# Patient Record
Sex: MALE | Race: WHITE | NOT HISPANIC OR LATINO | Employment: OTHER | URBAN - METROPOLITAN AREA
[De-identification: names, ages, dates, MRNs, and addresses within clinical notes are randomized per-mention and may not be internally consistent; named-entity substitution may affect disease eponyms.]

---

## 2019-04-08 ENCOUNTER — HOSPITAL ENCOUNTER (INPATIENT)
Facility: HOSPITAL | Age: 69
LOS: 22 days | Discharge: HOME HEALTH | DRG: 885 | End: 2019-05-01
Attending: EMERGENCY MEDICINE | Admitting: PSYCHIATRY & NEUROLOGY
Payer: MEDICARE

## 2019-04-08 DIAGNOSIS — L60.2 LONG TOENAIL: ICD-10-CM

## 2019-04-08 DIAGNOSIS — H53.2 DIPLOPIA: ICD-10-CM

## 2019-04-08 DIAGNOSIS — I63.9 CEREBROVASCULAR ACCIDENT (CVA), UNSPECIFIED MECHANISM (HCC): ICD-10-CM

## 2019-04-08 DIAGNOSIS — F10.929 ALCOHOLIC INTOXICATION WITH COMPLICATION (HCC): ICD-10-CM

## 2019-04-08 DIAGNOSIS — F29 PSYCHOSIS (HCC): Primary | ICD-10-CM

## 2019-04-08 DIAGNOSIS — R47.81 SLURRED SPEECH: ICD-10-CM

## 2019-04-08 LAB
ALBUMIN SERPL BCP-MCNC: 4 G/DL (ref 3.5–5.7)
ALP SERPL-CCNC: 41 U/L (ref 55–165)
ALT SERPL W P-5'-P-CCNC: 6 U/L (ref 7–52)
AMPHETAMINES SERPL QL SCN: NEGATIVE
ANION GAP SERPL CALCULATED.3IONS-SCNC: 15 MMOL/L (ref 4–13)
APTT PPP: 34 SECONDS (ref 26–38)
AST SERPL W P-5'-P-CCNC: 22 U/L (ref 13–39)
BACTERIA UR QL AUTO: ABNORMAL /HPF
BARBITURATES UR QL: NEGATIVE
BASOPHILS # BLD AUTO: 0 THOUSANDS/ΜL (ref 0–0.1)
BASOPHILS NFR BLD AUTO: 1 % (ref 0–2)
BENZODIAZ UR QL: NEGATIVE
BILIRUB SERPL-MCNC: 0.6 MG/DL (ref 0.2–1)
BILIRUB UR QL STRIP: ABNORMAL
BUN SERPL-MCNC: 11 MG/DL (ref 7–25)
CALCIUM SERPL-MCNC: 9 MG/DL (ref 8.6–10.5)
CHLORIDE SERPL-SCNC: 94 MMOL/L (ref 98–107)
CLARITY UR: CLEAR
CO2 SERPL-SCNC: 20 MMOL/L (ref 21–31)
COCAINE UR QL: NEGATIVE
COLOR UR: ABNORMAL
CREAT SERPL-MCNC: 0.83 MG/DL (ref 0.7–1.3)
EOSINOPHIL # BLD AUTO: 0.1 THOUSAND/ΜL (ref 0–0.61)
EOSINOPHIL NFR BLD AUTO: 1 % (ref 0–5)
ERYTHROCYTE [DISTWIDTH] IN BLOOD BY AUTOMATED COUNT: 16 % (ref 11.5–14.5)
ETHANOL SERPL-MCNC: 109.7 MG/DL
ETHANOL SERPL-MCNC: 36.9 MG/DL
GFR SERPL CREATININE-BSD FRML MDRD: 92 ML/MIN/1.73SQ M
GLUCOSE SERPL-MCNC: 101 MG/DL (ref 65–99)
GLUCOSE UR STRIP-MCNC: NEGATIVE MG/DL
HCT VFR BLD AUTO: 48 % (ref 42–47)
HGB BLD-MCNC: 16.6 G/DL (ref 14–18)
HGB UR QL STRIP.AUTO: NEGATIVE
INR PPP: 0.92 (ref 0.9–1.5)
KETONES UR STRIP-MCNC: ABNORMAL MG/DL
LEUKOCYTE ESTERASE UR QL STRIP: NEGATIVE
LYMPHOCYTES # BLD AUTO: 2.5 THOUSANDS/ΜL (ref 0.6–4.47)
LYMPHOCYTES NFR BLD AUTO: 44 % (ref 21–51)
MAGNESIUM SERPL-MCNC: 2.2 MG/DL (ref 1.9–2.7)
MCH RBC QN AUTO: 35.9 PG (ref 26–34)
MCHC RBC AUTO-ENTMCNC: 34.6 G/DL (ref 31–37)
MCV RBC AUTO: 104 FL (ref 81–99)
METHADONE UR QL: NEGATIVE
MONOCYTES # BLD AUTO: 1 THOUSAND/ΜL (ref 0.17–1.22)
MONOCYTES NFR BLD AUTO: 17 % (ref 2–12)
MUCOUS THREADS UR QL AUTO: ABNORMAL
NEUTROPHILS # BLD AUTO: 2.1 THOUSANDS/ΜL (ref 1.4–6.5)
NEUTS SEG NFR BLD AUTO: 37 % (ref 42–75)
NITRITE UR QL STRIP: NEGATIVE
NON-SQ EPI CELLS URNS QL MICRO: ABNORMAL /HPF
OPIATES UR QL SCN: POSITIVE
PCP UR QL: NEGATIVE
PH UR STRIP.AUTO: 6 [PH]
PLATELET # BLD AUTO: 260 THOUSANDS/UL (ref 149–390)
PMV BLD AUTO: 7.5 FL (ref 8.6–11.7)
POTASSIUM SERPL-SCNC: 3.3 MMOL/L (ref 3.5–5.5)
PROT SERPL-MCNC: 7.3 G/DL (ref 6.4–8.9)
PROT UR STRIP-MCNC: ABNORMAL MG/DL
PROTHROMBIN TIME: 10.7 SECONDS (ref 10.2–13)
RBC # BLD AUTO: 4.63 MILLION/UL (ref 4.3–5.9)
RBC #/AREA URNS AUTO: ABNORMAL /HPF
SODIUM SERPL-SCNC: 129 MMOL/L (ref 134–143)
SP GR UR STRIP.AUTO: 1.02 (ref 1–1.03)
THC UR QL: POSITIVE
TSH SERPL DL<=0.05 MIU/L-ACNC: 1.94 UIU/ML (ref 0.45–5.33)
UROBILINOGEN UR QL STRIP.AUTO: 0.2 E.U./DL
WBC # BLD AUTO: 5.6 THOUSAND/UL (ref 4.8–10.8)
WBC #/AREA URNS AUTO: ABNORMAL /HPF

## 2019-04-08 PROCEDURE — 84443 ASSAY THYROID STIM HORMONE: CPT | Performed by: EMERGENCY MEDICINE

## 2019-04-08 PROCEDURE — 83735 ASSAY OF MAGNESIUM: CPT | Performed by: EMERGENCY MEDICINE

## 2019-04-08 PROCEDURE — 80307 DRUG TEST PRSMV CHEM ANLYZR: CPT | Performed by: EMERGENCY MEDICINE

## 2019-04-08 PROCEDURE — 93005 ELECTROCARDIOGRAM TRACING: CPT

## 2019-04-08 PROCEDURE — 80320 DRUG SCREEN QUANTALCOHOLS: CPT | Performed by: EMERGENCY MEDICINE

## 2019-04-08 PROCEDURE — 85025 COMPLETE CBC W/AUTO DIFF WBC: CPT | Performed by: EMERGENCY MEDICINE

## 2019-04-08 PROCEDURE — 85730 THROMBOPLASTIN TIME PARTIAL: CPT | Performed by: EMERGENCY MEDICINE

## 2019-04-08 PROCEDURE — 85610 PROTHROMBIN TIME: CPT | Performed by: EMERGENCY MEDICINE

## 2019-04-08 PROCEDURE — 80053 COMPREHEN METABOLIC PANEL: CPT | Performed by: EMERGENCY MEDICINE

## 2019-04-08 PROCEDURE — 36415 COLL VENOUS BLD VENIPUNCTURE: CPT | Performed by: EMERGENCY MEDICINE

## 2019-04-08 PROCEDURE — 81001 URINALYSIS AUTO W/SCOPE: CPT | Performed by: EMERGENCY MEDICINE

## 2019-04-08 PROCEDURE — 99285 EMERGENCY DEPT VISIT HI MDM: CPT

## 2019-04-08 RX ORDER — QUETIAPINE FUMARATE 100 MG/1
100 TABLET, FILM COATED ORAL ONCE
Status: COMPLETED | OUTPATIENT
Start: 2019-04-08 | End: 2019-04-08

## 2019-04-08 RX ORDER — ALBUTEROL SULFATE 90 UG/1
2 AEROSOL, METERED RESPIRATORY (INHALATION) EVERY 6 HOURS PRN
COMMUNITY
Start: 2019-03-13 | End: 2019-05-15 | Stop reason: HOSPADM

## 2019-04-08 RX ORDER — QUETIAPINE FUMARATE 300 MG/1
300 TABLET, FILM COATED ORAL ONCE
Status: DISCONTINUED | OUTPATIENT
Start: 2019-04-08 | End: 2019-04-08

## 2019-04-08 RX ORDER — QUETIAPINE FUMARATE 300 MG/1
300 TABLET, FILM COATED ORAL
COMMUNITY
Start: 2019-03-27 | End: 2019-05-15 | Stop reason: HOSPADM

## 2019-04-08 RX ORDER — GABAPENTIN 300 MG/1
300 CAPSULE ORAL 3 TIMES DAILY
Status: ON HOLD | COMMUNITY
Start: 2019-01-30 | End: 2019-05-14 | Stop reason: SDUPTHER

## 2019-04-08 RX ORDER — QUETIAPINE FUMARATE 50 MG/1
50 TABLET, FILM COATED ORAL DAILY
COMMUNITY
Start: 2019-03-27 | End: 2019-05-15 | Stop reason: HOSPADM

## 2019-04-08 RX ORDER — CYCLOBENZAPRINE HCL 10 MG
10 TABLET ORAL DAILY
Status: ON HOLD | COMMUNITY
Start: 2019-01-30 | End: 2019-05-14 | Stop reason: SDUPTHER

## 2019-04-08 RX ORDER — POTASSIUM CHLORIDE 20 MEQ/1
40 TABLET, EXTENDED RELEASE ORAL ONCE
Status: COMPLETED | OUTPATIENT
Start: 2019-04-08 | End: 2019-04-08

## 2019-04-08 RX ORDER — ACETAMINOPHEN 325 MG/1
650 TABLET ORAL ONCE
Status: COMPLETED | OUTPATIENT
Start: 2019-04-08 | End: 2019-04-08

## 2019-04-08 RX ORDER — LORAZEPAM 1 MG/1
1 TABLET ORAL ONCE
Status: COMPLETED | OUTPATIENT
Start: 2019-04-08 | End: 2019-04-08

## 2019-04-08 RX ORDER — METOPROLOL TARTRATE 50 MG/1
50 TABLET, FILM COATED ORAL DAILY
COMMUNITY
Start: 2019-03-17 | End: 2019-05-15 | Stop reason: HOSPADM

## 2019-04-08 RX ORDER — QUETIAPINE FUMARATE 200 MG/1
200 TABLET, FILM COATED ORAL DAILY
COMMUNITY
Start: 2019-03-27 | End: 2019-05-15 | Stop reason: HOSPADM

## 2019-04-08 RX ORDER — QUETIAPINE FUMARATE 200 MG/1
200 TABLET, FILM COATED ORAL ONCE
Status: COMPLETED | OUTPATIENT
Start: 2019-04-08 | End: 2019-04-08

## 2019-04-08 RX ORDER — AMLODIPINE BESYLATE 10 MG/1
10 TABLET ORAL DAILY
Status: ON HOLD | COMMUNITY
Start: 2019-03-27 | End: 2019-05-14 | Stop reason: SDUPTHER

## 2019-04-08 RX ORDER — TAMSULOSIN HYDROCHLORIDE 0.4 MG/1
0.4 CAPSULE ORAL
Status: ON HOLD | COMMUNITY
Start: 2019-03-27 | End: 2019-05-14 | Stop reason: SDUPTHER

## 2019-04-08 RX ADMIN — QUETIAPINE FUMARATE 200 MG: 200 TABLET ORAL at 21:54

## 2019-04-08 RX ADMIN — ACETAMINOPHEN 650 MG: 325 TABLET ORAL at 17:40

## 2019-04-08 RX ADMIN — POTASSIUM CHLORIDE 40 MEQ: 1500 TABLET, EXTENDED RELEASE ORAL at 18:20

## 2019-04-08 RX ADMIN — QUETIAPINE FUMARATE 100 MG: 100 TABLET ORAL at 21:54

## 2019-04-08 RX ADMIN — LORAZEPAM 1 MG: 1 TABLET ORAL at 17:40

## 2019-04-09 PROBLEM — F33.9 RECURRENT MAJOR DEPRESSIVE DISORDER (HCC): Status: ACTIVE | Noted: 2019-04-09

## 2019-04-09 LAB
ALBUMIN SERPL BCP-MCNC: 3.6 G/DL (ref 3.5–5.7)
ALP SERPL-CCNC: 35 U/L (ref 55–165)
ALT SERPL W P-5'-P-CCNC: 5 U/L (ref 7–52)
ANION GAP SERPL CALCULATED.3IONS-SCNC: 7 MMOL/L (ref 4–13)
AST SERPL W P-5'-P-CCNC: 19 U/L (ref 13–39)
ATRIAL RATE: 82 BPM
BILIRUB SERPL-MCNC: 0.5 MG/DL (ref 0.2–1)
BUN SERPL-MCNC: 12 MG/DL (ref 7–25)
CALCIUM SERPL-MCNC: 9.1 MG/DL (ref 8.6–10.5)
CHLORIDE SERPL-SCNC: 97 MMOL/L (ref 98–107)
CO2 SERPL-SCNC: 26 MMOL/L (ref 21–31)
CREAT SERPL-MCNC: 0.84 MG/DL (ref 0.7–1.3)
GFR SERPL CREATININE-BSD FRML MDRD: 91 ML/MIN/1.73SQ M
GLUCOSE SERPL-MCNC: 158 MG/DL (ref 65–99)
P AXIS: 79 DEGREES
POTASSIUM SERPL-SCNC: 3.5 MMOL/L (ref 3.5–5.5)
PR INTERVAL: 156 MS
PROT SERPL-MCNC: 6.4 G/DL (ref 6.4–8.9)
QRS AXIS: 33 DEGREES
QRSD INTERVAL: 86 MS
QT INTERVAL: 372 MS
QTC INTERVAL: 434 MS
SODIUM SERPL-SCNC: 130 MMOL/L (ref 134–143)
T WAVE AXIS: 57 DEGREES
VENTRICULAR RATE: 82 BPM

## 2019-04-09 PROCEDURE — 36415 COLL VENOUS BLD VENIPUNCTURE: CPT | Performed by: EMERGENCY MEDICINE

## 2019-04-09 PROCEDURE — 93010 ELECTROCARDIOGRAM REPORT: CPT | Performed by: INTERNAL MEDICINE

## 2019-04-09 PROCEDURE — 80053 COMPREHEN METABOLIC PANEL: CPT | Performed by: EMERGENCY MEDICINE

## 2019-04-09 PROCEDURE — 99223 1ST HOSP IP/OBS HIGH 75: CPT | Performed by: PSYCHIATRY & NEUROLOGY

## 2019-04-09 RX ORDER — THIAMINE MONONITRATE (VIT B1) 100 MG
100 TABLET ORAL DAILY
Status: DISCONTINUED | OUTPATIENT
Start: 2019-04-09 | End: 2019-05-01 | Stop reason: HOSPADM

## 2019-04-09 RX ORDER — GABAPENTIN 300 MG/1
300 CAPSULE ORAL 3 TIMES DAILY
Status: DISCONTINUED | OUTPATIENT
Start: 2019-04-09 | End: 2019-05-01 | Stop reason: HOSPADM

## 2019-04-09 RX ORDER — ACETAMINOPHEN 325 MG/1
650 TABLET ORAL EVERY 4 HOURS PRN
Status: DISCONTINUED | OUTPATIENT
Start: 2019-04-09 | End: 2019-05-01 | Stop reason: HOSPADM

## 2019-04-09 RX ORDER — ACETAMINOPHEN 325 MG/1
650 TABLET ORAL EVERY 6 HOURS PRN
Status: DISCONTINUED | OUTPATIENT
Start: 2019-04-09 | End: 2019-05-01 | Stop reason: HOSPADM

## 2019-04-09 RX ORDER — ALBUTEROL SULFATE 90 UG/1
2 AEROSOL, METERED RESPIRATORY (INHALATION) EVERY 6 HOURS PRN
Status: DISCONTINUED | OUTPATIENT
Start: 2019-04-09 | End: 2019-04-26

## 2019-04-09 RX ORDER — TAMSULOSIN HYDROCHLORIDE 0.4 MG/1
0.4 CAPSULE ORAL
Status: DISCONTINUED | OUTPATIENT
Start: 2019-04-09 | End: 2019-05-01 | Stop reason: HOSPADM

## 2019-04-09 RX ORDER — MAGNESIUM HYDROXIDE/ALUMINUM HYDROXICE/SIMETHICONE 120; 1200; 1200 MG/30ML; MG/30ML; MG/30ML
30 SUSPENSION ORAL EVERY 4 HOURS PRN
Status: DISCONTINUED | OUTPATIENT
Start: 2019-04-09 | End: 2019-05-01 | Stop reason: HOSPADM

## 2019-04-09 RX ORDER — AMLODIPINE BESYLATE 5 MG/1
10 TABLET ORAL DAILY
Status: DISCONTINUED | OUTPATIENT
Start: 2019-04-09 | End: 2019-05-01 | Stop reason: HOSPADM

## 2019-04-09 RX ORDER — ACETAMINOPHEN 325 MG/1
975 TABLET ORAL EVERY 6 HOURS PRN
Status: DISCONTINUED | OUTPATIENT
Start: 2019-04-09 | End: 2019-04-11

## 2019-04-09 RX ORDER — LORAZEPAM 1 MG/1
2 TABLET ORAL EVERY 4 HOURS PRN
Status: DISCONTINUED | OUTPATIENT
Start: 2019-04-09 | End: 2019-05-01 | Stop reason: HOSPADM

## 2019-04-09 RX ORDER — NICOTINE 21 MG/24HR
14 PATCH, TRANSDERMAL 24 HOURS TRANSDERMAL DAILY
Status: DISCONTINUED | OUTPATIENT
Start: 2019-04-09 | End: 2019-05-01 | Stop reason: HOSPADM

## 2019-04-09 RX ORDER — METOPROLOL TARTRATE 50 MG/1
50 TABLET, FILM COATED ORAL DAILY
Status: DISCONTINUED | OUTPATIENT
Start: 2019-04-09 | End: 2019-05-01 | Stop reason: HOSPADM

## 2019-04-09 RX ORDER — HYDROXYZINE HYDROCHLORIDE 25 MG/1
50 TABLET, FILM COATED ORAL EVERY 6 HOURS PRN
Status: DISCONTINUED | OUTPATIENT
Start: 2019-04-09 | End: 2019-05-01 | Stop reason: HOSPADM

## 2019-04-09 RX ORDER — HALOPERIDOL 5 MG
5 TABLET ORAL EVERY 8 HOURS PRN
Status: DISCONTINUED | OUTPATIENT
Start: 2019-04-09 | End: 2019-05-01 | Stop reason: HOSPADM

## 2019-04-09 RX ORDER — HALOPERIDOL 5 MG/ML
5 INJECTION INTRAMUSCULAR EVERY 8 HOURS PRN
Status: DISCONTINUED | OUTPATIENT
Start: 2019-04-09 | End: 2019-05-01 | Stop reason: HOSPADM

## 2019-04-09 RX ORDER — TRAZODONE HYDROCHLORIDE 50 MG/1
50 TABLET ORAL
Status: DISCONTINUED | OUTPATIENT
Start: 2019-04-09 | End: 2019-05-01 | Stop reason: HOSPADM

## 2019-04-09 RX ORDER — FOLIC ACID 1 MG/1
1 TABLET ORAL DAILY
Status: DISCONTINUED | OUTPATIENT
Start: 2019-04-09 | End: 2019-05-01 | Stop reason: HOSPADM

## 2019-04-09 RX ORDER — CYCLOBENZAPRINE HCL 10 MG
10 TABLET ORAL 3 TIMES DAILY PRN
Status: DISCONTINUED | OUTPATIENT
Start: 2019-04-09 | End: 2019-05-01

## 2019-04-09 RX ADMIN — TAMSULOSIN HYDROCHLORIDE 0.4 MG: 0.4 CAPSULE ORAL at 16:48

## 2019-04-09 RX ADMIN — GABAPENTIN 300 MG: 300 CAPSULE ORAL at 10:24

## 2019-04-09 RX ADMIN — AMLODIPINE BESYLATE 10 MG: 5 TABLET ORAL at 10:24

## 2019-04-09 RX ADMIN — Medication 100 MG: at 09:07

## 2019-04-09 RX ADMIN — ACETAMINOPHEN 975 MG: 325 TABLET ORAL at 21:22

## 2019-04-09 RX ADMIN — ALBUTEROL SULFATE 2 PUFF: 90 AEROSOL, METERED RESPIRATORY (INHALATION) at 11:27

## 2019-04-09 RX ADMIN — QUETIAPINE FUMARATE 300 MG: 100 TABLET ORAL at 21:25

## 2019-04-09 RX ADMIN — GABAPENTIN 300 MG: 300 CAPSULE ORAL at 21:24

## 2019-04-09 RX ADMIN — Medication 1 TABLET: at 09:07

## 2019-04-09 RX ADMIN — METOPROLOL TARTRATE 50 MG: 50 TABLET, FILM COATED ORAL at 10:24

## 2019-04-09 RX ADMIN — GABAPENTIN 300 MG: 300 CAPSULE ORAL at 16:48

## 2019-04-09 RX ADMIN — FOLIC ACID 1 MG: 1 TABLET ORAL at 09:07

## 2019-04-09 RX ADMIN — NICOTINE 14 MG: 14 PATCH, EXTENDED RELEASE TRANSDERMAL at 11:25

## 2019-04-10 LAB
ANION GAP SERPL CALCULATED.3IONS-SCNC: 10 MMOL/L (ref 4–13)
BUN SERPL-MCNC: 15 MG/DL (ref 7–25)
CALCIUM SERPL-MCNC: 10.1 MG/DL (ref 8.6–10.5)
CHLORIDE SERPL-SCNC: 101 MMOL/L (ref 98–107)
CO2 SERPL-SCNC: 28 MMOL/L (ref 21–31)
CREAT SERPL-MCNC: 0.87 MG/DL (ref 0.7–1.3)
GFR SERPL CREATININE-BSD FRML MDRD: 90 ML/MIN/1.73SQ M
GLUCOSE SERPL-MCNC: 130 MG/DL (ref 65–99)
POTASSIUM SERPL-SCNC: 3.8 MMOL/L (ref 3.5–5.5)
SODIUM SERPL-SCNC: 139 MMOL/L (ref 134–143)

## 2019-04-10 PROCEDURE — 82306 VITAMIN D 25 HYDROXY: CPT | Performed by: PHYSICIAN ASSISTANT

## 2019-04-10 PROCEDURE — G8979 MOBILITY GOAL STATUS: HCPCS

## 2019-04-10 PROCEDURE — 80048 BASIC METABOLIC PNL TOTAL CA: CPT | Performed by: PSYCHIATRY & NEUROLOGY

## 2019-04-10 PROCEDURE — 97163 PT EVAL HIGH COMPLEX 45 MIN: CPT

## 2019-04-10 PROCEDURE — 99232 SBSQ HOSP IP/OBS MODERATE 35: CPT | Performed by: PSYCHIATRY & NEUROLOGY

## 2019-04-10 PROCEDURE — 82607 VITAMIN B-12: CPT | Performed by: PHYSICIAN ASSISTANT

## 2019-04-10 PROCEDURE — G8978 MOBILITY CURRENT STATUS: HCPCS

## 2019-04-10 RX ORDER — QUETIAPINE FUMARATE 400 MG/1
400 TABLET, FILM COATED ORAL
Status: DISCONTINUED | OUTPATIENT
Start: 2019-04-10 | End: 2019-04-18

## 2019-04-10 RX ADMIN — GABAPENTIN 300 MG: 300 CAPSULE ORAL at 16:45

## 2019-04-10 RX ADMIN — GABAPENTIN 300 MG: 300 CAPSULE ORAL at 09:15

## 2019-04-10 RX ADMIN — TAMSULOSIN HYDROCHLORIDE 0.4 MG: 0.4 CAPSULE ORAL at 16:44

## 2019-04-10 RX ADMIN — NICOTINE 14 MG: 14 PATCH, EXTENDED RELEASE TRANSDERMAL at 09:16

## 2019-04-10 RX ADMIN — QUETIAPINE FUMARATE 400 MG: 400 TABLET, FILM COATED ORAL at 21:51

## 2019-04-10 RX ADMIN — FOLIC ACID 1 MG: 1 TABLET ORAL at 09:15

## 2019-04-10 RX ADMIN — METOPROLOL TARTRATE 50 MG: 50 TABLET, FILM COATED ORAL at 09:15

## 2019-04-10 RX ADMIN — Medication 1 TABLET: at 09:15

## 2019-04-10 RX ADMIN — ALBUTEROL SULFATE 2 PUFF: 90 AEROSOL, METERED RESPIRATORY (INHALATION) at 16:47

## 2019-04-10 RX ADMIN — Medication 100 MG: at 09:15

## 2019-04-10 RX ADMIN — GABAPENTIN 300 MG: 300 CAPSULE ORAL at 21:51

## 2019-04-10 RX ADMIN — AMLODIPINE BESYLATE 10 MG: 5 TABLET ORAL at 09:15

## 2019-04-11 LAB
25(OH)D3 SERPL-MCNC: 34.3 NG/ML (ref 30–100)
VIT B12 SERPL-MCNC: 320 PG/ML (ref 100–900)

## 2019-04-11 PROCEDURE — 99232 SBSQ HOSP IP/OBS MODERATE 35: CPT | Performed by: PSYCHIATRY & NEUROLOGY

## 2019-04-11 RX ORDER — TRAMADOL HYDROCHLORIDE 50 MG/1
50 TABLET ORAL EVERY 8 HOURS PRN
Status: DISCONTINUED | OUTPATIENT
Start: 2019-04-11 | End: 2019-05-01 | Stop reason: HOSPADM

## 2019-04-11 RX ORDER — MELATONIN
1000 DAILY
Status: DISCONTINUED | OUTPATIENT
Start: 2019-04-11 | End: 2019-05-01 | Stop reason: HOSPADM

## 2019-04-11 RX ORDER — CYANOCOBALAMIN 1000 UG/ML
1000 INJECTION INTRAMUSCULAR; SUBCUTANEOUS
Status: DISCONTINUED | OUTPATIENT
Start: 2019-04-11 | End: 2019-05-01 | Stop reason: HOSPADM

## 2019-04-11 RX ADMIN — METOPROLOL TARTRATE 50 MG: 50 TABLET, FILM COATED ORAL at 08:31

## 2019-04-11 RX ADMIN — Medication 1 TABLET: at 08:30

## 2019-04-11 RX ADMIN — FOLIC ACID 1 MG: 1 TABLET ORAL at 08:31

## 2019-04-11 RX ADMIN — TAMSULOSIN HYDROCHLORIDE 0.4 MG: 0.4 CAPSULE ORAL at 16:22

## 2019-04-11 RX ADMIN — VITAMIN D, TAB 1000IU (100/BT) 1000 UNITS: 25 TAB at 14:11

## 2019-04-11 RX ADMIN — NICOTINE 14 MG: 14 PATCH, EXTENDED RELEASE TRANSDERMAL at 08:31

## 2019-04-11 RX ADMIN — CYANOCOBALAMIN 1000 MCG: 1000 INJECTION, SOLUTION INTRAMUSCULAR at 16:22

## 2019-04-11 RX ADMIN — Medication 100 MG: at 08:31

## 2019-04-11 RX ADMIN — QUETIAPINE FUMARATE 400 MG: 400 TABLET, FILM COATED ORAL at 21:39

## 2019-04-11 RX ADMIN — AMLODIPINE BESYLATE 10 MG: 5 TABLET ORAL at 08:30

## 2019-04-11 RX ADMIN — GABAPENTIN 300 MG: 300 CAPSULE ORAL at 08:31

## 2019-04-11 RX ADMIN — GABAPENTIN 300 MG: 300 CAPSULE ORAL at 21:39

## 2019-04-11 RX ADMIN — GABAPENTIN 300 MG: 300 CAPSULE ORAL at 16:22

## 2019-04-12 PROCEDURE — 99232 SBSQ HOSP IP/OBS MODERATE 35: CPT | Performed by: PSYCHIATRY & NEUROLOGY

## 2019-04-12 PROCEDURE — 97116 GAIT TRAINING THERAPY: CPT

## 2019-04-12 RX ORDER — QUETIAPINE FUMARATE 100 MG/1
100 TABLET, FILM COATED ORAL EVERY MORNING
Status: DISCONTINUED | OUTPATIENT
Start: 2019-04-13 | End: 2019-04-15

## 2019-04-12 RX ORDER — LIDOCAINE 50 MG/G
1 PATCH TOPICAL DAILY
Status: DISCONTINUED | OUTPATIENT
Start: 2019-04-12 | End: 2019-05-01 | Stop reason: HOSPADM

## 2019-04-12 RX ADMIN — AMLODIPINE BESYLATE 10 MG: 5 TABLET ORAL at 08:22

## 2019-04-12 RX ADMIN — ALBUTEROL SULFATE 2 PUFF: 90 AEROSOL, METERED RESPIRATORY (INHALATION) at 13:00

## 2019-04-12 RX ADMIN — Medication 100 MG: at 08:22

## 2019-04-12 RX ADMIN — LIDOCAINE 1 PATCH: 50 PATCH TOPICAL at 15:50

## 2019-04-12 RX ADMIN — QUETIAPINE FUMARATE 400 MG: 400 TABLET, FILM COATED ORAL at 21:24

## 2019-04-12 RX ADMIN — FOLIC ACID 1 MG: 1 TABLET ORAL at 08:22

## 2019-04-12 RX ADMIN — GABAPENTIN 300 MG: 300 CAPSULE ORAL at 21:24

## 2019-04-12 RX ADMIN — METOPROLOL TARTRATE 50 MG: 50 TABLET, FILM COATED ORAL at 08:22

## 2019-04-12 RX ADMIN — NICOTINE 14 MG: 14 PATCH, EXTENDED RELEASE TRANSDERMAL at 08:23

## 2019-04-12 RX ADMIN — TAMSULOSIN HYDROCHLORIDE 0.4 MG: 0.4 CAPSULE ORAL at 15:54

## 2019-04-12 RX ADMIN — Medication 1 TABLET: at 08:22

## 2019-04-12 RX ADMIN — DICLOFENAC 2 G: 10 GEL TOPICAL at 15:50

## 2019-04-12 RX ADMIN — GABAPENTIN 300 MG: 300 CAPSULE ORAL at 15:54

## 2019-04-12 RX ADMIN — VITAMIN D, TAB 1000IU (100/BT) 1000 UNITS: 25 TAB at 08:22

## 2019-04-12 RX ADMIN — DICLOFENAC 2 G: 10 GEL TOPICAL at 21:25

## 2019-04-12 RX ADMIN — TRAMADOL HYDROCHLORIDE 50 MG: 50 TABLET, COATED ORAL at 10:44

## 2019-04-12 RX ADMIN — GABAPENTIN 300 MG: 300 CAPSULE ORAL at 08:22

## 2019-04-13 PROCEDURE — 99232 SBSQ HOSP IP/OBS MODERATE 35: CPT | Performed by: PSYCHIATRY & NEUROLOGY

## 2019-04-13 RX ADMIN — Medication 100 MG: at 09:01

## 2019-04-13 RX ADMIN — FOLIC ACID 1 MG: 1 TABLET ORAL at 09:00

## 2019-04-13 RX ADMIN — NICOTINE 14 MG: 14 PATCH, EXTENDED RELEASE TRANSDERMAL at 09:02

## 2019-04-13 RX ADMIN — QUETIAPINE FUMARATE 400 MG: 400 TABLET, FILM COATED ORAL at 21:55

## 2019-04-13 RX ADMIN — GABAPENTIN 300 MG: 300 CAPSULE ORAL at 09:00

## 2019-04-13 RX ADMIN — TRAMADOL HYDROCHLORIDE 50 MG: 50 TABLET, COATED ORAL at 21:55

## 2019-04-13 RX ADMIN — TAMSULOSIN HYDROCHLORIDE 0.4 MG: 0.4 CAPSULE ORAL at 17:58

## 2019-04-13 RX ADMIN — METOPROLOL TARTRATE 50 MG: 50 TABLET, FILM COATED ORAL at 09:00

## 2019-04-13 RX ADMIN — DICLOFENAC 2 G: 10 GEL TOPICAL at 12:40

## 2019-04-13 RX ADMIN — AMLODIPINE BESYLATE 10 MG: 5 TABLET ORAL at 09:01

## 2019-04-13 RX ADMIN — GABAPENTIN 300 MG: 300 CAPSULE ORAL at 17:58

## 2019-04-13 RX ADMIN — QUETIAPINE FUMARATE 100 MG: 100 TABLET ORAL at 09:00

## 2019-04-13 RX ADMIN — VITAMIN D, TAB 1000IU (100/BT) 1000 UNITS: 25 TAB at 09:01

## 2019-04-13 RX ADMIN — DICLOFENAC 2 G: 10 GEL TOPICAL at 09:01

## 2019-04-13 RX ADMIN — GABAPENTIN 300 MG: 300 CAPSULE ORAL at 21:55

## 2019-04-13 RX ADMIN — LIDOCAINE 1 PATCH: 50 PATCH TOPICAL at 09:02

## 2019-04-13 RX ADMIN — Medication 1 TABLET: at 09:00

## 2019-04-14 PROCEDURE — 99232 SBSQ HOSP IP/OBS MODERATE 35: CPT | Performed by: PSYCHIATRY & NEUROLOGY

## 2019-04-14 RX ADMIN — VITAMIN D, TAB 1000IU (100/BT) 1000 UNITS: 25 TAB at 08:49

## 2019-04-14 RX ADMIN — GABAPENTIN 300 MG: 300 CAPSULE ORAL at 08:49

## 2019-04-14 RX ADMIN — DICLOFENAC 2 G: 10 GEL TOPICAL at 13:12

## 2019-04-14 RX ADMIN — TAMSULOSIN HYDROCHLORIDE 0.4 MG: 0.4 CAPSULE ORAL at 17:16

## 2019-04-14 RX ADMIN — LIDOCAINE 1 PATCH: 50 PATCH TOPICAL at 08:50

## 2019-04-14 RX ADMIN — QUETIAPINE FUMARATE 100 MG: 100 TABLET ORAL at 08:49

## 2019-04-14 RX ADMIN — QUETIAPINE FUMARATE 400 MG: 400 TABLET, FILM COATED ORAL at 22:01

## 2019-04-14 RX ADMIN — DICLOFENAC 2 G: 10 GEL TOPICAL at 17:16

## 2019-04-14 RX ADMIN — TRAMADOL HYDROCHLORIDE 50 MG: 50 TABLET, COATED ORAL at 22:01

## 2019-04-14 RX ADMIN — Medication 1 TABLET: at 08:49

## 2019-04-14 RX ADMIN — GABAPENTIN 300 MG: 300 CAPSULE ORAL at 17:16

## 2019-04-14 RX ADMIN — Medication 100 MG: at 08:49

## 2019-04-14 RX ADMIN — GABAPENTIN 300 MG: 300 CAPSULE ORAL at 22:01

## 2019-04-14 RX ADMIN — NICOTINE 14 MG: 14 PATCH, EXTENDED RELEASE TRANSDERMAL at 08:49

## 2019-04-14 RX ADMIN — ALBUTEROL SULFATE 2 PUFF: 90 AEROSOL, METERED RESPIRATORY (INHALATION) at 08:58

## 2019-04-14 RX ADMIN — AMLODIPINE BESYLATE 10 MG: 5 TABLET ORAL at 08:49

## 2019-04-14 RX ADMIN — DICLOFENAC 2 G: 10 GEL TOPICAL at 08:49

## 2019-04-14 RX ADMIN — METOPROLOL TARTRATE 50 MG: 50 TABLET, FILM COATED ORAL at 08:49

## 2019-04-14 RX ADMIN — FOLIC ACID 1 MG: 1 TABLET ORAL at 08:49

## 2019-04-15 PROCEDURE — 99233 SBSQ HOSP IP/OBS HIGH 50: CPT | Performed by: PSYCHIATRY & NEUROLOGY

## 2019-04-15 RX ORDER — QUETIAPINE FUMARATE 200 MG/1
200 TABLET, FILM COATED ORAL EVERY MORNING
Status: DISCONTINUED | OUTPATIENT
Start: 2019-04-16 | End: 2019-04-18

## 2019-04-15 RX ADMIN — ALBUTEROL SULFATE 2 PUFF: 90 AEROSOL, METERED RESPIRATORY (INHALATION) at 10:01

## 2019-04-15 RX ADMIN — DICLOFENAC 2 G: 10 GEL TOPICAL at 13:18

## 2019-04-15 RX ADMIN — METOPROLOL TARTRATE 50 MG: 50 TABLET, FILM COATED ORAL at 10:00

## 2019-04-15 RX ADMIN — Medication 100 MG: at 10:00

## 2019-04-15 RX ADMIN — QUETIAPINE FUMARATE 100 MG: 100 TABLET ORAL at 10:00

## 2019-04-15 RX ADMIN — DICLOFENAC 2 G: 10 GEL TOPICAL at 21:23

## 2019-04-15 RX ADMIN — Medication 1 TABLET: at 10:00

## 2019-04-15 RX ADMIN — DICLOFENAC 2 G: 10 GEL TOPICAL at 10:01

## 2019-04-15 RX ADMIN — NICOTINE 14 MG: 14 PATCH, EXTENDED RELEASE TRANSDERMAL at 10:00

## 2019-04-15 RX ADMIN — QUETIAPINE FUMARATE 400 MG: 400 TABLET, FILM COATED ORAL at 21:23

## 2019-04-15 RX ADMIN — GABAPENTIN 300 MG: 300 CAPSULE ORAL at 16:16

## 2019-04-15 RX ADMIN — AMLODIPINE BESYLATE 10 MG: 5 TABLET ORAL at 10:00

## 2019-04-15 RX ADMIN — GABAPENTIN 300 MG: 300 CAPSULE ORAL at 10:00

## 2019-04-15 RX ADMIN — FOLIC ACID 1 MG: 1 TABLET ORAL at 10:00

## 2019-04-15 RX ADMIN — DICLOFENAC 2 G: 10 GEL TOPICAL at 17:51

## 2019-04-15 RX ADMIN — VITAMIN D, TAB 1000IU (100/BT) 1000 UNITS: 25 TAB at 10:00

## 2019-04-15 RX ADMIN — LIDOCAINE 1 PATCH: 50 PATCH TOPICAL at 10:01

## 2019-04-15 RX ADMIN — GABAPENTIN 300 MG: 300 CAPSULE ORAL at 21:23

## 2019-04-15 RX ADMIN — TAMSULOSIN HYDROCHLORIDE 0.4 MG: 0.4 CAPSULE ORAL at 16:16

## 2019-04-16 PROCEDURE — 97116 GAIT TRAINING THERAPY: CPT

## 2019-04-16 PROCEDURE — 99232 SBSQ HOSP IP/OBS MODERATE 35: CPT | Performed by: NURSE PRACTITIONER

## 2019-04-16 RX ORDER — CLOTRIMAZOLE 1 %
CREAM (GRAM) TOPICAL 2 TIMES DAILY
Status: COMPLETED | OUTPATIENT
Start: 2019-04-16 | End: 2019-04-22

## 2019-04-16 RX ORDER — ESCITALOPRAM OXALATE 5 MG/1
5 TABLET ORAL DAILY
Status: DISCONTINUED | OUTPATIENT
Start: 2019-04-17 | End: 2019-04-19

## 2019-04-16 RX ADMIN — QUETIAPINE FUMARATE 400 MG: 400 TABLET, FILM COATED ORAL at 21:29

## 2019-04-16 RX ADMIN — TRAMADOL HYDROCHLORIDE 50 MG: 50 TABLET, COATED ORAL at 21:28

## 2019-04-16 RX ADMIN — AMLODIPINE BESYLATE 10 MG: 5 TABLET ORAL at 09:28

## 2019-04-16 RX ADMIN — Medication 100 MG: at 09:28

## 2019-04-16 RX ADMIN — DICLOFENAC 2 G: 10 GEL TOPICAL at 09:29

## 2019-04-16 RX ADMIN — DICLOFENAC 2 G: 10 GEL TOPICAL at 11:36

## 2019-04-16 RX ADMIN — FOLIC ACID 1 MG: 1 TABLET ORAL at 09:28

## 2019-04-16 RX ADMIN — LIDOCAINE 1 PATCH: 50 PATCH TOPICAL at 09:29

## 2019-04-16 RX ADMIN — Medication 1 TABLET: at 09:28

## 2019-04-16 RX ADMIN — GABAPENTIN 300 MG: 300 CAPSULE ORAL at 17:10

## 2019-04-16 RX ADMIN — TAMSULOSIN HYDROCHLORIDE 0.4 MG: 0.4 CAPSULE ORAL at 17:10

## 2019-04-16 RX ADMIN — DICLOFENAC 2 G: 10 GEL TOPICAL at 17:11

## 2019-04-16 RX ADMIN — VITAMIN D, TAB 1000IU (100/BT) 1000 UNITS: 25 TAB at 09:28

## 2019-04-16 RX ADMIN — CLOTRIMAZOLE: 10 CREAM TOPICAL at 11:36

## 2019-04-16 RX ADMIN — DICLOFENAC 2 G: 10 GEL TOPICAL at 21:29

## 2019-04-16 RX ADMIN — GABAPENTIN 300 MG: 300 CAPSULE ORAL at 09:29

## 2019-04-16 RX ADMIN — CLOTRIMAZOLE: 10 CREAM TOPICAL at 17:11

## 2019-04-16 RX ADMIN — NICOTINE 14 MG: 14 PATCH, EXTENDED RELEASE TRANSDERMAL at 09:29

## 2019-04-16 RX ADMIN — ALBUTEROL SULFATE 2 PUFF: 90 AEROSOL, METERED RESPIRATORY (INHALATION) at 17:36

## 2019-04-16 RX ADMIN — ALBUTEROL SULFATE 2 PUFF: 90 AEROSOL, METERED RESPIRATORY (INHALATION) at 09:30

## 2019-04-16 RX ADMIN — QUETIAPINE FUMARATE 200 MG: 200 TABLET ORAL at 09:28

## 2019-04-16 RX ADMIN — GABAPENTIN 300 MG: 300 CAPSULE ORAL at 21:29

## 2019-04-16 RX ADMIN — METOPROLOL TARTRATE 50 MG: 50 TABLET, FILM COATED ORAL at 09:28

## 2019-04-17 LAB
CHOLEST SERPL-MCNC: 161 MG/DL (ref 0–200)
EST. AVERAGE GLUCOSE BLD GHB EST-MCNC: 120 MG/DL
HBA1C MFR BLD: 5.8 % (ref 4.2–6.3)
HDLC SERPL-MCNC: 44 MG/DL (ref 40–60)
LDLC SERPL CALC-MCNC: 85 MG/DL (ref 0–100)
NONHDLC SERPL-MCNC: 117 MG/DL
TRIGL SERPL-MCNC: 160 MG/DL (ref 44–166)

## 2019-04-17 PROCEDURE — 97116 GAIT TRAINING THERAPY: CPT

## 2019-04-17 PROCEDURE — 99232 SBSQ HOSP IP/OBS MODERATE 35: CPT | Performed by: NURSE PRACTITIONER

## 2019-04-17 PROCEDURE — 80061 LIPID PANEL: CPT | Performed by: NURSE PRACTITIONER

## 2019-04-17 PROCEDURE — 83036 HEMOGLOBIN GLYCOSYLATED A1C: CPT | Performed by: NURSE PRACTITIONER

## 2019-04-17 RX ADMIN — CLOTRIMAZOLE: 10 CREAM TOPICAL at 08:54

## 2019-04-17 RX ADMIN — NICOTINE 14 MG: 14 PATCH, EXTENDED RELEASE TRANSDERMAL at 08:50

## 2019-04-17 RX ADMIN — TRAMADOL HYDROCHLORIDE 50 MG: 50 TABLET, COATED ORAL at 21:16

## 2019-04-17 RX ADMIN — LIDOCAINE 1 PATCH: 50 PATCH TOPICAL at 08:50

## 2019-04-17 RX ADMIN — CLOTRIMAZOLE: 10 CREAM TOPICAL at 17:54

## 2019-04-17 RX ADMIN — ALBUTEROL SULFATE 2 PUFF: 90 AEROSOL, METERED RESPIRATORY (INHALATION) at 09:01

## 2019-04-17 RX ADMIN — TAMSULOSIN HYDROCHLORIDE 0.4 MG: 0.4 CAPSULE ORAL at 16:34

## 2019-04-17 RX ADMIN — Medication 1 TABLET: at 08:50

## 2019-04-17 RX ADMIN — DICLOFENAC 2 G: 10 GEL TOPICAL at 08:54

## 2019-04-17 RX ADMIN — DICLOFENAC 2 G: 10 GEL TOPICAL at 21:25

## 2019-04-17 RX ADMIN — GABAPENTIN 300 MG: 300 CAPSULE ORAL at 08:49

## 2019-04-17 RX ADMIN — Medication 100 MG: at 08:49

## 2019-04-17 RX ADMIN — QUETIAPINE FUMARATE 200 MG: 200 TABLET ORAL at 08:49

## 2019-04-17 RX ADMIN — METOPROLOL TARTRATE 50 MG: 50 TABLET, FILM COATED ORAL at 08:49

## 2019-04-17 RX ADMIN — QUETIAPINE FUMARATE 400 MG: 400 TABLET, FILM COATED ORAL at 21:16

## 2019-04-17 RX ADMIN — DICLOFENAC 2 G: 10 GEL TOPICAL at 17:54

## 2019-04-17 RX ADMIN — GABAPENTIN 300 MG: 300 CAPSULE ORAL at 16:34

## 2019-04-17 RX ADMIN — GABAPENTIN 300 MG: 300 CAPSULE ORAL at 21:16

## 2019-04-17 RX ADMIN — VITAMIN D, TAB 1000IU (100/BT) 1000 UNITS: 25 TAB at 08:49

## 2019-04-17 RX ADMIN — CYCLOBENZAPRINE HYDROCHLORIDE 10 MG: 10 TABLET, FILM COATED ORAL at 09:37

## 2019-04-17 RX ADMIN — ESCITALOPRAM OXALATE 5 MG: 5 TABLET, FILM COATED ORAL at 08:49

## 2019-04-17 RX ADMIN — DICLOFENAC 2 G: 10 GEL TOPICAL at 12:29

## 2019-04-17 RX ADMIN — AMLODIPINE BESYLATE 10 MG: 5 TABLET ORAL at 08:49

## 2019-04-17 RX ADMIN — FOLIC ACID 1 MG: 1 TABLET ORAL at 08:50

## 2019-04-18 PROCEDURE — 99232 SBSQ HOSP IP/OBS MODERATE 35: CPT | Performed by: NURSE PRACTITIONER

## 2019-04-18 PROCEDURE — 97116 GAIT TRAINING THERAPY: CPT

## 2019-04-18 RX ADMIN — TRAMADOL HYDROCHLORIDE 50 MG: 50 TABLET, COATED ORAL at 21:43

## 2019-04-18 RX ADMIN — DICLOFENAC 2 G: 10 GEL TOPICAL at 08:53

## 2019-04-18 RX ADMIN — LIDOCAINE 1 PATCH: 50 PATCH TOPICAL at 08:53

## 2019-04-18 RX ADMIN — CLOTRIMAZOLE: 10 CREAM TOPICAL at 08:53

## 2019-04-18 RX ADMIN — VITAMIN D, TAB 1000IU (100/BT) 1000 UNITS: 25 TAB at 08:50

## 2019-04-18 RX ADMIN — NICOTINE 14 MG: 14 PATCH, EXTENDED RELEASE TRANSDERMAL at 08:52

## 2019-04-18 RX ADMIN — ESCITALOPRAM OXALATE 5 MG: 5 TABLET, FILM COATED ORAL at 08:51

## 2019-04-18 RX ADMIN — GABAPENTIN 300 MG: 300 CAPSULE ORAL at 17:53

## 2019-04-18 RX ADMIN — CYCLOBENZAPRINE HYDROCHLORIDE 10 MG: 10 TABLET, FILM COATED ORAL at 09:53

## 2019-04-18 RX ADMIN — Medication 100 MG: at 08:51

## 2019-04-18 RX ADMIN — FOLIC ACID 1 MG: 1 TABLET ORAL at 08:50

## 2019-04-18 RX ADMIN — TAMSULOSIN HYDROCHLORIDE 0.4 MG: 0.4 CAPSULE ORAL at 17:53

## 2019-04-18 RX ADMIN — DICLOFENAC 2 G: 10 GEL TOPICAL at 13:00

## 2019-04-18 RX ADMIN — QUETIAPINE FUMARATE 200 MG: 200 TABLET ORAL at 08:51

## 2019-04-18 RX ADMIN — CLOTRIMAZOLE: 10 CREAM TOPICAL at 17:54

## 2019-04-18 RX ADMIN — METOPROLOL TARTRATE 50 MG: 50 TABLET, FILM COATED ORAL at 08:51

## 2019-04-18 RX ADMIN — GABAPENTIN 300 MG: 300 CAPSULE ORAL at 21:43

## 2019-04-18 RX ADMIN — CYCLOBENZAPRINE HYDROCHLORIDE 10 MG: 10 TABLET, FILM COATED ORAL at 18:46

## 2019-04-18 RX ADMIN — GABAPENTIN 300 MG: 300 CAPSULE ORAL at 08:51

## 2019-04-18 RX ADMIN — ALBUTEROL SULFATE 2 PUFF: 90 AEROSOL, METERED RESPIRATORY (INHALATION) at 09:04

## 2019-04-18 RX ADMIN — AMLODIPINE BESYLATE 10 MG: 5 TABLET ORAL at 08:50

## 2019-04-18 RX ADMIN — Medication 1 TABLET: at 08:51

## 2019-04-18 RX ADMIN — QUETIAPINE FUMARATE 450 MG: 400 TABLET, FILM COATED ORAL at 21:43

## 2019-04-19 PROCEDURE — 99232 SBSQ HOSP IP/OBS MODERATE 35: CPT | Performed by: NURSE PRACTITIONER

## 2019-04-19 RX ORDER — ESCITALOPRAM OXALATE 10 MG/1
10 TABLET ORAL DAILY
Status: DISCONTINUED | OUTPATIENT
Start: 2019-04-20 | End: 2019-04-21

## 2019-04-19 RX ADMIN — CLOTRIMAZOLE: 10 CREAM TOPICAL at 17:40

## 2019-04-19 RX ADMIN — GABAPENTIN 300 MG: 300 CAPSULE ORAL at 21:49

## 2019-04-19 RX ADMIN — Medication 100 MG: at 08:34

## 2019-04-19 RX ADMIN — GABAPENTIN 300 MG: 300 CAPSULE ORAL at 15:10

## 2019-04-19 RX ADMIN — LIDOCAINE 1 PATCH: 50 PATCH TOPICAL at 09:31

## 2019-04-19 RX ADMIN — ESCITALOPRAM OXALATE 5 MG: 5 TABLET, FILM COATED ORAL at 08:33

## 2019-04-19 RX ADMIN — Medication 1 TABLET: at 08:34

## 2019-04-19 RX ADMIN — AMLODIPINE BESYLATE 10 MG: 5 TABLET ORAL at 08:33

## 2019-04-19 RX ADMIN — QUETIAPINE FUMARATE 500 MG: 400 TABLET ORAL at 21:49

## 2019-04-19 RX ADMIN — NICOTINE 14 MG: 14 PATCH, EXTENDED RELEASE TRANSDERMAL at 10:50

## 2019-04-19 RX ADMIN — CLOTRIMAZOLE: 10 CREAM TOPICAL at 10:49

## 2019-04-19 RX ADMIN — CYCLOBENZAPRINE HYDROCHLORIDE 10 MG: 10 TABLET, FILM COATED ORAL at 09:42

## 2019-04-19 RX ADMIN — GABAPENTIN 300 MG: 300 CAPSULE ORAL at 08:35

## 2019-04-19 RX ADMIN — CYCLOBENZAPRINE HYDROCHLORIDE 10 MG: 10 TABLET, FILM COATED ORAL at 17:48

## 2019-04-19 RX ADMIN — FOLIC ACID 1 MG: 1 TABLET ORAL at 08:33

## 2019-04-19 RX ADMIN — TAMSULOSIN HYDROCHLORIDE 0.4 MG: 0.4 CAPSULE ORAL at 16:23

## 2019-04-19 RX ADMIN — TRAMADOL HYDROCHLORIDE 50 MG: 50 TABLET, COATED ORAL at 21:49

## 2019-04-19 RX ADMIN — ALBUTEROL SULFATE 2 PUFF: 90 AEROSOL, METERED RESPIRATORY (INHALATION) at 09:41

## 2019-04-19 RX ADMIN — METOPROLOL TARTRATE 50 MG: 50 TABLET, FILM COATED ORAL at 08:34

## 2019-04-19 RX ADMIN — VITAMIN D, TAB 1000IU (100/BT) 1000 UNITS: 25 TAB at 08:35

## 2019-04-19 RX ADMIN — QUETIAPINE FUMARATE 250 MG: 200 TABLET ORAL at 08:34

## 2019-04-20 PROCEDURE — 99232 SBSQ HOSP IP/OBS MODERATE 35: CPT | Performed by: NURSE PRACTITIONER

## 2019-04-20 RX ADMIN — CLOTRIMAZOLE: 10 CREAM TOPICAL at 17:20

## 2019-04-20 RX ADMIN — CLOTRIMAZOLE: 10 CREAM TOPICAL at 09:03

## 2019-04-20 RX ADMIN — DICLOFENAC 2 G: 10 GEL TOPICAL at 17:20

## 2019-04-20 RX ADMIN — Medication 100 MG: at 09:02

## 2019-04-20 RX ADMIN — CYCLOBENZAPRINE HYDROCHLORIDE 10 MG: 10 TABLET, FILM COATED ORAL at 09:36

## 2019-04-20 RX ADMIN — LIDOCAINE 1 PATCH: 50 PATCH TOPICAL at 09:02

## 2019-04-20 RX ADMIN — CYCLOBENZAPRINE HYDROCHLORIDE 10 MG: 10 TABLET, FILM COATED ORAL at 18:32

## 2019-04-20 RX ADMIN — TAMSULOSIN HYDROCHLORIDE 0.4 MG: 0.4 CAPSULE ORAL at 17:20

## 2019-04-20 RX ADMIN — QUETIAPINE FUMARATE 500 MG: 400 TABLET ORAL at 21:25

## 2019-04-20 RX ADMIN — ESCITALOPRAM OXALATE 10 MG: 10 TABLET ORAL at 09:01

## 2019-04-20 RX ADMIN — QUETIAPINE FUMARATE 250 MG: 200 TABLET ORAL at 09:01

## 2019-04-20 RX ADMIN — METOPROLOL TARTRATE 50 MG: 50 TABLET, FILM COATED ORAL at 09:02

## 2019-04-20 RX ADMIN — FOLIC ACID 1 MG: 1 TABLET ORAL at 09:01

## 2019-04-20 RX ADMIN — TRAMADOL HYDROCHLORIDE 50 MG: 50 TABLET, COATED ORAL at 21:26

## 2019-04-20 RX ADMIN — VITAMIN D, TAB 1000IU (100/BT) 1000 UNITS: 25 TAB at 09:02

## 2019-04-20 RX ADMIN — AMLODIPINE BESYLATE 10 MG: 5 TABLET ORAL at 09:01

## 2019-04-20 RX ADMIN — GABAPENTIN 300 MG: 300 CAPSULE ORAL at 09:02

## 2019-04-20 RX ADMIN — DICLOFENAC 2 G: 10 GEL TOPICAL at 09:03

## 2019-04-20 RX ADMIN — GABAPENTIN 300 MG: 300 CAPSULE ORAL at 15:58

## 2019-04-20 RX ADMIN — Medication 1 TABLET: at 09:02

## 2019-04-20 RX ADMIN — GABAPENTIN 300 MG: 300 CAPSULE ORAL at 21:26

## 2019-04-20 RX ADMIN — NICOTINE 14 MG: 14 PATCH, EXTENDED RELEASE TRANSDERMAL at 09:02

## 2019-04-21 PROCEDURE — 99232 SBSQ HOSP IP/OBS MODERATE 35: CPT | Performed by: NURSE PRACTITIONER

## 2019-04-21 RX ORDER — BUPROPION HYDROCHLORIDE 100 MG/1
100 TABLET, EXTENDED RELEASE ORAL DAILY
Status: DISCONTINUED | OUTPATIENT
Start: 2019-04-22 | End: 2019-04-23

## 2019-04-21 RX ADMIN — GABAPENTIN 300 MG: 300 CAPSULE ORAL at 21:35

## 2019-04-21 RX ADMIN — ALBUTEROL SULFATE 2 PUFF: 90 AEROSOL, METERED RESPIRATORY (INHALATION) at 08:51

## 2019-04-21 RX ADMIN — CLOTRIMAZOLE: 10 CREAM TOPICAL at 08:46

## 2019-04-21 RX ADMIN — CYCLOBENZAPRINE HYDROCHLORIDE 10 MG: 10 TABLET, FILM COATED ORAL at 08:51

## 2019-04-21 RX ADMIN — Medication 100 MG: at 08:50

## 2019-04-21 RX ADMIN — LIDOCAINE 1 PATCH: 50 PATCH TOPICAL at 08:55

## 2019-04-21 RX ADMIN — CYCLOBENZAPRINE HYDROCHLORIDE 10 MG: 10 TABLET, FILM COATED ORAL at 18:46

## 2019-04-21 RX ADMIN — FOLIC ACID 1 MG: 1 TABLET ORAL at 08:48

## 2019-04-21 RX ADMIN — Medication 1 TABLET: at 08:49

## 2019-04-21 RX ADMIN — QUETIAPINE FUMARATE 300 MG: 100 TABLET ORAL at 21:34

## 2019-04-21 RX ADMIN — TRAMADOL HYDROCHLORIDE 50 MG: 50 TABLET, COATED ORAL at 12:41

## 2019-04-21 RX ADMIN — ALBUTEROL SULFATE 2 PUFF: 90 AEROSOL, METERED RESPIRATORY (INHALATION) at 18:47

## 2019-04-21 RX ADMIN — DICLOFENAC 2 G: 10 GEL TOPICAL at 18:29

## 2019-04-21 RX ADMIN — TRAMADOL HYDROCHLORIDE 50 MG: 50 TABLET, COATED ORAL at 21:35

## 2019-04-21 RX ADMIN — CLOTRIMAZOLE: 10 CREAM TOPICAL at 18:29

## 2019-04-21 RX ADMIN — VITAMIN D, TAB 1000IU (100/BT) 1000 UNITS: 25 TAB at 08:45

## 2019-04-21 RX ADMIN — TAMSULOSIN HYDROCHLORIDE 0.4 MG: 0.4 CAPSULE ORAL at 16:11

## 2019-04-21 RX ADMIN — ESCITALOPRAM OXALATE 10 MG: 10 TABLET ORAL at 08:47

## 2019-04-21 RX ADMIN — QUETIAPINE FUMARATE 250 MG: 200 TABLET ORAL at 08:49

## 2019-04-21 RX ADMIN — NICOTINE 14 MG: 14 PATCH, EXTENDED RELEASE TRANSDERMAL at 08:53

## 2019-04-21 RX ADMIN — GABAPENTIN 300 MG: 300 CAPSULE ORAL at 08:48

## 2019-04-21 RX ADMIN — GABAPENTIN 300 MG: 300 CAPSULE ORAL at 16:10

## 2019-04-22 PROCEDURE — 97116 GAIT TRAINING THERAPY: CPT

## 2019-04-22 RX ORDER — QUETIAPINE FUMARATE 400 MG/1
400 TABLET, FILM COATED ORAL
Status: DISCONTINUED | OUTPATIENT
Start: 2019-04-22 | End: 2019-04-27

## 2019-04-22 RX ORDER — QUETIAPINE FUMARATE 200 MG/1
200 TABLET, FILM COATED ORAL DAILY
Status: DISCONTINUED | OUTPATIENT
Start: 2019-04-22 | End: 2019-05-01 | Stop reason: HOSPADM

## 2019-04-22 RX ORDER — SENNOSIDES 8.6 MG
1 TABLET ORAL
Status: DISCONTINUED | OUTPATIENT
Start: 2019-04-22 | End: 2019-04-26

## 2019-04-22 RX ADMIN — GABAPENTIN 300 MG: 300 CAPSULE ORAL at 16:00

## 2019-04-22 RX ADMIN — CYCLOBENZAPRINE HYDROCHLORIDE 10 MG: 10 TABLET, FILM COATED ORAL at 21:29

## 2019-04-22 RX ADMIN — FOLIC ACID 1 MG: 1 TABLET ORAL at 08:57

## 2019-04-22 RX ADMIN — TAMSULOSIN HYDROCHLORIDE 0.4 MG: 0.4 CAPSULE ORAL at 16:00

## 2019-04-22 RX ADMIN — QUETIAPINE FUMARATE 300 MG: 100 TABLET ORAL at 08:55

## 2019-04-22 RX ADMIN — NICOTINE 14 MG: 14 PATCH, EXTENDED RELEASE TRANSDERMAL at 08:57

## 2019-04-22 RX ADMIN — VITAMIN D, TAB 1000IU (100/BT) 1000 UNITS: 25 TAB at 08:57

## 2019-04-22 RX ADMIN — ALBUTEROL SULFATE 2 PUFF: 90 AEROSOL, METERED RESPIRATORY (INHALATION) at 08:54

## 2019-04-22 RX ADMIN — TRAMADOL HYDROCHLORIDE 50 MG: 50 TABLET, COATED ORAL at 08:55

## 2019-04-22 RX ADMIN — CLOTRIMAZOLE 1 APPLICATION: 10 CREAM TOPICAL at 08:54

## 2019-04-22 RX ADMIN — Medication 100 MG: at 08:57

## 2019-04-22 RX ADMIN — QUETIAPINE FUMARATE 400 MG: 400 TABLET, FILM COATED ORAL at 21:14

## 2019-04-22 RX ADMIN — Medication 1 TABLET: at 08:57

## 2019-04-22 RX ADMIN — METOPROLOL TARTRATE 50 MG: 50 TABLET, FILM COATED ORAL at 08:57

## 2019-04-22 RX ADMIN — BUPROPION HYDROCHLORIDE 100 MG: 100 TABLET, FILM COATED, EXTENDED RELEASE ORAL at 08:55

## 2019-04-22 RX ADMIN — GABAPENTIN 300 MG: 300 CAPSULE ORAL at 08:55

## 2019-04-22 RX ADMIN — TRAMADOL HYDROCHLORIDE 50 MG: 50 TABLET, COATED ORAL at 16:58

## 2019-04-22 RX ADMIN — GABAPENTIN 300 MG: 300 CAPSULE ORAL at 21:14

## 2019-04-22 RX ADMIN — CLOTRIMAZOLE 1 APPLICATION: 10 CREAM TOPICAL at 18:54

## 2019-04-22 RX ADMIN — LIDOCAINE 1 PATCH: 50 PATCH TOPICAL at 08:54

## 2019-04-22 RX ADMIN — AMLODIPINE BESYLATE 10 MG: 5 TABLET ORAL at 08:56

## 2019-04-22 RX ADMIN — CYCLOBENZAPRINE HYDROCHLORIDE 10 MG: 10 TABLET, FILM COATED ORAL at 12:40

## 2019-04-23 ENCOUNTER — APPOINTMENT (INPATIENT)
Dept: RADIOLOGY | Facility: HOSPITAL | Age: 69
DRG: 885 | End: 2019-04-23
Payer: MEDICARE

## 2019-04-23 PROCEDURE — 97116 GAIT TRAINING THERAPY: CPT

## 2019-04-23 PROCEDURE — 99232 SBSQ HOSP IP/OBS MODERATE 35: CPT | Performed by: NURSE PRACTITIONER

## 2019-04-23 PROCEDURE — 97110 THERAPEUTIC EXERCISES: CPT

## 2019-04-23 PROCEDURE — 71046 X-RAY EXAM CHEST 2 VIEWS: CPT

## 2019-04-23 RX ORDER — CLOTRIMAZOLE 1 %
CREAM (GRAM) TOPICAL 2 TIMES DAILY
Status: DISCONTINUED | OUTPATIENT
Start: 2019-04-23 | End: 2019-04-30

## 2019-04-23 RX ORDER — DULOXETIN HYDROCHLORIDE 20 MG/1
20 CAPSULE, DELAYED RELEASE ORAL DAILY
Status: DISCONTINUED | OUTPATIENT
Start: 2019-04-24 | End: 2019-04-27

## 2019-04-23 RX ADMIN — GABAPENTIN 300 MG: 300 CAPSULE ORAL at 16:55

## 2019-04-23 RX ADMIN — TRAMADOL HYDROCHLORIDE 50 MG: 50 TABLET, COATED ORAL at 08:50

## 2019-04-23 RX ADMIN — TRAMADOL HYDROCHLORIDE 50 MG: 50 TABLET, COATED ORAL at 18:20

## 2019-04-23 RX ADMIN — CYCLOBENZAPRINE HYDROCHLORIDE 10 MG: 10 TABLET, FILM COATED ORAL at 12:45

## 2019-04-23 RX ADMIN — DICLOFENAC 2 G: 10 GEL TOPICAL at 12:45

## 2019-04-23 RX ADMIN — TAMSULOSIN HYDROCHLORIDE 0.4 MG: 0.4 CAPSULE ORAL at 16:55

## 2019-04-23 RX ADMIN — Medication 1 TABLET: at 08:49

## 2019-04-23 RX ADMIN — QUETIAPINE FUMARATE 200 MG: 200 TABLET ORAL at 08:50

## 2019-04-23 RX ADMIN — SENNOSIDES 8.6 MG: 8.6 TABLET, FILM COATED ORAL at 08:49

## 2019-04-23 RX ADMIN — Medication 100 MG: at 08:49

## 2019-04-23 RX ADMIN — GABAPENTIN 300 MG: 300 CAPSULE ORAL at 08:49

## 2019-04-23 RX ADMIN — VITAMIN D, TAB 1000IU (100/BT) 1000 UNITS: 25 TAB at 08:49

## 2019-04-23 RX ADMIN — FOLIC ACID 1 MG: 1 TABLET ORAL at 08:50

## 2019-04-23 RX ADMIN — NICOTINE 14 MG: 14 PATCH, EXTENDED RELEASE TRANSDERMAL at 08:50

## 2019-04-23 RX ADMIN — SENNOSIDES 8.6 MG: 8.6 TABLET, FILM COATED ORAL at 21:24

## 2019-04-23 RX ADMIN — DICLOFENAC 2 G: 10 GEL TOPICAL at 08:48

## 2019-04-23 RX ADMIN — BUPROPION HYDROCHLORIDE 100 MG: 100 TABLET, FILM COATED, EXTENDED RELEASE ORAL at 08:50

## 2019-04-23 RX ADMIN — LIDOCAINE 1 PATCH: 50 PATCH TOPICAL at 08:48

## 2019-04-23 RX ADMIN — ALBUTEROL SULFATE 2 PUFF: 90 AEROSOL, METERED RESPIRATORY (INHALATION) at 08:47

## 2019-04-23 RX ADMIN — AMLODIPINE BESYLATE 10 MG: 5 TABLET ORAL at 08:49

## 2019-04-23 RX ADMIN — ALBUTEROL SULFATE 2 PUFF: 90 AEROSOL, METERED RESPIRATORY (INHALATION) at 17:55

## 2019-04-23 RX ADMIN — GABAPENTIN 300 MG: 300 CAPSULE ORAL at 21:09

## 2019-04-23 RX ADMIN — Medication: at 17:56

## 2019-04-23 RX ADMIN — CYCLOBENZAPRINE HYDROCHLORIDE 10 MG: 10 TABLET, FILM COATED ORAL at 21:09

## 2019-04-23 RX ADMIN — METOPROLOL TARTRATE 50 MG: 50 TABLET, FILM COATED ORAL at 08:49

## 2019-04-23 RX ADMIN — QUETIAPINE FUMARATE 400 MG: 400 TABLET, FILM COATED ORAL at 21:09

## 2019-04-23 RX ADMIN — Medication: at 09:45

## 2019-04-24 PROCEDURE — 97116 GAIT TRAINING THERAPY: CPT

## 2019-04-24 PROCEDURE — 99232 SBSQ HOSP IP/OBS MODERATE 35: CPT | Performed by: NURSE PRACTITIONER

## 2019-04-24 RX ORDER — AZITHROMYCIN 250 MG/1
500 TABLET, FILM COATED ORAL EVERY 24 HOURS
Status: DISCONTINUED | OUTPATIENT
Start: 2019-04-24 | End: 2019-04-26

## 2019-04-24 RX ORDER — ECHINACEA PURPUREA EXTRACT 125 MG
2 TABLET ORAL
Status: DISCONTINUED | OUTPATIENT
Start: 2019-04-24 | End: 2019-04-26

## 2019-04-24 RX ADMIN — SALINE NASAL SPRAY 2 SPRAY: 1.5 SOLUTION NASAL at 21:26

## 2019-04-24 RX ADMIN — QUETIAPINE FUMARATE 200 MG: 200 TABLET ORAL at 08:40

## 2019-04-24 RX ADMIN — CYCLOBENZAPRINE HYDROCHLORIDE 10 MG: 10 TABLET, FILM COATED ORAL at 17:23

## 2019-04-24 RX ADMIN — Medication 1 TABLET: at 08:39

## 2019-04-24 RX ADMIN — GABAPENTIN 300 MG: 300 CAPSULE ORAL at 21:26

## 2019-04-24 RX ADMIN — FOLIC ACID 1 MG: 1 TABLET ORAL at 08:39

## 2019-04-24 RX ADMIN — NICOTINE 14 MG: 14 PATCH, EXTENDED RELEASE TRANSDERMAL at 08:44

## 2019-04-24 RX ADMIN — AMLODIPINE BESYLATE 10 MG: 5 TABLET ORAL at 08:39

## 2019-04-24 RX ADMIN — GABAPENTIN 300 MG: 300 CAPSULE ORAL at 08:39

## 2019-04-24 RX ADMIN — SALINE NASAL SPRAY 2 SPRAY: 1.5 SOLUTION NASAL at 12:29

## 2019-04-24 RX ADMIN — QUETIAPINE FUMARATE 400 MG: 400 TABLET, FILM COATED ORAL at 21:26

## 2019-04-24 RX ADMIN — Medication 1 APPLICATION: at 08:40

## 2019-04-24 RX ADMIN — METOPROLOL TARTRATE 50 MG: 50 TABLET, FILM COATED ORAL at 08:39

## 2019-04-24 RX ADMIN — SALINE NASAL SPRAY 2 SPRAY: 1.5 SOLUTION NASAL at 17:24

## 2019-04-24 RX ADMIN — LIDOCAINE 1 PATCH: 50 PATCH TOPICAL at 08:45

## 2019-04-24 RX ADMIN — DULOXETINE HYDROCHLORIDE 20 MG: 20 CAPSULE, DELAYED RELEASE ORAL at 09:01

## 2019-04-24 RX ADMIN — Medication 1 APPLICATION: at 17:20

## 2019-04-24 RX ADMIN — CYCLOBENZAPRINE HYDROCHLORIDE 10 MG: 10 TABLET, FILM COATED ORAL at 09:01

## 2019-04-24 RX ADMIN — AZITHROMYCIN 500 MG: 250 TABLET, FILM COATED ORAL at 12:29

## 2019-04-24 RX ADMIN — ALBUTEROL SULFATE 2 PUFF: 90 AEROSOL, METERED RESPIRATORY (INHALATION) at 09:00

## 2019-04-24 RX ADMIN — TAMSULOSIN HYDROCHLORIDE 0.4 MG: 0.4 CAPSULE ORAL at 16:56

## 2019-04-24 RX ADMIN — VITAMIN D, TAB 1000IU (100/BT) 1000 UNITS: 25 TAB at 08:39

## 2019-04-24 RX ADMIN — TRAMADOL HYDROCHLORIDE 50 MG: 50 TABLET, COATED ORAL at 17:23

## 2019-04-24 RX ADMIN — GABAPENTIN 300 MG: 300 CAPSULE ORAL at 16:56

## 2019-04-24 RX ADMIN — TRAMADOL HYDROCHLORIDE 50 MG: 50 TABLET, COATED ORAL at 09:01

## 2019-04-24 RX ADMIN — Medication 100 MG: at 08:39

## 2019-04-25 PROCEDURE — 97110 THERAPEUTIC EXERCISES: CPT

## 2019-04-25 PROCEDURE — 99232 SBSQ HOSP IP/OBS MODERATE 35: CPT | Performed by: NURSE PRACTITIONER

## 2019-04-25 PROCEDURE — 97116 GAIT TRAINING THERAPY: CPT

## 2019-04-25 RX ADMIN — GABAPENTIN 300 MG: 300 CAPSULE ORAL at 16:10

## 2019-04-25 RX ADMIN — AMLODIPINE BESYLATE 10 MG: 5 TABLET ORAL at 08:13

## 2019-04-25 RX ADMIN — SALINE NASAL SPRAY 2 SPRAY: 1.5 SOLUTION NASAL at 15:43

## 2019-04-25 RX ADMIN — GABAPENTIN 300 MG: 300 CAPSULE ORAL at 08:12

## 2019-04-25 RX ADMIN — LIDOCAINE 1 PATCH: 50 PATCH TOPICAL at 08:17

## 2019-04-25 RX ADMIN — SALINE NASAL SPRAY 2 SPRAY: 1.5 SOLUTION NASAL at 11:04

## 2019-04-25 RX ADMIN — FOLIC ACID 1 MG: 1 TABLET ORAL at 08:12

## 2019-04-25 RX ADMIN — ALBUTEROL SULFATE 2 PUFF: 90 AEROSOL, METERED RESPIRATORY (INHALATION) at 21:33

## 2019-04-25 RX ADMIN — TAMSULOSIN HYDROCHLORIDE 0.4 MG: 0.4 CAPSULE ORAL at 16:10

## 2019-04-25 RX ADMIN — Medication 1 APPLICATION: at 08:11

## 2019-04-25 RX ADMIN — Medication 1 APPLICATION: at 17:14

## 2019-04-25 RX ADMIN — QUETIAPINE FUMARATE 400 MG: 400 TABLET, FILM COATED ORAL at 21:28

## 2019-04-25 RX ADMIN — SALINE NASAL SPRAY 2 SPRAY: 1.5 SOLUTION NASAL at 08:10

## 2019-04-25 RX ADMIN — VITAMIN D, TAB 1000IU (100/BT) 1000 UNITS: 25 TAB at 08:13

## 2019-04-25 RX ADMIN — Medication 1 TABLET: at 08:13

## 2019-04-25 RX ADMIN — SENNOSIDES 8.6 MG: 8.6 TABLET, FILM COATED ORAL at 08:12

## 2019-04-25 RX ADMIN — ALBUTEROL SULFATE 2 PUFF: 90 AEROSOL, METERED RESPIRATORY (INHALATION) at 12:30

## 2019-04-25 RX ADMIN — AZITHROMYCIN 500 MG: 250 TABLET, FILM COATED ORAL at 11:03

## 2019-04-25 RX ADMIN — DULOXETINE HYDROCHLORIDE 20 MG: 20 CAPSULE, DELAYED RELEASE ORAL at 08:12

## 2019-04-25 RX ADMIN — QUETIAPINE FUMARATE 200 MG: 200 TABLET ORAL at 08:12

## 2019-04-25 RX ADMIN — TRAMADOL HYDROCHLORIDE 50 MG: 50 TABLET, COATED ORAL at 08:14

## 2019-04-25 RX ADMIN — GABAPENTIN 300 MG: 300 CAPSULE ORAL at 21:28

## 2019-04-25 RX ADMIN — METOPROLOL TARTRATE 50 MG: 50 TABLET, FILM COATED ORAL at 08:13

## 2019-04-25 RX ADMIN — CYCLOBENZAPRINE HYDROCHLORIDE 10 MG: 10 TABLET, FILM COATED ORAL at 16:15

## 2019-04-25 RX ADMIN — CYCLOBENZAPRINE HYDROCHLORIDE 10 MG: 10 TABLET, FILM COATED ORAL at 08:12

## 2019-04-25 RX ADMIN — NICOTINE 14 MG: 14 PATCH, EXTENDED RELEASE TRANSDERMAL at 08:16

## 2019-04-25 RX ADMIN — Medication 100 MG: at 08:14

## 2019-04-25 RX ADMIN — SALINE NASAL SPRAY 2 SPRAY: 1.5 SOLUTION NASAL at 17:15

## 2019-04-25 RX ADMIN — TRAMADOL HYDROCHLORIDE 50 MG: 50 TABLET, COATED ORAL at 16:15

## 2019-04-26 PROCEDURE — 94760 N-INVAS EAR/PLS OXIMETRY 1: CPT

## 2019-04-26 PROCEDURE — 97116 GAIT TRAINING THERAPY: CPT

## 2019-04-26 PROCEDURE — 97110 THERAPEUTIC EXERCISES: CPT

## 2019-04-26 PROCEDURE — 99232 SBSQ HOSP IP/OBS MODERATE 35: CPT | Performed by: NURSE PRACTITIONER

## 2019-04-26 RX ORDER — ALBUTEROL SULFATE 90 UG/1
2 AEROSOL, METERED RESPIRATORY (INHALATION) EVERY 6 HOURS PRN
Status: DISCONTINUED | OUTPATIENT
Start: 2019-04-26 | End: 2019-04-27

## 2019-04-26 RX ORDER — OXYMETAZOLINE HYDROCHLORIDE 0.05 G/100ML
2 SPRAY NASAL EVERY 12 HOURS PRN
Status: DISPENSED | OUTPATIENT
Start: 2019-04-26 | End: 2019-04-29

## 2019-04-26 RX ORDER — AMOXICILLIN 250 MG
1 CAPSULE ORAL 2 TIMES DAILY
Status: DISCONTINUED | OUTPATIENT
Start: 2019-04-26 | End: 2019-05-01 | Stop reason: HOSPADM

## 2019-04-26 RX ORDER — IPRATROPIUM BROMIDE AND ALBUTEROL SULFATE 2.5; .5 MG/3ML; MG/3ML
3 SOLUTION RESPIRATORY (INHALATION) EVERY 6 HOURS PRN
Status: DISCONTINUED | OUTPATIENT
Start: 2019-04-26 | End: 2019-04-26

## 2019-04-26 RX ORDER — LEVOFLOXACIN 750 MG/1
750 TABLET ORAL EVERY 24 HOURS
Status: COMPLETED | OUTPATIENT
Start: 2019-04-27 | End: 2019-05-01

## 2019-04-26 RX ADMIN — CYCLOBENZAPRINE HYDROCHLORIDE 10 MG: 10 TABLET, FILM COATED ORAL at 12:30

## 2019-04-26 RX ADMIN — VITAMIN D, TAB 1000IU (100/BT) 1000 UNITS: 25 TAB at 08:53

## 2019-04-26 RX ADMIN — GABAPENTIN 300 MG: 300 CAPSULE ORAL at 16:13

## 2019-04-26 RX ADMIN — ALBUTEROL SULFATE 2 PUFF: 90 AEROSOL, METERED RESPIRATORY (INHALATION) at 08:50

## 2019-04-26 RX ADMIN — TRAMADOL HYDROCHLORIDE 50 MG: 50 TABLET, COATED ORAL at 22:19

## 2019-04-26 RX ADMIN — TRAZODONE HYDROCHLORIDE 50 MG: 50 TABLET ORAL at 22:19

## 2019-04-26 RX ADMIN — Medication 1 TABLET: at 08:53

## 2019-04-26 RX ADMIN — TAMSULOSIN HYDROCHLORIDE 0.4 MG: 0.4 CAPSULE ORAL at 16:13

## 2019-04-26 RX ADMIN — NICOTINE 14 MG: 14 PATCH, EXTENDED RELEASE TRANSDERMAL at 08:53

## 2019-04-26 RX ADMIN — LIDOCAINE 1 PATCH: 50 PATCH TOPICAL at 08:53

## 2019-04-26 RX ADMIN — QUETIAPINE FUMARATE 400 MG: 400 TABLET, FILM COATED ORAL at 22:19

## 2019-04-26 RX ADMIN — GABAPENTIN 300 MG: 300 CAPSULE ORAL at 22:19

## 2019-04-26 RX ADMIN — SALINE NASAL SPRAY 2 SPRAY: 1.5 SOLUTION NASAL at 08:49

## 2019-04-26 RX ADMIN — AMLODIPINE BESYLATE 10 MG: 5 TABLET ORAL at 08:53

## 2019-04-26 RX ADMIN — QUETIAPINE FUMARATE 200 MG: 200 TABLET ORAL at 08:53

## 2019-04-26 RX ADMIN — SENNOSIDES AND DOCUSATE SODIUM 1 TABLET: 8.6; 5 TABLET ORAL at 12:30

## 2019-04-26 RX ADMIN — TRAMADOL HYDROCHLORIDE 50 MG: 50 TABLET, COATED ORAL at 08:54

## 2019-04-26 RX ADMIN — METOPROLOL TARTRATE 50 MG: 50 TABLET, FILM COATED ORAL at 08:53

## 2019-04-26 RX ADMIN — FOLIC ACID 1 MG: 1 TABLET ORAL at 08:53

## 2019-04-26 RX ADMIN — Medication 100 MG: at 08:53

## 2019-04-26 RX ADMIN — GABAPENTIN 300 MG: 300 CAPSULE ORAL at 08:53

## 2019-04-26 RX ADMIN — TRAMADOL HYDROCHLORIDE 50 MG: 50 TABLET, COATED ORAL at 17:55

## 2019-04-26 RX ADMIN — AZITHROMYCIN 500 MG: 250 TABLET, FILM COATED ORAL at 11:00

## 2019-04-26 RX ADMIN — SENNOSIDES AND DOCUSATE SODIUM 1 TABLET: 8.6; 5 TABLET ORAL at 17:55

## 2019-04-26 RX ADMIN — DULOXETINE HYDROCHLORIDE 20 MG: 20 CAPSULE, DELAYED RELEASE ORAL at 08:53

## 2019-04-27 PROCEDURE — 99232 SBSQ HOSP IP/OBS MODERATE 35: CPT | Performed by: NURSE PRACTITIONER

## 2019-04-27 PROCEDURE — 94664 DEMO&/EVAL PT USE INHALER: CPT

## 2019-04-27 RX ORDER — IPRATROPIUM BROMIDE AND ALBUTEROL SULFATE 2.5; .5 MG/3ML; MG/3ML
3 SOLUTION RESPIRATORY (INHALATION) EVERY 6 HOURS PRN
Status: DISCONTINUED | OUTPATIENT
Start: 2019-04-27 | End: 2019-05-01 | Stop reason: HOSPADM

## 2019-04-27 RX ORDER — DULOXETIN HYDROCHLORIDE 20 MG/1
40 CAPSULE, DELAYED RELEASE ORAL DAILY
Status: DISCONTINUED | OUTPATIENT
Start: 2019-04-27 | End: 2019-04-29

## 2019-04-27 RX ADMIN — QUETIAPINE FUMARATE 200 MG: 200 TABLET ORAL at 09:19

## 2019-04-27 RX ADMIN — Medication 1 TABLET: at 09:19

## 2019-04-27 RX ADMIN — GABAPENTIN 300 MG: 300 CAPSULE ORAL at 09:20

## 2019-04-27 RX ADMIN — GABAPENTIN 300 MG: 300 CAPSULE ORAL at 16:29

## 2019-04-27 RX ADMIN — SENNOSIDES AND DOCUSATE SODIUM 1 TABLET: 8.6; 5 TABLET ORAL at 18:14

## 2019-04-27 RX ADMIN — DICLOFENAC 2 G: 10 GEL TOPICAL at 09:17

## 2019-04-27 RX ADMIN — LEVOFLOXACIN 750 MG: 750 TABLET, FILM COATED ORAL at 09:19

## 2019-04-27 RX ADMIN — VITAMIN D, TAB 1000IU (100/BT) 1000 UNITS: 25 TAB at 09:21

## 2019-04-27 RX ADMIN — CYCLOBENZAPRINE HYDROCHLORIDE 10 MG: 10 TABLET, FILM COATED ORAL at 18:37

## 2019-04-27 RX ADMIN — QUETIAPINE FUMARATE 500 MG: 100 TABLET ORAL at 21:45

## 2019-04-27 RX ADMIN — TAMSULOSIN HYDROCHLORIDE 0.4 MG: 0.4 CAPSULE ORAL at 16:29

## 2019-04-27 RX ADMIN — Medication 100 MG: at 09:19

## 2019-04-27 RX ADMIN — AMLODIPINE BESYLATE 10 MG: 5 TABLET ORAL at 09:20

## 2019-04-27 RX ADMIN — NICOTINE 14 MG: 14 PATCH, EXTENDED RELEASE TRANSDERMAL at 09:21

## 2019-04-27 RX ADMIN — GABAPENTIN 300 MG: 300 CAPSULE ORAL at 20:09

## 2019-04-27 RX ADMIN — OXYMETAZOLINE HYDROCHLORIDE 2 SPRAY: 0.05 SPRAY NASAL at 09:18

## 2019-04-27 RX ADMIN — SENNOSIDES AND DOCUSATE SODIUM 1 TABLET: 8.6; 5 TABLET ORAL at 09:20

## 2019-04-27 RX ADMIN — TRAMADOL HYDROCHLORIDE 50 MG: 50 TABLET, COATED ORAL at 09:21

## 2019-04-27 RX ADMIN — FOLIC ACID 1 MG: 1 TABLET ORAL at 09:21

## 2019-04-27 RX ADMIN — TRAMADOL HYDROCHLORIDE 50 MG: 50 TABLET, COATED ORAL at 18:37

## 2019-04-27 RX ADMIN — DULOXETINE HYDROCHLORIDE 40 MG: 20 CAPSULE, DELAYED RELEASE ORAL at 09:19

## 2019-04-27 RX ADMIN — LIDOCAINE 1 PATCH: 50 PATCH TOPICAL at 09:18

## 2019-04-27 RX ADMIN — OXYMETAZOLINE HYDROCHLORIDE 2 SPRAY: 0.05 SPRAY NASAL at 21:46

## 2019-04-27 RX ADMIN — METOPROLOL TARTRATE 50 MG: 50 TABLET, FILM COATED ORAL at 09:20

## 2019-04-28 PROCEDURE — 94760 N-INVAS EAR/PLS OXIMETRY 1: CPT

## 2019-04-28 PROCEDURE — 99232 SBSQ HOSP IP/OBS MODERATE 35: CPT | Performed by: NURSE PRACTITIONER

## 2019-04-28 RX ORDER — GUAIFENESIN 600 MG
600 TABLET, EXTENDED RELEASE 12 HR ORAL EVERY 12 HOURS PRN
Status: DISCONTINUED | OUTPATIENT
Start: 2019-04-28 | End: 2019-05-01 | Stop reason: HOSPADM

## 2019-04-28 RX ADMIN — TAMSULOSIN HYDROCHLORIDE 0.4 MG: 0.4 CAPSULE ORAL at 16:24

## 2019-04-28 RX ADMIN — VITAMIN D, TAB 1000IU (100/BT) 1000 UNITS: 25 TAB at 09:05

## 2019-04-28 RX ADMIN — METOPROLOL TARTRATE 50 MG: 50 TABLET, FILM COATED ORAL at 09:05

## 2019-04-28 RX ADMIN — LEVOFLOXACIN 750 MG: 750 TABLET, FILM COATED ORAL at 09:05

## 2019-04-28 RX ADMIN — FOLIC ACID 1 MG: 1 TABLET ORAL at 09:05

## 2019-04-28 RX ADMIN — NICOTINE 14 MG: 14 PATCH, EXTENDED RELEASE TRANSDERMAL at 09:06

## 2019-04-28 RX ADMIN — SENNOSIDES AND DOCUSATE SODIUM 1 TABLET: 8.6; 5 TABLET ORAL at 09:05

## 2019-04-28 RX ADMIN — SENNOSIDES AND DOCUSATE SODIUM 1 TABLET: 8.6; 5 TABLET ORAL at 18:05

## 2019-04-28 RX ADMIN — AMLODIPINE BESYLATE 10 MG: 5 TABLET ORAL at 09:05

## 2019-04-28 RX ADMIN — Medication 1 TABLET: at 09:05

## 2019-04-28 RX ADMIN — TRAMADOL HYDROCHLORIDE 50 MG: 50 TABLET, COATED ORAL at 18:19

## 2019-04-28 RX ADMIN — LIDOCAINE 1 PATCH: 50 PATCH TOPICAL at 09:06

## 2019-04-28 RX ADMIN — DULOXETINE HYDROCHLORIDE 40 MG: 20 CAPSULE, DELAYED RELEASE ORAL at 09:05

## 2019-04-28 RX ADMIN — Medication 100 MG: at 09:05

## 2019-04-28 RX ADMIN — QUETIAPINE FUMARATE 200 MG: 200 TABLET ORAL at 09:05

## 2019-04-28 RX ADMIN — OXYMETAZOLINE HYDROCHLORIDE 2 SPRAY: 0.05 SPRAY NASAL at 21:17

## 2019-04-28 RX ADMIN — OXYMETAZOLINE HYDROCHLORIDE 2 SPRAY: 0.05 SPRAY NASAL at 09:53

## 2019-04-28 RX ADMIN — GABAPENTIN 300 MG: 300 CAPSULE ORAL at 09:05

## 2019-04-28 RX ADMIN — GABAPENTIN 300 MG: 300 CAPSULE ORAL at 16:24

## 2019-04-28 RX ADMIN — CYCLOBENZAPRINE HYDROCHLORIDE 10 MG: 10 TABLET, FILM COATED ORAL at 18:19

## 2019-04-28 RX ADMIN — GABAPENTIN 300 MG: 300 CAPSULE ORAL at 21:15

## 2019-04-28 RX ADMIN — TRAMADOL HYDROCHLORIDE 50 MG: 50 TABLET, COATED ORAL at 09:06

## 2019-04-28 RX ADMIN — QUETIAPINE FUMARATE 500 MG: 100 TABLET ORAL at 21:15

## 2019-04-29 PROCEDURE — 99232 SBSQ HOSP IP/OBS MODERATE 35: CPT | Performed by: NURSE PRACTITIONER

## 2019-04-29 PROCEDURE — 97116 GAIT TRAINING THERAPY: CPT

## 2019-04-29 RX ORDER — DULOXETIN HYDROCHLORIDE 60 MG/1
60 CAPSULE, DELAYED RELEASE ORAL DAILY
Status: DISCONTINUED | OUTPATIENT
Start: 2019-04-30 | End: 2019-05-01 | Stop reason: HOSPADM

## 2019-04-29 RX ORDER — FLUTICASONE FUROATE AND VILANTEROL 100; 25 UG/1; UG/1
1 POWDER RESPIRATORY (INHALATION) DAILY
Status: DISCONTINUED | OUTPATIENT
Start: 2019-04-29 | End: 2019-05-01 | Stop reason: HOSPADM

## 2019-04-29 RX ADMIN — GUAIFENESIN 600 MG: 600 TABLET, EXTENDED RELEASE ORAL at 13:09

## 2019-04-29 RX ADMIN — SENNOSIDES AND DOCUSATE SODIUM 1 TABLET: 8.6; 5 TABLET ORAL at 09:31

## 2019-04-29 RX ADMIN — GABAPENTIN 300 MG: 300 CAPSULE ORAL at 18:35

## 2019-04-29 RX ADMIN — TAMSULOSIN HYDROCHLORIDE 0.4 MG: 0.4 CAPSULE ORAL at 18:36

## 2019-04-29 RX ADMIN — CYCLOBENZAPRINE HYDROCHLORIDE 10 MG: 10 TABLET, FILM COATED ORAL at 21:46

## 2019-04-29 RX ADMIN — LIDOCAINE 1 PATCH: 50 PATCH TOPICAL at 09:32

## 2019-04-29 RX ADMIN — QUETIAPINE FUMARATE 500 MG: 100 TABLET ORAL at 21:46

## 2019-04-29 RX ADMIN — Medication 1 TABLET: at 09:30

## 2019-04-29 RX ADMIN — GABAPENTIN 300 MG: 300 CAPSULE ORAL at 09:31

## 2019-04-29 RX ADMIN — TRAMADOL HYDROCHLORIDE 50 MG: 50 TABLET, COATED ORAL at 21:47

## 2019-04-29 RX ADMIN — QUETIAPINE FUMARATE 200 MG: 200 TABLET ORAL at 09:31

## 2019-04-29 RX ADMIN — CYCLOBENZAPRINE HYDROCHLORIDE 10 MG: 10 TABLET, FILM COATED ORAL at 09:32

## 2019-04-29 RX ADMIN — GABAPENTIN 300 MG: 300 CAPSULE ORAL at 21:47

## 2019-04-29 RX ADMIN — METOPROLOL TARTRATE 50 MG: 50 TABLET, FILM COATED ORAL at 09:31

## 2019-04-29 RX ADMIN — AMLODIPINE BESYLATE 10 MG: 5 TABLET ORAL at 09:30

## 2019-04-29 RX ADMIN — FOLIC ACID 1 MG: 1 TABLET ORAL at 09:31

## 2019-04-29 RX ADMIN — NICOTINE 14 MG: 14 PATCH, EXTENDED RELEASE TRANSDERMAL at 09:32

## 2019-04-29 RX ADMIN — OXYMETAZOLINE HYDROCHLORIDE 2 SPRAY: 0.05 SPRAY NASAL at 09:32

## 2019-04-29 RX ADMIN — SENNOSIDES AND DOCUSATE SODIUM 1 TABLET: 8.6; 5 TABLET ORAL at 18:36

## 2019-04-29 RX ADMIN — FLUTICASONE FUROATE AND VILANTEROL TRIFENATATE 1 PUFF: 100; 25 POWDER RESPIRATORY (INHALATION) at 12:49

## 2019-04-29 RX ADMIN — LEVOFLOXACIN 750 MG: 750 TABLET, FILM COATED ORAL at 09:31

## 2019-04-29 RX ADMIN — VITAMIN D, TAB 1000IU (100/BT) 1000 UNITS: 25 TAB at 09:30

## 2019-04-29 RX ADMIN — Medication 100 MG: at 09:30

## 2019-04-29 RX ADMIN — DULOXETINE HYDROCHLORIDE 40 MG: 20 CAPSULE, DELAYED RELEASE ORAL at 09:31

## 2019-04-30 PROCEDURE — 97116 GAIT TRAINING THERAPY: CPT

## 2019-04-30 PROCEDURE — 97110 THERAPEUTIC EXERCISES: CPT

## 2019-04-30 PROCEDURE — 99232 SBSQ HOSP IP/OBS MODERATE 35: CPT | Performed by: NURSE PRACTITIONER

## 2019-04-30 RX ORDER — FLUTICASONE PROPIONATE 50 MCG
1 SPRAY, SUSPENSION (ML) NASAL DAILY
Status: DISCONTINUED | OUTPATIENT
Start: 2019-05-01 | End: 2019-05-01 | Stop reason: HOSPADM

## 2019-04-30 RX ORDER — OXYMETAZOLINE HYDROCHLORIDE 0.05 G/100ML
2 SPRAY NASAL ONCE
Status: COMPLETED | OUTPATIENT
Start: 2019-04-30 | End: 2019-04-30

## 2019-04-30 RX ADMIN — CYCLOBENZAPRINE HYDROCHLORIDE 10 MG: 10 TABLET, FILM COATED ORAL at 08:21

## 2019-04-30 RX ADMIN — AMLODIPINE BESYLATE 10 MG: 5 TABLET ORAL at 08:20

## 2019-04-30 RX ADMIN — METOPROLOL TARTRATE 50 MG: 50 TABLET, FILM COATED ORAL at 08:22

## 2019-04-30 RX ADMIN — QUETIAPINE FUMARATE 200 MG: 200 TABLET ORAL at 08:21

## 2019-04-30 RX ADMIN — FLUTICASONE FUROATE AND VILANTEROL TRIFENATATE 1 PUFF: 100; 25 POWDER RESPIRATORY (INHALATION) at 08:23

## 2019-04-30 RX ADMIN — SENNOSIDES AND DOCUSATE SODIUM 1 TABLET: 8.6; 5 TABLET ORAL at 08:21

## 2019-04-30 RX ADMIN — TRAMADOL HYDROCHLORIDE 50 MG: 50 TABLET, COATED ORAL at 17:27

## 2019-04-30 RX ADMIN — TRAMADOL HYDROCHLORIDE 50 MG: 50 TABLET, COATED ORAL at 08:20

## 2019-04-30 RX ADMIN — LEVOFLOXACIN 750 MG: 750 TABLET, FILM COATED ORAL at 08:22

## 2019-04-30 RX ADMIN — FOLIC ACID 1 MG: 1 TABLET ORAL at 08:21

## 2019-04-30 RX ADMIN — SENNOSIDES AND DOCUSATE SODIUM 1 TABLET: 8.6; 5 TABLET ORAL at 17:08

## 2019-04-30 RX ADMIN — Medication 1 TABLET: at 08:20

## 2019-04-30 RX ADMIN — NICOTINE 14 MG: 14 PATCH, EXTENDED RELEASE TRANSDERMAL at 08:25

## 2019-04-30 RX ADMIN — DULOXETINE HYDROCHLORIDE 60 MG: 60 CAPSULE, DELAYED RELEASE ORAL at 08:20

## 2019-04-30 RX ADMIN — CYCLOBENZAPRINE HYDROCHLORIDE 10 MG: 10 TABLET, FILM COATED ORAL at 17:27

## 2019-04-30 RX ADMIN — GABAPENTIN 300 MG: 300 CAPSULE ORAL at 16:14

## 2019-04-30 RX ADMIN — OXYMETAZOLINE HYDROCHLORIDE 2 SPRAY: 0.05 SPRAY NASAL at 12:15

## 2019-04-30 RX ADMIN — LIDOCAINE 1 PATCH: 50 PATCH TOPICAL at 08:23

## 2019-04-30 RX ADMIN — VITAMIN D, TAB 1000IU (100/BT) 1000 UNITS: 25 TAB at 08:22

## 2019-04-30 RX ADMIN — GUAIFENESIN 600 MG: 600 TABLET, EXTENDED RELEASE ORAL at 21:24

## 2019-04-30 RX ADMIN — GABAPENTIN 300 MG: 300 CAPSULE ORAL at 08:21

## 2019-04-30 RX ADMIN — QUETIAPINE FUMARATE 500 MG: 100 TABLET ORAL at 21:24

## 2019-04-30 RX ADMIN — TAMSULOSIN HYDROCHLORIDE 0.4 MG: 0.4 CAPSULE ORAL at 16:14

## 2019-04-30 RX ADMIN — GABAPENTIN 300 MG: 300 CAPSULE ORAL at 21:24

## 2019-04-30 RX ADMIN — Medication 100 MG: at 08:21

## 2019-05-01 ENCOUNTER — APPOINTMENT (INPATIENT)
Dept: CT IMAGING | Facility: HOSPITAL | Age: 69
DRG: 885 | End: 2019-05-01
Payer: MEDICARE

## 2019-05-01 ENCOUNTER — HOSPITAL ENCOUNTER (INPATIENT)
Facility: HOSPITAL | Age: 69
LOS: 2 days | DRG: 069 | End: 2019-05-03
Attending: FAMILY MEDICINE | Admitting: FAMILY MEDICINE
Payer: MEDICARE

## 2019-05-01 VITALS
BODY MASS INDEX: 27.83 KG/M2 | OXYGEN SATURATION: 93 % | SYSTOLIC BLOOD PRESSURE: 119 MMHG | DIASTOLIC BLOOD PRESSURE: 58 MMHG | HEIGHT: 68 IN | TEMPERATURE: 98 F | RESPIRATION RATE: 18 BRPM | WEIGHT: 183.6 LBS | HEART RATE: 65 BPM

## 2019-05-01 DIAGNOSIS — L60.2 LONG TOENAIL: ICD-10-CM

## 2019-05-01 DIAGNOSIS — I63.9 CEREBROVASCULAR ACCIDENT (CVA), UNSPECIFIED MECHANISM (HCC): ICD-10-CM

## 2019-05-01 DIAGNOSIS — R45.851 VERBALIZES SUICIDAL THOUGHTS: Primary | ICD-10-CM

## 2019-05-01 DIAGNOSIS — H53.2 DIPLOPIA: ICD-10-CM

## 2019-05-01 DIAGNOSIS — R47.81 SLURRED SPEECH: ICD-10-CM

## 2019-05-01 DIAGNOSIS — F10.929 ALCOHOLIC INTOXICATION WITH COMPLICATION (HCC): ICD-10-CM

## 2019-05-01 DIAGNOSIS — F33.9 RECURRENT MAJOR DEPRESSIVE DISORDER (HCC): ICD-10-CM

## 2019-05-01 LAB
ALBUMIN SERPL BCP-MCNC: 3.7 G/DL (ref 3.5–5.7)
ALP SERPL-CCNC: 39 U/L (ref 55–165)
ALT SERPL W P-5'-P-CCNC: 5 U/L (ref 7–52)
ANION GAP SERPL CALCULATED.3IONS-SCNC: 6 MMOL/L (ref 4–13)
AST SERPL W P-5'-P-CCNC: 12 U/L (ref 13–39)
BASOPHILS # BLD AUTO: 0 THOUSANDS/ΜL (ref 0–0.1)
BASOPHILS NFR BLD AUTO: 1 % (ref 0–2)
BILIRUB SERPL-MCNC: 0.3 MG/DL (ref 0.2–1)
BUN SERPL-MCNC: 13 MG/DL (ref 7–25)
CALCIUM SERPL-MCNC: 9.5 MG/DL (ref 8.6–10.5)
CHLORIDE SERPL-SCNC: 102 MMOL/L (ref 98–107)
CO2 SERPL-SCNC: 26 MMOL/L (ref 21–31)
CREAT SERPL-MCNC: 0.98 MG/DL (ref 0.7–1.3)
EOSINOPHIL # BLD AUTO: 0.2 THOUSAND/ΜL (ref 0–0.61)
EOSINOPHIL NFR BLD AUTO: 4 % (ref 0–5)
ERYTHROCYTE [DISTWIDTH] IN BLOOD BY AUTOMATED COUNT: 14.3 % (ref 11.5–14.5)
GFR SERPL CREATININE-BSD FRML MDRD: 80 ML/MIN/1.73SQ M
GLUCOSE SERPL-MCNC: 154 MG/DL (ref 65–99)
GLUCOSE SERPL-MCNC: 183 MG/DL (ref 65–140)
HCT VFR BLD AUTO: 41.5 % (ref 42–47)
HGB BLD-MCNC: 14.5 G/DL (ref 14–18)
LYMPHOCYTES # BLD AUTO: 1.5 THOUSANDS/ΜL (ref 0.6–4.47)
LYMPHOCYTES NFR BLD AUTO: 32 % (ref 21–51)
MAGNESIUM SERPL-MCNC: 1.9 MG/DL (ref 1.9–2.7)
MCH RBC QN AUTO: 35.7 PG (ref 26–34)
MCHC RBC AUTO-ENTMCNC: 34.9 G/DL (ref 31–37)
MCV RBC AUTO: 102 FL (ref 81–99)
MONOCYTES # BLD AUTO: 0.6 THOUSAND/ΜL (ref 0.17–1.22)
MONOCYTES NFR BLD AUTO: 13 % (ref 2–12)
NEUTROPHILS # BLD AUTO: 2.5 THOUSANDS/ΜL (ref 1.4–6.5)
NEUTS SEG NFR BLD AUTO: 51 % (ref 42–75)
PLATELET # BLD AUTO: 255 THOUSANDS/UL (ref 149–390)
PMV BLD AUTO: 7.7 FL (ref 8.6–11.7)
POTASSIUM SERPL-SCNC: 4 MMOL/L (ref 3.5–5.5)
PROT SERPL-MCNC: 6.6 G/DL (ref 6.4–8.9)
RBC # BLD AUTO: 4.06 MILLION/UL (ref 4.3–5.9)
SODIUM SERPL-SCNC: 134 MMOL/L (ref 134–143)
TROPONIN I SERPL-MCNC: <0.03 NG/ML
WBC # BLD AUTO: 4.9 THOUSAND/UL (ref 4.8–10.8)

## 2019-05-01 PROCEDURE — 93005 ELECTROCARDIOGRAM TRACING: CPT

## 2019-05-01 PROCEDURE — G0425 INPT/ED TELECONSULT30: HCPCS | Performed by: PSYCHIATRY & NEUROLOGY

## 2019-05-01 PROCEDURE — 94760 N-INVAS EAR/PLS OXIMETRY 1: CPT

## 2019-05-01 PROCEDURE — 82948 REAGENT STRIP/BLOOD GLUCOSE: CPT

## 2019-05-01 PROCEDURE — 70450 CT HEAD/BRAIN W/O DYE: CPT

## 2019-05-01 PROCEDURE — 80053 COMPREHEN METABOLIC PANEL: CPT | Performed by: NURSE PRACTITIONER

## 2019-05-01 PROCEDURE — 83735 ASSAY OF MAGNESIUM: CPT | Performed by: NURSE PRACTITIONER

## 2019-05-01 PROCEDURE — 99238 HOSP IP/OBS DSCHRG MGMT 30/<: CPT | Performed by: NURSE PRACTITIONER

## 2019-05-01 PROCEDURE — 85025 COMPLETE CBC W/AUTO DIFF WBC: CPT | Performed by: NURSE PRACTITIONER

## 2019-05-01 PROCEDURE — 84484 ASSAY OF TROPONIN QUANT: CPT | Performed by: NURSE PRACTITIONER

## 2019-05-01 RX ORDER — ACETAMINOPHEN 325 MG/1
650 TABLET ORAL EVERY 6 HOURS PRN
COMMUNITY
End: 2019-05-15 | Stop reason: HOSPADM

## 2019-05-01 RX ORDER — ASPIRIN 81 MG/1
81 TABLET ORAL DAILY
Status: DISCONTINUED | OUTPATIENT
Start: 2019-05-02 | End: 2019-05-03 | Stop reason: HOSPADM

## 2019-05-01 RX ORDER — HALOPERIDOL 5 MG
5 TABLET ORAL EVERY 8 HOURS PRN
Status: DISCONTINUED | OUTPATIENT
Start: 2019-05-01 | End: 2019-05-03 | Stop reason: HOSPADM

## 2019-05-01 RX ORDER — ATORVASTATIN CALCIUM 40 MG/1
40 TABLET, FILM COATED ORAL EVERY EVENING
Status: DISCONTINUED | OUTPATIENT
Start: 2019-05-01 | End: 2019-05-01 | Stop reason: HOSPADM

## 2019-05-01 RX ORDER — CYANOCOBALAMIN 1000 UG/ML
1000 INJECTION INTRAMUSCULAR; SUBCUTANEOUS
Status: CANCELLED | OUTPATIENT
Start: 2019-05-11

## 2019-05-01 RX ORDER — GUAIFENESIN 600 MG
600 TABLET, EXTENDED RELEASE 12 HR ORAL EVERY 12 HOURS PRN
Status: DISCONTINUED | OUTPATIENT
Start: 2019-05-01 | End: 2019-05-03 | Stop reason: HOSPADM

## 2019-05-01 RX ORDER — IPRATROPIUM BROMIDE AND ALBUTEROL SULFATE 2.5; .5 MG/3ML; MG/3ML
3 SOLUTION RESPIRATORY (INHALATION) EVERY 6 HOURS PRN
Status: ON HOLD | COMMUNITY
End: 2019-05-01 | Stop reason: HOSPADM

## 2019-05-01 RX ORDER — THIAMINE MONONITRATE (VIT B1) 100 MG
100 TABLET ORAL DAILY
Status: CANCELLED | OUTPATIENT
Start: 2019-05-02

## 2019-05-01 RX ORDER — MELATONIN
1000 DAILY
COMMUNITY
End: 2019-05-15 | Stop reason: HOSPADM

## 2019-05-01 RX ORDER — MELATONIN
1000 DAILY
Status: CANCELLED | OUTPATIENT
Start: 2019-05-02

## 2019-05-01 RX ORDER — TRAMADOL HYDROCHLORIDE 50 MG/1
50 TABLET ORAL EVERY 8 HOURS PRN
Status: CANCELLED | OUTPATIENT
Start: 2019-05-01

## 2019-05-01 RX ORDER — TRAZODONE HYDROCHLORIDE 50 MG/1
50 TABLET ORAL
Status: DISCONTINUED | OUTPATIENT
Start: 2019-05-01 | End: 2019-05-03 | Stop reason: HOSPADM

## 2019-05-01 RX ORDER — ASPIRIN 81 MG/1
81 TABLET ORAL DAILY
Status: ON HOLD | COMMUNITY
End: 2019-05-14 | Stop reason: SDUPTHER

## 2019-05-01 RX ORDER — HYDROXYZINE HYDROCHLORIDE 25 MG/1
50 TABLET, FILM COATED ORAL EVERY 6 HOURS PRN
Status: DISCONTINUED | OUTPATIENT
Start: 2019-05-01 | End: 2019-05-03 | Stop reason: HOSPADM

## 2019-05-01 RX ORDER — FOLIC ACID 1 MG/1
1 TABLET ORAL DAILY
Status: DISCONTINUED | OUTPATIENT
Start: 2019-05-02 | End: 2019-05-03 | Stop reason: HOSPADM

## 2019-05-01 RX ORDER — METHOCARBAMOL 500 MG/1
500 TABLET, FILM COATED ORAL EVERY 8 HOURS SCHEDULED
Status: DISCONTINUED | OUTPATIENT
Start: 2019-05-01 | End: 2019-05-03 | Stop reason: HOSPADM

## 2019-05-01 RX ORDER — ASPIRIN 325 MG
325 TABLET ORAL ONCE
Status: COMPLETED | OUTPATIENT
Start: 2019-05-01 | End: 2019-05-01

## 2019-05-01 RX ORDER — NICOTINE 21 MG/24HR
14 PATCH, TRANSDERMAL 24 HOURS TRANSDERMAL DAILY
Status: CANCELLED | OUTPATIENT
Start: 2019-05-02

## 2019-05-01 RX ORDER — LORAZEPAM 1 MG/1
2 TABLET ORAL EVERY 4 HOURS PRN
Status: CANCELLED | OUTPATIENT
Start: 2019-05-01

## 2019-05-01 RX ORDER — HALOPERIDOL 5 MG/ML
5 INJECTION INTRAMUSCULAR EVERY 8 HOURS PRN
Status: DISCONTINUED | OUTPATIENT
Start: 2019-05-01 | End: 2019-05-03 | Stop reason: HOSPADM

## 2019-05-01 RX ORDER — ATORVASTATIN CALCIUM 40 MG/1
40 TABLET, FILM COATED ORAL EVERY EVENING
Status: DISCONTINUED | OUTPATIENT
Start: 2019-05-01 | End: 2019-05-03 | Stop reason: HOSPADM

## 2019-05-01 RX ORDER — LORAZEPAM 2 MG/1
0.5 TABLET ORAL EVERY 4 HOURS PRN
COMMUNITY
End: 2019-05-15 | Stop reason: HOSPADM

## 2019-05-01 RX ORDER — GABAPENTIN 300 MG/1
300 CAPSULE ORAL 3 TIMES DAILY
Status: CANCELLED | OUTPATIENT
Start: 2019-05-01

## 2019-05-01 RX ORDER — TRAMADOL HYDROCHLORIDE 50 MG/1
50 TABLET ORAL EVERY 8 HOURS PRN
Status: ON HOLD | COMMUNITY
End: 2019-05-14 | Stop reason: SDUPTHER

## 2019-05-01 RX ORDER — FLUTICASONE FUROATE AND VILANTEROL 100; 25 UG/1; UG/1
1 POWDER RESPIRATORY (INHALATION) DAILY
Status: DISCONTINUED | OUTPATIENT
Start: 2019-05-02 | End: 2019-05-03 | Stop reason: HOSPADM

## 2019-05-01 RX ORDER — CYANOCOBALAMIN 1000 UG/ML
1000 INJECTION INTRAMUSCULAR; SUBCUTANEOUS
Status: DISCONTINUED | OUTPATIENT
Start: 2019-05-11 | End: 2019-05-03 | Stop reason: HOSPADM

## 2019-05-01 RX ORDER — LANOLIN ALCOHOL/MO/W.PET/CERES
100 CREAM (GRAM) TOPICAL DAILY
Status: ON HOLD | COMMUNITY
End: 2019-05-14 | Stop reason: SDUPTHER

## 2019-05-01 RX ORDER — METHOCARBAMOL 500 MG/1
500 TABLET, FILM COATED ORAL EVERY 8 HOURS PRN
COMMUNITY
End: 2019-05-15 | Stop reason: HOSPADM

## 2019-05-01 RX ORDER — ATORVASTATIN CALCIUM 40 MG/1
40 TABLET, FILM COATED ORAL DAILY
Status: ON HOLD | COMMUNITY
End: 2019-05-14 | Stop reason: SDUPTHER

## 2019-05-01 RX ORDER — ALBUTEROL SULFATE 90 UG/1
2 AEROSOL, METERED RESPIRATORY (INHALATION) EVERY 4 HOURS PRN
Status: DISCONTINUED | OUTPATIENT
Start: 2019-05-01 | End: 2019-05-03 | Stop reason: HOSPADM

## 2019-05-01 RX ORDER — TAMSULOSIN HYDROCHLORIDE 0.4 MG/1
0.4 CAPSULE ORAL
Status: CANCELLED | OUTPATIENT
Start: 2019-05-01

## 2019-05-01 RX ORDER — QUETIAPINE FUMARATE 200 MG/1
500 TABLET, FILM COATED ORAL
Status: DISCONTINUED | OUTPATIENT
Start: 2019-05-01 | End: 2019-05-03 | Stop reason: HOSPADM

## 2019-05-01 RX ORDER — GABAPENTIN 300 MG/1
300 CAPSULE ORAL 3 TIMES DAILY
Status: DISCONTINUED | OUTPATIENT
Start: 2019-05-01 | End: 2019-05-03 | Stop reason: HOSPADM

## 2019-05-01 RX ORDER — MAGNESIUM HYDROXIDE/ALUMINUM HYDROXICE/SIMETHICONE 120; 1200; 1200 MG/30ML; MG/30ML; MG/30ML
30 SUSPENSION ORAL EVERY 4 HOURS PRN
Status: CANCELLED | OUTPATIENT
Start: 2019-05-01

## 2019-05-01 RX ORDER — METHOCARBAMOL 500 MG/1
500 TABLET, FILM COATED ORAL EVERY 8 HOURS SCHEDULED
Status: DISCONTINUED | OUTPATIENT
Start: 2019-05-01 | End: 2019-05-01 | Stop reason: HOSPADM

## 2019-05-01 RX ORDER — FLUTICASONE FUROATE AND VILANTEROL 100; 25 UG/1; UG/1
1 POWDER RESPIRATORY (INHALATION) DAILY
Status: CANCELLED | OUTPATIENT
Start: 2019-05-02

## 2019-05-01 RX ORDER — AMOXICILLIN 250 MG
1 CAPSULE ORAL 2 TIMES DAILY
Status: DISCONTINUED | OUTPATIENT
Start: 2019-05-01 | End: 2019-05-03 | Stop reason: HOSPADM

## 2019-05-01 RX ORDER — MULTIVITAMIN/IRON/FOLIC ACID 18MG-0.4MG
1 TABLET ORAL DAILY
Status: DISCONTINUED | OUTPATIENT
Start: 2019-05-02 | End: 2019-05-03 | Stop reason: HOSPADM

## 2019-05-01 RX ORDER — HYDROXYZINE 50 MG/1
50 TABLET, FILM COATED ORAL EVERY 6 HOURS PRN
COMMUNITY
End: 2019-05-15 | Stop reason: HOSPADM

## 2019-05-01 RX ORDER — IPRATROPIUM BROMIDE AND ALBUTEROL SULFATE 2.5; .5 MG/3ML; MG/3ML
3 SOLUTION RESPIRATORY (INHALATION) EVERY 6 HOURS PRN
Status: CANCELLED | OUTPATIENT
Start: 2019-05-01

## 2019-05-01 RX ORDER — FLUTICASONE PROPIONATE 50 MCG
1 SPRAY, SUSPENSION (ML) NASAL DAILY
Status: DISCONTINUED | OUTPATIENT
Start: 2019-05-02 | End: 2019-05-03 | Stop reason: HOSPADM

## 2019-05-01 RX ORDER — GUAIFENESIN 600 MG
600 TABLET, EXTENDED RELEASE 12 HR ORAL EVERY 12 HOURS PRN
COMMUNITY
End: 2021-08-06 | Stop reason: ALTCHOICE

## 2019-05-01 RX ORDER — LIDOCAINE 50 MG/G
1 PATCH TOPICAL DAILY
Status: CANCELLED | OUTPATIENT
Start: 2019-05-02

## 2019-05-01 RX ORDER — IPRATROPIUM BROMIDE AND ALBUTEROL SULFATE 2.5; .5 MG/3ML; MG/3ML
3 SOLUTION RESPIRATORY (INHALATION) EVERY 6 HOURS PRN
Status: DISCONTINUED | OUTPATIENT
Start: 2019-05-01 | End: 2019-05-01

## 2019-05-01 RX ORDER — QUETIAPINE FUMARATE 200 MG/1
200 TABLET, FILM COATED ORAL DAILY
Status: CANCELLED | OUTPATIENT
Start: 2019-05-02

## 2019-05-01 RX ORDER — MAGNESIUM HYDROXIDE/ALUMINUM HYDROXICE/SIMETHICONE 120; 1200; 1200 MG/30ML; MG/30ML; MG/30ML
30 SUSPENSION ORAL EVERY 4 HOURS PRN
COMMUNITY
End: 2019-05-15 | Stop reason: HOSPADM

## 2019-05-01 RX ORDER — ACETAMINOPHEN 325 MG/1
650 TABLET ORAL EVERY 6 HOURS PRN
Status: DISCONTINUED | OUTPATIENT
Start: 2019-05-01 | End: 2019-05-03 | Stop reason: HOSPADM

## 2019-05-01 RX ORDER — DULOXETIN HYDROCHLORIDE 60 MG/1
20 CAPSULE, DELAYED RELEASE ORAL DAILY
COMMUNITY
End: 2019-05-15 | Stop reason: HOSPADM

## 2019-05-01 RX ORDER — ACETAMINOPHEN 325 MG/1
650 TABLET ORAL EVERY 6 HOURS PRN
Status: CANCELLED | OUTPATIENT
Start: 2019-05-01

## 2019-05-01 RX ORDER — METOPROLOL TARTRATE 50 MG/1
50 TABLET, FILM COATED ORAL DAILY
Status: DISCONTINUED | OUTPATIENT
Start: 2019-05-02 | End: 2019-05-03 | Stop reason: HOSPADM

## 2019-05-01 RX ORDER — AMLODIPINE BESYLATE 5 MG/1
10 TABLET ORAL DAILY
Status: CANCELLED | OUTPATIENT
Start: 2019-05-02

## 2019-05-01 RX ORDER — LORAZEPAM 1 MG/1
2 TABLET ORAL EVERY 4 HOURS PRN
Status: DISCONTINUED | OUTPATIENT
Start: 2019-05-01 | End: 2019-05-03 | Stop reason: HOSPADM

## 2019-05-01 RX ORDER — NICOTINE 21 MG/24HR
1 PATCH, TRANSDERMAL 24 HOURS TRANSDERMAL EVERY 24 HOURS
COMMUNITY
End: 2019-05-15 | Stop reason: HOSPADM

## 2019-05-01 RX ORDER — THIAMINE MONONITRATE (VIT B1) 100 MG
100 TABLET ORAL DAILY
Status: DISCONTINUED | OUTPATIENT
Start: 2019-05-02 | End: 2019-05-03 | Stop reason: HOSPADM

## 2019-05-01 RX ORDER — FLUTICASONE PROPIONATE 50 MCG
1 SPRAY, SUSPENSION (ML) NASAL DAILY
COMMUNITY
End: 2019-05-15 | Stop reason: HOSPADM

## 2019-05-01 RX ORDER — TRAZODONE HYDROCHLORIDE 50 MG/1
50 TABLET ORAL
Status: CANCELLED | OUTPATIENT
Start: 2019-05-01

## 2019-05-01 RX ORDER — FLUTICASONE PROPIONATE 50 MCG
1 SPRAY, SUSPENSION (ML) NASAL DAILY
Status: CANCELLED | OUTPATIENT
Start: 2019-05-02

## 2019-05-01 RX ORDER — HALOPERIDOL DECANOATE 100 MG/ML
INJECTION INTRAMUSCULAR
COMMUNITY
End: 2019-05-15 | Stop reason: HOSPADM

## 2019-05-01 RX ORDER — MELATONIN
1000 DAILY
Status: DISCONTINUED | OUTPATIENT
Start: 2019-05-02 | End: 2019-05-03 | Stop reason: HOSPADM

## 2019-05-01 RX ORDER — QUETIAPINE FUMARATE 200 MG/1
200 TABLET, FILM COATED ORAL DAILY
Status: DISCONTINUED | OUTPATIENT
Start: 2019-05-02 | End: 2019-05-03 | Stop reason: HOSPADM

## 2019-05-01 RX ORDER — GUAIFENESIN 600 MG
600 TABLET, EXTENDED RELEASE 12 HR ORAL EVERY 12 HOURS PRN
Status: CANCELLED | OUTPATIENT
Start: 2019-05-01

## 2019-05-01 RX ORDER — ACETAMINOPHEN 325 MG/1
650 TABLET ORAL EVERY 4 HOURS PRN
Status: CANCELLED | OUTPATIENT
Start: 2019-05-01

## 2019-05-01 RX ORDER — DULOXETIN HYDROCHLORIDE 30 MG/1
60 CAPSULE, DELAYED RELEASE ORAL DAILY
Status: DISCONTINUED | OUTPATIENT
Start: 2019-05-02 | End: 2019-05-03 | Stop reason: HOSPADM

## 2019-05-01 RX ORDER — HALOPERIDOL 5 MG
5 TABLET ORAL EVERY 8 HOURS PRN
Status: CANCELLED | OUTPATIENT
Start: 2019-05-01

## 2019-05-01 RX ORDER — NICOTINE 21 MG/24HR
14 PATCH, TRANSDERMAL 24 HOURS TRANSDERMAL DAILY
Status: DISCONTINUED | OUTPATIENT
Start: 2019-05-02 | End: 2019-05-03 | Stop reason: HOSPADM

## 2019-05-01 RX ORDER — ACETAMINOPHEN 325 MG/1
650 TABLET ORAL EVERY 4 HOURS PRN
Status: DISCONTINUED | OUTPATIENT
Start: 2019-05-01 | End: 2019-05-03 | Stop reason: HOSPADM

## 2019-05-01 RX ORDER — LIDOCAINE 50 MG/G
1 PATCH TOPICAL DAILY
Status: DISCONTINUED | OUTPATIENT
Start: 2019-05-02 | End: 2019-05-03 | Stop reason: HOSPADM

## 2019-05-01 RX ORDER — QUETIAPINE 200 MG/1
200 TABLET, FILM COATED, EXTENDED RELEASE ORAL
COMMUNITY
End: 2019-05-15 | Stop reason: HOSPADM

## 2019-05-01 RX ORDER — HYDROXYZINE HYDROCHLORIDE 25 MG/1
50 TABLET, FILM COATED ORAL EVERY 6 HOURS PRN
Status: CANCELLED | OUTPATIENT
Start: 2019-05-01

## 2019-05-01 RX ORDER — QUETIAPINE FUMARATE 200 MG/1
200 TABLET, FILM COATED ORAL
COMMUNITY
End: 2019-05-15 | Stop reason: HOSPADM

## 2019-05-01 RX ORDER — FOLIC ACID 1 MG/1
1 TABLET ORAL DAILY
Status: CANCELLED | OUTPATIENT
Start: 2019-05-02

## 2019-05-01 RX ORDER — AMLODIPINE BESYLATE 5 MG/1
10 TABLET ORAL DAILY
Status: DISCONTINUED | OUTPATIENT
Start: 2019-05-02 | End: 2019-05-03 | Stop reason: HOSPADM

## 2019-05-01 RX ORDER — METOPROLOL TARTRATE 50 MG/1
50 TABLET, FILM COATED ORAL DAILY
Status: CANCELLED | OUTPATIENT
Start: 2019-05-02

## 2019-05-01 RX ORDER — MAGNESIUM HYDROXIDE/ALUMINUM HYDROXICE/SIMETHICONE 120; 1200; 1200 MG/30ML; MG/30ML; MG/30ML
30 SUSPENSION ORAL EVERY 4 HOURS PRN
Status: DISCONTINUED | OUTPATIENT
Start: 2019-05-01 | End: 2019-05-03 | Stop reason: HOSPADM

## 2019-05-01 RX ORDER — TAMSULOSIN HYDROCHLORIDE 0.4 MG/1
0.4 CAPSULE ORAL
Status: DISCONTINUED | OUTPATIENT
Start: 2019-05-01 | End: 2019-05-03 | Stop reason: HOSPADM

## 2019-05-01 RX ORDER — SENNA PLUS 8.6 MG/1
1 TABLET ORAL 2 TIMES DAILY
COMMUNITY
End: 2019-05-15 | Stop reason: HOSPADM

## 2019-05-01 RX ORDER — DULOXETIN HYDROCHLORIDE 60 MG/1
60 CAPSULE, DELAYED RELEASE ORAL DAILY
Status: CANCELLED | OUTPATIENT
Start: 2019-05-02

## 2019-05-01 RX ORDER — ASPIRIN 81 MG/1
81 TABLET ORAL DAILY
Status: CANCELLED | OUTPATIENT
Start: 2019-05-02

## 2019-05-01 RX ORDER — ATORVASTATIN CALCIUM 40 MG/1
40 TABLET, FILM COATED ORAL EVERY EVENING
Status: CANCELLED | OUTPATIENT
Start: 2019-05-01

## 2019-05-01 RX ORDER — TRAZODONE HYDROCHLORIDE 50 MG/1
50 TABLET ORAL
COMMUNITY
End: 2019-05-15 | Stop reason: HOSPADM

## 2019-05-01 RX ORDER — TRAMADOL HYDROCHLORIDE 50 MG/1
50 TABLET ORAL EVERY 8 HOURS PRN
Status: DISCONTINUED | OUTPATIENT
Start: 2019-05-01 | End: 2019-05-03 | Stop reason: HOSPADM

## 2019-05-01 RX ORDER — ACETAMINOPHEN 325 MG/1
650 TABLET ORAL EVERY 4 HOURS PRN
COMMUNITY
End: 2019-05-15 | Stop reason: HOSPADM

## 2019-05-01 RX ORDER — LIDOCAINE 50 MG/G
1 PATCH TOPICAL DAILY
COMMUNITY
End: 2019-05-15 | Stop reason: HOSPADM

## 2019-05-01 RX ORDER — AMOXICILLIN 250 MG
1 CAPSULE ORAL 2 TIMES DAILY
Status: CANCELLED | OUTPATIENT
Start: 2019-05-01

## 2019-05-01 RX ORDER — FLUTICASONE FUROATE AND VILANTEROL 100; 25 UG/1; UG/1
1 POWDER RESPIRATORY (INHALATION) DAILY
Status: ON HOLD | COMMUNITY
End: 2019-05-01 | Stop reason: ALTCHOICE

## 2019-05-01 RX ORDER — METHOCARBAMOL 500 MG/1
500 TABLET, FILM COATED ORAL EVERY 8 HOURS SCHEDULED
Status: CANCELLED | OUTPATIENT
Start: 2019-05-01

## 2019-05-01 RX ORDER — HALOPERIDOL 5 MG/ML
5 INJECTION INTRAMUSCULAR EVERY 8 HOURS PRN
COMMUNITY
End: 2019-05-15 | Stop reason: HOSPADM

## 2019-05-01 RX ORDER — FOLIC ACID 1 MG/1
1 TABLET ORAL DAILY
Status: ON HOLD | COMMUNITY
End: 2019-05-14 | Stop reason: SDUPTHER

## 2019-05-01 RX ORDER — HALOPERIDOL 5 MG
5 TABLET ORAL EVERY 8 HOURS PRN
COMMUNITY
End: 2019-05-15 | Stop reason: HOSPADM

## 2019-05-01 RX ORDER — HALOPERIDOL 5 MG/ML
5 INJECTION INTRAMUSCULAR EVERY 8 HOURS PRN
Status: CANCELLED | OUTPATIENT
Start: 2019-05-01

## 2019-05-01 RX ORDER — ASPIRIN 81 MG/1
81 TABLET ORAL DAILY
Status: DISCONTINUED | OUTPATIENT
Start: 2019-05-02 | End: 2019-05-01 | Stop reason: HOSPADM

## 2019-05-01 RX ADMIN — METOPROLOL TARTRATE 50 MG: 50 TABLET, FILM COATED ORAL at 09:15

## 2019-05-01 RX ADMIN — DULOXETINE HYDROCHLORIDE 60 MG: 60 CAPSULE, DELAYED RELEASE ORAL at 09:14

## 2019-05-01 RX ADMIN — GUAIFENESIN 600 MG: 600 TABLET, EXTENDED RELEASE ORAL at 20:39

## 2019-05-01 RX ADMIN — FLUTICASONE PROPIONATE 1 SPRAY: 50 SPRAY, METERED NASAL at 09:17

## 2019-05-01 RX ADMIN — TRAMADOL HYDROCHLORIDE 50 MG: 50 TABLET, COATED ORAL at 17:57

## 2019-05-01 RX ADMIN — QUETIAPINE FUMARATE 200 MG: 200 TABLET ORAL at 09:15

## 2019-05-01 RX ADMIN — ASPIRIN 325 MG: 325 TABLET, FILM COATED ORAL at 12:34

## 2019-05-01 RX ADMIN — METHOCARBAMOL 500 MG: 500 TABLET, FILM COATED ORAL at 17:54

## 2019-05-01 RX ADMIN — SENNOSIDES AND DOCUSATE SODIUM 1 TABLET: 8.6; 5 TABLET ORAL at 09:15

## 2019-05-01 RX ADMIN — GABAPENTIN 300 MG: 300 CAPSULE ORAL at 09:14

## 2019-05-01 RX ADMIN — AMLODIPINE BESYLATE 10 MG: 5 TABLET ORAL at 09:15

## 2019-05-01 RX ADMIN — TAMSULOSIN HYDROCHLORIDE 0.4 MG: 0.4 CAPSULE ORAL at 17:54

## 2019-05-01 RX ADMIN — TRAMADOL HYDROCHLORIDE 50 MG: 50 TABLET, COATED ORAL at 09:16

## 2019-05-01 RX ADMIN — GABAPENTIN 300 MG: 300 CAPSULE ORAL at 17:53

## 2019-05-01 RX ADMIN — METHOCARBAMOL 500 MG: 500 TABLET, FILM COATED ORAL at 21:36

## 2019-05-01 RX ADMIN — FOLIC ACID 1 MG: 1 TABLET ORAL at 09:14

## 2019-05-01 RX ADMIN — LIDOCAINE 1 PATCH: 50 PATCH TOPICAL at 09:18

## 2019-05-01 RX ADMIN — GABAPENTIN 300 MG: 300 CAPSULE ORAL at 20:23

## 2019-05-01 RX ADMIN — LEVOFLOXACIN 750 MG: 750 TABLET, FILM COATED ORAL at 09:15

## 2019-05-01 RX ADMIN — QUETIAPINE FUMARATE 500 MG: 200 TABLET ORAL at 21:37

## 2019-05-01 RX ADMIN — Medication 1 TABLET: at 09:14

## 2019-05-01 RX ADMIN — ATORVASTATIN CALCIUM 40 MG: 40 TABLET, FILM COATED ORAL at 17:54

## 2019-05-01 RX ADMIN — ACETAMINOPHEN 650 MG: 325 TABLET ORAL at 20:26

## 2019-05-01 RX ADMIN — Medication 100 MG: at 09:15

## 2019-05-01 RX ADMIN — FLUTICASONE FUROATE AND VILANTEROL TRIFENATATE 1 PUFF: 100; 25 POWDER RESPIRATORY (INHALATION) at 09:17

## 2019-05-01 RX ADMIN — SENNOSIDES AND DOCUSATE SODIUM 1 TABLET: 8.6; 5 TABLET ORAL at 17:53

## 2019-05-01 RX ADMIN — VITAMIN D, TAB 1000IU (100/BT) 1000 UNITS: 25 TAB at 09:14

## 2019-05-01 RX ADMIN — NICOTINE 14 MG: 14 PATCH, EXTENDED RELEASE TRANSDERMAL at 09:22

## 2019-05-02 ENCOUNTER — APPOINTMENT (INPATIENT)
Dept: NON INVASIVE DIAGNOSTICS | Facility: HOSPITAL | Age: 69
DRG: 069 | End: 2019-05-02
Payer: MEDICARE

## 2019-05-02 ENCOUNTER — APPOINTMENT (INPATIENT)
Dept: MRI IMAGING | Facility: HOSPITAL | Age: 69
DRG: 069 | End: 2019-05-02
Payer: MEDICARE

## 2019-05-02 PROCEDURE — 99222 1ST HOSP IP/OBS MODERATE 55: CPT | Performed by: NURSE PRACTITIONER

## 2019-05-02 PROCEDURE — G8988 SELF CARE GOAL STATUS: HCPCS

## 2019-05-02 PROCEDURE — 93306 TTE W/DOPPLER COMPLETE: CPT | Performed by: INTERNAL MEDICINE

## 2019-05-02 PROCEDURE — 97167 OT EVAL HIGH COMPLEX 60 MIN: CPT

## 2019-05-02 PROCEDURE — 70547 MR ANGIOGRAPHY NECK W/O DYE: CPT

## 2019-05-02 PROCEDURE — 70551 MRI BRAIN STEM W/O DYE: CPT

## 2019-05-02 PROCEDURE — G8987 SELF CARE CURRENT STATUS: HCPCS

## 2019-05-02 PROCEDURE — 97163 PT EVAL HIGH COMPLEX 45 MIN: CPT

## 2019-05-02 PROCEDURE — 70544 MR ANGIOGRAPHY HEAD W/O DYE: CPT

## 2019-05-02 PROCEDURE — G8979 MOBILITY GOAL STATUS: HCPCS

## 2019-05-02 PROCEDURE — 93306 TTE W/DOPPLER COMPLETE: CPT

## 2019-05-02 PROCEDURE — G8978 MOBILITY CURRENT STATUS: HCPCS

## 2019-05-02 RX ADMIN — TRAMADOL HYDROCHLORIDE 50 MG: 50 TABLET, COATED ORAL at 20:45

## 2019-05-02 RX ADMIN — FLUTICASONE PROPIONATE 1 SPRAY: 50 SPRAY, METERED NASAL at 09:17

## 2019-05-02 RX ADMIN — METOPROLOL TARTRATE 50 MG: 50 TABLET, FILM COATED ORAL at 09:17

## 2019-05-02 RX ADMIN — FOLIC ACID 1 MG: 1 TABLET ORAL at 09:16

## 2019-05-02 RX ADMIN — Medication 100 MG: at 09:17

## 2019-05-02 RX ADMIN — VITAMIN D, TAB 1000IU (100/BT) 1000 UNITS: 25 TAB at 09:16

## 2019-05-02 RX ADMIN — TRAMADOL HYDROCHLORIDE 50 MG: 50 TABLET, COATED ORAL at 09:16

## 2019-05-02 RX ADMIN — AMLODIPINE BESYLATE 10 MG: 5 TABLET ORAL at 09:16

## 2019-05-02 RX ADMIN — ASPIRIN 81 MG: 81 TABLET, COATED ORAL at 09:17

## 2019-05-02 RX ADMIN — GABAPENTIN 300 MG: 300 CAPSULE ORAL at 16:48

## 2019-05-02 RX ADMIN — GABAPENTIN 300 MG: 300 CAPSULE ORAL at 20:43

## 2019-05-02 RX ADMIN — DICLOFENAC 2 G: 10 GEL TOPICAL at 09:18

## 2019-05-02 RX ADMIN — SENNOSIDES AND DOCUSATE SODIUM 1 TABLET: 8.6; 5 TABLET ORAL at 09:16

## 2019-05-02 RX ADMIN — METHOCARBAMOL 500 MG: 500 TABLET, FILM COATED ORAL at 05:29

## 2019-05-02 RX ADMIN — GUAIFENESIN 600 MG: 600 TABLET, EXTENDED RELEASE ORAL at 20:43

## 2019-05-02 RX ADMIN — LIDOCAINE 1 PATCH: 50 PATCH TOPICAL at 09:15

## 2019-05-02 RX ADMIN — QUETIAPINE FUMARATE 500 MG: 200 TABLET ORAL at 22:35

## 2019-05-02 RX ADMIN — ATORVASTATIN CALCIUM 40 MG: 40 TABLET, FILM COATED ORAL at 17:51

## 2019-05-02 RX ADMIN — NICOTINE 14 MG: 14 PATCH TRANSDERMAL at 09:15

## 2019-05-02 RX ADMIN — DULOXETINE HYDROCHLORIDE 60 MG: 30 CAPSULE, DELAYED RELEASE ORAL at 09:16

## 2019-05-02 RX ADMIN — METHOCARBAMOL 500 MG: 500 TABLET, FILM COATED ORAL at 16:48

## 2019-05-02 RX ADMIN — QUETIAPINE FUMARATE 200 MG: 200 TABLET ORAL at 09:16

## 2019-05-02 RX ADMIN — METHOCARBAMOL 500 MG: 500 TABLET, FILM COATED ORAL at 22:37

## 2019-05-02 RX ADMIN — Medication 1 TABLET: at 09:16

## 2019-05-02 RX ADMIN — SENNOSIDES AND DOCUSATE SODIUM 1 TABLET: 8.6; 5 TABLET ORAL at 17:51

## 2019-05-02 RX ADMIN — FLUTICASONE FUROATE AND VILANTEROL TRIFENATATE 1 PUFF: 100; 25 POWDER RESPIRATORY (INHALATION) at 09:17

## 2019-05-02 RX ADMIN — GABAPENTIN 300 MG: 300 CAPSULE ORAL at 09:17

## 2019-05-02 RX ADMIN — TAMSULOSIN HYDROCHLORIDE 0.4 MG: 0.4 CAPSULE ORAL at 16:48

## 2019-05-03 ENCOUNTER — HOSPITAL ENCOUNTER (INPATIENT)
Facility: HOSPITAL | Age: 69
LOS: 12 days | Discharge: HOME HEALTH | DRG: 885 | End: 2019-05-15
Attending: HOSPITALIST | Admitting: HOSPITALIST
Payer: MEDICARE

## 2019-05-03 VITALS
TEMPERATURE: 97.9 F | DIASTOLIC BLOOD PRESSURE: 82 MMHG | BODY MASS INDEX: 27.92 KG/M2 | OXYGEN SATURATION: 92 % | HEART RATE: 71 BPM | SYSTOLIC BLOOD PRESSURE: 129 MMHG | HEIGHT: 68 IN | RESPIRATION RATE: 18 BRPM | WEIGHT: 184.2 LBS

## 2019-05-03 DIAGNOSIS — F33.3 MAJOR DEPRESSIVE DISORDER, RECURRENT, SEVERE WITH PSYCHOTIC FEATURES (HCC): Primary | Chronic | ICD-10-CM

## 2019-05-03 DIAGNOSIS — N40.0 BPH (BENIGN PROSTATIC HYPERPLASIA): ICD-10-CM

## 2019-05-03 DIAGNOSIS — Z72.89 ALCOHOL USE: ICD-10-CM

## 2019-05-03 DIAGNOSIS — E53.8 B12 DEFICIENCY: ICD-10-CM

## 2019-05-03 DIAGNOSIS — J44.9 COPD (CHRONIC OBSTRUCTIVE PULMONARY DISEASE) (HCC): ICD-10-CM

## 2019-05-03 DIAGNOSIS — G45.9 TIA (TRANSIENT ISCHEMIC ATTACK): ICD-10-CM

## 2019-05-03 DIAGNOSIS — M54.50 LOW BACK PAIN: ICD-10-CM

## 2019-05-03 DIAGNOSIS — F33.9 RECURRENT MAJOR DEPRESSIVE DISORDER (HCC): ICD-10-CM

## 2019-05-03 DIAGNOSIS — R09.81 NASAL CONGESTION: ICD-10-CM

## 2019-05-03 DIAGNOSIS — M62.838 MUSCLE SPASM: ICD-10-CM

## 2019-05-03 DIAGNOSIS — Z72.0 TOBACCO ABUSE: ICD-10-CM

## 2019-05-03 DIAGNOSIS — E78.5 HYPERLIPIDEMIA: ICD-10-CM

## 2019-05-03 DIAGNOSIS — I63.9 CEREBROVASCULAR ACCIDENT (CVA), UNSPECIFIED MECHANISM (HCC): ICD-10-CM

## 2019-05-03 DIAGNOSIS — I10 ESSENTIAL HYPERTENSION: ICD-10-CM

## 2019-05-03 DIAGNOSIS — K59.00 CONSTIPATION: ICD-10-CM

## 2019-05-03 DIAGNOSIS — E55.9 VITAMIN D DEFICIENCY: ICD-10-CM

## 2019-05-03 PROBLEM — F10.20 UNCOMPLICATED ALCOHOL DEPENDENCE (HCC): Chronic | Status: ACTIVE | Noted: 2019-05-03

## 2019-05-03 PROCEDURE — 1124F ACP DISCUSS-NO DSCNMKR DOCD: CPT | Performed by: NURSE PRACTITIONER

## 2019-05-03 PROCEDURE — NC001 PR NO CHARGE: Performed by: PSYCHIATRY & NEUROLOGY

## 2019-05-03 PROCEDURE — 0HBRXZZ EXCISION OF TOE NAIL, EXTERNAL APPROACH: ICD-10-PCS | Performed by: PODIATRIST

## 2019-05-03 RX ORDER — THIAMINE MONONITRATE (VIT B1) 100 MG
100 TABLET ORAL DAILY
Status: CANCELLED | OUTPATIENT
Start: 2019-05-04

## 2019-05-03 RX ORDER — ATORVASTATIN CALCIUM 40 MG/1
40 TABLET, FILM COATED ORAL EVERY EVENING
Status: CANCELLED | OUTPATIENT
Start: 2019-05-03

## 2019-05-03 RX ORDER — GABAPENTIN 300 MG/1
300 CAPSULE ORAL 3 TIMES DAILY
Status: CANCELLED | OUTPATIENT
Start: 2019-05-03

## 2019-05-03 RX ORDER — MULTIVITAMIN/IRON/FOLIC ACID 18MG-0.4MG
1 TABLET ORAL DAILY
Status: CANCELLED | OUTPATIENT
Start: 2019-05-04

## 2019-05-03 RX ORDER — NICOTINE 21 MG/24HR
1 PATCH, TRANSDERMAL 24 HOURS TRANSDERMAL DAILY
Status: CANCELLED | OUTPATIENT
Start: 2019-05-03

## 2019-05-03 RX ORDER — HALOPERIDOL 5 MG/ML
5 INJECTION INTRAMUSCULAR EVERY 8 HOURS PRN
Status: CANCELLED | OUTPATIENT
Start: 2019-05-03

## 2019-05-03 RX ORDER — NICOTINE 21 MG/24HR
14 PATCH, TRANSDERMAL 24 HOURS TRANSDERMAL DAILY
Status: CANCELLED | OUTPATIENT
Start: 2019-05-04

## 2019-05-03 RX ORDER — MAGNESIUM HYDROXIDE/ALUMINUM HYDROXICE/SIMETHICONE 120; 1200; 1200 MG/30ML; MG/30ML; MG/30ML
30 SUSPENSION ORAL EVERY 4 HOURS PRN
Status: CANCELLED | OUTPATIENT
Start: 2019-05-03

## 2019-05-03 RX ORDER — RISPERIDONE 0.5 MG/1
0.5 TABLET, ORALLY DISINTEGRATING ORAL EVERY 4 HOURS PRN
Status: DISCONTINUED | OUTPATIENT
Start: 2019-05-03 | End: 2019-05-15 | Stop reason: HOSPADM

## 2019-05-03 RX ORDER — RISPERIDONE 1 MG/1
1 TABLET, ORALLY DISINTEGRATING ORAL EVERY 4 HOURS PRN
Status: CANCELLED | OUTPATIENT
Start: 2019-05-03

## 2019-05-03 RX ORDER — AMOXICILLIN 250 MG
1 CAPSULE ORAL 2 TIMES DAILY
Status: DISCONTINUED | OUTPATIENT
Start: 2019-05-03 | End: 2019-05-15 | Stop reason: HOSPADM

## 2019-05-03 RX ORDER — RISPERIDONE 1 MG/1
1 TABLET, ORALLY DISINTEGRATING ORAL EVERY 2 HOUR PRN
Status: DISCONTINUED | OUTPATIENT
Start: 2019-05-03 | End: 2019-05-03

## 2019-05-03 RX ORDER — QUETIAPINE FUMARATE 200 MG/1
500 TABLET, FILM COATED ORAL
Status: CANCELLED | OUTPATIENT
Start: 2019-05-03

## 2019-05-03 RX ORDER — MELATONIN
1000 DAILY
Status: DISCONTINUED | OUTPATIENT
Start: 2019-05-04 | End: 2019-05-15 | Stop reason: HOSPADM

## 2019-05-03 RX ORDER — METOPROLOL TARTRATE 50 MG/1
50 TABLET, FILM COATED ORAL DAILY
Status: DISCONTINUED | OUTPATIENT
Start: 2019-05-04 | End: 2019-05-15 | Stop reason: HOSPADM

## 2019-05-03 RX ORDER — CYANOCOBALAMIN 1000 UG/ML
1000 INJECTION INTRAMUSCULAR; SUBCUTANEOUS
Status: DISCONTINUED | OUTPATIENT
Start: 2019-05-11 | End: 2019-05-15 | Stop reason: HOSPADM

## 2019-05-03 RX ORDER — NICOTINE 21 MG/24HR
1 PATCH, TRANSDERMAL 24 HOURS TRANSDERMAL DAILY
Status: DISCONTINUED | OUTPATIENT
Start: 2019-05-04 | End: 2019-05-15 | Stop reason: HOSPADM

## 2019-05-03 RX ORDER — QUETIAPINE FUMARATE 200 MG/1
200 TABLET, FILM COATED ORAL DAILY
Status: DISCONTINUED | OUTPATIENT
Start: 2019-05-04 | End: 2019-05-15 | Stop reason: HOSPADM

## 2019-05-03 RX ORDER — MELATONIN
1000 DAILY
Status: CANCELLED | OUTPATIENT
Start: 2019-05-04

## 2019-05-03 RX ORDER — GUAIFENESIN 600 MG
600 TABLET, EXTENDED RELEASE 12 HR ORAL EVERY 12 HOURS PRN
Status: CANCELLED | OUTPATIENT
Start: 2019-05-03

## 2019-05-03 RX ORDER — ASPIRIN 81 MG/1
81 TABLET ORAL DAILY
Status: CANCELLED | OUTPATIENT
Start: 2019-05-04

## 2019-05-03 RX ORDER — TRAZODONE HYDROCHLORIDE 50 MG/1
50 TABLET ORAL
Status: DISCONTINUED | OUTPATIENT
Start: 2019-05-03 | End: 2019-05-15 | Stop reason: HOSPADM

## 2019-05-03 RX ORDER — ASPIRIN 81 MG/1
81 TABLET ORAL DAILY
Status: DISCONTINUED | OUTPATIENT
Start: 2019-05-04 | End: 2019-05-15 | Stop reason: HOSPADM

## 2019-05-03 RX ORDER — TRAMADOL HYDROCHLORIDE 50 MG/1
50 TABLET ORAL EVERY 8 HOURS PRN
Status: CANCELLED | OUTPATIENT
Start: 2019-05-03

## 2019-05-03 RX ORDER — NICOTINE 21 MG/24HR
14 PATCH, TRANSDERMAL 24 HOURS TRANSDERMAL DAILY
Status: DISCONTINUED | OUTPATIENT
Start: 2019-05-04 | End: 2019-05-03

## 2019-05-03 RX ORDER — FLUTICASONE PROPIONATE 50 MCG
1 SPRAY, SUSPENSION (ML) NASAL DAILY
Status: DISCONTINUED | OUTPATIENT
Start: 2019-05-04 | End: 2019-05-07

## 2019-05-03 RX ORDER — ACETAMINOPHEN 325 MG/1
650 TABLET ORAL EVERY 6 HOURS PRN
Status: DISCONTINUED | OUTPATIENT
Start: 2019-05-03 | End: 2019-05-15 | Stop reason: HOSPADM

## 2019-05-03 RX ORDER — ACETAMINOPHEN 325 MG/1
650 TABLET ORAL EVERY 6 HOURS PRN
Status: CANCELLED | OUTPATIENT
Start: 2019-05-03

## 2019-05-03 RX ORDER — GUAIFENESIN 600 MG
600 TABLET, EXTENDED RELEASE 12 HR ORAL EVERY 12 HOURS PRN
Status: DISCONTINUED | OUTPATIENT
Start: 2019-05-03 | End: 2019-05-15 | Stop reason: HOSPADM

## 2019-05-03 RX ORDER — ACETAMINOPHEN 325 MG/1
650 TABLET ORAL EVERY 4 HOURS PRN
Status: CANCELLED | OUTPATIENT
Start: 2019-05-03

## 2019-05-03 RX ORDER — FOLIC ACID 1 MG/1
1 TABLET ORAL DAILY
Status: CANCELLED | OUTPATIENT
Start: 2019-05-04

## 2019-05-03 RX ORDER — HALOPERIDOL 5 MG
5 TABLET ORAL EVERY 8 HOURS PRN
Status: CANCELLED | OUTPATIENT
Start: 2019-05-03

## 2019-05-03 RX ORDER — IBUPROFEN 400 MG/1
400 TABLET ORAL EVERY 8 HOURS PRN
Status: DISCONTINUED | OUTPATIENT
Start: 2019-05-03 | End: 2019-05-03

## 2019-05-03 RX ORDER — GABAPENTIN 300 MG/1
300 CAPSULE ORAL 3 TIMES DAILY
Status: DISCONTINUED | OUTPATIENT
Start: 2019-05-03 | End: 2019-05-15 | Stop reason: HOSPADM

## 2019-05-03 RX ORDER — METHOCARBAMOL 500 MG/1
500 TABLET, FILM COATED ORAL EVERY 8 HOURS SCHEDULED
Status: DISCONTINUED | OUTPATIENT
Start: 2019-05-03 | End: 2019-05-15 | Stop reason: HOSPADM

## 2019-05-03 RX ORDER — ACETAMINOPHEN 325 MG/1
975 TABLET ORAL EVERY 6 HOURS PRN
Status: CANCELLED | OUTPATIENT
Start: 2019-05-03

## 2019-05-03 RX ORDER — AMLODIPINE BESYLATE 5 MG/1
10 TABLET ORAL DAILY
Status: CANCELLED | OUTPATIENT
Start: 2019-05-04

## 2019-05-03 RX ORDER — DULOXETIN HYDROCHLORIDE 30 MG/1
60 CAPSULE, DELAYED RELEASE ORAL DAILY
Status: CANCELLED | OUTPATIENT
Start: 2019-05-04

## 2019-05-03 RX ORDER — TAMSULOSIN HYDROCHLORIDE 0.4 MG/1
0.4 CAPSULE ORAL
Status: DISCONTINUED | OUTPATIENT
Start: 2019-05-04 | End: 2019-05-15 | Stop reason: HOSPADM

## 2019-05-03 RX ORDER — AMLODIPINE BESYLATE 5 MG/1
10 TABLET ORAL DAILY
Status: DISCONTINUED | OUTPATIENT
Start: 2019-05-04 | End: 2019-05-15 | Stop reason: HOSPADM

## 2019-05-03 RX ORDER — LIDOCAINE 50 MG/G
1 PATCH TOPICAL DAILY
Status: CANCELLED | OUTPATIENT
Start: 2019-05-04

## 2019-05-03 RX ORDER — HYDROXYZINE HYDROCHLORIDE 25 MG/1
50 TABLET, FILM COATED ORAL EVERY 6 HOURS PRN
Status: DISCONTINUED | OUTPATIENT
Start: 2019-05-03 | End: 2019-05-04

## 2019-05-03 RX ORDER — FOLIC ACID 1 MG/1
1 TABLET ORAL DAILY
Status: DISCONTINUED | OUTPATIENT
Start: 2019-05-04 | End: 2019-05-15 | Stop reason: HOSPADM

## 2019-05-03 RX ORDER — METHOCARBAMOL 500 MG/1
500 TABLET, FILM COATED ORAL EVERY 8 HOURS SCHEDULED
Status: CANCELLED | OUTPATIENT
Start: 2019-05-03

## 2019-05-03 RX ORDER — ACETAMINOPHEN 325 MG/1
650 TABLET ORAL EVERY 4 HOURS PRN
Status: DISCONTINUED | OUTPATIENT
Start: 2019-05-03 | End: 2019-05-15 | Stop reason: HOSPADM

## 2019-05-03 RX ORDER — CYANOCOBALAMIN 1000 UG/ML
1000 INJECTION INTRAMUSCULAR; SUBCUTANEOUS
Status: CANCELLED | OUTPATIENT
Start: 2019-05-11

## 2019-05-03 RX ORDER — DULOXETIN HYDROCHLORIDE 60 MG/1
60 CAPSULE, DELAYED RELEASE ORAL DAILY
Status: DISCONTINUED | OUTPATIENT
Start: 2019-05-04 | End: 2019-05-03

## 2019-05-03 RX ORDER — FLUTICASONE FUROATE AND VILANTEROL 100; 25 UG/1; UG/1
1 POWDER RESPIRATORY (INHALATION) DAILY
Status: CANCELLED | OUTPATIENT
Start: 2019-05-04

## 2019-05-03 RX ORDER — RISPERIDONE 0.5 MG/1
0.5 TABLET, ORALLY DISINTEGRATING ORAL EVERY 4 HOURS PRN
Status: CANCELLED | OUTPATIENT
Start: 2019-05-03

## 2019-05-03 RX ORDER — LIDOCAINE 50 MG/G
1 PATCH TOPICAL DAILY
Status: DISCONTINUED | OUTPATIENT
Start: 2019-05-04 | End: 2019-05-15 | Stop reason: HOSPADM

## 2019-05-03 RX ORDER — HALOPERIDOL 5 MG
5 TABLET ORAL EVERY 8 HOURS PRN
Status: DISCONTINUED | OUTPATIENT
Start: 2019-05-03 | End: 2019-05-04

## 2019-05-03 RX ORDER — HYDROXYZINE HYDROCHLORIDE 25 MG/1
50 TABLET, FILM COATED ORAL EVERY 6 HOURS PRN
Status: CANCELLED | OUTPATIENT
Start: 2019-05-03

## 2019-05-03 RX ORDER — ALBUTEROL SULFATE 90 UG/1
2 AEROSOL, METERED RESPIRATORY (INHALATION) EVERY 4 HOURS PRN
Status: DISCONTINUED | OUTPATIENT
Start: 2019-05-03 | End: 2019-05-15 | Stop reason: HOSPADM

## 2019-05-03 RX ORDER — RISPERIDONE 1 MG/1
1 TABLET, ORALLY DISINTEGRATING ORAL EVERY 2 HOUR PRN
Status: CANCELLED | OUTPATIENT
Start: 2019-05-03

## 2019-05-03 RX ORDER — QUETIAPINE FUMARATE 200 MG/1
200 TABLET, FILM COATED ORAL DAILY
Status: CANCELLED | OUTPATIENT
Start: 2019-05-04

## 2019-05-03 RX ORDER — RISPERIDONE 1 MG/1
1 TABLET, ORALLY DISINTEGRATING ORAL EVERY 4 HOURS PRN
Status: DISCONTINUED | OUTPATIENT
Start: 2019-05-03 | End: 2019-05-15 | Stop reason: HOSPADM

## 2019-05-03 RX ORDER — AMOXICILLIN 250 MG
1 CAPSULE ORAL 2 TIMES DAILY
Status: CANCELLED | OUTPATIENT
Start: 2019-05-03

## 2019-05-03 RX ORDER — TRAZODONE HYDROCHLORIDE 50 MG/1
50 TABLET ORAL
Status: CANCELLED | OUTPATIENT
Start: 2019-05-03

## 2019-05-03 RX ORDER — ACETAMINOPHEN 325 MG/1
975 TABLET ORAL EVERY 6 HOURS PRN
Status: DISCONTINUED | OUTPATIENT
Start: 2019-05-03 | End: 2019-05-04

## 2019-05-03 RX ORDER — ALBUTEROL SULFATE 90 UG/1
2 AEROSOL, METERED RESPIRATORY (INHALATION) EVERY 4 HOURS PRN
Status: CANCELLED | OUTPATIENT
Start: 2019-05-03

## 2019-05-03 RX ORDER — FLUTICASONE PROPIONATE 50 MCG
1 SPRAY, SUSPENSION (ML) NASAL DAILY
Status: CANCELLED | OUTPATIENT
Start: 2019-05-04

## 2019-05-03 RX ORDER — DULOXETIN HYDROCHLORIDE 60 MG/1
60 CAPSULE, DELAYED RELEASE ORAL DAILY
Status: DISCONTINUED | OUTPATIENT
Start: 2019-05-04 | End: 2019-05-04

## 2019-05-03 RX ORDER — THIAMINE MONONITRATE (VIT B1) 100 MG
100 TABLET ORAL DAILY
Status: DISCONTINUED | OUTPATIENT
Start: 2019-05-04 | End: 2019-05-15 | Stop reason: HOSPADM

## 2019-05-03 RX ORDER — ATORVASTATIN CALCIUM 40 MG/1
40 TABLET, FILM COATED ORAL EVERY EVENING
Status: DISCONTINUED | OUTPATIENT
Start: 2019-05-03 | End: 2019-05-15 | Stop reason: HOSPADM

## 2019-05-03 RX ORDER — IBUPROFEN 800 MG/1
400 TABLET ORAL EVERY 8 HOURS PRN
Status: CANCELLED | OUTPATIENT
Start: 2019-05-03

## 2019-05-03 RX ORDER — HALOPERIDOL 5 MG/ML
5 INJECTION INTRAMUSCULAR EVERY 8 HOURS PRN
Status: DISCONTINUED | OUTPATIENT
Start: 2019-05-03 | End: 2019-05-04

## 2019-05-03 RX ORDER — TAMSULOSIN HYDROCHLORIDE 0.4 MG/1
0.4 CAPSULE ORAL
Status: CANCELLED | OUTPATIENT
Start: 2019-05-03

## 2019-05-03 RX ORDER — TRAMADOL HYDROCHLORIDE 50 MG/1
50 TABLET ORAL EVERY 8 HOURS PRN
Status: DISCONTINUED | OUTPATIENT
Start: 2019-05-03 | End: 2019-05-15 | Stop reason: HOSPADM

## 2019-05-03 RX ORDER — FLUTICASONE FUROATE AND VILANTEROL 100; 25 UG/1; UG/1
1 POWDER RESPIRATORY (INHALATION) DAILY
Status: DISCONTINUED | OUTPATIENT
Start: 2019-05-04 | End: 2019-05-15 | Stop reason: HOSPADM

## 2019-05-03 RX ORDER — METOPROLOL TARTRATE 50 MG/1
50 TABLET, FILM COATED ORAL DAILY
Status: CANCELLED | OUTPATIENT
Start: 2019-05-04

## 2019-05-03 RX ORDER — MAGNESIUM HYDROXIDE/ALUMINUM HYDROXICE/SIMETHICONE 120; 1200; 1200 MG/30ML; MG/30ML; MG/30ML
30 SUSPENSION ORAL EVERY 4 HOURS PRN
Status: DISCONTINUED | OUTPATIENT
Start: 2019-05-03 | End: 2019-05-15 | Stop reason: HOSPADM

## 2019-05-03 RX ADMIN — FLUTICASONE FUROATE AND VILANTEROL TRIFENATATE 1 PUFF: 100; 25 POWDER RESPIRATORY (INHALATION) at 09:49

## 2019-05-03 RX ADMIN — ASPIRIN 81 MG: 81 TABLET, COATED ORAL at 09:48

## 2019-05-03 RX ADMIN — DULOXETINE HYDROCHLORIDE 60 MG: 30 CAPSULE, DELAYED RELEASE ORAL at 09:48

## 2019-05-03 RX ADMIN — ATORVASTATIN CALCIUM 40 MG: 40 TABLET, FILM COATED ORAL at 21:40

## 2019-05-03 RX ADMIN — TRAMADOL HYDROCHLORIDE 50 MG: 50 TABLET, COATED ORAL at 09:54

## 2019-05-03 RX ADMIN — METHOCARBAMOL 500 MG: 500 TABLET, FILM COATED ORAL at 21:40

## 2019-05-03 RX ADMIN — SENNOSIDES AND DOCUSATE SODIUM 1 TABLET: 8.6; 5 TABLET ORAL at 21:40

## 2019-05-03 RX ADMIN — FLUTICASONE PROPIONATE 1 SPRAY: 50 SPRAY, METERED NASAL at 09:49

## 2019-05-03 RX ADMIN — Medication 1 TABLET: at 09:48

## 2019-05-03 RX ADMIN — GABAPENTIN 300 MG: 300 CAPSULE ORAL at 21:40

## 2019-05-03 RX ADMIN — SENNOSIDES AND DOCUSATE SODIUM 1 TABLET: 8.6; 5 TABLET ORAL at 09:47

## 2019-05-03 RX ADMIN — LIDOCAINE 1 PATCH: 50 PATCH TOPICAL at 09:49

## 2019-05-03 RX ADMIN — GUAIFENESIN 600 MG: 600 TABLET, EXTENDED RELEASE ORAL at 10:01

## 2019-05-03 RX ADMIN — AMLODIPINE BESYLATE 10 MG: 5 TABLET ORAL at 09:48

## 2019-05-03 RX ADMIN — QUETIAPINE FUMARATE 200 MG: 200 TABLET ORAL at 09:48

## 2019-05-03 RX ADMIN — VITAMIN D, TAB 1000IU (100/BT) 1000 UNITS: 25 TAB at 09:48

## 2019-05-03 RX ADMIN — FOLIC ACID 1 MG: 1 TABLET ORAL at 09:48

## 2019-05-03 RX ADMIN — TAMSULOSIN HYDROCHLORIDE 0.4 MG: 0.4 CAPSULE ORAL at 15:46

## 2019-05-03 RX ADMIN — GABAPENTIN 300 MG: 300 CAPSULE ORAL at 09:48

## 2019-05-03 RX ADMIN — Medication 100 MG: at 09:47

## 2019-05-03 RX ADMIN — METHOCARBAMOL 500 MG: 500 TABLET, FILM COATED ORAL at 06:23

## 2019-05-03 RX ADMIN — NICOTINE 14 MG: 14 PATCH TRANSDERMAL at 09:50

## 2019-05-03 RX ADMIN — METOPROLOL TARTRATE 50 MG: 50 TABLET, FILM COATED ORAL at 09:48

## 2019-05-03 RX ADMIN — METHOCARBAMOL 500 MG: 500 TABLET, FILM COATED ORAL at 15:46

## 2019-05-03 RX ADMIN — GABAPENTIN 300 MG: 300 CAPSULE ORAL at 15:46

## 2019-05-04 PROCEDURE — 99223 1ST HOSP IP/OBS HIGH 75: CPT | Performed by: PSYCHIATRY & NEUROLOGY

## 2019-05-04 RX ORDER — HALOPERIDOL 5 MG/ML
5 INJECTION INTRAMUSCULAR EVERY 8 HOURS PRN
Status: DISCONTINUED | OUTPATIENT
Start: 2019-05-04 | End: 2019-05-15 | Stop reason: HOSPADM

## 2019-05-04 RX ORDER — DULOXETIN HYDROCHLORIDE 30 MG/1
30 CAPSULE, DELAYED RELEASE ORAL DAILY
Status: COMPLETED | OUTPATIENT
Start: 2019-05-05 | End: 2019-05-06

## 2019-05-04 RX ORDER — HYDROXYZINE HYDROCHLORIDE 25 MG/1
50 TABLET, FILM COATED ORAL EVERY 6 HOURS PRN
Status: DISCONTINUED | OUTPATIENT
Start: 2019-05-04 | End: 2019-05-15 | Stop reason: HOSPADM

## 2019-05-04 RX ORDER — HALOPERIDOL 5 MG
5 TABLET ORAL EVERY 8 HOURS PRN
Status: DISCONTINUED | OUTPATIENT
Start: 2019-05-04 | End: 2019-05-15 | Stop reason: HOSPADM

## 2019-05-04 RX ORDER — MIRTAZAPINE 15 MG/1
15 TABLET, FILM COATED ORAL
Status: DISCONTINUED | OUTPATIENT
Start: 2019-05-04 | End: 2019-05-15 | Stop reason: HOSPADM

## 2019-05-04 RX ADMIN — METHOCARBAMOL 500 MG: 500 TABLET, FILM COATED ORAL at 22:05

## 2019-05-04 RX ADMIN — GABAPENTIN 300 MG: 300 CAPSULE ORAL at 18:00

## 2019-05-04 RX ADMIN — SENNOSIDES AND DOCUSATE SODIUM 1 TABLET: 8.6; 5 TABLET ORAL at 18:00

## 2019-05-04 RX ADMIN — METHOCARBAMOL 500 MG: 500 TABLET, FILM COATED ORAL at 14:23

## 2019-05-04 RX ADMIN — MIRTAZAPINE 15 MG: 15 TABLET, FILM COATED ORAL at 22:04

## 2019-05-04 RX ADMIN — QUETIAPINE FUMARATE 550 MG: 100 TABLET ORAL at 22:05

## 2019-05-04 RX ADMIN — TRAMADOL HYDROCHLORIDE 50 MG: 50 TABLET, COATED ORAL at 18:01

## 2019-05-04 RX ADMIN — GUAIFENESIN 600 MG: 600 TABLET, EXTENDED RELEASE ORAL at 10:38

## 2019-05-04 RX ADMIN — LIDOCAINE 1 PATCH: 50 PATCH TOPICAL at 09:02

## 2019-05-04 RX ADMIN — SENNOSIDES AND DOCUSATE SODIUM 1 TABLET: 8.6; 5 TABLET ORAL at 09:03

## 2019-05-04 RX ADMIN — ATORVASTATIN CALCIUM 40 MG: 40 TABLET, FILM COATED ORAL at 18:00

## 2019-05-04 RX ADMIN — ASPIRIN 81 MG: 81 TABLET, COATED ORAL at 09:03

## 2019-05-04 RX ADMIN — Medication 1 TABLET: at 09:03

## 2019-05-04 RX ADMIN — QUETIAPINE 200 MG: 200 TABLET, FILM COATED ORAL at 09:03

## 2019-05-04 RX ADMIN — NICOTINE 1 PATCH: 14 PATCH, EXTENDED RELEASE TRANSDERMAL at 09:03

## 2019-05-04 RX ADMIN — VITAMIN D, TAB 1000IU (100/BT) 1000 UNITS: 25 TAB at 09:03

## 2019-05-04 RX ADMIN — Medication 100 MG: at 09:03

## 2019-05-04 RX ADMIN — METHOCARBAMOL 500 MG: 500 TABLET, FILM COATED ORAL at 06:07

## 2019-05-04 RX ADMIN — GABAPENTIN 300 MG: 300 CAPSULE ORAL at 22:05

## 2019-05-04 RX ADMIN — FLUTICASONE FUROATE AND VILANTEROL TRIFENATATE 1 PUFF: 100; 25 POWDER RESPIRATORY (INHALATION) at 09:03

## 2019-05-04 RX ADMIN — FLUTICASONE PROPIONATE 1 SPRAY: 50 SPRAY, METERED NASAL at 09:02

## 2019-05-04 RX ADMIN — GABAPENTIN 300 MG: 300 CAPSULE ORAL at 09:03

## 2019-05-04 RX ADMIN — DULOXETINE HYDROCHLORIDE 60 MG: 60 CAPSULE, DELAYED RELEASE ORAL at 09:03

## 2019-05-04 RX ADMIN — METOPROLOL TARTRATE 50 MG: 50 TABLET, FILM COATED ORAL at 09:03

## 2019-05-04 RX ADMIN — TRAMADOL HYDROCHLORIDE 50 MG: 50 TABLET, COATED ORAL at 09:36

## 2019-05-04 RX ADMIN — TAMSULOSIN HYDROCHLORIDE 0.4 MG: 0.4 CAPSULE ORAL at 18:00

## 2019-05-04 RX ADMIN — AMLODIPINE BESYLATE 10 MG: 5 TABLET ORAL at 09:03

## 2019-05-04 RX ADMIN — FOLIC ACID 1 MG: 1 TABLET ORAL at 09:03

## 2019-05-05 PROCEDURE — 99232 SBSQ HOSP IP/OBS MODERATE 35: CPT | Performed by: PSYCHIATRY & NEUROLOGY

## 2019-05-05 RX ADMIN — NICOTINE 1 PATCH: 14 PATCH, EXTENDED RELEASE TRANSDERMAL at 08:55

## 2019-05-05 RX ADMIN — METHOCARBAMOL 500 MG: 500 TABLET, FILM COATED ORAL at 21:44

## 2019-05-05 RX ADMIN — Medication 100 MG: at 08:55

## 2019-05-05 RX ADMIN — FLUTICASONE FUROATE AND VILANTEROL TRIFENATATE 1 PUFF: 100; 25 POWDER RESPIRATORY (INHALATION) at 08:53

## 2019-05-05 RX ADMIN — MIRTAZAPINE 15 MG: 15 TABLET, FILM COATED ORAL at 21:45

## 2019-05-05 RX ADMIN — SENNOSIDES AND DOCUSATE SODIUM 1 TABLET: 8.6; 5 TABLET ORAL at 08:55

## 2019-05-05 RX ADMIN — LIDOCAINE 1 PATCH: 50 PATCH TOPICAL at 08:54

## 2019-05-05 RX ADMIN — SENNOSIDES AND DOCUSATE SODIUM 1 TABLET: 8.6; 5 TABLET ORAL at 18:01

## 2019-05-05 RX ADMIN — METOPROLOL TARTRATE 50 MG: 50 TABLET, FILM COATED ORAL at 08:54

## 2019-05-05 RX ADMIN — DULOXETINE HYDROCHLORIDE 30 MG: 30 CAPSULE, DELAYED RELEASE ORAL at 08:52

## 2019-05-05 RX ADMIN — TRAMADOL HYDROCHLORIDE 50 MG: 50 TABLET, COATED ORAL at 05:48

## 2019-05-05 RX ADMIN — FLUTICASONE PROPIONATE 1 SPRAY: 50 SPRAY, METERED NASAL at 08:53

## 2019-05-05 RX ADMIN — TAMSULOSIN HYDROCHLORIDE 0.4 MG: 0.4 CAPSULE ORAL at 16:32

## 2019-05-05 RX ADMIN — QUETIAPINE 200 MG: 200 TABLET, FILM COATED ORAL at 08:55

## 2019-05-05 RX ADMIN — VITAMIN D, TAB 1000IU (100/BT) 1000 UNITS: 25 TAB at 08:51

## 2019-05-05 RX ADMIN — QUETIAPINE FUMARATE 600 MG: 400 TABLET, FILM COATED ORAL at 21:45

## 2019-05-05 RX ADMIN — GABAPENTIN 300 MG: 300 CAPSULE ORAL at 21:45

## 2019-05-05 RX ADMIN — ATORVASTATIN CALCIUM 40 MG: 40 TABLET, FILM COATED ORAL at 18:01

## 2019-05-05 RX ADMIN — METHOCARBAMOL 500 MG: 500 TABLET, FILM COATED ORAL at 05:48

## 2019-05-05 RX ADMIN — FOLIC ACID 1 MG: 1 TABLET ORAL at 08:53

## 2019-05-05 RX ADMIN — GABAPENTIN 300 MG: 300 CAPSULE ORAL at 16:32

## 2019-05-05 RX ADMIN — Medication 1 TABLET: at 08:54

## 2019-05-05 RX ADMIN — GABAPENTIN 300 MG: 300 CAPSULE ORAL at 08:53

## 2019-05-05 RX ADMIN — AMLODIPINE BESYLATE 10 MG: 5 TABLET ORAL at 08:51

## 2019-05-05 RX ADMIN — METHOCARBAMOL 500 MG: 500 TABLET, FILM COATED ORAL at 13:45

## 2019-05-05 RX ADMIN — ASPIRIN 81 MG: 81 TABLET, COATED ORAL at 08:51

## 2019-05-05 RX ADMIN — TRAMADOL HYDROCHLORIDE 50 MG: 50 TABLET, COATED ORAL at 18:06

## 2019-05-06 RX ORDER — DULOXETIN HYDROCHLORIDE 30 MG/1
30 CAPSULE, DELAYED RELEASE ORAL DAILY
Status: DISCONTINUED | OUTPATIENT
Start: 2019-05-07 | End: 2019-05-07

## 2019-05-06 RX ORDER — CLOTRIMAZOLE 1 %
CREAM (GRAM) TOPICAL 2 TIMES DAILY
Status: DISPENSED | OUTPATIENT
Start: 2019-05-06 | End: 2019-05-13

## 2019-05-06 RX ORDER — CLOTRIMAZOLE 1 %
CREAM (GRAM) TOPICAL 2 TIMES DAILY
Status: DISCONTINUED | OUTPATIENT
Start: 2019-05-06 | End: 2019-05-06

## 2019-05-06 RX ADMIN — QUETIAPINE FUMARATE 600 MG: 400 TABLET, FILM COATED ORAL at 21:02

## 2019-05-06 RX ADMIN — Medication 1 TABLET: at 08:43

## 2019-05-06 RX ADMIN — FOLIC ACID 1 MG: 1 TABLET ORAL at 08:42

## 2019-05-06 RX ADMIN — GABAPENTIN 300 MG: 300 CAPSULE ORAL at 17:59

## 2019-05-06 RX ADMIN — METOPROLOL TARTRATE 50 MG: 50 TABLET, FILM COATED ORAL at 08:43

## 2019-05-06 RX ADMIN — METHOCARBAMOL 500 MG: 500 TABLET, FILM COATED ORAL at 06:12

## 2019-05-06 RX ADMIN — TRAMADOL HYDROCHLORIDE 50 MG: 50 TABLET, COATED ORAL at 17:59

## 2019-05-06 RX ADMIN — SENNOSIDES AND DOCUSATE SODIUM 1 TABLET: 8.6; 5 TABLET ORAL at 08:42

## 2019-05-06 RX ADMIN — TRAMADOL HYDROCHLORIDE 50 MG: 50 TABLET, COATED ORAL at 06:12

## 2019-05-06 RX ADMIN — AMLODIPINE BESYLATE 10 MG: 5 TABLET ORAL at 08:42

## 2019-05-06 RX ADMIN — FLUTICASONE FUROATE AND VILANTEROL TRIFENATATE 1 PUFF: 100; 25 POWDER RESPIRATORY (INHALATION) at 08:50

## 2019-05-06 RX ADMIN — GUAIFENESIN 600 MG: 600 TABLET, EXTENDED RELEASE ORAL at 09:14

## 2019-05-06 RX ADMIN — FLUTICASONE PROPIONATE 1 SPRAY: 50 SPRAY, METERED NASAL at 08:48

## 2019-05-06 RX ADMIN — VITAMIN D, TAB 1000IU (100/BT) 1000 UNITS: 25 TAB at 08:42

## 2019-05-06 RX ADMIN — TAMSULOSIN HYDROCHLORIDE 0.4 MG: 0.4 CAPSULE ORAL at 17:59

## 2019-05-06 RX ADMIN — GABAPENTIN 300 MG: 300 CAPSULE ORAL at 21:02

## 2019-05-06 RX ADMIN — DULOXETINE HYDROCHLORIDE 30 MG: 30 CAPSULE, DELAYED RELEASE ORAL at 08:42

## 2019-05-06 RX ADMIN — ASPIRIN 81 MG: 81 TABLET, COATED ORAL at 08:43

## 2019-05-06 RX ADMIN — ATORVASTATIN CALCIUM 40 MG: 40 TABLET, FILM COATED ORAL at 18:00

## 2019-05-06 RX ADMIN — Medication 100 MG: at 08:43

## 2019-05-06 RX ADMIN — NICOTINE 1 PATCH: 14 PATCH, EXTENDED RELEASE TRANSDERMAL at 08:48

## 2019-05-06 RX ADMIN — SENNOSIDES AND DOCUSATE SODIUM 1 TABLET: 8.6; 5 TABLET ORAL at 18:00

## 2019-05-06 RX ADMIN — METHOCARBAMOL 500 MG: 500 TABLET, FILM COATED ORAL at 14:19

## 2019-05-06 RX ADMIN — MIRTAZAPINE 15 MG: 15 TABLET, FILM COATED ORAL at 21:21

## 2019-05-06 RX ADMIN — LIDOCAINE 1 PATCH: 50 PATCH TOPICAL at 08:51

## 2019-05-06 RX ADMIN — QUETIAPINE 200 MG: 200 TABLET, FILM COATED ORAL at 08:43

## 2019-05-06 RX ADMIN — GABAPENTIN 300 MG: 300 CAPSULE ORAL at 08:43

## 2019-05-06 RX ADMIN — METHOCARBAMOL 500 MG: 500 TABLET, FILM COATED ORAL at 21:02

## 2019-05-07 PROCEDURE — 97530 THERAPEUTIC ACTIVITIES: CPT

## 2019-05-07 PROCEDURE — 99232 SBSQ HOSP IP/OBS MODERATE 35: CPT | Performed by: NURSE PRACTITIONER

## 2019-05-07 PROCEDURE — 97163 PT EVAL HIGH COMPLEX 45 MIN: CPT

## 2019-05-07 RX ORDER — DULOXETIN HYDROCHLORIDE 60 MG/1
60 CAPSULE, DELAYED RELEASE ORAL DAILY
Status: DISCONTINUED | OUTPATIENT
Start: 2019-05-08 | End: 2019-05-10

## 2019-05-07 RX ORDER — FLUTICASONE PROPIONATE 50 MCG
2 SPRAY, SUSPENSION (ML) NASAL DAILY
Status: DISCONTINUED | OUTPATIENT
Start: 2019-05-08 | End: 2019-05-15 | Stop reason: HOSPADM

## 2019-05-07 RX ADMIN — FLUTICASONE FUROATE AND VILANTEROL TRIFENATATE 1 PUFF: 100; 25 POWDER RESPIRATORY (INHALATION) at 08:16

## 2019-05-07 RX ADMIN — METHOCARBAMOL 500 MG: 500 TABLET, FILM COATED ORAL at 13:02

## 2019-05-07 RX ADMIN — TRAZODONE HYDROCHLORIDE 50 MG: 50 TABLET ORAL at 21:49

## 2019-05-07 RX ADMIN — GABAPENTIN 300 MG: 300 CAPSULE ORAL at 17:01

## 2019-05-07 RX ADMIN — TRAMADOL HYDROCHLORIDE 50 MG: 50 TABLET, COATED ORAL at 08:43

## 2019-05-07 RX ADMIN — LIDOCAINE 1 PATCH: 50 PATCH TOPICAL at 08:17

## 2019-05-07 RX ADMIN — NICOTINE 1 PATCH: 14 PATCH, EXTENDED RELEASE TRANSDERMAL at 08:43

## 2019-05-07 RX ADMIN — FOLIC ACID 1 MG: 1 TABLET ORAL at 08:14

## 2019-05-07 RX ADMIN — GABAPENTIN 300 MG: 300 CAPSULE ORAL at 08:14

## 2019-05-07 RX ADMIN — QUETIAPINE 200 MG: 200 TABLET, FILM COATED ORAL at 08:13

## 2019-05-07 RX ADMIN — TRAMADOL HYDROCHLORIDE 50 MG: 50 TABLET, COATED ORAL at 21:49

## 2019-05-07 RX ADMIN — GABAPENTIN 300 MG: 300 CAPSULE ORAL at 21:48

## 2019-05-07 RX ADMIN — ATORVASTATIN CALCIUM 40 MG: 40 TABLET, FILM COATED ORAL at 17:01

## 2019-05-07 RX ADMIN — CLOTRIMAZOLE: 10 CREAM TOPICAL at 08:17

## 2019-05-07 RX ADMIN — GUAIFENESIN 600 MG: 600 TABLET, EXTENDED RELEASE ORAL at 08:55

## 2019-05-07 RX ADMIN — ASPIRIN 81 MG: 81 TABLET, COATED ORAL at 08:14

## 2019-05-07 RX ADMIN — VITAMIN D, TAB 1000IU (100/BT) 1000 UNITS: 25 TAB at 08:14

## 2019-05-07 RX ADMIN — FLUTICASONE PROPIONATE 1 SPRAY: 50 SPRAY, METERED NASAL at 08:15

## 2019-05-07 RX ADMIN — TAMSULOSIN HYDROCHLORIDE 0.4 MG: 0.4 CAPSULE ORAL at 17:01

## 2019-05-07 RX ADMIN — METOPROLOL TARTRATE 50 MG: 50 TABLET, FILM COATED ORAL at 08:13

## 2019-05-07 RX ADMIN — DULOXETINE HYDROCHLORIDE 30 MG: 30 CAPSULE, DELAYED RELEASE ORAL at 08:44

## 2019-05-07 RX ADMIN — SENNOSIDES AND DOCUSATE SODIUM 1 TABLET: 8.6; 5 TABLET ORAL at 17:01

## 2019-05-07 RX ADMIN — SENNOSIDES AND DOCUSATE SODIUM 1 TABLET: 8.6; 5 TABLET ORAL at 08:14

## 2019-05-07 RX ADMIN — AMLODIPINE BESYLATE 10 MG: 5 TABLET ORAL at 08:13

## 2019-05-07 RX ADMIN — CLOTRIMAZOLE: 10 CREAM TOPICAL at 17:03

## 2019-05-07 RX ADMIN — METHOCARBAMOL 500 MG: 500 TABLET, FILM COATED ORAL at 05:45

## 2019-05-07 RX ADMIN — MIRTAZAPINE 15 MG: 15 TABLET, FILM COATED ORAL at 21:49

## 2019-05-07 RX ADMIN — Medication 1 TABLET: at 08:13

## 2019-05-07 RX ADMIN — QUETIAPINE FUMARATE 600 MG: 400 TABLET, FILM COATED ORAL at 21:48

## 2019-05-07 RX ADMIN — Medication 100 MG: at 08:13

## 2019-05-08 LAB
ATRIAL RATE: 82 BPM
P AXIS: 71 DEGREES
PR INTERVAL: 174 MS
QRS AXIS: 23 DEGREES
QRSD INTERVAL: 84 MS
QT INTERVAL: 366 MS
QTC INTERVAL: 427 MS
T WAVE AXIS: 53 DEGREES
VENTRICULAR RATE: 82 BPM

## 2019-05-08 PROCEDURE — 93010 ELECTROCARDIOGRAM REPORT: CPT | Performed by: INTERNAL MEDICINE

## 2019-05-08 PROCEDURE — 97116 GAIT TRAINING THERAPY: CPT

## 2019-05-08 PROCEDURE — 99232 SBSQ HOSP IP/OBS MODERATE 35: CPT | Performed by: PHYSICIAN ASSISTANT

## 2019-05-08 RX ADMIN — METHOCARBAMOL 500 MG: 500 TABLET, FILM COATED ORAL at 05:49

## 2019-05-08 RX ADMIN — Medication 1 TABLET: at 08:46

## 2019-05-08 RX ADMIN — METHOCARBAMOL 500 MG: 500 TABLET, FILM COATED ORAL at 00:46

## 2019-05-08 RX ADMIN — SENNOSIDES AND DOCUSATE SODIUM 1 TABLET: 8.6; 5 TABLET ORAL at 17:30

## 2019-05-08 RX ADMIN — GABAPENTIN 300 MG: 300 CAPSULE ORAL at 17:30

## 2019-05-08 RX ADMIN — SENNOSIDES AND DOCUSATE SODIUM 1 TABLET: 8.6; 5 TABLET ORAL at 08:46

## 2019-05-08 RX ADMIN — TRAMADOL HYDROCHLORIDE 50 MG: 50 TABLET, COATED ORAL at 05:48

## 2019-05-08 RX ADMIN — GABAPENTIN 300 MG: 300 CAPSULE ORAL at 08:46

## 2019-05-08 RX ADMIN — MIRTAZAPINE 15 MG: 15 TABLET, FILM COATED ORAL at 21:24

## 2019-05-08 RX ADMIN — CLOTRIMAZOLE: 10 CREAM TOPICAL at 21:00

## 2019-05-08 RX ADMIN — CLOTRIMAZOLE: 10 CREAM TOPICAL at 08:54

## 2019-05-08 RX ADMIN — METOPROLOL TARTRATE 50 MG: 50 TABLET, FILM COATED ORAL at 08:47

## 2019-05-08 RX ADMIN — TRAMADOL HYDROCHLORIDE 50 MG: 50 TABLET, COATED ORAL at 17:30

## 2019-05-08 RX ADMIN — QUETIAPINE 200 MG: 200 TABLET, FILM COATED ORAL at 08:46

## 2019-05-08 RX ADMIN — GABAPENTIN 300 MG: 300 CAPSULE ORAL at 21:24

## 2019-05-08 RX ADMIN — QUETIAPINE FUMARATE 600 MG: 400 TABLET, FILM COATED ORAL at 21:23

## 2019-05-08 RX ADMIN — VITAMIN D, TAB 1000IU (100/BT) 1000 UNITS: 25 TAB at 08:47

## 2019-05-08 RX ADMIN — METHOCARBAMOL 500 MG: 500 TABLET, FILM COATED ORAL at 21:23

## 2019-05-08 RX ADMIN — DULOXETINE HYDROCHLORIDE 60 MG: 60 CAPSULE, DELAYED RELEASE ORAL at 08:46

## 2019-05-08 RX ADMIN — NICOTINE 1 PATCH: 14 PATCH, EXTENDED RELEASE TRANSDERMAL at 08:45

## 2019-05-08 RX ADMIN — ATORVASTATIN CALCIUM 40 MG: 40 TABLET, FILM COATED ORAL at 17:30

## 2019-05-08 RX ADMIN — TRAZODONE HYDROCHLORIDE 50 MG: 50 TABLET ORAL at 21:25

## 2019-05-08 RX ADMIN — Medication 100 MG: at 08:46

## 2019-05-08 RX ADMIN — Medication 2 SPRAY: at 08:44

## 2019-05-08 RX ADMIN — ASPIRIN 81 MG: 81 TABLET, COATED ORAL at 08:46

## 2019-05-08 RX ADMIN — FOLIC ACID 1 MG: 1 TABLET ORAL at 08:46

## 2019-05-08 RX ADMIN — FLUTICASONE FUROATE AND VILANTEROL TRIFENATATE 1 PUFF: 100; 25 POWDER RESPIRATORY (INHALATION) at 08:59

## 2019-05-08 RX ADMIN — AMLODIPINE BESYLATE 10 MG: 5 TABLET ORAL at 08:47

## 2019-05-08 RX ADMIN — TAMSULOSIN HYDROCHLORIDE 0.4 MG: 0.4 CAPSULE ORAL at 17:30

## 2019-05-08 RX ADMIN — LIDOCAINE 1 PATCH: 50 PATCH TOPICAL at 08:45

## 2019-05-09 PROCEDURE — 97110 THERAPEUTIC EXERCISES: CPT

## 2019-05-09 PROCEDURE — 99232 SBSQ HOSP IP/OBS MODERATE 35: CPT | Performed by: NURSE PRACTITIONER

## 2019-05-09 PROCEDURE — 97116 GAIT TRAINING THERAPY: CPT

## 2019-05-09 RX ADMIN — METOPROLOL TARTRATE 50 MG: 50 TABLET, FILM COATED ORAL at 08:58

## 2019-05-09 RX ADMIN — TRAMADOL HYDROCHLORIDE 50 MG: 50 TABLET, COATED ORAL at 18:33

## 2019-05-09 RX ADMIN — QUETIAPINE FUMARATE 600 MG: 400 TABLET, FILM COATED ORAL at 21:21

## 2019-05-09 RX ADMIN — GABAPENTIN 300 MG: 300 CAPSULE ORAL at 08:58

## 2019-05-09 RX ADMIN — ALBUTEROL SULFATE 2 PUFF: 90 AEROSOL, METERED RESPIRATORY (INHALATION) at 18:33

## 2019-05-09 RX ADMIN — TRAMADOL HYDROCHLORIDE 50 MG: 50 TABLET, COATED ORAL at 10:22

## 2019-05-09 RX ADMIN — MIRTAZAPINE 15 MG: 15 TABLET, FILM COATED ORAL at 21:21

## 2019-05-09 RX ADMIN — SENNOSIDES AND DOCUSATE SODIUM 1 TABLET: 8.6; 5 TABLET ORAL at 17:22

## 2019-05-09 RX ADMIN — CLOTRIMAZOLE: 10 CREAM TOPICAL at 08:59

## 2019-05-09 RX ADMIN — METHOCARBAMOL 500 MG: 500 TABLET, FILM COATED ORAL at 05:31

## 2019-05-09 RX ADMIN — ATORVASTATIN CALCIUM 40 MG: 40 TABLET, FILM COATED ORAL at 17:22

## 2019-05-09 RX ADMIN — METHOCARBAMOL 500 MG: 500 TABLET, FILM COATED ORAL at 21:21

## 2019-05-09 RX ADMIN — GABAPENTIN 300 MG: 300 CAPSULE ORAL at 21:21

## 2019-05-09 RX ADMIN — FLUTICASONE FUROATE AND VILANTEROL TRIFENATATE 1 PUFF: 100; 25 POWDER RESPIRATORY (INHALATION) at 08:59

## 2019-05-09 RX ADMIN — NICOTINE 1 PATCH: 14 PATCH, EXTENDED RELEASE TRANSDERMAL at 09:02

## 2019-05-09 RX ADMIN — Medication 1 TABLET: at 08:58

## 2019-05-09 RX ADMIN — LIDOCAINE 1 PATCH: 50 PATCH TOPICAL at 09:03

## 2019-05-09 RX ADMIN — Medication 2 SPRAY: at 08:59

## 2019-05-09 RX ADMIN — CLOTRIMAZOLE 1 APPLICATION: 10 CREAM TOPICAL at 18:35

## 2019-05-09 RX ADMIN — FOLIC ACID 1 MG: 1 TABLET ORAL at 08:57

## 2019-05-09 RX ADMIN — SENNOSIDES AND DOCUSATE SODIUM 1 TABLET: 8.6; 5 TABLET ORAL at 08:58

## 2019-05-09 RX ADMIN — TAMSULOSIN HYDROCHLORIDE 0.4 MG: 0.4 CAPSULE ORAL at 17:22

## 2019-05-09 RX ADMIN — DULOXETINE HYDROCHLORIDE 60 MG: 60 CAPSULE, DELAYED RELEASE ORAL at 08:59

## 2019-05-09 RX ADMIN — METHOCARBAMOL 500 MG: 500 TABLET, FILM COATED ORAL at 14:46

## 2019-05-09 RX ADMIN — Medication 100 MG: at 08:57

## 2019-05-09 RX ADMIN — AMLODIPINE BESYLATE 10 MG: 5 TABLET ORAL at 08:58

## 2019-05-09 RX ADMIN — ASPIRIN 81 MG: 81 TABLET, COATED ORAL at 08:58

## 2019-05-09 RX ADMIN — QUETIAPINE 200 MG: 200 TABLET, FILM COATED ORAL at 08:58

## 2019-05-09 RX ADMIN — GABAPENTIN 300 MG: 300 CAPSULE ORAL at 17:22

## 2019-05-09 RX ADMIN — VITAMIN D, TAB 1000IU (100/BT) 1000 UNITS: 25 TAB at 08:59

## 2019-05-10 PROCEDURE — G8989 SELF CARE D/C STATUS: HCPCS

## 2019-05-10 PROCEDURE — 99232 SBSQ HOSP IP/OBS MODERATE 35: CPT | Performed by: NURSE PRACTITIONER

## 2019-05-10 PROCEDURE — G8988 SELF CARE GOAL STATUS: HCPCS

## 2019-05-10 PROCEDURE — G8987 SELF CARE CURRENT STATUS: HCPCS

## 2019-05-10 PROCEDURE — 97110 THERAPEUTIC EXERCISES: CPT

## 2019-05-10 PROCEDURE — 97166 OT EVAL MOD COMPLEX 45 MIN: CPT

## 2019-05-10 PROCEDURE — 97116 GAIT TRAINING THERAPY: CPT

## 2019-05-10 RX ADMIN — CLOTRIMAZOLE: 10 CREAM TOPICAL at 09:01

## 2019-05-10 RX ADMIN — METHOCARBAMOL 500 MG: 500 TABLET, FILM COATED ORAL at 14:04

## 2019-05-10 RX ADMIN — METHOCARBAMOL 500 MG: 500 TABLET, FILM COATED ORAL at 21:44

## 2019-05-10 RX ADMIN — QUETIAPINE 200 MG: 200 TABLET, FILM COATED ORAL at 08:59

## 2019-05-10 RX ADMIN — GABAPENTIN 300 MG: 300 CAPSULE ORAL at 21:44

## 2019-05-10 RX ADMIN — TRAZODONE HYDROCHLORIDE 50 MG: 50 TABLET ORAL at 21:45

## 2019-05-10 RX ADMIN — Medication 1 TABLET: at 08:59

## 2019-05-10 RX ADMIN — METHOCARBAMOL 500 MG: 500 TABLET, FILM COATED ORAL at 05:05

## 2019-05-10 RX ADMIN — QUETIAPINE FUMARATE 600 MG: 400 TABLET, FILM COATED ORAL at 21:44

## 2019-05-10 RX ADMIN — FLUTICASONE FUROATE AND VILANTEROL TRIFENATATE 1 PUFF: 100; 25 POWDER RESPIRATORY (INHALATION) at 09:00

## 2019-05-10 RX ADMIN — NICOTINE 1 PATCH: 14 PATCH, EXTENDED RELEASE TRANSDERMAL at 09:00

## 2019-05-10 RX ADMIN — SENNOSIDES AND DOCUSATE SODIUM 1 TABLET: 8.6; 5 TABLET ORAL at 08:59

## 2019-05-10 RX ADMIN — DULOXETINE HYDROCHLORIDE 60 MG: 60 CAPSULE, DELAYED RELEASE ORAL at 08:58

## 2019-05-10 RX ADMIN — Medication 100 MG: at 08:58

## 2019-05-10 RX ADMIN — TAMSULOSIN HYDROCHLORIDE 0.4 MG: 0.4 CAPSULE ORAL at 16:38

## 2019-05-10 RX ADMIN — ALBUTEROL SULFATE 2 PUFF: 90 AEROSOL, METERED RESPIRATORY (INHALATION) at 09:01

## 2019-05-10 RX ADMIN — METOPROLOL TARTRATE 50 MG: 50 TABLET, FILM COATED ORAL at 08:59

## 2019-05-10 RX ADMIN — CLOTRIMAZOLE: 10 CREAM TOPICAL at 17:45

## 2019-05-10 RX ADMIN — Medication 2 SPRAY: at 09:00

## 2019-05-10 RX ADMIN — VITAMIN D, TAB 1000IU (100/BT) 1000 UNITS: 25 TAB at 08:58

## 2019-05-10 RX ADMIN — AMLODIPINE BESYLATE 10 MG: 5 TABLET ORAL at 08:57

## 2019-05-10 RX ADMIN — GABAPENTIN 300 MG: 300 CAPSULE ORAL at 08:58

## 2019-05-10 RX ADMIN — ATORVASTATIN CALCIUM 40 MG: 40 TABLET, FILM COATED ORAL at 17:42

## 2019-05-10 RX ADMIN — SENNOSIDES AND DOCUSATE SODIUM 1 TABLET: 8.6; 5 TABLET ORAL at 17:42

## 2019-05-10 RX ADMIN — MIRTAZAPINE 15 MG: 15 TABLET, FILM COATED ORAL at 21:44

## 2019-05-10 RX ADMIN — LIDOCAINE 1 PATCH: 50 PATCH TOPICAL at 09:00

## 2019-05-10 RX ADMIN — TRAMADOL HYDROCHLORIDE 50 MG: 50 TABLET, COATED ORAL at 21:46

## 2019-05-10 RX ADMIN — FOLIC ACID 1 MG: 1 TABLET ORAL at 08:59

## 2019-05-10 RX ADMIN — GABAPENTIN 300 MG: 300 CAPSULE ORAL at 16:37

## 2019-05-10 RX ADMIN — TRAMADOL HYDROCHLORIDE 50 MG: 50 TABLET, COATED ORAL at 08:58

## 2019-05-10 RX ADMIN — ASPIRIN 81 MG: 81 TABLET, COATED ORAL at 08:59

## 2019-05-10 RX ADMIN — GUAIFENESIN 600 MG: 600 TABLET, EXTENDED RELEASE ORAL at 08:58

## 2019-05-11 PROCEDURE — 99231 SBSQ HOSP IP/OBS SF/LOW 25: CPT | Performed by: PSYCHIATRY & NEUROLOGY

## 2019-05-11 RX ADMIN — METOPROLOL TARTRATE 50 MG: 50 TABLET, FILM COATED ORAL at 09:10

## 2019-05-11 RX ADMIN — MIRTAZAPINE 15 MG: 15 TABLET, FILM COATED ORAL at 23:05

## 2019-05-11 RX ADMIN — METHOCARBAMOL 500 MG: 500 TABLET, FILM COATED ORAL at 05:46

## 2019-05-11 RX ADMIN — GUAIFENESIN 600 MG: 600 TABLET, EXTENDED RELEASE ORAL at 09:29

## 2019-05-11 RX ADMIN — TRAMADOL HYDROCHLORIDE 50 MG: 50 TABLET, COATED ORAL at 22:01

## 2019-05-11 RX ADMIN — METHOCARBAMOL 500 MG: 500 TABLET, FILM COATED ORAL at 22:00

## 2019-05-11 RX ADMIN — SENNOSIDES AND DOCUSATE SODIUM 1 TABLET: 8.6; 5 TABLET ORAL at 17:08

## 2019-05-11 RX ADMIN — CYANOCOBALAMIN 1000 MCG: 1000 INJECTION, SOLUTION INTRAMUSCULAR at 09:31

## 2019-05-11 RX ADMIN — GABAPENTIN 300 MG: 300 CAPSULE ORAL at 09:10

## 2019-05-11 RX ADMIN — METHOCARBAMOL 500 MG: 500 TABLET, FILM COATED ORAL at 14:14

## 2019-05-11 RX ADMIN — TRAZODONE HYDROCHLORIDE 50 MG: 50 TABLET ORAL at 22:19

## 2019-05-11 RX ADMIN — GABAPENTIN 300 MG: 300 CAPSULE ORAL at 23:06

## 2019-05-11 RX ADMIN — CLOTRIMAZOLE: 10 CREAM TOPICAL at 09:19

## 2019-05-11 RX ADMIN — LIDOCAINE 1 PATCH: 50 PATCH TOPICAL at 09:15

## 2019-05-11 RX ADMIN — AMLODIPINE BESYLATE 10 MG: 5 TABLET ORAL at 09:09

## 2019-05-11 RX ADMIN — SENNOSIDES AND DOCUSATE SODIUM 1 TABLET: 8.6; 5 TABLET ORAL at 09:09

## 2019-05-11 RX ADMIN — DULOXETINE HYDROCHLORIDE 90 MG: 60 CAPSULE, DELAYED RELEASE ORAL at 09:10

## 2019-05-11 RX ADMIN — NICOTINE 1 PATCH: 14 PATCH, EXTENDED RELEASE TRANSDERMAL at 09:15

## 2019-05-11 RX ADMIN — Medication 1 TABLET: at 09:09

## 2019-05-11 RX ADMIN — CLOTRIMAZOLE: 10 CREAM TOPICAL at 17:46

## 2019-05-11 RX ADMIN — Medication 2 SPRAY: at 09:08

## 2019-05-11 RX ADMIN — VITAMIN D, TAB 1000IU (100/BT) 1000 UNITS: 25 TAB at 09:10

## 2019-05-11 RX ADMIN — TAMSULOSIN HYDROCHLORIDE 0.4 MG: 0.4 CAPSULE ORAL at 15:34

## 2019-05-11 RX ADMIN — TRAMADOL HYDROCHLORIDE 50 MG: 50 TABLET, COATED ORAL at 15:33

## 2019-05-11 RX ADMIN — ATORVASTATIN CALCIUM 40 MG: 40 TABLET, FILM COATED ORAL at 17:08

## 2019-05-11 RX ADMIN — QUETIAPINE FUMARATE 600 MG: 400 TABLET, FILM COATED ORAL at 21:59

## 2019-05-11 RX ADMIN — FLUTICASONE FUROATE AND VILANTEROL TRIFENATATE 1 PUFF: 100; 25 POWDER RESPIRATORY (INHALATION) at 09:17

## 2019-05-11 RX ADMIN — Medication 100 MG: at 09:09

## 2019-05-11 RX ADMIN — GABAPENTIN 300 MG: 300 CAPSULE ORAL at 15:28

## 2019-05-11 RX ADMIN — QUETIAPINE 200 MG: 200 TABLET, FILM COATED ORAL at 09:09

## 2019-05-11 RX ADMIN — TRAMADOL HYDROCHLORIDE 50 MG: 50 TABLET, COATED ORAL at 05:46

## 2019-05-11 RX ADMIN — FOLIC ACID 1 MG: 1 TABLET ORAL at 09:09

## 2019-05-11 RX ADMIN — ASPIRIN 81 MG: 81 TABLET, COATED ORAL at 09:10

## 2019-05-12 PROCEDURE — 99231 SBSQ HOSP IP/OBS SF/LOW 25: CPT | Performed by: PSYCHIATRY & NEUROLOGY

## 2019-05-12 RX ADMIN — DULOXETINE HYDROCHLORIDE 90 MG: 60 CAPSULE, DELAYED RELEASE ORAL at 09:10

## 2019-05-12 RX ADMIN — TAMSULOSIN HYDROCHLORIDE 0.4 MG: 0.4 CAPSULE ORAL at 16:41

## 2019-05-12 RX ADMIN — FLUTICASONE FUROATE AND VILANTEROL TRIFENATATE 1 PUFF: 100; 25 POWDER RESPIRATORY (INHALATION) at 09:12

## 2019-05-12 RX ADMIN — Medication 2 SPRAY: at 09:11

## 2019-05-12 RX ADMIN — FOLIC ACID 1 MG: 1 TABLET ORAL at 09:11

## 2019-05-12 RX ADMIN — Medication 1 TABLET: at 11:02

## 2019-05-12 RX ADMIN — QUETIAPINE 200 MG: 200 TABLET, FILM COATED ORAL at 09:10

## 2019-05-12 RX ADMIN — NICOTINE 1 PATCH: 14 PATCH, EXTENDED RELEASE TRANSDERMAL at 09:13

## 2019-05-12 RX ADMIN — CLOTRIMAZOLE: 10 CREAM TOPICAL at 09:18

## 2019-05-12 RX ADMIN — ATORVASTATIN CALCIUM 40 MG: 40 TABLET, FILM COATED ORAL at 17:27

## 2019-05-12 RX ADMIN — QUETIAPINE FUMARATE 600 MG: 400 TABLET, FILM COATED ORAL at 21:14

## 2019-05-12 RX ADMIN — METHOCARBAMOL 500 MG: 500 TABLET, FILM COATED ORAL at 06:03

## 2019-05-12 RX ADMIN — LIDOCAINE 1 PATCH: 50 PATCH TOPICAL at 09:06

## 2019-05-12 RX ADMIN — Medication 100 MG: at 09:09

## 2019-05-12 RX ADMIN — SENNOSIDES AND DOCUSATE SODIUM 1 TABLET: 8.6; 5 TABLET ORAL at 09:10

## 2019-05-12 RX ADMIN — ALBUTEROL SULFATE 2 PUFF: 90 AEROSOL, METERED RESPIRATORY (INHALATION) at 21:53

## 2019-05-12 RX ADMIN — SENNOSIDES AND DOCUSATE SODIUM 1 TABLET: 8.6; 5 TABLET ORAL at 17:27

## 2019-05-12 RX ADMIN — GABAPENTIN 300 MG: 300 CAPSULE ORAL at 09:10

## 2019-05-12 RX ADMIN — ALBUTEROL SULFATE 2 PUFF: 90 AEROSOL, METERED RESPIRATORY (INHALATION) at 09:16

## 2019-05-12 RX ADMIN — GUAIFENESIN 600 MG: 600 TABLET, EXTENDED RELEASE ORAL at 09:28

## 2019-05-12 RX ADMIN — MIRTAZAPINE 15 MG: 15 TABLET, FILM COATED ORAL at 21:14

## 2019-05-12 RX ADMIN — METHOCARBAMOL 500 MG: 500 TABLET, FILM COATED ORAL at 21:14

## 2019-05-12 RX ADMIN — CLOTRIMAZOLE: 10 CREAM TOPICAL at 17:28

## 2019-05-12 RX ADMIN — METHOCARBAMOL 500 MG: 500 TABLET, FILM COATED ORAL at 13:36

## 2019-05-12 RX ADMIN — METOPROLOL TARTRATE 50 MG: 50 TABLET, FILM COATED ORAL at 09:10

## 2019-05-12 RX ADMIN — AMLODIPINE BESYLATE 10 MG: 5 TABLET ORAL at 09:11

## 2019-05-12 RX ADMIN — VITAMIN D, TAB 1000IU (100/BT) 1000 UNITS: 25 TAB at 09:10

## 2019-05-12 RX ADMIN — GABAPENTIN 300 MG: 300 CAPSULE ORAL at 16:41

## 2019-05-12 RX ADMIN — GABAPENTIN 300 MG: 300 CAPSULE ORAL at 21:14

## 2019-05-12 RX ADMIN — TRAZODONE HYDROCHLORIDE 50 MG: 50 TABLET ORAL at 21:52

## 2019-05-12 RX ADMIN — TRAMADOL HYDROCHLORIDE 50 MG: 50 TABLET, COATED ORAL at 06:02

## 2019-05-12 RX ADMIN — ASPIRIN 81 MG: 81 TABLET, COATED ORAL at 09:10

## 2019-05-13 PROCEDURE — 97116 GAIT TRAINING THERAPY: CPT

## 2019-05-13 PROCEDURE — 99232 SBSQ HOSP IP/OBS MODERATE 35: CPT | Performed by: NURSE PRACTITIONER

## 2019-05-13 RX ADMIN — ALBUTEROL SULFATE 2 PUFF: 90 AEROSOL, METERED RESPIRATORY (INHALATION) at 11:29

## 2019-05-13 RX ADMIN — METOPROLOL TARTRATE 50 MG: 50 TABLET, FILM COATED ORAL at 09:16

## 2019-05-13 RX ADMIN — Medication 100 MG: at 09:16

## 2019-05-13 RX ADMIN — FLUTICASONE FUROATE AND VILANTEROL TRIFENATATE 1 PUFF: 100; 25 POWDER RESPIRATORY (INHALATION) at 09:24

## 2019-05-13 RX ADMIN — SENNOSIDES AND DOCUSATE SODIUM 1 TABLET: 8.6; 5 TABLET ORAL at 17:39

## 2019-05-13 RX ADMIN — SENNOSIDES AND DOCUSATE SODIUM 1 TABLET: 8.6; 5 TABLET ORAL at 09:16

## 2019-05-13 RX ADMIN — QUETIAPINE FUMARATE 600 MG: 400 TABLET, FILM COATED ORAL at 21:14

## 2019-05-13 RX ADMIN — TRAMADOL HYDROCHLORIDE 50 MG: 50 TABLET, COATED ORAL at 06:33

## 2019-05-13 RX ADMIN — AMLODIPINE BESYLATE 10 MG: 5 TABLET ORAL at 09:16

## 2019-05-13 RX ADMIN — METHOCARBAMOL 500 MG: 500 TABLET, FILM COATED ORAL at 06:22

## 2019-05-13 RX ADMIN — NICOTINE 1 PATCH: 14 PATCH, EXTENDED RELEASE TRANSDERMAL at 09:20

## 2019-05-13 RX ADMIN — TAMSULOSIN HYDROCHLORIDE 0.4 MG: 0.4 CAPSULE ORAL at 16:43

## 2019-05-13 RX ADMIN — Medication 2 SPRAY: at 09:23

## 2019-05-13 RX ADMIN — GABAPENTIN 300 MG: 300 CAPSULE ORAL at 16:43

## 2019-05-13 RX ADMIN — GABAPENTIN 300 MG: 300 CAPSULE ORAL at 21:14

## 2019-05-13 RX ADMIN — VITAMIN D, TAB 1000IU (100/BT) 1000 UNITS: 25 TAB at 09:16

## 2019-05-13 RX ADMIN — TRAZODONE HYDROCHLORIDE 50 MG: 50 TABLET ORAL at 21:34

## 2019-05-13 RX ADMIN — METHOCARBAMOL 500 MG: 500 TABLET, FILM COATED ORAL at 21:14

## 2019-05-13 RX ADMIN — ASPIRIN 81 MG: 81 TABLET, COATED ORAL at 09:16

## 2019-05-13 RX ADMIN — GUAIFENESIN 600 MG: 600 TABLET, EXTENDED RELEASE ORAL at 09:17

## 2019-05-13 RX ADMIN — ATORVASTATIN CALCIUM 40 MG: 40 TABLET, FILM COATED ORAL at 17:39

## 2019-05-13 RX ADMIN — CLOTRIMAZOLE: 10 CREAM TOPICAL at 09:22

## 2019-05-13 RX ADMIN — METHOCARBAMOL 500 MG: 500 TABLET, FILM COATED ORAL at 13:42

## 2019-05-13 RX ADMIN — QUETIAPINE 200 MG: 200 TABLET, FILM COATED ORAL at 09:15

## 2019-05-13 RX ADMIN — DULOXETINE HYDROCHLORIDE 90 MG: 60 CAPSULE, DELAYED RELEASE ORAL at 09:16

## 2019-05-13 RX ADMIN — GABAPENTIN 300 MG: 300 CAPSULE ORAL at 09:16

## 2019-05-13 RX ADMIN — MIRTAZAPINE 15 MG: 15 TABLET, FILM COATED ORAL at 21:14

## 2019-05-13 RX ADMIN — LIDOCAINE 1 PATCH: 50 PATCH TOPICAL at 09:21

## 2019-05-13 RX ADMIN — Medication 1 TABLET: at 09:25

## 2019-05-13 RX ADMIN — FOLIC ACID 1 MG: 1 TABLET ORAL at 09:15

## 2019-05-14 PROCEDURE — 99232 SBSQ HOSP IP/OBS MODERATE 35: CPT | Performed by: NURSE PRACTITIONER

## 2019-05-14 RX ORDER — ATORVASTATIN CALCIUM 40 MG/1
40 TABLET, FILM COATED ORAL DAILY
Qty: 30 TABLET | Refills: 1 | Status: SHIPPED | OUTPATIENT
Start: 2019-05-14 | End: 2019-08-21 | Stop reason: SDUPTHER

## 2019-05-14 RX ORDER — QUETIAPINE FUMARATE 300 MG/1
600 TABLET, FILM COATED ORAL
Qty: 60 TABLET | Refills: 1 | Status: SHIPPED | OUTPATIENT
Start: 2019-05-14

## 2019-05-14 RX ORDER — TAMSULOSIN HYDROCHLORIDE 0.4 MG/1
0.4 CAPSULE ORAL
Qty: 30 CAPSULE | Refills: 1 | Status: SHIPPED | OUTPATIENT
Start: 2019-05-14 | End: 2019-10-01 | Stop reason: SDUPTHER

## 2019-05-14 RX ORDER — QUETIAPINE FUMARATE 200 MG/1
200 TABLET, FILM COATED ORAL DAILY
Qty: 30 TABLET | Refills: 1 | Status: SHIPPED | OUTPATIENT
Start: 2019-05-15

## 2019-05-14 RX ORDER — ALBUTEROL SULFATE 90 UG/1
2 AEROSOL, METERED RESPIRATORY (INHALATION) EVERY 4 HOURS PRN
Qty: 1 INHALER | Refills: 1 | Status: SHIPPED | OUTPATIENT
Start: 2019-05-14 | End: 2019-08-21 | Stop reason: SDUPTHER

## 2019-05-14 RX ORDER — AMOXICILLIN 250 MG
1 CAPSULE ORAL 2 TIMES DAILY
Qty: 30 TABLET | Refills: 0 | Status: SHIPPED | OUTPATIENT
Start: 2019-05-14 | End: 2019-10-01 | Stop reason: SDUPTHER

## 2019-05-14 RX ORDER — LANOLIN ALCOHOL/MO/W.PET/CERES
100 CREAM (GRAM) TOPICAL DAILY
Qty: 30 TABLET | Refills: 1 | Status: SHIPPED | OUTPATIENT
Start: 2019-05-14 | End: 2019-08-21 | Stop reason: SDUPTHER

## 2019-05-14 RX ORDER — AMLODIPINE BESYLATE 10 MG/1
10 TABLET ORAL DAILY
Qty: 30 TABLET | Refills: 1 | Status: SHIPPED | OUTPATIENT
Start: 2019-05-14 | End: 2019-10-01 | Stop reason: SDUPTHER

## 2019-05-14 RX ORDER — FLUTICASONE FUROATE AND VILANTEROL 100; 25 UG/1; UG/1
1 POWDER RESPIRATORY (INHALATION) DAILY
Qty: 1 INHALER | Refills: 1 | Status: SHIPPED | OUTPATIENT
Start: 2019-05-15 | End: 2019-08-21 | Stop reason: SDUPTHER

## 2019-05-14 RX ORDER — METOPROLOL TARTRATE 50 MG/1
50 TABLET, FILM COATED ORAL DAILY
Qty: 30 TABLET | Refills: 1 | Status: SHIPPED | OUTPATIENT
Start: 2019-05-15 | End: 2019-10-01 | Stop reason: SDUPTHER

## 2019-05-14 RX ORDER — CYCLOBENZAPRINE HCL 10 MG
10 TABLET ORAL 3 TIMES DAILY PRN
Qty: 30 TABLET | Refills: 0 | Status: SHIPPED | OUTPATIENT
Start: 2019-05-14 | End: 2020-02-11 | Stop reason: HOSPADM

## 2019-05-14 RX ORDER — GABAPENTIN 300 MG/1
300 CAPSULE ORAL 3 TIMES DAILY
Qty: 90 CAPSULE | Refills: 1 | Status: SHIPPED | OUTPATIENT
Start: 2019-05-14 | End: 2019-10-14

## 2019-05-14 RX ORDER — FOLIC ACID 1 MG/1
1 TABLET ORAL DAILY
Qty: 30 TABLET | Refills: 1 | Status: SHIPPED | OUTPATIENT
Start: 2019-05-14 | End: 2019-08-21 | Stop reason: SDUPTHER

## 2019-05-14 RX ORDER — ASPIRIN 81 MG/1
81 TABLET ORAL DAILY
Qty: 30 TABLET | Refills: 1 | Status: SHIPPED | OUTPATIENT
Start: 2019-05-14 | End: 2019-08-21 | Stop reason: SDUPTHER

## 2019-05-14 RX ORDER — TRAZODONE HYDROCHLORIDE 50 MG/1
50 TABLET ORAL
Qty: 30 TABLET | Refills: 1 | Status: SHIPPED | OUTPATIENT
Start: 2019-05-14 | End: 2019-11-20 | Stop reason: SDUPTHER

## 2019-05-14 RX ORDER — LIDOCAINE 50 MG/G
1 PATCH TOPICAL DAILY
Qty: 10 PATCH | Refills: 0 | Status: SHIPPED | OUTPATIENT
Start: 2019-05-15 | End: 2020-02-11 | Stop reason: HOSPADM

## 2019-05-14 RX ORDER — TRAMADOL HYDROCHLORIDE 50 MG/1
50 TABLET ORAL EVERY 6 HOURS PRN
Qty: 30 TABLET | Refills: 0 | Status: SHIPPED | OUTPATIENT
Start: 2019-05-14 | End: 2019-05-24

## 2019-05-14 RX ORDER — CYANOCOBALAMIN 1000 UG/ML
1000 INJECTION INTRAMUSCULAR; SUBCUTANEOUS
Qty: 1 ML | Refills: 1 | Status: SHIPPED | OUTPATIENT
Start: 2019-06-10 | End: 2019-12-11

## 2019-05-14 RX ORDER — FLUTICASONE PROPIONATE 50 MCG
2 SPRAY, SUSPENSION (ML) NASAL DAILY
Qty: 1 BOTTLE | Refills: 0 | Status: SHIPPED | OUTPATIENT
Start: 2019-05-15 | End: 2019-10-01 | Stop reason: SDUPTHER

## 2019-05-14 RX ORDER — DULOXETIN HYDROCHLORIDE 30 MG/1
90 CAPSULE, DELAYED RELEASE ORAL DAILY
Qty: 90 CAPSULE | Refills: 1 | Status: SHIPPED | OUTPATIENT
Start: 2019-05-15 | End: 2021-04-09 | Stop reason: ALTCHOICE

## 2019-05-14 RX ORDER — NICOTINE 21 MG/24HR
1 PATCH, TRANSDERMAL 24 HOURS TRANSDERMAL DAILY
Qty: 28 PATCH | Refills: 0 | Status: SHIPPED | OUTPATIENT
Start: 2019-05-15 | End: 2020-11-03

## 2019-05-14 RX ADMIN — TRAZODONE HYDROCHLORIDE 50 MG: 50 TABLET ORAL at 21:37

## 2019-05-14 RX ADMIN — FOLIC ACID 1 MG: 1 TABLET ORAL at 09:02

## 2019-05-14 RX ADMIN — DULOXETINE HYDROCHLORIDE 90 MG: 60 CAPSULE, DELAYED RELEASE ORAL at 09:03

## 2019-05-14 RX ADMIN — NICOTINE 1 PATCH: 14 PATCH, EXTENDED RELEASE TRANSDERMAL at 09:08

## 2019-05-14 RX ADMIN — AMLODIPINE BESYLATE 10 MG: 5 TABLET ORAL at 09:03

## 2019-05-14 RX ADMIN — TRAMADOL HYDROCHLORIDE 50 MG: 50 TABLET, COATED ORAL at 09:01

## 2019-05-14 RX ADMIN — METHOCARBAMOL 500 MG: 500 TABLET, FILM COATED ORAL at 21:38

## 2019-05-14 RX ADMIN — GABAPENTIN 300 MG: 300 CAPSULE ORAL at 17:00

## 2019-05-14 RX ADMIN — ATORVASTATIN CALCIUM 40 MG: 40 TABLET, FILM COATED ORAL at 17:03

## 2019-05-14 RX ADMIN — QUETIAPINE 200 MG: 200 TABLET, FILM COATED ORAL at 09:03

## 2019-05-14 RX ADMIN — ASPIRIN 81 MG: 81 TABLET, COATED ORAL at 09:03

## 2019-05-14 RX ADMIN — GABAPENTIN 300 MG: 300 CAPSULE ORAL at 21:38

## 2019-05-14 RX ADMIN — METHOCARBAMOL 500 MG: 500 TABLET, FILM COATED ORAL at 05:43

## 2019-05-14 RX ADMIN — ALBUTEROL SULFATE 2 PUFF: 90 AEROSOL, METERED RESPIRATORY (INHALATION) at 17:42

## 2019-05-14 RX ADMIN — TAMSULOSIN HYDROCHLORIDE 0.4 MG: 0.4 CAPSULE ORAL at 17:03

## 2019-05-14 RX ADMIN — FLUTICASONE FUROATE AND VILANTEROL TRIFENATATE 1 PUFF: 100; 25 POWDER RESPIRATORY (INHALATION) at 09:04

## 2019-05-14 RX ADMIN — TRAMADOL HYDROCHLORIDE 50 MG: 50 TABLET, COATED ORAL at 17:42

## 2019-05-14 RX ADMIN — MIRTAZAPINE 15 MG: 15 TABLET, FILM COATED ORAL at 21:37

## 2019-05-14 RX ADMIN — QUETIAPINE FUMARATE 600 MG: 400 TABLET, FILM COATED ORAL at 21:37

## 2019-05-14 RX ADMIN — SENNOSIDES AND DOCUSATE SODIUM 1 TABLET: 8.6; 5 TABLET ORAL at 17:03

## 2019-05-14 RX ADMIN — Medication 1 TABLET: at 09:49

## 2019-05-14 RX ADMIN — VITAMIN D, TAB 1000IU (100/BT) 1000 UNITS: 25 TAB at 09:03

## 2019-05-14 RX ADMIN — SENNOSIDES AND DOCUSATE SODIUM 1 TABLET: 8.6; 5 TABLET ORAL at 09:02

## 2019-05-14 RX ADMIN — METOPROLOL TARTRATE 50 MG: 50 TABLET, FILM COATED ORAL at 09:02

## 2019-05-14 RX ADMIN — GUAIFENESIN 600 MG: 600 TABLET, EXTENDED RELEASE ORAL at 09:02

## 2019-05-14 RX ADMIN — Medication 100 MG: at 09:02

## 2019-05-14 RX ADMIN — LIDOCAINE 1 PATCH: 50 PATCH TOPICAL at 09:05

## 2019-05-14 RX ADMIN — GABAPENTIN 300 MG: 300 CAPSULE ORAL at 09:02

## 2019-05-14 RX ADMIN — Medication 2 SPRAY: at 09:05

## 2019-05-14 RX ADMIN — METHOCARBAMOL 500 MG: 500 TABLET, FILM COATED ORAL at 13:30

## 2019-05-15 VITALS
SYSTOLIC BLOOD PRESSURE: 143 MMHG | HEIGHT: 68 IN | WEIGHT: 187 LBS | TEMPERATURE: 96.3 F | HEART RATE: 76 BPM | RESPIRATION RATE: 18 BRPM | DIASTOLIC BLOOD PRESSURE: 64 MMHG | OXYGEN SATURATION: 96 % | BODY MASS INDEX: 28.34 KG/M2

## 2019-05-15 PROCEDURE — 97116 GAIT TRAINING THERAPY: CPT

## 2019-05-15 PROCEDURE — 99239 HOSP IP/OBS DSCHRG MGMT >30: CPT | Performed by: NURSE PRACTITIONER

## 2019-05-15 RX ORDER — MIRTAZAPINE 15 MG/1
15 TABLET, FILM COATED ORAL
Qty: 30 TABLET | Refills: 1 | Status: SHIPPED | OUTPATIENT
Start: 2019-05-15 | End: 2020-11-03 | Stop reason: SDUPTHER

## 2019-05-15 RX ADMIN — QUETIAPINE 200 MG: 200 TABLET, FILM COATED ORAL at 09:37

## 2019-05-15 RX ADMIN — ASPIRIN 81 MG: 81 TABLET, COATED ORAL at 09:37

## 2019-05-15 RX ADMIN — METHOCARBAMOL 500 MG: 500 TABLET, FILM COATED ORAL at 06:13

## 2019-05-15 RX ADMIN — VITAMIN D, TAB 1000IU (100/BT) 1000 UNITS: 25 TAB at 09:39

## 2019-05-15 RX ADMIN — GABAPENTIN 300 MG: 300 CAPSULE ORAL at 16:25

## 2019-05-15 RX ADMIN — DULOXETINE HYDROCHLORIDE 90 MG: 60 CAPSULE, DELAYED RELEASE ORAL at 09:37

## 2019-05-15 RX ADMIN — METHOCARBAMOL 500 MG: 500 TABLET, FILM COATED ORAL at 13:19

## 2019-05-15 RX ADMIN — METOPROLOL TARTRATE 50 MG: 50 TABLET, FILM COATED ORAL at 09:38

## 2019-05-15 RX ADMIN — ATORVASTATIN CALCIUM 40 MG: 40 TABLET, FILM COATED ORAL at 16:25

## 2019-05-15 RX ADMIN — SENNOSIDES AND DOCUSATE SODIUM 1 TABLET: 8.6; 5 TABLET ORAL at 16:25

## 2019-05-15 RX ADMIN — Medication 100 MG: at 09:38

## 2019-05-15 RX ADMIN — SENNOSIDES AND DOCUSATE SODIUM 1 TABLET: 8.6; 5 TABLET ORAL at 09:38

## 2019-05-15 RX ADMIN — FLUTICASONE FUROATE AND VILANTEROL TRIFENATATE 1 PUFF: 100; 25 POWDER RESPIRATORY (INHALATION) at 09:35

## 2019-05-15 RX ADMIN — FOLIC ACID 1 MG: 1 TABLET ORAL at 09:38

## 2019-05-15 RX ADMIN — TRAMADOL HYDROCHLORIDE 50 MG: 50 TABLET, COATED ORAL at 10:29

## 2019-05-15 RX ADMIN — NICOTINE 1 PATCH: 14 PATCH, EXTENDED RELEASE TRANSDERMAL at 09:39

## 2019-05-15 RX ADMIN — Medication 1 TABLET: at 09:37

## 2019-05-15 RX ADMIN — GABAPENTIN 300 MG: 300 CAPSULE ORAL at 09:37

## 2019-05-15 RX ADMIN — Medication 2 SPRAY: at 09:35

## 2019-05-15 RX ADMIN — LIDOCAINE 1 PATCH: 50 PATCH TOPICAL at 09:35

## 2019-05-15 RX ADMIN — AMLODIPINE BESYLATE 10 MG: 5 TABLET ORAL at 09:36

## 2019-05-15 RX ADMIN — ALBUTEROL SULFATE 2 PUFF: 90 AEROSOL, METERED RESPIRATORY (INHALATION) at 09:45

## 2019-05-15 RX ADMIN — TAMSULOSIN HYDROCHLORIDE 0.4 MG: 0.4 CAPSULE ORAL at 16:25

## 2019-07-30 ENCOUNTER — TELEPHONE (OUTPATIENT)
Dept: FAMILY MEDICINE CLINIC | Facility: CLINIC | Age: 69
End: 2019-07-30

## 2019-07-30 NOTE — TELEPHONE ENCOUNTER
Raphael Del Cid from [facility] called, I told her he is not are pt yet does not have an appt till 8/21, she said that pt fell told her he should go to Er or she need call his old PCP

## 2019-08-21 ENCOUNTER — OFFICE VISIT (OUTPATIENT)
Dept: FAMILY MEDICINE CLINIC | Facility: CLINIC | Age: 69
End: 2019-08-21
Payer: MEDICARE

## 2019-08-21 VITALS
OXYGEN SATURATION: 94 % | RESPIRATION RATE: 20 BRPM | HEIGHT: 65 IN | SYSTOLIC BLOOD PRESSURE: 132 MMHG | HEART RATE: 108 BPM | TEMPERATURE: 97.6 F | DIASTOLIC BLOOD PRESSURE: 84 MMHG | BODY MASS INDEX: 28.82 KG/M2 | WEIGHT: 173 LBS

## 2019-08-21 DIAGNOSIS — Z76.89 ENCOUNTER TO ESTABLISH CARE WITH NEW DOCTOR: Primary | ICD-10-CM

## 2019-08-21 DIAGNOSIS — I51.89 HYPOKINESIA OF LEFT VENTRICLE: ICD-10-CM

## 2019-08-21 DIAGNOSIS — J44.9 COPD (CHRONIC OBSTRUCTIVE PULMONARY DISEASE) (HCC): ICD-10-CM

## 2019-08-21 DIAGNOSIS — F19.11 HISTORY OF SUBSTANCE ABUSE (HCC): ICD-10-CM

## 2019-08-21 DIAGNOSIS — E78.5 HYPERLIPIDEMIA: ICD-10-CM

## 2019-08-21 DIAGNOSIS — Z86.73 HISTORY OF TRANSIENT ISCHEMIC ATTACK (TIA): ICD-10-CM

## 2019-08-21 DIAGNOSIS — I10 ESSENTIAL HYPERTENSION: ICD-10-CM

## 2019-08-21 DIAGNOSIS — R60.0 BILATERAL LOWER EXTREMITY EDEMA: ICD-10-CM

## 2019-08-21 DIAGNOSIS — F10.20 UNCOMPLICATED ALCOHOL DEPENDENCE (HCC): Chronic | ICD-10-CM

## 2019-08-21 DIAGNOSIS — E55.9 VITAMIN D DEFICIENCY: ICD-10-CM

## 2019-08-21 DIAGNOSIS — R00.0 TACHYCARDIA: ICD-10-CM

## 2019-08-21 DIAGNOSIS — F33.3 MAJOR DEPRESSIVE DISORDER, RECURRENT, SEVERE WITH PSYCHOTIC FEATURES (HCC): Chronic | ICD-10-CM

## 2019-08-21 DIAGNOSIS — Z72.89 ALCOHOL USE: ICD-10-CM

## 2019-08-21 DIAGNOSIS — M54.89 BACK PAIN WITHOUT SCIATICA: ICD-10-CM

## 2019-08-21 PROBLEM — Z86.79 HISTORY OF SUSTAINED VENTRICULAR TACHYCARDIA: Status: ACTIVE | Noted: 2019-08-21

## 2019-08-21 PROBLEM — G45.9 BRAIN TIA: Status: RESOLVED | Noted: 2019-05-03 | Resolved: 2019-08-21

## 2019-08-21 PROBLEM — G45.9 BRAIN TIA: Status: ACTIVE | Noted: 2019-05-03

## 2019-08-21 PROCEDURE — 1124F ACP DISCUSS-NO DSCNMKR DOCD: CPT | Performed by: FAMILY MEDICINE

## 2019-08-21 PROCEDURE — 99203 OFFICE O/P NEW LOW 30 MIN: CPT | Performed by: FAMILY MEDICINE

## 2019-08-21 RX ORDER — FOLIC ACID 1 MG/1
1 TABLET ORAL DAILY
Qty: 30 TABLET | Refills: 1 | Status: SHIPPED | OUTPATIENT
Start: 2019-08-21 | End: 2019-10-01 | Stop reason: SDUPTHER

## 2019-08-21 RX ORDER — ALBUTEROL SULFATE 90 UG/1
2 AEROSOL, METERED RESPIRATORY (INHALATION) EVERY 4 HOURS PRN
Qty: 1 INHALER | Refills: 1 | Status: SHIPPED | OUTPATIENT
Start: 2019-08-21 | End: 2019-10-01 | Stop reason: SDUPTHER

## 2019-08-21 RX ORDER — LANOLIN ALCOHOL/MO/W.PET/CERES
100 CREAM (GRAM) TOPICAL DAILY
Qty: 30 TABLET | Refills: 1 | Status: SHIPPED | OUTPATIENT
Start: 2019-08-21 | End: 2019-10-01 | Stop reason: SDUPTHER

## 2019-08-21 RX ORDER — ATORVASTATIN CALCIUM 40 MG/1
40 TABLET, FILM COATED ORAL DAILY
Qty: 30 TABLET | Refills: 1 | Status: SHIPPED | OUTPATIENT
Start: 2019-08-21 | End: 2019-10-01 | Stop reason: SDUPTHER

## 2019-08-21 RX ORDER — MELOXICAM 15 MG/1
15 TABLET ORAL DAILY
Qty: 30 TABLET | Refills: 0 | Status: SHIPPED | OUTPATIENT
Start: 2019-08-21 | End: 2019-10-01 | Stop reason: SDUPTHER

## 2019-08-21 RX ORDER — ASPIRIN 81 MG/1
81 TABLET ORAL DAILY
Qty: 30 TABLET | Refills: 1 | Status: SHIPPED | OUTPATIENT
Start: 2019-08-21 | End: 2019-10-01 | Stop reason: SDUPTHER

## 2019-08-21 RX ORDER — FLUTICASONE FUROATE AND VILANTEROL 100; 25 UG/1; UG/1
1 POWDER RESPIRATORY (INHALATION) DAILY
Qty: 1 INHALER | Refills: 1 | Status: SHIPPED | OUTPATIENT
Start: 2019-08-21 | End: 2019-10-01 | Stop reason: SDUPTHER
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
## 2019-08-28 ENCOUNTER — TELEPHONE (OUTPATIENT)
Dept: FAMILY MEDICINE CLINIC | Facility: CLINIC | Age: 69
End: 2019-08-28

## 2019-08-28 PROBLEM — G45.9 TIA (TRANSIENT ISCHEMIC ATTACK): Status: RESOLVED | Noted: 2019-05-03 | Resolved: 2019-08-28

## 2019-08-28 PROBLEM — M54.89 BACK PAIN WITHOUT SCIATICA: Status: ACTIVE | Noted: 2019-08-28

## 2019-08-28 PROBLEM — Z86.73 HISTORY OF TRANSIENT ISCHEMIC ATTACK (TIA): Status: ACTIVE | Noted: 2019-08-28

## 2019-08-28 PROBLEM — R00.0 TACHYCARDIA: Status: ACTIVE | Noted: 2019-08-28

## 2019-08-28 NOTE — TELEPHONE ENCOUNTER
Ashley of [address] [facility] called to advised that all  PT goals have been met and patient has been discharged  Thank you

## 2019-10-01 ENCOUNTER — OFFICE VISIT (OUTPATIENT)
Dept: FAMILY MEDICINE CLINIC | Facility: CLINIC | Age: 69
End: 2019-10-01
Payer: MEDICARE

## 2019-10-01 VITALS
BODY MASS INDEX: 28.29 KG/M2 | WEIGHT: 170 LBS | DIASTOLIC BLOOD PRESSURE: 90 MMHG | TEMPERATURE: 97 F | HEART RATE: 126 BPM | OXYGEN SATURATION: 96 % | SYSTOLIC BLOOD PRESSURE: 140 MMHG | RESPIRATION RATE: 19 BRPM

## 2019-10-01 DIAGNOSIS — I10 ESSENTIAL HYPERTENSION: ICD-10-CM

## 2019-10-01 DIAGNOSIS — R09.81 NASAL CONGESTION: ICD-10-CM

## 2019-10-01 DIAGNOSIS — F10.20 UNCOMPLICATED ALCOHOL DEPENDENCE (HCC): Chronic | ICD-10-CM

## 2019-10-01 DIAGNOSIS — F33.3 MAJOR DEPRESSIVE DISORDER, RECURRENT, SEVERE WITH PSYCHOTIC FEATURES (HCC): Chronic | ICD-10-CM

## 2019-10-01 DIAGNOSIS — N40.0 BPH (BENIGN PROSTATIC HYPERPLASIA): ICD-10-CM

## 2019-10-01 DIAGNOSIS — L89.222 PRESSURE INJURY OF LEFT HIP, STAGE 2 (HCC): Primary | ICD-10-CM

## 2019-10-01 DIAGNOSIS — K59.00 CONSTIPATION: ICD-10-CM

## 2019-10-01 DIAGNOSIS — Z72.89 ALCOHOL USE: ICD-10-CM

## 2019-10-01 DIAGNOSIS — Z74.09 POOR MOBILITY: ICD-10-CM

## 2019-10-01 DIAGNOSIS — J44.9 COPD (CHRONIC OBSTRUCTIVE PULMONARY DISEASE) (HCC): ICD-10-CM

## 2019-10-01 DIAGNOSIS — R73.01 ELEVATED FASTING GLUCOSE: ICD-10-CM

## 2019-10-01 DIAGNOSIS — E78.5 HYPERLIPIDEMIA: ICD-10-CM

## 2019-10-01 DIAGNOSIS — Z12.5 PROSTATE CANCER SCREENING: ICD-10-CM

## 2019-10-01 DIAGNOSIS — E66.3 OVERWEIGHT (BMI 25.0-29.9): ICD-10-CM

## 2019-10-01 DIAGNOSIS — Z23 IMMUNIZATION DUE: ICD-10-CM

## 2019-10-01 DIAGNOSIS — I69.30 HISTORY OF CEREBROVASCULAR ACCIDENT (CVA) WITH RESIDUAL DEFICIT: ICD-10-CM

## 2019-10-01 DIAGNOSIS — E55.9 VITAMIN D DEFICIENCY: ICD-10-CM

## 2019-10-01 DIAGNOSIS — R26.2 AMBULATORY DYSFUNCTION: ICD-10-CM

## 2019-10-01 DIAGNOSIS — Z11.59 NEED FOR HEPATITIS C SCREENING TEST: ICD-10-CM

## 2019-10-01 DIAGNOSIS — R60.0 BILATERAL LOWER EXTREMITY EDEMA: ICD-10-CM

## 2019-10-01 DIAGNOSIS — Z86.73 HISTORY OF TRANSIENT ISCHEMIC ATTACK (TIA): ICD-10-CM

## 2019-10-01 DIAGNOSIS — M54.89 BACK PAIN WITHOUT SCIATICA: ICD-10-CM

## 2019-10-01 PROCEDURE — 99214 OFFICE O/P EST MOD 30 MIN: CPT | Performed by: FAMILY MEDICINE

## 2019-10-01 PROCEDURE — G0008 ADMIN INFLUENZA VIRUS VAC: HCPCS | Performed by: FAMILY MEDICINE

## 2019-10-01 PROCEDURE — G0438 PPPS, INITIAL VISIT: HCPCS | Performed by: FAMILY MEDICINE

## 2019-10-01 PROCEDURE — 90670 PCV13 VACCINE IM: CPT | Performed by: FAMILY MEDICINE

## 2019-10-01 PROCEDURE — 90662 IIV NO PRSV INCREASED AG IM: CPT | Performed by: FAMILY MEDICINE

## 2019-10-01 PROCEDURE — G0009 ADMIN PNEUMOCOCCAL VACCINE: HCPCS | Performed by: FAMILY MEDICINE

## 2019-10-01 RX ORDER — LANOLIN ALCOHOL/MO/W.PET/CERES
100 CREAM (GRAM) TOPICAL DAILY
Qty: 90 TABLET | Refills: 0 | Status: SHIPPED | OUTPATIENT
Start: 2019-10-01 | End: 2019-10-01 | Stop reason: SDUPTHER

## 2019-10-01 RX ORDER — AMLODIPINE BESYLATE 10 MG/1
10 TABLET ORAL DAILY
Qty: 90 TABLET | Refills: 0 | Status: SHIPPED | OUTPATIENT
Start: 2019-10-01 | End: 2019-10-01 | Stop reason: SDUPTHER

## 2019-10-01 RX ORDER — FLUTICASONE PROPIONATE 50 MCG
2 SPRAY, SUSPENSION (ML) NASAL DAILY
Qty: 18.2 ML | Refills: 1 | Status: SHIPPED | OUTPATIENT
Start: 2019-10-01 | End: 2019-10-01 | Stop reason: SDUPTHER

## 2019-10-01 RX ORDER — ATORVASTATIN CALCIUM 40 MG/1
40 TABLET, FILM COATED ORAL DAILY
Qty: 90 TABLET | Refills: 0 | Status: SHIPPED | OUTPATIENT
Start: 2019-10-01 | End: 2019-10-01 | Stop reason: SDUPTHER

## 2019-10-01 RX ORDER — FLUTICASONE FUROATE AND VILANTEROL 100; 25 UG/1; UG/1
1 POWDER RESPIRATORY (INHALATION) DAILY
Qty: 1 INHALER | Refills: 1 | Status: SHIPPED | OUTPATIENT
Start: 2019-10-01 | End: 2019-10-01 | Stop reason: SDUPTHER

## 2019-10-01 RX ORDER — TAMSULOSIN HYDROCHLORIDE 0.4 MG/1
0.4 CAPSULE ORAL
Qty: 90 CAPSULE | Refills: 0 | Status: SHIPPED | OUTPATIENT
Start: 2019-10-01 | End: 2020-02-11 | Stop reason: HOSPADM

## 2019-10-01 RX ORDER — MELOXICAM 15 MG/1
15 TABLET ORAL DAILY
Qty: 90 TABLET | Refills: 0 | Status: SHIPPED | OUTPATIENT
Start: 2019-10-01 | End: 2019-10-14

## 2019-10-01 RX ORDER — FOLIC ACID 1 MG/1
1 TABLET ORAL DAILY
Qty: 90 TABLET | Refills: 0 | Status: SHIPPED | OUTPATIENT
Start: 2019-10-01 | End: 2019-10-01 | Stop reason: SDUPTHER

## 2019-10-01 RX ORDER — ASPIRIN 81 MG/1
81 TABLET ORAL DAILY
Qty: 90 TABLET | Refills: 0 | Status: SHIPPED | OUTPATIENT
Start: 2019-10-01 | End: 2019-10-01 | Stop reason: SDUPTHER

## 2019-10-01 RX ORDER — HYDROCOLLOID DRESSING 4" X 4"
1 BANDAGE TOPICAL EVERY OTHER DAY
Qty: 10 EACH | Refills: 0 | Status: SHIPPED | OUTPATIENT
Start: 2019-10-01 | End: 2021-04-09

## 2019-10-01 RX ORDER — AMOXICILLIN 250 MG
1 CAPSULE ORAL 2 TIMES DAILY
Qty: 180 TABLET | Refills: 0 | Status: SHIPPED | OUTPATIENT
Start: 2019-10-01 | End: 2019-10-01 | Stop reason: SDUPTHER

## 2019-10-01 RX ORDER — METOPROLOL TARTRATE 50 MG/1
50 TABLET, FILM COATED ORAL DAILY
Qty: 90 TABLET | Refills: 0 | Status: SHIPPED | OUTPATIENT
Start: 2019-10-01 | End: 2019-10-01 | Stop reason: SDUPTHER

## 2019-10-01 RX ORDER — ALBUTEROL SULFATE 90 UG/1
2 AEROSOL, METERED RESPIRATORY (INHALATION) EVERY 4 HOURS PRN
Qty: 1 INHALER | Refills: 1 | Status: SHIPPED | OUTPATIENT
Start: 2019-10-01 | End: 2019-10-01 | Stop reason: SDUPTHER

## 2019-10-01 NOTE — PROGRESS NOTES
Assessment/Plan:         Diagnoses and all orders for this visit:    Pressure injury of left hip, stage 2 (HCC)  -     Wound Dressings (COMFEEL PLUS ULCER DRESSING) PADS; Apply 1 each topically every other day  -     menthol-zinc oxide (CALMOSEPTINE) 0 44-20 6 % OINT; Apply topically 2 (two) times a day  -     Ambulatory Referral to Home Health; Future    COPD (chronic obstructive pulmonary disease) (HCC)  Comments:  advised must quit smoking  Orders:  -     albuterol (PROVENTIL HFA,VENTOLIN HFA) 90 mcg/act inhaler; Inhale 2 puffs every 4 (four) hours as needed for wheezing  -     fluticasone-vilanterol (BREO ELLIPTA) 100-25 mcg/inh inhaler; Inhale 1 puff daily Rinse mouth after use  -     influenza vaccine, 1778-8235, high-dose, PF 0 5 mL (FLUZONE HIGH-DOSE)  -     PNEUMOCOCCAL CONJUGATE VACCINE 13-VALENT GREATER THAN 6 MONTHS  -     CBC and differential; Future    Essential hypertension  Comments:  140/90 today, was controlled well at new pt appt 6 weeks ago, cpm and monitoring  Orders:  -     aspirin (ECOTRIN LOW STRENGTH) 81 mg EC tablet; Take 1 tablet (81 mg total) by mouth daily  -     amLODIPine (NORVASC) 10 mg tablet; Take 1 tablet (10 mg total) by mouth daily Hold for SBP <110  -     metoprolol tartrate (LOPRESSOR) 50 mg tablet; Take 1 tablet (50 mg total) by mouth daily  -     Comprehensive metabolic panel; Future  -     Microalbumin / creatinine urine ratio    Hyperlipidemia  -     aspirin (ECOTRIN LOW STRENGTH) 81 mg EC tablet; Take 1 tablet (81 mg total) by mouth daily  -     atorvastatin (LIPITOR) 40 mg tablet; Take 1 tablet (40 mg total) by mouth daily  -     Comprehensive metabolic panel; Future  -     Lipid panel; Future    History of transient ischemic attack (TIA)  -     aspirin (ECOTRIN LOW STRENGTH) 81 mg EC tablet; Take 1 tablet (81 mg total) by mouth daily  -     atorvastatin (LIPITOR) 40 mg tablet; Take 1 tablet (40 mg total) by mouth daily    Alcohol use  -     thiamine 100 MG tablet;  Take 1 tablet (100 mg total) by mouth daily  -     folic acid (FOLVITE) 1 mg tablet; Take 1 tablet (1 mg total) by mouth daily    Uncomplicated alcohol dependence ([address] )  Comments:   must completely stay off all alcohol  Orders:  -     thiamine 100 MG tablet; Take 1 tablet (100 mg total) by mouth daily  -     folic acid (FOLVITE) 1 mg tablet; Take 1 tablet (1 mg total) by mouth daily  -     influenza vaccine, [id number], high-dose, PF 0 5 mL (FLUZONE HIGH-DOSE)  -     PNEUMOCOCCAL CONJUGATE VACCINE 13-VALENT GREATER THAN 6 MONTHS    Back pain without sciatica  Comments:  await pt scheduling pain management appt  Orders:  -     meloxicam (MOBIC) 15 mg tablet; Take 1 tablet (15 mg total) by mouth daily    History of cerebrovascular accident (CVA) with residual deficit    Poor mobility  -     Ambulatory Referral to Home Health; Future    Ambulatory dysfunction  -     Ambulatory Referral to Home Health; Future    Bilateral lower extremity edema  -     Ambulatory Referral to Home Health; Future  -     Comprehensive metabolic panel; Future    Vitamin D deficiency  -     Cholecalciferol 1000 units tablet; Take 1 tablet (1,000 Units total) by mouth daily    Essential hypertension  -     aspirin (ECOTRIN LOW STRENGTH) 81 mg EC tablet; Take 1 tablet (81 mg total) by mouth daily  -     amLODIPine (NORVASC) 10 mg tablet; Take 1 tablet (10 mg total) by mouth daily Hold for SBP <110  -     metoprolol tartrate (LOPRESSOR) 50 mg tablet; Take 1 tablet (50 mg total) by mouth daily  -     Comprehensive metabolic panel; Future  -     Microalbumin / creatinine urine ratio    Elevated fasting glucose  -     Comprehensive metabolic panel; Future  -     CBC and differential; Future  -     HEMOGLOBIN A1C W/ EAG ESTIMATION;  Future  -     Microalbumin / creatinine urine ratio    Nasal congestion  -     fluticasone (FLONASE) 50 mcg/act nasal spray; 2 sprays into each nostril daily    BPH (benign prostatic hyperplasia)  -     tamsulosin (FLOMAX) 0 4 mg; Take 1 capsule (0 4 mg total) by mouth daily with dinner    Constipation  -     senna-docusate sodium (SENOKOT S) 8 6-50 mg per tablet; Take 1 tablet by mouth 2 (two) times a day    Major depressive disorder, recurrent, severe with psychotic features ([facility] )  -     Ambulatory Referral to [address] [place] [address] [place]; Future    Immunization due  -     influenza vaccine, [id number], high-dose, PF 0 5 mL (FLUZONE HIGH-DOSE)  -     PNEUMOCOCCAL CONJUGATE VACCINE 13-VALENT GREATER THAN 6 MONTHS    Need for hepatitis C screening test  -     Hepatitis C antibody; Future    Prostate cancer screening  -     PSA, Total Screen; Future    Overweight (BMI 25 0-29  9)          Subjective:   Chief Complaint   Patient presents with    Medicare Wellness Visit    Follow-up    Medication Refill        Patient ID: Margery Lundborg is a 79 y o  male  Here for medicare wellness and f/u appt with his daughter-in-law/caregiver; pt lives with his son and DIL and grandchildren  was new pt here at last visit 08/2019, numerous medical problems, specialist evals were ordered, but pt did not schedule  States today that he ran out of almost all of his medications couple of weeks ago  Today reports left hip skin "sore" that he noticed one morning about a month ago, saw some blood on sheet and the area was a little painful, DIL had been applying neosporin and it got better, never fully healed, then started getting worse again couple of weeks ago, pt reports that he lays on his left side when sleeping, barely moves in bed, has decreased mobility stemming from old CVA, is ambulatory but requires walker for gait dysfunction      9/16/2019  [facility] - [place]  Telephone Encounter - Vahid Wilson - 09/16/2019 3:21 PM EDT  I spoke to patient regarding Cologuard  He had never received it  [facility] states it was delivered  Patient mentioned how he was in the hospital from Months of May to about June   He will need a kit reordered but he IS interested in doing so      Electronically signed by Valarie Phalen, MA at 09/16/2019 3:23 PM EDT    Telephone Encounter - Teri Funes MD - 09/17/2019 8:45 AM EDT  Ok ordering now    Electronically signed by Nadeem Simms MD at 09/17/2019 8:45 AM EDT          The following portions of the patient's history were reviewed and updated as appropriate: allergies, current medications, past family history, past medical history, past social history, past surgical history and problem list     Review of Systems   Constitutional: Negative for activity change, appetite change, chills, diaphoresis, fever and unexpected weight change  HENT: Negative for mouth sores, nosebleeds, sore throat and trouble swallowing  Eyes: Negative  Respiratory: Negative for apnea, choking, chest tightness, wheezing and stridor  Chronic sx at baseline   Cardiovascular: Positive for leg swelling  Negative for chest pain and palpitations  Gastrointestinal: Negative  Endocrine: Negative  Genitourinary: Negative for decreased urine volume, difficulty urinating, dysuria, flank pain, frequency and hematuria  Musculoskeletal: Positive for gait problem  Negative for joint swelling  Skin: Positive for wound (per hpi)  Negative for color change, pallor and rash  Neurological: Negative for dizziness, tremors, seizures, syncope, facial asymmetry, light-headedness and headaches  Hematological: Negative  Psychiatric/Behavioral: Negative for agitation, behavioral problems, self-injury and suicidal ideas  The patient is not nervous/anxious and is not hyperactive  Objective:      /90   Pulse (!) 126   Temp (!) 97 °F (36 1 °C)   Resp 19   Wt 77 1 kg (170 lb)   SpO2 96%   BMI 28 29 kg/m²          Physical Exam   Cardiovascular:   2+ edema b/l LE       BMI Counseling: Body mass index is 28 29 kg/m²  The BMI is above normal  No BMI follow-up plan is appropriate   Patient is 74 years old and weight reduction/weight gain would further complicate their underlying physical disability

## 2019-10-01 NOTE — PROGRESS NOTES
Assessment and Plan:     Problem List Items Addressed This Visit     None           Preventive health issues were discussed with patient, and age appropriate screening tests were ordered as noted in patient's After Visit Summary  Personalized health advice and appropriate referrals for health education or preventive services given if needed, as noted in patient's After Visit Summary  History of Present Illness:     Patient presents for Medicare Annual Wellness visit    Patient Care Team:  Reji Busby DO as PCP - General (Family Medicine)     Problem List:     Patient Active Problem List   Diagnosis    Major depressive disorder, recurrent, severe with psychotic features ([facility] )    Essential hypertension    Uncomplicated alcohol dependence ([facility] )    History of substance abuse ([facility] )    Encounter to establish care with new doctor    History of sustained ventricular tachycardia    Hypokinesia of left ventricle    Bilateral lower extremity edema    Tachycardia    History of transient ischemic attack (TIA)    Back pain without sciatica      Past Medical and Surgical History:     Past Medical History:   Diagnosis Date    BPH (benign prostatic hyperplasia)     CVA (cerebral vascular accident) ([facility] )     Head injury     Hypertension     Metatarsal fracture     TIA (transient ischemic attack)      Past Surgical History:   Procedure Laterality Date    ANKLE SURGERY      BACK SURGERY      JOINT REPLACEMENT      KNEE SURGERY      LIVER SURGERY        Family History:     No family history on file     Social History:     Social History     Socioeconomic History    Marital status: Single     Spouse name: Not on file    Number of children: Not on file    Years of education: Not on file    Highest education level: Not on file   Occupational History    Not on file   Social Needs    Financial resource strain: Not on file    Food insecurity:     Worry: Not on file     Inability: Not on file   [facility] Transportation needs:     Medical: Not on file     Non-medical: Not on file   Tobacco Use    Smoking status: Current Every Day Smoker     Packs/day: 0 50     Types: Cigarettes    Smokeless tobacco: Never Used    Tobacco comment: started age 14, 3 quit attempts   Substance and Sexual Activity    Alcohol use: Yes     Frequency: Monthly or less    Drug use: Not Currently     Comment: history polysubstance use including IVDA on record    Sexual activity: Not on file   Lifestyle    Physical activity:     Days per week: Not on file     Minutes per session: Not on file    Stress: Not on file   Relationships    Social connections:     Talks on phone: Not on file     Gets together: Not on file     Attends Rastafari service: Not on file     Active member of club or organization: Not on file     Attends meetings of clubs or organizations: Not on file     Relationship status: Not on file    Intimate partner violence:     Fear of current or ex partner: Not on file     Emotionally abused: Not on file     Physically abused: Not on file     Forced sexual activity: Not on file   Other Topics Concern    Not on file   Social History Narrative    Used to be  for about 30 yrs, unemployed since fell off roof onto concrete - pt does not recall what year    1 son with whom he lives,        Medications and Allergies:     Current Outpatient Medications   Medication Sig Dispense Refill    albuterol (PROVENTIL HFA,VENTOLIN HFA) 90 mcg/act inhaler Inhale 2 puffs every 4 (four) hours as needed for wheezing 1 Inhaler 1    amLODIPine (NORVASC) 10 mg tablet Take 1 tablet (10 mg total) by mouth daily Hold for SBP <110 30 tablet 1    aspirin (ECOTRIN LOW STRENGTH) 81 mg EC tablet Take 1 tablet (81 mg total) by mouth daily 30 tablet 1    atorvastatin (LIPITOR) 40 mg tablet Take 1 tablet (40 mg total) by mouth daily 30 tablet 1    Cholecalciferol 1000 units tablet Take 1 tablet (1,000 Units total) by mouth daily 30 tablet 1    cyanocobalamin 1,000 mcg/mL Inject 1 mL (1,000 mcg total) into a muscle every 30 (thirty) days 1 mL 1    cyclobenzaprine (FLEXERIL) 10 mg tablet Take 1 tablet (10 mg total) by mouth 3 (three) times a day as needed for muscle spasms 30 tablet 0    DULoxetine (CYMBALTA) 30 mg delayed release capsule Take 3 capsules (90 mg total) by mouth daily 90 capsule 1    fluticasone (FLONASE) 50 mcg/act nasal spray 2 sprays into each nostril daily 1 Bottle 0    fluticasone-vilanterol (BREO ELLIPTA) 100-25 mcg/inh inhaler Inhale 1 puff daily Rinse mouth after use   1 Inhaler 1    folic acid (FOLVITE) 1 mg tablet Take 1 tablet (1 mg total) by mouth daily 30 tablet 1    gabapentin (NEURONTIN) 300 mg capsule Take 1 capsule (300 mg total) by mouth 3 (three) times a day 90 capsule 1    guaiFENesin (MUCINEX) 600 mg 12 hr tablet Take 600 mg by mouth every 12 (twelve) hours as needed for cough      lidocaine (LIDODERM) 5 % Apply 1 patch topically daily Remove & Discard patch within 12 hours or as directed by MD (Patient not taking: Reported on 8/21/2019) 10 patch 0    meloxicam (MOBIC) 15 mg tablet Take 1 tablet (15 mg total) by mouth daily 30 tablet 0    metoprolol tartrate (LOPRESSOR) 50 mg tablet Take 1 tablet (50 mg total) by mouth daily 30 tablet 1    mirtazapine (REMERON) 15 mg tablet Take 1 tablet (15 mg total) by mouth daily at bedtime 30 tablet 1    nicotine (NICODERM CQ) 14 mg/24hr TD 24 hr patch Place 1 patch on the skin daily (Patient not taking: Reported on 8/21/2019) 28 patch 0    QUEtiapine (SEROquel) 200 mg tablet Take 1 tablet (200 mg total) by mouth daily 30 tablet 1    QUEtiapine (SEROquel) 300 mg tablet Take 2 tablets (600 mg total) by mouth daily at bedtime 60 tablet 1    senna-docusate sodium (SENOKOT S) 8 6-50 mg per tablet Take 1 tablet by mouth 2 (two) times a day 30 tablet 0    tamsulosin (FLOMAX) 0 4 mg Take 1 capsule (0 4 mg total) by mouth daily with dinner 30 capsule 1    thiamine 100 MG tablet Take 1 tablet (100 mg total) by mouth daily 30 tablet 1    traZODone (DESYREL) 50 mg tablet Take 1 tablet (50 mg total) by mouth daily at bedtime as needed for sleep 30 tablet 1     No current facility-administered medications for this visit  No Known Allergies   Immunizations: There is no immunization history on file for this patient  Health Maintenance:         Topic Date Due    Hepatitis C Screening  1950    CRC Screening: Colonoscopy  1950         Topic Date Due    DTaP,Tdap,and Td Vaccines (1 - Tdap) 12/17/1973    Pneumococcal Vaccine: 65+ Years (1 of 2 - PCV13) 12/17/2017    INFLUENZA VACCINE  07/01/2019      Medicare Health Risk Assessment:     There were no vitals taken for this visit  Angella Aldana is here for his Subsequent Wellness visit  Health Risk Assessment:   Patient rates overall health as good  Patient feels that their physical health rating is same  Eyesight was rated as slightly worse  Hearing was rated as slightly worse  Patient feels that their emotional and mental health rating is same  Pain experienced in the last 7 days has been a lot  Patient's pain rating has been 8/10  Patient states that he has experienced no weight loss or gain in last 6 months  Depression Screening:   PHQ-2 Score: 5  PHQ-9 Score: 18      Fall Risk Screening: In the past year, patient has experienced: history of falling in past year    Number of falls: 2 or more  Injured during fall?: Yes    Feels unsteady when standing or walking?: Yes    Worried about falling?: Yes      Home Safety:  Patient has trouble with stairs inside or outside of their home  Patient has working smoke alarms and has working carbon monoxide detector  Home safety hazards include: none  Nutrition:   Current diet is Regular  Medications:   Patient is not currently taking any over-the-counter supplements  Patient is able to manage medications       Activities of Daily Living (ADLs)/Instrumental Activities of Daily Living (IADLs):   Walk and transfer into and out of bed and chair?: Yes  Dress and groom yourself?: No    Bathe or shower yourself?: Yes    Feed yourself?  No  Do your laundry/housekeeping?: No  Manage your money, pay your bills and track your expenses?: Yes  Make your own meals?: No    Do your own shopping?: No    Previous Hospitalizations:   Any hospitalizations or ED visits within the last 12 months?: Yes    How many hospitalizations have you had in the last year?: 1-2    Advance Care Planning:     Advanced directive counseling given: Yes      PREVENTIVE SCREENINGS      Cardiovascular Screening:    General: Screening Not Indicated and History Lipid Disorder      Diabetes Screening:     General: Screening Current      Colorectal Cancer Screening:     General: Risks and Benefits Discussed    Due for: Cologuard      Prostate Cancer Screening:    General: Risks and Benefits Discussed    Due for: PSA      Osteoporosis Screening:    General: Screening Not Indicated      Abdominal Aortic Aneurysm (AAA) Screening:    Risk factors include: age between 73-67 yo and tobacco use        General: Screening Current      Lung Cancer Screening:     General: Screening Not Indicated      Hepatitis C Screening:    General: Risks and Benefits Discussed    Hep C Screening Accepted: Yes        Cecil Gallegos DO

## 2019-10-01 NOTE — PATIENT INSTRUCTIONS
Medicare Preventive Visit Patient Instructions  Thank you for completing your Welcome to Medicare Visit or Medicare Annual Wellness Visit today  Your next wellness visit will be due in one year (10/1/2020)  The screening/preventive services that you may require over the next 5-10 years are detailed below  Some tests may not apply to you based off risk factors and/or age  Screening tests ordered at today's visit but not completed yet may show as past due  Also, please note that scanned in results may not display below  Preventive Screenings:  Service Recommendations Previous Testing/Comments   Colorectal Cancer Screening  · Colonoscopy    · Fecal Occult Blood Test (FOBT)/Fecal Immunochemical Test (FIT)  · Fecal DNA/Cologuard Test  · Flexible Sigmoidoscopy Age: 54-65 years old   Colonoscopy: every 10 years (May be performed more frequently if at higher risk)  OR  FOBT/FIT: every 1 year  OR  Cologuard: every 3 years  OR  Sigmoidoscopy: every 5 years  Screening may be recommended earlier than age 48 if at higher risk for colorectal cancer  Also, an individualized decision between you and your healthcare provider will decide whether screening between the ages of 74-80 would be appropriate   Colonoscopy: Not on file  FOBT/FIT: Not on file  Cologuard: Not on file  Sigmoidoscopy: Not on file         Prostate Cancer Screening Individualized decision between patient and health care provider in men between ages of 53-78   Medicare will cover every 12 months beginning on the day after your 50th birthday PSA: No results in last 5 years          Hepatitis C Screening Once for adults born between [facility]  More frequently in patients at high risk for Hepatitis C Hep C Antibody: Not on file       Diabetes Screening 1-2 times per year if you're at risk for diabetes or have pre-diabetes Fasting glucose: No results in last 5 years   A1C: 5 8 %    Screening Current   Cholesterol Screening Once every 5 years if you don't have a lipid disorder  May order more often based on risk factors  Lipid panel: 04/17/2019    Screening Not Indicated  History Lipid Disorder      Other Preventive Screenings Covered by Medicare:  1  Abdominal Aortic Aneurysm (AAA) Screening: covered once if your at risk  You're considered to be at risk if you have a family history of AAA or a male between the age of 73-68 who smoking at least 100 cigarettes in your lifetime  2  Lung Cancer Screening: covers low dose CT scan once per year if you meet all of the following conditions: (1) Age 50-69; (2) No signs or symptoms of lung cancer; (3) Current smoker or have quit smoking within the last 15 years; (4) You have a tobacco smoking history of at least 30 pack years (packs per day x number of years you smoked); (5) You get a written order from a healthcare provider  3  Glaucoma Screening: covered annually if you're considered high risk: (1) You have diabetes OR (2) Family history of glaucoma OR (3)  aged 48 and older OR (3)  American aged 72 and older  3  Osteoporosis Screening: covered every 2 years if you meet one of the following conditions: (1) Have a vertebral abnormality; (2) On glucocorticoid therapy for more than 3 months; (3) Have primary hyperparathyroidism; (4) On osteoporosis medications and need to assess response to drug therapy  5  HIV Screening: covered annually if you're between the age of 12-76  Also covered annually if you are younger than 13 and older than 72 with risk factors for HIV infection  For pregnant patients, it is covered up to 3 times per pregnancy      Immunizations:  Immunization Recommendations   Influenza Vaccine Annual influenza vaccination during flu season is recommended for all persons aged >= 6 months who do not have contraindications   Pneumococcal Vaccine (Prevnar and Pneumovax)  * Prevnar = PCV13  * Pneumovax = PPSV23 Adults 25-60 years old: 1-3 doses may be recommended based on certain risk factors  Adults 72 years old: Prevnar (PCV13) vaccine recommended followed by Pneumovax (PPSV23) vaccine  If already received PPSV23 since turning 65, then PCV13 recommended at least one year after PPSV23 dose  Hepatitis B Vaccine 3 dose series if at intermediate or high risk (ex: diabetes, end stage renal disease, liver disease)   Tetanus (Td) Vaccine - COST NOT COVERED BY MEDICARE PART B Following completion of primary series, a booster dose should be given every 10 years to maintain immunity against tetanus  Td may also be given as tetanus wound prophylaxis  Tdap Vaccine - COST NOT COVERED BY MEDICARE PART B Recommended at least once for all adults  For pregnant patients, recommended with each pregnancy  Shingles Vaccine (Shingrix) - COST NOT COVERED BY MEDICARE PART B  2 shot series recommended in those aged 48 and above     Health Maintenance Due:      Topic Date Due    Hepatitis C Screening  1950    CRC Screening: Colonoscopy  1950     Immunizations Due:      Topic Date Due    DTaP,Tdap,and Td Vaccines (1 - Tdap) 12/17/1973    Pneumococcal Vaccine: 65+ Years (1 of 2 - PCV13) 12/17/2017    INFLUENZA VACCINE  07/01/2019     Advance Directives   What are advance directives? Advance directives are legal documents that state your wishes and plans for medical care  These plans are made ahead of time in case you lose your ability to make decisions for yourself  Advance directives can apply to any medical decision, such as the treatments you want, and if you want to donate organs  What are the types of advance directives? There are many types of advance directives, and each state has rules about how to use them  You may choose a combination of any of the following:  · Living will: This is a written record of the treatment you want  You can also choose which treatments you do not want, which to limit, and which to stop at a certain time   This includes surgery, medicine, IV fluid, and tube feedings  · Durable power of  for healthcare [facility]): This is a written record that states who you want to make healthcare choices for you when you are unable to make them for yourself  This person, called a proxy, is usually a family member or a friend  You may choose more than 1 proxy  · Do not resuscitate (DNR) order:  A DNR order is used in case your heart stops beating or you stop breathing  It is a request not to have certain forms of treatment, such as CPR  A DNR order may be included in other types of advance directives  · Medical directive: This covers the care that you want if you are in a coma, near death, or unable to make decisions for yourself  You can list the treatments you want for each condition  Treatment may include pain medicine, surgery, blood transfusions, dialysis, IV or tube feedings, and a ventilator (breathing machine)  · Values history: This document has questions about your views, beliefs, and how you feel and think about life  This information can help others choose the care that you would choose  Why are advance directives important? An advance directive helps you control your care  Although spoken wishes may be used, it is better to have your wishes written down  Spoken wishes can be misunderstood, or not followed  Treatments may be given even if you do not want them  An advance directive may make it easier for your family to make difficult choices about your care  Fall Prevention    Fall prevention  includes ways to make your home and other areas safer  It also includes ways you can move more carefully to prevent a fall  Health conditions that cause changes in your blood pressure, vision, or muscle strength and coordination may increase your risk for falls  Medicines may also increase your risk for falls if they make you dizzy, weak, or sleepy  Fall prevention tips:   · Stand or sit up slowly  · Use assistive devices as directed      · Wear shoes that fit well and have soles that   · Wear a personal alarm  · Stay active  · Manage your medical conditions  Home Safety Tips:  · Add items to prevent falls in the bathroom  · Keep paths clear  · Install bright lights in your home  · Keep items you use often on shelves within reach  · Paint or place reflective tape on the edges of your stairs  Cigarette Smoking and Your Health   Risks to your health if you smoke:  Nicotine and other chemicals found in tobacco damage every cell in your body  Even if you are a light smoker, you have an increased risk for cancer, heart disease, and lung disease  If you are pregnant or have diabetes, smoking increases your risk for complications  Benefits to your health if you stop smoking:   · You decrease respiratory symptoms such as coughing, wheezing, and shortness of breath  · You reduce your risk for cancers of the lung, mouth, throat, kidney, bladder, pancreas, stomach, and cervix  If you already have cancer, you increase the benefits of chemotherapy  You also reduce your risk for cancer returning or a second cancer from developing  · You reduce your risk for heart disease, blood clots, heart attack, and stroke  · You reduce your risk for lung infections, and diseases such as pneumonia, asthma, chronic bronchitis, and emphysema  · Your circulation improves  More oxygen can be delivered to your body  If you have diabetes, you lower your risk for complications, such as kidney, artery, and eye diseases  You also lower your risk for nerve damage  Nerve damage can lead to amputations, poor vision, and blindness  · You improve your body's ability to heal and to fight infections  For more information and support to stop smoking:   · [employer]  gov  Phone: 1- 270 - 381-9841  Web Address: www [employer]  Weight Management   Why it is important to manage your weight:  Being overweight increases your risk of health conditions such as heart disease, high blood pressure, type 2 diabetes, and certain types of cancer  It can also increase your risk for osteoarthritis, sleep apnea, and other respiratory problems  Aim for a slow, steady weight loss  Even a small amount of weight loss can lower your risk of health problems  How to lose weight safely:  A safe and healthy way to lose weight is to eat fewer calories and get regular exercise  You can lose up about 1 pound a week by decreasing the number of calories you eat by 500 calories each day  Healthy meal plan for weight management:  A healthy meal plan includes a variety of foods, contains fewer calories, and helps you stay healthy  A healthy meal plan includes the following:  · Eat whole-grain foods more often  A healthy meal plan should contain fiber  Fiber is the part of grains, fruits, and vegetables that is not broken down by your body  Whole-grain foods are healthy and provide extra fiber in your diet  Some examples of whole-grain foods are whole-wheat breads and pastas, oatmeal, brown rice, and bulgur  · Eat a variety of vegetables every day  Include dark, leafy greens such as spinach, kale, martín greens, and mustard greens  Eat yellow and orange vegetables such as carrots, sweet potatoes, and winter squash  · Eat a variety of fruits every day  Choose fresh or canned fruit (canned in its own juice or light syrup) instead of juice  Fruit juice has very little or no fiber  · Eat low-fat dairy foods  Drink fat-free (skim) milk or 1% milk  Eat fat-free yogurt and low-fat cottage cheese  Try low-fat cheeses such as mozzarella and other reduced-fat cheeses  · Choose meat and other protein foods that are low in fat  Choose beans or other legumes such as split peas or lentils  Choose fish, skinless poultry (chicken or turkey), or lean cuts of red meat (beef or pork)  Before you cook meat or poultry, cut off any visible fat  · Use less fat and oil  Try baking foods instead of frying them   Add less fat, such as margarine, sour cream, regular salad dressing and mayonnaise to foods  Eat fewer high-fat foods  Some examples of high-fat foods include french fries, doughnuts, ice cream, and cakes  · Eat fewer sweets  Limit foods and drinks that are high in sugar  This includes candy, cookies, regular soda, and sweetened drinks  Exercise:  Exercise at least 30 minutes per day on most days of the week  Some examples of exercise include walking, biking, dancing, and swimming  You can also fit in more physical activity by taking the stairs instead of the elevator or parking farther away from stores  Ask your healthcare provider about the best exercise plan for you  © Copyright [employer] 2018 Information is for End User's use only and may not be sold, redistributed or otherwise used for commercial purposes   All illustrations and images included in CareNotes® are the copyrighted property of [employer]  or [address] [place] [address]

## 2019-10-02 PROBLEM — E55.9 VITAMIN D DEFICIENCY: Status: ACTIVE | Noted: 2019-10-02

## 2019-10-03 RX ORDER — FOLIC ACID 1 MG/1
1 TABLET ORAL DAILY
Qty: 90 TABLET | Refills: 1 | Status: SHIPPED | OUTPATIENT
Start: 2019-10-03 | End: 2020-04-16 | Stop reason: SDUPTHER

## 2019-10-03 RX ORDER — METOPROLOL TARTRATE 50 MG/1
50 TABLET, FILM COATED ORAL DAILY
Qty: 90 TABLET | Refills: 1 | Status: SHIPPED | OUTPATIENT
Start: 2019-10-03 | End: 2020-02-11 | Stop reason: HOSPADM

## 2019-10-03 RX ORDER — FLUTICASONE PROPIONATE 50 MCG
2 SPRAY, SUSPENSION (ML) NASAL DAILY
Qty: 18.2 ML | Refills: 2 | Status: SHIPPED | OUTPATIENT
Start: 2019-10-03 | End: 2020-08-14 | Stop reason: SDUPTHER

## 2019-10-03 RX ORDER — FLUTICASONE FUROATE AND VILANTEROL 100; 25 UG/1; UG/1
1 POWDER RESPIRATORY (INHALATION) DAILY
Qty: 1 INHALER | Refills: 3 | Status: SHIPPED | OUTPATIENT
Start: 2019-10-03 | End: 2020-08-14 | Stop reason: SDUPTHER

## 2019-10-03 RX ORDER — AMOXICILLIN 250 MG
1 CAPSULE ORAL 2 TIMES DAILY
Qty: 180 TABLET | Refills: 1 | Status: SHIPPED | OUTPATIENT
Start: 2019-10-03 | End: 2020-02-11 | Stop reason: HOSPADM

## 2019-10-03 RX ORDER — LANOLIN ALCOHOL/MO/W.PET/CERES
100 CREAM (GRAM) TOPICAL DAILY
Qty: 90 TABLET | Refills: 1 | Status: SHIPPED | OUTPATIENT
Start: 2019-10-03 | End: 2020-11-03

## 2019-10-03 RX ORDER — ATORVASTATIN CALCIUM 40 MG/1
40 TABLET, FILM COATED ORAL DAILY
Qty: 90 TABLET | Refills: 1 | Status: SHIPPED | OUTPATIENT
Start: 2019-10-03 | End: 2019-10-28 | Stop reason: SDUPTHER

## 2019-10-03 RX ORDER — ASPIRIN 81 MG/1
81 TABLET ORAL DAILY
Qty: 90 TABLET | Refills: 1 | Status: SHIPPED | OUTPATIENT
Start: 2019-10-03 | End: 2020-02-11 | Stop reason: HOSPADM

## 2019-10-03 RX ORDER — ALBUTEROL SULFATE 90 UG/1
2 AEROSOL, METERED RESPIRATORY (INHALATION) EVERY 4 HOURS PRN
Qty: 1 INHALER | Refills: 2 | Status: SHIPPED | OUTPATIENT
Start: 2019-10-03 | End: 2020-02-11 | Stop reason: HOSPADM

## 2019-10-03 RX ORDER — TAMSULOSIN HYDROCHLORIDE 0.4 MG/1
0.4 CAPSULE ORAL
Qty: 90 CAPSULE | Refills: 1 | OUTPATIENT
Start: 2019-10-03

## 2019-10-03 RX ORDER — AMLODIPINE BESYLATE 10 MG/1
10 TABLET ORAL DAILY
Qty: 90 TABLET | Refills: 1 | Status: SHIPPED | OUTPATIENT
Start: 2019-10-03 | End: 2020-02-11 | Stop reason: HOSPADM

## 2019-10-14 ENCOUNTER — CONSULT (OUTPATIENT)
Dept: PAIN MEDICINE | Facility: CLINIC | Age: 69
End: 2019-10-14
Payer: MEDICARE

## 2019-10-14 VITALS
WEIGHT: 174 LBS | RESPIRATION RATE: 20 BRPM | HEIGHT: 65 IN | SYSTOLIC BLOOD PRESSURE: 128 MMHG | BODY MASS INDEX: 28.99 KG/M2 | DIASTOLIC BLOOD PRESSURE: 84 MMHG

## 2019-10-14 DIAGNOSIS — G89.29 CHRONIC PAIN OF LEFT ANKLE: ICD-10-CM

## 2019-10-14 DIAGNOSIS — M54.12 CERVICAL RADICULOPATHY: ICD-10-CM

## 2019-10-14 DIAGNOSIS — F10.20 UNCOMPLICATED ALCOHOL DEPENDENCE (HCC): Chronic | ICD-10-CM

## 2019-10-14 DIAGNOSIS — F19.11 HISTORY OF SUBSTANCE ABUSE (HCC): ICD-10-CM

## 2019-10-14 DIAGNOSIS — M54.89 BACK PAIN WITHOUT SCIATICA: ICD-10-CM

## 2019-10-14 DIAGNOSIS — M25.561 CHRONIC PAIN OF RIGHT KNEE: Primary | ICD-10-CM

## 2019-10-14 DIAGNOSIS — G89.29 CHRONIC PAIN OF RIGHT KNEE: Primary | ICD-10-CM

## 2019-10-14 DIAGNOSIS — F33.3 MAJOR DEPRESSIVE DISORDER, RECURRENT, SEVERE WITH PSYCHOTIC FEATURES (HCC): Chronic | ICD-10-CM

## 2019-10-14 DIAGNOSIS — M25.572 CHRONIC PAIN OF LEFT ANKLE: ICD-10-CM

## 2019-10-14 PROCEDURE — 99204 OFFICE O/P NEW MOD 45 MIN: CPT | Performed by: ANESTHESIOLOGY

## 2019-10-14 RX ORDER — GABAPENTIN 400 MG/1
400 CAPSULE ORAL 3 TIMES DAILY
Qty: 90 CAPSULE | Refills: 0 | Status: SHIPPED | OUTPATIENT
Start: 2019-10-14 | End: 2019-11-14 | Stop reason: SDUPTHER

## 2019-10-14 NOTE — PROGRESS NOTES
Assessment  1  Chronic pain of right knee    2  Uncomplicated alcohol dependence ([facility] )    3  History of substance abuse ([facility] )    4  Back pain without sciatica    5  Cervical radiculopathy        Plan  Patient is a 72-year-old male with complaints of left shoulder pain, bilateral hand pain, low back pain, bilateral leg pain with a history significant for CVA as a fall from 30 feet onto concrete 10 years ago presents office for initial consultation  Patient is currently taking gabapentin 300 milligrams p o  t i d  patient does have a history of a right elbow surgery on and also presents with medial epicondylitis with irritation of the ulnar nerve, possible entrapment in the left upper extremity  Patient does note significant right knee pain which most likely is arthritic condition we will have the obtain some diagnostic imaging to determine that  1   We will order x-ray of the cervical spine assess pathological causes for radiculopathy in the upper extremities  2  We will order x-ray of the low back assess pathological causes of low back pain  3  Order x-ray of the right knee to assess pathological cause of right knee pain  4  We will not prescribe opioids secondary patient's record of suicidal ideation on 05/01/2019 with a working diagnosis of major depressive disorder  Patient also smokes marijuana twice month  5  Will discontinue meloxicam secondary to lack of efficacy trial Voltaren 50 mg p o  T i d   6  Will increased gabapentin to 400 mg p o  T i d   7  Will get an x-ray of the left ankle secondary to patient we injuring his 5 left foot  8  Follow-up in 1 month                 [facility] report was reviewed and was appropriate       My impressions and treatment recommendations were discussed in detail with the patient who verbalized understanding and had no further questions  Discharge instructions were provided   I personally saw and examined the patient and I agree with the above discussed plan of care  No orders of the defined types were placed in this encounter  No orders of the defined types were placed in this encounter  History of Present Illness    Tad Montelongo is a 79 y o  male with complaints of bilateral arm pain, bilateral hand pain, low back pain, bilateral leg pain which patient reports started atraumatically presents today for initial consultation  Patient reports his pain is 8/10, constant without any typical time pattern  Patient describes his pain as burning, cramping, shooting, numbness, sharp, throbbing, pins and needles, dull/aching and denies any weakness or knee for gait assistance  Patient denies any bowel or bladder incontinence  The patient reports pain is increased by lying down, standing, bending, sitting, walking, relaxation, coughing/sneezing, bowel movements  Patient is only tried pharmaceutical management for pain control which entailed gabapentin to which at this time reports provides no relief  I have personally reviewed and/or updated the patient's past medical history, past surgical history, family history, social history, current medications, allergies, and vital signs today  Review of Systems   Constitutional: Positive for unexpected weight change  Negative for fever  HENT: Negative for trouble swallowing  Eyes: Negative for visual disturbance  Respiratory: Positive for shortness of breath and wheezing  Cardiovascular: Positive for chest pain  Negative for palpitations  Gastrointestinal: Positive for abdominal pain and nausea  Negative for constipation and diarrhea  Endocrine: Negative for cold intolerance, heat intolerance and polydipsia  Excessive thrist   Genitourinary: Negative for difficulty urinating and frequency  Musculoskeletal: Positive for arthralgias, joint swelling and myalgias  Negative for gait problem  Skin: Positive for wound  Negative for rash     Neurological: Positive for dizziness and headaches  Negative for seizures, syncope and weakness  Hematological: Does not bruise/bleed easily  Psychiatric/Behavioral: Positive for decreased concentration and dysphoric mood  Anxiety   Suicidal    All other systems reviewed and are negative  Patient Active Problem List   Diagnosis    Major depressive disorder, recurrent, severe with psychotic features ([facility] )    Essential hypertension    Uncomplicated alcohol dependence ([facility] )    History of substance abuse ([place] [id number] )    Encounter to establish care with new doctor    History of sustained ventricular tachycardia    Hypokinesia of left ventricle    Bilateral lower extremity edema    Tachycardia    History of transient ischemic attack (TIA)    Back pain without sciatica    History of cerebrovascular accident (CVA) with residual deficit    Elevated fasting glucose    Ambulatory dysfunction    Poor mobility    Overweight (BMI 25 0-29  9)    Vitamin D deficiency       Past Medical History:   Diagnosis Date    BPH (benign prostatic hyperplasia)     CVA (cerebral vascular accident) ([facility] )     Head injury     Hypertension     Metatarsal fracture     TIA (transient ischemic attack)        Past Surgical History:   Procedure Laterality Date    ANKLE SURGERY      BACK SURGERY      JOINT REPLACEMENT      KNEE SURGERY      LIVER SURGERY         No family history on file      Social History     Occupational History    Not on file   Tobacco Use    Smoking status: Current Every Day Smoker     Packs/day: 0 50     Types: Cigarettes    Smokeless tobacco: Never Used    Tobacco comment: started age 14, 3 quit attempts   Substance and Sexual Activity    Alcohol use: Yes     Frequency: Monthly or less    Drug use: Not Currently     Comment: history polysubstance use including IVDA on record    Sexual activity: Not on file       Current Outpatient Medications on File Prior to Visit   Medication Sig    albuterol (PROVENTIL HFA,VENTOLIN HFA) 90 mcg/act inhaler Inhale 2 puffs every 4 (four) hours as needed for wheezing    amLODIPine (NORVASC) 10 mg tablet Take 1 tablet (10 mg total) by mouth daily Hold for SBP <110    aspirin (ECOTRIN LOW STRENGTH) 81 mg EC tablet Take 1 tablet (81 mg total) by mouth daily    atorvastatin (LIPITOR) 40 mg tablet Take 1 tablet (40 mg total) by mouth daily    Cholecalciferol 1000 units tablet Take 1 tablet (1,000 Units total) by mouth daily    cyanocobalamin 1,000 mcg/mL Inject 1 mL (1,000 mcg total) into a muscle every 30 (thirty) days    cyclobenzaprine (FLEXERIL) 10 mg tablet Take 1 tablet (10 mg total) by mouth 3 (three) times a day as needed for muscle spasms    DULoxetine (CYMBALTA) 30 mg delayed release capsule Take 3 capsules (90 mg total) by mouth daily    fluticasone (FLONASE) 50 mcg/act nasal spray 2 sprays into each nostril daily    fluticasone-vilanterol (BREO ELLIPTA) 100-25 mcg/inh inhaler Inhale 1 puff daily Rinse mouth after use      folic acid (FOLVITE) 1 mg tablet Take 1 tablet (1 mg total) by mouth daily    gabapentin (NEURONTIN) 300 mg capsule Take 1 capsule (300 mg total) by mouth 3 (three) times a day    guaiFENesin (MUCINEX) 600 mg 12 hr tablet Take 600 mg by mouth every 12 (twelve) hours as needed for cough    lidocaine (LIDODERM) 5 % Apply 1 patch topically daily Remove & Discard patch within 12 hours or as directed by MD    meloxicam (MOBIC) 15 mg tablet Take 1 tablet (15 mg total) by mouth daily    menthol-zinc oxide (CALMOSEPTINE) 0 44-20 6 % OINT Apply topically 2 (two) times a day    metoprolol tartrate (LOPRESSOR) 50 mg tablet Take 1 tablet (50 mg total) by mouth daily    mirtazapine (REMERON) 15 mg tablet Take 1 tablet (15 mg total) by mouth daily at bedtime    nicotine (NICODERM CQ) 14 mg/24hr TD 24 hr patch Place 1 patch on the skin daily    QUEtiapine (SEROquel) 200 mg tablet Take 1 tablet (200 mg total) by mouth daily    QUEtiapine (SEROquel) 300 mg tablet Take 2 tablets (600 mg total) by mouth daily at bedtime    senna-docusate sodium (SENOKOT S) 8 6-50 mg per tablet Take 1 tablet by mouth 2 (two) times a day    tamsulosin (FLOMAX) 0 4 mg Take 1 capsule (0 4 mg total) by mouth daily with dinner    thiamine 100 MG tablet Take 1 tablet (100 mg total) by mouth daily    traZODone (DESYREL) 50 mg tablet Take 1 tablet (50 mg total) by mouth daily at bedtime as needed for sleep    Wound Dressings (COMFEEL PLUS ULCER DRESSING) PADS Apply 1 each topically every other day     No current facility-administered medications on file prior to visit  No Known Allergies    Physical Exam    /84 (BP Location: Left arm, Patient Position: Sitting, Cuff Size: Standard)   Resp 20   Ht 5' 5" (1 651 m)   Wt 78 9 kg (174 lb)   BMI 28 96 kg/m²     Constitutional: normal, well developed, well nourished, alert, in no distress and non-toxic and no overt pain behavior  Eyes: anicteric  HEENT: grossly intact  Neck: supple, symmetric, trachea midline and no masses   Pulmonary:even and unlabored  Cardiovascular:No edema or pitting edema present  Skin:Normal without rashes or lesions and well hydrated  Psychiatric:Mood and affect appropriate  Neurologic:Cranial Nerves II-XII grossly intact  Musculoskeletal:antalgic      Joint Exam  Tenderness to palpation of bilateral knees inferior and medial and inferior and lateral to the patellas  No ligamentous laxity or effusions noted of the knees  Cervical Spine examination demonstrates  Decreased ROM secondary to pain with lateral rotation to the left/right and bending to the left/right, in addition to neck flexion  4/5 upper extremity strength in all muscle groups bilaterally  Negative Spurling's maneuver to the b/l Ue, sensitivity to light touch intact b/l Ue  Lumbar/Sacral Spine examination demonstrates    Decreased range of motion lumbar spine with pain upon: flexion, lateral rotation to the left/right, and bending to the left/right  Bilateral lumbar paraspinals tender to palpation  Muscle spasms noted in the lumbar area bilaterally  3/5 lower extremity strength in all muscle groups bilaterally  Positive seated straight leg raise for bilateral lower extremities  Sensitivity to light touch intact bilateral lower extremities  4+ reflexes in the patella and Achilles    No ankle clonus     Imaging

## 2019-10-22 ENCOUNTER — TELEPHONE (OUTPATIENT)
Dept: FAMILY MEDICINE CLINIC | Facility: CLINIC | Age: 69
End: 2019-10-22

## 2019-10-22 NOTE — TELEPHONE ENCOUNTER
Breanna Escobedo from [address] called to report that patient has been falling a lot lately and doesn't want to go to the ER to get it check  Left ankle pain/swelling  Right ankle is more swollen

## 2019-10-23 ENCOUNTER — HOSPITAL ENCOUNTER (EMERGENCY)
Facility: HOSPITAL | Age: 69
Discharge: HOME | End: 2019-10-23
Attending: EMERGENCY MEDICINE | Admitting: EMERGENCY MEDICINE
Payer: MEDICARE

## 2019-10-23 ENCOUNTER — APPOINTMENT (EMERGENCY)
Dept: RADIOLOGY | Facility: HOSPITAL | Age: 69
End: 2019-10-23
Payer: MEDICARE

## 2019-10-23 VITALS
HEIGHT: 69 IN | TEMPERATURE: 98 F | BODY MASS INDEX: 25.18 KG/M2 | DIASTOLIC BLOOD PRESSURE: 70 MMHG | WEIGHT: 170 LBS | OXYGEN SATURATION: 96 % | RESPIRATION RATE: 16 BRPM | HEART RATE: 72 BPM | SYSTOLIC BLOOD PRESSURE: 150 MMHG

## 2019-10-23 DIAGNOSIS — M84.375A METATARSAL STRESS FRACTURE OF LEFT FOOT, INITIAL ENCOUNTER: Primary | ICD-10-CM

## 2019-10-23 LAB
ALBUMIN SERPL BCP-MCNC: 3.8 G/DL (ref 3.5–5.7)
ALP SERPL-CCNC: 38 U/L (ref 55–165)
ALT SERPL W P-5'-P-CCNC: 8 U/L (ref 7–52)
ANION GAP SERPL CALCULATED.3IONS-SCNC: 17 MMOL/L (ref 4–13)
APTT PPP: 29 SECONDS (ref 23–37)
AST SERPL W P-5'-P-CCNC: 35 U/L (ref 13–39)
BASOPHILS # BLD AUTO: 0 THOUSANDS/ΜL (ref 0–0.1)
BASOPHILS NFR BLD AUTO: 1 % (ref 0–2)
BILIRUB SERPL-MCNC: 0.9 MG/DL (ref 0.2–1)
BUN SERPL-MCNC: 10 MG/DL (ref 7–25)
CALCIUM SERPL-MCNC: 9.5 MG/DL (ref 8.6–10.5)
CHLORIDE SERPL-SCNC: 95 MMOL/L (ref 98–107)
CO2 SERPL-SCNC: 23 MMOL/L (ref 21–31)
CREAT SERPL-MCNC: 0.78 MG/DL (ref 0.7–1.3)
EOSINOPHIL # BLD AUTO: 0 THOUSAND/ΜL (ref 0–0.61)
EOSINOPHIL NFR BLD AUTO: 1 % (ref 0–5)
ERYTHROCYTE [DISTWIDTH] IN BLOOD BY AUTOMATED COUNT: 15 % (ref 11.5–14.5)
GFR SERPL CREATININE-BSD FRML MDRD: 94 ML/MIN/1.73SQ M
GLUCOSE SERPL-MCNC: 91 MG/DL (ref 65–99)
HCT VFR BLD AUTO: 41.4 % (ref 42–47)
HGB BLD-MCNC: 14.3 G/DL (ref 14–18)
INR PPP: 0.9 (ref 0.9–1.5)
LYMPHOCYTES # BLD AUTO: 2.8 THOUSANDS/ΜL (ref 0.6–4.47)
LYMPHOCYTES NFR BLD AUTO: 51 % (ref 21–51)
MCH RBC QN AUTO: 35.9 PG (ref 26–34)
MCHC RBC AUTO-ENTMCNC: 34.4 G/DL (ref 31–37)
MCV RBC AUTO: 104 FL (ref 81–99)
MONOCYTES # BLD AUTO: 0.8 THOUSAND/ΜL (ref 0.17–1.22)
MONOCYTES NFR BLD AUTO: 14 % (ref 2–12)
NEUTROPHILS # BLD AUTO: 1.9 THOUSANDS/ΜL (ref 1.4–6.5)
NEUTS SEG NFR BLD AUTO: 34 % (ref 42–75)
PLATELET # BLD AUTO: 224 THOUSANDS/UL (ref 149–390)
PMV BLD AUTO: 7.7 FL (ref 8.6–11.7)
POTASSIUM SERPL-SCNC: 3.2 MMOL/L (ref 3.5–5.5)
PROT SERPL-MCNC: 6.6 G/DL (ref 6.4–8.9)
PROTHROMBIN TIME: 10.4 SECONDS (ref 10.2–13)
RBC # BLD AUTO: 3.97 MILLION/UL (ref 4.3–5.9)
SODIUM SERPL-SCNC: 135 MMOL/L (ref 134–143)
URATE SERPL-MCNC: 8 MG/DL (ref 2.3–7.6)
WBC # BLD AUTO: 5.6 THOUSAND/UL (ref 4.8–10.8)

## 2019-10-23 PROCEDURE — 84550 ASSAY OF BLOOD/URIC ACID: CPT | Performed by: EMERGENCY MEDICINE

## 2019-10-23 PROCEDURE — 99284 EMERGENCY DEPT VISIT MOD MDM: CPT

## 2019-10-23 PROCEDURE — 80053 COMPREHEN METABOLIC PANEL: CPT | Performed by: EMERGENCY MEDICINE

## 2019-10-23 PROCEDURE — 85610 PROTHROMBIN TIME: CPT | Performed by: EMERGENCY MEDICINE

## 2019-10-23 PROCEDURE — 73600 X-RAY EXAM OF ANKLE: CPT

## 2019-10-23 PROCEDURE — 85730 THROMBOPLASTIN TIME PARTIAL: CPT | Performed by: EMERGENCY MEDICINE

## 2019-10-23 PROCEDURE — 85025 COMPLETE CBC W/AUTO DIFF WBC: CPT | Performed by: EMERGENCY MEDICINE

## 2019-10-23 PROCEDURE — 73620 X-RAY EXAM OF FOOT: CPT

## 2019-10-23 PROCEDURE — 36415 COLL VENOUS BLD VENIPUNCTURE: CPT | Performed by: EMERGENCY MEDICINE

## 2019-10-23 RX ORDER — TRAMADOL HYDROCHLORIDE 50 MG/1
50 TABLET ORAL EVERY 6 HOURS PRN
Qty: 24 TABLET | Refills: 0 | Status: SHIPPED | OUTPATIENT
Start: 2019-10-23 | End: 2019-10-29

## 2019-10-23 RX ORDER — POTASSIUM CHLORIDE 20 MEQ/1
20 TABLET, EXTENDED RELEASE ORAL ONCE
Status: DISCONTINUED | OUTPATIENT
Start: 2019-10-23 | End: 2019-10-23 | Stop reason: HOSPADM

## 2019-10-24 NOTE — ED PROVIDER NOTES
History  Chief Complaint   Patient presents with    Ankle Pain     left ankle pain  states fell but doesn't think he injured ankle when fell  PATIENT IS AN EXTREMELY VAGUE HISTORIAN  HE INDICATES HE HAS LEFT FOOT PAIN, GENERALLY POINTING TO THE DISTAL METATARSAL REGION WHERE HIS 2ND 3RD AND 4TH TOES ARE BLACK AND BLUE  HE HAS BEEN UNABLE TO BEAR WEIGHT CONSISTENTLY, AND UNABLE TO ATTEND PHYSICAL THERAPY  THE TOES ARE NOT PAINFUL INDICATED  HE IS UNAWARE OF PAPI THE PAIN OCCURRED  HE DID FALL A FEW DAYS AGO, WHICH SHE FREQUENTLY DOES BECAUSE HIS LEGS GAVE OUT  HE LANDED ON HIS LEFT HIP AND BUTTOCKS AT THAT TIME  HE DENIES ANY HIP OR BUTTOCK PAIN OR BACK PAIN PRESENTLY  HIS ONLY COMPLAINT IS PAIN OF THE LEFT FOOT PAIN AS WELL AS SWELLING ON THE DORSUM  HE DENIES CHEST PAIN  DENIES FEVER CHILLS  HE HAD OBVIOUS ANKLE SURGERY WITH A LARGE SCAR OVER THE MEDIAL MALLEOLUS  HE IS UNSURE OF THE EXACT DATE BUT IT WAS AT LEAST 10 YEARS AGO  Prior to Admission Medications   Prescriptions Last Dose Informant Patient Reported? Taking?    Cholecalciferol 1000 units tablet 10/23/2019 at Unknown time  No Yes   Sig: Take 1 tablet (1,000 Units total) by mouth daily   DULoxetine (CYMBALTA) 30 mg delayed release capsule 10/23/2019 at Unknown time  No Yes   Sig: Take 3 capsules (90 mg total) by mouth daily   QUEtiapine (SEROquel) 200 mg tablet 10/23/2019 at Unknown time  No Yes   Sig: Take 1 tablet (200 mg total) by mouth daily   QUEtiapine (SEROquel) 300 mg tablet 10/22/2019 at Unknown time  No Yes   Sig: Take 2 tablets (600 mg total) by mouth daily at bedtime   Wound Dressings (COMFEEL PLUS ULCER DRESSING) PADS More than a month at Unknown time  No No   Sig: Apply 1 each topically every other day   albuterol (PROVENTIL HFA,VENTOLIN HFA) 90 mcg/act inhaler 10/23/2019 at Unknown time  No Yes   Sig: Inhale 2 puffs every 4 (four) hours as needed for wheezing   amLODIPine (NORVASC) 10 mg tablet 10/23/2019 at Unknown time  No Yes   Sig: Take 1 tablet (10 mg total) by mouth daily Hold for SBP <110   aspirin (ECOTRIN LOW STRENGTH) 81 mg EC tablet Past Week at Unknown time  No Yes   Sig: Take 1 tablet (81 mg total) by mouth daily   atorvastatin (LIPITOR) 40 mg tablet 10/23/2019 at Unknown time  No Yes   Sig: Take 1 tablet (40 mg total) by mouth daily   cyanocobalamin 1,000 mcg/mL More than a month at Unknown time  No No   Sig: Inject 1 mL (1,000 mcg total) into a muscle every 30 (thirty) days   cyclobenzaprine (FLEXERIL) 10 mg tablet 10/23/2019 at Unknown time  No Yes   Sig: Take 1 tablet (10 mg total) by mouth 3 (three) times a day as needed for muscle spasms   diclofenac sodium (VOLTAREN) 50 mg EC tablet 10/23/2019 at Unknown time  No Yes   Sig: Take 1 tablet (50 mg total) by mouth 2 (two) times a day   fluticasone (FLONASE) 50 mcg/act nasal spray 10/23/2019 at Unknown time  No Yes   Si sprays into each nostril daily   fluticasone-vilanterol (BREO ELLIPTA) 100-25 mcg/inh inhaler More than a month at Unknown time  No No   Sig: Inhale 1 puff daily Rinse mouth after use     folic acid (FOLVITE) 1 mg tablet 10/23/2019 at Unknown time  No Yes   Sig: Take 1 tablet (1 mg total) by mouth daily   gabapentin (NEURONTIN) 400 mg capsule 10/23/2019 at Unknown time  No Yes   Sig: Take 1 capsule (400 mg total) by mouth 3 (three) times a day   guaiFENesin (MUCINEX) 600 mg 12 hr tablet Not Taking at Unknown time  Yes No   Sig: Take 600 mg by mouth every 12 (twelve) hours as needed for cough   lidocaine (LIDODERM) 5 % Not Taking at Unknown time  No No   Sig: Apply 1 patch topically daily Remove & Discard patch within 12 hours or as directed by MD   Patient not taking: Reported on 10/23/2019   menthol-zinc oxide (CALMOSEPTINE) 0 44-20 6 % OINT Past Month at Unknown time  No Yes   Sig: Apply topically 2 (two) times a day   metoprolol tartrate (LOPRESSOR) 50 mg tablet 10/23/2019 at Unknown time  No Yes   Sig: Take 1 tablet (50 mg total) by mouth daily   mirtazapine (REMERON) 15 mg tablet 10/22/2019 at Unknown time  No Yes   Sig: Take 1 tablet (15 mg total) by mouth daily at bedtime   nicotine (NICODERM CQ) 14 mg/24hr TD 24 hr patch Not Taking at Unknown time  No No   Sig: Place 1 patch on the skin daily   Patient not taking: Reported on 10/23/2019   senna-docusate sodium (SENOKOT S) 8 6-50 mg per tablet 10/23/2019 at Unknown time  No Yes   Sig: Take 1 tablet by mouth 2 (two) times a day   tamsulosin (FLOMAX) 0 4 mg 10/23/2019 at Unknown time  No Yes   Sig: Take 1 capsule (0 4 mg total) by mouth daily with dinner   thiamine 100 MG tablet 10/23/2019 at Unknown time  No Yes   Sig: Take 1 tablet (100 mg total) by mouth daily   traZODone (DESYREL) 50 mg tablet 10/22/2019 at Unknown time  No Yes   Sig: Take 1 tablet (50 mg total) by mouth daily at bedtime as needed for sleep      Facility-Administered Medications: None       Past Medical History:   Diagnosis Date    BPH (benign prostatic hyperplasia)     CVA [facility]cerebral vascular accident) ([facility] [address] )     Head injury     Hypertension     Metatarsal fracture     TIA (transient ischemic attack)        Past Surgical History:   Procedure Laterality Date    ABDOMINAL SURGERY      ANKLE SURGERY      BACK SURGERY      ELBOW SURGERY      JOINT REPLACEMENT      KNEE SURGERY      LIVER SURGERY         History reviewed  No pertinent family history  I have reviewed and agree with the history as documented  Social History     Tobacco Use    Smoking status: Current Every Day Smoker     Packs/day: 1 00     Types: Cigarettes    Smokeless tobacco: Never Used    Tobacco comment: started age 14, 4 quit attempts   Substance Use Topics    Alcohol use: Yes     Frequency: Monthly or less     Comment: drinks 1-2 glasses vodka daily     Drug use: Not Currently     Comment: history polysubstance use including IVDA on record        Review of Systems   Constitutional: Negative for chills and fever          PATIENT SMOKES 12 CIGARETTES DAILY HE STATES DOWN FROM A PACK A DAY  HE KNOWS HE HAS EMPHYSEMA  HE USED TO DRINK DAILY, FOR LAST SEVERAL MONTHS HE REPORTS DRINKING SELDOMLY  HENT: Negative  Respiratory: Negative  Cardiovascular: Negative  Musculoskeletal: Positive for arthralgias, gait problem and joint swelling  Negative for myalgias, neck pain and neck stiffness  Skin: Negative for rash and wound  Neurological: Positive for numbness  CHRONIC LOWER EXTREMITY NUMBNESS/NEUROPATHY  NO CHANGES       Physical Exam  Physical Exam   Constitutional: He is oriented to person, place, and time  PATIENT IS AMBULATORY WITH ASSISTANCE, NOT FULLY WEIGHT-BEARING ON HIS LEFT LOWER EXTREMITY  PATIENT IS NONTOXIC, BUT APPEARS SIGNIFICANTLY OLDER THAN HIS REPORTED AGE  HENT:   Head: Normocephalic and atraumatic  Mouth/Throat: Oropharynx is clear and moist    Eyes: Conjunctivae and EOM are normal    Neck: Normal range of motion  Cardiovascular: Normal rate, regular rhythm and normal heart sounds  Pulmonary/Chest: Effort normal  No stridor  No respiratory distress  He has no wheezes  MILD, SCATTERED RHONCHI DIFFUSELY   Musculoskeletal: He exhibits edema  THE LYMPH ANKLE RANGE OF MOTION IS LIMITED, LARGE OLD SCAR NOTED ON THE MEDIAL ASPECT, NO SIGNS OF CELLULITIS  ECCHYMOSIS ON THE DORSUM OF THE 2ND 3RD AND 4TH TOES ON THE LEFT  NONTENDER TO PALPATION  MILD TENDERNESS TO PALPATION OF THE DORSUM OF THE METATARSALS  NO CREPITUS  NO TENDERNESS OF THE ANKLE JOINT OR HEEL  THERE IS 1+ SYMMETRIC EDEMA OF THE LOWER EXTREMITIES  NO HOMANS SIGN  Neurological: He is alert and oriented to person, place, and time  No cranial nerve deficit  He exhibits normal muscle tone  Skin: Skin is warm  Capillary refill takes less than 2 seconds  No erythema  No pallor         Vital Signs  ED Triage Vitals   Temperature Pulse Respirations Blood Pressure SpO2   10/23/19 2000 10/23/19 2000 10/23/19 2000 10/23/19 2000 10/23/19 2000   98 1 °F (36 7 °C) 85 18 120/64 95 %      Temp Source Heart Rate Source Patient Position - Orthostatic VS BP Location FiO2 (%)   10/23/19 2000 10/23/19 2149 10/23/19 2154 10/23/19 2149 --   Temporal Monitor Lying Left arm       Pain Score       10/23/19 2000       7           Vitals:    10/23/19 2000 10/23/19 2149 10/23/19 2154   BP: 120/64 145/72 150/70   Pulse: 85 74 72   Patient Position - Orthostatic VS:   Lying         Visual Acuity      ED Medications  Medications   potassium chloride (K-DUR,KLOR-CON) CR tablet 20 mEq (20 mEq Oral Not Given 10/23/19 2155)       Diagnostic Studies  Results Reviewed     Procedure Component Value Units Date/Time    Uric acid [[id number]]  (Abnormal) Collected:  10/23/19 2104    Lab Status:  Final result Specimen:  Blood from Arm, Right Updated:  10/23/19 2146     Uric Acid 8 0 mg/dL     Protime-INR [[id number]]  (Normal) Collected:  10/23/19 2104    Lab Status:  Final result Specimen:  Blood from Arm, Right Updated:  10/23/19 2144     Protime 10 4 seconds      INR 0 90    APTT [[id number]]  (Normal) Collected:  10/23/19 2104    Lab Status:  Final result Specimen:  Blood from Arm, Right Updated:  10/23/19 2144     PTT 29 seconds     Comprehensive metabolic panel [[id number]]  (Abnormal) Collected:  10/23/19 2104    Lab Status:  Final result Specimen:  Blood from Arm, Right Updated:  10/23/19 2134     Sodium 135 mmol/L      Potassium 3 2 mmol/L      Chloride 95 mmol/L      CO2 23 mmol/L      ANION GAP 17 mmol/L      BUN 10 mg/dL      Creatinine 0 78 mg/dL      Glucose 91 mg/dL      Calcium 9 5 mg/dL      AST 35 U/L      ALT 8 U/L      Alkaline Phosphatase 38 U/L      Total Protein 6 6 g/dL      Albumin 3 8 g/dL      Total Bilirubin 0 90 mg/dL      eGFR 94 ml/min/1 73sq m     Narrative:       Kamila guidelines for Chronic Kidney Disease (CKD):     Stage 1 with normal or high GFR (GFR > 90 mL/min/1 73 square meters)    Stage 2 Mild CKD (GFR = 60-89 mL/min/1 73 square meters)    Stage 3A Moderate CKD (GFR = 45-59 mL/min/1 73 square meters)    Stage 3B Moderate CKD (GFR = 30-44 mL/min/1 73 square meters)    Stage 4 Severe CKD (GFR = 15-29 mL/min/1 73 square meters)    Stage 5 End Stage CKD (GFR <15 mL/min/1 73 square meters)  Note: GFR calculation is accurate only with a steady state creatinine    CBC and differential [[id number]]  (Abnormal) Collected:  10/23/19 2104    Lab Status:  Final result Specimen:  Blood from Arm, Right Updated:  10/23/19 2119     WBC 5 60 Thousand/uL      RBC 3 97 Million/uL      Hemoglobin 14 3 g/dL      Hematocrit 41 4 %       fL      MCH 35 9 pg      MCHC 34 4 g/dL      RDW 15 0 %      MPV 7 7 fL      Platelets 241 Thousands/uL      Neutrophils Relative 34 %      Lymphocytes Relative 51 %      Monocytes Relative 14 %      Eosinophils Relative 1 %      Basophils Relative 1 %      Neutrophils Absolute 1 90 Thousands/µL      Lymphocytes Absolute 2 80 Thousands/µL      Monocytes Absolute 0 80 Thousand/µL      Eosinophils Absolute 0 00 Thousand/µL      Basophils Absolute 0 00 Thousands/µL                  XR foot 2 views LEFT   ED Interpretation by Elmira Simmons DO (10/23 2123)   There are subtle fractures of the distal metatarsals of the 3rd and 4th metatarsals, nondisplaced  XR ankle 2 views LEFT    (Results Pending)              Procedures  Procedures       ED Course        EXAMINATION EVALUATION  X-RAY SHOWS EXTENSIVE CALLUS FORMATION OF THE ANKLE REGION  NO DISTINCT FRACTURE  CLOSE EXAMINATION OF THE X-RAY OF THE FOOT REVEALS SUBTLE FRACTURES OF THE 3RD AND 4TH DISTAL METATARSALS, NONDISPLACED  OF NOTE, SHORTLY AFTER INITIAL EXAM AND DRAWING LABORATORY STUDIES TO ENSURE THAT HIS PERIPHERAL EDEMA WAS NOT DUE TO SEVERE HYPOALBUMINEMIA, THE PATIENT REQUESTED DISCHARGE CLAIMING THAT HE AND HIS DAUGHTER [place] WHO CAME WITH HIM WERE TIRED  I INFORMED THE PATIENT OF THE FRACTURES AND THE PLAN FOR A BOOT    ALSO REVIEWED THE LABS WERE CHART GENERALLY UNREMARKABLE AND THE PATIENT WAS DISCHARGED HOME  SEE INSTRUCTIONS  ATTEMPT TO ELECTRONICALLY SEND THE PATIENT'S TRAMADOL PRESCRIPTION FAILED MULTIPLE TIMES, THEREFORE THE TRAMADOL PRESCRIPTION WAS PRINTED  MDM    Disposition  Final diagnoses:   Metatarsal stress fracture of left foot, initial encounter - Left Third and 4th distal metatarsal fractures, nondisplaced     Time reflects when diagnosis was documented in both MDM as applicable and the Disposition within this note     Time User Action Codes Description Comment    10/23/2019  9:42 PM Angie oLpez Add [V25 354E] Metatarsal stress fracture of left foot, initial encounter     10/23/2019  9:43 PM Angie Lopez Modify [Y80 [id number]] Metatarsal stress fracture of left foot, initial encounter Left Third and 4th distal metatarsal fractures, nondisplaced      ED Disposition     ED Disposition Condition Date/Time Comment    Discharge Stable Wed Oct 23, 2019  9:41 PM Meri Cleveland discharge to home/self care              Follow-up Information     Follow up With Specialties Details Why Sergio Ceferino 1903, DO Family Medicine Call in 1 day for orthopedic referral [address] [address]  [phone]            Discharge Medication List as of 10/23/2019  9:50 PM      CONTINUE these medications which have NOT CHANGED    Details   albuterol (PROVENTIL HFA,VENTOLIN HFA) 90 mcg/act inhaler Inhale 2 puffs every 4 (four) hours as needed for wheezing, Starting Thu 10/3/2019, Normal      amLODIPine (NORVASC) 10 mg tablet Take 1 tablet (10 mg total) by mouth daily Hold for SBP <110, Starting Thu 10/3/2019, Normal      aspirin (ECOTRIN LOW STRENGTH) 81 mg EC tablet Take 1 tablet (81 mg total) by mouth daily, Starting Thu 10/3/2019, Normal      atorvastatin (LIPITOR) 40 mg tablet Take 1 tablet (40 mg total) by mouth daily, Starting Thu 10/3/2019, Normal      Cholecalciferol 1000 units tablet Take 1 tablet (1,000 Units total) by mouth daily, Starting Thu 10/3/2019, Normal      cyclobenzaprine (FLEXERIL) 10 mg tablet Take 1 tablet (10 mg total) by mouth 3 (three) times a day as needed for muscle spasms, Starting Tue 5/14/2019, Print      diclofenac sodium (VOLTAREN) 50 mg EC tablet Take 1 tablet (50 mg total) by mouth 2 (two) times a day, Starting Mon 10/14/2019, Until Wed 11/13/2019, Normal      DULoxetine (CYMBALTA) 30 mg delayed release capsule Take 3 capsules (90 mg total) by mouth daily, Starting Wed 5/15/2019, Normal      fluticasone (FLONASE) 50 mcg/act nasal spray 2 sprays into each nostril daily, Starting Thu 55/7/9972, Normal      folic acid (FOLVITE) 1 mg tablet Take 1 tablet (1 mg total) by mouth daily, Starting Thu 10/3/2019, Normal      gabapentin (NEURONTIN) 400 mg capsule Take 1 capsule (400 mg total) by mouth 3 (three) times a day, Starting Mon 10/14/2019, Until Wed 11/13/2019, Normal      menthol-zinc oxide (CALMOSEPTINE) 0 44-20 6 % OINT Apply topically 2 (two) times a day, Starting Tue 10/1/2019, Normal      metoprolol tartrate (LOPRESSOR) 50 mg tablet Take 1 tablet (50 mg total) by mouth daily, Starting Thu 10/3/2019, Normal      mirtazapine (REMERON) 15 mg tablet Take 1 tablet (15 mg total) by mouth daily at bedtime, Starting Wed 5/15/2019, Normal      !! QUEtiapine (SEROquel) 200 mg tablet Take 1 tablet (200 mg total) by mouth daily, Starting Wed 5/15/2019, Normal      !!  QUEtiapine (SEROquel) 300 mg tablet Take 2 tablets (600 mg total) by mouth daily at bedtime, Starting Tue 5/14/2019, Normal      senna-docusate sodium (SENOKOT S) 8 6-50 mg per tablet Take 1 tablet by mouth 2 (two) times a day, Starting Thu 10/3/2019, Normal      tamsulosin (FLOMAX) 0 4 mg Take 1 capsule (0 4 mg total) by mouth daily with dinner, Starting Tue 10/1/2019, Print      thiamine 100 MG tablet Take 1 tablet (100 mg total) by mouth daily, Starting Thu 10/3/2019, Normal      traZODone (DESYREL) 50 mg tablet Take 1 tablet (50 mg total) by mouth daily at bedtime as needed for sleep, Starting Tue 5/14/2019, Normal      cyanocobalamin 1,000 mcg/mL Inject 1 mL (1,000 mcg total) into a muscle every 30 (thirty) days, Starting Mon 6/10/2019, Print      fluticasone-vilanterol (BREO ELLIPTA) 100-25 mcg/inh inhaler Inhale 1 puff daily Rinse mouth after use , Starting Thu 10/3/2019, Normal      guaiFENesin (MUCINEX) 600 mg 12 hr tablet Take 600 mg by mouth every 12 (twelve) hours as needed for cough, Historical Med      lidocaine (LIDODERM) 5 % Apply 1 patch topically daily Remove & Discard patch within 12 hours or as directed by MD, Starting Wed 5/15/2019, Print      nicotine (NICODERM CQ) 14 mg/24hr TD 24 hr patch Place 1 patch on the skin daily, Starting Wed 5/15/2019, Print      Wound Dressings (COMFEEL PLUS ULCER DRESSING) PADS Apply 1 each topically every other day, Starting Tue 10/1/2019, Normal       !! - Potential duplicate medications found  Please discuss with provider  No discharge procedures on file      ED Provider  Electronically Signed by           Elmira Simmons DO  10/23/19 [id number]

## 2019-10-25 DIAGNOSIS — M84.375A STRESS FRACTURE OF METATARSAL BONE OF LEFT FOOT, INITIAL ENCOUNTER: ICD-10-CM

## 2019-10-25 DIAGNOSIS — M84.375A METATARSAL STRESS FRACTURE OF LEFT FOOT, INITIAL ENCOUNTER: Primary | ICD-10-CM

## 2019-10-25 NOTE — TELEPHONE ENCOUNTER
Not sure if insurance will allow from our office if problem wasn't assessed here, but I put in order to try

## 2019-10-25 NOTE — TELEPHONE ENCOUNTER
Jonel Coleman from [facility] called asking for a referral to orthopedics for the patient, after his ER visit  She does not feel it should wait until the patient sees you on 11/20/19  Please advise  Thank you  Jonel Coleman can be reached at [phone]

## 2019-10-25 NOTE — TELEPHONE ENCOUNTER
I called patient  He agreed to set up an appt- He is pending an appt with Dr Leonarda Bedolla on 11/20  Patient did go to the ER on 10/23- he is wearing a boot and has some fractured toes

## 2019-10-25 NOTE — TELEPHONE ENCOUNTER
Patient should have an appt with me next month, but all I see in his chart for next appt here is his next AWV 10/2020- please schedule f/u appt

## 2019-10-28 DIAGNOSIS — E78.5 HYPERLIPIDEMIA: ICD-10-CM

## 2019-10-28 DIAGNOSIS — Z86.73 HISTORY OF TRANSIENT ISCHEMIC ATTACK (TIA): ICD-10-CM

## 2019-10-30 RX ORDER — ATORVASTATIN CALCIUM 40 MG/1
TABLET, FILM COATED ORAL
Qty: 30 TABLET | Refills: 0 | Status: SHIPPED | OUTPATIENT
Start: 2019-10-30 | End: 2020-04-16 | Stop reason: SDUPTHER

## 2019-11-11 ENCOUNTER — TELEPHONE (OUTPATIENT)
Dept: FAMILY MEDICINE CLINIC | Facility: CLINIC | Age: 69
End: 2019-11-11

## 2019-11-11 NOTE — TELEPHONE ENCOUNTER
Pt called in and lm on refill vm asking for all meds to be refilled with no pharm given  After researching chart pt has a long med list  I called pt back and explained to him that I will need to know the exact name and dosage info for us to be able to refill  The pt stated " ugh, really, I'll have to call you back later " and then proceeded to hang up

## 2019-11-13 ENCOUNTER — TELEPHONE (OUTPATIENT)
Dept: FAMILY MEDICINE CLINIC | Facility: CLINIC | Age: 69
End: 2019-11-13

## 2019-11-13 NOTE — TELEPHONE ENCOUNTER
Charity from [facility] called our office to let Dr Burgess Mancia know that his /90 at rest and has dizziness  This pt has never been seen with cardiology  Please make Dr Ella Ferguson aware of his BP and symptom        Thank you

## 2019-11-13 NOTE — TELEPHONE ENCOUNTER
Charity from [facility] called to look for patient's cardiologist     I gave Quintin Bright Dr Maryuri Keating info

## 2019-11-14 DIAGNOSIS — F33.3 MAJOR DEPRESSIVE DISORDER, RECURRENT, SEVERE WITH PSYCHOTIC FEATURES (HCC): Chronic | ICD-10-CM

## 2019-11-14 RX ORDER — GABAPENTIN 400 MG/1
CAPSULE ORAL
Qty: 90 CAPSULE | Refills: 0 | Status: SHIPPED | OUTPATIENT
Start: 2019-11-14 | End: 2020-02-11 | Stop reason: HOSPADM

## 2019-11-14 NOTE — TELEPHONE ENCOUNTER
Pt called in and asked again for refills  I explained to pt that he has a very large med list and most have refills from the beginning of October, so I would need to know the name and dose from the bottle  Pt expressed he threw the bottles away, but he needs all meds  I explained again to pt that I can not just send meds over to the pharm, without knowing what the medications is  Pt daughter Joseph Corley got on the phone and I explained to her that most meds have refills on them and I needs to know that name of the medications before sending a refill to the pharmacy  Jorge Schroeder stated she will just call [facility] and talk to them  I explained that if she can do that, that would be great  Jose J Gloria also expressed pt has PT coming in today and they have a med list on them  I explained again that I have his med list and I know what meds he is on, but I need to know the name of the medications to double check to make sure there is not already a script at the pharm  Jorge Schroeder stated she will have the PT call us when she gets there  I explained they can call but I might not be available to talk to right away  Jose J Gloria understood and hung up

## 2019-11-14 NOTE — TELEPHONE ENCOUNTER
Renae Garcia from [facility] called while with pt  And explained the pt and daughter in law stated I wanted to go over the med list with her  I explained that is not the case, but I needed to know what medications he needed refills on  Renae Garcia was confused also so I explained that we sent over a lot of meds on 10/1/19 and 10/3/19 to [facility] for a 6 mo supply, so I was questioning if they picked up the 90 day supplies or if it was on hold at the [facility]  Renae Garcia had pt get her a bottle of his medication  Renae Garcia read ot me his Vit D bottle and that it was almost out  I asked Renae Garcia to look to see if there was refills, she said there was 120 tablets left till 10/2020  I explained to Renae Garcia that he needs to read the bottles to see if there are refills  I also explained that we sent over a 6 mo supply of the medications, but, depending on the insurance he can only get a 30 day supply at a time  Renae Garcia was understanding and explained to the pt he can not throw out his pill bottles without reading them first  I explained to kai that the pt will need to check ALL pill bottles and then call [facility] for refill when needed  Renae Garcia understood and explained to pt  All was okay and we hung up

## 2019-11-14 NOTE — TELEPHONE ENCOUNTER
Patient is aware of below info regarding his high BP  He then went into conversation about his prescription refills, he stated he threw the bottles away and he needs all them filled, stated someone called him to tell him we needed the names

## 2019-11-14 NOTE — TELEPHONE ENCOUNTER
Pt was supposed to have set up appt with cardiology- order was given in August  He doesn't have appt to see me until next week - should [facility] or ER for eval if still symptomatic

## 2019-11-19 ENCOUNTER — TELEPHONE (OUTPATIENT)
Dept: FAMILY MEDICINE CLINIC | Facility: CLINIC | Age: 69
End: 2019-11-19

## 2019-11-19 NOTE — TELEPHONE ENCOUNTER
Radha Mcdaniel told him to go and he will not go   He stated he is coming here for his appt tomorrow and no where else

## 2019-11-19 NOTE — TELEPHONE ENCOUNTER
Just please make sure pt is aware that I will likely send him to ER from the office tomorrow, depending on exam findings

## 2019-11-19 NOTE — TELEPHONE ENCOUNTER
Aicha Zepeda with [facility], called to advise that the patient fell on his left side yesterday and it took about 15 minutes for him to get up  Patient reports pain in left hip area  Breanna Escobedo continued that the patient is non-compliant with wearing boot for left toe injury as it bothers him too much and makes him dizzy  Patient has appointment tomorrow 11/20/19 with you and feels he can make it in  Thank you

## 2019-11-20 ENCOUNTER — OFFICE VISIT (OUTPATIENT)
Dept: FAMILY MEDICINE CLINIC | Facility: CLINIC | Age: 69
End: 2019-11-20
Payer: MEDICARE

## 2019-11-20 VITALS
DIASTOLIC BLOOD PRESSURE: 79 MMHG | OXYGEN SATURATION: 94 % | WEIGHT: 168 LBS | HEART RATE: 121 BPM | SYSTOLIC BLOOD PRESSURE: 120 MMHG | BODY MASS INDEX: 25.17 KG/M2 | RESPIRATION RATE: 17 BRPM | TEMPERATURE: 96.6 F

## 2019-11-20 DIAGNOSIS — R00.0 TACHYCARDIA: ICD-10-CM

## 2019-11-20 DIAGNOSIS — F33.3 MAJOR DEPRESSIVE DISORDER, RECURRENT, SEVERE WITH PSYCHOTIC FEATURES (HCC): Primary | Chronic | ICD-10-CM

## 2019-11-20 DIAGNOSIS — Z12.11 SCREENING FOR COLON CANCER: ICD-10-CM

## 2019-11-20 DIAGNOSIS — I51.89 HYPOKINESIA OF LEFT VENTRICLE: ICD-10-CM

## 2019-11-20 DIAGNOSIS — I10 ESSENTIAL HYPERTENSION: ICD-10-CM

## 2019-11-20 PROCEDURE — 99214 OFFICE O/P EST MOD 30 MIN: CPT | Performed by: FAMILY MEDICINE

## 2019-11-20 RX ORDER — TRAZODONE HYDROCHLORIDE 50 MG/1
50 TABLET ORAL
Qty: 30 TABLET | Refills: 1 | Status: SHIPPED | OUTPATIENT
Start: 2019-11-20 | End: 2020-08-14 | Stop reason: SDUPTHER

## 2019-11-20 NOTE — PROGRESS NOTES
Assessment/Plan:         Diagnoses and all orders for this visit:    Screening for colon cancer  -     Occult Blood, Fecal Immunochemical; Future    Major depressive disorder, recurrent, severe with psychotic features ([facility] [address] )  -     traZODone (DESYREL) 50 mg tablet; Take 1 tablet (50 mg total) by mouth daily at bedtime as needed for sleep    Essential hypertension  -     Ambulatory referral to Cardiology; Future  -     Basic metabolic panel; Future    Tachycardia  -     Ambulatory referral to Cardiology; Future    Hypokinesia of left ventricle  -     Ambulatory referral to Cardiology; Future          Subjective:   Chief Complaint   Patient presents with    Follow-up     never went to ER- ddi not have labs done- wants trazadone and muscle relaxer         Patient ID: Manny Mott is a 79 y o  male  Didn't hurt myself at all, not ni pain, I mean I'm always in pain, but that aint from falling (yesterday), from falling off the roof"  I lost my trazodone and the other one, just refilled it, don't know where they went  Why am I here?  leg swelling better wearing lycra socks  Lost another couple of pounds  Laughs today for the first time      The following portions of the patient's history were reviewed and updated as appropriate: allergies, current medications, past family history, past medical history, past social history, past surgical history and problem list     Review of Systems   HENT: Negative for mouth sores, nosebleeds, sore throat and trouble swallowing  Eyes: Negative  Respiratory: Negative for apnea, choking, chest tightness and stridor  Cardiovascular: Negative for chest pain, palpitations and leg swelling (controlled very well with the mens dress socks with Lycra)  Gastrointestinal: Negative  Endocrine: Negative  Skin: Negative  Neurological: Negative for dizziness, tremors, seizures, syncope, facial asymmetry, light-headedness and headaches     Psychiatric/Behavioral: Negative for agitation, behavioral problems, dysphoric mood, hallucinations, sleep disturbance and suicidal ideas  The patient is not nervous/anxious and is not hyperactive  Objective:      /79 (BP Location: Left arm, Patient Position: Sitting, Cuff Size: Standard)   Pulse (!) 121   Temp (!) 96 6 °F (35 9 °C)   Resp 17   Wt 76 2 kg (168 lb)   SpO2 94%   BMI 25 17 kg/m²          Physical Exam   Constitutional: He is oriented to person, place, and time  He appears well-developed  He is cooperative  Non-toxic appearance  He has a sickly appearance  No distress  HENT:   Head: Normocephalic and atraumatic  Mouth/Throat: Uvula is midline, oropharynx is clear and moist and mucous membranes are normal    Eyes: Pupils are equal, round, and reactive to light  Conjunctivae and lids are normal    Neck: Trachea normal  Neck supple  No JVD present  No thyroid mass and no thyromegaly present  Cardiovascular: Regular rhythm, S1 normal and S2 normal  Tachycardia present  Exam reveals decreased pulses  Exam reveals no gallop and no friction rub  Pulmonary/Chest: Effort normal  No stridor  He has decreased breath sounds in the right lower field and the left lower field  He has no wheezes  He has no rhonchi  He has no rales  Abdominal: Soft  He exhibits no distension, no abdominal bruit and no mass  Bowel sounds are decreased  There is no hepatosplenomegaly  There is no tenderness  Lymphadenopathy:     He has no cervical adenopathy  Right: No supraclavicular adenopathy present  Left: No supraclavicular adenopathy present  Neurological: He is alert and oriented to person, place, and time  He displays no atrophy and no tremor  He displays no seizure activity  Skin: Skin is warm and dry  He is not diaphoretic  No cyanosis  No pallor  Psychiatric: His behavior is normal  His mood appears not anxious  His affect is not angry, not labile and not inappropriate  He does not exhibit a depressed mood  Nursing note and vitals reviewed

## 2019-11-20 NOTE — TELEPHONE ENCOUNTER
I read below info to [place] upon checking in of the patient       She stated patient did not get a missed call, we confirmed his phone number and she stated she would like to be called for everything in the future as patient doesn't always answer his phone number

## 2019-12-11 ENCOUNTER — OFFICE VISIT (OUTPATIENT)
Dept: CARDIOLOGY CLINIC | Facility: CLINIC | Age: 69
End: 2019-12-11
Payer: MEDICARE

## 2019-12-11 VITALS
SYSTOLIC BLOOD PRESSURE: 110 MMHG | HEIGHT: 69 IN | BODY MASS INDEX: 25.17 KG/M2 | HEART RATE: 90 BPM | DIASTOLIC BLOOD PRESSURE: 78 MMHG

## 2019-12-11 DIAGNOSIS — R93.1 ABNORMAL ECHOCARDIOGRAM: Primary | ICD-10-CM

## 2019-12-11 DIAGNOSIS — I10 ESSENTIAL HYPERTENSION: ICD-10-CM

## 2019-12-11 DIAGNOSIS — I51.89 HYPOKINESIA OF LEFT VENTRICLE: ICD-10-CM

## 2019-12-11 DIAGNOSIS — R00.0 TACHYCARDIA: ICD-10-CM

## 2019-12-11 PROCEDURE — 99203 OFFICE O/P NEW LOW 30 MIN: CPT | Performed by: INTERNAL MEDICINE

## 2019-12-11 PROCEDURE — 93000 ELECTROCARDIOGRAM COMPLETE: CPT | Performed by: INTERNAL MEDICINE

## 2019-12-11 NOTE — PROGRESS NOTES
Consultation - Cardiology   Manny Mott 79 y o  male MRN: [id number]  Unit/Bed#:  Encounter: [id number]  Physician Requesting Consult: No att  providers found  Reason for Consult / Principal Problem: cardiac evaluation    Assessment:  1  Abnl echocardiogram  2  HTN  3  Tachycardia    Plan:  I do not feel that he is having any cardiac symptoms   His echo is unremarkable  I suspect his tachycardia is secondary to agitation, substance abuse, smoking  I do not recommend any further cardiac testing or medications  I will see him back on an as needed basis  History of Present Illness     HPI: Manny Mott is a 79y o  year old male is seen for cardiac evaluation  He is not sure why he is here  He denies any cardiac history  He has rare atypical CP  He admits to smoking 1/2 packs a day and to drinking " a little bit ' of alcohol  He does have alcohol on his breath  He is on BP and cholesterol medications  On review of his chart, he was referred for an abnormal echo and tachycardia  He has a history of substance abuse, alcohol dependence, tobacco abuse, depressive disorder with psychotic features  He has many psychiatric notes in his chart which I did not  Review  BP today 110/78  HR 90 BPM   ECG  - NSR , normal ECG  I reviewed his echo personally from 5/2/2019  This was a technically difficult study with poor endocardial definition  His EF is normal by my interpretation and there are no wall motion abnormalities            Review of Systems:    Alert awake oriented, comfortable, denies any complaints  No fevers chills nausea vomiting  No weakness, dizziness, seizures  no cough, shortness of breath, or wheezing  Denies any palpitations, chest pain, diaphoresis  Denies leg edema, pain or paresthesias  Denies any skin rashes  Denies abdominal pain, bloody stools, masses  Denies any depression or suicidal ideations      Historical Information   Past Medical History:   Diagnosis Date    BPH (benign prostatic hyperplasia)     CVA (cerebral vascular accident) ([facility] [address] )     Head injury     Hypertension     Metatarsal fracture     TIA (transient ischemic attack)      Past Surgical History:   Procedure Laterality Date    ABDOMINAL SURGERY      ANKLE SURGERY      BACK SURGERY      ELBOW SURGERY      JOINT REPLACEMENT      KNEE SURGERY      LIVER SURGERY       Social History     Substance and Sexual Activity   Alcohol Use Yes    Frequency: Monthly or less    Comment: drinks 1-2 glasses vodka daily      Social History     Substance and Sexual Activity   Drug Use Yes    Types: Marijuana    Comment: history polysubstance use including IVDA on record- every now then will smoke weed      Social History     Tobacco Use   Smoking Status Current Every Day Smoker    Packs/day: 1 00    Types: Cigarettes   Smokeless Tobacco Never Used   Tobacco Comment    started age 14, 3 quit attempts     Family History: non-contributory    Meds/Allergies   all current active meds have been reviewed  No Known Allergies    Objective   Vitals: Blood pressure 110/78, pulse 90, height 5' 8 5" (1 74 m)  , Body mass index is 25 17 kg/m²  ,   Weight (last 2 days)     Date/Time   Weight    12/11/19 1425   -- not available    Weight: not available at 12/11/19 1425                        Physical Exam:  GEN: Manuel Gonzales appears well, alert and oriented x 3, pleasant and cooperative   HEENT: pupils equal, round, and reactive to light; extraocular muscles intact  NECK: supple, no carotid bruits   HEART: regular rhythm, normal S1 and S2, no murmurs, clicks, gallops or rubs   LUNGS: clear to auscultation bilaterally; no wheezes, rales, or rhonchi   ABDOMEN: normal bowel sounds, soft, no tenderness, no distention  EXTREMITIES: peripheral pulses normal; no clubbing, cyanosis, or edema  NEURO: no focal findings   SKIN: normal without suspicious lesions on exposed skin    Lab Results:   Office Visit on 12/11/2019   Component Date Value Ref Range Status    INTERPRETATIONS 12/11/2019    Final    NSR  Normal ECG  HR 90 BPM                        Imaging: I have personally reviewed pertinent reports

## 2019-12-12 ENCOUNTER — TELEPHONE (OUTPATIENT)
Dept: FAMILY MEDICINE CLINIC | Facility: CLINIC | Age: 69
End: 2019-12-12

## 2019-12-12 NOTE — TELEPHONE ENCOUNTER
Pia called asking if you would please order the patient a rolling walker  We can fax it to [facility]  Thank you

## 2019-12-13 DIAGNOSIS — R60.0 BILATERAL LOWER EXTREMITY EDEMA: ICD-10-CM

## 2019-12-13 DIAGNOSIS — M54.89 BACK PAIN WITHOUT SCIATICA: ICD-10-CM

## 2019-12-13 DIAGNOSIS — R26.2 AMBULATORY DYSFUNCTION: Primary | ICD-10-CM

## 2019-12-13 DIAGNOSIS — I69.30 HISTORY OF CEREBROVASCULAR ACCIDENT (CVA) WITH RESIDUAL DEFICIT: ICD-10-CM

## 2019-12-24 ENCOUNTER — TELEPHONE (OUTPATIENT)
Dept: FAMILY MEDICINE CLINIC | Facility: CLINIC | Age: 69
End: 2019-12-24

## 2019-12-24 NOTE — TELEPHONE ENCOUNTER
Charity, a Physical Therapist, with [address]   [address], called to advise that the patient has been discharged from their care as goals were met  Thank you

## 2020-01-08 ENCOUNTER — APPOINTMENT (EMERGENCY)
Dept: RADIOLOGY | Facility: HOSPITAL | Age: 70
DRG: 326 | End: 2020-01-08
Payer: MEDICARE

## 2020-01-08 ENCOUNTER — HOSPITAL ENCOUNTER (INPATIENT)
Facility: HOSPITAL | Age: 70
LOS: 33 days | Discharge: NURSING, REHAB OR LONG-TERM CARE | DRG: 326 | End: 2020-02-11
Attending: EMERGENCY MEDICINE | Admitting: INTERNAL MEDICINE
Payer: MEDICARE

## 2020-01-08 DIAGNOSIS — R07.9 CHEST PAIN: ICD-10-CM

## 2020-01-08 DIAGNOSIS — G89.29 OTHER CHRONIC PAIN: ICD-10-CM

## 2020-01-08 DIAGNOSIS — R55 SYNCOPE: Primary | ICD-10-CM

## 2020-01-08 DIAGNOSIS — Z74.09 POOR MOBILITY: ICD-10-CM

## 2020-01-08 DIAGNOSIS — D62 ACUTE BLOOD LOSS ANEMIA: ICD-10-CM

## 2020-01-08 DIAGNOSIS — R57.8 HEMORRHAGIC SHOCK (HCC): ICD-10-CM

## 2020-01-08 DIAGNOSIS — R45.851 SUICIDAL IDEATION: ICD-10-CM

## 2020-01-08 DIAGNOSIS — B35.1 ONYCHOMYCOSIS: ICD-10-CM

## 2020-01-08 DIAGNOSIS — Z78.9 DIFFICULT INTRAVENOUS ACCESS: ICD-10-CM

## 2020-01-08 DIAGNOSIS — I10 ESSENTIAL HYPERTENSION: ICD-10-CM

## 2020-01-08 DIAGNOSIS — K26.9 DUODENAL ULCER: ICD-10-CM

## 2020-01-08 DIAGNOSIS — K92.1 GASTROINTESTINAL HEMORRHAGE WITH MELENA: ICD-10-CM

## 2020-01-08 DIAGNOSIS — F10.10 ALCOHOL ABUSE: ICD-10-CM

## 2020-01-08 DIAGNOSIS — R47.81 SLURRED SPEECH: ICD-10-CM

## 2020-01-08 DIAGNOSIS — I63.9 CVA (CEREBRAL VASCULAR ACCIDENT) (HCC): ICD-10-CM

## 2020-01-08 PROBLEM — D64.9 ANEMIA: Status: ACTIVE | Noted: 2020-01-08

## 2020-01-08 PROBLEM — Z86.73 HISTORY OF CVA (CEREBROVASCULAR ACCIDENT): Status: ACTIVE | Noted: 2020-01-08

## 2020-01-08 PROBLEM — E78.5 DYSLIPIDEMIA: Status: ACTIVE | Noted: 2020-01-08

## 2020-01-08 PROBLEM — J44.9 COPD WITHOUT EXACERBATION (HCC): Status: ACTIVE | Noted: 2020-01-08

## 2020-01-08 PROBLEM — F32.9 MDD (MAJOR DEPRESSIVE DISORDER): Status: ACTIVE | Noted: 2020-01-08

## 2020-01-08 PROBLEM — Z72.0 TOBACCO ABUSE: Status: ACTIVE | Noted: 2020-01-08

## 2020-01-08 LAB
ALBUMIN SERPL BCP-MCNC: 2.4 G/DL (ref 3.5–5)
ALP SERPL-CCNC: 64 U/L (ref 46–116)
ALT SERPL W P-5'-P-CCNC: 9 U/L (ref 12–78)
ANION GAP SERPL CALCULATED.3IONS-SCNC: 13 MMOL/L (ref 4–13)
AST SERPL W P-5'-P-CCNC: 19 U/L (ref 5–45)
ATRIAL RATE: 115 BPM
BASOPHILS # BLD AUTO: 0.01 THOUSANDS/ΜL (ref 0–0.1)
BASOPHILS NFR BLD AUTO: 0 % (ref 0–1)
BILIRUB SERPL-MCNC: 0.37 MG/DL (ref 0.2–1)
BUN SERPL-MCNC: 19 MG/DL (ref 5–25)
CALCIUM SERPL-MCNC: 8.5 MG/DL (ref 8.3–10.1)
CHLORIDE SERPL-SCNC: 105 MMOL/L (ref 100–108)
CO2 SERPL-SCNC: 17 MMOL/L (ref 21–32)
CREAT SERPL-MCNC: 0.95 MG/DL (ref 0.6–1.3)
EOSINOPHIL # BLD AUTO: 0 THOUSAND/ΜL (ref 0–0.61)
EOSINOPHIL NFR BLD AUTO: 0 % (ref 0–6)
ERYTHROCYTE [DISTWIDTH] IN BLOOD BY AUTOMATED COUNT: 12.7 % (ref 11.6–15.1)
ETHANOL SERPL-MCNC: <3 MG/DL (ref 0–3)
FOLATE SERPL-MCNC: >20 NG/ML (ref 3.1–17.5)
GFR SERPL CREATININE-BSD FRML MDRD: 82 ML/MIN/1.73SQ M
GLUCOSE SERPL-MCNC: 99 MG/DL (ref 65–140)
HCT VFR BLD AUTO: 29.5 % (ref 36.5–49.3)
HGB BLD-MCNC: 9.7 G/DL (ref 12–17)
IMM GRANULOCYTES # BLD AUTO: 0.06 THOUSAND/UL (ref 0–0.2)
IMM GRANULOCYTES NFR BLD AUTO: 1 % (ref 0–2)
LIPASE SERPL-CCNC: 81 U/L (ref 73–393)
LYMPHOCYTES # BLD AUTO: 0.64 THOUSANDS/ΜL (ref 0.6–4.47)
LYMPHOCYTES NFR BLD AUTO: 6 % (ref 14–44)
MCH RBC QN AUTO: 35.7 PG (ref 26.8–34.3)
MCHC RBC AUTO-ENTMCNC: 32.9 G/DL (ref 31.4–37.4)
MCV RBC AUTO: 109 FL (ref 82–98)
MONOCYTES # BLD AUTO: 0.52 THOUSAND/ΜL (ref 0.17–1.22)
MONOCYTES NFR BLD AUTO: 5 % (ref 4–12)
NEUTROPHILS # BLD AUTO: 9.44 THOUSANDS/ΜL (ref 1.85–7.62)
NEUTS SEG NFR BLD AUTO: 88 % (ref 43–75)
NRBC BLD AUTO-RTO: 0 /100 WBCS
P AXIS: 76 DEGREES
PLATELET # BLD AUTO: 306 THOUSANDS/UL (ref 149–390)
PLATELET # BLD AUTO: 309 THOUSANDS/UL (ref 149–390)
PMV BLD AUTO: 9.4 FL (ref 8.9–12.7)
PMV BLD AUTO: 9.5 FL (ref 8.9–12.7)
POTASSIUM SERPL-SCNC: 4 MMOL/L (ref 3.5–5.3)
PR INTERVAL: 142 MS
PROT SERPL-MCNC: 6.2 G/DL (ref 6.4–8.2)
QRS AXIS: 72 DEGREES
QRSD INTERVAL: 74 MS
QT INTERVAL: 334 MS
QTC INTERVAL: 462 MS
RBC # BLD AUTO: 2.72 MILLION/UL (ref 3.88–5.62)
SODIUM SERPL-SCNC: 135 MMOL/L (ref 136–145)
T WAVE AXIS: 51 DEGREES
TROPONIN I SERPL-MCNC: <0.02 NG/ML
VENTRICULAR RATE: 115 BPM
VIT B12 SERPL-MCNC: 495 PG/ML (ref 100–900)
WBC # BLD AUTO: 10.67 THOUSAND/UL (ref 4.31–10.16)

## 2020-01-08 PROCEDURE — 99219 PR INITIAL OBSERVATION CARE/DAY 50 MINUTES: CPT | Performed by: HOSPITALIST

## 2020-01-08 PROCEDURE — 85025 COMPLETE CBC W/AUTO DIFF WBC: CPT | Performed by: EMERGENCY MEDICINE

## 2020-01-08 PROCEDURE — 83690 ASSAY OF LIPASE: CPT | Performed by: EMERGENCY MEDICINE

## 2020-01-08 PROCEDURE — 84484 ASSAY OF TROPONIN QUANT: CPT | Performed by: EMERGENCY MEDICINE

## 2020-01-08 PROCEDURE — 82746 ASSAY OF FOLIC ACID SERUM: CPT | Performed by: HOSPITALIST

## 2020-01-08 PROCEDURE — 80053 COMPREHEN METABOLIC PANEL: CPT | Performed by: EMERGENCY MEDICINE

## 2020-01-08 PROCEDURE — 99285 EMERGENCY DEPT VISIT HI MDM: CPT

## 2020-01-08 PROCEDURE — 93005 ELECTROCARDIOGRAM TRACING: CPT

## 2020-01-08 PROCEDURE — 70450 CT HEAD/BRAIN W/O DYE: CPT

## 2020-01-08 PROCEDURE — 93010 ELECTROCARDIOGRAM REPORT: CPT | Performed by: INTERNAL MEDICINE

## 2020-01-08 PROCEDURE — 96365 THER/PROPH/DIAG IV INF INIT: CPT

## 2020-01-08 PROCEDURE — 72125 CT NECK SPINE W/O DYE: CPT

## 2020-01-08 PROCEDURE — 80320 DRUG SCREEN QUANTALCOHOLS: CPT | Performed by: PHYSICIAN ASSISTANT

## 2020-01-08 PROCEDURE — 85049 AUTOMATED PLATELET COUNT: CPT | Performed by: PHYSICIAN ASSISTANT

## 2020-01-08 PROCEDURE — 99285 EMERGENCY DEPT VISIT HI MDM: CPT | Performed by: EMERGENCY MEDICINE

## 2020-01-08 PROCEDURE — 36415 COLL VENOUS BLD VENIPUNCTURE: CPT | Performed by: EMERGENCY MEDICINE

## 2020-01-08 PROCEDURE — 82607 VITAMIN B-12: CPT | Performed by: HOSPITALIST

## 2020-01-08 PROCEDURE — 84484 ASSAY OF TROPONIN QUANT: CPT | Performed by: PHYSICIAN ASSISTANT

## 2020-01-08 PROCEDURE — 71046 X-RAY EXAM CHEST 2 VIEWS: CPT

## 2020-01-08 RX ORDER — AMLODIPINE BESYLATE 10 MG/1
10 TABLET ORAL DAILY
Status: DISCONTINUED | OUTPATIENT
Start: 2020-01-09 | End: 2020-01-11

## 2020-01-08 RX ORDER — ONDANSETRON 2 MG/ML
4 INJECTION INTRAMUSCULAR; INTRAVENOUS ONCE
Status: COMPLETED | OUTPATIENT
Start: 2020-01-08 | End: 2020-01-08

## 2020-01-08 RX ORDER — THIAMINE MONONITRATE (VIT B1) 100 MG
100 TABLET ORAL DAILY
Status: DISCONTINUED | OUTPATIENT
Start: 2020-01-09 | End: 2020-01-11

## 2020-01-08 RX ORDER — GABAPENTIN 300 MG/1
300 CAPSULE ORAL 3 TIMES DAILY
Status: DISCONTINUED | OUTPATIENT
Start: 2020-01-08 | End: 2020-01-11

## 2020-01-08 RX ORDER — METOPROLOL TARTRATE 50 MG/1
50 TABLET, FILM COATED ORAL DAILY
Status: DISCONTINUED | OUTPATIENT
Start: 2020-01-09 | End: 2020-01-11

## 2020-01-08 RX ORDER — QUETIAPINE FUMARATE 100 MG/1
200 TABLET, FILM COATED ORAL DAILY
Status: DISCONTINUED | OUTPATIENT
Start: 2020-01-09 | End: 2020-01-11

## 2020-01-08 RX ORDER — ALBUTEROL SULFATE 90 UG/1
2 AEROSOL, METERED RESPIRATORY (INHALATION) EVERY 4 HOURS PRN
Status: DISCONTINUED | OUTPATIENT
Start: 2020-01-08 | End: 2020-01-10

## 2020-01-08 RX ORDER — NICOTINE 21 MG/24HR
1 PATCH, TRANSDERMAL 24 HOURS TRANSDERMAL DAILY
Status: DISCONTINUED | OUTPATIENT
Start: 2020-01-09 | End: 2020-01-11

## 2020-01-08 RX ORDER — MIRTAZAPINE 15 MG/1
15 TABLET, FILM COATED ORAL
Status: DISCONTINUED | OUTPATIENT
Start: 2020-01-08 | End: 2020-01-11

## 2020-01-08 RX ORDER — DIAZEPAM 5 MG/1
5 TABLET ORAL ONCE
Status: COMPLETED | OUTPATIENT
Start: 2020-01-08 | End: 2020-01-08

## 2020-01-08 RX ORDER — FLUTICASONE FUROATE AND VILANTEROL 100; 25 UG/1; UG/1
1 POWDER RESPIRATORY (INHALATION) DAILY
Status: DISCONTINUED | OUTPATIENT
Start: 2020-01-09 | End: 2020-01-11

## 2020-01-08 RX ORDER — ASPIRIN 81 MG/1
81 TABLET ORAL DAILY
Status: DISCONTINUED | OUTPATIENT
Start: 2020-01-09 | End: 2020-01-10

## 2020-01-08 RX ORDER — FOLIC ACID 1 MG/1
1 TABLET ORAL DAILY
Status: DISCONTINUED | OUTPATIENT
Start: 2020-01-09 | End: 2020-01-11

## 2020-01-08 RX ORDER — ATORVASTATIN CALCIUM 40 MG/1
40 TABLET, FILM COATED ORAL DAILY
Status: DISCONTINUED | OUTPATIENT
Start: 2020-01-09 | End: 2020-01-11

## 2020-01-08 RX ORDER — QUETIAPINE FUMARATE 300 MG/1
600 TABLET, FILM COATED ORAL
Status: DISCONTINUED | OUTPATIENT
Start: 2020-01-08 | End: 2020-01-11

## 2020-01-08 RX ORDER — TAMSULOSIN HYDROCHLORIDE 0.4 MG/1
0.4 CAPSULE ORAL
Status: DISCONTINUED | OUTPATIENT
Start: 2020-01-09 | End: 2020-01-11

## 2020-01-08 RX ORDER — SODIUM CHLORIDE 9 MG/ML
100 INJECTION, SOLUTION INTRAVENOUS CONTINUOUS
Status: DISCONTINUED | OUTPATIENT
Start: 2020-01-08 | End: 2020-01-09

## 2020-01-08 RX ORDER — TRAZODONE HYDROCHLORIDE 50 MG/1
50 TABLET ORAL
Status: DISCONTINUED | OUTPATIENT
Start: 2020-01-08 | End: 2020-01-11

## 2020-01-08 RX ORDER — MELATONIN
1000 DAILY
Status: DISCONTINUED | OUTPATIENT
Start: 2020-01-09 | End: 2020-01-11

## 2020-01-08 RX ORDER — FLUTICASONE PROPIONATE 50 MCG
2 SPRAY, SUSPENSION (ML) NASAL DAILY
Status: DISCONTINUED | OUTPATIENT
Start: 2020-01-09 | End: 2020-01-11

## 2020-01-08 RX ADMIN — QUETIAPINE FUMARATE 600 MG: 300 TABLET ORAL at 21:18

## 2020-01-08 RX ADMIN — SODIUM CHLORIDE 100 ML/HR: 0.9 INJECTION, SOLUTION INTRAVENOUS at 20:13

## 2020-01-08 RX ADMIN — MIRTAZAPINE 15 MG: 15 TABLET, FILM COATED ORAL at 21:17

## 2020-01-08 RX ADMIN — ONDANSETRON 4 MG: 2 INJECTION INTRAMUSCULAR; INTRAVENOUS at 17:10

## 2020-01-08 RX ADMIN — DEXTROSE AND SODIUM CHLORIDE 1000 ML: 5; .9 INJECTION, SOLUTION INTRAVENOUS at 15:01

## 2020-01-08 RX ADMIN — DIAZEPAM 5 MG: 5 TABLET ORAL at 16:01

## 2020-01-08 RX ADMIN — GABAPENTIN 300 MG: 300 CAPSULE ORAL at 21:18

## 2020-01-08 RX ADMIN — DIAZEPAM 5 MG: 5 TABLET ORAL at 15:00

## 2020-01-08 NOTE — ED ATTENDING ATTESTATION
1/8/2020  I, Alexandra Garcia MD, saw and evaluated the patient  I have discussed the patient with the resident/non-physician practitioner and agree with the resident's/non-physician practitioner's findings, Plan of Care, and MDM as documented in the resident's/non-physician practitioner's note, except where noted  All available labs and Radiology studies were reviewed  I was present for key portions of any procedure(s) performed by the resident/non-physician practitioner and I was immediately available to provide assistance  At this point I agree with the current assessment done in the Emergency Department  I have conducted an independent evaluation of this patient a history and physical is as follows:    ED Course         Critical Care Time  Procedures     82 yo male daily alcohol drinker, presents with syncope, lightheadedness, decreased appetite for the last five days  Pt has not eaten in five days and one drink today  Pt also with substernal chest pressure since this morning, intermittently worse, no modifying factors  Pt was diaphoretic and syncopized twice when standing up  Nausea, no urinary symptoms  pmh htn, bph, tia  Vss, afebrile, tachy, lungs cta, rrr, abdomen soft mild tenderness, midline cervical tenderness s/p fall, no focal neuro deficits, eccymosis and tenderness left arm    Cardiac workup, lipase, cmp, ct a/p, ct head, cspine, ivf, valium

## 2020-01-08 NOTE — ASSESSMENT & PLAN NOTE
· With episode of "room spinning" dizziness  Only occurs when patient is standing  Very poor PO intake reported over last 5 days  Sounds orthostatic in nature  Patient has already received 2L bolused (1 via EMS and 1 in ED)   Check orthostatic BPs  · Will continue IVF  · Fall precautions  · Update echo  · Telemetry  · CT head on admission with microangiopathic changes but no acute intracranial abnormality

## 2020-01-08 NOTE — SOCIAL WORK
CM met with pt to discuss CM role in D/C planning  Pt reported the following:    Emergency Contact: Son, Heaven Teresa and Dtr-in-law, Angelita Nava   POA/LW: None  Level of assist with ADL's PTA: IND  House or Apt: Lives with son and dil in [address]  RENETTA to enter: 3 RENETTA to enter from outside then 3 RENETTA inside  Pt has stair climber to 2nd floor  BATH on 1st floor: No  DME[place]  [facility]: [facility]  SNF/Rehab: None    Transportation: Son works and can assist with transport per pt  Help at home: Son and dil  Pharmacy/Rx Coverage: [address]   Name of PCP: Dr Debbie Hernandez tx for MH: 1 year ago at [facility] per pt  Inpt tx for SA: None per pt  Substance of choice/Frequency/Quantity/Date since last use: 1/5 Vodka daily  Employment/Income: Disabled     Anticipated D/C Plan: Return home with son and dil  Son may be able to transport  CM offered HOST and pt signed the consent  CM made referral to HOST  CM reviewed d/c planning process including the following: identifying help at home, patient preference for d/c planning needs, Discharge Lounge, [facility] Meds to Bed program, availability of treatment team to discuss questions or concerns patient and/or family may have regarding understanding medications and recognizing signs and symptoms once discharged  CM also encouraged patient to follow up with all recommended appointments after discharge  Patient advised of importance for patient and family to participate in managing patients medical well being

## 2020-01-08 NOTE — ASSESSMENT & PLAN NOTE
· Patient with history of MDD  Has had psychiatric hospitalization in the past  · Patient admits to suicidal ideation but denies plan   "Even if I did I wouldn't tell you" he says  · 1:1  · Psych consult

## 2020-01-08 NOTE — ED PROVIDER NOTES
History  Chief Complaint   Patient presents with    Syncope     Patient had dizziness for last few days, worse today, had an unwitnessed syncopal episode  Also states chest pain starting 730am this morning  HPI  Patient is a 42-year-old male history of alcohol dependence, chronic back pain, COPD presenting for evaluation chest pain, syncope, orthostatic symptoms, loss of appetite  Patient states that he has had no appetite for the last 5 days, states that he previously drank a 5th of vodka a day and states that he has not been able to drink that for the last day  Patient complaining of anxiety, tremulousness  Patient additionally stating starting at 8 o'clock this morning he developed substernal sharp, nonradiating, 7/10 chest pain  Patient denies any associated shortness of breath, palpitations, states that he did have an episode of diaphoresis around the time of onset  Patient states that around 9 o'clock you try to get up and felt very lightheaded like he was going to pass out  Patient states that several hours later he had a 2nd episode where he actually syncopized and fell onto the ground, struck his head  Patient now complaining of headache, neck pain  Patient otherwise atraumatic, states that his chest pain is largely resolved at this time  Patient denies abdominal pain, nausea, vomiting, urinary changes  Patient denies focal weakness or numbness  Prior to Admission Medications   Prescriptions Last Dose Informant Patient Reported? Taking?    Cholecalciferol 1000 units tablet  Outside Facility (Specify) No No   Sig: Take 1 tablet (1,000 Units total) by mouth daily   DULoxetine (CYMBALTA) 30 mg delayed release capsule  Outside Facility (Specify) No No   Sig: Take 3 capsules (90 mg total) by mouth daily   QUEtiapine (SEROquel) 200 mg tablet  Outside Facility (Specify) No No   Sig: Take 1 tablet (200 mg total) by mouth daily   QUEtiapine (SEROquel) 300 mg tablet  Outside Facility (Specify) No No Sig: Take 2 tablets (600 mg total) by mouth daily at bedtime   Wound Dressings ([address]) PADS  Outside Facility (Specify) No No   Sig: Apply 1 each topically every other day   albuterol (PROVENTIL HFA,VENTOLIN HFA) 90 mcg/act inhaler  Outside Facility (Specify) No No   Sig: Inhale 2 puffs every 4 (four) hours as needed for wheezing   amLODIPine (NORVASC) 10 mg tablet  Outside Facility (Specify) No No   Sig: Take 1 tablet (10 mg total) by mouth daily Hold for SBP <110   aspirin (ECOTRIN LOW STRENGTH) 81 mg EC tablet  Outside Facility (Specify) No No   Sig: Take 1 tablet (81 mg total) by mouth daily   atorvastatin (LIPITOR) 40 mg tablet  Outside Facility (Specify) No No   Sig: TAKE ONE TABLET BY MOUTH ONCE DAILY   cyclobenzaprine (FLEXERIL) 10 mg tablet  Outside Facility (Specify) No No   Sig: Take 1 tablet (10 mg total) by mouth 3 (three) times a day as needed for muscle spasms   diclofenac sodium (VOLTAREN) 50 mg EC tablet   No No   Sig: Take 1 tablet (50 mg total) by mouth 2 (two) times a day   fluticasone (FLONASE) 50 mcg/act nasal spray  Outside Facility (Specify) No No   Si sprays into each nostril daily   fluticasone-vilanterol (BREO ELLIPTA) 100-25 mcg/inh inhaler  Outside Facility (Specify) No No   Sig: Inhale 1 puff daily Rinse mouth after use     folic acid (FOLVITE) 1 mg tablet  Outside Facility (Specify) No No   Sig: Take 1 tablet (1 mg total) by mouth daily   gabapentin (NEURONTIN) 400 mg capsule  Outside Facility (Specify) No No   Sig: TAKE ONE CAPSULE BY MOUTH THREE TIMES A DAY   guaiFENesin (MUCINEX) 600 mg 12 hr tablet  Outside Facility (Specify) Yes No   Sig: Take 600 mg by mouth every 12 (twelve) hours as needed for cough   lidocaine (LIDODERM) 5 %  Outside Facility (Specify) No No   Sig: Apply 1 patch topically daily Remove & Discard patch within 12 hours or as directed by MD   menthol-zinc oxide (CALMOSEPTINE) 0 44-20 6 % OINT  Outside Facility (Specify) No No   Sig: Apply topically 2 (two) times a day   metoprolol tartrate (LOPRESSOR) 50 mg tablet  Outside Facility (Specify) No No   Sig: Take 1 tablet (50 mg total) by mouth daily   mirtazapine (REMERON) 15 mg tablet  Outside Facility (Specify) No No   Sig: Take 1 tablet (15 mg total) by mouth daily at bedtime   nicotine (NICODERM CQ) 14 mg/24hr TD 24 hr patch  Outside Facility (Specify) No No   Sig: Place 1 patch on the skin daily   senna-docusate sodium (SENOKOT S) 8 6-50 mg per tablet  Outside Facility (Specify) No No   Sig: Take 1 tablet by mouth 2 (two) times a day   tamsulosin (FLOMAX) 0 4 mg  Outside Facility (Specify) No No   Sig: Take 1 capsule (0 4 mg total) by mouth daily with dinner   thiamine 100 MG tablet  Outside Facility (Specify) No No   Sig: Take 1 tablet (100 mg total) by mouth daily   traZODone (DESYREL) 50 mg tablet  Outside Facility (Specify) No No   Sig: Take 1 tablet (50 mg total) by mouth daily at bedtime as needed for sleep      Facility-Administered Medications: None       Past Medical History:   Diagnosis Date    BPH (benign prostatic hyperplasia)     CVA [facility]cere[facility] accident) ([facility] [address] )     Head injury     Hypertension     Metatarsal fracture     TIA (transient ischemic attack)        Past Surgical History:   Procedure Laterality Date    ABDOMINAL SURGERY      ANKLE SURGERY      BACK SURGERY      ELBOW SURGERY      JOINT REPLACEMENT      KNEE SURGERY      LIVER SURGERY         History reviewed  No pertinent family history  I have reviewed and agree with the history as documented      Social History     Tobacco Use    Smoking status: Current Every Day Smoker     Packs/day: 1 00     Types: Cigarettes    Smokeless tobacco: Never Used    Tobacco comment: started age 14, 4 quit attempts   Substance Use Topics    Alcohol use: Yes     Frequency: Monthly or less     Comment: drinks 1-2 glasses vodka daily     Drug use: Yes     Types: Marijuana     Comment: history polysubstance use including IVDA on record- every now then will smoke weed         Review of Systems   Constitutional: Positive for appetite change  Negative for chills, fever and unexpected weight change  HENT: Negative for sore throat  Eyes: Negative for photophobia and visual disturbance  Respiratory: Negative for cough, chest tightness, shortness of breath and wheezing  Cardiovascular: Positive for chest pain  Negative for palpitations and leg swelling  Gastrointestinal: Negative for abdominal distention, abdominal pain, constipation, diarrhea, nausea and vomiting  Genitourinary: Negative for difficulty urinating, dysuria, flank pain and hematuria  Musculoskeletal: Negative for gait problem, joint swelling and myalgias  Neurological: Positive for syncope  Negative for weakness, numbness and headaches  Psychiatric/Behavioral: Negative for confusion  Physical Exam  ED Triage Vitals   Temperature Pulse Respirations Blood Pressure SpO2   01/08/20 1608 01/08/20 1346 01/08/20 1346 01/08/20 1346 01/08/20 1346   97 9 °F (36 6 °C) (!) 115 18 104/59 100 %      Temp Source Heart Rate Source Patient Position - Orthostatic VS BP Location FiO2 (%)   01/08/20 1608 01/08/20 1346 01/08/20 1346 01/08/20 1346 --   Oral Monitor Lying Right arm       Pain Score       01/08/20 1346       8             Orthostatic Vital Signs  Vitals:    01/08/20 1346 01/08/20 1347 01/08/20 1458 01/08/20 1600   BP: 104/59 104/59 97/61 152/73   Pulse: (!) 115 (!) 115 (!) 119 (!) 117   Patient Position - Orthostatic VS: Lying Lying Lying Lying       Physical Exam   Constitutional: He is oriented to person, place, and time  He appears well-developed and well-nourished  No distress  HENT:   Head: Normocephalic and atraumatic  Right Ear: External ear normal    Left Ear: External ear normal    Nose: Nose normal    Mouth/Throat: Oropharynx is clear and moist  No oropharyngeal exudate  Eyes: Pupils are equal, round, and reactive to light  Conjunctivae are normal    Neck:   Midline cervical tenderness at the C7 level   Cardiovascular: Normal rate, regular rhythm, normal heart sounds and intact distal pulses  Exam reveals no gallop and no friction rub  No murmur heard  Pulmonary/Chest: Effort normal and breath sounds normal  No respiratory distress  He has no wheezes  He exhibits no tenderness  Lungs clear to auscultation bilaterally   Abdominal: Soft  Bowel sounds are normal  He exhibits distension  He exhibits no mass  There is tenderness  There is no rebound and no guarding  Abdomen mildly distended, normal bowel sounds, mild lower abdominal tenderness   Musculoskeletal: He exhibits no edema or deformity  Area of ecchymosis in the left upper arm   Neurological: He is alert and oriented to person, place, and time  Cranial nerves 2-12 intact, full strength and sensation bilaterally in upper and lower extremities, no nystagmus, no abnormal cerebellar signs, no truncal ataxia  Patient does have a significant amount of orthostatic symptoms when getting up to stand from sitting  Skin: Skin is warm and dry  Capillary refill takes less than 2 seconds  He is not diaphoretic  Psychiatric: He has a normal mood and affect  His behavior is normal    Nursing note and vitals reviewed        ED Medications  Medications   dextrose 5 % and sodium chloride 0 9 % bolus 1,000 mL (1,000 mL Intravenous New Bag 1/8/20 1501)   diazepam (VALIUM) tablet 5 mg (5 mg Oral Given 1/8/20 1500)   diazepam (VALIUM) tablet 5 mg (5 mg Oral Given 1/8/20 1601)       Diagnostic Studies  Results Reviewed     Procedure Component Value Units Date/Time    Lipase [[id number]]  (Normal) Collected:  01/08/20 1425    Lab Status:  Final result Specimen:  Blood from Hand, Right Updated:  01/08/20 1526     Lipase 81 u/L     Comprehensive metabolic panel [[id number]]  (Abnormal) Collected:  01/08/20 1425    Lab Status:  Final result Specimen:  Blood from Hand, Right Updated:  01/08/20 1451     Sodium 135 mmol/L      Potassium 4 0 mmol/L      Chloride 105 mmol/L      CO2 17 mmol/L      ANION GAP 13 mmol/L      BUN 19 mg/dL      Creatinine 0 95 mg/dL      Glucose 99 mg/dL      Calcium 8 5 mg/dL      AST 19 U/L      ALT 9 U/L      Alkaline Phosphatase 64 U/L      Total Protein 6 2 g/dL      Albumin 2 4 g/dL      Total Bilirubin 0 37 mg/dL      eGFR 82 ml/min/1 73sq m     Narrative:       National Kid[employer] guidelines for Chronic Kidney Disease (CKD):     Stage 1 with normal or high GFR (GFR > 90 mL/min/1 73 square meters)    Stage 2 Mild CKD (GFR = 60-89 mL/min/1 73 square meters)    Stage 3A Moderate CKD (GFR = 45-59 mL/min/1 73 square meters)    Stage 3B Moderate CKD (GFR = 30-44 mL/min/1 73 square meters)    Stage 4 Severe CKD (GFR = 15-29 mL/min/1 73 square meters)    Stage 5 End Stage CKD (GFR <15 mL/min/1 73 square meters)  Note: GFR calculation is accurate only with a steady state creatinine    Troponin I [[id number]]  (Normal) Collected:  01/08/20 1425    Lab Status:  Final result Specimen:  Blood from Arm, Right Updated:  01/08/20 1451     Troponin I <0 02 ng/mL     CBC and differential [[id number]]  (Abnormal) Collected:  01/08/20 1425    Lab Status:  Final result Specimen:  Blood from Hand, Right Updated:  01/08/20 1449     WBC 10 67 Thousand/uL      RBC 2 72 Million/uL      Hemoglobin 9 7 g/dL      Hematocrit 29 5 %       fL      MCH 35 7 pg      MCHC 32 9 g/dL      RDW 12 7 %      MPV 9 5 fL      Platelets 783 Thousands/uL      nRBC 0 /100 WBCs      Neutrophils Relative 88 %      Immat GRANS % 1 %      Lymphocytes Relative 6 %      Monocytes Relative 5 %      Eosinophils Relative 0 %      Basophils Relative 0 %      Neutrophils Absolute 9 44 Thousands/µL      Immature Grans Absolute 0 06 Thousand/uL      Lymphocytes Absolute 0 64 Thousands/µL      Monocytes Absolute 0 52 Thousand/µL      Eosinophils Absolute 0 00 Thousand/µL      Basophils Absolute 0 01 Thousands/µL                  XR chest 2 views   Final Result by Jessie Pritchett MD (01/08 1607)      Linear atelectasis versus scarring at the left lung base  No focal consolidation, pleural effusion or pneumothorax  Age-indeterminate wedge compression deformity of an upper lumbar vertebral body, seen on the lateral view  The study was marked in EPIC for significant notification  Workstation performed: [id number]         CT head wo contrast   Final Result by Jodi Alvarez MD (01/08 1456)      No acute intracranial abnormality  Microangiopathic changes  Workstation performed: [id number]         CT spine cervical without contrast   Final Result by Jodi Alvarez MD (01/08 1502)      No cervical spine fracture or traumatic malalignment  Workstation performed: [id number]               Procedures  Procedures      ED Course           Identification of Seniors at Risk      Most Recent Value   (ISAR) Identification of Seniors at Risk   Before the illness or injury that brought you to the Emergency, did you need someone to help you on a regular basis? 1 Filed at: 01/08/2020 1348   In the last 24 hours, have you needed more help than usual?  1 Filed at: 01/08/2020 1348   Have you been hospitalized for one or more nights during the past 6 months? 0 Filed at: 01/08/2020 1348   In general, do you see well?  0 Filed at: 01/08/2020 1348   In general, do you have serious problems with your memory? 0 Filed at: 01/08/2020 1348   Do you take more than three different medications every day? 1 Filed at: 01/08/2020 1348   ISAR Score  3 Filed at: 01/08/2020 1348                          MDM  Number of Diagnoses or Management Options  Chest pain:   Syncope:   Diagnosis management comments: 24-year-old male presenting for chest pain, syncope, decreased p o  Intake, orthostatic symptoms    Patient tachycardic, mildly tremulous, nonfocal neuro exam, unremarkable cardiac and abdominal examinations  Patient with persistent orthostatic symptoms  Cardiac workup including CBC, CMP, troponin, chest x-ray, EKG only significant for sinus tachycardia  Given patient's alcohol history, believe some component of withdrawal contributing to abnormal vital signs, patient given multiple doses of 5 mg Valium with some improvement of symptoms  Patient given a total 2 L of fluid  Patient admitted for syncope, orthostatic symptoms, alcohol withdrawal        Amount and/or Complexity of Data Reviewed  Clinical lab tests: ordered and reviewed  Tests in the radiology section of CPT®: ordered and reviewed  Tests in the medicine section of CPT®: reviewed and ordered  Decide to obtain previous medical records or to obtain history from someone other than the patient: yes  Review and summarize past medical records: yes  Independent visualization of images, tracings, or specimens: yes    Risk of Complications, Morbidity, and/or Mortality  Presenting problems: moderate  Diagnostic procedures: low  Management options: minimal    Patient Progress  Patient progress: improved        Disposition  Final diagnoses:   Syncope   Chest pain     Time reflects when diagnosis was documented in both MDM as applicable and the Disposition within this note     Time User Action Codes Description Comment    1/8/2020  4:02 PM Felton Matta Add [R55] Syncope     1/8/2020  4:02 PM Felton Matta Add [R07 9] Chest pain       ED Disposition     ED Disposition Condition Date/Time Comment    Admit Stable Wed Jan 8, 2020  4:02 PM Case was discussed with ESTEFANY and the patient's admission status was agreed to be Admission Status: observation status to the service of Dr Gail Loza   Follow-up Information    None         Patient's Medications   Discharge Prescriptions    No medications on file     No discharge procedures on file  ED Provider  Attending physically available and evaluated Bonnie Ferguson I managed the patient along with the ED Attending      Electronically Signed by         Spike Groves MD  01/08/20 [id number]

## 2020-01-08 NOTE — H&P
H&P- Meri Cleveland 1950, 81 y o  male MRN: [id number]    Unit/Bed#: ED 24 Encounter: [id number]    Primary Care Provider: Bette Post DO   Date and time admitted to hospital: 1/8/2020  1:34 PM        * Syncope  Assessment & Plan  · With episode of "room spinning" dizziness  Only occurs when patient is standing  Very poor PO intake reported over last 5 days  Sounds orthostatic in nature  Patient has already received 2L bolused (1 via EMS and 1 in ED)  Check orthostatic BPs  · Will continue IVF  · Fall precautions  · Update echo  · Telemetry  · CT head on admission with microangiopathic changes but no acute intracranial abnormality    Chest pain  Assessment & Plan  · Episode of right-of-sternum sharp chest pain which comes on and resolves spontaneously  · ECG without ischemic changed  · Initial trop negative - will trend  · SOLOMON score = 2  · Recommend outpatient stress test and follow up with his Cardiologist, Dr Kristi Hernandes   · Consider GI ion nature in the setting of poor PO intake  Trial antacid     Suicidal ideation  Assessment & Plan  · Patient with history of MDD  Has had psychiatric hospitalization in the past  · Patient admits to suicidal ideation but denies plan  "Even if I did I wouldn't tell you" he says  · 1:1  · Psych consult     Alcohol abuse  Assessment & Plan  · Patient admits to drinking a fifth of vodka daily   He reports that he has been only drinking 1 drink per day for the last 2 days however  · CIWA protocol  · Thiamine and folate     MDD (major depressive disorder)  Assessment & Plan  · With suicidal ideation   · 1:1 and Psych consult   · Continue Cymbalta, Seroquel, trazodone     History of CVA (cerebrovascular accident)  Assessment & Plan  · Patient reports history of "mini stroke"  · He admits to being non-compliant with his aspirin  · Continue aspirin and statin  · CT head on admission with no acute intracranial abnormality   · Neurochecks given syncope     COPD without exacerbation ([address] )  Assessment & Plan  · Continue albuterol p r n  And Breo    Essential hypertension  Assessment & Plan  · Continue amlodipine, metoprolol tartrate     Dyslipidemia  Assessment & Plan  · Continue statin    Chronic pain  Assessment & Plan  · PDMP reviewed - patient has not had tramadol filled since May 2019    Anemia  Assessment & Plan  · Repeat CBC in AM    Tobacco abuse  Assessment & Plan  · Encouraged cessation  · Nicotine patch      VTE Prophylaxis: Enoxaparin (Lovenox)  / sequential compression device   Code Status: FULL CODE   POLST: POLST form is not discussed and not completed at this time  Discussion: discussed with [facility] attending Dr Wells Files     Anticipated Length of Stay:  Patient will be admitted on an Observation basis with an anticipated length of stay of  < 2 midnights  Justification for Hospital Stay: dizziness and syncope  Chest pain  Total Time for Visit, including Counseling / Coordination of Care: 1 hour  Greater than 50% of this total time spent on direct patient counseling and coordination of care  Chief Complaint:   "I just got really dizzy and fell and hit my head"    History of Present Illness:    Beatriz Garland is a 81 y o  male with MDD, HTN, dyslipidemia, history of stroke, COPD, ETOH abuse, tobacco abuse who presents with dizziness, syncope, and chest pain  Patient is a very vague historian and is not very forthcoming with information even when asked directed specific questions  He states that this morning he got dizzy and fell and hit his head  +LOC  He states that it happened when he was trying to go upstairs  To describe the dizziness he states that everything was spinning"  He estimates that the symptoms lasted for hours  He states that his symptoms are only present when standing  He reports 3-4 prior episodes over the last 1 year  He states that this episode prompted him to come to the emergency department because I just did not feel well"    He reports that when EMS came to get him his blood pressure was 90/50  He denies any prodromal symptoms  He reports cough, runny nose, watery eyes  He reports decreased p o  Intake and states that he has not eaten any food over the last 5 days  He then states that he had 1 cup of soup  He states that normally he drinks 1 fifth of vodka per day but over the last 2 days has only had 1 drink each day  He also reports an episode of "sharp" chest pain located just to the right of his sternum which has been ongoing for the last 1 week on and off  He denies any alleviating or aggravating factors  He states it just happens"  He denies any known a personal or family cardiac history  He reports that he has not been taking his aspirin but otherwise states that he takes his medications  The patient does admit to suicidal ideation but denies any planned  He then states even if I did, I wouldn't tell you"  He reports prior psychiatric hospitalizations for depression  Review of Systems:    Review of Systems   Constitutional: Positive for appetite change (decreased) and fatigue  HENT: Positive for rhinorrhea  Eyes:        "watery" eyes   Respiratory: Positive for cough  Negative for shortness of breath  Cardiovascular: Positive for chest pain  Gastrointestinal: Negative for abdominal pain  Genitourinary: Negative for hematuria  Musculoskeletal: Positive for gait problem  Skin: Negative for rash  Neurological: Positive for dizziness, syncope and headaches  Psychiatric/Behavioral: Positive for suicidal ideas         Past Medical and Surgical History:     Past Medical History:   Diagnosis Date    BPH (benign prostatic hyperplasia)     [address] [facility]cerebral vascular [address]) [address] [address]     Head injury     Hypertension     Metatarsal fracture     TIA (transient ischemic attack)        Past Surgical History:   Procedure Laterality Date    ABDOMINAL SURGERY      ANKLE SURGERY      BACK SURGERY      ELBOW SURGERY      JOINT REPLACEMENT      KNEE SURGERY      LIVER SURGERY         Meds/Allergies:    Prior to Admission medications    Medication Sig Start Date End Date Taking? Authorizing Provider   albuterol (PROVENTIL HFA,VENTOLIN HFA) 90 mcg/act inhaler Inhale 2 puffs every 4 (four) hours as needed for wheezing 10/3/19   Michelle Maldonado DO   amLODIPine (NORVASC) 10 mg tablet Take 1 tablet (10 mg total) by mouth daily Hold for SBP <110 10/3/19   Michelle Maldonado DO   aspirin (ECOTRIN LOW STRENGTH) 81 mg EC tablet Take 1 tablet (81 mg total) by mouth daily 10/3/19   Michelle Maldonado DO   atorvastatin (LIPITOR) 40 mg tablet TAKE ONE TABLET BY MOUTH ONCE DAILY 10/30/19   Yris Grewal DO   Cholecalciferol 1000 units tablet Take 1 tablet (1,000 Units total) by mouth daily 10/3/19   Yris Grewal DO   cyclobenzaprine (FLEXERIL) 10 mg tablet Take 1 tablet (10 mg total) by mouth 3 (three) times a day as needed for muscle spasms 5/14/19   Elyce Prader Rockovits, PA-C   diclofenac sodium (VOLTAREN) 50 mg EC tablet Take 1 tablet (50 mg total) by mouth 2 (two) times a day 10/14/19 12/11/19  Nikhil Madison MD   DULoxetine (CYMBALTA) 30 mg delayed release capsule Take 3 capsules (90 mg total) by mouth daily 5/15/19   Koreen Cockayne, CRNP   fluticasone [facility]) 50 mcg/act nasal spray 2 sprays into each nostril daily 10/3/19   Michelle Maldonado DO   fluticasone-vilanterol (BREO ELLIPTA) 100-25 mcg/inh inhaler Inhale 1 puff daily Rinse mouth after use   10/3/19   Yris Grewal DO   folic acid (FOLVITE) 1 mg tablet Take 1 tablet (1 mg total) by mouth daily 10/3/19   Michelle Maldonado DO   gabapentin (NEURONTIN) 400 mg capsule TAKE ONE CAPSULE BY MOUTH THREE TIMES A DAY 11/14/19   Nikhil Madison MD   guaiFENesin (MUCINEX) 600 mg 12 hr tablet Take 600 mg by mouth every 12 (twelve) hours as needed for cough    Historical Provider, MD   lidocaine (LIDODERM) 5 % Apply 1 patch topically daily Remove & Discard patch Never Used   Tobacco Comment    started age 14, 4 quit attempts     Social History     Substance and Sexual Activity   Drug Use Yes    Types: Marijuana    Comment: history polysubstance use including IVDA on record- every now then will smoke weed        Family History:    History reviewed  No pertinent family history  Physical Exam:     Vitals:   Blood Pressure: 110/64 (01/08/20 1713)  Pulse: (!) 118 (01/08/20 1713)  Temperature: 97 9 °F (36 6 °C) (01/08/20 1608)  Temp Source: Oral (01/08/20 1608)  Respirations: 16 (01/08/20 1713)  Height: 5' 8 5" (174 cm) (01/08/20 1346)  Weight - Scale: 76 2 kg (168 lb) (01/08/20 1346)  SpO2: 99 % (01/08/20 1713)    Physical Exam   Constitutional: He is oriented to person, place, and time  Patient seen lying in ED bed watching TV  He is nontoxic appearing  Appears older than stated age   Eyes: Pupils are equal, round, and reactive to light  EOM are normal    Cardiovascular: Normal rate and regular rhythm  Pulmonary/Chest: Effort normal and breath sounds normal  No respiratory distress  He has no wheezes  He exhibits no tenderness  Abdominal: Soft  Bowel sounds are normal  There is no tenderness  Musculoskeletal: He exhibits no edema  Neurological: He is alert and oriented to person, place, and time  Tongue is midline  Corners of mouth raise equally bilaterally smiling  Eyebrows raise equally bilaterally  Shoulder shrug,  strength, elbow flexion and extension, hip flexion equal bilaterally  Patient is able to calculate how many quarters are and 1 dollar 50 cents   Skin: Skin is warm and dry  He is not diaphoretic  Psychiatric:   Flat affect  +SI, denies plan   Vitals reviewed  Additional Data:     Lab Results: I have personally reviewed pertinent reports        Results from last 7 days   Lab Units 01/08/20  1425   WBC Thousand/uL 10 67*   HEMOGLOBIN g/dL 9 7*   HEMATOCRIT % 29 5*   PLATELETS Thousands/uL 309   NEUTROS PCT % 88*   LYMPHS PCT % 6* MONOS PCT % 5   EOS PCT % 0     Results from last 7 days   Lab Units 01/08/20  1425   SODIUM mmol/L 135*   POTASSIUM mmol/L 4 0   CHLORIDE mmol/L 105   CO2 mmol/L 17*   BUN mg/dL 19   CREATININE mg/dL 0 95   ANION GAP mmol/L 13   CALCIUM mg/dL 8 5   ALBUMIN g/dL 2 4*   TOTAL BILIRUBIN mg/dL 0 37   ALK PHOS U/L 64   ALT U/L 9*   AST U/L 19   GLUCOSE RANDOM mg/dL 99                       Imaging: I have personally reviewed pertinent reports  XR chest 2 views   Final Result by Alexa Foley MD (01/08 1607)      Linear atelectasis versus scarring at the left lung base  No focal consolidation, pleural effusion or pneumothorax  Age-indeterminate wedge compression deformity of an upper lumbar vertebral body, seen on the lateral view  The study was marked in EPIC for significant notification  Workstation performed: [id number]         CT head wo contrast   Final Result by Taylor Monge MD (01/08 9751)      No acute intracranial abnormality  Microangiopathic changes  Workstation performed: [id number]         CT spine cervical without contrast   Final Result by Taylor Monge MD (01/08 4626)      No cervical spine fracture or traumatic malalignment  Workstation performed: [id number]             EKG, Pathology, and Other Studies Reviewed on Admission:   · EKG: sinus tach    Allscripts / Epic Records Reviewed: Yes     ** Please Note: This note has been constructed using a voice recognition system   **

## 2020-01-08 NOTE — ASSESSMENT & PLAN NOTE
· Episode of right-of-sternum sharp chest pain which comes on and resolves spontaneously  · ECG without ischemic changed  · Initial trop negative - will trend  · SOLOMON score = 2  · Recommend outpatient stress test and follow up with his Cardiologist, Dr Mishra Labs   · Consider GI ion nature in the setting of poor PO intake   Trial antacid

## 2020-01-08 NOTE — ASSESSMENT & PLAN NOTE
· Patient reports history of "mini stroke"  · He admits to being non-compliant with his aspirin  · Continue aspirin and statin  · CT head on admission with no acute intracranial abnormality   · Neurochecks given syncope

## 2020-01-08 NOTE — ASSESSMENT & PLAN NOTE
· Patient admits to drinking a fifth of vodka daily   He reports that he has been only drinking 1 drink per day for the last 2 days however  · [facility] protocol  · Thiamine and folate   · Check ETOH level

## 2020-01-09 PROBLEM — E63.9 INADEQUATE ORAL NUTRITIONAL INTAKE: Status: ACTIVE | Noted: 2020-01-09

## 2020-01-09 PROBLEM — E86.0 DEHYDRATION: Status: ACTIVE | Noted: 2020-01-09

## 2020-01-09 LAB
25(OH)D3 SERPL-MCNC: 46.7 NG/ML (ref 30–100)
ABO GROUP BLD: NORMAL
ABO GROUP BLD: NORMAL
ANION GAP SERPL CALCULATED.3IONS-SCNC: 6 MMOL/L (ref 4–13)
BLD GP AB SCN SERPL QL: POSITIVE
BLOOD GROUP ANTIBODIES SERPL: NORMAL
BUN SERPL-MCNC: 30 MG/DL (ref 5–25)
CALCIUM SERPL-MCNC: 8 MG/DL (ref 8.3–10.1)
CHLORIDE SERPL-SCNC: 110 MMOL/L (ref 100–108)
CO2 SERPL-SCNC: 22 MMOL/L (ref 21–32)
CREAT SERPL-MCNC: 0.78 MG/DL (ref 0.6–1.3)
ERYTHROCYTE [DISTWIDTH] IN BLOOD BY AUTOMATED COUNT: 12.6 % (ref 11.6–15.1)
ERYTHROCYTE [DISTWIDTH] IN BLOOD BY AUTOMATED COUNT: 12.7 % (ref 11.6–15.1)
FY SUP(A) AG RBC QL: NEGATIVE
GFR SERPL CREATININE-BSD FRML MDRD: 93 ML/MIN/1.73SQ M
GLUCOSE SERPL-MCNC: 94 MG/DL (ref 65–140)
HCT VFR BLD AUTO: 17.9 % (ref 36.5–49.3)
HCT VFR BLD AUTO: 23 % (ref 36.5–49.3)
HEMOCCULT STL QL: POSITIVE
HGB BLD-MCNC: 5.8 G/DL (ref 12–17)
HGB BLD-MCNC: 7.3 G/DL (ref 12–17)
MCH RBC QN AUTO: 34.6 PG (ref 26.8–34.3)
MCH RBC QN AUTO: 35.8 PG (ref 26.8–34.3)
MCHC RBC AUTO-ENTMCNC: 31.7 G/DL (ref 31.4–37.4)
MCHC RBC AUTO-ENTMCNC: 32.4 G/DL (ref 31.4–37.4)
MCV RBC AUTO: 109 FL (ref 82–98)
MCV RBC AUTO: 111 FL (ref 82–98)
PLATELET # BLD AUTO: 228 THOUSANDS/UL (ref 149–390)
PLATELET # BLD AUTO: 254 THOUSANDS/UL (ref 149–390)
PMV BLD AUTO: 10.1 FL (ref 8.9–12.7)
PMV BLD AUTO: 9.6 FL (ref 8.9–12.7)
POTASSIUM SERPL-SCNC: 3.6 MMOL/L (ref 3.5–5.3)
RBC # BLD AUTO: 1.62 MILLION/UL (ref 3.88–5.62)
RBC # BLD AUTO: 2.11 MILLION/UL (ref 3.88–5.62)
RH BLD: NEGATIVE
RH BLD: NEGATIVE
SODIUM SERPL-SCNC: 138 MMOL/L (ref 136–145)
SPECIMEN EXPIRATION DATE: NORMAL
TROPONIN I SERPL-MCNC: <0.02 NG/ML
WBC # BLD AUTO: 5.12 THOUSAND/UL (ref 4.31–10.16)
WBC # BLD AUTO: 9.62 THOUSAND/UL (ref 4.31–10.16)

## 2020-01-09 PROCEDURE — 86900 BLOOD TYPING SEROLOGIC ABO: CPT | Performed by: INTERNAL MEDICINE

## 2020-01-09 PROCEDURE — 97163 PT EVAL HIGH COMPLEX 45 MIN: CPT

## 2020-01-09 PROCEDURE — 86921 COMPATIBILITY TEST INCUBATE: CPT

## 2020-01-09 PROCEDURE — 86850 RBC ANTIBODY SCREEN: CPT | Performed by: INTERNAL MEDICINE

## 2020-01-09 PROCEDURE — 86922 COMPATIBILITY TEST ANTIGLOB: CPT

## 2020-01-09 PROCEDURE — 85027 COMPLETE CBC AUTOMATED: CPT | Performed by: PHYSICIAN ASSISTANT

## 2020-01-09 PROCEDURE — 86901 BLOOD TYPING SEROLOGIC RH(D): CPT | Performed by: INTERNAL MEDICINE

## 2020-01-09 PROCEDURE — 86902 BLOOD TYPE ANTIGEN DONOR EA: CPT

## 2020-01-09 PROCEDURE — 99222 1ST HOSP IP/OBS MODERATE 55: CPT | Performed by: PSYCHIATRY & NEUROLOGY

## 2020-01-09 PROCEDURE — 82306 VITAMIN D 25 HYDROXY: CPT | Performed by: HOSPITALIST

## 2020-01-09 PROCEDURE — 80048 BASIC METABOLIC PNL TOTAL CA: CPT | Performed by: PHYSICIAN ASSISTANT

## 2020-01-09 PROCEDURE — 86905 BLOOD TYPING RBC ANTIGENS: CPT

## 2020-01-09 PROCEDURE — P9016 RBC LEUKOCYTES REDUCED: HCPCS

## 2020-01-09 PROCEDURE — 82272 OCCULT BLD FECES 1-3 TESTS: CPT | Performed by: INTERNAL MEDICINE

## 2020-01-09 PROCEDURE — 84484 ASSAY OF TROPONIN QUANT: CPT | Performed by: PHYSICIAN ASSISTANT

## 2020-01-09 PROCEDURE — 86870 RBC ANTIBODY IDENTIFICATION: CPT | Performed by: INTERNAL MEDICINE

## 2020-01-09 PROCEDURE — 30233N1 TRANSFUSION OF NONAUTOLOGOUS RED BLOOD CELLS INTO PERIPHERAL VEIN, PERCUTANEOUS APPROACH: ICD-10-PCS | Performed by: SURGERY

## 2020-01-09 PROCEDURE — 99232 SBSQ HOSP IP/OBS MODERATE 35: CPT | Performed by: INTERNAL MEDICINE

## 2020-01-09 PROCEDURE — 85027 COMPLETE CBC AUTOMATED: CPT | Performed by: INTERNAL MEDICINE

## 2020-01-09 RX ORDER — SODIUM CHLORIDE, SODIUM LACTATE, POTASSIUM CHLORIDE, CALCIUM CHLORIDE 600; 310; 30; 20 MG/100ML; MG/100ML; MG/100ML; MG/100ML
100 INJECTION, SOLUTION INTRAVENOUS CONTINUOUS
Status: DISCONTINUED | OUTPATIENT
Start: 2020-01-09 | End: 2020-01-13

## 2020-01-09 RX ADMIN — GABAPENTIN 300 MG: 300 CAPSULE ORAL at 09:48

## 2020-01-09 RX ADMIN — METOPROLOL TARTRATE 50 MG: 50 TABLET, FILM COATED ORAL at 09:49

## 2020-01-09 RX ADMIN — ASPIRIN 81 MG: 81 TABLET ORAL at 09:48

## 2020-01-09 RX ADMIN — SODIUM CHLORIDE 100 ML/HR: 0.9 INJECTION, SOLUTION INTRAVENOUS at 06:29

## 2020-01-09 RX ADMIN — GABAPENTIN 300 MG: 300 CAPSULE ORAL at 17:56

## 2020-01-09 RX ADMIN — GABAPENTIN 300 MG: 300 CAPSULE ORAL at 22:13

## 2020-01-09 RX ADMIN — FOLIC ACID 1 MG: 1 TABLET ORAL at 09:48

## 2020-01-09 RX ADMIN — QUETIAPINE FUMARATE 200 MG: 100 TABLET ORAL at 09:48

## 2020-01-09 RX ADMIN — DULOXETINE HYDROCHLORIDE 90 MG: 60 CAPSULE, DELAYED RELEASE ORAL at 09:51

## 2020-01-09 RX ADMIN — FLUTICASONE FUROATE AND VILANTEROL TRIFENATATE 1 PUFF: 100; 25 POWDER RESPIRATORY (INHALATION) at 09:48

## 2020-01-09 RX ADMIN — MELATONIN 1000 UNITS: at 09:48

## 2020-01-09 RX ADMIN — NICOTINE 1 PATCH: 14 PATCH TRANSDERMAL at 09:49

## 2020-01-09 RX ADMIN — MIRTAZAPINE 15 MG: 15 TABLET, FILM COATED ORAL at 22:13

## 2020-01-09 RX ADMIN — TAMSULOSIN HYDROCHLORIDE 0.4 MG: 0.4 CAPSULE ORAL at 17:56

## 2020-01-09 RX ADMIN — THIAMINE HCL TAB 100 MG 100 MG: 100 TAB at 09:48

## 2020-01-09 RX ADMIN — ALBUTEROL SULFATE 2 PUFF: 90 AEROSOL, METERED RESPIRATORY (INHALATION) at 23:12

## 2020-01-09 RX ADMIN — ATORVASTATIN CALCIUM 40 MG: 40 TABLET, FILM COATED ORAL at 09:48

## 2020-01-09 RX ADMIN — AMLODIPINE BESYLATE 10 MG: 10 TABLET ORAL at 09:48

## 2020-01-09 RX ADMIN — QUETIAPINE FUMARATE 600 MG: 300 TABLET ORAL at 22:13

## 2020-01-09 NOTE — ASSESSMENT & PLAN NOTE
· Episode of right-of-sternum sharp chest pain which comes on and resolves spontaneously  · ECG without ischemic changed  · Initial trop negative - will trend  · SOLOMON score = 2  · Recommend outpatient stress test and follow up with his Cardiologist, Dr Wilde Flight   · Consider GI in nature in the setting of poor PO intake   Trial antacid

## 2020-01-09 NOTE — ASSESSMENT & PLAN NOTE
Patient with history of alcohol abuse and poor nutritional intake  He currently exhibits poor appetite and is barely eating his meals  Psychiatry is unable to take patient given nutritional status  Nutrition on board, appreciate recs  Folate/B12 within normal limits  · Continue with thiamine/folate   · Continue Protonix, planning for EGD today

## 2020-01-09 NOTE — PROGRESS NOTES
Progress Note - Lavinia Gamez 1950, 81 y o  male MRN: [id number]    Unit/Bed#: 2 211-01 Encounter: [id number]    Primary Care Provider: Pablo Zhu DO   Date and time admitted to hospital: 1/8/2020  1:34 PM        Dehydration  Assessment & Plan  Dehydration, present on admission, secondary to poor oral intake, being treated with IV fluids    Inadequate oral nutritional intake  Assessment & Plan  Patient with history of alcohol abuse and poor nutritional intake  He currently exhibits poor appetite and is barely eating his meals  Psychiatry is unable to take patient given nutritional status  Nutrition on board, appreciate recs  Continue with thiamine/folate and check folate/iron panel and alex b12- replete if needed    Tobacco abuse  Assessment & Plan  · Encouraged cessation  · Nicotine patch    Anemia  Assessment & Plan  · Hemoglobin 9 7 on admission,  · Status post IV fluids, HGB 7 3 this morning  · Trend CBC daily, might require blood transfusion if HGB trends less than 7  · Check B12/folate and iron panel    Chronic pain  Assessment & Plan  · PDMP reviewed - patient has not had tramadol filled since May 2019    Dyslipidemia  Assessment & Plan  · Continue statin    COPD without exacerbation ([facility] [address] )  Assessment & Plan  · Continue albuterol p r n  And Breo    History of CVA (cerebrovascular accident)  Assessment & Plan  · Patient reports history of "mini stroke"  · He admits to being non-compliant with his aspirin  · Continue aspirin and statin  · CT head on admission with no acute intracranial abnormality   · Neurochecks given syncope     MDD (major depressive disorder)  Assessment & Plan  · With suicidal ideation   · 1:1 and Psych consulted   · Continue Cymbalta, Seroquel, trazodone     Alcohol abuse  Assessment & Plan  · Patient admits to drinking a fifth of vodka daily   He reports that he has been only drinking 1 drink per day for the last 2 days however  · CIWA protocol  · Thiamine and folate Suicidal ideation  Assessment & Plan  · Patient with history of MDD  Has had psychiatric hospitalization in the past  · Patient admits to suicidal ideation but denies plan  "Even if I did I wouldn't tell you" he says  · 1:1  · Psych consulted, appreciate recommendations    Chest pain  Assessment & Plan  · Episode of right-of-sternum sharp chest pain which comes on and resolves spontaneously  · ECG without ischemic changed  · Initial trop negative - will trend  · SOLOMON score = 2  · Recommend outpatient stress test and follow up with his Cardiologist, Dr Maddie Arcos   · Consider GI ion nature in the setting of poor PO intake  Trial antacid     Essential hypertension  Assessment & Plan  · Continue amlodipine, metoprolol tartrate     * Syncope  Assessment & Plan  · With episode of "room spinning" dizziness  Only occurs when patient is standing  Very poor PO intake reported over last 5 days  Sounds orthostatic in nature  Patient has already received 2L bolused (1 via EMS and 1 in ED)  Check orthostatic BPs  · Patient no longer dizzy  · Discontinue IVfs  · On telemetry  · Fall precautions  · Follow echo  · CT head on admission with microangiopathic changes but no acute intracranial abnormality      VTE Pharmacologic Prophylaxis:   Pharmacologic: Enoxaparin (Lovenox)  Mechanical VTE Prophylaxis in Place: Yes    Patient Centered Rounds: I have performed bedside rounds with nursing staff today  Discussions with Specialists or Other Care Team Provider:  Discussed with psychiatry    Education and Discussions with Family / Patient:  Discussed with patient    Time Spent for Care: 30 minutes  More than 50% of total time spent on counseling and coordination of care as described above      Current Length of Stay: 0 day(s)    Current Patient Status: Inpatient   Certification Statement: The patient, admitted on an observation basis, will now require > 2 midnight hospital stay due to poor nutritional intake, anemia    Discharge Plan:  Tomorrow    Code Status: Level 1 - Full Code      Subjective:   No overnight events  Patient reports the chest pain feels better but he has no appetite    Objective:     Vitals:   Temp (24hrs), Av 6 °F (37 °C), Min:98 3 °F (36 8 °C), Max:99 1 °F (37 3 °C)    Temp:  [98 3 °F (36 8 °C)-99 1 °F (37 3 °C)] 98 6 °F (37 °C)  HR:  [] 81  Resp:  [14-19] 16  BP: ()/(47-73) 90/47  SpO2:  [97 %-100 %] 98 %  Body mass index is 24 06 kg/m²  Input and Output Summary (last 24 hours): Intake/Output Summary (Last 24 hours) at 2020 1705  Last data filed at 2020 1017  Gross per 24 hour   Intake 1170 ml   Output 800 ml   Net 370 ml       Physical Exam:     Physical Exam   Constitutional: He is oriented to person, place, and time  No distress  Cardiovascular: Normal rate and regular rhythm  Pulmonary/Chest: Effort normal and breath sounds normal  No stridor  No respiratory distress  Abdominal: Soft  Bowel sounds are normal  He exhibits no distension  Musculoskeletal: Normal range of motion  He exhibits no edema, tenderness or deformity  Neurological: He is alert and oriented to person, place, and time  Skin: Skin is warm  He is not diaphoretic  No erythema  Additional Data:     Labs:    Results from last 7 days   Lab Units 20  0555  20  1425   WBC Thousand/uL 5 12  --  10 67*   HEMOGLOBIN g/dL 7 3*  --  9 7*   HEMATOCRIT % 23 0*  --  29 5*   PLATELETS Thousands/uL 228   < > 309   NEUTROS PCT %  --   --  88*   LYMPHS PCT %  --   --  6*   MONOS PCT %  --   --  5   EOS PCT %  --   --  0    < > = values in this interval not displayed       Results from last 7 days   Lab Units 20  0555 20  1425   SODIUM mmol/L 138 135*   POTASSIUM mmol/L 3 6 4 0   CHLORIDE mmol/L 110* 105   CO2 mmol/L 22 17*   BUN mg/dL 30* 19   CREATININE mg/dL 0 78 0 95   ANION GAP mmol/L 6 13   CALCIUM mg/dL 8 0* 8 5   ALBUMIN g/dL  --  2 4*   TOTAL BILIRUBIN mg/dL  --  0 37   ALK PHOS U/L --  64   ALT U/L  --  9*   AST U/L  --  19   GLUCOSE RANDOM mg/dL 94 99                           * I Have Reviewed All Lab Data Listed Above  * Additional Pertinent Lab Tests Reviewed: [address] 66 Admission Reviewed      Recent Cultures (last 7 days):           Last 24 Hours Medication List:     Current Facility-Administered Medications:  albuterol 2 puff Inhalation Q4H PRN Ivette Batsheva, PA-C   amLODIPine 10 mg Oral Daily Ivette Batsheva, PA-C   aspirin 81 mg Oral Daily Ivette Batsheva, PA-C   atorvastatin 40 mg Oral Daily Ivette Batsheva, PA-C   cholecalciferol 1,000 Units Oral Daily Ivette Batsheva, PA-C   DULoxetine 90 mg Oral Daily Ivette Batsheva, PA-C   enoxaparin 40 mg Subcutaneous Daily Ivette Batsheva, PA-C   fluticasone 2 spray Nasal Daily Ivette Batsheva, PA-C   fluticasone-vilanterol 1 puff Inhalation Daily Ivette Batsheva, PA-C   folic acid 1 mg Oral Daily Ivette Batsheva, PA-C   gabapentin 300 mg Oral TID Ivette Batsheva, PA-C   lactated ringers 75 mL/hr Intravenous Continuous Stu Damon MD   metoprolol tartrate 50 mg Oral Daily Ivette Batsheva, PA-C   mirtazapine 15 mg Oral HS Ivette Batsheva, PA-C   nicotine 1 patch Transdermal Daily Ivette Batsheva, PA-C   QUEtiapine 200 mg Oral Daily Ivette Batsheva, PA-C   QUEtiapine 600 mg Oral HS Ivette Batsheva, PA-C   tamsulosin 0 4 mg Oral Daily With [employer], PA-C   thiamine 100 mg Oral Daily Ivette Batsheva, PA-C   traZODone 50 mg Oral HS PRN Ivette Batsheva, PA-C        Today, Patient Was Seen By: Stu Damon MD    ** Please Note: Dictation voice to text software may have been used in the creation of this document   **

## 2020-01-09 NOTE — ASSESSMENT & PLAN NOTE
· Hemoglobin 9 7 on admission, likely hemoconcentrated from dehydration  · Status post IV fluids, HGB 7 3 -> 5 8 for which he was given 2 units of pRBCs and HGB improved to 8 7   · GI following as FOBT (+) and had hematemesis  · History of heavy alcohol abuse, which drinking a 5th of vodka a day  · Planning for EGD today  · Continue NPO, IVF, Protonix gtt   · Hold ASA and Lovenox   · Trend CBC daily, might require blood transfusion if HGB trends less than 7

## 2020-01-09 NOTE — ASSESSMENT & PLAN NOTE
· Patient admits to drinking a fifth of vodka daily   He reports that he has been only drinking 1 drink per day for the last 2 days however  · [facility] protocol  · Thiamine and folate

## 2020-01-09 NOTE — ASSESSMENT & PLAN NOTE
· Hemoglobin 9 7 on admission,  · Status post IV fluids, HGB 7 3 this morning  · Trend CBC daily, might require blood transfusion if HGB trends less than 7  · Check B12/folate and iron panel

## 2020-01-09 NOTE — ASSESSMENT & PLAN NOTE
Patient with history of alcohol abuse and poor nutritional intake  He currently exhibits poor appetite and is barely eating his meals  Psychiatry is unable to take patient given nutritional status  Nutrition on board, appreciate recs  Continue with thiamine/folate and check folate/iron panel and alex b12- replete if needed

## 2020-01-09 NOTE — ASSESSMENT & PLAN NOTE
· Patient with history of MDD  Has had psychiatric hospitalization in the past  · Patient admits to suicidal ideation but denies plan   "Even if I did I wouldn't tell you" he says  · Psych consulted, appreciate recommendations  · Continue current medications and one-to-one

## 2020-01-09 NOTE — ASSESSMENT & PLAN NOTE
Dehydration, present on admission, secondary to poor oral intake, being treated with IV fluids  Resolved

## 2020-01-09 NOTE — ASSESSMENT & PLAN NOTE
· Patient admits to drinking a fifth of vodka daily   He reports that he has been only drinking 1 drink per day for the last 2 days however  · [facility] protocol  · Thiamine and folate

## 2020-01-09 NOTE — CONSULTS
Consultation - [address]  81 y o  male MRN: [id number]  Unit/Bed#: CW2 211-01 Encounter: [id number]      Chief Complaint: "I feel sick and have no appetite"    History of Present Illness   Physician Requesting Consult: Padilla Cabrera MD  Reason for Consult / Principal Problem: Suicidal ideation without plan     Rohan Vance is a 81 y o  male past medical history of COPD, hypertension, dyslipidemia, stroke and TIA, depression, and alcohol abuse presents with a fall with associated loss of counsciousness, dizziness, and chest pain  Additionally, he endorses suicidal ideation, but currently denies a plan or intent  He reports that he usually drinks about a fifth of vodka per day, although within the past couple of days he has not been drinking very much or eating well  He reports poor sleep and increased depressed mood for the past couple of months  He reports poor energy and weight loss as well  He says he has not had an appetite for the past 5 days  He states that he is taking psychotropic medications, he also states that the does not have any new stressors at home, he states that his son is very supportive  He denies auditory hallucinations or other psychotic symptoms and denies any symptoms of elana  Psychiatric Review Of Systems:  sleep: yes  appetite changes: yes  weight changes: yes  energy/anergy: yes  interest/pleasure/anhedonia: no  somatic symptoms: no  anxiety/panic: no  elana: no  guilty/hopeless: no  self injurious behavior/risky behavior: no    Historical Information   Past Psychiatric History:   He was most recently hospitalized at [facility] 4/8/19-5/15/19 for major depressive episode with psychotic features, the stay was complicated by a TIA on 4/3/05  He also reports distant hospitalizations at [facility] and [facility]  Currently in treatment with Que Gunter PA-C     Past Suicide attempts:  Yes  Past Violent behavior:  None  Past Psychiatric medication trial:  Cymbalta, Seroquel, gabapentin, Xanax, Remeron, trazodone    Substance Abuse History:  He has a history of heroin, 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  He does have a longstanding history of alcohol use, he drinks a 5th of vodka daily  He does not have any history of withdrawal   He does have a history of DUI     I have assessed this patient for substance use within the past 12 months     History of IP/OP rehabilitation program:  He was at [facility]   Smoking history:  Half a pack a day  Family Psychiatric History:   Denies any    Social History  Education: high school diploma/GED  Learning Disabilities: None  Marital history:   Living arrangement, social support: He live with his son and daughter-in-law  Occupational History: on permanent disability  Functioning Relationships: good support system    Other Pertinent History: DUI in the past, no  history    Traumatic History:   Abuse: Denies any  Other Traumatic Events: None    Past Medical History:   Diagnosis Date    BPH (benign prostatic hyperplasia)     [facility]cere[facility] [address] [facility]     Head injury     Hypertension     Metatarsal fracture     TIA (transient ischemic attack)        Medical Review Of Systems:  Review of Systems - Negative except depression, poor appetite, poor sleep, dizziness, difficulty ambulating and body ache, all other systems reviewed were negative    Meds/Allergies   current meds:   Current Facility-Administered Medications   Medication Dose Route Frequency    albuterol (PROVENTIL HFA,VENTOLIN HFA) inhaler 2 puff  2 puff Inhalation Q4H PRN    amLODIPine (NORVASC) tablet 10 mg  10 mg Oral Daily    aspirin (ECOTRIN LOW STRENGTH) EC tablet 81 mg  81 mg Oral Daily    atorvastatin (LIPITOR) tablet 40 mg  40 mg Oral Daily    cholecalciferol (VITAMIN D3) tablet 1,000 Units  1,000 Units Oral Daily    DULoxetine (CYMBALTA) delayed release capsule 90 mg  90 mg Oral Daily    enoxaparin (LOVENOX) subcutaneous injection 40 mg  40 mg Subcutaneous Daily    fluticasone (FLONASE) 50 mcg/act nasal spray 2 spray  2 spray Nasal Daily    fluticasone-vilanterol (BREO ELLIPTA) 100-25 mcg/inh inhaler 1 puff  1 puff Inhalation Daily    folic acid (FOLVITE) tablet 1 mg  1 mg Oral Daily    gabapentin (NEURONTIN) capsule 300 mg  300 mg Oral TID    metoprolol tartrate (LOPRESSOR) tablet 50 mg  50 mg Oral Daily    mirtazapine (REMERON) tablet 15 mg  15 mg Oral HS    nicotine (NICODERM CQ) 14 mg/24hr TD 24 hr patch 1 patch  1 patch Transdermal Daily    QUEtiapine (SEROquel) tablet 200 mg  200 mg Oral Daily    QUEtiapine (SEROquel) tablet 600 mg  600 mg Oral HS    sodium chloride 0 9 % infusion  100 mL/hr Intravenous Continuous    tamsulosin (FLOMAX) capsule 0 4 mg  0 4 mg Oral Daily With Dinner    thiamine (VITAMIN B1) tablet 100 mg  100 mg Oral Daily    traZODone (DESYREL) tablet 50 mg  50 mg Oral HS PRN     No Known Allergies    Objective   Vital signs in last 24 hours:  Temp:  [97 9 °F (36 6 °C)-99 1 °F (37 3 °C)] 98 3 °F (36 8 °C)  HR:  [] 80  Resp:  [14-19] 18  BP: ()/(59-73) 113/64      Intake/Output Summary (Last 24 hours) at 1/9/2020 1150  Last data filed at 1/9/2020 1017  Gross per 24 hour   Intake 2170 ml   Output 800 ml   Net 1370 ml       Mental Status Evaluation:  Appearance:  disheveled and older than stated age   Behavior:  evasive   Speech:  soft   Mood:  depressed   Affect:  constricted   Language: naming objects and repeating phrases   Thought Process:  goal directed   Associations: intact associations   Thought Content:  normal   Perceptual Disturbances: None   Risk Potential: Suicidal Ideations without plan   Sensorium:  person, place, time/date and situation   Memory:  recent and remote memory grossly intact   Cognition:  grossly intact   Consciousness:  alert and awake    Attention: attention span and concentration were age appropriate   Intellect: within normal limits   Fund of Knowledge: awareness of current events: Fair, past history: Fair and vocabulary:  fair   Insight:  limited   Judgment: limited   Muscle Strength and Tone: Within normal limits   Gait/Station: Difficulty ambulating   Motor Activity: no abnormal movements     Lab Results:    I have personally reviewed all pertinent laboratory/tests results  Labs in last 72 hours:   Recent Labs     01/08/20  1425  01/09/20  0555   WBC 10 67*  --  5 12   RBC 2 72*  --  2 11*   HGB 9 7*  --  7 3*   HCT 29 5*  --  23 0*      < > 228   RDW 12 7  --  12 6   NEUTROABS 9 44*  --   --    SODIUM 135*  --  138   K 4 0  --  3 6     --  110*   CO2 17*  --  22   BUN 19  --  30*   CREATININE 0 95  --  0 78   GLUC 99  --  94   CALCIUM 8 5  --  8 0*   AST 19  --   --    ALT 9*  --   --    ALKPHOS 64  --   --    TP 6 2*  --   --    ALB 2 4*  --   --    TBILI 0 37  --   --     < > = values in this interval not displayed  Medical alcohol level   Lab Results   Component Value Date    ETOH <3 01/08/2020       Code Status: )Level 1 - Full Code    Assessment/Plan     Assessment:  Tad Montelongo is a 81 y o  male with medical history of  depression, alcohol abuse, hypertension dyslipidemia, chronic pain, anemia, COPD who presented to the hospital after he fell and hit his head  He states that he had been depressed, he had no appetite for the last 5 days, he claimed that he has lost some weight, he has some suicidal ideation without plan or intent at this moment  He states that he had been taking his medication as prescribed but continued to have any issue with appetite  He also continued to drink a 5th of vodka daily , but alcohol was negative when he arrived  He has sleep difficulties  He denies any new stressors     Diagnosis:  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

## 2020-01-09 NOTE — ASSESSMENT & PLAN NOTE
· Episode of right-of-sternum sharp chest pain which comes on and resolves spontaneously  · ECG without ischemic changed  · Initial trop negative - will trend  · SOLOMON score = 2  · Recommend outpatient stress test and follow up with his Cardiologist, Dr Josh Espinosa   · Consider GI ion nature in the setting of poor PO intake   Trial antacid

## 2020-01-09 NOTE — ASSESSMENT & PLAN NOTE
· Patient with history of MDD  Has had psychiatric hospitalization in the past  · Patient admits to suicidal ideation but denies plan   "Even if I did I wouldn't tell you" he says  · 1:1  · Psych consulted, appreciate recommendations

## 2020-01-09 NOTE — UTILIZATION REVIEW
Initial Clinical Review    Admission: Date/Time/Statement: 01/08/2020 @ 1602  Orders Placed This Encounter   Procedures    Place in Observation     Standing Status:   Standing     Number of Occurrences:   1     Order Specific Question:   Admitting Physician     Answer:   Berhane Wiggins [[id number]]     Order Specific Question:   Level of Care     Answer:   Med Surg [[id number]]     ED Arrival Information     Expected Arrival Acuity Means of Arrival Escorted By Service Admission Type    - 1/8/2020 13:34 Emergent Ambulance [facility] Hospitalist Emergency    Arrival Complaint    dizziness        01/08/20 1342 First Provider Evaluation of Patient Meri Tomas     Chief Complaint   Patient presents with    Syncope     Patient had dizziness for last few days, worse today, had an unwitnessed syncopal episode  Also states chest pain starting 730am this morning  Assessment/Plan: 81year old male, presented to the ED from home via EMS  Admitted as Observation due to syncope  Patient is a very vague historian and is not very forthcoming with information even when asked directed specific questions  He states that this morning he got dizzy and fell and hit his head  +LOC  He states that it happened when he was trying to go upstairs  To describe the dizziness he states that everything was spinning"  He estimates that the symptoms lasted for hours  He states that his symptoms are only present when standing  He reports 3-4 prior episodes over the last 1 year  He states that this episode prompted him to come to the emergency department because I just did not feel well"  He reports that when EMS came to get him his blood pressure was 90/50  He denies any prodromal symptoms  He reports cough, runny nose, watery eyes  He reports decreased p o  Intake and states that he has not eaten any food over the last 5 days  He then states that he had 1 cup of soup    He states that normally he drinks 1 fifth of vodka per day but over the last 2 days has only had 1 drink each day  He also reports an episode of "sharp" chest pain located just to the right of his sternum which has been ongoing for the last 1 week on and off  He denies any alleviating or aggravating factors  He states it just happens"  He denies any known a personal or family cardiac history  He reports that he has not been taking his aspirin but otherwise states that he takes his medications  The patient does admit to suicidal ideation but denies any planned  He then states even if I did, I wouldn't tell you"  He reports prior psychiatric hospitalizations for depression  Will continue IVF  Fall precautions  Update echo  Telemetry  CT head on admission with microangiopathic changes but no acute intracranial abnormality  OBSERVATION 01/08/2020 @ 1602, CONVERTED TO INPATIENT ADMISSION 01/09/2020 @ 1542, DUE TO FURTHER DIAGNOSTIC WORKUP, REQUIRING AT LEAST 2 MIDNIGHT STAY  01/09/20 1542  Inpatient Admission Once     Transfer Service: Hospitalist       Question Answer Comment   Admitting Physician Felisha Hilliard    Level of Care Med Surg    Estimated length of stay More than 2 Midnights    Certification I certify that inpatient services are medically necessary for this patient for a duration of greater than two midnights  See H&P and MD Progress Notes for additional information about the patient's course of treatment  01/09/2020  Consult Behavioral Health: 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  Alcohol abuse uncomplicated  Plan:   Continue medical management  Continue one-to-one observation  Continue current psychotropic medication    Patient was referred to host program       ED Triage Vitals   Temperature Pulse Respirations Blood Pressure SpO2   01/08/20 1608 01/08/20 1346 01/08/20 1346 01/08/20 1346 01/08/20 1346   97 9 °F (36 6 °C) (!) 115 18 104/59 100 %      Temp Source Heart Rate Source Patient Position - Orthostatic VS BP Location FiO2 (%)   20 1608 20 1346 20 1346 20 1346 --   Oral Monitor Lying Right arm       Pain Score       20 1346       8        Wt Readings from Last 1 Encounters:   20 72 8 kg (160 lb 9 6 oz)     Additional Vital Signs:   Date/Time  Temp  Pulse  Resp  BP  MAP (mmHg)  SpO2  O2 Device  Patient Position - Orthostatic VS   20 09:49:37    100    129/69  89  99 %       20 06:49:37  98 3 °F (36 8 °C)  100  16  111/60  77  97 %  None (Room air)  Lying   20 22:26:33  99 1 °F (37 3 °C)  94  14  103/65  78  97 %       20 19:48:03  98 3 °F (36 8 °C)  116Abnormal   19  115/68  84  100 %       20 1812    112Abnormal     107/73    98 %  None (Room air)  Sitting   20 1713    118Abnormal   16  110/64    99 %  None (Room air)  Lying   20 1608  97 9 °F (36 6 °C)                 20 1600    117Abnormal   17  152/73    99 %  None (Room air)  Lying   20 1458    119Abnormal   18  97/61    99 %  None (Room air)  Lying   20 1347    115Abnormal   18  104/59    100 %  None (Room air)  Lying     CIWA - Ar Score 2, 3    Pertinent Labs/Diagnostic Test Results:   2020 @ 1604  Chest X:  Linear atelectasis versus scarring at the left lung base   No focal consolidation, pleural effusion or pneumothorax  Age-indeterminate wedge compression deformity of an upper lumbar vertebral body, seen on the lateral view  2020 @ 1453  CT head:  No acute intracranial abnormality   Microangiopathic changes  2020 @ 1456 CT C Spine:  No cervical spine fracture or traumatic malalignment      2020 @1344  EC, Sinus Tachycardia    Results from last 7 days   Lab Units 20  0555 20  1953 20  1425   WBC Thousand/uL 5 12  --  10 67*   HEMOGLOBIN g/dL 7 3*  --  9 7*   HEMATOCRIT % 23 0*  --  29 5*   PLATELETS Thousands/uL 228 306 309   NEUTROS ABS Thousands/µL --   --  9 44*     Results from last 7 days   Lab Units 01/09/20  0555 01/08/20  1425   SODIUM mmol/L 138 135*   POTASSIUM mmol/L 3 6 4 0   CHLORIDE mmol/L 110* 105   CO2 mmol/L 22 17*   ANION GAP mmol/L 6 13   BUN mg/dL 30* 19   CREATININE mg/dL 0 78 0 95   EGFR ml/min/1 73sq m 93 82   CALCIUM mg/dL 8 0* 8 5     Results from last 7 days   Lab Units 01/08/20  1425   AST U/L 19   ALT U/L 9*   ALK PHOS U/L 64   TOTAL PROTEIN g/dL 6 2*   ALBUMIN g/dL 2 4*   TOTAL BILIRUBIN mg/dL 0 37     Results from last 7 days   Lab Units 01/09/20  0555 01/08/20  1425   GLUCOSE RANDOM mg/dL 94 99     Results from last 7 days   Lab Units 01/09/20  0144 01/08/20  2253 01/08/20  1953 01/08/20  1425   TROPONIN I ng/mL <0 02 <0 02 <0 02 <0 02     Results from last 7 days   Lab Units 01/08/20  1425   LIPASE u/L 81     Results from last 7 days   Lab Units 01/08/20  1952   ETHANOL LVL mg/dL <3     ED Treatment:   Medication Administration from 01/08/2020 1334 to 01/08/2020 1938       Date/Time Order Dose Route Action     01/08/2020 1500 diazepam (VALIUM) tablet 5 mg 5 mg Oral Given     01/08/2020 1501 dextrose 5 % and sodium chloride 0 9 % bolus 1,000 mL 1,000 mL Intravenous New Bag     01/08/2020 1601 diazepam (VALIUM) tablet 5 mg 5 mg Oral Given     01/08/2020 1710 ondansetron (ZOFRAN) injection 4 mg 4 mg Intravenous Given        Past Medical History:   Diagnosis Date    BPH (benign prostatic hyperplasia)     [facility]) ([facility] [address] )     Head injury     Hypertension     Metatarsal fracture     TIA (transient ischemic attack)      Present on Admission:   Essential hypertension    Admitting Diagnosis: Suicidal ideation [R45 851]  Dizziness [R42]  Alcohol abuse [F10 10]  Syncope [R55]  Chest pain [R07 9]  Age/Sex: 81 y o  male  Admission Orders:  Scheduled Medications:  Medications:  amLODIPine 10 mg Oral Daily   aspirin 81 mg Oral Daily   atorvastatin 40 mg Oral Daily   cholecalciferol 1,000 Units Oral Daily   DULoxetine 90 mg Oral Daily   enoxaparin 40 mg Subcutaneous Daily   fluticasone 2 spray Nasal Daily   fluticasone-vilanterol 1 puff Inhalation Daily   folic acid 1 mg Oral Daily   gabapentin 300 mg Oral TID   metoprolol tartrate 50 mg Oral Daily   mirtazapine 15 mg Oral HS   nicotine 1 patch Transdermal Daily   QUEtiapine 200 mg Oral Daily   QUEtiapine 600 mg Oral HS   tamsulosin 0 4 mg Oral Daily With Dinner   thiamine 100 mg Oral Daily   Continuous IV Infusions:  sodium chloride 100 mL/hr Intravenous Continuous   PRN Meds:  albuterol 2 puff Inhalation Q4H PRN   traZODone 50 mg Oral HS PRN   Continual Suicidal observation  TELM  CIWA-Ar Score  Neuro checks q 4h  Melvin SCDs  IP CONSULT TO CASE MANAGEMENT  IP CONSULT TO NUTRITION SERVICES  IP CONSULT TO PSYCHIATRY    Network Utilization Review Department  [email or web address]  ATTENTION: Please call with any questions or concerns to [phone] and carefully listen to the prompts so that you are directed to the right person  All voicemails are confidential   José Miguel Pappas all requests for admission clinical reviews, approved or denied determinations and any other requests to dedicated fax number below belonging to the campus where the patient is receiving treatment   List of dedicated fax numbers for the Facilities:  [address] DENIALS (Administrative/Medical Necessity) [phone]   [address] [place] [address] (Maternity/NICU/Pediatrics) [phone]   Carlo Eller [phone]   Vanessa Magdaleno [phone]   [place] [phone]   [facility] [phone]   [address] [facility] [phone]   [facility]  [phone]   [address] [facility] [address]  [address] [facility] [address] [facility] [phone]

## 2020-01-09 NOTE — PLAN OF CARE
Problem: PHYSICAL THERAPY ADULT  Goal: Performs mobility at highest level of function for planned discharge setting  See evaluation for individualized goals  Description  Treatment/Interventions: Functional transfer training, LE strengthening/ROM, Therapeutic exercise, Endurance training, Patient/family training, Bed mobility, Gait training  Equipment Recommended: Tano Altman       See flowsheet documentation for full assessment, interventions and recommendations  Note:   Prognosis: Good  Problem List: Decreased endurance, Impaired balance, Decreased mobility, Decreased coordination, Decreased safety awareness, Decreased cognition, Impaired judgement  Assessment: Pt is 81 y o  male seen for high complexity PT evaluation s/p admission to [facility] on 1/08/2020 s/p fall and associated loss of conscious; also reports of symptoms of dizziness during activity  CT head/cervical (-) for acute fx and acute pathology  Comorbidities affecting pt's physical performance at time of assessment include: ETOH abuse, chest pain, suicidal ideation, anemia, dehydration, inadequate oral nutritional intake, tobacco abuse, chronic pain, dyslipidemia, COPD, major depressive disorder, and essential HTN  PTA, pt independent with ADLs, IADLs and functional mobility with SPC or RW  Pt resides with his son and DIL in a [address] with [address], stair glide to 2nd floor  Pt present with mild irritation and disinterested in PT session; required education and motivation to participate into therapy  Pt went from supine to sit with min A x 1 for UB support; Pt deferred further OOB activity 2* fatigue and self-limiting behavior? Despite extensive education on the importance of OOB activity  Orthostatic vital (-) when transferred from supine to sitting EOB  However, pt reported increased dizziness when moving sitting EOB to supine/rolling; Pt may benefit from r/o BPPV due to these symptoms    Patient's decreased mobility level and increased fall risk is secondary to deficits in fatigue, dizziness with positional change, activity tolerance, judgement, safety awareness, insight into deficits, generalized deconditioning/weakness, and self-limiting behavior?   Current clinical presentation is unstable/unpredictable seen in pt's presentation of ongoing medical management, increased reliance on assistance compared to PLOF, impaired judgement/safety awareness, and significant PMH  Pt to benefit from continued PT tx to address deficits as defined above and maximize level of functional independent mobility and consistency  From PT/mobility standpoint, recommendation at time of d/c would be STR vs home with family support pending increase mobility and stair trial  PT to follow for OOB goals  Barriers to Discharge: Inaccessible home environment     Recommendation: (Home with family vs rehab pending OOB progress)     PT - OK to Discharge: No(pending stair and ambulation distances)    See flowsheet documentation for full assessment

## 2020-01-09 NOTE — ASSESSMENT & PLAN NOTE
· With suicidal ideation, psych following   · Continue Cymbalta, Seroquel, trazodone   · Continue one-to-one

## 2020-01-09 NOTE — PHYSICAL THERAPY NOTE
Physical Therapy Evaluation    Patient Name: Geovany SCHAEFER Date: 1/9/2020     Problem List  Principal Problem:    Syncope  Active Problems:    Essential hypertension    Chest pain    Suicidal ideation    Alcohol abuse    MDD (major depressive disorder)    History of CVA (cerebrovascular accident)    COPD without exacerbation ([facility] )    Dyslipidemia    Chronic pain    Anemia    Tobacco abuse    Inadequate oral nutritional intake    Dehydration       Past Medical History  Past Medical History:   Diagnosis Date    BPH (benign prostatic hyperplasia)     [facility] accident) [facility] )     Head injury     Hypertension     Metatarsal fracture     TIA (transient ischemic attack)         Past Surgical History  Past Surgical History:   Procedure Laterality Date    ABDOMINAL SURGERY      ANKLE SURGERY      BACK SURGERY      ELBOW SURGERY      JOINT REPLACEMENT      KNEE SURGERY      LIVER SURGERY          01/09/20 1147   Note Type   Note type Eval only   Pain Assessment   Pain Assessment 0-10   Pain Score 8   Hospital Pain Intervention(s) Ambulation/increased activity;Repositioned; Emotional support   Response to Interventions Tolerated   Home Living   Type of Home House  [facility] with 3 RENETTA)   Home Layout Two level;Bed/bath upstairs;Stairs to enter with rails  (stair glide to 2nd floor)   Bathroom Equipment Shower chair   Home Equipment Cane;Walker   Additional Comments Pt resides with his son and AMBAR in a [address] with [address]TA  Stair glide to bath/bed on 2nd floor  Pt uses SPC on 2nd floor and RW on the first floor  Pt reports independent with household distances with SPC or RW      Prior Function   Level of [place] Independent with ADLs and functional mobility   Lives With Family;Son  (AMBAR)   Receives Help From Family   ADL Assistance Independent   IADLs Independent   Falls in the last 6 months 1 to 4   Restrictions/Precautions   Weight Bearing Precautions Per Order No   Braces or Orthoses   (none)   Other Precautions 1:1;Suicidal;Cognitive; Fall Risk;Multiple lines;Telemetry   General   Family/Caregiver Present No   Cognition   Orientation Level Oriented X4   Memory Decreased recall of precautions   Following Commands Follows one step commands with increased time or repetition   Comments Initally, Pt somewhat irritated; required education/motivation to participate in therapy  Presents with self-limiting behavior? RLE Assessment   RLE Assessment WFL   LLE Assessment   LLE Assessment WFL   Bed Mobility   Rolling R 5  Supervision   Rolling L 5  Supervision   Supine to Sit 4  Minimal assistance   Additional items Assist x 1; Increased time required   Sit to Supine 4  Minimal assistance   Additional items Assist x 1; Increased time required;LE management   Additional Comments No dizziness reported at rest; Pt moved from supine to sit with min A x 1 for UB support with HOB elevated for ease  Pt reports mild onset of dizziness upon sitting up  Supine BP: 111/61 and Sitting EOB BP: 117/96  Pt deferred standing trial 2* fatigue and self-limiting behavior?   Pt tolerated sitting EOB ~4 minutes at supervision level  Pt returned back to supine however, reported increased in dizziness when returning to supine  Transfers   Sit to Stand Unable to assess   Additional Comments Pt refused 2* fatigue and self-limiting behavior  Continued to refuse despite education   Balance   Static Sitting Fair   Dynamic Sitting Fair -   Endurance Deficit   Endurance Deficit Yes   Endurance Deficit Description fatigue, onset of dizziness, self-limiting behavior? Activity Tolerance   Activity Tolerance Patient limited by fatigue   Nurse Made Aware Yes, GARO Call   Assessment   Prognosis Good   Problem List Decreased endurance; Impaired balance;Decreased mobility; Decreased coordination;Decreased safety awareness;Decreased cognition; Impaired judgement   Assessment Pt is 81 y o  male seen for high complexity PT evaluation s/p admission to [facility] on 1/08/2020 s/p fall and associated loss of conscious; also reports of symptoms of dizziness during activity  CT head/cervical (-) for acute fx and acute pathology  Comorbidities affecting pt's physical performance at time of assessment include: ETOH abuse, chest pain, suicidal ideation, anemia, dehydration, inadequate oral nutritional intake, tobacco abuse, chronic pain, dyslipidemia, COPD, major depressive disorder, and essential HTN  PTA, pt independent with ADLs, IADLs and functional mobility with SPC or RW  Pt resides with his son and DIL in a [facility] with 3 RENETTA, stair glide to 2nd floor  Pt present with mild irritation and disinterested in PT session; required education and motivation to participate into therapy  Pt went from supine to sit with min A x 1 for UB support; Pt deferred further OOB activity 2* fatigue and self-limiting behavior? Despite extensive education on the importance of OOB activity  Orthostatic vital (-) when transferred from supine to sitting EOB  However, pt reported increased dizziness when moving sitting EOB to supine/rolling; Pt may benefit from r/o BPPV due to these symptoms  Patient's decreased mobility level and increased fall risk is secondary to deficits in fatigue, dizziness with positional change, activity tolerance, judgement, safety awareness, insight into deficits, generalized deconditioning/weakness, and self-limiting behavior?   Current clinical presentation is unstable/unpredictable seen in pt's presentation of ongoing medical management, increased reliance on assistance compared to PLOF, impaired judgement/safety awareness, and significant PMH  Pt to benefit from continued PT tx to address deficits as defined above and maximize level of functional independent mobility and consistency    From PT/mobility standpoint, recommendation at time of d/c would be STR vs home with family support pending increase mobility and stair trial  PT to follow for OOB goals   Barriers to Discharge Inaccessible home environment   Goals   Patient Goals To sleep   STG Expiration Date 01/23/20   Short Term Goal #1 In 1-2 weeks, the patient will complete the following 1) Patient will perform all aspects of bed mobility independently  2)Increase sitting tolerance to 10 minutes  3) Participate in therapy program consisting of strengthening, ROM, and balance  PT to follow for OOB goals  Plan   Treatment/Interventions Functional transfer training;LE strengthening/ROM; Therapeutic exercise; Endurance training;Patient/family training;Bed mobility;Gait training   PT Frequency   (3-5x/wl)   Recommendation   Recommendation   (Home with family vs rehab pending OOB progress)   Equipment Recommended Walker   PT - OK to Discharge No  (pending stair and ambulation distances)   Barthel Index   Feeding 10   Bathing 0   Grooming Score 5   Dressing Score 10   Bladder Score 5   Bowels Score 10   Toilet Use Score 5   Transfers (Bed/Chair) Score 10   Mobility (Level Surface) Score 0   Stairs Score 0   Barthel Index Score 55       Gino Garcia PT, DPT

## 2020-01-09 NOTE — PLAN OF CARE
Problem: Potential for Falls  Goal: Patient will remain free of falls  Description  INTERVENTIONS:  - Assess patient frequently for physical needs  -  Identify cognitive and physical deficits and behaviors that affect risk of falls    -  Keller fall precautions as indicated by assessment   - Educate patient/family on patient safety including physical limitations  - Instruct patient to call for assistance with activity based on assessment  - Modify environment to reduce risk of injury  - Consider OT/PT consult to assist with strengthening/mobility  Outcome: Progressing     Problem: PAIN - ADULT  Goal: Verbalizes/displays adequate comfort level or baseline comfort level  Description  Interventions:  - Encourage patient to monitor pain and request assistance  - Assess pain using appropriate pain scale  - Administer analgesics based on type and severity of pain and evaluate response  - Implement non-pharmacological measures as appropriate and evaluate response  - Consider cultural and social influences on pain and pain management  - Notify physician/advanced practitioner if interventions unsuccessful or patient reports new pain  Outcome: Progressing     Problem: INFECTION - ADULT  Goal: Absence or prevention of progression during hospitalization  Description  INTERVENTIONS:  - Assess and monitor for signs and symptoms of infection  - Monitor lab/diagnostic results  - Monitor all insertion sites, i e  indwelling lines, tubes, and drains  - Monitor endotracheal if appropriate and nasal secretions for changes in amount and color  - Keller appropriate cooling/warming therapies per order  - Administer medications as ordered  - Instruct and encourage patient and family to use good hand hygiene technique  - Identify and instruct in appropriate isolation precautions for identified infection/condition  Outcome: Progressing  Goal: Absence of fever/infection during neutropenic period  Description  INTERVENTIONS:  - Monitor WBC    Outcome: Progressing     Problem: SAFETY ADULT  Goal: Maintain or return to baseline ADL function  Description  INTERVENTIONS:  -  Assess patient's ability to carry out ADLs; assess patient's baseline for ADL function and identify physical deficits which impact ability to perform ADLs (bathing, care of mouth/teeth, toileting, grooming, dressing, etc )  - Assess/evaluate cause of self-care deficits   - Assess range of motion  - Assess patient's mobility; develop plan if impaired  - Assess patient's need for assistive devices and provide as appropriate  - Encourage maximum independence but intervene and supervise when necessary  - Involve family in performance of ADLs  - Assess for home care needs following discharge   - Consider OT consult to assist with ADL evaluation and planning for discharge  - Provide patient education as appropriate  Outcome: Progressing  Goal: Maintain or return mobility status to optimal level  Description  INTERVENTIONS:  - Assess patient's baseline mobility status (ambulation, transfers, stairs, etc )    - Identify cognitive and physical deficits and behaviors that affect mobility  - Identify mobility aids required to assist with transfers and/or ambulation (gait belt, sit-to-stand, lift, walker, cane, etc )  - [place] fall precautions as indicated by assessment  - Record patient progress and toleration of activity level on Mobility SBAR; progress patient to next Phase/Stage  - Instruct patient to call for assistance with activity based on assessment  - Consider rehabilitation consult to assist with strengthening/weightbearing, etc   Outcome: Progressing     Problem: DISCHARGE PLANNING  Goal: Discharge to home or other facility with appropriate resources  Description  INTERVENTIONS:  - Identify barriers to discharge w/patient and caregiver  - Arrange for needed discharge resources and transportation as appropriate  - Identify discharge learning needs (meds, wound care, etc )  - Arrange for interpretive services to assist at discharge as needed  - Refer to Case Management Department for coordinating discharge planning if the patient needs post-hospital services based on physician/advanced practitioner order or complex needs related to functional status, cognitive ability, or social support system  Outcome: Progressing     Problem: Knowledge Deficit  Goal: Patient/family/caregiver demonstrates understanding of disease process, treatment plan, medications, and discharge instructions  Description  Complete learning assessment and assess knowledge base    Interventions:  - Provide teaching at level of understanding  - Provide teaching via preferred learning methods  Outcome: Progressing

## 2020-01-09 NOTE — ASSESSMENT & PLAN NOTE
· With episode of "room spinning" dizziness  Only occurs when patient is standing  Very poor PO intake reported over last 5 days  Sounds orthostatic in nature  Patient has already received 2L bolused (1 via EMS and 1 in ED)   Check orthostatic BPs  · Patient no longer dizzy  · Discontinue IVfs  · On telemetry  · Fall precautions  · Follow echo  · CT head on admission with microangiopathic changes but no acute intracranial abnormality

## 2020-01-09 NOTE — ASSESSMENT & PLAN NOTE
· With episode of "room spinning" dizziness     · That orthostatic hypotension in the setting of dehydration and acute blood loss anemia  · Given IV hydration  · Given 2 units of PRBCs  · Going for EGD today  · Continue Protonix infusion IV fluids  · Up with assistance, fall precautions   · Fall precautions  · Echo, LVEF 60%  · CT head on admission with microangiopathic changes but no acute intracranial abnormality

## 2020-01-10 ENCOUNTER — APPOINTMENT (INPATIENT)
Dept: GASTROENTEROLOGY | Facility: HOSPITAL | Age: 70
DRG: 326 | End: 2020-01-10
Payer: MEDICARE

## 2020-01-10 ENCOUNTER — APPOINTMENT (INPATIENT)
Dept: NON INVASIVE DIAGNOSTICS | Facility: HOSPITAL | Age: 70
DRG: 326 | End: 2020-01-10
Payer: MEDICARE

## 2020-01-10 ENCOUNTER — APPOINTMENT (INPATIENT)
Dept: RADIOLOGY | Facility: HOSPITAL | Age: 70
DRG: 326 | End: 2020-01-10
Payer: MEDICARE

## 2020-01-10 PROBLEM — E86.0 DEHYDRATION: Status: RESOLVED | Noted: 2020-01-09 | Resolved: 2020-01-10

## 2020-01-10 PROBLEM — D62 ACUTE BLOOD LOSS ANEMIA: Status: ACTIVE | Noted: 2020-01-08

## 2020-01-10 LAB
ABO GROUP BLD BPU: NORMAL
ABO GROUP BLD BPU: NORMAL
ANION GAP SERPL CALCULATED.3IONS-SCNC: 5 MMOL/L (ref 4–13)
ANION GAP SERPL CALCULATED.3IONS-SCNC: 5 MMOL/L (ref 4–13)
BPU ID: NORMAL
BPU ID: NORMAL
BUN SERPL-MCNC: 26 MG/DL (ref 5–25)
BUN SERPL-MCNC: 32 MG/DL (ref 5–25)
CALCIUM SERPL-MCNC: 8.3 MG/DL (ref 8.3–10.1)
CALCIUM SERPL-MCNC: 8.5 MG/DL (ref 8.3–10.1)
CHLORIDE SERPL-SCNC: 108 MMOL/L (ref 100–108)
CHLORIDE SERPL-SCNC: 111 MMOL/L (ref 100–108)
CO2 SERPL-SCNC: 20 MMOL/L (ref 21–32)
CO2 SERPL-SCNC: 24 MMOL/L (ref 21–32)
CREAT SERPL-MCNC: 0.66 MG/DL (ref 0.6–1.3)
CREAT SERPL-MCNC: 0.72 MG/DL (ref 0.6–1.3)
CROSSMATCH: NORMAL
CROSSMATCH: NORMAL
ERYTHROCYTE [DISTWIDTH] IN BLOOD BY AUTOMATED COUNT: 18.6 % (ref 11.6–15.1)
FERRITIN SERPL-MCNC: 52 NG/ML (ref 8–388)
FOLATE SERPL-MCNC: >20 NG/ML (ref 3.1–17.5)
GFR SERPL CREATININE-BSD FRML MDRD: 100 ML/MIN/1.73SQ M
GFR SERPL CREATININE-BSD FRML MDRD: 97 ML/MIN/1.73SQ M
GLUCOSE SERPL-MCNC: 104 MG/DL (ref 65–140)
GLUCOSE SERPL-MCNC: 110 MG/DL (ref 65–140)
GLUCOSE SERPL-MCNC: 156 MG/DL (ref 65–140)
HCT VFR BLD AUTO: 25.4 % (ref 36.5–49.3)
HGB BLD-MCNC: 8.7 G/DL (ref 12–17)
HGB BLD-MCNC: 9.9 G/DL (ref 12–17)
IRON SATN MFR SERPL: 86 %
IRON SERPL-MCNC: 169 UG/DL (ref 65–175)
MCH RBC QN AUTO: 32.7 PG (ref 26.8–34.3)
MCHC RBC AUTO-ENTMCNC: 34.3 G/DL (ref 31.4–37.4)
MCV RBC AUTO: 96 FL (ref 82–98)
PLATELET # BLD AUTO: 216 THOUSANDS/UL (ref 149–390)
PMV BLD AUTO: 9.7 FL (ref 8.9–12.7)
POTASSIUM SERPL-SCNC: 3.6 MMOL/L (ref 3.5–5.3)
POTASSIUM SERPL-SCNC: 4.1 MMOL/L (ref 3.5–5.3)
RBC # BLD AUTO: 2.66 MILLION/UL (ref 3.88–5.62)
SODIUM SERPL-SCNC: 136 MMOL/L (ref 136–145)
SODIUM SERPL-SCNC: 137 MMOL/L (ref 136–145)
TIBC SERPL-MCNC: 197 UG/DL (ref 250–450)
TROPONIN I SERPL-MCNC: <0.02 NG/ML
UNIT DISPENSE STATUS: NORMAL
UNIT DISPENSE STATUS: NORMAL
UNIT PRODUCT CODE: NORMAL
UNIT PRODUCT CODE: NORMAL
UNIT RH: NORMAL
UNIT RH: NORMAL
VIT B12 SERPL-MCNC: 376 PG/ML (ref 100–900)
WBC # BLD AUTO: 5.96 THOUSAND/UL (ref 4.31–10.16)

## 2020-01-10 PROCEDURE — 80048 BASIC METABOLIC PNL TOTAL CA: CPT | Performed by: PHYSICIAN ASSISTANT

## 2020-01-10 PROCEDURE — 82746 ASSAY OF FOLIC ACID SERUM: CPT | Performed by: INTERNAL MEDICINE

## 2020-01-10 PROCEDURE — 82607 VITAMIN B-12: CPT | Performed by: INTERNAL MEDICINE

## 2020-01-10 PROCEDURE — 82728 ASSAY OF FERRITIN: CPT | Performed by: INTERNAL MEDICINE

## 2020-01-10 PROCEDURE — 99223 1ST HOSP IP/OBS HIGH 75: CPT | Performed by: PSYCHIATRY & NEUROLOGY

## 2020-01-10 PROCEDURE — 93306 TTE W/DOPPLER COMPLETE: CPT

## 2020-01-10 PROCEDURE — C9113 INJ PANTOPRAZOLE SODIUM, VIA: HCPCS | Performed by: INTERNAL MEDICINE

## 2020-01-10 PROCEDURE — 70496 CT ANGIOGRAPHY HEAD: CPT

## 2020-01-10 PROCEDURE — 86902 BLOOD TYPE ANTIGEN DONOR EA: CPT

## 2020-01-10 PROCEDURE — NC001 PR NO CHARGE: Performed by: PSYCHIATRY & NEUROLOGY

## 2020-01-10 PROCEDURE — 99232 SBSQ HOSP IP/OBS MODERATE 35: CPT | Performed by: NURSE PRACTITIONER

## 2020-01-10 PROCEDURE — 82948 REAGENT STRIP/BLOOD GLUCOSE: CPT

## 2020-01-10 PROCEDURE — 80048 BASIC METABOLIC PNL TOTAL CA: CPT | Performed by: INTERNAL MEDICINE

## 2020-01-10 PROCEDURE — 84484 ASSAY OF TROPONIN QUANT: CPT | Performed by: PHYSICIAN ASSISTANT

## 2020-01-10 PROCEDURE — 83550 IRON BINDING TEST: CPT | Performed by: INTERNAL MEDICINE

## 2020-01-10 PROCEDURE — 83540 ASSAY OF IRON: CPT | Performed by: INTERNAL MEDICINE

## 2020-01-10 PROCEDURE — 70498 CT ANGIOGRAPHY NECK: CPT

## 2020-01-10 PROCEDURE — 99232 SBSQ HOSP IP/OBS MODERATE 35: CPT | Performed by: PSYCHIATRY & NEUROLOGY

## 2020-01-10 PROCEDURE — 71045 X-RAY EXAM CHEST 1 VIEW: CPT

## 2020-01-10 PROCEDURE — 93306 TTE W/DOPPLER COMPLETE: CPT | Performed by: INTERNAL MEDICINE

## 2020-01-10 PROCEDURE — 85018 HEMOGLOBIN: CPT | Performed by: INTERNAL MEDICINE

## 2020-01-10 PROCEDURE — 93005 ELECTROCARDIOGRAM TRACING: CPT

## 2020-01-10 PROCEDURE — 99231 SBSQ HOSP IP/OBS SF/LOW 25: CPT | Performed by: PHYSICIAN ASSISTANT

## 2020-01-10 PROCEDURE — P9016 RBC LEUKOCYTES REDUCED: HCPCS

## 2020-01-10 PROCEDURE — 85027 COMPLETE CBC AUTOMATED: CPT | Performed by: INTERNAL MEDICINE

## 2020-01-10 PROCEDURE — 99223 1ST HOSP IP/OBS HIGH 75: CPT | Performed by: INTERNAL MEDICINE

## 2020-01-10 RX ORDER — IPRATROPIUM BROMIDE AND ALBUTEROL SULFATE 2.5; .5 MG/3ML; MG/3ML
3 SOLUTION RESPIRATORY (INHALATION)
Status: DISCONTINUED | OUTPATIENT
Start: 2020-01-10 | End: 2020-01-10

## 2020-01-10 RX ORDER — ALBUTEROL SULFATE 90 UG/1
2 AEROSOL, METERED RESPIRATORY (INHALATION) EVERY 4 HOURS PRN
Status: DISCONTINUED | OUTPATIENT
Start: 2020-01-10 | End: 2020-01-11

## 2020-01-10 RX ORDER — LORAZEPAM 2 MG/ML
1 INJECTION INTRAMUSCULAR ONCE
Status: COMPLETED | OUTPATIENT
Start: 2020-01-10 | End: 2020-01-11

## 2020-01-10 RX ORDER — LORAZEPAM 2 MG/ML
2 INJECTION INTRAMUSCULAR ONCE
Status: COMPLETED | OUTPATIENT
Start: 2020-01-10 | End: 2020-01-10

## 2020-01-10 RX ADMIN — SODIUM CHLORIDE 8 MG/HR: 9 INJECTION, SOLUTION INTRAVENOUS at 10:19

## 2020-01-10 RX ADMIN — SODIUM CHLORIDE 80 MG: 9 INJECTION, SOLUTION INTRAVENOUS at 10:31

## 2020-01-10 RX ADMIN — DULOXETINE HYDROCHLORIDE 90 MG: 60 CAPSULE, DELAYED RELEASE ORAL at 09:55

## 2020-01-10 RX ADMIN — GABAPENTIN 300 MG: 300 CAPSULE ORAL at 21:55

## 2020-01-10 RX ADMIN — THIAMINE HCL TAB 100 MG 100 MG: 100 TAB at 10:13

## 2020-01-10 RX ADMIN — FLUTICASONE PROPIONATE 2 SPRAY: 50 SPRAY, METERED NASAL at 10:21

## 2020-01-10 RX ADMIN — QUETIAPINE FUMARATE 200 MG: 100 TABLET ORAL at 10:12

## 2020-01-10 RX ADMIN — FLUTICASONE FUROATE AND VILANTEROL TRIFENATATE 1 PUFF: 100; 25 POWDER RESPIRATORY (INHALATION) at 10:21

## 2020-01-10 RX ADMIN — ATORVASTATIN CALCIUM 40 MG: 40 TABLET, FILM COATED ORAL at 10:12

## 2020-01-10 RX ADMIN — MELATONIN 1000 UNITS: at 10:13

## 2020-01-10 RX ADMIN — LORAZEPAM 2 MG: 2 INJECTION INTRAMUSCULAR; INTRAVENOUS at 13:02

## 2020-01-10 RX ADMIN — TAMSULOSIN HYDROCHLORIDE 0.4 MG: 0.4 CAPSULE ORAL at 18:03

## 2020-01-10 RX ADMIN — GABAPENTIN 300 MG: 300 CAPSULE ORAL at 18:03

## 2020-01-10 RX ADMIN — FOLIC ACID 1 MG: 1 TABLET ORAL at 10:13

## 2020-01-10 RX ADMIN — NICOTINE 1 PATCH: 14 PATCH TRANSDERMAL at 10:33

## 2020-01-10 RX ADMIN — GABAPENTIN 300 MG: 300 CAPSULE ORAL at 10:13

## 2020-01-10 RX ADMIN — MIRTAZAPINE 15 MG: 15 TABLET, FILM COATED ORAL at 21:55

## 2020-01-10 RX ADMIN — IOHEXOL 85 ML: 350 INJECTION, SOLUTION INTRAVENOUS at 15:48

## 2020-01-10 NOTE — PROGRESS NOTES
Progress Note - Behavioral Health   Anabella Cooper 81 y o  male MRN: [id number]  Unit/Bed#: CW2 211-01 Encounter: [id number]           I came to see the patient continuation of care patient states that he is feeling better, patient was transfused 2 units yesterday days and he feels more awake, he states that he does not remember lot of things from yesterday  He states that he have good supportive family  He states that he feels depressed but he denies any suicidal thoughts plans or intent  Behavior over the last 24 hours:  improved  Sleep: normal  Appetite: poor  Medication side effects: No  ROS: Hand swelling    Mental Status Evaluation:  Appearance:  age appropriate and disheveled   Behavior:  normal   Speech:  soft   Mood:  depressed   Affect:  mood-congruent   Language: naming objects and repeating phrases   Thought Process:  goal directed   Associations: intact associations   Thought Content:  normal   Perceptual Disturbances: None   Risk Potential: He denies any suicidal homicidal ideation plan or intent   Sensorium:  person, place, time/date and situation   Memory:  recent and remote memory grossly intact   Cognition:  grossly intact   Consciousness:  alert and awake    Attention: attention span and concentration were age appropriate   Intellect: within normal limits   Fund of Knowledge: awareness of current events: Fair, past history: Fair and vocabulary: Fair   Insight:  fair   Judgment: fair   Muscle Strength and Tone: Within normal limits   Gait/Station: Unable to assess patient was in bed   Motor Activity: no abnormal movements         Assessment/Plan  Anabella Cooper is a 81 y o  male with medical history of alcohol abuse, hypertension, dyslipidemia, and chronic pain, anemia, COPD, depression who presented to the hospital after he fell at home and hit  his head, he had no appetite for 5 days he was feeling depressed    Today he was feeling better, he denies any suicidal thoughts plans or intent, he denies any psychotic symptoms, he denies any manic episodes     Diagnosis:  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  Alcohol abuse uncomplicated    Recommended Treatment:   Continue medical management  Discontinue one-to-one observation  Continue current psychotropic medication  Patient was seen by host program  Patient will follow up with his psychiatrist upon discharge in outpatient  Discussed with primary team  No intervention at this time  I will sign off        Medications:   current meds:   Current Facility-Administered Medications   Medication Dose Route Frequency    albuterol (PROVENTIL HFA,VENTOLIN HFA) inhaler 2 puff  2 puff Inhalation Q4H PRN    amLODIPine (NORVASC) tablet 10 mg  10 mg Oral Daily    atorvastatin (LIPITOR) tablet 40 mg  40 mg Oral Daily    cholecalciferol (VITAMIN D3) tablet 1,000 Units  1,000 Units Oral Daily    DULoxetine (CYMBALTA) delayed release capsule 90 mg  90 mg Oral Daily    fluticasone (FLONASE) 50 mcg/act nasal spray 2 spray  2 spray Nasal Daily    fluticasone-vilanterol (BREO ELLIPTA) 100-25 mcg/inh inhaler 1 puff  1 puff Inhalation Daily    folic acid (FOLVITE) tablet 1 mg  1 mg Oral Daily    gabapentin (NEURONTIN) capsule 300 mg  300 mg Oral TID    lactated ringers infusion  75 mL/hr Intravenous Continuous    metoprolol tartrate (LOPRESSOR) tablet 50 mg  50 mg Oral Daily    mirtazapine (REMERON) tablet 15 mg  15 mg Oral HS    nicotine (NICODERM CQ) 14 mg/24hr TD 24 hr patch 1 patch  1 patch Transdermal Daily    pantoprazole (PROTONIX) 80 mg in sodium chloride 0 9 % 100 mL infusion  8 mg/hr Intravenous Continuous    QUEtiapine (SEROquel) tablet 200 mg  200 mg Oral Daily    QUEtiapine (SEROquel) tablet 600 mg  600 mg Oral HS    tamsulosin (FLOMAX) capsule 0 4 mg  0 4 mg Oral Daily With Dinner    thiamine (VITAMIN B1) tablet 100 mg  100 mg Oral Daily    traZODone (DESYREL) tablet 50 mg  50 mg Oral HS PRN         Risks, benefits and possible side effects of Medications:     Risks, benefits, and possible side effects of medications explained to patient and patient verbalizes understanding  Labs: I have personally reviewed all pertinent laboratory results  I have personally reviewed all pertinent laboratory/tests results  Labs in last 72 hours:   Recent Labs     01/08/20  1425  01/10/20  1056   WBC 10 67*   < > 5 96   RBC 2 72*   < > 2 66*   HGB 9 7*   < > 8 7*   HCT 29 5*   < > 25 4*      < > 216   RDW 12 7   < > 18 6*   NEUTROABS 9 44*  --   --    SODIUM 135*   < > 137   K 4 0   < > 3 6      < > 108   CO2 17*   < > 24   BUN 19   < > 32*   CREATININE 0 95   < > 0 72   GLUC 99   < > 156*   CALCIUM 8 5   < > 8 3   AST 19  --   --    ALT 9*  --   --    ALKPHOS 64  --   --    TP 6 2*  --   --    ALB 2 4*  --   --    TBILI 0 37  --   --     < > = values in this interval not displayed             Hernando Lynch MD

## 2020-01-10 NOTE — NURSING NOTE
Pt had a large BM in pants and then told the nurse that he is carrying a large amount of money in them and would like to send it with security to be safe  Security came up and said that due to the smell on the pants the money is considered a Bio hazard and they can't take it  Money is being kept with the rest of the pts belongings in his room at this time

## 2020-01-10 NOTE — PROGRESS NOTES
Progress Note - Mechelle Rutherford 1950, 81 y o  male MRN: [id number]    Unit/Bed#: CW2 211-01 Encounter: [id number]    Primary Care Provider: Shayy Pelaez DO   Date and time admitted to hospital: 1/8/2020  1:34 PM    Update 5:30 pm - patient had a CIWA score of 14 at 1:00 p m  He was given 2 mg of Ativan IV  He then developed slurred speech, right facial droop, and right-sided weakness  Patient was taken for a stat CT head and CTA head and neck  Neurology was consulted  No obvious signs of stroke on CTA or CT head  Will proceed with stroke pathway  Unable to receive aspirin in the setting of acute blood loss anemia  No need for echo with bubble studies patient had a regular echo today  EGD was placed on hold for today  Possible EGD tomorrow  * Acute blood loss anemia  Assessment & Plan  · Hemoglobin 9 7 on admission, likely hemoconcentrated from dehydration  · Status post IV fluids, HGB 7 3 -> 5 8 for which he was given 2 units of pRBCs and HGB improved to 8 7   · GI following as FOBT (+) and had hematemesis  · History of heavy alcohol abuse, which drinking a 5th of vodka a day  · Planning for EGD today  · Continue NPO, IVF, Protonix gtt   · Hold ASA and Lovenox   · Trend CBC daily, might require blood transfusion if HGB trends less than 7    Syncope  Assessment & Plan  · With episode of "room spinning" dizziness  · That orthostatic hypotension in the setting of dehydration and acute blood loss anemia  · Given IV hydration  · Given 2 units of PRBCs  · Going for EGD today  · Continue Protonix infusion IV fluids  · Up with assistance, fall precautions   · Fall precautions  · Echo, LVEF 60%  · CT head on admission with microangiopathic changes but no acute intracranial abnormality    Alcohol abuse  Assessment & Plan  · Patient admits to drinking a fifth of vodka daily   He reports that he has been only drinking 1 drink per day for the last 2 days however  · CIWA protocol  · Thiamine and folate     Suicidal ideation  Assessment & Plan  · Patient with history of MDD  Has had psychiatric hospitalization in the past  · Patient admits to suicidal ideation but denies plan  "Even if I did I wouldn't tell you" he says  · Psych consulted, appreciate recommendations  · Continue current medications and one-to-one    Chest pain  Assessment & Plan  · Episode of right-of-sternum sharp chest pain which comes on and resolves spontaneously  · ECG without ischemic changed  · Initial trop negative - will trend  · SOLOMON score = 2  · Recommend outpatient stress test and follow up with his Cardiologist, Dr Prema Babin   · Consider GI in nature in the setting of poor PO intake   Trial antacid     Inadequate oral nutritional intake  Assessment & Plan  Patient with history of alcohol abuse and poor nutritional intake  He currently exhibits poor appetite and is barely eating his meals  Psychiatry is unable to take patient given nutritional status  Nutrition on board, appreciate recs  Folate/B12 within normal limits  · Continue with thiamine/folate   · Continue Protonix, planning for EGD today    Tobacco abuse  Assessment & Plan  · Encouraged cessation  · Nicotine patch    Chronic pain  Assessment & Plan  · PDMP reviewed - patient has not had tramadol filled since May 2019    Dyslipidemia  Assessment & Plan  · Continue statin    COPD without exacerbation (HCC)  Assessment & Plan  · Continue albuterol PRN and Breo    History of CVA (cerebrovascular accident)  Assessment & Plan  · Patient reports history of "mini stroke"  · He admits to being non-compliant with his aspirin  · Continue aspirin and statin  · CT head on admission with no acute intracranial abnormality   · Neurochecks given syncope     MDD (major depressive disorder)  Assessment & Plan  · With suicidal ideation, psych following   · Continue Cymbalta, Seroquel, trazodone   · Continue one-to-one    Essential hypertension  Assessment & Plan  · Continue amlodipine, metoprolol tartrate with holding parameters as patient has been hypotensive    Dehydrationresolved as of 1/10/2020  Assessment & Plan  Dehydration, present on admission, secondary to poor oral intake, being treated with IV fluids  Resolved  VTE Pharmacologic Prophylaxis:   Pharmacologic: Pharmacologic VTE Prophylaxis contraindicated due to anemia  Mechanical VTE Prophylaxis in Place: Yes    Patient Centered Rounds: I have performed bedside rounds with nursing staff today  Discussions with Specialists or Other Care Team Provider: Nursing, CM, gastroenterology    Education and Discussions with Family / Patient: I have answered all questions to the best of my ability  Time Spent for Care: 20 minutes  More than 50% of total time spent on counseling and coordination of care as described above  Current Length of Stay: 1 day(s)    Current Patient Status: Inpatient   Certification Statement: The patient will continue to require additional inpatient hospital stay due to [facility], needs EGD today    Discharge Plan: Patient is not medically stable for discharge today, likely in 48 hours pending EGD and HGB  Code Status: Level 1 - Full Code      Subjective:   Feels tired and weak  No active bleeding or vomiting  Now willing to have blood work prior to EGD  Reports SOB is at baseline  Denies CP or dizziness  Objective:     Vitals:   Temp (24hrs), Av 6 °F (37 °C), Min:97 8 °F (36 6 °C), Max:99 7 °F (37 6 °C)    Temp:  [97 8 °F (36 6 °C)-99 7 °F (37 6 °C)] 97 8 °F (36 6 °C)  HR:  [75-89] 89  Resp:  [16-18] 16  BP: ()/(47-68) 104/57  SpO2:  [96 %-99 %] 96 %  Body mass index is 24 06 kg/m²  Input and Output Summary (last 24 hours): Intake/Output Summary (Last 24 hours) at 1/10/2020 1229  Last data filed at 1/10/2020 0931  Gross per 24 hour   Intake 462 ml   Output 350 ml   Net 112 ml       Physical Exam:     Physical Exam   Constitutional: He is oriented to person, place, and time   He appears well-developed  No distress  HENT:   Head: Normocephalic  Neck: Normal range of motion  Cardiovascular: Normal rate and intact distal pulses  Pulmonary/Chest: Effort normal  No accessory muscle usage  No tachypnea  No respiratory distress  He has decreased breath sounds in the right lower field and the left lower field  He has wheezes in the right lower field and the left lower field  He has no rales  Abdominal: Soft  Bowel sounds are normal  He exhibits no distension  There is no tenderness  Musculoskeletal: Normal range of motion  He exhibits edema (+1 LUE/ hand, trace RUE)  Neurological: He is alert and oriented to person, place, and time  Skin: Skin is warm and dry  Capillary refill takes less than 2 seconds  He is not diaphoretic  There is pallor  Psychiatric: He has a normal mood and affect  His behavior is normal    Nursing note and vitals reviewed  Additional Data:     Labs:    Results from last 7 days   Lab Units 01/10/20  1056  01/08/20  1425   WBC Thousand/uL 5 96   < > 10 67*   HEMOGLOBIN g/dL 8 7*   < > 9 7*   HEMATOCRIT % 25 4*   < > 29 5*   PLATELETS Thousands/uL 216   < > 309   NEUTROS PCT %  --   --  88*   LYMPHS PCT %  --   --  6*   MONOS PCT %  --   --  5   EOS PCT %  --   --  0    < > = values in this interval not displayed  Results from last 7 days   Lab Units 01/10/20  1056  01/08/20  1425   POTASSIUM mmol/L 3 6   < > 4 0   CHLORIDE mmol/L 108   < > 105   CO2 mmol/L 24   < > 17*   BUN mg/dL 32*   < > 19   CREATININE mg/dL 0 72   < > 0 95   CALCIUM mg/dL 8 3   < > 8 5   ALK PHOS U/L  --   --  64   ALT U/L  --   --  9*   AST U/L  --   --  19    < > = values in this interval not displayed  * I Have Reviewed All Lab Data Listed Above  * Additional Pertinent Lab Tests Reviewed:  All Labs Within Last 24 Hours Reviewed    Imaging:    Imaging Reports Reviewed Today Include: CXR, CT head  Imaging Personally Reviewed by Myself Includes:  None    Recent Cultures (last 7 days):           Last 24 Hours Medication List:     Current Facility-Administered Medications:  albuterol 2 puff Inhalation Q4H PRN Irlanda Nica, PA-C    amLODIPine 10 mg Oral Daily Irlanda Nica, PA-C    atorvastatin 40 mg Oral Daily Irlanda Nica, PA-C    cholecalciferol 1,000 Units Oral Daily Irlanda T[employer], PA-C    DULoxetine 90 mg Oral Daily Irlanda Nica, PA-C    fluticasone 2 spray Nasal Daily Irlanda Nica, PA-C    fluticasone-vilanterol 1 puff Inhalation Daily Irlanda Yousif, PA-C    folic acid 1 mg Oral Daily Irlanda Nica, PA-C    gabapentin 300 mg Oral TID Irlanda Yousif, PA-C    lactated ringers 75 mL/hr Intravenous Continuous Sunni Quinteros MD    metoprolol tartrate 50 mg Oral Daily Irlanda Yousif, PA-SYDNEY    mirtazapine 15 mg Oral HS Irlanda Yousif, PA-C    nicotine 1 patch Transdermal Daily ALMA DELIA Cleary-C    pantoprozole (PROTONIX) infusion (Continuous) 8 mg/hr Intravenous Continuous Sunni Quinteros MD Last Rate: 8 mg/hr (01/10/20 1019)   QUEtiapine 200 mg Oral Daily Irlanda Nica, PA-SYDNEY    QUEtiapine 600 mg Oral HS ALMA DELIA Cleary-SYDNEY    tamsulosin 0 4 mg Oral Daily With [employer], PA-C    thiamine 100 mg Oral Daily Irlanda Nica, PA-SYDNEY    traZODone 50 mg Oral HS PRN Irlanda Yousif PA-C         Today, Patient Was Seen By: MAVERICK Goldberg    ** Please Note: Dictation voice to text software may have been used in the creation of this document   **

## 2020-01-10 NOTE — PLAN OF CARE
Problem: Potential for Falls  Goal: Patient will remain free of falls  Description  INTERVENTIONS:  - Assess patient frequently for physical needs  -  Identify cognitive and physical deficits and behaviors that affect risk of falls    -  [place] fall precautions as indicated by assessment   - Educate patient/family on patient safety including physical limitations  - Instruct patient to call for assistance with activity based on assessment  - Modify environment to reduce risk of injury  - Consider OT/PT consult to assist with strengthening/mobility  Outcome: Progressing     Problem: PAIN - ADULT  Goal: Verbalizes/displays adequate comfort level or baseline comfort level  Description  Interventions:  - Encourage patient to monitor pain and request assistance  - Assess pain using appropriate pain scale  - Administer analgesics based on type and severity of pain and evaluate response  - Implement non-pharmacological measures as appropriate and evaluate response  - Consider cultural and social influences on pain and pain management  - Notify physician/advanced practitioner if interventions unsuccessful or patient reports new pain  Outcome: Progressing     Problem: INFECTION - ADULT  Goal: Absence or prevention of progression during hospitalization  Description  INTERVENTIONS:  - Assess and monitor for signs and symptoms of infection  - Monitor lab/diagnostic results  - Monitor all insertion sites, i e  indwelling lines, tubes, and drains  - Monitor endotracheal if appropriate and nasal secretions for changes in amount and color  - [place] appropriate cooling/warming therapies per order  - Administer medications as ordered  - Instruct and encourage patient and family to use good hand hygiene technique  - Identify and instruct in appropriate isolation precautions for identified infection/condition  Outcome: Progressing  Goal: Absence of fever/infection during neutropenic period  Description  INTERVENTIONS:  - Monitor WBC    Outcome: Progressing     Problem: SAFETY ADULT  Goal: Maintain or return to baseline ADL function  Description  INTERVENTIONS:  -  Assess patient's ability to carry out ADLs; assess patient's baseline for ADL function and identify physical deficits which impact ability to perform ADLs (bathing, care of mouth/teeth, toileting, grooming, dressing, etc )  - Assess/evaluate cause of self-care deficits   - Assess range of motion  - Assess patient's mobility; develop plan if impaired  - Assess patient's need for assistive devices and provide as appropriate  - Encourage maximum independence but intervene and supervise when necessary  - Involve family in performance of ADLs  - Assess for home care needs following discharge   - Consider OT consult to assist with ADL evaluation and planning for discharge  - Provide patient education as appropriate  Outcome: Progressing  Goal: Maintain or return mobility status to optimal level  Description  INTERVENTIONS:  - Assess patient's baseline mobility status (ambulation, transfers, stairs, etc )    - Identify cognitive and physical deficits and behaviors that affect mobility  - Identify mobility aids required to assist with transfers and/or ambulation (gait belt, sit-to-stand, lift, walker, cane, etc )  - [place] fall precautions as indicated by assessment  - Record patient progress and toleration of activity level on Mobility SBAR; progress patient to next Phase/Stage  - Instruct patient to call for assistance with activity based on assessment  - Consider rehabilitation consult to assist with strengthening/weightbearing, etc   Outcome: Progressing     Problem: DISCHARGE PLANNING  Goal: Discharge to home or other facility with appropriate resources  Description  INTERVENTIONS:  - Identify barriers to discharge w/patient and caregiver  - Arrange for needed discharge resources and transportation as appropriate  - Identify discharge learning needs (meds, wound care, etc )  - Arrange for interpretive services to assist at discharge as needed  - Refer to Case Management Department for coordinating discharge planning if the patient needs post-hospital services based on physician/advanced practitioner order or complex needs related to functional status, cognitive ability, or social support system  Outcome: Progressing     Problem: Knowledge Deficit  Goal: Patient/family/caregiver demonstrates understanding of disease process, treatment plan, medications, and discharge instructions  Description  Complete learning assessment and assess knowledge base  Interventions:  - Provide teaching at level of understanding  - Provide teaching via preferred learning methods  Outcome: Progressing     Problem: Nutrition/Hydration-ADULT  Goal: Nutrient/Hydration intake appropriate for improving, restoring or maintaining nutritional needs  Description  Monitor and assess patient's nutrition/hydration status for malnutrition  Collaborate with interdisciplinary team and initiate plan and interventions as ordered  Monitor patient's weight and dietary intake as ordered or per policy  Utilize nutrition screening tool and intervene as necessary  Determine patient's food preferences and provide high-protein, high-caloric foods as appropriate       INTERVENTIONS:  - Monitor oral intake, urinary output, labs, and treatment plans  - Assess nutrition and hydration status and recommend course of action  - Evaluate amount of meals eaten  - Assist patient with eating if necessary   - Allow adequate time for meals  - Recommend/ encourage appropriate diets, oral nutritional supplements, and vitamin/mineral supplements  - Order, calculate, and assess calorie counts as needed  - Recommend, monitor, and adjust tube feedings and TPN/PPN based on assessed needs  - Assess need for intravenous fluids  - Provide specific nutrition/hydration education as appropriate  - Include patient/family/caregiver in decisions related to nutrition  Outcome: Progressing

## 2020-01-10 NOTE — CONSULTS
Consultation - Neurology   Jacob Bauer 81 y o  male MRN: [id number]  Unit/Bed#: CW2 211-01 Encounter: [id number]    Assessment/Plan   73-year-old male who presented on 01/08/2020 with dizziness described as room spinning sensation as well as being off balance and syncopal/lost conscious episode, it was thought initially his symptoms were secondary to dehydrations/orthrostatic/blood pressure related, CT of head was negative for acute ischemia, the patient does have a history of alcohol abuse and was placed on the CIWA protocol  He was found to have a drop in his hemoglobin requiring blood transfusions, gastroenterology saw the patient scheduled for EGD this was not completed, at around 1 o'clock he received Ativan, he was last seen normal at that time, per internal medicine no slurring or facial droop noted this a m  It reported around 2:30 p m  upon awakening and assessment by nursing he was noted to have worsening slurred speech and facial droop, upon evaluation by Internal Medicine and they noted worsening slurred speech, facial droop and possible right-sided weakness  Stat CT of head was ordered, upon assessment by neurology with his correlation of symptoms and prior hx of stroke / risk factors - rapid response/stroke alert was activated, NIH of 4, as below  The patient was not deemed a tpa candidate, secondary to GIB/ anemia, and improvement of symptoms, no large vessel occlusion seen  Etiology - ischemic stroke (small vessel) multiple vascular risk factors, vs medication effects - ativan, less likely seizure  No evidence CNS infectious/inflammatory process was noted       -  Stroke protocol   -  MRI of brain with out contrast if able, when able  -  CTA of head and neck with and with out contrast, was completed as below - no lvo   -  Echocardiogram (completed - ef is 60%, bilateral atrium nl)  -  Telemetry  -  Check Lipid panel and hemoglobin A1c  -  Secondary stroke / TIA  risk factor modification, smoking cessation, statin  -  Antiplatelet stopped, when safe per medicine and gastroenterology  -  Permissive blood pressures post acute stoke, goal blood pressure [phone] systolic - avoid hypotension  -  Keep euvolemic, normothermic  -  Dysphagia screen  -  Therapies to include PT/OT/ST  -  DVT prevention  -  Frequent neurological check and notify neurology with any changes in neurological condition  -  Continue to monitor for infectious and metabolic derangements and treat as arises  -  Hold on EEG at this time  -  Reviewed case in detail with internal medicine team  -  Gastroenterology following, as well as psychiatry - etoh withdrawal protocol    History of Present Illness     Reason for Consult / Principal Problem: double vision, speech appears more slurred, slight right facial droop  HPI: Karsten Rodrigues is a 81 y o  male with past medical hx as listed below, the patient history depression, daily alcohol use, history of cva, tobacco abuse, dyslipidemia, HTN who presented with symptoms of dizziness, described as the room spinning sensation a feeling of being off balance when he walks, there was also reports that had an episode where he lost consciousness, he does drink 1/5 of Vodka daily, to only one drink daily  He presented on 1/8/2020  It was reported his blood pressure to be low on arrival as well  It was thought his symptoms were secondary to dehydration and orthostatics  CT of head on admission was negative the patient was placed on CIWA protocol (he was placed on thiamine)  He also had chest pain "sharp", right of his sternum  He also reported that time suicidal ideation and was placed on 1 to 1 with psychiatry consultation  He has been treated for dehydration, poor oral intake  He was noted to have a drop in his hemoglobin from 9 7 to 7 3 which required a blood transfusion    The patient was seen by gastroenterology, secondary to his blood loss anemia with hemoglobin drop, he did have maroon positive stool, due to concern he was for EGD today and started on Protonix, his ASA and lovenox was held  The patient was seen by internal medicine this a m, they report they did not notice a facial droop, or slurring of his words  It was reported at one o'clock the patient was given ativan as per protocol - PARKERWA, EGD was cancelled, it was reported upon awakening at 2:30 the patient to be having double vision, slurred speech, right facial droop, slim was made aware  Slim evaluated the patient and noted a worsening slight speech, facial droop on the right and right sided weakness, which was not present this a m  Last seen normal this a m , and by nursing at 1 p m , a stat CT of head was consulted, and neurology was asked to evaluate  Upon neurology assessment in CT scan bay - the patient was noted to have a facial droop, slurring of speech - he has a hx of stroke and multiple vascular rf, off ASA for GI bleed, secondary to these symptoms a rapid response was called as well as a stroke alert  CT of head was completed - no acute intracranial abnormality, microangiopathic changes seen  CTA of head and neck with no large vessel occlusion seen  The patient was not deemed a tpa candidate with current GI bleed, anemia, as well as rapidly improving symptoms  No lvo for thrombectomy  Last known well 1 p m  today  Stroke alert activated at 3:42 p m  Neurology arrival time - 3:42, neurology activated  NIH 4  No tpa - GI bleed, low NIH  No large vessel occlusion seen       Inpatient consult to Neurology  Consult performed by: Eli Vickers PA-C  Consult ordered by: MAVERICK Carter Mai          Review of Systems    Historical Information   Past Medical History:   Diagnosis Date    BPH (benign prostatic hyperplasia)     CVA [facility]cerebral vascular accident) ([facility] [address]     Head injury     Hypertension     Metatarsal fracture     TIA (transient ischemic attack)      Past Surgical History:   Procedure Laterality Date    ABDOMINAL SURGERY      ANKLE SURGERY      BACK SURGERY      ELBOW SURGERY      JOINT REPLACEMENT      KNEE SURGERY      LIVER SURGERY       Social History   Social History     Substance and Sexual Activity   Alcohol Use Yes    Frequency: Monthly or less    Comment: half a handle of vodka daily as per family     Social History     Substance and Sexual Activity   Drug Use Yes    Types: Marijuana    Comment: history polysubstance use including IVDA on record- every now then will smoke weed      Social History     Tobacco Use   Smoking Status Current Every Day Smoker    Packs/day: 1 00    Types: Cigarettes   Smokeless Tobacco Never Used   Tobacco Comment    started age 14, 3 quit attempts     Family History: History reviewed  No pertinent family history      Meds/Allergies   all current active meds have been reviewed, current meds:   Current Facility-Administered Medications   Medication Dose Route Frequency    albuterol (PROVENTIL HFA,VENTOLIN HFA) inhaler 2 puff  2 puff Inhalation Q4H PRN    amLODIPine (NORVASC) tablet 10 mg  10 mg Oral Daily    atorvastatin (LIPITOR) tablet 40 mg  40 mg Oral Daily    cholecalciferol (VITAMIN D3) tablet 1,000 Units  1,000 Units Oral Daily    DULoxetine (CYMBALTA) delayed release capsule 90 mg  90 mg Oral Daily    fluticasone (FLONASE) 50 mcg/act nasal spray 2 spray  2 spray Nasal Daily    fluticasone-vilanterol (BREO ELLIPTA) 100-25 mcg/inh inhaler 1 puff  1 puff Inhalation Daily    folic acid (FOLVITE) tablet 1 mg  1 mg Oral Daily    gabapentin (NEURONTIN) capsule 300 mg  300 mg Oral TID    lactated ringers infusion  75 mL/hr Intravenous Continuous    metoprolol tartrate (LOPRESSOR) tablet 50 mg  50 mg Oral Daily    mirtazapine (REMERON) tablet 15 mg  15 mg Oral HS    nicotine (NICODERM CQ) 14 mg/24hr TD 24 hr patch 1 patch  1 patch Transdermal Daily    pantoprazole (PROTONIX) 80 mg in sodium chloride 0 9 % 100 mL infusion  8 mg/hr Intravenous Continuous    QUEtiapine (SEROquel) tablet 200 mg  200 mg Oral Daily    QUEtiapine (SEROquel) tablet 600 mg  600 mg Oral HS    tamsulosin (FLOMAX) capsule 0 4 mg  0 4 mg Oral Daily With Dinner    thiamine (VITAMIN B1) tablet 100 mg  100 mg Oral Daily    traZODone (DESYREL) tablet 50 mg  50 mg Oral HS PRN    and PTA meds:   Prior to Admission Medications   Prescriptions Last Dose Informant Patient Reported? Taking?    Cholecalciferol 1000 units tablet  Outside Facility (Specify) No No   Sig: Take 1 tablet (1,000 Units total) by mouth daily   DULoxetine (CYMBALTA) 30 mg delayed release capsule  Outside Facility (Specify) No No   Sig: Take 3 capsules (90 mg total) by mouth daily   QUEtiapine (SEROquel) 200 mg tablet  Outside Facility (Specify) No No   Sig: Take 1 tablet (200 mg total) by mouth daily   QUEtiapine (SEROquel) 300 mg tablet  Outside Facility (Specify) No No   Sig: Take 2 tablets (600 mg total) by mouth daily at bedtime   Wound Dressings (COMFEEL PLUS ULCER DRESSING) PADS  Outside Facility (Specify) No No   Sig: Apply 1 each topically every other day   albuterol (PROVENTIL HFA,VENTOLIN HFA) 90 mcg/act inhaler  Outside Facility (Specify) No No   Sig: Inhale 2 puffs every 4 (four) hours as needed for wheezing   amLODIPine (NORVASC) 10 mg tablet  Outside Facility (Specify) No No   Sig: Take 1 tablet (10 mg total) by mouth daily Hold for SBP <110   aspirin (ECOTRIN LOW STRENGTH) 81 mg EC tablet  Outside Facility (Specify) No No   Sig: Take 1 tablet (81 mg total) by mouth daily   atorvastatin (LIPITOR) 40 mg tablet  Outside Facility (Specify) No No   Sig: TAKE ONE TABLET BY MOUTH ONCE DAILY   cyclobenzaprine (FLEXERIL) 10 mg tablet  Outside Facility (Specify) No No   Sig: Take 1 tablet (10 mg total) by mouth 3 (three) times a day as needed for muscle spasms   diclofenac sodium (VOLTAREN) 50 mg EC tablet   No No   Sig: Take 1 tablet (50 mg total) by mouth 2 (two) times a day fluticasone (FLONASE) 50 mcg/act nasal spray  Outside Facility (Specify) No No   Si sprays into each nostril daily   fluticasone-vilanterol (BREO ELLIPTA) 100-25 mcg/inh inhaler  Outside Facility (Specify) No No   Sig: Inhale 1 puff daily Rinse mouth after use     folic acid (FOLVITE) 1 mg tablet  Outside Facility (Specify) No No   Sig: Take 1 tablet (1 mg total) by mouth daily   gabapentin (NEURONTIN) 400 mg capsule  Outside Facility (Specify) No No   Sig: TAKE ONE CAPSULE BY MOUTH THREE TIMES A DAY   guaiFENesin (MUCINEX) 600 mg 12 hr tablet  Outside Facility (Specify) Yes No   Sig: Take 600 mg by mouth every 12 (twelve) hours as needed for cough   lidocaine (LIDODERM) 5 %  Outside Facility (Specify) No No   Sig: Apply 1 patch topically daily Remove & Discard patch within 12 hours or as directed by MD   menthol-zinc oxide (CALMOSEPTINE) 0 44-20 6 % OINT  Outside Facility (Specify) No No   Sig: Apply topically 2 (two) times a day   metoprolol tartrate (LOPRESSOR) 50 mg tablet  Outside Facility (Specify) No No   Sig: Take 1 tablet (50 mg total) by mouth daily   mirtazapine (REMERON) 15 mg tablet  Outside Facility (Specify) No No   Sig: Take 1 tablet (15 mg total) by mouth daily at bedtime   nicotine (NICODERM CQ) 14 mg/24hr TD 24 hr patch  Outside Facility (Specify) No No   Sig: Place 1 patch on the skin daily   senna-docusate sodium (SENOKOT S) 8 6-50 mg per tablet  Outside Facility (Specify) No No   Sig: Take 1 tablet by mouth 2 (two) times a day   tamsulosin (FLOMAX) 0 4 mg  Outside Facility (Specify) No No   Sig: Take 1 capsule (0 4 mg total) by mouth daily with dinner   thiamine 100 MG tablet  Outside Facility (Specify) No No   Sig: Take 1 tablet (100 mg total) by mouth daily   traZODone (DESYREL) 50 mg tablet  Outside Facility (Specify) No No   Sig: Take 1 tablet (50 mg total) by mouth daily at bedtime as needed for sleep      Facility-Administered Medications: None       No Known Allergies    Objective Vitals:Blood pressure 146/77, pulse 96, temperature 98 °F (36 7 °C), temperature source Oral, resp  rate 17, height 5' 8 5" (1 74 m), weight 72 8 kg (160 lb 9 6 oz), SpO2 96 %  ,Body mass index is 24 06 kg/m²  Intake/Output Summary (Last 24 hours) at 1/10/2020 1519  Last data filed at 1/10/2020 1251  Gross per 24 hour   Intake 462 ml   Output 450 ml   Net 12 ml       Invasive Devices: Invasive Devices     Peripheral Intravenous Line            Peripheral IV 01/09/20 Right Antecubital less than 1 day                Physical Exam   Constitutional: No distress  Ill appearing, weak   HENT:   Head: Normocephalic and atraumatic  Eyes: EOM are normal  Right eye exhibits no discharge  Left eye exhibits no discharge  Neck: No thyromegaly present  Cardiovascular: Regular rhythm  Pulmonary/Chest: No respiratory distress  Abdominal: He exhibits no distension  Musculoskeletal: He exhibits no edema  Neurological: He has a normal Finger-Nose-Finger Test and a normal Heel to [facility]    Skin: Skin is warm and dry  He is not diaphoretic  Vitals reviewed  Neurologic Exam     Mental Status   Neuro, upon initial assessment the patient was lethargic, slow to respond, he had significant dysarthria, he was able to follow commands, and appeared to improve as time went on  He was able to recognize object, read, repeat, he did have cognitive slowing and moderate to slurring of his speech, no aphasia was noted  He was alert, there was evidence of decreased attn and concentration  Cranial Nerves   Cranial nerves II through XII intact  CN III, IV, VI   Extraocular motions are normal    Except right facial droop was appreciated  Motor Exam 5/5 in uppers, lowers right 4/5 chronic, left 5/5     Sensory Exam   Light touch normal    Pinprick normal    No dense neglect appreciated        Gait, Coordination, and Reflexes     Gait  Gait: (not tested)    Coordination   Finger to nose coordination: normal  Heel to shin coordination: normal    Tremor   Resting tremor: absent  Intention tremor: absent    Reflexes   Right plantar: normal  Left plantar: normal  Right ankle clonus: absent  Left ankle clonus: absentNo evidence of seizure activity seen  NIHSS:    1a Level of Consciousness: 0 = Alert   1b  LOC Questions: 0 = Answers both correctly   1c  LOC Commands: 0 = Obeys both correctly   2  Best Gaze: 0 = Normal   3  Visual: 0 = No visual field loss   4  Facial Palsy: 2=Partial paralysis (total or near total paralysis of the lower face)   5a  Motor Right Arm: 0=No drift, limb holds 90 (or 45) degrees for full 10 seconds   5b  Motor Left Arm: 0=No drift, limb holds 90 (or 45) degrees for full 10 seconds   6a  Motor Right Le=Drift, limb holds 90 (or 45) degrees but drifts down before full 10 seconds: does not hit bed   6b  Motor Left Le=No drift, limb holds 90 (or 45) degrees for full 10 seconds   7  Limb Ataxia:  0=Absent   8  Sensory: 0=Normal; no sensory loss   9  Best Language:  0=No aphasia, normal   10  Dysarthria: 1=Mild to moderate, patient slurs at least some words and at worst, can be understood with some difficulty   11  Extinction and Inattention (formerly Neglect): 0=No abnormality   Total Score: NIH of 4, it is reported that his leg weakness is chronic  Lab Results:   I have personally reviewed pertinent reports    , CBC:   Results from last 7 days   Lab Units 01/10/20  1056 20  1845 20  0555   WBC Thousand/uL 5 96 9 62 5 12   RBC Million/uL 2 66* 1 62* 2 11*   HEMOGLOBIN g/dL 8 7* 5 8* 7 3*   HEMATOCRIT % 25 4* 17 9* 23 0*   MCV fL 96 111* 109*   PLATELETS Thousands/uL 216 254 228   , BMP/CMP:   Results from last 7 days   Lab Units 01/10/20  1056 20  0555 20  1425   SODIUM mmol/L 137 138 135*   POTASSIUM mmol/L 3 6 3 6 4 0   CHLORIDE mmol/L 108 110* 105   CO2 mmol/L 24 22 17*   BUN mg/dL 32* 30* 19   CREATININE mg/dL 0 72 0 78 0 95   CALCIUM mg/dL 8 3 8 0* 8 5 AST U/L  --   --  19   ALT U/L  --   --  9*   ALK PHOS U/L  --   --  64   EGFR ml/min/1 73sq m 97 93 82   , Vitamin B12:   Results from last 7 days   Lab Units 01/10/20  1056 01/1952   VITAMIN B 12 pg/mL 376 495   , HgBA1C:   , TSH:   , Coagulation:   , Lipid Profile:   , Ammonia:   , Urinalysis:       Invalid input(s): URIBILINOGEN, Drug Screen:   , Medication Drug Levels:       Invalid input(s): CARBAMAZEPINE,  PHENOBARB, LACOSAMIDE, OXCARBAZEPINE     Per radiology interpretation -    "  Procedure: Xr Chest 2 Views    Result Date: 1/8/2020  Narrative: CHEST INDICATION:   Chest Pain  COMPARISON:  April 23, 2019  EXAM PERFORMED/VIEWS:  XR CHEST PA & LATERAL FINDINGS: Cardiomediastinal silhouette appears unremarkable  Linear atelectasis versus scarring at the left lung base  No focal consolidation, pleural effusion or pneumothorax  Straightening/reversal of the thoracic kyphosis  There is an age-indeterminate wedge compression deformity of an upper lumbar vertebral body, seen on the lateral view  Again seen are old right-sided rib fractures  IVC filter in place  Impression: Linear atelectasis versus scarring at the left lung base  No focal consolidation, pleural effusion or pneumothorax  Age-indeterminate wedge compression deformity of an upper lumbar vertebral body, seen on the lateral view  The study was marked in EPIC for significant notification  Workstation performed: [id number]     Procedure: Ct Head Wo Contrast    Result Date: 1/8/2020  Narrative: CT BRAIN - WITHOUT CONTRAST INDICATION:   fall, head strike  COMPARISON:  Head CT 5/1/19 TECHNIQUE:  CT examination of the brain was performed  In addition to axial images, coronal 2D reformatted images were created and submitted for interpretation  Radiation dose length product (DLP) for this visit:  964 37 mGy-cm     This examination, like all CT scans performed in the [facility], was performed utilizing techniques to minimize radiation dose exposure, including the use of iterative  reconstruction and automated exposure control  IMAGE QUALITY:  Diagnostic  FINDINGS: PARENCHYMA: Decreased attenuation is noted in periventricular and subcortical white matter demonstrating an appearance that is statistically most likely to represent mild microangiopathic change; this appearance is similar when compared to most recent prior examination  No CT signs of acute infarction  No intracranial mass, mass effect or midline shift  No acute parenchymal hemorrhage  VENTRICLES AND EXTRA-AXIAL SPACES:  Normal for the patient's age  VISUALIZED ORBITS AND PARANASAL SINUSES:  Unremarkable  CALVARIUM AND EXTRACRANIAL SOFT TISSUES:  Normal      Impression: No acute intracranial abnormality  Microangiopathic changes  Workstation performed: [id number]     Procedure: Ct Spine Cervical Without Contrast    Result Date: 1/8/2020  Narrative: CT CERVICAL SPINE - WITHOUT CONTRAST INDICATION:   c7 tenderness  COMPARISON:  None  TECHNIQUE:  CT examination of the cervical spine was performed without intravenous contrast   Contiguous axial images were obtained  Sagittal and coronal reconstructions were performed  Radiation dose length product (DLP) for this visit:  398 51 mGy-cm   This examination, like all CT scans performed in the [facility], was performed utilizing techniques to minimize radiation dose exposure, including the use of iterative  reconstruction and automated exposure control  IMAGE QUALITY:  Diagnostic  FINDINGS: ALIGNMENT:  There is 1 mm posterior subluxation of C5 on C6 VERTEBRAL BODIES:  No fracture  DEGENERATIVE CHANGES:  Ankylosis of the C6 and C7 vertebral bodies PREVERTEBRAL AND PARASPINAL SOFT TISSUES:  Moderate THORACIC INLET:  Normal      Impression: No cervical spine fracture or traumatic malalignment    Workstation performed: [id number]     Procedure: Ct Stroke Alert Brain    Result Date: 1/10/2020  Narrative: CT BRAIN - STROKE ALERT PROTOCOL INDICATION:   new onset slurred speech, concern for facial droop  COMPARISON:  None  TECHNIQUE:  CT examination of the brain was performed  In addition to axial images, coronal reformatted images were created and submitted for interpretation  Radiation dose length product (DLP) for this visit:  933 68 mGy-cm   This examination, like all CT scans performed in the [facility], was performed utilizing techniques to minimize radiation dose exposure, including the use of iterative  reconstruction and automated exposure control  IMAGE QUALITY:  Diagnostic  FINDINGS:  PARENCHYMA:  Decreased attenuation is noted in the supratentorial white matter demonstrating an appearance most consistent with mild microangiopathic change  No intracranial mass, mass effect or midline shift  No acute intracranial hemorrhage  Normal intracranial vasculature  VENTRICLES AND EXTRA-AXIAL SPACES:  Normal for patient's age  VISUALIZED ORBITS AND PARANASAL SINUSES:  Unremarkable  CALVARIUM AND EXTRACRANIAL SOFT TISSUES:   Normal      Impression: No acute intracranial abnormality  Microangiopathic changes  Findings were directly discussed with Dr Alan Guerrier on 1/10/2020 3:47 PM  Workstation performed: [id number]     Procedure: Cta Stroke Alert (head/neck)    Result Date: 1/10/2020  Narrative: CTA NECK AND BRAIN WITH CONTRAST INDICATION: stroke alert COMPARISON:   None  TECHNIQUE:   Post contrast imaging was performed after administration of iodinated contrast through the neck and brain  Post contrast axial 0 625 mm images timed to opacify the arterial system  3D rendering was performed on an independent workstation  MIP reconstructions performed  Coronal reconstructions were performed of the noncontrast portion of the brain  Radiation dose length product (DLP) for this visit:  509 32 mGy-cm     This examination, like all CT scans performed in the [facility], was performed utilizing techniques to minimize radiation dose exposure, including the use of iterative  reconstruction and automated exposure control  IV Contrast:  85 mL of iohexol (OMNIPAQUE)  IMAGE QUALITY:   Diagnostic, although motion artifact around the carotid bifurcation in the cervical section FINDINGS: CERVICAL VASCULATURE AORTIC ARCH AND GREAT VESSELS:  Mild athersclerotic disease of the arch, proximal great vessels and visualized subclavian vessels  No significant stenosis  RIGHT VERTEBRAL ARTERY CERVICAL SEGMENT:  Mild stenosis at the origin  The vessel is normal in caliber throughout the neck  LEFT VERTEBRAL ARTERY CERVICAL SEGMENT:  Normal origin  The vessel is normal in caliber throughout the neck  RIGHT EXTRACRANIAL CAROTID SEGMENT:  Mild atherosclerotic disease of the distal common carotid artery and proximal cervical internal carotid artery without significant stenosis compared to the more distal ICA  LEFT EXTRACRANIAL CAROTID SEGMENT:  Mild atherosclerotic disease of the distal common carotid artery and proximal cervical internal carotid artery without significant stenosis compared to the more distal ICA  NASCET criteria was used to determine the degree of internal carotid artery diameter stenosis  INTRACRANIAL VASCULATURE INTERNAL CAROTID ARTERIES:  Normal enhancement of the intracranial portions of the internal carotid arteries  Normal ophthalmic artery origins  Normal ICA terminus  ANTERIOR CIRCULATION:  Symmetric A1 segments and anterior cerebral arteries with normal enhancement  Normal anterior communicating artery  MIDDLE CEREBRAL ARTERY CIRCULATION:  M1 segment and middle cerebral artery branches demonstrate normal enhancement bilaterally  DISTAL VERTEBRAL ARTERIES:  Normal distal vertebral arteries  Posterior inferior cerebellar artery origins are normal  Normal vertebral basilar junction  BASILAR ARTERY:  Basilar artery is normal in caliber  Normal superior cerebellar arteries   POSTERIOR CEREBRAL ARTERIES: Both posterior cerebral arteries arises from the basilar tip  Both arteries demonstrate normal enhancement  Normal posterior communicating arteries  DURAL VENOUS SINUSES:  Normal  NON VASCULAR ANATOMY BONY STRUCTURES:  No acute osseous abnormality  SOFT TISSUES OF THE NECK:  Unremarkable  THORACIC INLET:  Emphysematous changes  Left upper lobe peribronchial thickening could relate to small airways disease  Partially visualized left lung consolidation/atelectatic changes  Nonemergent outpatient follow-up with unenhanced chest CT recommended  Soft tissue swelling about the left ureter could relate to infection if clinically suspected, correlate clinically  Impression: 1  No evidence of hemodynamic significant stenosis, aneurysm or dissection  2  Emphysematous changes  Left upper lobe peribronchial thickening could relate to small airways disease  Partially visualized left lung consolidation/atelectatic changes  Nonemergent outpatient follow-up with unenhanced chest CT recommended  3  Soft tissue swelling about the left ureter could relate to infection if clinically suspected, correlate clinically  Findings were directly discussed with Dr George Phillips on 1/10/2020 3:47 PM  Workstation performed: [id number]   "  Imaging Studies: I have personally reviewed pertinent reports  , reviewed images with attending at time of stroke alert      Code Status: Level 1 - Full Code

## 2020-01-10 NOTE — PHYSICAL THERAPY NOTE
Physical Therapy Cancellation Note  Attempted to see patient this AM however, pt is planned for emergent endoscopy today due to concerns for brisk upper GI bleed  Will re-attempt s/p endoscopy if patient is medically/clinicially appropriate        Renita Haddad DPT, PT

## 2020-01-10 NOTE — CONSULTS
Consultation - [facility]  Natalee Espinoza 81 y o  male MRN: [id number]  Unit/Bed#: 2 211-01 Encounter: [id number]              Inpatient consult to gastroenterology     Performed by  Tania Rogel DO     Authorized by Sunni Quinteros MD              Reason for Consult / Principal Problem:   Anemia, Melena      ASSESSMENT AND PLAN:    Natalee Espinoza is a 81 y o  old male with past medical history depression, alcohol use (1/5 daily) presents the hospital with syncopal episode, anemia, melena  Acute blood loss anemia  Likely secondary to brisk upper GI bleed from likely peptic ulcer disease in the setting of chronic alcohol use  Hemoglobin 9 7-7 3-5 8 on admission baseline appears to be 14  BUN of 30 up from baseline 15  Nawing epigastric abdominal pain  maroon-colored stool per nursing with heme-positive stool  Status post 2 units packed red blood cells overnight  Plan:  Patient had small breakfast (piece of Georgian toast) this morning around 9:00 a m  Due to concern for brisk upper GI bleed will perform emergent endoscopy today  Discussed with anesthesia  Maintain NPO  Protonix infusion  IV fluids  Hold aspirin and Lovenox this morning    ______________________________________________________________________    HPI:    73-year-old with past medical history depression, alcohol use (1/5 daily) presents the hospital with syncopal episode  Patient reports that he was feeling dizzy and off balance and lost consciousness at home  Patient was found to be anemic on admission with hemoglobin of 9 7 down from 13 to 14 in October  Repeat hemoglobin trended down to 5 8 overnight  Patient received 2 units packed red blood cells  Patient had large bowel movement overnight that was maroon with streaks of blood  GI was consulted for evaluation  Patient drinks a 5th of alcohol daily  Denies NSAID use  No history of GI bleed  Denies prior endoscopy, colonoscopy  Denies melena at home  Does report 9 epigastric abdominal pain that is been present over the last 2 weeks which is new  Does not associate that it is worse with without food  Denies history of reflux  REVIEW OF SYSTEMS:  Review of Systems   Constitutional: Negative for chills and fever  HENT: Negative for sore throat and trouble swallowing  Eyes: Negative for photophobia and visual disturbance  Respiratory: Positive for wheezing  Negative for chest tightness and shortness of breath  Cardiovascular: Negative for chest pain and palpitations  Gastrointestinal: Negative for abdominal distention, abdominal pain, anal bleeding, blood in stool, constipation, diarrhea, nausea and vomiting  Genitourinary: Negative for difficulty urinating and dysuria  Musculoskeletal: Positive for gait problem, myalgias and neck pain  Neurological: Negative for dizziness, light-headedness and numbness  Historical Information   Past Medical History:   Diagnosis Date    BPH (benign prostatic hyperplasia)     [facility]A [facility]cere[facility] vascular accident) [facility] [address] [facility]     Head injury     Hypertension     Metatarsal fracture     TIA (transient ischemic attack)      Past Surgical History:   Procedure Laterality Date    ABDOMINAL SURGERY      ANKLE SURGERY      BACK SURGERY      ELBOW SURGERY      JOINT REPLACEMENT      KNEE SURGERY      LIVER SURGERY       Social History   Social History     Substance and Sexual Activity   Alcohol Use Yes    Frequency: Monthly or less    Comment: half a handle of vodka daily as per family     Social History     Substance and Sexual Activity   Drug Use Yes    Types: Marijuana    Comment: history polysubstance use including IVDA on record- every now then will smoke weed      Social History     Tobacco Use   Smoking Status Current Every Day Smoker    Packs/day: 1 00    Types: Cigarettes   Smokeless Tobacco Never Used   Tobacco Comment    started age 14, 4 quit attempts     History reviewed   No pertinent family history      Meds/Allergies     Medications Prior to Admission   Medication    albuterol (PROVENTIL HFA,VENTOLIN HFA) 90 mcg/act inhaler    amLODIPine (NORVASC) 10 mg tablet    aspirin (ECOTRIN LOW STRENGTH) 81 mg EC tablet    atorvastatin (LIPITOR) 40 mg tablet    Cholecalciferol 1000 units tablet    cyclobenzaprine (FLEXERIL) 10 mg tablet    diclofenac sodium (VOLTAREN) 50 mg EC tablet    DULoxetine (CYMBALTA) 30 mg delayed release capsule    fluticasone (FLONASE) 50 mcg/act nasal spray    fluticasone-vilanterol (BREO ELLIPTA) 100-25 mcg/inh inhaler    folic acid (FOLVITE) 1 mg tablet    gabapentin (NEURONTIN) 400 mg capsule    guaiFENesin (MUCINEX) 600 mg 12 hr tablet    lidocaine (LIDODERM) 5 %    menthol-zinc oxide (CALMOSEPTINE) 0 44-20 6 % OINT    metoprolol tartrate (LOPRESSOR) 50 mg tablet    mirtazapine (REMERON) 15 mg tablet    nicotine (NICODERM CQ) 14 mg/24hr TD 24 hr patch    QUEtiapine (SEROquel) 200 mg tablet    QUEtiapine (SEROquel) 300 mg tablet    senna-docusate sodium (SENOKOT S) 8 6-50 mg per tablet    tamsulosin (FLOMAX) 0 4 mg    thiamine 100 MG tablet    traZODone (DESYREL) 50 mg tablet    Wound Dressings (COMFEEL PLUS ULCER DRESSING) PADS     Current Facility-Administered Medications   Medication Dose Route Frequency    albuterol (PROVENTIL HFA,VENTOLIN HFA) inhaler 2 puff  2 puff Inhalation Q4H PRN    amLODIPine (NORVASC) tablet 10 mg  10 mg Oral Daily    atorvastatin (LIPITOR) tablet 40 mg  40 mg Oral Daily    cholecalciferol (VITAMIN D3) tablet 1,000 Units  1,000 Units Oral Daily    DULoxetine (CYMBALTA) delayed release capsule 90 mg  90 mg Oral Daily    fluticasone (FLONASE) 50 mcg/act nasal spray 2 spray  2 spray Nasal Daily    fluticasone-vilanterol (BREO ELLIPTA) 100-25 mcg/inh inhaler 1 puff  1 puff Inhalation Daily    folic acid (FOLVITE) tablet 1 mg  1 mg Oral Daily    gabapentin (NEURONTIN) capsule 300 mg  300 mg Oral TID    lactated ringers infusion  75 mL/hr Intravenous Continuous    metoprolol tartrate (LOPRESSOR) tablet 50 mg  50 mg Oral Daily    mirtazapine (REMERON) tablet 15 mg  15 mg Oral HS    nicotine (NICODERM CQ) 14 mg/24hr TD 24 hr patch 1 patch  1 patch Transdermal Daily    pantoprazole (PROTONIX) 80 mg in sodium chloride 0 9 % 100 mL IVPB  80 mg Intravenous Once    And    pantoprazole (PROTONIX) 80 mg in sodium chloride 0 9 % 100 mL infusion  8 mg/hr Intravenous Continuous    QUEtiapine (SEROquel) tablet 200 mg  200 mg Oral Daily    QUEtiapine (SEROquel) tablet 600 mg  600 mg Oral HS    tamsulosin (FLOMAX) capsule 0 4 mg  0 4 mg Oral Daily With Dinner    thiamine (VITAMIN B1) tablet 100 mg  100 mg Oral Daily    traZODone (DESYREL) tablet 50 mg  50 mg Oral HS PRN     No Known Allergies    Objective     Blood pressure 104/57, pulse 89, temperature 97 8 °F (36 6 °C), temperature source Oral, resp  rate 16, height 5' 8 5" (1 74 m), weight 72 8 kg (160 lb 9 6 oz), SpO2 96 %  Body mass index is 24 06 kg/m²  Intake/Output Summary (Last 24 hours) at 1/10/2020 1010  Last data filed at 1/10/2020 0931  Gross per 24 hour   Intake 462 ml   Output 950 ml   Net -488 ml       PHYSICAL EXAM:    Physical Exam   Constitutional: He is oriented to person, place, and time  He appears well-developed and well-nourished  No distress  HENT:   Head: Normocephalic and atraumatic  Mouth/Throat: Oropharynx is clear and moist  No oropharyngeal exudate  Eyes: Pupils are equal, round, and reactive to light  Conjunctivae and EOM are normal  No scleral icterus  Cardiovascular: Normal rate, regular rhythm and normal heart sounds  No murmur heard  Pulmonary/Chest: Effort normal  No respiratory distress  Poor air movement, and expiratory scattered wheezing bilaterally   Abdominal: Soft  He exhibits no distension and no mass  There is tenderness (Mild epigastric right upper quadrant)  There is no rebound and no guarding     Musculoskeletal: He exhibits no edema, tenderness or deformity  Neurological: He is alert and oriented to person, place, and time  Skin: Skin is warm and dry  He is not diaphoretic  Psychiatric: He has a normal mood and affect   His behavior is normal  Judgment and thought content normal        Lab Results:   Admission on 01/08/2020   Component Date Value    WBC 01/08/2020 10 67*    RBC 01/08/2020 2 72*    Hemoglobin 01/08/2020 9 7*    Hematocrit 01/08/2020 29 5*    MCV 01/08/2020 109*    MCH 01/08/2020 35 7*    MCHC 01/08/2020 32 9     RDW 01/08/2020 12 7     MPV 01/08/2020 9 5     Platelets 75/66/1865 309     nRBC 01/08/2020 0     Neutrophils Relative 01/08/2020 88*    Immat GRANS % 01/08/2020 1     Lymphocytes Relative 01/08/2020 6*    Monocytes Relative 01/08/2020 5     Eosinophils Relative 01/08/2020 0     Basophils Relative 01/08/2020 0     Neutrophils Absolute 01/08/2020 9 44*    Immature Grans Absolute 01/08/2020 0 06     Lymphocytes Absolute 01/08/2020 0 64     Monocytes Absolute 01/08/2020 0 52     Eosinophils Absolute 01/08/2020 0 00     Basophils Absolute 01/08/2020 0 01     Sodium 01/08/2020 135*    Potassium 01/08/2020 4 0     Chloride 01/08/2020 105     CO2 01/08/2020 17*    ANION GAP 01/08/2020 13     BUN 01/08/2020 19     Creatinine 01/08/2020 0 95     Glucose 01/08/2020 99     Calcium 01/08/2020 8 5     AST 01/08/2020 19     ALT 01/08/2020 9*    Alkaline Phosphatase 01/08/2020 64     Total Protein 01/08/2020 6 2*    Albumin 01/08/2020 2 4*    Total Bilirubin 01/08/2020 0 37     eGFR 01/08/2020 82     Troponin I 01/08/2020 <0 02     Lipase 01/08/2020 81     Ventricular Rate 01/08/2020 115     Atrial Rate 01/08/2020 115     PA Interval 01/08/2020 142     QRSD Interval 01/08/2020 74     QT Interval 01/08/2020 334     QTC Interval 01/08/2020 462     P Axis 01/08/2020 76     QRS Axis 01/08/2020 72     T Wave [place] 01/08/2020 51     Troponin I 01/08/2020 <0 02     Platelets 01/08/2020 306     MPV 01/08/2020 9 4     Ethanol Lvl 01/08/2020 <3     Troponin I 01/08/2020 <0 02     Vitamin B-12 01/08/2020 495     Folate 01/08/2020 >20 0*    Troponin I 01/09/2020 <0 02     Sodium 01/09/2020 138     Potassium 01/09/2020 3 6     Chloride 01/09/2020 110*    CO2 01/09/2020 22     ANION GAP 01/09/2020 6     BUN 01/09/2020 30*    Creatinine 01/09/2020 0 78     Glucose 01/09/2020 94     Calcium 01/09/2020 8 0*    eGFR 01/09/2020 93     WBC 01/09/2020 5 12     RBC 01/09/2020 2 11*    Hemoglobin 01/09/2020 7 3*    Hematocrit 01/09/2020 23 0*    MCV 01/09/2020 109*    MCH 01/09/2020 34 6*    MCHC 01/09/2020 31 7     RDW 01/09/2020 12 6     Platelets 58/32/5240 228     MPV 01/09/2020 9 6     Vit D, 25-Hydroxy 01/09/2020 46 7     WBC 01/09/2020 9 62     RBC 01/09/2020 1 62*    Hemoglobin 01/09/2020 5 8*    Hematocrit 01/09/2020 17 9*    MCV 01/09/2020 111*    MCH 01/09/2020 35 8*    MCHC 01/09/2020 32 4     RDW 01/09/2020 12 7     Platelets 43/59/3181 254     MPV 01/09/2020 10 1     Fecal Occult Blood Diagn* 01/09/2020 Positive*    ABO Grouping 01/09/2020 A     Rh Factor 01/09/2020 Negative     Antibody Screen 01/09/2020 Positive     Specimen Expiration Date 01/09/2020 26208904     Unit Product Code 01/10/2020 [id number]     Unit Number 01/10/2020 [id number]     Unit ABO 01/10/2020 A     Unit [facility] 01/10/2020 NEG     Crossmatch 01/10/2020 Compatible     Unit Dispense Status 01/10/2020 Presumed Trans     Unit Product Code 01/10/2020 [id number]     Unit Number 01/10/2020 [id number]     Unit ABO 01/10/2020 A     Unit RH 01/10/2020 NEG     Crossmatch 01/10/2020 Compatible     Unit Dispense Status 01/10/2020 Presumed Trans     ABO Grouping 01/09/2020 A     Rh Factor 01/09/2020 Negative     GERONIMO A ANTIGEN 01/09/2020 Negative     ANTIBODY ID  #1 01/09/2020 Anti-Fya        Imaging Studies: I have personally reviewed pertinent imaging studies      Lucy Cheney Chest 2 Views    Result Date: 1/8/2020  Narrative: CHEST INDICATION:   Chest Pain  COMPARISON:  April 23, 2019  EXAM PERFORMED/VIEWS:  XR CHEST PA & LATERAL FINDINGS: Cardiomediastinal silhouette appears unremarkable  Linear atelectasis versus scarring at the left lung base  No focal consolidation, pleural effusion or pneumothorax  Straightening/reversal of the thoracic kyphosis  There is an age-indeterminate wedge compression deformity of an upper lumbar vertebral body, seen on the lateral view  Again seen are old right-sided rib fractures  IVC filter in place  Impression: Linear atelectasis versus scarring at the left lung base  No focal consolidation, pleural effusion or pneumothorax  Age-indeterminate wedge compression deformity of an upper lumbar vertebral body, seen on the lateral view  The study was marked in EPIC for significant notification  Workstation performed: [id number]     Ct Head Wo Contrast    Result Date: 1/8/2020  Narrative: CT BRAIN - WITHOUT CONTRAST INDICATION:   fall, head strike  COMPARISON:  Head CT 5/1/19 TECHNIQUE:  CT examination of the brain was performed  In addition to axial images, coronal 2D reformatted images were created and submitted for interpretation  Radiation dose length product (DLP) for this visit:  964 37 mGy-cm   This examination, like all CT scans performed in the [facility], was performed utilizing techniques to minimize radiation dose exposure, including the use of iterative  reconstruction and automated exposure control  IMAGE QUALITY:  Diagnostic  FINDINGS: PARENCHYMA: Decreased attenuation is noted in periventricular and subcortical white matter demonstrating an appearance that is statistically most likely to represent mild microangiopathic change; this appearance is similar when compared to most recent prior examination  No CT signs of acute infarction  No intracranial mass, mass effect or midline shift    No acute parenchymal hemorrhage  VENTRICLES AND EXTRA-AXIAL SPACES:  Normal for the patient's age  VISUALIZED ORBITS AND PARANASAL SINUSES:  Unremarkable  CALVARIUM AND EXTRACRANIAL SOFT TISSUES:  Normal      Impression: No acute intracranial abnormality  Microangiopathic changes  Workstation performed: [id number]     Ct Spine Cervical Without Contrast    Result Date: 1/8/2020  Narrative: CT CERVICAL SPINE - WITHOUT CONTRAST INDICATION:   c7 tenderness  COMPARISON:  None  TECHNIQUE:  CT examination of the cervical spine was performed without intravenous contrast   Contiguous axial images were obtained  Sagittal and coronal reconstructions were performed  Radiation dose length product (DLP) for this visit:  398 51 mGy-cm   This examination, like all CT scans performed in the [facility], was performed utilizing techniques to minimize radiation dose exposure, including the use of iterative  reconstruction and automated exposure control  IMAGE QUALITY:  Diagnostic  FINDINGS: ALIGNMENT:  There is 1 mm posterior subluxation of C5 on C6 VERTEBRAL BODIES:  No fracture  DEGENERATIVE CHANGES:  Ankylosis of the C6 and C7 vertebral bodies PREVERTEBRAL AND PARASPINAL SOFT TISSUES:  Moderate THORACIC INLET:  Normal      Impression: No cervical spine fracture or traumatic malalignment    Workstation performed: [id number]       ---------------------------------------------------  Note Electronically Signed By:    Car Mcdonald 15 Internal Medicine Residency PGY-3

## 2020-01-10 NOTE — PROGRESS NOTES
Patient having double vision, speech appears slightly more slurred, slight right sided facial droop more pronounced  SLIM aware, at bedside  CT head ordered

## 2020-01-10 NOTE — RESPIRATORY THERAPY NOTE
RT Protocol Note  Katerina Velasquez 81 y o  male MRN: [id number]  Unit/Bed#: CW2 211-01 Encounter: [id number]    Assessment    Principal Problem:    Acute blood loss anemia  Active Problems:    Essential hypertension    Syncope    Chest pain    Suicidal ideation    Alcohol abuse    MDD (major depressive disorder)    History of CVA (cerebrovascular accident)    COPD without exacerbation (HCC)    Dyslipidemia    Chronic pain    Tobacco abuse    Inadequate oral nutritional intake      Home Pulmonary Medications:  Albuterol MDI        Past Medical History:   Diagnosis Date    BPH (benign prostatic hyperplasia)     [facility]) [facility] [address]     Head injury     Hypertension     Metatarsal fracture     TIA (transient ischemic attack)      Social History     Socioeconomic History    Marital status: Single     Spouse name: None    Number of children: None    Years of education: None    Highest education level: None   Occupational History    None   Social Needs    Financial resource strain: None    Food insecurity:     Worry: None     Inability: None    Transportation needs:     Medical: None     Non-medical: None   Tobacco Use    Smoking status: Current Every Day Smoker     Packs/day: 1 00     Types: Cigarettes    Smokeless tobacco: Never Used    Tobacco comment: started age 14, 4 quit attempts   Substance and Sexual Activity    Alcohol use: Yes     Frequency: Monthly or less     Comment: half a handle of vodka daily as per family    Drug use: Yes     Types: Marijuana     Comment: history polysubstance use including IVDA on record- every now then will smoke weed     Sexual activity: None   Lifestyle    Physical activity:     Days per week: None     Minutes per session: None    Stress: None   Relationships    Social connections:     Talks on phone: None     Gets together: None     Attends [employer] service: None     Active member of club or organization: None     Attends meetings of clubs or organizations: None     Relationship status: None    Intimate partner violence:     Fear of current or ex partner: None     Emotionally abused: None     Physically abused: None     Forced sexual activity: None   Other Topics Concern    None   Social History Narrative    Used to be  for about 27 yrs, unemployed since fell off roof onto concrete - pt does not recall what year    1 son with whom he lives,        Subjective         Objective    Physical Exam:   Assessment Type: Assess only  General Appearance: Awake  Respiratory Pattern: Normal  Chest Assessment: Chest expansion symmetrical  Bilateral Breath Sounds: Clear, Diminished  Cough: None  O2 Device: RA    Vitals:  Blood pressure 146/77, pulse 96, temperature 98 °F (36 7 °C), temperature source Oral, resp  rate 17, height 5' 8 5" (1 74 m), weight 72 8 kg (160 lb 9 6 oz), SpO2 96 %  Imaging and other studies: I have personally reviewed pertinent reports  O2 Device: RA     Plan    Respiratory Plan: Home Bronchodilator Patient pathway, Discontinue Protocol        Resp Comments: (P) Pt assessed per protocol, pt takes albuterol mdi at home  No wheezing, no distress  RN and team at bedside state no need for nebulizers  Plan to return to MDIs PRN

## 2020-01-11 ENCOUNTER — ANESTHESIA (INPATIENT)
Dept: PERIOP | Facility: HOSPITAL | Age: 70
DRG: 326 | End: 2020-01-11
Payer: MEDICARE

## 2020-01-11 ENCOUNTER — APPOINTMENT (INPATIENT)
Dept: RADIOLOGY | Facility: HOSPITAL | Age: 70
DRG: 326 | End: 2020-01-11
Payer: MEDICARE

## 2020-01-11 ENCOUNTER — ANESTHESIA EVENT (INPATIENT)
Dept: PERIOP | Facility: HOSPITAL | Age: 70
DRG: 326 | End: 2020-01-11
Payer: MEDICARE

## 2020-01-11 ENCOUNTER — ANESTHESIA EVENT (INPATIENT)
Dept: RADIOLOGY | Facility: HOSPITAL | Age: 70
DRG: 326 | End: 2020-01-11
Payer: MEDICARE

## 2020-01-11 ENCOUNTER — APPOINTMENT (INPATIENT)
Dept: PERIOP | Facility: HOSPITAL | Age: 70
DRG: 326 | End: 2020-01-11
Payer: MEDICARE

## 2020-01-11 ENCOUNTER — ANESTHESIA (INPATIENT)
Dept: RADIOLOGY | Facility: HOSPITAL | Age: 70
DRG: 326 | End: 2020-01-11
Payer: MEDICARE

## 2020-01-11 PROBLEM — K26.9 DUODENAL ULCER: Status: ACTIVE | Noted: 2020-01-08

## 2020-01-11 LAB
ALBUMIN SERPL BCP-MCNC: 0.8 G/DL (ref 3.5–5)
ALBUMIN SERPL BCP-MCNC: 1.5 G/DL (ref 3.5–5)
ALBUMIN SERPL BCP-MCNC: 2 G/DL (ref 3.5–5)
ALBUMIN SERPL BCP-MCNC: 2.5 G/DL (ref 3.5–5)
ALP SERPL-CCNC: 18 U/L (ref 46–116)
ALP SERPL-CCNC: 28 U/L (ref 46–116)
ALP SERPL-CCNC: 39 U/L (ref 46–116)
ALP SERPL-CCNC: 47 U/L (ref 46–116)
ALT SERPL W P-5'-P-CCNC: 15 U/L (ref 12–78)
ALT SERPL W P-5'-P-CCNC: 21 U/L (ref 12–78)
ALT SERPL W P-5'-P-CCNC: 6 U/L (ref 12–78)
ALT SERPL W P-5'-P-CCNC: 9 U/L (ref 12–78)
ANION GAP SERPL CALCULATED.3IONS-SCNC: 4 MMOL/L (ref 4–13)
ANION GAP SERPL CALCULATED.3IONS-SCNC: 4 MMOL/L (ref 4–13)
ANION GAP SERPL CALCULATED.3IONS-SCNC: 6 MMOL/L (ref 4–13)
ANION GAP SERPL CALCULATED.3IONS-SCNC: 7 MMOL/L (ref 4–13)
ANION GAP SERPL CALCULATED.3IONS-SCNC: 7 MMOL/L (ref 4–13)
ANION GAP SERPL CALCULATED.3IONS-SCNC: 8 MMOL/L (ref 4–13)
APTT PPP: 47 SECONDS (ref 23–37)
APTT PPP: 77 SECONDS (ref 23–37)
APTT PPP: >210 SECONDS (ref 23–37)
AST SERPL W P-5'-P-CCNC: 18 U/L (ref 5–45)
AST SERPL W P-5'-P-CCNC: 22 U/L (ref 5–45)
AST SERPL W P-5'-P-CCNC: 33 U/L (ref 5–45)
AST SERPL W P-5'-P-CCNC: 8 U/L (ref 5–45)
BASE EXCESS BLDA CALC-SCNC: -2.6 MMOL/L
BASE EXCESS BLDA CALC-SCNC: -7 MMOL/L (ref -2–3)
BASE EXCESS BLDA CALC-SCNC: 1.7 MMOL/L
BASE EXCESS BLDA CALC-SCNC: 7.2 MMOL/L
BASOPHILS # BLD AUTO: 0 THOUSANDS/ΜL (ref 0–0.1)
BASOPHILS # BLD AUTO: 0 THOUSANDS/ΜL (ref 0–0.1)
BASOPHILS # BLD AUTO: 0.02 THOUSANDS/ΜL (ref 0–0.1)
BASOPHILS NFR BLD AUTO: 0 % (ref 0–1)
BILIRUB SERPL-MCNC: 0.55 MG/DL (ref 0.2–1)
BILIRUB SERPL-MCNC: 0.63 MG/DL (ref 0.2–1)
BILIRUB SERPL-MCNC: 0.66 MG/DL (ref 0.2–1)
BILIRUB SERPL-MCNC: 1.47 MG/DL (ref 0.2–1)
BODY TEMPERATURE: 95.9 DEGREES FEHRENHEIT
BUN SERPL-MCNC: 13 MG/DL (ref 5–25)
BUN SERPL-MCNC: 14 MG/DL (ref 5–25)
BUN SERPL-MCNC: 16 MG/DL (ref 5–25)
BUN SERPL-MCNC: 17 MG/DL (ref 5–25)
BUN SERPL-MCNC: 20 MG/DL (ref 5–25)
BUN SERPL-MCNC: 21 MG/DL (ref 5–25)
CA-I BLD-SCNC: 0.97 MMOL/L (ref 1.12–1.32)
CA-I BLD-SCNC: 1.1 MMOL/L (ref 1.12–1.32)
CA-I BLD-SCNC: 1.14 MMOL/L (ref 1.12–1.32)
CA-I BLD-SCNC: 1.19 MMOL/L (ref 1.12–1.32)
CALCIUM SERPL-MCNC: 6.6 MG/DL (ref 8.3–10.1)
CALCIUM SERPL-MCNC: 6.9 MG/DL (ref 8.3–10.1)
CALCIUM SERPL-MCNC: 7.2 MG/DL (ref 8.3–10.1)
CALCIUM SERPL-MCNC: 7.8 MG/DL (ref 8.3–10.1)
CALCIUM SERPL-MCNC: 8.6 MG/DL (ref 8.3–10.1)
CALCIUM SERPL-MCNC: 9.2 MG/DL (ref 8.3–10.1)
CHLORIDE SERPL-SCNC: 108 MMOL/L (ref 100–108)
CHLORIDE SERPL-SCNC: 110 MMOL/L (ref 100–108)
CHLORIDE SERPL-SCNC: 113 MMOL/L (ref 100–108)
CHLORIDE SERPL-SCNC: 114 MMOL/L (ref 100–108)
CHLORIDE SERPL-SCNC: 115 MMOL/L (ref 100–108)
CHLORIDE SERPL-SCNC: 120 MMOL/L (ref 100–108)
CHOLEST SERPL-MCNC: 74 MG/DL (ref 50–200)
CO2 SERPL-SCNC: 23 MMOL/L (ref 21–32)
CO2 SERPL-SCNC: 23 MMOL/L (ref 21–32)
CO2 SERPL-SCNC: 24 MMOL/L (ref 21–32)
CO2 SERPL-SCNC: 24 MMOL/L (ref 21–32)
CO2 SERPL-SCNC: 28 MMOL/L (ref 21–32)
CO2 SERPL-SCNC: 33 MMOL/L (ref 21–32)
CREAT SERPL-MCNC: 0.45 MG/DL (ref 0.6–1.3)
CREAT SERPL-MCNC: 0.46 MG/DL (ref 0.6–1.3)
CREAT SERPL-MCNC: 0.51 MG/DL (ref 0.6–1.3)
CREAT SERPL-MCNC: 0.51 MG/DL (ref 0.6–1.3)
CREAT SERPL-MCNC: 0.52 MG/DL (ref 0.6–1.3)
CREAT SERPL-MCNC: 0.6 MG/DL (ref 0.6–1.3)
EOSINOPHIL # BLD AUTO: 0 THOUSAND/ΜL (ref 0–0.61)
EOSINOPHIL # BLD AUTO: 0.03 THOUSAND/ΜL (ref 0–0.61)
EOSINOPHIL # BLD AUTO: 0.06 THOUSAND/ΜL (ref 0–0.61)
EOSINOPHIL NFR BLD AUTO: 0 % (ref 0–6)
EOSINOPHIL NFR BLD AUTO: 1 % (ref 0–6)
EOSINOPHIL NFR BLD AUTO: 2 % (ref 0–6)
ERYTHROCYTE [DISTWIDTH] IN BLOOD BY AUTOMATED COUNT: 13.9 % (ref 11.6–15.1)
ERYTHROCYTE [DISTWIDTH] IN BLOOD BY AUTOMATED COUNT: 14.1 % (ref 11.6–15.1)
ERYTHROCYTE [DISTWIDTH] IN BLOOD BY AUTOMATED COUNT: 14.6 % (ref 11.6–15.1)
ERYTHROCYTE [DISTWIDTH] IN BLOOD BY AUTOMATED COUNT: 14.9 % (ref 11.6–15.1)
ERYTHROCYTE [DISTWIDTH] IN BLOOD BY AUTOMATED COUNT: 15.2 % (ref 11.6–15.1)
ERYTHROCYTE [DISTWIDTH] IN BLOOD BY AUTOMATED COUNT: 18.3 % (ref 11.6–15.1)
FIBRINOGEN PPP-MCNC: 273 MG/DL (ref 227–495)
FIBRINOGEN PPP-MCNC: 91 MG/DL (ref 227–495)
GFR SERPL CREATININE-BSD FRML MDRD: 104 ML/MIN/1.73SQ M
GFR SERPL CREATININE-BSD FRML MDRD: 110 ML/MIN/1.73SQ M
GFR SERPL CREATININE-BSD FRML MDRD: 111 ML/MIN/1.73SQ M
GFR SERPL CREATININE-BSD FRML MDRD: 111 ML/MIN/1.73SQ M
GFR SERPL CREATININE-BSD FRML MDRD: 116 ML/MIN/1.73SQ M
GFR SERPL CREATININE-BSD FRML MDRD: 117 ML/MIN/1.73SQ M
GLUCOSE SERPL-MCNC: 112 MG/DL (ref 65–140)
GLUCOSE SERPL-MCNC: 144 MG/DL (ref 65–140)
GLUCOSE SERPL-MCNC: 146 MG/DL (ref 65–140)
GLUCOSE SERPL-MCNC: 154 MG/DL (ref 65–140)
GLUCOSE SERPL-MCNC: 180 MG/DL (ref 65–140)
GLUCOSE SERPL-MCNC: 191 MG/DL (ref 65–140)
GLUCOSE SERPL-MCNC: 192 MG/DL (ref 65–140)
HCO3 BLDA-SCNC: 20.3 MMOL/L (ref 24–30)
HCO3 BLDA-SCNC: 21.9 MMOL/L (ref 22–28)
HCO3 BLDA-SCNC: 27.8 MMOL/L (ref 22–28)
HCO3 BLDA-SCNC: 31.9 MMOL/L (ref 22–28)
HCT VFR BLD AUTO: 11.9 % (ref 36.5–49.3)
HCT VFR BLD AUTO: 20.7 % (ref 36.5–49.3)
HCT VFR BLD AUTO: 26.4 % (ref 36.5–49.3)
HCT VFR BLD AUTO: 28.7 % (ref 36.5–49.3)
HCT VFR BLD AUTO: 33.8 % (ref 36.5–49.3)
HCT VFR BLD AUTO: 9.6 % (ref 36.5–49.3)
HCT VFR BLD CALC: 18 % (ref 36.5–49.3)
HDLC SERPL-MCNC: 29 MG/DL
HGB BLD-MCNC: 11.5 G/DL (ref 12–17)
HGB BLD-MCNC: 3 G/DL (ref 12–17)
HGB BLD-MCNC: 3.8 G/DL (ref 12–17)
HGB BLD-MCNC: 6.8 G/DL (ref 12–17)
HGB BLD-MCNC: 7 G/DL (ref 12–17)
HGB BLD-MCNC: 8.8 G/DL (ref 12–17)
HGB BLD-MCNC: 9.4 G/DL (ref 12–17)
HGB BLDA-MCNC: 6.1 G/DL (ref 12–17)
HOROWITZ INDEX BLDA+IHG-RTO: 80 MM[HG]
IMM GRANULOCYTES # BLD AUTO: 0.03 THOUSAND/UL (ref 0–0.2)
IMM GRANULOCYTES # BLD AUTO: 0.03 THOUSAND/UL (ref 0–0.2)
IMM GRANULOCYTES # BLD AUTO: 0.06 THOUSAND/UL (ref 0–0.2)
IMM GRANULOCYTES NFR BLD AUTO: 1 % (ref 0–2)
IMM GRANULOCYTES NFR BLD AUTO: 1 % (ref 0–2)
IMM GRANULOCYTES NFR BLD AUTO: 2 % (ref 0–2)
INR PPP: 1.09 (ref 0.84–1.19)
INR PPP: 1.42 (ref 0.84–1.19)
INR PPP: 1.47 (ref 0.84–1.19)
INR PPP: 1.55 (ref 0.84–1.19)
INR PPP: 2.59 (ref 0.84–1.19)
LACTATE SERPL-SCNC: 1.4 MMOL/L (ref 0.5–2)
LACTATE SERPL-SCNC: 3.3 MMOL/L (ref 0.5–2)
LACTATE SERPL-SCNC: 3.8 MMOL/L (ref 0.5–2)
LDLC SERPL CALC-MCNC: 21 MG/DL (ref 0–100)
LYMPHOCYTES # BLD AUTO: 0.19 THOUSANDS/ΜL (ref 0.6–4.47)
LYMPHOCYTES # BLD AUTO: 0.37 THOUSANDS/ΜL (ref 0.6–4.47)
LYMPHOCYTES # BLD AUTO: 1.28 THOUSANDS/ΜL (ref 0.6–4.47)
LYMPHOCYTES NFR BLD AUTO: 22 % (ref 14–44)
LYMPHOCYTES NFR BLD AUTO: 23 % (ref 14–44)
LYMPHOCYTES NFR BLD AUTO: 7 % (ref 14–44)
MAGNESIUM SERPL-MCNC: 1.4 MG/DL (ref 1.6–2.6)
MAGNESIUM SERPL-MCNC: 1.5 MG/DL (ref 1.6–2.6)
MAGNESIUM SERPL-MCNC: 1.5 MG/DL (ref 1.6–2.6)
MAGNESIUM SERPL-MCNC: 1.6 MG/DL (ref 1.6–2.6)
MCH RBC QN AUTO: 28.1 PG (ref 26.8–34.3)
MCH RBC QN AUTO: 28.3 PG (ref 26.8–34.3)
MCH RBC QN AUTO: 29.2 PG (ref 26.8–34.3)
MCH RBC QN AUTO: 29.3 PG (ref 26.8–34.3)
MCH RBC QN AUTO: 32 PG (ref 26.8–34.3)
MCH RBC QN AUTO: 32.1 PG (ref 26.8–34.3)
MCHC RBC AUTO-ENTMCNC: 31.3 G/DL (ref 31.4–37.4)
MCHC RBC AUTO-ENTMCNC: 31.9 G/DL (ref 31.4–37.4)
MCHC RBC AUTO-ENTMCNC: 32.8 G/DL (ref 31.4–37.4)
MCHC RBC AUTO-ENTMCNC: 32.9 G/DL (ref 31.4–37.4)
MCHC RBC AUTO-ENTMCNC: 33.3 G/DL (ref 31.4–37.4)
MCHC RBC AUTO-ENTMCNC: 34 G/DL (ref 31.4–37.4)
MCV RBC AUTO: 86 FL (ref 82–98)
MCV RBC AUTO: 88 FL (ref 82–98)
MCV RBC AUTO: 89 FL (ref 82–98)
MCV RBC AUTO: 91 FL (ref 82–98)
MCV RBC AUTO: 96 FL (ref 82–98)
MCV RBC AUTO: 98 FL (ref 82–98)
MONOCYTES # BLD AUTO: 0.2 THOUSAND/ΜL (ref 0.17–1.22)
MONOCYTES # BLD AUTO: 0.25 THOUSAND/ΜL (ref 0.17–1.22)
MONOCYTES # BLD AUTO: 0.64 THOUSAND/ΜL (ref 0.17–1.22)
MONOCYTES NFR BLD AUTO: 11 % (ref 4–12)
MONOCYTES NFR BLD AUTO: 15 % (ref 4–12)
MONOCYTES NFR BLD AUTO: 8 % (ref 4–12)
NEUTROPHILS # BLD AUTO: 1.03 THOUSANDS/ΜL (ref 1.85–7.62)
NEUTROPHILS # BLD AUTO: 2.14 THOUSANDS/ΜL (ref 1.85–7.62)
NEUTROPHILS # BLD AUTO: 3.62 THOUSANDS/ΜL (ref 1.85–7.62)
NEUTS SEG NFR BLD AUTO: 59 % (ref 43–75)
NEUTS SEG NFR BLD AUTO: 64 % (ref 43–75)
NEUTS SEG NFR BLD AUTO: 84 % (ref 43–75)
NRBC BLD AUTO-RTO: 0 /100 WBCS
O2 CT BLDA-SCNC: 16.5 ML/DL (ref 16–23)
O2 CT BLDA-SCNC: 5.1 ML/DL (ref 16–23)
O2 CT BLDA-SCNC: 6.5 ML/DL (ref 16–23)
OXYHGB MFR BLDA: 93.6 % (ref 94–97)
OXYHGB MFR BLDA: 97.1 % (ref 94–97)
OXYHGB MFR BLDA: 98.2 % (ref 94–97)
PCO2 BLD: 22 MMOL/L (ref 21–32)
PCO2 BLD: 51.7 MM HG (ref 42–50)
PCO2 BLDA: 34.9 MM HG (ref 36–44)
PCO2 BLDA: 48 MM HG (ref 36–44)
PCO2 BLDA: 49.8 MM HG (ref 36–44)
PCO2 TEMP ADJ BLDA: 46.6 MM HG (ref 36–44)
PEEP RESPIRATORY: 5 CM[H2O]
PEEP RESPIRATORY: 5 CM[H2O]
PEEP RESPIRATORY: 8 CM[H2O]
PH BLD: 7.2 [PH] (ref 7.3–7.4)
PH BLD: 7.39 [PH] (ref 7.35–7.45)
PH BLDA: 7.36 [PH] (ref 7.35–7.45)
PH BLDA: 7.42 [PH] (ref 7.35–7.45)
PH BLDA: 7.44 [PH] (ref 7.35–7.45)
PHOSPHATE SERPL-MCNC: 2.3 MG/DL (ref 2.3–4.1)
PHOSPHATE SERPL-MCNC: 2.5 MG/DL (ref 2.3–4.1)
PHOSPHATE SERPL-MCNC: 3 MG/DL (ref 2.3–4.1)
PHOSPHATE SERPL-MCNC: 3.1 MG/DL (ref 2.3–4.1)
PLATELET # BLD AUTO: 122 THOUSANDS/UL (ref 149–390)
PLATELET # BLD AUTO: 150 THOUSANDS/UL (ref 149–390)
PLATELET # BLD AUTO: 154 THOUSANDS/UL (ref 149–390)
PLATELET # BLD AUTO: 162 THOUSANDS/UL (ref 149–390)
PLATELET # BLD AUTO: 163 THOUSANDS/UL (ref 149–390)
PLATELET # BLD AUTO: 253 THOUSANDS/UL (ref 149–390)
PMV BLD AUTO: 9 FL (ref 8.9–12.7)
PMV BLD AUTO: 9 FL (ref 8.9–12.7)
PMV BLD AUTO: 9.1 FL (ref 8.9–12.7)
PMV BLD AUTO: 9.1 FL (ref 8.9–12.7)
PMV BLD AUTO: 9.3 FL (ref 8.9–12.7)
PMV BLD AUTO: 9.4 FL (ref 8.9–12.7)
PO2 BLD: 61 MM HG (ref 35–45)
PO2 BLD: 62.6 MM HG (ref 75–129)
PO2 BLDA: 213 MM HG (ref 75–129)
PO2 BLDA: 250 MM HG (ref 75–129)
PO2 BLDA: 69.3 MM HG (ref 75–129)
POTASSIUM BLD-SCNC: 4 MMOL/L (ref 3.5–5.3)
POTASSIUM SERPL-SCNC: 3.3 MMOL/L (ref 3.5–5.3)
POTASSIUM SERPL-SCNC: 3.5 MMOL/L (ref 3.5–5.3)
POTASSIUM SERPL-SCNC: 3.8 MMOL/L (ref 3.5–5.3)
POTASSIUM SERPL-SCNC: 3.9 MMOL/L (ref 3.5–5.3)
POTASSIUM SERPL-SCNC: 4.1 MMOL/L (ref 3.5–5.3)
POTASSIUM SERPL-SCNC: 5 MMOL/L (ref 3.5–5.3)
PROT SERPL-MCNC: 3.2 G/DL (ref 6.4–8.2)
PROT SERPL-MCNC: 3.8 G/DL (ref 6.4–8.2)
PROT SERPL-MCNC: 5.2 G/DL (ref 6.4–8.2)
PROT SERPL-MCNC: <2 G/DL (ref 6.4–8.2)
PROTHROMBIN TIME: 13.7 SECONDS (ref 11.6–14.5)
PROTHROMBIN TIME: 16.9 SECONDS (ref 11.6–14.5)
PROTHROMBIN TIME: 17.4 SECONDS (ref 11.6–14.5)
PROTHROMBIN TIME: 18.1 SECONDS (ref 11.6–14.5)
PROTHROMBIN TIME: 27.2 SECONDS (ref 11.6–14.5)
RBC # BLD AUTO: 1.06 MILLION/UL (ref 3.88–5.62)
RBC # BLD AUTO: 1.35 MILLION/UL (ref 3.88–5.62)
RBC # BLD AUTO: 2.12 MILLION/UL (ref 3.88–5.62)
RBC # BLD AUTO: 2.75 MILLION/UL (ref 3.88–5.62)
RBC # BLD AUTO: 3.21 MILLION/UL (ref 3.88–5.62)
RBC # BLD AUTO: 3.94 MILLION/UL (ref 3.88–5.62)
SAO2 % BLD FROM PO2: 85 % (ref 60–85)
SODIUM BLD-SCNC: 137 MMOL/L (ref 136–145)
SODIUM SERPL-SCNC: 138 MMOL/L (ref 136–145)
SODIUM SERPL-SCNC: 140 MMOL/L (ref 136–145)
SODIUM SERPL-SCNC: 144 MMOL/L (ref 136–145)
SODIUM SERPL-SCNC: 148 MMOL/L (ref 136–145)
SODIUM SERPL-SCNC: 149 MMOL/L (ref 136–145)
SODIUM SERPL-SCNC: 152 MMOL/L (ref 136–145)
SPECIMEN SOURCE: ABNORMAL
TRIGL SERPL-MCNC: 119 MG/DL
TROPONIN I SERPL-MCNC: <0.02 NG/ML
VENT AC: 20
VENT- AC: AC
VT SETTING VENT: 500 ML
WBC # BLD AUTO: 1.71 THOUSAND/UL (ref 4.31–10.16)
WBC # BLD AUTO: 1.76 THOUSAND/UL (ref 4.31–10.16)
WBC # BLD AUTO: 2.56 THOUSAND/UL (ref 4.31–10.16)
WBC # BLD AUTO: 2.75 THOUSAND/UL (ref 4.31–10.16)
WBC # BLD AUTO: 5.05 THOUSAND/UL (ref 4.31–10.16)
WBC # BLD AUTO: 5.68 THOUSAND/UL (ref 4.31–10.16)

## 2020-01-11 PROCEDURE — 37244 VASC EMBOLIZE/OCCLUDE BLEED: CPT | Performed by: RADIOLOGY

## 2020-01-11 PROCEDURE — 83735 ASSAY OF MAGNESIUM: CPT | Performed by: PHYSICIAN ASSISTANT

## 2020-01-11 PROCEDURE — 99223 1ST HOSP IP/OBS HIGH 75: CPT | Performed by: SURGERY

## 2020-01-11 PROCEDURE — C9132 KCENTRA, PER I.U.: HCPCS | Performed by: PHYSICIAN ASSISTANT

## 2020-01-11 PROCEDURE — 0W3P0ZZ CONTROL BLEEDING IN GASTROINTESTINAL TRACT, OPEN APPROACH: ICD-10-PCS | Performed by: SURGERY

## 2020-01-11 PROCEDURE — 30233R1 TRANSFUSION OF NONAUTOLOGOUS PLATELETS INTO PERIPHERAL VEIN, PERCUTANEOUS APPROACH: ICD-10-PCS | Performed by: SURGERY

## 2020-01-11 PROCEDURE — 30233K1 TRANSFUSION OF NONAUTOLOGOUS FROZEN PLASMA INTO PERIPHERAL VEIN, PERCUTANEOUS APPROACH: ICD-10-PCS | Performed by: SURGERY

## 2020-01-11 PROCEDURE — P9017 PLASMA 1 DONOR FRZ W/IN 8 HR: HCPCS

## 2020-01-11 PROCEDURE — P9037 PLATE PHERES LEUKOREDU IRRAD: HCPCS

## 2020-01-11 PROCEDURE — 85730 THROMBOPLASTIN TIME PARTIAL: CPT | Performed by: NURSE ANESTHETIST, CERTIFIED REGISTERED

## 2020-01-11 PROCEDURE — C9113 INJ PANTOPRAZOLE SODIUM, VIA: HCPCS | Performed by: PHYSICIAN ASSISTANT

## 2020-01-11 PROCEDURE — 44602 SUTURE SMALL INTESTINE: CPT | Performed by: SURGERY

## 2020-01-11 PROCEDURE — 80053 COMPREHEN METABOLIC PANEL: CPT | Performed by: SURGERY

## 2020-01-11 PROCEDURE — 85610 PROTHROMBIN TIME: CPT | Performed by: NURSE ANESTHETIST, CERTIFIED REGISTERED

## 2020-01-11 PROCEDURE — 74174 CTA ABD&PLVS W/CONTRAST: CPT

## 2020-01-11 PROCEDURE — 83036 HEMOGLOBIN GLYCOSYLATED A1C: CPT | Performed by: NURSE PRACTITIONER

## 2020-01-11 PROCEDURE — 85384 FIBRINOGEN ACTIVITY: CPT | Performed by: NURSE ANESTHETIST, CERTIFIED REGISTERED

## 2020-01-11 PROCEDURE — 82330 ASSAY OF CALCIUM: CPT | Performed by: PHYSICIAN ASSISTANT

## 2020-01-11 PROCEDURE — 82947 ASSAY GLUCOSE BLOOD QUANT: CPT

## 2020-01-11 PROCEDURE — 70551 MRI BRAIN STEM W/O DYE: CPT

## 2020-01-11 PROCEDURE — C1769 GUIDE WIRE: HCPCS

## 2020-01-11 PROCEDURE — 71045 X-RAY EXAM CHEST 1 VIEW: CPT

## 2020-01-11 PROCEDURE — P9012 CRYOPRECIPITATE EACH UNIT: HCPCS

## 2020-01-11 PROCEDURE — NC001 PR NO CHARGE: Performed by: SURGERY

## 2020-01-11 PROCEDURE — C1887 CATHETER, GUIDING: HCPCS

## 2020-01-11 PROCEDURE — 0W3P8ZZ CONTROL BLEEDING IN GASTROINTESTINAL TRACT, VIA NATURAL OR ARTIFICIAL OPENING ENDOSCOPIC: ICD-10-PCS | Performed by: INTERNAL MEDICINE

## 2020-01-11 PROCEDURE — P9035 PLATELET PHERES LEUKOREDUCED: HCPCS

## 2020-01-11 PROCEDURE — 85610 PROTHROMBIN TIME: CPT | Performed by: PHYSICIAN ASSISTANT

## 2020-01-11 PROCEDURE — 85730 THROMBOPLASTIN TIME PARTIAL: CPT | Performed by: PHYSICIAN ASSISTANT

## 2020-01-11 PROCEDURE — 82805 BLOOD GASES W/O2 SATURATION: CPT | Performed by: SURGERY

## 2020-01-11 PROCEDURE — 0DC90ZZ EXTIRPATION OF MATTER FROM DUODENUM, OPEN APPROACH: ICD-10-PCS | Performed by: SURGERY

## 2020-01-11 PROCEDURE — 82330 ASSAY OF CALCIUM: CPT

## 2020-01-11 PROCEDURE — 86902 BLOOD TYPE ANTIGEN DONOR EA: CPT

## 2020-01-11 PROCEDURE — 84295 ASSAY OF SERUM SODIUM: CPT

## 2020-01-11 PROCEDURE — 75726 ARTERY X-RAYS ABDOMEN: CPT

## 2020-01-11 PROCEDURE — 30233M1 TRANSFUSION OF NONAUTOLOGOUS PLASMA CRYOPRECIPITATE INTO PERIPHERAL VEIN, PERCUTANEOUS APPROACH: ICD-10-PCS | Performed by: SURGERY

## 2020-01-11 PROCEDURE — 85027 COMPLETE CBC AUTOMATED: CPT | Performed by: NURSE ANESTHETIST, CERTIFIED REGISTERED

## 2020-01-11 PROCEDURE — 94002 VENT MGMT INPAT INIT DAY: CPT

## 2020-01-11 PROCEDURE — 80053 COMPREHEN METABOLIC PANEL: CPT | Performed by: PHYSICIAN ASSISTANT

## 2020-01-11 PROCEDURE — 76937 US GUIDE VASCULAR ACCESS: CPT | Performed by: RADIOLOGY

## 2020-01-11 PROCEDURE — 85018 HEMOGLOBIN: CPT | Performed by: INTERNAL MEDICINE

## 2020-01-11 PROCEDURE — 99291 CRITICAL CARE FIRST HOUR: CPT | Performed by: INTERNAL MEDICINE

## 2020-01-11 PROCEDURE — P9016 RBC LEUKOCYTES REDUCED: HCPCS

## 2020-01-11 PROCEDURE — 85384 FIBRINOGEN ACTIVITY: CPT | Performed by: PHYSICIAN ASSISTANT

## 2020-01-11 PROCEDURE — P9040 RBC LEUKOREDUCED IRRADIATED: HCPCS

## 2020-01-11 PROCEDURE — 43830 GSTRST OPEN WO CONSTJ TUBE: CPT | Performed by: SURGERY

## 2020-01-11 PROCEDURE — 80048 BASIC METABOLIC PNL TOTAL CA: CPT | Performed by: PHYSICIAN ASSISTANT

## 2020-01-11 PROCEDURE — 85027 COMPLETE CBC AUTOMATED: CPT | Performed by: PHYSICIAN ASSISTANT

## 2020-01-11 PROCEDURE — 84132 ASSAY OF SERUM POTASSIUM: CPT

## 2020-01-11 PROCEDURE — 80048 BASIC METABOLIC PNL TOTAL CA: CPT | Performed by: INTERNAL MEDICINE

## 2020-01-11 PROCEDURE — 83735 ASSAY OF MAGNESIUM: CPT | Performed by: SURGERY

## 2020-01-11 PROCEDURE — 36246 INS CATH ABD/L-EXT ART 2ND: CPT

## 2020-01-11 PROCEDURE — 85610 PROTHROMBIN TIME: CPT | Performed by: SURGERY

## 2020-01-11 PROCEDURE — 85014 HEMATOCRIT: CPT

## 2020-01-11 PROCEDURE — 94760 N-INVAS EAR/PLS OXIMETRY 1: CPT

## 2020-01-11 PROCEDURE — 84100 ASSAY OF PHOSPHORUS: CPT | Performed by: SURGERY

## 2020-01-11 PROCEDURE — C9113 INJ PANTOPRAZOLE SODIUM, VIA: HCPCS | Performed by: INTERNAL MEDICINE

## 2020-01-11 PROCEDURE — 02HV33Z INSERTION OF INFUSION DEVICE INTO SUPERIOR VENA CAVA, PERCUTANEOUS APPROACH: ICD-10-PCS | Performed by: SURGERY

## 2020-01-11 PROCEDURE — 85027 COMPLETE CBC AUTOMATED: CPT | Performed by: INTERNAL MEDICINE

## 2020-01-11 PROCEDURE — 84100 ASSAY OF PHOSPHORUS: CPT | Performed by: PHYSICIAN ASSISTANT

## 2020-01-11 PROCEDURE — 04L23DZ OCCLUSION OF GASTRIC ARTERY WITH INTRALUMINAL DEVICE, PERCUTANEOUS APPROACH: ICD-10-PCS | Performed by: RADIOLOGY

## 2020-01-11 PROCEDURE — B41CYZZ FLUOROSCOPY OF PELVIC ARTERIES USING OTHER CONTRAST: ICD-10-PCS | Performed by: RADIOLOGY

## 2020-01-11 PROCEDURE — 82803 BLOOD GASES ANY COMBINATION: CPT

## 2020-01-11 PROCEDURE — 83605 ASSAY OF LACTIC ACID: CPT | Performed by: SURGERY

## 2020-01-11 PROCEDURE — NC001 PR NO CHARGE: Performed by: PHYSICIAN ASSISTANT

## 2020-01-11 PROCEDURE — 83605 ASSAY OF LACTIC ACID: CPT | Performed by: PHYSICIAN ASSISTANT

## 2020-01-11 PROCEDURE — 99292 CRITICAL CARE ADDL 30 MIN: CPT | Performed by: INTERNAL MEDICINE

## 2020-01-11 PROCEDURE — 05HY33Z INSERTION OF INFUSION DEVICE INTO UPPER VEIN, PERCUTANEOUS APPROACH: ICD-10-PCS | Performed by: SURGERY

## 2020-01-11 PROCEDURE — 75710 ARTERY X-RAYS ARM/LEG: CPT

## 2020-01-11 PROCEDURE — 36620 INSERTION CATHETER ARTERY: CPT | Performed by: PHYSICIAN ASSISTANT

## 2020-01-11 PROCEDURE — 82330 ASSAY OF CALCIUM: CPT | Performed by: SURGERY

## 2020-01-11 PROCEDURE — 82805 BLOOD GASES W/O2 SATURATION: CPT | Performed by: PHYSICIAN ASSISTANT

## 2020-01-11 PROCEDURE — C1894 INTRO/SHEATH, NON-LASER: HCPCS

## 2020-01-11 PROCEDURE — 85025 COMPLETE CBC W/AUTO DIFF WBC: CPT | Performed by: SURGERY

## 2020-01-11 PROCEDURE — 80061 LIPID PANEL: CPT | Performed by: NURSE PRACTITIONER

## 2020-01-11 PROCEDURE — 36556 INSERT NON-TUNNEL CV CATH: CPT | Performed by: PHYSICIAN ASSISTANT

## 2020-01-11 PROCEDURE — 99232 SBSQ HOSP IP/OBS MODERATE 35: CPT | Performed by: PSYCHIATRY & NEUROLOGY

## 2020-01-11 PROCEDURE — 85025 COMPLETE CBC W/AUTO DIFF WBC: CPT | Performed by: PHYSICIAN ASSISTANT

## 2020-01-11 PROCEDURE — 0DHA0UZ INSERTION OF FEEDING DEVICE INTO JEJUNUM, OPEN APPROACH: ICD-10-PCS | Performed by: INTERNAL MEDICINE

## 2020-01-11 PROCEDURE — 84484 ASSAY OF TROPONIN QUANT: CPT | Performed by: PHYSICIAN ASSISTANT

## 2020-01-11 PROCEDURE — 30233N1 TRANSFUSION OF NONAUTOLOGOUS RED BLOOD CELLS INTO PERIPHERAL VEIN, PERCUTANEOUS APPROACH: ICD-10-PCS | Performed by: SURGERY

## 2020-01-11 PROCEDURE — 36247 INS CATH ABD/L-EXT ART 3RD: CPT | Performed by: RADIOLOGY

## 2020-01-11 PROCEDURE — 0DC60ZZ EXTIRPATION OF MATTER FROM STOMACH, OPEN APPROACH: ICD-10-PCS | Performed by: SURGERY

## 2020-01-11 PROCEDURE — 75774 ARTERY X-RAY EACH VESSEL: CPT | Performed by: RADIOLOGY

## 2020-01-11 PROCEDURE — 80053 COMPREHEN METABOLIC PANEL: CPT | Performed by: NURSE ANESTHETIST, CERTIFIED REGISTERED

## 2020-01-11 PROCEDURE — 75726 ARTERY X-RAYS ABDOMEN: CPT | Performed by: RADIOLOGY

## 2020-01-11 RX ORDER — SODIUM CHLORIDE 9 MG/ML
INJECTION, SOLUTION INTRAVENOUS CONTINUOUS PRN
Status: DISCONTINUED | OUTPATIENT
Start: 2020-01-11 | End: 2020-01-11 | Stop reason: SURG

## 2020-01-11 RX ORDER — FENTANYL CITRATE 50 UG/ML
100 INJECTION, SOLUTION INTRAMUSCULAR; INTRAVENOUS ONCE
Status: COMPLETED | OUTPATIENT
Start: 2020-01-11 | End: 2020-01-11

## 2020-01-11 RX ORDER — FENTANYL CITRATE-0.9 % NACL/PF 10 MCG/ML
50 PLASTIC BAG, INJECTION (ML) INTRAVENOUS CONTINUOUS
Status: DISCONTINUED | OUTPATIENT
Start: 2020-01-11 | End: 2020-01-14

## 2020-01-11 RX ORDER — ALBUMIN, HUMAN INJ 5% 5 %
SOLUTION INTRAVENOUS CONTINUOUS PRN
Status: DISCONTINUED | OUTPATIENT
Start: 2020-01-11 | End: 2020-01-11 | Stop reason: SURG

## 2020-01-11 RX ORDER — CALCIUM GLUCONATE 20 MG/ML
2 INJECTION, SOLUTION INTRAVENOUS ONCE
Status: COMPLETED | OUTPATIENT
Start: 2020-01-11 | End: 2020-01-12

## 2020-01-11 RX ORDER — VASOPRESSIN 20 U/ML
INJECTION PARENTERAL AS NEEDED
Status: DISCONTINUED | OUTPATIENT
Start: 2020-01-11 | End: 2020-01-11 | Stop reason: SURG

## 2020-01-11 RX ORDER — MIDAZOLAM HYDROCHLORIDE 2 MG/2ML
INJECTION, SOLUTION INTRAMUSCULAR; INTRAVENOUS AS NEEDED
Status: DISCONTINUED | OUTPATIENT
Start: 2020-01-11 | End: 2020-01-11 | Stop reason: SURG

## 2020-01-11 RX ORDER — FLUCONAZOLE 2 MG/ML
400 INJECTION, SOLUTION INTRAVENOUS ONCE
Status: COMPLETED | OUTPATIENT
Start: 2020-01-11 | End: 2020-01-12

## 2020-01-11 RX ORDER — PROPOFOL 10 MG/ML
5-50 INJECTION, EMULSION INTRAVENOUS
Status: DISCONTINUED | OUTPATIENT
Start: 2020-01-11 | End: 2020-01-13

## 2020-01-11 RX ORDER — ROCURONIUM BROMIDE 10 MG/ML
INJECTION, SOLUTION INTRAVENOUS AS NEEDED
Status: DISCONTINUED | OUTPATIENT
Start: 2020-01-11 | End: 2020-01-11 | Stop reason: SURG

## 2020-01-11 RX ORDER — FLUCONAZOLE 2 MG/ML
200 INJECTION, SOLUTION INTRAVENOUS EVERY 24 HOURS
Status: DISCONTINUED | OUTPATIENT
Start: 2020-01-12 | End: 2020-01-11

## 2020-01-11 RX ORDER — SUCCINYLCHOLINE/SOD CL,ISO/PF 100 MG/5ML
SYRINGE (ML) INTRAVENOUS AS NEEDED
Status: DISCONTINUED | OUTPATIENT
Start: 2020-01-11 | End: 2020-01-11 | Stop reason: SURG

## 2020-01-11 RX ORDER — CEFAZOLIN SODIUM 2 G/50ML
2000 SOLUTION INTRAVENOUS EVERY 8 HOURS
Status: DISCONTINUED | OUTPATIENT
Start: 2020-01-11 | End: 2020-01-15

## 2020-01-11 RX ORDER — FUROSEMIDE 10 MG/ML
INJECTION INTRAMUSCULAR; INTRAVENOUS AS NEEDED
Status: DISCONTINUED | OUTPATIENT
Start: 2020-01-11 | End: 2020-01-11 | Stop reason: SURG

## 2020-01-11 RX ORDER — FENTANYL CITRATE 50 UG/ML
INJECTION, SOLUTION INTRAMUSCULAR; INTRAVENOUS AS NEEDED
Status: DISCONTINUED | OUTPATIENT
Start: 2020-01-11 | End: 2020-01-11 | Stop reason: SURG

## 2020-01-11 RX ORDER — CEFAZOLIN SODIUM 2 G/50ML
SOLUTION INTRAVENOUS AS NEEDED
Status: DISCONTINUED | OUTPATIENT
Start: 2020-01-11 | End: 2020-01-11 | Stop reason: SURG

## 2020-01-11 RX ORDER — FENTANYL CITRATE 50 UG/ML
INJECTION, SOLUTION INTRAMUSCULAR; INTRAVENOUS
Status: COMPLETED
Start: 2020-01-11 | End: 2020-01-11

## 2020-01-11 RX ORDER — MIDAZOLAM HYDROCHLORIDE 2 MG/2ML
INJECTION, SOLUTION INTRAMUSCULAR; INTRAVENOUS
Status: COMPLETED
Start: 2020-01-11 | End: 2020-01-11

## 2020-01-11 RX ORDER — FENTANYL CITRATE 50 UG/ML
50 INJECTION, SOLUTION INTRAMUSCULAR; INTRAVENOUS
Status: DISCONTINUED | OUTPATIENT
Start: 2020-01-11 | End: 2020-01-11

## 2020-01-11 RX ORDER — POTASSIUM CHLORIDE 14.9 MG/ML
INJECTION INTRAVENOUS CONTINUOUS PRN
Status: DISCONTINUED | OUTPATIENT
Start: 2020-01-11 | End: 2020-01-11 | Stop reason: SURG

## 2020-01-11 RX ORDER — PROPOFOL 10 MG/ML
INJECTION, EMULSION INTRAVENOUS AS NEEDED
Status: DISCONTINUED | OUTPATIENT
Start: 2020-01-11 | End: 2020-01-11 | Stop reason: SURG

## 2020-01-11 RX ORDER — TRANEXAMIC ACID 100 MG/ML
INJECTION, SOLUTION INTRAVENOUS AS NEEDED
Status: DISCONTINUED | OUTPATIENT
Start: 2020-01-11 | End: 2020-01-11 | Stop reason: SURG

## 2020-01-11 RX ORDER — CALCIUM CHLORIDE 100 MG/ML
INJECTION INTRAVENOUS; INTRAVENTRICULAR AS NEEDED
Status: DISCONTINUED | OUTPATIENT
Start: 2020-01-11 | End: 2020-01-11 | Stop reason: SURG

## 2020-01-11 RX ORDER — SODIUM CHLORIDE, SODIUM GLUCONATE, SODIUM ACETATE, POTASSIUM CHLORIDE, MAGNESIUM CHLORIDE, SODIUM PHOSPHATE, DIBASIC, AND POTASSIUM PHOSPHATE .53; .5; .37; .037; .03; .012; .00082 G/100ML; G/100ML; G/100ML; G/100ML; G/100ML; G/100ML; G/100ML
3000 INJECTION, SOLUTION INTRAVENOUS ONCE
Status: DISCONTINUED | OUTPATIENT
Start: 2020-01-11 | End: 2020-01-12

## 2020-01-11 RX ORDER — LIDOCAINE HYDROCHLORIDE 10 MG/ML
INJECTION, SOLUTION EPIDURAL; INFILTRATION; INTRACAUDAL; PERINEURAL AS NEEDED
Status: DISCONTINUED | OUTPATIENT
Start: 2020-01-11 | End: 2020-01-11 | Stop reason: SURG

## 2020-01-11 RX ORDER — MIDAZOLAM HYDROCHLORIDE 2 MG/2ML
2 INJECTION, SOLUTION INTRAMUSCULAR; INTRAVENOUS ONCE
Status: DISCONTINUED | OUTPATIENT
Start: 2020-01-11 | End: 2020-01-12

## 2020-01-11 RX ORDER — MAGNESIUM SULFATE HEPTAHYDRATE 40 MG/ML
2 INJECTION, SOLUTION INTRAVENOUS ONCE
Status: COMPLETED | OUTPATIENT
Start: 2020-01-11 | End: 2020-01-12

## 2020-01-11 RX ORDER — SODIUM CHLORIDE, SODIUM LACTATE, POTASSIUM CHLORIDE, CALCIUM CHLORIDE 600; 310; 30; 20 MG/100ML; MG/100ML; MG/100ML; MG/100ML
INJECTION, SOLUTION INTRAVENOUS CONTINUOUS PRN
Status: DISCONTINUED | OUTPATIENT
Start: 2020-01-11 | End: 2020-01-11 | Stop reason: SURG

## 2020-01-11 RX ORDER — FENTANYL CITRATE 50 UG/ML
100 INJECTION, SOLUTION INTRAMUSCULAR; INTRAVENOUS ONCE
Status: DISCONTINUED | OUTPATIENT
Start: 2020-01-11 | End: 2020-01-11

## 2020-01-11 RX ORDER — MAGNESIUM HYDROXIDE 1200 MG/15ML
LIQUID ORAL AS NEEDED
Status: DISCONTINUED | OUTPATIENT
Start: 2020-01-11 | End: 2020-01-11 | Stop reason: HOSPADM

## 2020-01-11 RX ORDER — FLUCONAZOLE 2 MG/ML
200 INJECTION, SOLUTION INTRAVENOUS EVERY 24 HOURS
Status: DISCONTINUED | OUTPATIENT
Start: 2020-01-13 | End: 2020-01-15

## 2020-01-11 RX ORDER — POTASSIUM CHLORIDE 29.8 MG/ML
40 INJECTION INTRAVENOUS ONCE
Status: DISCONTINUED | OUTPATIENT
Start: 2020-01-11 | End: 2020-01-12

## 2020-01-11 RX ORDER — PROPOFOL 10 MG/ML
INJECTION, EMULSION INTRAVENOUS CONTINUOUS PRN
Status: DISCONTINUED | OUTPATIENT
Start: 2020-01-11 | End: 2020-01-11 | Stop reason: SURG

## 2020-01-11 RX ORDER — CHLORHEXIDINE GLUCONATE 0.12 MG/ML
15 RINSE ORAL EVERY 12 HOURS SCHEDULED
Status: DISCONTINUED | OUTPATIENT
Start: 2020-01-11 | End: 2020-02-11 | Stop reason: HOSPADM

## 2020-01-11 RX ORDER — ONDANSETRON 2 MG/ML
INJECTION INTRAMUSCULAR; INTRAVENOUS AS NEEDED
Status: DISCONTINUED | OUTPATIENT
Start: 2020-01-11 | End: 2020-01-11 | Stop reason: SURG

## 2020-01-11 RX ORDER — CALCIUM CHLORIDE 100 MG/ML
1 INJECTION INTRAVENOUS; INTRAVENTRICULAR ONCE
Status: COMPLETED | OUTPATIENT
Start: 2020-01-11 | End: 2020-01-11

## 2020-01-11 RX ADMIN — NITROGLYCERIN 100 MCG: 20 INJECTION INTRAVENOUS at 16:40

## 2020-01-11 RX ADMIN — PHENYLEPHRINE HYDROCHLORIDE 300 MCG: 10 INJECTION INTRAVENOUS at 12:02

## 2020-01-11 RX ADMIN — CALCIUM CHLORIDE 0.5 G: 100 INJECTION, SOLUTION INTRAVENOUS; INTRAVENTRICULAR at 16:44

## 2020-01-11 RX ADMIN — EPINEPHRINE 0.6 MG: 0.1 INJECTION INTRACARDIAC; INTRAVENOUS at 13:01

## 2020-01-11 RX ADMIN — IODIXANOL 52 ML: 320 INJECTION, SOLUTION INTRAVASCULAR at 17:48

## 2020-01-11 RX ADMIN — MELATONIN 1000 UNITS: at 08:13

## 2020-01-11 RX ADMIN — FENTANYL CITRATE 100 MCG: 50 INJECTION, SOLUTION INTRAMUSCULAR; INTRAVENOUS at 23:30

## 2020-01-11 RX ADMIN — SODIUM CHLORIDE: 0.9 INJECTION, SOLUTION INTRAVENOUS at 19:05

## 2020-01-11 RX ADMIN — QUETIAPINE FUMARATE 200 MG: 100 TABLET ORAL at 08:13

## 2020-01-11 RX ADMIN — ALBUMIN (HUMAN): 12.5 SOLUTION INTRAVENOUS at 12:56

## 2020-01-11 RX ADMIN — POTASSIUM CHLORIDE: 200 INJECTION, SOLUTION INTRAVENOUS at 20:17

## 2020-01-11 RX ADMIN — CEFAZOLIN SODIUM 2000 MG: 2 SOLUTION INTRAVENOUS at 19:12

## 2020-01-11 RX ADMIN — METOPROLOL TARTRATE 50 MG: 50 TABLET, FILM COATED ORAL at 08:13

## 2020-01-11 RX ADMIN — VASOPRESSIN 2 UNITS: 20 INJECTION INTRAVENOUS at 16:39

## 2020-01-11 RX ADMIN — FUROSEMIDE 20 MG: 10 INJECTION, SOLUTION INTRAMUSCULAR; INTRAVENOUS at 19:31

## 2020-01-11 RX ADMIN — ROCURONIUM BROMIDE 50 MG: 50 INJECTION, SOLUTION INTRAVENOUS at 15:47

## 2020-01-11 RX ADMIN — CALCIUM CHLORIDE 0.5 G: 100 INJECTION, SOLUTION INTRAVENOUS; INTRAVENTRICULAR at 21:22

## 2020-01-11 RX ADMIN — VASOPRESSIN 2 UNITS: 20 INJECTION INTRAVENOUS at 16:45

## 2020-01-11 RX ADMIN — SODIUM CHLORIDE: 0.9 INJECTION, SOLUTION INTRAVENOUS at 15:47

## 2020-01-11 RX ADMIN — SODIUM CHLORIDE: 0.9 INJECTION, SOLUTION INTRAVENOUS at 19:01

## 2020-01-11 RX ADMIN — FENTANYL CITRATE 100 MCG: 50 INJECTION, SOLUTION INTRAMUSCULAR; INTRAVENOUS at 15:03

## 2020-01-11 RX ADMIN — PHENYLEPHRINE HYDROCHLORIDE 100 MCG/MIN: 10 INJECTION INTRAVENOUS at 12:01

## 2020-01-11 RX ADMIN — LIDOCAINE HYDROCHLORIDE 50 MG: 10 INJECTION, SOLUTION EPIDURAL; INFILTRATION; INTRACAUDAL; PERINEURAL at 12:02

## 2020-01-11 RX ADMIN — FLUTICASONE FUROATE AND VILANTEROL TRIFENATATE 1 PUFF: 100; 25 POWDER RESPIRATORY (INHALATION) at 09:28

## 2020-01-11 RX ADMIN — SODIUM CHLORIDE 8 MG/HR: 9 INJECTION, SOLUTION INTRAVENOUS at 15:00

## 2020-01-11 RX ADMIN — Medication 125 MCG/HR: at 23:54

## 2020-01-11 RX ADMIN — PHENYLEPHRINE HYDROCHLORIDE 200 MCG: 10 INJECTION INTRAVENOUS at 12:10

## 2020-01-11 RX ADMIN — ALBUMIN (HUMAN): 12.5 SOLUTION INTRAVENOUS at 12:22

## 2020-01-11 RX ADMIN — VASOPRESSIN 2 UNITS: 20 INJECTION INTRAVENOUS at 12:10

## 2020-01-11 RX ADMIN — MIDAZOLAM 2 MG: 1 INJECTION INTRAMUSCULAR; INTRAVENOUS at 11:48

## 2020-01-11 RX ADMIN — CALCIUM CHLORIDE 0.5 G: 100 INJECTION, SOLUTION INTRAVENOUS; INTRAVENTRICULAR at 16:37

## 2020-01-11 RX ADMIN — TRANEXAMIC ACID 1 G: 1 INJECTION, SOLUTION INTRAVENOUS at 16:35

## 2020-01-11 RX ADMIN — PHENYLEPHRINE HYDROCHLORIDE 200 MCG: 10 INJECTION INTRAVENOUS at 16:47

## 2020-01-11 RX ADMIN — PHENYLEPHRINE HYDROCHLORIDE 200 MCG: 10 INJECTION INTRAVENOUS at 16:15

## 2020-01-11 RX ADMIN — ROCURONIUM BROMIDE 50 MG: 50 INJECTION, SOLUTION INTRAVENOUS at 16:56

## 2020-01-11 RX ADMIN — SODIUM CHLORIDE, SODIUM LACTATE, POTASSIUM CHLORIDE, AND CALCIUM CHLORIDE: .6; .31; .03; .02 INJECTION, SOLUTION INTRAVENOUS at 11:48

## 2020-01-11 RX ADMIN — PHENYLEPHRINE HYDROCHLORIDE 200 MCG: 10 INJECTION INTRAVENOUS at 16:09

## 2020-01-11 RX ADMIN — FENTANYL CITRATE 50 MCG: 50 INJECTION, SOLUTION INTRAMUSCULAR; INTRAVENOUS at 12:02

## 2020-01-11 RX ADMIN — VASOPRESSIN 1 UNITS: 20 INJECTION INTRAVENOUS at 12:41

## 2020-01-11 RX ADMIN — PROPOFOL 40 MCG/KG/MIN: 10 INJECTION, EMULSION INTRAVENOUS at 22:11

## 2020-01-11 RX ADMIN — Medication 2500 UNITS: at 18:26

## 2020-01-11 RX ADMIN — MIDAZOLAM 2 MG: 1 INJECTION INTRAMUSCULAR; INTRAVENOUS at 19:12

## 2020-01-11 RX ADMIN — VASOPRESSIN 0.04 UNITS/MIN: 20 INJECTION INTRAVENOUS at 18:16

## 2020-01-11 RX ADMIN — CALCIUM CHLORIDE 1 G: 100 INJECTION, SOLUTION INTRAVENOUS at 18:18

## 2020-01-11 RX ADMIN — FOLIC ACID 1 MG: 1 TABLET ORAL at 08:13

## 2020-01-11 RX ADMIN — LORAZEPAM 1 MG: 2 INJECTION INTRAMUSCULAR; INTRAVENOUS at 08:46

## 2020-01-11 RX ADMIN — ONDANSETRON 4 MG: 2 INJECTION INTRAMUSCULAR; INTRAVENOUS at 12:13

## 2020-01-11 RX ADMIN — SODIUM BICARBONATE 100 MEQ: 84 INJECTION, SOLUTION INTRAVENOUS at 14:49

## 2020-01-11 RX ADMIN — GABAPENTIN 300 MG: 300 CAPSULE ORAL at 08:13

## 2020-01-11 RX ADMIN — SODIUM CHLORIDE, SODIUM LACTATE, POTASSIUM CHLORIDE, AND CALCIUM CHLORIDE: .6; .31; .03; .02 INJECTION, SOLUTION INTRAVENOUS at 15:38

## 2020-01-11 RX ADMIN — IODIXANOL 100 ML: 320 INJECTION, SOLUTION INTRAVASCULAR at 17:42

## 2020-01-11 RX ADMIN — NICOTINE 1 PATCH: 14 PATCH TRANSDERMAL at 08:14

## 2020-01-11 RX ADMIN — PROPOFOL 40 MG: 10 INJECTION, EMULSION INTRAVENOUS at 12:02

## 2020-01-11 RX ADMIN — PHENYLEPHRINE HYDROCHLORIDE 200 MCG: 10 INJECTION INTRAVENOUS at 12:41

## 2020-01-11 RX ADMIN — METRONIDAZOLE 500 MG: 500 INJECTION, SOLUTION INTRAVENOUS at 19:12

## 2020-01-11 RX ADMIN — ATORVASTATIN CALCIUM 40 MG: 40 TABLET, FILM COATED ORAL at 08:13

## 2020-01-11 RX ADMIN — SODIUM CHLORIDE 8 MG/HR: 9 INJECTION, SOLUTION INTRAVENOUS at 05:39

## 2020-01-11 RX ADMIN — FLUTICASONE PROPIONATE 2 SPRAY: 50 SPRAY, METERED NASAL at 09:28

## 2020-01-11 RX ADMIN — FENTANYL CITRATE 50 MCG: 50 INJECTION, SOLUTION INTRAMUSCULAR; INTRAVENOUS at 11:54

## 2020-01-11 RX ADMIN — THIAMINE HCL TAB 100 MG 100 MG: 100 TAB at 08:13

## 2020-01-11 RX ADMIN — ALBUMIN (HUMAN): 12.5 SOLUTION INTRAVENOUS at 12:45

## 2020-01-11 RX ADMIN — POTASSIUM CHLORIDE: 200 INJECTION, SOLUTION INTRAVENOUS at 19:52

## 2020-01-11 RX ADMIN — CALCIUM CHLORIDE 0.5 G: 100 INJECTION, SOLUTION INTRAVENOUS; INTRAVENTRICULAR at 21:14

## 2020-01-11 RX ADMIN — MIDAZOLAM 2 MG: 1 INJECTION INTRAMUSCULAR; INTRAVENOUS at 15:04

## 2020-01-11 RX ADMIN — CALCIUM GLUCONATE 2 G: 20 INJECTION, SOLUTION INTRAVENOUS at 17:58

## 2020-01-11 RX ADMIN — AMLODIPINE BESYLATE 10 MG: 10 TABLET ORAL at 08:13

## 2020-01-11 RX ADMIN — PHENYLEPHRINE HYDROCHLORIDE 200 MCG: 10 INJECTION INTRAVENOUS at 16:39

## 2020-01-11 RX ADMIN — ROCURONIUM BROMIDE 50 MG: 50 INJECTION, SOLUTION INTRAVENOUS at 19:12

## 2020-01-11 RX ADMIN — SODIUM CHLORIDE: 0.9 INJECTION, SOLUTION INTRAVENOUS at 11:55

## 2020-01-11 RX ADMIN — DULOXETINE HYDROCHLORIDE 90 MG: 60 CAPSULE, DELAYED RELEASE ORAL at 08:13

## 2020-01-11 RX ADMIN — Medication 100 MG: at 12:02

## 2020-01-11 NOTE — PROGRESS NOTES
Progress Note - Neurology   Jacob Bauer 81 y o  male MRN: [id number]  Unit/Bed#: [facility] 516-01 Encounter: [id number]    Assessment:  1  Stroke-like symptoms noted yesterday, with right facial droop, reported right arm weakness, and slurred speech, following administration Ativan (CIWA protocol)  Fortunately his MRI does not show any evidence of acute ischemia  He still exhibits facial asymmetry, which he told me he has probably been present for a year  His speech is just minimally dysarthric at times, not significant  I do not think that this represents stroke, rather it appears more likely some exaggeration of existing asymmetry related to the Ativan  2  GI bleeding-likely the cause of his syncope and ongoing dizziness  EGD noted active bleeding duodenal ulcer, requiring further treatment, ongoing    3  Anemia  4  Dyslipidemia  5  History TIA    Plan:  1  Okay to remove patient from the stroke pathway  2  No neurologic necessity for anti-platelet or anticoagulation (currently contraindicated regardless)  3  No further neurologic workup required  We will sign off       Subjective:   Patient reports again feeling dizzy which he describes as spinning  Denies lateralizing weakness  He had his MRI brain performed this morning, shows no evidence of acute ischemic infarction  ROS:    Review of Systems  Patient denies chest pain, shortness of breath, bowel/bladder issues, headache, vision changes,  and weakness or numbness of extremities  Vitals: Blood pressure (!) 52/44, pulse 102, temperature (!) 96 4 °F (35 8 °C), resp  rate 20, height 5' 8 5" (1 74 m), weight 72 8 kg (160 lb 9 6 oz), SpO2 100 %  ,Body mass index is 24 06 kg/m²  Physical Exam   Constitutional: He appears well-developed and well-nourished  He appears distressed  HENT:   Head: Normocephalic and atraumatic  Mouth/Throat: Oropharynx is clear and moist    Eyes: Pupils are equal, round, and reactive to light   EOM are normal  No scleral icterus  Neck: Normal range of motion  Neck supple  Cardiovascular: Normal rate and regular rhythm  Pulmonary/Chest: Effort normal and breath sounds normal  No respiratory distress  Musculoskeletal: He exhibits no edema or deformity  Neurological: He has normal strength  Skin: Skin is warm and dry  He is not diaphoretic  There is pallor  Vitals reviewed  Neurologic Exam     Mental Status   Oriented to month, location, recent events  Speaks slowly  No difficulty following commands  Intact naming, repetition, reading  Cranial Nerves     CN III, IV, VI   Pupils are equal, round, and reactive to light  Extraocular motions are normal    Mild resting facial asymmetry, no significant volitional weakness  Cranial nerves II through XII are intact except as noted above  Motor Exam     Strength   Strength 5/5 throughout  Plantar responses are flexor bilaterally     Sensory Exam   Intact to vibration and light touch       Lab Results:   I have personally reviewed pertinent reports  , CBC:   Results from last 7 days   Lab Units 01/11/20  1408 01/11/20  1309 01/11/20  0629  01/10/20  1056   WBC Thousand/uL 5 68  --  5 05  --  5 96   RBC Million/uL 2 75*  --  2 12*  --  2 66*   HEMOGLOBIN g/dL 8 8*  --  6 8*   < > 8 7*   I STAT HEMOGLOBIN g/dl  --  6 1*  --   --   --    HEMATOCRIT % 26 4*  --  20 7*  --  25 4*   HEMATOCRIT, ISTAT %  --  18*  --   --   --    MCV fL 96  --  98  --  96   PLATELETS Thousands/uL 154  --  253  --  216    < > = values in this interval not displayed  , HgBA1C:   , Lipid Profile:   Results from last 7 days   Lab Units 01/11/20  0629   HDL mg/dL 29*   LDL CALC mg/dL 21   TRIGLYCERIDES mg/dL 119     Imaging Studies: I have personally reviewed pertinent films in PACS  MRI brain is negative for acute infarction  EKG, Pathology, and Other Studies: I have personally reviewed pertinent reports

## 2020-01-11 NOTE — PROGRESS NOTES
ICU Acceptance Note - Critical Care   Felicia Marcial 81 y o  male MRN: [id number]  Unit/Bed#: [facility] 516-01 Encounter: [id number]        ----------------------------------------------------------------------------------------  HPI/24hr events:  22-year-old male past medical history MDD, HTN, HLD, hx CVA, COPD, EtOH abuse (1 fifth vodka daily), and tobacco abuse who presented to B on 01/08 c/o dizziness and syncope  In addition to this he had poor appetite with decreased intake over the previous 5 days  Also reported episodes of sharp chest pain on off for the past week  He did admit to suicidal ideation but denied any plans and states "even if I did, I want tell you"  Started on CIWA protocol for ETOH withdrawal and evaluated by Psychiatry with no interventions at this time  Hemoglobin of 9 7 on admission (baseline 14) and trended down to 5 8 on morning of 1/10  At the time noted to have large bowel movement that was maroon with streaks of blood  GI consulted and concern for upper GI bleed  On 1/10, rapid response and stroke alert were called after patient found with altered mental status and right-sided weakness  No large vessel occlusion on CTA  Not a tPA candidate secondary to GI bleed  Symptoms resolved and were thought to be related to recent ativan administration with CIWA  On 1/11, persistent drop in hgb to 6 8  Went for EGD with GI which revealed large duodenal ulcer and bleeding vessel  Clipped, however not adequate hemostasis and IR consulted  Patient presented to ICU at which time a radial A-line was placed  Patient hypotensive with increasing pressor requirements  Blood began to pour out of patient's mouth around OG tube  Left femoral Cordis place and aggressive component resuscitation initiated  Patient to IR  IR successfully able to embolize GDA  Follow-up CTA of abdomen and pelvis revealed area of possible active extravasation contrast and and blush  Patient returned ICU  Continued to have significant bright red bloody output from OG tube  Pressor requirements continued to increase  Proceeded with aggressive component resuscitation which is limited by patient's antibody requirements with limited supply in the hospital   Acute Care surgery called to bedside to re-evaluate for possible ex lap and OR       ---------------------------------------------------------------------------------------  SUBJECTIVE  Intubated and sedated  Review of Systems  Review of systems was unable to be performed secondary to intubated and sedated    ---------------------------------------------------------------------------------------  Assessment and Plan:    Neuro:  · Diagnosis: EtOH abuse  · Plan:  · Pain controlled with:  · PRN: fentanyl  · Sedation plan/Daily sedation holiday: propofol  · RASS goal: -3 Moderate Sedation and -2 Light Sedation  · Delirium Precautions  · CAM ICU per protocol  · Regulate sleep/wake cycle+  · Trend neuro exam  · Monitor for s/s withdrawal    CV:   · Diagnosis: Hemorrhagic shock  · Plan:   · Continue aggressive component resuscitation  · Calcium repletion with large component resuscitation  · [address]  · Received TXA in IR  · Avoid hypothermia with nivia hugger, warmed fluids  · GI bleed plan as below  · Hemodynamic infusions: Levophed, 10 mcg/min, Vasopressin, 0 04 Units/min  · Wean levo as able  · MAP goal > 65  · Systolic (79NZH), ZGK:51 , Min:52 , Max:162   ·   · Rhythm: NSR  · Follow rhythm on telemetry    Lung:   · Diagnosis: Acute hypoxic respiratory failure  · Plan:   · Not candidate for SAT/SBT given hemodynamic instability and active GI bleed  · Chlorhexidine/HOB>30degrees ordered: yes  · Pulmonary hygiene    GI:   · Diagnosis: Duodenal ulcer with hemorrhage  · Plan:   · Continue aggressive component resuscitation  · Protonix gtt  · S/p EGD with duodenal ulcer which was clipped, injected, and hemosprayed  · S/p IR embolization of GDA  · CTA A/P post-IR embolization-- possible area of active extrav and blush  · Acute care surgery consult, may require exploratory laparotomy  · GI following  · Bladder pressures per surgery    FEN:   · Plan:   · Fluid/Diuretic plan: isolyte 125cc/hr  · Nutrition/diet plan: NPO  · Replete electrolytes with goals: K >4 0, Mag >2 0, and Phos >3 0    :   · Diagnosis: No acute issues  · Plan:   · Indwelling Balderas present: yes   · Trend UOP and BUN/creat  · Strict I and O    Intake/Output Summary (Last 24 hours) at 2020 1627  Last data filed at 2020 1527  Gross per 24 hour   Intake 5219 93 ml   Output 1750 ml   Net 3469 93 ml   ·     ID:   · Diagnosis: No acute issues  · Plan:   · Trend temps and WBC count  · Temp (24hrs), Av 5 °F (35 8 °C), Min:95 7 °F (35 4 °C), Max:98 4 °F (36 9 °C)  ·     Heme:   · Diagnosis: Acute blood loss anemia 2/2 GI bleed  · Plan:   · Continue aggressive component resuscitation  · Obtain coags and stat labs  · Trend hgb and plts  · Transfuse as needed for goal hgb >7 0    Endo:   · Diagnosis: No acute issues  · Plan:   · Monitor blood glucose q6 while NPO    MSK/Skin:  · Plan:   · Mobility goal: early mobility as able in medical   · PT consult: not applicable  · OT consult: not applicable  · Frequent turning and pressure off-loading        Disposition: Continue Critical Care   Code Status: Level 1 - Full Code  ---------------------------------------------------------------------------------------  ICU CORE MEASURES    Prophylaxis   VTE Pharmacologic Prophylaxis: Pharmacologic VTE Prophylaxis contraindicated due to hemorrhagic shock  VTE Mechanical Prophylaxis: sequential compression device  Stress Ulcer Prophylaxis: Pantoprazole Infusion    ABCDE Protocol (if indicated)  Plan to perform spontaneous awakening trial today? No (provide explanation): hemodynamically unstable  Plan to perform spontaneous breathing trial today? No (provide explanation): hemodynamically unstable  Obvious barriers to extubation?  No (provide explanation): hemodynamically unstable  CAM-ICU: sedated    Invasive Devices Review  Invasive Devices     Central Venous Catheter Line            CVC Central Lines 20 Triple less than 1 day          Drain            NG/OG/Enteral Tube Orogastric less than 1 day    Urethral Catheter Temperature probe less than 1 day          Airway            ETT  Cuffed 8 mm less than 1 day              Can any invasive devices be discontinued today? No (provide explanation): hemodynamically unstable  ---------------------------------------------------------------------------------------  OBJECTIVE    Physical Exam   Constitutional: No distress  HENT:   Head: Normocephalic and atraumatic  Oozing bright red blood out of mouth  Bright red blood out of OG tube  Eyes: Pupils are equal, round, and reactive to light  Neck: Neck supple  R  IJ CVC   Cardiovascular: Normal rate, regular rhythm, normal heart sounds and intact distal pulses  Exam reveals no friction rub  No murmur heard  Pulmonary/Chest: He has no wheezes  He has no rales  Intubated and mechanically ventilated   Abdominal: Bowel sounds are normal  He exhibits distension  There is no tenderness  There is no guarding  Genitourinary:   Genitourinary Comments: Balderas in place   Musculoskeletal: He exhibits no edema  Neurological:   Sedated   Skin: He is not diaphoretic         Vitals   Vitals:    20 1518 20 1520 20 1525 20 1527   BP: 103/60 100/57 (!) 81/66    Pulse: 83 92 80 82   Resp: 15 20 19 20   Temp: (!) 95 9 °F (35 5 °C) (!) 96 1 °F (35 6 °C) (!) 95 7 °F (35 4 °C)    TempSrc:       SpO2:  100% 100% 100%   Weight:       Height:         Temp (24hrs), Av 5 °F (35 8 °C), Min:95 7 °F (35 4 °C), Max:98 4 °F (36 9 °C)  Current: Temperature: (!) 95 7 °F (35 4 °C)      Respiratory:  SpO2: SpO2: 100 %, SpO2 Activity: SpO2 Activity: At Rest, SpO2 Device: O2 Device: Other (comment)(vent)       Invasive/non-invasive ventilation settings   Respiratory    Lab Data (Last 4 hours)    None         O2/Vent Data (Last 4 hours)      01/11 1415           Vent Mode AC/VC       Resp Rate (BPM) (BPM) 14       Vt (mL) (mL) 500       FIO2 (%) (%) 40       PEEP (cmH2O) (cmH2O) 5       MV 11 3                   Height and Weights   Height: 5' 8 5" (174 cm)  IBW: 69 55 kg  Body mass index is 24 06 kg/m²  Weight (last 2 days)     None          Intake and Output  I/O       01/09 0701 - 01/10 0700 01/10 0701 - 01/11 0700 01/11 0701 - 01/12 0700    P  O  170  0    I V  (mL/kg)   1769 9 (24 3)    Blood 462  2700    NG/GT   0    IV Piggyback   750    Total Intake(mL/kg) 632 (8 7)  5219 9 (71 7)    Urine (mL/kg/hr) 600 (0 3) 750 (0 4) 450 (0 7)    Emesis/NG output   1000    Total Output     Net +32 -750 +3769 9           Unmeasured Emesis Occurrence   3 x          Nutrition       Diet Orders   (From admission, onward)             Start     Ordered    01/11/20 1418  Diet NPO  Diet effective now     Question Answer Comment   Diet Type NPO    RD to adjust diet per protocol?  Yes        01/11/20 1418                Laboratory and Diagnostics:  Results from last 7 days   Lab Units 01/11/20  1408 01/11/20  1309 01/11/20  0629 01/11/20  0045 01/10/20  1717 01/10/20  1056 01/09/20  1845 01/09/20  0555 01/08/20  1953 01/08/20  1425   WBC Thousand/uL 5 68  --  5 05  --   --  5 96 9 62 5 12  --  10 67*   HEMOGLOBIN g/dL 8 8*  --  6 8* 7 0* 9 9* 8 7* 5 8* 7 3*  --  9 7*   I STAT HEMOGLOBIN g/dl  --  6 1*  --   --   --   --   --   --   --   --    HEMATOCRIT % 26 4*  --  20 7*  --   --  25 4* 17 9* 23 0*  --  29 5*   HEMATOCRIT, ISTAT %  --  18*  --   --   --   --   --   --   --   --    PLATELETS Thousands/uL 154  --  253  --   --  216 254 228 306 309   NEUTROS PCT % 64  --   --   --   --   --   --   --   --  88*   MONOS PCT % 11  --   --   --   --   --   --   --   --  5     Results from last 7 days   Lab Units 01/11/20  1408 01/11/20  1309 01/11/20  1521 01/10/20  1717 01/10/20  1056 01/09/20  0555 01/08/20  1425   SODIUM mmol/L 138  --  140 136 137 138 135*   POTASSIUM mmol/L 4 1  --  3 8 4 1 3 6 3 6 4 0   CHLORIDE mmol/L 110*  --  108 111* 108 110* 105   CO2 mmol/L 24  --  24 20* 24 22 17*   CO2, I-STAT mmol/L  --  22  --   --   --   --   --    ANION GAP mmol/L 4  --  8 5 5 6 13   BUN mg/dL 20  --  21 26* 32* 30* 19   CREATININE mg/dL 0 51*  --  0 60 0 66 0 72 0 78 0 95   CALCIUM mg/dL 7 2*  --  7 8* 8 5 8 3 8 0* 8 5   GLUCOSE RANDOM mg/dL 192*  --  112 104 156* 94 99   ALT U/L 9*  --   --   --   --   --  9*   AST U/L 18  --   --   --   --   --  19   ALK PHOS U/L 39*  --   --   --   --   --  64   ALBUMIN g/dL 2 0*  --   --   --   --   --  2 4*   TOTAL BILIRUBIN mg/dL 0 55  --   --   --   --   --  0 37     Results from last 7 days   Lab Units 01/11/20  1408   MAGNESIUM mg/dL 1 4*   PHOSPHORUS mg/dL 3 1      Results from last 7 days   Lab Units 01/11/20  1408   INR  1 42*      Results from last 7 days   Lab Units 01/11/20  0045 01/10/20  1717 01/09/20  0144 01/08/20  2253 01/08/20  1953 01/08/20  1425   TROPONIN I ng/mL <0 02 <0 02 <0 02 <0 02 <0 02 <0 02         ABG:    VBG:          Micro        Imaging:  I have personally reviewed pertinent reports  and I have personally reviewed pertinent films in PACS   Xr Chest 2 Views    Result Date: 1/8/2020  Impression: Linear atelectasis versus scarring at the left lung base  No focal consolidation, pleural effusion or pneumothorax  Age-indeterminate wedge compression deformity of an upper lumbar vertebral body, seen on the lateral view  The study was marked in EPIC for significant notification  Workstation performed: [id number]     Ct Head Wo Contrast    Result Date: 1/8/2020  Impression: No acute intracranial abnormality  Microangiopathic changes  Workstation performed: [id number]     Ct Spine Cervical Without Contrast    Result Date: 1/8/2020  Impression: No cervical spine fracture or traumatic malalignment  Workstation performed: [id number]     Mri Brain Wo Contrast    Result Date: 1/11/2020  Impression: No acute intracranial ischemia  No intracranial mass or intracranial hemorrhage  Scattered white matter change consistent with chronic microangiopathy, similar from prior examination Workstation performed: [id number]     Xr Stroke Alert Portable Chest    Result Date: 1/11/2020  Impression: New lingular subsegmental atelectasis  Otherwise, no significant interval change  Workstation performed: [id number]     Ct Stroke Alert Brain    Result Date: 1/10/2020  Impression: No acute intracranial abnormality  Microangiopathic changes  Findings were directly discussed with Dr Quin Castañeda on 1/10/2020 3:47 PM  Workstation performed: [id number]     Cta Stroke Alert (head/neck)    Result Date: 1/10/2020  Impression: 1  No evidence of hemodynamic significant stenosis, aneurysm or dissection  2  Emphysematous changes  Left upper lobe peribronchial thickening could relate to small airways disease  Partially visualized left lung consolidation/atelectatic changes  Nonemergent outpatient follow-up with unenhanced chest CT recommended  3  Soft tissue swelling about the left ureter could relate to infection if clinically suspected, correlate clinically   Findings were directly discussed with Dr Quin Castañeda on 1/10/2020 3:47 PM  Workstation performed: [id number]       Active Medications  Scheduled Meds:  Current Facility-Administered Medications:  chlorhexidine 15 mL [employer] Spit Q12H [address] Kalpana Beto Cochran PA-C    fentanyl citrate (PF) 100 mcg Intravenous Once Caitlyn Lancaster PA-C    folic acid IVPB 1 mg Intravenous Once Kary Ferguson MD    lactated ringers 125 mL/hr Intravenous Continuous Kary Ferguson MD Last Rate: Stopped (01/11/20 0840)   midazolam 2 mg Intravenous Once Caitlyn Lancaster PA-C    multi-electrolyte 3,000 mL Intravenous Once Caitlyn Lancaster PA-C    norepinephrine 1-30 mcg/min Intravenous Titrated Caitlyn Lancaster PA-C Last Rate: 30 mcg/min (01/11/20 1504)   pantoprozole (PROTONIX) infusion (Continuous) 8 mg/hr Intravenous Continuous Marrian Cathryn PA-C Last Rate: 8 mg/hr (01/11/20 1500)   propofol 5-50 mcg/kg/min Intravenous Titrated Marrian Cathryn, PA-C Last Rate: Stopped (01/11/20 1434)   sodium bicarbonate 50 mEq Intravenous Once Sixto M Cross, PA-C    sodium bicarbonate 50 mEq Intravenous Once Sixto M Cross, PA-C    thiamine 100 mg Intravenous Daily Thi Licona MD      Continuous Infusions:    lactated ringers 125 mL/hr Last Rate: Stopped (01/11/20 0840)   norepinephrine 1-30 mcg/min Last Rate: 30 mcg/min (01/11/20 1504)   pantoprozole (PROTONIX) infusion (Continuous) 8 mg/hr Last Rate: 8 mg/hr (01/11/20 1500)   propofol 5-50 mcg/kg/min Last Rate: Stopped (01/11/20 1434)     PRN Meds:        Allergies   No Known Allergies    Advance Directive and Living Will:      Power of :    POLST:      Counseling / Coordination of Care  Total Critical Care time spent 82 minutes excluding procedures, teaching and family updates  ALMA DELIA Man-C      Portions of the record may have been created with voice recognition software  Occasional wrong word or "sound a like" substitutions may have occurred due to the inherent limitations of voice recognition software    Read the chart carefully and recognize, using context, where substitutions have occurred

## 2020-01-11 NOTE — ANESTHESIA POSTPROCEDURE EVALUATION
Post-Op Assessment Note    CV Status:  Stable       Mental Status:  Unresponsive (Pt sedated )   Hydration Status:  Stable   PONV Controlled:  None   Airway Patency:  Patent  Airway: intubated   Post Op Vitals Reviewed: Yes      Staff: Anesthesiologist, CRNA   Comments: On vent -Transferred to CT scan by ICU staff-Reort given          BP  158/69   Temp     Pulse  98   Resp   /20/60% 5Peep   SpO2   100

## 2020-01-11 NOTE — ANESTHESIA PREPROCEDURE EVALUATION
Review of Systems/Medical History  Patient summary reviewed  Chart reviewed  No history of anesthetic complications     Cardiovascular  EKG reviewed, Hyperlipidemia, Hypertension ,   Comment: Sinus tachycardia  Otherwise normal ECG  When compared with ECG of 01-MAY-2019 09:51,  No significant change was found  Confirmed by Maryann Pabon ([id number]) on 1/8/2020 6:34:22 PM      TTE:    LEFT VENTRICLE: Size was normal  Systolic function was normal  Ejection fraction was estimated to be 60 %  There were no regional wall motion abnormalities  Wall thickness was mildly increased  DOPPLER: Left ventricular diastolic function  parameters were normal      RIGHT VENTRICLE: The size was normal  Systolic function was normal  Wall thickness was normal      LEFT ATRIUM: Size was normal      RIGHT ATRIUM: Size was normal      MITRAL VALVE: Valve structure was normal  There was normal leaflet separation  DOPPLER: The transmitral velocity was within the normal range  There was no evidence for stenosis  There was no regurgitation      AORTIC VALVE: The valve was trileaflet  Leaflets exhibited mildly increased thickness, mild calcification, and normal cuspal separation  DOPPLER: Transaortic velocity was within the normal range  There was no evidence for stenosis  There  was no regurgitation      TRICUSPID VALVE: The valve structure was normal  There was normal leaflet separation  DOPPLER: The transtricuspid velocity was within the normal range  There was no evidence for stenosis  There was trace regurgitation  The tricuspid jet  envelope definition was inadequate for estimation of RV systolic pressure  There are no indirect findings suggestive of moderate or severe pulmonary hypertension      PULMONIC VALVE: Leaflets exhibited normal thickness, no calcification, and normal cuspal separation  DOPPLER: The transpulmonic velocity was within the normal range  There was no regurgitation      PERICARDIUM: There was no pericardial effusion   The pericardium was normal in appearance      AORTA: The root exhibited normal size      SYSTEMIC VEINS: IVC: The inferior vena cava was normal in size and course  Respirophasic changes were normal ,  Pulmonary  Smoker cigarette smoker  , Tobacco cessation counseling given , COPD ,        GI/Hepatic    GI bleeding , active,        Prostatic disorder, benign prostatic hyperplasia       Endo/Other     GYN       Hematology  Anemia acute blood loss anemia,     Musculoskeletal       Neurology    TIA, CVA ,    Psychology   Depression ,     Comment: Uncomplicated alcohol dependence ([facility] [address] )  [facility]                     Anesthesia Plan  ASA Score- 4 Emergent    Anesthesia Type- general with ASA Monitors     Additional Monitors:   Airway Plan:         Plan Factors-    Induction-     Postoperative Plan-     Informed Consent-

## 2020-01-11 NOTE — BRIEF OP NOTE (RAD/CATH)
IR VISCERAL ANGIOGRAPHY / INTERVENTION Procedure Note    PATIENT NAME: Kim Payne  : 1950  MRN: [id number]    Pre-op Diagnosis:   1  Syncope    2  Chest pain    3  Suicidal ideation    4  Alcohol abuse    5  Difficult intravenous access    6  Gastrointestinal hemorrhage with melena    7  Slurred speech    8  CVA (cerebral vascular accident) ([facility] [address] )    9  Acute blood loss anemia    10  Hemorrhagic shock (HCC)      Post-op Diagnosis:   1  Syncope    2  Chest pain    3  Suicidal ideation    4  Alcohol abuse    5  Difficult intravenous access    6  Gastrointestinal hemorrhage with melena    7  Slurred speech    8  CVA (cerebral vascular accident) ([facility] [address] )    9  Acute blood loss anemia    10  Hemorrhagic shock [facility])        Surgeon:   Jazz Christensen MD    Estimated Blood Loss:  Massive GI blood loss during procedure  See anesthesia note  Minimal direct procedural blood loss  Findings: 1  Massive active GI bleed from gastroduodenal artery successfully treated with coil embolization  2  Occluded right external iliac artery and suspected occluded or diffusely stenotic left external iliac artery  Left radial access used for procedure  I updated the patient's son, Anna Gaxiola, by telephone after the procedure      Specimens:  None    Complications:  None    Anesthesia: Sha Calderon MD     Date: 2020  Time: 4:50 PM

## 2020-01-11 NOTE — CONSULTS
Consultation - General Surgery   Krish Marcano 81 y o  male MRN: [id number]  Unit/Bed#: [facility] 516-01 Encounter: [id number]    Assessment/Plan     Assessment:  80yM w/ bleeding duodenal ulcer  For now it appears controlled by IR embolization    Plan:  q4 hemoglobin checks  If drops, he will go to OR for exlap  Obtain bladder pressures starting now  Abdomen is large, distended, and edematous  Will follow up CT abdomen/pelvis  Cont OGT  Cont jett  Rest of care per ICU      History of Present Illness     HPI:  Krish Marcano is a 81 y o  male who we were consulted on for upper GI bleed  The patient's history was obtained by chart review  This seems to include EtOH abuse (1/5th of vodka qd) , history of heroin and meth abuse, history of CVA, COPD, HTN, schizophrenia, depression, exlap incision ?etiology, We were called emergently to see the patient in IR  He presented to ED on 1/8 with what appeared to be syncope  Hemoglobin was 9 7 with normal baseline  GI was consulted after his Hgb dropeed to 5 8 with large maroon bowel movement  He was taken for EGD 1/11 which showed bleeding ulcer in duodenum  This was clipped, injected and hemosprayed but continued to bleed  He was then taken to OR while he continued to pour blood out of his mouth  Dr Aric Jerry was able to successfully embolize the GDA  In total he has received 9U RBC, 2 FFP, 2 Platelets  Because of his blood antibodies, there are limited further units available for him       Consults    Review of Systems   Unable to perform ROS: Intubated       Historical Information   Past Medical History:   Diagnosis Date    BPH (benign prostatic hyperplasia)     CVA [facility]cerebral vascular accident) ([facility] [address] )     Head injury     Hypertension     Metatarsal fracture     TIA (transient ischemic attack)      Past Surgical History:   Procedure Laterality Date    ABDOMINAL SURGERY      ANKLE SURGERY      BACK SURGERY      ELBOW SURGERY      JOINT REPLACEMENT      [facility]       Social History   Social History     Substance and Sexual Activity   Alcohol Use Yes    Frequency: Monthly or less    Comment: half a handle of vodka daily as per family     Social History     Substance and Sexual Activity   Drug Use Yes    Types: Marijuana    Comment: history polysubstance use including IVDA on record- every now then will smoke weed      Social History     Tobacco Use   Smoking Status Current Every Day Smoker    Packs/day: 1 00    Types: Cigarettes   Smokeless Tobacco Never Used   Tobacco Comment    started age 14, 3 quit attempts     Family History: History reviewed  No pertinent family history      Meds/Allergies   all current active meds have been reviewed, current meds:   Current Facility-Administered Medications   Medication Dose Route Frequency    chlorhexidine (PERIDEX) 0 12 % oral rinse 15 mL  15 mL Swish & Spit Q12H [address]    fentanyl citrate (PF) 100 MCG/2ML 100 mcg  100 mcg Intravenous Once    folic acid 1 mg in sodium chloride 0 9 % 50 mL IVPB  1 mg Intravenous Once    lactated ringers infusion  125 mL/hr Intravenous Continuous    midazolam (VERSED) injection 2 mg  2 mg Intravenous Once    multi-electrolyte (ISOLYTE-S PH 7 4) bolus 3,000 mL  3,000 mL Intravenous Once    NITROGLYCERIN 25 MCG/ML (CMPD ORDER)    Code/Trauma/Sedation Med    norepinephrine (LEVOPHED) 4 mg (STANDARD CONCENTRATION) IV in sodium chloride 0 9% 250 mL  1-30 mcg/min Intravenous Titrated    pantoprazole (PROTONIX) 80 mg in sodium chloride 0 9 % 100 mL infusion  8 mg/hr Intravenous Continuous    propofol (DIPRIVAN) 1000 mg in 100 mL infusion (premix)  5-50 mcg/kg/min Intravenous Titrated    sodium bicarbonate 8 4 % injection 50 mEq  50 mEq Intravenous Once    sodium bicarbonate 8 4 % injection 50 mEq  50 mEq Intravenous Once    thiamine (VITAMIN B1) 100 mg in sodium chloride 0 9 % 50 mL IVPB  100 mg Intravenous Daily    and PTA meds:   Prior to Admission Medications Prescriptions Last Dose Informant Patient Reported? Taking? Cholecalciferol 1000 units tablet  Outside Facility (Specify) No No   Sig: Take 1 tablet (1,000 Units total) by mouth daily   DULoxetine (CYMBALTA) 30 mg delayed release capsule  Outside Facility (Specify) No No   Sig: Take 3 capsules (90 mg total) by mouth daily   QUEtiapine (SEROquel) 200 mg tablet  Outside Facility (Specify) No No   Sig: Take 1 tablet (200 mg total) by mouth daily   QUEtiapine (SEROquel) 300 mg tablet  Outside Facility (Specify) No No   Sig: Take 2 tablets (600 mg total) by mouth daily at bedtime   Wound Dressings (COMFEEL PLUS ULCER DRESSING) PADS  Outside Facility (Specify) No No   Sig: Apply 1 each topically every other day   albuterol (PROVENTIL HFA,VENTOLIN HFA) 90 mcg/act inhaler  Outside Facility (Specify) No No   Sig: Inhale 2 puffs every 4 (four) hours as needed for wheezing   amLODIPine (NORVASC) 10 mg tablet  Outside Facility (Specify) No No   Sig: Take 1 tablet (10 mg total) by mouth daily Hold for SBP <110   aspirin (ECOTRIN LOW STRENGTH) 81 mg EC tablet  Outside Facility (Specify) No No   Sig: Take 1 tablet (81 mg total) by mouth daily   atorvastatin (LIPITOR) 40 mg tablet  Outside Facility (Specify) No No   Sig: TAKE ONE TABLET BY MOUTH ONCE DAILY   cyclobenzaprine (FLEXERIL) 10 mg tablet  Outside Facility (Specify) No No   Sig: Take 1 tablet (10 mg total) by mouth 3 (three) times a day as needed for muscle spasms   diclofenac sodium (VOLTAREN) 50 mg EC tablet   No No   Sig: Take 1 tablet (50 mg total) by mouth 2 (two) times a day   fluticasone (FLONASE) 50 mcg/act nasal spray  Outside Facility (Specify) No No   Si sprays into each nostril daily   fluticasone-vilanterol (BREO ELLIPTA) 100-25 mcg/inh inhaler  Outside Facility (Specify) No No   Sig: Inhale 1 puff daily Rinse mouth after use     folic acid (FOLVITE) 1 mg tablet  Outside Facility (Specify) No No   Sig: Take 1 tablet (1 mg total) by mouth daily gabapentin (NEURONTIN) 400 mg capsule  Outside Facility (Specify) No No   Sig: TAKE ONE CAPSULE BY MOUTH THREE TIMES A DAY   guaiFENesin (MUCINEX) 600 mg 12 hr tablet  Outside Facility (Specify) Yes No   Sig: Take 600 mg by mouth every 12 (twelve) hours as needed for cough   lidocaine (LIDODERM) 5 %  Outside Facility (Specify) No No   Sig: Apply 1 patch topically daily Remove & Discard patch within 12 hours or as directed by MD   menthol-zinc oxide (CALMOSEPTINE) 0 44-20 6 % OINT  Outside Facility (Specify) No No   Sig: Apply topically 2 (two) times a day   metoprolol tartrate (LOPRESSOR) 50 mg tablet  Outside Facility (Specify) No No   Sig: Take 1 tablet (50 mg total) by mouth daily   mirtazapine (REMERON) 15 mg tablet  Outside Facility (Specify) No No   Sig: Take 1 tablet (15 mg total) by mouth daily at bedtime   nicotine (NICODERM CQ) 14 mg/24hr TD 24 hr patch  Outside Facility (Specify) No No   Sig: Place 1 patch on the skin daily   senna-docusate sodium (SENOKOT S) 8 6-50 mg per tablet  Outside Facility (Specify) No No   Sig: Take 1 tablet by mouth 2 (two) times a day   tamsulosin (FLOMAX) 0 4 mg  Outside Facility (Specify) No No   Sig: Take 1 capsule (0 4 mg total) by mouth daily with dinner   thiamine 100 MG tablet  Outside Facility (Specify) No No   Sig: Take 1 tablet (100 mg total) by mouth daily   traZODone (DESYREL) 50 mg tablet  Outside Facility (Specify) No No   Sig: Take 1 tablet (50 mg total) by mouth daily at bedtime as needed for sleep      Facility-Administered Medications: None     No Known Allergies    Objective   First Vitals:   Blood Pressure: 104/59 (01/08/20 1346)  Pulse: (!) 115 (01/08/20 1346)  Temperature: 97 9 °F (36 6 °C) (01/08/20 1608)  Temp Source: Oral (01/08/20 1608)  Respirations: 18 (01/08/20 1346)  Height: 5' 8 5" (174 cm) (01/08/20 1346)  Weight - Scale: 76 2 kg (168 lb) (01/08/20 1346)  SpO2: 100 % (01/08/20 1346)    Current Vitals:   Blood Pressure: (!) 81/66 (01/11/20 1525)  Pulse: 82 (01/11/20 1527)  Temperature: (!) 95 7 °F (35 4 °C) (01/11/20 1525)  Temp Source: Oral (01/11/20 1400)  Respirations: 20 (01/11/20 1527)  Height: 5' 8 5" (174 cm) (01/08/20 1346)  Weight - Scale: 72 8 kg (160 lb 9 6 oz) (01/08/20 1948)  SpO2: 100 % (01/11/20 1527)      Intake/Output Summary (Last 24 hours) at 1/11/2020 1707  Last data filed at 1/11/2020 1641  Gross per 24 hour   Intake 6469 93 ml   Output 1750 ml   Net 4719 93 ml       Invasive Devices     Central Venous Catheter Line            CVC Central Lines 01/11/20 less than 1 day    CVC Central Lines 01/11/20 Triple less than 1 day          Arterial Line            Arterial Line 01/11/20 Radial less than 1 day          Drain            NG/OG/Enteral Tube Orogastric less than 1 day    Urethral Catheter Temperature probe less than 1 day          Airway            ETT  Cuffed 8 mm less than 1 day                Physical Exam   Constitutional: He is oriented to person, place, and time  No distress  HENT:   Head: Normocephalic and atraumatic  NGT in place draining bright red blood   Neck: No tracheal deviation present  Cardiovascular: Normal rate and regular rhythm  Pulmonary/Chest: No respiratory distress  Intubated in IR   Abdominal: Soft  He exhibits distension  Large, distended, edematous abdomen  Firm, not rigid  Midline exlap scar   Musculoskeletal: He exhibits no edema or deformity  Neurological: He is alert and oriented to person, place, and time  Skin: No rash noted  He is not diaphoretic  No erythema  Psychiatric:   Sedated   Vitals reviewed  Lab Results:   I have personally reviewed pertinent lab results    , CBC:   Lab Results   Component Value Date    WBC 5 68 01/11/2020    HGB 8 8 (L) 01/11/2020    HCT 26 4 (L) 01/11/2020    MCV 96 01/11/2020     01/11/2020    MCH 32 0 01/11/2020    MCHC 33 3 01/11/2020    RDW 14 9 01/11/2020    MPV 9 3 01/11/2020    NRBC 0 01/11/2020   , CMP:   Lab Results   Component Value Date    SODIUM 138 01/11/2020    K 4 1 01/11/2020     (H) 01/11/2020    CO2 24 01/11/2020    CO2 22 01/11/2020    BUN 20 01/11/2020    CREATININE 0 51 (L) 01/11/2020    GLUCOSE 154 (H) 01/11/2020    CALCIUM 7 2 (L) 01/11/2020    AST 18 01/11/2020    ALT 9 (L) 01/11/2020    ALKPHOS 39 (L) 01/11/2020    EGFR 111 01/11/2020   , Coagulation:   Lab Results   Component Value Date    INR 1 42 (H) 01/11/2020   , Urinalysis: No results found for: Blankenship Petit, SPECGRAV, PHUR, LEUKOCYTESUR, NITRITE, PROTEINUA, GLUCOSEU, KETONESU, BILIRUBINUR, BLOODU  Imaging: I have personally reviewed pertinent reports  EKG, Pathology, and Other Studies: I have personally reviewed pertinent reports  Counseling / Coordination of Care  Total floor / unit time spent today 25 minutes  Greater than 50% of total time was spent with the patient and / or family counseling and / or coordination of care  A description of the counseling / coordination of care: 25

## 2020-01-11 NOTE — OCCUPATIONAL THERAPY NOTE
Occupational Therapy Cancellation Note    Orders received and chart reviewed  Pt currently off of the unit in OR  Will continue to follow to be seen for OT evaluation post-op as appropriate/when medically cleared           Tapan Lozada MS, OTR/L

## 2020-01-11 NOTE — ANESTHESIA PROCEDURE NOTES
Central Line Insertion  Performed by: Gilford Shivers, MD  Authorized by: Gilford Shivers, MD   Date/Time: 1/11/2020 11:04 AM  Catheter Type:  triple lumen  Consent: Verbal consent obtained  Written consent obtained  Risks and benefits: risks, benefits and alternatives were discussed  Consent given by: patient  Patient understanding: patient states understanding of the procedure being performed  Patient consent: the patient's understanding of the procedure matches consent given  Procedure consent: procedure consent matches procedure scheduled  Relevant documents: relevant documents present and verified  Test results: test results available and properly labeled  Radiology Images: Radiology Images displayed and confirmed  If images not available, report reviewed  Required items: required blood products, implants, devices, and special equipment available  Patient identity confirmed: arm band  Time out: Immediately prior to procedure a "time out" was called to verify the correct patient, procedure, equipment, support staff and site/side marked as required  Location details: right internal jugular  Catheter size: 7 Fr  Patient position: Trendelenburg  Anesthesia: local infiltration    Anesthesia:  Local Anesthetic: lidocaine 1% with epinephrine (2 cc)  Anesthetic total: 2 mL    Sedation:  Patient sedated: no    Assessment: blood return through all ports and placement verified by x-ray  Preparation: skin prepped with 2% chlorhexidine and Chloraprep  Skin prep agent dried: skin prep agent completely dried prior to procedure  Sterile barriers: all five maximum sterile barriers used - cap, mask, sterile gown, sterile gloves, and large sterile sheet  Hand hygiene: hand hygiene performed prior to central venous catheter insertion  Ultrasound guidance: yes  sterile gel and probe cover used in ultrasound-guided central venous catheter insertionReason All Sterile Barriers Not Used:   All elements of maximal sterile barrier technique not followed for medical reasons  Pre-procedure: landmarks identified  Number of attempts: 1  Successful placement: yes  Post-procedure: dressing applied and line sutured  Patient tolerance: Patient tolerated the procedure well with no immediate complications  independent trained observer present for patient monitoring - Devika Ashton CRNA

## 2020-01-11 NOTE — ANESTHESIA PREPROCEDURE EVALUATION
Review of Systems/Medical History  Patient summary reviewed  Chart reviewed  No history of anesthetic complications     Cardiovascular  EKG reviewed, Hyperlipidemia, Hypertension ,   Comment: Sinus tachycardia  Otherwise normal ECG  When compared with ECG of 01-MAY-2019 09:51,  No significant change was found  Confirmed by Kurt Huston ([id number]) on 1/8/2020 6:34:22 PM      TTE:    LEFT VENTRICLE: Size was normal  Systolic function was normal  Ejection fraction was estimated to be 60 %  There were no regional wall motion abnormalities  Wall thickness was mildly increased  DOPPLER: Left ventricular diastolic function  parameters were normal      RIGHT VENTRICLE: The size was normal  Systolic function was normal  Wall thickness was normal      LEFT ATRIUM: Size was normal      RIGHT ATRIUM: Size was normal      MITRAL VALVE: Valve structure was normal  There was normal leaflet separation  DOPPLER: The transmitral velocity was within the normal range  There was no evidence for stenosis  There was no regurgitation      AORTIC VALVE: The valve was trileaflet  Leaflets exhibited mildly increased thickness, mild calcification, and normal cuspal separation  DOPPLER: Transaortic velocity was within the normal range  There was no evidence for stenosis  There  was no regurgitation      TRICUSPID VALVE: The valve structure was normal  There was normal leaflet separation  DOPPLER: The transtricuspid velocity was within the normal range  There was no evidence for stenosis  There was trace regurgitation  The tricuspid jet  envelope definition was inadequate for estimation of RV systolic pressure  There are no indirect findings suggestive of moderate or severe pulmonary hypertension      PULMONIC VALVE: Leaflets exhibited normal thickness, no calcification, and normal cuspal separation  DOPPLER: The transpulmonic velocity was within the normal range  There was no regurgitation      PERICARDIUM: There was no pericardial effusion   The pericardium was normal in appearance      AORTA: The root exhibited normal size      SYSTEMIC VEINS: IVC: The inferior vena cava was normal in size and course  Respirophasic changes were normal ,  Pulmonary  Smoker cigarette smoker  , Tobacco cessation counseling given , COPD ,        GI/Hepatic    GI bleeding , active,        Prostatic disorder, benign prostatic hyperplasia       Endo/Other     GYN       Hematology  Anemia acute blood loss anemia,     Musculoskeletal       Neurology    TIA, CVA ,    Psychology   Depression ,     Comment: Uncomplicated alcohol dependence ([facility] [address] )  History of substance abuse ([facility] [address]              PSH:    LIVER SURGERY BACK SURGERY   ANKLE SURGERY KNEE SURGERY   JOINT REPLACEMENT ELBOW SURGERY   ABDOMINAL SURGERY             Anesthesia Plan  ASA Score- 3 Emergent    Anesthesia Type- general with ASA Monitors  Additional Monitors: central venous line  Airway Plan: ETT  Comment: Patient with no access secondary to failure of PICC placement and loss of intravenous access on the floor  Will need CVC prior to proceeding with case        Plan Factors- Patient instructed to abstain from smoking on day of procedure       Induction- intravenous and rapid sequence induction  Postoperative Plan- Plan for postoperative opioid use  Planned trial extubation    Informed Consent- Anesthetic plan and risks discussed with patient  I personally reviewed this patient with the CRNA  Discussed and agreed on the Anesthesia Plan with the CRNA           Lab Results   Component Value Date    WBC 5 05 01/11/2020    HGB 6 8 (LL) 01/11/2020    HCT 20 7 (L) 01/11/2020    MCV 98 01/11/2020     01/11/2020     Lab Results   Component Value Date    GLUCOSE 108 02/08/2015    CALCIUM 7 8 (L) 01/11/2020     (L) 02/08/2015    K 3 8 01/11/2020    CO2 24 01/11/2020     01/11/2020    BUN 21 01/11/2020    CREATININE 0 60 01/11/2020     Lab Results   Component Value Date    INR 0 90 10/23/2019    INR 0 92 04/08/2019    INR 0 93 01/22/2015    PROTIME 10 4 10/23/2019    PROTIME 10 7 04/08/2019    PROTIME 12 7 01/22/2015     Lab Results   Component Value Date    PTT 29 10/23/2019     Type and Screen:  A        Discussed with pt the benefits/alternatives and risks or General Anesthesia including breathing tube remaining in place if not strong enough, PONV, damage to lips and teeth, sore throat, eye injury or blindness    I, Dr Mor Romo, the attending physician, have personally seen and evaluated the patient prior to anesthetic care  I have reviewed the pre-anesthetic record, and other medical records if appropriate to the anesthetic care  If a CRNA is involved in the case, I have reviewed the CRNA assessment, if present, and agree  The patient is in a suitable condition to proceed with my formulated anesthetic plan

## 2020-01-11 NOTE — PROGRESS NOTES
Gastroenterology Progress Note   - Nessa Yanes 81 y o  male MRN: [id number]    Unit/Bed#: [facility] 706-01 Encounter: [id number]      Assessment and Plan:   Acute blood loss anemia  73-year-old male patient admitted to the hospital after an episode of syncope  Patient was found to have acute drop in hemoglobin along with melena/maroon color stool and EGD was planned yesterday  There was a concern for ongoing stroke and stroke alert was called yesterday and procedure was canceled  Patient had 2 units of blood transfused yesterday but persisted with drop in hemoglobin today  Given concern for ongoing bleeding will perform EGD today  Continue PPI drip      Subjective:   Patient seen and examined at the bed, denies any events overnight, currently is NPO, denies nausea or vomiting, is passing flatus, denies chest pain or shortness of breath, no abdominal pain, patient is not ambulating     Objective:     Vitals: Blood pressure 129/72, pulse 98, temperature 98 4 °F (36 9 °C), resp  rate 16, height 5' 8 5" (1 74 m), weight 72 8 kg (160 lb 9 6 oz), SpO2 98 %  ,Body mass index is 24 06 kg/m²  Intake/Output Summary (Last 24 hours) at 1/11/2020 1028  Last data filed at 1/11/2020 0800  Gross per 24 hour   Intake 0 ml   Output 400 ml   Net -400 ml       Physical Exam:   Physical Exam   Constitutional: He appears well-developed  No distress  HENT:   Head: Normocephalic and atraumatic  Neck: Normal range of motion  Neck supple  Cardiovascular: Normal rate  Pulmonary/Chest: Effort normal and breath sounds normal    Abdominal: Soft  Bowel sounds are normal  He exhibits no mass  There is no tenderness  There is no rebound and no guarding  Neurological: He is alert  Skin: Skin is warm and dry  Nursing note and vitals reviewed        Invasive Devices     None                 Lab Results:  Results from last 7 days   Lab Units 01/11/20  0629  01/08/20  1425   WBC Thousand/uL 5 05   < > 10 67*   HEMOGLOBIN g/dL 6 8*   < > 9 7*   HEMATOCRIT % 20 7*   < > 29 5*   PLATELETS Thousands/uL 253   < > 309   NEUTROS PCT %  --   --  88*   LYMPHS PCT %  --   --  6*   MONOS PCT %  --   --  5   EOS PCT %  --   --  0    < > = values in this interval not displayed  Results from last 7 days   Lab Units 01/11/20  0629  01/08/20  1425   POTASSIUM mmol/L 3 8   < > 4 0   CHLORIDE mmol/L 108   < > 105   CO2 mmol/L 24   < > 17*   BUN mg/dL 21   < > 19   CREATININE mg/dL 0 60   < > 0 95   CALCIUM mg/dL 7 8*   < > 8 5   ALK PHOS U/L  --   --  64   ALT U/L  --   --  9*   AST U/L  --   --  19    < > = values in this interval not displayed  Results from last 7 days   Lab Units 01/08/20  1425   LIPASE u/L 81       Imaging Studies: I have personally reviewed pertinent imaging studies  Xr Chest 2 Views    Result Date: 1/8/2020  Impression: Linear atelectasis versus scarring at the left lung base  No focal consolidation, pleural effusion or pneumothorax  Age-indeterminate wedge compression deformity of an upper lumbar vertebral body, seen on the lateral view  The study was marked in EPIC for significant notification  Workstation performed: [id number]     Ct Head Wo Contrast    Result Date: 1/8/2020  Impression: No acute intracranial abnormality  Microangiopathic changes  Workstation performed: [id number]     Ct Spine Cervical Without Contrast    Result Date: 1/8/2020  Impression: No cervical spine fracture or traumatic malalignment  Workstation performed: [id number]     Mri Brain Wo Contrast    Result Date: 1/11/2020  Impression: No acute intracranial ischemia  No intracranial mass or intracranial hemorrhage  Scattered white matter change consistent with chronic microangiopathy, similar from prior examination Workstation performed: [id number]     Xr Stroke Alert Portable Chest    Result Date: 1/11/2020  Impression: New lingular subsegmental atelectasis  Otherwise, no significant interval change   Workstation performed: [id number]     Ct Stroke Alert Brain    Result Date: 1/10/2020  Impression: No acute intracranial abnormality  Microangiopathic changes  Findings were directly discussed with Dr Jose Ortega on 1/10/2020 3:47 PM  Workstation performed: [id number]     Cta Stroke Alert (head/neck)    Result Date: 1/10/2020  Impression: 1  No evidence of hemodynamic significant stenosis, aneurysm or dissection  2  Emphysematous changes  Left upper lobe peribronchial thickening could relate to small airways disease  Partially visualized left lung consolidation/atelectatic changes  Nonemergent outpatient follow-up with unenhanced chest CT recommended  3  Soft tissue swelling about the left ureter could relate to infection if clinically suspected, correlate clinically   Findings were directly discussed with Dr Jose Ortega on 1/10/2020 3:47 PM  Workstation performed: [id number]

## 2020-01-11 NOTE — SPEECH THERAPY NOTE
Speech Language/Pathology    Speech/Language Pathology Progress Note    Patient Name: Kem Hamilton  LGGYO'F Date: 1/11/2020     Problem List  Principal Problem:    Acute blood loss anemia  Active Problems:    Essential hypertension    Syncope    Chest pain    Suicidal ideation    Alcohol abuse    MDD (major depressive disorder)    History of CVA (cerebrovascular accident)    COPD without exacerbation (HCC)    Dyslipidemia    Chronic pain    Tobacco abuse    Inadequate oral nutritional intake       Past Medical History  Past Medical History:   Diagnosis Date    BPH (benign prostatic hyperplasia)     CVA (cerebral vascular accident) ([address] )     Head injury     Hypertension     Metatarsal fracture     TIA (transient ischemic attack)         Past Surgical History  Past Surgical History:   Procedure Laterality Date    ABDOMINAL SURGERY      ANKLE SURGERY      BACK SURGERY      ELBOW SURGERY      JOINT REPLACEMENT      KNEE SURGERY      LIVER SURGERY         Attempted evaluation earlier on this date but held due to patient in OR  Patient is now orally intubated and not a candidate for Bedside Swallow Evaluation  Please re-consult speech pathology service when patient is able to medically participate in evaluation

## 2020-01-11 NOTE — PROCEDURES
Central Line Insertion  Date/Time: 1/11/2020 3:15 PM  Performed by: Dominick Cheney PA-C  Authorized by: Dominick Cheney PA-C     Patient location:  Bedside  Other Assisting Provider: Yes (comment) (Dr Jovan Fortune)    Consent:     Consent obtained:  Emergent situation  Universal protocol:     Patient identity confirmed:  Arm band and provided demographic data  Pre-procedure details:     Hand hygiene: Hand hygiene performed prior to insertion      Sterile barrier technique: All elements of maximal sterile technique followed      Skin preparation:  ChloraPrep    Skin preparation agent: Skin preparation agent completely dried prior to procedure    Indications:     Central line indications: medications requiring central line and no peripheral vascular access      Site selection rationale:  Emergent component resuscitation  Anesthesia (see MAR for exact dosages): Anesthesia method:  Local infiltration    Local anesthetic:  Lidocaine 1% w/o epi  Procedure details:     Location:  Left femoral    Vessel type: vein      Laterality:  Left    Approach: percutaneous technique used      Patient position:  Flat    Catheter type:  Cordis    Landmarks identified: yes      Ultrasound guidance: yes      Sterile ultrasound techniques: Sterile gel and sterile probe covers were used      Manometry confirmation: no      Number of attempts:  2    Successful placement: yes    Post-procedure details:     Post-procedure:  Dressing applied and line sutured    Assessment:  Blood return through all ports and free fluid flow    Post-procedure complications: none      Patient tolerance of procedure:   Tolerated well, no immediate complications

## 2020-01-11 NOTE — SPEECH THERAPY NOTE
Speech Language/Pathology    Speech/Language Pathology Progress Note    Patient Name: Vero Draper  GFRJF'N Date: 1/11/2020     Problem List  Principal Problem:    Acute blood loss anemia  Active Problems:    Essential hypertension    Syncope    Chest pain    Suicidal ideation    Alcohol abuse    MDD (major depressive disorder)    History of CVA (cerebrovascular accident)    COPD without exacerbation (HCC)    Dyslipidemia    Chronic pain    Tobacco abuse    Inadequate oral nutritional intake       Past Medical History  Past Medical History:   Diagnosis Date    BPH (benign prostatic hyperplasia)     CVA (cerebral vascular accident) ([address]     Head injury     Hypertension     Metatarsal fracture     TIA (transient ischemic attack)         Past Surgical History  Past Surgical History:   Procedure Laterality Date    ABDOMINAL SURGERY      ANKLE SURGERY      BACK SURGERY      ELBOW SURGERY      JOINT REPLACEMENT      KNEE SURGERY      LIVER SURGERY           Consult received, medical chart reviewed and care history taken  Attempted initial Bedside Swallow Evaluation, however, patient is off the floor for OR per RN  Patient had EGD planned this AM  Per RN, patient is being transferred to ICU  Will complete Bedside Wallow Evaluation once patient is medically appropriate to participate

## 2020-01-11 NOTE — PROCEDURES
Arterial Line Insertion  Date/Time: 1/11/2020 2:30 PM  Performed by: Pedro Duron PA-C  Authorized by: Pedro Duron PA-C     Patient location:  Bedside  Other Assisting Provider: No    Consent:     Consent obtained:  Emergent situation  Universal protocol:     Patient identity confirmed:  Arm band and provided demographic data  Indications:     Indications: hemodynamic monitoring, multiple ABGs, continuous blood pressure monitoring and frequent labs / infusion    Pre-procedure details:     Skin preparation:  Chlorhexidine    Preparation: Patient was prepped and draped in sterile fashion    Anesthesia (see MAR for exact dosages): Anesthesia method:  None  Procedure details:     Location / Tip of Catheter:  Radial    Laterality:  Right    Vladimir's test performed: no      Placement technique:  Percutaneous and ultrasound guided    Number of attempts:  2    Successful placement: yes      Transducer: waveform confirmed    Post-procedure details:     Post-procedure:  Biopatch applied, secured with tape, sterile dressing applied and sutured    CMS:  Normal    Patient tolerance of procedure:   Tolerated well, no immediate complications

## 2020-01-11 NOTE — SEDATION DOCUMENTATION
Pt transported from ICU bed intubated and ventilated , hemodynamically unstable on pressors and blood products  Sedation and monitoring by anesthesia  Here for visceral angiogram  And possible embolization of GI bleed  1630 TR Band in place  1645 3 cc air released from TR Band  1700 3cc air released from Band    5  Pt tyo CT scan

## 2020-01-11 NOTE — RAPID RESPONSE
Progress Note - Rapid Response   Karsten Rodrigues 81 y o  male MRN: [id number]    Time Called ( Time): 3128  Date Called: 1/10/2020  Level of Care: MS  Room#: CT room 1  Arrival Time ( Time): 9917  Event End Time ( Time): 8603  Primary reason for call: Acute change in mental status  Interventions:  Airway/Breathing:  No Intervention  Circulation: N/A  Other Treatments: Stroke Alert       Assessment:   1  Altered Mental Status  2  R sided weakness    Plan:   · Rapid response called to facilitate stroke alert  · No interveneable large vessel occlusion on CTA  · Not a TPA candidate 2/2 likely GIB  · Symptoms resolving, likely 2/2 recent ativan administration, possible recrudescence       HPI/Chief Complaint (Background/Situation):   HPI: Karsten Rodrigues is a 81 y o  male with MDD, HTN, dyslipidemia, history of stroke, COPD, ETOH abuse, tobacco abuse who presents with dizziness, syncope, and chest pain  Patient is a very vague historian and is not very forthcoming with information even when asked directed specific questions  He states that this morning he got dizzy and fell and hit his head  +LOC  He states that it happened when he was trying to go upstairs  To describe the dizziness he states that everything was spinning"  He estimates that the symptoms lasted for hours  He states that his symptoms are only present when standing  He reports 3-4 prior episodes over the last 1 year  He states that this episode prompted him to come to the emergency department because I just did not feel well"  He reports that when EMS came to get him his blood pressure was 90/50  He denies any prodromal symptoms  He reports cough, runny nose, watery eyes  He reports decreased p o  Intake and states that he has not eaten any food over the last 5 days  He then states that he had 1 cup of soup    He states that normally he drinks 1 fifth of vodka per day but over the last 2 days has only had 1 drink each day   He also reports an episode of "sharp" chest pain located just to the right of his sternum which has been ongoing for the last 1 week on and off  He denies any alleviating or aggravating factors  He states it just happens"  He denies any known a personal or family cardiac history  He reports that he has not been taking his aspirin but otherwise states that he takes his medications  The patient does admit to suicidal ideation but denies any planned  He then states even if I did, I wouldn't tell you"  He reports prior psychiatric hospitalizations for depression      Events: Patient received Ativan per [facility] protocol after which he was more lethargic and had acute R sided weakness   Rapid response was called to facilitate stroke alert    Historical Information   Past Medical History:   Diagnosis Date    BPH (benign prostatic hyperplasia)     [address] [facility] [address] [address] [address]     Head injury     Hypertension     Metatarsal fracture     TIA (transient ischemic attack)      Past Surgical History:   Procedure Laterality Date    ABDOMINAL SURGERY      ANKLE SURGERY      BACK SURGERY      ELBOW SURGERY      JOINT REPLACEMENT      KNEE SURGERY      LIVER SURGERY       Social History   Social History     Substance and Sexual Activity   Alcohol Use Yes    Frequency: Monthly or less    Comment: half a handle of vodka daily as per family     Social History     Substance and Sexual Activity   Drug Use Yes    Types: Marijuana    Comment: history polysubstance use including IVDA on record- every now then will smoke weed      Social History     Tobacco Use   Smoking Status Current Every Day Smoker    Packs/day: 1 00    Types: Cigarettes   Smokeless Tobacco Never Used   Tobacco Comment    started age 14, 4 quit attempts     Family History: non-contributory    Meds/Allergies     Current Facility-Administered Medications:  albuterol 2 puff Inhalation Q4H PRN Shane Gaming MD    amLODIPine 10 mg Oral Daily Angela Harding PA-C    atorvastatin 40 mg Oral Daily Angela Harding PA-C    cholecalciferol 1,000 Units Oral Daily Ellie Summers    DULoxetine 90 mg Oral Daily Angela Harding PA-C    fluticasone 2 spray Nasal Daily Angela Harding PA-C    fluticasone-vilanterol 1 puff Inhalation Daily Angela Harding PA-C    folic acid 1 mg Oral Daily Angela Harding PA-C    gabapentin 300 mg Oral TID Angela Harding PA-C    lactated ringers 75 mL/hr Intravenous Continuous Hesham Villa MD    metoprolol tartrate 50 mg Oral Daily Angela Harding PA-C    mirtazapine 15 mg Oral HS Angela Harding PA-C    nicotine 1 patch Transdermal Daily Angela Harding PA-C    pantoprozole (PROTONIX) infusion (Continuous) 8 mg/hr Intravenous Continuous Ethmaxx Villa MD Last Rate: 8 mg/hr (01/10/20 1019)   QUEtiapine 200 mg Oral Daily Angela Harding PA-C    QUEtiapine 600 mg Oral HS Angela Harding PA-C    tamsulosin 0 4 mg Oral Daily With [employer], BRIGITTE    thiamine 100 mg Oral Daily Angela Harding PA-C    traZODone 50 mg Oral HS PRN Angela Harding PA-C          lactated ringers 75 mL/hr    pantoprozole (PROTONIX) infusion (Continuous) 8 mg/hr Last Rate: 8 mg/hr (01/10/20 1019)       No Known Allergies    ROS: patient refusing participation     Vitals:     Vitals:    01/10/20 0957 01/10/20 1424 01/10/20 1621 01/10/20 1700   BP: 104/57 146/77 167/80 162/80   BP Location:  Left arm     Pulse: 89 96 99    Resp:  17     Temp:  98 °F (36 7 °C) (!) 97 3 °F (36 3 °C)    TempSrc:  Oral     SpO2: 96% 96% 96%    Weight:       Height:             Physical Exam:  Gen: WNWD, NAD  HEENT: PERRLA, EOM intact  Neuro: R sided 3/5 weakness        Intake/Output Summary (Last 24 hours) at 1/10/2020 1907  Last data filed at 1/10/2020 1251  Gross per 24 hour   Intake 462 ml   Output 450 ml   Net 12 ml       Respiratory    Lab Data (Last 4 hours)    None         O2/Vent Data (Last 4 hours)    None              Invasive Devices     Peripheral Intravenous Line

## 2020-01-11 NOTE — RESPIRATORY THERAPY NOTE
RT Ventilator Management Note  Mamie Connor 81 y o  male MRN: [id number]  Unit/Bed#: [facility] 516-01 Encounter: [id number]      Daily Screen     No data found in the last 10 encounters              Physical Exam:   Assessment Type: Assess only  General Appearance: Sedated  Respiratory Pattern: Normal  Chest Assessment: Chest expansion symmetrical  R Breath Sounds: Clear, Diminished  Cough: None  O2 Device: vent AC 14, 500, 40%, +5      Resp Comments: received pt from the OR and placed on ordered vent settings no distress noted no other changes at this time will cont to monitor

## 2020-01-11 NOTE — PROGRESS NOTES
H and P reviewed  Admitted with dizziness  Found to have GI bleed  Underwent endoscopy this morning  I was called by GI fellow post endoscopy  There is a ulceration within the duodenal sweep with active bleeding  Bleeding could not be adequately controlled endoscopically  We have been asked to perform arteriography with possible embolization  Discussed report findings of the endoscopy  with patient's son, Melissa Encinas by telephone and arteriogram was discussed with possibility of embolization  Empiric embolization in the absence of angiographically identified bleeding discussed  Risks including but not limited to arterial injury, inadvertent embolization, and bowel ischemia discussed  All questions answered  Consent obtained  Patient had been transferred to the ICU post endoscopy intubated  He is currently hypotensive  Plan for emergent celiac and SMA arteriography with anesthesia support        Fredi Russo MD  01/11/20  2:53 PM

## 2020-01-12 ENCOUNTER — APPOINTMENT (INPATIENT)
Dept: RADIOLOGY | Facility: HOSPITAL | Age: 70
DRG: 326 | End: 2020-01-12
Payer: MEDICARE

## 2020-01-12 LAB
ABO GROUP BLD BPU: NORMAL
ALBUMIN SERPL BCP-MCNC: 2.1 G/DL (ref 3.5–5)
ALP SERPL-CCNC: 39 U/L (ref 46–116)
ALT SERPL W P-5'-P-CCNC: 16 U/L (ref 12–78)
AMMONIA PLAS-SCNC: 26 UMOL/L (ref 11–35)
ANION GAP SERPL CALCULATED.3IONS-SCNC: 5 MMOL/L (ref 4–13)
AST SERPL W P-5'-P-CCNC: 31 U/L (ref 5–45)
ATRIAL RATE: 100 BPM
BASE EXCESS BLDA CALC-SCNC: -2.3 MMOL/L
BASE EXCESS BLDA CALC-SCNC: -3 MMOL/L (ref -2–3)
BASE EXCESS BLDA CALC-SCNC: 1 MMOL/L
BASOPHILS # BLD AUTO: 0 THOUSANDS/ΜL (ref 0–0.1)
BASOPHILS NFR BLD AUTO: 0 % (ref 0–1)
BILIRUB SERPL-MCNC: 0.77 MG/DL (ref 0.2–1)
BODY TEMPERATURE: 99.3 DEGREES FEHRENHEIT
BPU ID: NORMAL
BUN SERPL-MCNC: 13 MG/DL (ref 5–25)
CA-I BLD-SCNC: 1.13 MMOL/L (ref 1.12–1.32)
CA-I BLD-SCNC: 1.15 MMOL/L (ref 1.12–1.32)
CALCIUM SERPL-MCNC: 8.3 MG/DL (ref 8.3–10.1)
CHLORIDE SERPL-SCNC: 112 MMOL/L (ref 100–108)
CO2 SERPL-SCNC: 26 MMOL/L (ref 21–32)
CREAT SERPL-MCNC: 0.58 MG/DL (ref 0.6–1.3)
CROSSMATCH: NORMAL
EOSINOPHIL # BLD AUTO: 0 THOUSAND/ΜL (ref 0–0.61)
EOSINOPHIL NFR BLD AUTO: 0 % (ref 0–6)
ERYTHROCYTE [DISTWIDTH] IN BLOOD BY AUTOMATED COUNT: 14.4 % (ref 11.6–15.1)
GFR SERPL CREATININE-BSD FRML MDRD: 105 ML/MIN/1.73SQ M
GLUCOSE SERPL-MCNC: 117 MG/DL (ref 65–140)
GLUCOSE SERPL-MCNC: 191 MG/DL (ref 65–140)
HCO3 BLDA-SCNC: 22.4 MMOL/L (ref 22–28)
HCO3 BLDA-SCNC: 22.9 MMOL/L (ref 22–28)
HCO3 BLDA-SCNC: 24.4 MMOL/L (ref 22–28)
HCT VFR BLD AUTO: 29.8 % (ref 36.5–49.3)
HCT VFR BLD AUTO: 30.8 % (ref 36.5–49.3)
HCT VFR BLD CALC: 15 % (ref 36.5–49.3)
HGB BLD-MCNC: 10.2 G/DL (ref 12–17)
HGB BLD-MCNC: 10.4 G/DL (ref 12–17)
HGB BLD-MCNC: 10.7 G/DL (ref 12–17)
HGB BLDA-MCNC: 5.1 G/DL (ref 12–17)
HOROWITZ INDEX BLDA+IHG-RTO: 50 MM[HG]
HOROWITZ INDEX BLDA+IHG-RTO: 60 MM[HG]
IMM GRANULOCYTES # BLD AUTO: 0.01 THOUSAND/UL (ref 0–0.2)
IMM GRANULOCYTES NFR BLD AUTO: 0 % (ref 0–2)
LYMPHOCYTES # BLD AUTO: 0.3 THOUSANDS/ΜL (ref 0.6–4.47)
LYMPHOCYTES NFR BLD AUTO: 9 % (ref 14–44)
MAGNESIUM SERPL-MCNC: 2 MG/DL (ref 1.6–2.6)
MCH RBC QN AUTO: 29.2 PG (ref 26.8–34.3)
MCHC RBC AUTO-ENTMCNC: 34.7 G/DL (ref 31.4–37.4)
MCV RBC AUTO: 84 FL (ref 82–98)
MONOCYTES # BLD AUTO: 0.42 THOUSAND/ΜL (ref 0.17–1.22)
MONOCYTES NFR BLD AUTO: 12 % (ref 4–12)
NEUTROPHILS # BLD AUTO: 2.67 THOUSANDS/ΜL (ref 1.85–7.62)
NEUTS SEG NFR BLD AUTO: 79 % (ref 43–75)
NRBC BLD AUTO-RTO: 0 /100 WBCS
O2 CT BLDA-SCNC: 14.8 ML/DL (ref 16–23)
O2 CT BLDA-SCNC: 16 ML/DL (ref 16–23)
OXYHGB MFR BLDA: 96.9 % (ref 94–97)
OXYHGB MFR BLDA: 97.5 % (ref 94–97)
P AXIS: 63 DEGREES
PCO2 BLD: 26 MMOL/L (ref 21–32)
PCO2 BLD: 63.4 MM HG (ref 36–44)
PCO2 BLDA: 28.4 MM HG (ref 36–44)
PCO2 BLDA: 37.8 MM HG (ref 36–44)
PCO2 TEMP ADJ BLDA: 28.9 MM HG (ref 36–44)
PEEP RESPIRATORY: 8 CM[H2O]
PEEP RESPIRATORY: 8 CM[H2O]
PH BLD: 7.19 [PH] (ref 7.35–7.45)
PH BLD: 7.52 [PH] (ref 7.35–7.45)
PH BLDA: 7.39 [PH] (ref 7.35–7.45)
PH BLDA: 7.53 [PH] (ref 7.35–7.45)
PLATELET # BLD AUTO: 151 THOUSANDS/UL (ref 149–390)
PMV BLD AUTO: 9.1 FL (ref 8.9–12.7)
PO2 BLD: 125.2 MM HG (ref 75–129)
PO2 BLD: 273 MM HG (ref 75–129)
PO2 BLDA: 112.6 MM HG (ref 75–129)
PO2 BLDA: 122.7 MM HG (ref 75–129)
POTASSIUM BLD-SCNC: 3.2 MMOL/L (ref 3.5–5.3)
POTASSIUM SERPL-SCNC: 3.1 MMOL/L (ref 3.5–5.3)
PR INTERVAL: 158 MS
PROT SERPL-MCNC: 4.6 G/DL (ref 6.4–8.2)
QRS AXIS: 16 DEGREES
QRSD INTERVAL: 82 MS
QT INTERVAL: 350 MS
QTC INTERVAL: 451 MS
RBC # BLD AUTO: 3.67 MILLION/UL (ref 3.88–5.62)
SAO2 % BLD FROM PO2: 100 % (ref 60–85)
SODIUM BLD-SCNC: 145 MMOL/L (ref 136–145)
SODIUM SERPL-SCNC: 143 MMOL/L (ref 136–145)
SPECIMEN SOURCE: ABNORMAL
T WAVE AXIS: 28 DEGREES
UNIT DISPENSE STATUS: NORMAL
UNIT PRODUCT CODE: NORMAL
UNIT RH: NORMAL
VENT AC: 12
VENT AC: 20
VENT- AC: AC
VENT- AC: AC
VENTRICULAR RATE: 100 BPM
VT SETTING VENT: 500 ML
VT SETTING VENT: 500 ML
WBC # BLD AUTO: 3.4 THOUSAND/UL (ref 4.31–10.16)

## 2020-01-12 PROCEDURE — 85018 HEMOGLOBIN: CPT | Performed by: EMERGENCY MEDICINE

## 2020-01-12 PROCEDURE — 80053 COMPREHEN METABOLIC PANEL: CPT | Performed by: EMERGENCY MEDICINE

## 2020-01-12 PROCEDURE — 82805 BLOOD GASES W/O2 SATURATION: CPT | Performed by: EMERGENCY MEDICINE

## 2020-01-12 PROCEDURE — 82140 ASSAY OF AMMONIA: CPT | Performed by: STUDENT IN AN ORGANIZED HEALTH CARE EDUCATION/TRAINING PROGRAM

## 2020-01-12 PROCEDURE — C9113 INJ PANTOPRAZOLE SODIUM, VIA: HCPCS | Performed by: SURGERY

## 2020-01-12 PROCEDURE — 82330 ASSAY OF CALCIUM: CPT | Performed by: SURGERY

## 2020-01-12 PROCEDURE — 85025 COMPLETE CBC W/AUTO DIFF WBC: CPT | Performed by: SURGERY

## 2020-01-12 PROCEDURE — 71045 X-RAY EXAM CHEST 1 VIEW: CPT

## 2020-01-12 PROCEDURE — 94003 VENT MGMT INPAT SUBQ DAY: CPT

## 2020-01-12 PROCEDURE — 85014 HEMATOCRIT: CPT | Performed by: SURGERY

## 2020-01-12 PROCEDURE — 99291 CRITICAL CARE FIRST HOUR: CPT | Performed by: SURGERY

## 2020-01-12 PROCEDURE — 83735 ASSAY OF MAGNESIUM: CPT | Performed by: SURGERY

## 2020-01-12 PROCEDURE — 93010 ELECTROCARDIOGRAM REPORT: CPT | Performed by: INTERNAL MEDICINE

## 2020-01-12 PROCEDURE — 94760 N-INVAS EAR/PLS OXIMETRY 1: CPT

## 2020-01-12 PROCEDURE — 85018 HEMOGLOBIN: CPT | Performed by: SURGERY

## 2020-01-12 PROCEDURE — NC001 PR NO CHARGE: Performed by: SURGERY

## 2020-01-12 RX ORDER — POTASSIUM CHLORIDE 29.8 MG/ML
40 INJECTION INTRAVENOUS ONCE
Status: COMPLETED | OUTPATIENT
Start: 2020-01-12 | End: 2020-01-12

## 2020-01-12 RX ORDER — OXYCODONE HYDROCHLORIDE 10 MG/1
10 TABLET ORAL EVERY 4 HOURS PRN
Status: DISCONTINUED | OUTPATIENT
Start: 2020-01-12 | End: 2020-02-11 | Stop reason: HOSPADM

## 2020-01-12 RX ORDER — OXYCODONE HYDROCHLORIDE 5 MG/1
5 TABLET ORAL EVERY 4 HOURS PRN
Status: DISCONTINUED | OUTPATIENT
Start: 2020-01-12 | End: 2020-01-12

## 2020-01-12 RX ORDER — OXYCODONE HYDROCHLORIDE 10 MG/1
10 TABLET ORAL EVERY 4 HOURS PRN
Status: DISCONTINUED | OUTPATIENT
Start: 2020-01-12 | End: 2020-01-12

## 2020-01-12 RX ORDER — METHOCARBAMOL 500 MG/1
500 TABLET, FILM COATED ORAL EVERY 6 HOURS SCHEDULED
Status: DISCONTINUED | OUTPATIENT
Start: 2020-01-12 | End: 2020-01-12

## 2020-01-12 RX ORDER — HYDROMORPHONE HCL/PF 1 MG/ML
0.5 SYRINGE (ML) INJECTION
Status: DISCONTINUED | OUTPATIENT
Start: 2020-01-12 | End: 2020-01-12

## 2020-01-12 RX ORDER — ACETAMINOPHEN 325 MG/1
975 TABLET ORAL EVERY 8 HOURS SCHEDULED
Status: DISCONTINUED | OUTPATIENT
Start: 2020-01-12 | End: 2020-01-12

## 2020-01-12 RX ORDER — HYDRALAZINE HYDROCHLORIDE 20 MG/ML
10 INJECTION INTRAMUSCULAR; INTRAVENOUS ONCE
Status: COMPLETED | OUTPATIENT
Start: 2020-01-12 | End: 2020-01-12

## 2020-01-12 RX ORDER — LANOLIN ALCOHOL/MO/W.PET/CERES
6 CREAM (GRAM) TOPICAL
Status: DISCONTINUED | OUTPATIENT
Start: 2020-01-12 | End: 2020-01-12

## 2020-01-12 RX ORDER — OXYCODONE HYDROCHLORIDE 5 MG/1
5 TABLET ORAL EVERY 4 HOURS PRN
Status: DISCONTINUED | OUTPATIENT
Start: 2020-01-12 | End: 2020-02-11 | Stop reason: HOSPADM

## 2020-01-12 RX ADMIN — SODIUM CHLORIDE 8 MG/HR: 9 INJECTION, SOLUTION INTRAVENOUS at 09:04

## 2020-01-12 RX ADMIN — FLUCONAZOLE 400 MG: 2 INJECTION, SOLUTION INTRAVENOUS at 00:26

## 2020-01-12 RX ADMIN — PROPOFOL 30 MCG/KG/MIN: 10 INJECTION, EMULSION INTRAVENOUS at 23:25

## 2020-01-12 RX ADMIN — CHLORHEXIDINE GLUCONATE 0.12% ORAL RINSE 15 ML: 1.2 LIQUID ORAL at 21:25

## 2020-01-12 RX ADMIN — CHLORHEXIDINE GLUCONATE 0.12% ORAL RINSE 15 ML: 1.2 LIQUID ORAL at 08:32

## 2020-01-12 RX ADMIN — PROPOFOL 50 MCG/KG/MIN: 10 INJECTION, EMULSION INTRAVENOUS at 01:11

## 2020-01-12 RX ADMIN — Medication 75 MCG/HR: at 07:32

## 2020-01-12 RX ADMIN — Medication 75 MCG/HR: at 19:58

## 2020-01-12 RX ADMIN — POTASSIUM CHLORIDE 40 MEQ: 29.8 INJECTION, SOLUTION INTRAVENOUS at 06:26

## 2020-01-12 RX ADMIN — PROPOFOL 30 MCG/KG/MIN: 10 INJECTION, EMULSION INTRAVENOUS at 06:27

## 2020-01-12 RX ADMIN — CEFAZOLIN SODIUM 2000 MG: 2 SOLUTION INTRAVENOUS at 11:53

## 2020-01-12 RX ADMIN — THIAMINE HYDROCHLORIDE 100 MG: 100 INJECTION, SOLUTION INTRAMUSCULAR; INTRAVENOUS at 08:32

## 2020-01-12 RX ADMIN — SODIUM CHLORIDE, SODIUM LACTATE, POTASSIUM CHLORIDE, AND CALCIUM CHLORIDE 75 ML/HR: .6; .31; .03; .02 INJECTION, SOLUTION INTRAVENOUS at 04:00

## 2020-01-12 RX ADMIN — MAGNESIUM SULFATE HEPTAHYDRATE 2 G: 40 INJECTION, SOLUTION INTRAVENOUS at 01:11

## 2020-01-12 RX ADMIN — SODIUM CHLORIDE 8 MG/HR: 9 INJECTION, SOLUTION INTRAVENOUS at 20:03

## 2020-01-12 RX ADMIN — SODIUM CHLORIDE, SODIUM LACTATE, POTASSIUM CHLORIDE, AND CALCIUM CHLORIDE 100 ML/HR: .6; .31; .03; .02 INJECTION, SOLUTION INTRAVENOUS at 16:12

## 2020-01-12 RX ADMIN — FLUCONAZOLE 200 MG: 2 INJECTION, SOLUTION INTRAVENOUS at 23:26

## 2020-01-12 RX ADMIN — CEFAZOLIN SODIUM 2000 MG: 2 SOLUTION INTRAVENOUS at 20:11

## 2020-01-12 RX ADMIN — CEFAZOLIN SODIUM 2000 MG: 2 SOLUTION INTRAVENOUS at 04:17

## 2020-01-12 RX ADMIN — HYDRALAZINE HYDROCHLORIDE 10 MG: 20 INJECTION INTRAMUSCULAR; INTRAVENOUS at 00:53

## 2020-01-12 RX ADMIN — PROPOFOL 25 MCG/KG/MIN: 10 INJECTION, EMULSION INTRAVENOUS at 14:21

## 2020-01-12 NOTE — RESPIRATORY THERAPY NOTE
RT Ventilator Management Note  Ruano Mix 81 y o  male MRN: [id number]  Unit/Bed#: [facility] 516-01 Encounter: [id number]      Daily Screen       1/12/2020  0751             Spont breathing trial outcome[de-identified]              Physical Exam:   Assessment Type: Assess only  General Appearance: (P) Sleeping  Respiratory Pattern: (P) Assisted  Chest Assessment: (P) Chest expansion symmetrical  Bilateral Breath Sounds: (P) Clear, Diminished  Cough: (P) None  Suction: ET Tube  O2 Device: (P) vent      Resp Comments: (P) pt cont to bernard current AC settings no distress noted no other changes at this time will cont to monitor

## 2020-01-12 NOTE — H&P
History and Physical - Critical Care  Lavinia Gamez 81 y o  male MRN: [id number]  Unit/Bed#: [facility] 520-05 Encounter: [id number]     Reason for Admission / Chief Complaint: bleeding duodenal ulcer with hemorrhagic shock     History of Present Illness:  Lavinia Gamez is a 81 y o  male who initially presented to the ED on 1/9 for evaluation of syncope and head injury  Patient was admitted to the medicine service and was found to have worsening anemia overnight  GI was consulted and patient was taken for endoscopy which was unsuccessful at clipping a bleed  Patient then underwent IR attempted embolization which originally was thought to be successful however patient continued to have worsening anemia  Patient was brought to the OR for ex lap  In OR patient required numerous blood products, had oversewing of his large dudenal perforation with two drains in place  Patient also had GJ tube placed for possible feedings tomorrow  History obtained from chart review  Past Medical History:  Past Medical History:   Diagnosis Date    BPH (benign prostatic hyperplasia)     CVA (cerebral vascular accident) ([address] )     Head injury     Hypertension     Metatarsal fracture     TIA (transient ischemic attack)         Past Surgical History:  Past Surgical History:   Procedure Laterality Date    ABDOMINAL SURGERY      ANKLE SURGERY      BACK SURGERY      ELBOW SURGERY      JOINT REPLACEMENT      KNEE SURGERY      LIVER SURGERY          Past Family History:  History reviewed  No pertinent family history       Social History:  Social History     Tobacco Use   Smoking Status Current Every Day Smoker    Packs/day: 1 00    Types: Cigarettes   Smokeless Tobacco Never Used   Tobacco Comment    started age 14, 3 quit attempts     Social History     Substance and Sexual Activity   Alcohol Use Yes    Frequency: Monthly or less    Comment: half a handle of vodka daily as per family     Social History     Substance and Sexual Activity   Drug Use Yes    Types: Marijuana    Comment: history polysubstance use including IVDA on record- every now then will smoke weed      Marital Status: Single       Medications:  Current Facility-Administered Medications   Medication Dose Route Frequency    ceFAZolin (ANCEF) IVPB (premix) 2,000 mg  2,000 mg Intravenous Q8H    chlorhexidine (PERIDEX) 0 12 % oral rinse 15 mL  15 mL Swish & Spit Q12H [facility]    fentaNYL 1000 mcg in sodium chloride 0 9% 100mL infusion  125 mcg/hr Intravenous Continuous    [START ON 1/13/2020] fluconazole (DIFLUCAN) IVPB (premix) 200 mg  200 mg Intravenous Q24H    fluconazole (DIFLUCAN) IVPB (premix) 400 mg  400 mg Intravenous Once    lactated ringers infusion  125 mL/hr Intravenous Continuous    magnesium sulfate 2 g/50 mL IVPB (premix) 2 g  2 g Intravenous Once    norepinephrine (LEVOPHED) 4 mg (STANDARD CONCENTRATION) IV in sodium chloride 0 9% 250 mL  1-30 mcg/min Intravenous Titrated    pantoprazole (PROTONIX) 80 mg in sodium chloride 0 9 % 100 mL infusion  8 mg/hr Intravenous Continuous    propofol (DIPRIVAN) 1000 mg in 100 mL infusion (premix)  5-50 mcg/kg/min Intravenous Titrated    sodium bicarbonate 8 4 % injection 50 mEq  50 mEq Intravenous Once    sodium bicarbonate 8 4 % injection 50 mEq  50 mEq Intravenous Once    thiamine (VITAMIN B1) 100 mg in sodium chloride 0 9 % 50 mL IVPB  100 mg Intravenous Daily    vasopressin (PITRESSIN) 20 Units in sodium chloride 0 9 % 100 mL infusion  0 04 Units/min Intravenous Continuous     Home medications:  Prior to Admission medications    Medication Sig Start Date End Date Taking?  Authorizing Provider   albuterol (PROVENTIL HFA,VENTOLIN HFA) 90 mcg/act inhaler Inhale 2 puffs every 4 (four) hours as needed for wheezing 10/3/19   Michelle Maldonado,    amLODIPine (NORVASC) 10 mg tablet Take 1 tablet (10 mg total) by mouth daily Hold for SBP <110 10/3/19   Michelle Maldonado,    aspirin (ECOTRIN LOW STRENGTH) 81 mg EC tablet Take 1 tablet (81 mg total) by mouth daily 10/3/19   Michelle Maldonado DO   atorvastatin (LIPITOR) 40 mg tablet TAKE ONE TABLET BY MOUTH ONCE DAILY 10/30/19   Bryan Sears DO   Cholecalciferol 1000 units tablet Take 1 tablet (1,000 Units total) by mouth daily 10/3/19   Bryan Sears DO   cyclobenzaprine (FLEXERIL) 10 mg tablet Take 1 tablet (10 mg total) by mouth 3 (three) times a day as needed for muscle spasms 5/14/19   Timi Murrell PA-C   diclofenac sodium (VOLTAREN) 50 mg EC tablet Take 1 tablet (50 mg total) by mouth 2 (two) times a day 10/14/19 12/11/19  Nikhil Madison MD   DULoxetine (CYMBALTA) 30 mg delayed release capsule Take 3 capsules (90 mg total) by mouth daily 5/15/19   MAVERICK Edwards   fluticasone [facility]) 50 mcg/act nasal spray 2 sprays into each nostril daily 10/3/19   Michelle Maldonado DO   fluticasone-vilanterol (BREO ELLIPTA) 100-25 mcg/inh inhaler Inhale 1 puff daily Rinse mouth after use   10/3/19   Bryan Sears, DO   folic acid (FOLVITE) 1 mg tablet Take 1 tablet (1 mg total) by mouth daily 10/3/19   Michelle Maldonado DO   gabapentin (NEURONTIN) 400 mg capsule TAKE ONE CAPSULE BY MOUTH THREE TIMES A DAY 11/14/19   Nikhil Madison MD   guaiFENesin (MUCINEX) 600 mg 12 hr tablet Take 600 mg by mouth every 12 (twelve) hours as needed for cough    Historical Provider, MD   lidocaine (LIDODERM) 5 % Apply 1 patch topically daily Remove & Discard patch within 12 hours or as directed by MD 5/15/19   Jeremi Wagoner PA-C   menthol-zinc oxide (CALMOSEPTINE) 0 44-20 6 % OINT Apply topically 2 (two) times a day 10/1/19   Bryan Sears, DO   metoprolol tartrate (LOPRESSOR) 50 mg tablet Take 1 tablet (50 mg total) by mouth daily 10/3/19   Bryan Sears DO   mirtazapine (REMERON) 15 mg tablet Take 1 tablet (15 mg total) by mouth daily at bedtime 5/15/19   MAVERICK Edwards   nicotine (NICODERM CQ) 14 mg/24hr TD 24 hr patch Place 1 patch on the skin daily 5/15/19 Lavern Bliss PA-C   QUEtiapine (SEROquel) 200 mg tablet Take 1 tablet (200 mg total) by mouth daily 5/15/19   MAVERICK Kearney   QUEtiapine (SEROquel) 300 mg tablet Take 2 tablets (600 mg total) by mouth daily at bedtime 19   MAVERICK Kearney   senna-docusate sodium (SENOKOT S) 8 6-50 mg per tablet Take 1 tablet by mouth 2 (two) times a day 10/3/19   [facility], DO   tamsulosin (FLOMAX) 0 4 mg Take 1 capsule (0 4 mg total) by mouth daily with dinner 10/1/19   [facility] Raghavendra, DO   thiamine 100 MG tablet Take 1 tablet (100 mg total) by mouth daily 10/3/19   [facility] Raghavendra, DO   traZODone (DESYREL) 50 mg tablet Take 1 tablet (50 mg total) by mouth daily at bedtime as needed for sleep 19   Carriletflorina Mabry, DO   Wound Dressings (COMFEEL PLUS ULCER DRESSING) PADS Apply 1 each topically every other day 10/1/19   Carriletflorina Mabry, DO     Allergies:  No Known Allergies     ROS:   Review of Systems   Unable to perform ROS: Intubated        Vitals:  Vitals:    20 1845 20 2234 20 0000 20 0053   BP: 108/74   (!) 183/85   Pulse: 68 78 78    Resp: 19 20 12    Temp: (!) 91 4 °F (33 °C)  (!) 97 2 °F (36 2 °C)    TempSrc:   Probe    SpO2: 100% 100% 100%    Weight:       Height:         Temperature:   Temp (24hrs), Av 7 °F (34 8 °C), Min:91 4 °F (33 °C), Max:98 4 °F (36 9 °C)    Current: Temperature: (!) 97 2 °F (36 2 °C)     Weights:   IBW: 69 55 kg  Body mass index is 24 06 kg/m²       Hemodynamic Monitoring:  N/A     Non-Invasive/Invasive Ventilation Settings:  Respiratory    Lab Data (Last 4 hours)       2238            pH, Arterial       7 364           pCO2, Arterial       49 8           pO2, Arterial       69 3           HCO3, Arterial       27 8           Base Excess, Arterial       1 7                O2/Vent Data       2234   Most Recent         Vent Mode AC/VC  AC/VC      Resp Rate (BPM) (BPM) 20  20      Vt (mL) (mL) 500  500      FIO2 (%) (%) 80  80 PEEP (cmH2O) (cmH2O) 8  8      MV 10 4  10 4                Lab Results   Component Value Date    PHART 7 364 01/11/2020    [id number] 49 8 (H) 01/11/2020    PO2ART 69 3 (L) 01/11/2020    ZMY4LEO 27 8 01/11/2020    BEART 1 7 01/11/2020    SOURCE Line, Arterial 01/11/2020     SpO2: SpO2: 100 %     Physical Exam:  Physical Exam   Constitutional: He appears well-developed and well-nourished  HENT:   Head: Normocephalic and atraumatic  Eyes: Conjunctivae and EOM are normal  No scleral icterus  Neck: Normal range of motion  Neck supple  Cardiovascular: Normal rate and regular rhythm  No murmur heard  Pulmonary/Chest: Effort normal and breath sounds normal    Abdominal: Soft  Bowel sounds are normal    New midline incision with G tube and two CAMPBELL drains   Musculoskeletal: Normal range of motion  Neurological: He is unresponsive  Skin: Skin is warm and dry  Psychiatric: He has a normal mood and affect  His behavior is normal    Nursing note and vitals reviewed         Labs:  Results from last 7 days   Lab Units 01/11/20 2236 01/11/20 2036 01/11/20 1850 01/11/20  1803 01/11/20  1408   WBC Thousand/uL 2 56* 2 75* 1 76* 1 71* 5 68   HEMOGLOBIN g/dL 11 5* 9 4* 3 8* 3 0* 8 8*   HEMATOCRIT % 33 8* 28 7* 11 9* 9 6* 26 4*   PLATELETS Thousands/uL 150 162 163 122* 154   NEUTROS PCT % 84*  --   --  59 64   MONOS PCT % 8  --   --  15* 11      Results from last 7 days   Lab Units 01/11/20 2236 01/11/20 2040 01/11/20 1850 01/11/20  1803  01/11/20  1309   SODIUM mmol/L 148* 149* 152* 144   < >  --    POTASSIUM mmol/L 3 9 5 0 3 3* 3 5   < >  --    CHLORIDE mmol/L 113* 120* 115* 114*   < >  --    CO2 mmol/L 28 23 33* 23   < >  --    CO2, I-STAT mmol/L  --   --   --   --   --  22   BUN mg/dL 13 14 16 17   < >  --    CREATININE mg/dL 0 52* 0 45* 0 51* 0 46*   < >  --    CALCIUM mg/dL 8 6 6 9* 9 2 6 6*   < >  --    ALK PHOS U/L 47 28*  --  18*   < >  --    ALT U/L 21 15  --  6*   < >  --    AST U/L 33 22  --  8   < >  -- GLUCOSE, ISTAT mg/dl  --   --   --   --   --  154*    < > = values in this interval not displayed  Results from last 7 days   Lab Units 01/11/20 2236 01/11/20 1850 01/11/20  1803   MAGNESIUM mg/dL 1 5* 1 6 1 5*     Results from last 7 days   Lab Units 01/11/20 2236 01/11/20 1850 01/11/20  1803   PHOSPHORUS mg/dL 3 0 2 5 2 3      Results from last 7 days   Lab Units 01/11/20 2236 01/11/20 2040 01/11/20 1851 01/11/20  1803   INR  1 09 1 47* 1 55* 2 59*   PTT seconds  --  47* >210* 77*     Results from last 7 days   Lab Units 01/11/20 2236   LACTIC ACID mmol/L 1 4     0   Lab Value Date/Time    TROPONINI <0 02 01/11/2020 0045    TROPONINI <0 02 01/10/2020 1717    TROPONINI <0 02 01/09/2020 0144    TROPONINI <0 02 01/08/2020 2253    TROPONINI <0 02 01/08/2020 1953    TROPONINI <0 02 01/08/2020 1425    TROPONINI <0 03 05/01/2019 1042    TROPONINI 0 04 01/17/2015 1110    TROPONINI <0 04 01/17/2015 0435        Imaging:  I have personally reviewed pertinent reports      Micro:  No results found for: Chele Gladis, SPUTUMCULTUR    Assessment: 72yo M with h/o alcoholic cirrhosis presenting to the SICU post-ex-lap for hemorrhagic shock secondary to duodenal ulcer perforation        Plan:                  Neuro:    Sedation:    - propofol and fentanyl    - titrate for RASS -3                 CV:    Hemorrhagic shock (resolved)    - prior to OR 1/11 patient was on 15mg levophed and 0 4 vasopressin    - levophed weaned while in OR   Resuscitated with:    IVF: 3L    RBC 5U    FFP 3U    Cryo 4U    Plts 1 pack   Hypertension after OR with 's    - one time dose of hydralazine                  Lung:    Intubated in OR   - no acute issues                 GI:    - protonix infusion   - s/p open repair of perf duodenal ulcer   - GJ tube in place   - 2 CAMPBELL drains in place surrounding ulcer                 FEN:    - LR @125cc/hr   - lytes replete for Mag >2, Phos >3 K >3 5   - NPO through the night, consider tube feeds in am                 :    - Balderas in place for close IO monitoring   - good urine op, continue to monitor   - 2 2L UO in OR                 ID:    - No concern for infeciton at this time, will continue to monitor for fevers and rising WBC                 Heme:    - Recheck CBC in am   - no need for additional blood products at this time, Hb stable at 11 5                 Endo:    - No acute issues                 Msk/Skin:    - frequent turning   - PT/OT once weaned off sedation                 Disposition: ICU     VTE Pharmacologic Prophylaxis: Sequential compression device (Venodyne)   VTE Mechanical Prophylaxis: sequential compression device     Invasive lines and devices: Invasive Devices     Central Venous Catheter Line            CVC Central Lines 01/11/20 less than 1 day    CVC Central Lines 01/11/20 Triple less than 1 day          Arterial Line            Arterial Line 01/11/20 Radial less than 1 day          Drain            Closed/Suction Drain Right Abdomen Bulb less than 1 day    Closed/Suction Drain Right Abdomen Bulb 19 Fr  less than 1 day    NG/OG/Enteral Tube Enteral Feeding Tube   less than 1 day    NG/OG/Enteral Tube Orogastric less than 1 day    Urethral Catheter Temperature probe less than 1 day          Airway            ETT  Cuffed 8 mm less than 1 day                 Code Status: Level 1 - Full Code  POA:    POLST:       Given critical illness, patient length of stay will require greater than two midnights  Counseling / Coordination of Care  Total Critical Care time spent 48 minutes excluding procedures, teaching and family updates  Portions of the record may have been created with voice recognition software  Occasional wrong word or "sound a like" substitutions may have occurred due to the inherent limitations of voice recognition software  Read the chart carefully and recognize, using context, where substitutions have occurred          Ana Mendes MD

## 2020-01-12 NOTE — PROGRESS NOTES
Gastroenterology Progress Note   - Ruano Mix 81 y o  male MRN: [id number]    Unit/Bed#: [facility] 516-01 Encounter: [id number]      Assessment and Plan:   Peptic ulcer disease  42-year-old male patient admitted to the hospital after an episode of syncope  Patient was found to have acute drop in hemoglobin along with melena/maroon color stool and EGD was planned Friday but here was a concern for ongoing stroke and stroke alert was called and procedure was canceled  Patient had blood transfused  but persisted with drop in hemoglobin yesterday and EGD was performed yesterday, during EGD was found to have blood in the stomach with large excavated duodenal ulcer with a central pulsating vessel, the location of the ulcer made it difficult to treat endoscopically but the vessel was injected peripherally with epinephrine, 2 clips were also deployed as well as hemo spray, active bleeding persisted after treatment  IR was contacted for embolization but the patient persisted with bleeding after embolization and required surgery, during surgery a perforated duodenal ulcer with a pulsatile bleeding vessel was seen  Patient required 14 units of blood yesterday  Current hemoglobin is 10 2  Remains intubated but off pressors  Continue PPI drip    Subjective:   Patient seen and examined at the bed, currently intubated, sedated on mechanical ventilation    Objective:     Vitals: Blood pressure 98/52, pulse 90, temperature 99 °F (37 2 °C), resp  rate 12, height 5' 8 5" (1 74 m), weight 89 1 kg (196 lb 6 9 oz), SpO2 100 %  ,Body mass index is 29 43 kg/m²  Intake/Output Summary (Last 24 hours) at 1/12/2020 1153  Last data filed at 1/12/2020 1000  Gross per 24 hour   Intake 84372 82 ml   Output 9290 ml   Net 9548 82 ml       Physical Exam:   Physical Exam   Constitutional: He appears well-developed  HENT:   Head: Normocephalic  NG tube in place   Neck: Neck supple  Cardiovascular: Normal rate     Pulmonary/Chest:   On mechanical ventilation   Abdominal: He exhibits distension  Neurological:   Currently under sedation   Skin: Skin is warm and dry  Nursing note and vitals reviewed  Invasive Devices     Central Venous Catheter Line            CVC Central Lines 01/11/20 Triple 1 day    CVC Central Lines 01/11/20 less than 1 day          Arterial Line            Arterial Line 01/11/20 Radial less than 1 day          Drain            Closed/Suction Drain Right Abdomen Bulb less than 1 day    Closed/Suction Drain Right Abdomen Bulb 19 Fr  less than 1 day    NG/OG/Enteral Tube Enteral Feeding Tube   less than 1 day    Urethral Catheter Temperature probe less than 1 day          Airway            ETT  Cuffed 8 mm less than 1 day                Lab Results:  Results from last 7 days   Lab Units 01/12/20  1044 01/12/20  0818 01/12/20  0447   WBC Thousand/uL  --   --  3 40*   HEMOGLOBIN g/dL 10 2* 10 4* 10 7*   HEMATOCRIT %  --  29 8* 30 8*   PLATELETS Thousands/uL  --   --  151   NEUTROS PCT %  --   --  79*   LYMPHS PCT %  --   --  9*   MONOS PCT %  --   --  12   EOS PCT %  --   --  0     Results from last 7 days   Lab Units 01/12/20  0447  01/11/20  1951   POTASSIUM mmol/L 3 1*   < >  --    CHLORIDE mmol/L 112*   < >  --    CO2 mmol/L 26   < >  --    CO2, I-STAT mmol/L  --   --  26   BUN mg/dL 13   < >  --    CREATININE mg/dL 0 58*   < >  --    CALCIUM mg/dL 8 3   < >  --    ALK PHOS U/L 39*   < >  --    ALT U/L 16   < >  --    AST U/L 31   < >  --    GLUCOSE, ISTAT mg/dl  --   --  191*    < > = values in this interval not displayed  Results from last 7 days   Lab Units 01/11/20  2236   INR  1 09     Results from last 7 days   Lab Units 01/08/20  1425   LIPASE u/L 81       Imaging Studies: I have personally reviewed pertinent imaging studies  Xr Chest Portable    Result Date: 1/12/2020  Impression: Right IJ central line has been placed with tip at the cavoatrial junction  No pneumothorax  Unchanged linear atelectasis in the lingula  Workstation performed: [id number]     Xr Chest 2 Views    Result Date: 1/8/2020  Impression: Linear atelectasis versus scarring at the left lung base  No focal consolidation, pleural effusion or pneumothorax  Age-indeterminate wedge compression deformity of an upper lumbar vertebral body, seen on the lateral view  The study was marked in EPIC for significant notification  Workstation performed: [id number]     Ct Head Wo Contrast    Result Date: 1/8/2020  Impression: No acute intracranial abnormality  Microangiopathic changes  Workstation performed: [id number]     Ct Spine Cervical Without Contrast    Result Date: 1/8/2020  Impression: No cervical spine fracture or traumatic malalignment  Workstation performed: [id number]     Mri Brain Wo Contrast    Result Date: 1/11/2020  Impression: No acute intracranial ischemia  No intracranial mass or intracranial hemorrhage  Scattered white matter change consistent with chronic microangiopathy, similar from prior examination Workstation performed: [id number]     Xr Stroke Alert Portable Chest    Result Date: 1/11/2020  Impression: New lingular subsegmental atelectasis  Otherwise, no significant interval change  Workstation performed: [id number]     Ct Stroke Alert Brain    Result Date: 1/10/2020  Impression: No acute intracranial abnormality  Microangiopathic changes  Findings were directly discussed with Dr Debra March on 1/10/2020 3:47 PM  Workstation performed: [id number]     Cta Stroke Alert (head/neck)    Result Date: 1/10/2020  Impression: 1  No evidence of hemodynamic significant stenosis, aneurysm or dissection  2  Emphysematous changes  Left upper lobe peribronchial thickening could relate to small airways disease  Partially visualized left lung consolidation/atelectatic changes  Nonemergent outpatient follow-up with unenhanced chest CT recommended   3  Soft tissue swelling about the left ureter could relate to infection if clinically suspected, correlate clinically   Findings were directly discussed with Dr Kareem Metcalf on 1/10/2020 3:47 PM  Workstation performed: [id number]

## 2020-01-12 NOTE — PROGRESS NOTES
Progress Note - General Surgery   Mamie Connor 81 y o  male MRN: [id number]  Unit/Bed#: [facility] 721-22 Encounter: [id number]    Assessment:  80yM w/ bleeding and perforated 1st portion duodenal ulcer  - S/p EGD 1/11  - S/p IR GDA embolozation 1/11  - S/p Exlap, oversewing bleeding vessels, closure of tissue over perforation, Thal patch with transverse colon, open gastrostomy-jejunostomy tube placement 1/11    Doing well  Well resuscitated and hemostatic  Plan:  Can start jejunostomy tube feeds  Continue G-tube portion to drainage  Accurate I/O  Protonix gtt  Monitor for signs of alcohol withdrawal given abuse history  D/c left femoral TLC when able    Subjective/Objective   Subjective:   No major events since OR  Objective:     Blood pressure 117/65, pulse 86, temperature 99 3 °F (37 4 °C), resp  rate 16, height 5' 8 5" (1 74 m), weight 89 1 kg (196 lb 6 9 oz), SpO2 100 %  ,Body mass index is 29 43 kg/m²  Intake/Output Summary (Last 24 hours) at 1/12/2020 0854  Last data filed at 1/12/2020 0800  Gross per 24 hour   Intake 17230 95 ml   Output 9150 ml   Net 9477 95 ml       Invasive Devices     Central Venous Catheter Line            CVC Central Lines 01/11/20 less than 1 day    CVC Central Lines 01/11/20 Triple less than 1 day          Arterial Line            Arterial Line 01/11/20 Radial less than 1 day          Drain            Closed/Suction Drain Right Abdomen Bulb less than 1 day    Closed/Suction Drain Right Abdomen Bulb 19 Fr  less than 1 day    NG/OG/Enteral Tube Enteral Feeding Tube   less than 1 day    NG/OG/Enteral Tube Orogastric less than 1 day    Urethral Catheter Temperature probe less than 1 day          Airway            ETT  Cuffed 8 mm less than 1 day                Physical Exam:     Intubated and sedated  On AC  OGT with 1 9L output total serosang  Abd is soft, NT  Incision c/d/i  JPx2 serous output  RRR off of pressors       Lab, Imaging and other studies:  I have personally reviewed pertinent lab results    , CBC:   Lab Results   Component Value Date    WBC 3 40 (L) 01/12/2020    HGB 10 4 (L) 01/12/2020    HCT 29 8 (L) 01/12/2020    MCV 84 01/12/2020     01/12/2020    MCH 29 2 01/12/2020    MCHC 34 7 01/12/2020    RDW 14 4 01/12/2020    MPV 9 1 01/12/2020    NRBC 0 01/12/2020   , CMP:   Lab Results   Component Value Date    SODIUM 143 01/12/2020    K 3 1 (L) 01/12/2020     (H) 01/12/2020    CO2 26 01/12/2020    CO2 22 01/11/2020    BUN 13 01/12/2020    CREATININE 0 58 (L) 01/12/2020    GLUCOSE 154 (H) 01/11/2020    CALCIUM 8 3 01/12/2020    AST 31 01/12/2020    ALT 16 01/12/2020    ALKPHOS 39 (L) 01/12/2020    EGFR 105 01/12/2020   , Coagulation:   Lab Results   Component Value Date    INR 1 09 01/11/2020   , Urinalysis: No results found for: COLORU, CLARITYU, SPECGRAV, PHUR, LEUKOCYTESUR, NITRITE, PROTEINUA, GLUCOSEU, KETONESU, BILIRUBINUR, BLOODU  VTE Pharmacologic Prophylaxis: Sequential compression device (Venodyne)   VTE Mechanical Prophylaxis: sequential compression device

## 2020-01-12 NOTE — ANESTHESIA PREPROCEDURE EVALUATION
Review of Systems/Medical History  Patient summary reviewed  Chart reviewed  No history of anesthetic complications     Cardiovascular  EKG reviewed, Hyperlipidemia, Hypertension ,   Comment: Sinus tachycardia  Otherwise normal ECG  When compared with ECG of 01-MAY-2019 09:51,  No significant change was found  Confirmed by Caye Nageotte ([id number]) on 1/8/2020 6:34:22 PM      TTE:    LEFT VENTRICLE: Size was normal  Systolic function was normal  Ejection fraction was estimated to be 60 %  There were no regional wall motion abnormalities  Wall thickness was mildly increased  DOPPLER: Left ventricular diastolic function  parameters were normal      RIGHT VENTRICLE: The size was normal  Systolic function was normal  Wall thickness was normal      LEFT ATRIUM: Size was normal      RIGHT ATRIUM: Size was normal      MITRAL VALVE: Valve structure was normal  There was normal leaflet separation  DOPPLER: The transmitral velocity was within the normal range  There was no evidence for stenosis  There was no regurgitation      AORTIC VALVE: The valve was trileaflet  Leaflets exhibited mildly increased thickness, mild calcification, and normal cuspal separation  DOPPLER: Transaortic velocity was within the normal range  There was no evidence for stenosis  There  was no regurgitation      TRICUSPID VALVE: The valve structure was normal  There was normal leaflet separation  DOPPLER: The transtricuspid velocity was within the normal range  There was no evidence for stenosis  There was trace regurgitation  The tricuspid jet  envelope definition was inadequate for estimation of RV systolic pressure  There are no indirect findings suggestive of moderate or severe pulmonary hypertension      PULMONIC VALVE: Leaflets exhibited normal thickness, no calcification, and normal cuspal separation  DOPPLER: The transpulmonic velocity was within the normal range  There was no regurgitation      PERICARDIUM: There was no pericardial effusion   The pericardium was normal in appearance      AORTA: The root exhibited normal size      SYSTEMIC VEINS: IVC: The inferior vena cava was normal in size and course  Respirophasic changes were normal ,  Pulmonary  Smoker cigarette smoker  , Tobacco cessation counseling given , COPD ,        GI/Hepatic    GI bleeding , active,        Prostatic disorder, benign prostatic hyperplasia       Endo/Other     GYN       Hematology  Anemia acute blood loss anemia,     Musculoskeletal       Neurology    TIA, CVA ,    Psychology   Depression ,     Comment: Uncomplicated alcohol dependence ([facility] [address] )  History of substance abuse ([facility] [address]                     Anesthesia Plan  ASA Score- 5 Emergent    Anesthesia Type- general with ASA Monitors  Additional Monitors:   Airway Plan:     Comment:  JUSTO Sandra , have personally seen and evaluated the patient prior to anesthetic care  I have reviewed the pre-anesthetic record, and other medical records if appropriate to the anesthetic care  If a CRNA is involved in the case, I have reviewed the CRNA assessment, if present, and agree  Risks/benefits and alternatives discussed with patient including possible PONV, sore throat, and possibility of rare anesthetic and surgical emergencies  BLOOD PRODUCT SITUATION REVIEWED WITH ICU TEAM/BLOOD BANK:  TOTAL AVAILABLE PRODUCT AT [facility] IS 5 PRBC, 2 PLTS; CURRENTLY WORKING ON 2 FFP, 2 CRYO;  REQUESTED A TOTAL OF 10 ADDITIONAL UNITS FROM [facility] EACH OF PRBCS AND PLATELETS; WILL ORDER ADDITIONAL 3 UNITS OF FFP  CELL SAVER IN ROOM  [facility] Plan Factors-    Induction- intravenous  Postoperative Plan-     Informed Consent- Anesthetic plan and risks discussed with patient

## 2020-01-12 NOTE — OP NOTE
OPERATIVE REPORT  PATIENT NAME: James Judge    :  1950  MRN: [id number]  Pt Location: BE OR ROOM 09    SURGERY DATE: 2020    Surgeon(s) and Role:     * Sweetie Romano, DO - Primary     * Daisy Veloz - Assisting     * Efrain Dueñas MD - Assisting    Preop Diagnosis:  Duodenal ulcer [K26 9]    Post-Op Diagnosis Codes:     * Duodenal ulcer [K26 9]    Procedure(s) (LRB):  LAPAROTOMY EXPLORATORY,  OVERSEW  OF GASTRO DUODENAL ARTERY, THAL PATCH OF DUODENUM, VICKY GASTRO JEJUNOSTOMY TUBE (N/A)    Specimen(s):  * No specimens in log *    Estimated Blood Loss:   1000 mL    Drains:  Closed/Suction Drain Right Abdomen Bulb 19 Fr  (Active)   Number of days: 0       NG/OG/Enteral Tube Orogastric (Active)   Placement Reverification Auscultation 2020  2:18 PM   Site Assessment Clean;Dry; Intact 2020  2:18 PM   Status Suction-low continuous 2020  2:18 PM   Drainage Appearance Bloody;Bright red 2020  2:18 PM   Intake (mL) 0 mL 2020  2:18 PM   Output (mL) 700 mL 2020  6:10 PM   Number of days: 0       NG/OG/Enteral Tube Enteral Feeding Tube   (Active)   Number of days: 0       Urethral Catheter Temperature probe (Active)   Amt returned on insertion(mL) 450 mL 2020  2:18 PM   Site Assessment Clean;Skin intact 2020  3:27 PM   Collection Container Standard drainage bag 2020  3:27 PM   Securement Method Securing device (Describe) 2020  3:27 PM   Output (mL) 250 mL 2020  6:10 PM   Number of days: 0       Anesthesia Type:   General    Operative Indications:  Duodenal ulcer [K26 9]    Operative Findings:  4cm perforated duodenal ulcer associated with pulsatile bleeding  Appearance of previous surgery in RUQ  Colon was adherent to duodenum suggesting possible previous duodenal ulcer disease with surgery  Procedure:  Control of pulsatile bleeding by ulcer with suture ligation  Open gastrostomy tube with GJ placement     Primary closure of tissue around perforation with colon and fat buttress  Drain x2 placement  Complications:   None    Procedure and Technique:  The patient was emergently taken to the operating room from the ICU intubated  He was moved to the operating room table, prepped with chloraprep, and draped in a sterile manner  Preoperative ancef and flagyl were administered  A time out was called  An upper midline incision was carried out through his old midline laparotomy scar  Once in the abdomen, adhesions were taken down with electrocautery  In the RUQ there was signs of previous abdominal surgery including surgical clips  Omentum was well-adhered in this area  Natalia Legato was placed for retraction  After further dissection, the transverse colon was found to be well-adhered to the antrum and first part of the duodenum  A plane of dissection was created and the transverse colon was mobilized inferiorly  At the inferior-posterior portion of the first portion of the duodenum was a large, 4cm perforation  A large volume of clot was extracted from this area with the stomach and distal duodenum decompressing clot out  There was active pulsatile bleeding from just inferior to this area  This was controlled with figure-of-eight 2-0 silk sutures  Next we turned attention to the anterior wall of the stomach to place a gastrostomy tube in the Nanette approach  Two concentric purse string sutures were placed with 0 silk  Using electrocautery the anterior wall of the stomach was opened  More clot was removed from the stomach  An incision was made in the LUQ and the GJ tube was fed in through the abdominal wall, through the stomach, past the perforation, and distally into the jejenum  Next the anterior wall of the stomach was fashioned to the anterior abdominal wall in 4 points with 0 silk suture  Tissue was then reapproximated over the duodenal perforation with interrupted 2-0 silk  Next, transverse colon was used to buttress the repair   It was sutured over the area with interrupted 2-0 silk  Omentum was mobilized to lay in the RUQ  Two 19Fr CAMPBELL drains were placed in the RUQ by the anastomosis and brought out through two previous right abdominal scars that look like they had been drain sites  The abdomen was irrigated with warm saline  Finally, the abdomen was closed with looped PDS and the skin was stapled  In total I/O from the OR were:   IVF: 3L  RBC 5U  FFP 3U  Cryo 4U  Plts 1 pack  ------------  EBL: 1L  UO: 2 2    The patient was taken to the ICU intubated  He initially started the case on 15 levophed and vasopressin  At the conclusion he was only on vasopressin  Dr Mak Vu was present throughout       Patient Disposition:  Critical Care Unit    SIGNATURE: Ant Armendariz MD  DATE: January 11, 2020  TIME: 10:35 PM

## 2020-01-12 NOTE — RESPIRATORY THERAPY NOTE
RT Ventilator Management Note  Katerina Cancel 81 y o  male MRN: [id number]  Unit/Bed#: OR POOL Encounter: [id number]      Daily Screen     No data found in the last 10 encounters  Physical Exam:   Assessment Type: Assess only  General Appearance: Sedated  Respiratory Pattern: Assisted  Chest Assessment: Chest expansion symmetrical  Bilateral Breath Sounds: Clear  R Breath Sounds: Clear  L Breath Sounds: Absent  Cough: None  Suction: ET Tube  O2 Device: vent      Resp Comments: Pt returned from OR, attended by anesthesia department  Placed on vent and previous settings  Airway intact  No secretions sx from ETT  ABGs to follow

## 2020-01-12 NOTE — ANESTHESIA POSTPROCEDURE EVALUATION
Post-Op Assessment Note    CV Status:  Stable    Pain management: adequate     Mental Status:  Sleepy (Pt sedated )   Hydration Status:  Stable   PONV Controlled:  Controlled   Airway Patency:  Patent  Airway: intubated   Post Op Vitals Reviewed:  Yes              BP   158/78   Temp     Pulse  69   Resp   14   SpO2 100 % (01/11/20 8799)

## 2020-01-13 ENCOUNTER — APPOINTMENT (INPATIENT)
Dept: RADIOLOGY | Facility: HOSPITAL | Age: 70
DRG: 326 | End: 2020-01-13
Payer: MEDICARE

## 2020-01-13 PROBLEM — R57.8 HEMORRHAGIC SHOCK (HCC): Status: ACTIVE | Noted: 2020-01-13

## 2020-01-13 LAB
ABO GROUP BLD BPU: NORMAL
ALBUMIN SERPL BCP-MCNC: 1.8 G/DL (ref 3.5–5)
ALP SERPL-CCNC: 43 U/L (ref 46–116)
ALT SERPL W P-5'-P-CCNC: 8 U/L (ref 12–78)
AMMONIA PLAS-SCNC: 26 UMOL/L (ref 11–35)
ANION GAP SERPL CALCULATED.3IONS-SCNC: 3 MMOL/L (ref 4–13)
ANION GAP SERPL CALCULATED.3IONS-SCNC: 4 MMOL/L (ref 4–13)
APTT PPP: 35 SECONDS (ref 23–37)
AST SERPL W P-5'-P-CCNC: 13 U/L (ref 5–45)
BASE EXCESS BLDA CALC-SCNC: -3 MMOL/L (ref -2–3)
BASE EXCESS BLDA CALC-SCNC: -4 MMOL/L (ref -2–3)
BASOPHILS # BLD AUTO: 0.01 THOUSANDS/ΜL (ref 0–0.1)
BASOPHILS NFR BLD AUTO: 0 % (ref 0–1)
BILIRUB SERPL-MCNC: 0.29 MG/DL (ref 0.2–1)
BPU ID: NORMAL
BUN SERPL-MCNC: 15 MG/DL (ref 5–25)
BUN SERPL-MCNC: 16 MG/DL (ref 5–25)
CA-I BLD-SCNC: 0.82 MMOL/L (ref 1.12–1.32)
CA-I BLD-SCNC: 0.98 MMOL/L (ref 1.12–1.32)
CALCIUM SERPL-MCNC: 7.5 MG/DL (ref 8.3–10.1)
CALCIUM SERPL-MCNC: 7.8 MG/DL (ref 8.3–10.1)
CHLORIDE SERPL-SCNC: 109 MMOL/L (ref 100–108)
CHLORIDE SERPL-SCNC: 114 MMOL/L (ref 100–108)
CO2 SERPL-SCNC: 29 MMOL/L (ref 21–32)
CO2 SERPL-SCNC: 29 MMOL/L (ref 21–32)
CREAT SERPL-MCNC: 0.59 MG/DL (ref 0.6–1.3)
CREAT SERPL-MCNC: 0.67 MG/DL (ref 0.6–1.3)
CROSSMATCH: NORMAL
EOSINOPHIL # BLD AUTO: 0.01 THOUSAND/ΜL (ref 0–0.61)
EOSINOPHIL NFR BLD AUTO: 0 % (ref 0–6)
ERYTHROCYTE [DISTWIDTH] IN BLOOD BY AUTOMATED COUNT: 15.7 % (ref 11.6–15.1)
GFR SERPL CREATININE-BSD FRML MDRD: 105 ML/MIN/1.73SQ M
GFR SERPL CREATININE-BSD FRML MDRD: 99 ML/MIN/1.73SQ M
GLUCOSE SERPL-MCNC: 117 MG/DL (ref 65–140)
GLUCOSE SERPL-MCNC: 124 MG/DL (ref 65–140)
GLUCOSE SERPL-MCNC: 176 MG/DL (ref 65–140)
GLUCOSE SERPL-MCNC: 271 MG/DL (ref 65–140)
HCO3 BLDA-SCNC: 24.3 MMOL/L (ref 22–28)
HCO3 BLDA-SCNC: 24.6 MMOL/L (ref 24–30)
HCT VFR BLD AUTO: 28.8 % (ref 36.5–49.3)
HCT VFR BLD CALC: 17 % (ref 36.5–49.3)
HCT VFR BLD CALC: 28 % (ref 36.5–49.3)
HGB BLD-MCNC: 9.4 G/DL (ref 12–17)
HGB BLDA-MCNC: 5.8 G/DL (ref 12–17)
HGB BLDA-MCNC: 9.5 G/DL (ref 12–17)
IMM GRANULOCYTES # BLD AUTO: 0.03 THOUSAND/UL (ref 0–0.2)
IMM GRANULOCYTES NFR BLD AUTO: 0 % (ref 0–2)
INR PPP: 1.13 (ref 0.84–1.19)
LYMPHOCYTES # BLD AUTO: 0.86 THOUSANDS/ΜL (ref 0.6–4.47)
LYMPHOCYTES NFR BLD AUTO: 12 % (ref 14–44)
MCH RBC QN AUTO: 28.9 PG (ref 26.8–34.3)
MCHC RBC AUTO-ENTMCNC: 32.6 G/DL (ref 31.4–37.4)
MCV RBC AUTO: 89 FL (ref 82–98)
MONOCYTES # BLD AUTO: 0.69 THOUSAND/ΜL (ref 0.17–1.22)
MONOCYTES NFR BLD AUTO: 10 % (ref 4–12)
NEUTROPHILS # BLD AUTO: 5.31 THOUSANDS/ΜL (ref 1.85–7.62)
NEUTS SEG NFR BLD AUTO: 78 % (ref 43–75)
NRBC BLD AUTO-RTO: 0 /100 WBCS
PCO2 BLD: 26 MMOL/L (ref 21–32)
PCO2 BLD: 26 MMOL/L (ref 21–32)
PCO2 BLD: 57.8 MM HG (ref 36–44)
PCO2 BLD: 60 MM HG (ref 42–50)
PH BLD: 7.22 [PH] (ref 7.3–7.4)
PH BLD: 7.23 [PH] (ref 7.35–7.45)
PLATELET # BLD AUTO: 152 THOUSANDS/UL (ref 149–390)
PMV BLD AUTO: 10.1 FL (ref 8.9–12.7)
PO2 BLD: 165 MM HG (ref 75–129)
PO2 BLD: 324 MM HG (ref 35–45)
POTASSIUM BLD-SCNC: 4 MMOL/L (ref 3.5–5.3)
POTASSIUM BLD-SCNC: 4.1 MMOL/L (ref 3.5–5.3)
POTASSIUM SERPL-SCNC: 3.3 MMOL/L (ref 3.5–5.3)
POTASSIUM SERPL-SCNC: 3.6 MMOL/L (ref 3.5–5.3)
PROT SERPL-MCNC: 4.3 G/DL (ref 6.4–8.2)
PROTHROMBIN TIME: 14.1 SECONDS (ref 11.6–14.5)
RBC # BLD AUTO: 3.25 MILLION/UL (ref 3.88–5.62)
SAO2 % BLD FROM PO2: 100 % (ref 60–85)
SAO2 % BLD FROM PO2: 99 % (ref 60–85)
SODIUM BLD-SCNC: 140 MMOL/L (ref 136–145)
SODIUM BLD-SCNC: 144 MMOL/L (ref 136–145)
SODIUM SERPL-SCNC: 141 MMOL/L (ref 136–145)
SODIUM SERPL-SCNC: 147 MMOL/L (ref 136–145)
SPECIMEN SOURCE: ABNORMAL
SPECIMEN SOURCE: ABNORMAL
UNIT DISPENSE STATUS: NORMAL
UNIT PRODUCT CODE: NORMAL
UNIT RH: NORMAL
WBC # BLD AUTO: 6.91 THOUSAND/UL (ref 4.31–10.16)

## 2020-01-13 PROCEDURE — 80048 BASIC METABOLIC PNL TOTAL CA: CPT | Performed by: PHYSICIAN ASSISTANT

## 2020-01-13 PROCEDURE — 94760 N-INVAS EAR/PLS OXIMETRY 1: CPT

## 2020-01-13 PROCEDURE — 71045 X-RAY EXAM CHEST 1 VIEW: CPT

## 2020-01-13 PROCEDURE — 99291 CRITICAL CARE FIRST HOUR: CPT | Performed by: SURGERY

## 2020-01-13 PROCEDURE — 85730 THROMBOPLASTIN TIME PARTIAL: CPT | Performed by: PHYSICIAN ASSISTANT

## 2020-01-13 PROCEDURE — 99233 SBSQ HOSP IP/OBS HIGH 50: CPT | Performed by: INTERNAL MEDICINE

## 2020-01-13 PROCEDURE — 85025 COMPLETE CBC W/AUTO DIFF WBC: CPT | Performed by: EMERGENCY MEDICINE

## 2020-01-13 PROCEDURE — 3E0H76Z INTRODUCTION OF NUTRITIONAL SUBSTANCE INTO LOWER GI, VIA NATURAL OR ARTIFICIAL OPENING: ICD-10-PCS | Performed by: SURGERY

## 2020-01-13 PROCEDURE — 85610 PROTHROMBIN TIME: CPT | Performed by: PHYSICIAN ASSISTANT

## 2020-01-13 PROCEDURE — 82140 ASSAY OF AMMONIA: CPT | Performed by: PHYSICIAN ASSISTANT

## 2020-01-13 PROCEDURE — C9113 INJ PANTOPRAZOLE SODIUM, VIA: HCPCS | Performed by: INTERNAL MEDICINE

## 2020-01-13 PROCEDURE — 94003 VENT MGMT INPAT SUBQ DAY: CPT

## 2020-01-13 PROCEDURE — C9113 INJ PANTOPRAZOLE SODIUM, VIA: HCPCS | Performed by: SURGERY

## 2020-01-13 PROCEDURE — 80053 COMPREHEN METABOLIC PANEL: CPT | Performed by: EMERGENCY MEDICINE

## 2020-01-13 PROCEDURE — NC001 PR NO CHARGE: Performed by: SURGERY

## 2020-01-13 RX ORDER — ALBUMIN (HUMAN) 12.5 G/50ML
12.5 SOLUTION INTRAVENOUS ONCE
Status: COMPLETED | OUTPATIENT
Start: 2020-01-13 | End: 2020-01-13

## 2020-01-13 RX ORDER — POTASSIUM CHLORIDE 29.8 MG/ML
40 INJECTION INTRAVENOUS ONCE
Status: COMPLETED | OUTPATIENT
Start: 2020-01-13 | End: 2020-01-13

## 2020-01-13 RX ORDER — FUROSEMIDE 10 MG/ML
40 INJECTION INTRAMUSCULAR; INTRAVENOUS ONCE
Status: COMPLETED | OUTPATIENT
Start: 2020-01-13 | End: 2020-01-13

## 2020-01-13 RX ORDER — ACETAMINOPHEN 325 MG/1
650 TABLET ORAL EVERY 6 HOURS PRN
Status: DISCONTINUED | OUTPATIENT
Start: 2020-01-13 | End: 2020-02-11 | Stop reason: HOSPADM

## 2020-01-13 RX ORDER — FUROSEMIDE 10 MG/ML
40 INJECTION INTRAMUSCULAR; INTRAVENOUS ONCE
Status: DISCONTINUED | OUTPATIENT
Start: 2020-01-13 | End: 2020-01-13

## 2020-01-13 RX ADMIN — SODIUM CHLORIDE 80 MG: 9 INJECTION, SOLUTION INTRAVENOUS at 11:24

## 2020-01-13 RX ADMIN — POTASSIUM CHLORIDE 40 MEQ: 29.8 INJECTION, SOLUTION INTRAVENOUS at 19:49

## 2020-01-13 RX ADMIN — SODIUM CHLORIDE 80 MG: 9 INJECTION, SOLUTION INTRAVENOUS at 21:14

## 2020-01-13 RX ADMIN — Medication 75 MCG/HR: at 09:15

## 2020-01-13 RX ADMIN — PROPOFOL 30 MCG/KG/MIN: 10 INJECTION, EMULSION INTRAVENOUS at 02:01

## 2020-01-13 RX ADMIN — FOLIC ACID 1 MG: 5 INJECTION, SOLUTION INTRAMUSCULAR; INTRAVENOUS; SUBCUTANEOUS at 11:29

## 2020-01-13 RX ADMIN — OXYCODONE HYDROCHLORIDE 5 MG: 5 TABLET ORAL at 17:39

## 2020-01-13 RX ADMIN — SODIUM CHLORIDE, SODIUM LACTATE, POTASSIUM CHLORIDE, AND CALCIUM CHLORIDE 100 ML/HR: .6; .31; .03; .02 INJECTION, SOLUTION INTRAVENOUS at 10:44

## 2020-01-13 RX ADMIN — SODIUM CHLORIDE 8 MG/HR: 9 INJECTION, SOLUTION INTRAVENOUS at 05:50

## 2020-01-13 RX ADMIN — CHLORHEXIDINE GLUCONATE 0.12% ORAL RINSE 15 ML: 1.2 LIQUID ORAL at 21:14

## 2020-01-13 RX ADMIN — ALBUMIN (HUMAN) 12.5 G: 0.25 INJECTION, SOLUTION INTRAVENOUS at 11:10

## 2020-01-13 RX ADMIN — PROPOFOL 30 MCG/KG/MIN: 10 INJECTION, EMULSION INTRAVENOUS at 08:12

## 2020-01-13 RX ADMIN — THIAMINE HYDROCHLORIDE 100 MG: 100 INJECTION, SOLUTION INTRAMUSCULAR; INTRAVENOUS at 09:15

## 2020-01-13 RX ADMIN — CEFAZOLIN SODIUM 2000 MG: 2 SOLUTION INTRAVENOUS at 12:28

## 2020-01-13 RX ADMIN — FUROSEMIDE 40 MG: 10 INJECTION, SOLUTION INTRAMUSCULAR; INTRAVENOUS at 11:18

## 2020-01-13 RX ADMIN — CEFAZOLIN SODIUM 2000 MG: 2 SOLUTION INTRAVENOUS at 19:49

## 2020-01-13 RX ADMIN — CHLORHEXIDINE GLUCONATE 0.12% ORAL RINSE 15 ML: 1.2 LIQUID ORAL at 09:15

## 2020-01-13 RX ADMIN — CEFAZOLIN SODIUM 2000 MG: 2 SOLUTION INTRAVENOUS at 04:21

## 2020-01-13 RX ADMIN — FUROSEMIDE 40 MG: 10 INJECTION, SOLUTION INTRAMUSCULAR; INTRAVENOUS at 17:39

## 2020-01-13 NOTE — PROGRESS NOTES
Progress Note - General Surgery   Ismael Duque 81 y o  male MRN: [id number]  Unit/Bed#: [facility] 516-01 Encounter: [id number]    Assessment:  80yM w/ bleeding and perforated 1st portion duodenal ulcer  1/11 S/p EGD  1/11 S/p IR GDA embolozation   1/11 S/p Exlap, oversewing bleeding vessels, closure of tissue over perforation, Thal patch with transverse colon, open gastrostomy-jejunostomy tube placement      Plan:  Diet Enteral/Parenteral; Tube Feeding No Oral Diet; Jevity 1 2 [place]; Continuous; 10  Continue G-tube to gravity, Will plan for UGI study on POD 5  Advance TFs through J-tube as tolerated  Strict I/Os  Continue Protonix  Anemia:  Continue to trend hemoglobin, restart DVT PPx when stable  Pain and Nausea control PRN  Monitor for alcohol withdrawal      Subjective/Objective   Subjective:  No acute events overnight  Objective:     Blood pressure (!) 107/45, pulse 82, temperature 99 3 °F (37 4 °C), resp  rate 12, height 5' 8 5" (1 74 m), weight 89 1 kg (196 lb 6 9 oz), SpO2 100 %  ,Body mass index is 29 43 kg/m²  I/O       01/11 0701 - 01/12 0700 01/12 0701 - 01/13 0700    P  O  0 0    I V  (mL/kg) 8607 3 (96 6) 2868 2 (31 7)    Blood 8460     NG/GT 0 0    IV Piggyback 1250 350    Feedings  187    Total Intake(mL/kg) 50241 3 (205 6) 3405 2 (37 6)    Urine (mL/kg/hr) 5650 (2 6) 715 (0 3)    Emesis/NG output 1930 510    Drains 170 85    Stool 0     Blood 1000     Total Output 8750 1310    Net +9567 3 +2095 2          Unmeasured Stool Occurrence 1 x     Unmeasured Emesis Occurrence 3 x             Invasive Devices     Central Venous Catheter Line            CVC Central Lines 01/11/20 1 day    CVC Central Lines 01/11/20 Triple 1 day          Arterial Line            Arterial Line 01/11/20 Radial 1 day          Drain            Closed/Suction Drain Right Abdomen Bulb 1 day    Closed/Suction Drain Right Abdomen Bulb 19 Fr  1 day    NG/OG/Enteral Tube Enteral Feeding Tube   1 day    Urethral Catheter Temperature probe 1 day          Airway            ETT  Cuffed 8 mm 1 day              Respiratory    Lab Data (Last 4 hours)    None         O2/Vent Data (Last 4 hours)      01/13 0319           Vent Mode AC/VC       Resp Rate (BPM) (BPM) 12       Vt (mL) (mL) 500       FIO2 (%) (%) 45       PEEP (cmH2O) (cmH2O) 8       MV 6 13                     Physical Exam:   Gen: NAD, lightly sedated, Comfortable   Chest:  Mechanically ventilated, settings above, stable  Abd:  Soft, moderately distended, mild tenderness along incision, incision CDI with staples, CAMPBELL x2 with minimal serosanguineous output  Ext: 1+ edema  Skin: warm, dry, intact      Lab, Imaging and other studies:  Recent Labs     01/11/20  2236 01/12/20  0447 01/12/20  0818 01/12/20  1044 01/13/20  0422   WBC 2 56* 3 40*  --   --  6 91   HGB 11 5* 10 7* 10 4* 10 2* 9 4*    151  --   --  152     Recent Labs     01/11/20  1803 01/11/20  1850  01/11/20  2236 01/12/20  0447 01/13/20  0422   SODIUM 144 152*   < > 148* 143 147*   K 3 5 3 3*   < > 3 9 3 1* 3 6   * 115*   < > 113* 112* 114*   CO2 23 33*   < > 28 26 29   BUN 17 16   < > 13 13 16   CREATININE 0 46* 0 51*   < > 0 52* 0 58* 0 59*   PHOS 2 3 2 5  --  3 0  --   --    MG 1 5* 1 6  --  1 5* 2 0  --    CALCIUM 6 6* 9 2   < > 8 6 8 3 7 5*    < > = values in this interval not displayed               VTE Pharmacologic Prophylaxis: Sequential compression device (Venodyne)   VTE Mechanical Prophylaxis: sequential compression device

## 2020-01-13 NOTE — RESPIRATORY THERAPY NOTE
RT Ventilator Management Note  Karsten Rodrigues 81 y o  male MRN: [id number]  Unit/Bed#: [facility] 516-01 Encounter: [id number]      Daily Screen       1/12/2020  0751             Spont breathing trial outcome[de-identified]              Physical Exam:   Assessment Type: Assess only  General Appearance: Sedated  Respiratory Pattern: Assisted  Chest Assessment: Chest expansion symmetrical  Bilateral Breath Sounds: Coarse  Cough: Weak  Suction: ET Tube  O2 Device: vent      Resp Comments: Pt was on PSV mode for weaning,  1 5hrs  Pt having frequent periods of apnea  BS coarse  Suctioned some small blood clots  Pt returned to AC/VC mode for the night  Plan to continue full vent support

## 2020-01-13 NOTE — NUTRITION
01/13/20 1512   Recommendations/Interventions   Nutrition Recommendations Other (specify)  (with propofol @6 5ml/hr advance TF as tolerated to goal rate of Jevity 1 2 @68ml/hr to provide 1632ml, 2130kcal with propofol, 91g protein, 1322ml free water   Monitor weight and electrolytes  )

## 2020-01-13 NOTE — RESPIRATORY THERAPY NOTE
RT Ventilator Management Note  Adrianna Hatch 81 y o  male MRN: [id number]  Unit/Bed#: [facility] 801-14 Encounter: [id number]      Daily Screen       1/12/2020  0751 1/13/2020  0759          Patient safety screen outcome[de-identified]    Passed      Spont breathing trial % for 30 min:          Spont breathing trial outcome[de-identified]                  Physical Exam:   Assessment Type: Assess only  General Appearance: Awake  Respiratory Pattern: Assisted, Spontaneous  Chest Assessment: Chest expansion symmetrical  Bilateral Breath Sounds: Diminished      Resp Comments: Pt placed on PSV settings at this time  No resp distress noted, RSBI WNL  Will continue to monitor per resp protocol

## 2020-01-13 NOTE — PROGRESS NOTES
Progress Note- Dee Goff 81 y o  male MRN: [id number]    Unit/Bed#: [facility] 760-17 Encounter: [id number]    Assessment and Plan:  Dee Goff is a 81 y o  old male who presents with syncope, acute blood loss anemia from perforated duodenal ulcer  Duodenal ulceration with perforation  Status post EGD 1/11 with large excavated duodenal ulcer with pulsating vessel, injected peripherally with epinephrine, 2 clips deployed and hemo spray with persistent bleeding  IR embolization 1/11 with persistent bleeding  Ex lap, over-sewing bleeding vessels, Thal patch with transverse colon, open gastrostomy-jejunostomy with tube placement 1/11  Status post 18 units PRBCs, 5 units platelets, 3 units cryo, 6 units FFP  Plan:  Completed 72 hour Protonix drip, switched to IV Protonix b i d  Continue to trend hemoglobin  Monitor G-tube output  Surgery in critical care following    Mechanical ventilation  Hemorrhagic shock  ______________________________________________________________________    Subjective:  Patient seen examined at bedside  Patient is intubated and sedated  Discussed with nursing  Patient has had 1 bowel movement with old blood yesterday      Medication Administration - last 24 hours from 01/12/2020 1044 to 01/13/2020 1044       Date/Time Order Dose Route Action Action by     01/12/2020 1612 lactated ringers infusion 100 mL/hr Intravenous Leslyet 37 Rahul James RN     01/12/2020 1611 lactated ringers infusion 0 mL/hr Intravenous Stopped Rahul James RN     01/12/2020 1249 lactated ringers infusion 100 mL/hr Intravenous Rate/Dose Change Rahul James RN     01/13/2020 1043 pantoprazole (PROTONIX) 80 mg in sodium chloride 0 9 % 100 mL infusion 0 mg/hr Intravenous Stopped Elle Otto RN     01/13/2020 0550 pantoprazole (PROTONIX) 80 mg in sodium chloride 0 9 % 100 mL infusion 8 mg/hr Intravenous [facility] Yuli Oneal RN     01/13/2020 0548 pantoprazole (PROTONIX) 80 mg in sodium chloride 0 9 % 100 mL infusion 0 mg/hr Intravenous Stopped Marco Sellers, RN     01/12/2020 2003 pantoprazole (PROTONIX) 80 mg in sodium chloride 0 9 % 100 mL infusion 8 mg/hr Intravenous New Bag Josefina Ramos, RN     01/12/2020 2002 pantoprazole (PROTONIX) 80 mg in sodium chloride 0 9 % 100 mL infusion 0 mg/hr Intravenous Stopped Marco Sellers, RN     01/13/2020 0950 propofol (DIPRIVAN) 1000 mg in 100 mL infusion (premix) 20 mcg/kg/min Intravenous Rate/Dose Change Hermila Garcia, RN     01/13/2020 0915 propofol (DIPRIVAN) 1000 mg in 100 mL infusion (premix) 25 mcg/kg/min Intravenous Rate/Dose Change Hermila Garcia, RN     01/13/2020 0812 propofol (DIPRIVAN) 1000 mg in 100 mL infusion (premix) 30 mcg/kg/min Intravenous New [address] [place] [address] Cortney Garcia, RN     01/13/2020 7423 propofol (DIPRIVAN) 1000 mg in 100 mL infusion (premix) 0 mcg/kg/min Intravenous Stopped Hermila Armando, RN     01/13/2020 0201 propofol (DIPRIVAN) 1000 mg in 100 mL infusion (premix) 30 mcg/kg/min Intravenous New Bag Josefina Ivette, RN     01/13/2020 0200 propofol (DIPRIVAN) 1000 mg in 100 mL infusion (premix) 0 mcg/kg/min Intravenous Stopped Josefina Steele, RN     01/12/2020 2325 propofol (DIPRIVAN) 1000 mg in 100 mL infusion (premix) 30 mcg/kg/min Intravenous New Bag Josefina Ivette, RN     01/12/2020 2324 propofol (DIPRIVAN) 1000 mg in 100 mL infusion (premix) 0 mcg/kg/min Intravenous Stopped Josefina Steele, RN     01/12/2020 2019 propofol (DIPRIVAN) 1000 mg in 100 mL infusion (premix) 30 mcg/kg/min Intravenous Rate/Dose Change Josefina Ivette, RN     01/12/2020 1743 propofol (DIPRIVAN) 1000 mg in 100 mL infusion (premix) 0 mcg/kg/min Intravenous Hold Perez Brito, RN     01/12/2020 1727 propofol (DIPRIVAN) 1000 mg in 100 mL infusion (premix) 15 mcg/kg/min Intravenous Rate/Dose Change Perez Brito RN     01/12/2020 1421 propofol (DIPRIVAN) 1000 mg in 100 mL infusion (premix) 25 mcg/kg/min Intravenous Raquel 37 Perez Brito RN     01/12/2020 1419 propofol (DIPRIVAN) 1000 mg in 100 mL infusion (premix) 0 mcg/kg/min Intravenous Stopped Henrine Dakin, RN     01/13/2020 0915 chlorhexidine (PERIDEX) 0 12 % oral rinse 15 mL 15 mL Swish & Spit Given Veola Sauce, [facility]     01/12/2020 2125 chlorhexidine (PERIDEX) 0 12 % oral rinse 15 mL 15 mL Swish & Spit Given Airam Jaycee, RN     01/13/2020 0915 thiamine (VITAMIN B1) 100 mg in sodium chloride 0 9 % 50 mL IVPB 100 mg Intravenous New Bag Veola Sauce, RN     01/13/2020 0915 fentaNYL 1000 mcg in sodium chloride 0 9% 100mL infusion 75 mcg/hr Intravenous [address], [facility]     01/13/2020 0914 fentaNYL 1000 mcg in sodium chloride 0 9% 100mL infusion 0 mcg/hr Intravenous Stopped Veopal Toshia, RN     01/12/2020 1958 fentaNYL 1000 mcg in sodium chloride 0 9% 100mL infusion 75 mcg/hr Intravenous Raquel 37 Airam Champion, RN     01/12/2020 1957 fentaNYL 1000 mcg in sodium chloride 0 9% 100mL infusion 0 mcg/hr Intravenous Stopped Airam Champion, RN     01/13/2020 0541 ceFAZolin (ANCEF) IVPB (premix) 2,000 mg 0 mg Intravenous Stopped Airam Champion, [facility]     01/13/2020 0421 ceFAZolin (ANCEF) IVPB (premix) 2,000 mg 2,000 mg Intravenous New Bag Airam Jaycee, RN     01/12/2020 2301 ceFAZolin (ANCEF) IVPB (premix) 2,000 mg 0 mg Intravenous Stopped Airam Champion RN     01/12/2020 2011 ceFAZolin (ANCEF) IVPB (premix) 2,000 mg 2,000 mg Intravenous New Bag Airam Jaycee, RN     01/12/2020 1400 ceFAZolin (ANCEF) IVPB (premix) 2,000 mg 0 mg Intravenous Stopped Henrine Dakin, RN     01/12/2020 1153 ceFAZolin (ANCEF) IVPB (premix) 2,000 mg 2,000 mg Intravenous New Bag Alvarorine Dakin, RN     01/13/2020 0000 fluconazole (DIFLUCAN) IVPB (premix) 200 mg 0 mg Intravenous Stopped Airam Champion RN     01/12/2020 2326 fluconazole (DIFLUCAN) IVPB (premix) 200 mg 200 mg Intravenous New Bag Airam Champion RN     01/12/2020 2317 acetaminophen (TYLENOL) tablet 975 mg 975 mg Oral Not Given Airam Champion RN 01/12/2020 2318 methocarbamol (ROBAXIN) tablet 500 mg 500 mg Oral Not Given Bon Young RN     01/12/2020 2317 melatonin tablet 6 mg 6 mg Oral Not Given Bon Young RN          Objective:     Vitals: Blood pressure (!) 107/45, pulse 96, temperature 100 °F (37 8 °C), temperature source Probe, resp  rate 17, height 5' 8 5" (1 74 m), weight 90 6 kg (199 lb 11 8 oz), SpO2 99 %  ,Body mass index is 29 93 kg/m²  Intake/Output Summary (Last 24 hours) at 1/13/2020 1044  Last data filed at 1/13/2020 1000  Gross per 24 hour   Intake 3486 79 ml   Output 970 ml   Net 2516 79 ml       Physical Exam:   Physical Exam   Constitutional: He appears well-developed  Intubated and sedated   HENT:   Head: Normocephalic and atraumatic  Eyes: Pupils are equal, round, and reactive to light  Conjunctivae and EOM are normal    Cardiovascular: Normal rate, regular rhythm and normal heart sounds  No murmur heard  Pulmonary/Chest: Effort normal and breath sounds normal  No stridor  No respiratory distress  He has no wheezes  Abdominal: Soft  There is no rebound and no guarding  Surgical incision clean dry and intact, 2 CAMPBELL drains with minimal drainage, abdomen nontender, mildly distended   Musculoskeletal: He exhibits no edema, tenderness or deformity  Neurological: He is alert  Follows commands on upper lower extremity   Skin: Skin is warm and dry  Invasive Devices     Central Venous Catheter Line            CVC Central Lines 01/11/20 1 day    CVC Central Lines 01/11/20 Triple 1 day          Arterial Line            Arterial Line 01/11/20 Radial 1 day          Drain            Closed/Suction Drain Right Abdomen Bulb 1 day    Closed/Suction Drain Right Abdomen Bulb 19 Fr  1 day    NG/OG/Enteral Tube Enteral Feeding Tube   1 day    Urethral Catheter Temperature probe 1 day          Airway            ETT  Cuffed 8 mm 1 day                Lab Results:  No results displayed because visit has over 200 results  Imaging Studies: I have personally reviewed pertinent imaging studies        ---------------------------------------------------  Note Electronically Signed By:    Car Wilson 15 Internal Medicine Residency PGY-3

## 2020-01-13 NOTE — RESPIRATORY THERAPY NOTE
RT Ventilator Management Note  Chandan Heller 81 y o  male MRN: [id number]  Unit/Bed#: [facility] 516-01 Encounter: [id number]      Daily Screen       1/13/2020  0759 1/13/2020  0950          Patient safety screen outcome[de-identified]  Passed        Spont breathing trial % for 30 min:          Spont breathing trial outcome[de-identified]    Failed      Spont breathing trial reason failed:    RR < 8 bpm      Previous settings resumed:    Yes              Physical Exam:   Assessment Type: (P) Assess only  General Appearance: (P) Awake, Drowsy  Respiratory Pattern: (P) Assisted  Chest Assessment: (P) Chest expansion symmetrical  Bilateral Breath Sounds: Diminished      Resp Comments: (P) Pt placed back on AC/VC settings due to low RR  Pt tolerating settings with no resp distress noted  Will continue to monitor per resp protocol

## 2020-01-13 NOTE — PROGRESS NOTES
Daily Progress Note - Critical Care   Alex Valles 81 y o  male MRN: [id number]  [facility] Encounter: [id number]        ----------------------------------------------------------------------------------------  HPI/24hr events: Unable to wean from vent last night due to frequent apneic episodes   Continue to monitor Hgb      ---------------------------------------------------------------------------------------  SUBJECTIVE  No acute events overnight    Review of Systems  Review of systems was unable to be performed secondary to sedated and intubated  ---------------------------------------------------------------------------------------  Assessment and Plan:    Neuro:    Pain/Sedation:  o Fentany gtt at 75 this AM  o Propofol gtt at 30 this AM  o Continue to wean as tolerated    CV:    Hemorrhagic shock  o Resusucitation: 18uPRBCs, 5uplatelets, 3uCryo, 6uFFP  o Currently hemodynamically stable  o Continue to monitor CBC  o Off levophed  o R fem line    Pulm:   Intubated due to acuity of condition  o Wean as tolerated    GI:    Perforated duodenal ulcer  o S/P EGD on 1/11 (GI)  o S/P IR GDA embolozation 1/11 (IR)  o S/P Exlap, oversewing bleeding vessels, closure of tissue over perforation, Thal patch with transverse colon, open gastrostomy-jejunostomy tube placement (Surgery)  o Plan: Diet Enteral/Parenteral; Tube Feeding No Oral Diet; Jevity 1 2 [place]; Continuous; 10  · Monitor JPx2 output per Surgery  · G-J tube in place  · Protonix gtt    :    Balderas in place  o Monitor UOP, strict I/O    F/E/N:    F: [email or web address]   E: replete as needed   N: Diet Enteral/Parenteral; Tube Feeding No Oral Diet; Jevity 1 2 [place]; Continuous; 10    Heme/Onc:    ABLA/Hemorrhagic shock   o S/P 18uPRBCs, 5uplatelets, 3uCryo, 6uFFP  o DVT ppx: SCDs, will likely start chem DVT ppx today    Endo:   o No acute issues    ID:   o Monitor WBC and fever curve  o Currently on Ancef and diflucan    MSK/Skin:   o Frequent turning and positioning/offloading  o PT/OT when able      Disposition: Continue Critical Care   Code Status: Level 1 - Full Code  ---------------------------------------------------------------------------------------  ICU CORE MEASURES    Prophylaxis   VTE Pharmacologic Prophylaxis: Held due to hemorrhagic shock  VTE Mechanical Prophylaxis: sequential compression device  Stress Ulcer Prophylaxis: Pantoprazole Infusion    ABCDE Protocol (if indicated)  Plan to perform spontaneous awakening trial today? Yes  Plan to perform spontaneous breathing trial today? Yes  Obvious barriers to extubation? Not applicable  CAM-ICU: Negative    Invasive Devices Review  Invasive Devices     Central Venous Catheter Line            CVC Central Lines 01/11/20 1 day    CVC Central Lines 01/11/20 Triple 1 day          Arterial Line            Arterial Line 01/11/20 Radial 1 day          Drain            Closed/Suction Drain Right Abdomen Bulb 1 day    Closed/Suction Drain Right Abdomen Bulb 19 Fr  1 day    NG/OG/Enteral Tube Enteral Feeding Tube   1 day    Urethral Catheter Temperature probe 1 day          Airway            ETT  Cuffed 8 mm 1 day              Can any invasive devices be discontinued today? Yes, R fem line  ---------------------------------------------------------------------------------------  OBJECTIVE    Physical Exam   Constitutional: He appears well-developed and well-nourished  HENT:   Head: Normocephalic and atraumatic  Nose: Nose normal    Mouth/Throat: Oropharynx is clear and moist    Eyes: Conjunctivae are normal    Opens eyes to verbal stimuli   Neck: Neck supple  Cardiovascular: Normal rate, regular rhythm and normal heart sounds  Pulmonary/Chest: He has no wheezes  Mech ventilation   Abdominal: Soft  He exhibits no distension  JPx2  Dressings c/d/i  Nondistended   Musculoskeletal: He exhibits edema  Neurological:   Intubated and sedated, but opens eyes to verbal stimuli   Hardly follows commands Skin: Skin is warm  Capillary refill takes less than 2 seconds  Vitals reviewed  Vitals   Vitals:    20 0319 20 0400 20 0500 20 0600   BP:  (!) 107/45     Pulse: 83 82 90 96   Resp:  19   Temp:  99 3 °F (37 4 °C) 99 3 °F (37 4 °C) 99 7 °F (37 6 °C)   TempSrc:       SpO2: 100% 100% 99% 100%   Weight:    90 6 kg (199 lb 11 8 oz)   Height:         Temp (24hrs), Av 2 °F (37 3 °C), Min:98 6 °F (37 °C), Max:99 7 °F (37 6 °C)  Current: Temperature: 99 7 °F (37 6 °C)  HR: 96  BP: 134/52  RR: 19  SpO2: 100%    Respiratory:  SpO2: SpO2: 100 %, SpO2 Device: O2 Device: Other (comment)       Invasive/non-invasive ventilation settings   Respiratory    Lab Data (Last 4 hours)    None         O2/Vent Data (Last 4 hours)    None                Height and Weights   Height: 5' 8 5" (174 cm)  IBW: 69 55 kg  Body mass index is 29 93 kg/m²  Weight (last 2 days)     Date/Time   Weight    20 0600   90 6 (199 74)    20 0522   89 1 (196 43)              Intake and Output  I/O        0701 -  0700  07 -  0700    P  O  0 0    I V  (mL/kg) 8607 3 (96 6) 2868 2 (31 7)    Blood 8460     NG/GT 0 0    IV Piggyback 1250 350    Feedings  187    Total Intake(mL/kg) 81078 3 (205 6) 3405 2 (37 6)    Urine (mL/kg/hr) 5650 (2 6) 715 (0 3)    Emesis/NG output 1930 510    Drains 170 85    Stool 0     Blood 1000     Total Output 8750 1310    Net +9567 3 +2095 2          Unmeasured Stool Occurrence 1 x     Unmeasured Emesis Occurrence 3 x         UOP: 715 ml    Nutrition       Diet Orders   (From admission, onward)             Start     Ordered    20 0931  Diet Enteral/Parenteral; Tube Feeding No Oral Diet; Jevity 1 2 [place]; Continuous; 10  Diet effective now     Comments:   Through j-tube   Question Answer Comment   Diet Type Enteral/Parenteral    Enteral/Parenteral Tube Feeding No Oral Diet    Tube Feeding Formula: Jevity 1 2 [place]    Bolus/Cyclic/Continuous Continuous    Tube Feeding Goal Rate (mL/hr): 10    RD to adjust diet per protocol? Yes        01/12/20 0931                Laboratory and Diagnostics:  Results from last 7 days   Lab Units 01/13/20 0422 01/12/20  1044 01/12/20 0818 01/12/20 0447 01/11/20 2236 01/11/20 2036 01/11/20 1951 01/11/20 1850 01/11/20  1803 01/11/20  1408  01/08/20  1425   WBC Thousand/uL 6 91  --   --  3 40* 2 56* 2 75*  --  1 76* 1 71* 5 68   < > 10 67*   HEMOGLOBIN g/dL 9 4* 10 2* 10 4* 10 7* 11 5* 9 4*  --  3 8* 3 0* 8 8*   < > 9 7*   I STAT HEMOGLOBIN g/dl  --   --   --   --   --   --  5 1*  --   --   --    < >  --    HEMATOCRIT % 28 8*  --  29 8* 30 8* 33 8* 28 7*  --  11 9* 9 6* 26 4*   < > 29 5*   HEMATOCRIT, ISTAT %  --   --   --   --   --   --  15*  --   --   --    < >  --    PLATELETS Thousands/uL 152  --   --  151 150 162  --  163 122* 154   < > 309   NEUTROS PCT % 78*  --   --  79* 84*  --   --   --  59 64  --  88*   MONOS PCT % 10  --   --  12 8  --   --   --  15* 11  --  5    < > = values in this interval not displayed       Results from last 7 days   Lab Units 01/13/20 0422 01/12/20 0447 01/11/20 2236 01/11/20 2040 01/11/20 1951 01/11/20 1850 01/11/20  1803 01/11/20  1408  01/08/20  1425   SODIUM mmol/L 147* 143 148* 149*  --  152* 144 138   < > 135*   POTASSIUM mmol/L 3 6 3 1* 3 9 5 0  --  3 3* 3 5 4 1   < > 4 0   CHLORIDE mmol/L 114* 112* 113* 120*  --  115* 114* 110*   < > 105   CO2 mmol/L 29 26 28 23  --  33* 23 24   < > 17*   CO2, I-STAT mmol/L  --   --   --   --  26  --   --   --    < >  --    ANION GAP mmol/L 4 5 7 6  --  4 7 4   < > 13   BUN mg/dL 16 13 13 14  --  16 17 20   < > 19   CREATININE mg/dL 0 59* 0 58* 0 52* 0 45*  --  0 51* 0 46* 0 51*   < > 0 95   CALCIUM mg/dL 7 5* 8 3 8 6 6 9*  --  9 2 6 6* 7 2*   < > 8 5   GLUCOSE RANDOM mg/dL 117 117 180* 191*  --  146* 144* 192*   < > 99   ALT U/L 8* 16 21 15  --   --  6* 9*  --  9*   AST U/L 13 31 33 22  --   --  8 18  --  19   ALK PHOS U/L 43* 39* 47 28*  --   --  18* 39* --  64   ALBUMIN g/dL 1 8* 2 1* 2 5* 1 5*  --   --  0 8* 2 0*  --  2 4*   TOTAL BILIRUBIN mg/dL 0 29 0 77 1 47* 0 66  --   --  0 63 0 55  --  0 37    < > = values in this interval not displayed  Results from last 7 days   Lab Units 01/12/20  0447 01/11/20  2236 01/11/20  1850 01/11/20  1803 01/11/20  1408   MAGNESIUM mg/dL 2 0 1 5* 1 6 1 5* 1 4*   PHOSPHORUS mg/dL  --  3 0 2 5 2 3 3 1      Results from last 7 days   Lab Units 01/11/20 2236 01/11/20  2040 01/11/20  1851 01/11/20  1803 01/11/20  1408   INR  1 09 1 47* 1 55* 2 59* 1 42*   PTT seconds  --  47* >210* 77*  --       Results from last 7 days   Lab Units 01/11/20  0045 01/10/20  1717 01/09/20  0144 01/08/20  2253 01/08/20  1953 01/08/20  1425   TROPONIN I ng/mL <0 02 <0 02 <0 02 <0 02 <0 02 <0 02     Results from last 7 days   Lab Units 01/11/20 2236 01/11/20  1850 01/11/20  1803   LACTIC ACID mmol/L 1 4 3 8* 3 3*     ABG:  Results from last 7 days   Lab Units 01/12/20  1043   PH ART  7 390   PCO2 ART mm Hg 37 8   PO2 ART mm Hg 112 6   HCO3 ART mmol/L 22 4   BASE EXC ART mmol/L -2 3   ABG SOURCE  Line, Arterial     VBG:  Results from last 7 days   Lab Units 01/12/20  1043   ABG SOURCE  Line, Arterial           Micro        EKG:   Imaging:  I have personally reviewed pertinent reports        Active Medications  Scheduled Meds:    Current Facility-Administered Medications:  cefazolin 2,000 mg Intravenous Q8H Vinetta Lorado Petit-Me Last Rate: Stopped (01/13/20 0541)   chlorhexidine 15 mL Swish & Spit Q12H [facility] Abeba Ramírez    fentaNYL 75 mcg/hr Intravenous Continuous Javi Monge MD Last Rate: 75 mcg/hr (01/12/20 1958)   fluconazole 200 mg Intravenous Q24H Vinetta Anton Petit-Me Last Rate: Stopped (01/13/20 0000)   lactated ringers 100 mL/hr Intravenous Continuous Alban Dakin, MD Last Rate: 100 mL/hr (01/12/20 1612)   norepinephrine 1-30 mcg/min Intravenous Titrated Merchristiano French Last Rate: Stopped (01/11/20 2119)   oxyCODONE 10 mg Oral Q4H PRN Chelle Sepulveda MD    oxyCODONE 5 mg Oral Q4H PRN Chelle Sepulveda MD    pantoprozole (PROTONIX) infusion (Continuous) 8 mg/hr Intravenous Continuous Francisco Bottoms Last Rate: 8 mg/hr (01/13/20 0550)   propofol 5-50 mcg/kg/min Intravenous Titrated Francisco Bottoms Last Rate: 30 mcg/kg/min (01/13/20 0201)   sodium bicarbonate 50 mEq Intravenous Once Leobardo Ramírez    sodium bicarbonate 50 mEq Intravenous Once [place] Bottoms    thiamine 100 mg Intravenous Daily Francisco Bottoms Last Rate: 100 mg (01/12/20 0832)   vasopressin (PITRESSIN) in 0 9 % sodium chloride 100 mL 0 04 Units/min Intravenous Continuous [place] Bottoms Last Rate: Stopped (01/11/20 2300)     Continuous Infusions:    fentaNYL 75 mcg/hr Last Rate: 75 mcg/hr (01/12/20 1958)   lactated ringers 100 mL/hr Last Rate: 100 mL/hr (01/12/20 1612)   norepinephrine 1-30 mcg/min Last Rate: Stopped (01/11/20 2119)   pantoprozole (PROTONIX) infusion (Continuous) 8 mg/hr Last Rate: 8 mg/hr (01/13/20 0550)   propofol 5-50 mcg/kg/min Last Rate: 30 mcg/kg/min (01/13/20 0201)   vasopressin (PITRESSIN) in 0 9 % sodium chloride 100 mL 0 04 Units/min Last Rate: Stopped (01/11/20 2300)     PRN Meds:     oxyCODONE 10 mg Q4H PRN   oxyCODONE 5 mg Q4H PRN       Allergies   No Known Allergies    Advance Directive and Living Will:      Power of :    POLST:      Counseling / Coordination of Care  Total Critical Care time spent 35 minutes excluding procedures, teaching and family updates  Candy Rodriguez MD      Portions of the record may have been created with voice recognition software  Occasional wrong word or "sound a like" substitutions may have occurred due to the inherent limitations of voice recognition software    Read the chart carefully and recognize, using context, where substitutions have occurred

## 2020-01-13 NOTE — RESPIRATORY THERAPY NOTE
RT Ventilator Management Note  Gary Boo 81 y o  male MRN: [id number]  Unit/Bed#: [facility] 516-01 Encounter: [id number]      Daily Screen       1/13/2020  0950 1/13/2020  1335          Patient safety screen outcome[de-identified]    Passed      Spont breathing trial % for 30 min:    No      Spont breathing trial outcome[de-identified]  Failed        Spont breathing trial reason failed:  RR < 8 bpm        Previous settings resumed: Yes                Physical Exam:   Assessment Type: (P) Assess only  General Appearance: (P) Awake  Respiratory Pattern: (P) Assisted, Normal  Chest Assessment: (P) Chest expansion symmetrical  Bilateral Breath Sounds: (P) Diminished, Coarse  R Breath Sounds: (P) Diminished, Coarse  L Breath Sounds: (P) Diminished, Coarse  Cough: (P) None  Suction: (P) ET Tube  O2 Device: (P) ventilator      Resp Comments: (P) Pt tolerating current vent settings and appears to be resting comfortably  No vent changes at this time  Will continue to monitor and assess per resp protocol

## 2020-01-14 ENCOUNTER — APPOINTMENT (INPATIENT)
Dept: RADIOLOGY | Facility: HOSPITAL | Age: 70
DRG: 326 | End: 2020-01-14
Payer: MEDICARE

## 2020-01-14 LAB
ALBUMIN SERPL BCP-MCNC: 1.7 G/DL (ref 3.5–5)
ALP SERPL-CCNC: 57 U/L (ref 46–116)
ALT SERPL W P-5'-P-CCNC: 7 U/L (ref 12–78)
ANION GAP SERPL CALCULATED.3IONS-SCNC: 2 MMOL/L (ref 4–13)
ANION GAP SERPL CALCULATED.3IONS-SCNC: 4 MMOL/L (ref 4–13)
APTT PPP: 37 SECONDS (ref 23–37)
AST SERPL W P-5'-P-CCNC: 10 U/L (ref 5–45)
BASOPHILS # BLD AUTO: 0.02 THOUSANDS/ΜL (ref 0–0.1)
BASOPHILS NFR BLD AUTO: 0 % (ref 0–1)
BILIRUB DIRECT SERPL-MCNC: 0.17 MG/DL (ref 0–0.2)
BILIRUB SERPL-MCNC: 0.33 MG/DL (ref 0.2–1)
BUN SERPL-MCNC: 16 MG/DL (ref 5–25)
BUN SERPL-MCNC: 16 MG/DL (ref 5–25)
CA-I BLD-SCNC: 1.04 MMOL/L (ref 1.12–1.32)
CALCIUM SERPL-MCNC: 7.8 MG/DL (ref 8.3–10.1)
CALCIUM SERPL-MCNC: 8.1 MG/DL (ref 8.3–10.1)
CHLORIDE SERPL-SCNC: 105 MMOL/L (ref 100–108)
CHLORIDE SERPL-SCNC: 107 MMOL/L (ref 100–108)
CO2 SERPL-SCNC: 30 MMOL/L (ref 21–32)
CO2 SERPL-SCNC: 31 MMOL/L (ref 21–32)
CREAT SERPL-MCNC: 0.66 MG/DL (ref 0.6–1.3)
CREAT SERPL-MCNC: 0.67 MG/DL (ref 0.6–1.3)
EOSINOPHIL # BLD AUTO: 0.04 THOUSAND/ΜL (ref 0–0.61)
EOSINOPHIL NFR BLD AUTO: 1 % (ref 0–6)
ERYTHROCYTE [DISTWIDTH] IN BLOOD BY AUTOMATED COUNT: 15.2 % (ref 11.6–15.1)
GFR SERPL CREATININE-BSD FRML MDRD: 100 ML/MIN/1.73SQ M
GFR SERPL CREATININE-BSD FRML MDRD: 99 ML/MIN/1.73SQ M
GLUCOSE SERPL-MCNC: 154 MG/DL (ref 65–140)
GLUCOSE SERPL-MCNC: 169 MG/DL (ref 65–140)
HCT VFR BLD AUTO: 29 % (ref 36.5–49.3)
HGB BLD-MCNC: 9.3 G/DL (ref 12–17)
IMM GRANULOCYTES # BLD AUTO: 0.07 THOUSAND/UL (ref 0–0.2)
IMM GRANULOCYTES NFR BLD AUTO: 1 % (ref 0–2)
INR PPP: 1.18 (ref 0.84–1.19)
LYMPHOCYTES # BLD AUTO: 0.64 THOUSANDS/ΜL (ref 0.6–4.47)
LYMPHOCYTES NFR BLD AUTO: 7 % (ref 14–44)
MAGNESIUM SERPL-MCNC: 1.9 MG/DL (ref 1.6–2.6)
MCH RBC QN AUTO: 28.8 PG (ref 26.8–34.3)
MCHC RBC AUTO-ENTMCNC: 32.1 G/DL (ref 31.4–37.4)
MCV RBC AUTO: 90 FL (ref 82–98)
MONOCYTES # BLD AUTO: 0.98 THOUSAND/ΜL (ref 0.17–1.22)
MONOCYTES NFR BLD AUTO: 11 % (ref 4–12)
NEUTROPHILS # BLD AUTO: 6.85 THOUSANDS/ΜL (ref 1.85–7.62)
NEUTS SEG NFR BLD AUTO: 80 % (ref 43–75)
NRBC BLD AUTO-RTO: 0 /100 WBCS
PHOSPHATE SERPL-MCNC: 1.9 MG/DL (ref 2.3–4.1)
PLATELET # BLD AUTO: 186 THOUSANDS/UL (ref 149–390)
PMV BLD AUTO: 10.2 FL (ref 8.9–12.7)
POTASSIUM SERPL-SCNC: 3.2 MMOL/L (ref 3.5–5.3)
POTASSIUM SERPL-SCNC: 3.3 MMOL/L (ref 3.5–5.3)
PROT SERPL-MCNC: 4.8 G/DL (ref 6.4–8.2)
PROTHROMBIN TIME: 14.6 SECONDS (ref 11.6–14.5)
RBC # BLD AUTO: 3.23 MILLION/UL (ref 3.88–5.62)
SODIUM SERPL-SCNC: 139 MMOL/L (ref 136–145)
SODIUM SERPL-SCNC: 140 MMOL/L (ref 136–145)
WBC # BLD AUTO: 8.6 THOUSAND/UL (ref 4.31–10.16)

## 2020-01-14 PROCEDURE — 94003 VENT MGMT INPAT SUBQ DAY: CPT

## 2020-01-14 PROCEDURE — 80076 HEPATIC FUNCTION PANEL: CPT | Performed by: PHYSICIAN ASSISTANT

## 2020-01-14 PROCEDURE — 85610 PROTHROMBIN TIME: CPT | Performed by: PHYSICIAN ASSISTANT

## 2020-01-14 PROCEDURE — 83735 ASSAY OF MAGNESIUM: CPT | Performed by: PHYSICIAN ASSISTANT

## 2020-01-14 PROCEDURE — 86677 HELICOBACTER PYLORI ANTIBODY: CPT | Performed by: INTERNAL MEDICINE

## 2020-01-14 PROCEDURE — NC001 PR NO CHARGE: Performed by: SURGERY

## 2020-01-14 PROCEDURE — 94640 AIRWAY INHALATION TREATMENT: CPT

## 2020-01-14 PROCEDURE — 85730 THROMBOPLASTIN TIME PARTIAL: CPT | Performed by: PHYSICIAN ASSISTANT

## 2020-01-14 PROCEDURE — C9113 INJ PANTOPRAZOLE SODIUM, VIA: HCPCS | Performed by: INTERNAL MEDICINE

## 2020-01-14 PROCEDURE — 99233 SBSQ HOSP IP/OBS HIGH 50: CPT | Performed by: SURGERY

## 2020-01-14 PROCEDURE — C9113 INJ PANTOPRAZOLE SODIUM, VIA: HCPCS | Performed by: ORTHOPAEDIC SURGERY

## 2020-01-14 PROCEDURE — 84100 ASSAY OF PHOSPHORUS: CPT | Performed by: PHYSICIAN ASSISTANT

## 2020-01-14 PROCEDURE — 80048 BASIC METABOLIC PNL TOTAL CA: CPT | Performed by: NURSE PRACTITIONER

## 2020-01-14 PROCEDURE — 80048 BASIC METABOLIC PNL TOTAL CA: CPT | Performed by: PHYSICIAN ASSISTANT

## 2020-01-14 PROCEDURE — 99232 SBSQ HOSP IP/OBS MODERATE 35: CPT | Performed by: INTERNAL MEDICINE

## 2020-01-14 PROCEDURE — 71045 X-RAY EXAM CHEST 1 VIEW: CPT

## 2020-01-14 PROCEDURE — 82330 ASSAY OF CALCIUM: CPT | Performed by: PHYSICIAN ASSISTANT

## 2020-01-14 PROCEDURE — 85025 COMPLETE CBC W/AUTO DIFF WBC: CPT | Performed by: PHYSICIAN ASSISTANT

## 2020-01-14 PROCEDURE — 94760 N-INVAS EAR/PLS OXIMETRY 1: CPT

## 2020-01-14 RX ORDER — GUAIFENESIN 600 MG
600 TABLET, EXTENDED RELEASE 12 HR ORAL EVERY 12 HOURS PRN
Status: DISCONTINUED | OUTPATIENT
Start: 2020-01-14 | End: 2020-02-11 | Stop reason: HOSPADM

## 2020-01-14 RX ORDER — PANTOPRAZOLE SODIUM 40 MG/1
40 INJECTION, POWDER, FOR SOLUTION INTRAVENOUS EVERY 12 HOURS SCHEDULED
Status: DISCONTINUED | OUTPATIENT
Start: 2020-01-14 | End: 2020-01-15

## 2020-01-14 RX ORDER — ALBUTEROL SULFATE 2.5 MG/3ML
2.5 SOLUTION RESPIRATORY (INHALATION) EVERY 4 HOURS PRN
Status: DISCONTINUED | OUTPATIENT
Start: 2020-01-14 | End: 2020-01-20

## 2020-01-14 RX ORDER — FOLIC ACID 1 MG/1
1 TABLET ORAL DAILY
Status: DISCONTINUED | OUTPATIENT
Start: 2020-01-14 | End: 2020-01-14

## 2020-01-14 RX ORDER — NICOTINE 21 MG/24HR
1 PATCH, TRANSDERMAL 24 HOURS TRANSDERMAL DAILY
Status: DISCONTINUED | OUTPATIENT
Start: 2020-01-14 | End: 2020-01-14

## 2020-01-14 RX ORDER — FLUTICASONE PROPIONATE 50 MCG
2 SPRAY, SUSPENSION (ML) NASAL DAILY
Status: DISCONTINUED | OUTPATIENT
Start: 2020-01-14 | End: 2020-02-11 | Stop reason: HOSPADM

## 2020-01-14 RX ORDER — MIRTAZAPINE 15 MG/1
15 TABLET, FILM COATED ORAL
Status: DISCONTINUED | OUTPATIENT
Start: 2020-01-14 | End: 2020-02-11 | Stop reason: HOSPADM

## 2020-01-14 RX ORDER — HEPARIN SODIUM 5000 [USP'U]/ML
5000 INJECTION, SOLUTION INTRAVENOUS; SUBCUTANEOUS EVERY 8 HOURS SCHEDULED
Status: DISCONTINUED | OUTPATIENT
Start: 2020-01-14 | End: 2020-02-11 | Stop reason: HOSPADM

## 2020-01-14 RX ORDER — THIAMINE MONONITRATE (VIT B1) 100 MG
100 TABLET ORAL DAILY
Status: DISCONTINUED | OUTPATIENT
Start: 2020-01-14 | End: 2020-02-11 | Stop reason: HOSPADM

## 2020-01-14 RX ORDER — ALBUTEROL SULFATE 90 UG/1
2 AEROSOL, METERED RESPIRATORY (INHALATION) EVERY 4 HOURS PRN
Status: DISCONTINUED | OUTPATIENT
Start: 2020-01-14 | End: 2020-01-14

## 2020-01-14 RX ORDER — QUETIAPINE FUMARATE 300 MG/1
600 TABLET, FILM COATED ORAL
Status: DISCONTINUED | OUTPATIENT
Start: 2020-01-14 | End: 2020-02-11 | Stop reason: HOSPADM

## 2020-01-14 RX ORDER — MAGNESIUM SULFATE HEPTAHYDRATE 40 MG/ML
2 INJECTION, SOLUTION INTRAVENOUS ONCE
Status: COMPLETED | OUTPATIENT
Start: 2020-01-14 | End: 2020-01-14

## 2020-01-14 RX ORDER — FUROSEMIDE 10 MG/ML
40 INJECTION INTRAMUSCULAR; INTRAVENOUS ONCE
Status: COMPLETED | OUTPATIENT
Start: 2020-01-14 | End: 2020-01-14

## 2020-01-14 RX ORDER — POTASSIUM CHLORIDE 29.8 MG/ML
40 INJECTION INTRAVENOUS ONCE
Status: COMPLETED | OUTPATIENT
Start: 2020-01-14 | End: 2020-01-14

## 2020-01-14 RX ORDER — CALCIUM GLUCONATE 20 MG/ML
1 INJECTION, SOLUTION INTRAVENOUS ONCE
Status: COMPLETED | OUTPATIENT
Start: 2020-01-14 | End: 2020-01-14

## 2020-01-14 RX ORDER — MELATONIN
1000 DAILY
Status: DISCONTINUED | OUTPATIENT
Start: 2020-01-14 | End: 2020-02-11 | Stop reason: HOSPADM

## 2020-01-14 RX ORDER — FLUTICASONE FUROATE AND VILANTEROL 100; 25 UG/1; UG/1
1 POWDER RESPIRATORY (INHALATION) DAILY
Status: DISCONTINUED | OUTPATIENT
Start: 2020-01-14 | End: 2020-02-11 | Stop reason: HOSPADM

## 2020-01-14 RX ORDER — AMLODIPINE BESYLATE 10 MG/1
10 TABLET ORAL DAILY
Status: DISCONTINUED | OUTPATIENT
Start: 2020-01-14 | End: 2020-01-16

## 2020-01-14 RX ORDER — METOPROLOL TARTRATE 50 MG/1
50 TABLET, FILM COATED ORAL DAILY
Status: DISCONTINUED | OUTPATIENT
Start: 2020-01-14 | End: 2020-01-16

## 2020-01-14 RX ORDER — QUETIAPINE FUMARATE 100 MG/1
200 TABLET, FILM COATED ORAL DAILY
Status: DISCONTINUED | OUTPATIENT
Start: 2020-01-14 | End: 2020-02-11 | Stop reason: HOSPADM

## 2020-01-14 RX ORDER — ATORVASTATIN CALCIUM 40 MG/1
40 TABLET, FILM COATED ORAL
Status: DISCONTINUED | OUTPATIENT
Start: 2020-01-14 | End: 2020-02-11 | Stop reason: HOSPADM

## 2020-01-14 RX ORDER — GABAPENTIN 300 MG/1
300 CAPSULE ORAL 3 TIMES DAILY
Status: DISCONTINUED | OUTPATIENT
Start: 2020-01-14 | End: 2020-02-11 | Stop reason: HOSPADM

## 2020-01-14 RX ORDER — NICOTINE 21 MG/24HR
1 PATCH, TRANSDERMAL 24 HOURS TRANSDERMAL DAILY
Status: DISCONTINUED | OUTPATIENT
Start: 2020-01-14 | End: 2020-02-11 | Stop reason: HOSPADM

## 2020-01-14 RX ORDER — TRAZODONE HYDROCHLORIDE 50 MG/1
50 TABLET ORAL
Status: DISCONTINUED | OUTPATIENT
Start: 2020-01-14 | End: 2020-02-11 | Stop reason: HOSPADM

## 2020-01-14 RX ADMIN — MELATONIN 1000 UNITS: at 16:19

## 2020-01-14 RX ADMIN — CHLORHEXIDINE GLUCONATE 0.12% ORAL RINSE 15 ML: 1.2 LIQUID ORAL at 09:19

## 2020-01-14 RX ADMIN — HEPARIN SODIUM 5000 UNITS: 5000 INJECTION INTRAVENOUS; SUBCUTANEOUS at 14:28

## 2020-01-14 RX ADMIN — CEFAZOLIN SODIUM 2000 MG: 2 SOLUTION INTRAVENOUS at 03:48

## 2020-01-14 RX ADMIN — FUROSEMIDE 40 MG: 10 INJECTION, SOLUTION INTRAMUSCULAR; INTRAVENOUS at 12:30

## 2020-01-14 RX ADMIN — ATORVASTATIN CALCIUM 40 MG: 40 TABLET, FILM COATED ORAL at 16:17

## 2020-01-14 RX ADMIN — FLUCONAZOLE 200 MG: 2 INJECTION, SOLUTION INTRAVENOUS at 01:18

## 2020-01-14 RX ADMIN — FOLIC ACID 1 MG: 5 INJECTION, SOLUTION INTRAMUSCULAR; INTRAVENOUS; SUBCUTANEOUS at 09:19

## 2020-01-14 RX ADMIN — SODIUM CHLORIDE 80 MG: 9 INJECTION, SOLUTION INTRAVENOUS at 09:18

## 2020-01-14 RX ADMIN — ACETAMINOPHEN 650 MG: 325 TABLET ORAL at 22:06

## 2020-01-14 RX ADMIN — MIRTAZAPINE 15 MG: 15 TABLET, FILM COATED ORAL at 22:06

## 2020-01-14 RX ADMIN — ALBUTEROL SULFATE 2.5 MG: 2.5 SOLUTION RESPIRATORY (INHALATION) at 15:10

## 2020-01-14 RX ADMIN — GABAPENTIN 300 MG: 300 CAPSULE ORAL at 22:00

## 2020-01-14 RX ADMIN — METOPROLOL TARTRATE 50 MG: 50 TABLET, FILM COATED ORAL at 16:17

## 2020-01-14 RX ADMIN — FLUCONAZOLE 200 MG: 2 INJECTION, SOLUTION INTRAVENOUS at 23:11

## 2020-01-14 RX ADMIN — MAGNESIUM SULFATE HEPTAHYDRATE 2 G: 40 INJECTION, SOLUTION INTRAVENOUS at 09:19

## 2020-01-14 RX ADMIN — SODIUM PHOSPHATE, MONOBASIC, MONOHYDRATE 12 MMOL: 276; 142 INJECTION, SOLUTION INTRAVENOUS at 09:23

## 2020-01-14 RX ADMIN — Medication 50 MCG/HR: at 06:16

## 2020-01-14 RX ADMIN — FLUTICASONE FUROATE AND VILANTEROL TRIFENATATE 1 PUFF: 100; 25 POWDER RESPIRATORY (INHALATION) at 16:18

## 2020-01-14 RX ADMIN — NICOTINE 1 PATCH: 14 PATCH TRANSDERMAL at 16:18

## 2020-01-14 RX ADMIN — CEFAZOLIN SODIUM 2000 MG: 2 SOLUTION INTRAVENOUS at 20:05

## 2020-01-14 RX ADMIN — TRAZODONE HYDROCHLORIDE 50 MG: 50 TABLET ORAL at 23:03

## 2020-01-14 RX ADMIN — FUROSEMIDE 40 MG: 10 INJECTION, SOLUTION INTRAMUSCULAR; INTRAVENOUS at 17:49

## 2020-01-14 RX ADMIN — HEPARIN SODIUM 5000 UNITS: 5000 INJECTION INTRAVENOUS; SUBCUTANEOUS at 22:06

## 2020-01-14 RX ADMIN — CEFAZOLIN SODIUM 2000 MG: 2 SOLUTION INTRAVENOUS at 12:30

## 2020-01-14 RX ADMIN — PANTOPRAZOLE SODIUM 40 MG: 40 INJECTION, POWDER, FOR SOLUTION INTRAVENOUS at 12:30

## 2020-01-14 RX ADMIN — THIAMINE HCL TAB 100 MG 100 MG: 100 TAB at 16:17

## 2020-01-14 RX ADMIN — THIAMINE HYDROCHLORIDE 100 MG: 100 INJECTION, SOLUTION INTRAMUSCULAR; INTRAVENOUS at 09:19

## 2020-01-14 RX ADMIN — GABAPENTIN 300 MG: 300 CAPSULE ORAL at 16:17

## 2020-01-14 RX ADMIN — CALCIUM GLUCONATE 1 G: 20 INJECTION, SOLUTION INTRAVENOUS at 12:30

## 2020-01-14 RX ADMIN — PANTOPRAZOLE SODIUM 40 MG: 40 INJECTION, POWDER, FOR SOLUTION INTRAVENOUS at 22:00

## 2020-01-14 RX ADMIN — CHLORHEXIDINE GLUCONATE 0.12% ORAL RINSE 15 ML: 1.2 LIQUID ORAL at 22:00

## 2020-01-14 RX ADMIN — POTASSIUM CHLORIDE 40 MEQ: 29.8 INJECTION, SOLUTION INTRAVENOUS at 09:19

## 2020-01-14 RX ADMIN — HEPARIN SODIUM 5000 UNITS: 5000 INJECTION INTRAVENOUS; SUBCUTANEOUS at 09:25

## 2020-01-14 RX ADMIN — QUETIAPINE FUMARATE 600 MG: 300 TABLET ORAL at 20:05

## 2020-01-14 RX ADMIN — AMLODIPINE BESYLATE 10 MG: 10 TABLET ORAL at 16:17

## 2020-01-14 NOTE — PROGRESS NOTES
Progress Note - General Surgery   Mitcheal Farzana 81 y o  male MRN: [id number]  Unit/Bed#: [facility] 516-01 Encounter: [id number]    Assessment:  80yM w/ bleeding and perforated 1st portion duodenal ulcer  1/11 S/p EGD  1/11 S/p IR GDA embolozation   1/11 S/p Exlap, oversewing bleeding vessels, closure of tissue over perforation, Thal patch with transverse colon, open gastrostomy-jejunostomy tube placement      Plan:  Diet Enteral/Parenteral; Tube Feeding No Oral Diet; Jevity 1 2 [place]; Continuous; 68  Continue G-tube to gravity, Will plan for UGI study on POD 5  Advance TFs through J-tube as tolerated  Strict I/Os  Continue Protonix  Anemia:  Continue to trend daily hemoglobin, hemoglobin appears stable, consider starting DVT prophylaxis  Pain and Nausea control PRN  Monitor for alcohol withdrawal      Subjective/Objective   Subjective:  Nothing acute overnight  Afebrile  Good urine output in response to Lasix  Objective:     Blood pressure 141/78, pulse 88, temperature 99 7 °F (37 6 °C), resp  rate 18, height 5' 8 5" (1 74 m), weight 90 6 kg (199 lb 11 8 oz), SpO2 96 %  ,Body mass index is 29 93 kg/m²  I/O       01/12 0701 - 01/13 0700 01/13 0701 - 01/14 0700    P  O  0 0    I V  (mL/kg) 2868 2 (31 7) 1070 1 (11 8)    NG/GT 0 70    IV Piggyback 350 545    Feedings 187 266    Total Intake(mL/kg) 3405 2 (37 6) 1951 1 (21 5)    Urine (mL/kg/hr) 715 (0 3) 3061 (1 4)    Emesis/NG output 510 331    Drains 85 48    Total Output 1310 3440    Net +2095 2 -1488 9                  Invasive Devices     Central Venous Catheter Line            CVC Central Lines 01/11/20 Triple 2 days          Arterial Line            Arterial Line 01/11/20 Radial 2 days          Drain            Closed/Suction Drain Right Abdomen Bulb 2 days    Closed/Suction Drain Right Abdomen Bulb 19 Fr  2 days    NG/OG/Enteral Tube Enteral Feeding Tube   2 days    Urethral Catheter Temperature probe 2 days          Airway            ETT  Cuffed 8 mm 2 days Respiratory    Lab Data (Last 4 hours)    None         O2/Vent Data (Last 4 hours)      01/14 0411           Vent Mode AC/VC       Resp Rate (BPM) (BPM) 12       Vt (mL) (mL) 500       FIO2 (%) (%) 40       PEEP (cmH2O) (cmH2O) 5       MV 14                     Physical Exam:   Gen: NAD, A&O, Comfortable   Chest: Normal work of breathing, no resp distress  Abd:  Soft, moderately distended, nontender, incision CDI with esvin, Danyel-Maharaj drains x2 with minimal serosanguineous output  Ext: 1+ edema  Skin: warm, dry, intact        Lab, Imaging and other studies:  Recent Labs     01/12/20  0447  01/12/20  1044 01/13/20  0422 01/14/20  0458   WBC 3 40*  --   --  6 91 8 60   HGB 10 7*   < > 10 2* 9 4* 9 3*     --   --  152 186    < > = values in this interval not displayed  Recent Labs     01/11/20  1850  01/11/20  2236 01/12/20  0447 01/13/20  0422 01/13/20  1413 01/14/20  0458   SODIUM 152*   < > 148* 143 147* 141 139   K 3 3*   < > 3 9 3 1* 3 6 3 3* 3 2*   *   < > 113* 112* 114* 109* 107   CO2 33*   < > 28 26 29 29 30   BUN 16   < > 13 13 16 15 16   CREATININE 0 51*   < > 0 52* 0 58* 0 59* 0 67 0 66   PHOS 2 5  --  3 0  --   --   --  1 9*   MG 1 6  --  1 5* 2 0  --   --  1 9   CALCIUM 9 2   < > 8 6 8 3 7 5* 7 8* 7 8*    < > = values in this interval not displayed  COAG - PT/INR/PTT: 14 1/1 13/35 (01/13 1146)     Procedure: Xr Chest Portable    Result Date: 1/13/2020  Narrative: CHEST INDICATION:   effusion  COMPARISON:  None EXAM PERFORMED/VIEWS:  XR CHEST PORTABLE FINDINGS:  Right IJ catheter  Endotracheal tube in place  Cardiomediastinal silhouette appears unremarkable  Moderate left pleural effusion  No pneumothorax  Mild pulmonary vascular congestion  Osseous structures appear within normal limits for patient age  Impression: Mild pulmonary vascular congestion with moderate sized left pleural effusion   Workstation performed: [id number]     Procedure: Xr Chest Portable    Result Date: 1/13/2020  Narrative: CHEST INDICATION:   s/p intubation    COMPARISON:  1/11/2020 EXAM PERFORMED/VIEWS:  XR CHEST PORTABLE FINDINGS:  Endotracheal and nasogastric tubes in satisfactory position  Right central line tip at the caval atrial junction as before  Cardiomegaly is stable  Mild pulmonary edema pattern with left basilar pleural effusion  No pneumothorax  Osseous structures appear within normal limits for patient age  Impression: Tubes and lines as above without pneumothorax  Pulmonary edema with left basilar pleural effusion  Workstation performed: [id number]     Procedure: Xr Chest Portable    Result Date: 1/12/2020  Narrative: CHEST INDICATION:   central line placement  COMPARISON:  Chest x-ray from 1/10/2020  EXAM PERFORMED/VIEWS:  XR CHEST PORTABLE FINDINGS:  Right IJ central line has been placed with tip at the cavoatrial junction  Evaluation of the mediastinum is limited due to rotation on the film  Unchanged linear atelectasis in the lingula  No pneumothorax  No pleural effusion  Osseous structures appear within normal limits for patient age  Impression: Right IJ central line has been placed with tip at the cavoatrial junction  No pneumothorax  Unchanged linear atelectasis in the lingula  Workstation performed: [id number]     Procedure: Cta Abdomen Pelvis W Wo Contrast    Result Date: 1/12/2020  Narrative: CT ANGIOGRAM OF THE ABDOMEN AND PELVIS WITH AND WITHOUT IV CONTRAST INDICATION:  Abdominal distention  Status post embolization for duodenal bleed  Shock  COMPARISON: None  TECHNIQUE:  CT angiogram examination of the abdomen and pelvis was performed according to standard protocol  This examination, like all CT scans performed in the [facility], was performed utilizing techniques to minimize radiation dose exposure, including the use of iterative reconstruction and automated exposure control     Contrast as well as noncontrast images were obtained  Rad dose 4245 75 mGy-cm IV Contrast:  100 mL of iodixanol (VISIPAQUE) Enteric Contrast:  None FINDINGS: VASCULAR STRUCTURES:  Continued active extravasation is seen in the duodenum  There is no source vessel identified  Bilateral external iliac artery occlusion  High density material in the 3rd and 4th portions of the duodenum were likely related to left over contrast from arteriogram   IVC filter in place  OTHER FINDINGS ABDOMEN LOWER CHEST:  Minor bibasilar atelectasis and tiny right effusion LIVER/BILIARY TREE:  Biliary ductal dilatation, mild  May be related to duodenal process  GALLBLADDER:  Surgically absent SPLEEN:  Unremarkable  Normal size  PANCREAS:  Unremarkable  ADRENAL GLANDS:  Left adrenal gland diffusely thickened without focal nodule KIDNEYS/URETERS:  Tiny nonobstructive calculi bilaterally  Numerous low-density lesions in both kidneys too small accurately characterize  PELVIS REPRODUCTIVE ORGANS:  Unremarkable for patient's age  URINARY BLADDER:  Balderas in place ADDITIONAL ABDOMINAL AND PELVIC STRUCTURES STOMACH AND BOWEL:  Stomach and duodenum are diffusely distended with high density material likely acute hemorrhage and clot  NG tube in place  Small bowel is diffusely distended  Diffuse gaseous distention of the colon  ABDOMINOPELVIC CAVITY:  Small amount of free intra-abdominal fluid  Considerable fluid/inflammatory changes around the duodenum  No shayy free intraperitoneal air  ABDOMINAL WALL/INGUINAL REGIONS:  Unremarkable  OSSEOUS STRUCTURES:  No acute fracture or destructive osseous lesion  Impression: 1  Continued active arterial extravasation in the 2nd portion of the duodenum despite satisfactory embolization of GDA bleed  No active bleeding was seen post embolization and no flow was seen angiographically through the coil pack  No source vessel is identified for this continued bleed on the CT    It is possible there could be continued bleeding through coil pack due to coagulopathy or another source vessel not identified by arteriography  Findings were discussed with Dr David Awan with critical care  2   Abdominal distention due to diffusely distended stomach and duodenum filled with acute hemorrhage and clot as well as fluid distention of the small bowel and gaseous distention of the colon 3  Bilateral external iliac artery occlusion Workstation performed: [id number]     Procedure: Mri Brain Wo Contrast    Result Date: 1/11/2020  Narrative: MRI BRAIN WITHOUT CONTRAST INDICATION: stroke like symptoms  COMPARISON:   May 2, 2019 TECHNIQUE:  Sagittal T1, axial T2, axial FLAIR, axial T1, axial Wingate and axial diffusion imaging  IMAGE QUALITY:  Diagnostic  FINDINGS: BRAIN PARENCHYMA:  There is no discrete mass, mass effect or midline shift  There is no intracranial hemorrhage  There is no evidence of acute infarction and diffusion imaging is unremarkable  Small scattered hyperintensities on T2/FLAIR imaging are noted in the periventricular and subcortical white matter demonstrating an appearance that is statistically most likely to represent moderate microangiopathic change are reidentified  VENTRICLES:  Ventricles are enlarged consistent with the degree of chronic microangiopathic change and cerebral volume loss  SELLA AND PITUITARY GLAND:  Normal  ORBITS:  Normal  PARANASAL SINUSES:  Normal  VASCULATURE:  Evaluation of the major intracranial vasculature demonstrates appropriate flow voids  CALVARIUM AND SKULL BASE:  Normal  EXTRACRANIAL SOFT TISSUES:  Normal      Impression: No acute intracranial ischemia  No intracranial mass or intracranial hemorrhage  Scattered white matter change consistent with chronic microangiopathy, similar from prior examination Workstation performed: [id number]     Procedure: Ir Visceral Angiography / Intervention    Result Date: 1/12/2020  Narrative: IR VISCERAL ANGIOGRAPHY / INTERVENTION Indication: Massive upper GI bleed    Active extravasation identified from duodenal ulcer on EGD  Patient presents hemodynamically unstable on pressure support, intubated  Procedure: The right groin was prepped and draped in sterile fashion  Ultrasound guidance was used for access due to nonpalpable pulses  The right groin was surveyed with ultrasound to assess for vessel patency  The right common femoral artery was punctured using a 21gauge needle and direct sonographic guidance  A static sonographic image was saved demonstrating needle entry  The common femoral artery is diseased but patent  Wire could not be advanced into the external iliac and micropuncture dilator was placed  Arteriography was performed of the right groin  This access was abandoned  Ultrasound evaluation was performed of the left iliac system which could not confirm external iliac patency and left-sided access was therefore not attempted  Decision was made to proceed with left radial access  Left radial artery is patent though also appears catheter is chronic  There may been attempt at a line placement as well  The artery was accessed successfully with ultrasound guidance and a 5-Bulgarian slender sheath was placed  A 4-Bulgarian glide catheter and Glidewire were used to traverse narrowed segment of the brachial artery at the elbow and a 5-Bulgarian Photolitec catheter was advanced into the descending thoracic aorta over a Bentson wire  Oblique arteriography of the abdomen was performed with hand-injection  The celiac artery was selected with a catheter and selective celiac arteriography was performed  A 3-Bulgarian Lantern microcatheter was advanced into the celiac and further into the gastroduodenal artery  GDA arteriography was performed  The catheter was advanced over a double angle glide wire across a segment of active extravasation into the gastroepiploic artery  Gastroepiploic arteriography was performed    Coil embolization was performed from the gastroepiploic back into the mid to proximal GDA using 3 mm POD coil and packing coil  Repeat arteriography was performed  Catheters were removed  Sheath was removed and patent hemostasis achieved with a radial band  Fluoroscopy time (min) : 15  4MIN Images: Multiple processed and unprocessed fluoroscopic images Contrast (ml) : 52 mL Visipaque 320  Sedation (min) : N/A Findings: Right external iliac artery is occluded  Left external iliac artery suspected by ultrasound  Left radial access was used for procedure  There is brisk active bleeding identified from the distal gastroduodenal artery directly into the duodenum  The focus of extravasation was crossed successfully with a microcatheter and coil embolization was performed from the gastroepiploic to the [address]  There is no flow seen across the coil pack at completion  No other site of extravasation was identified prior to or subsequent to GDA embolization  The patient remained hemodynamically unstable throughout the procedure actively bleeding from the mouth  His pressure improved transiently following embolization  His abdomen had progressively distended throughout the procedure and he was taken for CT to evaluate cause of distention  Impression: Impression: Brisk active bleeding from the distal gastroduodenal artery successfully treated with coil embolization of the GDA across the focus of extravasation  No other site of bleeding is identified prior to or subsequent to GDA embolization   Workstation performed: [id number]       VTE Pharmacologic Prophylaxis: Sequential compression device (Venodyne)   VTE Mechanical Prophylaxis: sequential compression device

## 2020-01-14 NOTE — PROGRESS NOTES
Progress Note- Adrianna Hatch 81 y o  male MRN: [id number]    Unit/Bed#: [facility] 899-64 Encounter: [id number]    Assessment and Plan:  Adrianna Hatch is a 81 y o  old male who presents with syncope, acute blood loss anemia from perforated duodenal ulcer        Duodenal ulceration with perforation  Status post EGD 1/11 with large excavated duodenal ulcer with pulsating vessel, injected peripherally with epinephrine, 2 clips deployed and hemo spray with persistent bleeding  IR embolization 1/11 with persistent bleeding  Ex lap, over-sewing bleeding vessels, Thal patch with transverse colon, open gastrostomy-jejunostomy with tube placement 1/11  Status post 18 units PRBCs, 5 units platelets, 3 units cryo, 6 units FFP  Hemoglobin stable at 9 3   Tube feedings at goal, CAMPBELL drains with 20cc and 28 cc output  Plan: Will check H pylori IGG and follow up result outpatient  Will treat for eradication of H pylori if returns positive   Continue PPI BID on discharge   Will follow up as outpatient   Will sign off, please call with any questions or concerns       ______________________________________________________________________    Subjective:  Patient seen examined at bedside  Patient is intubated but awake and alert  Following commands in all 4 extremities  Reports mild abdominal pain with palpation in the epigastric area    No acute overnight events    Medication Administration - last 24 hours from 01/13/2020 1121 to 01/14/2020 1121       Date/Time Order Dose Route Action Action by     01/13/2020 1130 lactated ringers infusion 0 mL/hr Intravenous Stopped Hermila Roberts RN     01/13/2020 1606 propofol (DIPRIVAN) 1000 mg in 100 mL infusion (premix) 0 mcg/kg/min Intravenous Stopped Hermila Roberts RN     01/13/2020 1330 propofol (DIPRIVAN) 1000 mg in 100 mL infusion (premix) 0 mcg/kg/min Intravenous Hold Hermila Roberts RN     01/13/2020 1200 propofol (DIPRIVAN) 1000 mg in 100 mL infusion (premix) 15 mcg/kg/min Intravenous Rate/Dose Change Hermila Cramer, GARO     01/14/2020 0919 chlorhexidine (PERIDEX) 0 12 % oral rinse 15 mL 15 mL Swish & Spit Given Gigi Dobbins, GARO     01/13/2020 2114 chlorhexidine (PERIDEX) 0 12 % oral rinse 15 mL 15 mL Swish & Spit Given Eunice Gandara, [facility]     01/14/2020 2699 thiamine (VITAMIN B1) 100 mg in sodium chloride 0 9 % 50 mL IVPB 0 mg Intravenous Stopped Alessandra Garsia, GARO     01/14/2020 0919 thiamine (VITAMIN B1) 100 mg in sodium chloride 0 9 % 50 mL IVPB 100 mg Intravenous [address] [place], GARO     01/14/2020 0715 fentaNYL 1000 mcg in sodium chloride 0 9% 100mL infusion 0 mcg/hr Intravenous Hold Alessandra Garsia, RN     01/14/2020 2584 fentaNYL 1000 mcg in sodium chloride 0 9% 100mL infusion 50 mcg/hr Intravenous 125 [facility], [address] [place] [address]     01/13/2020 1600 fentaNYL 1000 mcg in sodium chloride 0 9% 100mL infusion 50 mcg/hr Intravenous Rate/Dose Change Hermila Cramer, GARO     01/13/2020 1330 fentaNYL 1000 mcg in sodium chloride 0 9% 100mL infusion 0 mcg/hr Intravenous Hold Hermila Cramer, RN     01/14/2020 0447 ceFAZolin (ANCEF) IVPB (premix) 2,000 mg 0 mg Intravenous Stopped Sung Acevedo RN     01/14/2020 0348 ceFAZolin (ANCEF) IVPB (premix) 2,000 mg 2,000 mg Intravenous [facility] Austinforeign, RN     01/13/2020 2000 ceFAZolin (ANCEF) IVPB (premix) 2,000 mg 0 mg Intravenous Stopped Eunice Gandara RN     01/13/2020 1949 ceFAZolin (ANCEF) IVPB (premix) 2,000 mg 2,000 mg Intravenous [address] Eunice Gandara RN     01/13/2020 1258 ceFAZolin (ANCEF) IVPB (premix) 2,000 mg 0 mg Intravenous Stopped Eileen Valverde RN     01/13/2020 1228 ceFAZolin (ANCEF) IVPB (premix) 2,000 mg 2,000 mg Intravenous New Bag Hermila Cramer RN     01/14/2020 0230 fluconazole (DIFLUCAN) IVPB (premix) 200 mg 0 mg Intravenous Stopped Maryam Almonte RN     01/14/2020 0118 fluconazole (DIFLUCAN) IVPB (premix) 200 mg 200 mg Intravenous New Fan Almonte RN     01/13/2020 1739 oxyCODONE (ROXICODONE) IR tablet 5 mg 5 mg Oral Given Deniz Gomez RN     01/14/2020 0918 pantoprazole (PROTONIX) 80 mg in sodium chloride 0 9 % 100 mL IVPB 80 mg Intravenous [address] GARO Price     01/13/2020 2114 pantoprazole (PROTONIX) 80 mg in sodium chloride 0 9 % 100 mL IVPB 80 mg Intravenous [address] Taylor Dsouza RN     01/13/2020 1139 pantoprazole (PROTONIX) 80 mg in sodium chloride 0 9 % 100 mL IVPB 0 mg Intravenous Stopped Hermila Roberts RN     01/13/2020 1124 pantoprazole (PROTONIX) 80 mg in sodium chloride 0 9 % 100 mL IVPB 80 mg Intravenous [place] [address] [facility]     65/08/2976 3582 folic acid 1 mg in sodium chloride 0 9 % 50 mL IVPB 1 mg Intravenous New Bag [facility] GARO Price     94/18/6861 9451 folic acid 1 mg in sodium chloride 0 9 % 50 mL IVPB 0 mg Intravenous Stopped Deniz Gomez RN     35/45/8371 2893 folic acid 1 mg in sodium chloride 0 9 % 50 mL IVPB 1 mg Intravenous New Bag Hermila Easley RN     01/13/2020 1739 furosemide (LASIX) injection 40 mg 40 mg Intravenous Given Deniz Gomez RN     01/13/2020 2104 furosemide (LASIX) injection 40 mg 40 mg Intravenous Not Given Taylor Dsouza RN     01/13/2020 2200 potassium chloride 40 mEq IVPB (premix) 0 mEq Intravenous Stopped Taylor Dsouza RN     01/13/2020 1949 potassium chloride 40 mEq IVPB (premix) 40 mEq Intravenous [address] Taylor Dsouza RN     01/14/2020 7858 heparin (porcine) subcutaneous injection 5,000 Units 5,000 Units Subcutaneous Given Gregory Rob RN     01/14/2020 0919 magnesium sulfate 2 g/50 mL IVPB (premix) 2 g 2 g Intravenous [address] GARO Price     01/14/2020 0919 potassium chloride 40 mEq IVPB (premix) 40 mEq Intravenous [address] GARO Price     01/14/2020 9275 sodium phosphate 12 mmol in sodium chloride 0 9 % 100 mL Infusion 12 mmol Intravenous New Bag Chelsea Morales RN          Objective:     Vitals: Blood pressure 141/78, pulse 96, temperature 99 3 °F (37 4 °C), resp  rate 15, height 5' 8 5" (1 74 m), weight 88 1 kg (194 lb 3 6 oz), SpO2 97 %  ,Body mass index is 29 1 kg/m²  Intake/Output Summary (Last 24 hours) at 1/14/2020 1121  Last data filed at 1/14/2020 1000  Gross per 24 hour   Intake 1778 3 ml   Output 3735 ml   Net -1956 7 ml       Physical Exam:   Physical Exam   Constitutional: He appears well-developed and well-nourished  No distress  HENT:   Head: Normocephalic and atraumatic  Eyes: Pupils are equal, round, and reactive to light  Conjunctivae and EOM are normal  No scleral icterus  Cardiovascular: Normal rate, regular rhythm and normal heart sounds  No murmur heard  Pulmonary/Chest: Effort normal    Coarse breath sounds bilaterally   Abdominal: Soft  He exhibits no distension and no mass  There is tenderness (Epigastric mild)  There is no guarding  GJ tube and CAMPBELL drains in place   Musculoskeletal: He exhibits no edema, tenderness or deformity  Neurological: He is alert  Follows commands on b/l upper and lower extremities    Skin: Skin is warm and dry  He is not diaphoretic  Invasive Devices     Central Venous Catheter Line            CVC Central Lines 01/11/20 Triple 3 days          Arterial Line            Arterial Line 01/11/20 Radial 2 days          Drain            Closed/Suction Drain Right Abdomen Bulb 2 days    Closed/Suction Drain Right Abdomen Bulb 19 Fr  2 days    NG/OG/Enteral Tube Enteral Feeding Tube   2 days    Urethral Catheter Temperature probe 2 days          Airway            ETT  Cuffed 8 mm 2 days                Lab Results:  No results displayed because visit has over 200 results  Imaging Studies: I have personally reviewed pertinent imaging studies        ---------------------------------------------------  Note Electronically Signed By:    Car Herr 15 Internal Medicine Residency PGY-3

## 2020-01-14 NOTE — PROGRESS NOTES
Daily Progress Note - Critical Care   Alex Valles 81 y o  male MRN: [id number]  Unit/Bed#: [facility] 509-85 Encounter: [id number]        ----------------------------------------------------------------------------------------  HPI/24hr events: Patient was weaned off sedation yesterday successfully  When attempting to wean off vent, patient with apneic episodes as well as waxing and waning epsiodes  Albumin and lasix given yesterday for diuresis due to volume overload  CXR with b/l pleural effusions      ---------------------------------------------------------------------------------------  SUBJECTIVE  No acute events overnight  Review of Systems  Review of systems was unable to be performed secondary to intubated  ---------------------------------------------------------------------------------------  Assessment and Plan:    Neuro:    Diagnosis: Pain/sedation  o Propofol gtt discontinued yesterday  o Currently on fentanyl gtt overnight for pain/sedation; will wean again today for SBT  o PRN tylenol, oxycodone   Diagnosis: Encephalopathy  o According to son, patient is AAOx3 at baseline  o Ammonia level yesterday 26 (WNL)  o CT/MRI for stroke alert were negative on admission  o Delirium precautions  o Regulate sleep/wake cycle    CV:   · Hemorrhagic shock  ? S/P agressive resusucitation: 18 units of PRBCs, 5 units platelets, 3 units Cryo, 6 units FFP  ? Currently hemodynamically stable  ? Continue to monitor CBC; Hgb today 9 3 (from 9 4 yesterday)  ? Has been off pressors  ? Left femoral line discontinued yesterday      Pulm:   Diagnosis: Intubated due to acuity of condition  o Plan: Wean as tolerated  o Failed PSV yesterday  o B/l pleural effusions on CXR; now post albumin and lasix, repeat CXR today     GI:   · Perforated duodenal ulcer  ?  S/P EGD on 1/11 (GI)  ? S/P IR GDA embolozation 1/11 (IR)  ? S/P Exlap, oversewing bleeding vessels, closure of tissue over perforation, Thal patch with transverse colon, open gastrostomy-jejunostomy tube placement (Surgery)  ? Diet Enteral/Parenteral; Tube Feeding No Oral Diet; Jevity 1 2 [place]; Continuous; 68  ? Trickle feeds started yesterday via J-tube  · Monitor JPx2 output per Surgery  · G-J tube in place  · Protonix gtt discontinued yesterday after 72hrs; now on Protonix 80mg q12hrs  · GI is following, appreciate recs      :   · Balderas in place  ? Monitor UOP, received lasix and albumin yesterday   ? UOP over last 24hrs 3,186cc  ? Continue strict I/O  ? Renal function stable      F/E/N:   · F: Maintenance IVFs discontinued  · E: replete as needed for K>4, Mg>2, PO4>2   N: Diet Enteral/Parenteral; Tube Feeding No Oral Diet; Jevity 1 2 [place]; Continuous; 68 via J-tube      Heme/Onc:   · ABLA/Hemorrhagic shock   ? S/P 18uPRBCs, 5uplatelets, 3uCryo, 6uFFP  o DVT ppx: currently on mechanical w SCDs,   o Discuss chem DVT ppx to start today    Endo:   ? No acute issues    ID:    Has been afebrile   Continue prophylactic ancef and diflucan   Monitor WBC and temperature      MSK/Skin:   ? Frequent turning and positioning/offloading  ? PT/OT when able  ? Per patient's son, he was not ambulating much at baseline currently, using a walker or a 4-point cane mostly      Disposition: Continue Critical Care   Code Status: Level 1 - Full Code  ---------------------------------------------------------------------------------------  ICU CORE MEASURES    Prophylaxis   VTE Pharmacologic Prophylaxis: Will discuss today, since hgb has been stable  VTE Mechanical Prophylaxis: sequential compression device  Stress Ulcer Prophylaxis: Pantoprazole IV     ABCDE Protocol (if indicated)  Plan to perform spontaneous awakening trial today? Yes  Plan to perform spontaneous breathing trial today? Yes  Obvious barriers to extubation?  Yes  CAM-ICU: Negative    Invasive Devices Review  Invasive Devices     Central Venous Catheter Line            CVC Central Lines 01/11/20 Triple 2 days          Arterial Line            Arterial Line 20 Radial 2 days          Drain            Closed/Suction Drain Right Abdomen Bulb 2 days    Closed/Suction Drain Right Abdomen Bulb 19 Fr  2 days    NG/OG/Enteral Tube Enteral Feeding Tube   2 days    Urethral Catheter Temperature probe 2 days          Airway            ETT  Cuffed 8 mm 2 days              Can any invasive devices be discontinued today? Not applicable  ---------------------------------------------------------------------------------------  OBJECTIVE    Physical Exam   Constitutional: He appears well-developed and well-nourished  No distress  HENT:   Head: Normocephalic and atraumatic  Nose: Nose normal    Eyes: Pupils are equal, round, and reactive to light  Conjunctivae and EOM are normal    Neck: Normal range of motion  Neck supple  R IJ   Cardiovascular: Normal rate, regular rhythm, normal heart sounds and intact distal pulses  Peripheral edema   Pulmonary/Chest: Effort normal and breath sounds normal    On mechanical ventilation   Abdominal: Soft  He exhibits no distension  CAMPBELL drains x2 in place  G-J tube in place  Midline incision with staples in place c/d/i   Musculoskeletal: He exhibits edema  Neurological: He is alert  Responds to verbal and tactile stimuli  Following more commands this morning  intubated   Skin: Capillary refill takes less than 2 seconds  He is not diaphoretic  Nursing note and vitals reviewed        Vitals   Vitals:    20 0400 20 0411 20 0500 20 0600   BP:       Pulse: 84 84 88    Resp: 18  18    Temp:       TempSrc:       SpO2: 98% 99% 96%    Weight:    88 1 kg (194 lb 3 6 oz)   Height:         Temp (24hrs), Av 8 °F (37 7 °C), Min:99 7 °F (37 6 °C), Max:100 °F (37 8 °C)  Current: Temperature: 99 7 °F (37 6 °C)  HR: 88  BP: 141/78  RR: 18  SpO2: 96%    Respiratory:  SpO2 Device: O2 Device: Other (comment)       Invasive/non-invasive ventilation settings   Respiratory    Lab Data (Last 4 hours) None         O2/Vent Data (Last 4 hours)      01/14 0411           Vent Mode AC/VC       Resp Rate (BPM) (BPM) 12       Vt (mL) (mL) 500       FIO2 (%) (%) 40       PEEP (cmH2O) (cmH2O) 5       MV 14                   Height and Weights   Height: 5' 8 5" (174 cm)  IBW: 69 55 kg  Body mass index is 29 1 kg/m²  Weight (last 2 days)     Date/Time   Weight    01/14/20 0600   88 1 (194 23)    01/13/20 0600   90 6 (199 74)    01/12/20 0522   89 1 (196 43)              Intake and Output  I/O       01/12 0701 - 01/13 0700 01/13 0701 - 01/14 0700    P  O  0 0    I V  (mL/kg) 2868 2 (31 7) 1070 1 (12 1)    NG/GT 0 70    IV Piggyback 350 545    Feedings 187 415    Total Intake(mL/kg) 3405 2 (37 6) 2100 1 (23 8)    Urine (mL/kg/hr) 715 (0 3) 3186 (1 5)    Emesis/NG output 510 431    Drains 85 48    Total Output 1310 3665    Net +2095 2 -1564 9              UOP: 3186cc     Nutrition       Diet Orders   (From admission, onward)             Start     Ordered    01/13/20 1910  Diet Enteral/Parenteral; Tube Feeding No Oral Diet; Jevity 1 2 [place]; Continuous; 68  Diet effective now     Comments: Through j-tube  Please advance tube feeds to goal increasing 10 cc every 4 hours until goal  Thank you  Question Answer Comment   Diet Type Enteral/Parenteral    Enteral/Parenteral Tube Feeding No Oral Diet    Tube Feeding Formula: Jevity 1 2 [place]    Bolus/Cyclic/Continuous Continuous    Tube Feeding Goal Rate (mL/hr): 68    RD to adjust diet per protocol? Yes        01/13/20 1910              TF currently running at 68 ml/hr with a goal of 68 ml/hr   Formula: jevity    Laboratory and Diagnostics:  Results from last 7 days   Lab Units 01/14/20  0458 01/13/20  0422 01/12/20  1044 01/12/20  0818 01/12/20  0447 01/11/20  2236 01/11/20  2111 01/11/20  2036  01/11/20  1850 01/11/20  1803  01/11/20  1408  01/08/20  1425   WBC Thousand/uL 8 60 6 91  --   --  3 40* 2 56*  --  2 75*  --  1 76* 1 71*  --  5 68   < > 10 67*   HEMOGLOBIN g/dL 9  3* 9 4* 10 2* 10 4* 10 7* 11 5*  --  9 4*  --  3 8* 3 0*  --  8 8*   < > 9 7*   I STAT HEMOGLOBIN g/dl  --   --   --   --   --   --  9 5*  --    < >  --   --    < >  --    < >  --    HEMATOCRIT % 29 0* 28 8*  --  29 8* 30 8* 33 8*  --  28 7*  --  11 9* 9 6*  --  26 4*   < > 29 5*   HEMATOCRIT, ISTAT %  --   --   --   --   --   --  28*  --    < >  --   --    < >  --    < >  --    PLATELETS Thousands/uL 186 152  --   --  151 150  --  162  --  163 122*  --  154   < > 309   NEUTROS PCT % 80* 78*  --   --  79* 84*  --   --   --   --  59  --  64  --  88*   MONOS PCT % 11 10  --   --  12 8  --   --   --   --  15*  --  11  --  5    < > = values in this interval not displayed       Results from last 7 days   Lab Units 01/14/20  0458 01/13/20  1413 01/13/20  0422 01/12/20  0447 01/11/20  2236 01/11/20  2111 01/11/20  2040  01/11/20  1850 01/11/20  1803  01/11/20  1408   SODIUM mmol/L 139 141 147* 143 148*  --  149*  --  152* 144  --  138   POTASSIUM mmol/L 3 2* 3 3* 3 6 3 1* 3 9  --  5 0  --  3 3* 3 5  --  4 1   CHLORIDE mmol/L 107 109* 114* 112* 113*  --  120*  --  115* 114*  --  110*   CO2 mmol/L 30 29 29 26 28  --  23  --  33* 23  --  24   CO2, I-STAT mmol/L  --   --   --   --   --  26  --    < >  --   --    < >  --    ANION GAP mmol/L 2* 3* 4 5 7  --  6  --  4 7  --  4   BUN mg/dL 16 15 16 13 13  --  14  --  16 17  --  20   CREATININE mg/dL 0 66 0 67 0 59* 0 58* 0 52*  --  0 45*  --  0 51* 0 46*  --  0 51*   CALCIUM mg/dL 7 8* 7 8* 7 5* 8 3 8 6  --  6 9*  --  9 2 6 6*  --  7 2*   GLUCOSE RANDOM mg/dL 169* 124 117 117 180*  --  191*  --  146* 144*  --  192*   ALT U/L 7*  --  8* 16 21  --  15  --   --  6*  --  9*   AST U/L 10  --  13 31 33  --  22  --   --  8  --  18   ALK PHOS U/L 57  --  43* 39* 47  --  28*  --   --  18*  --  39*   ALBUMIN g/dL 1 7*  --  1 8* 2 1* 2 5*  --  1 5*  --   --  0 8*  --  2 0*   TOTAL BILIRUBIN mg/dL 0 33  --  0 29 0 77 1 47*  --  0 66  --   --  0 63  --  0 55    < > = values in this interval not displayed  Results from last 7 days   Lab Units 01/14/20  0458 01/12/20  0447 01/11/20  2236 01/11/20  1850 01/11/20  1803 01/11/20  1408   MAGNESIUM mg/dL 1 9 2 0 1 5* 1 6 1 5* 1 4*   PHOSPHORUS mg/dL 1 9*  --  3 0 2 5 2 3 3 1      Results from last 7 days   Lab Units 01/14/20  0458 01/13/20  1146 01/11/20  2236 01/11/20  2040 01/11/20  1851 01/11/20  1803 01/11/20  1408   INR  1 18 1 13 1 09 1 47* 1 55* 2 59* 1 42*   PTT seconds 37 35  --  47* >210* 77*  --       Results from last 7 days   Lab Units 01/11/20  0045 01/10/20  1717 01/09/20  0144 01/08/20  2253 01/08/20  1953 01/08/20  1425   TROPONIN I ng/mL <0 02 <0 02 <0 02 <0 02 <0 02 <0 02     Results from last 7 days   Lab Units 01/11/20  2236 01/11/20  1850 01/11/20  1803   LACTIC ACID mmol/L 1 4 3 8* 3 3*     ABG:  Results from last 7 days   Lab Units 01/12/20  1043   PH ART  7 390   PCO2 ART mm Hg 37 8   PO2 ART mm Hg 112 6   HCO3 ART mmol/L 22 4   BASE EXC ART mmol/L -2 3   ABG SOURCE  Line, Arterial     VBG:  Results from last 7 days   Lab Units 01/12/20  1043   ABG SOURCE  Line, Arterial           Micro        Imaging:  I have personally reviewed pertinent reports        Active Medications  Scheduled Meds:  Current Facility-Administered Medications:  acetaminophen 650 mg Oral Q6H PRN Loreta Mishra PA-C    cefazolin 2,000 mg Intravenous Q8H Larraine Fidel Stapletonit-Me Last Rate: Stopped (01/14/20 0447)   chlorhexidine 15 mL Swish & Spit Q12H [address] Abeba Ramírez    fentaNYL 50 mcg/hr Intravenous Continuous Loreta Mishra PA-C Last Rate: 50 mcg/hr (01/14/20 0616)   fluconazole 200 mg Intravenous Q24H Larkaylah Parra Petit-Me Last Rate: Stopped (29/45/51 0130)   folic acid IVPB 1 mg Intravenous Daily Loreta Mishra PA-C Last Rate: Stopped (01/13/20 1200)   oxyCODONE 10 mg Oral Q4H PRN Kim Thornton MD    oxyCODONE 5 mg Oral Q4H PRN Kim Thornton MD    pantoprazole 80 mg Intravenous Q12H [address], DO Last Rate: 80 mg (01/13/20 2114) thiamine 100 mg Intravenous Daily [facility] Last Rate: Stopped (01/13/20 0945)     Continuous Infusions:    fentaNYL 50 mcg/hr Last Rate: 50 mcg/hr (01/14/20 0616)     PRN Meds:     acetaminophen 650 mg Q6H PRN   oxyCODONE 10 mg Q4H PRN   oxyCODONE 5 mg Q4H PRN       Allergies   No Known Allergies    Advance Directive and Living Will:      Power of :    POLST:      Counseling / Coordination of Care  Total Critical Care time spent 35 minutes excluding procedures, teaching and family updates  Liyah Sierra MD      Portions of the record may have been created with voice recognition software  Occasional wrong word or "sound a like" substitutions may have occurred due to the inherent limitations of voice recognition software    Read the chart carefully and recognize, using context, where substitutions have occurred

## 2020-01-15 ENCOUNTER — APPOINTMENT (INPATIENT)
Dept: RADIOLOGY | Facility: HOSPITAL | Age: 70
DRG: 326 | End: 2020-01-15
Payer: MEDICARE

## 2020-01-15 LAB
ANION GAP SERPL CALCULATED.3IONS-SCNC: 4 MMOL/L (ref 4–13)
BASOPHILS # BLD AUTO: 0.02 THOUSANDS/ΜL (ref 0–0.1)
BASOPHILS # BLD AUTO: 0.03 THOUSANDS/ΜL (ref 0–0.1)
BASOPHILS NFR BLD AUTO: 0 % (ref 0–1)
BASOPHILS NFR BLD AUTO: 0 % (ref 0–1)
BUN SERPL-MCNC: 20 MG/DL (ref 5–25)
CA-I BLD-SCNC: 1.12 MMOL/L (ref 1.12–1.32)
CALCIUM SERPL-MCNC: 8.5 MG/DL (ref 8.3–10.1)
CHLORIDE SERPL-SCNC: 104 MMOL/L (ref 100–108)
CO2 SERPL-SCNC: 33 MMOL/L (ref 21–32)
CREAT SERPL-MCNC: 0.66 MG/DL (ref 0.6–1.3)
EOSINOPHIL # BLD AUTO: 0.12 THOUSAND/ΜL (ref 0–0.61)
EOSINOPHIL # BLD AUTO: 0.14 THOUSAND/ΜL (ref 0–0.61)
EOSINOPHIL NFR BLD AUTO: 1 % (ref 0–6)
EOSINOPHIL NFR BLD AUTO: 2 % (ref 0–6)
ERYTHROCYTE [DISTWIDTH] IN BLOOD BY AUTOMATED COUNT: 14.8 % (ref 11.6–15.1)
ERYTHROCYTE [DISTWIDTH] IN BLOOD BY AUTOMATED COUNT: 14.9 % (ref 11.6–15.1)
GFR SERPL CREATININE-BSD FRML MDRD: 100 ML/MIN/1.73SQ M
GLUCOSE SERPL-MCNC: 116 MG/DL (ref 65–140)
H PYLORI IGG SER IA-ACNC: 0.54 INDEX VALUE (ref 0–0.79)
HCT VFR BLD AUTO: 31.4 % (ref 36.5–49.3)
HCT VFR BLD AUTO: 32.2 % (ref 36.5–49.3)
HGB BLD-MCNC: 10.1 G/DL (ref 12–17)
HGB BLD-MCNC: 9.9 G/DL (ref 12–17)
IMM GRANULOCYTES # BLD AUTO: 0.05 THOUSAND/UL (ref 0–0.2)
IMM GRANULOCYTES # BLD AUTO: 0.06 THOUSAND/UL (ref 0–0.2)
IMM GRANULOCYTES NFR BLD AUTO: 1 % (ref 0–2)
IMM GRANULOCYTES NFR BLD AUTO: 1 % (ref 0–2)
INR PPP: 1.05 (ref 0.84–1.19)
LYMPHOCYTES # BLD AUTO: 0.92 THOUSANDS/ΜL (ref 0.6–4.47)
LYMPHOCYTES # BLD AUTO: 1.01 THOUSANDS/ΜL (ref 0.6–4.47)
LYMPHOCYTES NFR BLD AUTO: 11 % (ref 14–44)
LYMPHOCYTES NFR BLD AUTO: 12 % (ref 14–44)
MAGNESIUM SERPL-MCNC: 2.4 MG/DL (ref 1.6–2.6)
MCH RBC QN AUTO: 29.1 PG (ref 26.8–34.3)
MCH RBC QN AUTO: 29.1 PG (ref 26.8–34.3)
MCHC RBC AUTO-ENTMCNC: 31.4 G/DL (ref 31.4–37.4)
MCHC RBC AUTO-ENTMCNC: 31.5 G/DL (ref 31.4–37.4)
MCV RBC AUTO: 92 FL (ref 82–98)
MCV RBC AUTO: 93 FL (ref 82–98)
MONOCYTES # BLD AUTO: 1.29 THOUSAND/ΜL (ref 0.17–1.22)
MONOCYTES # BLD AUTO: 1.29 THOUSAND/ΜL (ref 0.17–1.22)
MONOCYTES NFR BLD AUTO: 16 % (ref 4–12)
MONOCYTES NFR BLD AUTO: 16 % (ref 4–12)
NEUTROPHILS # BLD AUTO: 5.67 THOUSANDS/ΜL (ref 1.85–7.62)
NEUTROPHILS # BLD AUTO: 5.84 THOUSANDS/ΜL (ref 1.85–7.62)
NEUTS SEG NFR BLD AUTO: 70 % (ref 43–75)
NEUTS SEG NFR BLD AUTO: 70 % (ref 43–75)
NRBC BLD AUTO-RTO: 0 /100 WBCS
NRBC BLD AUTO-RTO: 0 /100 WBCS
PHOSPHATE SERPL-MCNC: 2.1 MG/DL (ref 2.3–4.1)
PLATELET # BLD AUTO: 246 THOUSANDS/UL (ref 149–390)
PLATELET # BLD AUTO: 269 THOUSANDS/UL (ref 149–390)
PMV BLD AUTO: 10.3 FL (ref 8.9–12.7)
PMV BLD AUTO: 9.9 FL (ref 8.9–12.7)
POTASSIUM SERPL-SCNC: 3.5 MMOL/L (ref 3.5–5.3)
PROTHROMBIN TIME: 13.3 SECONDS (ref 11.6–14.5)
RBC # BLD AUTO: 3.4 MILLION/UL (ref 3.88–5.62)
RBC # BLD AUTO: 3.47 MILLION/UL (ref 3.88–5.62)
SODIUM SERPL-SCNC: 141 MMOL/L (ref 136–145)
WBC # BLD AUTO: 8.1 THOUSAND/UL (ref 4.31–10.16)
WBC # BLD AUTO: 8.34 THOUSAND/UL (ref 4.31–10.16)

## 2020-01-15 PROCEDURE — 85025 COMPLETE CBC W/AUTO DIFF WBC: CPT | Performed by: ORTHOPAEDIC SURGERY

## 2020-01-15 PROCEDURE — 85025 COMPLETE CBC W/AUTO DIFF WBC: CPT | Performed by: NURSE PRACTITIONER

## 2020-01-15 PROCEDURE — 80048 BASIC METABOLIC PNL TOTAL CA: CPT | Performed by: ORTHOPAEDIC SURGERY

## 2020-01-15 PROCEDURE — NC001 PR NO CHARGE: Performed by: SURGERY

## 2020-01-15 PROCEDURE — 84100 ASSAY OF PHOSPHORUS: CPT | Performed by: ORTHOPAEDIC SURGERY

## 2020-01-15 PROCEDURE — 82330 ASSAY OF CALCIUM: CPT | Performed by: ORTHOPAEDIC SURGERY

## 2020-01-15 PROCEDURE — 83735 ASSAY OF MAGNESIUM: CPT | Performed by: ORTHOPAEDIC SURGERY

## 2020-01-15 PROCEDURE — 94760 N-INVAS EAR/PLS OXIMETRY 1: CPT

## 2020-01-15 PROCEDURE — 85610 PROTHROMBIN TIME: CPT | Performed by: ORTHOPAEDIC SURGERY

## 2020-01-15 PROCEDURE — C9113 INJ PANTOPRAZOLE SODIUM, VIA: HCPCS | Performed by: ORTHOPAEDIC SURGERY

## 2020-01-15 PROCEDURE — 71045 X-RAY EXAM CHEST 1 VIEW: CPT

## 2020-01-15 RX ORDER — SODIUM CHLORIDE, SODIUM GLUCONATE, SODIUM ACETATE, POTASSIUM CHLORIDE, MAGNESIUM CHLORIDE, SODIUM PHOSPHATE, DIBASIC, AND POTASSIUM PHOSPHATE .53; .5; .37; .037; .03; .012; .00082 G/100ML; G/100ML; G/100ML; G/100ML; G/100ML; G/100ML; G/100ML
75 INJECTION, SOLUTION INTRAVENOUS CONTINUOUS
Status: DISCONTINUED | OUTPATIENT
Start: 2020-01-15 | End: 2020-01-17

## 2020-01-15 RX ORDER — POTASSIUM CHLORIDE 20MEQ/15ML
20 LIQUID (ML) ORAL ONCE
Status: COMPLETED | OUTPATIENT
Start: 2020-01-15 | End: 2020-01-15

## 2020-01-15 RX ORDER — DOXAZOSIN MESYLATE 1 MG/1
1 TABLET ORAL DAILY
Status: DISCONTINUED | OUTPATIENT
Start: 2020-01-15 | End: 2020-01-16

## 2020-01-15 RX ORDER — POTASSIUM CHLORIDE 20MEQ/15ML
40 LIQUID (ML) ORAL ONCE
Status: COMPLETED | OUTPATIENT
Start: 2020-01-15 | End: 2020-01-15

## 2020-01-15 RX ORDER — TAMSULOSIN HYDROCHLORIDE 0.4 MG/1
0.4 CAPSULE ORAL
Status: DISCONTINUED | OUTPATIENT
Start: 2020-01-15 | End: 2020-01-15

## 2020-01-15 RX ORDER — POTASSIUM CHLORIDE 20MEQ/15ML
60 LIQUID (ML) ORAL ONCE
Status: DISCONTINUED | OUTPATIENT
Start: 2020-01-15 | End: 2020-01-15

## 2020-01-15 RX ORDER — SODIUM CHLORIDE, SODIUM GLUCONATE, SODIUM ACETATE, POTASSIUM CHLORIDE, MAGNESIUM CHLORIDE, SODIUM PHOSPHATE, DIBASIC, AND POTASSIUM PHOSPHATE .53; .5; .37; .037; .03; .012; .00082 G/100ML; G/100ML; G/100ML; G/100ML; G/100ML; G/100ML; G/100ML
500 INJECTION, SOLUTION INTRAVENOUS ONCE
Status: COMPLETED | OUTPATIENT
Start: 2020-01-15 | End: 2020-01-15

## 2020-01-15 RX ADMIN — HEPARIN SODIUM 5000 UNITS: 5000 INJECTION INTRAVENOUS; SUBCUTANEOUS at 05:52

## 2020-01-15 RX ADMIN — QUETIAPINE FUMARATE 600 MG: 300 TABLET ORAL at 22:13

## 2020-01-15 RX ADMIN — GABAPENTIN 300 MG: 300 CAPSULE ORAL at 22:13

## 2020-01-15 RX ADMIN — OXYCODONE HYDROCHLORIDE 10 MG: 10 TABLET ORAL at 16:51

## 2020-01-15 RX ADMIN — HEPARIN SODIUM 5000 UNITS: 5000 INJECTION INTRAVENOUS; SUBCUTANEOUS at 15:00

## 2020-01-15 RX ADMIN — DULOXETINE HYDROCHLORIDE 90 MG: 60 CAPSULE, DELAYED RELEASE ORAL at 09:29

## 2020-01-15 RX ADMIN — TRAZODONE HYDROCHLORIDE 50 MG: 50 TABLET ORAL at 23:32

## 2020-01-15 RX ADMIN — ACETAMINOPHEN 650 MG: 325 TABLET ORAL at 09:27

## 2020-01-15 RX ADMIN — FLUTICASONE FUROATE AND VILANTEROL TRIFENATATE 1 PUFF: 100; 25 POWDER RESPIRATORY (INHALATION) at 09:30

## 2020-01-15 RX ADMIN — GABAPENTIN 300 MG: 300 CAPSULE ORAL at 16:51

## 2020-01-15 RX ADMIN — POTASSIUM CHLORIDE 40 MEQ: 20 SOLUTION ORAL at 00:59

## 2020-01-15 RX ADMIN — METOPROLOL TARTRATE 50 MG: 50 TABLET, FILM COATED ORAL at 09:26

## 2020-01-15 RX ADMIN — NICOTINE 1 PATCH: 14 PATCH TRANSDERMAL at 09:29

## 2020-01-15 RX ADMIN — SODIUM CHLORIDE, SODIUM GLUCONATE, SODIUM ACETATE, POTASSIUM CHLORIDE, MAGNESIUM CHLORIDE, SODIUM PHOSPHATE, DIBASIC, AND POTASSIUM PHOSPHATE 500 ML: .53; .5; .37; .037; .03; .012; .00082 INJECTION, SOLUTION INTRAVENOUS at 14:30

## 2020-01-15 RX ADMIN — PANTOPRAZOLE SODIUM 40 MG: 40 INJECTION, POWDER, FOR SOLUTION INTRAVENOUS at 09:28

## 2020-01-15 RX ADMIN — THIAMINE HCL TAB 100 MG 100 MG: 100 TAB at 09:29

## 2020-01-15 RX ADMIN — MELATONIN 1000 UNITS: at 09:28

## 2020-01-15 RX ADMIN — THIAMINE HYDROCHLORIDE 100 MG: 100 INJECTION, SOLUTION INTRAMUSCULAR; INTRAVENOUS at 09:24

## 2020-01-15 RX ADMIN — CHLORHEXIDINE GLUCONATE 0.12% ORAL RINSE 15 ML: 1.2 LIQUID ORAL at 22:12

## 2020-01-15 RX ADMIN — QUETIAPINE FUMARATE 200 MG: 100 TABLET ORAL at 09:28

## 2020-01-15 RX ADMIN — FOLIC ACID 1 MG: 5 INJECTION, SOLUTION INTRAMUSCULAR; INTRAVENOUS; SUBCUTANEOUS at 09:24

## 2020-01-15 RX ADMIN — MIRTAZAPINE 15 MG: 15 TABLET, FILM COATED ORAL at 22:13

## 2020-01-15 RX ADMIN — ATORVASTATIN CALCIUM 40 MG: 40 TABLET, FILM COATED ORAL at 16:51

## 2020-01-15 RX ADMIN — ACETAMINOPHEN 650 MG: 325 TABLET ORAL at 16:52

## 2020-01-15 RX ADMIN — OXYCODONE HYDROCHLORIDE 5 MG: 5 TABLET ORAL at 09:28

## 2020-01-15 RX ADMIN — FLUTICASONE PROPIONATE 2 SPRAY: 50 SPRAY, METERED NASAL at 09:30

## 2020-01-15 RX ADMIN — POTASSIUM CHLORIDE 20 MEQ: 20 SOLUTION ORAL at 01:53

## 2020-01-15 RX ADMIN — Medication 20 MG: at 18:44

## 2020-01-15 RX ADMIN — HEPARIN SODIUM 5000 UNITS: 5000 INJECTION INTRAVENOUS; SUBCUTANEOUS at 22:13

## 2020-01-15 RX ADMIN — GUAIFENESIN 600 MG: 600 TABLET, EXTENDED RELEASE ORAL at 09:27

## 2020-01-15 RX ADMIN — SODIUM PHOSPHATE, MONOBASIC, MONOHYDRATE 12 MMOL: 276; 142 INJECTION, SOLUTION INTRAVENOUS at 09:24

## 2020-01-15 RX ADMIN — OXYCODONE HYDROCHLORIDE 5 MG: 5 TABLET ORAL at 06:03

## 2020-01-15 RX ADMIN — CHLORHEXIDINE GLUCONATE 0.12% ORAL RINSE 15 ML: 1.2 LIQUID ORAL at 09:28

## 2020-01-15 RX ADMIN — GABAPENTIN 300 MG: 300 CAPSULE ORAL at 09:28

## 2020-01-15 RX ADMIN — OXYCODONE HYDROCHLORIDE 10 MG: 10 TABLET ORAL at 22:12

## 2020-01-15 RX ADMIN — SODIUM CHLORIDE, SODIUM GLUCONATE, SODIUM ACETATE, POTASSIUM CHLORIDE, MAGNESIUM CHLORIDE, SODIUM PHOSPHATE, DIBASIC, AND POTASSIUM PHOSPHATE 50 ML/HR: .53; .5; .37; .037; .03; .012; .00082 INJECTION, SOLUTION INTRAVENOUS at 23:25

## 2020-01-15 NOTE — SPEECH THERAPY NOTE
Speech Language/Pathology  Speech/Language Pathology  Note   Patient Name: Ivana Saels  GCQGV'S Date: 1/15/2020     Order received, chart reviewed  Arrived on floor & spoke w/ nsg- pt to remain NPO until after his UGI tomorrow  If that test is clear will follow w/ bedside swallow eval for po recs  Will check tomorrow

## 2020-01-15 NOTE — PROGRESS NOTES
Daily Progress Note - Critical Care   Candelaria Davidson 81 y o  male MRN: [id number]  Unit/Bed#: [facility] 126-96 Encounter: [id number]        ----------------------------------------------------------------------------------------  HPI/24hr events: Patient was extubated yesterday  Wheezes were heard after extubation for which he received therapy with nebulizers and also was given IV lasix for diuresis multiple times for volume overload  Home medications were restarted yesterday      ---------------------------------------------------------------------------------------  SUBJECTIVE  No acute events overnight  Denies fever, chills, increased abdominal pain, nausea, vomiting  Review of Systems   Constitutional: Positive for appetite change  Negative for chills and fever  Eyes: Negative for discharge  Respiratory: Positive for cough and wheezing  Negative for apnea  Cardiovascular: Negative for chest pain and leg swelling  Gastrointestinal: Positive for abdominal pain  Negative for diarrhea, nausea and vomiting  Genitourinary: Negative for difficulty urinating  Musculoskeletal: Negative  Skin: Negative  Neurological: Negative for tremors and numbness  Psychiatric/Behavioral: Negative for agitation and confusion  All other systems reviewed and are negative  Review of systems was reviewed and negative unless stated above in HPI/24-hour events   ---------------------------------------------------------------------------------------  Assessment and Plan:    Neuro:   · Diagnosis: Pain acute on chronic  ? Home gabapentin 300mg TID restarted  ? Home duloxetine 90mg restarted  ? Home trazodone 50mg ordered PRN qHS  ? PRN tylenol, oxycodone 5/10  · Diagnosis: Encephalopathy? ? According to son, patient is AAOx3 at baseline  ? CT/MRI for stroke alert were negative on admission  ? Delirium precautions  ? Regulate sleep/wake cycle  ? CIWA protocol  ? Home seroquel 200 daily and 600 HS restarted  ?  Home remeron 15mg qHS restarted    CV:   · Hemorrhagic shock  ? S/P agressive resusucitation on 1/11/2020: 18 units of PRBCs, 5 units platelets, 3 units Cryo, 6 units FFP  ? Continues to be hemodynamically stable  ? Continue to monitor CBC; Hgb today 10 1 (from 9 3 yesterday)  · Hyperlipidemia  · Home med Lipitor 40mg  · Hypertension  · Home meds Norvasc and Metoprolol restarted (hold if SBP <110)    Pulm:  · Diagnosis: Intubated due to acuity of condition  ? Extubated successfully yesterday   o B/l pleural effusions were seen on CXR; have received several doses of lasix  o Repeat CXR this AM to monitor   Diagnosis: COPD/smoking  o Nebulizers given yesterday due to wheezing  o Home meds albuterol and breo ellipta ordered  o Nicotine patch  o Continue to monitor SpO2  o Wean NC as able      GI:   · Perforated duodenal ulcer  ? S/P EGD on 1/11 (GI)  ? S/P IR GDA embolozation 1/11 (IR)  ? S/P Exlap, oversewing bleeding vessels, closure of tissue over perforation, Thal patch with transverse colon, open gastrostomy-jejunostomy tube placement (Surgery)  ? Diet Enteral/Parenteral; Tube Feeding No Oral Diet; Jevity 1 2 [place]; Continuous; 68  ? Tolerating tube feeds at goal  · Monitor JPx2 output per [facility]  · Superior CAMPBELL with 20cc SS and Inferior with 5cc SS   · G-J tube in place  · J-tube output 1400cc dark fluid yesterday  · Protonix gtt discontinued yesterday after 72hrs; now on Protonix 80mg q12hrs  · GI signed off  Continue Protonix  OP f/u in 5 months  :   ? Monitor UOP, received multiple lasix doses  ? UOP over last 24hrs 4775cc  ? Continue strict I/O  ? Renal function stable         F/E/N:   · F: Not on maintenance IVFs  · E: replete as needed for K>4, Mg>2, PO4>2 5  · N: Diet Enteral/Parenteral; Tube Feeding No Oral Diet; Jevity 1 2 [place]; Continuous; 68 via J-tube  Tolerating at goal      Heme/Onc:   · ABLA/Hemorrhagic shock   ? S/P 18uPRBCs, 5uplatelets, 3uCryo, 6uFFP on 1/11  ?  DVT ppx: SQH, SCDs      Endo:   o No acute issues      ID:   · Continues to be afebrile  · Completed Ancef and diflucan prophylaxis  · Monitor WBC and temperature      MSK/Skin:   ? Frequent turning and positioning/offloading  ? PT/OT  ? Per patient's son, he was not ambulating much at baseline currently, using a walker or a 4-point cane mostly      Disposition: Continue Critical Care   Code Status: Level 1 - Full Code  ---------------------------------------------------------------------------------------  ICU CORE MEASURES    Prophylaxis   VTE Pharmacologic Prophylaxis: Heparin  VTE Mechanical Prophylaxis: sequential compression device  Stress Ulcer Prophylaxis: Pantoprazole IV     ABCDE Protocol (if indicated)  Plan to perform spontaneous awakening trial today? Not applicable  Plan to perform spontaneous breathing trial today? Not applicable  Obvious barriers to extubation? Not applicable  CAM-ICU: Positive    Invasive Devices Review  Invasive Devices     Peripheral Intravenous Line            Peripheral IV 01/14/20 Left Antecubital less than 1 day    Peripheral IV 01/14/20 Right;Upper Arm less than 1 day          Drain            Closed/Suction Drain Right Abdomen Bulb 3 days    Closed/Suction Drain Right Abdomen Bulb 19 Fr  3 days    NG/OG/Enteral Tube Enteral Feeding Tube   3 days    External Urinary Catheter Small less than 1 day              Can any invasive devices be discontinued today? Not applicable  ---------------------------------------------------------------------------------------  OBJECTIVE    Physical Exam   Constitutional: He is oriented to person, place, and time  He appears well-developed and well-nourished  No distress  HENT:   Head: Normocephalic and atraumatic  Nose: Nose normal    Mouth/Throat: Oropharynx is clear and moist    Eyes: Pupils are equal, round, and reactive to light  Conjunctivae and EOM are normal    Neck: Normal range of motion  Neck supple     Cardiovascular: Normal rate, regular rhythm and intact distal pulses  Pulmonary/Chest: Effort normal  No respiratory distress  He has no wheezes  He has rales  Rales in b/l bases  No wheezing  Productive cough   Abdominal: Soft  He exhibits no distension  There is tenderness  JPx2 in place with ss contents  J-tube in place to gravity  Midline incision w staples c/d/i   Musculoskeletal: He exhibits edema  Edematous b/l UE, but improved from yesterday   Neurological: He is alert and oriented to person, place, and time  Skin: Skin is warm  Capillary refill takes less than 2 seconds  He is not diaphoretic  Vitals   Vitals:    01/14/20 2000 01/15/20 0015 01/15/20 0254 01/15/20 0430   BP: 108/63 (!) 87/43  123/64   Pulse: 78 84  86   Resp: 15 (!) 10  13   Temp:   99 1 °F (37 3 °C)    TempSrc:   Oral    SpO2: 98% 97%  96%   Weight:       Height:         Temp (24hrs), Av 5 °F (37 5 °C), Min:99 °F (37 2 °C), Max:100 4 °F (38 °C)  Current: Temperature: 99 1 °F (37 3 °C)  HR: 86  BP: 123/64  RR: 13  SpO2: 96%    Respiratory:  SpO2: SpO2: 96 %, SpO2 Device: O2 Device: Nasal cannula  O2 Flow Rate (L/min): 5 L/min    Invasive/non-invasive ventilation settings   Respiratory    Lab Data (Last 4 hours)    None         O2/Vent Data (Last 4 hours)    None                Height and Weights   Height: 5' 8 5" (174 cm)  IBW: 69 55 kg  Body mass index is 29 1 kg/m²  Weight (last 2 days)     Date/Time   Weight    20 0600   88 1 (194 23)    20 0600   90 6 (199 74)              Intake and Output  I/O       701 -  0700 701 - 01/15 0700    P  O  0     I V  (mL/kg) 1070 1 (12 1) 16 3 (0 2)    NG/GT 70 50    IV Piggyback 545 450    Feedings 415 1433    Total Intake(mL/kg) 2100 1 (23 8) 1949 3 (22 1)    Urine (mL/kg/hr) 3186 (1 5) 4775 (2 3)    Emesis/NG output 431 1400    Drains 48 25    Total Output 3665 4220    Net -1568 2 -3189 8              UOP: 4775cc last 24hrs     Nutrition       Diet Orders   (From admission, onward) Start     Ordered    01/13/20 1910  Diet Enteral/Parenteral; Tube Feeding No Oral Diet; Jevity 1 2 [place]; Continuous; 68  Diet effective now     Comments: Through j-tube  Please advance tube feeds to goal increasing 10 cc every 4 hours until goal  Thank you  Question Answer Comment   Diet Type Enteral/Parenteral    Enteral/Parenteral Tube Feeding No Oral Diet    Tube Feeding Formula: Jevity 1 2 [place]    Bolus/Cyclic/Continuous Continuous    Tube Feeding Goal Rate (mL/hr): 68    RD to adjust diet per protocol? Yes        01/13/20 1910              TF currently running at 68 ml/hr with a goal of 68 ml/hr  Formula: jevity    Laboratory and Diagnostics:  Results from last 7 days   Lab Units 01/14/20  0458 01/13/20  0422 01/12/20  1044 01/12/20  0818 01/12/20  0447 01/11/20  2236 01/11/20  2111 01/11/20  2036  01/11/20  1850 01/11/20  1803  01/11/20  1408  01/08/20  1425   WBC Thousand/uL 8 60 6 91  --   --  3 40* 2 56*  --  2 75*  --  1 76* 1 71*  --  5 68   < > 10 67*   HEMOGLOBIN g/dL 9 3* 9 4* 10 2* 10 4* 10 7* 11 5*  --  9 4*  --  3 8* 3 0*  --  8 8*   < > 9 7*   I STAT HEMOGLOBIN g/dl  --   --   --   --   --   --  9 5*  --    < >  --   --    < >  --    < >  --    HEMATOCRIT % 29 0* 28 8*  --  29 8* 30 8* 33 8*  --  28 7*  --  11 9* 9 6*  --  26 4*   < > 29 5*   HEMATOCRIT, ISTAT %  --   --   --   --   --   --  28*  --    < >  --   --    < >  --    < >  --    PLATELETS Thousands/uL 186 152  --   --  151 150  --  162  --  163 122*  --  154   < > 309   NEUTROS PCT % 80* 78*  --   --  79* 84*  --   --   --   --  59  --  64  --  88*   MONOS PCT % 11 10  --   --  12 8  --   --   --   --  15*  --  11  --  5    < > = values in this interval not displayed       Results from last 7 days   Lab Units 01/14/20  1746 01/14/20  0458 01/13/20  1413 01/13/20  0422 01/12/20  0447 01/11/20  2236 01/11/20  2111 01/11/20  2040  01/11/20  1803  01/11/20  1408   SODIUM mmol/L 140 139 141 147* 143 148*  --  149*   < > 144  -- 138   POTASSIUM mmol/L 3 3* 3 2* 3 3* 3 6 3 1* 3 9  --  5 0   < > 3 5  --  4 1   CHLORIDE mmol/L 105 107 109* 114* 112* 113*  --  120*   < > 114*  --  110*   CO2 mmol/L 31 30 29 29 26 28  --  23   < > 23  --  24   CO2, I-STAT mmol/L  --   --   --   --   --   --  26  --    < >  --    < >  --    ANION GAP mmol/L 4 2* 3* 4 5 7  --  6   < > 7  --  4   BUN mg/dL 16 16 15 16 13 13  --  14   < > 17  --  20   CREATININE mg/dL 0 67 0 66 0 67 0 59* 0 58* 0 52*  --  0 45*   < > 0 46*  --  0 51*   CALCIUM mg/dL 8 1* 7 8* 7 8* 7 5* 8 3 8 6  --  6 9*   < > 6 6*  --  7 2*   GLUCOSE RANDOM mg/dL 154* 169* 124 117 117 180*  --  191*   < > 144*  --  192*   ALT U/L  --  7*  --  8* 16 21  --  15  --  6*  --  9*   AST U/L  --  10  --  13 31 33  --  22  --  8  --  18   ALK PHOS U/L  --  57  --  43* 39* 47  --  28*  --  18*  --  39*   ALBUMIN g/dL  --  1 7*  --  1 8* 2 1* 2 5*  --  1 5*  --  0 8*  --  2 0*   TOTAL BILIRUBIN mg/dL  --  0 33  --  0 29 0 77 1 47*  --  0 66  --  0 63  --  0 55    < > = values in this interval not displayed       Results from last 7 days   Lab Units 01/14/20 0458 01/12/20  0447 01/11/20 2236 01/11/20  1850 01/11/20  1803 01/11/20  1408   MAGNESIUM mg/dL 1 9 2 0 1 5* 1 6 1 5* 1 4*   PHOSPHORUS mg/dL 1 9*  --  3 0 2 5 2 3 3 1      Results from last 7 days   Lab Units 01/14/20 0458 01/13/20  1146 01/11/20 2236 01/11/20  2040 01/11/20  1851 01/11/20  1803 01/11/20  1408   INR  1 18 1 13 1 09 1 47* 1 55* 2 59* 1 42*   PTT seconds 37 35  --  47* >210* 77*  --       Results from last 7 days   Lab Units 01/11/20  0045 01/10/20  1717 01/09/20  0144 01/08/20  2253 01/08/20  1953 01/08/20  1425   TROPONIN I ng/mL <0 02 <0 02 <0 02 <0 02 <0 02 <0 02     Results from last 7 days   Lab Units 01/11/20 2236 01/11/20  1850 01/11/20  1803   LACTIC ACID mmol/L 1 4 3 8* 3 3*     ABG:  Results from last 7 days   Lab Units 01/12/20  1043   PH ART  7 390   PCO2 ART mm Hg 37 8   PO2 ART mm Hg 112 6   HCO3 ART mmol/L 22 4   BASE EXC ART mmol/L -2 3   ABG SOURCE  Line, Arterial     VBG:  Results from last 7 days   Lab Units 01/12/20  1043   ABG SOURCE  Line, Arterial           Micro        EKG:   Imaging:  I have personally reviewed pertinent reports        Active Medications  Scheduled Meds:  Current Facility-Administered Medications:  acetaminophen 650 mg Oral Q6H PRN Stephen Allen PA-C    albuterol 2 5 mg Nebulization Q4H PRN Lugene Ned, DO    amLODIPine 10 mg Oral Daily Carmela Thapa MD    atorvastatin 40 mg Oral Daily With Dinner Carmela Thapa MD    chlorhexidine 15 mL Swish & Spit Q12H [facility] David Ramírez    cholecalciferol 1,000 Units Oral Daily Carmela Thapa MD    fluconazole 200 mg Intravenous Q24H Starr Niya Petit-Me Last Rate: 200 mg (01/14/20 2311)   fluticasone 2 spray Nasal Daily Carmela Thapa MD    fluticasone-vilanterol 1 puff Inhalation Daily Carmela Thapa MD    folic acid IVPB 1 mg Intravenous Daily Stephen Allen PA-C Last Rate: 1 mg (01/14/20 0919)   gabapentin 300 mg Oral TID Carmela Thapa MD    guaiFENesin 600 mg Oral Q12H PRN Carmela Thapa MD    heparin (porcine) 5,000 Units Subcutaneous [facility] Lugene Ned, DO    metoprolol tartrate 50 mg Oral Daily Carmela Thapa MD    mirtazapine 15 mg Oral HS Carmela Thapa MD    nicotine 1 patch Transdermal Daily Carmela Thapa MD    oxyCODONE 10 mg Oral Q4H PRN Raz Ruth MD    oxyCODONE 5 mg Oral Q4H PRN Raz Ruth MD    pantoprazole 40 mg Intravenous Q12H [facility] Carmela Thapa MD    QUEtiapine 200 mg Oral Daily Carmela Thapa MD    QUEtiapine 600 mg Oral HS Carmela Thapa MD    thiamine 100 mg Intravenous Daily [facility] Last Rate: Stopped (01/14/20 0180)   thiamine 100 mg Oral Daily Carmela Thapa MD    traZODone 50 mg Oral HS PRN Carmela Thapa MD      Continuous Infusions:     PRN Meds:     acetaminophen 650 mg Q6H PRN   albuterol 2 5 mg Q4H PRN   guaiFENesin 600 mg Q12H PRN   oxyCODONE 10 mg Q4H PRN   oxyCODONE 5 mg Q4H PRN   traZODone 50 mg HS PRN       Allergies   No Known Allergies    Advance Directive and Living Will:      Power of :    POLST:      Counseling / Coordination of Care  Total Critical Care time spent 35 minutes excluding procedures, teaching and family updates  Aldo Ashley MD      Portions of the record may have been created with voice recognition software  Occasional wrong word or "sound a like" substitutions may have occurred due to the inherent limitations of voice recognition software    Read the chart carefully and recognize, using context, where substitutions have occurred

## 2020-01-15 NOTE — PROGRESS NOTES
Progress Note - Kim Escort 81 y o  male MRN: [id number]    Unit/Bed#: [facility] 124-23 Encounter: [id number]      Assessment:  80yM w/ bleeding and perforated 1st portion duodenal ulcer  1/11 S/p EGD  1/11 S/p IR GDA embolozation   1/11 S/p Exlap, oversewing bleeding vessels, closure of tissue over perforation, Thal patch with transverse colon, open gastrostomy-jejunostomy tube placement    Vss  Afebrile  Abdomen soft/ nontender/ non distended  Superior HOMERO w 20 cc ss  Inferior homero w 5cc ss  G tube w 1400cc dark output  Tolerating tube feeds at goal  Extubated yesterday  Plan:  Continue tube feeds  Maintain drains  Pt/ot  dvt ppx  Possible step down today    Subjective:   No complaints  Denied fever, chills, chest pain, shortness of breath, nausea, vomiting, or abdominal pain this morning  Objective:     Vitals: Blood pressure 123/64, pulse 86, temperature 99 1 °F (37 3 °C), temperature source Oral, resp  rate 13, height 5' 8 5" (1 74 m), weight 88 1 kg (194 lb 3 6 oz), SpO2 96 %  ,Body mass index is 29 1 kg/m²  Intake/Output Summary (Last 24 hours) at 1/15/2020 0614  Last data filed at 1/15/2020 0526  Gross per 24 hour   Intake 1949 25 ml   Output 6200 ml   Net -4250 75 ml       Physical Exam  General: NAD  HEENT: NC/AT  MMM  Cv: RRR     Lungs: normal effort  Ab: Soft, NT/ND  Ex: no CCE  Neuro: AAOx3      Invasive Devices     Peripheral Intravenous Line            Peripheral IV 01/14/20 Left Antecubital less than 1 day    Peripheral IV 01/14/20 Right;Upper Arm less than 1 day          Drain            Closed/Suction Drain Right Abdomen Bulb 3 days    Closed/Suction Drain Right Abdomen Bulb 19 Fr  3 days    NG/OG/Enteral Tube Enteral Feeding Tube   3 days    External Urinary Catheter Small less than 1 day                Scheduled Meds:  Current Facility-Administered Medications:  acetaminophen 650 mg Oral Q6H PRN Jessenia Rhein, PA-C    albuterol 2 5 mg Nebulization Q4H PRN Ricks Foil, DO    amLODIPine 10 mg Oral Daily Jamal Hartman MD    atorvastatin 40 mg Oral Daily With [address] Shira Hebert MD    chlorhexidine 15 mL Swish & Spit Q12H [address]    cholecalciferol 1,000 Units Oral Daily Jamal Hartman MD    fluconazole 200 mg Intravenous Q24H Leslie Andreina BocanegraMe Last Rate: 200 mg (01/14/20 2311)   fluticasone 2 spray Nasal Daily Jamal Hartman MD    fluticasone-vilanterol 1 puff Inhalation Daily Jamal Hartman MD    folic acid IVPB 1 mg Intravenous Daily Stephen Scherer PA-C Last Rate: 1 mg (01/14/20 0919)   gabapentin 300 mg Oral TID Jamal Hartman MD    guaiFENesin 600 mg Oral Q12H PRN Jamal Hartman MD    heparin (porcine) 5,000 Units Subcutaneous [facility] Katy Ann DO    metoprolol tartrate 50 mg Oral Daily Jamal Hartman MD    mirtazapine 15 mg Oral HS Jamal Hartman MD    nicotine 1 patch Transdermal Daily Jamal Hartman MD    oxyCODONE 10 mg Oral Q4H PRN Saira Marshall MD    oxyCODONE 5 mg Oral Q4H PRN Saira Marshall MD    pantoprazole 40 mg Intravenous Q12H [facility] Jamal Hartman MD    QUEtiapine 200 mg Oral Daily Jamal Hartman MD    QUEtiapine 600 mg Oral HS Jamal Hartman MD    thiamine 100 mg Intravenous Daily Nicho Heaton Last Rate: Stopped (01/14/20 2310)   thiamine 100 mg Oral Daily Jamal Hartman MD    traZODone 50 mg Oral HS PRN Jamal Hartman MD      Continuous Infusions:   PRN Meds:   acetaminophen    albuterol    guaiFENesin    oxyCODONE    oxyCODONE    traZODone      Lab, Imaging and other studies: I have personally reviewed pertinent reports      VTE Pharmacologic Prophylaxis: Heparin  VTE Mechanical Prophylaxis: sequential compression device

## 2020-01-16 ENCOUNTER — APPOINTMENT (OUTPATIENT)
Dept: RADIOLOGY | Facility: HOSPITAL | Age: 70
DRG: 326 | End: 2020-01-16
Payer: MEDICARE

## 2020-01-16 ENCOUNTER — APPOINTMENT (INPATIENT)
Dept: RADIOLOGY | Facility: HOSPITAL | Age: 70
DRG: 326 | End: 2020-01-16
Payer: MEDICARE

## 2020-01-16 LAB
ABO GROUP BLD: NORMAL
ANION GAP SERPL CALCULATED.3IONS-SCNC: 2 MMOL/L (ref 4–13)
ANION GAP SERPL CALCULATED.3IONS-SCNC: 3 MMOL/L (ref 4–13)
APTT PPP: 38 SECONDS (ref 23–37)
BASE EXCESS BLDA CALC-SCNC: 6 MMOL/L (ref -2–3)
BASOPHILS # BLD AUTO: 0.02 THOUSANDS/ΜL (ref 0–0.1)
BASOPHILS # BLD AUTO: 0.03 THOUSANDS/ΜL (ref 0–0.1)
BASOPHILS NFR BLD AUTO: 0 % (ref 0–1)
BASOPHILS NFR BLD AUTO: 0 % (ref 0–1)
BLD GP AB SCN SERPL QL: POSITIVE
BUN SERPL-MCNC: 21 MG/DL (ref 5–25)
BUN SERPL-MCNC: 21 MG/DL (ref 5–25)
CA-I BLD-SCNC: 1.16 MMOL/L (ref 1.12–1.32)
CALCIUM SERPL-MCNC: 8 MG/DL (ref 8.3–10.1)
CALCIUM SERPL-MCNC: 8.4 MG/DL (ref 8.3–10.1)
CHLORIDE SERPL-SCNC: 103 MMOL/L (ref 100–108)
CHLORIDE SERPL-SCNC: 104 MMOL/L (ref 100–108)
CO2 SERPL-SCNC: 32 MMOL/L (ref 21–32)
CO2 SERPL-SCNC: 33 MMOL/L (ref 21–32)
CREAT SERPL-MCNC: 0.53 MG/DL (ref 0.6–1.3)
CREAT SERPL-MCNC: 0.56 MG/DL (ref 0.6–1.3)
EOSINOPHIL # BLD AUTO: 0.12 THOUSAND/ΜL (ref 0–0.61)
EOSINOPHIL # BLD AUTO: 0.18 THOUSAND/ΜL (ref 0–0.61)
EOSINOPHIL NFR BLD AUTO: 2 % (ref 0–6)
EOSINOPHIL NFR BLD AUTO: 2 % (ref 0–6)
ERYTHROCYTE [DISTWIDTH] IN BLOOD BY AUTOMATED COUNT: 14.6 % (ref 11.6–15.1)
ERYTHROCYTE [DISTWIDTH] IN BLOOD BY AUTOMATED COUNT: 14.6 % (ref 11.6–15.1)
ERYTHROCYTE [DISTWIDTH] IN BLOOD BY AUTOMATED COUNT: 14.7 % (ref 11.6–15.1)
GFR SERPL CREATININE-BSD FRML MDRD: 107 ML/MIN/1.73SQ M
GFR SERPL CREATININE-BSD FRML MDRD: 109 ML/MIN/1.73SQ M
GLUCOSE SERPL-MCNC: 113 MG/DL (ref 65–140)
GLUCOSE SERPL-MCNC: 118 MG/DL (ref 65–140)
GLUCOSE SERPL-MCNC: 120 MG/DL (ref 65–140)
HCO3 BLDA-SCNC: 32.1 MMOL/L (ref 24–30)
HCT VFR BLD AUTO: 26.4 % (ref 36.5–49.3)
HCT VFR BLD AUTO: 27.5 % (ref 36.5–49.3)
HCT VFR BLD AUTO: 31.4 % (ref 36.5–49.3)
HCT VFR BLD CALC: 23 % (ref 36.5–49.3)
HGB BLD-MCNC: 8.3 G/DL (ref 12–17)
HGB BLD-MCNC: 8.6 G/DL (ref 12–17)
HGB BLD-MCNC: 9.7 G/DL (ref 12–17)
HGB BLDA-MCNC: 7.8 G/DL (ref 12–17)
IMM GRANULOCYTES # BLD AUTO: 0.13 THOUSAND/UL (ref 0–0.2)
IMM GRANULOCYTES # BLD AUTO: 0.15 THOUSAND/UL (ref 0–0.2)
IMM GRANULOCYTES NFR BLD AUTO: 2 % (ref 0–2)
IMM GRANULOCYTES NFR BLD AUTO: 2 % (ref 0–2)
INR PPP: 1.16 (ref 0.84–1.19)
LACTATE SERPL-SCNC: 1.1 MMOL/L (ref 0.5–2)
LYMPHOCYTES # BLD AUTO: 0.84 THOUSANDS/ΜL (ref 0.6–4.47)
LYMPHOCYTES # BLD AUTO: 0.9 THOUSANDS/ΜL (ref 0.6–4.47)
LYMPHOCYTES NFR BLD AUTO: 10 % (ref 14–44)
LYMPHOCYTES NFR BLD AUTO: 10 % (ref 14–44)
MCH RBC QN AUTO: 29 PG (ref 26.8–34.3)
MCH RBC QN AUTO: 29 PG (ref 26.8–34.3)
MCH RBC QN AUTO: 29.2 PG (ref 26.8–34.3)
MCHC RBC AUTO-ENTMCNC: 30.9 G/DL (ref 31.4–37.4)
MCHC RBC AUTO-ENTMCNC: 31.3 G/DL (ref 31.4–37.4)
MCHC RBC AUTO-ENTMCNC: 31.4 G/DL (ref 31.4–37.4)
MCV RBC AUTO: 92 FL (ref 82–98)
MCV RBC AUTO: 93 FL (ref 82–98)
MCV RBC AUTO: 94 FL (ref 82–98)
MONOCYTES # BLD AUTO: 1.48 THOUSAND/ΜL (ref 0.17–1.22)
MONOCYTES # BLD AUTO: 1.55 THOUSAND/ΜL (ref 0.17–1.22)
MONOCYTES NFR BLD AUTO: 18 % (ref 4–12)
MONOCYTES NFR BLD AUTO: 18 % (ref 4–12)
NEUTROPHILS # BLD AUTO: 5.55 THOUSANDS/ΜL (ref 1.85–7.62)
NEUTROPHILS # BLD AUTO: 5.87 THOUSANDS/ΜL (ref 1.85–7.62)
NEUTS SEG NFR BLD AUTO: 68 % (ref 43–75)
NEUTS SEG NFR BLD AUTO: 68 % (ref 43–75)
NRBC BLD AUTO-RTO: 0 /100 WBCS
NRBC BLD AUTO-RTO: 0 /100 WBCS
PCO2 BLD: 34 MMOL/L (ref 21–32)
PCO2 BLD: 53.1 MM HG (ref 42–50)
PH BLD: 7.39 [PH] (ref 7.3–7.4)
PLATELET # BLD AUTO: 236 THOUSANDS/UL (ref 149–390)
PLATELET # BLD AUTO: 269 THOUSANDS/UL (ref 149–390)
PLATELET # BLD AUTO: 269 THOUSANDS/UL (ref 149–390)
PMV BLD AUTO: 10 FL (ref 8.9–12.7)
PMV BLD AUTO: 10.1 FL (ref 8.9–12.7)
PMV BLD AUTO: 9.8 FL (ref 8.9–12.7)
PO2 BLD: 24 MM HG (ref 35–45)
POTASSIUM BLD-SCNC: 3.5 MMOL/L (ref 3.5–5.3)
POTASSIUM SERPL-SCNC: 3.5 MMOL/L (ref 3.5–5.3)
POTASSIUM SERPL-SCNC: 4.5 MMOL/L (ref 3.5–5.3)
PROTHROMBIN TIME: 14.4 SECONDS (ref 11.6–14.5)
RBC # BLD AUTO: 2.86 MILLION/UL (ref 3.88–5.62)
RBC # BLD AUTO: 2.95 MILLION/UL (ref 3.88–5.62)
RBC # BLD AUTO: 3.35 MILLION/UL (ref 3.88–5.62)
RH BLD: NEGATIVE
SAO2 % BLD FROM PO2: 40 % (ref 60–85)
SODIUM BLD-SCNC: 139 MMOL/L (ref 136–145)
SODIUM SERPL-SCNC: 138 MMOL/L (ref 136–145)
SODIUM SERPL-SCNC: 139 MMOL/L (ref 136–145)
SPECIMEN EXPIRATION DATE: NORMAL
SPECIMEN SOURCE: ABNORMAL
WBC # BLD AUTO: 7.28 THOUSAND/UL (ref 4.31–10.16)
WBC # BLD AUTO: 8.16 THOUSAND/UL (ref 4.31–10.16)
WBC # BLD AUTO: 8.66 THOUSAND/UL (ref 4.31–10.16)

## 2020-01-16 PROCEDURE — 94760 N-INVAS EAR/PLS OXIMETRY 1: CPT

## 2020-01-16 PROCEDURE — 03HY32Z INSERTION OF MONITORING DEVICE INTO UPPER ARTERY, PERCUTANEOUS APPROACH: ICD-10-PCS | Performed by: SURGERY

## 2020-01-16 PROCEDURE — 74240 X-RAY XM UPR GI TRC 1CNTRST: CPT

## 2020-01-16 PROCEDURE — 82803 BLOOD GASES ANY COMBINATION: CPT

## 2020-01-16 PROCEDURE — 97164 PT RE-EVAL EST PLAN CARE: CPT

## 2020-01-16 PROCEDURE — 85025 COMPLETE CBC W/AUTO DIFF WBC: CPT | Performed by: PHYSICIAN ASSISTANT

## 2020-01-16 PROCEDURE — 85730 THROMBOPLASTIN TIME PARTIAL: CPT | Performed by: PHYSICIAN ASSISTANT

## 2020-01-16 PROCEDURE — 86900 BLOOD TYPING SEROLOGIC ABO: CPT | Performed by: PHYSICIAN ASSISTANT

## 2020-01-16 PROCEDURE — 02HV33Z INSERTION OF INFUSION DEVICE INTO SUPERIOR VENA CAVA, PERCUTANEOUS APPROACH: ICD-10-PCS | Performed by: SURGERY

## 2020-01-16 PROCEDURE — 80048 BASIC METABOLIC PNL TOTAL CA: CPT | Performed by: NURSE PRACTITIONER

## 2020-01-16 PROCEDURE — 71045 X-RAY EXAM CHEST 1 VIEW: CPT

## 2020-01-16 PROCEDURE — C1751 CATH, INF, PER/CENT/MIDLINE: HCPCS

## 2020-01-16 PROCEDURE — 36620 INSERTION CATHETER ARTERY: CPT | Performed by: NURSE PRACTITIONER

## 2020-01-16 PROCEDURE — 82947 ASSAY GLUCOSE BLOOD QUANT: CPT

## 2020-01-16 PROCEDURE — 85014 HEMATOCRIT: CPT

## 2020-01-16 PROCEDURE — 82330 ASSAY OF CALCIUM: CPT

## 2020-01-16 PROCEDURE — 85610 PROTHROMBIN TIME: CPT | Performed by: PHYSICIAN ASSISTANT

## 2020-01-16 PROCEDURE — 86850 RBC ANTIBODY SCREEN: CPT | Performed by: PHYSICIAN ASSISTANT

## 2020-01-16 PROCEDURE — 4A133J1 MONITORING OF ARTERIAL PULSE, PERIPHERAL, PERCUTANEOUS APPROACH: ICD-10-PCS | Performed by: SURGERY

## 2020-01-16 PROCEDURE — 97167 OT EVAL HIGH COMPLEX 60 MIN: CPT

## 2020-01-16 PROCEDURE — 4A133B1 MONITORING OF ARTERIAL PRESSURE, PERIPHERAL, PERCUTANEOUS APPROACH: ICD-10-PCS | Performed by: SURGERY

## 2020-01-16 PROCEDURE — 36569 INSJ PICC 5 YR+ W/O IMAGING: CPT

## 2020-01-16 PROCEDURE — 86901 BLOOD TYPING SEROLOGIC RH(D): CPT | Performed by: PHYSICIAN ASSISTANT

## 2020-01-16 PROCEDURE — NC001 PR NO CHARGE: Performed by: NURSE PRACTITIONER

## 2020-01-16 PROCEDURE — 83605 ASSAY OF LACTIC ACID: CPT | Performed by: PHYSICIAN ASSISTANT

## 2020-01-16 PROCEDURE — 84295 ASSAY OF SERUM SODIUM: CPT

## 2020-01-16 PROCEDURE — 85025 COMPLETE CBC W/AUTO DIFF WBC: CPT | Performed by: NURSE PRACTITIONER

## 2020-01-16 PROCEDURE — 85027 COMPLETE CBC AUTOMATED: CPT | Performed by: PHYSICIAN ASSISTANT

## 2020-01-16 PROCEDURE — NC001 PR NO CHARGE: Performed by: SURGERY

## 2020-01-16 PROCEDURE — 84132 ASSAY OF SERUM POTASSIUM: CPT

## 2020-01-16 PROCEDURE — 80048 BASIC METABOLIC PNL TOTAL CA: CPT | Performed by: PHYSICIAN ASSISTANT

## 2020-01-16 RX ORDER — SODIUM CHLORIDE, SODIUM GLUCONATE, SODIUM ACETATE, POTASSIUM CHLORIDE, MAGNESIUM CHLORIDE, SODIUM PHOSPHATE, DIBASIC, AND POTASSIUM PHOSPHATE .53; .5; .37; .037; .03; .012; .00082 G/100ML; G/100ML; G/100ML; G/100ML; G/100ML; G/100ML; G/100ML
1000 INJECTION, SOLUTION INTRAVENOUS ONCE
Status: COMPLETED | OUTPATIENT
Start: 2020-01-16 | End: 2020-01-16

## 2020-01-16 RX ORDER — FOLIC ACID 1 MG/1
1 TABLET ORAL DAILY
Status: DISCONTINUED | OUTPATIENT
Start: 2020-01-17 | End: 2020-02-11 | Stop reason: HOSPADM

## 2020-01-16 RX ORDER — NOREPINEPHRINE BITARTRATE 1 MG/ML
INJECTION, SOLUTION INTRAVENOUS
Status: DISPENSED
Start: 2020-01-16 | End: 2020-01-16

## 2020-01-16 RX ORDER — LORATADINE 10 MG/1
10 TABLET ORAL DAILY
Status: DISCONTINUED | OUTPATIENT
Start: 2020-01-16 | End: 2020-02-11 | Stop reason: HOSPADM

## 2020-01-16 RX ADMIN — LORATADINE 10 MG: 10 TABLET ORAL at 08:46

## 2020-01-16 RX ADMIN — OXYCODONE HYDROCHLORIDE 10 MG: 10 TABLET ORAL at 08:45

## 2020-01-16 RX ADMIN — AMLODIPINE BESYLATE 10 MG: 10 TABLET ORAL at 08:45

## 2020-01-16 RX ADMIN — QUETIAPINE FUMARATE 200 MG: 100 TABLET ORAL at 08:46

## 2020-01-16 RX ADMIN — HEPARIN SODIUM 5000 UNITS: 5000 INJECTION INTRAVENOUS; SUBCUTANEOUS at 05:52

## 2020-01-16 RX ADMIN — CHLORHEXIDINE GLUCONATE 0.12% ORAL RINSE 15 ML: 1.2 LIQUID ORAL at 08:45

## 2020-01-16 RX ADMIN — MELATONIN 1000 UNITS: at 08:45

## 2020-01-16 RX ADMIN — METOPROLOL TARTRATE 25 MG: 25 TABLET, FILM COATED ORAL at 21:53

## 2020-01-16 RX ADMIN — GABAPENTIN 300 MG: 300 CAPSULE ORAL at 21:53

## 2020-01-16 RX ADMIN — MIRTAZAPINE 15 MG: 15 TABLET, FILM COATED ORAL at 21:53

## 2020-01-16 RX ADMIN — HEPARIN SODIUM 5000 UNITS: 5000 INJECTION INTRAVENOUS; SUBCUTANEOUS at 15:33

## 2020-01-16 RX ADMIN — FLUTICASONE PROPIONATE 2 SPRAY: 50 SPRAY, METERED NASAL at 08:47

## 2020-01-16 RX ADMIN — IOHEXOL 175 ML: 300 INJECTION, SOLUTION INTRAVENOUS at 13:46

## 2020-01-16 RX ADMIN — HEPARIN SODIUM 5000 UNITS: 5000 INJECTION INTRAVENOUS; SUBCUTANEOUS at 21:53

## 2020-01-16 RX ADMIN — QUETIAPINE FUMARATE 600 MG: 300 TABLET ORAL at 21:54

## 2020-01-16 RX ADMIN — SODIUM CHLORIDE, SODIUM GLUCONATE, SODIUM ACETATE, POTASSIUM CHLORIDE, MAGNESIUM CHLORIDE, SODIUM PHOSPHATE, DIBASIC, AND POTASSIUM PHOSPHATE 1000 ML: .53; .5; .37; .037; .03; .012; .00082 INJECTION, SOLUTION INTRAVENOUS at 11:00

## 2020-01-16 RX ADMIN — NICOTINE 1 PATCH: 14 PATCH TRANSDERMAL at 08:45

## 2020-01-16 RX ADMIN — SODIUM CHLORIDE, SODIUM GLUCONATE, SODIUM ACETATE, POTASSIUM CHLORIDE, MAGNESIUM CHLORIDE, SODIUM PHOSPHATE, DIBASIC, AND POTASSIUM PHOSPHATE 1000 ML: .53; .5; .37; .037; .03; .012; .00082 INJECTION, SOLUTION INTRAVENOUS at 10:30

## 2020-01-16 RX ADMIN — CHLORHEXIDINE GLUCONATE 0.12% ORAL RINSE 15 ML: 1.2 LIQUID ORAL at 21:55

## 2020-01-16 RX ADMIN — Medication 20 MG: at 08:48

## 2020-01-16 RX ADMIN — DULOXETINE HYDROCHLORIDE 90 MG: 60 CAPSULE, DELAYED RELEASE ORAL at 08:49

## 2020-01-16 RX ADMIN — THIAMINE HCL TAB 100 MG 100 MG: 100 TAB at 08:46

## 2020-01-16 RX ADMIN — OXYCODONE HYDROCHLORIDE 10 MG: 10 TABLET ORAL at 19:18

## 2020-01-16 RX ADMIN — TRAZODONE HYDROCHLORIDE 50 MG: 50 TABLET ORAL at 21:53

## 2020-01-16 RX ADMIN — FOLIC ACID 1 MG: 5 INJECTION, SOLUTION INTRAMUSCULAR; INTRAVENOUS; SUBCUTANEOUS at 08:45

## 2020-01-16 RX ADMIN — METOPROLOL TARTRATE 50 MG: 50 TABLET, FILM COATED ORAL at 08:45

## 2020-01-16 RX ADMIN — GABAPENTIN 300 MG: 300 CAPSULE ORAL at 08:45

## 2020-01-16 RX ADMIN — DOXAZOSIN 1 MG: 1 TABLET ORAL at 08:45

## 2020-01-16 RX ADMIN — FLUTICASONE FUROATE AND VILANTEROL TRIFENATATE 1 PUFF: 100; 25 POWDER RESPIRATORY (INHALATION) at 08:47

## 2020-01-16 NOTE — PLAN OF CARE
Problem: PHYSICAL THERAPY ADULT  Goal: Performs mobility at highest level of function for planned discharge setting  See evaluation for individualized goals  Description  Treatment/Interventions: Functional transfer training, LE strengthening/ROM, Therapeutic exercise, Endurance training, Patient/family training, Bed mobility, Gait training  Equipment Recommended: Shayne Scripture       See flowsheet documentation for full assessment, interventions and recommendations  Note:   Prognosis: Good  Problem List: Decreased strength, Decreased endurance, Impaired balance, Decreased mobility, Decreased coordination, Decreased cognition, Decreased safety awareness, Pain  Assessment: Pt seen for skilled PT re-evaluation 2* S/p Exlap, oversewing bleeding vessels, closure of tissue over perforation, Thal patch with transverse colon, open gastrostomy-jejunostomy tube placement 1/11/2020  Please see PT IE for PLOF and home set-up  Pt is 81 y o  male seen for PT re-evaluation s/p admit to [facility] 1/8/2020 w/ Acute blood loss anemia  Please find objective findings from PT assessment regarding body systems outlined above with impairments and limitations including weakness, impaired balance, decreased endurance, impaired coordination, gait deviations, pain, decreased activity tolerance, decreased functional mobility tolerance, decreased safety awareness, fall risk and decreased cognition  Pt performed bed mobility at Mod A X2, STS at Max A X2, and ambulated 3ft w/ RW at [address]  The following objective measures performed on IE also reveal limitations: Barthel Index: 35/100  Pt to benefit from continued PT tx to address deficits as defined above and maximize level of functional independent mobility and consistency  From PT/mobility standpoint, recommendation at time of d/c would be STR pending progress in order to facilitate return to PLOF    Barriers to Discharge: Inaccessible home environment     Recommendation: Post acute IP rehab     PT - OK to Discharge: (when medically stable to rehab)    See flowsheet documentation for full assessment

## 2020-01-16 NOTE — PHYSICAL THERAPY NOTE
Physical Therapy Re-Evaluation     Patient's Name: Rohan Vance    Admitting Diagnosis  Suicidal ideation [R45 851]  Dizziness [R42]  Alcohol abuse [F10 10]  Syncope [R55]  Chest pain [R07 9]    Problem List  Patient Active Problem List   Diagnosis    Major depressive disorder, recurrent, severe with psychotic features ([facility] )    Essential hypertension    Uncomplicated alcohol dependence ([facility] )    History of substance abuse ([place] [address] )    Encounter to establish care with new doctor    History of sustained ventricular tachycardia    Hypokinesia of left ventricle    Bilateral lower extremity edema    Tachycardia    History of transient ischemic attack (TIA)    Back pain without sciatica    History of cerebrovascular accident (CVA) with residual deficit    Elevated fasting glucose    Ambulatory dysfunction    Poor mobility    Overweight (BMI 25 0-29  9)    Vitamin D deficiency    Abnormal echocardiogram    Syncope    Chest pain    Suicidal ideation    Alcohol abuse    MDD (major depressive disorder)    History of CVA (cerebrovascular accident)    COPD without exacerbation (HCC)    Dyslipidemia    Chronic pain    Acute blood loss anemia    Tobacco abuse    Inadequate oral nutritional intake    Duodenal ulcer    Hemorrhagic shock (HCC)       Past Medical History  Past Medical History:   Diagnosis Date    BPH (benign prostatic hyperplasia)     CVA (cerebral vascular accident) ([facility] )     Head injury     Hypertension     Metatarsal fracture     TIA (transient ischemic attack)        Past Surgical History  Past Surgical History:   Procedure Laterality Date    ABDOMINAL SURGERY      ANKLE SURGERY      BACK SURGERY      ELBOW SURGERY      IR VISCERAL ANGIOGRAPHY / INTERVENTION  1/11/2020    JOINT REPLACEMENT      KNEE SURGERY      LAPAROTOMY N/A 1/11/2020    Procedure: LAPAROTOMY EXPLORATORY,  OVERSEW  OF GASTRO DUODENAL ARTERY, THAL PATCH OF DUODENUM, VICKY GASTRO JEJUNOSTOMY TUBE; Surgeon: Britney Alvarez DO;  [facility];  Service: General    LIVER SURGERY          01/16/20 0849   Note Type   Note type Re-eval   Pain Assessment   Pain Assessment 0-10   Pain Score 8   Pain Type Acute pain;Surgical pain   Pain Location Abdomen   Pain Orientation Mid   Hospital Pain Intervention(s) Ambulation/increased activity;Repositioned;Distraction; Emotional support   Response to Interventions Tolerated   Home Living   Additional Comments Please refer to PT IE for home set-up and PLOF   Prior Function   Comments Please refer to PT IE for home set-up and PLOF   Restrictions/Precautions   Weight Bearing Precautions Per Order No   Other Precautions Cognitive; Chair Alarm; Bed Alarm;Multiple lines;Telemetry;O2;Fall Risk;Pain  (4L NC)   General   Family/Caregiver Present No   Cognition   Overall Cognitive Status Impaired   Arousal/Participation Alert   Attention Attends with cues to redirect   Orientation Level Oriented to person;Oriented to place;Oriented to situation;Disoriented to time   Memory Decreased recall of precautions;Decreased recall of recent events   Following Commands Follows one step commands with increased time or repetition   Comments Pt cooperative to participate in therapy  Pt presents w/ decreased insight into deficits and decreased safety awareness  Required VC and TC for all initiation, sequencing, safety, and redirection to task  RLE Assessment   RLE Assessment   (grossly 3+/5)   LLE Assessment   LLE Assessment   (grossly 3+/5)   Bed Mobility   Supine to Sit 3  Moderate assistance   Additional items Assist x 2;HOB elevated; Increased time required;Verbal cues;LE management   Sit to Supine Unable to assess   Additional Comments Pt left seated OOB in recliner at end of session w/ chair alarm activated and all needs within reach   Transfers   Sit to Stand 2  Maximal assistance   Additional items Assist x 2; Increased time required;Verbal cues   Stand to Sit 2  Maximal assistance Additional items Assist x 2; Increased time required;Verbal cues   Additional Comments Transfers w/ RW  VC and TC required for safe hand placement and safety  Ambulation/Elevation   Gait pattern Excessively slow; Short stride; Improper Weight shift;Decreased foot clearance; Inconsistent kathrine   Gait Assistance 3  Moderate assist   Additional items Assist x 2;Verbal cues   Assistive Device Rolling walker   Distance 3ft bed to chair   Stair Management Assistance Not tested   Balance   Static Sitting Fair -   Dynamic Sitting Poor +   Static Standing Poor   Dynamic Standing Poor -   Ambulatory Poor -   Endurance Deficit   Endurance Deficit Yes   Endurance Deficit Description fatigue, weakness   Activity Tolerance   Activity Tolerance Patient limited by fatigue;Patient limited by pain;Treatment limited secondary to medical complications (Comment)   Medical Staff Made Aware NANCI Cloud CM   Nurse Made Aware RN cleared pt for therapy   Assessment   Prognosis Good   Problem List Decreased strength;Decreased endurance; Impaired balance;Decreased mobility; Decreased coordination;Decreased cognition;Decreased safety awareness;Pain   Assessment Pt seen for skilled PT re-evaluation 2* S/p Exlap, oversewing bleeding vessels, closure of tissue over perforation, Thal patch with transverse colon, open gastrostomy-jejunostomy tube placement 1/11/2020  Please see PT IE for PLOF and home set-up  Pt is 81 y o  male seen for PT re-evaluation s/p admit to [facility] on 1/8/2020 w/ Acute blood loss anemia  Please find objective findings from PT assessment regarding body systems outlined above with impairments and limitations including weakness, impaired balance, decreased endurance, impaired coordination, gait deviations, pain, decreased activity tolerance, decreased functional mobility tolerance, decreased safety awareness, fall risk and decreased cognition   Pt performed bed mobility at Mod A X2, STS at Max A X2, and ambulated 3ft w/ RW at [address]  The following objective measures performed on IE also reveal limitations: Barthel Index: 35/100  Pt to benefit from continued PT tx to address deficits as defined above and maximize level of functional independent mobility and consistency  From PT/mobility standpoint, recommendation at time of d/c would be STR pending progress in order to facilitate return to PLOF  Goals   Patient Goals to have less pain   STG Expiration Date 01/30/20   Short Term Goal #1 1  Pt will demonstrate the ability to perform all aspects of bed mobility at [address] [facility] in onder to maximize functional independence and decrease burden on caregivers  2 Pt will demonstrate the ability to perform all functional transfers at [address] [facility] in order to maximize functional independence and decrease burden on caregivers  3 Pt will demonstrate the ability to ambulate at least 50ft with least restrictive device at Mod I in order to maximize functional independence  4 Pt will demonstrate improved balance by one grade in order to decrease risk of falls  5 Pt will increase b/l LE strength by 1 grade in order to increase ease of functional mobility and transfers   PT Treatment Day 0   Plan   Treatment/Interventions Functional transfer training;LE strengthening/ROM; Elevations; Therapeutic exercise; Endurance training;Patient/family training;Equipment eval/education; Bed mobility;Gait training;Spoke to nursing;Spoke to case management   PT Frequency   (3-5x/week)   Recommendation   Recommendation Post acute IP rehab   Equipment Recommended Walker  (RW)   PT - OK to Discharge   (when medically stable to rehab)   Modified [place] Scale   Modified [place] Scale 4   Barthel Index   Feeding 10   Bathing 0   Grooming Score 0   Dressing Score 5   Bladder Score 0   Bowels Score 10   Toilet Use Score 5   Transfers (Bed/Chair) Score 5   Mobility (Level Surface) Score 0   Stairs Score 0   Barthel Index Score 35     Zeeshan Pollock, PT, DPT

## 2020-01-16 NOTE — PLAN OF CARE
Problem: OCCUPATIONAL THERAPY ADULT  Goal: Performs self-care activities at highest level of function for planned discharge setting  See evaluation for individualized goals  Description  Treatment Interventions: ADL retraining, Functional transfer training, UE strengthening/ROM, Endurance training, Cognitive reorientation, Patient/family training, Equipment evaluation/education, Fine motor coordination activities, Compensatory technique education, Activityengagement, Energy conservation          See flowsheet documentation for full assessment, interventions and recommendations  Note:   Limitation: Decreased ADL status, Decreased UE strength, Decreased Safe judgement during ADL, Decreased cognition, Decreased endurance, Decreased fine motor control, Decreased high-level ADLs  Prognosis: Fair  Assessment: Pt is a 82 yo male seen for OT eval s/p adm to [facility] 1/8/2020 w/ dizziness, syncope, and chest pain dx'd w/ ABLA  Pt 1/11 S/p EGD; 1/11 S/p IR GDA embolozation;1/11 S/p Exlap, oversewing bleeding vessels, closure of tissue over perforation, Thal patch with transverse colon, open gastrostomy-jejunostomy tube placement  Comorbidities include a h/o HTN, syncope, chest pain, suicidal ideation, alcohol abuse, MDD, CVA, chronic pain, hemorrhagic shock, alcohol dependence, history of sustained v-tach, TIA, back pain, poor mobility  Pt with active OT orders and activity as tolerated orders  Pt is retired and resides w/ family in [facility] w/ 2STE w/ stair glide to 2nd flr where bed/bath are located  Family works during the day  Pt was I w/  ADLS and family A w/ IADLS, drove, & required use of DME PTA, including RW for short distance mobility and W/C for long distance mobility  Pt is currently demonstrating the following occupational deficits: Mod A UB bathing/dressing, Max A LB bathing/dressing and toileting, Mod A x2 bed mobility, Max Ax2 STS, Mod Ax2 short distance mobility ~3ft w/ RW   These deficits that are impacting pt's baseline areas of occupation are a result of the following impairments: pain, endurance, activity tolerance, functional mobility, forward functional reach, balance, functional standing tolerance, unsupportive home environment, decreased I w/ ADLS/IADLS, strength, cognitive impairments, decreased safety awareness, decreased insight into deficits, impaired fine motor skills and coordination deficits  The following Occupational Performance Areas to address include: grooming, bathing/shower, toilet hygiene, dressing, health maintenance, functional mobility and clothing management  Based on the aforementioned OT evaluation, functional performance deficits, and assessments, pt has been identified as a high complexity evaluation  Recommend STR upon D/C   Pt to continue to benefit from acute immediate OT services to address the following goals 3-5x/week to  w/in 7-10 days:      OT Discharge Recommendation: Short Term Rehab  OT - OK to Discharge: Yes(when medically cleared)

## 2020-01-16 NOTE — OCCUPATIONAL THERAPY NOTE
Occupational Therapy Evaluation      Alex Valles    1/16/2020    Principal Problem:    Acute blood loss anemia  Active Problems:    Essential hypertension    Syncope    Chest pain    Suicidal ideation    Alcohol abuse    MDD (major depressive disorder)    History of CVA (cerebrovascular accident)    COPD without exacerbation (HCC)    Dyslipidemia    Chronic pain    Tobacco abuse    Inadequate oral nutritional intake    Duodenal ulcer    Hemorrhagic shock (HCC)      Past Medical History:   Diagnosis Date    BPH (benign prostatic hyperplasia)     CVA (cerebral vascular accident) ([address] )     Head injury     Hypertension     Metatarsal fracture     TIA (transient ischemic attack)        Past Surgical History:   Procedure Laterality Date    ABDOMINAL SURGERY      ANKLE SURGERY      BACK SURGERY      ELBOW SURGERY      IR VISCERAL ANGIOGRAPHY / INTERVENTION  1/11/2020    JOINT REPLACEMENT      KNEE SURGERY      LAPAROTOMY N/A 1/11/2020    Procedure: LAPAROTOMY EXPLORATORY,  OVERSEW  OF GASTRO DUODENAL ARTERY, THAL PATCH OF DUODENUM, VICKY GASTRO JEJUNOSTOMY TUBE;  Surgeon: Jonathan Hauser DO;  [facility];  Service: General    LIVER SURGERY          01/16/20 0848   Note Type   Note type Eval/Treat   Restrictions/Precautions   Weight Bearing Precautions Per Order No   Other Precautions Cognitive; Chair Alarm; Bed Alarm;Multiple lines;Telemetry;O2;Fall Risk;Pain  (4L NC O2; G-J tube)   Pain Assessment   Pain Assessment 0-10   Pain Score 8   Pain Type Surgical pain   Pain Location Abdomen   Pain Orientation Mid   Pain Descriptors Aching;Discomfort   Pain Onset Ongoing   Clinical Progression Not changed   Patient's Stated Pain Goal No pain   Hospital Pain Intervention(s) Repositioned; Ambulation/increased activity; Distraction; Emotional support   Response to Interventions Tolerated   Home Living   Type of [address] [place] [address] Two level;Bed/bath upstairs;Stairs to enter with rails  [facility]; 3STE; stair glide to 2nd flr)   Bathroom Shower/Tub Tub/shower unit   Bathroom Toilet Standard   Bathroom Equipment Shower chair   Bathroom Accessibility Accessible   Home Equipment Walker;Cane;Wheelchair-manual   Additional Comments Pt reports use of RW for short distance mobility and W/C for long distance mobility  Prior Function   Level of [place] Independent with ADLs and functional mobility   Lives With Family;Son  (AMBAR)   Receives Help From Family   ADL Assistance Independent   IADLs Needs assistance   Falls in the last 6 months 1 to 4  (3, per Pt)   Vocational Retired   Lifestyle   Autonomy Pt reports being I w/ all ADLS and requires A from family w/ IADLS; (-) drives; ambulates short distances w/ RW and long distances w/ W/C  Reciprocal Relationships Pt lives w/ son and DIL  Pt's family works during the day  Service to Others Pt is retired   Intrinsic Gratification Pt reports enjoying being home   Psychosocial   Psychosocial (WDL) WDL   ADL   Where Assessed Edge of bed   Eating Assistance 5  Supervision/Setup   Grooming Assistance 4  Minimal Assistance   [address] 3  Moderate Assistance   LB [address] 2  Maximal [facility] [address] 3  Moderate Assistance   LB Dressing Assistance 2  Maximal [address]  2  Maximal Assistance   Bed Mobility   Supine to Sit 3  Moderate assistance   Additional items Assist x 2; Increased time required;LE management;Verbal cues; Bedrails;HOB elevated   Sit to Supine Unable to assess   Additional Comments Pt laying supine in bed upon OT arrival  Pt c/o dizziness while seated EOB - BP taken at this time 133/60s  Pt reports improved dizziness w/ increased sitting rest break  Pt seated OOB in chair w/ chair alarm activated and all needs in reach s/p OT sesison  Transfers   Sit to Stand 2  Maximal assistance   Additional items Assist x 2; Increased time required;Verbal cues;Armrests   Stand to Sit 2  Maximal assistance Additional items Assist x 2; Increased time required;Verbal cues;Armrests   Additional Comments Transfers w/ RW  VC and TC required for safe hand placement and safety  Functional Mobility   Functional Mobility 3  Moderate assistance  (x2)   Additional Comments Pt demonstrated short distance household mobility in room EOB>chair w/ RW w/ Mod Ax2  Pt required VC and TC for all initiation, sequencing, safety, and RW management  Additional items Rolling walker   Balance   Static Sitting Fair -   Dynamic Sitting Poor +   Static Standing Poor -   Dynamic Standing Poor -   Ambulatory Poor -   Activity Tolerance   Activity Tolerance Patient limited by fatigue;Patient limited by pain;Treatment limited secondary to medical complications (Comment)   Medical Staff Made Aware PT Nakul Guzman CM for safe dispo planning   Nurse Made Aware RN cleared Pt for OT eval   RUE Assessment   RUE Assessment X  (decreased gross strength)   LUE Assessment   LUE Assessment X  (decreased gross strength)   Hand Function   Gross Motor Coordination Functional   Fine Motor Coordination Impaired  (noted B/L UE tremors)   Sensation   Light Touch No apparent deficits   Cognition   Overall Cognitive Status Impaired   Arousal/Participation Alert; Cooperative;Lethargic   Attention Attends with cues to redirect   Orientation Level Oriented to person;Oriented to place;Oriented to situation;Disoriented to time   Memory Decreased recall of precautions;Decreased recall of recent events   Following Commands Follows one step commands with increased time or repetition   Comments Pt cooperative to participate in therapy  Pt presents w/ decreased insight into deficits and decreased safety awareness  Required VC and TC for all initiation, sequencing, safety, and redirection to task  Assessment   Limitation Decreased ADL status; Decreased UE strength;Decreased Safe judgement during ADL;Decreased cognition;Decreased endurance;Decreased fine motor control;Decreased high-level ADLs   Prognosis Fair   Assessment Pt is a 80 yo male seen for OT eval s/p adm to [facility] 1/8/2020 w/ dizziness, syncope, and chest pain dx'd w/ ABLA  Pt 1/11 S/p EGD; 1/11 S/p IR GDA embolozation;1/11 S/p Exlap, oversewing bleeding vessels, closure of tissue over perforation, Thal patch with transverse colon, open gastrostomy-jejunostomy tube placement  Comorbidities include a h/o HTN, syncope, chest pain, suicidal ideation, alcohol abuse, MDD, CVA, chronic pain, hemorrhagic shock, alcohol dependence, history of sustained v-tach, TIA, back pain, poor mobility  Pt with active OT orders and activity as tolerated orders  Pt is retired and resides w/ family in [address] w/ 2STE w/ stair glide to 2nd flr where bed/bath are located  Family works during the day  Pt was I w/  ADLS and family A w/ IADLS, drove, & required use of DME PTA, including RW for short distance mobility and W/C for long distance mobility  Pt is currently demonstrating the following occupational deficits: Mod A UB bathing/dressing, Max A LB bathing/dressing and toileting, Mod A x2 bed mobility, Max Ax2 STS, Mod Ax2 short distance mobility ~3ft w/ RW  These deficits that are impacting pt's baseline areas of occupation are a result of the following impairments: pain, endurance, activity tolerance, functional mobility, forward functional reach, balance, functional standing tolerance, unsupportive home environment, decreased I w/ ADLS/IADLS, strength, cognitive impairments, decreased safety awareness, decreased insight into deficits, impaired fine motor skills and coordination deficits  The following Occupational Performance Areas to address include: grooming, bathing/shower, toilet hygiene, dressing, health maintenance, functional mobility and clothing management  Based on the aforementioned OT evaluation, functional performance deficits, and assessments, pt has been identified as a high complexity evaluation  Recommend STR upon D/C   Pt to continue to benefit from acute immediate OT services to address the following goals 3-5x/week to  w/in 7-10 days:    Goals   Patient Goals To feel better   LTG Time Frame 7-10   Long Term Goal #1 Refer to goals below   Plan   Treatment Interventions ADL retraining;Functional transfer training;UE strengthening/ROM; Endurance training;Cognitive reorientation;Patient/family training;Equipment evaluation/education; Fine motor coordination activities; Compensatory technique education; Activityengagement; Energy conservation   Goal Expiration Date 20   OT Frequency 3-5x/wk   Recommendation   OT Discharge Recommendation Short Term Rehab   OT - OK to Discharge Yes  (when medically cleared)       GOALS    1) Pt will improve activity tolerance to G for min 30 min txment sessions for increase engagement in functional tasks    2) Pt will complete UB/LB dressing/self care w/ S using adaptive device and DME as needed    3) Pt will complete bathing w/ S w/ use of AE and DME as needed    4) Pt will complete toileting w/ S w/ G hygiene/thoroughness using DME as needed    5) Pt will improve functional transfers to S on/off all surfaces using DME as needed w/ G balance/safety     6) Pt will improve functional mobility during ADL/IADL/leisure tasks to S using DME as needed w/ G balance/safety     8) Pt will be attentive 100% of the time during ongoing cognitive assessment w/ G participation to assist w/ safe d/c planning/recommendations    9) Pt will demonstrate G carryover of pt/caregiver education and training as appropriate w/o cues w/ good tolerance to increase safety during functional tasks    10) Pt will demonstrate 100% carryover of energy conservation techniques t/o functional I/ADL/leisure tasks w/o cues s/p skilled education to increase endurance during functional tasks             Ora Dong MS, OTR/L

## 2020-01-16 NOTE — PROGRESS NOTES
Progress Note - Margery Lundborg 81 y o  male MRN: [id number]    Unit/Bed#: [facility] 516-01 Encounter: [id number]      Assessment:  80yM w/ bleeding and perforated 1st portion duodenal ulcer  1/11 S/p EGD  1/11 S/p IR GDA embolozation   1/11 S/p Exlap, oversewing bleeding vessels, closure of tissue over perforation, Thal patch with transverse colon, open gastrostomy-jejunostomy tube placement      Plan:  Diet NPO; Sips with meds  Upper GI Swallow study today to assess for leaks, diet plan pending results  OOBTC  Pulmonary Toilet, IS  PPx: SQH, PPI  Add colace  Pain and Nausea control PRN  Monitor HGB, appears stable  Replace potassium      Subjective:   No acute events overnight  Patient denied any abdominal pain this morning  Endorses flatus and bowel movements  Afebrile  Objective:     Vitals: Blood pressure 123/61, pulse 94, temperature 98 °F (36 7 °C), temperature source Oral, resp  rate (!) 10, height 5' 8 5" (1 74 m), weight 88 1 kg (194 lb 3 6 oz), SpO2 99 %  ,Body mass index is 29 1 kg/m²  I/O       01/14 0701 - 01/15 0700 01/15 0701 - 01/16 0700    P  O   0    I V  (mL/kg) 16 3 (0 2) 629 2 (7 1)    NG/GT 50 120    IV Piggyback 450 230    Feedings 1433 946    Total Intake(mL/kg) 1949 3 (22 1) 1925 2 (21 9)    Urine (mL/kg/hr) 4875 (2 3) 800 (0 4)    Emesis/NG output 1500 580    Drains 40 15    Stool  0    Total Output 6415 1395    Net -4465 8 +530 2          Unmeasured Urine Occurrence  1 x    Unmeasured Stool Occurrence  1 x            Physical Exam  Gen: NAD, A&O, Comfortable   Chest: Normal work of breathing, no resp distress  Abd: S, mild distention, NT, incision with mild erythema, staples intact, J p's x2 with minimal serous output, G-tube with dark maroon output, J-tube with tube feeds running at goal  Ext: No edema  Skin: warm, dry, intact        Invasive Devices     Peripheral Intravenous Line            Peripheral IV 01/14/20 Left Antecubital 1 day    Peripheral IV 01/14/20 Right;Upper Arm 1 day Drain            Closed/Suction Drain Right Abdomen Bulb 4 days    Closed/Suction Drain Right Abdomen Bulb 19 Fr  4 days    NG/OG/Enteral Tube Enteral Feeding Tube   4 days    External Urinary Catheter Small 1 day                Scheduled Meds:    Current Facility-Administered Medications:  acetaminophen 650 mg Oral Q6H PRN Birdie Paulson PA-C    albuterol 2 5 mg Nebulization Q4H PRN Karen Nicole DO    amLODIPine 10 mg Oral Daily Byron Smith MD    atorvastatin 40 mg Oral Daily With Dinner Byron Smith MD    chlorhexidine 15 mL Swish & Spit Q12H [address] Rosalina Billing    cholecalciferol 1,000 Units Oral Daily Byron Smith MD    doxazosin 1 mg Oral Daily MAVERICK Brown    DULoxetine 90 mg Oral Daily Byron Smith MD    fluticasone 2 spray Nasal Daily Byron Smith MD    fluticasone-vilanterol 1 puff Inhalation Daily Byron Smith MD    folic acid IVPB 1 mg Intravenous Daily Birdie Paulson PA-C Last Rate: Stopped (01/15/20 1102)   gabapentin 300 mg Oral TID Byron Smith MD    guaiFENesin 600 mg Oral Q12H PRN Byron Smith MD    heparin (porcine) 5,000 Units Subcutaneous [facility] Karen Nicole DO    metoprolol tartrate 50 mg Oral Daily Byron Smith MD    mirtazapine 15 mg Oral HS Byron Smith MD    multi-electrolyte 50 mL/hr Intravenous Continuous AlyseMAVERICK Massey Last Rate: 50 mL/hr (01/15/20 2325)   nicotine 1 patch Transdermal Daily Federico Sanabria MD    omeprazole (PRILOSEC) suspension 2 mg/mL 20 mg Oral BID MAVERICK Wylie    oxyCODONE 10 mg Oral Q4H PRN Bryant Rodriguez MD    oxyCODONE 5 mg Oral Q4H PRN Bryant Rodriguez MD    QUEtiapine 200 mg Oral Daily Byron Smith MD    QUEtiapine 600 mg Oral HS Byron Smith MD    thiamine 100 mg Oral Daily Byron Smith MD    traZODone 50 mg Oral HS PRN Byron Smith MD      Continuous Infusions:    multi-electrolyte 50 mL/hr Last Rate: 50 mL/hr (01/15/20 2325)     PRN Meds:   acetaminophen    albuterol    guaiFENesin    oxyCODONE    oxyCODONE    traZODone      Lab, Imaging and other studies:  Recent Labs     01/14/20  0458 01/15/20  0558 01/15/20  1429   WBC 8 60 8 34 8 10   HGB 9 3* 10 1* 9 9*    269 246     Recent Labs     01/14/20  0458 01/14/20  1746 01/15/20  0550 01/15/20  0647   SODIUM 139 140  --  141   K 3 2* 3 3*  --  3 5    105  --  104   CO2 30 31  --  33*   BUN 16 16  --  20   CREATININE 0 66 0 67  --  0 66   PHOS 1 9*  --   --  2 1*   MG 1 9  --  2 4  --    CALCIUM 7 8* 8 1*  --  8 5     COAG - PT/INR/PTT: 13 3/1 05/-- (01/15 6361)       VTE Pharmacologic Prophylaxis: Heparin  VTE Mechanical Prophylaxis: sequential compression device

## 2020-01-16 NOTE — PROGRESS NOTES
Daily Progress Note - Critical Care   James Judge 81 y o  male MRN: [id number]  Unit/Bed#: [facility] 516-01 Encounter: [id number]        ----------------------------------------------------------------------------------------  HPI/24hr events: Patient with hypotensive episode yesterday, responded well to 500cc NSS bolus  Hgb seems stable  NPO today for UGI study  ---------------------------------------------------------------------------------------  SUBJECTIVE  No acute events overnight    Review of Systems   Constitutional: Positive for appetite change  Negative for chills and fever  HENT: Negative  Eyes: Negative  Respiratory: Positive for cough and wheezing  Negative for shortness of breath  Cardiovascular: Negative  Negative for chest pain and leg swelling  Gastrointestinal: Positive for abdominal pain  Negative for nausea and vomiting  Genitourinary: Negative for difficulty urinating  Neurological: Negative for dizziness and headaches  Review of systems was reviewed and negative unless stated above in HPI/24-hour events   ---------------------------------------------------------------------------------------  Assessment and Plan:    Neuro:   · Diagnosis: Pain acute on chronic  ? Home gabapentin 300mg TID  ? Home duloxetine 90mg daily  ? Home trazodone 50mg PRN qHS  ? PRN tylenol, oxycodone 5/10  · Diagnosis: Encephalopathy  ? Currently AAOx3  ? CT/MRI for stroke alert were negative on admission  ? Delirium precautions  ? Regulate sleep/wake cycle  ? CIWA protocol  ? Home seroquel 200 daily and 600 HS restarted  ? Home remeron 15mg qHS restarted     CV:   · Hemorrhagic shock  ? S/P agressive resusucitation on 1/11/2020: 18 units of PRBCs, 5 units platelets, 3 units Cryo, 6 units FFP  ? Continues to be hemodynamically stable  ? Hgb today pending at this time  · Hyperlipidemia  ? Lipitor 40mg  · Hypertension  ?  Norvasc and Metoprolol (hold if SBP <110)     Pulm:  · Diagnosis: Intubated due to acuity of condition  ? Extubated successfully 1/14/20  ? B/l pleural effusions were seen on CXR; have received several doses of lasix  ? Repeat CXR this AM   · Diagnosis: COPD/smoking  ? Nebulizers given  ? Home meds albuterol and breo ellipta  ? Nicotine patch  ? Continue to monitor SpO2  ? Wean NC as able, currently at 4L NC        GI:   · Perforated duodenal ulcer  ? S/P EGD on 1/11 (GI)  ? S/P IR GDA embolozation 1/11 (IR)  ? S/P Exlap, oversewing bleeding vessels, closure of tissue over perforation, Thal patch with transverse colon, open gastrostomy-jejunostomy tube placement (Surgery)  ? NPO today for UGI study  · Monitor JPx2 output per [facility]  § Superior CAMPBELL with 10cc SS and Inferior with 5cc SS   · G-J tube in place  · J-tube output 580  · Protonix 40mg PO BID  · GI signed off  Continue Protonix  OP f/u in 5 months          :   ? Monitor UOP, received multiple lasix doses  ? UOP over last 24hrs 982cc + one unmeasured ocurrence  ? Continue strict I/O  ? Renal function stable           F/E/N:   · F: Isolyte at 50mL since NPO  · E: replete as needed for K>4, Mg>2, PO4>2 5  · N: NPO today for UGI study        Heme/Onc:   · ABLA/Hemorrhagic shock   ? S/P 18uPRBCs, 5uplatelets, 3uCryo, 6uFFP on 1/11  ? DVT ppx: SQH, SCDs  ? Transfuse for hgb<7        Endo:   ? No acute issues        ID:   · Continues to be afebrile  · Completed Ancef and diflucan prophylaxis  · Monitor WBC and temperature        MSK/Skin:   ? Frequent turning and positioning/offloading  ? Itching around proximal midline incision - loratadine PRN ordered  ? Instructed pt and nursing to not let him scratch his incision  ? PT/OT  ?  Per patient's son, he was not ambulating much at baseline currently, using a walker or a 4-point cane mostly    Disposition: Continue Critical Care   Code Status: Level 1 - Full Code  ---------------------------------------------------------------------------------------  ICU CORE MEASURES    Prophylaxis VTE Pharmacologic Prophylaxis: Heparin  VTE Mechanical Prophylaxis: sequential compression device  Stress Ulcer Prophylaxis: Pantoprazole PO    ABCDE Protocol (if indicated)  Plan to perform spontaneous awakening trial today? Not applicable  Plan to perform spontaneous breathing trial today? Not applicable  Obvious barriers to extubation? Not applicable  CAM-ICU: Negative    Invasive Devices Review  Invasive Devices     Peripheral Intravenous Line            Peripheral IV 01/14/20 Left Antecubital 1 day    Peripheral IV 01/14/20 Right;Upper Arm 1 day          Drain            Closed/Suction Drain Right Abdomen Bulb 4 days    Closed/Suction Drain Right Abdomen Bulb 19 Fr  4 days    NG/OG/Enteral Tube Enteral Feeding Tube   4 days    External Urinary Catheter Small 1 day              Can any invasive devices be discontinued today? Not applicable  ---------------------------------------------------------------------------------------  OBJECTIVE    Physical Exam   Constitutional: He is oriented to person, place, and time  He appears well-developed and well-nourished  No distress  HENT:   Head: Normocephalic and atraumatic  Nose: Nose normal    Mouth/Throat: Oropharynx is clear and moist    Eyes: Pupils are equal, round, and reactive to light  Conjunctivae and EOM are normal    Neck: Normal range of motion  Neck supple  Cardiovascular: Normal rate, regular rhythm and intact distal pulses  Pulmonary/Chest: Effort normal  No respiratory distress  He has no wheezes  He has rales  Rales in b/l bases  No wheezing  Productive cough   Abdominal: Soft  He exhibits no distension  There is tenderness  JPx2 in place with ss contents  J-tube in place to gravity  Midline incision w staples c/d/i   Musculoskeletal: He exhibits edema  Mild edema b/l hands  Neurological: He is alert and oriented to person, place, and time  Skin: Skin is warm  Capillary refill takes less than 2 seconds   He is not diaphoretic  Vitals Vitals:    01/15/20 1900 01/15/20 2015 01/15/20 2100 01/15/20 2315   BP:   97/56 123/61   Pulse:   80 94   Resp:    (!) 10   Temp: 98 °F (36 7 °C)      TempSrc: Oral      SpO2:  97% 99%    Weight:       Height:         Temp (24hrs), Av 2 °F (36 8 °C), Min:98 °F (36 7 °C), Max:98 3 °F (36 8 °C)  Current: Temperature: 98 °F (36 7 °C)  HR: 94  BP: 123/61  RR: 10  SpO2: 99% on NC    Respiratory:  SpO2: SpO2: 99 %  O2 Flow Rate (L/min): 2 L/min    Invasive/non-invasive ventilation settings   Respiratory    Lab Data (Last 4 hours)    None         O2/Vent Data (Last 4 hours)    None                Height and Weights   Height: 5' 8 5" (174 cm)  IBW: 69 55 kg  Body mass index is 29 1 kg/m²  Weight (last 2 days)     Date/Time   Weight    20 0600   88 1 (194 23)              Intake and Output  I/O        0701 - 01/15 0700 01/15 07 -  0700    P  O   0    I V  (mL/kg) 16 3 (0 2) 829 2 (9 4)    NG/GT 50 120    IV Piggyback 450 230    Feedings 1433 1195    Total Intake(mL/kg) 1949 3 (22 1) 2374 2 (26 9)    Urine (mL/kg/hr) 4875 (2 3) 982 (0 5)    Emesis/NG output 1500 580    Drains 40 15    Stool  0    Total Output 6415 1577    Net -4465 8 +797 2          Unmeasured Urine Occurrence  1 x    Unmeasured Stool Occurrence  1 x        UOP: 986cc + unmeasured event  Nutrition       Diet Orders   (From admission, onward)             Start     Ordered    20 0001  Diet NPO; Sips with meds  Diet effective midnight     Question Answer Comment   Diet Type NPO    NPO Except: Sips with meds    RD to adjust diet per protocol?  Yes        01/15/20 1103                  Laboratory and Diagnostics:  Results from last 7 days   Lab Units 01/15/20  1429 01/15/20  0558 20  0458 20  0422 20  1044 20  0818 20  0447 20  2236  20  2036  20  1803   WBC Thousand/uL 8 10 8 34 8 60 6 91  --   --  3 40* 2 56*  --  2 75*   < > 1 71*   HEMOGLOBIN g/dL 9 9* 10 1* 9 3* 9 4* 10 2* 10 4* 10 7* 11 5*  --  9 4*   < > 3 0*   I STAT HEMOGLOBIN   --   --   --   --   --   --   --   --    < >  --    < >  --    HEMATOCRIT % 31 4* 32 2* 29 0* 28 8*  --  29 8* 30 8* 33 8*  --  28 7*   < > 9 6*   HEMATOCRIT, ISTAT   --   --   --   --   --   --   --   --    < >  --    < >  --    PLATELETS Thousands/uL 246 269 186 152  --   --  151 150  --  162   < > 122*   NEUTROS PCT % 70 70 80* 78*  --   --  79* 84*  --   --   --  59   MONOS PCT % 16* 16* 11 10  --   --  12 8  --   --   --  15*    < > = values in this interval not displayed  Results from last 7 days   Lab Units 01/15/20  0647 01/14/20  1746 01/14/20  0458 01/13/20  1413 01/13/20  0422 01/12/20  0447 01/11/20  2236  01/11/20  2040  01/11/20  1803  01/11/20  1408   SODIUM mmol/L 141 140 139 141 147* 143 148*  --  149*   < > 144  --  138   POTASSIUM mmol/L 3 5 3 3* 3 2* 3 3* 3 6 3 1* 3 9  --  5 0   < > 3 5  --  4 1   CHLORIDE mmol/L 104 105 107 109* 114* 112* 113*  --  120*   < > 114*  --  110*   CO2 mmol/L 33* 31 30 29 29 26 28  --  23   < > 23  --  24   CO2, I-STAT   --   --   --   --   --   --   --    < >  --    < >  --    < >  --    ANION GAP mmol/L 4 4 2* 3* 4 5 7  --  6   < > 7  --  4   BUN mg/dL 20 16 16 15 16 13 13  --  14   < > 17  --  20   CREATININE mg/dL 0 66 0 67 0 66 0 67 0 59* 0 58* 0 52*  --  0 45*   < > 0 46*  --  0 51*   CALCIUM mg/dL 8 5 8 1* 7 8* 7 8* 7 5* 8 3 8 6  --  6 9*   < > 6 6*  --  7 2*   GLUCOSE RANDOM mg/dL 116 154* 169* 124 117 117 180*  --  191*   < > 144*  --  192*   ALT U/L  --   --  7*  --  8* 16 21  --  15  --  6*  --  9*   AST U/L  --   --  10  --  13 31 33  --  22  --  8  --  18   ALK PHOS U/L  --   --  57  --  43* 39* 47  --  28*  --  18*  --  39*   ALBUMIN g/dL  --   --  1 7*  --  1 8* 2 1* 2 5*  --  1 5*  --  0 8*  --  2 0*   TOTAL BILIRUBIN mg/dL  --   --  0 33  --  0 29 0 77 1 47*  --  0 66  --  0 63  --  0 55    < > = values in this interval not displayed       Results from last 7 days   Lab Units 01/15/20  0647 01/15/20  0550 01/14/20  0458 01/12/20  0447 01/11/20  2236 01/11/20  1850 01/11/20  1803 01/11/20  1408   MAGNESIUM mg/dL  --  2 4 1 9 2 0 1 5* 1 6 1 5* 1 4*   PHOSPHORUS mg/dL 2 1*  --  1 9*  --  3 0 2 5 2 3 3 1      Results from last 7 days   Lab Units 01/15/20  0619 01/14/20  0458 01/13/20  1146 01/11/20  2236 01/11/20  2040 01/11/20  1851 01/11/20  1803   INR  1 05 1 18 1 13 1 09 1 47* 1 55* 2 59*   PTT seconds  --  37 35  --  47* >210* 77*      Results from last 7 days   Lab Units 01/11/20  0045 01/10/20  1717   TROPONIN I ng/mL <0 02 <0 02     Results from last 7 days   Lab Units 01/11/20  2236 01/11/20  1850 01/11/20  1803   LACTIC ACID mmol/L 1 4 3 8* 3 3*     ABG:  Results from last 7 days   Lab Units 01/12/20  1043   PH ART  7 390   PCO2 ART mm Hg 37 8   PO2 ART mm Hg 112 6   HCO3 ART mmol/L 22 4   BASE EXC ART mmol/L -2 3   ABG SOURCE  Line, Arterial     VBG:  Results from last 7 days   Lab Units 01/12/20  1043   ABG SOURCE  Line, Arterial           Micro        EKG:   Imaging:  I have personally reviewed pertinent reports        Active Medications  Scheduled Meds:  Current Facility-Administered Medications:  acetaminophen 650 mg Oral Q6H PRN Dulcikory Del Real PA-C    albuterol 2 5 mg Nebulization Q4H PRN Annitta Prime, DO    amLODIPine 10 mg Oral Daily Ayad Mirza MD    atorvastatin 40 mg Oral Daily With 412 Shira Hebert MD    chlorhexidine 15 mL Swish & Spit Q12H [facility]    cholecalciferol 1,000 Units Oral Daily Ayad Mirza MD    doxazosin 1 mg Oral Daily MAVERICK George    DULoxetine 90 mg Oral Daily Ayad Mirza MD    fluticasone 2 spray Nasal Daily Ayad Mirza MD    fluticasone-vilanterol 1 puff Inhalation Daily Ayad Mirza MD    folic acid IVPB 1 mg Intravenous Daily Fatou Del Real PA-C Last Rate: Stopped (01/15/20 1102)   gabapentin 300 mg Oral TID Ayad Mirza MD    guaiFENesin 600 mg Oral Q12H PRN Ayad Mirza MD    heparin (porcine) 5,000 Units Subcutaneous [facility] Rositakory Bowling     metoprolol tartrate 50 mg Oral Daily Erickson Watkins MD    mirtazapine 15 mg Oral HS Erickson Watkins MD    multi-electrolyte 50 mL/hr Intravenous Continuous Cherylene Fury, CRNP Last Rate: 50 mL/hr (01/15/20 2325)   nicotine 1 patch Transdermal Daily Erickson Watkins MD    omeprazole (PRILOSEC) suspension 2 mg/mL 20 mg Oral BID Cherylene Fury, CRNP    oxyCODONE 10 mg Oral Q4H PRN Kwadwo Stoll MD    oxyCODONE 5 mg Oral Q4H PRN Kwadwo Stoll MD    QUEtiapine 200 mg Oral Daily Erickson Watkins MD    QUEtiapine 600 mg Oral HS Erickson Watkins MD    thiamine 100 mg Oral Daily Erickson Watkins MD    traZODone 50 mg Oral HS PRN Erickson Watkins MD      Continuous Infusions:    multi-electrolyte 50 mL/hr Last Rate: 50 mL/hr (01/15/20 2325)     PRN Meds:     acetaminophen 650 mg Q6H PRN   albuterol 2 5 mg Q4H PRN   guaiFENesin 600 mg Q12H PRN   oxyCODONE 10 mg Q4H PRN   oxyCODONE 5 mg Q4H PRN   traZODone 50 mg HS PRN       Allergies   No Known Allergies    Advance Directive and Living Will:      Power of :    POLST:      Counseling / Coordination of Care  Total Critical Care time spent 35 minutes excluding procedures, teaching and family updates  Erickson Watkins MD      Portions of the record may have been created with voice recognition software  Occasional wrong word or "sound a like" substitutions may have occurred due to the inherent limitations of voice recognition software    Read the chart carefully and recognize, using context, where substitutions have occurred

## 2020-01-16 NOTE — PROCEDURES
Arterial Line Insertion  Date/Time: 1/16/2020 11:24 AM  Performed by: MAVERICK Hargrove  Authorized by: Tavia Madera [address] [place] [address]     Patient location:  Bedside  Other Assisting Provider: Yes (comment) [place])    Consent:     Consent obtained:  Emergent situation  Universal protocol:     Patient identity confirmed:  Verbally with patient and arm band  Indications:     Indications: hemodynamic monitoring, multiple ABGs, continuous blood pressure monitoring and frequent labs / infusion    Pre-procedure details:     Skin preparation:  Chlorhexidine    Preparation: Patient was prepped and draped in sterile fashion    Anesthesia (see MAR for exact dosages): Anesthesia method:  None  Procedure details:     Location / Tip of Catheter:  Radial    Laterality:  Right    Needle gauge:  20 G    Placement technique:  Ultrasound guided    Number of attempts:  2    Successful placement: yes      Transducer: waveform confirmed    Post-procedure details:     Post-procedure:  Secured with tape, sterile dressing applied and sutured    CMS:  Normal    Patient tolerance of procedure:   Tolerated well, no immediate complications

## 2020-01-16 NOTE — PROGRESS NOTES
Patient oob in chair for apporximately one hour when bp dropped to 63/41 map 46  Patient had received scheduled bp meds at [address]   Patient slightly drowsy but alert and oriented  Patients iv site infilterated upon initiation of iv fluid bolus, CCSX team rounding at time, patient slid back to bed by team with slide board and right femoral central line placed emergently  2L of iv isolyte bolused with improvement in bp  Right radial bronson also placed  Labs sent  Will continue to monitor bp continuously via bronson

## 2020-01-16 NOTE — PROCEDURES
Insert PICC line  Date/Time: 1/16/2020 3:00 PM  Performed by: Orion Montaño RN  Authorized by: Darin [facility] MD     Patient location:  Bedside  Other Assisting Provider: Yes (comment)    Consent:     Consent obtained:  Written (obtained by physician)    Consent given by:  Patient  Universal protocol:     Procedure explained and questions answered to patient or proxy's satisfaction: yes      Relevant documents present and verified: yes      Test results available and properly labeled: yes      Radiology Images displayed and confirmed  If images not available, report reviewed: yes      Required blood products, implants, devices, and special equipment available: yes      Site/side marked: yes      Immediately prior to procedure, a time out was called: yes      Patient identity confirmed:  Verbally with patient and arm band  Pre-procedure details:     Hand hygiene: Hand hygiene performed prior to insertion      Sterile barrier technique: All elements of maximal sterile technique followed      Skin preparation:  ChloraPrep    Skin preparation agent: Skin preparation agent completely dried prior to procedure    Indications:     PICC line indications: medications requiring central line    Sedation:     Sedation type: Other (comment)  Anesthesia (see MAR for exact dosages): Anesthesia method:  Local infiltration    Local anesthetic:  Lidocaine 1% w/o epi (2ml)  Procedure details:     Location:  [place]    Vessel type: vein      Laterality:  Right    Approach: percutaneous technique used      Patient position:  Flat    Procedural supplies:  Double lumen    Catheter size:  5 Fr    Landmarks identified: yes      Ultrasound guidance: yes      Sterile ultrasound techniques: Sterile gel and sterile probe covers were used      Number of attempts:  1    Successful placement: yes      Cath access vessel: SVC      Total catheter length (cm):  39    Catheter out on skin (cm):  1    Max flow rate:  999ml/hr    Arm circumference:  34  Post-procedure details:     Post-procedure:  Dressing applied and securement device placed    Assessment:  Blood return through all ports and free fluid flow    Patient tolerance of procedure: Tolerated well, no immediate complications    Observer: Yes      Observer name:  [facility]

## 2020-01-16 NOTE — SPEECH THERAPY NOTE
Speech Language/Pathology    Speech/Language Pathology Progress Note    Patient Name: Dee Goff  Today's Date: 1/16/2020     Attempted x 2 to see pt for swallowing evaluation, however, results of UGI not yet available  D/w nurse who contacted surgery  Will need to await reading to clear for po trials  Pt is requesting po at this time  If ST unable to assess today, consider repeating nursing dysphagia screening (when cleared) to determine po readiness or at least determine safety for ice chips and/or water for oral comfort pending full evaluation 1/17/20

## 2020-01-17 ENCOUNTER — APPOINTMENT (INPATIENT)
Dept: RADIOLOGY | Facility: HOSPITAL | Age: 70
DRG: 326 | End: 2020-01-17
Payer: MEDICARE

## 2020-01-17 PROBLEM — G93.40 ENCEPHALOPATHY: Status: ACTIVE | Noted: 2020-01-17

## 2020-01-17 PROBLEM — I95.9 HYPOTENSION: Status: ACTIVE | Noted: 2020-01-17

## 2020-01-17 LAB
ABO GROUP BLD BPU: NORMAL
ANION GAP SERPL CALCULATED.3IONS-SCNC: 3 MMOL/L (ref 4–13)
ANISOCYTOSIS BLD QL SMEAR: PRESENT
BASOPHILS # BLD MANUAL: 0 THOUSAND/UL (ref 0–0.1)
BASOPHILS NFR MAR MANUAL: 0 % (ref 0–1)
BPU ID: NORMAL
BUN SERPL-MCNC: 15 MG/DL (ref 5–25)
CALCIUM SERPL-MCNC: 8 MG/DL (ref 8.3–10.1)
CHLORIDE SERPL-SCNC: 104 MMOL/L (ref 100–108)
CO2 SERPL-SCNC: 32 MMOL/L (ref 21–32)
CREAT SERPL-MCNC: 0.46 MG/DL (ref 0.6–1.3)
EOSINOPHIL # BLD MANUAL: 0 THOUSAND/UL (ref 0–0.4)
EOSINOPHIL NFR BLD MANUAL: 0 % (ref 0–6)
ERYTHROCYTE [DISTWIDTH] IN BLOOD BY AUTOMATED COUNT: 14.6 % (ref 11.6–15.1)
EST. AVERAGE GLUCOSE BLD GHB EST-MCNC: 103 MG/DL
GFR SERPL CREATININE-BSD FRML MDRD: 116 ML/MIN/1.73SQ M
GIANT PLATELETS BLD QL SMEAR: PRESENT
GLUCOSE SERPL-MCNC: 103 MG/DL (ref 65–140)
HBA1C MFR BLD: 5.2 % (ref 4.2–6.3)
HCT VFR BLD AUTO: 27.1 % (ref 36.5–49.3)
HGB BLD-MCNC: 8.3 G/DL (ref 12–17)
LYMPHOCYTES # BLD AUTO: 0.51 THOUSAND/UL (ref 0.6–4.47)
LYMPHOCYTES # BLD AUTO: 7 % (ref 14–44)
MAGNESIUM SERPL-MCNC: 2.3 MG/DL (ref 1.6–2.6)
MCH RBC QN AUTO: 28.8 PG (ref 26.8–34.3)
MCHC RBC AUTO-ENTMCNC: 30.6 G/DL (ref 31.4–37.4)
MCV RBC AUTO: 94 FL (ref 82–98)
MONOCYTES # BLD AUTO: 0.72 THOUSAND/UL (ref 0–1.22)
MONOCYTES NFR BLD: 10 % (ref 4–12)
NEUTROPHILS # BLD MANUAL: 5.99 THOUSAND/UL (ref 1.85–7.62)
NEUTS SEG NFR BLD AUTO: 83 % (ref 43–75)
NRBC BLD AUTO-RTO: 0 /100 WBCS
PHOSPHATE SERPL-MCNC: 2.5 MG/DL (ref 2.3–4.1)
PLATELET # BLD AUTO: 280 THOUSANDS/UL (ref 149–390)
PLATELET BLD QL SMEAR: ADEQUATE
PMV BLD AUTO: 9.7 FL (ref 8.9–12.7)
POLYCHROMASIA BLD QL SMEAR: PRESENT
POTASSIUM SERPL-SCNC: 3.2 MMOL/L (ref 3.5–5.3)
RBC # BLD AUTO: 2.88 MILLION/UL (ref 3.88–5.62)
RBC MORPH BLD: PRESENT
SODIUM SERPL-SCNC: 139 MMOL/L (ref 136–145)
UNIT DISPENSE STATUS: NORMAL
UNIT PRODUCT CODE: NORMAL
UNIT RH: NORMAL
WBC # BLD AUTO: 7.22 THOUSAND/UL (ref 4.31–10.16)

## 2020-01-17 PROCEDURE — 85007 BL SMEAR W/DIFF WBC COUNT: CPT | Performed by: NURSE PRACTITIONER

## 2020-01-17 PROCEDURE — NC001 PR NO CHARGE: Performed by: SURGERY

## 2020-01-17 PROCEDURE — 99233 SBSQ HOSP IP/OBS HIGH 50: CPT | Performed by: SURGERY

## 2020-01-17 PROCEDURE — 85027 COMPLETE CBC AUTOMATED: CPT | Performed by: NURSE PRACTITIONER

## 2020-01-17 PROCEDURE — 74018 RADEX ABDOMEN 1 VIEW: CPT

## 2020-01-17 PROCEDURE — 84100 ASSAY OF PHOSPHORUS: CPT | Performed by: NURSE PRACTITIONER

## 2020-01-17 PROCEDURE — 92610 EVALUATE SWALLOWING FUNCTION: CPT

## 2020-01-17 PROCEDURE — 94640 AIRWAY INHALATION TREATMENT: CPT

## 2020-01-17 PROCEDURE — 94760 N-INVAS EAR/PLS OXIMETRY 1: CPT

## 2020-01-17 PROCEDURE — 83735 ASSAY OF MAGNESIUM: CPT | Performed by: NURSE PRACTITIONER

## 2020-01-17 PROCEDURE — 80048 BASIC METABOLIC PNL TOTAL CA: CPT | Performed by: NURSE PRACTITIONER

## 2020-01-17 RX ORDER — POTASSIUM CHLORIDE 29.8 MG/ML
40 INJECTION INTRAVENOUS ONCE
Status: COMPLETED | OUTPATIENT
Start: 2020-01-17 | End: 2020-01-17

## 2020-01-17 RX ORDER — DOCUSATE SODIUM 100 MG/1
100 CAPSULE, LIQUID FILLED ORAL 2 TIMES DAILY
Status: DISCONTINUED | OUTPATIENT
Start: 2020-01-17 | End: 2020-02-03

## 2020-01-17 RX ORDER — SODIUM CHLORIDE, SODIUM GLUCONATE, SODIUM ACETATE, POTASSIUM CHLORIDE, MAGNESIUM CHLORIDE, SODIUM PHOSPHATE, DIBASIC, AND POTASSIUM PHOSPHATE .53; .5; .37; .037; .03; .012; .00082 G/100ML; G/100ML; G/100ML; G/100ML; G/100ML; G/100ML; G/100ML
500 INJECTION, SOLUTION INTRAVENOUS ONCE
Status: COMPLETED | OUTPATIENT
Start: 2020-01-17 | End: 2020-01-17

## 2020-01-17 RX ORDER — ALBUMIN, HUMAN INJ 5% 5 %
12.5 SOLUTION INTRAVENOUS ONCE
Status: COMPLETED | OUTPATIENT
Start: 2020-01-17 | End: 2020-01-17

## 2020-01-17 RX ADMIN — DOCUSATE SODIUM 100 MG: 100 CAPSULE, LIQUID FILLED ORAL at 17:53

## 2020-01-17 RX ADMIN — GABAPENTIN 300 MG: 300 CAPSULE ORAL at 17:53

## 2020-01-17 RX ADMIN — Medication 20 MG: at 08:35

## 2020-01-17 RX ADMIN — HEPARIN SODIUM 5000 UNITS: 5000 INJECTION INTRAVENOUS; SUBCUTANEOUS at 08:35

## 2020-01-17 RX ADMIN — METOPROLOL TARTRATE 25 MG: 25 TABLET, FILM COATED ORAL at 08:33

## 2020-01-17 RX ADMIN — MELATONIN 1000 UNITS: at 08:33

## 2020-01-17 RX ADMIN — Medication 20 MG: at 17:53

## 2020-01-17 RX ADMIN — LORATADINE 10 MG: 10 TABLET ORAL at 08:33

## 2020-01-17 RX ADMIN — ALBUTEROL SULFATE 2.5 MG: 2.5 SOLUTION RESPIRATORY (INHALATION) at 10:15

## 2020-01-17 RX ADMIN — DULOXETINE HYDROCHLORIDE 90 MG: 60 CAPSULE, DELAYED RELEASE ORAL at 08:38

## 2020-01-17 RX ADMIN — POTASSIUM CHLORIDE 40 MEQ: 29.8 INJECTION, SOLUTION INTRAVENOUS at 08:32

## 2020-01-17 RX ADMIN — OXYCODONE HYDROCHLORIDE 5 MG: 5 TABLET ORAL at 10:14

## 2020-01-17 RX ADMIN — OXYCODONE HYDROCHLORIDE 10 MG: 10 TABLET ORAL at 13:50

## 2020-01-17 RX ADMIN — THIAMINE HCL TAB 100 MG 100 MG: 100 TAB at 08:33

## 2020-01-17 RX ADMIN — METOPROLOL TARTRATE 25 MG: 25 TABLET, FILM COATED ORAL at 20:08

## 2020-01-17 RX ADMIN — OXYCODONE HYDROCHLORIDE 10 MG: 10 TABLET ORAL at 23:13

## 2020-01-17 RX ADMIN — FLUTICASONE PROPIONATE 2 SPRAY: 50 SPRAY, METERED NASAL at 10:01

## 2020-01-17 RX ADMIN — ATORVASTATIN CALCIUM 40 MG: 40 TABLET, FILM COATED ORAL at 17:53

## 2020-01-17 RX ADMIN — CHLORHEXIDINE GLUCONATE 0.12% ORAL RINSE 15 ML: 1.2 LIQUID ORAL at 20:08

## 2020-01-17 RX ADMIN — SODIUM CHLORIDE, SODIUM GLUCONATE, SODIUM ACETATE, POTASSIUM CHLORIDE, MAGNESIUM CHLORIDE, SODIUM PHOSPHATE, DIBASIC, AND POTASSIUM PHOSPHATE 75 ML/HR: .53; .5; .37; .037; .03; .012; .00082 INJECTION, SOLUTION INTRAVENOUS at 00:40

## 2020-01-17 RX ADMIN — ALBUMIN (HUMAN) 12.5 G: 12.5 SOLUTION INTRAVENOUS at 09:44

## 2020-01-17 RX ADMIN — NICOTINE 1 PATCH: 14 PATCH TRANSDERMAL at 08:33

## 2020-01-17 RX ADMIN — HEPARIN SODIUM 5000 UNITS: 5000 INJECTION INTRAVENOUS; SUBCUTANEOUS at 13:50

## 2020-01-17 RX ADMIN — FOLIC ACID 1 MG: 1 TABLET ORAL at 08:33

## 2020-01-17 RX ADMIN — SODIUM CHLORIDE, SODIUM GLUCONATE, SODIUM ACETATE, POTASSIUM CHLORIDE, MAGNESIUM CHLORIDE, SODIUM PHOSPHATE, DIBASIC, AND POTASSIUM PHOSPHATE 500 ML: .53; .5; .37; .037; .03; .012; .00082 INJECTION, SOLUTION INTRAVENOUS at 09:23

## 2020-01-17 RX ADMIN — QUETIAPINE FUMARATE 600 MG: 300 TABLET ORAL at 20:09

## 2020-01-17 RX ADMIN — GABAPENTIN 300 MG: 300 CAPSULE ORAL at 08:33

## 2020-01-17 RX ADMIN — CHLORHEXIDINE GLUCONATE 0.12% ORAL RINSE 15 ML: 1.2 LIQUID ORAL at 08:33

## 2020-01-17 RX ADMIN — OXYCODONE HYDROCHLORIDE 10 MG: 10 TABLET ORAL at 17:59

## 2020-01-17 RX ADMIN — GABAPENTIN 300 MG: 300 CAPSULE ORAL at 20:08

## 2020-01-17 RX ADMIN — FLUTICASONE FUROATE AND VILANTEROL TRIFENATATE 1 PUFF: 100; 25 POWDER RESPIRATORY (INHALATION) at 10:02

## 2020-01-17 RX ADMIN — MIRTAZAPINE 15 MG: 15 TABLET, FILM COATED ORAL at 23:13

## 2020-01-17 RX ADMIN — QUETIAPINE FUMARATE 200 MG: 100 TABLET ORAL at 08:33

## 2020-01-17 RX ADMIN — HEPARIN SODIUM 5000 UNITS: 5000 INJECTION INTRAVENOUS; SUBCUTANEOUS at 23:13

## 2020-01-17 NOTE — PROGRESS NOTES
01/17/20 0920   Vitals   Pulse 78   Heart Rate Source Monitor   Respirations 15   Blood Pressure (!) 82/50   MAP (mmHg) 59   Art Line   Arterial Line BP 82/48   Arterial Line MAP (mmHg) 62 mmHg     CCsx made aware of patients hypotension  Patients fluids stopped about 1 hr prior per order  Orders for 500ml bolus

## 2020-01-17 NOTE — PROGRESS NOTES
Progress Note - Ivana Pel 81 y o  male MRN: [id number]    Unit/Bed#: [facility] 516-01 Encounter: [id number]      Assessment:  80yM w/ bleeding and perforated 1st portion duodenal ulcer  1/11 S/p EGD  1/11 S/p IR GDA embolozation   1/11 S/p Exlap, oversewing bleeding vessels, closure of tissue over perforation, Thal patch with transverse colon, open gastrostomy-jejunostomy tube placement      Plan:  Diet Enteral/Parenteral; Tube Feeding No Oral Diet; Jevity 1 2 [place]; Continuous; 68  Upper GI Swallow study:  Technically limited however no leak identified  SLP:  May evaluate in order to attempt clear liquid diet  OOBTC  Pulmonary Toilet, IS  PPx: SQH, PPI  Add colace  Pain and Nausea control PRN  Monitor HGB, appears stable  Hypokalemia ; replace potassium      Subjective:   Patient some low blood pressure overnight  Does not appear to be symptomatic this morning  No complaints  Endorsed flatus, no bowel movement  Objective:     Vitals: Blood pressure (!) 83/47, pulse 72, temperature 98 4 °F (36 9 °C), temperature source Axillary, resp  rate (!) 10, height 5' 8 5" (1 74 m), weight 88 1 kg (194 lb 3 6 oz), SpO2 98 %  ,Body mass index is 29 1 kg/m²  I/O       01/15 0701 - 01/16 0700 01/16 0701 - 01/17 0700    P  O  0 120    I V  (mL/kg) 829 2 (9 4) 3505 (39 8)    NG/ 180    IV Piggyback 230 50    Feedings 1195 160    Total Intake(mL/kg) 2374 2 (26 9) 4015 (45 6)    Urine (mL/kg/hr) 982 (0 5) 1531 (0 7)    Emesis/NG output 580 895    Drains 15 40    Stool 0 0    Total Output 1577 2466    Net +797 2 +1549          Unmeasured Urine Occurrence 2 x     Unmeasured Stool Occurrence 1 x 0 x          Physical Exam  Gen: NAD, A&O, Comfortable   Chest: Normal work of breathing, no resp distress  Abd:  Soft, moderately distended, nontender, incision with mild erythema stable from yesterday, staples intact, 2 CAMPBELL is with minimal serous output, 20 mL each, G-tube with dark maroon output, J-tube with tube feeds at 30 mL/hr  Ext: No edema  Skin: warm, dry, intact        Invasive Devices     Peripherally Inserted Central Catheter Line            PICC Line 08/74/02 Right Basilic less than 1 day          Central Venous Catheter Line            CVC Central Lines 01/16/20 Triple Right Femoral less than 1 day          Arterial Line            Arterial Line 01/16/20 Radial less than 1 day          Drain            Closed/Suction Drain Right Abdomen Bulb 5 days    Closed/Suction Drain Right Abdomen Bulb 19 Fr  5 days    Gastrostomy/Enterostomy Gastrostomy-jejunostomy 22 Fr  LLQ 5 days    External Urinary Catheter Small 2 days                Scheduled Meds:    Current Facility-Administered Medications:  acetaminophen 650 mg Oral Q6H PRN Aftab Mcghee PA-C    albuterol 2 5 mg Nebulization Q4H PRN [facility], DO    atorvastatin 40 mg Oral Daily With Litzy Palomo MD    chlorhexidine 15 mL Swish & Spit Q12H [address] [facility]    cholecalciferol 1,000 Units Oral Daily Candy Rodriguez MD    DULoxetine 90 mg Oral Daily Candy Rodriguez MD    fluticasone 2 spray Nasal Daily Candy Rodriguez MD    fluticasone-vilanterol 1 puff Inhalation Daily Canyd Rodriguez MD    folic acid 1 mg Oral Daily Torrie Palmer PA-C    gabapentin 300 mg Oral TID Candy Rodriguez MD    guaiFENesin 600 mg Oral Q12H PRN Candy Rodriguez MD    heparin (porcine) 5,000 Units Subcutaneous [facility], DO    loratadine 10 mg Oral Daily Candy Rodriguez MD    metoprolol tartrate 25 mg Oral Q12H [address] Cristin Christine PA-C    mirtazapine 15 mg Oral HS Candy Rodriguez MD    multi-electrolyte 75 mL/hr Intravenous Continuous Torrie Palmer PA-C Last Rate: 75 mL/hr (01/17/20 0040)   nicotine 1 patch Transdermal Daily Federico Sanabria MD    omeprazole (PRILOSEC) suspension 2 mg/mL 20 mg Oral BID Cheril Body, CRNP    oxyCODONE 10 mg Oral Q4H PRN Chelle Sepulveda MD    oxyCODONE 5 mg Oral Q4H PRN Chelle Sepulveda MD    QUEtiapine 200 mg Oral Daily Candy Rodriguez MD    QUEtiapine 600 mg Oral HS Candy Rodriguez MD    thiamine 100 mg Oral Daily Candy Rodriguez MD    traZODone 50 mg Oral HS PRN Candy Rodriguez MD      Continuous Infusions:    multi-electrolyte 75 mL/hr Last Rate: 75 mL/hr (01/17/20 0040)     PRN Meds:   acetaminophen    albuterol    guaiFENesin    oxyCODONE    oxyCODONE    traZODone      Lab, Imaging and other studies:  Recent Labs     01/16/20  1036 01/16/20  1043 01/16/20  1122 01/17/20  0430   WBC 8 16  --  7 28 7 22   HGB 8 6* 7 8* 8 3* 8 3*     --  236 280     Recent Labs     01/15/20  0550 01/15/20  0647 01/16/20  0717 01/16/20  1036 01/16/20  1043 01/17/20  0430   SODIUM  --  141 139 138  --  139   K  --  3 5 4 5 3 5  --  3 2*   CL  --  104 104 103  --  104   CO2  --  33* 32 33* 34* 32   BUN  --  20 21 21  --  15   CREATININE  --  0 66 0 53* 0 56*  --  0 46*   PHOS  --  2 1*  --   --   --  2 5   MG 2 4  --   --   --   --  2 3   CALCIUM  --  8 5 8 4 8 0*  --  8 0*     COAG - PT/INR/PTT: 14 4/1 16/38* (01/16 1121)       VTE Pharmacologic Prophylaxis: Heparin  VTE Mechanical Prophylaxis: sequential compression device

## 2020-01-17 NOTE — PLAN OF CARE
Summary   Pt presented with functional appearing oral and pharyngeal stage swallowing skills with materials administered today (clear thick and thin liquids, as well as pudding and melyssa crackers)    Recommended Diet: per MD (?desire for clear liquids to begin and advance to mechanical soft as medically indicated)    OK for thin liquids  Recommended Form of Meds: as best tolerated (took pills whole w/water in past)   Aspiration precautions and swallowing strategies: encourage slow rate of intake

## 2020-01-17 NOTE — SPEECH THERAPY NOTE
Speech-Language Pathology Bedside Swallow Evaluation      Patient Name: Leo Jones    IXATR'F Date: 1/17/2020     Problem List  Principal Problem:    Acute blood loss anemia  Active Problems:    Essential hypertension    Syncope    Chest pain    Suicidal ideation    Alcohol abuse    MDD (major depressive disorder)    History of CVA (cerebrovascular accident)    COPD without exacerbation (HCC)    Dyslipidemia    Chronic pain    Tobacco abuse    Inadequate oral nutritional intake    Duodenal ulcer    Hemorrhagic shock (HCC)      Past Medical History  Past Medical History:   Diagnosis Date    BPH (benign prostatic hyperplasia)     CVA (cerebral vascular accident) ([address] )     Head injury     Hypertension     Metatarsal fracture     TIA (transient ischemic attack)        Past Surgical History  Past Surgical History:   Procedure Laterality Date    ABDOMINAL SURGERY      ANKLE SURGERY      BACK SURGERY      ELBOW SURGERY      IR VISCERAL ANGIOGRAPHY / INTERVENTION  1/11/2020    JOINT REPLACEMENT      KNEE SURGERY      LAPAROTOMY N/A 1/11/2020    Procedure: LAPAROTOMY EXPLORATORY,  OVERSEW  OF GASTRO DUODENAL ARTERY, THAL PATCH OF DUODENUM, VICKY GASTRO JEJUNOSTOMY TUBE;  Surgeon: Garret Benton DO;  Location: [facility];  Service: General    LIVER SURGERY         Summary   Pt presented with functional appearing oral and pharyngeal stage swallowing skills with materials administered today (clear thick and thin liquids, as well as pudding and melyssa crackers)    Recommended Diet: per MD (?desire for clear liquids to begin and advance to mechanical soft as medically indicated)    OK for thin liquids  Recommended Form of Meds: as best tolerated (took pills whole w/water in past)   Aspiration precautions and swallowing strategies: encourage slow rate of intake      Current Medical Status  Pt is a 81 y o  male with PMH of history of depression, daily alcohol abuse, history of CVA; presented 1/11 with symptoms of dizziness and an episode where he passed out  DX: Perforated duodenal ulcer  ? S/P EGD on 1/11 (GI)  ? S/P IR GDA embolozation 1/11 (IR)  ? S/P Exlap, oversewing bleeding vessels, closure of tissue over perforation, Thal patch with transverse colon, open gastrostomy-jejunostomy tube placement (Surgery)   UGI study yesterday, no leaks appreciated  Will discuss with surgery starting diet   Monitor JPx2 output per [facility]      Superior CAMPBELL with 20cc SS and Inferior with 20cc SS     G-J tube in place   J-tube output 895cc   Protonix 40mg PO BID   GI signed off  Continue Protonix  OP f/u in 5 months     KUB TODAY  · Severe emorrhagic shock  ? S/P agressive resusucitation on 1/11/2020: 18 units of PRBCs, 5 units platelets, 3 units Cryo, 6 units FFP  ? Some hypotensive episodes, received fluids yesterday  § Albumin ordered  ? Hgb stable at 8 3  · Diagnosis: Intubated due to acuity of condition  ? Extubated successfully 1/14/20  · Diagnosis: COPD/smoking  ? Nebulizers  ? Home meds albuterol and breo ellipta  ? Nicotine patch  ? Continue to monitor SpO2  ? Wean NC as able, currently at 3L NC  ? PRN tylenol, oxycodone 5/10  · Diagnosis: Encephalopathy  ? Currently AAOx3  ? CT/MRI for stroke alert were negative on admission  ? Delirium precautions  ? Regulate sleep/wake cycle   CIWA protocol    Swallowing Evaluation requested at this time  Current Precautions:  Delirium    Past medical history:Please see H&P for details    Special Studies:  1/16 UGI:  Technically limited study due to patient's condition and unable to stand patient  No extraluminal contrast to suggest leak identified  1/17 Xray of abdomen/KUB: No evidence of obstruction      Social/Education/Vocational Hx:  [place] [id number] [address] [place]   Current Risks for Dysphagia & Aspiration: recent surgery, recent intubation and general debility  Current Diet: NPO with tube feeds   Baseline Diet: regular diet and thin liquids      Baseline Assessment   Behavior/Cognition: alert  Speech/Language Status: able to participate in basic conversation and able to follow commands  Patient Positioning: upright in bed  Pain Status/Interventions/Response to Interventions:  "Mild pain" in stomach ("maybe a little worse after I ate")     Swallow Mechanism Exam  Facial: symmetrical  Labial: WFL  Lingual: WFL  Velum: symmetrical  Mandible: adequate ROM  Dentition: edentulous and does not own dentures  Vocal quality:clear/adequate   Volitional Cough: strong/productive   Respiratory Status: on 3L O2 with baseline O2 sat of 95%  RR 24     Consistencies Assessed and Performance   Consistencies Administered: thin liquids, nectar thick, honey thick, puree and mechanical soft solids  Materials administered included clear honey/nectar/thin liquid, pudding and limited melyssa crackers    Oral Stage: WFL with limited food materials  Mastication was adequate with the materials administered today  Bolus formation and transfer were functional with no significant oral residue noted  No overt s/s reduced oral control  Pharyngeal Stage: WFL suspected  Swallow Mechanics:  Swallowing initiation appeared prompt  Laryngeal rise was palpated and judged to be within functional limits  No coughing, throat clearing, change in vocal quality or respiratory status noted today  Esophageal Concerns: none reported    Strategies and Efficacy: paced feeding so as to not challenge respiratory status/    Summary and Recommendations (see above)    Results Reviewed with: patient and RN     Treatment Recommended: yes     Frequency of treatment: 2-3 session to ensure safe intake of least restrictive diet    Patient Stated Goal: "I want water"    Dysphagia LTG per SLP  -Patient will demonstrate optimum safety and efficacy of oral intake and swallowing function without overt s/sx of penetration or aspiration for the highest appropriate diet level       Short Term Goals:    -Pt will tolerate Dysphagia 2/mechanical soft diet and thin liquid with no significant s/s oral or pharyngeal dysphagia across 1-2 diagnostic session/s     -Patient will tolerate trials of upgraded food and/or liquid texture with no significant s/s of oral or pharyngeal dysphagia including aspiration across 1-3 diagnostic sessions

## 2020-01-17 NOTE — PROGRESS NOTES
Daily Progress Note - Critical Care   Jeannie Tsai 81 y o  male MRN: [id number]  Unit/Bed#: [facility] 516-01 Encounter: [id number]        ----------------------------------------------------------------------------------------  HPI/24hr events: Multiple hypotensive episodes, received IVFs bolus x2  Had UGI study  PICC line placed R arm      ---------------------------------------------------------------------------------------  SUBJECTIVE  No acute events overnight  Slept well  Review of Systems   Constitutional: Positive for appetite change  Negative for diaphoresis and fever  HENT: Negative  Eyes: Negative for visual disturbance  Respiratory: Positive for cough  Negative for chest tightness and shortness of breath  Cardiovascular: Negative for chest pain  Gastrointestinal: Negative for abdominal distention, nausea and vomiting  Abdominal soreness   Neurological: Negative for light-headedness and headaches  Review of systems was reviewed and negative unless stated above in HPI/24-hour events   ---------------------------------------------------------------------------------------  Assessment and Plan:     Neuro:   · Diagnosis: Pain acute on chronic  ? Home gabapentin 300mg TID  ? Home duloxetine 90mg daily  ? Home trazodone 50mg PRN qHS  ? PRN tylenol, oxycodone 5/10  · Diagnosis: Encephalopathy  ? Currently AAOx3  ? CT/MRI for stroke alert were negative on admission  ? Delirium precautions  ? Regulate sleep/wake cycle  ? CIWA protocol  ? Home seroquel 200 daily and 600 HS restarted  ? Home remeron 15mg qHS restarted     CV:   · Hemorrhagic shock  ? S/P agressive resusucitation on 1/11/2020: 18 units of PRBCs, 5 units platelets, 3 units Cryo, 6 units FFP  ? Some hypotensive episodes, received fluids yesterday  ? Albumin ordered  ? Hgb stable at 8 3  · Discontinue femoral line  · PICC in place RUE  · Hyperlipidemia  ? Lipitor 40mg  · Hypertension  ? Metoprolol 25 BID  ?  Discontinued norvasc and cardura     Pulm:  · Diagnosis: Intubated due to acuity of condition  ? Extubated successfully 1/14/20  · Diagnosis: COPD/smoking  ? Nebulizers  ? Home meds albuterol and breo ellipta  ? Nicotine patch  ? Continue to monitor SpO2  ? Wean NC as able, currently at 3L NC        GI:   · Perforated duodenal ulcer  ? S/P EGD on 1/11 (GI)  ? S/P IR GDA embolozation 1/11 (IR)  ? S/P Exlap, oversewing bleeding vessels, closure of tissue over perforation, Thal patch with transverse colon, open gastrostomy-jejunostomy tube placement (Surgery)  ? UGI study yesterday, no leaks appreciated  ? Will discuss with surgery starting diet  · Monitor JPx2 output per Red Surgery  § Superior CAMPBELL with 20cc SS and Inferior with 20cc SS   · G-J tube in place  · J-tube output 895cc  · Protonix 40mg PO BID  · GI signed off  Continue Protonix  OP f/u in 5 months    · KUB TODAY        :   ? Monitor UOP  ? UOP over last 24hrs 1500cc  ? Continue strict I/O  ? Renal function stable           F/E/N:   · F: DC IVFs  · E: replete as needed for K>4, Mg>2, PO4>2 5, will give K+  · N: Tube feeds at goal        Heme/Onc:   · ABLA/Hemorrhagic shock   ? S/P 18uPRBCs, 5uplatelets, 3uCryo, 6uFFP on 1/11  ? DVT ppx: SQH, SCDs  ? Transfuse for hgb<7        Endo:   ? No acute issues        ID:   · Continues to be afebrile  · Completed Ancef and diflucan prophylaxis  · Monitor WBC and temperature        MSK/Skin:   ? Frequent turning and positioning/offloading  § Itching around proximal midline incisionimproved  ? PT/OT  ? OOB with assistance    Disposition: Continue Critical Care  Code Status: Level 1 - Full Code  ---------------------------------------------------------------------------------------  ICU CORE MEASURES    Prophylaxis   VTE Pharmacologic Prophylaxis: Heparin  VTE Mechanical Prophylaxis: sequential compression device  Stress Ulcer Prophylaxis: Pantoprazole PO    ABCDE Protocol (if indicated)  Plan to perform spontaneous awakening trial today? Not applicable  Plan to perform spontaneous breathing trial today? Not applicable  Obvious barriers to extubation? Not applicable  CAM-ICU: Negative    Invasive Devices Review  Invasive Devices     Peripherally Inserted Central Catheter Line            PICC Line 31/17/90 Right Basilic less than 1 day          Central Venous Catheter Line            CVC Central Lines 01/16/20 Triple Right Femoral less than 1 day          Arterial Line            Arterial Line 01/16/20 Radial less than 1 day          Drain            Closed/Suction Drain Right Abdomen Bulb 5 days    Closed/Suction Drain Right Abdomen Bulb 19 Fr  5 days    Gastrostomy/Enterostomy Gastrostomy-jejunostomy 22 Fr  LLQ 5 days    External Urinary Catheter Small 2 days              Can any invasive devices be discontinued today? Not applicable  ---------------------------------------------------------------------------------------  OBJECTIVE    Physical Exam   Constitutional: He is oriented to person, place, and time  He appears well-developed and well-nourished  No distress  HENT:   Head: Normocephalic and atraumatic  Nose: Nose normal    Mouth/Throat: Oropharynx is clear and moist    Eyes: Pupils are equal, round, and reactive to light  Conjunctivae and EOM are normal    Neck: Normal range of motion  Neck supple  Cardiovascular: Normal rate, regular rhythm, normal heart sounds and intact distal pulses  Pulmonary/Chest: Effort normal and breath sounds normal  No respiratory distress  He has no wheezes  Abdominal: Soft  Bowel sounds are normal    JPx2  Midline incision c/d/i, redness improved  Jtube in place   Genitourinary:   Genitourinary Comments: Condom cath  Scrotal swelling   Musculoskeletal: Normal range of motion  BL UE edema +1  RUE PICC in place, ecchymosis in forearm  RLE fem line in place   Neurological: He is alert and oriented to person, place, and time  Skin: Skin is warm and dry  Capillary refill takes less than 2 seconds   He is not diaphoretic  Vitals reviewed  Vitals   Vitals:    20 0100 20 0200 20 0300 20 0400   BP: (!) 87/49 (!) 88/52 92/51 (!) 83/47   BP Location:    Left arm   Pulse: 76 76 76 72   Resp: (!) 10 (!) 9 (!) 10 (!) 10   Temp:       TempSrc:       SpO2: 99% 99% 98% 98%   Weight:       Height:         Temp (24hrs), Av 3 °F (36 8 °C), Min:98 2 °F (36 8 °C), Max:98 4 °F (36 9 °C)  Current: Temperature: 98 4 °F (36 9 °C)  HR: 72  BP: 83/47  RR: 10  SpO2: 99%    Respiratory:  SpO2 Device: O2 Device: Nasal cannula  O2 Flow Rate (L/min): 2 L/min    Invasive/non-invasive ventilation settings   Respiratory    Lab Data (Last 4 hours)    None         O2/Vent Data (Last 4 hours)    None                Height and Weights   Height: 5' 8 5" (174 cm)  IBW: 69 55 kg  Body mass index is 29 1 kg/m²  Weight (last 2 days)     None          Intake and Output  I/O       01/15 07 -  0700  07 -  0700  07 -  0700    P  O  0 120     I V  (mL/kg) 829 2 (9 4) 3505 (39 8)     NG/ 180     IV Piggyback 230 50     Feedings 1195 160     Total Intake(mL/kg) 2374 2 (26 9) 4015 (45 6)     Urine (mL/kg/hr) 982 (0 5) 1531 (0 7)     Emesis/NG output 580 895     Drains 15 40     Stool 0 0     Total Output 1577 2466     Net +797 2 +1549            Unmeasured Urine Occurrence 2 x      Unmeasured Stool Occurrence 1 x 0 x         UOP: 1 5L    Nutrition       Diet Orders   (From admission, onward)             Start     Ordered    20 1758  Diet Enteral/Parenteral; Tube Feeding No Oral Diet; Jevity 1 2 [place]; Continuous; 68  Diet effective now     Comments: Through j-tube  Please advance tube feeds to goal increasing 10 cc every 4 hours until goal  Thank you     Question Answer Comment   Diet Type Enteral/Parenteral    Enteral/Parenteral Tube Feeding No Oral Diet    Tube Feeding Formula: Jevity 1 2 [place]    Bolus/Cyclic/Continuous Continuous    Tube Feeding Goal Rate (mL/hr): 68    RD to adjust diet per protocol? Yes        01/16/20 1801              TF currently running at 68 ml/hr with a goal of 68 ml/hr  Formula: jevity    Laboratory and Diagnostics:  Results from last 7 days   Lab Units 01/17/20  0430 01/16/20  1122 01/16/20  1043 01/16/20  1036 01/16/20  0717 01/15/20  1429 01/15/20  0558 01/14/20  0458 01/13/20  0422  01/12/20  0447   WBC Thousand/uL 7 22 7 28  --  8 16 8 66 8 10 8 34 8 60 6 91  --  3 40*   HEMOGLOBIN g/dL 8 3* 8 3*  --  8 6* 9 7* 9 9* 10 1* 9 3* 9 4*   < > 10 7*   I STAT HEMOGLOBIN g/dl  --   --  7 8*  --   --   --   --   --   --   --   --    HEMATOCRIT % 27 1* 26 4*  --  27 5* 31 4* 31 4* 32 2* 29 0* 28 8*   < > 30 8*   HEMATOCRIT, ISTAT %  --   --  23*  --   --   --   --   --   --   --   --    PLATELETS Thousands/uL 280 236  --  269 269 246 269 186 152  --  151   NEUTROS PCT %  --   --   --  68 68 70 70 80* 78*  --  79*   MONOS PCT %  --   --   --  18* 18* 16* 16* 11 10  --  12    < > = values in this interval not displayed       Results from last 7 days   Lab Units 01/17/20  0430 01/16/20  1043 01/16/20  1036 01/16/20  0717 01/15/20  1752 01/14/20  1746 01/14/20  0458 01/13/20  1413 01/13/20  0422 01/12/20  0447 01/11/20  2236  01/11/20  2040  01/11/20  1803  01/11/20  1408   SODIUM mmol/L 139  --  138 139 141 140 139 141 147* 143 148*  --  149*   < > 144  --  138   POTASSIUM mmol/L 3 2*  --  3 5 4 5 3 5 3 3* 3 2* 3 3* 3 6 3 1* 3 9  --  5 0   < > 3 5  --  4 1   CHLORIDE mmol/L 104  --  103 104 104 105 107 109* 114* 112* 113*  --  120*   < > 114*  --  110*   CO2 mmol/L 32  --  33* 32 33* 31 30 29 29 26 28  --  23   < > 23  --  24   CO2, I-STAT mmol/L  --  34*  --   --   --   --   --   --   --   --   --    < >  --    < >  --    < >  --    ANION GAP mmol/L 3*  --  2* 3* 4 4 2* 3* 4 5 7  --  6   < > 7  --  4   BUN mg/dL 15  --  21 21 20 16 16 15 16 13 13  --  14   < > 17  --  20   CREATININE mg/dL 0 46*  --  0 56* 0 53* 0 66 0 67 0 66 0 67 0 59* 0 58* 0 52*  --  0 45*   < > 0 46*  -- 0 51*   CALCIUM mg/dL 8 0*  --  8 0* 8 4 8 5 8 1* 7 8* 7 8* 7 5* 8 3 8 6  --  6 9*   < > 6 6*  --  7 2*   GLUCOSE RANDOM mg/dL 103  --  118 113 116 154* 169* 124 117 117 180*  --  191*   < > 144*  --  192*   ALT U/L  --   --   --   --   --   --  7*  --  8* 16 21  --  15  --  6*  --  9*   AST U/L  --   --   --   --   --   --  10  --  13 31 33  --  22  --  8  --  18   ALK PHOS U/L  --   --   --   --   --   --  57  --  43* 39* 47  --  28*  --  18*  --  39*   ALBUMIN g/dL  --   --   --   --   --   --  1 7*  --  1 8* 2 1* 2 5*  --  1 5*  --  0 8*  --  2 0*   TOTAL BILIRUBIN mg/dL  --   --   --   --   --   --  0 33  --  0 29 0 77 1 47*  --  0 66  --  0 63  --  0 55    < > = values in this interval not displayed  Results from last 7 days   Lab Units 01/17/20  0430 01/15/20  0647 01/15/20  0550 01/14/20  0458 01/12/20  0447 01/11/20  2236 01/11/20  1850 01/11/20  1803 01/11/20  1408   MAGNESIUM mg/dL 2 3  --  2 4 1 9 2 0 1 5* 1 6 1 5* 1 4*   PHOSPHORUS mg/dL 2 5 2 1*  --  1 9*  --  3 0 2 5 2 3 3 1      Results from last 7 days   Lab Units 01/16/20  1121 01/15/20  0619 01/14/20  0458 01/13/20  1146 01/11/20 2236 01/11/20  2040 01/11/20  1851 01/11/20  1803   INR  1 16 1 05 1 18 1 13 1 09 1 47* 1 55* 2 59*   PTT seconds 38*  --  37 35  --  47* >210* 77*      Results from last 7 days   Lab Units 01/11/20  0045 01/10/20  1717   TROPONIN I ng/mL <0 02 <0 02     Results from last 7 days   Lab Units 01/16/20  1036 01/11/20  2236 01/11/20  1850 01/11/20  1803   LACTIC ACID mmol/L 1 1 1 4 3 8* 3 3*     ABG:  Results from last 7 days   Lab Units 01/12/20  1043   PH ART  7 390   PCO2 ART mm Hg 37 8   PO2 ART mm Hg 112 6   HCO3 ART mmol/L 22 4   BASE EXC ART mmol/L -2 3   ABG SOURCE  Line, Arterial     VBG:  Results from last 7 days   Lab Units 01/12/20  1043   ABG SOURCE  Line, Arterial           Micro        EKG:   Imaging:  I have personally reviewed pertinent reports        Active Medications  Scheduled Meds:  Current Facility-Administered Medications:  acetaminophen 650 mg Oral Q6H PRN Mason Kimbrough PA-C    albuterol 2 5 mg Nebulization Q4H PRN Gary Ross, DO    atorvastatin 40 mg Oral Daily With [address], MD    chlorhexidine 15 mL Swish & Spit Q12H [address]    cholecalciferol 1,000 Units Oral Daily Henri Valenzuela MD    DULoxetine 90 mg Oral Daily Henri Valenzuela MD    fluticasone 2 spray Nasal Daily Henri Valenzuela MD    fluticasone-vilanterol 1 puff Inhalation Daily Henri Valenzuela MD    folic acid 1 mg Oral Daily Rosa Nice PA-C    gabapentin 300 mg Oral TID MD hipolito ArechigaaiFENesin 600 mg Oral Q12H PRN Henri Valenzuela MD    heparin (porcine) 5,000 Units Subcutaneous [facility] Gary Ross, DO    loratadine 10 mg Oral Daily Henri Valenzuela MD    metoprolol tartrate 25 mg Oral Q12H [address] Amrita Fay PA-C    mirtazapine 15 mg Oral HS Henri Valenzuela MD    multi-electrolyte 75 mL/hr Intravenous Continuous Rosa Nice PA-C Last Rate: 75 mL/hr (01/17/20 0040)   nicotine 1 patch Transdermal Daily Zane Sanabria MD    omeprazole (PRILOSEC) suspension 2 mg/mL 20 mg Oral BID MAVERICK Vincent    oxyCODONE 10 mg Oral Q4H PRN Tammie Webster MD    oxyCODONE 5 mg Oral Q4H PRN Tammie Webster MD    QUEtiapine 200 mg Oral Daily Henri Valenzuela MD    QUEtiapine 600 mg Oral HS Henri Valenzuela MD    thiamine 100 mg Oral Daily Henri Valenzuela MD    traZODone 50 mg Oral HS PRN Henri Valenzuela MD      Continuous Infusions:    multi-electrolyte 75 mL/hr Last Rate: 75 mL/hr (01/17/20 0040)     PRN Meds:     acetaminophen 650 mg Q6H PRN   albuterol 2 5 mg Q4H PRN   guaiFENesin 600 mg Q12H PRN   oxyCODONE 10 mg Q4H PRN   oxyCODONE 5 mg Q4H PRN   traZODone 50 mg HS PRN       Allergies   No Known Allergies    Advance Directive and Living Will:      Power of :    POLST:      Counseling / Coordination of Care  Total Critical Care time spent 35 minutes excluding procedures, teaching and family updates  Darin Anderson MD      Portions of the record may have been created with voice recognition software  Occasional wrong word or "sound a like" substitutions may have occurred due to the inherent limitations of voice recognition software    Read the chart carefully and recognize, using context, where substitutions have occurred

## 2020-01-17 NOTE — NUTRITION
01/17/20 1352   Recommendations/Interventions   Nutrition Recommendations Tube Feeding Recommendation provided  (Rec adjusting TF to: Jevity 1 2 @ 76ml/hr (can d/c prosource)  Provides 2189kcal, 101g pro, 1477ml free water   Monitor electrolytes and weight )

## 2020-01-18 LAB
ANION GAP SERPL CALCULATED.3IONS-SCNC: 5 MMOL/L (ref 4–13)
ANISOCYTOSIS BLD QL SMEAR: PRESENT
BASOPHILS # BLD MANUAL: 0.08 THOUSAND/UL (ref 0–0.1)
BASOPHILS NFR MAR MANUAL: 1 % (ref 0–1)
BUN SERPL-MCNC: 9 MG/DL (ref 5–25)
CALCIUM SERPL-MCNC: 8.3 MG/DL (ref 8.3–10.1)
CHLORIDE SERPL-SCNC: 106 MMOL/L (ref 100–108)
CO2 SERPL-SCNC: 30 MMOL/L (ref 21–32)
CREAT SERPL-MCNC: 0.52 MG/DL (ref 0.6–1.3)
EOSINOPHIL # BLD MANUAL: 0 THOUSAND/UL (ref 0–0.4)
EOSINOPHIL NFR BLD MANUAL: 0 % (ref 0–6)
ERYTHROCYTE [DISTWIDTH] IN BLOOD BY AUTOMATED COUNT: 14.5 % (ref 11.6–15.1)
GFR SERPL CREATININE-BSD FRML MDRD: 110 ML/MIN/1.73SQ M
GLUCOSE SERPL-MCNC: 153 MG/DL (ref 65–140)
HCT VFR BLD AUTO: 27.2 % (ref 36.5–49.3)
HGB BLD-MCNC: 8.5 G/DL (ref 12–17)
LYMPHOCYTES # BLD AUTO: 0.68 THOUSAND/UL (ref 0.6–4.47)
LYMPHOCYTES # BLD AUTO: 9 % (ref 14–44)
MAGNESIUM SERPL-MCNC: 2.2 MG/DL (ref 1.6–2.6)
MCH RBC QN AUTO: 29 PG (ref 26.8–34.3)
MCHC RBC AUTO-ENTMCNC: 31.3 G/DL (ref 31.4–37.4)
MCV RBC AUTO: 93 FL (ref 82–98)
METAMYELOCYTES NFR BLD MANUAL: 1 % (ref 0–1)
MONOCYTES # BLD AUTO: 1.21 THOUSAND/UL (ref 0–1.22)
MONOCYTES NFR BLD: 16 % (ref 4–12)
NEUTROPHILS # BLD MANUAL: 5.52 THOUSAND/UL (ref 1.85–7.62)
NEUTS SEG NFR BLD AUTO: 73 % (ref 43–75)
NRBC BLD AUTO-RTO: 0 /100 WBCS
PHOSPHATE SERPL-MCNC: 2.9 MG/DL (ref 2.3–4.1)
PLATELET # BLD AUTO: 340 THOUSANDS/UL (ref 149–390)
PLATELET BLD QL SMEAR: ADEQUATE
PMV BLD AUTO: 9.8 FL (ref 8.9–12.7)
POTASSIUM SERPL-SCNC: 3.5 MMOL/L (ref 3.5–5.3)
RBC # BLD AUTO: 2.93 MILLION/UL (ref 3.88–5.62)
RBC MORPH BLD: PRESENT
SODIUM SERPL-SCNC: 141 MMOL/L (ref 136–145)
WBC # BLD AUTO: 7.56 THOUSAND/UL (ref 4.31–10.16)

## 2020-01-18 PROCEDURE — 84100 ASSAY OF PHOSPHORUS: CPT | Performed by: EMERGENCY MEDICINE

## 2020-01-18 PROCEDURE — NC001 PR NO CHARGE: Performed by: SURGERY

## 2020-01-18 PROCEDURE — 85027 COMPLETE CBC AUTOMATED: CPT | Performed by: EMERGENCY MEDICINE

## 2020-01-18 PROCEDURE — 94760 N-INVAS EAR/PLS OXIMETRY 1: CPT

## 2020-01-18 PROCEDURE — 83735 ASSAY OF MAGNESIUM: CPT | Performed by: EMERGENCY MEDICINE

## 2020-01-18 PROCEDURE — 85007 BL SMEAR W/DIFF WBC COUNT: CPT | Performed by: EMERGENCY MEDICINE

## 2020-01-18 PROCEDURE — 99232 SBSQ HOSP IP/OBS MODERATE 35: CPT | Performed by: PHYSICIAN ASSISTANT

## 2020-01-18 PROCEDURE — 80048 BASIC METABOLIC PNL TOTAL CA: CPT | Performed by: EMERGENCY MEDICINE

## 2020-01-18 RX ORDER — POTASSIUM CHLORIDE 14.9 MG/ML
20 INJECTION INTRAVENOUS
Status: COMPLETED | OUTPATIENT
Start: 2020-01-18 | End: 2020-01-19

## 2020-01-18 RX ADMIN — OXYCODONE HYDROCHLORIDE 10 MG: 10 TABLET ORAL at 06:07

## 2020-01-18 RX ADMIN — MELATONIN 1000 UNITS: at 08:06

## 2020-01-18 RX ADMIN — METOPROLOL TARTRATE 25 MG: 25 TABLET, FILM COATED ORAL at 08:05

## 2020-01-18 RX ADMIN — POTASSIUM CHLORIDE 20 MEQ: 14.9 INJECTION, SOLUTION INTRAVENOUS at 11:07

## 2020-01-18 RX ADMIN — LORATADINE 10 MG: 10 TABLET ORAL at 08:06

## 2020-01-18 RX ADMIN — DOCUSATE SODIUM 100 MG: 100 CAPSULE, LIQUID FILLED ORAL at 18:50

## 2020-01-18 RX ADMIN — CHLORHEXIDINE GLUCONATE 0.12% ORAL RINSE 15 ML: 1.2 LIQUID ORAL at 21:19

## 2020-01-18 RX ADMIN — OXYCODONE HYDROCHLORIDE 10 MG: 10 TABLET ORAL at 16:45

## 2020-01-18 RX ADMIN — FLUTICASONE PROPIONATE 2 SPRAY: 50 SPRAY, METERED NASAL at 08:06

## 2020-01-18 RX ADMIN — HEPARIN SODIUM 5000 UNITS: 5000 INJECTION INTRAVENOUS; SUBCUTANEOUS at 21:19

## 2020-01-18 RX ADMIN — DULOXETINE HYDROCHLORIDE 90 MG: 60 CAPSULE, DELAYED RELEASE ORAL at 08:06

## 2020-01-18 RX ADMIN — GABAPENTIN 300 MG: 300 CAPSULE ORAL at 08:06

## 2020-01-18 RX ADMIN — Medication 20 MG: at 18:50

## 2020-01-18 RX ADMIN — MIRTAZAPINE 15 MG: 15 TABLET, FILM COATED ORAL at 21:19

## 2020-01-18 RX ADMIN — ATORVASTATIN CALCIUM 40 MG: 40 TABLET, FILM COATED ORAL at 16:45

## 2020-01-18 RX ADMIN — QUETIAPINE FUMARATE 600 MG: 300 TABLET ORAL at 21:26

## 2020-01-18 RX ADMIN — Medication 20 MG: at 08:14

## 2020-01-18 RX ADMIN — HEPARIN SODIUM 5000 UNITS: 5000 INJECTION INTRAVENOUS; SUBCUTANEOUS at 14:00

## 2020-01-18 RX ADMIN — THIAMINE HCL TAB 100 MG 100 MG: 100 TAB at 08:05

## 2020-01-18 RX ADMIN — CHLORHEXIDINE GLUCONATE 0.12% ORAL RINSE 15 ML: 1.2 LIQUID ORAL at 08:05

## 2020-01-18 RX ADMIN — GABAPENTIN 300 MG: 300 CAPSULE ORAL at 16:45

## 2020-01-18 RX ADMIN — DOCUSATE SODIUM 100 MG: 100 CAPSULE, LIQUID FILLED ORAL at 08:06

## 2020-01-18 RX ADMIN — GABAPENTIN 300 MG: 300 CAPSULE ORAL at 21:19

## 2020-01-18 RX ADMIN — POTASSIUM CHLORIDE 20 MEQ: 14.9 INJECTION, SOLUTION INTRAVENOUS at 07:35

## 2020-01-18 RX ADMIN — METOPROLOL TARTRATE 25 MG: 25 TABLET, FILM COATED ORAL at 21:20

## 2020-01-18 RX ADMIN — OXYCODONE HYDROCHLORIDE 10 MG: 10 TABLET ORAL at 21:19

## 2020-01-18 RX ADMIN — QUETIAPINE FUMARATE 200 MG: 100 TABLET ORAL at 08:05

## 2020-01-18 RX ADMIN — FOLIC ACID 1 MG: 1 TABLET ORAL at 08:06

## 2020-01-18 RX ADMIN — NICOTINE 1 PATCH: 14 PATCH TRANSDERMAL at 08:05

## 2020-01-18 RX ADMIN — FLUTICASONE FUROATE AND VILANTEROL TRIFENATATE 1 PUFF: 100; 25 POWDER RESPIRATORY (INHALATION) at 08:06

## 2020-01-18 RX ADMIN — HEPARIN SODIUM 5000 UNITS: 5000 INJECTION INTRAVENOUS; SUBCUTANEOUS at 05:23

## 2020-01-18 NOTE — PROGRESS NOTES
Daily Progress Note - Critical Care   Gil Bridges 81 y o  male MRN: [id number]  [facility] Encounter: [id number]        ----------------------------------------------------------------------------------------  HPI/24hr events:  No significant 24 hour events    ---------------------------------------------------------------------------------------  SUBJECTIVE  Patient complains of abdominal pain which improves with p o  Analgesia  Admits to flatus, no BM  Advanced to clear liquids today  Review of Systems  Review of systems was reviewed and negative unless stated above in HPI/24-hour events   ---------------------------------------------------------------------------------------  Assessment and Plan:    Neuro:    Diagnosis:  Acute on chronic pain, encephalopathy  o Plan:  Encephalopathy resolved  No evidence of alcohol withdrawal   Oriented x3  No focal neurologic deficits  No evidence of delirium  Continue home Seroquel, Remeron, trazodone, duloxetine, gabapentin  Continue p r n  Tylenol, oxycodone  Continue neurologic exams, daily CAM ICU, delirium precautions  Maintain adequate sleep-wake cycle  Patient slept well overnight  CV:    Diagnosis:  Hemorrhagic shock, resolved  o Plan:  Hemodynamically normal   Continue metoprolol 25 mg b i d   Discontinue arterial line  Continue to monitor closely  Pulm:   Diagnosis:  COPD, nicotine addiction  o Plan:  Mild expiratory wheezing  Doing well on nasal cannula oxygen  No complaints of shortness of breath  Continue nicotine patch  Encourage deep breathing, coughing and incentive spirometry  Out of bed as tolerated  Wean oxygen as tolerated  GI:    Diagnosis:  Perforated duodenal ulcer status EGD with unsuccessful clipping, status post IR GDA embolization and subsequent exploratory laparotomy with over-sewing bleeding vessels/closure of tissue over perforation/Thal patch with colon, open GJ tube placement    o Plan: Abdominal exam appropriately tender  Advanced to clear liquids today  Admits to flatus without BM   2 CAMPBELL drains in place, each with 5 mL serosanguineous output in past 24 hours  GJ tube intact  G-tube output 325 mL  :    Diagnosis:  No acute issues  o Plan:  BUN and creatinine normal   Good urine output  Condom catheter in place  F/E/N:    Plan:  Mild hypokalemia, will replete  Remaining electrolytes unremarkable  Continue to monitor and replete as needed  Heme/Onc:    Diagnosis:  Acute blood loss anemia  o Plan:  Hemoglobin stable at 8 5  Platelet count normal   Continue subcutaneous heparin and SCDs for DVT prophylaxis  Endo:    Diagnosis:  No acute issues  o Plan:  Blood glucose normal   Continue to monitor  ID:    Diagnosis:  No acute issues  o Plan:  Afebrile  No leukocytosis  No evidence of infection  MSK/Skin:    Diagnosis:  No acute issues  o Plan:  Frequent repositioning and offloading  Out of bed to chair  Continue PT and OT  Disposition: Transfer to Med-Surg   Code Status: Level 1 - Full Code  ---------------------------------------------------------------------------------------  ICU CORE MEASURES    Prophylaxis   VTE Pharmacologic Prophylaxis: Heparin  VTE Mechanical Prophylaxis: sequential compression device  Stress Ulcer Prophylaxis: Omeprazole PO    ABCDE Protocol (if indicated)  Plan to perform spontaneous awakening trial today? Not applicable  Plan to perform spontaneous breathing trial today? Not applicable  Obvious barriers to extubation?  Not applicable  CAM-ICU: Negative    Invasive Devices Review  Invasive Devices     Peripherally Inserted Central Catheter Line            PICC Line 99/76/53 Right Basilic 1 day          Arterial Line            Arterial Line 01/16/20 Radial 1 day          Drain            Closed/Suction Drain Right Abdomen Bulb 6 days    Closed/Suction Drain Right Abdomen Bulb 19 Fr  6 days Gastrostomy/Enterostomy Gastrostomy-jejunostomy 22 Fr  LLQ 6 days    External Urinary Catheter Small 3 days              Can any invasive devices be discontinued today? Yes  ---------------------------------------------------------------------------------------  OBJECTIVE    Physical Exam   Constitutional: He is oriented to person, place, and time  Vital signs are normal  He appears well-developed and well-nourished  He is cooperative  Non-toxic appearance  No distress  Cardiovascular: Normal rate, regular rhythm, intact distal pulses and normal pulses  Pulmonary/Chest: Effort normal  No stridor  He has no decreased breath sounds  He has wheezes (Mild expiratory wheezes throughout )  He has no rhonchi  He has no rales  Abdominal: Soft  Normal appearance  He exhibits no distension  There is generalized tenderness  There is no rigidity and no guarding  Neurological: He is alert and oriented to person, place, and time  He has normal strength  GCS eye subscore is 4  GCS verbal subscore is 5  GCS motor subscore is 6  Skin: Skin is warm, dry and intact  Capillary refill takes less than 2 seconds  Vitals   Vitals:    20 0300 20 0400 20 0500 20 0600   BP: 148/71 154/79 147/74 143/71   Pulse: 84 90 86 104   Resp: (!) 11 14 20 (!) 28   Temp:  98 °F (36 7 °C)     TempSrc:  Oral     SpO2: 98% 98% 98% 97%   Weight:       Height:         Temp (24hrs), Av 4 °F (36 9 °C), Min:97 5 °F (36 4 °C), Max:99 1 °F (37 3 °C)  Current: Temperature: 98 °F (36 7 °C)  HR:  86  BP:  143/71  RR:  20  SpO2:  97%    Invasive/non-invasive ventilation settings   Respiratory    Lab Data (Last 4 hours)    None         O2/Vent Data (Last 4 hours)    None                Height and Weights   Height: 5' 8 5" (174 cm)  IBW: 69 55 kg  Body mass index is 29 1 kg/m²  Weight (last 2 days)     None            Intake and Output  I/O       701 -  - 700    P  O  120     I V  (mL/kg) 3505 (39 8) 340 (3 9)    NG/ 350    IV Piggyback 50 100    Feedings 160 1540    Total Intake(mL/kg) 4015 (45 6) 2330 (26 4)    Urine (mL/kg/hr) 1531 (0 7) 2366 (1 1)    Emesis/NG output 895 325    Drains 40 10    Stool 0     Total Output 2466 2701    Net +1549 -371          Unmeasured Stool Occurrence 0 x         UOP:  1 1 ml/kg/hr     Nutrition       Diet Orders   (From admission, onward)             Start     Ordered    01/18/20 0335  Diet Clear Liquid  Diet effective now     Question Answer Comment   Diet Type Clear Liquid    RD to adjust diet per protocol? Yes        01/18/20 0335              Laboratory and Diagnostics:  Results from last 7 days   Lab Units 01/18/20  0532 01/17/20  0430 01/16/20  1122 01/16/20  1043 01/16/20  1036 01/16/20  0717 01/15/20  1429 01/15/20  0558 01/14/20  0458 01/13/20  0422  01/12/20  0447   WBC Thousand/uL 7 56 7 22 7 28  --  8 16 8 66 8 10 8 34 8 60 6 91  --  3 40*   HEMOGLOBIN g/dL 8 5* 8 3* 8 3*  --  8 6* 9 7* 9 9* 10 1* 9 3* 9 4*   < > 10 7*   I STAT HEMOGLOBIN g/dl  --   --   --  7 8*  --   --   --   --   --   --   --   --    HEMATOCRIT % 27 2* 27 1* 26 4*  --  27 5* 31 4* 31 4* 32 2* 29 0* 28 8*   < > 30 8*   HEMATOCRIT, ISTAT %  --   --   --  23*  --   --   --   --   --   --   --   --    PLATELETS Thousands/uL 340 280 236  --  269 269 246 269 186 152  --  151   NEUTROS PCT %  --   --   --   --  68 68 70 70 80* 78*  --  79*   MONOS PCT %  --   --   --   --  18* 18* 16* 16* 11 10  --  12   MONO PCT %  --  10  --   --   --   --   --   --   --   --   --   --     < > = values in this interval not displayed       Results from last 7 days   Lab Units 01/18/20  0532 01/17/20  0430 01/16/20  1043 01/16/20  1036 01/16/20  0717 01/15/20  0872 01/14/20  1746 01/14/20  0458  01/13/20  0422 01/12/20  0447 01/11/20  2236  01/11/20  2040  01/11/20  1803  01/11/20  1408   SODIUM mmol/L 141 139  --  138 139 141 140 139   < > 147* 143 148*  --  149*   < > 144  --  138   POTASSIUM mmol/L 3  5 3 2*  --  3 5 4 5 3 5 3 3* 3 2*   < > 3 6 3 1* 3 9  --  5 0   < > 3 5  --  4 1   CHLORIDE mmol/L 106 104  --  103 104 104 105 107   < > 114* 112* 113*  --  120*   < > 114*  --  110*   CO2 mmol/L 30 32  --  33* 32 33* 31 30   < > 29 26 28  --  23   < > 23  --  24   CO2, I-STAT mmol/L  --   --  34*  --   --   --   --   --   --   --   --   --    < >  --    < >  --    < >  --    ANION GAP mmol/L 5 3*  --  2* 3* 4 4 2*   < > 4 5 7  --  6   < > 7  --  4   BUN mg/dL 9 15  --  21 21 20 16 16   < > 16 13 13  --  14   < > 17  --  20   CREATININE mg/dL 0 52* 0 46*  --  0 56* 0 53* 0 66 0 67 0 66   < > 0 59* 0 58* 0 52*  --  0 45*   < > 0 46*  --  0 51*   CALCIUM mg/dL 8 3 8 0*  --  8 0* 8 4 8 5 8 1* 7 8*   < > 7 5* 8 3 8 6  --  6 9*   < > 6 6*  --  7 2*   GLUCOSE RANDOM mg/dL 153* 103  --  118 113 116 154* 169*   < > 117 117 180*  --  191*   < > 144*  --  192*   ALT U/L  --   --   --   --   --   --   --  7*  --  8* 16 21  --  15  --  6*  --  9*   AST U/L  --   --   --   --   --   --   --  10  --  13 31 33  --  22  --  8  --  18   ALK PHOS U/L  --   --   --   --   --   --   --  57  --  43* 39* 47  --  28*  --  18*  --  39*   ALBUMIN g/dL  --   --   --   --   --   --   --  1 7*  --  1 8* 2 1* 2 5*  --  1 5*  --  0 8*  --  2 0*   TOTAL BILIRUBIN mg/dL  --   --   --   --   --   --   --  0 33  --  0 29 0 77 1 47*  --  0 66  --  0 63  --  0 55    < > = values in this interval not displayed       Results from last 7 days   Lab Units 01/18/20  0532 01/17/20  0430 01/15/20  0647 01/15/20  0550 01/14/20  0458 01/12/20  0447 01/11/20  2236 01/11/20  1850 01/11/20  1803   MAGNESIUM mg/dL 2 2 2 3  --  2 4 1 9 2 0 1 5* 1 6 1 5*   PHOSPHORUS mg/dL 2 9 2 5 2 1*  --  1 9*  --  3 0 2 5 2 3      Results from last 7 days   Lab Units 01/16/20  1121 01/15/20  0619 01/14/20  0458 01/13/20  1146 01/11/20  2236 01/11/20  2040 01/11/20  1851 01/11/20  1803   INR  1 16 1 05 1 18 1 13 1 09 1 47* 1 55* 2 59*   PTT seconds 38*  --  37 35  --  47* >210* 77*          Results from last 7 days   Lab Units 01/16/20  1036 01/11/20  2236 01/11/20  1850 01/11/20  1803   LACTIC ACID mmol/L 1 1 1 4 3 8* 3 3*     ABG:  Results from last 7 days   Lab Units 01/12/20  1043   PH ART  7 390   PCO2 ART mm Hg 37 8   PO2 ART mm Hg 112 6   HCO3 ART mmol/L 22 4   BASE EXC ART mmol/L -2 3   ABG SOURCE  Line, Arterial     VBG:  Results from last 7 days   Lab Units 01/12/20  1043   ABG SOURCE  Line, Arterial           Micro        EKG:  None today  Imaging:  None today  I have personally reviewed pertinent reports     and I have personally reviewed pertinent films in PACS    Active Medications  Scheduled Meds:  Current Facility-Administered Medications:  acetaminophen 650 mg Oral Q6H PRN Dennie Prows, PA-C   albuterol 2 5 mg Nebulization Q4H PRN Flower Lopez DO   atorvastatin 40 mg Oral Daily With [address] Shira Hebert MD   chlorhexidine 15 mL Swish & Spit Q12H [facility]   cholecalciferol 1,000 Units Oral Daily Mariposa San MD   docusate sodium 100 mg Oral BID Mariposa San MD   DULoxetine 90 mg Oral Daily Mariposa San MD   fluticasone 2 spray Nasal Daily Mariposa San MD   fluticasone-vilanterol 1 puff Inhalation Daily Mariposa San MD   folic acid 1 mg Oral Daily Angie Cai PA-C   gabapentin 300 mg Oral TID Mariposa San MD   guaiFENesin 600 mg Oral Q12H PRN Mariposa San MD   heparin (porcine) 5,000 Units Subcutaneous [facility] Flower Lopez,    loratadine 10 mg Oral Daily Mariposa San MD   metoprolol tartrate 25 mg Oral Q12H [address] Luna Esposito PA-C   mirtazapine 15 mg Oral HS Mariposa San MD   nicotine 1 patch Transdermal Daily Mariposa San MD   omeprazole (PRILOSEC) suspension 2 mg/mL 20 mg Oral BID MAVERICK Valle   oxyCODONE 10 mg Oral Q4H PRN Judith Lazaro MD   oxyCODONE 5 mg Oral Q4H PRN Judith Lazaro MD   QUEtiapine 200 mg Oral Daily Mariposa San MD   QUEtiapine 600 mg Oral HS [employer] Uriel Davenport MD   thiamine 100 mg Oral Daily Jamal Hartman MD   traZODone 50 mg Oral HS PRN Jamal Hartman MD     Continuous Infusions:     PRN Meds:     acetaminophen 650 mg Q6H PRN   albuterol 2 5 mg Q4H PRN   guaiFENesin 600 mg Q12H PRN   oxyCODONE 10 mg Q4H PRN   oxyCODONE 5 mg Q4H PRN   traZODone 50 mg HS PRN       Allergies   No Known Allergies    Advance Directive and Living Will:      Power of :    POLST:        Counseling / Coordination of Care  Total Critical Care time spent 39 minutes excluding procedures, teaching and family updates  Charline Green PA-C        Portions of the record may have been created with voice recognition software  Occasional wrong word or "sound a like" substitutions may have occurred due to the inherent limitations of voice recognition software    Read the chart carefully and recognize, using context, where substitutions have occurred

## 2020-01-18 NOTE — PROGRESS NOTES
Progress Note - Mitchmiguel Yanes 81 y o  male MRN: [id number]    Unit/Bed#: [facility] 340-36 Encounter: [id number]      Assessment:  80yM w/ bleeding and perforated 1st portion duodenal ulcer  1/11 S/p EGD  1/11 S/p IR GDA embolozation   1/11 S/p Exlap, oversewing bleeding vessels, closure of tissue over perforation, Thal patch with transverse colon, open gastrostomy-jejunostomy tube placement      Plan:  Diet Clear Liquid   May continue tube feeds through J-tube until tolerating enough p o  Diet  Cap G-tube  Give medications PO  Monitor CAMPBELL outputs while advancing oral diet  SLP:  Approved for diet with thin liquids  OOBTC  Pulmonary Toilet, IS  PPx: SQH, PPI  Add colace  Pain and Nausea control PRN  Monitor HGB, appears stable  Hypokalemia ; replace potassium      Subjective:   No acute events overnight  Patient is approved for diet  Patient denies any nausea or vomiting this morning  Patient endorses stable abdominal pain  Patient appears comfortable  Objective:     Vitals: Blood pressure 143/71, pulse 104, temperature 98 °F (36 7 °C), temperature source Oral, resp  rate (!) 28, height 5' 8 5" (1 74 m), weight 88 1 kg (194 lb 3 6 oz), SpO2 97 %  ,Body mass index is 29 1 kg/m²  I/O       01/16 0701 - 01/17 0700 01/17 0701 - 01/18 0700    P  O  120     I V  (mL/kg) 3505 (39 8) 340 (3 9)    NG/ 350    IV Piggyback 50 100    Feedings 160 1540    Total Intake(mL/kg) 4015 (45 6) 2330 (26 4)    Urine (mL/kg/hr) 1531 (0 7) 2366 (1 1)    Emesis/NG output 895 325    Drains 40 10    Stool 0     Total Output 2466 2701    Net +1549 -371          Unmeasured Stool Occurrence 0 x           Physical Exam  Gen: NAD, A&O, Comfortable   Chest: Normal work of breathing, no resp distress  Abd:  Soft moderately distended, nontender, incision with mild surrounding erythema which is stable, staples intact, 2 CAMPBELL is with minimal serous output, G-tube with minimal bilious output, J-tube with tube feeds  Ext: No edema  Skin: warm, dry, intact      Invasive Devices     Peripherally Inserted Central Catheter Line            PICC Line 58/59/70 Right Basilic 1 day          Arterial Line            Arterial Line 01/16/20 Radial 1 day          Drain            Closed/Suction Drain Right Abdomen Bulb 6 days    Closed/Suction Drain Right Abdomen Bulb 19 Fr  6 days    Gastrostomy/Enterostomy Gastrostomy-jejunostomy 22 Fr  LLQ 6 days    External Urinary Catheter Small 3 days                Scheduled Meds:    Current Facility-Administered Medications:  acetaminophen 650 mg Oral Q6H PRN Mason Kimbrough PA-C   albuterol 2 5 mg Nebulization Q4H PRN Gary Ross DO   atorvastatin 40 mg Oral Daily With [address] Veebeam, MD   chlorhexidine 15 mL Swish & Spit Q12H [address]   cholecalciferol 1,000 Units Oral Daily Federico Sanabria MD   docusate sodium 100 mg Oral BID Henri Valenzuela MD   DULoxetine 90 mg Oral Daily Henri Valenzuela MD   fluticasone 2 spray Nasal Daily Henri Valenzuela MD   fluticasone-vilanterol 1 puff Inhalation Daily Henri Valenzuela MD   folic acid 1 mg Oral Daily Rosa Nice PA-C   gabapentin 300 mg Oral TID Henri Valenzuela MD   guaiFENesin 600 mg Oral Q12H PRN Henri Valenzuela MD   heparin (porcine) 5,000 Units Subcutaneous [facility] Gary Ross DO   loratadine 10 mg Oral Daily Henri Valenzuela MD   metoprolol tartrate 25 mg Oral Q12H [address] Amrita Fay PA-C   mirtazapine 15 mg Oral HS Henri Valenzuela MD   nicotine 1 patch Transdermal Daily Henri Valenzuela MD   omeprazole (PRILOSEC) suspension 2 mg/mL 20 mg Oral BID MAVERICK Vincent   oxyCODONE 10 mg Oral Q4H PRN Tammie Webster MD   oxyCODONE 5 mg Oral Q4H PRN Tammie Webster MD   QUEtiapine 200 mg Oral Daily Henri Valenzuela MD   QUEtiapine 600 mg Oral HS Henri Valenzuela MD   thiamine 100 mg Oral Daily Henri Valenzuela MD   traZODone 50 mg Oral HS PRN Henri Valenzuela MD     Continuous Infusions:     PRN Meds:   acetaminophen   albuterol    guaiFENesin    oxyCODONE    oxyCODONE    traZODone      Lab, Imaging and other studies:  Recent Labs     01/16/20  1122 01/17/20  0430 01/18/20  0532   WBC 7 28 7 22 7 56   HGB 8 3* 8 3* 8 5*    280 340     Recent Labs     01/15/20  0647  01/16/20  1036 01/16/20  1043 01/17/20  0430 01/18/20  0532   SODIUM 141   < > 138  --  139 141   K 3 5   < > 3 5  --  3 2* 3 5      < > 103  --  104 106   CO2 33*   < > 33* 34* 32 30   BUN 20   < > 21  --  15 9   CREATININE 0 66   < > 0 56*  --  0 46* 0 52*   PHOS 2 1*  --   --   --  2 5 2 9   MG  --   --   --   --  2 3 2 2   CALCIUM 8 5   < > 8 0*  --  8 0* 8 3    < > = values in this interval not displayed  Procedure: Xr Chest Portable  Result Date: 1/16/2020  Impression: Some interval improvement in pulmonary vascular congestion  Small bilateral pleural effusions and bibasilar opacities  Workstation performed: [id number]     Procedure: Xr Chest Portable  Result Date: 1/16/2020  Impression: Partially improved appearance of the lungs since the earlier study  In particular, persistent but diminished size of pleural effusions and partially improved bibasilar airspace disease  Persistent pulmonary vascular congestion Workstation performed: [id number]     Procedure: Xr Abdomen 1 View Kub  Result Date: 1/17/2020   Impression: No evidence of obstruction  Workstation performed: [id number]     Procedure: Fl Upper Gi Ugi  Result Date: 1/16/2020  Impression: Technically limited study due to patient's condition and unable to stand patient  No extraluminal contrast to suggest leak identified   Workstation performed: [id number]       VTE Pharmacologic Prophylaxis: Heparin  VTE Mechanical Prophylaxis: sequential compression device

## 2020-01-19 LAB
ANION GAP SERPL CALCULATED.3IONS-SCNC: 5 MMOL/L (ref 4–13)
BUN SERPL-MCNC: 10 MG/DL (ref 5–25)
CALCIUM SERPL-MCNC: 8.3 MG/DL (ref 8.3–10.1)
CHLORIDE SERPL-SCNC: 104 MMOL/L (ref 100–108)
CO2 SERPL-SCNC: 30 MMOL/L (ref 21–32)
CREAT SERPL-MCNC: 0.56 MG/DL (ref 0.6–1.3)
ERYTHROCYTE [DISTWIDTH] IN BLOOD BY AUTOMATED COUNT: 14.6 % (ref 11.6–15.1)
GFR SERPL CREATININE-BSD FRML MDRD: 107 ML/MIN/1.73SQ M
GLUCOSE SERPL-MCNC: 147 MG/DL (ref 65–140)
HCT VFR BLD AUTO: 27 % (ref 36.5–49.3)
HGB BLD-MCNC: 8.7 G/DL (ref 12–17)
MCH RBC QN AUTO: 29.8 PG (ref 26.8–34.3)
MCHC RBC AUTO-ENTMCNC: 32.2 G/DL (ref 31.4–37.4)
MCV RBC AUTO: 93 FL (ref 82–98)
PLATELET # BLD AUTO: 414 THOUSANDS/UL (ref 149–390)
PMV BLD AUTO: 10 FL (ref 8.9–12.7)
POTASSIUM SERPL-SCNC: 3.7 MMOL/L (ref 3.5–5.3)
RBC # BLD AUTO: 2.92 MILLION/UL (ref 3.88–5.62)
SODIUM SERPL-SCNC: 139 MMOL/L (ref 136–145)
WBC # BLD AUTO: 11.57 THOUSAND/UL (ref 4.31–10.16)

## 2020-01-19 PROCEDURE — 80048 BASIC METABOLIC PNL TOTAL CA: CPT | Performed by: PHYSICIAN ASSISTANT

## 2020-01-19 PROCEDURE — 94760 N-INVAS EAR/PLS OXIMETRY 1: CPT

## 2020-01-19 PROCEDURE — NS001 PR NO SIGNATURE OR ATTESTATION: Performed by: SURGERY

## 2020-01-19 PROCEDURE — 85027 COMPLETE CBC AUTOMATED: CPT | Performed by: PHYSICIAN ASSISTANT

## 2020-01-19 RX ORDER — FUROSEMIDE 10 MG/ML
20 INJECTION INTRAMUSCULAR; INTRAVENOUS ONCE
Status: COMPLETED | OUTPATIENT
Start: 2020-01-19 | End: 2020-01-19

## 2020-01-19 RX ORDER — CEPHALEXIN 500 MG/1
500 CAPSULE ORAL EVERY 6 HOURS SCHEDULED
Status: DISCONTINUED | OUTPATIENT
Start: 2020-01-19 | End: 2020-01-22

## 2020-01-19 RX ORDER — POTASSIUM CHLORIDE 14.9 MG/ML
20 INJECTION INTRAVENOUS
Status: COMPLETED | OUTPATIENT
Start: 2020-01-19 | End: 2020-01-19

## 2020-01-19 RX ADMIN — CEPHALEXIN 500 MG: 500 CAPSULE ORAL at 17:22

## 2020-01-19 RX ADMIN — CHLORHEXIDINE GLUCONATE 0.12% ORAL RINSE 15 ML: 1.2 LIQUID ORAL at 08:48

## 2020-01-19 RX ADMIN — CEPHALEXIN 500 MG: 500 CAPSULE ORAL at 11:54

## 2020-01-19 RX ADMIN — CHLORHEXIDINE GLUCONATE 0.12% ORAL RINSE 15 ML: 1.2 LIQUID ORAL at 21:23

## 2020-01-19 RX ADMIN — GABAPENTIN 300 MG: 300 CAPSULE ORAL at 17:22

## 2020-01-19 RX ADMIN — MIRTAZAPINE 15 MG: 15 TABLET, FILM COATED ORAL at 21:23

## 2020-01-19 RX ADMIN — METOPROLOL TARTRATE 25 MG: 25 TABLET, FILM COATED ORAL at 08:46

## 2020-01-19 RX ADMIN — OXYCODONE HYDROCHLORIDE 5 MG: 5 TABLET ORAL at 08:45

## 2020-01-19 RX ADMIN — POTASSIUM CHLORIDE 20 MEQ: 14.9 INJECTION, SOLUTION INTRAVENOUS at 11:54

## 2020-01-19 RX ADMIN — GABAPENTIN 300 MG: 300 CAPSULE ORAL at 21:23

## 2020-01-19 RX ADMIN — CEPHALEXIN 500 MG: 500 CAPSULE ORAL at 09:01

## 2020-01-19 RX ADMIN — QUETIAPINE FUMARATE 200 MG: 100 TABLET ORAL at 08:45

## 2020-01-19 RX ADMIN — NICOTINE 1 PATCH: 14 PATCH TRANSDERMAL at 08:47

## 2020-01-19 RX ADMIN — ATORVASTATIN CALCIUM 40 MG: 40 TABLET, FILM COATED ORAL at 17:22

## 2020-01-19 RX ADMIN — METOPROLOL TARTRATE 25 MG: 25 TABLET, FILM COATED ORAL at 21:23

## 2020-01-19 RX ADMIN — FOLIC ACID 1 MG: 1 TABLET ORAL at 08:46

## 2020-01-19 RX ADMIN — FUROSEMIDE 20 MG: 10 INJECTION, SOLUTION INTRAMUSCULAR; INTRAVENOUS at 08:45

## 2020-01-19 RX ADMIN — THIAMINE HCL TAB 100 MG 100 MG: 100 TAB at 08:46

## 2020-01-19 RX ADMIN — FUROSEMIDE 20 MG: 10 INJECTION, SOLUTION INTRAMUSCULAR; INTRAVENOUS at 10:04

## 2020-01-19 RX ADMIN — HEPARIN SODIUM 5000 UNITS: 5000 INJECTION INTRAVENOUS; SUBCUTANEOUS at 13:07

## 2020-01-19 RX ADMIN — Medication 20 MG: at 08:54

## 2020-01-19 RX ADMIN — OXYCODONE HYDROCHLORIDE 10 MG: 10 TABLET ORAL at 17:25

## 2020-01-19 RX ADMIN — LORATADINE 10 MG: 10 TABLET ORAL at 08:46

## 2020-01-19 RX ADMIN — HEPARIN SODIUM 5000 UNITS: 5000 INJECTION INTRAVENOUS; SUBCUTANEOUS at 21:23

## 2020-01-19 RX ADMIN — FLUTICASONE FUROATE AND VILANTEROL TRIFENATATE 1 PUFF: 100; 25 POWDER RESPIRATORY (INHALATION) at 08:48

## 2020-01-19 RX ADMIN — HEPARIN SODIUM 5000 UNITS: 5000 INJECTION INTRAVENOUS; SUBCUTANEOUS at 05:28

## 2020-01-19 RX ADMIN — POTASSIUM CHLORIDE 20 MEQ: 14.9 INJECTION, SOLUTION INTRAVENOUS at 08:47

## 2020-01-19 RX ADMIN — Medication 20 MG: at 17:23

## 2020-01-19 RX ADMIN — GABAPENTIN 300 MG: 300 CAPSULE ORAL at 08:46

## 2020-01-19 RX ADMIN — OXYCODONE HYDROCHLORIDE 10 MG: 10 TABLET ORAL at 13:05

## 2020-01-19 RX ADMIN — FLUTICASONE PROPIONATE 2 SPRAY: 50 SPRAY, METERED NASAL at 08:48

## 2020-01-19 RX ADMIN — QUETIAPINE FUMARATE 600 MG: 300 TABLET ORAL at 21:24

## 2020-01-19 RX ADMIN — DULOXETINE HYDROCHLORIDE 90 MG: 60 CAPSULE, DELAYED RELEASE ORAL at 08:49

## 2020-01-19 RX ADMIN — MELATONIN 1000 UNITS: at 08:46

## 2020-01-19 NOTE — PLAN OF CARE
Problem: Potential for Falls  Goal: Patient will remain free of falls  Description  INTERVENTIONS:  - Assess patient frequently for physical needs  -  Identify cognitive and physical deficits and behaviors that affect risk of falls    -  [employer] fall precautions as indicated by assessment   - Educate patient/family on patient safety including physical limitations  - Instruct patient to call for assistance with activity based on assessment  - Modify environment to reduce risk of injury  - Consider OT/PT consult to assist with strengthening/mobility  Outcome: Progressing     Problem: PAIN - ADULT  Goal: Verbalizes/displays adequate comfort level or baseline comfort level  Description  Interventions:  - Encourage patient to monitor pain and request assistance  - Assess pain using appropriate pain scale  - Administer analgesics based on type and severity of pain and evaluate response  - Implement non-pharmacological measures as appropriate and evaluate response  - Consider cultural and social influences on pain and pain management  - Notify physician/advanced practitioner if interventions unsuccessful or patient reports new pain  Outcome: Progressing     Problem: INFECTION - ADULT  Goal: Absence or prevention of progression during hospitalization  Description  INTERVENTIONS:  - Assess and monitor for signs and symptoms of infection  - Monitor lab/diagnostic results  - Monitor all insertion sites, i e  indwelling lines, tubes, and drains  - Monitor endotracheal if appropriate and nasal secretions for changes in amount and color  - [employer] appropriate cooling/warming therapies per order  - Administer medications as ordered  - Instruct and encourage patient and family to use good hand hygiene technique  - Identify and instruct in appropriate isolation precautions for identified infection/condition  Outcome: Progressing  Goal: Absence of fever/infection during neutropenic period  Description  INTERVENTIONS:  - Monitor WBC    Outcome: Progressing     Problem: SAFETY ADULT  Goal: Maintain or return to baseline ADL function  Description  INTERVENTIONS:  -  Assess patient's ability to carry out ADLs; assess patient's baseline for ADL function and identify physical deficits which impact ability to perform ADLs (bathing, care of mouth/teeth, toileting, grooming, dressing, etc )  - Assess/evaluate cause of self-care deficits   - Assess range of motion  - Assess patient's mobility; develop plan if impaired  - Assess patient's need for assistive devices and provide as appropriate  - Encourage maximum independence but intervene and supervise when necessary  - Involve family in performance of ADLs  - Assess for home care needs following discharge   - Consider OT consult to assist with ADL evaluation and planning for discharge  - Provide patient education as appropriate  Outcome: Progressing  Goal: Maintain or return mobility status to optimal level  Description  INTERVENTIONS:  - Assess patient's baseline mobility status (ambulation, transfers, stairs, etc )    - Identify cognitive and physical deficits and behaviors that affect mobility  - Identify mobility aids required to assist with transfers and/or ambulation (gait belt, sit-to-stand, lift, walker, cane, etc )  - [employer] fall precautions as indicated by assessment  - Record patient progress and toleration of activity level on Mobility SBAR; progress patient to next Phase/Stage  - Instruct patient to call for assistance with activity based on assessment  - Consider rehabilitation consult to assist with strengthening/weightbearing, etc   Outcome: Progressing     Problem: DISCHARGE PLANNING  Goal: Discharge to home or other facility with appropriate resources  Description  INTERVENTIONS:  - Identify barriers to discharge w/patient and caregiver  - Arrange for needed discharge resources and transportation as appropriate  - Identify discharge learning needs (meds, wound care, etc )  - Arrange for interpretive services to assist at discharge as needed  - Refer to Case Management Department for coordinating discharge planning if the patient needs post-hospital services based on physician/advanced practitioner order or complex needs related to functional status, cognitive ability, or social support system  Outcome: Progressing     Problem: Knowledge Deficit  Goal: Patient/family/caregiver demonstrates understanding of disease process, treatment plan, medications, and discharge instructions  Description  Complete learning assessment and assess knowledge base  Interventions:  - Provide teaching at level of understanding  - Provide teaching via preferred learning methods  Outcome: Progressing     Problem: Nutrition/Hydration-ADULT  Goal: Nutrient/Hydration intake appropriate for improving, restoring or maintaining nutritional needs  Description  Monitor and assess patient's nutrition/hydration status for malnutrition  Collaborate with interdisciplinary team and initiate plan and interventions as ordered  Monitor patient's weight and dietary intake as ordered or per policy  Utilize nutrition screening tool and intervene as necessary  Determine patient's food preferences and provide high-protein, high-caloric foods as appropriate       INTERVENTIONS:  - Monitor oral intake, urinary output, labs, and treatment plans  - Assess nutrition and hydration status and recommend course of action  - Evaluate amount of meals eaten  - Assist patient with eating if necessary   - Allow adequate time for meals  - Recommend/ encourage appropriate diets, oral nutritional supplements, and vitamin/mineral supplements  - Order, calculate, and assess calorie counts as needed  - Recommend, monitor, and adjust tube feedings and TPN/PPN based on assessed needs  - Assess need for intravenous fluids  - Provide specific nutrition/hydration education as appropriate  - Include patient/family/caregiver in decisions related to nutrition  Outcome: Progressing     Problem: SAFETY,RESTRAINT: NV/NON-SELF DESTRUCTIVE BEHAVIOR  Goal: Remains free of harm/injury (restraint for non violent/non self-detsructive behavior)  Description  INTERVENTIONS:  - Instruct patient/family regarding restraint use   - Assess and monitor physiologic and psychological status   - Provide interventions and comfort measures to meet assessed patient needs   - Identify and implement measures to help patient regain control  - Assess readiness for release of restraint   Outcome: Progressing  Goal: Returns to optimal restraint-free functioning  Description  INTERVENTIONS:  - Assess the patient's behavior and symptoms that indicate continued need for restraint  - Identify and implement measures to help patient regain control  - Assess readiness for release of restraint   Outcome: Progressing     Problem: Prexisting or High Potential for Compromised Skin Integrity  Goal: Skin integrity is maintained or improved  Description  INTERVENTIONS:  - Identify patients at risk for skin breakdown  - Assess and monitor skin integrity  - Assess and monitor nutrition and hydration status  - Monitor labs   - Assess for incontinence   - Turn and reposition patient  - Assist with mobility/ambulation  - Relieve pressure over bony prominences  - Avoid friction and shearing  - Provide appropriate hygiene as needed including keeping skin clean and dry  - Evaluate need for skin moisturizer/barrier cream  - Collaborate with interdisciplinary team   - Patient/family teaching  - Consider wound care consult   Outcome: Progressing

## 2020-01-19 NOTE — PROGRESS NOTES
Progress Note - Ruano Mix 81 y o  male MRN: [id number]    Unit/Bed#: [facility] 510-01 Encounter: [id number]      Assessment:  80yM w/ bleeding and perforated 1st portion duodenal ulcer  1/11 S/p EGD  1/11 S/p IR GDA embolozation   1/11 S/p Exlap, oversewing bleeding vessels, closure of tissue over perforation, Thal patch with transverse colon, open gastrostomy-jejunostomy tube placement      Plan:  Diet Clear Liquid, ADAT  May continue tube feeds through J-tube until tolerating enough p o  Diet  Cap G-tube  Give medications PO  Monitor CAMPBELL outputs while advancing oral diet  SLP:  Approved for diet with thin liquids  OOBTC  Pulmonary Toilet, IS  PPx: SQH, PPI, Colace  Pain and Nausea control PRN  Monitor HGB, appears stable  Hypokalemia ; replace potassium      Subjective:   No acute events overnight  Patient was moved out of the ICU yesterday and started on clear liquid diet  Good urine output  Afebrile  No complaints of nausea, vomiting, or abdominal pain  Patient endorses flatness and bowel movement  Objective:     Vitals: Blood pressure 161/75, pulse 82, temperature 98 2 °F (36 8 °C), temperature source Oral, resp  rate 18, height 5' 8 5" (1 74 m), weight 88 1 kg (194 lb 3 6 oz), SpO2 97 %  ,Body mass index is 29 1 kg/m²  I/O       01/17 0701 - 01/18 0700 01/18 0701 - 01/19 0700    P  O  240 360    I V  (mL/kg) 340 (3 9)     NG/ 0    IV Piggyback 100 100    Feedings 1540 387    Total Intake(mL/kg) 2570 (29 2) 847 (9 6)    Urine (mL/kg/hr) 2366 (1 1) 875 (0 4)    Emesis/NG output 325 0    Drains 10 0    Total Output 2701 875    Net -131 -28                Physical Exam  Gen: NAD, A&O, Comfortable   Chest: Normal work of breathing, no resp distress  Abd:  Soft, moderately distended, nontender, incision with increased surrounding erythema, no drainage, staples intact, 2 CAMPBELL use with minimal serous output, G-tube capped, J-tube with tube feeds  Ext: No edema  Skin: warm, dry, intact      Invasive Devices Peripherally Inserted Central Catheter Line            PICC Line 84/37/73 Right Basilic 2 days          Drain            Closed/Suction Drain Right Abdomen Bulb 7 days    Closed/Suction Drain Right Abdomen Bulb 19 Fr  7 days    Gastrostomy/Enterostomy Gastrostomy-jejunostomy 22 Fr  LLQ 7 days    External Urinary Catheter Small 4 days                Scheduled Meds:    Current Facility-Administered Medications:  acetaminophen 650 mg Oral Q6H PRN Chelle Ortiz, PA-SYDNEY   albuterol 2 5 mg Nebulization Q4H PRN Chelle Ortiz, BRIGITTE   atorvastatin 40 mg Oral Daily With [employer], PA-SYDNEY   chlorhexidine 15 mL Swish & Spit Q12H [address] [facility], [place]   cholecalciferol 1,000 Units Oral Daily Chelle Vasquezd, BRIGITTE   docusate sodium 100 mg Oral BID Chelle Vasquezd, BRIGITTE   DULoxetine 90 mg Oral Daily Chelle Vasquezd, BRIGITTE   fluticasone 2 spray Nasal Daily Chelle Vasquezd, BRIGITTE   fluticasone-vilanterol 1 puff Inhalation Daily Chelle Ortiz PA-C   folic acid 1 mg Oral Daily Chelle Vasquezd, BRIGITTE   gabapentin 300 mg Oral TID Olafverona Ortiz, BRIGITTE   guaiFENesin 600 mg Oral Q12H PRN Chelle Otriz PA-C   heparin (porcine) 5,000 Units Subcutaneous [facility] Chelle Ortiz PA-C   loratadine 10 mg Oral Daily Chelle VasquezBRIGITTE cano   metoprolol tartrate 25 mg Oral Q12H [address] Chelle Ortiz PA-C   mirtazapine 15 mg Oral HS Chelle Ortiz PA-C   nicotine 1 patch Transdermal Daily Jaime Rossi PA-C   omeprazole (PRILOSEC) suspension 2 mg/mL 20 mg Oral BID Olafverona OrtizBRIGITTE cano   oxyCODONE 10 mg Oral Q4H PRN Tenverona VasquezBRIGITTE cano   oxyCODONE 5 mg Oral Q4H PRN Tenverona Ortiz, BRIGITTE   QUEtiapine 200 mg Oral Daily Jaime Rossi PA-C   QUEtiapine 600 mg Oral HS Chelle Ortiz PA-C   thiamine 100 mg Oral Daily Chelle Vasquezd, BRIGITTE   traZODone 50 mg Oral HS PRN Chelle Ortiz, BRIGITTE     Continuous Infusions:     PRN Meds:   acetaminophen    albuterol   guaiFENesin    oxyCODONE    oxyCODONE    traZODone      Lab, Imaging and other studies:  Recent Labs     01/17/20  0430 01/18/20  0532 01/19/20  0538   WBC 7 22 7 56 11 57*   HGB 8 3* 8 5* 8 7*    340 414*     Recent Labs     01/17/20  0430 01/18/20  0532 01/19/20  0538   SODIUM 139 141 139   K 3 2* 3 5 3 7    106 104   CO2 32 30 30   BUN 15 9 10   CREATININE 0 46* 0 52* 0 56*   PHOS 2 5 2 9  --    MG 2 3 2 2  --    CALCIUM 8 0* 8 3 8 3           Procedure: Xr Chest Portable  Result Date: 1/16/2020  Impression: Some interval improvement in pulmonary vascular congestion  Small bilateral pleural effusions and bibasilar opacities  Workstation performed: [id number]     Procedure: Xr Chest Portable  Result Date: 1/16/2020  Impression: Partially improved appearance of the lungs since the earlier study  In particular, persistent but diminished size of pleural effusions and partially improved bibasilar airspace disease  Persistent pulmonary vascular congestion Workstation performed: [id number]     Procedure: Xr Abdomen 1 View Kub  Result Date: 1/17/2020   Impression: No evidence of obstruction  Workstation performed: [id number]     Procedure: Fl Upper Gi Ugi  Result Date: 1/16/2020  Impression: Technically limited study due to patient's condition and unable to stand patient  No extraluminal contrast to suggest leak identified   Workstation performed: [id number]       VTE Pharmacologic Prophylaxis: Heparin  VTE Mechanical Prophylaxis: sequential compression device

## 2020-01-20 LAB
ANION GAP SERPL CALCULATED.3IONS-SCNC: 2 MMOL/L (ref 4–13)
ANION GAP SERPL CALCULATED.3IONS-SCNC: 7 MMOL/L (ref 4–13)
BUN SERPL-MCNC: 11 MG/DL (ref 5–25)
BUN SERPL-MCNC: 12 MG/DL (ref 5–25)
CALCIUM SERPL-MCNC: 8.1 MG/DL (ref 8.3–10.1)
CALCIUM SERPL-MCNC: 8.7 MG/DL (ref 8.3–10.1)
CHLORIDE SERPL-SCNC: 100 MMOL/L (ref 100–108)
CHLORIDE SERPL-SCNC: 98 MMOL/L (ref 100–108)
CO2 SERPL-SCNC: 32 MMOL/L (ref 21–32)
CO2 SERPL-SCNC: 32 MMOL/L (ref 21–32)
CREAT SERPL-MCNC: 0.6 MG/DL (ref 0.6–1.3)
CREAT SERPL-MCNC: 0.79 MG/DL (ref 0.6–1.3)
ERYTHROCYTE [DISTWIDTH] IN BLOOD BY AUTOMATED COUNT: 14.5 % (ref 11.6–15.1)
GFR SERPL CREATININE-BSD FRML MDRD: 104 ML/MIN/1.73SQ M
GFR SERPL CREATININE-BSD FRML MDRD: 93 ML/MIN/1.73SQ M
GLUCOSE SERPL-MCNC: 136 MG/DL (ref 65–140)
GLUCOSE SERPL-MCNC: 142 MG/DL (ref 65–140)
HCT VFR BLD AUTO: 26.6 % (ref 36.5–49.3)
HGB BLD-MCNC: 8.2 G/DL (ref 12–17)
MCH RBC QN AUTO: 28.9 PG (ref 26.8–34.3)
MCHC RBC AUTO-ENTMCNC: 30.8 G/DL (ref 31.4–37.4)
MCV RBC AUTO: 94 FL (ref 82–98)
PLATELET # BLD AUTO: 392 THOUSANDS/UL (ref 149–390)
PMV BLD AUTO: 9.7 FL (ref 8.9–12.7)
POTASSIUM SERPL-SCNC: 3.9 MMOL/L (ref 3.5–5.3)
POTASSIUM SERPL-SCNC: 4.5 MMOL/L (ref 3.5–5.3)
RBC # BLD AUTO: 2.84 MILLION/UL (ref 3.88–5.62)
SODIUM SERPL-SCNC: 134 MMOL/L (ref 136–145)
SODIUM SERPL-SCNC: 137 MMOL/L (ref 136–145)
WBC # BLD AUTO: 11.63 THOUSAND/UL (ref 4.31–10.16)

## 2020-01-20 PROCEDURE — 94760 N-INVAS EAR/PLS OXIMETRY 1: CPT

## 2020-01-20 PROCEDURE — 99024 POSTOP FOLLOW-UP VISIT: CPT | Performed by: SURGERY

## 2020-01-20 PROCEDURE — 80048 BASIC METABOLIC PNL TOTAL CA: CPT | Performed by: ORTHOPAEDIC SURGERY

## 2020-01-20 PROCEDURE — 85027 COMPLETE CBC AUTOMATED: CPT | Performed by: ORTHOPAEDIC SURGERY

## 2020-01-20 PROCEDURE — 80048 BASIC METABOLIC PNL TOTAL CA: CPT | Performed by: PHYSICIAN ASSISTANT

## 2020-01-20 PROCEDURE — NC001 PR NO CHARGE: Performed by: PODIATRIST

## 2020-01-20 RX ORDER — FUROSEMIDE 10 MG/ML
20 SYRINGE (ML) INJECTION CONTINUOUS
Status: DISCONTINUED | OUTPATIENT
Start: 2020-01-20 | End: 2020-01-20

## 2020-01-20 RX ORDER — FUROSEMIDE 10 MG/ML
20 INJECTION INTRAMUSCULAR; INTRAVENOUS ONCE
Status: DISCONTINUED | OUTPATIENT
Start: 2020-01-20 | End: 2020-01-20

## 2020-01-20 RX ADMIN — Medication 20 MG: at 17:47

## 2020-01-20 RX ADMIN — DULOXETINE HYDROCHLORIDE 90 MG: 60 CAPSULE, DELAYED RELEASE ORAL at 09:39

## 2020-01-20 RX ADMIN — CHLORHEXIDINE GLUCONATE 0.12% ORAL RINSE 15 ML: 1.2 LIQUID ORAL at 22:12

## 2020-01-20 RX ADMIN — Medication 20 MG/HR: at 10:11

## 2020-01-20 RX ADMIN — OXYCODONE HYDROCHLORIDE 10 MG: 10 TABLET ORAL at 14:09

## 2020-01-20 RX ADMIN — QUETIAPINE FUMARATE 600 MG: 300 TABLET ORAL at 22:16

## 2020-01-20 RX ADMIN — DOCUSATE SODIUM 100 MG: 100 CAPSULE, LIQUID FILLED ORAL at 17:35

## 2020-01-20 RX ADMIN — METOPROLOL TARTRATE 25 MG: 25 TABLET, FILM COATED ORAL at 09:35

## 2020-01-20 RX ADMIN — HEPARIN SODIUM 5000 UNITS: 5000 INJECTION INTRAVENOUS; SUBCUTANEOUS at 22:16

## 2020-01-20 RX ADMIN — CEPHALEXIN 500 MG: 500 CAPSULE ORAL at 17:35

## 2020-01-20 RX ADMIN — LORATADINE 10 MG: 10 TABLET ORAL at 09:35

## 2020-01-20 RX ADMIN — DOCUSATE SODIUM 100 MG: 100 CAPSULE, LIQUID FILLED ORAL at 09:37

## 2020-01-20 RX ADMIN — THIAMINE HCL TAB 100 MG 100 MG: 100 TAB at 09:35

## 2020-01-20 RX ADMIN — GABAPENTIN 300 MG: 300 CAPSULE ORAL at 17:34

## 2020-01-20 RX ADMIN — FLUTICASONE PROPIONATE 2 SPRAY: 50 SPRAY, METERED NASAL at 09:39

## 2020-01-20 RX ADMIN — ALTEPLASE 2 MG: 2.2 INJECTION, POWDER, LYOPHILIZED, FOR SOLUTION INTRAVENOUS at 22:19

## 2020-01-20 RX ADMIN — QUETIAPINE FUMARATE 200 MG: 100 TABLET ORAL at 09:35

## 2020-01-20 RX ADMIN — METOPROLOL TARTRATE 25 MG: 25 TABLET, FILM COATED ORAL at 22:12

## 2020-01-20 RX ADMIN — OXYCODONE HYDROCHLORIDE 10 MG: 10 TABLET ORAL at 00:27

## 2020-01-20 RX ADMIN — GABAPENTIN 300 MG: 300 CAPSULE ORAL at 22:12

## 2020-01-20 RX ADMIN — FOLIC ACID 1 MG: 1 TABLET ORAL at 09:35

## 2020-01-20 RX ADMIN — HEPARIN SODIUM 5000 UNITS: 5000 INJECTION INTRAVENOUS; SUBCUTANEOUS at 13:13

## 2020-01-20 RX ADMIN — HEPARIN SODIUM 5000 UNITS: 5000 INJECTION INTRAVENOUS; SUBCUTANEOUS at 05:23

## 2020-01-20 RX ADMIN — Medication 20 MG: at 09:45

## 2020-01-20 RX ADMIN — OXYCODONE HYDROCHLORIDE 10 MG: 10 TABLET ORAL at 09:45

## 2020-01-20 RX ADMIN — FLUTICASONE FUROATE AND VILANTEROL TRIFENATATE 1 PUFF: 100; 25 POWDER RESPIRATORY (INHALATION) at 09:37

## 2020-01-20 RX ADMIN — NICOTINE 1 PATCH: 14 PATCH TRANSDERMAL at 09:37

## 2020-01-20 RX ADMIN — ATORVASTATIN CALCIUM 40 MG: 40 TABLET, FILM COATED ORAL at 17:34

## 2020-01-20 RX ADMIN — GABAPENTIN 300 MG: 300 CAPSULE ORAL at 09:35

## 2020-01-20 RX ADMIN — CEPHALEXIN 500 MG: 500 CAPSULE ORAL at 00:27

## 2020-01-20 RX ADMIN — MELATONIN 1000 UNITS: at 09:35

## 2020-01-20 RX ADMIN — CHLORHEXIDINE GLUCONATE 0.12% ORAL RINSE 15 ML: 1.2 LIQUID ORAL at 09:35

## 2020-01-20 RX ADMIN — CEPHALEXIN 500 MG: 500 CAPSULE ORAL at 13:13

## 2020-01-20 RX ADMIN — CEPHALEXIN 500 MG: 500 CAPSULE ORAL at 23:48

## 2020-01-20 RX ADMIN — CEPHALEXIN 500 MG: 500 CAPSULE ORAL at 05:23

## 2020-01-20 RX ADMIN — ACETAMINOPHEN 650 MG: 325 TABLET ORAL at 13:12

## 2020-01-20 RX ADMIN — OXYCODONE HYDROCHLORIDE 10 MG: 10 TABLET ORAL at 05:23

## 2020-01-20 RX ADMIN — MIRTAZAPINE 15 MG: 15 TABLET, FILM COATED ORAL at 22:12

## 2020-01-20 NOTE — PROGRESS NOTES
Progress Note - Raymond Hdz 81 y o  male MRN: [id number]    Unit/Bed#: [facility] 510-01 Encounter: [id number]      Assessment:  80yM w/ bleeding and perforated 1st portion duodenal ulcer  1/11 S/p EGD  1/11 S/p IR GDA embolozation   1/11 S/p Exlap, oversewing bleeding vessels, closure of tissue over perforation, Thal patch with transverse colon, open gastrostomy-jejunostomy tube placement     Vss  Afebrile  abd soft/ nontender/ nondistended  Superior homero 10cc serosang  Inferior homero 5cc serosang  Plan:  Surgical soft as tolerated w cycled J-tube feeds 8pm-8am  DVT prophylaxis  Incentive spirometer    Subjective:   Denied fever, chills, chest pain, shortness of breath, nausea, vomiting, or abdominal pain this morning  Objective:     Vitals: Blood pressure 107/62, pulse 84, temperature 98 4 °F (36 9 °C), resp  rate 18, height 5' 8 5" (1 74 m), weight 88 1 kg (194 lb 3 6 oz), SpO2 93 %  ,Body mass index is 29 1 kg/m²  Intake/Output Summary (Last 24 hours) at 1/20/2020 0258  Last data filed at 1/20/2020 0000  Gross per 24 hour   Intake 2226 ml   Output 3850 ml   Net -1624 ml       Physical Exam  General: NAD  HEENT: NC/AT  MMM  Cv: RRR     Lungs: normal effort  Ab: Soft, NT/ND  Ex: no CCE  Neuro: AAOx3    Scheduled Meds:  Current Facility-Administered Medications:  acetaminophen 650 mg Oral Q6H PRN Yinka Kat PA-C   albuterol 2 5 mg Nebulization Q4H PRN Yinka Kat PA-C   atorvastatin 40 mg Oral Daily With [employer]   cephalexin 500 mg Oral Q6H Elke Curiel MD   chlorhexidine 15 mL Swish & Spit Q12H [address] Narcisa Palma   cholecalciferol 1,000 Units Oral Daily Yinka Kat PA-C   docusate sodium 100 mg Oral BID Yinka Kat PA-C   DULoxetine 90 mg Oral Daily Yinka Kat PA-C   fluticasone 2 spray Nasal Daily Yinka Kat PA-C   fluticasone-vilanterol 1 puff Inhalation Daily Yinka Kat PA-C   folic acid 1 mg Oral Daily Kayla Brown BRIGITTE Rossi   gabapentin 300 mg Oral TID Aliya Butts, BRIGITTE   guaiFENesin 600 mg Oral Q12H PRN Aliya Butts, BRIGITTE   heparin (porcine) 5,000 Units Subcutaneous [facility] Aliya Butts, BRIGITTE   loratadine 10 mg Oral Daily Aliya Butts, PA-SYDNEY   metoprolol tartrate 25 mg Oral Q12H [address] Aliya Butts, BRIGITTE   mirtazapine 15 mg Oral HS Aliya Butts, BRIGITTE   nicotine 1 patch Transdermal Daily Jaime Rossi PA-C   omeprazole (PRILOSEC) suspension 2 mg/mL 20 mg Oral BID Aliya Latbrianna, BRIGITTE   oxyCODONE 10 mg Oral Q4H PRN Aliya Butts, BRIGITTE   oxyCODONE 5 mg Oral Q4H PRN Aliya Butts, PA-SYDNEY   QUEtiapine 200 mg Oral Daily Jaime Rossi PA-C   QUEtiapine 600 mg Oral HS Aliya Butts, BRIGITTE   thiamine 100 mg Oral Daily Aliya Butts, BRIGITTE   traZODone 50 mg Oral HS PRN Aliya Butts, BRIGITTE     Continuous Infusions:   PRN Meds:   acetaminophen    albuterol    guaiFENesin    oxyCODONE    oxyCODONE    traZODone      Invasive Devices     Peripherally Inserted Central Catheter Line            PICC Line 80/67/67 Right Basilic 3 days          Drain            Closed/Suction Drain Right Abdomen Bulb 8 days    Closed/Suction Drain Right Abdomen Bulb 19 Fr  8 days    Gastrostomy/Enterostomy Gastrostomy-jejunostomy 22 Fr  LLQ 8 days    External Urinary Catheter Small 5 days                Lab, Imaging and other studies: I have personally reviewed pertinent reports      VTE Pharmacologic Prophylaxis: Heparin  VTE Mechanical Prophylaxis: sequential compression device

## 2020-01-20 NOTE — PHYSICAL THERAPY NOTE
Physical Therapy Cancellation Note    Orders received and chart reviewed  Spoke to [facility]  Attempted to see pt- pt declining therapy services at this time reporting "My stomach hurts  Come back tomorrow " Pt educated on the benefits of therapy services- pt continually declining  Will continue to follow and attempt to see pt as able and appropriate      Aquilino Olguin, PT, DPT

## 2020-01-21 ENCOUNTER — APPOINTMENT (INPATIENT)
Dept: RADIOLOGY | Facility: HOSPITAL | Age: 70
DRG: 326 | End: 2020-01-21
Payer: MEDICARE

## 2020-01-21 LAB
ANION GAP SERPL CALCULATED.3IONS-SCNC: 7 MMOL/L (ref 4–13)
BASOPHILS # BLD AUTO: 0.04 THOUSANDS/ΜL (ref 0–0.1)
BASOPHILS NFR BLD AUTO: 0 % (ref 0–1)
BUN SERPL-MCNC: 10 MG/DL (ref 5–25)
CALCIUM SERPL-MCNC: 8.3 MG/DL (ref 8.3–10.1)
CHLORIDE SERPL-SCNC: 99 MMOL/L (ref 100–108)
CO2 SERPL-SCNC: 31 MMOL/L (ref 21–32)
CREAT SERPL-MCNC: 0.6 MG/DL (ref 0.6–1.3)
EOSINOPHIL # BLD AUTO: 0.02 THOUSAND/ΜL (ref 0–0.61)
EOSINOPHIL NFR BLD AUTO: 0 % (ref 0–6)
ERYTHROCYTE [DISTWIDTH] IN BLOOD BY AUTOMATED COUNT: 14.6 % (ref 11.6–15.1)
GFR SERPL CREATININE-BSD FRML MDRD: 104 ML/MIN/1.73SQ M
GLUCOSE SERPL-MCNC: 155 MG/DL (ref 65–140)
HCT VFR BLD AUTO: 27.8 % (ref 36.5–49.3)
HGB BLD-MCNC: 8.7 G/DL (ref 12–17)
IMM GRANULOCYTES # BLD AUTO: 0.13 THOUSAND/UL (ref 0–0.2)
IMM GRANULOCYTES NFR BLD AUTO: 1 % (ref 0–2)
LYMPHOCYTES # BLD AUTO: 1.12 THOUSANDS/ΜL (ref 0.6–4.47)
LYMPHOCYTES NFR BLD AUTO: 7 % (ref 14–44)
MCH RBC QN AUTO: 29 PG (ref 26.8–34.3)
MCHC RBC AUTO-ENTMCNC: 31.3 G/DL (ref 31.4–37.4)
MCV RBC AUTO: 93 FL (ref 82–98)
MONOCYTES # BLD AUTO: 1.44 THOUSAND/ΜL (ref 0.17–1.22)
MONOCYTES NFR BLD AUTO: 9 % (ref 4–12)
NEUTROPHILS # BLD AUTO: 12.53 THOUSANDS/ΜL (ref 1.85–7.62)
NEUTS SEG NFR BLD AUTO: 83 % (ref 43–75)
NRBC BLD AUTO-RTO: 0 /100 WBCS
PLATELET # BLD AUTO: 462 THOUSANDS/UL (ref 149–390)
PMV BLD AUTO: 9.8 FL (ref 8.9–12.7)
POTASSIUM SERPL-SCNC: 3.5 MMOL/L (ref 3.5–5.3)
RBC # BLD AUTO: 3 MILLION/UL (ref 3.88–5.62)
SODIUM SERPL-SCNC: 137 MMOL/L (ref 136–145)
WBC # BLD AUTO: 15.28 THOUSAND/UL (ref 4.31–10.16)

## 2020-01-21 PROCEDURE — 97535 SELF CARE MNGMENT TRAINING: CPT

## 2020-01-21 PROCEDURE — 80048 BASIC METABOLIC PNL TOTAL CA: CPT | Performed by: STUDENT IN AN ORGANIZED HEALTH CARE EDUCATION/TRAINING PROGRAM

## 2020-01-21 PROCEDURE — 92526 ORAL FUNCTION THERAPY: CPT

## 2020-01-21 PROCEDURE — 85025 COMPLETE CBC W/AUTO DIFF WBC: CPT | Performed by: STUDENT IN AN ORGANIZED HEALTH CARE EDUCATION/TRAINING PROGRAM

## 2020-01-21 PROCEDURE — 74018 RADEX ABDOMEN 1 VIEW: CPT

## 2020-01-21 PROCEDURE — 97116 GAIT TRAINING THERAPY: CPT

## 2020-01-21 PROCEDURE — 99024 POSTOP FOLLOW-UP VISIT: CPT | Performed by: SURGERY

## 2020-01-21 PROCEDURE — 97110 THERAPEUTIC EXERCISES: CPT

## 2020-01-21 RX ORDER — POTASSIUM CHLORIDE 14.9 MG/ML
20 INJECTION INTRAVENOUS ONCE
Status: COMPLETED | OUTPATIENT
Start: 2020-01-21 | End: 2020-01-21

## 2020-01-21 RX ORDER — ONDANSETRON 2 MG/ML
4 INJECTION INTRAMUSCULAR; INTRAVENOUS EVERY 6 HOURS PRN
Status: DISCONTINUED | OUTPATIENT
Start: 2020-01-21 | End: 2020-01-30

## 2020-01-21 RX ADMIN — CEPHALEXIN 500 MG: 500 CAPSULE ORAL at 23:03

## 2020-01-21 RX ADMIN — NICOTINE 1 PATCH: 14 PATCH TRANSDERMAL at 09:16

## 2020-01-21 RX ADMIN — POTASSIUM CHLORIDE 20 MEQ: 14.9 INJECTION, SOLUTION INTRAVENOUS at 11:48

## 2020-01-21 RX ADMIN — CEPHALEXIN 500 MG: 500 CAPSULE ORAL at 11:49

## 2020-01-21 RX ADMIN — OXYCODONE HYDROCHLORIDE 10 MG: 10 TABLET ORAL at 16:23

## 2020-01-21 RX ADMIN — HEPARIN SODIUM 5000 UNITS: 5000 INJECTION INTRAVENOUS; SUBCUTANEOUS at 13:23

## 2020-01-21 RX ADMIN — CEPHALEXIN 500 MG: 500 CAPSULE ORAL at 05:52

## 2020-01-21 RX ADMIN — TRAZODONE HYDROCHLORIDE 50 MG: 50 TABLET ORAL at 23:03

## 2020-01-21 RX ADMIN — MIRTAZAPINE 15 MG: 15 TABLET, FILM COATED ORAL at 23:03

## 2020-01-21 RX ADMIN — ATORVASTATIN CALCIUM 40 MG: 40 TABLET, FILM COATED ORAL at 16:19

## 2020-01-21 RX ADMIN — METOPROLOL TARTRATE 25 MG: 25 TABLET, FILM COATED ORAL at 23:02

## 2020-01-21 RX ADMIN — LORATADINE 10 MG: 10 TABLET ORAL at 09:13

## 2020-01-21 RX ADMIN — HEPARIN SODIUM 5000 UNITS: 5000 INJECTION INTRAVENOUS; SUBCUTANEOUS at 23:02

## 2020-01-21 RX ADMIN — Medication 20 MG: at 09:19

## 2020-01-21 RX ADMIN — Medication 20 MG: at 17:51

## 2020-01-21 RX ADMIN — QUETIAPINE FUMARATE 200 MG: 100 TABLET ORAL at 09:13

## 2020-01-21 RX ADMIN — OXYCODONE HYDROCHLORIDE 10 MG: 10 TABLET ORAL at 23:03

## 2020-01-21 RX ADMIN — GABAPENTIN 300 MG: 300 CAPSULE ORAL at 23:02

## 2020-01-21 RX ADMIN — CEPHALEXIN 500 MG: 500 CAPSULE ORAL at 17:51

## 2020-01-21 RX ADMIN — HEPARIN SODIUM 5000 UNITS: 5000 INJECTION INTRAVENOUS; SUBCUTANEOUS at 05:48

## 2020-01-21 RX ADMIN — CHLORHEXIDINE GLUCONATE 0.12% ORAL RINSE 15 ML: 1.2 LIQUID ORAL at 23:03

## 2020-01-21 RX ADMIN — POTASSIUM & SODIUM PHOSPHATES POWDER PACK 280-160-250 MG 1 PACKET: 280-160-250 PACK at 11:48

## 2020-01-21 RX ADMIN — METOPROLOL TARTRATE 25 MG: 25 TABLET, FILM COATED ORAL at 09:13

## 2020-01-21 RX ADMIN — CHLORHEXIDINE GLUCONATE 0.12% ORAL RINSE 15 ML: 1.2 LIQUID ORAL at 09:14

## 2020-01-21 RX ADMIN — MELATONIN 1000 UNITS: at 09:13

## 2020-01-21 RX ADMIN — QUETIAPINE FUMARATE 600 MG: 300 TABLET ORAL at 20:15

## 2020-01-21 RX ADMIN — GABAPENTIN 300 MG: 300 CAPSULE ORAL at 09:14

## 2020-01-21 RX ADMIN — THIAMINE HCL TAB 100 MG 100 MG: 100 TAB at 09:13

## 2020-01-21 RX ADMIN — ONDANSETRON 4 MG: 2 INJECTION INTRAMUSCULAR; INTRAVENOUS at 09:11

## 2020-01-21 RX ADMIN — DULOXETINE HYDROCHLORIDE 90 MG: 60 CAPSULE, DELAYED RELEASE ORAL at 09:14

## 2020-01-21 RX ADMIN — FOLIC ACID 1 MG: 1 TABLET ORAL at 09:12

## 2020-01-21 RX ADMIN — GABAPENTIN 300 MG: 300 CAPSULE ORAL at 16:19

## 2020-01-21 NOTE — SOCIAL WORK
Called son, Chad Dakin, to discuss discharge needs  A post acute care recommendation was made by your care team for STR  Discussed Freedom of Choice with caregiver  List of facilities given to caregiver via emailed  caregiver aware the list is custom filtered for them by zip code location and that [facility] post acute providers are designated  Son will review snf list and inform CM of choices  Explained to son that patient will need OPTIONS assessment due to target MH diagnosis and inpatient [facility] admission within past year

## 2020-01-21 NOTE — PLAN OF CARE
Problem: PHYSICAL THERAPY ADULT  Goal: Performs mobility at highest level of function for planned discharge setting  See evaluation for individualized goals  Description  Treatment/Interventions: Functional transfer training, LE strengthening/ROM, Therapeutic exercise, Endurance training, Patient/family training, Bed mobility, Gait training  Equipment Recommended: Kyra Campos       See flowsheet documentation for full assessment, interventions and recommendations  Outcome: Progressing  Note:   Prognosis: Good  Problem List: Decreased strength, Decreased endurance, Impaired balance, Decreased mobility, Decreased coordination, Decreased cognition, Decreased safety awareness, Pain  Assessment: Pt presents with decreased mobility, strength, balance, and activity tolerance  Pt demonstrated ability to perform STS functional transfers at [address]  Pt ambulated 5 ft with RW at [address] w/ close chair follow  Pt required VCs and TCs throughout ambulation for initiation, sequencing, and RW management  Distance limited at this time 2' dizziness which resolved w/ seated rest break  Pt able to complete seated therex program  Pt continues to benefit from skilled therapy to maximize functional independence  Recommendation at this time is rehab  Pt would benefit from continued ambulation, functional transfers, strength, balance, and activity tolerance  Barriers to Discharge: Inaccessible home environment     Recommendation: Post acute IP rehab     PT - OK to Discharge: Yes    See flowsheet documentation for full assessment

## 2020-01-21 NOTE — OCCUPATIONAL THERAPY NOTE
Occupational Therapy Treatment Note      Ruano Mix    1/21/2020    Principal Problem:    Acute blood loss anemia  Active Problems:    Essential hypertension    Syncope    Chest pain    Suicidal ideation    Alcohol abuse    MDD (major depressive disorder)    History of CVA (cerebrovascular accident)    COPD without exacerbation (HCC)    Dyslipidemia    Chronic pain    Tobacco abuse    Inadequate oral nutritional intake    Duodenal ulcer    Hemorrhagic shock (HCC)    Hypotension    Encephalopathy      Past Medical History:   Diagnosis Date    BPH (benign prostatic hyperplasia)     CVA (cerebral vascular accident) ([address] )     Head injury     Hypertension     Metatarsal fracture     TIA (transient ischemic attack)        Past Surgical History:   Procedure Laterality Date    ABDOMINAL SURGERY      ANKLE SURGERY      BACK SURGERY      ELBOW SURGERY      IR VISCERAL ANGIOGRAPHY / INTERVENTION  1/11/2020    JOINT REPLACEMENT      KNEE SURGERY      LAPAROTOMY N/A 1/11/2020    Procedure: LAPAROTOMY EXPLORATORY,  OVERSEW  OF GASTRO DUODENAL ARTERY, THAL PATCH OF DUODENUM, VICKY GASTRO JEJUNOSTOMY TUBE;  Surgeon: Cindy Pierre DO;  Location: [facility];  Service: General    LIVER SURGERY          01/21/20 1446   Restrictions/Precautions   Weight Bearing Precautions Per Order No   Other Precautions Cognitive; Chair Alarm; Bed Alarm;Multiple lines;Telemetry;O2;Fall Risk;Pain   Lifestyle   Autonomy Pt reports being I w/ all ADLS and requires A from family w/ IADLS; (-) drives; ambulates short distances w/ RW and long distances w/ W/C  Reciprocal Relationships Pt lives w/ son and DIL  Pt's family works during the day  Service to Others Pt is retired   Intrinsic Gratification Pt reports enjoying being home   Pain Assessment   Pain Assessment 0-10   Pain Score 8   Pain Type Acute pain   Pain Location Abdomen   Pain Orientation Mid   Hospital Pain Intervention(s) Repositioned; Ambulation/increased activity; Distraction; Emotional support   Response to Interventions Tolerated   ADL   Grooming Assistance 5  Supervision/Setup   Grooming Deficit Setup;Supervision/safety; Increased time to complete;Wash/dry hands; Wash/dry face;Brushing hair   LB Dressing Assistance 2  Maximal Assistance   LB Dressing Deficit Setup;Don/doff R sock; Don/doff L sock; Verbal cueing;Supervision/safety; Increased time to complete   Bed Mobility   Supine to Sit Unable to assess   Sit to Supine Unable to assess   Additional Comments Pt seated OOB in chair upon OT arrival  Pt seated OOB in chair w/ chair alarm activated and all needs in reach s/p OT session  Transfers   Sit to Stand 3  Moderate assistance   Additional items Assist x 1; Increased time required;Verbal cues;Armrests   Stand to Sit 3  Moderate assistance   Additional items Assist x 1; Increased time required;Verbal cues;Armrests   Additional Comments Transfers w/ RW  VC required for safety and hand placement  Functional Mobility   Functional Mobility 3  Moderate assistance   Additional Comments Pt demonstrated short distance household mobility in room ~5ft forward w/ RW w/ Mod Ax1 and SBA of 2nd for close chair follow  Pt stated he is unable to ambulate farther 2' dizziness  Pt reports decreased dizziness w/ seated rest break and legs elevated  Additional items Rolling walker   Cognition   Overall Cognitive Status Impaired   Arousal/Participation Alert; Cooperative;Lethargic   Attention Attends with cues to redirect   Orientation Level Oriented to person;Oriented to place;Oriented to situation;Disoriented to time   Memory Decreased recall of precautions;Decreased recall of recent events   Following Commands Follows one step commands with increased time or repetition   Comments Pt is pleasant and cooperative to participate in therapy  Pt required VC and TC for safety and sequencing t/o session     Activity Tolerance   Activity Tolerance Patient tolerated treatment well;Patient limited by fatigue;Treatment limited secondary to medical complications (Comment)  (dizziness)   Medical Staff Made Aware RN cleared Pt for OT sessoin   Assessment   Assessment Patient participated in Skilled OT session this date with interventions consisting of ADL re training with the use of correct body mechnaics, Energy Conservation techniques, deep breathing technique, safety awareness and fall prevention techniques,  therapeutic activities to: increase activity tolerance, increase dynamic sit/ stand balance during functional activity  and increase OOB/ sitting tolerance   Patient agreeable to OT treatment session, upon arrival patient was found seated OOB to Chair  Pt participated in functional transfers, mobility, self care tasks  Please refer to chart for functional levels  Patient requiring frequent re direction, verbal cues for safety, verbal cues for correct technique, verbal cues for pacing thru activity steps, cognitive assistance to anticipate next step, one step directives and frequent rest periods  Patient continues to be functioning below baseline level, occupational performance remains limited secondary to factors listed above and increased risk for falls and injury  From OT standpoint, recommendation at time of d/c would be Short Term Rehab  Patient to benefit from continued Occupational Therapy treatment while in the hospital to address deficits as defined above and maximize level of functional independence with ADLs and functional mobility  Plan   Treatment Interventions ADL retraining;Functional transfer training;UE strengthening/ROM; Endurance training;Cognitive reorientation;Patient/family training;Equipment evaluation/education; Compensatory technique education; Activityengagement; Energy conservation   Goal Expiration Date 01/26/20   OT Treatment Day 1   OT Frequency 3-5x/wk   Recommendation   OT Discharge Recommendation Short Term Rehab   OT - OK to Discharge Yes  (when medically cleared)         Nae Swanson, MS, OTR/L

## 2020-01-21 NOTE — PROGRESS NOTES
Progress Note - Anabella Cooper 81 y o  male MRN: [id number]    Unit/Bed#: [facility] 510-01 Encounter: [id number]      Assessment:  80yM w/ bleeding and perforated 1st portion duodenal ulcer  1/11 S/p EGD  1/11 S/p IR GDA embolozation   1/11 S/p Exlap, oversewing bleeding vessels, closure of tissue over perforation, Thal patch with transverse colon, open gastrostomy-jejunostomy tube placement      Plan:  Diet Enteral/Parenteral; Tube Feeding with Oral Diet; Jevity 1 2 [place]; Cyclic; 68; 12 hours; 046; Water; Every 6 hours; Surgical; Surgical Soft/Lite Meal, ADAT  Cathed G and J tubes, dc Tube feeds  Give medications PO  Monitor CAMPBELL outputs, remove 1 CAMPBELL  Continue Keflex for incisional cellulitis  OOBTC  Pulmonary Toilet, IS  PPx: SQH, PPI, Colace  Pain and Nausea control PRN  Monitor HGB, appears stable      Subjective:   No acute events  Patient diuresed well yesterday removing 4 L  He is now on room air  No complaints this morning  Slept well  Objective:     Vitals: Blood pressure 161/85, pulse 94, temperature 98 6 °F (37 °C), temperature source Oral, resp  rate 20, height 5' 8 5" (1 74 m), weight 81 6 kg (179 lb 14 3 oz), SpO2 94 %  ,Body mass index is 26 96 kg/m²  I/O       01/19 0701 - 01/20 0700 01/20 0701 - 01/21 0700    P  O  120 990    I V  (mL/kg) 30 (0 4) 5 9 (0 1)    NG/GT 0 100    IV Piggyback 230     Feedings 1960     Total Intake(mL/kg) 2340 (28 7) 1095 9 (13 4)    Urine (mL/kg/hr) 3185 (1 6) 4020 (2 1)    Emesis/NG output 0     Drains 15 30    Stool 0     Total Output 3200 4050    Net -860 -2954 1          Unmeasured Stool Occurrence 1 x             Physical Exam  Gen: NAD, A&O, Comfortable   Chest: Normal work of breathing, no resp distress  Abd:  Soft, mild distention, nontender, incision with surrounding erythema appears stable, no drainage, staples intact, 2 CAMPBELL is with minimal serous output, G tube is capped, J-tube with tube feeds running  Ext: No edema  Skin: warm, dry, intact      Invasive Devices     Peripherally Inserted Central Catheter Line            PICC Line 24/97/07 Right Basilic 4 days          Drain            Closed/Suction Drain Right Abdomen Bulb 9 days    Closed/Suction Drain Right Abdomen Bulb 19 Fr  9 days    Gastrostomy/Enterostomy Gastrostomy-jejunostomy 22 Fr  LLQ 9 days    External Urinary Catheter Small 6 days                Scheduled Meds:    Current Facility-Administered Medications:  acetaminophen 650 mg Oral Q6H PRN Griselda [employer], PA-C   atorvastatin 40 mg Oral Daily With [employer], PA-C   cephalexin 500 mg Oral Q6H Angi Rush MD   chlorhexidine 15 mL Swish & Spit Q12H [address] [facility], [place]   cholecalciferol 1,000 Units Oral Daily Griselda [employer], PA-C   docusate sodium 100 mg Oral BID Griselda [employer], PA-C   DULoxetine 90 mg Oral Daily Griselda [employer], PA-C   fluticasone 2 spray Nasal Daily Griselda [employer], PA-C   fluticasone-vilanterol 1 puff Inhalation Daily Griselda [employer], PA-C   folic acid 1 mg Oral Daily Griselda [employer], PA-C   gabapentin 300 mg Oral TID Chandler [employer], PA-C   guaiFENesin 600 mg Oral Q12H PRN Chandler [employer], PA-C   heparin (porcine) 5,000 Units Subcutaneous [facility] [employer], PA-C   loratadine 10 mg Oral Daily Griselda [employer], PA-C   metoprolol tartrate 25 mg Oral Q12H [address] Chandler [employer], PA-C   mirtazapine 15 mg Oral HS Chandler [employer], PA-C   nicotine 1 patch Transdermal Daily Jaime Rossi, PA-C   omeprazole (PRILOSEC) suspension 2 mg/mL 20 mg Oral BID Griselda [employer], PA-C   oxyCODONE 10 mg Oral Q4H PRN Griselda [employer], PA-C   oxyCODONE 5 mg Oral Q4H PRN Griselda [employer], PA-C   QUEtiapine 200 mg Oral Daily Jaime D'Malachi, PA-C   QUEtiapine 600 mg Oral HS Chandler [employer], PA-C   thiamine 100 mg Oral Daily Chandler [employer], PA-C   traZODone 50 mg Oral HS PRN Chandler [employer], PA-C     Continuous Infusions:     PRN Meds:   acetaminophen    guaiFENesin    oxyCODONE   oxyCODONE    traZODone      Lab, Imaging and other studies:  Recent Labs     01/19/20  0538 01/20/20  0438   WBC 11 57* 11 63*   HGB 8 7* 8 2*   * 392*     Recent Labs     01/19/20  0538 01/20/20  0438 01/20/20  1859   SODIUM 139 134* 137   K 3 7 3 9 4 5    100 98*   CO2 30 32 32   BUN 10 12 11   CREATININE 0 56* 0 60 0 79   CALCIUM 8 3 8 1* 8 7           Procedure: Xr Chest Portable  Result Date: 1/16/2020  Impression: Some interval improvement in pulmonary vascular congestion  Small bilateral pleural effusions and bibasilar opacities  Workstation performed: [id number]     Procedure: Xr Chest Portable  Result Date: 1/16/2020  Impression: Partially improved appearance of the lungs since the earlier study  In particular, persistent but diminished size of pleural effusions and partially improved bibasilar airspace disease  Persistent pulmonary vascular congestion Workstation performed: [id number]     Procedure: Xr Abdomen 1 View Kub  Result Date: 1/17/2020   Impression: No evidence of obstruction  Workstation performed: [id number]     Procedure: Fl Upper Gi Ugi  Result Date: 1/16/2020  Impression: Technically limited study due to patient's condition and unable to stand patient  No extraluminal contrast to suggest leak identified   Workstation performed: [id number]       VTE Pharmacologic Prophylaxis: Heparin  VTE Mechanical Prophylaxis: sequential compression device

## 2020-01-21 NOTE — NUTRITION
01/21/20 1520   Recommendations/Interventions   Interventions Supplement initiate  (Ensure Enlive added TID )   Nutrition Recommendations Other (specify)  (Current nocturnal TF providing about 50% of kcal/protein needs  Pt reports poor PO intake today d/t nausea  If pt unable to increase PO intake to meet nutrition needs, may need to increase TF   Will monitor )

## 2020-01-21 NOTE — RESTORATIVE TECHNICIAN NOTE
Restorative Specialist Mobility Note       Activity: Ambulate in room, Other (Comment)(Strecher to chair)     Assistive Device: Front wheel walker        Repositioned: Sitting, Up in chair

## 2020-01-21 NOTE — SPEECH THERAPY NOTE
SLP Swallow Progress Note    Patient Name: Vivi Cleveland  Today's Date: 1/21/2020     Subjective:  "I don't really feel like eating "     Objective:  Pt was seen for diagnostic dysphagia therapy to ensure tolerance of current diet and assess tolerance or appropriateness of for upgraded diet  Records reviewed to begin (aware of c/o nausea) and spoke c RN (informed pt with significant vomiting this am)  Pt remains on TF (for ~50% of needs) and surgical soft diet  Pt & RN reported LIMITED oral intake today  Pt was seen bedside  He was alert, OOB/upright and assessed with 4 bites of pound cake, thin liquid and pill with thin liquid  Pt edentulous but c adequate mastication with cake  Bolus formation and transfer were effective  Swallow initiation was prompt  Laryngeal rise was good by palpation  No coughing, throat clearing, c/o stasis, multiple swallows, change in vocal quality noted c po intake today  Assessment:  Pt presents s/p N/V c little desire for food today  He tolerated soft food, thin liquid and pills c no s/s oropharyngeal dysphagia  He remains on TF and  surgical soft diet ( I do not recommend upgrade to regular texture but presume tolerance of Level 3 dysphagia/dental soft from oropharyngeal standpoint)  Plan/Recommendations: Continue diet as per MD   No additional recommendations at this time  Continue f/u by RD re: adequate nutrition

## 2020-01-21 NOTE — PLAN OF CARE
Problem: OCCUPATIONAL THERAPY ADULT  Goal: Performs self-care activities at highest level of function for planned discharge setting  See evaluation for individualized goals  Description  Treatment Interventions: ADL retraining, Functional transfer training, UE strengthening/ROM, Endurance training, Cognitive reorientation, Patient/family training, Equipment evaluation/education, Fine motor coordination activities, Compensatory technique education, Activityengagement, Energy conservation          See flowsheet documentation for full assessment, interventions and recommendations  Outcome: Progressing  Note:   Limitation: Decreased ADL status, Decreased UE strength, Decreased Safe judgement during ADL, Decreased cognition, Decreased endurance, Decreased fine motor control, Decreased high-level ADLs  Prognosis: Fair  Assessment: Patient participated in Skilled OT session this date with interventions consisting of ADL re training with the use of correct body mechnaics, Energy Conservation techniques, deep breathing technique, safety awareness and fall prevention techniques,  therapeutic activities to: increase activity tolerance, increase dynamic sit/ stand balance during functional activity  and increase OOB/ sitting tolerance   Patient agreeable to OT treatment session, upon arrival patient was found seated OOB to Chair  Pt participated in functional transfers, mobility, self care tasks  Please refer to chart for functional levels  Patient requiring frequent re direction, verbal cues for safety, verbal cues for correct technique, verbal cues for pacing thru activity steps, cognitive assistance to anticipate next step, one step directives and frequent rest periods  Patient continues to be functioning below baseline level, occupational performance remains limited secondary to factors listed above and increased risk for falls and injury  From OT standpoint, recommendation at time of d/c would be Short Term Rehab  Patient to benefit from continued Occupational Therapy treatment while in the hospital to address deficits as defined above and maximize level of functional independence with ADLs and functional mobility  OT Discharge Recommendation: Short Term Rehab  OT - OK to Discharge: Yes(when medically cleared)       Problem: OCCUPATIONAL THERAPY ADULT  Goal: Performs self-care activities at highest level of function for planned discharge setting  See evaluation for individualized goals  Description  Treatment Interventions: ADL retraining, Functional transfer training, UE strengthening/ROM, Endurance training, Cognitive reorientation, Patient/family training, Equipment evaluation/education, Fine motor coordination activities, Compensatory technique education, Activityengagement, Energy conservation          See flowsheet documentation for full assessment, interventions and recommendations  Outcome: Progressing  Note:   Limitation: Decreased ADL status, Decreased UE strength, Decreased Safe judgement during ADL, Decreased cognition, Decreased endurance, Decreased fine motor control, Decreased high-level ADLs  Prognosis: Fair  Assessment: Patient participated in Skilled OT session this date with interventions consisting of ADL re training with the use of correct body mechnaics, Energy Conservation techniques, deep breathing technique, safety awareness and fall prevention techniques,  therapeutic activities to: increase activity tolerance, increase dynamic sit/ stand balance during functional activity  and increase OOB/ sitting tolerance   Patient agreeable to OT treatment session, upon arrival patient was found seated OOB to Chair  Pt participated in functional transfers, mobility, self care tasks  Please refer to chart for functional levels   Patient requiring frequent re direction, verbal cues for safety, verbal cues for correct technique, verbal cues for pacing thru activity steps, cognitive assistance to anticipate next step, one step directives and frequent rest periods  Patient continues to be functioning below baseline level, occupational performance remains limited secondary to factors listed above and increased risk for falls and injury  From OT standpoint, recommendation at time of d/c would be Short Term Rehab  Patient to benefit from continued Occupational Therapy treatment while in the hospital to address deficits as defined above and maximize level of functional independence with ADLs and functional mobility        OT Discharge Recommendation: Short Term Rehab  OT - OK to Discharge: Yes(when medically cleared)

## 2020-01-21 NOTE — PHYSICAL THERAPY NOTE
PHYSICAL THERAPY NOTE    Patient Name: Vivi Cleveland  [id number] Date: 1/21/2020 01/21/20 9019   Pain Assessment   Pain Assessment 0-10   Pain Score 8   Pain Type Acute pain   Pain Location Abdomen   Pain Orientation Mid   Hospital Pain Intervention(s) Ambulation/increased activity;Repositioned; Emotional support;Distraction   Response to Interventions tolerated   Restrictions/Precautions   Weight Bearing Precautions Per Order No   Other Precautions Cognitive; Chair Alarm; Bed Alarm;Multiple lines;Telemetry;O2;Fall Risk;Pain  (2L NC)   General   Chart Reviewed Yes   Response to Previous Treatment Patient with no complaints from previous session  Family/Caregiver Present No   Cognition   Overall Cognitive Status Impaired   Arousal/Participation Alert; Cooperative;Lethargic   Attention Attends with cues to redirect   Memory Decreased recall of precautions   Following Commands Follows one step commands with increased time or repetition   Subjective   Subjective Pt seated OOB and agreeable to work w/ therapy   Bed Mobility   Supine to Sit Unable to assess   Sit to Supine Unable to assess   Additional Comments Pt seated OOB upon PT arrival and returned seated OOB at end of session w/ all needs within reach   Transfers   Sit to Stand 3  Moderate assistance   Additional items Assist x 1; Increased time required;Verbal cues   Stand to Sit 3  Moderate assistance   Additional items Assist x 1; Increased time required;Verbal cues   Additional Comments Transfers w/ RW; VCs and TCs for safe hand placement and sequencing   Ambulation/Elevation   Gait pattern Excessively slow; Short stride; Shuffling;L Foot drag;Improper Weight shift;Decreased foot clearance; Inconsistent kathrine   Gait Assistance 3  Moderate assist   Additional items Assist x 2;Verbal cues   Assistive Device Rolling walker   Distance 5ft   Stair Management Assistance Not tested   Balance Static Sitting Fair   Dynamic Sitting Fair -   Static Standing Poor +   Dynamic Standing Poor   Ambulatory Poor   Endurance Deficit   Endurance Deficit Yes   Endurance Deficit Description weakness, fatigue   Activity Tolerance   Activity Tolerance Patient limited by fatigue;Patient limited by pain   Medical Staff Made Aware OT Ai   Nurse Made Aware RN cleared pt for therapy   Exercises   Knee AROM Long Arc Quad Sitting;Bilateral;10 reps   Ankle Pumps Sitting;Bilateral;10 reps   Marching Sitting;Bilateral;10 reps   Assessment   Prognosis Good   Problem List Decreased strength;Decreased endurance; Impaired balance;Decreased mobility; Decreased coordination;Decreased cognition;Decreased safety awareness;Pain   Assessment Pt presents with decreased mobility, strength, balance, and activity tolerance  Pt demonstrated ability to perform STS functional transfers at [address]  Pt ambulated 5 ft with RW at [address] w/ close chair follow  Pt required VCs and TCs throughout ambulation for initiation, sequencing, and RW management  Distance limited at this time 2' dizziness which resolved w/ seated rest break  Pt able to complete seated therex program  Pt continues to benefit from skilled therapy to maximize functional independence  Recommendation at this time is rehab  Pt would benefit from continued ambulation, functional transfers, strength, balance, and activity tolerance   Barriers to Discharge Inaccessible home environment   Goals   Patient Goals to have less pain   STG Expiration Date 01/30/20   PT Treatment Day 1   Plan   Treatment/Interventions Functional transfer training;LE strengthening/ROM; Therapeutic exercise; Endurance training;Patient/family training;Equipment eval/education;Gait training;Spoke to nursing   Progress Slow progress, decreased activity tolerance   PT Frequency   (3-5x/week)   Recommendation   Recommendation Post acute IP rehab   Equipment Recommended Walker  (RW)   PT - OK to Discharge Yes Additional Comments when medically cleared to rehab     Theresa Watkins, PT, DPT

## 2020-01-22 LAB
ANION GAP SERPL CALCULATED.3IONS-SCNC: 4 MMOL/L (ref 4–13)
BASOPHILS # BLD AUTO: 0.02 THOUSANDS/ΜL (ref 0–0.1)
BASOPHILS NFR BLD AUTO: 0 % (ref 0–1)
BUN SERPL-MCNC: 9 MG/DL (ref 5–25)
CALCIUM SERPL-MCNC: 8.3 MG/DL (ref 8.3–10.1)
CHLORIDE SERPL-SCNC: 101 MMOL/L (ref 100–108)
CO2 SERPL-SCNC: 31 MMOL/L (ref 21–32)
CREAT SERPL-MCNC: 0.58 MG/DL (ref 0.6–1.3)
EOSINOPHIL # BLD AUTO: 0.06 THOUSAND/ΜL (ref 0–0.61)
EOSINOPHIL NFR BLD AUTO: 1 % (ref 0–6)
ERYTHROCYTE [DISTWIDTH] IN BLOOD BY AUTOMATED COUNT: 14.4 % (ref 11.6–15.1)
GFR SERPL CREATININE-BSD FRML MDRD: 105 ML/MIN/1.73SQ M
GLUCOSE SERPL-MCNC: 159 MG/DL (ref 65–140)
HCT VFR BLD AUTO: 25.6 % (ref 36.5–49.3)
HGB BLD-MCNC: 8 G/DL (ref 12–17)
IMM GRANULOCYTES # BLD AUTO: 0.07 THOUSAND/UL (ref 0–0.2)
IMM GRANULOCYTES NFR BLD AUTO: 1 % (ref 0–2)
LYMPHOCYTES # BLD AUTO: 0.87 THOUSANDS/ΜL (ref 0.6–4.47)
LYMPHOCYTES NFR BLD AUTO: 8 % (ref 14–44)
MCH RBC QN AUTO: 29 PG (ref 26.8–34.3)
MCHC RBC AUTO-ENTMCNC: 31.3 G/DL (ref 31.4–37.4)
MCV RBC AUTO: 93 FL (ref 82–98)
MONOCYTES # BLD AUTO: 1.08 THOUSAND/ΜL (ref 0.17–1.22)
MONOCYTES NFR BLD AUTO: 10 % (ref 4–12)
NEUTROPHILS # BLD AUTO: 8.76 THOUSANDS/ΜL (ref 1.85–7.62)
NEUTS SEG NFR BLD AUTO: 80 % (ref 43–75)
NRBC BLD AUTO-RTO: 0 /100 WBCS
PLATELET # BLD AUTO: 468 THOUSANDS/UL (ref 149–390)
PMV BLD AUTO: 9.6 FL (ref 8.9–12.7)
POTASSIUM SERPL-SCNC: 3.4 MMOL/L (ref 3.5–5.3)
RBC # BLD AUTO: 2.76 MILLION/UL (ref 3.88–5.62)
SODIUM SERPL-SCNC: 136 MMOL/L (ref 136–145)
WBC # BLD AUTO: 10.86 THOUSAND/UL (ref 4.31–10.16)

## 2020-01-22 PROCEDURE — 97110 THERAPEUTIC EXERCISES: CPT

## 2020-01-22 PROCEDURE — 97530 THERAPEUTIC ACTIVITIES: CPT

## 2020-01-22 PROCEDURE — 80048 BASIC METABOLIC PNL TOTAL CA: CPT | Performed by: SURGERY

## 2020-01-22 PROCEDURE — 99024 POSTOP FOLLOW-UP VISIT: CPT | Performed by: SURGERY

## 2020-01-22 PROCEDURE — 85025 COMPLETE CBC W/AUTO DIFF WBC: CPT | Performed by: SURGERY

## 2020-01-22 RX ORDER — MINERAL OIL 100 G/100G
1 OIL RECTAL ONCE
Status: DISCONTINUED | OUTPATIENT
Start: 2020-01-22 | End: 2020-01-26

## 2020-01-22 RX ORDER — POLYETHYLENE GLYCOL 3350 17 G/17G
17 POWDER, FOR SOLUTION ORAL DAILY
Status: DISCONTINUED | OUTPATIENT
Start: 2020-01-22 | End: 2020-02-03

## 2020-01-22 RX ORDER — POTASSIUM CHLORIDE 20 MEQ/1
40 TABLET, EXTENDED RELEASE ORAL ONCE
Status: COMPLETED | OUTPATIENT
Start: 2020-01-22 | End: 2020-01-22

## 2020-01-22 RX ORDER — POTASSIUM CHLORIDE 14.9 MG/ML
20 INJECTION INTRAVENOUS ONCE
Status: COMPLETED | OUTPATIENT
Start: 2020-01-22 | End: 2020-01-22

## 2020-01-22 RX ADMIN — DULOXETINE HYDROCHLORIDE 90 MG: 60 CAPSULE, DELAYED RELEASE ORAL at 10:55

## 2020-01-22 RX ADMIN — MIRTAZAPINE 15 MG: 15 TABLET, FILM COATED ORAL at 22:03

## 2020-01-22 RX ADMIN — GABAPENTIN 300 MG: 300 CAPSULE ORAL at 09:02

## 2020-01-22 RX ADMIN — CEPHALEXIN 500 MG: 500 CAPSULE ORAL at 05:55

## 2020-01-22 RX ADMIN — METOPROLOL TARTRATE 25 MG: 25 TABLET, FILM COATED ORAL at 22:05

## 2020-01-22 RX ADMIN — OXYCODONE HYDROCHLORIDE 10 MG: 10 TABLET ORAL at 16:18

## 2020-01-22 RX ADMIN — CHLORHEXIDINE GLUCONATE 0.12% ORAL RINSE 15 ML: 1.2 LIQUID ORAL at 22:02

## 2020-01-22 RX ADMIN — Medication 20 MG: at 17:45

## 2020-01-22 RX ADMIN — METOPROLOL TARTRATE 25 MG: 25 TABLET, FILM COATED ORAL at 09:02

## 2020-01-22 RX ADMIN — HEPARIN SODIUM 5000 UNITS: 5000 INJECTION INTRAVENOUS; SUBCUTANEOUS at 22:02

## 2020-01-22 RX ADMIN — FLUTICASONE FUROATE AND VILANTEROL TRIFENATATE 1 PUFF: 100; 25 POWDER RESPIRATORY (INHALATION) at 10:55

## 2020-01-22 RX ADMIN — FOLIC ACID 1 MG: 1 TABLET ORAL at 09:03

## 2020-01-22 RX ADMIN — ATORVASTATIN CALCIUM 40 MG: 40 TABLET, FILM COATED ORAL at 17:45

## 2020-01-22 RX ADMIN — THIAMINE HCL TAB 100 MG 100 MG: 100 TAB at 09:03

## 2020-01-22 RX ADMIN — OXYCODONE HYDROCHLORIDE 10 MG: 10 TABLET ORAL at 09:02

## 2020-01-22 RX ADMIN — Medication 20 MG: at 10:55

## 2020-01-22 RX ADMIN — FLUTICASONE PROPIONATE 2 SPRAY: 50 SPRAY, METERED NASAL at 10:55

## 2020-01-22 RX ADMIN — LORATADINE 10 MG: 10 TABLET ORAL at 10:55

## 2020-01-22 RX ADMIN — MELATONIN 1000 UNITS: at 09:02

## 2020-01-22 RX ADMIN — POLYETHYLENE GLYCOL 3350 17 G: 17 POWDER, FOR SOLUTION ORAL at 09:02

## 2020-01-22 RX ADMIN — NICOTINE 1 PATCH: 14 PATCH TRANSDERMAL at 09:02

## 2020-01-22 RX ADMIN — DOCUSATE SODIUM 100 MG: 100 CAPSULE, LIQUID FILLED ORAL at 17:44

## 2020-01-22 RX ADMIN — VANCOMYCIN HYDROCHLORIDE 1250 MG: 10 INJECTION, POWDER, LYOPHILIZED, FOR SOLUTION INTRAVENOUS at 22:03

## 2020-01-22 RX ADMIN — HEPARIN SODIUM 5000 UNITS: 5000 INJECTION INTRAVENOUS; SUBCUTANEOUS at 13:29

## 2020-01-22 RX ADMIN — CHLORHEXIDINE GLUCONATE 0.12% ORAL RINSE 15 ML: 1.2 LIQUID ORAL at 10:55

## 2020-01-22 RX ADMIN — DOCUSATE SODIUM 100 MG: 100 CAPSULE, LIQUID FILLED ORAL at 09:02

## 2020-01-22 RX ADMIN — GABAPENTIN 300 MG: 300 CAPSULE ORAL at 22:02

## 2020-01-22 RX ADMIN — HEPARIN SODIUM 5000 UNITS: 5000 INJECTION INTRAVENOUS; SUBCUTANEOUS at 05:55

## 2020-01-22 RX ADMIN — QUETIAPINE FUMARATE 200 MG: 100 TABLET ORAL at 09:02

## 2020-01-22 RX ADMIN — POTASSIUM CHLORIDE 40 MEQ: 1500 TABLET, EXTENDED RELEASE ORAL at 12:27

## 2020-01-22 RX ADMIN — VANCOMYCIN HYDROCHLORIDE 1250 MG: 10 INJECTION, POWDER, LYOPHILIZED, FOR SOLUTION INTRAVENOUS at 09:04

## 2020-01-22 RX ADMIN — QUETIAPINE FUMARATE 600 MG: 300 TABLET ORAL at 22:04

## 2020-01-22 RX ADMIN — POTASSIUM CHLORIDE 20 MEQ: 14.9 INJECTION, SOLUTION INTRAVENOUS at 12:29

## 2020-01-22 RX ADMIN — GABAPENTIN 300 MG: 300 CAPSULE ORAL at 17:45

## 2020-01-22 NOTE — SOCIAL WORK
Cm reviewed patient during care coordination rounds  Patient is not medically stable for dc today  Cm informed that patient is anticipated for dc to rehab  Therapies to work with patient again for updated recommendations  Cm informed that patient would need to have Options process completed if STR is being recommended  Cm awaiting medical clearance and updated recommendations  Cm contacted patient's son John Wise, [phone] to speak about rehab choices  Per son he would prefer high performance facilities for rehab  Cm sent referrals to [facility], [employer], and [address] [place] [address],  Referrals sent

## 2020-01-22 NOTE — PLAN OF CARE
Problem: Potential for Falls  Goal: Patient will remain free of falls  Description  INTERVENTIONS:  - Assess patient frequently for physical needs  -  Identify cognitive and physical deficits and behaviors that affect risk of falls    -  [place] fall precautions as indicated by assessment   - Educate patient/family on patient safety including physical limitations  - Instruct patient to call for assistance with activity based on assessment  - Modify environment to reduce risk of injury  - Consider OT/PT consult to assist with strengthening/mobility  Outcome: Progressing     Problem: PAIN - ADULT  Goal: Verbalizes/displays adequate comfort level or baseline comfort level  Description  Interventions:  - Encourage patient to monitor pain and request assistance  - Assess pain using appropriate pain scale  - Administer analgesics based on type and severity of pain and evaluate response  - Implement non-pharmacological measures as appropriate and evaluate response  - Consider cultural and social influences on pain and pain management  - Notify physician/advanced practitioner if interventions unsuccessful or patient reports new pain  Outcome: Progressing     Problem: INFECTION - ADULT  Goal: Absence or prevention of progression during hospitalization  Description  INTERVENTIONS:  - Assess and monitor for signs and symptoms of infection  - Monitor lab/diagnostic results  - Monitor all insertion sites, i e  indwelling lines, tubes, and drains  - Monitor endotracheal if appropriate and nasal secretions for changes in amount and color  - [place] appropriate cooling/warming therapies per order  - Administer medications as ordered  - Instruct and encourage patient and family to use good hand hygiene technique  - Identify and instruct in appropriate isolation precautions for identified infection/condition  Outcome: Progressing  Goal: Absence of fever/infection during neutropenic period  Description  INTERVENTIONS:  - Monitor WBC    Outcome: Progressing     Problem: SAFETY ADULT  Goal: Maintain or return to baseline ADL function  Description  INTERVENTIONS:  -  Assess patient's ability to carry out ADLs; assess patient's baseline for ADL function and identify physical deficits which impact ability to perform ADLs (bathing, care of mouth/teeth, toileting, grooming, dressing, etc )  - Assess/evaluate cause of self-care deficits   - Assess range of motion  - Assess patient's mobility; develop plan if impaired  - Assess patient's need for assistive devices and provide as appropriate  - Encourage maximum independence but intervene and supervise when necessary  - Involve family in performance of ADLs  - Assess for home care needs following discharge   - Consider OT consult to assist with ADL evaluation and planning for discharge  - Provide patient education as appropriate  Outcome: Progressing  Goal: Maintain or return mobility status to optimal level  Description  INTERVENTIONS:  - Assess patient's baseline mobility status (ambulation, transfers, stairs, etc )    - Identify cognitive and physical deficits and behaviors that affect mobility  - Identify mobility aids required to assist with transfers and/or ambulation (gait belt, sit-to-stand, lift, walker, cane, etc )  - [place] fall precautions as indicated by assessment  - Record patient progress and toleration of activity level on Mobility SBAR; progress patient to next Phase/Stage  - Instruct patient to call for assistance with activity based on assessment  - Consider rehabilitation consult to assist with strengthening/weightbearing, etc   Outcome: Progressing     Problem: DISCHARGE PLANNING  Goal: Discharge to home or other facility with appropriate resources  Description  INTERVENTIONS:  - Identify barriers to discharge w/patient and caregiver  - Arrange for needed discharge resources and transportation as appropriate  - Identify discharge learning needs (meds, wound care, etc )  - Arrange for interpretive services to assist at discharge as needed  - Refer to Case Management Department for coordinating discharge planning if the patient needs post-hospital services based on physician/advanced practitioner order or complex needs related to functional status, cognitive ability, or social support system  Outcome: Progressing     Problem: Knowledge Deficit  Goal: Patient/family/caregiver demonstrates understanding of disease process, treatment plan, medications, and discharge instructions  Description  Complete learning assessment and assess knowledge base  Interventions:  - Provide teaching at level of understanding  - Provide teaching via preferred learning methods  Outcome: Progressing     Problem: Nutrition/Hydration-ADULT  Goal: Nutrient/Hydration intake appropriate for improving, restoring or maintaining nutritional needs  Description  Monitor and assess patient's nutrition/hydration status for malnutrition  Collaborate with interdisciplinary team and initiate plan and interventions as ordered  Monitor patient's weight and dietary intake as ordered or per policy  Utilize nutrition screening tool and intervene as necessary  Determine patient's food preferences and provide high-protein, high-caloric foods as appropriate       INTERVENTIONS:  - Monitor oral intake, urinary output, labs, and treatment plans  - Assess nutrition and hydration status and recommend course of action  - Evaluate amount of meals eaten  - Assist patient with eating if necessary   - Allow adequate time for meals  - Recommend/ encourage appropriate diets, oral nutritional supplements, and vitamin/mineral supplements  - Order, calculate, and assess calorie counts as needed  - Recommend, monitor, and adjust tube feedings and TPN/PPN based on assessed needs  - Assess need for intravenous fluids  - Provide specific nutrition/hydration education as appropriate  - Include patient/family/caregiver in decisions related to nutrition  Outcome: Progressing     Problem: SAFETY,RESTRAINT: NV/NON-SELF DESTRUCTIVE BEHAVIOR  Goal: Remains free of harm/injury (restraint for non violent/non self-detsructive behavior)  Description  INTERVENTIONS:  - Instruct patient/family regarding restraint use   - Assess and monitor physiologic and psychological status   - Provide interventions and comfort measures to meet assessed patient needs   - Identify and implement measures to help patient regain control  - Assess readiness for release of restraint   Outcome: Progressing  Goal: Returns to optimal restraint-free functioning  Description  INTERVENTIONS:  - Assess the patient's behavior and symptoms that indicate continued need for restraint  - Identify and implement measures to help patient regain control  - Assess readiness for release of restraint   Outcome: Progressing     Problem: Prexisting or High Potential for Compromised Skin Integrity  Goal: Skin integrity is maintained or improved  Description  INTERVENTIONS:  - Identify patients at risk for skin breakdown  - Assess and monitor skin integrity  - Assess and monitor nutrition and hydration status  - Monitor labs   - Assess for incontinence   - Turn and reposition patient  - Assist with mobility/ambulation  - Relieve pressure over bony prominences  - Avoid friction and shearing  - Provide appropriate hygiene as needed including keeping skin clean and dry  - Evaluate need for skin moisturizer/barrier cream  - Collaborate with interdisciplinary team   - Patient/family teaching  - Consider wound care consult   Outcome: Progressing

## 2020-01-22 NOTE — PROGRESS NOTES
Progress Note - General Surgery   Anabella Cooper 81 y o  male MRN: [id number]  Unit/Bed#: [facility] 607-01 Encounter: [id number]    Assessment:  81 y o  M with hx of bleeding and perforated 1st portion duodenal ulcer  1/11 S/p EGD  1/11 S/p IR GDA embolozation   1/11 S/p Exlap, oversewing bleeding vessels, closure of tissue over perforation, Thal patch with transverse colon, open gastrostomy-jejunostomy tube placement    AVSS  R abdominal CAMPBELL x 2 w serous output   Hg 8 7 from 8 2, CBC for today still pending     Plan:  Surg soft diet as tolerated  Continue cycling TFs  Monitor CAMPBELL drain output  Continue Keflex for incisional cellulitis   Continue Protonix  Prn analgesia/anti-emetics   DVT ppx      Subjective/Objective     Subjective: Patient has been nauseous throughout the night  States he was vomiting and had a few episodes of diarrhea during the day yesterday  No fevers, chills  Objective:     Blood pressure 123/66, pulse 82, temperature 98 1 °F (36 7 °C), resp  rate 18, height 5' 8 5" (1 74 m), weight 81 3 kg (179 lb 4 8 oz), SpO2 98 %  ,Body mass index is 26 87 kg/m²  Intake/Output Summary (Last 24 hours) at 1/22/2020 0551  Last data filed at 1/22/2020 0402  Gross per 24 hour   Intake 221 67 ml   Output 1451 ml   Net -1229 33 ml       Invasive Devices     Peripherally Inserted Central Catheter Line            PICC Line 16/58/51 Right Basilic 5 days          Drain            Closed/Suction Drain Right Abdomen Bulb 10 days    Closed/Suction Drain Right Abdomen Bulb 19 Fr  10 days    Gastrostomy/Enterostomy Gastrostomy-jejunostomy 22 Fr   LLQ 10 days    External Urinary Catheter Small less than 1 day                Physical Exam:   NAD, alert and oriented x3  Normocephalic, atraumatic  MMM, EOMI, PERRLA  Norm resp effort on RA  RRR  Abd soft, NT/ND, incisions c/d/i with jorge-incisional erythema, PEG tube in place  R abdominal CAMPBELL x2 with serous output  No calf tenderness or peripheral edema  Motor/sensation intact in distal extremities  CN grossly intact  -rash/lesions      Lab, Imaging and other studies:  CBC:   Lab Results   Component Value Date    WBC 15 28 (H) 01/21/2020    HGB 8 7 (L) 01/21/2020    HCT 27 8 (L) 01/21/2020    MCV 93 01/21/2020     (H) 01/21/2020    MCH 29 0 01/21/2020    MCHC 31 3 (L) 01/21/2020    RDW 14 6 01/21/2020    MPV 9 8 01/21/2020    NRBC 0 01/21/2020   , CMP:   Lab Results   Component Value Date    SODIUM 137 01/21/2020    K 3 5 01/21/2020    CL 99 (L) 01/21/2020    CO2 31 01/21/2020    BUN 10 01/21/2020    CREATININE 0 60 01/21/2020    CALCIUM 8 3 01/21/2020    EGFR 104 01/21/2020     VTE Pharmacologic Prophylaxis: Heparin  VTE Mechanical Prophylaxis: sequential compression device

## 2020-01-22 NOTE — PLAN OF CARE
Problem: PHYSICAL THERAPY ADULT  Goal: Performs mobility at highest level of function for planned discharge setting  See evaluation for individualized goals  Description  Treatment/Interventions: Functional transfer training, LE strengthening/ROM, Therapeutic exercise, Endurance training, Patient/family training, Bed mobility, Gait training  Equipment Recommended: Anna Miner       See flowsheet documentation for full assessment, interventions and recommendations  Outcome: Progressing  Note:   Prognosis: Good  Problem List: Decreased strength, Decreased range of motion, Decreased endurance, Decreased mobility, Impaired balance, Pain  Assessment: Pt is a 82 yo M presenting to therapy with acute blood loss anemia  Upon PT arrival he was lethargic and initially did not want to participate due to being in 8/10 pain  After encouragement and education on the importance of therapy, he was agreeable to treatment  Pt was identified with name and  and required increased time to respond to cues throughout session  Pt makes progress in terms of mobility and functionality  He was able to perform supine>sit transfers with Min A x 1 with VC for motor sequencing  He was able to perform sit>supine transfers with supervision and VC for hand placement  Once seated at EOB, pt was able to perform seated exercises without an increase in pain levels and was able to sit at EOB for 5 minutes without a LOB  Progress continues to be limited by low activity tolerance secondary to pain and fatigue  Pt will benefit from continued skilled physical therapy addressing mobility and functionality deficits  DC recommendation is to post acute IP rehab pending medical clearance  Barriers to Discharge: Inaccessible home environment     Recommendation: Post acute IP rehab     PT - OK to Discharge: Yes    See flowsheet documentation for full assessment

## 2020-01-22 NOTE — PLAN OF CARE
Problem: Potential for Falls  Goal: Patient will remain free of falls  Description  INTERVENTIONS:  - Assess patient frequently for physical needs  -  Identify cognitive and physical deficits and behaviors that affect risk of falls    -  [place] fall precautions as indicated by assessment   - Educate patient/family on patient safety including physical limitations  - Instruct patient to call for assistance with activity based on assessment  - Modify environment to reduce risk of injury  - Consider OT/PT consult to assist with strengthening/mobility  Outcome: Progressing     Problem: PAIN - ADULT  Goal: Verbalizes/displays adequate comfort level or baseline comfort level  Description  Interventions:  - Encourage patient to monitor pain and request assistance  - Assess pain using appropriate pain scale  - Administer analgesics based on type and severity of pain and evaluate response  - Implement non-pharmacological measures as appropriate and evaluate response  - Consider cultural and social influences on pain and pain management  - Notify physician/advanced practitioner if interventions unsuccessful or patient reports new pain  Outcome: Progressing     Problem: INFECTION - ADULT  Goal: Absence or prevention of progression during hospitalization  Description  INTERVENTIONS:  - Assess and monitor for signs and symptoms of infection  - Monitor lab/diagnostic results  - Monitor all insertion sites, i e  indwelling lines, tubes, and drains  - Monitor endotracheal if appropriate and nasal secretions for changes in amount and color  - [place] appropriate cooling/warming therapies per order  - Administer medications as ordered  - Instruct and encourage patient and family to use good hand hygiene technique  - Identify and instruct in appropriate isolation precautions for identified infection/condition  Outcome: Progressing  Goal: Absence of fever/infection during neutropenic period  Description  INTERVENTIONS:  - Monitor WBC    Outcome: Progressing     Problem: SAFETY ADULT  Goal: Maintain or return to baseline ADL function  Description  INTERVENTIONS:  -  Assess patient's ability to carry out ADLs; assess patient's baseline for ADL function and identify physical deficits which impact ability to perform ADLs (bathing, care of mouth/teeth, toileting, grooming, dressing, etc )  - Assess/evaluate cause of self-care deficits   - Assess range of motion  - Assess patient's mobility; develop plan if impaired  - Assess patient's need for assistive devices and provide as appropriate  - Encourage maximum independence but intervene and supervise when necessary  - Involve family in performance of ADLs  - Assess for home care needs following discharge   - Consider OT consult to assist with ADL evaluation and planning for discharge  - Provide patient education as appropriate  Outcome: Progressing  Goal: Maintain or return mobility status to optimal level  Description  INTERVENTIONS:  - Assess patient's baseline mobility status (ambulation, transfers, stairs, etc )    - Identify cognitive and physical deficits and behaviors that affect mobility  - Identify mobility aids required to assist with transfers and/or ambulation (gait belt, sit-to-stand, lift, walker, cane, etc )  - [place] fall precautions as indicated by assessment  - Record patient progress and toleration of activity level on Mobility SBAR; progress patient to next Phase/Stage  - Instruct patient to call for assistance with activity based on assessment  - Consider rehabilitation consult to assist with strengthening/weightbearing, etc   Outcome: Progressing     Problem: DISCHARGE PLANNING  Goal: Discharge to home or other facility with appropriate resources  Description  INTERVENTIONS:  - Identify barriers to discharge w/patient and caregiver  - Arrange for needed discharge resources and transportation as appropriate  - Identify discharge learning needs (meds, wound care, etc )  - Arrange for interpretive services to assist at discharge as needed  - Refer to Case Management Department for coordinating discharge planning if the patient needs post-hospital services based on physician/advanced practitioner order or complex needs related to functional status, cognitive ability, or social support system  Outcome: Progressing     Problem: Knowledge Deficit  Goal: Patient/family/caregiver demonstrates understanding of disease process, treatment plan, medications, and discharge instructions  Description  Complete learning assessment and assess knowledge base  Interventions:  - Provide teaching at level of understanding  - Provide teaching via preferred learning methods  Outcome: Progressing     Problem: Nutrition/Hydration-ADULT  Goal: Nutrient/Hydration intake appropriate for improving, restoring or maintaining nutritional needs  Description  Monitor and assess patient's nutrition/hydration status for malnutrition  Collaborate with interdisciplinary team and initiate plan and interventions as ordered  Monitor patient's weight and dietary intake as ordered or per policy  Utilize nutrition screening tool and intervene as necessary  Determine patient's food preferences and provide high-protein, high-caloric foods as appropriate       INTERVENTIONS:  - Monitor oral intake, urinary output, labs, and treatment plans  - Assess nutrition and hydration status and recommend course of action  - Evaluate amount of meals eaten  - Assist patient with eating if necessary   - Allow adequate time for meals  - Recommend/ encourage appropriate diets, oral nutritional supplements, and vitamin/mineral supplements  - Order, calculate, and assess calorie counts as needed  - Recommend, monitor, and adjust tube feedings and TPN/PPN based on assessed needs  - Assess need for intravenous fluids  - Provide specific nutrition/hydration education as appropriate  - Include patient/family/caregiver in decisions related to nutrition  Outcome: Progressing     Problem: SAFETY,RESTRAINT: NV/NON-SELF DESTRUCTIVE BEHAVIOR  Goal: Remains free of harm/injury (restraint for non violent/non self-detsructive behavior)  Description  INTERVENTIONS:  - Instruct patient/family regarding restraint use   - Assess and monitor physiologic and psychological status   - Provide interventions and comfort measures to meet assessed patient needs   - Identify and implement measures to help patient regain control  - Assess readiness for release of restraint   Outcome: Progressing  Goal: Returns to optimal restraint-free functioning  Description  INTERVENTIONS:  - Assess the patient's behavior and symptoms that indicate continued need for restraint  - Identify and implement measures to help patient regain control  - Assess readiness for release of restraint   Outcome: Progressing     Problem: Prexisting or High Potential for Compromised Skin Integrity  Goal: Skin integrity is maintained or improved  Description  INTERVENTIONS:  - Identify patients at risk for skin breakdown  - Assess and monitor skin integrity  - Assess and monitor nutrition and hydration status  - Monitor labs   - Assess for incontinence   - Turn and reposition patient  - Assist with mobility/ambulation  - Relieve pressure over bony prominences  - Avoid friction and shearing  - Provide appropriate hygiene as needed including keeping skin clean and dry  - Evaluate need for skin moisturizer/barrier cream  - Collaborate with interdisciplinary team   - Patient/family teaching  - Consider wound care consult   Outcome: Progressing

## 2020-01-22 NOTE — PHYSICAL THERAPY NOTE
PHYSICAL THERAPY Evaluation NOTE  Patient Name: Felicia Marcial  ZNTDC'V Date: 20 1424   Pain Assessment   Pain Assessment 0-10   Pain Score 8   Pain Type Acute pain   Pain Location Abdomen   Pain Descriptors Aching   Pain Frequency Constant/continuous   Pain Onset Ongoing   Patient's Stated Pain Goal No pain   Hospital Pain Intervention(s) Ambulation/increased activity;Repositioned   Response to Interventions Tolerated   Restrictions/Precautions   Weight Bearing Precautions Per Order No   Other Precautions Bed Alarm; Fall Risk;Pain   General   Chart Reviewed Yes   Additional Pertinent History Pt is a 82 yo M presenting to therapy with acute blood loss anemia   Response to Previous Treatment Patient with no complaints from previous session  Family/Caregiver Present No   Cognition   Overall Cognitive Status Impaired   Arousal/Participation Alert; Cooperative;Lethargic   Attention Attends with cues to redirect   Orientation Level Oriented X4   Memory Within functional limits   Following Commands Follows one step commands with increased time or repetition   Comments Pt was alert and cooperative throughout therapy session  He was identified with name and  but requires increased time to respond to cues  In order for patient to participate he required encouragement and was instructed on the importance of treatment session  Subjective   Subjective Pt reports being in significant 8/10 pain at rest and initially did not want to participate in therapy session  Bed Mobility   Supine to Sit 4  Minimal assistance   Additional items Assist x 1; Increased time required;Verbal cues   Sit to Supine 5  Supervision   Additional items Assist x 1; Increased time required   Additional Comments Pt was supine in bed upon PT arrival and was left supine in bed at the end of therapy session with all needs within reach     Transfers Additional Comments Pt did not want to get up from the bed and was limited by pain  Balance   Static Sitting Fair   Dynamic Sitting Fair -   Endurance Deficit   Endurance Deficit Yes   Endurance Deficit Description Pain   Activity Tolerance   Activity Tolerance Patient limited by pain   Nurse Made Aware Spoke to RN   Exercises   Hip Flexion 15 reps; Sitting;Bilateral   Knee AROM Long Arc Quad Sitting;15 reps;Bilateral   Ankle Pumps Sitting;15 reps;Bilateral   Balance training  Pt sat at EOB for 5 minutes without LOB  Assessment   Prognosis Good   Problem List Decreased strength;Decreased range of motion;Decreased endurance;Decreased mobility; Impaired balance;Pain   Assessment Pt is a 80 yo M presenting to therapy with acute blood loss anemia  Upon PT arrival he was lethargic and initially did not want to participate due to being in 8/10 pain  After encouragement and education on the importance of therapy, he was agreeable to treatment  Pt was identified with name and  and required increased time to respond to cues throughout session  Pt makes progress in terms of mobility and functionality  He was able to perform supine>sit transfers with Min A x 1 with VC for motor sequencing  He was able to perform sit>supine transfers with supervision and VC for hand placement  Once seated at EOB, pt was able to perform seated exercises without an increase in pain levels and was able to sit at EOB for 5 minutes without a LOB  Progress continues to be limited by low activity tolerance secondary to pain and fatigue  Pt will benefit from continued skilled physical therapy addressing mobility and functionality deficits  DC recommendation is to post acute IP rehab pending medical clearance  Barriers to Discharge Inaccessible home environment   Goals   STG Expiration Date 20   Plan   Treatment/Interventions ADL retraining;Functional transfer training;LE strengthening/ROM; Therapeutic exercise; Endurance training;Cognitive reorientation;Patient/family training;Equipment eval/education; Bed mobility;Gait training;Spoke to nursing   Progress Slow progress, decreased activity tolerance   PT Frequency Other (Comment)  (3-5x/wk)   Recommendation   Recommendation Post acute IP rehab   Equipment Recommended Walker   PT - OK to Discharge Yes   Additional Comments DC to post acute IP rehab pending medical clearance     Jeanine Mirza, JORDYN, 1/22/2020

## 2020-01-23 LAB
ANION GAP SERPL CALCULATED.3IONS-SCNC: 4 MMOL/L (ref 4–13)
BASOPHILS # BLD AUTO: 0.02 THOUSANDS/ΜL (ref 0–0.1)
BASOPHILS NFR BLD AUTO: 0 % (ref 0–1)
BUN SERPL-MCNC: 8 MG/DL (ref 5–25)
CALCIUM SERPL-MCNC: 8.1 MG/DL (ref 8.3–10.1)
CHLORIDE SERPL-SCNC: 105 MMOL/L (ref 100–108)
CO2 SERPL-SCNC: 30 MMOL/L (ref 21–32)
CREAT SERPL-MCNC: 0.62 MG/DL (ref 0.6–1.3)
EOSINOPHIL # BLD AUTO: 0.05 THOUSAND/ΜL (ref 0–0.61)
EOSINOPHIL NFR BLD AUTO: 1 % (ref 0–6)
ERYTHROCYTE [DISTWIDTH] IN BLOOD BY AUTOMATED COUNT: 14.5 % (ref 11.6–15.1)
GFR SERPL CREATININE-BSD FRML MDRD: 103 ML/MIN/1.73SQ M
GLUCOSE SERPL-MCNC: 142 MG/DL (ref 65–140)
HCT VFR BLD AUTO: 25.8 % (ref 36.5–49.3)
HGB BLD-MCNC: 8 G/DL (ref 12–17)
IMM GRANULOCYTES # BLD AUTO: 0.07 THOUSAND/UL (ref 0–0.2)
IMM GRANULOCYTES NFR BLD AUTO: 1 % (ref 0–2)
LYMPHOCYTES # BLD AUTO: 1.14 THOUSANDS/ΜL (ref 0.6–4.47)
LYMPHOCYTES NFR BLD AUTO: 11 % (ref 14–44)
MAGNESIUM SERPL-MCNC: 2.3 MG/DL (ref 1.6–2.6)
MCH RBC QN AUTO: 29.1 PG (ref 26.8–34.3)
MCHC RBC AUTO-ENTMCNC: 31 G/DL (ref 31.4–37.4)
MCV RBC AUTO: 94 FL (ref 82–98)
MONOCYTES # BLD AUTO: 1.24 THOUSAND/ΜL (ref 0.17–1.22)
MONOCYTES NFR BLD AUTO: 11 % (ref 4–12)
NEUTROPHILS # BLD AUTO: 8.37 THOUSANDS/ΜL (ref 1.85–7.62)
NEUTS SEG NFR BLD AUTO: 76 % (ref 43–75)
NRBC BLD AUTO-RTO: 0 /100 WBCS
PHOSPHATE SERPL-MCNC: 3.2 MG/DL (ref 2.3–4.1)
PLATELET # BLD AUTO: 560 THOUSANDS/UL (ref 149–390)
PMV BLD AUTO: 9.6 FL (ref 8.9–12.7)
POTASSIUM SERPL-SCNC: 3.8 MMOL/L (ref 3.5–5.3)
RBC # BLD AUTO: 2.75 MILLION/UL (ref 3.88–5.62)
SODIUM SERPL-SCNC: 139 MMOL/L (ref 136–145)
WBC # BLD AUTO: 10.89 THOUSAND/UL (ref 4.31–10.16)

## 2020-01-23 PROCEDURE — 85025 COMPLETE CBC W/AUTO DIFF WBC: CPT | Performed by: SURGERY

## 2020-01-23 PROCEDURE — 94760 N-INVAS EAR/PLS OXIMETRY 1: CPT

## 2020-01-23 PROCEDURE — 83735 ASSAY OF MAGNESIUM: CPT | Performed by: PHYSICIAN ASSISTANT

## 2020-01-23 PROCEDURE — 80048 BASIC METABOLIC PNL TOTAL CA: CPT | Performed by: SURGERY

## 2020-01-23 PROCEDURE — 99024 POSTOP FOLLOW-UP VISIT: CPT | Performed by: SURGERY

## 2020-01-23 PROCEDURE — 84100 ASSAY OF PHOSPHORUS: CPT | Performed by: PHYSICIAN ASSISTANT

## 2020-01-23 RX ORDER — POTASSIUM CHLORIDE 20 MEQ/1
40 TABLET, EXTENDED RELEASE ORAL ONCE
Status: COMPLETED | OUTPATIENT
Start: 2020-01-23 | End: 2020-01-23

## 2020-01-23 RX ADMIN — QUETIAPINE FUMARATE 200 MG: 100 TABLET ORAL at 08:36

## 2020-01-23 RX ADMIN — DULOXETINE HYDROCHLORIDE 90 MG: 60 CAPSULE, DELAYED RELEASE ORAL at 08:37

## 2020-01-23 RX ADMIN — ACETAMINOPHEN 650 MG: 325 TABLET ORAL at 20:18

## 2020-01-23 RX ADMIN — OXYCODONE HYDROCHLORIDE 10 MG: 10 TABLET ORAL at 08:43

## 2020-01-23 RX ADMIN — ATORVASTATIN CALCIUM 40 MG: 40 TABLET, FILM COATED ORAL at 17:28

## 2020-01-23 RX ADMIN — CHLORHEXIDINE GLUCONATE 0.12% ORAL RINSE 15 ML: 1.2 LIQUID ORAL at 08:36

## 2020-01-23 RX ADMIN — ONDANSETRON 4 MG: 2 INJECTION INTRAMUSCULAR; INTRAVENOUS at 22:05

## 2020-01-23 RX ADMIN — THIAMINE HCL TAB 100 MG 100 MG: 100 TAB at 08:38

## 2020-01-23 RX ADMIN — POTASSIUM CHLORIDE 40 MEQ: 1500 TABLET, EXTENDED RELEASE ORAL at 13:21

## 2020-01-23 RX ADMIN — FLUTICASONE PROPIONATE 2 SPRAY: 50 SPRAY, METERED NASAL at 08:38

## 2020-01-23 RX ADMIN — GABAPENTIN 300 MG: 300 CAPSULE ORAL at 17:28

## 2020-01-23 RX ADMIN — MIRTAZAPINE 15 MG: 15 TABLET, FILM COATED ORAL at 22:03

## 2020-01-23 RX ADMIN — MELATONIN 1000 UNITS: at 08:36

## 2020-01-23 RX ADMIN — VANCOMYCIN HYDROCHLORIDE 1250 MG: 10 INJECTION, POWDER, LYOPHILIZED, FOR SOLUTION INTRAVENOUS at 06:27

## 2020-01-23 RX ADMIN — NICOTINE 1 PATCH: 14 PATCH TRANSDERMAL at 08:38

## 2020-01-23 RX ADMIN — HEPARIN SODIUM 5000 UNITS: 5000 INJECTION INTRAVENOUS; SUBCUTANEOUS at 05:41

## 2020-01-23 RX ADMIN — METOPROLOL TARTRATE 25 MG: 25 TABLET, FILM COATED ORAL at 08:37

## 2020-01-23 RX ADMIN — Medication 20 MG: at 08:43

## 2020-01-23 RX ADMIN — POTASSIUM & SODIUM PHOSPHATES POWDER PACK 280-160-250 MG 1 PACKET: 280-160-250 PACK at 13:21

## 2020-01-23 RX ADMIN — GABAPENTIN 300 MG: 300 CAPSULE ORAL at 08:36

## 2020-01-23 RX ADMIN — HEPARIN SODIUM 5000 UNITS: 5000 INJECTION INTRAVENOUS; SUBCUTANEOUS at 13:21

## 2020-01-23 RX ADMIN — Medication 20 MG: at 17:28

## 2020-01-23 RX ADMIN — DOCUSATE SODIUM 100 MG: 100 CAPSULE, LIQUID FILLED ORAL at 08:37

## 2020-01-23 RX ADMIN — LORATADINE 10 MG: 10 TABLET ORAL at 08:36

## 2020-01-23 RX ADMIN — FLUTICASONE FUROATE AND VILANTEROL TRIFENATATE 1 PUFF: 100; 25 POWDER RESPIRATORY (INHALATION) at 08:38

## 2020-01-23 RX ADMIN — FOLIC ACID 1 MG: 1 TABLET ORAL at 08:37

## 2020-01-23 RX ADMIN — QUETIAPINE FUMARATE 600 MG: 300 TABLET ORAL at 22:03

## 2020-01-23 NOTE — PROGRESS NOTES
Progress Note - Kim Escort 81 y o  male MRN: [id number]    Unit/Bed#: [facility] 607-01 Encounter: [id number]      Assessment:  81 y o  M with hx of bleeding and perforated 1st portion duodenal ulcer  1/11 S/p EGD  1/11 S/p IR GDA embolozation   1/11 S/p Exlap, oversewing bleeding vessels, closure of tissue over perforation, Thal patch with transverse colon, open gastrostomy-jejunostomy tube placement    VSS  Afebrile  Abdomen soft/ mild tender/ non distended  HOMERO x2 w scant serous output  Plan:  Continue surg soft diet  Discontinue tube feeds  Maintain homero x2  Ambulate  Continue abx for jorge-incisional cellulitis    Subjective:   C/o abd discomfort  Does not really have motivation to walk  Denied fever, chills, chest pain, shortness of breath, nausea, vomiting, or abdominal pain this morning  Objective:     Vitals: Blood pressure 130/68, pulse 84, temperature 99 7 °F (37 6 °C), resp  rate 19, height 5' 8 5" (1 74 m), weight 81 3 kg (179 lb 4 8 oz), SpO2 93 %  ,Body mass index is 26 87 kg/m²  Intake/Output Summary (Last 24 hours) at 1/23/2020 0409  Last data filed at 1/22/2020 2218  Gross per 24 hour   Intake 1808 ml   Output 1260 ml   Net 548 ml       Physical Exam  General: NAD  HEENT: NC/AT  MMM  Cv: RRR  Lungs: normal effort  Ab: Soft, NT/ND  Ex: no CCE  Neuro: AAOx3      Invasive Devices     Peripherally Inserted Central Catheter Line            PICC Line 72/83/93 Right Basilic 6 days          Drain            Closed/Suction Drain Right Abdomen Bulb 11 days    Closed/Suction Drain Right Abdomen Bulb 19 Fr  11 days    Gastrostomy/Enterostomy Gastrostomy-jejunostomy 22 Fr   LLQ 11 days              Scheduled Meds:  Current Facility-Administered Medications:  acetaminophen 650 mg Oral Q6H PRN Rosa Clark PA-C    atorvastatin 40 mg Oral Daily With [employer] mccallum    chlorhexidine 15 mL Swish & Spit Q12H [address], [place]    cholecalciferol 1,000 Units Oral Daily Ivonne Morris [place], BRIGITTE    docusate sodium 100 mg Oral BID Umpire Jesús, BRIGITTE    DULoxetine 90 mg Oral Daily Umpire Jesús, BRIGITTE    fluticasone 2 spray Nasal Daily Umpire Jesús, BRIGITTE    fluticasone-vilanterol 1 puff Inhalation Daily Umpire Jesús, [place]    folic acid 1 mg Oral Daily Umpire Jesús, BRIGITTE    gabapentin 300 mg Oral TID Umpire Jesús, BRIGITTE    guaiFENesin 600 mg Oral Q12H PRN Umpire Jesús, BRIGITTE    heparin (porcine) 5,000 Units Subcutaneous [facility] Umpire Jesús, BRIGITTE    loratadine 10 mg Oral Daily Umpire Jesús, BRIGITTE    metoprolol tartrate 25 mg Oral Q12H [address] Rachel Jesús, BRIGITTE    mineral oil 1 enema Rectal Once Augusto Romano MD    mirtazapine 15 mg Oral HS Umpire Jesús, BRIGITTE    nicotine 1 patch Transdermal Daily Jaime Rossi PA-C    omeprazole (PRILOSEC) suspension 2 mg/mL 20 mg Oral BID Umpire Jesús, BRIGITTE    ondansetron 4 mg Intravenous Q6H PRN Adrian Welch DO    oxyCODONE 10 mg Oral Q4H PRN Umpire Jesús, BRIGITTE    oxyCODONE 5 mg Oral Q4H PRN Umpire Jesús, BRIGITTE    polyethylene glycol 17 g Oral Daily Augusto Romano MD    QUEtiapine 200 mg Oral Daily Umpire Jesús, BRIGITTE    QUEtiapine 600 mg Oral HS Umpire eJsús, BRIGITTE    thiamine 100 mg Oral Daily Umpire Jesús, BRIGITTE    traZODone 50 mg Oral HS PRN Umpire Jesús, BRIGITTE    vancomycin 15 mg/kg Intravenous Q12H Reyna Welch DO Last Rate: 1,250 mg (01/22/20 2203)     Continuous Infusions:   PRN Meds:   acetaminophen    guaiFENesin    ondansetron    oxyCODONE    oxyCODONE    traZODone      Lab, Imaging and other studies: I have personally reviewed pertinent reports      VTE Pharmacologic Prophylaxis: Heparin  VTE Mechanical Prophylaxis: sequential compression device

## 2020-01-23 NOTE — SOCIAL WORK
Cm reviewed patient during care coordination rounds  Cm awaiting for Options assessment to be completed  Anticipated for assessment to be completed today  Will need to await letter from [facility] clearing patient to transfer to rehab  Cm requested PT/OT work with patient in regards to mobility using a WC since son reported to  yesterday that patient usually uses WC but can stand/pivot to device  Patient also is known to walk sometimes depending on well he feels  Cm awaiting [facility] paperwork completion and updated therapies

## 2020-01-23 NOTE — PLAN OF CARE
Problem: Nutrition/Hydration-ADULT  Goal: Nutrient/Hydration intake appropriate for improving, restoring or maintaining nutritional needs  Description  Monitor and assess patient's nutrition/hydration status for malnutrition  Collaborate with interdisciplinary team and initiate plan and interventions as ordered  Monitor patient's weight and dietary intake as ordered or per policy  Utilize nutrition screening tool and intervene as necessary  Determine patient's food preferences and provide high-protein, high-caloric foods as appropriate       INTERVENTIONS:  - Monitor oral intake, urinary output, labs, and treatment plans  - Assess nutrition and hydration status and recommend course of action  - Evaluate amount of meals eaten  - Assist patient with eating if necessary   - Allow adequate time for meals  - Recommend/ encourage appropriate diets, oral nutritional supplements, and vitamin/mineral supplements  - Order, calculate, and assess calorie counts as needed  - Recommend, monitor, and adjust tube feedings and TPN/PPN based on assessed needs  - Assess need for intravenous fluids  - Provide specific nutrition/hydration education as appropriate  - Include patient/family/caregiver in decisions related to nutrition  Outcome: Not Progressing   Patient with poor po intake

## 2020-01-23 NOTE — QUICK NOTE
Nurse-Patient-Provider rounds were completed with the patient's nurse today, Tyrese Winkler  We discussed the plan is to continue to monitor midline wound and placed patient on vancomycin  Will continue to monitor clinically at this time  Trend vitals and white count  Abdominal exam has remained stable  Monitor CAMPBELL drain output  We reviewed all of the invasive devices/lines/telemetry orders   - None  DVT Prophylaxis:  Subcutaneous heparin and SCDs    Pain Assessment / Plan:  - Continue current analgesic regimen  Mobility Assessment / Plan:  - Activity as tolerated  Goals / Barriers for discharge:  Antibiotic management of midline wound as well as improvement in overall clinical picture  Will need case management to assist as patient will need placement  Case management following; case and discharge needs discussed  All questions and concerns were addressed  I spent greater than 14 minutes reviewing the plan with the patient and the nurse, and coordinating care for the day      Ashley Pinto PA-C  1/23/2020

## 2020-01-24 ENCOUNTER — APPOINTMENT (INPATIENT)
Dept: RADIOLOGY | Facility: HOSPITAL | Age: 70
DRG: 326 | End: 2020-01-24
Payer: MEDICARE

## 2020-01-24 LAB
ANION GAP SERPL CALCULATED.3IONS-SCNC: 5 MMOL/L (ref 4–13)
BASOPHILS # BLD AUTO: 0.03 THOUSANDS/ΜL (ref 0–0.1)
BASOPHILS # BLD AUTO: 0.03 THOUSANDS/ΜL (ref 0–0.1)
BASOPHILS NFR BLD AUTO: 0 % (ref 0–1)
BASOPHILS NFR BLD AUTO: 0 % (ref 0–1)
BUN SERPL-MCNC: 6 MG/DL (ref 5–25)
CALCIUM SERPL-MCNC: 8.3 MG/DL (ref 8.3–10.1)
CHLORIDE SERPL-SCNC: 105 MMOL/L (ref 100–108)
CO2 SERPL-SCNC: 26 MMOL/L (ref 21–32)
CREAT SERPL-MCNC: 0.53 MG/DL (ref 0.6–1.3)
EOSINOPHIL # BLD AUTO: 0 THOUSAND/ΜL (ref 0–0.61)
EOSINOPHIL # BLD AUTO: 0 THOUSAND/ΜL (ref 0–0.61)
EOSINOPHIL NFR BLD AUTO: 0 % (ref 0–6)
EOSINOPHIL NFR BLD AUTO: 0 % (ref 0–6)
ERYTHROCYTE [DISTWIDTH] IN BLOOD BY AUTOMATED COUNT: 14.3 % (ref 11.6–15.1)
ERYTHROCYTE [DISTWIDTH] IN BLOOD BY AUTOMATED COUNT: 14.4 % (ref 11.6–15.1)
GFR SERPL CREATININE-BSD FRML MDRD: 109 ML/MIN/1.73SQ M
GLUCOSE SERPL-MCNC: 115 MG/DL (ref 65–140)
HCT VFR BLD AUTO: 25.4 % (ref 36.5–49.3)
HCT VFR BLD AUTO: 27.7 % (ref 36.5–49.3)
HGB BLD-MCNC: 8 G/DL (ref 12–17)
HGB BLD-MCNC: 8.8 G/DL (ref 12–17)
IMM GRANULOCYTES # BLD AUTO: 0.13 THOUSAND/UL (ref 0–0.2)
IMM GRANULOCYTES # BLD AUTO: 0.18 THOUSAND/UL (ref 0–0.2)
IMM GRANULOCYTES NFR BLD AUTO: 1 % (ref 0–2)
IMM GRANULOCYTES NFR BLD AUTO: 1 % (ref 0–2)
LYMPHOCYTES # BLD AUTO: 0.69 THOUSANDS/ΜL (ref 0.6–4.47)
LYMPHOCYTES # BLD AUTO: 1.14 THOUSANDS/ΜL (ref 0.6–4.47)
LYMPHOCYTES NFR BLD AUTO: 4 % (ref 14–44)
LYMPHOCYTES NFR BLD AUTO: 7 % (ref 14–44)
MCH RBC QN AUTO: 29.3 PG (ref 26.8–34.3)
MCH RBC QN AUTO: 29.5 PG (ref 26.8–34.3)
MCHC RBC AUTO-ENTMCNC: 31.5 G/DL (ref 31.4–37.4)
MCHC RBC AUTO-ENTMCNC: 31.8 G/DL (ref 31.4–37.4)
MCV RBC AUTO: 92 FL (ref 82–98)
MCV RBC AUTO: 94 FL (ref 82–98)
MONOCYTES # BLD AUTO: 1.37 THOUSAND/ΜL (ref 0.17–1.22)
MONOCYTES # BLD AUTO: 1.52 THOUSAND/ΜL (ref 0.17–1.22)
MONOCYTES NFR BLD AUTO: 8 % (ref 4–12)
MONOCYTES NFR BLD AUTO: 8 % (ref 4–12)
NEUTROPHILS # BLD AUTO: 14.52 THOUSANDS/ΜL (ref 1.85–7.62)
NEUTROPHILS # BLD AUTO: 16.93 THOUSANDS/ΜL (ref 1.85–7.62)
NEUTS SEG NFR BLD AUTO: 84 % (ref 43–75)
NEUTS SEG NFR BLD AUTO: 87 % (ref 43–75)
NRBC BLD AUTO-RTO: 0 /100 WBCS
NRBC BLD AUTO-RTO: 0 /100 WBCS
PLATELET # BLD AUTO: 519 THOUSANDS/UL (ref 149–390)
PLATELET # BLD AUTO: 599 THOUSANDS/UL (ref 149–390)
PMV BLD AUTO: 9.2 FL (ref 8.9–12.7)
PMV BLD AUTO: 9.4 FL (ref 8.9–12.7)
POTASSIUM SERPL-SCNC: 3.4 MMOL/L (ref 3.5–5.3)
RBC # BLD AUTO: 2.71 MILLION/UL (ref 3.88–5.62)
RBC # BLD AUTO: 3 MILLION/UL (ref 3.88–5.62)
SODIUM SERPL-SCNC: 136 MMOL/L (ref 136–145)
WBC # BLD AUTO: 17.19 THOUSAND/UL (ref 4.31–10.16)
WBC # BLD AUTO: 19.35 THOUSAND/UL (ref 4.31–10.16)

## 2020-01-24 PROCEDURE — 80048 BASIC METABOLIC PNL TOTAL CA: CPT | Performed by: SURGERY

## 2020-01-24 PROCEDURE — 99024 POSTOP FOLLOW-UP VISIT: CPT | Performed by: SURGERY

## 2020-01-24 PROCEDURE — 71045 X-RAY EXAM CHEST 1 VIEW: CPT

## 2020-01-24 PROCEDURE — 85025 COMPLETE CBC W/AUTO DIFF WBC: CPT | Performed by: SURGERY

## 2020-01-24 PROCEDURE — 87040 BLOOD CULTURE FOR BACTERIA: CPT | Performed by: SURGERY

## 2020-01-24 PROCEDURE — 97110 THERAPEUTIC EXERCISES: CPT

## 2020-01-24 PROCEDURE — 97530 THERAPEUTIC ACTIVITIES: CPT

## 2020-01-24 RX ORDER — SODIUM CHLORIDE, SODIUM LACTATE, POTASSIUM CHLORIDE, CALCIUM CHLORIDE 600; 310; 30; 20 MG/100ML; MG/100ML; MG/100ML; MG/100ML
50 INJECTION, SOLUTION INTRAVENOUS CONTINUOUS
Status: DISCONTINUED | OUTPATIENT
Start: 2020-01-24 | End: 2020-01-26

## 2020-01-24 RX ORDER — POTASSIUM CHLORIDE 20 MEQ/1
40 TABLET, EXTENDED RELEASE ORAL ONCE
Status: COMPLETED | OUTPATIENT
Start: 2020-01-24 | End: 2020-01-24

## 2020-01-24 RX ORDER — POTASSIUM CHLORIDE 29.8 MG/ML
40 INJECTION INTRAVENOUS ONCE
Status: COMPLETED | OUTPATIENT
Start: 2020-01-24 | End: 2020-01-24

## 2020-01-24 RX ORDER — MAGNESIUM CARB/ALUMINUM HYDROX 105-160MG
296 TABLET,CHEWABLE ORAL ONCE
Status: COMPLETED | OUTPATIENT
Start: 2020-01-24 | End: 2020-01-24

## 2020-01-24 RX ADMIN — METRONIDAZOLE 500 MG: 500 INJECTION, SOLUTION INTRAVENOUS at 02:43

## 2020-01-24 RX ADMIN — GABAPENTIN 300 MG: 300 CAPSULE ORAL at 08:27

## 2020-01-24 RX ADMIN — FOLIC ACID 1 MG: 1 TABLET ORAL at 08:27

## 2020-01-24 RX ADMIN — VANCOMYCIN HYDROCHLORIDE 1250 MG: 10 INJECTION, POWDER, LYOPHILIZED, FOR SOLUTION INTRAVENOUS at 12:59

## 2020-01-24 RX ADMIN — THIAMINE HCL TAB 100 MG 100 MG: 100 TAB at 08:27

## 2020-01-24 RX ADMIN — NICOTINE 1 PATCH: 14 PATCH TRANSDERMAL at 08:28

## 2020-01-24 RX ADMIN — POTASSIUM CHLORIDE 40 MEQ: 29.8 INJECTION, SOLUTION INTRAVENOUS at 13:54

## 2020-01-24 RX ADMIN — POTASSIUM CHLORIDE 40 MEQ: 1500 TABLET, EXTENDED RELEASE ORAL at 08:26

## 2020-01-24 RX ADMIN — SODIUM CHLORIDE, SODIUM LACTATE, POTASSIUM CHLORIDE, AND CALCIUM CHLORIDE 125 ML/HR: .6; .31; .03; .02 INJECTION, SOLUTION INTRAVENOUS at 23:46

## 2020-01-24 RX ADMIN — FLUTICASONE PROPIONATE 2 SPRAY: 50 SPRAY, METERED NASAL at 08:28

## 2020-01-24 RX ADMIN — METOPROLOL TARTRATE 25 MG: 25 TABLET, FILM COATED ORAL at 08:26

## 2020-01-24 RX ADMIN — GABAPENTIN 300 MG: 300 CAPSULE ORAL at 21:29

## 2020-01-24 RX ADMIN — CEFEPIME HYDROCHLORIDE 2000 MG: 2 INJECTION, POWDER, FOR SOLUTION INTRAVENOUS at 16:54

## 2020-01-24 RX ADMIN — VANCOMYCIN HYDROCHLORIDE 1250 MG: 10 INJECTION, POWDER, LYOPHILIZED, FOR SOLUTION INTRAVENOUS at 00:40

## 2020-01-24 RX ADMIN — MIRTAZAPINE 15 MG: 15 TABLET, FILM COATED ORAL at 21:29

## 2020-01-24 RX ADMIN — HEPARIN SODIUM 5000 UNITS: 5000 INJECTION INTRAVENOUS; SUBCUTANEOUS at 13:55

## 2020-01-24 RX ADMIN — Medication 20 MG: at 19:01

## 2020-01-24 RX ADMIN — OXYCODONE HYDROCHLORIDE 10 MG: 10 TABLET ORAL at 08:32

## 2020-01-24 RX ADMIN — METRONIDAZOLE 500 MG: 500 INJECTION, SOLUTION INTRAVENOUS at 09:31

## 2020-01-24 RX ADMIN — DULOXETINE HYDROCHLORIDE 90 MG: 60 CAPSULE, DELAYED RELEASE ORAL at 08:26

## 2020-01-24 RX ADMIN — MELATONIN 1000 UNITS: at 08:26

## 2020-01-24 RX ADMIN — SODIUM CHLORIDE, SODIUM LACTATE, POTASSIUM CHLORIDE, AND CALCIUM CHLORIDE 125 ML/HR: .6; .31; .03; .02 INJECTION, SOLUTION INTRAVENOUS at 01:45

## 2020-01-24 RX ADMIN — ATORVASTATIN CALCIUM 40 MG: 40 TABLET, FILM COATED ORAL at 17:06

## 2020-01-24 RX ADMIN — ONDANSETRON 4 MG: 2 INJECTION INTRAMUSCULAR; INTRAVENOUS at 04:12

## 2020-01-24 RX ADMIN — MAGNESIUM CITRATE 296 ML: 1.75 LIQUID ORAL at 09:20

## 2020-01-24 RX ADMIN — HEPARIN SODIUM 5000 UNITS: 5000 INJECTION INTRAVENOUS; SUBCUTANEOUS at 05:26

## 2020-01-24 RX ADMIN — POLYETHYLENE GLYCOL 3350 17 G: 17 POWDER, FOR SOLUTION ORAL at 08:25

## 2020-01-24 RX ADMIN — SODIUM CHLORIDE, SODIUM LACTATE, POTASSIUM CHLORIDE, AND CALCIUM CHLORIDE 125 ML/HR: .6; .31; .03; .02 INJECTION, SOLUTION INTRAVENOUS at 12:58

## 2020-01-24 RX ADMIN — CHLORHEXIDINE GLUCONATE 0.12% ORAL RINSE 15 ML: 1.2 LIQUID ORAL at 21:29

## 2020-01-24 RX ADMIN — HEPARIN SODIUM 5000 UNITS: 5000 INJECTION INTRAVENOUS; SUBCUTANEOUS at 21:29

## 2020-01-24 RX ADMIN — Medication 20 MG: at 08:41

## 2020-01-24 RX ADMIN — POTASSIUM & SODIUM PHOSPHATES POWDER PACK 280-160-250 MG 1 PACKET: 280-160-250 PACK at 09:20

## 2020-01-24 RX ADMIN — GABAPENTIN 300 MG: 300 CAPSULE ORAL at 17:06

## 2020-01-24 RX ADMIN — QUETIAPINE FUMARATE 200 MG: 100 TABLET ORAL at 08:26

## 2020-01-24 RX ADMIN — METRONIDAZOLE 500 MG: 500 INJECTION, SOLUTION INTRAVENOUS at 19:02

## 2020-01-24 RX ADMIN — CEFEPIME HYDROCHLORIDE 2000 MG: 2 INJECTION, POWDER, FOR SOLUTION INTRAVENOUS at 04:12

## 2020-01-24 RX ADMIN — METOPROLOL TARTRATE 25 MG: 25 TABLET, FILM COATED ORAL at 21:29

## 2020-01-24 RX ADMIN — FLUTICASONE FUROATE AND VILANTEROL TRIFENATATE 1 PUFF: 100; 25 POWDER RESPIRATORY (INHALATION) at 08:28

## 2020-01-24 RX ADMIN — QUETIAPINE FUMARATE 600 MG: 300 TABLET ORAL at 19:03

## 2020-01-24 RX ADMIN — CHLORHEXIDINE GLUCONATE 0.12% ORAL RINSE 15 ML: 1.2 LIQUID ORAL at 08:25

## 2020-01-24 RX ADMIN — DOCUSATE SODIUM 100 MG: 100 CAPSULE, LIQUID FILLED ORAL at 08:27

## 2020-01-24 RX ADMIN — LORATADINE 10 MG: 10 TABLET ORAL at 08:26

## 2020-01-24 NOTE — PROGRESS NOTES
Progress Note - General Surgery   Meri Cleveland 81 y o  male MRN: [id number]  Unit/Bed#: [facility] 607-01 Encounter: [id number]    Assessment:  73-year-old male who presented with GI bleed from perforated duodenal ulcer, bleeding unable to be controlled endoscopically and with IR embolization now status post exploratory laparotomy, over-sewing of bleeding vessels, Thal patch repair with transverse colon, open gastrojejunostomy tube placement  Febrile overnight with emesis, nonbilious nonbloody  Concern for aspiration    Plan:  Continue NPO for now  Fluid resuscitation  Mobilize patient  Continue antibiotics  Follow-up blood cultures  Maintain CAMPBELL drains  Aggressive pulmonary toilet/incentive spirometry  Aspiration precautions  DVT prophylaxis    Subjective/Objective   Chief Complaint:     Subjective:  Patient had episodes of emesis overnight  Also with episode of melenic appearing stool  Febrile overnight  Patient at one point refused his scheduled antibiotics but later on became agreeable after discussion was Surgical team   Feels slightly better this morning  Objective:     Blood pressure 152/70, pulse 105, temperature 99 8 °F (37 7 °C), temperature source Oral, resp  rate 20, height 5' 8 5" (1 74 m), weight 80 1 kg (176 lb 9 4 oz), SpO2 92 %  ,Body mass index is 26 46 kg/m²  Intake/Output Summary (Last 24 hours) at 1/24/2020 0528  Last data filed at 1/23/2020 2258  Gross per 24 hour   Intake 642 ml   Output 1210 ml   Net -568 ml       Invasive Devices     Peripherally Inserted Central Catheter Line            PICC Line 47/63/18 Right Basilic 7 days          Drain            Closed/Suction Drain Right Abdomen Bulb 12 days    Closed/Suction Drain Right Abdomen Bulb 19 Fr  12 days    Gastrostomy/Enterostomy Gastrostomy-jejunostomy 22 Fr  LLQ 12 days                Physical Exam:   No acute distress  Slightly tachycardic to 100  Nonlabored respirations on 2 L nasal cannula  Abdomen soft, nondistended  Incision with stable erythema  GJ tube in place    CAMPBELL drain x2 with serous drainage    Lab, Imaging and other studies:  CBC:   Lab Results   Component Value Date    WBC 19 35 (H) 01/24/2020    HGB 8 8 (L) 01/24/2020    HCT 27 7 (L) 01/24/2020    MCV 92 01/24/2020     (H) 01/24/2020    MCH 29 3 01/24/2020    MCHC 31 8 01/24/2020    RDW 14 3 01/24/2020    MPV 9 2 01/24/2020    NRBC 0 01/24/2020   , CMP:   Lab Results   Component Value Date    SODIUM 139 01/23/2020    K 3 8 01/23/2020     01/23/2020    CO2 30 01/23/2020    BUN 8 01/23/2020    CREATININE 0 62 01/23/2020    CALCIUM 8 1 (L) 01/23/2020    EGFR 103 01/23/2020   , Coagulation: No results found for: PT, INR, APTT, Urinalysis: No results found for: COLORU, CLARITYU, SPECGRAV, PHUR, LEUKOCYTESUR, NITRITE, PROTEINUA, GLUCOSEU, KETONESU, BILIRUBINUR, BLOODU, Amylase: No results found for: AMYLASE, Lipase: No results found for: LIPASE  VTE Pharmacologic Prophylaxis: Sequential compression device (Venodyne)   VTE Mechanical Prophylaxis: sequential compression device

## 2020-01-24 NOTE — QUICK NOTE
Nurse-Patient-Provider rounds were completed with the patient's nurse today, Sultana Azul  We discussed the plan is to keep him NPO except for oral medications  Continue gastrostomy tube port to gravity  Tube feeds via J-tube port resumed at 20 mL/hour earlier today  Patient is tolerating it thus far without any change in his abdominal pain or bloating, and he has had no further nausea or vomiting since the G-tube port has been to gravity  Continue his current drains in monitor output  Monitor temperature curve and weight fever workup which is suspected to have been due to aspiration pneumonitis/pneumonia  Continue antibiotics for suspected aspiration pneumonitis/pneumonia  Maintain aspiration precautions  Continue to encourage aggressive incentive spirometer use and pulmonary hygiene  We reviewed all of the invasive devices/lines/telemetry orders   - Maintain G/J-tube for gastric venting and enteral nutrition   - Maintain CAMPBELL drains at this time  DVT Prophylaxis:  - Subcutaneous heparin and SCDs  Pain Assessment / Plan:  - Continue current analgesic regimen  Mobility Assessment / Plan:  - Activity as tolerated  - PT and OT evaluation and treatment as indicated  Goals / Barriers for discharge:  - Not yet appropriate for discharge secondary to fevers  - Case management following; case and discharge needs discussed  All questions and concerns were addressed  I spent greater than 19 minutes reviewing the plan with the patient and the nurse, and coordinating care for the day      Oralia Treadwell PA-C  1/24/2020 03:35 PM

## 2020-01-24 NOTE — SOCIAL WORK
Cm reviewed patient during care coordination rounds  Cm awaiting completion of Options letter by [place]  Cm informed in rounds that patient is not medically stable for dc today  Cm reviewed referrals sent and no accepting beds at this time  Cm will need to contact patient's son to obtain additional choices  Per discussion with patient's son, he is in agreement with additional referrals to [facility], [facility] [address], [place]  Cm sent referrals and awaiting responses

## 2020-01-24 NOTE — PLAN OF CARE
Problem: Potential for Falls  Goal: Patient will remain free of falls  Description  INTERVENTIONS:  - Assess patient frequently for physical needs  -  Identify cognitive and physical deficits and behaviors that affect risk of falls    -  [place] fall precautions as indicated by assessment   - Educate patient/family on patient safety including physical limitations  - Instruct patient to call for assistance with activity based on assessment  - Modify environment to reduce risk of injury  - Consider OT/PT consult to assist with strengthening/mobility  Outcome: Progressing     Problem: PAIN - ADULT  Goal: Verbalizes/displays adequate comfort level or baseline comfort level  Description  Interventions:  - Encourage patient to monitor pain and request assistance  - Assess pain using appropriate pain scale  - Administer analgesics based on type and severity of pain and evaluate response  - Implement non-pharmacological measures as appropriate and evaluate response  - Consider cultural and social influences on pain and pain management  - Notify physician/advanced practitioner if interventions unsuccessful or patient reports new pain  Outcome: Progressing     Problem: INFECTION - ADULT  Goal: Absence or prevention of progression during hospitalization  Description  INTERVENTIONS:  - Assess and monitor for signs and symptoms of infection  - Monitor lab/diagnostic results  - Monitor all insertion sites, i e  indwelling lines, tubes, and drains  - Monitor endotracheal if appropriate and nasal secretions for changes in amount and color  - [place] appropriate cooling/warming therapies per order  - Administer medications as ordered  - Instruct and encourage patient and family to use good hand hygiene technique  - Identify and instruct in appropriate isolation precautions for identified infection/condition  Outcome: Progressing  Goal: Absence of fever/infection during neutropenic period  Description  INTERVENTIONS:  - Monitor WBC    Outcome: Progressing     Problem: SAFETY ADULT  Goal: Maintain or return to baseline ADL function  Description  INTERVENTIONS:  -  Assess patient's ability to carry out ADLs; assess patient's baseline for ADL function and identify physical deficits which impact ability to perform ADLs (bathing, care of mouth/teeth, toileting, grooming, dressing, etc )  - Assess/evaluate cause of self-care deficits   - Assess range of motion  - Assess patient's mobility; develop plan if impaired  - Assess patient's need for assistive devices and provide as appropriate  - Encourage maximum independence but intervene and supervise when necessary  - Involve family in performance of ADLs  - Assess for home care needs following discharge   - Consider OT consult to assist with ADL evaluation and planning for discharge  - Provide patient education as appropriate  Outcome: Progressing  Goal: Maintain or return mobility status to optimal level  Description  INTERVENTIONS:  - Assess patient's baseline mobility status (ambulation, transfers, stairs, etc )    - Identify cognitive and physical deficits and behaviors that affect mobility  - Identify mobility aids required to assist with transfers and/or ambulation (gait belt, sit-to-stand, lift, walker, cane, etc )  - [place] fall precautions as indicated by assessment  - Record patient progress and toleration of activity level on Mobility SBAR; progress patient to next Phase/Stage  - Instruct patient to call for assistance with activity based on assessment  - Consider rehabilitation consult to assist with strengthening/weightbearing, etc   Outcome: Progressing     Problem: DISCHARGE PLANNING  Goal: Discharge to home or other facility with appropriate resources  Description  INTERVENTIONS:  - Identify barriers to discharge w/patient and caregiver  - Arrange for needed discharge resources and transportation as appropriate  - Identify discharge learning needs (meds, wound care, etc )  - Arrange for interpretive services to assist at discharge as needed  - Refer to Case Management Department for coordinating discharge planning if the patient needs post-hospital services based on physician/advanced practitioner order or complex needs related to functional status, cognitive ability, or social support system  Outcome: Progressing     Problem: Knowledge Deficit  Goal: Patient/family/caregiver demonstrates understanding of disease process, treatment plan, medications, and discharge instructions  Description  Complete learning assessment and assess knowledge base  Interventions:  - Provide teaching at level of understanding  - Provide teaching via preferred learning methods  Outcome: Progressing     Problem: Nutrition/Hydration-ADULT  Goal: Nutrient/Hydration intake appropriate for improving, restoring or maintaining nutritional needs  Description  Monitor and assess patient's nutrition/hydration status for malnutrition  Collaborate with interdisciplinary team and initiate plan and interventions as ordered  Monitor patient's weight and dietary intake as ordered or per policy  Utilize nutrition screening tool and intervene as necessary  Determine patient's food preferences and provide high-protein, high-caloric foods as appropriate       INTERVENTIONS:  - Monitor oral intake, urinary output, labs, and treatment plans  - Assess nutrition and hydration status and recommend course of action  - Evaluate amount of meals eaten  - Assist patient with eating if necessary   - Allow adequate time for meals  - Recommend/ encourage appropriate diets, oral nutritional supplements, and vitamin/mineral supplements  - Order, calculate, and assess calorie counts as needed  - Recommend, monitor, and adjust tube feedings and TPN/PPN based on assessed needs  - Assess need for intravenous fluids  - Provide specific nutrition/hydration education as appropriate  - Include patient/family/caregiver in decisions related to nutrition  Outcome: Progressing     Problem: SAFETY,RESTRAINT: NV/NON-SELF DESTRUCTIVE BEHAVIOR  Goal: Remains free of harm/injury (restraint for non violent/non self-detsructive behavior)  Description  INTERVENTIONS:  - Instruct patient/family regarding restraint use   - Assess and monitor physiologic and psychological status   - Provide interventions and comfort measures to meet assessed patient needs   - Identify and implement measures to help patient regain control  - Assess readiness for release of restraint   Outcome: Progressing  Goal: Returns to optimal restraint-free functioning  Description  INTERVENTIONS:  - Assess the patient's behavior and symptoms that indicate continued need for restraint  - Identify and implement measures to help patient regain control  - Assess readiness for release of restraint   Outcome: Progressing     Problem: Prexisting or High Potential for Compromised Skin Integrity  Goal: Skin integrity is maintained or improved  Description  INTERVENTIONS:  - Identify patients at risk for skin breakdown  - Assess and monitor skin integrity  - Assess and monitor nutrition and hydration status  - Monitor labs   - Assess for incontinence   - Turn and reposition patient  - Assist with mobility/ambulation  - Relieve pressure over bony prominences  - Avoid friction and shearing  - Provide appropriate hygiene as needed including keeping skin clean and dry  - Evaluate need for skin moisturizer/barrier cream  - Collaborate with interdisciplinary team   - Patient/family teaching  - Consider wound care consult   Outcome: Progressing

## 2020-01-24 NOTE — PROGRESS NOTES
Pt 's latest vitals at 2232 was temp 101 8 F axillary, hr 103 bpm, bp 170/94, respirations 17  Pt s bp at 2213 173/103  Pt  Is also throwing up and administered i v  Zofran  Pt  Is refusing medical treatment  Pt  refused all his scheduled bedtime medications which included 25 mg metoprolol  Pt  is refusing i v  antibiotic (Vancomyocin)  Pt  also has dark tarry stool  General Sx was paged multiple times and sent tigertext via [address] [place] [address] [place]  Will continue to monitor

## 2020-01-24 NOTE — PLAN OF CARE
Problem: PHYSICAL THERAPY ADULT  Goal: Performs mobility at highest level of function for planned discharge setting  See evaluation for individualized goals  Description  Treatment/Interventions: Functional transfer training, LE strengthening/ROM, Therapeutic exercise, Endurance training, Patient/family training, Bed mobility, Gait training  Equipment Recommended: Valentina Horn       See flowsheet documentation for full assessment, interventions and recommendations  Outcome: Progressing  Note:   Prognosis: Good  Problem List: Decreased strength, Decreased endurance, Impaired balance, Decreased mobility, Pain  Assessment: Patient lying supine in bed, agreeable to participate in therapy  He was able to roll with supervision for clean up, and sit EOB with min A x 10 minutes, stating he feels weak today  He was able to perform supine exercises with moderate muscle fatigue  He presents wtih low activtiy tolerance at this time, but is progressing slowly  He would continue to benefit from skilled PT to maximize functional independence  Barriers to Discharge: Inaccessible home environment     Recommendation: Post acute IP rehab     PT - OK to Discharge: Yes    See flowsheet documentation for full assessment

## 2020-01-24 NOTE — PHYSICAL THERAPY NOTE
Physical Therapy Progress Note        01/24/20 1414   Pain Assessment   Pain Assessment 0-10   Pain Score 8   Pain Type Acute pain   Pain Location Abdomen   Hospital Pain Intervention(s) Repositioned;Distraction   Response to Interventions Tolerated   Restrictions/Precautions   Weight Bearing Precautions Per Order No   Other Precautions Bed Alarm; Fall Risk;Pain   General   Chart Reviewed Yes   Family/Caregiver Present No   Cognition   Arousal/Participation Alert; Cooperative   Comments Patient is pleasant and cooperative throughout session   Bed Mobility   Rolling R 5  Supervision   Rolling L 5  Supervision   Supine to Sit 4  Minimal assistance   Additional items Assist x 1;Bedrails;HOB elevated; Increased time required;Verbal cues;LE management   Sit to Supine 4  Minimal assistance   Additional items Assist x 1;Bedrails; Increased time required;Verbal cues   Balance   Static Sitting Fair   Dynamic Sitting Fair -   Static Standing Poor +   Endurance Deficit   Endurance Deficit Yes   Endurance Deficit Description Pain   Activity Tolerance   Activity Tolerance Patient limited by pain   Nurse Made Aware Appropriate to see per RN   Exercises   Hip Flexion Supine;10 reps;AROM; Bilateral   Hip Abduction Supine;10 reps;AROM; Bilateral   Knee AROM Long Arc Quad Supine;10 reps;AROM; Bilateral   Assessment   Prognosis Good   Problem List Decreased strength;Decreased endurance; Impaired balance;Decreased mobility;Pain   Assessment Patient lying supine in bed, agreeable to participate in therapy  He was able to roll with supervision for clean up, and sit EOB with min A x 10 minutes, stating he feels weak today  He was able to perform supine exercises with moderate muscle fatigue  He presents wtih low activtiy tolerance at this time, but is progressing slowly  He would continue to benefit from skilled PT to maximize functional independence     Barriers to Discharge Inaccessible home environment   Goals   Patient Goals To rest   STG Expiration Date 01/30/20   PT Treatment Day 2   Plan   Treatment/Interventions LE strengthening/ROM; Therapeutic exercise; Endurance training;Bed mobility;Spoke to nursing   Progress Slow progress, decreased activity tolerance   PT Frequency   (3-5x a week)   Recommendation   Recommendation Post acute IP rehab   Equipment Recommended Walker  (RW)   PT - OK to Discharge Yes     Nonda Joe, PTA

## 2020-01-25 LAB
ANION GAP SERPL CALCULATED.3IONS-SCNC: 5 MMOL/L (ref 4–13)
BASOPHILS # BLD AUTO: 0.03 THOUSANDS/ΜL (ref 0–0.1)
BASOPHILS NFR BLD AUTO: 0 % (ref 0–1)
BUN SERPL-MCNC: 8 MG/DL (ref 5–25)
CALCIUM SERPL-MCNC: 8 MG/DL (ref 8.3–10.1)
CHLORIDE SERPL-SCNC: 107 MMOL/L (ref 100–108)
CO2 SERPL-SCNC: 24 MMOL/L (ref 21–32)
CREAT SERPL-MCNC: 0.54 MG/DL (ref 0.6–1.3)
EOSINOPHIL # BLD AUTO: 0.03 THOUSAND/ΜL (ref 0–0.61)
EOSINOPHIL NFR BLD AUTO: 0 % (ref 0–6)
ERYTHROCYTE [DISTWIDTH] IN BLOOD BY AUTOMATED COUNT: 14.6 % (ref 11.6–15.1)
GFR SERPL CREATININE-BSD FRML MDRD: 109 ML/MIN/1.73SQ M
GLUCOSE SERPL-MCNC: 125 MG/DL (ref 65–140)
HCT VFR BLD AUTO: 24 % (ref 36.5–49.3)
HGB BLD-MCNC: 7.6 G/DL (ref 12–17)
IMM GRANULOCYTES # BLD AUTO: 0.08 THOUSAND/UL (ref 0–0.2)
IMM GRANULOCYTES NFR BLD AUTO: 1 % (ref 0–2)
LYMPHOCYTES # BLD AUTO: 0.83 THOUSANDS/ΜL (ref 0.6–4.47)
LYMPHOCYTES NFR BLD AUTO: 8 % (ref 14–44)
MAGNESIUM SERPL-MCNC: 2 MG/DL (ref 1.6–2.6)
MCH RBC QN AUTO: 29.7 PG (ref 26.8–34.3)
MCHC RBC AUTO-ENTMCNC: 31.7 G/DL (ref 31.4–37.4)
MCV RBC AUTO: 94 FL (ref 82–98)
MONOCYTES # BLD AUTO: 1.21 THOUSAND/ΜL (ref 0.17–1.22)
MONOCYTES NFR BLD AUTO: 11 % (ref 4–12)
NEUTROPHILS # BLD AUTO: 8.84 THOUSANDS/ΜL (ref 1.85–7.62)
NEUTS SEG NFR BLD AUTO: 80 % (ref 43–75)
NRBC BLD AUTO-RTO: 0 /100 WBCS
PLATELET # BLD AUTO: 501 THOUSANDS/UL (ref 149–390)
PMV BLD AUTO: 9.4 FL (ref 8.9–12.7)
POTASSIUM SERPL-SCNC: 3.8 MMOL/L (ref 3.5–5.3)
RBC # BLD AUTO: 2.56 MILLION/UL (ref 3.88–5.62)
SODIUM SERPL-SCNC: 136 MMOL/L (ref 136–145)
WBC # BLD AUTO: 11.02 THOUSAND/UL (ref 4.31–10.16)

## 2020-01-25 PROCEDURE — 99024 POSTOP FOLLOW-UP VISIT: CPT | Performed by: SURGERY

## 2020-01-25 PROCEDURE — 85025 COMPLETE CBC W/AUTO DIFF WBC: CPT | Performed by: SURGERY

## 2020-01-25 PROCEDURE — 83735 ASSAY OF MAGNESIUM: CPT | Performed by: PHYSICIAN ASSISTANT

## 2020-01-25 PROCEDURE — 80048 BASIC METABOLIC PNL TOTAL CA: CPT | Performed by: SURGERY

## 2020-01-25 RX ADMIN — GABAPENTIN 300 MG: 300 CAPSULE ORAL at 09:02

## 2020-01-25 RX ADMIN — Medication 20 MG: at 18:15

## 2020-01-25 RX ADMIN — GABAPENTIN 300 MG: 300 CAPSULE ORAL at 15:57

## 2020-01-25 RX ADMIN — QUETIAPINE FUMARATE 600 MG: 300 TABLET ORAL at 21:00

## 2020-01-25 RX ADMIN — METOPROLOL TARTRATE 25 MG: 25 TABLET, FILM COATED ORAL at 21:04

## 2020-01-25 RX ADMIN — SODIUM CHLORIDE, SODIUM LACTATE, POTASSIUM CHLORIDE, AND CALCIUM CHLORIDE 50 ML/HR: .6; .31; .03; .02 INJECTION, SOLUTION INTRAVENOUS at 10:00

## 2020-01-25 RX ADMIN — MIRTAZAPINE 15 MG: 15 TABLET, FILM COATED ORAL at 21:01

## 2020-01-25 RX ADMIN — METRONIDAZOLE 500 MG: 500 INJECTION, SOLUTION INTRAVENOUS at 01:01

## 2020-01-25 RX ADMIN — CHLORHEXIDINE GLUCONATE 0.12% ORAL RINSE 15 ML: 1.2 LIQUID ORAL at 10:23

## 2020-01-25 RX ADMIN — CHLORHEXIDINE GLUCONATE 0.12% ORAL RINSE 15 ML: 1.2 LIQUID ORAL at 21:01

## 2020-01-25 RX ADMIN — FLUTICASONE FUROATE AND VILANTEROL TRIFENATATE 1 PUFF: 100; 25 POWDER RESPIRATORY (INHALATION) at 09:10

## 2020-01-25 RX ADMIN — ACETAMINOPHEN 650 MG: 325 TABLET ORAL at 16:56

## 2020-01-25 RX ADMIN — THIAMINE HCL TAB 100 MG 100 MG: 100 TAB at 09:02

## 2020-01-25 RX ADMIN — ACETAMINOPHEN 650 MG: 325 TABLET ORAL at 07:14

## 2020-01-25 RX ADMIN — LORATADINE 10 MG: 10 TABLET ORAL at 09:02

## 2020-01-25 RX ADMIN — OXYCODONE HYDROCHLORIDE 10 MG: 10 TABLET ORAL at 21:04

## 2020-01-25 RX ADMIN — METRONIDAZOLE 500 MG: 500 INJECTION, SOLUTION INTRAVENOUS at 09:07

## 2020-01-25 RX ADMIN — GABAPENTIN 300 MG: 300 CAPSULE ORAL at 21:01

## 2020-01-25 RX ADMIN — Medication 20 MG: at 09:13

## 2020-01-25 RX ADMIN — METOPROLOL TARTRATE 25 MG: 25 TABLET, FILM COATED ORAL at 09:02

## 2020-01-25 RX ADMIN — FLUTICASONE PROPIONATE 2 SPRAY: 50 SPRAY, METERED NASAL at 09:10

## 2020-01-25 RX ADMIN — CEFEPIME HYDROCHLORIDE 2000 MG: 2 INJECTION, POWDER, FOR SOLUTION INTRAVENOUS at 04:00

## 2020-01-25 RX ADMIN — OXYCODONE HYDROCHLORIDE 10 MG: 10 TABLET ORAL at 09:02

## 2020-01-25 RX ADMIN — VANCOMYCIN HYDROCHLORIDE 1250 MG: 10 INJECTION, POWDER, LYOPHILIZED, FOR SOLUTION INTRAVENOUS at 13:56

## 2020-01-25 RX ADMIN — ONDANSETRON 4 MG: 2 INJECTION INTRAMUSCULAR; INTRAVENOUS at 17:46

## 2020-01-25 RX ADMIN — HEPARIN SODIUM 5000 UNITS: 5000 INJECTION INTRAVENOUS; SUBCUTANEOUS at 05:15

## 2020-01-25 RX ADMIN — METRONIDAZOLE 500 MG: 500 INJECTION, SOLUTION INTRAVENOUS at 17:46

## 2020-01-25 RX ADMIN — CEFEPIME HYDROCHLORIDE 2000 MG: 2 INJECTION, POWDER, FOR SOLUTION INTRAVENOUS at 14:58

## 2020-01-25 RX ADMIN — OXYCODONE HYDROCHLORIDE 10 MG: 10 TABLET ORAL at 13:56

## 2020-01-25 RX ADMIN — HEPARIN SODIUM 5000 UNITS: 5000 INJECTION INTRAVENOUS; SUBCUTANEOUS at 13:57

## 2020-01-25 RX ADMIN — DULOXETINE HYDROCHLORIDE 90 MG: 60 CAPSULE, DELAYED RELEASE ORAL at 09:02

## 2020-01-25 RX ADMIN — HEPARIN SODIUM 5000 UNITS: 5000 INJECTION INTRAVENOUS; SUBCUTANEOUS at 21:01

## 2020-01-25 RX ADMIN — VANCOMYCIN HYDROCHLORIDE 1250 MG: 10 INJECTION, POWDER, LYOPHILIZED, FOR SOLUTION INTRAVENOUS at 01:39

## 2020-01-25 RX ADMIN — ATORVASTATIN CALCIUM 40 MG: 40 TABLET, FILM COATED ORAL at 15:56

## 2020-01-25 RX ADMIN — SODIUM CHLORIDE, SODIUM LACTATE, POTASSIUM CHLORIDE, AND CALCIUM CHLORIDE 50 ML/HR: .6; .31; .03; .02 INJECTION, SOLUTION INTRAVENOUS at 18:18

## 2020-01-25 RX ADMIN — FOLIC ACID 1 MG: 1 TABLET ORAL at 09:02

## 2020-01-25 RX ADMIN — NICOTINE 1 PATCH: 14 PATCH TRANSDERMAL at 09:03

## 2020-01-25 RX ADMIN — MELATONIN 1000 UNITS: at 09:03

## 2020-01-25 RX ADMIN — QUETIAPINE FUMARATE 200 MG: 100 TABLET ORAL at 09:02

## 2020-01-25 NOTE — PLAN OF CARE
Problem: Potential for Falls  Goal: Patient will remain free of falls  Description  INTERVENTIONS:  - Assess patient frequently for physical needs  -  Identify cognitive and physical deficits and behaviors that affect risk of falls    -  [place] fall precautions as indicated by assessment   - Educate patient/family on patient safety including physical limitations  - Instruct patient to call for assistance with activity based on assessment  - Modify environment to reduce risk of injury  - Consider OT/PT consult to assist with strengthening/mobility  Outcome: Progressing     Problem: PAIN - ADULT  Goal: Verbalizes/displays adequate comfort level or baseline comfort level  Description  Interventions:  - Encourage patient to monitor pain and request assistance  - Assess pain using appropriate pain scale  - Administer analgesics based on type and severity of pain and evaluate response  - Implement non-pharmacological measures as appropriate and evaluate response  - Consider cultural and social influences on pain and pain management  - Notify physician/advanced practitioner if interventions unsuccessful or patient reports new pain  Outcome: Progressing     Problem: INFECTION - ADULT  Goal: Absence or prevention of progression during hospitalization  Description  INTERVENTIONS:  - Assess and monitor for signs and symptoms of infection  - Monitor lab/diagnostic results  - Monitor all insertion sites, i e  indwelling lines, tubes, and drains  - Monitor endotracheal if appropriate and nasal secretions for changes in amount and color  - [place] appropriate cooling/warming therapies per order  - Administer medications as ordered  - Instruct and encourage patient and family to use good hand hygiene technique  - Identify and instruct in appropriate isolation precautions for identified infection/condition  Outcome: Progressing  Goal: Absence of fever/infection during neutropenic period  Description  INTERVENTIONS:  - Monitor WBC    Outcome: Progressing     Problem: SAFETY ADULT  Goal: Maintain or return to baseline ADL function  Description  INTERVENTIONS:  -  Assess patient's ability to carry out ADLs; assess patient's baseline for ADL function and identify physical deficits which impact ability to perform ADLs (bathing, care of mouth/teeth, toileting, grooming, dressing, etc )  - Assess/evaluate cause of self-care deficits   - Assess range of motion  - Assess patient's mobility; develop plan if impaired  - Assess patient's need for assistive devices and provide as appropriate  - Encourage maximum independence but intervene and supervise when necessary  - Involve family in performance of ADLs  - Assess for home care needs following discharge   - Consider OT consult to assist with ADL evaluation and planning for discharge  - Provide patient education as appropriate  Outcome: Progressing  Goal: Maintain or return mobility status to optimal level  Description  INTERVENTIONS:  - Assess patient's baseline mobility status (ambulation, transfers, stairs, etc )    - Identify cognitive and physical deficits and behaviors that affect mobility  - Identify mobility aids required to assist with transfers and/or ambulation (gait belt, sit-to-stand, lift, walker, cane, etc )  - [place] fall precautions as indicated by assessment  - Record patient progress and toleration of activity level on Mobility SBAR; progress patient to next Phase/Stage  - Instruct patient to call for assistance with activity based on assessment  - Consider rehabilitation consult to assist with strengthening/weightbearing, etc   Outcome: Progressing     Problem: DISCHARGE PLANNING  Goal: Discharge to home or other facility with appropriate resources  Description  INTERVENTIONS:  - Identify barriers to discharge w/patient and caregiver  - Arrange for needed discharge resources and transportation as appropriate  - Identify discharge learning needs (meds, wound care, etc )  - Arrange for interpretive services to assist at discharge as needed  - Refer to Case Management Department for coordinating discharge planning if the patient needs post-hospital services based on physician/advanced practitioner order or complex needs related to functional status, cognitive ability, or social support system  Outcome: Progressing     Problem: Knowledge Deficit  Goal: Patient/family/caregiver demonstrates understanding of disease process, treatment plan, medications, and discharge instructions  Description  Complete learning assessment and assess knowledge base  Interventions:  - Provide teaching at level of understanding  - Provide teaching via preferred learning methods  Outcome: Progressing     Problem: Nutrition/Hydration-ADULT  Goal: Nutrient/Hydration intake appropriate for improving, restoring or maintaining nutritional needs  Description  Monitor and assess patient's nutrition/hydration status for malnutrition  Collaborate with interdisciplinary team and initiate plan and interventions as ordered  Monitor patient's weight and dietary intake as ordered or per policy  Utilize nutrition screening tool and intervene as necessary  Determine patient's food preferences and provide high-protein, high-caloric foods as appropriate       INTERVENTIONS:  - Monitor oral intake, urinary output, labs, and treatment plans  - Assess nutrition and hydration status and recommend course of action  - Evaluate amount of meals eaten  - Assist patient with eating if necessary   - Allow adequate time for meals  - Recommend/ encourage appropriate diets, oral nutritional supplements, and vitamin/mineral supplements  - Order, calculate, and assess calorie counts as needed  - Recommend, monitor, and adjust tube feedings and TPN/PPN based on assessed needs  - Assess need for intravenous fluids  - Provide specific nutrition/hydration education as appropriate  - Include patient/family/caregiver in decisions related to nutrition  Outcome: Progressing     Problem: SAFETY,RESTRAINT: NV/NON-SELF DESTRUCTIVE BEHAVIOR  Goal: Remains free of harm/injury (restraint for non violent/non self-detsructive behavior)  Description  INTERVENTIONS:  - Instruct patient/family regarding restraint use   - Assess and monitor physiologic and psychological status   - Provide interventions and comfort measures to meet assessed patient needs   - Identify and implement measures to help patient regain control  - Assess readiness for release of restraint   Outcome: Progressing  Goal: Returns to optimal restraint-free functioning  Description  INTERVENTIONS:  - Assess the patient's behavior and symptoms that indicate continued need for restraint  - Identify and implement measures to help patient regain control  - Assess readiness for release of restraint   Outcome: Progressing     Problem: Prexisting or High Potential for Compromised Skin Integrity  Goal: Skin integrity is maintained or improved  Description  INTERVENTIONS:  - Identify patients at risk for skin breakdown  - Assess and monitor skin integrity  - Assess and monitor nutrition and hydration status  - Monitor labs   - Assess for incontinence   - Turn and reposition patient  - Assist with mobility/ambulation  - Relieve pressure over bony prominences  - Avoid friction and shearing  - Provide appropriate hygiene as needed including keeping skin clean and dry  - Evaluate need for skin moisturizer/barrier cream  - Collaborate with interdisciplinary team   - Patient/family teaching  - Consider wound care consult   Outcome: Progressing

## 2020-01-25 NOTE — PROGRESS NOTES
Progress Note - General Surgery   Karsten Rodrigues 81 y o  male MRN: [id number]  Unit/Bed#: [facility] 607-01 Encounter: [id number]    Assessment:  57-year-old male who presented with GI bleed from perforated duodenal ulcer, bleeding unable to be controlled endoscopically and with IR embolization now status post exploratory laparotomy, over-sewing of bleeding vessels, Thal patch repair with transverse colon, open gastrojejunostomy tube placement  Febrile overnight with emesis, nonbilious nonbloody  - N/V 1/23 with fevers and concern for aspiration - started on cefepime    Plan:  Restart PO diet in addition to Jevity being advanced back to goal  Drop IVF to 48  Mobilize patient  Continue antibiotics  Vanco for wound erythema and cefepime/flagyl for aspiration/PNA  Follow-up blood cultures  Maintain CAMPBELL drains  Aggressive pulmonary toilet/incentive spirometry  Aspiration precautions  DVT prophylaxis    Subjective/Objective   Chief Complaint:     Subjective:   No events overnight  No more nausea  Objective:     Blood pressure 160/83, pulse 93, temperature (!) 100 8 °F (38 2 °C), resp  rate 18, height 5' 8 5" (1 74 m), weight 78 6 kg (173 lb 4 5 oz), SpO2 93 %  ,Body mass index is 25 96 kg/m²  Intake/Output Summary (Last 24 hours) at 1/25/2020 0647  Last data filed at 1/25/2020 0507  Gross per 24 hour   Intake 150 ml   Output 816 ml   Net -666 ml       Invasive Devices     Peripherally Inserted Central Catheter Line            PICC Line 30/94/70 Right Basilic 8 days          Drain            Closed/Suction Drain Right Abdomen Bulb 13 days    Closed/Suction Drain Right Abdomen Bulb 19 Fr  13 days    Gastrostomy/Enterostomy Gastrostomy-jejunostomy 22 Fr  LLQ 13 days                Physical Exam:   No acute distress  Regular rate and rhythm  No resp distress on 3L  Abdomen soft nontender  Incisional erythema stable/decreased  CAMPBELL x2 with serous output 16 mL       Lab, Imaging and other studies:  CBC:   Lab Results Component Value Date    WBC 11 02 (H) 01/25/2020    HGB 7 6 (L) 01/25/2020    HCT 24 0 (L) 01/25/2020    MCV 94 01/25/2020     (H) 01/25/2020    MCH 29 7 01/25/2020    MCHC 31 7 01/25/2020    RDW 14 6 01/25/2020    MPV 9 4 01/25/2020    NRBC 0 01/25/2020   , CMP:   Lab Results   Component Value Date    SODIUM 136 01/25/2020    K 3 8 01/25/2020     01/25/2020    CO2 24 01/25/2020    BUN 8 01/25/2020    CREATININE 0 54 (L) 01/25/2020    CALCIUM 8 0 (L) 01/25/2020    EGFR 109 01/25/2020   , Coagulation: No results found for: PT, INR, APTT, Urinalysis: No results found for: COLORU, CLARITYU, SPECGRAV, PHUR, LEUKOCYTESUR, NITRITE, PROTEINUA, GLUCOSEU, KETONESU, BILIRUBINUR, BLOODU, Amylase: No results found for: AMYLASE, Lipase: No results found for: LIPASE  VTE Pharmacologic Prophylaxis: Heparin  VTE Mechanical Prophylaxis: sequential compression device

## 2020-01-26 ENCOUNTER — APPOINTMENT (INPATIENT)
Dept: RADIOLOGY | Facility: HOSPITAL | Age: 70
DRG: 326 | End: 2020-01-26
Payer: MEDICARE

## 2020-01-26 LAB
ANION GAP SERPL CALCULATED.3IONS-SCNC: 6 MMOL/L (ref 4–13)
ANISOCYTOSIS BLD QL SMEAR: PRESENT
BASOPHILS # BLD MANUAL: 0 THOUSAND/UL (ref 0–0.1)
BASOPHILS NFR MAR MANUAL: 0 % (ref 0–1)
BUN SERPL-MCNC: 11 MG/DL (ref 5–25)
CALCIUM SERPL-MCNC: 7.9 MG/DL (ref 8.3–10.1)
CHLORIDE SERPL-SCNC: 107 MMOL/L (ref 100–108)
CO2 SERPL-SCNC: 26 MMOL/L (ref 21–32)
CREAT SERPL-MCNC: 0.66 MG/DL (ref 0.6–1.3)
EOSINOPHIL # BLD MANUAL: 0 THOUSAND/UL (ref 0–0.4)
EOSINOPHIL NFR BLD MANUAL: 0 % (ref 0–6)
ERYTHROCYTE [DISTWIDTH] IN BLOOD BY AUTOMATED COUNT: 14.9 % (ref 11.6–15.1)
GFR SERPL CREATININE-BSD FRML MDRD: 100 ML/MIN/1.73SQ M
GLUCOSE SERPL-MCNC: 174 MG/DL (ref 65–140)
HCT VFR BLD AUTO: 26.7 % (ref 36.5–49.3)
HGB BLD-MCNC: 8.4 G/DL (ref 12–17)
LYMPHOCYTES # BLD AUTO: 0.46 THOUSAND/UL (ref 0.6–4.47)
LYMPHOCYTES # BLD AUTO: 4 % (ref 14–44)
MCH RBC QN AUTO: 29.3 PG (ref 26.8–34.3)
MCHC RBC AUTO-ENTMCNC: 31.5 G/DL (ref 31.4–37.4)
MCV RBC AUTO: 93 FL (ref 82–98)
MONOCYTES # BLD AUTO: 0.58 THOUSAND/UL (ref 0–1.22)
MONOCYTES NFR BLD: 5 % (ref 4–12)
NEUTROPHILS # BLD MANUAL: 10.51 THOUSAND/UL (ref 1.85–7.62)
NEUTS BAND NFR BLD MANUAL: 2 % (ref 0–8)
NEUTS SEG NFR BLD AUTO: 89 % (ref 43–75)
NRBC BLD AUTO-RTO: 0 /100 WBCS
PLATELET # BLD AUTO: 513 THOUSANDS/UL (ref 149–390)
PLATELET BLD QL SMEAR: ABNORMAL
PMV BLD AUTO: 9 FL (ref 8.9–12.7)
POLYCHROMASIA BLD QL SMEAR: PRESENT
POTASSIUM SERPL-SCNC: 3.3 MMOL/L (ref 3.5–5.3)
RBC # BLD AUTO: 2.87 MILLION/UL (ref 3.88–5.62)
RBC MORPH BLD: PRESENT
SODIUM SERPL-SCNC: 139 MMOL/L (ref 136–145)
WBC # BLD AUTO: 11.55 THOUSAND/UL (ref 4.31–10.16)

## 2020-01-26 PROCEDURE — 99024 POSTOP FOLLOW-UP VISIT: CPT | Performed by: SURGERY

## 2020-01-26 PROCEDURE — 85007 BL SMEAR W/DIFF WBC COUNT: CPT | Performed by: STUDENT IN AN ORGANIZED HEALTH CARE EDUCATION/TRAINING PROGRAM

## 2020-01-26 PROCEDURE — 74177 CT ABD & PELVIS W/CONTRAST: CPT

## 2020-01-26 PROCEDURE — 85027 COMPLETE CBC AUTOMATED: CPT | Performed by: STUDENT IN AN ORGANIZED HEALTH CARE EDUCATION/TRAINING PROGRAM

## 2020-01-26 PROCEDURE — 80048 BASIC METABOLIC PNL TOTAL CA: CPT | Performed by: STUDENT IN AN ORGANIZED HEALTH CARE EDUCATION/TRAINING PROGRAM

## 2020-01-26 RX ORDER — POTASSIUM CHLORIDE 29.8 MG/ML
40 INJECTION INTRAVENOUS ONCE
Status: COMPLETED | OUTPATIENT
Start: 2020-01-26 | End: 2020-01-26

## 2020-01-26 RX ORDER — METRONIDAZOLE 500 MG/1
500 TABLET ORAL EVERY 8 HOURS SCHEDULED
Status: DISCONTINUED | OUTPATIENT
Start: 2020-01-26 | End: 2020-02-04

## 2020-01-26 RX ADMIN — METRONIDAZOLE 500 MG: 500 INJECTION, SOLUTION INTRAVENOUS at 02:52

## 2020-01-26 RX ADMIN — HEPARIN SODIUM 5000 UNITS: 5000 INJECTION INTRAVENOUS; SUBCUTANEOUS at 21:13

## 2020-01-26 RX ADMIN — METOPROLOL TARTRATE 25 MG: 25 TABLET, FILM COATED ORAL at 08:31

## 2020-01-26 RX ADMIN — GABAPENTIN 300 MG: 300 CAPSULE ORAL at 15:54

## 2020-01-26 RX ADMIN — CHLORHEXIDINE GLUCONATE 0.12% ORAL RINSE 15 ML: 1.2 LIQUID ORAL at 21:12

## 2020-01-26 RX ADMIN — NICOTINE 1 PATCH: 14 PATCH TRANSDERMAL at 08:38

## 2020-01-26 RX ADMIN — GABAPENTIN 300 MG: 300 CAPSULE ORAL at 08:38

## 2020-01-26 RX ADMIN — VANCOMYCIN HYDROCHLORIDE 1250 MG: 10 INJECTION, POWDER, LYOPHILIZED, FOR SOLUTION INTRAVENOUS at 01:18

## 2020-01-26 RX ADMIN — CHLORHEXIDINE GLUCONATE 0.12% ORAL RINSE 15 ML: 1.2 LIQUID ORAL at 08:30

## 2020-01-26 RX ADMIN — ACETAMINOPHEN 650 MG: 325 TABLET ORAL at 08:43

## 2020-01-26 RX ADMIN — LORATADINE 10 MG: 10 TABLET ORAL at 08:30

## 2020-01-26 RX ADMIN — FLUTICASONE PROPIONATE 2 SPRAY: 50 SPRAY, METERED NASAL at 08:37

## 2020-01-26 RX ADMIN — CEFEPIME HYDROCHLORIDE 2000 MG: 2 INJECTION, POWDER, FOR SOLUTION INTRAVENOUS at 15:54

## 2020-01-26 RX ADMIN — OXYCODONE HYDROCHLORIDE 10 MG: 10 TABLET ORAL at 05:59

## 2020-01-26 RX ADMIN — MIRTAZAPINE 15 MG: 15 TABLET, FILM COATED ORAL at 21:12

## 2020-01-26 RX ADMIN — HEPARIN SODIUM 5000 UNITS: 5000 INJECTION INTRAVENOUS; SUBCUTANEOUS at 13:38

## 2020-01-26 RX ADMIN — METOPROLOL TARTRATE 25 MG: 25 TABLET, FILM COATED ORAL at 21:12

## 2020-01-26 RX ADMIN — FLUTICASONE FUROATE AND VILANTEROL TRIFENATATE 1 PUFF: 100; 25 POWDER RESPIRATORY (INHALATION) at 08:37

## 2020-01-26 RX ADMIN — QUETIAPINE FUMARATE 600 MG: 300 TABLET ORAL at 19:44

## 2020-01-26 RX ADMIN — DULOXETINE HYDROCHLORIDE 90 MG: 60 CAPSULE, DELAYED RELEASE ORAL at 08:31

## 2020-01-26 RX ADMIN — MELATONIN 1000 UNITS: at 08:31

## 2020-01-26 RX ADMIN — IOHEXOL 100 ML: 350 INJECTION, SOLUTION INTRAVENOUS at 23:42

## 2020-01-26 RX ADMIN — ACETAMINOPHEN 650 MG: 325 TABLET ORAL at 01:16

## 2020-01-26 RX ADMIN — THIAMINE HCL TAB 100 MG 100 MG: 100 TAB at 08:30

## 2020-01-26 RX ADMIN — QUETIAPINE FUMARATE 200 MG: 100 TABLET ORAL at 08:31

## 2020-01-26 RX ADMIN — VANCOMYCIN HYDROCHLORIDE 1250 MG: 10 INJECTION, POWDER, LYOPHILIZED, FOR SOLUTION INTRAVENOUS at 13:38

## 2020-01-26 RX ADMIN — OXYCODONE HYDROCHLORIDE 10 MG: 10 TABLET ORAL at 21:19

## 2020-01-26 RX ADMIN — HEPARIN SODIUM 5000 UNITS: 5000 INJECTION INTRAVENOUS; SUBCUTANEOUS at 05:59

## 2020-01-26 RX ADMIN — CEFEPIME HYDROCHLORIDE 2000 MG: 2 INJECTION, POWDER, FOR SOLUTION INTRAVENOUS at 03:42

## 2020-01-26 RX ADMIN — ATORVASTATIN CALCIUM 40 MG: 40 TABLET, FILM COATED ORAL at 15:54

## 2020-01-26 RX ADMIN — FOLIC ACID 1 MG: 1 TABLET ORAL at 08:31

## 2020-01-26 RX ADMIN — POTASSIUM CHLORIDE 40 MEQ: 400 INJECTION, SOLUTION INTRAVENOUS at 09:22

## 2020-01-26 RX ADMIN — GABAPENTIN 300 MG: 300 CAPSULE ORAL at 21:13

## 2020-01-26 RX ADMIN — OXYCODONE HYDROCHLORIDE 10 MG: 10 TABLET ORAL at 08:43

## 2020-01-26 RX ADMIN — METRONIDAZOLE 500 MG: 500 INJECTION, SOLUTION INTRAVENOUS at 11:08

## 2020-01-26 RX ADMIN — METRONIDAZOLE 500 MG: 500 TABLET, FILM COATED ORAL at 21:12

## 2020-01-26 NOTE — PROGRESS NOTES
Progress Note - General Surgery   Adrianna Hatch 81 y o  male MRN: [id number]  Unit/Bed#: [facility] 607-01 Encounter: [id number]    Assessment:  51-year-old male who presented with GI bleed from perforated duodenal ulcer, bleeding unable to be controlled endoscopically and with IR embolization now status post exploratory laparotomy, over-sewing of bleeding vessels, Thal patch repair with transverse colon, open gastrojejunostomy tube placement  Febrile overnight with emesis, nonbilious nonbloody  Plan:  Gtube was capped this AM  Diet as tolerating  Will advance to dysphagia diet  Continue TF at goal    Cont IVF at 50  Patient needs to be moved out of bed to chair all day and walking multiple times  Continue antibiotics  Vanco for wound erythema and cefepime/flagyl for aspiration/PNA  Follow-up blood cultures  Maintain CAMPBELL drains  Aggressive pulmonary toilet/incentive spirometry  DVT prophylaxis    Subjective/Objective   Chief Complaint:     Subjective:   Still with some nausea  Objective:     Blood pressure 153/90, pulse 102, temperature 100 4 °F (38 °C), resp  rate 20, height 5' 8 5" (1 74 m), weight 78 6 kg (173 lb 4 5 oz), SpO2 93 %  ,Body mass index is 25 96 kg/m²  Intake/Output Summary (Last 24 hours) at 1/26/2020 0643  Last data filed at 1/26/2020 0412  Gross per 24 hour   Intake 2230 67 ml   Output 925 ml   Net 1305 67 ml       Invasive Devices     Peripherally Inserted Central Catheter Line            PICC Line 08/55/96 Right Basilic 9 days          Drain            Closed/Suction Drain Right Abdomen Bulb 14 days    Closed/Suction Drain Right Abdomen Bulb 19 Fr  14 days    Gastrostomy/Enterostomy Gastrostomy-jejunostomy 22 Fr  LLQ 14 days                Physical Exam:   NAD  RRR  No resp distress on NC  Ab soft, NT, ND  Incisional erythema stable/decreased    CAMPBELL x2 with serous output    Lab, Imaging and other studies:  CBC:   Lab Results   Component Value Date    WBC 11 55 (H) 01/26/2020    HGB 8 4 (L) 01/26/2020    HCT 26 7 (L) 01/26/2020    MCV 93 01/26/2020     (H) 01/26/2020    MCH 29 3 01/26/2020    MCHC 31 5 01/26/2020    RDW 14 9 01/26/2020    MPV 9 0 01/26/2020   , CMP:   Lab Results   Component Value Date    SODIUM 139 01/26/2020    K 3 3 (L) 01/26/2020     01/26/2020    CO2 26 01/26/2020    BUN 11 01/26/2020    CREATININE 0 66 01/26/2020    CALCIUM 7 9 (L) 01/26/2020    EGFR 100 01/26/2020   , Coagulation: No results found for: PT, INR, APTT, Urinalysis: No results found for: COLORU, CLARITYU, SPECGRAV, PHUR, LEUKOCYTESUR, NITRITE, PROTEINUA, GLUCOSEU, KETONESU, BILIRUBINUR, BLOODU, Amylase: No results found for: AMYLASE, Lipase: No results found for: LIPASE  VTE Pharmacologic Prophylaxis: Heparin  VTE Mechanical Prophylaxis: sequential compression device

## 2020-01-26 NOTE — PROGRESS NOTES
Vancomycin IV Pharmacy-to-Dose Consultation    Lavinia Gamez is a 81 y o  male who is currently receiving vancomycin IV with management by the Pharmacy Consult service  Relevant clinical data and objective / subjective history reviewed  Vancomycin Assessment:  Indication: skin-soft tissue infection  Status: hemodynamically stable  Micro:   1/24 blood cx: NGTD x 48 hours  Procalcitonin: none ordered  Renal Function: creatinine stable around baseline ~0 5-0 6  Potential Nephrotoxicity Factors:  Medications: none at this time  Patient-Factors: elderly  Days of Therapy:   Current Dose: 1250 mg IV q12h previous 5 days  Goal Trough: 15-20 (appropriate for most indications)   Goal AUC(24h): 400-600  Last Level: none obtained      Vancomycin Plan:  New Dosing: continue with 1250 mg IV q12h  Next Level: trough 1/27 at 0030  Renal Function Monitoring: daily BMP and UOP assessment      Pharmacy will continue to follow closely for s/sx of nephrotoxicity, infusion reactions and appropriateness of therapy  BMP and CBC will be ordered per protocol  We will continue to follow the patient's culture results and clinical progress daily         Thank you,  Chucho Michele, PharmD, [facility] Pharmacist  [phone]

## 2020-01-26 NOTE — SOCIAL WORK
CM awaiting completion of Options letter by [place] for rehab placement  CM will continue to follow

## 2020-01-26 NOTE — NUTRITION
01/26/20 1521   Recommendations/Interventions   Interventions Diet: continued as ordered; Supplement initiate;EN continue as ordered;EN flush change (specify)  (Spoke with RN & pt  Encouraged po intake No appetite  Jtube feeding restarting -now meeting 100% kcal /pro needs  Per RD protocol adjusted PO supplements Brittanie Wakefield Ensure [employer]  Only had ice cream for lunch  Tolerating TF thus far  )   Nutrition Recommendations Continue diet as ordered; Tube Feeding Recommendation provided;Continue EN/PN as ordered  (Consider decreasing free H20 flushes via Jtube to 140ml Q4hrs for improved tolerance  Consider calorie count later in week for any TF adjustment considerations   )

## 2020-01-27 ENCOUNTER — APPOINTMENT (INPATIENT)
Dept: RADIOLOGY | Facility: HOSPITAL | Age: 70
DRG: 326 | End: 2020-01-27
Payer: MEDICARE

## 2020-01-27 PROBLEM — I95.9 HYPOTENSION: Status: RESOLVED | Noted: 2020-01-17 | Resolved: 2020-01-27

## 2020-01-27 PROBLEM — R57.8 HEMORRHAGIC SHOCK (HCC): Status: RESOLVED | Noted: 2020-01-13 | Resolved: 2020-01-27

## 2020-01-27 PROBLEM — G93.40 ENCEPHALOPATHY: Status: RESOLVED | Noted: 2020-01-17 | Resolved: 2020-01-27

## 2020-01-27 LAB
ANION GAP SERPL CALCULATED.3IONS-SCNC: 5 MMOL/L (ref 4–13)
BASOPHILS # BLD MANUAL: 0.08 THOUSAND/UL (ref 0–0.1)
BASOPHILS NFR MAR MANUAL: 1 % (ref 0–1)
BUN SERPL-MCNC: 8 MG/DL (ref 5–25)
CALCIUM SERPL-MCNC: 7.9 MG/DL (ref 8.3–10.1)
CHLORIDE SERPL-SCNC: 106 MMOL/L (ref 100–108)
CO2 SERPL-SCNC: 26 MMOL/L (ref 21–32)
CREAT SERPL-MCNC: 0.48 MG/DL (ref 0.6–1.3)
EOSINOPHIL # BLD MANUAL: 0.08 THOUSAND/UL (ref 0–0.4)
EOSINOPHIL NFR BLD MANUAL: 1 % (ref 0–6)
ERYTHROCYTE [DISTWIDTH] IN BLOOD BY AUTOMATED COUNT: 15.3 % (ref 11.6–15.1)
GFR SERPL CREATININE-BSD FRML MDRD: 114 ML/MIN/1.73SQ M
GLUCOSE SERPL-MCNC: 103 MG/DL (ref 65–140)
HCT VFR BLD AUTO: 24 % (ref 36.5–49.3)
HGB BLD-MCNC: 7.3 G/DL (ref 12–17)
LYMPHOCYTES # BLD AUTO: 0.55 THOUSAND/UL (ref 0.6–4.47)
LYMPHOCYTES # BLD AUTO: 7 % (ref 14–44)
MCH RBC QN AUTO: 29.1 PG (ref 26.8–34.3)
MCHC RBC AUTO-ENTMCNC: 30.4 G/DL (ref 31.4–37.4)
MCV RBC AUTO: 96 FL (ref 82–98)
MONOCYTES # BLD AUTO: 0.62 THOUSAND/UL (ref 0–1.22)
MONOCYTES NFR BLD: 8 % (ref 4–12)
NEUTROPHILS # BLD MANUAL: 6.48 THOUSAND/UL (ref 1.85–7.62)
NEUTS SEG NFR BLD AUTO: 83 % (ref 43–75)
NRBC BLD AUTO-RTO: 0 /100 WBCS
PLATELET # BLD AUTO: 498 THOUSANDS/UL (ref 149–390)
PLATELET BLD QL SMEAR: ABNORMAL
PMV BLD AUTO: 9.2 FL (ref 8.9–12.7)
POLYCHROMASIA BLD QL SMEAR: PRESENT
POTASSIUM SERPL-SCNC: 3.6 MMOL/L (ref 3.5–5.3)
RBC # BLD AUTO: 2.51 MILLION/UL (ref 3.88–5.62)
RBC MORPH BLD: PRESENT
SODIUM SERPL-SCNC: 137 MMOL/L (ref 136–145)
VANCOMYCIN TROUGH SERPL-MCNC: 14.9 UG/ML (ref 10–20)
WBC # BLD AUTO: 7.81 THOUSAND/UL (ref 4.31–10.16)

## 2020-01-27 PROCEDURE — 74022 RADEX COMPL AQT ABD SERIES: CPT

## 2020-01-27 PROCEDURE — 99152 MOD SED SAME PHYS/QHP 5/>YRS: CPT

## 2020-01-27 PROCEDURE — 87070 CULTURE OTHR SPECIMN AEROBIC: CPT | Performed by: RADIOLOGY

## 2020-01-27 PROCEDURE — 85007 BL SMEAR W/DIFF WBC COUNT: CPT | Performed by: STUDENT IN AN ORGANIZED HEALTH CARE EDUCATION/TRAINING PROGRAM

## 2020-01-27 PROCEDURE — 80048 BASIC METABOLIC PNL TOTAL CA: CPT | Performed by: STUDENT IN AN ORGANIZED HEALTH CARE EDUCATION/TRAINING PROGRAM

## 2020-01-27 PROCEDURE — 80202 ASSAY OF VANCOMYCIN: CPT | Performed by: SURGERY

## 2020-01-27 PROCEDURE — C1769 GUIDE WIRE: HCPCS

## 2020-01-27 PROCEDURE — 87185 SC STD ENZYME DETCJ PER NZM: CPT | Performed by: RADIOLOGY

## 2020-01-27 PROCEDURE — 87077 CULTURE AEROBIC IDENTIFY: CPT | Performed by: RADIOLOGY

## 2020-01-27 PROCEDURE — 85027 COMPLETE CBC AUTOMATED: CPT | Performed by: STUDENT IN AN ORGANIZED HEALTH CARE EDUCATION/TRAINING PROGRAM

## 2020-01-27 PROCEDURE — 99153 MOD SED SAME PHYS/QHP EA: CPT

## 2020-01-27 PROCEDURE — C1729 CATH, DRAINAGE: HCPCS

## 2020-01-27 PROCEDURE — 0D9630Z DRAINAGE OF STOMACH WITH DRAINAGE DEVICE, PERCUTANEOUS APPROACH: ICD-10-PCS | Performed by: RADIOLOGY

## 2020-01-27 PROCEDURE — 87076 CULTURE ANAEROBE IDENT EACH: CPT | Performed by: RADIOLOGY

## 2020-01-27 PROCEDURE — 99024 POSTOP FOLLOW-UP VISIT: CPT | Performed by: SURGERY

## 2020-01-27 PROCEDURE — 87186 SC STD MICRODIL/AGAR DIL: CPT | Performed by: RADIOLOGY

## 2020-01-27 PROCEDURE — 99152 MOD SED SAME PHYS/QHP 5/>YRS: CPT | Performed by: RADIOLOGY

## 2020-01-27 PROCEDURE — 49406 IMAGE CATH FLUID PERI/RETRO: CPT | Performed by: RADIOLOGY

## 2020-01-27 PROCEDURE — 3E03317 INTRODUCTION OF OTHER THROMBOLYTIC INTO PERIPHERAL VEIN, PERCUTANEOUS APPROACH: ICD-10-PCS | Performed by: SURGERY

## 2020-01-27 PROCEDURE — 87205 SMEAR GRAM STAIN: CPT | Performed by: RADIOLOGY

## 2020-01-27 PROCEDURE — 3E1038Z IRRIGATION OF SKIN AND MUCOUS MEMBRANES USING IRRIGATING SUBSTANCE, PERCUTANEOUS APPROACH: ICD-10-PCS | Performed by: SURGERY

## 2020-01-27 PROCEDURE — 49406 IMAGE CATH FLUID PERI/RETRO: CPT

## 2020-01-27 PROCEDURE — 87075 CULTR BACTERIA EXCEPT BLOOD: CPT | Performed by: RADIOLOGY

## 2020-01-27 RX ORDER — MIDAZOLAM HYDROCHLORIDE 2 MG/2ML
INJECTION, SOLUTION INTRAMUSCULAR; INTRAVENOUS CODE/TRAUMA/SEDATION MEDICATION
Status: COMPLETED | OUTPATIENT
Start: 2020-01-27 | End: 2020-01-27

## 2020-01-27 RX ORDER — FENTANYL CITRATE 50 UG/ML
INJECTION, SOLUTION INTRAMUSCULAR; INTRAVENOUS CODE/TRAUMA/SEDATION MEDICATION
Status: COMPLETED | OUTPATIENT
Start: 2020-01-27 | End: 2020-01-27

## 2020-01-27 RX ADMIN — Medication 20 MG: at 17:46

## 2020-01-27 RX ADMIN — FLUTICASONE FUROATE AND VILANTEROL TRIFENATATE 1 PUFF: 100; 25 POWDER RESPIRATORY (INHALATION) at 08:21

## 2020-01-27 RX ADMIN — METRONIDAZOLE 500 MG: 500 TABLET, FILM COATED ORAL at 13:51

## 2020-01-27 RX ADMIN — METRONIDAZOLE 500 MG: 500 TABLET, FILM COATED ORAL at 21:39

## 2020-01-27 RX ADMIN — HEPARIN SODIUM 5000 UNITS: 5000 INJECTION INTRAVENOUS; SUBCUTANEOUS at 13:51

## 2020-01-27 RX ADMIN — QUETIAPINE FUMARATE 600 MG: 300 TABLET ORAL at 21:40

## 2020-01-27 RX ADMIN — LORATADINE 10 MG: 10 TABLET ORAL at 08:21

## 2020-01-27 RX ADMIN — METRONIDAZOLE 500 MG: 500 TABLET, FILM COATED ORAL at 05:18

## 2020-01-27 RX ADMIN — CHLORHEXIDINE GLUCONATE 0.12% ORAL RINSE 15 ML: 1.2 LIQUID ORAL at 08:19

## 2020-01-27 RX ADMIN — CHLORHEXIDINE GLUCONATE 0.12% ORAL RINSE 15 ML: 1.2 LIQUID ORAL at 21:42

## 2020-01-27 RX ADMIN — ALTEPLASE 2 MG: 2.2 INJECTION, POWDER, LYOPHILIZED, FOR SOLUTION INTRAVENOUS at 05:21

## 2020-01-27 RX ADMIN — GABAPENTIN 300 MG: 300 CAPSULE ORAL at 21:39

## 2020-01-27 RX ADMIN — MELATONIN 1000 UNITS: at 08:21

## 2020-01-27 RX ADMIN — FOLIC ACID 1 MG: 1 TABLET ORAL at 08:21

## 2020-01-27 RX ADMIN — FENTANYL CITRATE 50 MCG: 50 INJECTION, SOLUTION INTRAMUSCULAR; INTRAVENOUS at 19:00

## 2020-01-27 RX ADMIN — METOPROLOL TARTRATE 25 MG: 25 TABLET, FILM COATED ORAL at 21:39

## 2020-01-27 RX ADMIN — GABAPENTIN 300 MG: 300 CAPSULE ORAL at 15:15

## 2020-01-27 RX ADMIN — QUETIAPINE FUMARATE 200 MG: 100 TABLET ORAL at 08:20

## 2020-01-27 RX ADMIN — VANCOMYCIN HYDROCHLORIDE 1250 MG: 10 INJECTION, POWDER, LYOPHILIZED, FOR SOLUTION INTRAVENOUS at 01:18

## 2020-01-27 RX ADMIN — HEPARIN SODIUM 5000 UNITS: 5000 INJECTION INTRAVENOUS; SUBCUTANEOUS at 05:18

## 2020-01-27 RX ADMIN — OXYCODONE HYDROCHLORIDE 10 MG: 10 TABLET ORAL at 15:15

## 2020-01-27 RX ADMIN — Medication 20 MG: at 08:20

## 2020-01-27 RX ADMIN — VANCOMYCIN HYDROCHLORIDE 1500 MG: 10 INJECTION, POWDER, LYOPHILIZED, FOR SOLUTION INTRAVENOUS at 20:19

## 2020-01-27 RX ADMIN — IOHEXOL 5 ML: 240 INJECTION, SOLUTION INTRATHECAL; INTRAVASCULAR; INTRAVENOUS; ORAL at 19:33

## 2020-01-27 RX ADMIN — THIAMINE HCL TAB 100 MG 100 MG: 100 TAB at 08:21

## 2020-01-27 RX ADMIN — MIRTAZAPINE 15 MG: 15 TABLET, FILM COATED ORAL at 21:39

## 2020-01-27 RX ADMIN — MIDAZOLAM 1 MG: 1 INJECTION INTRAMUSCULAR; INTRAVENOUS at 19:11

## 2020-01-27 RX ADMIN — NICOTINE 1 PATCH: 14 PATCH TRANSDERMAL at 08:20

## 2020-01-27 RX ADMIN — VANCOMYCIN HYDROCHLORIDE 1500 MG: 10 INJECTION, POWDER, LYOPHILIZED, FOR SOLUTION INTRAVENOUS at 08:20

## 2020-01-27 RX ADMIN — GABAPENTIN 300 MG: 300 CAPSULE ORAL at 08:20

## 2020-01-27 RX ADMIN — FENTANYL CITRATE 50 MCG: 50 INJECTION, SOLUTION INTRAMUSCULAR; INTRAVENOUS at 19:11

## 2020-01-27 RX ADMIN — OXYCODONE HYDROCHLORIDE 10 MG: 10 TABLET ORAL at 20:15

## 2020-01-27 RX ADMIN — CEFEPIME HYDROCHLORIDE 2000 MG: 2 INJECTION, POWDER, FOR SOLUTION INTRAVENOUS at 14:05

## 2020-01-27 RX ADMIN — MIDAZOLAM 2 MG: 1 INJECTION INTRAMUSCULAR; INTRAVENOUS at 18:59

## 2020-01-27 RX ADMIN — METOPROLOL TARTRATE 25 MG: 25 TABLET, FILM COATED ORAL at 08:21

## 2020-01-27 RX ADMIN — DULOXETINE HYDROCHLORIDE 90 MG: 60 CAPSULE, DELAYED RELEASE ORAL at 08:20

## 2020-01-27 RX ADMIN — CEFEPIME HYDROCHLORIDE 2000 MG: 2 INJECTION, POWDER, FOR SOLUTION INTRAVENOUS at 03:55

## 2020-01-27 RX ADMIN — HEPARIN SODIUM 5000 UNITS: 5000 INJECTION INTRAVENOUS; SUBCUTANEOUS at 21:42

## 2020-01-27 NOTE — SOCIAL WORK
CM awaiting completion of Options letter by [place] for rehab placement  CM will contact [facility] today for update on options status  CM will continue to follow

## 2020-01-27 NOTE — PROGRESS NOTES
Vancomycin Assessment    Rohan Vance is a 81 y o  male who is currently receiving vancomycin [id number] for skin-soft tissue infection     Relevant clinical data and objective history reviewed:  Creatinine   Date Value Ref Range Status   01/26/2020 0 66 0 60 - 1 30 mg/dL Final     Comment:     Standardized to IDMS reference method   01/25/2020 0 54 (L) 0 60 - 1 30 mg/dL Final     Comment:     Standardized to IDMS reference method   01/24/2020 0 53 (L) 0 60 - 1 30 mg/dL Final     Comment:     Standardized to IDMS reference method   02/08/2015 0 57 (L) 0 60 - 1 30 mg/dL Final     Comment:     Standardized to IDMS reference method   02/04/2015 0 49 (L) 0 60 - 1 30 mg/dL Final     Comment:     Standardized to IDMS reference method   01/29/2015 0 45 (L) 0 60 - 1 30 mg/dL Final     Comment:     Standardized to IDMS reference method     /68   Pulse 77   Temp 99 1 °F (37 3 °C)   Resp 15   Ht 5' 8 5" (1 74 m)   Wt 78 6 kg (173 lb 4 5 oz)   SpO2 97%   BMI 25 96 kg/m²   I/O last 3 completed shifts: In: 6518 8 [P O :1060; I V :1834 8; NG/GT:1000; IV Piggyback:800; Feedings:1824]  Out: 2046 [Urine:1375; Emesis/NG output:650; Drains:21]  Lab Results   Component Value Date/Time    BUN 11 01/26/2020 05:47 AM    BUN 11 02/08/2015 06:00 AM    WBC 11 55 (H) 01/26/2020 05:47 AM    WBC 5 72 02/08/2015 06:00 AM    HGB 8 4 (L) 01/26/2020 05:47 AM    HGB 10 9 (L) 02/08/2015 06:00 AM    HCT 26 7 (L) 01/26/2020 05:47 AM    HCT 32 8 (L) 02/08/2015 06:00 AM    MCV 93 01/26/2020 05:47 AM     (H) 02/08/2015 06:00 AM     (H) 01/26/2020 05:47 AM     02/08/2015 06:00 AM     Temp Readings from Last 3 Encounters:   01/26/20 99 1 °F (37 3 °C)   11/20/19 (!) 96 6 °F (35 9 °C)   10/23/19 98 °F (36 7 °C) (Temporal)     Vancomycin Days of Therapy: 6    Assessment/Plan  The patient is currently on vancomycin utilizing scheduled dosing  Baseline risks associated with therapy include: advanced age    The patient is receiving [id number] with the most recent vancomycin level being at steady-state and sub-therapeutic based on a goal of 15-20 (appropriate for most indications) ; therefore, after clinical evaluation will be changed to [id number]   Pharmacy will continue to follow closely for s/sx of nephrotoxicity, infusion reactions and appropriateness of therapy  BMP and CBC will be ordered per protocol  Plan for trough as patient approaches steady state, prior to the 5th  dose at [id number] on 1/29/20 Pharmacy will continue to follow the patients culture results and clinical progress daily      Ayan Morales, Pharmacist

## 2020-01-27 NOTE — ASSESSMENT & PLAN NOTE
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  CT scan of the abdomen and pelvis on 1/26/2020 demonstrated evidence of an intra-abdominal abscess in the lesser sac, with 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 due to fluid and air tracking up to the midline abdominal staples/incision, but no evidence of contrast extravasation from the bowel  Status post bedside upper midline wound exploration, irrigation, and wound packing with ostomy appliance application over the open wound on 01/27/2020 early AM   - Continue local wound care to midline incision with wound packing daily and wound appliance to monitor output  - Maintain current CAMPBELL drains x2  Continue to monitor output   - Continue current IV antibiotic regimen  - Await obstruction series to evaluate the bowel gas pattern and possible progression of oral contrast out of the stomach  - Continue NPO for now pending IR evaluation   - IR consult to evaluate for possible percutaneous drainage of abscess  - Allow patient to have clear liquid diet after IR evaluation  Also plan to resume tube feeds following IR evaluation  Will need to closely monitor for evidence of enterocutaneous fistula/further output from midline wound once tube feeds resumed  We will plan to leave gastrostomy port to gravity indefinitely    - Continue current analgesic regimen   - Continue to encourage patient to be out of bed and active as well as uses incentive spirometer with adequate pulmonary hygiene   - PT and OT evaluation and treatment as indicated  - No additional operative intervention anticipated at this time  - Maintain PICC line access in the event that the patient will require TPN administration

## 2020-01-27 NOTE — PROGRESS NOTES
Progress Note - Meri Cleveland 1950, 81 y o  male MRN: [id number]    Unit/Bed#: [facility] 607-01 Encounter: [id number]    Primary Care Provider: Bette Post DO   Date and time admitted to hospital: 1/8/2020  1:34 PM        * Duodenal ulcer  Assessment & Plan  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  CT scan of the abdomen and pelvis on 1/26/2020 demonstrated evidence of an intra-abdominal abscess in the lesser sac, with 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 due to fluid and air tracking up to the midline abdominal staples/incision, but no evidence of contrast extravasation from the bowel  Status post bedside upper midline wound exploration, irrigation, and wound packing with ostomy appliance application over the open wound on 01/27/2020 early AM   - Continue local wound care to midline incision with wound packing daily and wound appliance to monitor output   - Continue current IV antibiotic regimen  - Await obstruction series to evaluate the bowel gas pattern and possible progression of oral contrast out of the stomach  - Continue NPO for now pending IR evaluation   - IR consult to evaluate for possible percutaneous drainage of abscess  - Allow patient to have clear liquid diet after IR evaluation  Also plan to resume tube feeds following IR evaluation    Will need to closely monitor for evidence of enterocutaneous fistula/further output from midline wound once tube feeds resumed  We will plan to leave gastrostomy port to gravity indefinitely  - Continue current analgesic regimen   - Continue to encourage patient to be out of bed and active as well as uses incentive spirometer with adequate pulmonary hygiene   - PT and OT evaluation and treatment as indicated  - No additional operative intervention anticipated at this time  - Maintain PICC line access in the event that the patient will require TPN administration  Acute blood loss anemia  Assessment & Plan  - Acute blood loss anemia secondary to bleeding duodenal ulcer   - Management of duodenal ulcer as noted above  - No evidence of active or recurrent bleeding   - Monitor hemoglobin  Transfuse only as indicated going forward  Acute Care Surgery  Progress Note   Meri Cleveland 81 y o  male MRN: [id number]  Unit/Bed#: [facility] 607-01 Encounter: [id number]    Nurse-patient-provider rounds completed today with the patient's nurse, Felicia Simpson  Subjective/Objective     Subjective: Patient is feeling okay this morning  He notes minimal abdominal pain, denies any associated fever, chills, nausea, vomiting, chest pain, shortness of breath or difficulty breathing  He was able to get some sleep after his CT scan last night      Meds/Allergies   Medications Prior to Admission   Medication Sig Dispense Refill Last Dose    albuterol (PROVENTIL HFA,VENTOLIN HFA) 90 mcg/act inhaler Inhale 2 puffs every 4 (four) hours as needed for wheezing 1 Inhaler 2 Taking    amLODIPine (NORVASC) 10 mg tablet Take 1 tablet (10 mg total) by mouth daily Hold for SBP <110 90 tablet 1 Taking    aspirin (ECOTRIN LOW STRENGTH) 81 mg EC tablet Take 1 tablet (81 mg total) by mouth daily 90 tablet 1 Taking    atorvastatin (LIPITOR) 40 mg tablet TAKE ONE TABLET BY MOUTH ONCE DAILY 30 tablet 0 Taking    Cholecalciferol 1000 units tablet Take 1 tablet (1,000 Units total) by mouth daily 90 tablet 1 Taking    cyclobenzaprine (FLEXERIL) 10 mg tablet Take 1 tablet (10 mg total) by mouth 3 (three) times a day as needed for muscle spasms 30 tablet 0 Taking    diclofenac sodium (VOLTAREN) 50 mg EC tablet Take 1 tablet (50 mg total) by mouth 2 (two) times a day 60 tablet 1 Taking    DULoxetine (CYMBALTA) 30 mg delayed release capsule Take 3 capsules (90 mg total) by mouth daily 90 capsule 1 Taking    fluticasone (FLONASE) 50 mcg/act nasal spray 2 sprays into each nostril daily 18 2 mL 2 Taking    fluticasone-vilanterol (BREO ELLIPTA) 100-25 mcg/inh inhaler Inhale 1 puff daily Rinse mouth after use   1 Inhaler 3 Taking    folic acid (FOLVITE) 1 mg tablet Take 1 tablet (1 mg total) by mouth daily 90 tablet 1 Taking    gabapentin (NEURONTIN) 400 mg capsule TAKE ONE CAPSULE BY MOUTH THREE TIMES A DAY 90 capsule 0 Taking    guaiFENesin (MUCINEX) 600 mg 12 hr tablet Take 600 mg by mouth every 12 (twelve) hours as needed for cough   Taking    lidocaine (LIDODERM) 5 % Apply 1 patch topically daily Remove & Discard patch within 12 hours or as directed by MD 10 patch 0 Taking    menthol-zinc oxide (CALMOSEPTINE) 0 44-20 6 % OINT Apply topically 2 (two) times a day 71 g 0 Taking    metoprolol tartrate (LOPRESSOR) 50 mg tablet Take 1 tablet (50 mg total) by mouth daily 90 tablet 1 Taking    mirtazapine (REMERON) 15 mg tablet Take 1 tablet (15 mg total) by mouth daily at bedtime 30 tablet 1 Taking    nicotine (NICODERM CQ) 14 mg/24hr TD 24 hr patch Place 1 patch on the skin daily 28 patch 0 Taking    QUEtiapine (SEROquel) 200 mg tablet Take 1 tablet (200 mg total) by mouth daily 30 tablet 1 Taking    QUEtiapine (SEROquel) 300 mg tablet Take 2 tablets (600 mg total) by mouth daily at bedtime 60 tablet 1 Taking    senna-docusate sodium (SENOKOT S) 8 6-50 mg per tablet Take 1 tablet by mouth 2 (two) times a day 180 tablet 1 Taking    tamsulosin (FLOMAX) 0 4 mg Take 1 capsule (0 4 mg total) by mouth daily with dinner 90 capsule 0 Taking    thiamine 100 MG tablet Take 1 tablet (100 mg total) by mouth daily 90 tablet 1 Taking    traZODone (DESYREL) 50 mg tablet Take 1 tablet (50 mg total) by mouth daily at bedtime as needed for sleep 30 tablet 1 Taking    Wound Dressings (COMFEEL PLUS ULCER DRESSING) PADS Apply 1 each topically every other day 10 each 0 Taking       Current Facility-Administered Medications:     acetaminophen (TYLENOL) tablet 650 mg, 650 mg, Oral, Q6H PRN, Taras Desai PA-C, 650 mg at 01/26/20 0843    atorvastatin (LIPITOR) tablet 40 mg, 40 mg, Oral, Daily With Dinner, Taras Desai PA-C, 40 mg at 01/26/20 1554    cefepime (MAXIPIME) 2,000 mg in dextrose 5 % 50 mL IVPB, 2,000 mg, Intravenous, Q12H, Viet Montiel MD, Stopped at 01/27/20 0425    chlorhexidine (PERIDEX) 0 12 % oral rinse 15 mL, 15 mL, Swish & Spit, Q12H [address], Jaime Salas PA-C, 15 mL at 01/27/20 9283    cholecalciferol (VITAMIN D3) tablet 1,000 Units, 1,000 Units, Oral, Daily, Taras Desai PA-C, 1,000 Units at 01/27/20 1149    docusate sodium (COLACE) capsule 100 mg, 100 mg, Oral, BID, Taras Desai PA-C, Stopped at 01/26/20 1800    DULoxetine (CYMBALTA) delayed release capsule 90 mg, 90 mg, Oral, Daily, Taras Desai PA-C, 90 mg at 01/27/20 0820    fluticasone (FLONASE) 50 mcg/act nasal spray 2 spray, 2 spray, Nasal, Daily, Taras Desai PA-C, 2 spray at 01/26/20 0837    fluticasone-vilanterol (BREO ELLIPTA) 100-25 mcg/inh inhaler 1 puff, 1 puff, Inhalation, Daily, Taras Desai PA-C, 1 puff at 30/74/09 7426    folic acid (FOLVITE) tablet 1 mg, 1 mg, Oral, Daily, Taras Desai PA-C, 1 mg at 01/27/20 6520    gabapentin (NEURONTIN) capsule 300 mg, 300 mg, Oral, TID, Taras Desai PA-C, 300 mg at 01/27/20 0820    guaiFENesin (MUCINEX) 12 hr tablet 600 mg, 600 mg, Oral, Q12H PRN, Taras Desai PA-C, 600 mg at 01/15/20 8034    heparin (porcine) subcutaneous injection 5,000 Units, 5,000 Units, Subcutaneous, Q8H [address], Taras Desai PA-C, 5,000 Units at 01/27/20 0518    iohexol (OMNIPAQUE) 240 MG/ML solution 50 mL, 50 mL, Other, 90 min pre-procedure, Gisella Angel MD    loratadine (CLARITIN) tablet 10 mg, 10 mg, Oral, Daily, Taras Desai PA-C, 10 mg at 01/27/20 2261    metoprolol tartrate (LOPRESSOR) tablet 25 mg, 25 mg, Oral, Q12H [address], Taras Desai PA-C, 25 mg at 01/27/20 3649    metroNIDAZOLE (FLAGYL) tablet 500 mg, 500 mg, Oral, Q8H [address], Batool Ballesteros MD, 500 mg at 01/27/20 0518    mirtazapine (REMERON) tablet 15 mg, 15 mg, Oral, HS, Taras Desai PA-C, 15 mg at 01/26/20 2112    nicotine (NICODERM CQ) 14 mg/24hr TD 24 hr patch 1 patch, 1 patch, Transdermal, Daily, Taras Desai PA-C, 1 patch at 01/27/20 0820    omeprazole (PRILOSEC) suspension 2 mg/mL, 20 mg, Oral, BID, Taras Desai PA-C, 20 mg at 01/27/20 0820    ondansetron [facility]) injection 4 mg, 4 mg, Intravenous, Q6H PRN, Viry Gonzáles DO, 4 mg at 01/25/20 1746    oxyCODONE (ROXICODONE) immediate release tablet 10 mg, 10 mg, Oral, Q4H PRN, Taras Desai PA-C, 10 mg at 01/26/20 2119    oxyCODONE (ROXICODONE) IR tablet 5 mg, 5 mg, Oral, Q4H PRN, Taras Desai PA-C, 5 mg at 01/19/20 0845    polyethylene glycol (MIRALAX) packet 17 g, 17 g, Oral, Daily, Erich Bravo MD, 17 g at 01/24/20 0825    QUEtiapine (SEROquel) tablet 200 mg, 200 mg, Oral, Daily, Taras Desai PA-C, 200 mg at 01/27/20 0820    QUEtiapine (SEROquel) tablet 600 mg, 600 mg, Oral, HS, Taras Desai PA-C, 600 mg at 01/26/20 1944    thiamine (VITAMIN B1) tablet 100 mg, 100 mg, Oral, Daily, Taras Desai PA-C, 100 mg at 01/27/20 6267    traZODone (DESYREL) tablet 50 mg, 50 mg, Oral, HS PRN, Taras Desai PA-C, 50 mg at 01/21/20 2303    vancomycin (VANCOCIN) 1,500 mg in sodium chloride 0 9 % 250 mL IVPB, 1,500 mg, Intravenous, Q12H, Reyna Sandra DO, Last Rate: 166 7 mL/hr at 01/27/20 0820, 1,500 mg at 20 0820    Objective:     Vitals:Blood pressure 128/60, pulse 89, temperature 99 5 °F (37 5 °C), resp  rate 18, height 5' 8 5" (1 74 m), weight 78 6 kg (173 lb 4 5 oz), SpO2 92 %  ,Body mass index is 25 96 kg/m²  SpO2: SpO2: 92 %    Temp (24hrs), Av 2 °F (37 3 °C), Min:98 8 °F (37 1 °C), Max:99 5 °F (37 5 °C)  Current: Temperature: 99 5 °F (37 5 °C)      Intake/Output Summary (Last 24 hours) at 2020 0930  Last data filed at 2020 0840  Gross per 24 hour   Intake 3678 ml   Output 2771 ml   Net 907 ml       Invasive Devices     Peripherally Inserted Central Catheter Line            PICC Line 64/58/81 Right Basilic 10 days          Drain            Closed/Suction Drain Right Abdomen Bulb 15 days    Closed/Suction Drain Right Abdomen Bulb 19 Fr  15 days    Gastrostomy/Enterostomy Gastrostomy-jejunostomy 22 Fr  LLQ 15 days                Nutrition/GI Proph/Bowel Reg:  NPO except for sips with medications; Prilosec; Colace  Physical Exam:     GENERAL APPEARANCE: Patient in no acute distress  HEENT: NCAT; PERRL, EOMs intact; Mucous membranes moist  CV: Regular rate and rhythm; + S1, S2; no murmur/gallops/rubs appreciated  LUNGS: Clear to auscultation; no wheezes/rales/rhonci  ABD: NABS; soft; non-distended; minimally tender around midline incision  The 2 CAMPBELL drains are draining clear serous fluid  The midline incision is open at the upper 3rd with packing in place and serous-appearing drainage in the overlying ostomy appliance  The remainder of the incision is mildly erythematous, but appears less erythematous than yesterday  The left abdominal wall gastrojejunostomy tube remains in place with no surrounding skin changes and minimal drainage from around the tube  EXT: +2 pulses bilaterally upper & lower extremities; no clubbing/cyanosis/edema  NEURO: GCS 15; no focal neurologic deficits; neurovascularly intact  SKIN: Warm, dry and well perfused; no rash; no jaundice  Lab Results:   Results: I have personally reviewed pertinent reports   , BMP/CMP:   Lab Results   Component Value Date    SODIUM 137 01/27/2020    K 3 6 01/27/2020     01/27/2020    CO2 26 01/27/2020    BUN 8 01/27/2020    CREATININE 0 48 (L) 01/27/2020    CALCIUM 7 9 (L) 01/27/2020    EGFR 114 01/27/2020   , CBC:   Lab Results   Component Value Date    WBC 7 81 01/27/2020    HGB 7 3 (L) 01/27/2020    HCT 24 0 (L) 01/27/2020    MCV 96 01/27/2020     (H) 01/27/2020    MCH 29 1 01/27/2020    MCHC 30 4 (L) 01/27/2020    RDW 15 3 (H) 01/27/2020    MPV 9 2 01/27/2020    NRBC 0 01/27/2020    and Coagulation: No results found for: PT, INR, APTT  Imaging/EKG Studies: Results: I have personally reviewed pertinent reports      Other Studies: N/A  VTE Prophylaxis: Sequential compression device (Venodyne)  and Heparin    Karen Martinez PA-C  1/27/2020

## 2020-01-27 NOTE — PHYSICIAN ADVISOR
Current patient class: Inpatient  The patient is currently on Hospital Day: 20      The patient was admitted to the hospital at [phone] on 1/9/20 for the following diagnosis:  Suicidal ideation [R45 851]  Dizziness [R42]  Alcohol abuse [F10 10]  Syncope [R55]  Chest pain [R07 9]     CMS OUTLIER STAY REVIEW    After review of the relevant documentation, labs, vital signs and test results, the patient is appropriate for CONTINUED INPATIENT ADMISSION  The patient continues to remain hospitalized receiving acute medical care  The patient has surpassed the expected duration of stay, however given the clinical condition, need for further acute care management, the patient is appropriate to remain in an inpatient status  The patient still being actively managed, and does have unresolved medical issues requiring further hospitalization  This review is conducted at 10 day intervals, to help satisfy the requirements for significant outlier stay review as per CMS  Given the current condition of this patient, the patient satisfies this review was determination for continued inpatient stay  Rationale is as follows: The patient is a 81 yrs old Male who presented to the ED at 1/8/2020  1:34 PM with a chief complaint of Syncope (Patient had dizziness for last few days, worse today, had an unwitnessed syncopal episode  Also states chest pain starting 730am this morning )  In severe hemorrhagic shock secondary to acute blood loss anemia from a penetrating duodenal ulcer and did have a exploratory laparotomy  Patient also has a history of alcohol abuse and was being monitored for withdrawal   Patient from the cardiovascular standpoint needed to be on pressors secondary to hypotension  On 1/11/2020 followed by the GDA embolization by interventional Radiology on 1/11/2020  Patient also had new drainage from the midline incision noted in the evening of 1/26/2020 with concern for enterocutaneous fistula    Patient still remains on IV antibiotics and has just been started on a clear liquid diet as well  Interventional Radiology was also consulted for possible percutaneous drainage of an abscess  Given all of the above in the on going inpatient acute medical care needing IV antibiotics and Interventional Radiology evaluation the patient still remains inpatient status appropriate      The patients vitals on arrival were ED Triage Vitals   Temperature Pulse Respirations Blood Pressure SpO2   01/08/20 1608 01/08/20 1346 01/08/20 1346 01/08/20 1346 01/08/20 1346   97 9 °F (36 6 °C) (!) 115 18 104/59 100 %      Temp Source Heart Rate Source Patient Position - Orthostatic VS BP Location FiO2 (%)   01/08/20 1608 01/08/20 1346 01/08/20 1346 01/08/20 1346 01/11/20 1415   Oral Monitor Lying Right arm 40      Pain Score       01/08/20 1346       8           Past Medical History:   Diagnosis Date    BPH (benign prostatic hyperplasia)     CVA [facility]cerebral vascular accident) ([facility] [address] )     Head injury     Hypertension     Metatarsal fracture     TIA (transient ischemic attack)      Past Surgical History:   Procedure Laterality Date    ABDOMINAL SURGERY      ANKLE SURGERY      BACK SURGERY      ELBOW SURGERY      IR VISCERAL ANGIOGRAPHY / INTERVENTION  1/11/2020    JOINT REPLACEMENT      KNEE SURGERY      LAPAROTOMY N/A 1/11/2020    Procedure: LAPAROTOMY EXPLORATORY,  OVERSEW  OF GASTRO DUODENAL ARTERY, THAL PATCH OF DUODENUM, VICKY GASTRO JEJUNOSTOMY TUBE;  Surgeon: Nia Clay DO;  Location: [facility];  Service: General    LIVER SURGERY             Consults have been placed to:   IP CONSULT TO CASE MANAGEMENT  IP CONSULT TO NUTRITION SERVICES  IP CONSULT TO PSYCHIATRY  IP CONSULT TO GASTROENTEROLOGY  IP CONSULT TO NEUROLOGY  IP CONSULT TO NEUROLOGY  IP CONSULT TO CASE MANAGEMENT  IP CONSULT TO ACUTE CARE SURGERY  IP CONSULT TO PICC TEAM  IP CONSULT TO PODIATRY  IP CONSULT TO PHARMACY    Vitals:    01/26/20 2228 01/27/20 0403 01/27/20 0726 01/27/20 1523   BP: 118/68 118/68 128/60 155/81   Pulse: 77 77 89    Resp: 15  18 20   Temp: 99 1 °F (37 3 °C) 99 3 °F (37 4 °C) 99 5 °F (37 5 °C) 98 8 °F (37 1 °C)   TempSrc:       SpO2: 97% 98% 92%    Weight:       Height:           Most recent labs:    Recent Labs     01/27/20  0527   WBC 7 81   HGB 7 3*   HCT 24 0*   *   K 3 6   CALCIUM 7 9*   BUN 8   CREATININE 0 48*       Scheduled Meds:  Current Facility-Administered Medications:  acetaminophen 650 mg Oral Q6H PRN Ebony Mcpherson PA-C    atorvastatin 40 mg Oral Daily With [employer] mccallum    cefepime 2,000 mg Intravenous Q12H Saskia Whitley MD Last Rate: Stopped (01/27/20 1433)   chlorhexidine 15 mL Swish & Spit Q12H [address] Deonte Leonardo    cholecalciferol 1,000 Units Oral Daily Ebony Mcpherson PA-C    docusate sodium 100 mg Oral BID Ebony Mcpherson PA-C    DULoxetine 90 mg Oral Daily Ebony Mcpherson PA-C    fluticasone 2 spray Nasal Daily Ebony Mcpherson PA-C    fluticasone-vilanterol 1 puff Inhalation Daily Jaime Rossi PA-C    folic acid 1 mg Oral Daily Ebony Mcpherson PA-C    gabapentin 300 mg Oral TID Ebony Mcpherson PA-C    guaiFENesin 600 mg Oral Q12H PRN Ebony Mcpherson PA-C    heparin (porcine) 5,000 Units Subcutaneous [facility] Jaime Rossi PA-C    iohexol 50 mL Other 90 min pre-procedure Radha Packer MD    loratadine 10 mg Oral Daily Ebony Mcpherson PA-C    metoprolol tartrate 25 mg Oral Q12H [address] Jaime Rossi PA-C    metroNIDAZOLE 500 mg Oral [facility] Corey Prince MD    mirtazapine 15 mg Oral HS Ebony Mcpherson PA-C    nicotine 1 patch Transdermal Daily Jaime Rossi PA-C    omeprazole (PRILOSEC) suspension 2 mg/mL 20 mg Oral BID Ebony Mcpherson PA-C    ondansetron 4 mg Intravenous Q6H PRN Hollis Welch DO    oxyCODONE 10 mg Oral Q4H PRN Ebony Mcpherson PA-C    oxyCODONE 5 mg Oral Q4H PRN Ebony Mcpherson PA-C    polyethylene glycol 17 g Oral Daily Javid Scott MD    QUEtiapine 200 mg Oral Daily Rod Lozano PA-C    QUEtiapine 600 mg Oral HS Rod Lozano PA-C    thiamine 100 mg Oral Daily Rod Lozano PA-C    traZODone 50 mg Oral HS PRN Rod Lozano PA-C    vancomycin 1,500 mg Intravenous Q12H Paulino Rodriguez DO Last Rate: 1,500 mg (01/27/20 0820)     Continuous Infusions:   PRN Meds:   acetaminophen    guaiFENesin    ondansetron    oxyCODONE    oxyCODONE    traZODone    Surgical procedures (if appropriate):  Procedure(s):  LAPAROTOMY EXPLORATORY,  OVERSEW  OF GASTRO DUODENAL ARTERY, THAL PATCH OF DUODENUM, VICKY GASTRO JEJUNOSTOMY TUBE

## 2020-01-27 NOTE — QUICK NOTE
Patient seen and examined after nurse notified me of concerning drainage from midline incision  The patient was found in his room resting comfortably  He was a little concerned about the drainage and asked what it could be from  He denied any new or worsening abdominal pain, fever, chills, diaphoresis, chest pain, shortness of breath, difficulty breathing and/or nausea or vomiting  On exam, he was afebrile with normal vital signs  His abdominal was soft, but mildly distended and mildly tender around incision  There was some erythema around his staple line  There was some thick brownish drainage coming from the upper 1/3 of the midline incision and some bilious-appearing drainage was also able to be expressed from the upper 1/3 of the incision; it appeared that the drainage was coming from between two staples in one isolated location  He skin was cleaned up and a 1-piece ostomy appliance was placed over the draining portion of his incision to control and measure the output as well as protect the surrounding skin  Due to concern for enterocutaneous fistula, the patient will be kept NPO, tube feeds will be held, gastric port should remain to gravity, and we will obtain a STAT CT scan of the abdomen and pelvis with PO & IV contrast  PO contrast to be given via gastric tube port      Daniele Ocampo PA-C  1/26/2020 09:44 PM

## 2020-01-27 NOTE — PROGRESS NOTES
Red Sx  BRIGITTE Mckeon notified pt mid abdomen incision found with moderate brownish drainage mucoid like  New order to hold tube feeding, give po contrast, maintain pt npo except med's and Ct of the Abdomen

## 2020-01-27 NOTE — ASSESSMENT & PLAN NOTE
- Acute blood loss anemia secondary to bleeding duodenal ulcer   - Management of duodenal ulcer as noted above  - No evidence of active or recurrent bleeding   - Monitor hemoglobin  Transfuse only as indicated going forward

## 2020-01-27 NOTE — SOCIAL WORK
CM received letter of nursing facility eligibility letter from the [facility]  CM will await [facility] letter

## 2020-01-27 NOTE — QUICK NOTE
I reviewed the CT scan personally, with Dr Duarte Call and with a radiologist  There appears to be an air-fluid collection in lesser sac consistent with abscess  This appears to track anteriorly passed transverse colon, mildly inflamed, toward abdominal wall  Also of note, there was a small left pleural effusion and some left lower lobe consolidation seen  I spoke with the patient regarding the above findings  I proceeded to remove 2 staples to further open his wound, irrigated the wound with NS (30 mL) until clear fluid returned and packed the wound with dry 1/2" Nugauze packing  I then placed a wound manager/1-piece ostomy appliance over the wound  Plan to obtain abdominal obstruction series in AM to evaluate for progression of oral contrast from stomach and further evaluate the LLL consolidation  Will also speak with IR regarding possible percutaneous drainage of abscess      Temo Crain PA-C  1/27/2020 01:10 AM

## 2020-01-28 LAB
ANION GAP SERPL CALCULATED.3IONS-SCNC: 6 MMOL/L (ref 4–13)
BASOPHILS # BLD MANUAL: 0.07 THOUSAND/UL (ref 0–0.1)
BASOPHILS NFR MAR MANUAL: 1 % (ref 0–1)
BUN SERPL-MCNC: 5 MG/DL (ref 5–25)
CALCIUM SERPL-MCNC: 8.4 MG/DL (ref 8.3–10.1)
CHLORIDE SERPL-SCNC: 107 MMOL/L (ref 100–108)
CO2 SERPL-SCNC: 28 MMOL/L (ref 21–32)
CREAT SERPL-MCNC: 0.5 MG/DL (ref 0.6–1.3)
EOSINOPHIL # BLD MANUAL: 0 THOUSAND/UL (ref 0–0.4)
EOSINOPHIL NFR BLD MANUAL: 0 % (ref 0–6)
ERYTHROCYTE [DISTWIDTH] IN BLOOD BY AUTOMATED COUNT: 15.3 % (ref 11.6–15.1)
GFR SERPL CREATININE-BSD FRML MDRD: 112 ML/MIN/1.73SQ M
GLUCOSE SERPL-MCNC: 90 MG/DL (ref 65–140)
HCT VFR BLD AUTO: 26 % (ref 36.5–49.3)
HGB BLD-MCNC: 7.9 G/DL (ref 12–17)
LYMPHOCYTES # BLD AUTO: 0.55 THOUSAND/UL (ref 0.6–4.47)
LYMPHOCYTES # BLD AUTO: 8 % (ref 14–44)
MCH RBC QN AUTO: 29 PG (ref 26.8–34.3)
MCHC RBC AUTO-ENTMCNC: 30.4 G/DL (ref 31.4–37.4)
MCV RBC AUTO: 96 FL (ref 82–98)
MONOCYTES # BLD AUTO: 0.69 THOUSAND/UL (ref 0–1.22)
MONOCYTES NFR BLD: 10 % (ref 4–12)
NEUTROPHILS # BLD MANUAL: 5.52 THOUSAND/UL (ref 1.85–7.62)
NEUTS SEG NFR BLD AUTO: 80 % (ref 43–75)
NRBC BLD AUTO-RTO: 0 /100 WBCS
PLATELET # BLD AUTO: 554 THOUSANDS/UL (ref 149–390)
PLATELET BLD QL SMEAR: ABNORMAL
PMV BLD AUTO: 9.2 FL (ref 8.9–12.7)
POLYCHROMASIA BLD QL SMEAR: PRESENT
POTASSIUM SERPL-SCNC: 3.7 MMOL/L (ref 3.5–5.3)
RBC # BLD AUTO: 2.72 MILLION/UL (ref 3.88–5.62)
RBC MORPH BLD: PRESENT
SODIUM SERPL-SCNC: 141 MMOL/L (ref 136–145)
VARIANT LYMPHS # BLD AUTO: 1 %
WBC # BLD AUTO: 6.9 THOUSAND/UL (ref 4.31–10.16)

## 2020-01-28 PROCEDURE — 85007 BL SMEAR W/DIFF WBC COUNT: CPT | Performed by: SURGERY

## 2020-01-28 PROCEDURE — 80048 BASIC METABOLIC PNL TOTAL CA: CPT | Performed by: SURGERY

## 2020-01-28 PROCEDURE — 85027 COMPLETE CBC AUTOMATED: CPT | Performed by: SURGERY

## 2020-01-28 PROCEDURE — 97168 OT RE-EVAL EST PLAN CARE: CPT

## 2020-01-28 PROCEDURE — 99231 SBSQ HOSP IP/OBS SF/LOW 25: CPT | Performed by: PHYSICIAN ASSISTANT

## 2020-01-28 PROCEDURE — 99024 POSTOP FOLLOW-UP VISIT: CPT | Performed by: SURGERY

## 2020-01-28 RX ORDER — LABETALOL 20 MG/4 ML (5 MG/ML) INTRAVENOUS SYRINGE
10 EVERY 6 HOURS PRN
Status: DISCONTINUED | OUTPATIENT
Start: 2020-01-28 | End: 2020-02-11 | Stop reason: HOSPADM

## 2020-01-28 RX ORDER — LABETALOL 20 MG/4 ML (5 MG/ML) INTRAVENOUS SYRINGE
10 EVERY 6 HOURS
Status: DISCONTINUED | OUTPATIENT
Start: 2020-01-28 | End: 2020-01-28

## 2020-01-28 RX ADMIN — VANCOMYCIN HYDROCHLORIDE 1500 MG: 10 INJECTION, POWDER, LYOPHILIZED, FOR SOLUTION INTRAVENOUS at 21:58

## 2020-01-28 RX ADMIN — LORATADINE 10 MG: 10 TABLET ORAL at 10:07

## 2020-01-28 RX ADMIN — OXYCODONE HYDROCHLORIDE 10 MG: 10 TABLET ORAL at 22:06

## 2020-01-28 RX ADMIN — VANCOMYCIN HYDROCHLORIDE 1500 MG: 10 INJECTION, POWDER, LYOPHILIZED, FOR SOLUTION INTRAVENOUS at 10:09

## 2020-01-28 RX ADMIN — OXYCODONE HYDROCHLORIDE 10 MG: 10 TABLET ORAL at 17:58

## 2020-01-28 RX ADMIN — GABAPENTIN 300 MG: 300 CAPSULE ORAL at 21:59

## 2020-01-28 RX ADMIN — METRONIDAZOLE 500 MG: 500 TABLET, FILM COATED ORAL at 06:01

## 2020-01-28 RX ADMIN — METOPROLOL TARTRATE 25 MG: 25 TABLET, FILM COATED ORAL at 10:07

## 2020-01-28 RX ADMIN — HEPARIN SODIUM 5000 UNITS: 5000 INJECTION INTRAVENOUS; SUBCUTANEOUS at 13:36

## 2020-01-28 RX ADMIN — MIRTAZAPINE 15 MG: 15 TABLET, FILM COATED ORAL at 21:59

## 2020-01-28 RX ADMIN — DULOXETINE HYDROCHLORIDE 90 MG: 60 CAPSULE, DELAYED RELEASE ORAL at 10:07

## 2020-01-28 RX ADMIN — Medication 20 MG: at 18:00

## 2020-01-28 RX ADMIN — OXYCODONE HYDROCHLORIDE 10 MG: 10 TABLET ORAL at 13:36

## 2020-01-28 RX ADMIN — CHLORHEXIDINE GLUCONATE 0.12% ORAL RINSE 15 ML: 1.2 LIQUID ORAL at 10:08

## 2020-01-28 RX ADMIN — GABAPENTIN 300 MG: 300 CAPSULE ORAL at 10:07

## 2020-01-28 RX ADMIN — FLUTICASONE FUROATE AND VILANTEROL TRIFENATATE 1 PUFF: 100; 25 POWDER RESPIRATORY (INHALATION) at 10:09

## 2020-01-28 RX ADMIN — QUETIAPINE FUMARATE 600 MG: 300 TABLET ORAL at 22:02

## 2020-01-28 RX ADMIN — HEPARIN SODIUM 5000 UNITS: 5000 INJECTION INTRAVENOUS; SUBCUTANEOUS at 06:01

## 2020-01-28 RX ADMIN — FOLIC ACID 1 MG: 1 TABLET ORAL at 10:08

## 2020-01-28 RX ADMIN — THIAMINE HCL TAB 100 MG 100 MG: 100 TAB at 10:07

## 2020-01-28 RX ADMIN — QUETIAPINE FUMARATE 200 MG: 100 TABLET ORAL at 10:06

## 2020-01-28 RX ADMIN — FLUTICASONE PROPIONATE 2 SPRAY: 50 SPRAY, METERED NASAL at 10:09

## 2020-01-28 RX ADMIN — CEFEPIME HYDROCHLORIDE 2000 MG: 2 INJECTION, POWDER, FOR SOLUTION INTRAVENOUS at 15:47

## 2020-01-28 RX ADMIN — CHLORHEXIDINE GLUCONATE 0.12% ORAL RINSE 15 ML: 1.2 LIQUID ORAL at 21:59

## 2020-01-28 RX ADMIN — NICOTINE 1 PATCH: 14 PATCH TRANSDERMAL at 10:09

## 2020-01-28 RX ADMIN — GABAPENTIN 300 MG: 300 CAPSULE ORAL at 15:47

## 2020-01-28 RX ADMIN — CEFEPIME HYDROCHLORIDE 2000 MG: 2 INJECTION, POWDER, FOR SOLUTION INTRAVENOUS at 04:18

## 2020-01-28 RX ADMIN — METRONIDAZOLE 500 MG: 500 TABLET, FILM COATED ORAL at 21:59

## 2020-01-28 RX ADMIN — METRONIDAZOLE 500 MG: 500 TABLET, FILM COATED ORAL at 13:36

## 2020-01-28 RX ADMIN — METOPROLOL TARTRATE 25 MG: 25 TABLET, FILM COATED ORAL at 21:59

## 2020-01-28 RX ADMIN — ATORVASTATIN CALCIUM 40 MG: 40 TABLET, FILM COATED ORAL at 17:59

## 2020-01-28 RX ADMIN — MELATONIN 1000 UNITS: at 10:07

## 2020-01-28 RX ADMIN — HEPARIN SODIUM 5000 UNITS: 5000 INJECTION INTRAVENOUS; SUBCUTANEOUS at 21:58

## 2020-01-28 RX ADMIN — Medication 20 MG: at 10:18

## 2020-01-28 NOTE — OCCUPATIONAL THERAPY NOTE
[address] RE-Evaluation     Patient Name: Adrianna COLLIER Date: 1/28/2020  Problem List  Principal Problem:    Duodenal ulcer  Active Problems:    Essential hypertension    Syncope    Chest pain    Suicidal ideation    Alcohol abuse    MDD (major depressive disorder)    History of CVA (cerebrovascular accident)    COPD without exacerbation (HCC)    Dyslipidemia    Chronic pain    Acute blood loss anemia    Tobacco abuse    Inadequate oral nutritional intake    Past Medical History  Past Medical History:   Diagnosis Date    BPH (benign prostatic hyperplasia)     CVA (cerebral vascular accident) ([address] )     Head injury     Hypertension     Metatarsal fracture     TIA (transient ischemic attack)      Past Surgical History  Past Surgical History:   Procedure Laterality Date    ABDOMINAL SURGERY      ANKLE SURGERY      BACK SURGERY      CT GUIDED [facility] DRAINAGE CATHETER PLACEMENT  1/27/2020    ELBOW SURGERY      IR VISCERAL ANGIOGRAPHY / INTERVENTION  1/11/2020    JOINT REPLACEMENT      KNEE SURGERY      LAPAROTOMY N/A 1/11/2020    Procedure: LAPAROTOMY EXPLORATORY,  OVERSEW  OF GASTRO DUODENAL ARTERY, THAL PATCH OF DUODENUM, VICKY GASTRO JEJUNOSTOMY TUBE;  Surgeon: Scottie Nunez DO;  Location: [facility];  Service: General    LIVER SURGERY             01/28/20 1640   Note Type   Note type Re-eval   Restrictions/Precautions   Other Precautions Cognitive;Multiple lines; Fall Risk;Pain   Pain Assessment   Pain Assessment 0-10   Pain Score 8   Pain Type Acute pain   Pain Location Abdomen   Patient's Stated Pain Goal No pain   Hospital Pain Intervention(s) Repositioned; Ambulation/increased activity; Distraction; Emotional support   Response to Interventions TOLERATED    Home Living   Type of [address] [place] [address] Two level;Stairs to enter with rails  (3 RENETTA; STAIR GLIDE TO 2ND FLOOR )   Bathroom Shower/Tub Tub/shower unit   Bathroom Toilet Standard   Bathroom Equipment Shower chair Bathroom Accessibility Accessible   Home Equipment Walker;Cane   Additional Comments RW ON 2ST FLOOR; SPC ON 2ND FLOOR  USE OF STAIR GLIDE TO 2ND    Prior Function   Level of [place] Independent with ADLs and functional mobility   Lives With Family; Shyam Paniagua Help From Family   ADL Assistance Independent   IADLs Needs assistance   Falls in the last 6 months 1 to 4   Vocational Retired   Lifestyle   Autonomy Pt reports being I w/ all ADLS and requires A from family w/ IADLS; (-) drives; ambulates short distances w/ RW and long distances w/ W/C  Reciprocal Relationships Pt lives w/ son and DIL  Pt's family works during the day  Service to Others Pt is retired   Intrinsic Gratification ENJOYS [address] 5  [address] [place] 4  [address] 2  Maximal [address] 4  [address] 2  Maximal [address]  2  Maximal [address] 2  Maximal Assistance   Bed Mobility   Supine to Sit 4  Minimal assistance   Additional items Assist x 1; Increased time required;Verbal cues   Transfers   Sit to Stand 4  Minimal assistance   Additional items Assist x 1; Increased time required;Verbal cues   Stand to Sit 4  Minimal assistance   Additional items Assist x 1; Increased time required;Verbal cues   Functional Mobility   Functional Mobility 4  Minimal assistance   Additional items Rolling walker   Balance   Static Sitting Fair +   Static Standing Fair -   Ambulatory Poor +   Activity Tolerance   Activity Tolerance Patient limited by pain; Patient limited by fatigue   Nurse Made Aware APPROPRIATE TO SEE PER MOE WYMAN Assessment   RUE Assessment   (GENERALIZED WEAKNESS )   LUE Assessment   LUE Assessment   (GENERALIZED WEAKNESS )   Cognition   Overall Cognitive Status Impaired Arousal/Participation Alert; Cooperative   Attention Attends with cues to redirect   Orientation Level Oriented X4   Memory Within functional limits   Following Commands Follows one step commands without difficulty   Comments PT IS OVERALL PLEASANT AND AGREEABLE TO PARTICIPATE WITH ENCOURAGEMENT  SLOW TO PROCESS/RESPOND  APPROPRIATE USE OF CALL DILLARD    Assessment   Limitation Decreased ADL status; Decreased Safe judgement during ADL;Decreased cognition;Decreased endurance;Decreased self-care trans;Decreased high-level ADLs   Prognosis Fair   Assessment 82 YO MALE SEEN FOR OT REEVAL 2' GOALS EXPIRING  SEE INITIAL OT EVAL FOR FURTHER DETAILS  PT CURRENTLY REQUIRES OVERALL MIN A WITH UB ADLS, MAX A WITH LB ADLS, AND MIN A WITH TRANSFERS / LIMITED FUNCTIONAL MOBILITY WITH USE OF RW  PT IS LIMITED 2' PAIN, FATIGUE, IMPAIRED BALANCE, FALL RISK , OVERALL WEAKNESS/DECONDITIONING , DIZZINESS WITH CHANGE OF POSITIONING , SLOW TO PROCESS/RESPOND, LIMITED INSIGHT, MULTIPLE LINES, INACCESSIBLE HOME ENVIRONMENT and OVERALL LIMITED ACTIVITY TOLERANCE  PT EDUCATED ON IMPORTANCE OF OOB ACTIVITY, DEEP BREATHING TECHNIQUES T/O ACTIVITY, SLOWING OF PACE and CONTINUE PARTICIPATION IN SELF-CARE/MOBILITY WITH STAFF WHILE IN THE HOSPITAL   FROM AN OCCUPATIONAL THERAPY PERSPECTIVE, CONT TO RECOMMEND INPT REHAB UPON D/C  INITIAL OT GOALS REMAIN APPROPRIATE- SEE INITIAL OT EVAL FOR GOALS  GOAL DATE EXTENDED +14 DAYS  WILL CONT TO FOLLOW  Goals   Patient Goals PT ONLY AGREEABLE TO GET TO CHAIR AT THIS TIME    LTG Time Frame 10-14   Long Term Goal #1 SEE INITIAL OT EVAL FOR GOALS  Plan   Treatment Interventions ADL retraining;Functional transfer training; Endurance training;Cognitive reorientation;Patient/family training;Equipment evaluation/education; Compensatory technique education; Energy conservation; Activityengagement   Goal Expiration Date 02/11/20   OT Treatment Day 2   OT Frequency 3-5x/wk   Recommendation   OT Discharge Recommendation Short Term Rehab   OT - OK to Discharge Yes   Modified Butler Scale   Modified Butler Scale 4       SEE INITIAL OT EVAL FOR OT GOALS       Documentation completed by Mark Plata, JENNIFER, OTR/L

## 2020-01-28 NOTE — PROGRESS NOTES
Progress Note -Interventional Radiology ALMA DELIA Bailey Deannesherlyn 81 y o  male MRN: [id number]  Unit/Bed#: [facility] 607-01 Encounter: [id number]    Assessment:    81year old male presenting with GI bleed from perforated duodenal ulcer s/p ex lap, oversewing of vessels, GJ placement s/p drain placement into lesser sack of abdominal abscess    Drain output 24 hours:  Documents 8cc with additional 10cc in drain    Plan:    - drain care- flush TID 10cc NS  Drain flushed easily today  Record output daily  - F/U final cultures from drain placement  Currently gram positive rods, gram positive cocci pairs, gram negative rods  - Please call IR with any questions  May need tube check when output from drain less than 10cc daily for 48 hours  Patient Active Problem List   Diagnosis    Major depressive disorder, recurrent, severe with psychotic features ([facility] )    Essential hypertension    Uncomplicated alcohol dependence ([facility] )    History of substance abuse ([facility] )    Encounter to establish care with new doctor    History of sustained ventricular tachycardia    Hypokinesia of left ventricle    Bilateral lower extremity edema    Tachycardia    History of transient ischemic attack (TIA)    Back pain without sciatica    History of cerebrovascular accident (CVA) with residual deficit    Elevated fasting glucose    Ambulatory dysfunction    Poor mobility    Overweight (BMI 25 0-29  9)    Vitamin D deficiency    Abnormal echocardiogram    Syncope    Chest pain    Suicidal ideation    Alcohol abuse    MDD (major depressive disorder)    History of CVA (cerebrovascular accident)    COPD without exacerbation ([facility] )    Dyslipidemia    Chronic pain    Acute blood loss anemia    Tobacco abuse    Inadequate oral nutritional intake    Duodenal ulcer          Subjective: Patient denies any discomfort to drain site  Does state he has mild diffuse abdominal pain  Drain 8cc recorded output  WBC 6 9      Objective:    Vitals:  BP 115/79   Pulse 92   Temp 99 8 °F (37 7 °C)   Resp 18   Ht 5' 8 5" (1 74 m)   Wt 78 6 kg (173 lb 4 5 oz)   SpO2 92%   BMI 25 96 kg/m²   Body mass index is 25 96 kg/m²  Weight (last 2 days)     None          I/Os:    Intake/Output Summary (Last 24 hours) at 1/28/2020 1402  Last data filed at 1/28/2020 1100  Gross per 24 hour   Intake 410 ml   Output 1158 ml   Net -748 ml       Invasive Devices     Peripherally Inserted Central Catheter Line            PICC Line 94/01/76 Right Basilic 11 days          Drain            Closed/Suction Drain Right Abdomen Bulb 16 days    Closed/Suction Drain Right Abdomen Bulb 19 Fr  16 days    Gastrostomy/Enterostomy Gastrostomy-jejunostomy 22 Fr   LLQ 16 days    Closed/Suction Drain Left Abdomen Bulb less than 1 day                Physical Exam:  General appearance: alert and oriented, in no acute distress  Lungs: clear to auscultation bilaterally  Heart: regular rate and rhythm, S1, S2 normal, no murmur, click, rub or gallop  Abdomen: soft, bowel sounds +, mild diffuse tenderness to palpation, CAMPBELL drain with dark brown output                Lab Results and Cultures:   CBC with diff:   Lab Results   Component Value Date    WBC 6 90 01/28/2020    HGB 7 9 (L) 01/28/2020    HCT 26 0 (L) 01/28/2020    MCV 96 01/28/2020     (H) 01/28/2020    MCH 29 0 01/28/2020    MCHC 30 4 (L) 01/28/2020    RDW 15 3 (H) 01/28/2020    MPV 9 2 01/28/2020    NRBC 0 01/28/2020      BMP/CMP:  Lab Results   Component Value Date     (L) 02/08/2015    K 3 7 01/28/2020    K 4 3 02/08/2015     01/28/2020    CL 98 (L) 02/08/2015    CO2 28 01/28/2020    CO2 34 (H) 01/16/2020    ANIONGAP 3 (L) 02/08/2015    BUN 5 01/28/2020    BUN 11 02/08/2015    CREATININE 0 50 (L) 01/28/2020    CREATININE 0 57 (L) 02/08/2015    GLUCOSE 120 01/16/2020    GLUCOSE 108 02/08/2015    CALCIUM 8 4 01/28/2020    CALCIUM 9 4 02/08/2015    AST 10 01/14/2020    AST 71 (H) 01/22/2015    ALT 7 (L) 01/14/2020    ALT 24 01/22/2015    ALKPHOS 57 01/14/2020    ALKPHOS 79 01/22/2015    PROT 7 2 01/22/2015    BILITOT 0 6 01/22/2015    EGFR 112 01/28/2020   ,     Coags:   Lab Results   Component Value Date    PTT 38 (H) 01/16/2020    INR 1 16 01/16/2020    INR 0 93 01/22/2015   ,          Lipid Panel: No results found for: CHOL  Lab Results   Component Value Date    HDL 29 (L) 01/11/2020     Lab Results   Component Value Date    HDL 29 (L) 01/11/2020     Lab Results   Component Value Date    LDLCALC 21 01/11/2020     Lab Results   Component Value Date    TRIG 119 01/11/2020       HgbA1c:   Lab Results   Component Value Date    HGBA1C 5 2 01/11/2020    HGBA1C 5 8 04/17/2019       Blood Culture:   Lab Results   Component Value Date    BLOODCX No Growth After 4 Days  01/24/2020    BLOODCX No Growth After 4 Days  01/24/2020   ,   Urinalysis:   Lab Results   Component Value Date    COLORU Andreina (A) 04/08/2019    COLORU Yellow 02/03/2015    CLARITYU Clear 04/08/2019    CLARITYU Clear 02/03/2015    SPECGRAV 1 025 04/08/2019    SPECGRAV 1 015 02/03/2015    PHUR 6 0 04/08/2019    PHUR 7 5 02/03/2015    LEUKOCYTESUR Negative 04/08/2019    LEUKOCYTESUR Negative 02/03/2015    NITRITE Negative 04/08/2019    NITRITE Negative 02/03/2015    PROTEINUA Negative 02/03/2015    GLUCOSEU Negative 04/08/2019    GLUCOSEU Negative 02/03/2015    KETONESU 15 (1+) (A) 04/08/2019    KETONESU Negative 02/03/2015    BILIRUBINUR 1+ (A) 04/08/2019    BILIRUBINUR Negative 02/03/2015    BLOODU Negative 04/08/2019    BLOODU Negative 02/03/2015   ,   Urine Culture: No results found for: URINECX,   Wound Culure:  No results found for: WOUNDCULT    VTE Pharmacologic Prophylaxis: Heparin      Thank you for allowing me to participate in the care of [facility]  Please don't hesitate to call, text, email, or TigerText with any questions  This text is generated with voice recognition software  There may be translation, syntax,  or grammatical errors   If you have any questions, please contact the dictating provider      Viet Wilcox PA-C

## 2020-01-28 NOTE — SEDATION DOCUMENTATION
Left abdominal drain tube 10 fr Kristi placed by Dr Sarah Fields without complications  Cultures sent as ordered  Bedrest start at [address]

## 2020-01-28 NOTE — PROGRESS NOTES
Progress Note - General Surgery   Candelaria Davidson 81 y o  male MRN: [id number]  Unit/Bed#: [facility] 607-01 Encounter: [id number]    Assessment:  44-year-old male who presented with GI bleed from perforated duodenal ulcer, bleeding unable to be controlled endoscopically and with IR embolization now status post exploratory laparotomy, over-sewing of bleeding vessels, Thal patch repair with transverse colon, open gastrojejunostomy tube placement  -IR drain placed 1/27    Plan:  - NPO  Can attempt to resume TF   - Cont IVF at 50  - Patient needs to be moved out of bed to chair all day and walking multiple times  - Continue antibiotics  Vanco for wound erythema and cefepime/flagyl for aspiration/PNA  - Follow-up blood cultures: NGTD  - Follow-up Abscess cultures: 4+ GPR, 2+ GPC, 1+ GNR  - Maintain drains  - Aggressive pulmonary toilet/incentive spirometry  - DVT prophylaxis    Subjective/Objective   Subjective:   Drain placed with IR yesterday  No acute events overnight  Objective:     Blood pressure 113/75, pulse 89, temperature 100 4 °F (38 °C), resp  rate 18, height 5' 8 5" (1 74 m), weight 78 6 kg (173 lb 4 5 oz), SpO2 91 %  ,Body mass index is 25 96 kg/m²  Intake/Output Summary (Last 24 hours) at 1/28/2020 0326  Last data filed at 1/28/2020 0041  Gross per 24 hour   Intake 300 ml   Output 2741 ml   Net -2441 ml       Invasive Devices     Peripherally Inserted Central Catheter Line            PICC Line 78/06/36 Right Basilic 11 days          Drain            Closed/Suction Drain Right Abdomen Bulb 16 days    Closed/Suction Drain Right Abdomen Bulb 19 Fr  16 days    Gastrostomy/Enterostomy Gastrostomy-jejunostomy 22 Fr   LLQ 16 days    Closed/Suction Drain Left Abdomen Bulb less than 1 day                Physical Exam:  Patient refused      Lab, Imaging and other studies:  CBC:   Lab Results   Component Value Date    WBC 7 81 01/27/2020    HGB 7 3 (L) 01/27/2020    HCT 24 0 (L) 01/27/2020    MCV 96 01/27/2020  (H) 01/27/2020    MCH 29 1 01/27/2020    MCHC 30 4 (L) 01/27/2020    RDW 15 3 (H) 01/27/2020    MPV 9 2 01/27/2020    NRBC 0 01/27/2020   , CMP:   Lab Results   Component Value Date    SODIUM 137 01/27/2020    K 3 6 01/27/2020     01/27/2020    CO2 26 01/27/2020    BUN 8 01/27/2020    CREATININE 0 48 (L) 01/27/2020    CALCIUM 7 9 (L) 01/27/2020    EGFR 114 01/27/2020   , Coagulation: No results found for: PT, INR, APTT, Urinalysis: No results found for: COLORU, CLARITYU, SPECGRAV, PHUR, LEUKOCYTESUR, NITRITE, PROTEINUA, GLUCOSEU, KETONESU, BILIRUBINUR, BLOODU, Amylase: No results found for: AMYLASE, Lipase: No results found for: LIPASE  VTE Pharmacologic Prophylaxis: Heparin  VTE Mechanical Prophylaxis: sequential compression device

## 2020-01-28 NOTE — QUICK NOTE
Nurse-Patient-Provider rounds were completed with the patient's nurse today, Susana Espinosa  We discussed the plan is to continue oral diet while monitoring output from midline abdominal wound as well as IR drain placed yesterday  If there is evidence of ongoing leak from prior duodenal perforation, will need to make patient NPO and proceed with alternative formal nutrition via tube feeds through his jejunostomy port  Continue current antibiotic regimen  Await culture results from fluid obtained with drain placement yesterday by IR  We reviewed all of the invasive devices/lines/telemetry orders   - Maintain current surgical an IR abdominal drains  DVT Prophylaxis:  - Subcutaneous heparin and SCDs  Pain Assessment / Plan:  - Continue current analgesic regimen  Mobility Assessment / Plan:  - Activity as tolerated  - Continue PT and OT evaluation and treatment as indicated  Goals / Barriers for discharge:  - Not yet appropriate for discharge while monitoring for toleration of oral diet  - Case management following; case and discharge needs discussed  All questions and concerns were addressed  I spent greater than 19 minutes reviewing the plan with the patient and the nurse, and coordinating care for the day      Jesus Christy PA-C  1/28/2020 01:58 PM

## 2020-01-28 NOTE — PROGRESS NOTES
Vancomycin IV Pharmacy-to-Dose Consultation    Vivi Cleveland is a 81 y o  male who is currently receiving Vancomycin IV with management by the Pharmacy Consult service  Assessment/Plan:  The patient was reviewed  Renal function is stable and no signs or symptoms of nephrotoxicity and/or infusion reactions were documented in the chart  Based on todays assessment, continue current vancomycin (day # 7) dosing of 1500mg iv q12h, with a plan for trough to be drawn at 0730 on 1/29/20  We will continue to follow the patients culture results and clinical progress daily      Jessy Benoit, Pharmacist

## 2020-01-28 NOTE — SOCIAL WORK
CM received letter from the [facility] and state stating patient is Nursing Facility Eligible  Patient not medically clear for discharge today  Patient has referrals out with no accepting facilities at this time  Patient requested referrals be sent to all of the rehab facilities in the patient's home area w/in 20 miles and he will choose from the accepting facilities  Referrals sent, CM will continue to follow

## 2020-01-28 NOTE — PHYSICAL THERAPY NOTE
Physical Therapy Cancellation Note    Attempted to see pt this PM   Pt refused all activity due to pain in abdomen & back as well as increased fatigue  Stated he will be agreeable tomorrow  Pt educated on importance of increased mobility at this time to prevent deconditioning & bed sores, but pt continued to refuse, keeping eyes closed while talking  Will continue to follow and treat as appropriate      Toma Leong, PTA

## 2020-01-28 NOTE — BRIEF OP NOTE (RAD/CATH)
CT GUIDED [facility] DRAINAGE CATHETER PLACEMENT Procedure Note    PATIENT NAME: Bonnie Ferguson  : 1950  MRN: [id number]    Pre-op Diagnosis:   1  Syncope    2  Chest pain    3  Suicidal ideation    4  Alcohol abuse    5  Difficult intravenous access    6  Gastrointestinal hemorrhage with melena    7  Slurred speech    8  CVA (cerebral vascular accident) ([address] )    9  Acute blood loss anemia    10  Hemorrhagic shock ([address] )    11  Duodenal ulcer    12  Onychomycosis      Post-op Diagnosis:   1  Syncope    2  Chest pain    3  Suicidal ideation    4  Alcohol abuse    5  Difficult intravenous access    6  Gastrointestinal hemorrhage with melena    7  Slurred speech    8  CVA (cerebral vascular accident) ([address] )    9  Acute blood loss anemia    10  Hemorrhagic shock ([address] )    11  Duodenal ulcer    12  Onychomycosis        Surgeon:   Jeff Bazan MD  Assistants:     No qualified resident was available, Resident is only observing    Estimated Blood Loss: none  Findings: 10 Fr pigtail drain placed into lesser sack abscess with CT guidance  8 ml of purulent fluid was aspirated      Specimens: abscess fluid for culture    Complications:  none    Anesthesia: Conscious sedation and Local    Jeff Bazan MD     Date: 2020  Time: 7:34 PM

## 2020-01-28 NOTE — PLAN OF CARE
Problem: OCCUPATIONAL THERAPY ADULT  Goal: Performs self-care activities at highest level of function for planned discharge setting  See evaluation for individualized goals  Description  Treatment Interventions: ADL retraining, Functional transfer training, UE strengthening/ROM, Endurance training, Cognitive reorientation, Patient/family training, Equipment evaluation/education, Fine motor coordination activities, Compensatory technique education, Activityengagement, Energy conservation          See flowsheet documentation for full assessment, interventions and recommendations  Outcome: Progressing  Note:   Limitation: Decreased ADL status, Decreased Safe judgement during ADL, Decreased cognition, Decreased endurance, Decreased self-care trans, Decreased high-level ADLs  Prognosis: Fair  Assessment: 70 YO MALE SEEN FOR OT REEVAL 2' GOALS EXPIRING  SEE INITIAL OT EVAL FOR FURTHER DETAILS  PT CURRENTLY REQUIRES OVERALL MIN A WITH UB ADLS, MAX A WITH LB ADLS, AND MIN A WITH TRANSFERS / LIMITED FUNCTIONAL MOBILITY WITH USE OF RW  PT IS LIMITED 2' PAIN, FATIGUE, IMPAIRED BALANCE, FALL RISK , OVERALL WEAKNESS/DECONDITIONING , DIZZINESS WITH CHANGE OF POSITIONING , SLOW TO PROCESS/RESPOND, LIMITED INSIGHT, MULTIPLE LINES, INACCESSIBLE HOME ENVIRONMENT and OVERALL LIMITED ACTIVITY TOLERANCE  PT EDUCATED ON IMPORTANCE OF OOB ACTIVITY, DEEP BREATHING TECHNIQUES T/O ACTIVITY, SLOWING OF PACE and CONTINUE PARTICIPATION IN SELF-CARE/MOBILITY WITH STAFF WHILE IN THE HOSPITAL   FROM AN OCCUPATIONAL THERAPY PERSPECTIVE, CONT TO RECOMMEND INPT REHAB UPON D/C  INITIAL OT GOALS REMAIN APPROPRIATE- SEE INITIAL OT EVAL FOR GOALS  GOAL DATE EXTENDED +14 DAYS  WILL CONT TO FOLLOW  OT Discharge Recommendation: Short Term Rehab  OT - OK to Discharge:  Yes

## 2020-01-29 LAB
ANION GAP SERPL CALCULATED.3IONS-SCNC: 7 MMOL/L (ref 4–13)
ANISOCYTOSIS BLD QL SMEAR: PRESENT
BACTERIA BLD CULT: NORMAL
BACTERIA BLD CULT: NORMAL
BACTERIA SPEC ANAEROBE CULT: ABNORMAL
BACTERIA SPEC ANAEROBE CULT: ABNORMAL
BASOPHILS # BLD MANUAL: 0 THOUSAND/UL (ref 0–0.1)
BASOPHILS NFR MAR MANUAL: 0 % (ref 0–1)
BUN SERPL-MCNC: 5 MG/DL (ref 5–25)
CALCIUM SERPL-MCNC: 8.5 MG/DL (ref 8.3–10.1)
CHLORIDE SERPL-SCNC: 103 MMOL/L (ref 100–108)
CO2 SERPL-SCNC: 28 MMOL/L (ref 21–32)
CREAT SERPL-MCNC: 0.65 MG/DL (ref 0.6–1.3)
EOSINOPHIL # BLD MANUAL: 0 THOUSAND/UL (ref 0–0.4)
EOSINOPHIL NFR BLD MANUAL: 0 % (ref 0–6)
ERYTHROCYTE [DISTWIDTH] IN BLOOD BY AUTOMATED COUNT: 15.5 % (ref 11.6–15.1)
GFR SERPL CREATININE-BSD FRML MDRD: 101 ML/MIN/1.73SQ M
GLUCOSE SERPL-MCNC: 122 MG/DL (ref 65–140)
HCT VFR BLD AUTO: 27.9 % (ref 36.5–49.3)
HGB BLD-MCNC: 8.8 G/DL (ref 12–17)
LYMPHOCYTES # BLD AUTO: 0.37 THOUSAND/UL (ref 0.6–4.47)
LYMPHOCYTES # BLD AUTO: 4 % (ref 14–44)
MCH RBC QN AUTO: 29.4 PG (ref 26.8–34.3)
MCHC RBC AUTO-ENTMCNC: 31.5 G/DL (ref 31.4–37.4)
MCV RBC AUTO: 93 FL (ref 82–98)
MONOCYTES # BLD AUTO: 1.28 THOUSAND/UL (ref 0–1.22)
MONOCYTES NFR BLD: 14 % (ref 4–12)
NEUTROPHILS # BLD MANUAL: 6.94 THOUSAND/UL (ref 1.85–7.62)
NEUTS SEG NFR BLD AUTO: 76 % (ref 43–75)
NRBC BLD AUTO-RTO: 0 /100 WBCS
PLATELET # BLD AUTO: 552 THOUSANDS/UL (ref 149–390)
PLATELET BLD QL SMEAR: ABNORMAL
PMV BLD AUTO: 9.2 FL (ref 8.9–12.7)
POLYCHROMASIA BLD QL SMEAR: PRESENT
POTASSIUM SERPL-SCNC: 3.5 MMOL/L (ref 3.5–5.3)
RBC # BLD AUTO: 2.99 MILLION/UL (ref 3.88–5.62)
RBC MORPH BLD: PRESENT
SODIUM SERPL-SCNC: 138 MMOL/L (ref 136–145)
TOXIC GRANULES BLD QL SMEAR: PRESENT
VANCOMYCIN TROUGH SERPL-MCNC: 21.4 UG/ML (ref 10–20)
VARIANT LYMPHS # BLD AUTO: 6 %
WBC # BLD AUTO: 9.13 THOUSAND/UL (ref 4.31–10.16)

## 2020-01-29 PROCEDURE — 80202 ASSAY OF VANCOMYCIN: CPT | Performed by: STUDENT IN AN ORGANIZED HEALTH CARE EDUCATION/TRAINING PROGRAM

## 2020-01-29 PROCEDURE — 80048 BASIC METABOLIC PNL TOTAL CA: CPT | Performed by: SURGERY

## 2020-01-29 PROCEDURE — NC001 PR NO CHARGE: Performed by: SURGERY

## 2020-01-29 PROCEDURE — 99024 POSTOP FOLLOW-UP VISIT: CPT | Performed by: SURGERY

## 2020-01-29 PROCEDURE — 85007 BL SMEAR W/DIFF WBC COUNT: CPT | Performed by: SURGERY

## 2020-01-29 PROCEDURE — 85027 COMPLETE CBC AUTOMATED: CPT | Performed by: SURGERY

## 2020-01-29 RX ADMIN — QUETIAPINE FUMARATE 200 MG: 100 TABLET ORAL at 09:23

## 2020-01-29 RX ADMIN — HEPARIN SODIUM 5000 UNITS: 5000 INJECTION INTRAVENOUS; SUBCUTANEOUS at 05:59

## 2020-01-29 RX ADMIN — ATORVASTATIN CALCIUM 40 MG: 40 TABLET, FILM COATED ORAL at 16:20

## 2020-01-29 RX ADMIN — CHLORHEXIDINE GLUCONATE 0.12% ORAL RINSE 15 ML: 1.2 LIQUID ORAL at 21:48

## 2020-01-29 RX ADMIN — DULOXETINE HYDROCHLORIDE 90 MG: 60 CAPSULE, DELAYED RELEASE ORAL at 09:12

## 2020-01-29 RX ADMIN — MIRTAZAPINE 15 MG: 15 TABLET, FILM COATED ORAL at 21:49

## 2020-01-29 RX ADMIN — METOPROLOL TARTRATE 25 MG: 25 TABLET, FILM COATED ORAL at 21:48

## 2020-01-29 RX ADMIN — Medication 20 MG: at 09:31

## 2020-01-29 RX ADMIN — VANCOMYCIN HYDROCHLORIDE 1500 MG: 10 INJECTION, POWDER, LYOPHILIZED, FOR SOLUTION INTRAVENOUS at 21:48

## 2020-01-29 RX ADMIN — THIAMINE HCL TAB 100 MG 100 MG: 100 TAB at 09:13

## 2020-01-29 RX ADMIN — METRONIDAZOLE 500 MG: 500 TABLET, FILM COATED ORAL at 14:08

## 2020-01-29 RX ADMIN — FOLIC ACID 1 MG: 1 TABLET ORAL at 09:17

## 2020-01-29 RX ADMIN — METOPROLOL TARTRATE 25 MG: 25 TABLET, FILM COATED ORAL at 09:17

## 2020-01-29 RX ADMIN — LORATADINE 10 MG: 10 TABLET ORAL at 09:25

## 2020-01-29 RX ADMIN — FLUTICASONE FUROATE AND VILANTEROL TRIFENATATE 1 PUFF: 100; 25 POWDER RESPIRATORY (INHALATION) at 09:32

## 2020-01-29 RX ADMIN — GABAPENTIN 300 MG: 300 CAPSULE ORAL at 21:49

## 2020-01-29 RX ADMIN — GABAPENTIN 300 MG: 300 CAPSULE ORAL at 09:17

## 2020-01-29 RX ADMIN — DOCUSATE SODIUM 100 MG: 100 CAPSULE, LIQUID FILLED ORAL at 09:17

## 2020-01-29 RX ADMIN — GABAPENTIN 300 MG: 300 CAPSULE ORAL at 16:20

## 2020-01-29 RX ADMIN — METRONIDAZOLE 500 MG: 500 TABLET, FILM COATED ORAL at 05:59

## 2020-01-29 RX ADMIN — NICOTINE 1 PATCH: 14 PATCH TRANSDERMAL at 09:14

## 2020-01-29 RX ADMIN — MELATONIN 1000 UNITS: at 09:24

## 2020-01-29 RX ADMIN — METRONIDAZOLE 500 MG: 500 TABLET, FILM COATED ORAL at 21:49

## 2020-01-29 RX ADMIN — VANCOMYCIN HYDROCHLORIDE 1500 MG: 10 INJECTION, POWDER, LYOPHILIZED, FOR SOLUTION INTRAVENOUS at 09:01

## 2020-01-29 RX ADMIN — Medication 20 MG: at 18:01

## 2020-01-29 RX ADMIN — HEPARIN SODIUM 5000 UNITS: 5000 INJECTION INTRAVENOUS; SUBCUTANEOUS at 14:08

## 2020-01-29 RX ADMIN — CHLORHEXIDINE GLUCONATE 0.12% ORAL RINSE 15 ML: 1.2 LIQUID ORAL at 09:12

## 2020-01-29 RX ADMIN — QUETIAPINE FUMARATE 600 MG: 300 TABLET ORAL at 21:48

## 2020-01-29 RX ADMIN — HEPARIN SODIUM 5000 UNITS: 5000 INJECTION INTRAVENOUS; SUBCUTANEOUS at 21:48

## 2020-01-29 RX ADMIN — CEFEPIME HYDROCHLORIDE 2000 MG: 2 INJECTION, POWDER, FOR SOLUTION INTRAVENOUS at 14:08

## 2020-01-29 RX ADMIN — DOCUSATE SODIUM 100 MG: 100 CAPSULE, LIQUID FILLED ORAL at 18:01

## 2020-01-29 RX ADMIN — CEFEPIME HYDROCHLORIDE 2000 MG: 2 INJECTION, POWDER, FOR SOLUTION INTRAVENOUS at 03:37

## 2020-01-29 NOTE — PLAN OF CARE
Problem: Potential for Falls  Goal: Patient will remain free of falls  Description  INTERVENTIONS:  - Assess patient frequently for physical needs  -  Identify cognitive and physical deficits and behaviors that affect risk of falls    -  [place] fall precautions as indicated by assessment   - Educate patient/family on patient safety including physical limitations  - Instruct patient to call for assistance with activity based on assessment  - Modify environment to reduce risk of injury  - Consider OT/PT consult to assist with strengthening/mobility  Outcome: Progressing     Problem: PAIN - ADULT  Goal: Verbalizes/displays adequate comfort level or baseline comfort level  Description  Interventions:  - Encourage patient to monitor pain and request assistance  - Assess pain using appropriate pain scale  - Administer analgesics based on type and severity of pain and evaluate response  - Implement non-pharmacological measures as appropriate and evaluate response  - Consider cultural and social influences on pain and pain management  - Notify physician/advanced practitioner if interventions unsuccessful or patient reports new pain  Outcome: Progressing     Problem: INFECTION - ADULT  Goal: Absence or prevention of progression during hospitalization  Description  INTERVENTIONS:  - Assess and monitor for signs and symptoms of infection  - Monitor lab/diagnostic results  - Monitor all insertion sites, i e  indwelling lines, tubes, and drains  - Monitor endotracheal if appropriate and nasal secretions for changes in amount and color  - [place] appropriate cooling/warming therapies per order  - Administer medications as ordered  - Instruct and encourage patient and family to use good hand hygiene technique  - Identify and instruct in appropriate isolation precautions for identified infection/condition  Outcome: Progressing  Goal: Absence of fever/infection during neutropenic period  Description  INTERVENTIONS:  - Monitor WBC    Outcome: Progressing     Problem: SAFETY ADULT  Goal: Maintain or return to baseline ADL function  Description  INTERVENTIONS:  -  Assess patient's ability to carry out ADLs; assess patient's baseline for ADL function and identify physical deficits which impact ability to perform ADLs (bathing, care of mouth/teeth, toileting, grooming, dressing, etc )  - Assess/evaluate cause of self-care deficits   - Assess range of motion  - Assess patient's mobility; develop plan if impaired  - Assess patient's need for assistive devices and provide as appropriate  - Encourage maximum independence but intervene and supervise when necessary  - Involve family in performance of ADLs  - Assess for home care needs following discharge   - Consider OT consult to assist with ADL evaluation and planning for discharge  - Provide patient education as appropriate  Outcome: Progressing  Goal: Maintain or return mobility status to optimal level  Description  INTERVENTIONS:  - Assess patient's baseline mobility status (ambulation, transfers, stairs, etc )    - Identify cognitive and physical deficits and behaviors that affect mobility  - Identify mobility aids required to assist with transfers and/or ambulation (gait belt, sit-to-stand, lift, walker, cane, etc )  - [place] fall precautions as indicated by assessment  - Record patient progress and toleration of activity level on Mobility SBAR; progress patient to next Phase/Stage  - Instruct patient to call for assistance with activity based on assessment  - Consider rehabilitation consult to assist with strengthening/weightbearing, etc   Outcome: Progressing     Problem: DISCHARGE PLANNING  Goal: Discharge to home or other facility with appropriate resources  Description  INTERVENTIONS:  - Identify barriers to discharge w/patient and caregiver  - Arrange for needed discharge resources and transportation as appropriate  - Identify discharge learning needs (meds, wound care, etc )  - Arrange for interpretive services to assist at discharge as needed  - Refer to Case Management Department for coordinating discharge planning if the patient needs post-hospital services based on physician/advanced practitioner order or complex needs related to functional status, cognitive ability, or social support system  Outcome: Progressing     Problem: Knowledge Deficit  Goal: Patient/family/caregiver demonstrates understanding of disease process, treatment plan, medications, and discharge instructions  Description  Complete learning assessment and assess knowledge base  Interventions:  - Provide teaching at level of understanding  - Provide teaching via preferred learning methods  Outcome: Progressing     Problem: Nutrition/Hydration-ADULT  Goal: Nutrient/Hydration intake appropriate for improving, restoring or maintaining nutritional needs  Description  Monitor and assess patient's nutrition/hydration status for malnutrition  Collaborate with interdisciplinary team and initiate plan and interventions as ordered  Monitor patient's weight and dietary intake as ordered or per policy  Utilize nutrition screening tool and intervene as necessary  Determine patient's food preferences and provide high-protein, high-caloric foods as appropriate       INTERVENTIONS:  - Monitor oral intake, urinary output, labs, and treatment plans  - Assess nutrition and hydration status and recommend course of action  - Evaluate amount of meals eaten  - Assist patient with eating if necessary   - Allow adequate time for meals  - Recommend/ encourage appropriate diets, oral nutritional supplements, and vitamin/mineral supplements  - Order, calculate, and assess calorie counts as needed  - Recommend, monitor, and adjust tube feedings and TPN/PPN based on assessed needs  - Assess need for intravenous fluids  - Provide specific nutrition/hydration education as appropriate  - Include patient/family/caregiver in decisions related to nutrition  Outcome: Progressing     Problem: SAFETY,RESTRAINT: NV/NON-SELF DESTRUCTIVE BEHAVIOR  Goal: Remains free of harm/injury (restraint for non violent/non self-detsructive behavior)  Description  INTERVENTIONS:  - Instruct patient/family regarding restraint use   - Assess and monitor physiologic and psychological status   - Provide interventions and comfort measures to meet assessed patient needs   - Identify and implement measures to help patient regain control  - Assess readiness for release of restraint   Outcome: Progressing  Goal: Returns to optimal restraint-free functioning  Description  INTERVENTIONS:  - Assess the patient's behavior and symptoms that indicate continued need for restraint  - Identify and implement measures to help patient regain control  - Assess readiness for release of restraint   Outcome: Progressing     Problem: Prexisting or High Potential for Compromised Skin Integrity  Goal: Skin integrity is maintained or improved  Description  INTERVENTIONS:  - Identify patients at risk for skin breakdown  - Assess and monitor skin integrity  - Assess and monitor nutrition and hydration status  - Monitor labs   - Assess for incontinence   - Turn and reposition patient  - Assist with mobility/ambulation  - Relieve pressure over bony prominences  - Avoid friction and shearing  - Provide appropriate hygiene as needed including keeping skin clean and dry  - Evaluate need for skin moisturizer/barrier cream  - Collaborate with interdisciplinary team   - Patient/family teaching  - Consider wound care consult   Outcome: Progressing

## 2020-01-29 NOTE — PROGRESS NOTES
Vancomycin IV Pharmacy-to-Dose Consultation    Gil Bridges is a 81 y o  male who is currently receiving Vancomycin IV with management by the Pharmacy Consult service  Assessment/Plan:  The patient was reviewed  Renal function is stable and no signs or symptoms of nephrotoxicity and/or infusion reactions were documented in the chart  Today's trough came back at 21 4 with an extrapolated trough of 18, which is therapeutic  Based on todays assessment, continue current vancomycin (day # 8) dosing of 1500 mg IV q12h, with a plan for trough to be drawn at 2030 on 1/30/2020  We will continue to follow the patients culture results and clinical progress daily      Luiz Junior, Pharmacist

## 2020-01-29 NOTE — PROGRESS NOTES
Progress Note - General Surgery   Raymond Hdz 81 y o  male MRN: [id number]  Unit/Bed#: [facility] 607-01 Encounter: [id number]    Assessment:  71-year-old male who presented with GI bleed from perforated duodenal ulcer, bleeding unable to be controlled endoscopically and with IR embolization now status post exploratory laparotomy, over-sewing of bleeding vessels, Thal patch repair with transverse colon, open gastrojejunostomy tube placement  -IR drain placed 1/27    Plan:  Diet Regular; Regular House   Ambulate TID, OOBTC  Continue Antibiotics for wound erythema and cefepime/flagyl for aspiration/PNA  Will discontinue pts other CAMPBELL drain today  Continue the IR drain  Aggressive pulmonary toilet/incentive spirometry  SQH for DVT PPx      Subjective/Objective   Subjective:  BELTRAN  No complaints this morning  Objective:   Blood pressure 155/84, pulse 97, temperature 100 °F (37 8 °C), resp  rate 18, height 5' 8 5" (1 74 m), weight 78 6 kg (173 lb 4 5 oz), SpO2 94 %  ,Body mass index is 25 96 kg/m²  Intake/Output Summary (Last 24 hours) at 1/29/2020 0913  Last data filed at 1/29/2020 0901  Gross per 24 hour   Intake 870 05 ml   Output 1355 ml   Net -484 95 ml       Invasive Devices     Peripherally Inserted Central Catheter Line            PICC Line 87/54/99 Right Basilic 12 days          Drain            Closed/Suction Drain Right Abdomen Bulb 19 Fr  17 days    Gastrostomy/Enterostomy Gastrostomy-jejunostomy 22 Fr   LLQ 17 days    Closed/Suction Drain Left Abdomen Bulb 1 day                Physical Exam:  Gen: NAD, A&O, Comfortable   Chest: Normal work of breathing, no resp distress  Abd: S, ND, NT, incision with erythema improving, staples intact inferiorly, drains with serous output, wound manager draining similar colored fluid  Ext: No edema  Skin: warm, dry, intact        Lab, Imaging and other studies:  Recent Labs     01/27/20  0527 01/28/20  0609 01/29/20  0605   WBC 7 81 6 90 9 13   HGB 7 3* 7 9* 8 8*   PLT 498* 554* 552*     Recent Labs     01/27/20  0527 01/28/20  0609 01/29/20  0605   SODIUM 137 141 138   K 3 6 3 7 3 5    107 103   CO2 26 28 28   BUN 8 5 5   CREATININE 0 48* 0 50* 0 65   CALCIUM 7 9* 8 4 8 5           VTE Pharmacologic Prophylaxis: Heparin  VTE Mechanical Prophylaxis: sequential compression device

## 2020-01-29 NOTE — SOCIAL WORK
CM spoke with patient's son Matthew Lemons to discuss rehab facilities  Patient has acceptance to [facility] and [facility]  Patient's son wanted [facility] in [facility] due to proximity to home  CM called pt's son back to advise, pt's son now requesting [facility]  CM spoke with [facility] liaison requesting the bed  Liaison will let CM know

## 2020-01-29 NOTE — PROGRESS NOTES
Nurse-Patient-Provider rounds were completed with the patient's nurse today, Rolando Sibley  We discussed the plan is to continue his current oral diet while monitoring for any change in character or quantity of output from his IR drain and/or midline abdominal wound  The remaining right-sided abdominal drain was discontinued during my evaluation today without incident with a dry gauze dressing placed  Continue local wound care to his abdominal wall, particularly regarding the wound packing to his midline abdominal incision as well as the appliance over top of this for monitoring output  Continue current antibiotic regimen  We reviewed all of the invasive devices/lines/telemetry orders   - Maintain IR drain for drainage of intra-abdominal abscess  DVT Prophylaxis:  - Subcutaneous heparin and SCDs  Pain Assessment / Plan:  - Continue current analgesic regimen  Mobility Assessment / Plan:  - Activity as tolerated  - Continue PT and OT evaluation and treatment as indicated  Goals / Barriers for discharge:  - Anticipate discharge in the next 24-48 hours pending continued toleration of oral diet without any change in the character of his drain and/or midline abdominal incision output and placement availability   - Case management following; case and discharge needs discussed  All questions and concerns were addressed  I spent greater than 17 minutes reviewing the plan with the patient and the nurse, and coordinating care for the day      Ana Jean PA-C  1/29/2020 01:12 PM

## 2020-01-29 NOTE — NURSING NOTE
Patients ostomy bag placed on the abdomen to contain drainage was emptied for 125mL  The drainage was thick and tan

## 2020-01-30 ENCOUNTER — APPOINTMENT (INPATIENT)
Dept: RADIOLOGY | Facility: HOSPITAL | Age: 70
DRG: 326 | End: 2020-01-30
Payer: MEDICARE

## 2020-01-30 LAB
ANION GAP SERPL CALCULATED.3IONS-SCNC: 7 MMOL/L (ref 4–13)
BASOPHILS # BLD AUTO: 0.02 THOUSANDS/ΜL (ref 0–0.1)
BASOPHILS NFR BLD AUTO: 0 % (ref 0–1)
BUN SERPL-MCNC: 4 MG/DL (ref 5–25)
CALCIUM SERPL-MCNC: 8.5 MG/DL (ref 8.3–10.1)
CHLORIDE SERPL-SCNC: 102 MMOL/L (ref 100–108)
CO2 SERPL-SCNC: 28 MMOL/L (ref 21–32)
CREAT SERPL-MCNC: 0.54 MG/DL (ref 0.6–1.3)
EOSINOPHIL # BLD AUTO: 0.03 THOUSAND/ΜL (ref 0–0.61)
EOSINOPHIL NFR BLD AUTO: 0 % (ref 0–6)
ERYTHROCYTE [DISTWIDTH] IN BLOOD BY AUTOMATED COUNT: 15.3 % (ref 11.6–15.1)
GFR SERPL CREATININE-BSD FRML MDRD: 109 ML/MIN/1.73SQ M
GLUCOSE SERPL-MCNC: 129 MG/DL (ref 65–140)
HCT VFR BLD AUTO: 28.2 % (ref 36.5–49.3)
HGB BLD-MCNC: 8.6 G/DL (ref 12–17)
IMM GRANULOCYTES # BLD AUTO: 0.06 THOUSAND/UL (ref 0–0.2)
IMM GRANULOCYTES NFR BLD AUTO: 1 % (ref 0–2)
LYMPHOCYTES # BLD AUTO: 1.22 THOUSANDS/ΜL (ref 0.6–4.47)
LYMPHOCYTES NFR BLD AUTO: 12 % (ref 14–44)
MCH RBC QN AUTO: 28.4 PG (ref 26.8–34.3)
MCHC RBC AUTO-ENTMCNC: 30.5 G/DL (ref 31.4–37.4)
MCV RBC AUTO: 93 FL (ref 82–98)
MONOCYTES # BLD AUTO: 1.3 THOUSAND/ΜL (ref 0.17–1.22)
MONOCYTES NFR BLD AUTO: 13 % (ref 4–12)
NEUTROPHILS # BLD AUTO: 7.18 THOUSANDS/ΜL (ref 1.85–7.62)
NEUTS SEG NFR BLD AUTO: 74 % (ref 43–75)
NRBC BLD AUTO-RTO: 0 /100 WBCS
PLATELET # BLD AUTO: 501 THOUSANDS/UL (ref 149–390)
PMV BLD AUTO: 8.9 FL (ref 8.9–12.7)
POTASSIUM SERPL-SCNC: 3.2 MMOL/L (ref 3.5–5.3)
RBC # BLD AUTO: 3.03 MILLION/UL (ref 3.88–5.62)
SODIUM SERPL-SCNC: 137 MMOL/L (ref 136–145)
VANCOMYCIN TROUGH SERPL-MCNC: 20.5 UG/ML (ref 10–20)
WBC # BLD AUTO: 9.81 THOUSAND/UL (ref 4.31–10.16)

## 2020-01-30 PROCEDURE — 99024 POSTOP FOLLOW-UP VISIT: CPT | Performed by: SURGERY

## 2020-01-30 PROCEDURE — 49424 ASSESS CYST CONTRAST INJECT: CPT | Performed by: RADIOLOGY

## 2020-01-30 PROCEDURE — 76080 X-RAY EXAM OF FISTULA: CPT

## 2020-01-30 PROCEDURE — BW111ZZ FLUOROSCOPY OF ABDOMEN AND PELVIS USING LOW OSMOLAR CONTRAST: ICD-10-PCS | Performed by: RADIOLOGY

## 2020-01-30 PROCEDURE — 80048 BASIC METABOLIC PNL TOTAL CA: CPT | Performed by: SURGERY

## 2020-01-30 PROCEDURE — 80202 ASSAY OF VANCOMYCIN: CPT | Performed by: STUDENT IN AN ORGANIZED HEALTH CARE EDUCATION/TRAINING PROGRAM

## 2020-01-30 PROCEDURE — 49424 ASSESS CYST CONTRAST INJECT: CPT

## 2020-01-30 PROCEDURE — 85025 COMPLETE CBC W/AUTO DIFF WBC: CPT | Performed by: SURGERY

## 2020-01-30 PROCEDURE — 76080 X-RAY EXAM OF FISTULA: CPT | Performed by: RADIOLOGY

## 2020-01-30 RX ORDER — ONDANSETRON 2 MG/ML
4 INJECTION INTRAMUSCULAR; INTRAVENOUS EVERY 4 HOURS PRN
Status: DISCONTINUED | OUTPATIENT
Start: 2020-01-30 | End: 2020-02-11 | Stop reason: HOSPADM

## 2020-01-30 RX ORDER — POTASSIUM CHLORIDE 14.9 MG/ML
20 INJECTION INTRAVENOUS
Status: COMPLETED | OUTPATIENT
Start: 2020-01-30 | End: 2020-01-31

## 2020-01-30 RX ADMIN — ACETAMINOPHEN 650 MG: 325 TABLET ORAL at 00:10

## 2020-01-30 RX ADMIN — ONDANSETRON 4 MG: 2 INJECTION INTRAMUSCULAR; INTRAVENOUS at 11:42

## 2020-01-30 RX ADMIN — METOPROLOL TARTRATE 25 MG: 25 TABLET, FILM COATED ORAL at 20:11

## 2020-01-30 RX ADMIN — CEFEPIME HYDROCHLORIDE 2000 MG: 2 INJECTION, POWDER, FOR SOLUTION INTRAVENOUS at 14:07

## 2020-01-30 RX ADMIN — HEPARIN SODIUM 5000 UNITS: 5000 INJECTION INTRAVENOUS; SUBCUTANEOUS at 13:53

## 2020-01-30 RX ADMIN — NICOTINE 1 PATCH: 14 PATCH TRANSDERMAL at 09:00

## 2020-01-30 RX ADMIN — DOCUSATE SODIUM 100 MG: 100 CAPSULE, LIQUID FILLED ORAL at 20:09

## 2020-01-30 RX ADMIN — VANCOMYCIN HYDROCHLORIDE 1500 MG: 10 INJECTION, POWDER, LYOPHILIZED, FOR SOLUTION INTRAVENOUS at 21:43

## 2020-01-30 RX ADMIN — IOHEXOL 10 ML: 350 INJECTION, SOLUTION INTRAVENOUS at 18:29

## 2020-01-30 RX ADMIN — MIRTAZAPINE 15 MG: 15 TABLET, FILM COATED ORAL at 21:41

## 2020-01-30 RX ADMIN — HEPARIN SODIUM 5000 UNITS: 5000 INJECTION INTRAVENOUS; SUBCUTANEOUS at 21:41

## 2020-01-30 RX ADMIN — CEFEPIME HYDROCHLORIDE 2000 MG: 2 INJECTION, POWDER, FOR SOLUTION INTRAVENOUS at 03:40

## 2020-01-30 RX ADMIN — POTASSIUM CHLORIDE 20 MEQ: 14.9 INJECTION, SOLUTION INTRAVENOUS at 22:18

## 2020-01-30 RX ADMIN — ONDANSETRON 4 MG: 2 INJECTION INTRAMUSCULAR; INTRAVENOUS at 20:11

## 2020-01-30 RX ADMIN — ONDANSETRON 4 MG: 2 INJECTION INTRAMUSCULAR; INTRAVENOUS at 05:45

## 2020-01-30 RX ADMIN — ATORVASTATIN CALCIUM 40 MG: 40 TABLET, FILM COATED ORAL at 20:09

## 2020-01-30 RX ADMIN — VANCOMYCIN HYDROCHLORIDE 1500 MG: 10 INJECTION, POWDER, LYOPHILIZED, FOR SOLUTION INTRAVENOUS at 08:52

## 2020-01-30 RX ADMIN — GABAPENTIN 300 MG: 300 CAPSULE ORAL at 20:09

## 2020-01-30 RX ADMIN — QUETIAPINE FUMARATE 600 MG: 300 TABLET ORAL at 20:11

## 2020-01-30 RX ADMIN — LABETALOL 20 MG/4 ML (5 MG/ML) INTRAVENOUS SYRINGE 10 MG: at 08:58

## 2020-01-30 RX ADMIN — CHLORHEXIDINE GLUCONATE 0.12% ORAL RINSE 15 ML: 1.2 LIQUID ORAL at 20:09

## 2020-01-30 RX ADMIN — POTASSIUM CHLORIDE 20 MEQ: 14.9 INJECTION, SOLUTION INTRAVENOUS at 20:04

## 2020-01-30 RX ADMIN — METRONIDAZOLE 500 MG: 500 TABLET, FILM COATED ORAL at 21:41

## 2020-01-30 NOTE — SOCIAL WORK
Pt discussed with Radha Gomez from [facility] for care coordination rounds  Pt not medically cleared  Pt continues with n/v & not tolerating diet yet  Cm to follow

## 2020-01-30 NOTE — QUICK NOTE
Nurse-Patient-Provider rounds were completed with the patient's nurse today, Lizzette Laguerre  We discussed the plan is to continue keep NPO except for sips of liquids and medications secondary to persistent nausea and small volume emesis today  Nursing will place gastric port of GJ tube to gravity & collection bag  Monitor for normal bowel function  If unable to tolerate oral diet adequately, we will tentatively plan to resume enteric feeding via a jejunostomy port of GJ tube  Maintain IR drain indefinitely for management of intra-abdominal abscess and request IR consult for drain study today due to decreased drain output  Continue local wound care to midline abdominal wound and monitor output  Continue current antibiotic regimen which appears appropriate based on culture sensitivities  We reviewed all of the invasive devices/lines/telemetry orders   - Maintain PICC line for IV access until no longer requiring IV medications; anticipate discontinuation of line prior to discharge  - Maintain IR drain indefinitely for management of intra-abdominal abscess  DVT Prophylaxis:  - Subcutaneous heparin and SCDs  Pain Assessment / Plan:  - Continue current analgesic regimen  Mobility Assessment / Plan:  - Activity as tolerated  - Continue PT and OT evaluation and treatment as indicated  Goals / Barriers for discharge:  - Not yet appropriate for discharge secondary to poor tolerance of oral diet; may require re-initiation of tube feeds  Additionally, patient requires further evaluation of his intra-abdominal abscess with IR drain study  - Case management following; case and discharge needs discussed  All questions and concerns were addressed  I spent greater than 23 minutes reviewing the plan with the patient and the nurse, and coordinating care for the day      Som Truong PA-C  1/30/2020 10:59 AM

## 2020-01-30 NOTE — PROGRESS NOTES
Progress Note - Margery Lundborg 81 y o  male MRN: [id number]    Unit/Bed#: [facility] 607-01 Encounter: [id number]      Assessment:  62-year-old male who presented with GI bleed from perforated duodenal ulcer, bleeding unable to be controlled endoscopically and with IR embolization now status post exploratory laparotomy, over-sewing of bleeding vessels, Thal patch repair with transverse colon, open gastrojejunostomy tube placement  -IR drain placed 1/27    VSS  tmax 100 9  Vomiting this am  Epigastric tenderness on exam  5 cc outupt left IR drain  Wound manager with 60cc purulent output  Plan:  Deescalate diet  Ambulate  Maintain wound manager  Prn nausea control    Subjective:   Nausea w epigastric tenderness  Denied chest pain or shortness of breath  Objective:     Vitals: Blood pressure 162/90, pulse 90, temperature 100 2 °F (37 9 °C), temperature source Oral, resp  rate 18, height 5' 8 5" (1 74 m), weight 78 6 kg (173 lb 4 5 oz), SpO2 91 %  ,Body mass index is 25 96 kg/m²  Intake/Output Summary (Last 24 hours) at 1/30/2020 0628  Last data filed at 1/29/2020 1533  Gross per 24 hour   Intake 770 ml   Output 1440 ml   Net -670 ml       Physical Exam  General: NAD  HEENT: NC/AT  MMM  Cv: RRR  Lungs: normal effort  Ab: Soft, Tender in epigastrum/ND  Ex: no CCE  Neuro: AAOx3       Invasive Devices     Peripherally Inserted Central Catheter Line            PICC Line 54/76/66 Right Basilic 13 days          Drain            Gastrostomy/Enterostomy Gastrostomy-jejunostomy 22 Fr   LLQ 18 days    Closed/Suction Drain Left Abdomen Bulb 2 days              Scheduled Meds:  Current Facility-Administered Medications:  acetaminophen 650 mg Oral Q6H PRN Aleida Mcmillan PA-C    atorvastatin 40 mg Oral Daily With [employer] BRIGITTE Summers    cefepime 2,000 mg Intravenous Q12H Gillian Astorga MD Last Rate: 2,000 mg (01/30/20 0340)   chlorhexidine 15 mL [employer] Q12H [address] Aleida Mcmillan [place] cholecalciferol 1,000 Units Oral Daily Shanika Harvey, BRIGITTE    docusate sodium 100 mg Oral BID Shanika Harvey, PA-SYDNEY    DULoxetine 90 mg Oral Daily Shanika Harvey, BRIGITTE    fluticasone 2 spray Nasal Daily Shanika Harvey, BRIGITTE    fluticasone-vilanterol 1 puff Inhalation Daily Deonte Iniguez    folic acid 1 mg Oral Daily Shanika Harvey, PA-SYDNEY    gabapentin 300 mg Oral TID Shanika Harvey, PA-SYDNEY    guaiFENesin 600 mg Oral Q12H PRN Shanika Harvey, BRIGITTE    heparin (porcine) 5,000 Units Subcutaneous [facility] Shanika Harvey, BRIGITTE    iohexol 50 mL Other 90 min pre-procedure Malika Park MD    Labetalol HCl 10 mg Intravenous Q6H PRN Dina Welch DO    loratadine 10 mg Oral Daily Shanika Harvey, BRIGITTE    metoprolol tartrate 25 mg Oral Q12H [address] Shanika Harvey, BRIGITTE    metroNIDAZOLE 500 mg Oral [facility] Héctor Post MD    mirtazapine 15 mg Oral HS Shanika Harvey, BRIGITTE    nicotine 1 patch Transdermal Daily Jaime Rossi, BRIGITTE    omeprazole (PRILOSEC) suspension 2 mg/mL 20 mg Oral BID Shanika Harvey, BRIGITTE    ondansetron 4 mg Intravenous Q6H PRN Dina Welch DO    oxyCODONE 10 mg Oral Q4H PRN Shanika Harvey, BRIGITTE    oxyCODONE 5 mg Oral Q4H PRN Shanika Harvey, BRIGITTE    polyethylene glycol 17 g Oral Daily Brad Gonzales MD    QUEtiapine 200 mg Oral Daily Shanika Harvey, BRIGITTE    QUEtiapine 600 mg Oral HS Shanika Harvey, PA-C    thiamine 100 mg Oral Daily Shanika Harvey, PA-C    traZODone 50 mg Oral HS PRN Shanika Harvey, PA-C    vancomycin 1,500 mg Intravenous Q12H Ondina Cuevas DO Last Rate: 1,500 mg (01/29/20 2148)     Continuous Infusions:   PRN Meds:   acetaminophen    guaiFENesin    Labetalol HCl    ondansetron    oxyCODONE    oxyCODONE    traZODone      Lab, Imaging and other studies: I have personally reviewed pertinent reports      VTE Pharmacologic Prophylaxis: Sequential compression device (Venodyne)   VTE Mechanical Prophylaxis: sequential compression device

## 2020-01-30 NOTE — SEDATION DOCUMENTATION
Left abdominal drain checked by Dr Barbette Merlin  At this time he would like to leave drain in place but flush with only 5 mL saline daily instead of 10 mL  Report given to P6 RN

## 2020-01-30 NOTE — BRIEF OP NOTE (RAD/CATH)
IR TUBE CHECK Procedure Note    PATIENT NAME: Chandan Heller  : 1950  MRN: [id number]    Pre-op Diagnosis:   1  Syncope    2  Chest pain    3  Suicidal ideation    4  Alcohol abuse    5  Difficult intravenous access    6  Gastrointestinal hemorrhage with melena    7  Slurred speech    8  CVA (cerebral vascular accident) ([facility] [address] )    9  Acute blood loss anemia    10  Hemorrhagic shock ([address] )    11  Duodenal ulcer    12  Onychomycosis      Post-op Diagnosis:   1  Syncope    2  Chest pain    3  Suicidal ideation    4  Alcohol abuse    5  Difficult intravenous access    6  Gastrointestinal hemorrhage with melena    7  Slurred speech    8  CVA (cerebral vascular accident) ([address] )    9  Acute blood loss anemia    10  Hemorrhagic shock ([facility] [address] )    11  Duodenal ulcer    12  Onychomycosis        Surgeon:   Neno Brar MD  Assistants:     No qualified resident was available, Resident is only observing    Estimated Blood Loss: 0  Findings:  Nearly collapsed flat cavity  No fistula to the enteric system    Specimens:  None    Complications:  None immediate    Anesthesia: None     Plan:  Reduce flush to 5 cc per day    If there is concern for residual collection consider repeat short interval CT scan    Neno Brar MD     Date: 2020  Time: 6:28 PM

## 2020-01-30 NOTE — PLAN OF CARE
Problem: Potential for Falls  Goal: Patient will remain free of falls  Description  INTERVENTIONS:  - Assess patient frequently for physical needs  -  Identify cognitive and physical deficits and behaviors that affect risk of falls    -  [place] fall precautions as indicated by assessment   - Educate patient/family on patient safety including physical limitations  - Instruct patient to call for assistance with activity based on assessment  - Modify environment to reduce risk of injury  - Consider OT/PT consult to assist with strengthening/mobility  Outcome: Progressing     Problem: PAIN - ADULT  Goal: Verbalizes/displays adequate comfort level or baseline comfort level  Description  Interventions:  - Encourage patient to monitor pain and request assistance  - Assess pain using appropriate pain scale  - Administer analgesics based on type and severity of pain and evaluate response  - Implement non-pharmacological measures as appropriate and evaluate response  - Consider cultural and social influences on pain and pain management  - Notify physician/advanced practitioner if interventions unsuccessful or patient reports new pain  Outcome: Progressing     Problem: INFECTION - ADULT  Goal: Absence or prevention of progression during hospitalization  Description  INTERVENTIONS:  - Assess and monitor for signs and symptoms of infection  - Monitor lab/diagnostic results  - Monitor all insertion sites, i e  indwelling lines, tubes, and drains  - Monitor endotracheal if appropriate and nasal secretions for changes in amount and color  - [place] appropriate cooling/warming therapies per order  - Administer medications as ordered  - Instruct and encourage patient and family to use good hand hygiene technique  - Identify and instruct in appropriate isolation precautions for identified infection/condition  Outcome: Progressing  Goal: Absence of fever/infection during neutropenic period  Description  INTERVENTIONS:  - Monitor WBC    Outcome: Progressing     Problem: SAFETY ADULT  Goal: Maintain or return to baseline ADL function  Description  INTERVENTIONS:  -  Assess patient's ability to carry out ADLs; assess patient's baseline for ADL function and identify physical deficits which impact ability to perform ADLs (bathing, care of mouth/teeth, toileting, grooming, dressing, etc )  - Assess/evaluate cause of self-care deficits   - Assess range of motion  - Assess patient's mobility; develop plan if impaired  - Assess patient's need for assistive devices and provide as appropriate  - Encourage maximum independence but intervene and supervise when necessary  - Involve family in performance of ADLs  - Assess for home care needs following discharge   - Consider OT consult to assist with ADL evaluation and planning for discharge  - Provide patient education as appropriate  Outcome: Progressing  Goal: Maintain or return mobility status to optimal level  Description  INTERVENTIONS:  - Assess patient's baseline mobility status (ambulation, transfers, stairs, etc )    - Identify cognitive and physical deficits and behaviors that affect mobility  - Identify mobility aids required to assist with transfers and/or ambulation (gait belt, sit-to-stand, lift, walker, cane, etc )  - [place] fall precautions as indicated by assessment  - Record patient progress and toleration of activity level on Mobility SBAR; progress patient to next Phase/Stage  - Instruct patient to call for assistance with activity based on assessment  - Consider rehabilitation consult to assist with strengthening/weightbearing, etc   Outcome: Progressing     Problem: DISCHARGE PLANNING  Goal: Discharge to home or other facility with appropriate resources  Description  INTERVENTIONS:  - Identify barriers to discharge w/patient and caregiver  - Arrange for needed discharge resources and transportation as appropriate  - Identify discharge learning needs (meds, wound care, etc )  - Arrange for interpretive services to assist at discharge as needed  - Refer to Case Management Department for coordinating discharge planning if the patient needs post-hospital services based on physician/advanced practitioner order or complex needs related to functional status, cognitive ability, or social support system  Outcome: Progressing     Problem: Knowledge Deficit  Goal: Patient/family/caregiver demonstrates understanding of disease process, treatment plan, medications, and discharge instructions  Description  Complete learning assessment and assess knowledge base  Interventions:  - Provide teaching at level of understanding  - Provide teaching via preferred learning methods  Outcome: Progressing     Problem: Nutrition/Hydration-ADULT  Goal: Nutrient/Hydration intake appropriate for improving, restoring or maintaining nutritional needs  Description  Monitor and assess patient's nutrition/hydration status for malnutrition  Collaborate with interdisciplinary team and initiate plan and interventions as ordered  Monitor patient's weight and dietary intake as ordered or per policy  Utilize nutrition screening tool and intervene as necessary  Determine patient's food preferences and provide high-protein, high-caloric foods as appropriate       INTERVENTIONS:  - Monitor oral intake, urinary output, labs, and treatment plans  - Assess nutrition and hydration status and recommend course of action  - Evaluate amount of meals eaten  - Assist patient with eating if necessary   - Allow adequate time for meals  - Recommend/ encourage appropriate diets, oral nutritional supplements, and vitamin/mineral supplements  - Order, calculate, and assess calorie counts as needed  - Recommend, monitor, and adjust tube feedings and TPN/PPN based on assessed needs  - Assess need for intravenous fluids  - Provide specific nutrition/hydration education as appropriate  - Include patient/family/caregiver in decisions related to nutrition  Outcome: Progressing     Problem: SAFETY,RESTRAINT: NV/NON-SELF DESTRUCTIVE BEHAVIOR  Goal: Remains free of harm/injury (restraint for non violent/non self-detsructive behavior)  Description  INTERVENTIONS:  - Instruct patient/family regarding restraint use   - Assess and monitor physiologic and psychological status   - Provide interventions and comfort measures to meet assessed patient needs   - Identify and implement measures to help patient regain control  - Assess readiness for release of restraint   Outcome: Progressing  Goal: Returns to optimal restraint-free functioning  Description  INTERVENTIONS:  - Assess the patient's behavior and symptoms that indicate continued need for restraint  - Identify and implement measures to help patient regain control  - Assess readiness for release of restraint   Outcome: Progressing     Problem: Prexisting or High Potential for Compromised Skin Integrity  Goal: Skin integrity is maintained or improved  Description  INTERVENTIONS:  - Identify patients at risk for skin breakdown  - Assess and monitor skin integrity  - Assess and monitor nutrition and hydration status  - Monitor labs   - Assess for incontinence   - Turn and reposition patient  - Assist with mobility/ambulation  - Relieve pressure over bony prominences  - Avoid friction and shearing  - Provide appropriate hygiene as needed including keeping skin clean and dry  - Evaluate need for skin moisturizer/barrier cream  - Collaborate with interdisciplinary team   - Patient/family teaching  - Consider wound care consult   Outcome: Progressing

## 2020-01-31 LAB
ALBUMIN SERPL BCP-MCNC: 1.9 G/DL (ref 3.5–5)
ALP SERPL-CCNC: 117 U/L (ref 46–116)
ALT SERPL W P-5'-P-CCNC: 8 U/L (ref 12–78)
AMYLASE FLD QL: <2 U/L
ANION GAP SERPL CALCULATED.3IONS-SCNC: 8 MMOL/L (ref 4–13)
AST SERPL W P-5'-P-CCNC: 11 U/L (ref 5–45)
BASOPHILS # BLD AUTO: 0.02 THOUSANDS/ΜL (ref 0–0.1)
BASOPHILS NFR BLD AUTO: 0 % (ref 0–1)
BILIRUB DIRECT SERPL-MCNC: 0.13 MG/DL (ref 0–0.2)
BILIRUB SERPL-MCNC: 0.37 MG/DL (ref 0.2–1)
BUN SERPL-MCNC: 6 MG/DL (ref 5–25)
CALCIUM SERPL-MCNC: 8.4 MG/DL (ref 8.3–10.1)
CHLORIDE SERPL-SCNC: 103 MMOL/L (ref 100–108)
CO2 SERPL-SCNC: 27 MMOL/L (ref 21–32)
CREAT SERPL-MCNC: 0.49 MG/DL (ref 0.6–1.3)
EOSINOPHIL # BLD AUTO: 0 THOUSAND/ΜL (ref 0–0.61)
EOSINOPHIL NFR BLD AUTO: 0 % (ref 0–6)
ERYTHROCYTE [DISTWIDTH] IN BLOOD BY AUTOMATED COUNT: 15.6 % (ref 11.6–15.1)
GFR SERPL CREATININE-BSD FRML MDRD: 113 ML/MIN/1.73SQ M
GLUCOSE SERPL-MCNC: 132 MG/DL (ref 65–140)
HCT VFR BLD AUTO: 28.7 % (ref 36.5–49.3)
HGB BLD-MCNC: 8.9 G/DL (ref 12–17)
IMM GRANULOCYTES # BLD AUTO: 0.08 THOUSAND/UL (ref 0–0.2)
IMM GRANULOCYTES NFR BLD AUTO: 1 % (ref 0–2)
LIPASE SERPL-CCNC: 54 U/L (ref 73–393)
LYMPHOCYTES # BLD AUTO: 0.83 THOUSANDS/ΜL (ref 0.6–4.47)
LYMPHOCYTES NFR BLD AUTO: 7 % (ref 14–44)
MCH RBC QN AUTO: 28.8 PG (ref 26.8–34.3)
MCHC RBC AUTO-ENTMCNC: 31 G/DL (ref 31.4–37.4)
MCV RBC AUTO: 93 FL (ref 82–98)
MONOCYTES # BLD AUTO: 1.53 THOUSAND/ΜL (ref 0.17–1.22)
MONOCYTES NFR BLD AUTO: 13 % (ref 4–12)
NEUTROPHILS # BLD AUTO: 9.6 THOUSANDS/ΜL (ref 1.85–7.62)
NEUTS SEG NFR BLD AUTO: 79 % (ref 43–75)
NRBC BLD AUTO-RTO: 0 /100 WBCS
PLATELET # BLD AUTO: 508 THOUSANDS/UL (ref 149–390)
PMV BLD AUTO: 9 FL (ref 8.9–12.7)
POTASSIUM SERPL-SCNC: 3.3 MMOL/L (ref 3.5–5.3)
PROT SERPL-MCNC: 6.6 G/DL (ref 6.4–8.2)
RBC # BLD AUTO: 3.09 MILLION/UL (ref 3.88–5.62)
SODIUM SERPL-SCNC: 138 MMOL/L (ref 136–145)
WBC # BLD AUTO: 12.06 THOUSAND/UL (ref 4.31–10.16)

## 2020-01-31 PROCEDURE — 99024 POSTOP FOLLOW-UP VISIT: CPT | Performed by: SURGERY

## 2020-01-31 PROCEDURE — 83690 ASSAY OF LIPASE: CPT | Performed by: SURGERY

## 2020-01-31 PROCEDURE — 82150 ASSAY OF AMYLASE: CPT | Performed by: SURGERY

## 2020-01-31 PROCEDURE — 80048 BASIC METABOLIC PNL TOTAL CA: CPT | Performed by: SURGERY

## 2020-01-31 PROCEDURE — 85025 COMPLETE CBC W/AUTO DIFF WBC: CPT | Performed by: SURGERY

## 2020-01-31 PROCEDURE — 80076 HEPATIC FUNCTION PANEL: CPT | Performed by: STUDENT IN AN ORGANIZED HEALTH CARE EDUCATION/TRAINING PROGRAM

## 2020-01-31 RX ORDER — MAGNESIUM SULFATE HEPTAHYDRATE 40 MG/ML
2 INJECTION, SOLUTION INTRAVENOUS ONCE
Status: COMPLETED | OUTPATIENT
Start: 2020-01-31 | End: 2020-01-31

## 2020-01-31 RX ORDER — DEXTROSE, SODIUM CHLORIDE, AND POTASSIUM CHLORIDE 5; .45; .15 G/100ML; G/100ML; G/100ML
100 INJECTION INTRAVENOUS CONTINUOUS
Status: DISCONTINUED | OUTPATIENT
Start: 2020-01-31 | End: 2020-02-02

## 2020-01-31 RX ORDER — PROMETHAZINE HYDROCHLORIDE 25 MG/ML
12.5 INJECTION, SOLUTION INTRAMUSCULAR; INTRAVENOUS EVERY 6 HOURS PRN
Status: DISCONTINUED | OUTPATIENT
Start: 2020-01-31 | End: 2020-02-11 | Stop reason: HOSPADM

## 2020-01-31 RX ORDER — POTASSIUM CHLORIDE 14.9 MG/ML
20 INJECTION INTRAVENOUS
Status: COMPLETED | OUTPATIENT
Start: 2020-01-31 | End: 2020-01-31

## 2020-01-31 RX ADMIN — ONDANSETRON 4 MG: 2 INJECTION INTRAMUSCULAR; INTRAVENOUS at 05:09

## 2020-01-31 RX ADMIN — POTASSIUM CHLORIDE 20 MEQ: 14.9 INJECTION, SOLUTION INTRAVENOUS at 15:47

## 2020-01-31 RX ADMIN — DULOXETINE HYDROCHLORIDE 90 MG: 60 CAPSULE, DELAYED RELEASE ORAL at 09:22

## 2020-01-31 RX ADMIN — GABAPENTIN 300 MG: 300 CAPSULE ORAL at 09:22

## 2020-01-31 RX ADMIN — MIRTAZAPINE 15 MG: 15 TABLET, FILM COATED ORAL at 22:20

## 2020-01-31 RX ADMIN — DOCUSATE SODIUM 100 MG: 100 CAPSULE, LIQUID FILLED ORAL at 18:19

## 2020-01-31 RX ADMIN — HEPARIN SODIUM 5000 UNITS: 5000 INJECTION INTRAVENOUS; SUBCUTANEOUS at 05:09

## 2020-01-31 RX ADMIN — POTASSIUM CHLORIDE 20 MEQ: 14.9 INJECTION, SOLUTION INTRAVENOUS at 00:24

## 2020-01-31 RX ADMIN — CEFEPIME HYDROCHLORIDE 2000 MG: 2 INJECTION, POWDER, FOR SOLUTION INTRAVENOUS at 14:24

## 2020-01-31 RX ADMIN — QUETIAPINE FUMARATE 200 MG: 100 TABLET ORAL at 09:22

## 2020-01-31 RX ADMIN — ONDANSETRON 4 MG: 2 INJECTION INTRAMUSCULAR; INTRAVENOUS at 09:23

## 2020-01-31 RX ADMIN — NICOTINE 1 PATCH: 14 PATCH TRANSDERMAL at 09:23

## 2020-01-31 RX ADMIN — VANCOMYCIN HYDROCHLORIDE 1500 MG: 10 INJECTION, POWDER, LYOPHILIZED, FOR SOLUTION INTRAVENOUS at 22:19

## 2020-01-31 RX ADMIN — MAGNESIUM SULFATE HEPTAHYDRATE 2 G: 40 INJECTION, SOLUTION INTRAVENOUS at 12:57

## 2020-01-31 RX ADMIN — METOPROLOL TARTRATE 25 MG: 25 TABLET, FILM COATED ORAL at 22:19

## 2020-01-31 RX ADMIN — POTASSIUM CHLORIDE 20 MEQ: 14.9 INJECTION, SOLUTION INTRAVENOUS at 12:57

## 2020-01-31 RX ADMIN — GABAPENTIN 300 MG: 300 CAPSULE ORAL at 22:19

## 2020-01-31 RX ADMIN — CEFEPIME HYDROCHLORIDE 2000 MG: 2 INJECTION, POWDER, FOR SOLUTION INTRAVENOUS at 03:16

## 2020-01-31 RX ADMIN — FLUTICASONE FUROATE AND VILANTEROL TRIFENATATE 1 PUFF: 100; 25 POWDER RESPIRATORY (INHALATION) at 09:19

## 2020-01-31 RX ADMIN — CHLORHEXIDINE GLUCONATE 0.12% ORAL RINSE 15 ML: 1.2 LIQUID ORAL at 22:19

## 2020-01-31 RX ADMIN — METRONIDAZOLE 500 MG: 500 TABLET, FILM COATED ORAL at 22:19

## 2020-01-31 RX ADMIN — METRONIDAZOLE 500 MG: 500 TABLET, FILM COATED ORAL at 14:25

## 2020-01-31 RX ADMIN — THIAMINE HCL TAB 100 MG 100 MG: 100 TAB at 09:22

## 2020-01-31 RX ADMIN — MELATONIN 1000 UNITS: at 09:22

## 2020-01-31 RX ADMIN — LORATADINE 10 MG: 10 TABLET ORAL at 09:22

## 2020-01-31 RX ADMIN — ATORVASTATIN CALCIUM 40 MG: 40 TABLET, FILM COATED ORAL at 18:19

## 2020-01-31 RX ADMIN — Medication 20 MG: at 09:21

## 2020-01-31 RX ADMIN — HEPARIN SODIUM 5000 UNITS: 5000 INJECTION INTRAVENOUS; SUBCUTANEOUS at 22:19

## 2020-01-31 RX ADMIN — CHLORHEXIDINE GLUCONATE 0.12% ORAL RINSE 15 ML: 1.2 LIQUID ORAL at 09:23

## 2020-01-31 RX ADMIN — DOCUSATE SODIUM 100 MG: 100 CAPSULE, LIQUID FILLED ORAL at 09:22

## 2020-01-31 RX ADMIN — HEPARIN SODIUM 5000 UNITS: 5000 INJECTION INTRAVENOUS; SUBCUTANEOUS at 14:25

## 2020-01-31 RX ADMIN — GABAPENTIN 300 MG: 300 CAPSULE ORAL at 18:19

## 2020-01-31 RX ADMIN — DEXTROSE, SODIUM CHLORIDE, AND POTASSIUM CHLORIDE 100 ML/HR: 5; .45; .15 INJECTION INTRAVENOUS at 03:28

## 2020-01-31 RX ADMIN — FOLIC ACID 1 MG: 1 TABLET ORAL at 09:22

## 2020-01-31 RX ADMIN — METOPROLOL TARTRATE 25 MG: 25 TABLET, FILM COATED ORAL at 09:22

## 2020-01-31 RX ADMIN — PROMETHAZINE HYDROCHLORIDE 12.5 MG: 25 INJECTION INTRAMUSCULAR; INTRAVENOUS at 06:06

## 2020-01-31 RX ADMIN — METRONIDAZOLE 500 MG: 500 TABLET, FILM COATED ORAL at 05:09

## 2020-01-31 RX ADMIN — Medication 20 MG: at 18:19

## 2020-01-31 RX ADMIN — VANCOMYCIN HYDROCHLORIDE 1500 MG: 10 INJECTION, POWDER, LYOPHILIZED, FOR SOLUTION INTRAVENOUS at 09:48

## 2020-01-31 RX ADMIN — QUETIAPINE FUMARATE 600 MG: 300 TABLET ORAL at 22:20

## 2020-01-31 NOTE — PROGRESS NOTES
Vancomycin IV Pharmacy-to-Dose Consultation    Nessa Yanes is a 81 y o  male who is currently receiving Vancomycin IV with management by the Pharmacy Consult service  Assessment/Plan:  The patient was reviewed  Renal function is improving/stable and no signs or symptoms of nephrotoxicity and/or infusion reactions were documented in the chart  Based on todays assessment, continue current vancomycin (day # 10) dosing of 1500mg iv q12hrs with a plan for trough to be drawn at 0930 on 2/1 with an estimated trough/AUC of 17 9/583     We will continue to follow the patients culture results and clinical progress daily    Thanks    Christopher Howard, Pharmacist

## 2020-01-31 NOTE — SOCIAL WORK
Patient is not medically clear for discharge today  CM will continue to follow for medical clearance and discharge planning  [address] unable to continue to hold rehab bed  Patient will need additional choices for rehab at discharge  CM will continue to follow

## 2020-01-31 NOTE — PROGRESS NOTES
Progress Note - Ruano Mix 81 y o  male MRN: [id number]    Unit/Bed#: [facility] 607-01 Encounter: [id number]      Assessment:  82-year-old male who presented with GI bleed from perforated duodenal ulcer, bleeding unable to be controlled endoscopically and with IR embolization now status post exploratory laparotomy, over-sewing of bleeding vessels, Thal patch repair with transverse colon, open gastrojejunostomy tube placement     -IR drain placed 1/27 1/30 IR tube check showed drain in place, no fistula to enteric system, nearly collapsed flat cavity  VSS  Afebrile  Abdomen soft/ mild epigastric tenderness/ non distended  Left IR drain 10cc serous  Wound manager 70cc serous output  G tube w 425 output  Plan:  NPO  D5 1/2 NS 20K 100cc/h  Continue abx  G tube to gravity  Ambulate  dvt ppx    Subjective:   Feeling a little better  Having bowel mvts  Denied fever, chills, chest pain, shortness of breath, nausea, vomiting, or abdominal pain this morning  Objective:     Vitals: Blood pressure 165/86, pulse 96, temperature 98 6 °F (37 °C), temperature source Oral, resp  rate 18, height 5' 8 5" (1 74 m), weight 77 3 kg (170 lb 6 7 oz), SpO2 91 %  ,Body mass index is 25 53 kg/m²  Intake/Output Summary (Last 24 hours) at 1/31/2020 0234  Last data filed at 1/30/2020 2329  Gross per 24 hour   Intake 660 ml   Output 1425 ml   Net -765 ml       Physical Exam  General: NAD  HEENT: NC/AT  MMM  Cv: RRR  Lungs: normal effort  Ab: Soft, NT/ND  mildine wound manager and left IR drain in place     Ex: no CCE  Neuro: AAOx3    Scheduled Meds:  Current Facility-Administered Medications:  acetaminophen 650 mg Oral Q6H PRN Esaw Satya PA-C    atorvastatin 40 mg Oral Daily With [employer], PA-SYDNEY    cefepime 2,000 mg Intravenous Q12H Olga Feng MD Last Rate: 2,000 mg (01/30/20 1407)   chlorhexidine 15 mL [employer] Q12H [address] Esaw Satya PA-C    cholecalciferol 1,000 Units Oral Daily Xochitl Hernandez PA-C    dextrose 5 % and sodium chloride 0 45 % with KCl 20 mEq/L 100 mL/hr Intravenous Continuous Brandon Carson MD    docusate sodium 100 mg Oral BID Xochitl Hernandez PA-C    DULoxetine 90 mg Oral Daily Xochitl Hernandez PA-C    fluticasone 2 spray Nasal Daily Xohcitl Hernandez PA-C    fluticasone-vilanterol 1 puff Inhalation Daily Deonte Funez    folic acid 1 mg Oral Daily Xochitl Hernandez PA-C    gabapentin 300 mg Oral TID Xochitl Hernandez PA-C    guaiFENesin 600 mg Oral Q12H PRN Xochitl Hernandez PA-C    heparin (porcine) 5,000 Units Subcutaneous [facility] Jaime Rossi PA-C    iohexol 50 mL Other 90 min pre-procedure Adrien Ramirez MD    Labetalol HCl 10 mg Intravenous Q6H PRN Parminder Welch DO    loratadine 10 mg Oral Daily Xochitl Hernandez PA-C    metoprolol tartrate 25 mg Oral Q12H [address] Jaime Rossi PA-C    metroNIDAZOLE 500 mg Oral [facility] Heri Castaneda MD    mirtazapine 15 mg Oral HS Xochitl Hernandez PA-C    nicotine 1 patch Transdermal Daily Jaime Rossi PA-C    omeprazole (PRILOSEC) suspension 2 mg/mL 20 mg Oral BID Xochitl Hernandez PA-C    ondansetron 4 mg Intravenous Q4H PRN Brandon Carson MD    oxyCODONE 10 mg Oral Q4H PRN Xochitl Hernandez PA-C    oxyCODONE 5 mg Oral Q4H PRN Xochitl Hernandez PA-C    polyethylene glycol 17 g Oral Daily Carson Garrido MD    QUEtiapine 200 mg Oral Daily Xochitl Hernandez PA-C    QUEtiapine 600 mg Oral HS Xochitl Hernandez PA-C    thiamine 100 mg Oral Daily Xochitl Hernandez PA-C    traZODone 50 mg Oral HS PRN Xochitl Hernandez PA-C    vancomycin 1,500 mg Intravenous Q12H Analisa Grimm DO Last Rate: 1,500 mg (01/30/20 5238)     Continuous Infusions:  dextrose 5 % and sodium chloride 0 45 % with KCl 20 mEq/L 100 mL/hr     PRN Meds:   acetaminophen    guaiFENesin    Labetalol HCl    ondansetron    oxyCODONE    oxyCODONE    traZODone      Invasive Devices     Peripherally Inserted Central Catheter Line            PICC Line 35/84/45 Right Basilic 14 days          Drain            Gastrostomy/Enterostomy Gastrostomy-jejunostomy 22 Fr  LLQ 19 days    Closed/Suction Drain Left Abdomen Bulb 3 days                Lab, Imaging and other studies: I have personally reviewed pertinent reports      VTE Pharmacologic Prophylaxis: Heparin  VTE Mechanical Prophylaxis: sequential compression device

## 2020-01-31 NOTE — NUTRITION
01/31/20 1317   Recommendations/Interventions   Summary Patient currently NPO secondary to nausea and small amount of emesis  G-tube to gravity and J-tube feedings initiated  Nursing skin care plan reviewed  Interventions EN change formula/rate   Nutrition Recommendations Tube Feeding Recommendation provided;Lab - consider order (specify)  (Suggest increase EN to provide 100% nutritional needs  Recommend: Jevity 1 2 kcal @ a goal rate of 80 ml/hr with 1 packet prosource per day with 125 ml free water flush every 4 hours (2364 kcal, 121 grams protein, 2299 ml tv)   Monitor electrolytes )

## 2020-01-31 NOTE — PLAN OF CARE
Problem: Potential for Falls  Goal: Patient will remain free of falls  Description  INTERVENTIONS:  - Assess patient frequently for physical needs  -  Identify cognitive and physical deficits and behaviors that affect risk of falls    -  [place] fall precautions as indicated by assessment   - Educate patient/family on patient safety including physical limitations  - Instruct patient to call for assistance with activity based on assessment  - Modify environment to reduce risk of injury  - Consider OT/PT consult to assist with strengthening/mobility  Outcome: Progressing     Problem: PAIN - ADULT  Goal: Verbalizes/displays adequate comfort level or baseline comfort level  Description  Interventions:  - Encourage patient to monitor pain and request assistance  - Assess pain using appropriate pain scale  - Administer analgesics based on type and severity of pain and evaluate response  - Implement non-pharmacological measures as appropriate and evaluate response  - Consider cultural and social influences on pain and pain management  - Notify physician/advanced practitioner if interventions unsuccessful or patient reports new pain  Outcome: Progressing     Problem: INFECTION - ADULT  Goal: Absence or prevention of progression during hospitalization  Description  INTERVENTIONS:  - Assess and monitor for signs and symptoms of infection  - Monitor lab/diagnostic results  - Monitor all insertion sites, i e  indwelling lines, tubes, and drains  - Monitor endotracheal if appropriate and nasal secretions for changes in amount and color  - [place] appropriate cooling/warming therapies per order  - Administer medications as ordered  - Instruct and encourage patient and family to use good hand hygiene technique  - Identify and instruct in appropriate isolation precautions for identified infection/condition  Outcome: Progressing  Goal: Absence of fever/infection during neutropenic period  Description  INTERVENTIONS:  - Monitor WBC    Outcome: Progressing     Problem: SAFETY ADULT  Goal: Maintain or return to baseline ADL function  Description  INTERVENTIONS:  -  Assess patient's ability to carry out ADLs; assess patient's baseline for ADL function and identify physical deficits which impact ability to perform ADLs (bathing, care of mouth/teeth, toileting, grooming, dressing, etc )  - Assess/evaluate cause of self-care deficits   - Assess range of motion  - Assess patient's mobility; develop plan if impaired  - Assess patient's need for assistive devices and provide as appropriate  - Encourage maximum independence but intervene and supervise when necessary  - Involve family in performance of ADLs  - Assess for home care needs following discharge   - Consider OT consult to assist with ADL evaluation and planning for discharge  - Provide patient education as appropriate  Outcome: Progressing  Goal: Maintain or return mobility status to optimal level  Description  INTERVENTIONS:  - Assess patient's baseline mobility status (ambulation, transfers, stairs, etc )    - Identify cognitive and physical deficits and behaviors that affect mobility  - Identify mobility aids required to assist with transfers and/or ambulation (gait belt, sit-to-stand, lift, walker, cane, etc )  - [place] fall precautions as indicated by assessment  - Record patient progress and toleration of activity level on Mobility SBAR; progress patient to next Phase/Stage  - Instruct patient to call for assistance with activity based on assessment  - Consider rehabilitation consult to assist with strengthening/weightbearing, etc   Outcome: Progressing     Problem: DISCHARGE PLANNING  Goal: Discharge to home or other facility with appropriate resources  Description  INTERVENTIONS:  - Identify barriers to discharge w/patient and caregiver  - Arrange for needed discharge resources and transportation as appropriate  - Identify discharge learning needs (meds, wound care, etc )  - Arrange for interpretive services to assist at discharge as needed  - Refer to Case Management Department for coordinating discharge planning if the patient needs post-hospital services based on physician/advanced practitioner order or complex needs related to functional status, cognitive ability, or social support system  Outcome: Progressing     Problem: Knowledge Deficit  Goal: Patient/family/caregiver demonstrates understanding of disease process, treatment plan, medications, and discharge instructions  Description  Complete learning assessment and assess knowledge base  Interventions:  - Provide teaching at level of understanding  - Provide teaching via preferred learning methods  Outcome: Progressing     Problem: Nutrition/Hydration-ADULT  Goal: Nutrient/Hydration intake appropriate for improving, restoring or maintaining nutritional needs  Description  Monitor and assess patient's nutrition/hydration status for malnutrition  Collaborate with interdisciplinary team and initiate plan and interventions as ordered  Monitor patient's weight and dietary intake as ordered or per policy  Utilize nutrition screening tool and intervene as necessary  Determine patient's food preferences and provide high-protein, high-caloric foods as appropriate       INTERVENTIONS:  - Monitor oral intake, urinary output, labs, and treatment plans  - Assess nutrition and hydration status and recommend course of action  - Evaluate amount of meals eaten  - Assist patient with eating if necessary   - Allow adequate time for meals  - Recommend/ encourage appropriate diets, oral nutritional supplements, and vitamin/mineral supplements  - Order, calculate, and assess calorie counts as needed  - Recommend, monitor, and adjust tube feedings and TPN/PPN based on assessed needs  - Assess need for intravenous fluids  - Provide specific nutrition/hydration education as appropriate  - Include patient/family/caregiver in decisions related to nutrition  Outcome: Progressing     Problem: SAFETY,RESTRAINT: NV/NON-SELF DESTRUCTIVE BEHAVIOR  Goal: Remains free of harm/injury (restraint for non violent/non self-detsructive behavior)  Description  INTERVENTIONS:  - Instruct patient/family regarding restraint use   - Assess and monitor physiologic and psychological status   - Provide interventions and comfort measures to meet assessed patient needs   - Identify and implement measures to help patient regain control  - Assess readiness for release of restraint   Outcome: Progressing  Goal: Returns to optimal restraint-free functioning  Description  INTERVENTIONS:  - Assess the patient's behavior and symptoms that indicate continued need for restraint  - Identify and implement measures to help patient regain control  - Assess readiness for release of restraint   Outcome: Progressing     Problem: Prexisting or High Potential for Compromised Skin Integrity  Goal: Skin integrity is maintained or improved  Description  INTERVENTIONS:  - Identify patients at risk for skin breakdown  - Assess and monitor skin integrity  - Assess and monitor nutrition and hydration status  - Monitor labs   - Assess for incontinence   - Turn and reposition patient  - Assist with mobility/ambulation  - Relieve pressure over bony prominences  - Avoid friction and shearing  - Provide appropriate hygiene as needed including keeping skin clean and dry  - Evaluate need for skin moisturizer/barrier cream  - Collaborate with interdisciplinary team   - Patient/family teaching  - Consider wound care consult   Outcome: Progressing

## 2020-01-31 NOTE — PROGRESS NOTES
Vancomycin IV Pharmacy-to-Dose Consultation    Felicia Marcial is a 81 y o  male who is currently receiving Vancomycin IV with management by the Pharmacy Consult service  Assessment/Plan:  The patient was reviewed  Renal function is stable and no signs or symptoms of nephrotoxicity and/or infusion reactions were documented in the chart  Based on todays assessment, continue current vancomycin (day # 9) dosing of 1500mg q12hrs , with a plan for trough to be drawn at 0930 on 2/1  Trough will be drawn after 4 more doses of current dose due to level being  minimally out of 15-20 range     We will continue to follow the patients culture results and clinical progress daily    Thanks    Neeta Way, Pharmacist    Lisette Thompson, Pharmacist

## 2020-02-01 LAB
ANION GAP SERPL CALCULATED.3IONS-SCNC: 4 MMOL/L (ref 4–13)
BACTERIA SPEC BFLD CULT: ABNORMAL
BASOPHILS # BLD AUTO: 0.02 THOUSANDS/ΜL (ref 0–0.1)
BASOPHILS NFR BLD AUTO: 0 % (ref 0–1)
BUN SERPL-MCNC: 7 MG/DL (ref 5–25)
CALCIUM SERPL-MCNC: 7.9 MG/DL (ref 8.3–10.1)
CHLORIDE SERPL-SCNC: 106 MMOL/L (ref 100–108)
CO2 SERPL-SCNC: 28 MMOL/L (ref 21–32)
CREAT SERPL-MCNC: 0.57 MG/DL (ref 0.6–1.3)
EOSINOPHIL # BLD AUTO: 0.03 THOUSAND/ΜL (ref 0–0.61)
EOSINOPHIL NFR BLD AUTO: 0 % (ref 0–6)
ERYTHROCYTE [DISTWIDTH] IN BLOOD BY AUTOMATED COUNT: 15.9 % (ref 11.6–15.1)
GFR SERPL CREATININE-BSD FRML MDRD: 106 ML/MIN/1.73SQ M
GLUCOSE SERPL-MCNC: 146 MG/DL (ref 65–140)
GRAM STN SPEC: ABNORMAL
HCT VFR BLD AUTO: 26.4 % (ref 36.5–49.3)
HGB BLD-MCNC: 8.2 G/DL (ref 12–17)
IMM GRANULOCYTES # BLD AUTO: 0.06 THOUSAND/UL (ref 0–0.2)
IMM GRANULOCYTES NFR BLD AUTO: 1 % (ref 0–2)
LYMPHOCYTES # BLD AUTO: 0.95 THOUSANDS/ΜL (ref 0.6–4.47)
LYMPHOCYTES NFR BLD AUTO: 12 % (ref 14–44)
MCH RBC QN AUTO: 29.1 PG (ref 26.8–34.3)
MCHC RBC AUTO-ENTMCNC: 31.1 G/DL (ref 31.4–37.4)
MCV RBC AUTO: 94 FL (ref 82–98)
MONOCYTES # BLD AUTO: 1.27 THOUSAND/ΜL (ref 0.17–1.22)
MONOCYTES NFR BLD AUTO: 17 % (ref 4–12)
NEUTROPHILS # BLD AUTO: 5.31 THOUSANDS/ΜL (ref 1.85–7.62)
NEUTS SEG NFR BLD AUTO: 70 % (ref 43–75)
NRBC BLD AUTO-RTO: 0 /100 WBCS
PLATELET # BLD AUTO: 437 THOUSANDS/UL (ref 149–390)
PMV BLD AUTO: 9 FL (ref 8.9–12.7)
POTASSIUM SERPL-SCNC: 3.5 MMOL/L (ref 3.5–5.3)
RBC # BLD AUTO: 2.82 MILLION/UL (ref 3.88–5.62)
SODIUM SERPL-SCNC: 138 MMOL/L (ref 136–145)
VANCOMYCIN TROUGH SERPL-MCNC: 20.4 UG/ML (ref 10–20)
WBC # BLD AUTO: 7.64 THOUSAND/UL (ref 4.31–10.16)

## 2020-02-01 PROCEDURE — 97164 PT RE-EVAL EST PLAN CARE: CPT

## 2020-02-01 PROCEDURE — 80202 ASSAY OF VANCOMYCIN: CPT | Performed by: SURGERY

## 2020-02-01 PROCEDURE — 99024 POSTOP FOLLOW-UP VISIT: CPT | Performed by: SURGERY

## 2020-02-01 PROCEDURE — 97110 THERAPEUTIC EXERCISES: CPT

## 2020-02-01 PROCEDURE — 85025 COMPLETE CBC W/AUTO DIFF WBC: CPT | Performed by: STUDENT IN AN ORGANIZED HEALTH CARE EDUCATION/TRAINING PROGRAM

## 2020-02-01 PROCEDURE — 80048 BASIC METABOLIC PNL TOTAL CA: CPT | Performed by: STUDENT IN AN ORGANIZED HEALTH CARE EDUCATION/TRAINING PROGRAM

## 2020-02-01 RX ADMIN — DEXTROSE, SODIUM CHLORIDE, AND POTASSIUM CHLORIDE 100 ML/HR: 5; .45; .15 INJECTION INTRAVENOUS at 17:35

## 2020-02-01 RX ADMIN — METOPROLOL TARTRATE 25 MG: 25 TABLET, FILM COATED ORAL at 22:03

## 2020-02-01 RX ADMIN — MIRTAZAPINE 15 MG: 15 TABLET, FILM COATED ORAL at 22:03

## 2020-02-01 RX ADMIN — HEPARIN SODIUM 5000 UNITS: 5000 INJECTION INTRAVENOUS; SUBCUTANEOUS at 15:02

## 2020-02-01 RX ADMIN — METRONIDAZOLE 500 MG: 500 TABLET, FILM COATED ORAL at 15:03

## 2020-02-01 RX ADMIN — METRONIDAZOLE 500 MG: 500 TABLET, FILM COATED ORAL at 05:00

## 2020-02-01 RX ADMIN — ATORVASTATIN CALCIUM 40 MG: 40 TABLET, FILM COATED ORAL at 17:29

## 2020-02-01 RX ADMIN — FLUTICASONE FUROATE AND VILANTEROL TRIFENATATE 1 PUFF: 100; 25 POWDER RESPIRATORY (INHALATION) at 09:17

## 2020-02-01 RX ADMIN — METRONIDAZOLE 500 MG: 500 TABLET, FILM COATED ORAL at 22:03

## 2020-02-01 RX ADMIN — CEFEPIME HYDROCHLORIDE 2000 MG: 2 INJECTION, POWDER, FOR SOLUTION INTRAVENOUS at 15:25

## 2020-02-01 RX ADMIN — HEPARIN SODIUM 5000 UNITS: 5000 INJECTION INTRAVENOUS; SUBCUTANEOUS at 05:00

## 2020-02-01 RX ADMIN — CHLORHEXIDINE GLUCONATE 0.12% ORAL RINSE 15 ML: 1.2 LIQUID ORAL at 09:16

## 2020-02-01 RX ADMIN — FOLIC ACID 1 MG: 1 TABLET ORAL at 09:15

## 2020-02-01 RX ADMIN — GABAPENTIN 300 MG: 300 CAPSULE ORAL at 17:29

## 2020-02-01 RX ADMIN — DULOXETINE HYDROCHLORIDE 90 MG: 60 CAPSULE, DELAYED RELEASE ORAL at 09:15

## 2020-02-01 RX ADMIN — FLUTICASONE PROPIONATE 2 SPRAY: 50 SPRAY, METERED NASAL at 09:17

## 2020-02-01 RX ADMIN — NICOTINE 1 PATCH: 14 PATCH TRANSDERMAL at 09:19

## 2020-02-01 RX ADMIN — GABAPENTIN 300 MG: 300 CAPSULE ORAL at 09:16

## 2020-02-01 RX ADMIN — CEFEPIME HYDROCHLORIDE 2000 MG: 2 INJECTION, POWDER, FOR SOLUTION INTRAVENOUS at 03:35

## 2020-02-01 RX ADMIN — METOPROLOL TARTRATE 25 MG: 25 TABLET, FILM COATED ORAL at 09:16

## 2020-02-01 RX ADMIN — GABAPENTIN 300 MG: 300 CAPSULE ORAL at 22:03

## 2020-02-01 RX ADMIN — DEXTROSE, SODIUM CHLORIDE, AND POTASSIUM CHLORIDE 100 ML/HR: 5; .45; .15 INJECTION INTRAVENOUS at 07:11

## 2020-02-01 RX ADMIN — Medication 20 MG: at 09:24

## 2020-02-01 RX ADMIN — MELATONIN 1000 UNITS: at 09:16

## 2020-02-01 RX ADMIN — LORATADINE 10 MG: 10 TABLET ORAL at 09:16

## 2020-02-01 RX ADMIN — THIAMINE HCL TAB 100 MG 100 MG: 100 TAB at 09:15

## 2020-02-01 RX ADMIN — QUETIAPINE FUMARATE 200 MG: 100 TABLET ORAL at 09:15

## 2020-02-01 RX ADMIN — CHLORHEXIDINE GLUCONATE 0.12% ORAL RINSE 15 ML: 1.2 LIQUID ORAL at 22:02

## 2020-02-01 RX ADMIN — Medication 20 MG: at 18:18

## 2020-02-01 RX ADMIN — VANCOMYCIN HYDROCHLORIDE 1250 MG: 10 INJECTION, POWDER, LYOPHILIZED, FOR SOLUTION INTRAVENOUS at 22:10

## 2020-02-01 RX ADMIN — VANCOMYCIN HYDROCHLORIDE 1500 MG: 10 INJECTION, POWDER, LYOPHILIZED, FOR SOLUTION INTRAVENOUS at 10:42

## 2020-02-01 RX ADMIN — HEPARIN SODIUM 5000 UNITS: 5000 INJECTION INTRAVENOUS; SUBCUTANEOUS at 22:03

## 2020-02-01 RX ADMIN — QUETIAPINE FUMARATE 600 MG: 300 TABLET ORAL at 22:04

## 2020-02-01 NOTE — PROGRESS NOTES
Vancomycin Pharmacy Consult    Meri Cleveland is an 81 y o  male who is currently receiving IV vancomycin for peritoneal infection  Vancomycin Assessment:  1  ID Consult: No  2  Cultures:    Blood : NG   Anaerobic Peritoneum: 3+ Prevotella buccae   Fluid Peritoneum: 3+ Enterococcus faecalis, 2+ E coli  3  Procalcitonin:   n/a  4  Renal Function: stable  SCr: 0 57  CrCl: >100 mL/min  UOP: 0 5 mL/kg/hr  5  Days of Therapy: 11  6  Current Dose: 1500 mg IV q12h  7  Last Level: 20 4 ( at 0944)  8  Goal AUC(24h): 400-600  9  Goal Random/Trough: 15-20    Vancomycin Plan:  1  Evaluation: calculated AUC is 737, thus, will decrease the dose  2  New Dosin mg IV q12h (to start today at 2300)  Predicted Trough / AUC(24h): 16   3  Next Level: trough 2/3 at 1030      Pharmacy will continue to follow closely for s/sx of nephrotoxicity, infusion reactions, and appropriateness of therapy  BMP and CBC will be ordered per protocol  We will continue to follow the patients culture results and clinical progress daily  Melita Rivero, PharmD  Internal Medicine Clinical Pharmacist Specialist  [phone]  [facility]

## 2020-02-01 NOTE — PROGRESS NOTES
Progress Note - General Surgery   Adrianna Hatch 81 y o  male MRN: [id number]  Unit/Bed#: [facility] 607-01 Encounter: [id number]    Assessment:  80 y/o M p/w GI bleed 2/2 perforated duodenal ulcer, with bleeding uncontrolled with endoscopic attempts and IR embolization, now s/p ex-lap, closure over 1st portion of duodenum, Thal patch, GJ placement on 1/11, IR drainage of lesser sack on 1/27    Plan:  --NPO  --Continue J tube feeds as tolerated, advance to goal  --IVF  --G tube to gravity  --OOB, ambulate  --DVT ppx    Subjective/Objective     Subjective:     No acute events overnight  Pt feels well this AM, denies any complaints  Tolerating TF well  Objective:     Blood pressure 142/78, pulse 92, temperature 98 6 °F (37 °C), resp  rate 19, height 5' 8 5" (1 74 m), weight 77 1 kg (170 lb), SpO2 94 %  ,Body mass index is 25 47 kg/m²  Intake/Output Summary (Last 24 hours) at 2/1/2020 1006  Last data filed at 1/31/2020 2247  Gross per 24 hour   Intake 2681 66 ml   Output 1790 ml   Net 891 66 ml       Invasive Devices     Peripherally Inserted Central Catheter Line            PICC Line 05/15/21 Right Basilic 15 days          Drain            Gastrostomy/Enterostomy Gastrostomy-jejunostomy 22 Fr  LLQ 20 days    Closed/Suction Drain Left Abdomen Bulb 4 days                Physical Exam:    GEN: NAD  HEENT: MMM  CV: RRR  Lung: normal effort  Ab: Soft, NT/ND, midline wound manager in place with scant tan output, IR drain in place with serous output  Extrem: No CCE  Neuro:  A+Ox3, motor and sensation grossly intact    Lab, Imaging and other studies:  CBC: No results found for: WBC, HGB, HCT, MCV, PLT, ADJUSTEDWBC, MCH, MCHC, RDW, MPV, NRBC, CMP: No results found for: SODIUM, K, CL, CO2, ANIONGAP, BUN, CREATININE, GLUCOSE, CALCIUM, AST, ALT, ALKPHOS, PROT, BILITOT, EGFR, Coagulation: No results found for: PT, INR, APTT, Urinalysis: No results found for: Mary June, SPECGRAV, PHUR, LEUKOCYTESUR, NITRITE, PROTEINUA, Sara Gaffney, CANDY, BLOODU, Amylase: No results found for: AMYLASE, Lipase:   Lab Results   Component Value Date    LIPASE 54 (L) 01/31/2020     VTE Pharmacologic Prophylaxis: Heparin  VTE Mechanical Prophylaxis: sequential compression device

## 2020-02-01 NOTE — NURSING NOTE
Received report from Jose  Pt resting in bed  AAOx4  Tube feeding infusing, ordered IV fluids infusing  Abdomen assessed, colostomy bag for wound drainage in place and intact, dressings on abdomen clean, dry, intact  Will continue to monitor

## 2020-02-01 NOTE — PLAN OF CARE
Problem: Potential for Falls  Goal: Patient will remain free of falls  Description  INTERVENTIONS:  - Assess patient frequently for physical needs  -  Identify cognitive and physical deficits and behaviors that affect risk of falls    -  [place] fall precautions as indicated by assessment   - Educate patient/family on patient safety including physical limitations  - Instruct patient to call for assistance with activity based on assessment  - Modify environment to reduce risk of injury  - Consider OT/PT consult to assist with strengthening/mobility  Outcome: Progressing     Problem: PAIN - ADULT  Goal: Verbalizes/displays adequate comfort level or baseline comfort level  Description  Interventions:  - Encourage patient to monitor pain and request assistance  - Assess pain using appropriate pain scale  - Administer analgesics based on type and severity of pain and evaluate response  - Implement non-pharmacological measures as appropriate and evaluate response  - Consider cultural and social influences on pain and pain management  - Notify physician/advanced practitioner if interventions unsuccessful or patient reports new pain  Outcome: Progressing     Problem: INFECTION - ADULT  Goal: Absence or prevention of progression during hospitalization  Description  INTERVENTIONS:  - Assess and monitor for signs and symptoms of infection  - Monitor lab/diagnostic results  - Monitor all insertion sites, i e  indwelling lines, tubes, and drains  - Monitor endotracheal if appropriate and nasal secretions for changes in amount and color  - [place] appropriate cooling/warming therapies per order  - Administer medications as ordered  - Instruct and encourage patient and family to use good hand hygiene technique  - Identify and instruct in appropriate isolation precautions for identified infection/condition  Outcome: Progressing  Goal: Absence of fever/infection during neutropenic period  Description  INTERVENTIONS:  - Monitor WBC    Outcome: Progressing     Problem: SAFETY ADULT  Goal: Maintain or return to baseline ADL function  Description  INTERVENTIONS:  -  Assess patient's ability to carry out ADLs; assess patient's baseline for ADL function and identify physical deficits which impact ability to perform ADLs (bathing, care of mouth/teeth, toileting, grooming, dressing, etc )  - Assess/evaluate cause of self-care deficits   - Assess range of motion  - Assess patient's mobility; develop plan if impaired  - Assess patient's need for assistive devices and provide as appropriate  - Encourage maximum independence but intervene and supervise when necessary  - Involve family in performance of ADLs  - Assess for home care needs following discharge   - Consider OT consult to assist with ADL evaluation and planning for discharge  - Provide patient education as appropriate  Outcome: Progressing  Goal: Maintain or return mobility status to optimal level  Description  INTERVENTIONS:  - Assess patient's baseline mobility status (ambulation, transfers, stairs, etc )    - Identify cognitive and physical deficits and behaviors that affect mobility  - Identify mobility aids required to assist with transfers and/or ambulation (gait belt, sit-to-stand, lift, walker, cane, etc )  - [place] fall precautions as indicated by assessment  - Record patient progress and toleration of activity level on Mobility SBAR; progress patient to next Phase/Stage  - Instruct patient to call for assistance with activity based on assessment  - Consider rehabilitation consult to assist with strengthening/weightbearing, etc   Outcome: Progressing     Problem: DISCHARGE PLANNING  Goal: Discharge to home or other facility with appropriate resources  Description  INTERVENTIONS:  - Identify barriers to discharge w/patient and caregiver  - Arrange for needed discharge resources and transportation as appropriate  - Identify discharge learning needs (meds, wound care, etc )  - Arrange for interpretive services to assist at discharge as needed  - Refer to Case Management Department for coordinating discharge planning if the patient needs post-hospital services based on physician/advanced practitioner order or complex needs related to functional status, cognitive ability, or social support system  Outcome: Progressing     Problem: Knowledge Deficit  Goal: Patient/family/caregiver demonstrates understanding of disease process, treatment plan, medications, and discharge instructions  Description  Complete learning assessment and assess knowledge base  Interventions:  - Provide teaching at level of understanding  - Provide teaching via preferred learning methods  Outcome: Progressing     Problem: Nutrition/Hydration-ADULT  Goal: Nutrient/Hydration intake appropriate for improving, restoring or maintaining nutritional needs  Description  Monitor and assess patient's nutrition/hydration status for malnutrition  Collaborate with interdisciplinary team and initiate plan and interventions as ordered  Monitor patient's weight and dietary intake as ordered or per policy  Utilize nutrition screening tool and intervene as necessary  Determine patient's food preferences and provide high-protein, high-caloric foods as appropriate       INTERVENTIONS:  - Monitor oral intake, urinary output, labs, and treatment plans  - Assess nutrition and hydration status and recommend course of action  - Evaluate amount of meals eaten  - Assist patient with eating if necessary   - Allow adequate time for meals  - Recommend/ encourage appropriate diets, oral nutritional supplements, and vitamin/mineral supplements  - Order, calculate, and assess calorie counts as needed  - Recommend, monitor, and adjust tube feedings and TPN/PPN based on assessed needs  - Assess need for intravenous fluids  - Provide specific nutrition/hydration education as appropriate  - Include patient/family/caregiver in decisions related to nutrition  Outcome: Progressing     Problem: SAFETY,RESTRAINT: NV/NON-SELF DESTRUCTIVE BEHAVIOR  Goal: Remains free of harm/injury (restraint for non violent/non self-detsructive behavior)  Description  INTERVENTIONS:  - Instruct patient/family regarding restraint use   - Assess and monitor physiologic and psychological status   - Provide interventions and comfort measures to meet assessed patient needs   - Identify and implement measures to help patient regain control  - Assess readiness for release of restraint   Outcome: Adequate for Discharge  Goal: Returns to optimal restraint-free functioning  Description  INTERVENTIONS:  - Assess the patient's behavior and symptoms that indicate continued need for restraint  - Identify and implement measures to help patient regain control  - Assess readiness for release of restraint   Outcome: Adequate for Discharge     Problem: SAFETY,RESTRAINT: NV/NON-SELF DESTRUCTIVE BEHAVIOR  Goal: Remains free of harm/injury (restraint for non violent/non self-detsructive behavior)  Description  INTERVENTIONS:  - Instruct patient/family regarding restraint use   - Assess and monitor physiologic and psychological status   - Provide interventions and comfort measures to meet assessed patient needs   - Identify and implement measures to help patient regain control  - Assess readiness for release of restraint   Outcome: Adequate for Discharge  Goal: Returns to optimal restraint-free functioning  Description  INTERVENTIONS:  - Assess the patient's behavior and symptoms that indicate continued need for restraint  - Identify and implement measures to help patient regain control  - Assess readiness for release of restraint   Outcome: Adequate for Discharge     Problem: Prexisting or High Potential for Compromised Skin Integrity  Goal: Skin integrity is maintained or improved  Description  INTERVENTIONS:  - Identify patients at risk for skin breakdown  - Assess and monitor skin integrity  - Assess and monitor nutrition and hydration status  - Monitor labs   - Assess for incontinence   - Turn and reposition patient  - Assist with mobility/ambulation  - Relieve pressure over bony prominences  - Avoid friction and shearing  - Provide appropriate hygiene as needed including keeping skin clean and dry  - Evaluate need for skin moisturizer/barrier cream  - Collaborate with interdisciplinary team   - Patient/family teaching  - Consider wound care consult   Outcome: Progressing

## 2020-02-01 NOTE — PHYSICAL THERAPY NOTE
Physical Therapy Re-Evaluation Note     Patient Name: Geovany Medina    LDIFG'X Date: 2/1/2020     Problem List  Principal Problem:    Duodenal ulcer  Active Problems:    Essential hypertension    Syncope    Chest pain    Suicidal ideation    Alcohol abuse    MDD (major depressive disorder)    History of CVA (cerebrovascular accident)    COPD without exacerbation (HCC)    Dyslipidemia    Chronic pain    Acute blood loss anemia    Tobacco abuse    Inadequate oral nutritional intake       Past Medical History  Past Medical History:   Diagnosis Date    BPH (benign prostatic hyperplasia)     CVA (cerebral vascular accident) ([address] )     Head injury     Hypertension     Metatarsal fracture     TIA (transient ischemic attack)         Past Surgical History  Past Surgical History:   Procedure Laterality Date    ABDOMINAL SURGERY      ANKLE SURGERY      BACK SURGERY      CT GUIDED [facility] DRAINAGE CATHETER PLACEMENT  1/27/2020    ELBOW SURGERY      IR VISCERAL ANGIOGRAPHY / INTERVENTION  1/11/2020    JOINT REPLACEMENT      KNEE SURGERY      LAPAROTOMY N/A 1/11/2020    Procedure: LAPAROTOMY EXPLORATORY,  OVERSEW  OF GASTRO DUODENAL ARTERY, THAL PATCH OF DUODENUM, VICKY GASTRO JEJUNOSTOMY TUBE;  Surgeon: Devonte Champion DO;  Location: [facility];  Service: General    LIVER SURGERY          Re-Eval 8:16-8:30    02/01/20 0847   Note Type   Note type Re-eval   Pain Assessment   Pain Assessment No/denies pain   Pain Score No Pain   Home Living   Additional Comments see IE for additional information   Prior Function   Comments see IE for additional information   Restrictions/Precautions   Weight Bearing Precautions Per Order No   Other Precautions Cognitive; Chair Alarm; Bed Alarm;Multiple lines; Fall Risk   General   Family/Caregiver Present No   Cognition   Overall Cognitive Status Impaired   Arousal/Participation Alert   Attention Attends with cues to redirect Orientation Level Oriented X4   Memory Within functional limits   Following Commands Follows all commands and directions without difficulty   RLE Assessment   RLE Assessment WFL   LLE Assessment   LLE Assessment WFL   Light Touch   RLE Light Touch Grossly intact   LLE Light Touch Grossly intact   Bed Mobility   Supine to Sit 4  Minimal assistance   Additional items Assist x 1   Sit to Supine 4  Minimal assistance   Additional items Assist x 1   Transfers   Sit to Stand 4  Minimal assistance   Additional items Assist x 1   Stand to Sit 4  Minimal assistance   Additional items Assist x 1   Ambulation/Elevation   Gait pattern Foward flexed; Shuffling   Gait Assistance 4  Minimal assist   Additional items Assist x 1   Assistive Device Rolling walker   Distance 2ft to chair   Balance   Static Sitting Fair +   Dynamic Sitting Fair +   Static Standing Fair   Dynamic Standing Fair   Ambulatory Fair -   Endurance Deficit   Endurance Deficit Yes   Activity Tolerance   Activity Tolerance Patient limited by fatigue   Nurse Made Aware nurse approved therapy session   Assessment   Prognosis Good   Problem List Decreased strength;Decreased endurance; Impaired balance;Decreased mobility   Assessment Pt presents to therapy today for a re-eval due to goals expiring with reduced mobility, limited endurance, limited B LE strength, high fall risk  These impairments limit the patient by requiring assistance for mobility and palaces him at an increased risk of falling  Pt would benefit from continued skilled therapy while in the hospital to improve overall mobility  Recommend reahb  At end of session patient was left seated with call bell within reach  Barriers to Discharge Inaccessible home environment   Goals   STG Expiration Date 02/15/20   Short Term Goal #1 1  Pt will demonstrate the ability to perform all aspects of bed mobility at S X1 in onder to maximize functional independence and decrease burden on caregivers   2 Pt will demonstrate the ability to perform all functional transfers at S in order to maximize functional independence and decrease burden on caregivers  3 Pt will demonstrate the ability to ambulate at least 50ft with least restrictive device at Mod I in order to maximize functional independence  4 Pt will demonstrate improved balance by one grade in order to decrease risk of falls  5 Pt will increase b/l LE strength by 1 grade in order to increase ease of functional mobility and transfers   PT Treatment Day 3   Plan   Treatment/Interventions Functional transfer training;LE strengthening/ROM; Therapeutic exercise; Endurance training;Gait training;Bed mobility; Equipment eval/education   PT Frequency Other (Comment)  (3-5xwk)   Recommendation   Recommendation Post acute IP rehab   Equipment Recommended Walker   PT - OK to Discharge Yes   Additional Comments to rehab when medically cleared   Modified Alejandro Scale   Modified Kinney Scale 4   Barthel Index   Feeding 10   Bathing 0   Grooming Score 5   Dressing Score 5   Bladder Score 0   Bowels Score 0   Toilet Use Score 5   Transfers (Bed/Chair) Score 10   Mobility (Level Surface) Score 0   Stairs Score 0   Barthel Index Score 35   Treatment Note 8:31-8:47  Pt participated in multiple sit to stands during hygiene s/p incontinent of BM  Pt required min A to stand, S when standing  Pt was able to stand for 10 minutes with S  Pt became fatigued and leaned on walker for more support    Dolores Key, Pt, DPT

## 2020-02-01 NOTE — PLAN OF CARE
Problem: PHYSICAL THERAPY ADULT  Goal: Performs mobility at highest level of function for planned discharge setting  See evaluation for individualized goals  Description  Treatment/Interventions: Functional transfer training, LE strengthening/ROM, Therapeutic exercise, Endurance training, Patient/family training, Bed mobility, Gait training  Equipment Recommended: Sia Tuttle       See flowsheet documentation for full assessment, interventions and recommendations  Outcome: Progressing  Note:   Prognosis: Good  Problem List: Decreased strength, Decreased endurance, Impaired balance, Decreased mobility  Assessment: Pt presents to therapy today for a re-eval due to goals expiring with reduced mobility, limited endurance, limited B LE strength, high fall risk  These impairments limit the patient by requiring assistance for mobility and palaces him at an increased risk of falling  Pt would benefit from continued skilled therapy while in the hospital to improve overall mobility  Recommend reahb  At end of session patient was left seated with call bell within reach  Barriers to Discharge: Inaccessible home environment     Recommendation: Post acute IP rehab     PT - OK to Discharge: Yes    See flowsheet documentation for full assessment

## 2020-02-02 PROCEDURE — 99024 POSTOP FOLLOW-UP VISIT: CPT | Performed by: SURGERY

## 2020-02-02 RX ADMIN — HEPARIN SODIUM 5000 UNITS: 5000 INJECTION INTRAVENOUS; SUBCUTANEOUS at 05:30

## 2020-02-02 RX ADMIN — FLUTICASONE PROPIONATE 2 SPRAY: 50 SPRAY, METERED NASAL at 08:04

## 2020-02-02 RX ADMIN — GABAPENTIN 300 MG: 300 CAPSULE ORAL at 08:03

## 2020-02-02 RX ADMIN — METOPROLOL TARTRATE 25 MG: 25 TABLET, FILM COATED ORAL at 21:39

## 2020-02-02 RX ADMIN — QUETIAPINE FUMARATE 200 MG: 100 TABLET ORAL at 08:03

## 2020-02-02 RX ADMIN — GABAPENTIN 300 MG: 300 CAPSULE ORAL at 21:39

## 2020-02-02 RX ADMIN — HEPARIN SODIUM 5000 UNITS: 5000 INJECTION INTRAVENOUS; SUBCUTANEOUS at 21:38

## 2020-02-02 RX ADMIN — CEFEPIME HYDROCHLORIDE 2000 MG: 2 INJECTION, POWDER, FOR SOLUTION INTRAVENOUS at 03:12

## 2020-02-02 RX ADMIN — Medication 20 MG: at 17:59

## 2020-02-02 RX ADMIN — NICOTINE 1 PATCH: 14 PATCH TRANSDERMAL at 08:02

## 2020-02-02 RX ADMIN — ATORVASTATIN CALCIUM 40 MG: 40 TABLET, FILM COATED ORAL at 17:59

## 2020-02-02 RX ADMIN — Medication 20 MG: at 08:05

## 2020-02-02 RX ADMIN — FOLIC ACID 1 MG: 1 TABLET ORAL at 08:03

## 2020-02-02 RX ADMIN — HEPARIN SODIUM 5000 UNITS: 5000 INJECTION INTRAVENOUS; SUBCUTANEOUS at 13:30

## 2020-02-02 RX ADMIN — CEFEPIME HYDROCHLORIDE 2000 MG: 2 INJECTION, POWDER, FOR SOLUTION INTRAVENOUS at 14:00

## 2020-02-02 RX ADMIN — DOCUSATE SODIUM 100 MG: 100 CAPSULE, LIQUID FILLED ORAL at 08:03

## 2020-02-02 RX ADMIN — MIRTAZAPINE 15 MG: 15 TABLET, FILM COATED ORAL at 21:38

## 2020-02-02 RX ADMIN — METRONIDAZOLE 500 MG: 500 TABLET, FILM COATED ORAL at 13:30

## 2020-02-02 RX ADMIN — DULOXETINE HYDROCHLORIDE 90 MG: 60 CAPSULE, DELAYED RELEASE ORAL at 08:03

## 2020-02-02 RX ADMIN — METOPROLOL TARTRATE 25 MG: 25 TABLET, FILM COATED ORAL at 08:03

## 2020-02-02 RX ADMIN — METRONIDAZOLE 500 MG: 500 TABLET, FILM COATED ORAL at 05:30

## 2020-02-02 RX ADMIN — METRONIDAZOLE 500 MG: 500 TABLET, FILM COATED ORAL at 21:38

## 2020-02-02 RX ADMIN — CHLORHEXIDINE GLUCONATE 0.12% ORAL RINSE 15 ML: 1.2 LIQUID ORAL at 21:38

## 2020-02-02 RX ADMIN — MELATONIN 1000 UNITS: at 08:03

## 2020-02-02 RX ADMIN — CHLORHEXIDINE GLUCONATE 0.12% ORAL RINSE 15 ML: 1.2 LIQUID ORAL at 08:03

## 2020-02-02 RX ADMIN — THIAMINE HCL TAB 100 MG 100 MG: 100 TAB at 08:03

## 2020-02-02 RX ADMIN — GABAPENTIN 300 MG: 300 CAPSULE ORAL at 17:59

## 2020-02-02 RX ADMIN — DEXTROSE, SODIUM CHLORIDE, AND POTASSIUM CHLORIDE 100 ML/HR: 5; .45; .15 INJECTION INTRAVENOUS at 03:12

## 2020-02-02 RX ADMIN — VANCOMYCIN HYDROCHLORIDE 1250 MG: 10 INJECTION, POWDER, LYOPHILIZED, FOR SOLUTION INTRAVENOUS at 10:55

## 2020-02-02 RX ADMIN — QUETIAPINE FUMARATE 600 MG: 300 TABLET ORAL at 21:40

## 2020-02-02 RX ADMIN — FLUTICASONE FUROATE AND VILANTEROL TRIFENATATE 1 PUFF: 100; 25 POWDER RESPIRATORY (INHALATION) at 08:04

## 2020-02-02 RX ADMIN — LORATADINE 10 MG: 10 TABLET ORAL at 08:03

## 2020-02-02 NOTE — PROGRESS NOTES
Progress Note - General Surgery   Ivana Pel 81 y o  male MRN: [id number]  Unit/Bed#: [facility] 607-01 Encounter: [id number]    Assessment:  80 y/o M p/w GI bleed 2/2 perforated duodenal ulcer, with bleeding uncontrolled with endoscopic attempts and IR embolization, now s/p ex-lap, closure over 1st portion of duodenum, Thal patch, GJ placement on 1/11, IR drainage of lesser sack on 1/27    Plan:  --NPO  --Continue J tube feeds as tolerated  --d/c IVF  --currently on vanc/cefepime/flagyl  --G tube to gravity  --OOB, ambulate  --PT/OT recommending inpatient rehab  --DVT ppx    Subjective/Objective     Subjective: No acute events  Tolerating TF at goal  Having bowel function  Objective:     Blood pressure 166/78, pulse 100, temperature 98 1 °F (36 7 °C), temperature source Oral, resp  rate 18, height 5' 8 5" (1 74 m), weight 77 1 kg (170 lb), SpO2 97 %  ,Body mass index is 25 47 kg/m²  Intake/Output Summary (Last 24 hours) at 2/2/2020 1044  Last data filed at 2/2/2020 0844  Gross per 24 hour   Intake 1353 33 ml   Output 1450 ml   Net -96 67 ml       Invasive Devices     Peripherally Inserted Central Catheter Line            PICC Line 75/70/48 Right Basilic 16 days          Drain            Gastrostomy/Enterostomy Gastrostomy-jejunostomy 22 Fr  LLQ 21 days    Closed/Suction Drain Left Abdomen Bulb 5 days                Physical Exam:  GEN: NAD  HEENT: MMM  CV: RRR  Lung: normal effort  Ab: Soft, NT/ND  Wound manager in place  CAMPBELL drain with serous output  Extrem: No CCE  Neuro:  A+Ox3, motor and sensation grossly intact    Lab, Imaging and other studies:  CBC: No results found for: WBC, HGB, HCT, MCV, PLT, ADJUSTEDWBC, MCH, MCHC, RDW, MPV, NRBC, CMP: No results found for: SODIUM, K, CL, CO2, ANIONGAP, BUN, CREATININE, GLUCOSE, CALCIUM, AST, ALT, ALKPHOS, PROT, BILITOT, EGFR, Coagulation: No results found for: PT, INR, APTT, Urinalysis: No results found for: COLORU, CLARITYU, SPECGRAV, PHUR, LEUKOCYTESUR, NITRITE, STEVEN Peralta, CANDY, BLOODU, Amylase: No results found for: AMYLASE, Lipase:   No results found for: LIPASE  VTE Pharmacologic Prophylaxis: Heparin  VTE Mechanical Prophylaxis: sequential compression device

## 2020-02-02 NOTE — PLAN OF CARE
Problem: Potential for Falls  Goal: Patient will remain free of falls  Description  INTERVENTIONS:  - Assess patient frequently for physical needs  -  Identify cognitive and physical deficits and behaviors that affect risk of falls    -  [place] fall precautions as indicated by assessment   - Educate patient/family on patient safety including physical limitations  - Instruct patient to call for assistance with activity based on assessment  - Modify environment to reduce risk of injury  - Consider OT/PT consult to assist with strengthening/mobility  Outcome: Progressing     Problem: PAIN - ADULT  Goal: Verbalizes/displays adequate comfort level or baseline comfort level  Description  Interventions:  - Encourage patient to monitor pain and request assistance  - Assess pain using appropriate pain scale  - Administer analgesics based on type and severity of pain and evaluate response  - Implement non-pharmacological measures as appropriate and evaluate response  - Consider cultural and social influences on pain and pain management  - Notify physician/advanced practitioner if interventions unsuccessful or patient reports new pain  Outcome: Progressing     Problem: INFECTION - ADULT  Goal: Absence or prevention of progression during hospitalization  Description  INTERVENTIONS:  - Assess and monitor for signs and symptoms of infection  - Monitor lab/diagnostic results  - Monitor all insertion sites, i e  indwelling lines, tubes, and drains  - Monitor endotracheal if appropriate and nasal secretions for changes in amount and color  - [place] appropriate cooling/warming therapies per order  - Administer medications as ordered  - Instruct and encourage patient and family to use good hand hygiene technique  - Identify and instruct in appropriate isolation precautions for identified infection/condition  Outcome: Progressing  Goal: Absence of fever/infection during neutropenic period  Description  INTERVENTIONS:  - Monitor WBC    Outcome: Progressing     Problem: SAFETY ADULT  Goal: Maintain or return to baseline ADL function  Description  INTERVENTIONS:  -  Assess patient's ability to carry out ADLs; assess patient's baseline for ADL function and identify physical deficits which impact ability to perform ADLs (bathing, care of mouth/teeth, toileting, grooming, dressing, etc )  - Assess/evaluate cause of self-care deficits   - Assess range of motion  - Assess patient's mobility; develop plan if impaired  - Assess patient's need for assistive devices and provide as appropriate  - Encourage maximum independence but intervene and supervise when necessary  - Involve family in performance of ADLs  - Assess for home care needs following discharge   - Consider OT consult to assist with ADL evaluation and planning for discharge  - Provide patient education as appropriate  Outcome: Progressing  Goal: Maintain or return mobility status to optimal level  Description  INTERVENTIONS:  - Assess patient's baseline mobility status (ambulation, transfers, stairs, etc )    - Identify cognitive and physical deficits and behaviors that affect mobility  - Identify mobility aids required to assist with transfers and/or ambulation (gait belt, sit-to-stand, lift, walker, cane, etc )  - [place] fall precautions as indicated by assessment  - Record patient progress and toleration of activity level on Mobility SBAR; progress patient to next Phase/Stage  - Instruct patient to call for assistance with activity based on assessment  - Consider rehabilitation consult to assist with strengthening/weightbearing, etc   Outcome: Progressing     Problem: DISCHARGE PLANNING  Goal: Discharge to home or other facility with appropriate resources  Description  INTERVENTIONS:  - Identify barriers to discharge w/patient and caregiver  - Arrange for needed discharge resources and transportation as appropriate  - Identify discharge learning needs (meds, wound care, etc )  - Arrange for interpretive services to assist at discharge as needed  - Refer to Case Management Department for coordinating discharge planning if the patient needs post-hospital services based on physician/advanced practitioner order or complex needs related to functional status, cognitive ability, or social support system  Outcome: Progressing     Problem: Knowledge Deficit  Goal: Patient/family/caregiver demonstrates understanding of disease process, treatment plan, medications, and discharge instructions  Description  Complete learning assessment and assess knowledge base  Interventions:  - Provide teaching at level of understanding  - Provide teaching via preferred learning methods  Outcome: Progressing     Problem: Nutrition/Hydration-ADULT  Goal: Nutrient/Hydration intake appropriate for improving, restoring or maintaining nutritional needs  Description  Monitor and assess patient's nutrition/hydration status for malnutrition  Collaborate with interdisciplinary team and initiate plan and interventions as ordered  Monitor patient's weight and dietary intake as ordered or per policy  Utilize nutrition screening tool and intervene as necessary  Determine patient's food preferences and provide high-protein, high-caloric foods as appropriate       INTERVENTIONS:  - Monitor oral intake, urinary output, labs, and treatment plans  - Assess nutrition and hydration status and recommend course of action  - Evaluate amount of meals eaten  - Assist patient with eating if necessary   - Allow adequate time for meals  - Recommend/ encourage appropriate diets, oral nutritional supplements, and vitamin/mineral supplements  - Order, calculate, and assess calorie counts as needed  - Recommend, monitor, and adjust tube feedings and TPN/PPN based on assessed needs  - Assess need for intravenous fluids  - Provide specific nutrition/hydration education as appropriate  - Include patient/family/caregiver in decisions related to nutrition  Outcome: Progressing     Problem: Prexisting or High Potential for Compromised Skin Integrity  Goal: Skin integrity is maintained or improved  Description  INTERVENTIONS:  - Identify patients at risk for skin breakdown  - Assess and monitor skin integrity  - Assess and monitor nutrition and hydration status  - Monitor labs   - Assess for incontinence   - Turn and reposition patient  - Assist with mobility/ambulation  - Relieve pressure over bony prominences  - Avoid friction and shearing  - Provide appropriate hygiene as needed including keeping skin clean and dry  - Evaluate need for skin moisturizer/barrier cream  - Collaborate with interdisciplinary team   - Patient/family teaching  - Consider wound care consult   Outcome: Progressing

## 2020-02-02 NOTE — PROGRESS NOTES
Vancomycin Pharmacy Consult     Lavinia Gamez is an 81 y o  male who is currently receiving IV vancomycin for peritoneal infection  Vancomycin Assessment:  1  ID Consult: No  2  Cultures:    Blood : NG   Anaerobic Peritoneum: 3+ Prevotella buccae   Fluid Peritoneum: 3+ Enterococcus faecalis, 2+ E coli, few colonies Pseudomonas aeruginosa  3  Procalcitonin:   n/a  4  Renal Function: stable  SCr: 0 57  CrCl: >100 mL/min  UOP: 0 8 mL/kg/hr  5  Days of Therapy: 12  6  Current Dose: 1500 mg IV q12h  7  Last Level: 20 4 (trough  at 0944)  8  Goal AUC(24h): 400-600  9  Goal Random/Trough: 15-20     Vancomycin Plan:  1  Evaluation: continue current regimen  2  New Dosin mg IV q12h  Predicted Trough / AUC(24h): 16   3  Next Level: trough 3 at 1030        Pharmacy will continue to follow closely for s/sx of nephrotoxicity, infusion reactions, and appropriateness of therapy  BMP and CBC will be ordered per protocol  We will continue to follow the patients culture results and clinical progress daily  Andrea Guzman, DeloresD  Internal Medicine Clinical Pharmacist Specialist  [phone]  [facility]

## 2020-02-03 ENCOUNTER — APPOINTMENT (INPATIENT)
Dept: RADIOLOGY | Facility: HOSPITAL | Age: 70
DRG: 326 | End: 2020-02-03
Payer: MEDICARE

## 2020-02-03 LAB
ANION GAP SERPL CALCULATED.3IONS-SCNC: 6 MMOL/L (ref 4–13)
ATRIAL RATE: 87 BPM
BUN SERPL-MCNC: 9 MG/DL (ref 5–25)
CALCIUM SERPL-MCNC: 8.1 MG/DL (ref 8.3–10.1)
CHLORIDE SERPL-SCNC: 103 MMOL/L (ref 100–108)
CO2 SERPL-SCNC: 28 MMOL/L (ref 21–32)
CREAT SERPL-MCNC: 0.49 MG/DL (ref 0.6–1.3)
ERYTHROCYTE [DISTWIDTH] IN BLOOD BY AUTOMATED COUNT: 16.5 % (ref 11.6–15.1)
GFR SERPL CREATININE-BSD FRML MDRD: 113 ML/MIN/1.73SQ M
GLUCOSE SERPL-MCNC: 146 MG/DL (ref 65–140)
HCT VFR BLD AUTO: 30.3 % (ref 36.5–49.3)
HGB BLD-MCNC: 9.6 G/DL (ref 12–17)
MCH RBC QN AUTO: 29.8 PG (ref 26.8–34.3)
MCHC RBC AUTO-ENTMCNC: 31.7 G/DL (ref 31.4–37.4)
MCV RBC AUTO: 94 FL (ref 82–98)
P AXIS: 70 DEGREES
PLATELET # BLD AUTO: 482 THOUSANDS/UL (ref 149–390)
PMV BLD AUTO: 9.1 FL (ref 8.9–12.7)
POTASSIUM SERPL-SCNC: 3.3 MMOL/L (ref 3.5–5.3)
PR INTERVAL: 152 MS
QRS AXIS: 0 DEGREES
QRSD INTERVAL: 78 MS
QT INTERVAL: 392 MS
QTC INTERVAL: 471 MS
RBC # BLD AUTO: 3.22 MILLION/UL (ref 3.88–5.62)
SODIUM SERPL-SCNC: 137 MMOL/L (ref 136–145)
T WAVE AXIS: 32 DEGREES
VANCOMYCIN TROUGH SERPL-MCNC: 18.9 UG/ML (ref 10–20)
VENTRICULAR RATE: 87 BPM
WBC # BLD AUTO: 9.62 THOUSAND/UL (ref 4.31–10.16)

## 2020-02-03 PROCEDURE — 74177 CT ABD & PELVIS W/CONTRAST: CPT

## 2020-02-03 PROCEDURE — 80048 BASIC METABOLIC PNL TOTAL CA: CPT | Performed by: ORTHOPAEDIC SURGERY

## 2020-02-03 PROCEDURE — 97535 SELF CARE MNGMENT TRAINING: CPT

## 2020-02-03 PROCEDURE — 80202 ASSAY OF VANCOMYCIN: CPT | Performed by: SURGERY

## 2020-02-03 PROCEDURE — 97116 GAIT TRAINING THERAPY: CPT

## 2020-02-03 PROCEDURE — 85027 COMPLETE CBC AUTOMATED: CPT | Performed by: ORTHOPAEDIC SURGERY

## 2020-02-03 PROCEDURE — 93010 ELECTROCARDIOGRAM REPORT: CPT | Performed by: INTERNAL MEDICINE

## 2020-02-03 PROCEDURE — 99024 POSTOP FOLLOW-UP VISIT: CPT | Performed by: SURGERY

## 2020-02-03 PROCEDURE — NC001 PR NO CHARGE: Performed by: SURGERY

## 2020-02-03 PROCEDURE — 93005 ELECTROCARDIOGRAM TRACING: CPT

## 2020-02-03 RX ORDER — DOCUSATE SODIUM 100 MG/1
100 CAPSULE, LIQUID FILLED ORAL 2 TIMES DAILY PRN
Status: DISCONTINUED | OUTPATIENT
Start: 2020-02-03 | End: 2020-02-03

## 2020-02-03 RX ORDER — DOCUSATE SODIUM 100 MG/1
100 CAPSULE, LIQUID FILLED ORAL 2 TIMES DAILY PRN
Status: DISCONTINUED | OUTPATIENT
Start: 2020-02-03 | End: 2020-02-11 | Stop reason: HOSPADM

## 2020-02-03 RX ORDER — METOCLOPRAMIDE HYDROCHLORIDE 5 MG/ML
10 INJECTION INTRAMUSCULAR; INTRAVENOUS EVERY 6 HOURS SCHEDULED
Status: DISCONTINUED | OUTPATIENT
Start: 2020-02-03 | End: 2020-02-11 | Stop reason: HOSPADM

## 2020-02-03 RX ORDER — POLYETHYLENE GLYCOL 3350 17 G/17G
17 POWDER, FOR SOLUTION ORAL DAILY PRN
Status: DISCONTINUED | OUTPATIENT
Start: 2020-02-03 | End: 2020-02-11 | Stop reason: HOSPADM

## 2020-02-03 RX ADMIN — NICOTINE 1 PATCH: 14 PATCH TRANSDERMAL at 09:23

## 2020-02-03 RX ADMIN — VANCOMYCIN HYDROCHLORIDE 1250 MG: 10 INJECTION, POWDER, LYOPHILIZED, FOR SOLUTION INTRAVENOUS at 12:00

## 2020-02-03 RX ADMIN — CEFEPIME HYDROCHLORIDE 2000 MG: 2 INJECTION, POWDER, FOR SOLUTION INTRAVENOUS at 14:02

## 2020-02-03 RX ADMIN — FOLIC ACID 1 MG: 1 TABLET ORAL at 09:19

## 2020-02-03 RX ADMIN — MIRTAZAPINE 15 MG: 15 TABLET, FILM COATED ORAL at 21:43

## 2020-02-03 RX ADMIN — METRONIDAZOLE 500 MG: 500 TABLET, FILM COATED ORAL at 21:43

## 2020-02-03 RX ADMIN — VANCOMYCIN HYDROCHLORIDE 1250 MG: 10 INJECTION, POWDER, LYOPHILIZED, FOR SOLUTION INTRAVENOUS at 23:00

## 2020-02-03 RX ADMIN — FLUTICASONE FUROATE AND VILANTEROL TRIFENATATE 1 PUFF: 100; 25 POWDER RESPIRATORY (INHALATION) at 09:23

## 2020-02-03 RX ADMIN — DULOXETINE HYDROCHLORIDE 90 MG: 60 CAPSULE, DELAYED RELEASE ORAL at 09:19

## 2020-02-03 RX ADMIN — GABAPENTIN 300 MG: 300 CAPSULE ORAL at 21:43

## 2020-02-03 RX ADMIN — METRONIDAZOLE 500 MG: 500 TABLET, FILM COATED ORAL at 13:45

## 2020-02-03 RX ADMIN — GABAPENTIN 300 MG: 300 CAPSULE ORAL at 09:19

## 2020-02-03 RX ADMIN — IOHEXOL 90 ML: 350 INJECTION, SOLUTION INTRAVENOUS at 14:41

## 2020-02-03 RX ADMIN — CHLORHEXIDINE GLUCONATE 0.12% ORAL RINSE 15 ML: 1.2 LIQUID ORAL at 21:44

## 2020-02-03 RX ADMIN — CEFEPIME HYDROCHLORIDE 2000 MG: 2 INJECTION, POWDER, FOR SOLUTION INTRAVENOUS at 03:27

## 2020-02-03 RX ADMIN — MELATONIN 1000 UNITS: at 09:19

## 2020-02-03 RX ADMIN — LORATADINE 10 MG: 10 TABLET ORAL at 09:19

## 2020-02-03 RX ADMIN — THIAMINE HCL TAB 100 MG 100 MG: 100 TAB at 09:19

## 2020-02-03 RX ADMIN — CHLORHEXIDINE GLUCONATE 0.12% ORAL RINSE 15 ML: 1.2 LIQUID ORAL at 09:19

## 2020-02-03 RX ADMIN — QUETIAPINE FUMARATE 200 MG: 100 TABLET ORAL at 09:19

## 2020-02-03 RX ADMIN — HEPARIN SODIUM 5000 UNITS: 5000 INJECTION INTRAVENOUS; SUBCUTANEOUS at 21:44

## 2020-02-03 RX ADMIN — METOPROLOL TARTRATE 25 MG: 25 TABLET, FILM COATED ORAL at 09:19

## 2020-02-03 RX ADMIN — QUETIAPINE FUMARATE 600 MG: 300 TABLET ORAL at 21:45

## 2020-02-03 RX ADMIN — Medication 20 MG: at 17:08

## 2020-02-03 RX ADMIN — METOCLOPRAMIDE 10 MG: 5 INJECTION, SOLUTION INTRAMUSCULAR; INTRAVENOUS at 17:08

## 2020-02-03 RX ADMIN — GABAPENTIN 300 MG: 300 CAPSULE ORAL at 17:08

## 2020-02-03 RX ADMIN — METRONIDAZOLE 500 MG: 500 TABLET, FILM COATED ORAL at 05:26

## 2020-02-03 RX ADMIN — HEPARIN SODIUM 5000 UNITS: 5000 INJECTION INTRAVENOUS; SUBCUTANEOUS at 05:26

## 2020-02-03 RX ADMIN — ATORVASTATIN CALCIUM 40 MG: 40 TABLET, FILM COATED ORAL at 17:08

## 2020-02-03 RX ADMIN — METOPROLOL TARTRATE 25 MG: 25 TABLET, FILM COATED ORAL at 21:44

## 2020-02-03 RX ADMIN — VANCOMYCIN HYDROCHLORIDE 1250 MG: 10 INJECTION, POWDER, LYOPHILIZED, FOR SOLUTION INTRAVENOUS at 01:13

## 2020-02-03 RX ADMIN — HEPARIN SODIUM 5000 UNITS: 5000 INJECTION INTRAVENOUS; SUBCUTANEOUS at 13:45

## 2020-02-03 RX ADMIN — LABETALOL 20 MG/4 ML (5 MG/ML) INTRAVENOUS SYRINGE 10 MG: at 01:42

## 2020-02-03 NOTE — SOCIAL WORK
Patient reviewed  CM awaiting medical clearance for discharge planning  PT/OT recommending rehab  Patient needs an accepting facility  CM will continue to follow for clearance

## 2020-02-03 NOTE — PROGRESS NOTES
Vancomycin Assessment    Leo Jones is a 81 y o  male who is currently receiving vancomycin 1250mg q12h for skin-soft tissue infection     Relevant clinical data and objective history reviewed:  Creatinine   Date Value Ref Range Status   02/03/2020 0 49 (L) 0 60 - 1 30 mg/dL Final     Comment:     Standardized to IDMS reference method   02/01/2020 0 57 (L) 0 60 - 1 30 mg/dL Final     Comment:     Standardized to IDMS reference method   01/31/2020 0 49 (L) 0 60 - 1 30 mg/dL Final     Comment:     Standardized to IDMS reference method   02/08/2015 0 57 (L) 0 60 - 1 30 mg/dL Final     Comment:     Standardized to IDMS reference method   02/04/2015 0 49 (L) 0 60 - 1 30 mg/dL Final     Comment:     Standardized to IDMS reference method   01/29/2015 0 45 (L) 0 60 - 1 30 mg/dL Final     Comment:     Standardized to IDMS reference method     /85 (BP Location: Left arm)   Pulse 97   Temp 98 6 °F (37 °C) (Oral)   Resp 18   Ht 5' 8 5" (1 74 m)   Wt 77 1 kg (170 lb)   SpO2 97%   BMI 25 47 kg/m²   I/O last 3 completed shifts: In: 0287 [I V :10; NG/GT:300; IV Piggyback:550; Feedings:560]  Out: 2873 [Urine:2173; Emesis/NG output:700]  Lab Results   Component Value Date/Time    BUN 9 02/03/2020 08:14 AM    BUN 11 02/08/2015 06:00 AM    WBC 9 62 02/03/2020 09:32 AM    WBC 5 72 02/08/2015 06:00 AM    HGB 9 6 (L) 02/03/2020 09:32 AM    HGB 10 9 (L) 02/08/2015 06:00 AM    HCT 30 3 (L) 02/03/2020 09:32 AM    HCT 32 8 (L) 02/08/2015 06:00 AM    MCV 94 02/03/2020 09:32 AM     (H) 02/08/2015 06:00 AM     (H) 02/03/2020 09:32 AM     02/08/2015 06:00 AM     Temp Readings from Last 3 Encounters:   02/03/20 98 6 °F (37 °C) (Oral)   11/20/19 (!) 96 6 °F (35 9 °C)   10/23/19 98 °F (36 7 °C) (Temporal)     Vancomycin Days of Therapy: 13    Assessment/Plan  The patient is currently on vancomycin utilizing scheduled dosing  Baseline risks associated with therapy include: advanced age    The patient is receiving 1250mg q12h with the most recent vancomycin level being at steady-state and therapeutic based on a goal of 15-20 (appropriate for most indications) ; therefore, is clinically appropriate and dose will be continued   Pharmacy will continue to follow closely for s/sx of nephrotoxicity, infusion reactions and appropriateness of therapy  BMP and CBC will be ordered per protocol  Plan for trough as patient approaches steady state, prior to the other  dose at approximately 2/8/2020 at 1030am  Pharmacy will continue to follow the patients culture results and clinical progress daily      Trough on 2/3/2020 is 18 9    Santy Blank, Pharmacist

## 2020-02-03 NOTE — PLAN OF CARE
Problem: OCCUPATIONAL THERAPY ADULT  Goal: Performs self-care activities at highest level of function for planned discharge setting  See evaluation for individualized goals  Description  Treatment Interventions: ADL retraining, Functional transfer training, UE strengthening/ROM, Endurance training, Cognitive reorientation, Patient/family training, Equipment evaluation/education, Fine motor coordination activities, Compensatory technique education, Activityengagement, Energy conservation          See flowsheet documentation for full assessment, interventions and recommendations  Outcome: Progressing  Note:   Limitation: Decreased ADL status, Decreased Safe judgement during ADL, Decreased cognition, Decreased endurance, Decreased self-care trans, Decreased high-level ADLs  Prognosis: Fair  Assessment: pt participated in am ot session and was seen focusing on functional transfers chiar to bed and bed mobility,  pt requires minimal asst for bed mobility, transfers with rw     pt continues with abd drains  pt required min asst for sit to stand and sit to supine with increased cues and time allowed     OT Discharge Recommendation: Short Term Rehab  OT - OK to Discharge:  Yes     April A LESLIE Jenkins

## 2020-02-03 NOTE — PROGRESS NOTES
Upon assessing patient at start of shift (2300), patient noted to have emesis on the floor, empty drink containers on his bedside table, and a container of soup in his room  Emesis appars to have chunks of food present  Per PCA, patient had vomited on previous shift as well  G tube drainage 650 ml, previously had been 50 ml or less  Red Sx notified

## 2020-02-03 NOTE — PLAN OF CARE
Problem: Potential for Falls  Goal: Patient will remain free of falls  Description  INTERVENTIONS:  - Assess patient frequently for physical needs  -  Identify cognitive and physical deficits and behaviors that affect risk of falls    -  [place] fall precautions as indicated by assessment   - Educate patient/family on patient safety including physical limitations  - Instruct patient to call for assistance with activity based on assessment  - Modify environment to reduce risk of injury  - Consider OT/PT consult to assist with strengthening/mobility  Outcome: Progressing     Problem: PAIN - ADULT  Goal: Verbalizes/displays adequate comfort level or baseline comfort level  Description  Interventions:  - Encourage patient to monitor pain and request assistance  - Assess pain using appropriate pain scale  - Administer analgesics based on type and severity of pain and evaluate response  - Implement non-pharmacological measures as appropriate and evaluate response  - Consider cultural and social influences on pain and pain management  - Notify physician/advanced practitioner if interventions unsuccessful or patient reports new pain  Outcome: Progressing     Problem: INFECTION - ADULT  Goal: Absence or prevention of progression during hospitalization  Description  INTERVENTIONS:  - Assess and monitor for signs and symptoms of infection  - Monitor lab/diagnostic results  - Monitor all insertion sites, i e  indwelling lines, tubes, and drains  - Monitor endotracheal if appropriate and nasal secretions for changes in amount and color  - [place] appropriate cooling/warming therapies per order  - Administer medications as ordered  - Instruct and encourage patient and family to use good hand hygiene technique  - Identify and instruct in appropriate isolation precautions for identified infection/condition  Outcome: Progressing  Goal: Absence of fever/infection during neutropenic period  Description  INTERVENTIONS:  - Monitor WBC    Outcome: Progressing     Problem: SAFETY ADULT  Goal: Maintain or return to baseline ADL function  Description  INTERVENTIONS:  -  Assess patient's ability to carry out ADLs; assess patient's baseline for ADL function and identify physical deficits which impact ability to perform ADLs (bathing, care of mouth/teeth, toileting, grooming, dressing, etc )  - Assess/evaluate cause of self-care deficits   - Assess range of motion  - Assess patient's mobility; develop plan if impaired  - Assess patient's need for assistive devices and provide as appropriate  - Encourage maximum independence but intervene and supervise when necessary  - Involve family in performance of ADLs  - Assess for home care needs following discharge   - Consider OT consult to assist with ADL evaluation and planning for discharge  - Provide patient education as appropriate  Outcome: Progressing  Goal: Maintain or return mobility status to optimal level  Description  INTERVENTIONS:  - Assess patient's baseline mobility status (ambulation, transfers, stairs, etc )    - Identify cognitive and physical deficits and behaviors that affect mobility  - Identify mobility aids required to assist with transfers and/or ambulation (gait belt, sit-to-stand, lift, walker, cane, etc )  - [place] fall precautions as indicated by assessment  - Record patient progress and toleration of activity level on Mobility SBAR; progress patient to next Phase/Stage  - Instruct patient to call for assistance with activity based on assessment  - Consider rehabilitation consult to assist with strengthening/weightbearing, etc   Outcome: Progressing     Problem: DISCHARGE PLANNING  Goal: Discharge to home or other facility with appropriate resources  Description  INTERVENTIONS:  - Identify barriers to discharge w/patient and caregiver  - Arrange for needed discharge resources and transportation as appropriate  - Identify discharge learning needs (meds, wound care, etc )  - Arrange for interpretive services to assist at discharge as needed  - Refer to Case Management Department for coordinating discharge planning if the patient needs post-hospital services based on physician/advanced practitioner order or complex needs related to functional status, cognitive ability, or social support system  Outcome: Progressing     Problem: Knowledge Deficit  Goal: Patient/family/caregiver demonstrates understanding of disease process, treatment plan, medications, and discharge instructions  Description  Complete learning assessment and assess knowledge base  Interventions:  - Provide teaching at level of understanding  - Provide teaching via preferred learning methods  Outcome: Progressing     Problem: Nutrition/Hydration-ADULT  Goal: Nutrient/Hydration intake appropriate for improving, restoring or maintaining nutritional needs  Description  Monitor and assess patient's nutrition/hydration status for malnutrition  Collaborate with interdisciplinary team and initiate plan and interventions as ordered  Monitor patient's weight and dietary intake as ordered or per policy  Utilize nutrition screening tool and intervene as necessary  Determine patient's food preferences and provide high-protein, high-caloric foods as appropriate       INTERVENTIONS:  - Monitor oral intake, urinary output, labs, and treatment plans  - Assess nutrition and hydration status and recommend course of action  - Evaluate amount of meals eaten  - Assist patient with eating if necessary   - Allow adequate time for meals  - Recommend/ encourage appropriate diets, oral nutritional supplements, and vitamin/mineral supplements  - Order, calculate, and assess calorie counts as needed  - Recommend, monitor, and adjust tube feedings and TPN/PPN based on assessed needs  - Assess need for intravenous fluids  - Provide specific nutrition/hydration education as appropriate  - Include patient/family/caregiver in decisions related to nutrition  Outcome: Progressing     Problem: Prexisting or High Potential for Compromised Skin Integrity  Goal: Skin integrity is maintained or improved  Description  INTERVENTIONS:  - Identify patients at risk for skin breakdown  - Assess and monitor skin integrity  - Assess and monitor nutrition and hydration status  - Monitor labs   - Assess for incontinence   - Turn and reposition patient  - Assist with mobility/ambulation  - Relieve pressure over bony prominences  - Avoid friction and shearing  - Provide appropriate hygiene as needed including keeping skin clean and dry  - Evaluate need for skin moisturizer/barrier cream  - Collaborate with interdisciplinary team   - Patient/family teaching  - Consider wound care consult   Outcome: Progressing

## 2020-02-03 NOTE — OCCUPATIONAL THERAPY NOTE
Occupational Therapy Treatment Note:       02/03/20 1000   Pain Assessment   Pain Assessment No/denies pain   Pain Score No Pain   Functional Standing Tolerance   Time f balance c rw during transfers   Bed Mobility   Sit to Supine 4  Minimal assistance   Transfers   Sit to Stand 4  Minimal assistance   Stand to Sit 4  Minimal assistance   Stand pivot 4  Minimal assistance   Cognition   Overall Cognitive Status Impaired   Arousal/Participation Alert   Attention Attends with cues to redirect   Activity Tolerance   Activity Tolerance Patient tolerated treatment well   Assessment   Assessment pt participated in am ot session and was seen focusing on functional transfers chiar to bed and bed mobility,  pt requires minimal asst for bed mobility, transfers with rw     pt continues with abd drains  pt required min asst for sit to stand and sit to supine with increased cues and time allowed   Plan   Treatment Interventions ADL retraining;Functional transfer training; Activityengagement;Equipment evaluation/education;Patient/family training; Endurance training   Goal Expiration Date 02/11/20   OT Treatment Day 3   OT Frequency 3-5x/wk   Recommendation   OT Discharge Recommendation Short Term Rehab   Azeb Grmies

## 2020-02-03 NOTE — PLAN OF CARE
Problem: PHYSICAL THERAPY ADULT  Goal: Performs mobility at highest level of function for planned discharge setting  See evaluation for individualized goals  Description  Treatment/Interventions: Functional transfer training, LE strengthening/ROM, Therapeutic exercise, Endurance training, Patient/family training, Bed mobility, Gait training  Equipment Recommended: Giovany Smith       See flowsheet documentation for full assessment, interventions and recommendations  Outcome: Progressing  Note:   Prognosis: Good  Problem List: Decreased strength, Decreased endurance, Impaired balance, Decreased mobility  Assessment: Pt continues to be limited by increased fatigue this session, but was able to increase ambulation distances with encouragement  Seated rests provided to recover after each trail  Pt ambualtes with forward flexed posture, but was unable to correct after instructions for posture  Pt educated in importance of increased activity to reduce risk for further deconditioning & related complications  will continue to benefit from therapy services to address noted deficits to maximize functional endurance & safety at this time  Barriers to Discharge: Inaccessible home environment     Recommendation: Post acute IP rehab     PT - OK to Discharge: Yes    See flowsheet documentation for full assessment

## 2020-02-03 NOTE — PROGRESS NOTES
Nurse / Provider rounds completed with patient and staff nurseAvila   Will get CT scan again today, reviewed wound care and order is in Not to pack , just wound manager over to follow output

## 2020-02-03 NOTE — PROGRESS NOTES
Progress Note - Red Surgery  Ismael Duque 81 y o  male MRN: [id number]  Unit/Bed#: [facility] 607-01 Encounter: [id number]    Assessment/Plan:  82 y/o M p/w GI bleed 2/2 perforated duodenal ulcer, with bleeding uncontrolled with endoscopic attempts and IR embolization, now s/p ex-lap, closure over 1st portion of duodenum, Thal patch, GJ placement on 1/11, IR drainage of lesser sack on 1/27     Plan:  -- Reinforce NPO status  --Continue J tube feeds as tolerated  --currently on vanc/cefepime/flagyl  --G tube to gravity   --OOB, ambulate  --PT/OT recommending inpatient rehab  --DVT ppx  -- CT abdomen/pelvis  -- Dispo: Red surgery will follow    Subjective: Patient was found to have soup and soda cans in room last night after vomit found next to bed  Patient denies PO intake, but nurse found chunks coming from J tube  No current nausea or vomiting, no fevers or chills    Objective:     Vitals: Temp:  [98 1 °F (36 7 °C)-99 7 °F (37 6 °C)] 98 4 °F (36 9 °C)  HR:  [] 97  Resp:  [14-20] 20  BP: (166-176)/(78-92) 168/91  Body mass index is 25 47 kg/m²  I/O       02/01 0701 - 02/02 0700 02/02 0701 - 02/03 0700    P  O  120 0    I V  (mL/kg) 948 3 (12 3)     NG/GT 30 290    IV Piggyback 250 550    Feedings 390 560    Total Intake(mL/kg) 1738 3 (22 5) 1400 (18 2)    Urine (mL/kg/hr) 1400 (0 8) 1198 (0 6)    Emesis/NG output  700    Drains 0 0    Stool 0 0    Total Output 1400 1898    Net +338 3 -498          Unmeasured Urine Occurrence  1 x    Unmeasured Stool Occurrence 1 x 1 x          Physical Exam:  GEN: NAD  HEENT: MMM  CV: No palpable arrythmias  Lung: Normal effort  Ab: Soft, NT/ND, some erythema around incision inferior to wound manager, no output from wound manager  G-J tube in place  No CAMPBELL drain output overnight  Extrem: Move extremities spontaneously  Neuro: A+Ox3    Lab, Imaging and other studies: I have personally reviewed pertinent reports      VTE Pharmacologic Prophylaxis: Heparin  VTE Mechanical Prophylaxis: sequential compression device

## 2020-02-03 NOTE — PHYSICAL THERAPY NOTE
Physical Therapy Progress Note     02/03/20 0900   Pain Assessment   Pain Assessment No/denies pain   Restrictions/Precautions   Other Precautions Cognitive; Chair Alarm; Fall Risk;Multiple lines;O2   Subjective   Subjective Pt encountered supine in bed, reporting poor sleep, but unable to further clarify  Reports no pain at this time, but does complain of SOB with incrased activity  Does not appear dyspneic during seated rests  Bed Mobility   Supine to Sit 5  Supervision   Additional items Assist x 1;HOB elevated; Increased time required   Transfers   Sit to Stand 4  Minimal assistance   Additional items Assist x 1; Armrests; Increased time required;Verbal cues   Stand to Sit 4  Minimal assistance   Additional items Assist x 1; Armrests; Increased time required;Verbal cues   Ambulation/Elevation   Gait pattern Excessively slow; Short stride; Inconsistent kathrine; Foward flexed;Decreased foot clearance; Improper Weight shift   Gait Assistance 4  Minimal assist   Additional items Assist x 1  (+ chair follow & line management)   Assistive Device Rolling walker   Distance 10', 50'   Balance   Static Sitting Fair +   Static Standing Fair -   Ambulatory Poor +   Endurance Deficit   Endurance Deficit Yes   Endurance Deficit Description fatigue, complaints of SOB   Activity Tolerance   Activity Tolerance Patient limited by fatigue;Patient tolerated treatment well   Nurse Maggie Shoemaker, RN   Assessment   Prognosis Good   Problem List Decreased strength;Decreased endurance; Impaired balance;Decreased mobility   Assessment Pt continues to be limited by increased fatigue this session, but was able to increase ambulation distances with encouragement  Seated rests provided to recover after each trail  Pt ambualtes with forward flexed posture, but was unable to correct after instructions for posture  Pt educated in importance of increased activity to reduce risk for further deconditioning & related complications    will continue to benefit from therapy services to address noted deficits to maximize functional endurance & safety at this time  Barriers to Discharge Inaccessible home environment   Goals   Patient Goals to rest   STG Expiration Date 02/15/20   PT Treatment Day 4   Plan   Treatment/Interventions Functional transfer training;LE strengthening/ROM; Therapeutic exercise; Endurance training;Patient/family training;Equipment eval/education; Bed mobility;Gait training   Progress Progressing toward goals   PT Frequency   (3-5x/week)   Recommendation   Recommendation Post acute IP rehab   Equipment Recommended Sebastian Magdaleno PTA

## 2020-02-04 LAB
ANION GAP SERPL CALCULATED.3IONS-SCNC: 3 MMOL/L (ref 4–13)
BASOPHILS # BLD AUTO: 0.02 THOUSANDS/ΜL (ref 0–0.1)
BASOPHILS NFR BLD AUTO: 0 % (ref 0–1)
BUN SERPL-MCNC: 9 MG/DL (ref 5–25)
CALCIUM SERPL-MCNC: 8.5 MG/DL (ref 8.3–10.1)
CHLORIDE SERPL-SCNC: 106 MMOL/L (ref 100–108)
CO2 SERPL-SCNC: 29 MMOL/L (ref 21–32)
CREAT SERPL-MCNC: 0.49 MG/DL (ref 0.6–1.3)
EOSINOPHIL # BLD AUTO: 0.03 THOUSAND/ΜL (ref 0–0.61)
EOSINOPHIL NFR BLD AUTO: 0 % (ref 0–6)
ERYTHROCYTE [DISTWIDTH] IN BLOOD BY AUTOMATED COUNT: 16.7 % (ref 11.6–15.1)
GFR SERPL CREATININE-BSD FRML MDRD: 113 ML/MIN/1.73SQ M
GLUCOSE SERPL-MCNC: 112 MG/DL (ref 65–140)
HCT VFR BLD AUTO: 27.6 % (ref 36.5–49.3)
HGB BLD-MCNC: 8.7 G/DL (ref 12–17)
IMM GRANULOCYTES # BLD AUTO: 0.06 THOUSAND/UL (ref 0–0.2)
IMM GRANULOCYTES NFR BLD AUTO: 1 % (ref 0–2)
LYMPHOCYTES # BLD AUTO: 1.07 THOUSANDS/ΜL (ref 0.6–4.47)
LYMPHOCYTES NFR BLD AUTO: 12 % (ref 14–44)
MCH RBC QN AUTO: 29.7 PG (ref 26.8–34.3)
MCHC RBC AUTO-ENTMCNC: 31.5 G/DL (ref 31.4–37.4)
MCV RBC AUTO: 94 FL (ref 82–98)
MONOCYTES # BLD AUTO: 1.07 THOUSAND/ΜL (ref 0.17–1.22)
MONOCYTES NFR BLD AUTO: 12 % (ref 4–12)
NEUTROPHILS # BLD AUTO: 6.99 THOUSANDS/ΜL (ref 1.85–7.62)
NEUTS SEG NFR BLD AUTO: 75 % (ref 43–75)
NRBC BLD AUTO-RTO: 0 /100 WBCS
PLATELET # BLD AUTO: 425 THOUSANDS/UL (ref 149–390)
PMV BLD AUTO: 8.9 FL (ref 8.9–12.7)
POTASSIUM SERPL-SCNC: 3.6 MMOL/L (ref 3.5–5.3)
RBC # BLD AUTO: 2.93 MILLION/UL (ref 3.88–5.62)
SODIUM SERPL-SCNC: 138 MMOL/L (ref 136–145)
WBC # BLD AUTO: 9.24 THOUSAND/UL (ref 4.31–10.16)

## 2020-02-04 PROCEDURE — 80048 BASIC METABOLIC PNL TOTAL CA: CPT | Performed by: SURGERY

## 2020-02-04 PROCEDURE — 85025 COMPLETE CBC W/AUTO DIFF WBC: CPT | Performed by: ORTHOPAEDIC SURGERY

## 2020-02-04 PROCEDURE — 99024 POSTOP FOLLOW-UP VISIT: CPT | Performed by: SURGERY

## 2020-02-04 RX ORDER — SODIUM CHLORIDE 9 MG/ML
50 INJECTION, SOLUTION INTRAVENOUS CONTINUOUS
Status: DISCONTINUED | OUTPATIENT
Start: 2020-02-04 | End: 2020-02-09

## 2020-02-04 RX ADMIN — OXYCODONE HYDROCHLORIDE 10 MG: 10 TABLET ORAL at 13:37

## 2020-02-04 RX ADMIN — METOCLOPRAMIDE 10 MG: 5 INJECTION, SOLUTION INTRAMUSCULAR; INTRAVENOUS at 06:28

## 2020-02-04 RX ADMIN — CHLORHEXIDINE GLUCONATE 0.12% ORAL RINSE 15 ML: 1.2 LIQUID ORAL at 09:10

## 2020-02-04 RX ADMIN — QUETIAPINE FUMARATE 200 MG: 100 TABLET ORAL at 09:09

## 2020-02-04 RX ADMIN — MELATONIN 1000 UNITS: at 09:10

## 2020-02-04 RX ADMIN — VANCOMYCIN HYDROCHLORIDE 1250 MG: 10 INJECTION, POWDER, LYOPHILIZED, FOR SOLUTION INTRAVENOUS at 12:14

## 2020-02-04 RX ADMIN — QUETIAPINE FUMARATE 600 MG: 300 TABLET ORAL at 21:13

## 2020-02-04 RX ADMIN — METOPROLOL TARTRATE 25 MG: 25 TABLET, FILM COATED ORAL at 09:09

## 2020-02-04 RX ADMIN — METOCLOPRAMIDE 10 MG: 5 INJECTION, SOLUTION INTRAMUSCULAR; INTRAVENOUS at 00:00

## 2020-02-04 RX ADMIN — HEPARIN SODIUM 5000 UNITS: 5000 INJECTION INTRAVENOUS; SUBCUTANEOUS at 13:37

## 2020-02-04 RX ADMIN — PIPERACILLIN SODIUM AND TAZOBACTAM SODIUM 4.5 G: 36; 4.5 INJECTION, POWDER, FOR SOLUTION INTRAVENOUS at 21:14

## 2020-02-04 RX ADMIN — GABAPENTIN 300 MG: 300 CAPSULE ORAL at 09:10

## 2020-02-04 RX ADMIN — OXYCODONE HYDROCHLORIDE 10 MG: 10 TABLET ORAL at 09:14

## 2020-02-04 RX ADMIN — METOCLOPRAMIDE 10 MG: 5 INJECTION, SOLUTION INTRAMUSCULAR; INTRAVENOUS at 12:14

## 2020-02-04 RX ADMIN — MIRTAZAPINE 15 MG: 15 TABLET, FILM COATED ORAL at 21:14

## 2020-02-04 RX ADMIN — ATORVASTATIN CALCIUM 40 MG: 40 TABLET, FILM COATED ORAL at 17:57

## 2020-02-04 RX ADMIN — FLUTICASONE FUROATE AND VILANTEROL TRIFENATATE 1 PUFF: 100; 25 POWDER RESPIRATORY (INHALATION) at 09:11

## 2020-02-04 RX ADMIN — LORATADINE 10 MG: 10 TABLET ORAL at 09:10

## 2020-02-04 RX ADMIN — NICOTINE 1 PATCH: 14 PATCH TRANSDERMAL at 09:10

## 2020-02-04 RX ADMIN — CEFEPIME HYDROCHLORIDE 2000 MG: 2 INJECTION, POWDER, FOR SOLUTION INTRAVENOUS at 03:02

## 2020-02-04 RX ADMIN — DULOXETINE HYDROCHLORIDE 90 MG: 60 CAPSULE, DELAYED RELEASE ORAL at 09:10

## 2020-02-04 RX ADMIN — OXYCODONE HYDROCHLORIDE 10 MG: 10 TABLET ORAL at 21:13

## 2020-02-04 RX ADMIN — SODIUM CHLORIDE 125 ML/HR: 0.9 INJECTION, SOLUTION INTRAVENOUS at 15:46

## 2020-02-04 RX ADMIN — GABAPENTIN 300 MG: 300 CAPSULE ORAL at 21:14

## 2020-02-04 RX ADMIN — HEPARIN SODIUM 5000 UNITS: 5000 INJECTION INTRAVENOUS; SUBCUTANEOUS at 21:14

## 2020-02-04 RX ADMIN — METRONIDAZOLE 500 MG: 500 TABLET, FILM COATED ORAL at 13:37

## 2020-02-04 RX ADMIN — Medication 20 MG: at 18:01

## 2020-02-04 RX ADMIN — CEFEPIME HYDROCHLORIDE 2000 MG: 2 INJECTION, POWDER, FOR SOLUTION INTRAVENOUS at 14:00

## 2020-02-04 RX ADMIN — PANCRELIPASE 24000 UNITS: 24000; 76000; 120000 CAPSULE, DELAYED RELEASE PELLETS ORAL at 04:00

## 2020-02-04 RX ADMIN — GABAPENTIN 300 MG: 300 CAPSULE ORAL at 17:57

## 2020-02-04 RX ADMIN — FOLIC ACID 1 MG: 1 TABLET ORAL at 09:10

## 2020-02-04 RX ADMIN — THIAMINE HCL TAB 100 MG 100 MG: 100 TAB at 09:09

## 2020-02-04 RX ADMIN — METRONIDAZOLE 500 MG: 500 TABLET, FILM COATED ORAL at 06:27

## 2020-02-04 RX ADMIN — HEPARIN SODIUM 5000 UNITS: 5000 INJECTION INTRAVENOUS; SUBCUTANEOUS at 06:27

## 2020-02-04 RX ADMIN — METOCLOPRAMIDE 10 MG: 5 INJECTION, SOLUTION INTRAMUSCULAR; INTRAVENOUS at 17:57

## 2020-02-04 RX ADMIN — CHLORHEXIDINE GLUCONATE 0.12% ORAL RINSE 15 ML: 1.2 LIQUID ORAL at 21:14

## 2020-02-04 RX ADMIN — METOPROLOL TARTRATE 25 MG: 25 TABLET, FILM COATED ORAL at 21:14

## 2020-02-04 NOTE — CONSULTS
Vancomycin IV Pharmacy-to-Dose Consultation    Tad Montelongo is a 81 y o  male who was receiving Vancomycin IV with management by the Pharmacy Consult service  The patient's Vancomycin therapy has been completed / discontinued  Thank you for allowing us to take part in this patient's care  Pharmacy will sign-off now; please call or re-consult if there are any questions          Saúl Pappas, Mary Kate  Pharmacist

## 2020-02-04 NOTE — PLAN OF CARE
Problem: Potential for Falls  Goal: Patient will remain free of falls  Description  INTERVENTIONS:  - Assess patient frequently for physical needs  -  Identify cognitive and physical deficits and behaviors that affect risk of falls    -  [place] fall precautions as indicated by assessment   - Educate patient/family on patient safety including physical limitations  - Instruct patient to call for assistance with activity based on assessment  - Modify environment to reduce risk of injury  - Consider OT/PT consult to assist with strengthening/mobility  Outcome: Progressing     Problem: PAIN - ADULT  Goal: Verbalizes/displays adequate comfort level or baseline comfort level  Description  Interventions:  - Encourage patient to monitor pain and request assistance  - Assess pain using appropriate pain scale  - Administer analgesics based on type and severity of pain and evaluate response  - Implement non-pharmacological measures as appropriate and evaluate response  - Consider cultural and social influences on pain and pain management  - Notify physician/advanced practitioner if interventions unsuccessful or patient reports new pain  Outcome: Progressing     Problem: INFECTION - ADULT  Goal: Absence or prevention of progression during hospitalization  Description  INTERVENTIONS:  - Assess and monitor for signs and symptoms of infection  - Monitor lab/diagnostic results  - Monitor all insertion sites, i e  indwelling lines, tubes, and drains  - Monitor endotracheal if appropriate and nasal secretions for changes in amount and color  - [place] appropriate cooling/warming therapies per order  - Administer medications as ordered  - Instruct and encourage patient and family to use good hand hygiene technique  - Identify and instruct in appropriate isolation precautions for identified infection/condition  Outcome: Progressing  Goal: Absence of fever/infection during neutropenic period  Description  INTERVENTIONS:  - Monitor WBC    Outcome: Progressing     Problem: SAFETY ADULT  Goal: Maintain or return to baseline ADL function  Description  INTERVENTIONS:  -  Assess patient's ability to carry out ADLs; assess patient's baseline for ADL function and identify physical deficits which impact ability to perform ADLs (bathing, care of mouth/teeth, toileting, grooming, dressing, etc )  - Assess/evaluate cause of self-care deficits   - Assess range of motion  - Assess patient's mobility; develop plan if impaired  - Assess patient's need for assistive devices and provide as appropriate  - Encourage maximum independence but intervene and supervise when necessary  - Involve family in performance of ADLs  - Assess for home care needs following discharge   - Consider OT consult to assist with ADL evaluation and planning for discharge  - Provide patient education as appropriate  Outcome: Progressing  Goal: Maintain or return mobility status to optimal level  Description  INTERVENTIONS:  - Assess patient's baseline mobility status (ambulation, transfers, stairs, etc )    - Identify cognitive and physical deficits and behaviors that affect mobility  - Identify mobility aids required to assist with transfers and/or ambulation (gait belt, sit-to-stand, lift, walker, cane, etc )  - [place] fall precautions as indicated by assessment  - Record patient progress and toleration of activity level on Mobility SBAR; progress patient to next Phase/Stage  - Instruct patient to call for assistance with activity based on assessment  - Consider rehabilitation consult to assist with strengthening/weightbearing, etc   Outcome: Progressing     Problem: DISCHARGE PLANNING  Goal: Discharge to home or other facility with appropriate resources  Description  INTERVENTIONS:  - Identify barriers to discharge w/patient and caregiver  - Arrange for needed discharge resources and transportation as appropriate  - Identify discharge learning needs (meds, wound care, etc )  - Arrange for interpretive services to assist at discharge as needed  - Refer to Case Management Department for coordinating discharge planning if the patient needs post-hospital services based on physician/advanced practitioner order or complex needs related to functional status, cognitive ability, or social support system  Outcome: Progressing     Problem: Knowledge Deficit  Goal: Patient/family/caregiver demonstrates understanding of disease process, treatment plan, medications, and discharge instructions  Description  Complete learning assessment and assess knowledge base  Interventions:  - Provide teaching at level of understanding  - Provide teaching via preferred learning methods  Outcome: Progressing     Problem: Nutrition/Hydration-ADULT  Goal: Nutrient/Hydration intake appropriate for improving, restoring or maintaining nutritional needs  Description  Monitor and assess patient's nutrition/hydration status for malnutrition  Collaborate with interdisciplinary team and initiate plan and interventions as ordered  Monitor patient's weight and dietary intake as ordered or per policy  Utilize nutrition screening tool and intervene as necessary  Determine patient's food preferences and provide high-protein, high-caloric foods as appropriate       INTERVENTIONS:  - Monitor oral intake, urinary output, labs, and treatment plans  - Assess nutrition and hydration status and recommend course of action  - Evaluate amount of meals eaten  - Assist patient with eating if necessary   - Allow adequate time for meals  - Recommend/ encourage appropriate diets, oral nutritional supplements, and vitamin/mineral supplements  - Order, calculate, and assess calorie counts as needed  - Recommend, monitor, and adjust tube feedings and TPN/PPN based on assessed needs  - Assess need for intravenous fluids  - Provide specific nutrition/hydration education as appropriate  - Include patient/family/caregiver in decisions related to nutrition  Outcome: Progressing     Problem: Prexisting or High Potential for Compromised Skin Integrity  Goal: Skin integrity is maintained or improved  Description  INTERVENTIONS:  - Identify patients at risk for skin breakdown  - Assess and monitor skin integrity  - Assess and monitor nutrition and hydration status  - Monitor labs   - Assess for incontinence   - Turn and reposition patient  - Assist with mobility/ambulation  - Relieve pressure over bony prominences  - Avoid friction and shearing  - Provide appropriate hygiene as needed including keeping skin clean and dry  - Evaluate need for skin moisturizer/barrier cream  - Collaborate with interdisciplinary team   - Patient/family teaching  - Consider wound care consult   Outcome: Progressing

## 2020-02-04 NOTE — NUTRITION
02/04/20 4123   Recommendations/Interventions   Summary Patient currently NPO, G-tube open to gravity  J-tube feeding blocked, possible tube exchange in IR per chart review  Nursing skin care plan reviewed  Nutrition Recommendations Initiate EN/PN;Lab - consider order (specify); Other (specify)  (Once able to use J-tube suggest initiate continuous EN of Jevity 1 2 kcal @ 80 ml/hr with 1 packet prosource per day and 125 ml free water flush every 4 hours (2364 kcal, 121 grams protein, 2299 ml tv)   Monitor electrolytes and weight  )

## 2020-02-04 NOTE — NUTRITION
02/04/20 1353   Recommendations/Interventions   Summary Patient currently NPO, G-tube open to gravity  J-tube feeding blocked, possible tube exchange in IR per chart review  Per chart review patient had soda and soup in room, +emesis but patient denied eating/drinking  Nursing skin care plan reviewed  Interventions Speech/swallow evaluation  (? speech therapist to re-eval for possible diet advancement  Patient reports, "just get me some lunch"  )   Nutrition Recommendations Initiate EN/PN;Lab - consider order (specify); Other (specify)  (Once able to use J-tube suggest initiate continuous EN of Jevity 1 2 kcal @ 80 ml/hr with 1 packet prosource per day and 125 ml free water flush every 4 hours (2364 kcal, 121 grams protein, 2299 ml tv)   Monitor electrolytes and weight  )

## 2020-02-04 NOTE — PLAN OF CARE
Problem: Nutrition/Hydration-ADULT  Goal: Nutrient/Hydration intake appropriate for improving, restoring or maintaining nutritional needs  Description  Monitor and assess patient's nutrition/hydration status for malnutrition  Collaborate with interdisciplinary team and initiate plan and interventions as ordered  Monitor patient's weight and dietary intake as ordered or per policy  Utilize nutrition screening tool and intervene as necessary  Determine patient's food preferences and provide high-protein, high-caloric foods as appropriate  INTERVENTIONS:  - Monitor oral intake, urinary output, labs, and treatment plans  - Assess nutrition and hydration status and recommend course of action  - Evaluate amount of meals eaten  - Assist patient with eating if necessary   - Allow adequate time for meals  - Recommend/ encourage appropriate diets, oral nutritional supplements, and vitamin/mineral supplements  - Order, calculate, and assess calorie counts as needed  - Recommend, monitor, and adjust tube feedings and TPN/PPN based on assessed needs  - Assess need for intravenous fluids  - Provide specific nutrition/hydration education as appropriate  - Include patient/family/caregiver in decisions related to nutrition  Outcome: Not Progressing   Currently NPO, will monitor EN advancement

## 2020-02-04 NOTE — PROGRESS NOTES
Progress Note - Red Surgery  Chandan Heller 81 y o  male MRN: [id number]  Unit/Bed#: [facility] 607-01 Encounter: [id number]    Assessment/Plan:  80 y/o M p/w GI bleed 2/2 perforated duodenal ulcer, with bleeding uncontrolled with endoscopic attempts and IR embolization, now s/p ex-lap, closure over 1st portion of duodenum, Thal patch, GJ placement on 1/11, IR drainage of lesser sack on 1/27  2/4 CT abdomen pelvis does not demonstrate cause for gastric distension      Plan:  -- Reinforce NPO status  --Continue J tube feeds as tolerated  --currently on vanc/cefepime/flagyl  --G tube to gravity, attempt to flush  If unable to restore patency, may consider IR consult to exchange tubing  --OOB, ambulate  -- Strict I&Os  --PT/OT recommending inpatient rehab  --DVT ppx  -- Dispo: Red surgery will follow    Subjective: Patient states that he has continued to have nausea, but no vomiting since yesterday  At midnight, his J tube became clogged and was unable to be cleared by flushes, soda or creon  No fevers or chills      Objective:     Vitals: Temp:  [97 7 °F (36 5 °C)-98 6 °F (37 °C)] 98 2 °F (36 8 °C)  HR:  [87-89] 87  Resp:  [17-18] 18  BP: (154-164)/(78-85) 160/81  Body mass index is 25 47 kg/m²  I/O       02/02 0701 - 02/03 0700 02/03 0701 - 02/04 0700    P  O  0 240    NG/ 290    IV Piggyback 550 300    Feedings 560 875    Total Intake(mL/kg) 1400 (18 2) 1705 (22 1)    Urine (mL/kg/hr) 1673 (0 9) 1449 (0 8)    Emesis/NG output 700 75    Drains 0 7 5    Stool 0 0    Total Output 2373 1531 5    Net -973 +173 5          Unmeasured Urine Occurrence 1 x 2 x    Unmeasured Stool Occurrence 1 x 11 x    Unmeasured Emesis Occurrence  1 x          Physical Exam:  GEN: NAD  HEENT: MMM  CV: no palpable arrythmias  Lung: Normal effort  Ab: Soft, mildly distended  Wound manager present with purulent drainage, CAMPBELL drain has 76  Cc of light green fluid and J tube in place   No erythema noted surrounding skin  Extrem:Moving extremities spontaneously  Neuro: A+Ox3    Lab, Imaging and other studies: I have personally reviewed pertinent reports      VTE Pharmacologic Prophylaxis: Heparin  VTE Mechanical Prophylaxis: sequential compression device

## 2020-02-04 NOTE — QUICK NOTE
Nurse-Patient-Provider rounds were completed with the patient's nurse today, Wyn Fabry  We discussed the plan is to keep him NPO  Will continue to attempt to get jejunostomy port unclogged while maintaining gastric port gravity; may need to consider IR consult to exchange GJ tube over a wire if unable to get the jejunostomy for unclogged for resumption of nutrition via tube feeds  Continue broad-spectrum IV antibiotics with plan to transition to Zosyn based on culture results and pharmacy recommendations  Continue local wound care for midline abdominal wound as well as drain care  We reviewed all of the invasive devices/lines/telemetry orders   - Maintain PICC line for IV access until no longer requiring IV medications  - Maintain IR drain indefinitely for management of intra-abdominal abscess  DVT Prophylaxis:  - Subcutaneous heparin and SCDs  Pain Assessment / Plan:  - Continue current analgesic regimen  Mobility Assessment / Plan:  - Activity as tolerated  - Continue PT and OT evaluation and treatment as indicated  Goals / Barriers for discharge:  - Not yet appropriate for discharge secondary to ongoing management for postoperative abscess drain to midline incision with local wound care needs  - Case management following; case and discharge needs discussed  All questions and concerns were addressed  I spent greater than 20 minutes reviewing the plan with the patient and the nurse, and coordinating care for the day      Raghavendra Cabrera PA-C  2/4/2020 02:29 PM

## 2020-02-05 ENCOUNTER — APPOINTMENT (INPATIENT)
Dept: RADIOLOGY | Facility: HOSPITAL | Age: 70
DRG: 326 | End: 2020-02-05
Payer: MEDICARE

## 2020-02-05 PROCEDURE — C1769 GUIDE WIRE: HCPCS

## 2020-02-05 PROCEDURE — 3E1H38Z IRRIGATION OF LOWER GI USING IRRIGATING SUBSTANCE, PERCUTANEOUS APPROACH: ICD-10-PCS | Performed by: RADIOLOGY

## 2020-02-05 PROCEDURE — 49465 FLUORO EXAM OF G/COLON TUBE: CPT

## 2020-02-05 PROCEDURE — 49460 FIX G/COLON TUBE W/DEVICE: CPT | Performed by: RADIOLOGY

## 2020-02-05 PROCEDURE — 97530 THERAPEUTIC ACTIVITIES: CPT

## 2020-02-05 PROCEDURE — 97110 THERAPEUTIC EXERCISES: CPT

## 2020-02-05 PROCEDURE — 99024 POSTOP FOLLOW-UP VISIT: CPT | Performed by: SURGERY

## 2020-02-05 RX ADMIN — METOPROLOL TARTRATE 25 MG: 25 TABLET, FILM COATED ORAL at 21:32

## 2020-02-05 RX ADMIN — METOCLOPRAMIDE 10 MG: 5 INJECTION, SOLUTION INTRAMUSCULAR; INTRAVENOUS at 05:06

## 2020-02-05 RX ADMIN — PIPERACILLIN SODIUM AND TAZOBACTAM SODIUM 4.5 G: 36; 4.5 INJECTION, POWDER, FOR SOLUTION INTRAVENOUS at 23:54

## 2020-02-05 RX ADMIN — DULOXETINE HYDROCHLORIDE 90 MG: 60 CAPSULE, DELAYED RELEASE ORAL at 08:09

## 2020-02-05 RX ADMIN — PIPERACILLIN SODIUM AND TAZOBACTAM SODIUM 4.5 G: 36; 4.5 INJECTION, POWDER, FOR SOLUTION INTRAVENOUS at 05:06

## 2020-02-05 RX ADMIN — GABAPENTIN 300 MG: 300 CAPSULE ORAL at 08:10

## 2020-02-05 RX ADMIN — SODIUM CHLORIDE 125 ML/HR: 0.9 INJECTION, SOLUTION INTRAVENOUS at 18:03

## 2020-02-05 RX ADMIN — Medication 20 MG: at 08:14

## 2020-02-05 RX ADMIN — LORATADINE 10 MG: 10 TABLET ORAL at 08:09

## 2020-02-05 RX ADMIN — MELATONIN 1000 UNITS: at 08:10

## 2020-02-05 RX ADMIN — METOCLOPRAMIDE 10 MG: 5 INJECTION, SOLUTION INTRAMUSCULAR; INTRAVENOUS at 23:54

## 2020-02-05 RX ADMIN — CHLORHEXIDINE GLUCONATE 0.12% ORAL RINSE 15 ML: 1.2 LIQUID ORAL at 08:09

## 2020-02-05 RX ADMIN — QUETIAPINE FUMARATE 200 MG: 100 TABLET ORAL at 08:10

## 2020-02-05 RX ADMIN — THIAMINE HCL TAB 100 MG 100 MG: 100 TAB at 08:09

## 2020-02-05 RX ADMIN — QUETIAPINE FUMARATE 600 MG: 300 TABLET ORAL at 21:32

## 2020-02-05 RX ADMIN — HEPARIN SODIUM 5000 UNITS: 5000 INJECTION INTRAVENOUS; SUBCUTANEOUS at 21:32

## 2020-02-05 RX ADMIN — OXYCODONE HYDROCHLORIDE 10 MG: 10 TABLET ORAL at 05:06

## 2020-02-05 RX ADMIN — SODIUM CHLORIDE 125 ML/HR: 0.9 INJECTION, SOLUTION INTRAVENOUS at 08:17

## 2020-02-05 RX ADMIN — METOPROLOL TARTRATE 25 MG: 25 TABLET, FILM COATED ORAL at 08:10

## 2020-02-05 RX ADMIN — NICOTINE 1 PATCH: 14 PATCH TRANSDERMAL at 08:09

## 2020-02-05 RX ADMIN — PIPERACILLIN SODIUM AND TAZOBACTAM SODIUM 4.5 G: 36; 4.5 INJECTION, POWDER, FOR SOLUTION INTRAVENOUS at 10:04

## 2020-02-05 RX ADMIN — METOCLOPRAMIDE 10 MG: 5 INJECTION, SOLUTION INTRAMUSCULAR; INTRAVENOUS at 12:54

## 2020-02-05 RX ADMIN — MIRTAZAPINE 15 MG: 15 TABLET, FILM COATED ORAL at 21:32

## 2020-02-05 RX ADMIN — OXYCODONE HYDROCHLORIDE 10 MG: 10 TABLET ORAL at 10:03

## 2020-02-05 RX ADMIN — GABAPENTIN 300 MG: 300 CAPSULE ORAL at 21:32

## 2020-02-05 RX ADMIN — METOCLOPRAMIDE 10 MG: 5 INJECTION, SOLUTION INTRAMUSCULAR; INTRAVENOUS at 18:02

## 2020-02-05 RX ADMIN — HEPARIN SODIUM 5000 UNITS: 5000 INJECTION INTRAVENOUS; SUBCUTANEOUS at 05:06

## 2020-02-05 RX ADMIN — PIPERACILLIN SODIUM AND TAZOBACTAM SODIUM 4.5 G: 36; 4.5 INJECTION, POWDER, FOR SOLUTION INTRAVENOUS at 18:03

## 2020-02-05 RX ADMIN — FOLIC ACID 1 MG: 1 TABLET ORAL at 08:10

## 2020-02-05 RX ADMIN — SODIUM CHLORIDE 125 ML/HR: 0.9 INJECTION, SOLUTION INTRAVENOUS at 00:18

## 2020-02-05 RX ADMIN — IOHEXOL 15 ML: 350 INJECTION, SOLUTION INTRAVENOUS at 17:27

## 2020-02-05 RX ADMIN — HEPARIN SODIUM 5000 UNITS: 5000 INJECTION INTRAVENOUS; SUBCUTANEOUS at 13:02

## 2020-02-05 NOTE — PLAN OF CARE
Problem: Potential for Falls  Goal: Patient will remain free of falls  Description  INTERVENTIONS:  - Assess patient frequently for physical needs  -  Identify cognitive and physical deficits and behaviors that affect risk of falls    -  [place] fall precautions as indicated by assessment   - Educate patient/family on patient safety including physical limitations  - Instruct patient to call for assistance with activity based on assessment  - Modify environment to reduce risk of injury  - Consider OT/PT consult to assist with strengthening/mobility  Outcome: Progressing     Problem: PAIN - ADULT  Goal: Verbalizes/displays adequate comfort level or baseline comfort level  Description  Interventions:  - Encourage patient to monitor pain and request assistance  - Assess pain using appropriate pain scale  - Administer analgesics based on type and severity of pain and evaluate response  - Implement non-pharmacological measures as appropriate and evaluate response  - Consider cultural and social influences on pain and pain management  - Notify physician/advanced practitioner if interventions unsuccessful or patient reports new pain  Outcome: Progressing     Problem: INFECTION - ADULT  Goal: Absence or prevention of progression during hospitalization  Description  INTERVENTIONS:  - Assess and monitor for signs and symptoms of infection  - Monitor lab/diagnostic results  - Monitor all insertion sites, i e  indwelling lines, tubes, and drains  - Monitor endotracheal if appropriate and nasal secretions for changes in amount and color  - [place] appropriate cooling/warming therapies per order  - Administer medications as ordered  - Instruct and encourage patient and family to use good hand hygiene technique  - Identify and instruct in appropriate isolation precautions for identified infection/condition  Outcome: Progressing  Goal: Absence of fever/infection during neutropenic period  Description  INTERVENTIONS:  - Monitor WBC    Outcome: Progressing     Problem: SAFETY ADULT  Goal: Maintain or return to baseline ADL function  Description  INTERVENTIONS:  -  Assess patient's ability to carry out ADLs; assess patient's baseline for ADL function and identify physical deficits which impact ability to perform ADLs (bathing, care of mouth/teeth, toileting, grooming, dressing, etc )  - Assess/evaluate cause of self-care deficits   - Assess range of motion  - Assess patient's mobility; develop plan if impaired  - Assess patient's need for assistive devices and provide as appropriate  - Encourage maximum independence but intervene and supervise when necessary  - Involve family in performance of ADLs  - Assess for home care needs following discharge   - Consider OT consult to assist with ADL evaluation and planning for discharge  - Provide patient education as appropriate  Outcome: Progressing  Goal: Maintain or return mobility status to optimal level  Description  INTERVENTIONS:  - Assess patient's baseline mobility status (ambulation, transfers, stairs, etc )    - Identify cognitive and physical deficits and behaviors that affect mobility  - Identify mobility aids required to assist with transfers and/or ambulation (gait belt, sit-to-stand, lift, walker, cane, etc )  - [place] fall precautions as indicated by assessment  - Record patient progress and toleration of activity level on Mobility SBAR; progress patient to next Phase/Stage  - Instruct patient to call for assistance with activity based on assessment  - Consider rehabilitation consult to assist with strengthening/weightbearing, etc   Outcome: Progressing     Problem: DISCHARGE PLANNING  Goal: Discharge to home or other facility with appropriate resources  Description  INTERVENTIONS:  - Identify barriers to discharge w/patient and caregiver  - Arrange for needed discharge resources and transportation as appropriate  - Identify discharge learning needs (meds, wound care, etc )  - Arrange for interpretive services to assist at discharge as needed  - Refer to Case Management Department for coordinating discharge planning if the patient needs post-hospital services based on physician/advanced practitioner order or complex needs related to functional status, cognitive ability, or social support system  Outcome: Progressing     Problem: Knowledge Deficit  Goal: Patient/family/caregiver demonstrates understanding of disease process, treatment plan, medications, and discharge instructions  Description  Complete learning assessment and assess knowledge base  Interventions:  - Provide teaching at level of understanding  - Provide teaching via preferred learning methods  Outcome: Progressing     Problem: Nutrition/Hydration-ADULT  Goal: Nutrient/Hydration intake appropriate for improving, restoring or maintaining nutritional needs  Description  Monitor and assess patient's nutrition/hydration status for malnutrition  Collaborate with interdisciplinary team and initiate plan and interventions as ordered  Monitor patient's weight and dietary intake as ordered or per policy  Utilize nutrition screening tool and intervene as necessary  Determine patient's food preferences and provide high-protein, high-caloric foods as appropriate       INTERVENTIONS:  - Monitor oral intake, urinary output, labs, and treatment plans  - Assess nutrition and hydration status and recommend course of action  - Evaluate amount of meals eaten  - Assist patient with eating if necessary   - Allow adequate time for meals  - Recommend/ encourage appropriate diets, oral nutritional supplements, and vitamin/mineral supplements  - Order, calculate, and assess calorie counts as needed  - Recommend, monitor, and adjust tube feedings and TPN/PPN based on assessed needs  - Assess need for intravenous fluids  - Provide specific nutrition/hydration education as appropriate  - Include patient/family/caregiver in decisions related to nutrition  Outcome: Progressing     Problem: Prexisting or High Potential for Compromised Skin Integrity  Goal: Skin integrity is maintained or improved  Description  INTERVENTIONS:  - Identify patients at risk for skin breakdown  - Assess and monitor skin integrity  - Assess and monitor nutrition and hydration status  - Monitor labs   - Assess for incontinence   - Turn and reposition patient  - Assist with mobility/ambulation  - Relieve pressure over bony prominences  - Avoid friction and shearing  - Provide appropriate hygiene as needed including keeping skin clean and dry  - Evaluate need for skin moisturizer/barrier cream  - Collaborate with interdisciplinary team   - Patient/family teaching  - Consider wound care consult   Outcome: Progressing

## 2020-02-05 NOTE — BRIEF OP NOTE (RAD/CATH)
INTERVENTIONAL RADIOLOGY PROCEDURE NOTE    Date: 2/5/2020    Procedure: IR TUBE CHECK AND DECLOGGING    Preoperative diagnosis:  BLOCKED GASTROJEJUNOSTOMY TUBE     Postoperative diagnosis: Same  Surgeon: Mitchell Miner MD     Assistant: None  No qualified resident was available  Blood loss:  NONE    Specimens:  NONE    Findings:  JEJUNOSTOMY LUMEN SUCCESSFULLY UNCLOGGED WITH A KUMPE THE CATHETER, GLIDEWIRE AND HOT WATER  Complications: None immediate      Anesthesia: none

## 2020-02-05 NOTE — PROGRESS NOTES
Progress Note - General Surgery   Chandan Heller 81 y o  male MRN: [id number]  Unit/Bed#: [facility] 607-01 Encounter: [id number]    Assessment:  81 y o  M with hx of bleeding and perforated 1st portion duodenal ulcer  1/11 S/p EGD  1/11 S/p IR GDA embolozation   1/11 S/p Exlap, oversewing bleeding vessels, closure of tissue over perforation, Thal patch with transverse colon, open gastrostomy-jejunostomy tube placement    AVSS  L abdominal CAMPBELL w 20 cc bilious output   Midline wound manager with 5 cc purulent output     Plan:  NPO  G port to gravity   Monitor CAMPBELL drain output  Continue Zosyn  Continue Reglan   Prn analgesia/anti-emetics   DVT ppx'  OOB/Ambulate  PT/OT      Subjective/Objective     Subjective: No acute events overnight  Pain is controlled on prn analgesia  +flatulence/BM  Denies nausea/vomiting  No fevers, chills  Objective:     Blood pressure 150/86, pulse 102, temperature 100 2 °F (37 9 °C), temperature source Oral, resp  rate 18, height 5' 8 5" (1 74 m), weight 75 8 kg (167 lb 1 7 oz), SpO2 93 %  ,Body mass index is 25 04 kg/m²        Intake/Output Summary (Last 24 hours) at 2/5/2020 1032  Last data filed at 2/5/2020 0017  Gross per 24 hour   Intake 1064 58 ml   Output 1685 ml   Net -620 42 ml       Invasive Devices     Peripherally Inserted Central Catheter Line            PICC Line 33/34/57 Right Basilic 19 days          Drain            Gastrostomy/Enterostomy Gastrostomy-jejunostomy 22 Fr  LLQ 24 days    Closed/Suction Drain Left Abdomen Bulb 8 days    Open Drain Midline Abdomen 4 days    External Urinary Catheter 2 days                Physical Exam:   NAD, alert and oriented x3  Normocephalic, atraumatic  MMM, EOMI, PERRLA  Norm resp effort on RA  RRR  Abd soft, NT/ND, incisions c/d/i with GJ tube in place, G to gravity  L abdominal w bilious output  Midline wound manager with minimal purulent output   No calf tenderness or peripheral edema  Motor/sensation intact in distal extremities  CN grossly intact  -rash/lesions      Lab, Imaging and other studies:  CBC:   Lab Results   Component Value Date    WBC 9 24 02/04/2020    HGB 8 7 (L) 02/04/2020    HCT 27 6 (L) 02/04/2020    MCV 94 02/04/2020     (H) 02/04/2020    MCH 29 7 02/04/2020    MCHC 31 5 02/04/2020    RDW 16 7 (H) 02/04/2020    MPV 8 9 02/04/2020    NRBC 0 02/04/2020   , CMP:   Lab Results   Component Value Date    SODIUM 138 02/04/2020    K 3 6 02/04/2020     02/04/2020    CO2 29 02/04/2020    BUN 9 02/04/2020    CREATININE 0 49 (L) 02/04/2020    CALCIUM 8 5 02/04/2020    EGFR 113 02/04/2020     VTE Pharmacologic Prophylaxis: Heparin  VTE Mechanical Prophylaxis: sequential compression device

## 2020-02-05 NOTE — QUICK NOTE
Nurse-Patient-Provider rounds were completed with the patient's nurse today, Theoplis Nesbit  We discussed the plan is to keep him NPO except for ice chips for comfort  Maintain gastrostomy port of GJ tube to gravity for gastric decompression  Awaiting IR evaluation and anticipated intervention to reposition/on clogged jejunostomy portion of GJ to and/or replace the tube altogether if necessary  Once GJ tube is functioning again, plan to resume jejunal tube feeds  Continue current antibiotic regimen for management of intra-abdominal abscess  Continue IR drain and wound care for midline abdominal wound as well as monitoring output  We reviewed all of the invasive devices/lines/telemetry orders   - Maintain PICC line indefinitely for IV access until no longer requiring IV medications  - Maintain IR drain for continued management of his intra-abdominal abscess indefinitely  DVT Prophylaxis:  - Subcutaneous heparin and SCDs  Pain Assessment / Plan:  - Continue current analgesic regimen  Mobility Assessment / Plan:  - Activity as tolerated  - Continue PT and OT evaluation and treatment as indicated  Goals / Barriers for discharge:  - Not yet appropriate for discharge while awaiting intervention by IR and for continued management of his intra-abdominal abscess  - Case management following; case and discharge needs discussed  All questions and concerns were addressed  I spent greater than 19 minutes reviewing the plan with the patient and the nurse, and coordinating care for the day      Kaia Goff PA-C  2/5/2020 12:39 PM

## 2020-02-05 NOTE — SOCIAL WORK
Patient reviewed  CM awaiting medical clearance for discharge planning  PT/OT recommending rehab  Patient needs an accepting facility, and is being considered by [facility]  AND [facility] at this time  CM will continue to follow for clearance

## 2020-02-05 NOTE — PHYSICAL THERAPY NOTE
PHYSICAL THERAPY NOTE    Patient Name: Geovany Medina  MMKGJ'P Date: 2/5/2020 02/05/20 1500   Pain Assessment   Pain Assessment 0-10   Pain Score 6   Pain Type Acute pain   Pain Location Abdomen   Hospital Pain Intervention(s) Repositioned; Ambulation/increased activity; Distraction; Emotional support   Response to Interventions tolerated   Restrictions/Precautions   Weight Bearing Precautions Per Order No   Other Precautions Cognitive; Chair Alarm; Bed Alarm; Fall Risk;Pain;Multiple lines   General   Chart Reviewed Yes   Response to Previous Treatment Patient with no complaints from previous session  Family/Caregiver Present No   Cognition   Overall Cognitive Status Impaired   Arousal/Participation Alert   Attention Attends with cues to redirect   Orientation Level Oriented X4   Memory Within functional limits   Following Commands Follows multistep commands with increased time or repetition   Subjective   Subjective Pt lying supine and requires encouragement to participate in therapy reporting "I'll do it tomorrow"   Bed Mobility   Supine to Sit 4  Minimal assistance   Additional items Assist x 1;HOB elevated; Increased time required;Verbal cues;LE management   Sit to Supine 4  Minimal assistance   Additional items Assist x 1; Increased time required;Verbal cues;LE management   Additional Comments Pt lying supine upon PT arrival  Pt returned lying supine at end of session w/ all needs within reach   Transfers   Sit to Stand 3  Moderate assistance   Additional items Assist x 1; Increased time required;Verbal cues   Stand to Sit 3  Moderate assistance   Additional items Assist x 1; Increased time required;Verbal cues   Additional Comments Pt initially refusing OOB mobility with pt eventually agreeing to sit EOB and perform LE therex   Pt then agreeable to perform STS but declined all other functional mobility at this time   Balance   Static Sitting Fair +   Dynamic Sitting Fair   Static Standing Fair -   Dynamic Standing Poor +   Endurance Deficit   Endurance Deficit Yes   Endurance Deficit Description fatigue, pain, weakness   Activity Tolerance   Activity Tolerance Patient limited by fatigue   Nurse Made Aware RN cleared pt for therapy   Assessment   Prognosis Good   Problem List Decreased strength;Decreased endurance; Impaired balance;Decreased mobility; Decreased cognition;Decreased safety awareness   Assessment Pt presents with decreased mobility, strength, balance, and activity tolerance  Pt initially refusing OOB mobility reporting "I'll do it tomorrow" PT educated pt on benefits of therapy services and pt agreeable to sit EOB and participate in LE therex  Pt performed LE therex w/ noted RLE>LLE weakness  Pt then agreeable to performed STS at Mod A  Pt declined any further functional mobility at this time  Pt continues to benefit from skilled therapy to maximize functional independence  Recommendation at this time is rehab  Pt would benefit from continued ambulation, functional transfers, strength, balance, and activity tolerance   Barriers to Discharge Inaccessible home environment   Goals   Patient Goals to lay in bed   STG Expiration Date 02/15/20   PT Treatment Day 5   Plan   Treatment/Interventions Functional transfer training;LE strengthening/ROM; Therapeutic exercise;Patient/family training;Equipment eval/education; Bed mobility;Spoke to nursing   Progress Progressing toward goals   PT Frequency   (3-5x/week)   Recommendation   Recommendation Post acute IP rehab   Equipment Recommended Walker  (RW)   PT - OK to Discharge Yes   Additional Comments when medically cleared to rehab     Sukhi Rodriguez, PT, DPT

## 2020-02-05 NOTE — PROGRESS NOTES
Spoke with [facility] about what we wanted to do about proceeding with tube feedings tonight now that patient's j-tube was unclogged by IR  Thought it was best to give it a rest tonight and proceed tomorrow morning  Continue with IV fluids and NPO for now

## 2020-02-05 NOTE — PLAN OF CARE
Problem: PHYSICAL THERAPY ADULT  Goal: Performs mobility at highest level of function for planned discharge setting  See evaluation for individualized goals  Description  Treatment/Interventions: Functional transfer training, LE strengthening/ROM, Therapeutic exercise, Endurance training, Patient/family training, Bed mobility, Gait training  Equipment Recommended: Candie Larson       See flowsheet documentation for full assessment, interventions and recommendations  Outcome: Progressing  Note:   Prognosis: Good  Problem List: Decreased strength, Decreased endurance, Impaired balance, Decreased mobility, Decreased cognition, Decreased safety awareness  Assessment: Pt presents with decreased mobility, strength, balance, and activity tolerance  Pt initially refusing OOB mobility reporting "I'll do it tomorrow" PT educated pt on benefits of therapy services and pt agreeable to sit EOB and participate in LE therex  Pt performed LE therex w/ noted RLE>LLE weakness  Pt then agreeable to performed STS at Mod A  Pt declined any further functional mobility at this time  Pt continues to benefit from skilled therapy to maximize functional independence  Recommendation at this time is rehab  Pt would benefit from continued ambulation, functional transfers, strength, balance, and activity tolerance  Barriers to Discharge: Inaccessible home environment     Recommendation: Post acute IP rehab     PT - OK to Discharge: Yes    See flowsheet documentation for full assessment

## 2020-02-05 NOTE — SEDATION DOCUMENTATION
GJ Tube unclogged  Report given to [facility]   Pt tolerated procedure well

## 2020-02-06 LAB
ANION GAP SERPL CALCULATED.3IONS-SCNC: 9 MMOL/L (ref 4–13)
BUN SERPL-MCNC: 4 MG/DL (ref 5–25)
CALCIUM SERPL-MCNC: 8.1 MG/DL (ref 8.3–10.1)
CHLORIDE SERPL-SCNC: 105 MMOL/L (ref 100–108)
CO2 SERPL-SCNC: 24 MMOL/L (ref 21–32)
CREAT SERPL-MCNC: 0.45 MG/DL (ref 0.6–1.3)
ERYTHROCYTE [DISTWIDTH] IN BLOOD BY AUTOMATED COUNT: 16.9 % (ref 11.6–15.1)
GFR SERPL CREATININE-BSD FRML MDRD: 117 ML/MIN/1.73SQ M
GLUCOSE SERPL-MCNC: 73 MG/DL (ref 65–140)
HCT VFR BLD AUTO: 28.4 % (ref 36.5–49.3)
HGB BLD-MCNC: 8.9 G/DL (ref 12–17)
MAGNESIUM SERPL-MCNC: 1.9 MG/DL (ref 1.6–2.6)
MCH RBC QN AUTO: 29.6 PG (ref 26.8–34.3)
MCHC RBC AUTO-ENTMCNC: 31.3 G/DL (ref 31.4–37.4)
MCV RBC AUTO: 94 FL (ref 82–98)
PLATELET # BLD AUTO: 335 THOUSANDS/UL (ref 149–390)
PMV BLD AUTO: 8.7 FL (ref 8.9–12.7)
POTASSIUM SERPL-SCNC: 3.1 MMOL/L (ref 3.5–5.3)
RBC # BLD AUTO: 3.01 MILLION/UL (ref 3.88–5.62)
SODIUM SERPL-SCNC: 138 MMOL/L (ref 136–145)
WBC # BLD AUTO: 6.97 THOUSAND/UL (ref 4.31–10.16)

## 2020-02-06 PROCEDURE — 83735 ASSAY OF MAGNESIUM: CPT | Performed by: SURGERY

## 2020-02-06 PROCEDURE — 80048 BASIC METABOLIC PNL TOTAL CA: CPT | Performed by: SURGERY

## 2020-02-06 PROCEDURE — 99024 POSTOP FOLLOW-UP VISIT: CPT | Performed by: SURGERY

## 2020-02-06 PROCEDURE — 85027 COMPLETE CBC AUTOMATED: CPT | Performed by: SURGERY

## 2020-02-06 RX ADMIN — QUETIAPINE FUMARATE 600 MG: 300 TABLET ORAL at 20:55

## 2020-02-06 RX ADMIN — GABAPENTIN 300 MG: 300 CAPSULE ORAL at 10:32

## 2020-02-06 RX ADMIN — MIRTAZAPINE 15 MG: 15 TABLET, FILM COATED ORAL at 20:48

## 2020-02-06 RX ADMIN — SODIUM CHLORIDE 50 ML/HR: 0.9 INJECTION, SOLUTION INTRAVENOUS at 13:43

## 2020-02-06 RX ADMIN — METOPROLOL TARTRATE 25 MG: 25 TABLET, FILM COATED ORAL at 10:33

## 2020-02-06 RX ADMIN — HEPARIN SODIUM 5000 UNITS: 5000 INJECTION INTRAVENOUS; SUBCUTANEOUS at 20:48

## 2020-02-06 RX ADMIN — PIPERACILLIN SODIUM AND TAZOBACTAM SODIUM 4.5 G: 36; 4.5 INJECTION, POWDER, FOR SOLUTION INTRAVENOUS at 05:40

## 2020-02-06 RX ADMIN — CHLORHEXIDINE GLUCONATE 0.12% ORAL RINSE 15 ML: 1.2 LIQUID ORAL at 20:47

## 2020-02-06 RX ADMIN — GABAPENTIN 300 MG: 300 CAPSULE ORAL at 18:09

## 2020-02-06 RX ADMIN — ACETAMINOPHEN 650 MG: 325 TABLET ORAL at 23:14

## 2020-02-06 RX ADMIN — METOCLOPRAMIDE 10 MG: 5 INJECTION, SOLUTION INTRAMUSCULAR; INTRAVENOUS at 13:39

## 2020-02-06 RX ADMIN — ATORVASTATIN CALCIUM 40 MG: 40 TABLET, FILM COATED ORAL at 18:09

## 2020-02-06 RX ADMIN — HEPARIN SODIUM 5000 UNITS: 5000 INJECTION INTRAVENOUS; SUBCUTANEOUS at 05:41

## 2020-02-06 RX ADMIN — Medication 20 MG: at 18:09

## 2020-02-06 RX ADMIN — PIPERACILLIN SODIUM AND TAZOBACTAM SODIUM 4.5 G: 36; 4.5 INJECTION, POWDER, FOR SOLUTION INTRAVENOUS at 13:43

## 2020-02-06 RX ADMIN — METOPROLOL TARTRATE 25 MG: 25 TABLET, FILM COATED ORAL at 20:48

## 2020-02-06 RX ADMIN — NICOTINE 1 PATCH: 14 PATCH TRANSDERMAL at 10:34

## 2020-02-06 RX ADMIN — QUETIAPINE FUMARATE 200 MG: 100 TABLET ORAL at 10:33

## 2020-02-06 RX ADMIN — DULOXETINE HYDROCHLORIDE 90 MG: 60 CAPSULE, DELAYED RELEASE ORAL at 10:33

## 2020-02-06 RX ADMIN — OXYCODONE HYDROCHLORIDE 10 MG: 10 TABLET ORAL at 13:39

## 2020-02-06 RX ADMIN — GABAPENTIN 300 MG: 300 CAPSULE ORAL at 23:14

## 2020-02-06 RX ADMIN — Medication 20 MG: at 10:34

## 2020-02-06 RX ADMIN — METOCLOPRAMIDE 10 MG: 5 INJECTION, SOLUTION INTRAMUSCULAR; INTRAVENOUS at 05:40

## 2020-02-06 RX ADMIN — PIPERACILLIN SODIUM AND TAZOBACTAM SODIUM 4.5 G: 36; 4.5 INJECTION, POWDER, FOR SOLUTION INTRAVENOUS at 20:47

## 2020-02-06 RX ADMIN — FLUTICASONE FUROATE AND VILANTEROL TRIFENATATE 1 PUFF: 100; 25 POWDER RESPIRATORY (INHALATION) at 10:34

## 2020-02-06 RX ADMIN — HEPARIN SODIUM 5000 UNITS: 5000 INJECTION INTRAVENOUS; SUBCUTANEOUS at 13:39

## 2020-02-06 RX ADMIN — OXYCODONE HYDROCHLORIDE 10 MG: 10 TABLET ORAL at 21:14

## 2020-02-06 RX ADMIN — METOCLOPRAMIDE 10 MG: 5 INJECTION, SOLUTION INTRAMUSCULAR; INTRAVENOUS at 21:00

## 2020-02-06 RX ADMIN — CHLORHEXIDINE GLUCONATE 0.12% ORAL RINSE 15 ML: 1.2 LIQUID ORAL at 10:33

## 2020-02-06 RX ADMIN — LORATADINE 10 MG: 10 TABLET ORAL at 10:33

## 2020-02-06 RX ADMIN — FLUTICASONE PROPIONATE 2 SPRAY: 50 SPRAY, METERED NASAL at 10:34

## 2020-02-06 RX ADMIN — THIAMINE HCL TAB 100 MG 100 MG: 100 TAB at 10:32

## 2020-02-06 RX ADMIN — MELATONIN 1000 UNITS: at 10:33

## 2020-02-06 RX ADMIN — FOLIC ACID 1 MG: 1 TABLET ORAL at 10:32

## 2020-02-06 NOTE — PHYSICAL THERAPY NOTE
Physical Therapy Cancellation Note    Orders received and chart reviewed  Attempted to see pt- pt declining at this time reporting he will participate in the afternoon  Pt educated on benefits of therapy services  Will continue to follow and attempt to see pt for therapy treatment as able      Reji Avitia PT, DPT

## 2020-02-06 NOTE — PROGRESS NOTES
Progress Note - Red Surgery  Candelaria Davidson 81 y o  male MRN: [id number]  Unit/Bed#: [facility] 607-01 Encounter: [id number]    Assessment/Plan:  81 y o  M with hx of bleeding and perforated 1st portion duodenal ulcer  1/11 S/p EGD  1/11 S/p IR GDA embolozation   1/11 S/p Exlap, oversewing bleeding vessels, closure of tissue over perforation, Thal patch with transverse colon, open gastrostomy-jejunostomy tube placement  2/5: IR successfully restored patency of J tube        Plan:  NPO  G port to gravity   Restart tube feeds   Monitor CAMPBELL drain output  Continue Zosyn  Continue Reglan   Prn analgesia/anti-emetics   DVT ppx  OOB/Ambulate  PT/OT    Subjective: Patient had no acute events overnight  Patient reports no nausea or vomiting  Objective:     Vitals: Temp:  [98 2 °F (36 8 °C)-99 5 °F (37 5 °C)] 98 2 °F (36 8 °C)  HR:  [100-107] 107  Resp:  [18] 18  BP: (143-145)/(81-96) 143/96  Body mass index is 24 51 kg/m²  I/O       02/04 0701 - 02/05 0700 02/05 0701 - 02/06 0700 02/06 0701 - 02/07 0700    P  O  0 0     I V  (mL/kg) 1064 6 (14 3) 3352 1 (45 2)     NG/GT  5     IV Piggyback  300     Feedings       Total Intake(mL/kg) 1064 6 (14 3) 3657 1 (49 3)     Urine (mL/kg/hr) 1975 (1 1) 2162 (1 2)     Emesis/NG output 625 400     Drains 37 60     Stool 0 0     Total Output 2637 2622     Net -1572 4 +1035 1            Unmeasured Urine Occurrence  1 x     Unmeasured Stool Occurrence 1 x 1 x           Physical Exam:  GEN: NAD  HEENT: MMM  CV: no palpable arrythmias  Lung: Normal effort  Ab: Soft, NT/ND, some purulent drainage noted in wound manager  CAMPBELL and J tube intact  Extrem: Moving extremities spontaneously  Neuro: A+Ox3    Lab, Imaging and other studies: I have personally reviewed pertinent reports      VTE Pharmacologic Prophylaxis: Heparin  VTE Mechanical Prophylaxis: sequential compression device

## 2020-02-07 LAB
ANION GAP SERPL CALCULATED.3IONS-SCNC: 3 MMOL/L (ref 4–13)
BASOPHILS # BLD AUTO: 0.01 THOUSANDS/ΜL (ref 0–0.1)
BASOPHILS # BLD AUTO: 0.01 THOUSANDS/ΜL (ref 0–0.1)
BASOPHILS NFR BLD AUTO: 0 % (ref 0–1)
BASOPHILS NFR BLD AUTO: 0 % (ref 0–1)
BILIRUB UR QL STRIP: NEGATIVE
BUN SERPL-MCNC: 5 MG/DL (ref 5–25)
CALCIUM SERPL-MCNC: 7.8 MG/DL (ref 8.3–10.1)
CHLORIDE SERPL-SCNC: 103 MMOL/L (ref 100–108)
CLARITY UR: CLEAR
CO2 SERPL-SCNC: 29 MMOL/L (ref 21–32)
COLOR UR: YELLOW
CREAT SERPL-MCNC: 0.58 MG/DL (ref 0.6–1.3)
EOSINOPHIL # BLD AUTO: 0.01 THOUSAND/ΜL (ref 0–0.61)
EOSINOPHIL # BLD AUTO: 0.01 THOUSAND/ΜL (ref 0–0.61)
EOSINOPHIL NFR BLD AUTO: 0 % (ref 0–6)
EOSINOPHIL NFR BLD AUTO: 0 % (ref 0–6)
ERYTHROCYTE [DISTWIDTH] IN BLOOD BY AUTOMATED COUNT: 17.2 % (ref 11.6–15.1)
ERYTHROCYTE [DISTWIDTH] IN BLOOD BY AUTOMATED COUNT: 17.2 % (ref 11.6–15.1)
GFR SERPL CREATININE-BSD FRML MDRD: 105 ML/MIN/1.73SQ M
GLUCOSE SERPL-MCNC: 162 MG/DL (ref 65–140)
GLUCOSE UR STRIP-MCNC: NEGATIVE MG/DL
HCT VFR BLD AUTO: 23.1 % (ref 36.5–49.3)
HCT VFR BLD AUTO: 26.8 % (ref 36.5–49.3)
HGB BLD-MCNC: 7.3 G/DL (ref 12–17)
HGB BLD-MCNC: 8.5 G/DL (ref 12–17)
HGB UR QL STRIP.AUTO: NEGATIVE
IMM GRANULOCYTES # BLD AUTO: 0.02 THOUSAND/UL (ref 0–0.2)
IMM GRANULOCYTES # BLD AUTO: 0.03 THOUSAND/UL (ref 0–0.2)
IMM GRANULOCYTES NFR BLD AUTO: 0 % (ref 0–2)
IMM GRANULOCYTES NFR BLD AUTO: 1 % (ref 0–2)
KETONES UR STRIP-MCNC: NEGATIVE MG/DL
LEUKOCYTE ESTERASE UR QL STRIP: NEGATIVE
LYMPHOCYTES # BLD AUTO: 0.84 THOUSANDS/ΜL (ref 0.6–4.47)
LYMPHOCYTES # BLD AUTO: 0.98 THOUSANDS/ΜL (ref 0.6–4.47)
LYMPHOCYTES NFR BLD AUTO: 16 % (ref 14–44)
LYMPHOCYTES NFR BLD AUTO: 17 % (ref 14–44)
MCH RBC QN AUTO: 29.8 PG (ref 26.8–34.3)
MCH RBC QN AUTO: 30.2 PG (ref 26.8–34.3)
MCHC RBC AUTO-ENTMCNC: 31.6 G/DL (ref 31.4–37.4)
MCHC RBC AUTO-ENTMCNC: 31.7 G/DL (ref 31.4–37.4)
MCV RBC AUTO: 94 FL (ref 82–98)
MCV RBC AUTO: 96 FL (ref 82–98)
MONOCYTES # BLD AUTO: 1.07 THOUSAND/ΜL (ref 0.17–1.22)
MONOCYTES # BLD AUTO: 1.26 THOUSAND/ΜL (ref 0.17–1.22)
MONOCYTES NFR BLD AUTO: 21 % (ref 4–12)
MONOCYTES NFR BLD AUTO: 21 % (ref 4–12)
NEUTROPHILS # BLD AUTO: 3.07 THOUSANDS/ΜL (ref 1.85–7.62)
NEUTROPHILS # BLD AUTO: 3.74 THOUSANDS/ΜL (ref 1.85–7.62)
NEUTS SEG NFR BLD AUTO: 61 % (ref 43–75)
NEUTS SEG NFR BLD AUTO: 63 % (ref 43–75)
NITRITE UR QL STRIP: NEGATIVE
NRBC BLD AUTO-RTO: 0 /100 WBCS
NRBC BLD AUTO-RTO: 0 /100 WBCS
PH UR STRIP.AUTO: 6 [PH]
PLATELET # BLD AUTO: 305 THOUSANDS/UL (ref 149–390)
PLATELET # BLD AUTO: 333 THOUSANDS/UL (ref 149–390)
PMV BLD AUTO: 8.8 FL (ref 8.9–12.7)
PMV BLD AUTO: 8.9 FL (ref 8.9–12.7)
POTASSIUM SERPL-SCNC: 2.7 MMOL/L (ref 3.5–5.3)
PROT UR STRIP-MCNC: NEGATIVE MG/DL
RBC # BLD AUTO: 2.42 MILLION/UL (ref 3.88–5.62)
RBC # BLD AUTO: 2.85 MILLION/UL (ref 3.88–5.62)
SODIUM SERPL-SCNC: 135 MMOL/L (ref 136–145)
SP GR UR STRIP.AUTO: 1.02 (ref 1–1.03)
UROBILINOGEN UR QL STRIP.AUTO: 0.2 E.U./DL
WBC # BLD AUTO: 5.03 THOUSAND/UL (ref 4.31–10.16)
WBC # BLD AUTO: 6.02 THOUSAND/UL (ref 4.31–10.16)

## 2020-02-07 PROCEDURE — 81003 URINALYSIS AUTO W/O SCOPE: CPT | Performed by: STUDENT IN AN ORGANIZED HEALTH CARE EDUCATION/TRAINING PROGRAM

## 2020-02-07 PROCEDURE — 85025 COMPLETE CBC W/AUTO DIFF WBC: CPT | Performed by: SURGERY

## 2020-02-07 PROCEDURE — 80048 BASIC METABOLIC PNL TOTAL CA: CPT | Performed by: STUDENT IN AN ORGANIZED HEALTH CARE EDUCATION/TRAINING PROGRAM

## 2020-02-07 PROCEDURE — 97535 SELF CARE MNGMENT TRAINING: CPT

## 2020-02-07 PROCEDURE — 97530 THERAPEUTIC ACTIVITIES: CPT

## 2020-02-07 PROCEDURE — 85025 COMPLETE CBC W/AUTO DIFF WBC: CPT | Performed by: STUDENT IN AN ORGANIZED HEALTH CARE EDUCATION/TRAINING PROGRAM

## 2020-02-07 PROCEDURE — 97112 NEUROMUSCULAR REEDUCATION: CPT

## 2020-02-07 PROCEDURE — 99024 POSTOP FOLLOW-UP VISIT: CPT | Performed by: SURGERY

## 2020-02-07 RX ORDER — POTASSIUM CHLORIDE 14.9 MG/ML
20 INJECTION INTRAVENOUS 2 TIMES DAILY
Status: COMPLETED | OUTPATIENT
Start: 2020-02-07 | End: 2020-02-08

## 2020-02-07 RX ADMIN — ATORVASTATIN CALCIUM 40 MG: 40 TABLET, FILM COATED ORAL at 17:50

## 2020-02-07 RX ADMIN — OXYCODONE HYDROCHLORIDE 10 MG: 10 TABLET ORAL at 01:25

## 2020-02-07 RX ADMIN — OXYCODONE HYDROCHLORIDE 10 MG: 10 TABLET ORAL at 10:37

## 2020-02-07 RX ADMIN — QUETIAPINE FUMARATE 200 MG: 100 TABLET ORAL at 10:06

## 2020-02-07 RX ADMIN — LORATADINE 10 MG: 10 TABLET ORAL at 10:05

## 2020-02-07 RX ADMIN — DULOXETINE HYDROCHLORIDE 90 MG: 60 CAPSULE, DELAYED RELEASE ORAL at 10:05

## 2020-02-07 RX ADMIN — GABAPENTIN 300 MG: 300 CAPSULE ORAL at 17:51

## 2020-02-07 RX ADMIN — GABAPENTIN 300 MG: 300 CAPSULE ORAL at 21:52

## 2020-02-07 RX ADMIN — CHLORHEXIDINE GLUCONATE 0.12% ORAL RINSE 15 ML: 1.2 LIQUID ORAL at 21:52

## 2020-02-07 RX ADMIN — QUETIAPINE FUMARATE 600 MG: 300 TABLET ORAL at 21:55

## 2020-02-07 RX ADMIN — PIPERACILLIN SODIUM AND TAZOBACTAM SODIUM 4.5 G: 36; 4.5 INJECTION, POWDER, FOR SOLUTION INTRAVENOUS at 05:53

## 2020-02-07 RX ADMIN — METOPROLOL TARTRATE 25 MG: 25 TABLET, FILM COATED ORAL at 21:52

## 2020-02-07 RX ADMIN — Medication 20 MG: at 17:51

## 2020-02-07 RX ADMIN — POTASSIUM CHLORIDE 20 MEQ: 14.9 INJECTION, SOLUTION INTRAVENOUS at 17:51

## 2020-02-07 RX ADMIN — FLUTICASONE PROPIONATE 2 SPRAY: 50 SPRAY, METERED NASAL at 10:07

## 2020-02-07 RX ADMIN — OXYCODONE HYDROCHLORIDE 10 MG: 10 TABLET ORAL at 21:59

## 2020-02-07 RX ADMIN — METOCLOPRAMIDE 10 MG: 5 INJECTION, SOLUTION INTRAMUSCULAR; INTRAVENOUS at 01:16

## 2020-02-07 RX ADMIN — HEPARIN SODIUM 5000 UNITS: 5000 INJECTION INTRAVENOUS; SUBCUTANEOUS at 21:51

## 2020-02-07 RX ADMIN — METOPROLOL TARTRATE 25 MG: 25 TABLET, FILM COATED ORAL at 10:06

## 2020-02-07 RX ADMIN — METOCLOPRAMIDE 10 MG: 5 INJECTION, SOLUTION INTRAMUSCULAR; INTRAVENOUS at 17:50

## 2020-02-07 RX ADMIN — Medication 20 MG: at 11:21

## 2020-02-07 RX ADMIN — SODIUM CHLORIDE 50 ML/HR: 0.9 INJECTION, SOLUTION INTRAVENOUS at 12:48

## 2020-02-07 RX ADMIN — HEPARIN SODIUM 5000 UNITS: 5000 INJECTION INTRAVENOUS; SUBCUTANEOUS at 05:53

## 2020-02-07 RX ADMIN — THIAMINE HCL TAB 100 MG 100 MG: 100 TAB at 10:06

## 2020-02-07 RX ADMIN — FOLIC ACID 1 MG: 1 TABLET ORAL at 10:06

## 2020-02-07 RX ADMIN — PIPERACILLIN SODIUM AND TAZOBACTAM SODIUM 4.5 G: 36; 4.5 INJECTION, POWDER, FOR SOLUTION INTRAVENOUS at 17:50

## 2020-02-07 RX ADMIN — METOCLOPRAMIDE 10 MG: 5 INJECTION, SOLUTION INTRAMUSCULAR; INTRAVENOUS at 05:53

## 2020-02-07 RX ADMIN — HEPARIN SODIUM 5000 UNITS: 5000 INJECTION INTRAVENOUS; SUBCUTANEOUS at 13:21

## 2020-02-07 RX ADMIN — MELATONIN 1000 UNITS: at 10:06

## 2020-02-07 RX ADMIN — PIPERACILLIN SODIUM AND TAZOBACTAM SODIUM 4.5 G: 36; 4.5 INJECTION, POWDER, FOR SOLUTION INTRAVENOUS at 12:15

## 2020-02-07 RX ADMIN — CHLORHEXIDINE GLUCONATE 0.12% ORAL RINSE 15 ML: 1.2 LIQUID ORAL at 10:06

## 2020-02-07 RX ADMIN — POTASSIUM CHLORIDE 20 MEQ: 14.9 INJECTION, SOLUTION INTRAVENOUS at 10:37

## 2020-02-07 RX ADMIN — GABAPENTIN 300 MG: 300 CAPSULE ORAL at 10:05

## 2020-02-07 RX ADMIN — METOCLOPRAMIDE 10 MG: 5 INJECTION, SOLUTION INTRAMUSCULAR; INTRAVENOUS at 12:15

## 2020-02-07 RX ADMIN — NICOTINE 1 PATCH: 14 PATCH TRANSDERMAL at 10:08

## 2020-02-07 RX ADMIN — OXYCODONE HYDROCHLORIDE 10 MG: 10 TABLET ORAL at 06:16

## 2020-02-07 RX ADMIN — MIRTAZAPINE 15 MG: 15 TABLET, FILM COATED ORAL at 21:52

## 2020-02-07 RX ADMIN — PIPERACILLIN SODIUM AND TAZOBACTAM SODIUM 4.5 G: 36; 4.5 INJECTION, POWDER, FOR SOLUTION INTRAVENOUS at 01:16

## 2020-02-07 RX ADMIN — FLUTICASONE FUROATE AND VILANTEROL TRIFENATATE 1 PUFF: 100; 25 POWDER RESPIRATORY (INHALATION) at 10:07

## 2020-02-07 NOTE — PHYSICAL THERAPY NOTE
Physical Therapy Progress Note     02/07/20 7448   Pain Assessment   Pain Assessment No/denies pain   Restrictions/Precautions   Other Precautions Pain; Fall Risk;Multiple lines; Chair Alarm   Subjective   Subjective pt encoutnered supine in bed, initially declining therapy, but agreeable after encouragement  pt lethargic throughout session, having trouble keeping eyes open  Apologetic post ambulation regarding particiaption, and verbalized understanding of education & instructions regarding mobility  Bed Mobility   Supine to Sit 3  Moderate assistance   Additional items Assist x 1; Increased time required;LE management;HOB elevated   Transfers   Sit to Stand 3  Moderate assistance   Additional items Assist x 1; Armrests; Increased time required   Stand to Sit 3  Moderate assistance   Additional items Assist x 1; Armrests; Increased time required   Stand pivot 3  Moderate assistance   Additional items Assist x 2  (+ standby)   Ambulation/Elevation   Gait pattern Excessively slow; Step to;Short stride; Foward flexed; Shuffling;Decreased foot clearance; Improper Weight shift; Antalgic   Gait Assistance 3  Moderate assist   Additional items Assist x 2   Assistive Device Rolling walker   Distance 3' bed to chair   Balance   Static Sitting Poor   Static Standing Poor   Ambulatory Poor   Endurance Deficit   Endurance Deficit Yes   Endurance Deficit Description fatigue, impaired balance, pain   Activity Tolerance   Activity Tolerance Patient tolerated treatment well;Patient limited by fatigue;Patient limited by pain   Nurse [id number] Naomi Banks RN   Assessment   Prognosis Good   Problem List Decreased strength;Decreased endurance; Impaired balance;Decreased mobility; Decreased cognition;Decreased safety awareness   Assessment Pt remains limited in mobility this session by fatigue & inconsistant participation in mobility tasks  Pt required increased assist for all transfers & constant assist while sitting to maintain balance    Pt demonstrated one posterior LOB upon 1st standing trial due to poor forward weight shifting, and then attempted to impulsively sit after taking 2 shuffling steps during 2nd trial   Pt able to successfully transfer OOB to chair in 3rd trial after instructions for weight shifting, posture, and balance in standing  Pt educated in importance of mobility, increased activity & participating in therapy to improve strength and breathing, reduce muscle & joint pain, and improve overall body functioing to reduce need for tube feeds  pt verbalized understanding & agreed to work harder in future sessions  Will continue to benefit from further practice & education to ensure carryvoer  Barriers to Discharge Inaccessible home environment   Goals   Patient Goals to do better next time   STG Expiration Date 02/15/20   PT Treatment Day 6   Plan   Treatment/Interventions Functional transfer training;Elevations;LE strengthening/ROM; Therapeutic exercise; Endurance training;Cognitive reorientation;Patient/family training;Equipment eval/education; Bed mobility;Gait training   Progress Slow progress, decreased activity tolerance   PT Frequency   (3-5x/week)   Recommendation   Recommendation Post acute IP rehab   Equipment Recommended Robbie Bradshaw, PTA

## 2020-02-07 NOTE — OCCUPATIONAL THERAPY NOTE
Occupational Therapy Treatment Note:     02/07/20 1555   Restrictions/Precautions   Weight Bearing Precautions Per Order No   Other Precautions Cognitive; Bed Alarm;Multiple lines; Fall Risk;Pain   Pain Assessment   Pain Assessment FLACC   Pain Location Back   Pain Rating: FLACC (Rest) - Face 0   Pain Rating: FLACC (Rest) - Legs 0   Pain Rating: FLACC (Rest) - Activity 0   Pain Rating: FLACC (Rest) - Cry 1   Pain Rating: FLACC (Rest) - Consolability 0   Score: FLACC (Rest) 1   Pain Rating: FLACC (Activity) - Face 0   Pain Rating: FLACC (Activity) - Legs 0   Pain Rating: FLACC (Activity) - Activity 0   Pain Rating: FLACC (Activity) - Cry 1   Pain Rating: FLACC (Activity) - Consolability 0   Score: FLACC (Activity) 1   Bed Mobility   Sit to Supine 4  Minimal assistance   Additional items Assist x 1;Verbal cues; Increased time required;HOB elevated   Additional Comments pt started session seated in chair, ended session supine in bed   Transfers   Sit to Stand 4  Minimal assistance   Additional items Armrests; Verbal cues   Stand to Sit 4  Minimal assistance   Additional items Verbal cues   Stand pivot 4  Minimal assistance   Coordination   Fine Motor pt able to manipulate nuts, bolts, and metal bars for seated metal work activity x 20 min   Cognition   Overall Cognitive Status Impaired   Arousal/Participation Alert; Cooperative   Attention Attends with cues to redirect   Following Commands Follows multistep commands with increased time or repetition   Comments pt initially required encouragement to engage in metal work activity but during session expressed pleasure and appreciation  reported decrease in pain during activity  Activity Tolerance   Activity Tolerance Patient tolerated treatment well   Assessment   Assessment pt seen for pm ot session focused on seated metal work activity  pt seemed brighter and more talkative  expressed enjoyment in and reduction of pain during activity   overall min a for transfers and bed mobility  Plan   Treatment Interventions ADL retraining;Functional transfer training;UE strengthening/ROM; Endurance training;Cognitive reorientation;Patient/family training;Equipment evaluation/education; Fine motor coordination activities; Compensatory technique education; Energy conservation; Activityengagement   Goal Expiration Date 02/11/20   OT Treatment Day 4   OT Frequency 3-5x/wk   Recommendation   OT Discharge Recommendation Short Term Rehab   OT - OK to Discharge Yes   Celena Foote

## 2020-02-07 NOTE — MALNUTRITION/BMI
This medical record reflects one or more clinical indicators suggestive of malnutrition  Malnutrition Findings:   Malnutrition type: Unknown (comment)(mild malnutrition r/t EN interruptions, as evidenced by intake meeting <50-75% estimated needs and 6% wt loss x 1 week (1/31/20: 170#, 2/7/20: 159#)  Treated with nutrition support)  Degree of Malnutrition: Malnutrition of mild degree  Malnutrition Characteristics: Weight loss, Inadequate energy       Body mass index is 23 88 kg/m²  See Nutrition note dated 2/7/2020 for additional details  Completed nutrition assessment is viewable in the nutrition documentation  See progress note 2/7/2020 for updated TF recs

## 2020-02-07 NOTE — PROGRESS NOTES
Progress Note - General Surgery   Ismael Duque 81 y o  male MRN: [id number]  Unit/Bed#: [facility] 607-01 Encounter: [id number]    Assessment:  81 y o  M with hx of bleeding and perforated 1st portion duodenal ulcer  1/11 S/p EGD  1/11 S/p IR GDA embolozation   1/11 S/p Exlap, oversewing bleeding vessels, closure of tissue over perforation, Thal patch with transverse colon, open gastrostomy-jejunostomy tube placement  2/5 J-tube fixed and repositioned - IR    Febrile to 102 9 overnight  L abdominal CAMPBELL w 10 cc bilious output over last 24 hrs   Midline wound manager with 0 recorded output over last 24 hrs, but with small amount of purulent drainage in bag  G port to gravity - 28 cc bilious output     Plan:  NPO  Continue TFs through J port   G port to gravity   Monitor CAMPBELL drain output  Continue Zosyn  Continue Reglan   Prn analgesia/anti-emetics   DVT ppx  OOB/Ambulate  PT/OT      Subjective/Objective     Subjective: No acute events overnight  Pain is controlled on prn analgesia  +flatulence/BM  Denies nausea/vomiting  No fevers, chills  Objective:     Blood pressure 158/89, pulse (!) 113, temperature (!) 102 9 °F (39 4 °C), resp  rate 17, height 5' 8 5" (1 74 m), weight 72 3 kg (159 lb 6 3 oz), SpO2 95 %  ,Body mass index is 23 88 kg/m²        Intake/Output Summary (Last 24 hours) at 2/7/2020 0545  Last data filed at 2/6/2020 2201  Gross per 24 hour   Intake 1445 25 ml   Output 2468 ml   Net -1022 75 ml       Invasive Devices     Peripherally Inserted Central Catheter Line            PICC Line 31/65/63 Right Basilic 21 days          Drain            Gastrostomy/Enterostomy Gastrostomy-jejunostomy 22 Fr  LLQ 26 days    Closed/Suction Drain Left Abdomen Bulb 10 days    Open Drain Midline Abdomen 6 days    External Urinary Catheter 4 days                Physical Exam:   NAD, alert and oriented x3  Normocephalic, atraumatic  MMM, EOMI, PERRLA  Norm resp effort on RA  RRR  Abd soft, NT/ND, incisions c/d/i with GJ tube in place, G to gravity  L abdominal CAMPBELL w bilious output  Midline wound manager with minimal purulent output   No calf tenderness or peripheral edema  Motor/sensation intact in distal extremities  CN grossly intact  -rash/lesions      Lab, Imaging and other studies:  CBC:   Lab Results   Component Value Date    WBC 6 97 02/06/2020    HGB 8 9 (L) 02/06/2020    HCT 28 4 (L) 02/06/2020    MCV 94 02/06/2020     02/06/2020    MCH 29 6 02/06/2020    MCHC 31 3 (L) 02/06/2020    RDW 16 9 (H) 02/06/2020    MPV 8 7 (L) 02/06/2020   , CMP:   Lab Results   Component Value Date    SODIUM 138 02/06/2020    K 3 1 (L) 02/06/2020     02/06/2020    CO2 24 02/06/2020    BUN 4 (L) 02/06/2020    CREATININE 0 45 (L) 02/06/2020    CALCIUM 8 1 (L) 02/06/2020    EGFR 117 02/06/2020     VTE Pharmacologic Prophylaxis: Heparin  VTE Mechanical Prophylaxis: sequential compression device

## 2020-02-07 NOTE — SOCIAL WORK
Patient reviewed  CM awaiting medical clearance for discharge planning  PT/OT recommending rehab  Patient needs an accepting facility when closer to discharging  CM will continue to follow for clearance

## 2020-02-07 NOTE — QUICK NOTE
Nurse-Patient-Provider rounds were completed with the patient's nurse today, Kendal Cheung  We discussed the plan is to close monitoring of abdominal exam as well as overall clinical condition  Monitor fever curve  Trend labs  Replete potassium today  We reviewed all of the invasive devices/lines/telemetry orders   - None  DVT Prophylaxis:  SCDs and subcutaneous heparin    Pain Assessment / Plan:  - Continue current analgesic regimen  Mobility Assessment / Plan:  - Activity as tolerated  Goals / Barriers for discharge:  Current abdominal exam as well as overall clinical condition  Will need to monitor fever curve     Case management following; case and discharge needs discussed  All questions and concerns were addressed  I spent greater than 20 minutes reviewing the plan with the patient and the nurse, and coordinating care for the day      Aileen Alston PA-C  2/7/2020

## 2020-02-07 NOTE — PLAN OF CARE
Problem: OCCUPATIONAL THERAPY ADULT  Goal: Performs self-care activities at highest level of function for planned discharge setting  See evaluation for individualized goals  Description  Treatment Interventions: ADL retraining, Functional transfer training, UE strengthening/ROM, Endurance training, Cognitive reorientation, Patient/family training, Equipment evaluation/education, Fine motor coordination activities, Compensatory technique education, Activityengagement, Energy conservation          See flowsheet documentation for full assessment, interventions and recommendations  Outcome: Progressing  Note:   Limitation: Decreased ADL status, Decreased Safe judgement during ADL, Decreased cognition, Decreased endurance, Decreased self-care trans, Decreased high-level ADLs  Prognosis: Fair  Assessment: pt seen for pm ot session focused on seated metal work activity  pt seemed brighter and more talkative  expressed enjoyment in and reduction of pain during activity  overall min a for transfers and bed mobility       OT Discharge Recommendation: Short Term Rehab  OT - OK to Discharge: Yes  Mansi Beaver

## 2020-02-07 NOTE — PLAN OF CARE
Problem: PHYSICAL THERAPY ADULT  Goal: Performs mobility at highest level of function for planned discharge setting  See evaluation for individualized goals  Description  Treatment/Interventions: Functional transfer training, LE strengthening/ROM, Therapeutic exercise, Endurance training, Patient/family training, Bed mobility, Gait training  Equipment Recommended: Heriberto Gonzalez       See flowsheet documentation for full assessment, interventions and recommendations  Outcome: Progressing  Note:   Prognosis: Good  Problem List: Decreased strength, Decreased endurance, Impaired balance, Decreased mobility, Decreased cognition, Decreased safety awareness  Assessment: Pt remains limited in mobility this session by fatigue & inconsistant participation in mobility tasks  Pt required increased assist for all transfers & constant assist while sitting to maintain balance  Pt demonstrated one posterior LOB upon 1st standing trial due to poor forward weight shifting, and then attempted to impulsively sit after taking 2 shuffling steps during 2nd trial   Pt able to successfully transfer OOB to chair in 3rd trial after instructions for weight shifting, posture, and balance in standing  Pt educated in importance of mobility, increased activity & participating in therapy to improve strength and breathing, reduce muscle & joint pain, and improve overall body functioing to reduce need for tube feeds  pt verbalized understanding & agreed to work harder in future sessions  Will continue to benefit from further practice & education to ensure carryvoer  Barriers to Discharge: Inaccessible home environment     Recommendation: Post acute IP rehab     PT - OK to Discharge: Yes    See flowsheet documentation for full assessment

## 2020-02-07 NOTE — PROGRESS NOTES
Due to EN being held 4hrs daily for meds, rec increasing goal as tolerated to: Jevity 1 2 @ 95ml/hr (will be running 20hrs daily) with 1 pack prosource per day and 125ml water flushes q 4hrs  Provides 2340kcal, 121g PRO, 75g fat, 2290ml water  Replete K, continue to monitor electrolytes and weight

## 2020-02-08 ENCOUNTER — APPOINTMENT (INPATIENT)
Dept: RADIOLOGY | Facility: HOSPITAL | Age: 70
DRG: 326 | End: 2020-02-08
Payer: MEDICARE

## 2020-02-08 LAB
ANION GAP SERPL CALCULATED.3IONS-SCNC: 5 MMOL/L (ref 4–13)
BASOPHILS # BLD AUTO: 0.02 THOUSANDS/ΜL (ref 0–0.1)
BASOPHILS NFR BLD AUTO: 1 % (ref 0–1)
BUN SERPL-MCNC: 8 MG/DL (ref 5–25)
CALCIUM SERPL-MCNC: 8 MG/DL (ref 8.3–10.1)
CHLORIDE SERPL-SCNC: 106 MMOL/L (ref 100–108)
CO2 SERPL-SCNC: 28 MMOL/L (ref 21–32)
CREAT SERPL-MCNC: 0.59 MG/DL (ref 0.6–1.3)
EOSINOPHIL # BLD AUTO: 0.03 THOUSAND/ΜL (ref 0–0.61)
EOSINOPHIL NFR BLD AUTO: 1 % (ref 0–6)
ERYTHROCYTE [DISTWIDTH] IN BLOOD BY AUTOMATED COUNT: 17.4 % (ref 11.6–15.1)
GFR SERPL CREATININE-BSD FRML MDRD: 105 ML/MIN/1.73SQ M
GLUCOSE SERPL-MCNC: 156 MG/DL (ref 65–140)
HCT VFR BLD AUTO: 27.2 % (ref 36.5–49.3)
HGB BLD-MCNC: 8.4 G/DL (ref 12–17)
IMM GRANULOCYTES # BLD AUTO: 0.03 THOUSAND/UL (ref 0–0.2)
IMM GRANULOCYTES NFR BLD AUTO: 1 % (ref 0–2)
LYMPHOCYTES # BLD AUTO: 0.77 THOUSANDS/ΜL (ref 0.6–4.47)
LYMPHOCYTES NFR BLD AUTO: 20 % (ref 14–44)
MAGNESIUM SERPL-MCNC: 2.1 MG/DL (ref 1.6–2.6)
MCH RBC QN AUTO: 29.8 PG (ref 26.8–34.3)
MCHC RBC AUTO-ENTMCNC: 30.9 G/DL (ref 31.4–37.4)
MCV RBC AUTO: 97 FL (ref 82–98)
MONOCYTES # BLD AUTO: 0.89 THOUSAND/ΜL (ref 0.17–1.22)
MONOCYTES NFR BLD AUTO: 23 % (ref 4–12)
NEUTROPHILS # BLD AUTO: 2.06 THOUSANDS/ΜL (ref 1.85–7.62)
NEUTS SEG NFR BLD AUTO: 54 % (ref 43–75)
NRBC BLD AUTO-RTO: 0 /100 WBCS
PHOSPHATE SERPL-MCNC: 2.9 MG/DL (ref 2.3–4.1)
PLATELET # BLD AUTO: 309 THOUSANDS/UL (ref 149–390)
PMV BLD AUTO: 9.1 FL (ref 8.9–12.7)
POTASSIUM SERPL-SCNC: 3.4 MMOL/L (ref 3.5–5.3)
RBC # BLD AUTO: 2.82 MILLION/UL (ref 3.88–5.62)
SODIUM SERPL-SCNC: 139 MMOL/L (ref 136–145)
WBC # BLD AUTO: 3.8 THOUSAND/UL (ref 4.31–10.16)

## 2020-02-08 PROCEDURE — 85025 COMPLETE CBC W/AUTO DIFF WBC: CPT | Performed by: PHYSICIAN ASSISTANT

## 2020-02-08 PROCEDURE — 71046 X-RAY EXAM CHEST 2 VIEWS: CPT

## 2020-02-08 PROCEDURE — 80048 BASIC METABOLIC PNL TOTAL CA: CPT | Performed by: SURGERY

## 2020-02-08 PROCEDURE — 99024 POSTOP FOLLOW-UP VISIT: CPT | Performed by: SURGERY

## 2020-02-08 PROCEDURE — 84100 ASSAY OF PHOSPHORUS: CPT | Performed by: PHYSICIAN ASSISTANT

## 2020-02-08 PROCEDURE — 83735 ASSAY OF MAGNESIUM: CPT | Performed by: PHYSICIAN ASSISTANT

## 2020-02-08 RX ORDER — POTASSIUM CHLORIDE 20 MEQ/1
20 TABLET, EXTENDED RELEASE ORAL ONCE
Status: COMPLETED | OUTPATIENT
Start: 2020-02-08 | End: 2020-02-08

## 2020-02-08 RX ORDER — POTASSIUM CHLORIDE 29.8 MG/ML
40 INJECTION INTRAVENOUS ONCE
Status: COMPLETED | OUTPATIENT
Start: 2020-02-08 | End: 2020-02-09

## 2020-02-08 RX ADMIN — POTASSIUM CHLORIDE 40 MEQ: 400 INJECTION, SOLUTION INTRAVENOUS at 20:36

## 2020-02-08 RX ADMIN — Medication 20 MG: at 17:22

## 2020-02-08 RX ADMIN — METOCLOPRAMIDE 10 MG: 5 INJECTION, SOLUTION INTRAMUSCULAR; INTRAVENOUS at 12:39

## 2020-02-08 RX ADMIN — OXYCODONE HYDROCHLORIDE 10 MG: 10 TABLET ORAL at 20:44

## 2020-02-08 RX ADMIN — METOCLOPRAMIDE 10 MG: 5 INJECTION, SOLUTION INTRAMUSCULAR; INTRAVENOUS at 17:22

## 2020-02-08 RX ADMIN — POTASSIUM CHLORIDE 20 MEQ: 14.9 INJECTION, SOLUTION INTRAVENOUS at 09:27

## 2020-02-08 RX ADMIN — MIRTAZAPINE 15 MG: 15 TABLET, FILM COATED ORAL at 20:37

## 2020-02-08 RX ADMIN — GABAPENTIN 300 MG: 300 CAPSULE ORAL at 16:22

## 2020-02-08 RX ADMIN — HEPARIN SODIUM 5000 UNITS: 5000 INJECTION INTRAVENOUS; SUBCUTANEOUS at 20:38

## 2020-02-08 RX ADMIN — DULOXETINE HYDROCHLORIDE 90 MG: 60 CAPSULE, DELAYED RELEASE ORAL at 09:20

## 2020-02-08 RX ADMIN — CHLORHEXIDINE GLUCONATE 0.12% ORAL RINSE 15 ML: 1.2 LIQUID ORAL at 09:20

## 2020-02-08 RX ADMIN — PIPERACILLIN SODIUM AND TAZOBACTAM SODIUM 4.5 G: 36; 4.5 INJECTION, POWDER, FOR SOLUTION INTRAVENOUS at 00:57

## 2020-02-08 RX ADMIN — GABAPENTIN 300 MG: 300 CAPSULE ORAL at 20:37

## 2020-02-08 RX ADMIN — PIPERACILLIN SODIUM AND TAZOBACTAM SODIUM 4.5 G: 36; 4.5 INJECTION, POWDER, FOR SOLUTION INTRAVENOUS at 05:50

## 2020-02-08 RX ADMIN — LORATADINE 10 MG: 10 TABLET ORAL at 09:20

## 2020-02-08 RX ADMIN — GABAPENTIN 300 MG: 300 CAPSULE ORAL at 09:20

## 2020-02-08 RX ADMIN — FLUTICASONE FUROATE AND VILANTEROL TRIFENATATE 1 PUFF: 100; 25 POWDER RESPIRATORY (INHALATION) at 09:20

## 2020-02-08 RX ADMIN — HEPARIN SODIUM 5000 UNITS: 5000 INJECTION INTRAVENOUS; SUBCUTANEOUS at 05:50

## 2020-02-08 RX ADMIN — HEPARIN SODIUM 5000 UNITS: 5000 INJECTION INTRAVENOUS; SUBCUTANEOUS at 16:18

## 2020-02-08 RX ADMIN — PIPERACILLIN SODIUM AND TAZOBACTAM SODIUM 4.5 G: 36; 4.5 INJECTION, POWDER, FOR SOLUTION INTRAVENOUS at 12:39

## 2020-02-08 RX ADMIN — METOCLOPRAMIDE 10 MG: 5 INJECTION, SOLUTION INTRAMUSCULAR; INTRAVENOUS at 05:50

## 2020-02-08 RX ADMIN — NICOTINE 1 PATCH: 14 PATCH TRANSDERMAL at 09:22

## 2020-02-08 RX ADMIN — METOCLOPRAMIDE 10 MG: 5 INJECTION, SOLUTION INTRAMUSCULAR; INTRAVENOUS at 00:57

## 2020-02-08 RX ADMIN — PIPERACILLIN SODIUM AND TAZOBACTAM SODIUM 4.5 G: 36; 4.5 INJECTION, POWDER, FOR SOLUTION INTRAVENOUS at 17:22

## 2020-02-08 RX ADMIN — CHLORHEXIDINE GLUCONATE 0.12% ORAL RINSE 15 ML: 1.2 LIQUID ORAL at 20:37

## 2020-02-08 RX ADMIN — THIAMINE HCL TAB 100 MG 100 MG: 100 TAB at 09:20

## 2020-02-08 RX ADMIN — FLUTICASONE PROPIONATE 2 SPRAY: 50 SPRAY, METERED NASAL at 09:20

## 2020-02-08 RX ADMIN — QUETIAPINE FUMARATE 600 MG: 300 TABLET ORAL at 20:38

## 2020-02-08 RX ADMIN — OXYCODONE HYDROCHLORIDE 10 MG: 10 TABLET ORAL at 09:41

## 2020-02-08 RX ADMIN — SODIUM CHLORIDE 50 ML/HR: 0.9 INJECTION, SOLUTION INTRAVENOUS at 06:12

## 2020-02-08 RX ADMIN — QUETIAPINE FUMARATE 200 MG: 100 TABLET ORAL at 09:20

## 2020-02-08 RX ADMIN — POTASSIUM CHLORIDE 20 MEQ: 1500 TABLET, EXTENDED RELEASE ORAL at 20:36

## 2020-02-08 RX ADMIN — METOPROLOL TARTRATE 25 MG: 25 TABLET, FILM COATED ORAL at 20:42

## 2020-02-08 RX ADMIN — METOPROLOL TARTRATE 25 MG: 25 TABLET, FILM COATED ORAL at 09:20

## 2020-02-08 RX ADMIN — FOLIC ACID 1 MG: 1 TABLET ORAL at 09:20

## 2020-02-08 RX ADMIN — MELATONIN 1000 UNITS: at 09:20

## 2020-02-08 RX ADMIN — OXYCODONE HYDROCHLORIDE 10 MG: 10 TABLET ORAL at 16:22

## 2020-02-08 RX ADMIN — Medication 20 MG: at 09:20

## 2020-02-08 RX ADMIN — ATORVASTATIN CALCIUM 40 MG: 40 TABLET, FILM COATED ORAL at 16:22

## 2020-02-08 NOTE — PROGRESS NOTES
Progress Note - General Surgery   Jacob Bauer 81 y o  male MRN: [id number]  Unit/Bed#: [facility] 607-01 Encounter: [id number]    Assessment:  79-year-old male with history of perforated duodenal ulcer with bleeding and failed endoscopic and IR embolization for control who subsequently underwent exploratory laparotomy, over-sewing of bleeding vessels, tall patch repair with transverse colon, open gastro jejunostomy tube placement  Plan:  Continue J-tube feeds  Trial clear liquid diet today with G portion of GJ tube clamped  Mobilize patient  Monitor and replete electrolytes  Analgesia  Complete course of antibiotics - Zosyn  DVT prophylaxis    Subjective/Objective   Chief Complaint:     Subjective:  No acute events  Afebrile past 24 hours  Tolerating J-tube feeds and having bowel movements  Objective:     Blood pressure 142/68, pulse 81, temperature 98 1 °F (36 7 °C), resp  rate 18, height 5' 8 5" (1 74 m), weight 72 3 kg (159 lb 6 3 oz), SpO2 93 %  ,Body mass index is 23 88 kg/m²  Intake/Output Summary (Last 24 hours) at 2/8/2020 0641  Last data filed at 2/8/2020 5797  Gross per 24 hour   Intake 2695 ml   Output 1099 ml   Net 1596 ml       Invasive Devices     Peripherally Inserted Central Catheter Line            PICC Line 07/33/55 Right Basilic 22 days          Drain            Gastrostomy/Enterostomy Gastrostomy-jejunostomy 22 Fr  LLQ 27 days    Closed/Suction Drain Left Abdomen Bulb 11 days    Open Drain Midline Abdomen 7 days    External Urinary Catheter 5 days                Physical Exam:  No acute distress  Regular rate and rhythm  Nonlabored respirations on room air  Abdomen soft, nondistended, nontender  Drain with purulent-appearing drainage  Fistula with scant output  GJ tube in place      Lab, Imaging and other studies:  CBC:   Lab Results   Component Value Date    WBC 6 02 02/07/2020    HGB 8 5 (L) 02/07/2020    HCT 26 8 (L) 02/07/2020    MCV 94 02/07/2020     02/07/2020    MCH 29 8 02/07/2020    MCHC 31 7 02/07/2020    RDW 17 2 (H) 02/07/2020    MPV 8 8 (L) 02/07/2020    NRBC 0 02/07/2020   , CMP:   Lab Results   Component Value Date    SODIUM 135 (L) 02/07/2020    K 2 7 (LL) 02/07/2020     02/07/2020    CO2 29 02/07/2020    BUN 5 02/07/2020    CREATININE 0 58 (L) 02/07/2020    CALCIUM 7 8 (L) 02/07/2020    EGFR 105 02/07/2020   , Coagulation: No results found for: PT, INR, APTT, Urinalysis:   Lab Results   Component Value Date    COLORU Yellow 02/07/2020    CLARITYU Clear 02/07/2020    SPECGRAV 1 017 02/07/2020    PHUR 6 0 02/07/2020    LEUKOCYTESUR Negative 02/07/2020    NITRITE Negative 02/07/2020    GLUCOSEU Negative 02/07/2020    KETONESU Negative 02/07/2020    BILIRUBINUR Negative 02/07/2020    BLOODU Negative 02/07/2020   , Amylase: No results found for: AMYLASE, Lipase: No results found for: LIPASE  VTE Pharmacologic Prophylaxis: Sequential compression device (Venodyne)   VTE Mechanical Prophylaxis: sequential compression device

## 2020-02-09 LAB
ANION GAP SERPL CALCULATED.3IONS-SCNC: 4 MMOL/L (ref 4–13)
BUN SERPL-MCNC: 7 MG/DL (ref 5–25)
CALCIUM SERPL-MCNC: 8.1 MG/DL (ref 8.3–10.1)
CHLORIDE SERPL-SCNC: 105 MMOL/L (ref 100–108)
CO2 SERPL-SCNC: 29 MMOL/L (ref 21–32)
CREAT SERPL-MCNC: 0.59 MG/DL (ref 0.6–1.3)
ERYTHROCYTE [DISTWIDTH] IN BLOOD BY AUTOMATED COUNT: 17.3 % (ref 11.6–15.1)
GFR SERPL CREATININE-BSD FRML MDRD: 105 ML/MIN/1.73SQ M
GLUCOSE SERPL-MCNC: 147 MG/DL (ref 65–140)
HCT VFR BLD AUTO: 26.5 % (ref 36.5–49.3)
HGB BLD-MCNC: 8.4 G/DL (ref 12–17)
MCH RBC QN AUTO: 30.2 PG (ref 26.8–34.3)
MCHC RBC AUTO-ENTMCNC: 31.7 G/DL (ref 31.4–37.4)
MCV RBC AUTO: 95 FL (ref 82–98)
PLATELET # BLD AUTO: 314 THOUSANDS/UL (ref 149–390)
PMV BLD AUTO: 9.2 FL (ref 8.9–12.7)
POTASSIUM SERPL-SCNC: 3.6 MMOL/L (ref 3.5–5.3)
RBC # BLD AUTO: 2.78 MILLION/UL (ref 3.88–5.62)
SODIUM SERPL-SCNC: 138 MMOL/L (ref 136–145)
WBC # BLD AUTO: 6.06 THOUSAND/UL (ref 4.31–10.16)

## 2020-02-09 PROCEDURE — 80048 BASIC METABOLIC PNL TOTAL CA: CPT | Performed by: SURGERY

## 2020-02-09 PROCEDURE — 85027 COMPLETE CBC AUTOMATED: CPT | Performed by: SURGERY

## 2020-02-09 PROCEDURE — 3E03317 INTRODUCTION OF OTHER THROMBOLYTIC INTO PERIPHERAL VEIN, PERCUTANEOUS APPROACH: ICD-10-PCS | Performed by: SURGERY

## 2020-02-09 PROCEDURE — 99024 POSTOP FOLLOW-UP VISIT: CPT | Performed by: SURGERY

## 2020-02-09 RX ORDER — POTASSIUM CHLORIDE 20 MEQ/1
40 TABLET, EXTENDED RELEASE ORAL ONCE
Status: COMPLETED | OUTPATIENT
Start: 2020-02-09 | End: 2020-02-09

## 2020-02-09 RX ADMIN — LABETALOL 20 MG/4 ML (5 MG/ML) INTRAVENOUS SYRINGE 10 MG: at 08:01

## 2020-02-09 RX ADMIN — DULOXETINE HYDROCHLORIDE 90 MG: 60 CAPSULE, DELAYED RELEASE ORAL at 08:51

## 2020-02-09 RX ADMIN — THIAMINE HCL TAB 100 MG 100 MG: 100 TAB at 08:52

## 2020-02-09 RX ADMIN — HEPARIN SODIUM 5000 UNITS: 5000 INJECTION INTRAVENOUS; SUBCUTANEOUS at 14:30

## 2020-02-09 RX ADMIN — HEPARIN SODIUM 5000 UNITS: 5000 INJECTION INTRAVENOUS; SUBCUTANEOUS at 05:34

## 2020-02-09 RX ADMIN — PIPERACILLIN SODIUM AND TAZOBACTAM SODIUM 4.5 G: 36; 4.5 INJECTION, POWDER, FOR SOLUTION INTRAVENOUS at 00:11

## 2020-02-09 RX ADMIN — PIPERACILLIN SODIUM AND TAZOBACTAM SODIUM 4.5 G: 36; 4.5 INJECTION, POWDER, FOR SOLUTION INTRAVENOUS at 05:35

## 2020-02-09 RX ADMIN — PIPERACILLIN SODIUM AND TAZOBACTAM SODIUM 4.5 G: 36; 4.5 INJECTION, POWDER, FOR SOLUTION INTRAVENOUS at 17:49

## 2020-02-09 RX ADMIN — GABAPENTIN 300 MG: 300 CAPSULE ORAL at 21:50

## 2020-02-09 RX ADMIN — HEPARIN SODIUM 5000 UNITS: 5000 INJECTION INTRAVENOUS; SUBCUTANEOUS at 21:50

## 2020-02-09 RX ADMIN — METOCLOPRAMIDE 10 MG: 5 INJECTION, SOLUTION INTRAMUSCULAR; INTRAVENOUS at 12:10

## 2020-02-09 RX ADMIN — LORATADINE 10 MG: 10 TABLET ORAL at 08:50

## 2020-02-09 RX ADMIN — FLUTICASONE FUROATE AND VILANTEROL TRIFENATATE 1 PUFF: 100; 25 POWDER RESPIRATORY (INHALATION) at 08:50

## 2020-02-09 RX ADMIN — FOLIC ACID 1 MG: 1 TABLET ORAL at 08:51

## 2020-02-09 RX ADMIN — METOPROLOL TARTRATE 25 MG: 25 TABLET, FILM COATED ORAL at 21:52

## 2020-02-09 RX ADMIN — QUETIAPINE FUMARATE 200 MG: 100 TABLET ORAL at 08:50

## 2020-02-09 RX ADMIN — METOCLOPRAMIDE 10 MG: 5 INJECTION, SOLUTION INTRAMUSCULAR; INTRAVENOUS at 05:34

## 2020-02-09 RX ADMIN — NICOTINE 1 PATCH: 14 PATCH TRANSDERMAL at 08:53

## 2020-02-09 RX ADMIN — Medication 20 MG: at 08:52

## 2020-02-09 RX ADMIN — PIPERACILLIN SODIUM AND TAZOBACTAM SODIUM 4.5 G: 36; 4.5 INJECTION, POWDER, FOR SOLUTION INTRAVENOUS at 12:10

## 2020-02-09 RX ADMIN — Medication 20 MG: at 17:49

## 2020-02-09 RX ADMIN — SODIUM CHLORIDE 50 ML/HR: 0.9 INJECTION, SOLUTION INTRAVENOUS at 04:26

## 2020-02-09 RX ADMIN — MIRTAZAPINE 15 MG: 15 TABLET, FILM COATED ORAL at 21:50

## 2020-02-09 RX ADMIN — QUETIAPINE FUMARATE 600 MG: 300 TABLET ORAL at 21:51

## 2020-02-09 RX ADMIN — GABAPENTIN 300 MG: 300 CAPSULE ORAL at 08:50

## 2020-02-09 RX ADMIN — POTASSIUM CHLORIDE 40 MEQ: 1500 TABLET, EXTENDED RELEASE ORAL at 08:50

## 2020-02-09 RX ADMIN — CHLORHEXIDINE GLUCONATE 0.12% ORAL RINSE 15 ML: 1.2 LIQUID ORAL at 21:50

## 2020-02-09 RX ADMIN — OXYCODONE HYDROCHLORIDE 10 MG: 10 TABLET ORAL at 10:53

## 2020-02-09 RX ADMIN — OXYCODONE HYDROCHLORIDE 10 MG: 10 TABLET ORAL at 21:55

## 2020-02-09 RX ADMIN — METOCLOPRAMIDE 10 MG: 5 INJECTION, SOLUTION INTRAMUSCULAR; INTRAVENOUS at 00:11

## 2020-02-09 RX ADMIN — ATORVASTATIN CALCIUM 40 MG: 40 TABLET, FILM COATED ORAL at 17:50

## 2020-02-09 RX ADMIN — ALTEPLASE 2 MG: 2.2 INJECTION, POWDER, LYOPHILIZED, FOR SOLUTION INTRAVENOUS at 00:11

## 2020-02-09 RX ADMIN — METOCLOPRAMIDE 10 MG: 5 INJECTION, SOLUTION INTRAMUSCULAR; INTRAVENOUS at 17:50

## 2020-02-09 RX ADMIN — MELATONIN 1000 UNITS: at 08:51

## 2020-02-09 RX ADMIN — GABAPENTIN 300 MG: 300 CAPSULE ORAL at 17:50

## 2020-02-09 RX ADMIN — FLUTICASONE PROPIONATE 2 SPRAY: 50 SPRAY, METERED NASAL at 08:50

## 2020-02-09 RX ADMIN — METOPROLOL TARTRATE 25 MG: 25 TABLET, FILM COATED ORAL at 08:52

## 2020-02-09 NOTE — PROGRESS NOTES
Progress Note - General Surgery   Leo Jones 81 y o  male MRN: [id number]  Unit/Bed#: [facility] 607-01 Encounter: [id number]    Assessment:  72yo M with h/o perforated duodenal ulcer with bleeding and failed endoscopic and IR embolization, who subsequently underwent exploratory laparotomy, over-sewing of bleeding vessels, tall patch repair with transverse colon, open gastro jejunostomy tube placement on 1/11/20  Tmax 100 8  CAMPBELL: 20 (72)     Plan:  · Advance to full liquid diet  · Continue j-tube feeds  · Continue zosyn  · OOB, ambulate with assist  · Monitor & replete electrolytes  · PRN analgesia  · DVT PPx    Subjective/Objective     Subjective:   No acute events overnight  Tolerating clear liquid diet without N/V  Tolerating TFs  +F/+BM  Objective:    Blood pressure 161/80, pulse 88, temperature (!) 100 8 °F (38 2 °C), resp  rate 18, height 5' 8 5" (1 74 m), weight 72 3 kg (159 lb 6 3 oz), SpO2 93 %  ,Body mass index is 23 88 kg/m²        Intake/Output Summary (Last 24 hours) at 2/9/2020 7736  Last data filed at 2/9/2020 0425  Gross per 24 hour   Intake 3894 16 ml   Output 1675 ml   Net 2219 16 ml       Invasive Devices     Peripherally Inserted Central Catheter Line            PICC Line 41/86/48 Right Basilic 23 days          Drain            Gastrostomy/Enterostomy Gastrostomy-jejunostomy 22 Fr  LLQ 28 days    Closed/Suction Drain Left Abdomen Bulb 12 days    Open Drain Midline Abdomen 8 days    External Urinary Catheter 6 days                Physical Exam:   Gen:  NAD  CV:  RRR  Lungs: nl effort  Abd:  soft, NT/ND   CAMPBELL to bulb suction with bilious/purulent output   fistula with scant output noted in wound manager   GJ tube in place with G limb clamped  Ext:  no CCE  Skin:  no rashes  Neuro: A&Ox3     Results from last 7 days   Lab Units 02/08/20  0558 02/07/20  0738 02/07/20  0606   WBC Thousand/uL 3 80* 6 02 5 03   HEMOGLOBIN g/dL 8 4* 8 5* 7 3*   HEMATOCRIT % 27 2* 26 8* 23 1*   PLATELETS Thousands/uL 309 333 305     Results from last 7 days   Lab Units 02/08/20  0558 02/07/20  0738 02/06/20  1053   POTASSIUM mmol/L 3 4* 2 7* 3 1*   CHLORIDE mmol/L 106 103 105   CO2 mmol/L 28 29 24   BUN mg/dL 8 5 4*   CREATININE mg/dL 0 59* 0 58* 0 45*   CALCIUM mg/dL 8 0* 7 8* 8 1*

## 2020-02-10 LAB
ANION GAP SERPL CALCULATED.3IONS-SCNC: 4 MMOL/L (ref 4–13)
BUN SERPL-MCNC: 7 MG/DL (ref 5–25)
CALCIUM SERPL-MCNC: 8.4 MG/DL (ref 8.3–10.1)
CHLORIDE SERPL-SCNC: 105 MMOL/L (ref 100–108)
CO2 SERPL-SCNC: 29 MMOL/L (ref 21–32)
CREAT SERPL-MCNC: 0.58 MG/DL (ref 0.6–1.3)
GFR SERPL CREATININE-BSD FRML MDRD: 105 ML/MIN/1.73SQ M
GLUCOSE SERPL-MCNC: 168 MG/DL (ref 65–140)
POTASSIUM SERPL-SCNC: 4 MMOL/L (ref 3.5–5.3)
SODIUM SERPL-SCNC: 138 MMOL/L (ref 136–145)

## 2020-02-10 PROCEDURE — NS001 PR NO SIGNATURE OR ATTESTATION: Performed by: SURGERY

## 2020-02-10 PROCEDURE — 97116 GAIT TRAINING THERAPY: CPT

## 2020-02-10 PROCEDURE — 97530 THERAPEUTIC ACTIVITIES: CPT

## 2020-02-10 PROCEDURE — 97535 SELF CARE MNGMENT TRAINING: CPT

## 2020-02-10 PROCEDURE — 80048 BASIC METABOLIC PNL TOTAL CA: CPT | Performed by: SURGERY

## 2020-02-10 RX ORDER — PANTOPRAZOLE SODIUM 40 MG/1
40 TABLET, DELAYED RELEASE ORAL
Status: DISCONTINUED | OUTPATIENT
Start: 2020-02-10 | End: 2020-02-10

## 2020-02-10 RX ORDER — PANTOPRAZOLE SODIUM 40 MG/1
40 TABLET, DELAYED RELEASE ORAL
Status: DISCONTINUED | OUTPATIENT
Start: 2020-02-10 | End: 2020-02-11 | Stop reason: HOSPADM

## 2020-02-10 RX ORDER — METOPROLOL TARTRATE 50 MG/1
50 TABLET, FILM COATED ORAL EVERY 12 HOURS SCHEDULED
Status: DISCONTINUED | OUTPATIENT
Start: 2020-02-10 | End: 2020-02-11 | Stop reason: HOSPADM

## 2020-02-10 RX ADMIN — QUETIAPINE FUMARATE 600 MG: 300 TABLET ORAL at 20:49

## 2020-02-10 RX ADMIN — METOPROLOL TARTRATE 25 MG: 25 TABLET, FILM COATED ORAL at 10:15

## 2020-02-10 RX ADMIN — MIRTAZAPINE 15 MG: 15 TABLET, FILM COATED ORAL at 21:02

## 2020-02-10 RX ADMIN — OXYCODONE HYDROCHLORIDE 10 MG: 10 TABLET ORAL at 17:50

## 2020-02-10 RX ADMIN — METOCLOPRAMIDE 10 MG: 5 INJECTION, SOLUTION INTRAMUSCULAR; INTRAVENOUS at 05:56

## 2020-02-10 RX ADMIN — METOCLOPRAMIDE 10 MG: 5 INJECTION, SOLUTION INTRAMUSCULAR; INTRAVENOUS at 23:44

## 2020-02-10 RX ADMIN — HEPARIN SODIUM 5000 UNITS: 5000 INJECTION INTRAVENOUS; SUBCUTANEOUS at 05:56

## 2020-02-10 RX ADMIN — ATORVASTATIN CALCIUM 40 MG: 40 TABLET, FILM COATED ORAL at 17:50

## 2020-02-10 RX ADMIN — NICOTINE 1 PATCH: 14 PATCH TRANSDERMAL at 10:16

## 2020-02-10 RX ADMIN — OXYCODONE HYDROCHLORIDE 10 MG: 10 TABLET ORAL at 22:30

## 2020-02-10 RX ADMIN — FOLIC ACID 1 MG: 1 TABLET ORAL at 10:14

## 2020-02-10 RX ADMIN — FLUTICASONE PROPIONATE 2 SPRAY: 50 SPRAY, METERED NASAL at 10:17

## 2020-02-10 RX ADMIN — METOPROLOL TARTRATE 50 MG: 50 TABLET, FILM COATED ORAL at 20:53

## 2020-02-10 RX ADMIN — HEPARIN SODIUM 5000 UNITS: 5000 INJECTION INTRAVENOUS; SUBCUTANEOUS at 21:02

## 2020-02-10 RX ADMIN — GABAPENTIN 300 MG: 300 CAPSULE ORAL at 20:50

## 2020-02-10 RX ADMIN — PANTOPRAZOLE SODIUM 40 MG: 40 TABLET, DELAYED RELEASE ORAL at 11:02

## 2020-02-10 RX ADMIN — HEPARIN SODIUM 5000 UNITS: 5000 INJECTION INTRAVENOUS; SUBCUTANEOUS at 14:16

## 2020-02-10 RX ADMIN — PIPERACILLIN SODIUM AND TAZOBACTAM SODIUM 4.5 G: 36; 4.5 INJECTION, POWDER, FOR SOLUTION INTRAVENOUS at 00:16

## 2020-02-10 RX ADMIN — QUETIAPINE FUMARATE 200 MG: 100 TABLET ORAL at 10:14

## 2020-02-10 RX ADMIN — LABETALOL 20 MG/4 ML (5 MG/ML) INTRAVENOUS SYRINGE 10 MG: at 12:24

## 2020-02-10 RX ADMIN — METOCLOPRAMIDE 10 MG: 5 INJECTION, SOLUTION INTRAMUSCULAR; INTRAVENOUS at 17:50

## 2020-02-10 RX ADMIN — THIAMINE HCL TAB 100 MG 100 MG: 100 TAB at 10:15

## 2020-02-10 RX ADMIN — MELATONIN 1000 UNITS: at 10:15

## 2020-02-10 RX ADMIN — PANTOPRAZOLE SODIUM 40 MG: 40 TABLET, DELAYED RELEASE ORAL at 17:50

## 2020-02-10 RX ADMIN — OXYCODONE HYDROCHLORIDE 10 MG: 10 TABLET ORAL at 10:15

## 2020-02-10 RX ADMIN — CHLORHEXIDINE GLUCONATE 0.12% ORAL RINSE 15 ML: 1.2 LIQUID ORAL at 20:49

## 2020-02-10 RX ADMIN — GABAPENTIN 300 MG: 300 CAPSULE ORAL at 17:50

## 2020-02-10 RX ADMIN — LORATADINE 10 MG: 10 TABLET ORAL at 10:15

## 2020-02-10 RX ADMIN — GABAPENTIN 300 MG: 300 CAPSULE ORAL at 10:15

## 2020-02-10 RX ADMIN — CHLORHEXIDINE GLUCONATE 0.12% ORAL RINSE 15 ML: 1.2 LIQUID ORAL at 10:14

## 2020-02-10 RX ADMIN — PIPERACILLIN SODIUM AND TAZOBACTAM SODIUM 4.5 G: 36; 4.5 INJECTION, POWDER, FOR SOLUTION INTRAVENOUS at 05:56

## 2020-02-10 RX ADMIN — FLUTICASONE FUROATE AND VILANTEROL TRIFENATATE 1 PUFF: 100; 25 POWDER RESPIRATORY (INHALATION) at 10:18

## 2020-02-10 RX ADMIN — METOCLOPRAMIDE 10 MG: 5 INJECTION, SOLUTION INTRAMUSCULAR; INTRAVENOUS at 11:02

## 2020-02-10 RX ADMIN — DULOXETINE HYDROCHLORIDE 90 MG: 60 CAPSULE, DELAYED RELEASE ORAL at 10:15

## 2020-02-10 RX ADMIN — METOCLOPRAMIDE 10 MG: 5 INJECTION, SOLUTION INTRAMUSCULAR; INTRAVENOUS at 00:16

## 2020-02-10 NOTE — SOCIAL WORK
Patient reviewed  Patient tentatively ready to discharge tomorrow to rehab  CM spoke with patient's son  Patient's son wants patient closest to home  Referrals sent to facilities near patients home  CM will continue to follow

## 2020-02-10 NOTE — PLAN OF CARE
Problem: PHYSICAL THERAPY ADULT  Goal: Performs mobility at highest level of function for planned discharge setting  See evaluation for individualized goals  Description  Treatment/Interventions: Functional transfer training, LE strengthening/ROM, Therapeutic exercise, Endurance training, Patient/family training, Bed mobility, Gait training  Equipment Recommended: Francisco Houston       See flowsheet documentation for full assessment, interventions and recommendations  Outcome: Progressing  Note:   Prognosis: Good  Problem List: Decreased strength, Decreased endurance, Impaired balance, Decreased mobility, Decreased cognition, Decreased safety awareness  Assessment: Pt continues to be limited by decreased standing & ambulatory tolerance at this time  Required increased time to perform all tasks & seated rests in between to recover  Pt able to perform tasks with decreased assist overall & ambulate household distances, but refused further ambulation  Stands & ambualtes with forward flexed posture, but was unable to correct with instructions throughout  Upon return to room, pt educated on importance of increased activity, changing positions, and participation in therapy to maximize endurance and functional mobilty before returning home  Pt verbalized understanding, but will continue to require instructions and practice to address noted deficits  Barriers to Discharge: Inaccessible home environment     Recommendation: Post acute IP rehab     PT - OK to Discharge: Yes    See flowsheet documentation for full assessment

## 2020-02-10 NOTE — PHYSICAL THERAPY NOTE
Physical Therapy Progress Note     02/10/20 1410   Pain Assessment   Pain Assessment No/denies pain   Restrictions/Precautions   Other Precautions Cognitive; Chair Alarm;Pain; Fall Risk;Multiple lines  (tube feed, urinary catheter)   Subjective   Subjective Pt encountered supine in bed, initially reporting upset stomach, but no changes with mobility  Continues to require encouragement and edcuation to participate in all tasks  Bed Mobility   Supine to Sit 4  Minimal assistance   Additional items Assist x 1;HOB elevated; Increased time required;Verbal cues  (HHA)   Transfers   Sit to Stand 4  Minimal assistance   Additional items Assist x 1; Increased time required;Verbal cues;Armrests   Stand to Sit 4  Minimal assistance   Additional items Assist x 1; Armrests; Increased time required;Verbal cues   Stand pivot 4  Minimal assistance   Additional items Assist x 1; Armrests; Increased time required   Ambulation/Elevation   Gait pattern Excessively slow; Short stride; Foward flexed;Decreased foot clearance; Improper Weight shift; Poor UE support   Gait Assistance 4  Minimal assist   Additional items Assist x 1  (+ chair follow)   Assistive Device Rolling walker   Distance 3', 80'   Balance   Static Sitting Fair   Static Standing Poor +   Ambulatory Poor +   Endurance Deficit   Endurance Deficit Yes   Endurance Deficit Description fatigue   Activity Tolerance   Activity Tolerance Patient tolerated treatment well;Patient limited by fatigue   Nurse Made Nahed Saunders RN   Assessment   Prognosis Good   Problem List Decreased strength;Decreased endurance; Impaired balance;Decreased mobility; Decreased cognition;Decreased safety awareness   Assessment Pt continues to be limited by decreased standing & ambulatory tolerance at this time  Required increased time to perform all tasks & seated rests in between to recover  Pt able to perform tasks with decreased assist overall & ambulate household distances, but refused further ambulation  Stands & ambualtes with forward flexed posture, but was unable to correct with instructions throughout  Upon return to room, pt educated on importance of increased activity, changing positions, and participation in therapy to maximize endurance and functional mobilty before returning home  Pt verbalized understanding, but will continue to require instructions and practice to address noted deficits  Barriers to Discharge Inaccessible home environment   Goals   Patient Goals to go back to bed   STG Expiration Date 02/15/20   PT Treatment Day 7   Plan   Treatment/Interventions Functional transfer training;LE strengthening/ROM; Therapeutic exercise; Endurance training;Patient/family training;Equipment eval/education; Bed mobility;Gait training   Progress Slow progress, decreased activity tolerance   PT Frequency   (3-5x/week)   Recommendation   Recommendation Post acute IP rehab   Equipment Recommended Jayshree Block, PTA

## 2020-02-10 NOTE — QUICK NOTE
Nurse-Patient-Provider rounds were completed with the patient's nurse today, Paul Ballard  We discussed the plan is to   - continue tube feeds  - continue drain  - ostomy care  - increase metoprolol for persistent hypertension    We reviewed all of the invasive devices/lines/telemetry orders  - drain    DVT prophylaxis:  - heparin    Diet:  - tube feeds  - post gastrectomy    Pain Assessment / Plan:  - Continue current pain management regimen    Mobility Assessment / Plan:  - Activity as tolerated  Goals / Barriers for discharge:  - rehab planning    Case management following; case and discharge needs discussed  All questions and concerns were addressed        Kia Iverson PA-C

## 2020-02-10 NOTE — OCCUPATIONAL THERAPY NOTE
Occupational Therapy Treatment Note:     02/10/20 1426   Restrictions/Precautions   Weight Bearing Precautions Per Order No   Other Precautions Cognitive; Chair Alarm;Multiple lines; Fall Risk;Pain  (tube feed, urinary catheter)   Pain Assessment   Pain Assessment FLACC   Pain Location Abdomen   Pain Orientation Left   Pain Rating: FLACC (Rest) - Face 0   Pain Rating: FLACC (Rest) - Legs 0   Pain Rating: FLACC (Rest) - Activity 0   Pain Rating: FLACC (Rest) - Cry 0   Pain Rating: FLACC (Rest) - Consolability 0   Score: FLACC (Rest) 0   Pain Rating: FLACC (Activity) - Face 1   Pain Rating: FLACC (Activity) - Legs 0   Pain Rating: FLACC (Activity) - Activity 0   Pain Rating: FLACC (Activity) - Cry 1   Pain Rating: FLACC (Activity) - Consolability 0   Score: FLACC (Activity) 2   ADL   Where Assessed Chair   Grooming Assistance 5  Supervision/Setup  (comb hair/beard)   Grooming Deficit Setup; Increased time to complete   UB Bathing Assistance 4  Minimal Assistance   UB Bathing Deficit Verbal cueing; Increased time to complete   LB Bathing Assistance 3  Moderate Assistance   LB Bathing Deficit Verbal cueing; Increased time to complete; Buttocks   UB Dressing Assistance 4  Minimal Assistance   UB Dressing Deficit Verbal cueing; Increased time to complete   LB Dressing Assistance 3  Moderate Assistance   LB Dressing Deficit Increased time to complete;Verbal cueing;Pull up over hips   LB Dressing Comments f- balance c rw in stance   Toileting Assistance  3  Moderate Assistance   Toileting Comments clothing management   Bed Mobility   Supine to Sit 4  Minimal assistance   Additional items Assist x 1;HOB elevated; Increased time required;Verbal cues   Additional Comments pt started session supine in bed, ended in recliner   Transfers   Sit to Stand 4  Minimal assistance   Additional items Assist x 1; Armrests; Increased time required;Verbal cues   Stand to Sit 4  Minimal assistance   Additional items Assist x 1; Armrests; Increased time required;Verbal cues   Stand pivot 4  Minimal assistance   Additional items Assist x 1; Armrests; Increased time required;Verbal cues   Functional Mobility   Functional Mobility 4  Minimal assistance   Additional Comments short distance   Additional items Rolling walker   Cognition   Overall Cognitive Status Impaired   Arousal/Participation Alert; Cooperative   Attention Attends with cues to redirect   Following Commands Follows multistep commands with increased time or repetition   Comments during session pt progressed from limited responses to questions to initiating conversation   Activity Tolerance   Activity Tolerance Patient tolerated treatment well   Assessment   Assessment pt was seen for pm ot session focusing on self care and transfers  required overall min a for ub adls, mod a for lb adls  f- balance c rw in stance during lb dressing  overall min a x1 for transfers  during session pt progressed from limited responses to questions to initiating conversation  Plan   Treatment Interventions ADL retraining;Functional transfer training;UE strengthening/ROM; Endurance training;Cognitive reorientation;Patient/family training;Equipment evaluation/education; Fine motor coordination activities; Compensatory technique education; Energy conservation; Activityengagement   Goal Expiration Date 02/11/20   OT Treatment Day 5   OT Frequency 3-5x/wk   Recommendation   OT Discharge Recommendation Short Term Rehab   OT - OK to Discharge Yes   Karen Quesada

## 2020-02-10 NOTE — PLAN OF CARE
Problem: OCCUPATIONAL THERAPY ADULT  Goal: Performs self-care activities at highest level of function for planned discharge setting  See evaluation for individualized goals  Description  Treatment Interventions: ADL retraining, Functional transfer training, UE strengthening/ROM, Endurance training, Cognitive reorientation, Patient/family training, Equipment evaluation/education, Fine motor coordination activities, Compensatory technique education, Activityengagement, Energy conservation          See flowsheet documentation for full assessment, interventions and recommendations  Outcome: Progressing  Note:   Limitation: Decreased ADL status, Decreased Safe judgement during ADL, Decreased cognition, Decreased endurance, Decreased self-care trans, Decreased high-level ADLs  Prognosis: Fair  Assessment: pt was seen for pm ot session focusing on self care and transfers  required overall min a for ub adls, mod a for lb adls  f- balance c rw in stance during lb dressing  overall min a x1 for transfers  during session pt progressed from limited responses to questions to initiating conversation       OT Discharge Recommendation: Short Term Rehab  OT - OK to Discharge: Yes  Karen Quesada

## 2020-02-10 NOTE — PROGRESS NOTES
Progress Note - General Surgery   Karsten Rodrigues 81 y o  male MRN: [id number]  Unit/Bed#: [facility] 607-01 Encounter: [id number]    Assessment:  72-year-old male with history of perforated duodenal ulcer with bleeding and failed attempt at endoscopic control and IR embolization who subsequently underwent exploratory laparotomy, over-sewing of bleeding vessels, Thal patch repair with transverse colon, open gastrojejunostomy tube placement on 01/11/2020  Intra-abdominal abscess status post percutaneous drainage  Enterocutaneous fistula    Plan:  Post gastrectomy diet as tolerated  Hold tube feeds today and monitor p o  Intake  Maintain IR drainage  Continue Reglan  Discontinue antibiotics - has completed 14 day course  Maintain CAMPBELL drain  DVT prophylaxis  Disposition planning    Subjective/Objective   Chief Complaint:     Subjective:  No events overnight  Tolerating oral diet  Having bowel function    Objective:     Blood pressure 162/81, pulse 79, temperature 98 2 °F (36 8 °C), resp  rate 18, height 5' 8 5" (1 74 m), weight 72 3 kg (159 lb 6 3 oz), SpO2 90 %  ,Body mass index is 23 88 kg/m²  Intake/Output Summary (Last 24 hours) at 2/10/2020 0601  Last data filed at 2/9/2020 2300  Gross per 24 hour   Intake 2522 ml   Output 3580 ml   Net -1058 ml       Invasive Devices     Peripherally Inserted Central Catheter Line            PICC Line 42/16/39 Right Basilic 24 days          Drain            Gastrostomy/Enterostomy Gastrostomy-jejunostomy 22 Fr  LLQ 29 days    Closed/Suction Drain Left Abdomen Bulb 13 days    Open Drain Midline Abdomen 9 days    External Urinary Catheter 7 days                Physical Exam:  No acute distress  Regular rate and rhythm  Nonlabored respirations on room  Abdomen soft, nontender, nondistended  Appliance over fistula with scant drainage    CAMPBELL drain with purulent output    Lab, Imaging and other studies:CBC: No results found for: WBC, HGB, HCT, MCV, PLT, ADJUSTEDWBC, MCH, MCHC, RDW, MPV, NRBC, CMP: No results found for: SODIUM, K, CL, CO2, ANIONGAP, BUN, CREATININE, GLUCOSE, CALCIUM, AST, ALT, ALKPHOS, PROT, BILITOT, EGFR, Coagulation: No results found for: PT, INR, APTT, Urinalysis: No results found for: COLORU, CLARITYU, SPECGRAV, PHUR, LEUKOCYTESUR, NITRITE, PROTEINUA, GLUCOSEU, KETONESU, BILIRUBINUR, BLOODU, Amylase: No results found for: AMYLASE, Lipase: No results found for: LIPASE  VTE Pharmacologic Prophylaxis: Sequential compression device (Venodyne)   VTE Mechanical Prophylaxis: sequential compression device

## 2020-02-11 VITALS
WEIGHT: 159.39 LBS | OXYGEN SATURATION: 96 % | HEIGHT: 69 IN | DIASTOLIC BLOOD PRESSURE: 93 MMHG | HEART RATE: 86 BPM | RESPIRATION RATE: 16 BRPM | TEMPERATURE: 99 F | BODY MASS INDEX: 23.61 KG/M2 | SYSTOLIC BLOOD PRESSURE: 148 MMHG

## 2020-02-11 PROCEDURE — 99024 POSTOP FOLLOW-UP VISIT: CPT | Performed by: SURGERY

## 2020-02-11 RX ORDER — GABAPENTIN 300 MG/1
300 CAPSULE ORAL 3 TIMES DAILY
Refills: 0
Start: 2020-02-11 | End: 2021-03-08

## 2020-02-11 RX ORDER — METOPROLOL TARTRATE 50 MG/1
50 TABLET, FILM COATED ORAL EVERY 12 HOURS SCHEDULED
Refills: 0
Start: 2020-02-11 | End: 2020-06-22 | Stop reason: SDUPTHER

## 2020-02-11 RX ORDER — PANTOPRAZOLE SODIUM 40 MG/1
40 TABLET, DELAYED RELEASE ORAL
Refills: 0
Start: 2020-02-11 | End: 2020-08-14 | Stop reason: SDUPTHER

## 2020-02-11 RX ORDER — OXYCODONE HYDROCHLORIDE 10 MG/1
10 TABLET ORAL EVERY 4 HOURS PRN
Qty: 30 TABLET | Refills: 0 | Status: SHIPPED | OUTPATIENT
Start: 2020-02-11 | End: 2020-02-21

## 2020-02-11 RX ORDER — HEPARIN SODIUM 5000 [USP'U]/ML
5000 INJECTION, SOLUTION INTRAVENOUS; SUBCUTANEOUS EVERY 8 HOURS SCHEDULED
Qty: 1 ML | Refills: 0
Start: 2020-02-11 | End: 2020-11-03

## 2020-02-11 RX ADMIN — ATORVASTATIN CALCIUM 40 MG: 40 TABLET, FILM COATED ORAL at 16:47

## 2020-02-11 RX ADMIN — METOPROLOL TARTRATE 50 MG: 50 TABLET, FILM COATED ORAL at 09:09

## 2020-02-11 RX ADMIN — FOLIC ACID 1 MG: 1 TABLET ORAL at 09:12

## 2020-02-11 RX ADMIN — GABAPENTIN 300 MG: 300 CAPSULE ORAL at 09:10

## 2020-02-11 RX ADMIN — METOCLOPRAMIDE 10 MG: 5 INJECTION, SOLUTION INTRAMUSCULAR; INTRAVENOUS at 11:26

## 2020-02-11 RX ADMIN — FLUTICASONE FUROATE AND VILANTEROL TRIFENATATE 1 PUFF: 100; 25 POWDER RESPIRATORY (INHALATION) at 09:12

## 2020-02-11 RX ADMIN — PANTOPRAZOLE SODIUM 40 MG: 40 TABLET, DELAYED RELEASE ORAL at 05:35

## 2020-02-11 RX ADMIN — METOCLOPRAMIDE 10 MG: 5 INJECTION, SOLUTION INTRAMUSCULAR; INTRAVENOUS at 05:34

## 2020-02-11 RX ADMIN — OXYCODONE HYDROCHLORIDE 10 MG: 10 TABLET ORAL at 16:47

## 2020-02-11 RX ADMIN — THIAMINE HCL TAB 100 MG 100 MG: 100 TAB at 09:10

## 2020-02-11 RX ADMIN — DULOXETINE HYDROCHLORIDE 90 MG: 60 CAPSULE, DELAYED RELEASE ORAL at 09:11

## 2020-02-11 RX ADMIN — OXYCODONE HYDROCHLORIDE 10 MG: 10 TABLET ORAL at 05:34

## 2020-02-11 RX ADMIN — MELATONIN 1000 UNITS: at 09:12

## 2020-02-11 RX ADMIN — LORATADINE 10 MG: 10 TABLET ORAL at 09:10

## 2020-02-11 RX ADMIN — FLUTICASONE PROPIONATE 2 SPRAY: 50 SPRAY, METERED NASAL at 09:12

## 2020-02-11 RX ADMIN — CHLORHEXIDINE GLUCONATE 0.12% ORAL RINSE 15 ML: 1.2 LIQUID ORAL at 09:11

## 2020-02-11 RX ADMIN — HEPARIN SODIUM 5000 UNITS: 5000 INJECTION INTRAVENOUS; SUBCUTANEOUS at 16:47

## 2020-02-11 RX ADMIN — QUETIAPINE FUMARATE 200 MG: 100 TABLET ORAL at 09:09

## 2020-02-11 RX ADMIN — HEPARIN SODIUM 5000 UNITS: 5000 INJECTION INTRAVENOUS; SUBCUTANEOUS at 05:34

## 2020-02-11 RX ADMIN — NICOTINE 1 PATCH: 14 PATCH TRANSDERMAL at 09:12

## 2020-02-11 RX ADMIN — PANTOPRAZOLE SODIUM 40 MG: 40 TABLET, DELAYED RELEASE ORAL at 16:47

## 2020-02-11 RX ADMIN — GABAPENTIN 300 MG: 300 CAPSULE ORAL at 16:47

## 2020-02-11 NOTE — DISCHARGE INSTRUCTIONS
Surgery Team Instruction:  - Continue post gastrectomy diet with nightly tube feeds (Jevity 1 2 goal 70 ml/hr) from 7 pm to 7 am  - Please keep dressings clean dry and intact and may change wound manager as needed  - will give 2 wound managers for discharge; please call the surgery office with any questions or concerns  - patient should be re-evaluated within 1 week of discharge for CAMPBELL drain output as well as wound manager output  - will reassess wound in clinic   - Please call to follow up in our clinic in 1 week  - Call office or return to ER if fever greater than 101, chills, persistent nausea/vomiting, worsening/uncontrollable pain, and/or increasing redness or purulent drainage from incision   - please continue CAMPBELL drain at this time; May flush every 8 hours with 10 mL normal saline    DIET:  Question Answer Comment   Type: Enteral/Parenteral    Enteral/Parenteral Tube Feeding with Oral Diet    Formula Jevity 1 2 [place]    Bolus/Cyclic/Continuous Cyclic    Tube feeding cyc rat 70    Tube Feeding Cyclic: Administer over: 12 hours    water flush 140    flush frequency Every 4 hours    Type: Surgical    Surgical Post Gastrectomy    RD to adjust diet per protocol? Yes               Gastroenterology Instruction:   - please follow-up as an outpatient with the Gastroenterology team  - numbers provided in the chart  - they require outpatient follow-up to discuss ongoing management    [address]  after your procedure:    Resume your normal diet  Small sips of flat soda will help with nausea  1  The properly functioning catheter should be forward flushed once (1x) daily with 10ml of normal saline using clean technique  You will be given a prescription for flushes  To flush the tube, clean both connections with alcohol swab  Twist off the drainage bag/ bulb  tubing and twist the saline syringe into the drainage tube and flush   Remove the syringe and twist the drainage bag / bulb tubing tubing back on     2  The drainage bag/bulb may be emptied as necessary  Keep a record of the amount of fluid you drain from your tube  This should be done with clean technique as well  3  A fresh dressing should be applied daily over the tube insertion site  4  As the tube is secured to the skin with only a suture,try not to pull on your tube  Tub baths are not permitted  Showers are permitted if the patient's skin entry site is prevented from getting wet  Similarly, washcloth "baths" are acceptable  Contact Interventional Radiology at [phone] [place] PATIENTS: Contact Interventional Radiology at [phone]) Jose Antonio Olivas PATIENTS: Contact Interventional Radiology at [phone]) if:    1  Leakage or large amounts of liquid around the catheter  2  Fever of 101 degrees lasting several hours without other obvious cause (such as sore throat, flu, etc)  3  Persistent nausea or vomiting  4  Diminished drainage, which may be associated with pressure or pain  Or when the     drainage from your tube is less than 10mls for 48 hours  5  Catheter pulled back or falls out  The following pharmacies carry the flush syringes  [facility]  [address] [facility]                         [address] [facility]  Phone [phone]            Phone [phone] Suzette Sanches [address]             [facility] [address] [phone]  [address]                      [address] [facility]  Phone [phone]            Phone [phone]                      Miriam Schwartz [address] [facility],Fourth [address]    [address]  Phone [phone]

## 2020-02-11 NOTE — DISCHARGE SUMMARY
Discharge Summary - General Surgery   Krish Marcano 81 y o  male MRN: [id number]  [facility] Encounter: [id number]    Admission Date: 1/8/2020     Discharge Date: 2/11/2020    Admitting Diagnosis: Suicidal ideation [R45 851]  Dizziness [R42]  Alcohol abuse [F10 10]  Syncope [R55]  Chest pain [R07 9]    Discharge Diagnosis: Perforated duodenal ulcer complicated by midline surgical site infection    Attending and Service: Dr Kirby Rodriguez  Consulting Team(s): Interventional radiology, gastroenterology, psychiatry, neurology, podiatry    Imaging and Procedures Performed:   Orders Placed This Encounter   Procedures   [facility]     1/11 EGD, duodenal ulcer clipping, injection, hemospray  1/11 GDA embolization - IR  1/11 Exploratory laparotomy, closure of tissue over 4cm 1st portion duodenal perforation, Thal patch with colon, GJ placement - Dr Villa Im  1/27 Lesser sack drainage with drain placement - IR      Pathology: None    Secondary to complexity of patient hospital course and prolonged hospital stay; recommend close follow-up and thorough chart review of patient hospital course prior to re-evaluating patient as an outpatient  Hospital Course: Krish Marcano is a 61-year-old male with PMH of HTN, HLD, stroke, COPD, alcohol and tobacco abuse who initially presented after syncope episode and suicidal ideation  He was evaluated by psychiatry and during course of admission and was determined to have resolution of suicidal ideation  GI was initially consulted and an EGD was performed on 1/11/2020 during which the patient was found to have a duodenal perforation and an upper gastrointestinal bleed and IR was consulted for embolization of the gastroduodenal artery  General surgery was consulted and risks and benefits were discussed with the patient's POA and it was agreed to proceed with exploratory laparotomy with GJ tube placement   Patient was taken to the OR on 1/11/20 for  exploratory laparotomy with GJ tube placement was taken to PACU in stable condition and then transferred to the ICU postoperatively  Patient in the ICU did well he was closely monitored and his condition slowly improved to stable for discharge to the floor  Patient was also noted to have on 01/27/2020 s/p IR drainage of lesser sac abscess with delayed gastric emptying  He would then go on on 01/30/2020 to have an IR drain check  Drain was placed to continue to monitor output of abscess  His endpoints remained stable and the patient progressed with PT and OT to be recommended to rehab  He continued with a wound manager status post EC fistula formation  Wound care was provided for patient  Clinical condition improved  On discharge, the patient is instructed to follow-up with the patient's primary care provider within the next 2 weeks to review the events of the recent hospitalization  The patient is instructed to follow-up with the [facility] Surgical Services as scheduled on 02/17/2020 at 8:30 a m    The patient is instructed to follow the provided discharge instructions  On discharge, the patient's hospital course was reviewed with the primary care provider as well as incidental findings were reviewed with both the primary care provider in [address] [facility]  Patient instructions were thoroughly reviewed  He was continue on his current medication regimen that was given in the hospital     Condition at Discharge: fair     Discharge instructions/Information to patient and family:   See after visit summary for information provided to patient and family  Provisions for Follow-Up Care:  See after visit summary for information related to follow-up care and any pertinent home health orders  Disposition: [facility]    Planned Readmission: Yes pending outpatient management    Discharge Statement   I spent 55 minutes discharging the patient  This time was spent on the day of discharge   I had direct contact with the patient on the day of discharge  Additional documentation is required if more than 30 minutes were spent on discharge  Discharge Medications:  See after visit summary for reconciled discharge medications provided to patient and family      Patrick Heller MD  2/11/2020  1:47 PM

## 2020-02-11 NOTE — INCIDENTAL FINDINGS
The following findings require follow up:  Radiographic finding   Findin  One or more subcentimeter sharply circumscribed low-density hepatic lesion(s) are noted, too small to accurately characterize, but statistically most likely to represent subcentimeter hepatic cysts  2  Bilateral adrenal adenomas suspected  3  One or more simple renal cyst(s) is noted  4  Small fat-containing umbilical hernia  5  Emphysematous changes  Left upper lobe peribronchial thickening could relate to small airways disease  Partially visualized left lung consolidation/atelectatic changes  Nonemergent outpatient follow-up with unenhanced chest CT   recommended  Follow up required: Yes   Follow up should be done within 2-4 week(s)    Please notify the following clinician to assist with the follow up:   Primary Care Provider  This was discussed with caregiver for patient due to concerns over patient retaining all information listed above  Spoke with Marcia Rodriguez who is the patient's son  Also spoke with the primary care provider regarding all incidental findings listed above

## 2020-02-11 NOTE — QUICK NOTE
Nurse-Patient-Provider rounds were completed with the patient's nurse today, Grace Giron  We discussed the plan is to:  - reassess dressings at bedside  - monitor CAMPBELL drain output  - follow-up p o  Intake with calorie count  - continue to trend blood pressure in the setting of increasing metoprolol yesterday    We reviewed all of the invasive devices/lines/telemetry orders   - PICC line  DVT Prophylaxis:  Subcutaneous heparin    Pain Assessment / Plan:  - Continue current analgesic regimen  Mobility Assessment / Plan:  - Activity as tolerated  Goals / Barriers for discharge:  Discussed with case management regarding potential DC today on 02/11/2020  They will discuss with the son of the patient regarding a few selections of facilities  Will continue to monitor patient p o  Intake     Case management following; case and discharge needs discussed  All questions and concerns were addressed  I spent greater than 19 minutes reviewing the plan with the patient and the nurse, and coordinating care for the day      Dawn Walker PA-C  2/11/2020

## 2020-02-11 NOTE — SOCIAL WORK
Patient reviewed during care coordination rounds  Patient ready to discharge to rehab  Patient has [facility] interested  CM will wait to hear back from [facility] as to which facility  CM spoke with patient's son Caitlyn Yeung advising the same  CM discussed transportation, either stretcher vs Wheel chair van  Explained Wheel chair vans do not go through [employer] and it would be a billed out of pocket cost  Patient's son Caitlyn Yeung understands and is agreeable  CM will continue to follow  Update:    Patient has been accepted to [facility]  Transportation [phone] has been arranged for 4:30 pm with [address] [facility] wheel chair van  Patient's son, patient, RN at bedside, provider made aware of discharge arrangements  Chart copy requested  Facility contacts entered in [place]  Left VM message for patient's son with facility and pickup time

## 2020-02-11 NOTE — NUTRITION
02/11/20 1210   Recommendations/Interventions   Summary Patient's appetite remains fair on current diet  Patient is agreeable to Ensure enlive supplements  Current cyclic EN providing approximately 40% nutritional needs, remainder of nutrition to be met with po intake and supplements  Generalized non-pitting edema  Nursing skin care plan reviewed  Interventions EN continue as ordered; Supplement initiate  (Ensure enlive (vanilla) TID ordered  )   Nutrition Recommendations Continue EN/PN as ordered;Lab - consider order (specify)  (Monitor electrolytes and weight  )

## 2020-02-12 ENCOUNTER — TRANSITIONAL CARE MANAGEMENT (OUTPATIENT)
Dept: FAMILY MEDICINE CLINIC | Facility: CLINIC | Age: 70
End: 2020-02-12

## 2020-02-12 DIAGNOSIS — Z71.89 COMPLEX CARE COORDINATION: Primary | ICD-10-CM

## 2020-02-13 ENCOUNTER — NURSING HOME VISIT (OUTPATIENT)
Dept: GERIATRICS | Facility: OTHER | Age: 70
End: 2020-02-13
Payer: MEDICARE

## 2020-02-13 DIAGNOSIS — M54.89 BACK PAIN WITHOUT SCIATICA: ICD-10-CM

## 2020-02-13 DIAGNOSIS — K26.9 DUODENAL ULCER: ICD-10-CM

## 2020-02-13 DIAGNOSIS — Z72.0 TOBACCO ABUSE: ICD-10-CM

## 2020-02-13 DIAGNOSIS — D64.9 ANEMIA, UNSPECIFIED TYPE: ICD-10-CM

## 2020-02-13 DIAGNOSIS — R26.2 AMBULATORY DYSFUNCTION: Primary | ICD-10-CM

## 2020-02-13 DIAGNOSIS — J44.9 COPD WITHOUT EXACERBATION (HCC): ICD-10-CM

## 2020-02-13 DIAGNOSIS — I10 ESSENTIAL HYPERTENSION: ICD-10-CM

## 2020-02-13 PROCEDURE — 99306 1ST NF CARE HIGH MDM 50: CPT | Performed by: FAMILY MEDICINE

## 2020-02-13 NOTE — ASSESSMENT & PLAN NOTE
Multi factorial with recent prolonged and complicated hospitalization, with duodenal perforation and surgery, needs NH care, PT/OT was ordered to improve gait, strength,ADLS, transfer and endurance  DVT prophylaxis with Heparin

## 2020-02-13 NOTE — PROGRESS NOTES
[facility]  [address] [place] [address]  Facility: [address]    NAME: Gil Bridges  AGE: 81 y o  SEX: male    DATE OF ENCOUNTER: 2/13/2020    Code status:  CPR code status was D/W pt, pt wants to be full code    Assessment and Plan     Ambulatory dysfunction  Multi factorial with recent prolonged and complicated hospitalization, with duodenal perforation and surgery, needs NH care, PT/OT was ordered to improve gait, strength,ADLS, transfer and endurance  DVT prophylaxis with Heparin  Anemia  Will check CBC,Iron studies, and vitamin C93  on folic acid,     Duodenal ulcer  With perforation ,S/P surgery, on Pantoprazole, will have f/u with surgery on 2/18/2020  On Oxycodone received PRN once daily  Will monitor closely if frequent use of PRN will put him on scheduled oxycodone  Will add tylenol scheduled for now  Back pain without sciatica  On Oxycodone, will schedule Tylenol  Tobacco abuse  Per hospital discharge meds was on Nicotine patch 14mg w/o any decreasing dose, changed to be on 7 mg after one week of current dose  Major depressive disorder, recurrent, severe with psychotic features ([facility] [address] )  On Cymbalta, Gabapentin, Mirtazapine, Tazodone, and seroquel  Will continue with monitoring, will check labs tomorrow  Essential hypertension  On Metoprolol, will monitor closely  COPD without exacerbation ([facility] [address] )  No sign or symptoms of exacerbation, will monitor  All medications and routine orders were reviewed and updated as needed  Plan discussed with: Patient, and Nurses     Chief Complaint     Seen for admission at [address] [place] [address]    History of Present Illness   Pt with [address] [place] [address] and medical problem as this note who had a complicated and long hospital stay starting on 1/8/2020, now is at [facility] since 2/11/2020 for STR  Pt was initially admitted with suicidal ideation , and syncope  Was cleared by psych for suicidal ideation   While at the hospital GI was consulted and after having EGD was found to have duodenal perforation, and upper GI bleeding, had gastroduodenal artery embolization, and had exploratory laparotomy with GJ tube placement  Later on pt was noted to have lesser Sac abscess, so went under IR drain check  Pt now has tube feeding  Pt reports that he has back and abdominal pain and his current dose  oxycodone is not enough! Per staff pt also was refusing his heparin to be given at his abdomen and requesting to have it on his arm  Pt also requests to have jett catheter! Per therapy pt is not very motivated  HISTORY:  Past Medical History:   Diagnosis Date    BPH (benign prostatic hyperplasia)     CVA (cerebral vascular accident) ([facility] [address] )     Head injury     Hypertension     Metatarsal fracture     TIA (transient ischemic attack)      History reviewed  No pertinent family history    Social History     Socioeconomic History    Marital status: Single     Spouse name: None    Number of children: None    Years of education: None    Highest education level: None   Occupational History    None   Social Needs    Financial resource strain: None    Food insecurity:     Worry: None     Inability: None    Transportation needs:     Medical: None     Non-medical: None   Tobacco Use    Smoking status: Current Every Day Smoker     Packs/day: 1 00     Types: Cigarettes    Smokeless tobacco: Never Used    Tobacco comment: started age 14, 4 quit attempts   Substance and Sexual Activity    Alcohol use: Yes     Frequency: Monthly or less     Comment: half a handle of vodka daily as per family    Drug use: Yes     Types: Marijuana     Comment: history polysubstance use including IVDA on record- every now then will smoke weed     Sexual activity: None   Lifestyle    Physical activity:     Days per week: None     Minutes per session: None    Stress: None   Relationships    Social connections:     Talks on phone: None     Gets together: None     Attends Sabianism service: None     Active member of club or organization: None     Attends meetings of clubs or organizations: None     Relationship status: None    Intimate partner violence:     Fear of current or ex partner: None     Emotionally abused: None     Physically abused: None     Forced sexual activity: None   Other Topics Concern    None   Social History Narrative    Used to be  for about 27 yrs, unemployed since fell off roof onto concrete - pt does not recall what year    1 son with whom he lives,        Allergies:  No Known Allergies    Review of Systems     Review of Systems   Constitutional: Negative  HENT: Negative  Eyes: Negative  Respiratory: Negative  Cardiovascular: Negative  Gastrointestinal: Negative  Endocrine: Negative  Genitourinary: Negative  Musculoskeletal: Negative  Back pain and abdominal pain at the site of surgery  Skin: Negative  Allergic/Immunologic: Negative  Neurological: Negative  Hematological: Negative  Psychiatric/Behavioral: Negative  All other systems reviewed and are negative  Medications and orders     All medications reviewed and updated in Nursing Home EMR  Objective     Vitals:Wt:161 1Ibs   BP:129/81  AK:101   RR:19    Physical Exam   Constitutional: He is oriented to person, place, and time  He appears well-developed and well-nourished  No distress  HENT:   Head: Normocephalic and atraumatic  Right Ear: External ear normal    Left Ear: External ear normal    Nose: Nose normal    Mouth/Throat: Oropharynx is clear and moist  No oropharyngeal exudate  No teeth  Eyes: Pupils are equal, round, and reactive to light  Conjunctivae and EOM are normal  Right eye exhibits no discharge  Left eye exhibits no discharge  No scleral icterus  Neck: Normal range of motion  Neck supple  Cardiovascular: Normal rate, regular rhythm and normal heart sounds   Exam reveals no gallop and no friction rub  No murmur heard  Pulmonary/Chest: Effort normal and breath sounds normal  No stridor  No respiratory distress  He has no wheezes  He has no rales  He exhibits no tenderness  Abdominal: Soft  Bowel sounds are normal  He exhibits no distension and no mass  There is no tenderness  There is no rebound and no guarding  No hernia  Mid line incision line has staples on, top toward chest line is open with drainage,  TF in place  CAMPBELL tubr in place  Genitourinary:   Genitourinary Comments: deferred   Musculoskeletal: He exhibits edema (trace if any edema)  He exhibits no tenderness or deformity  In bed with limited ROM   Lymphadenopathy:     He has no cervical adenopathy  Neurological: He is alert and oriented to person, place, and time  No cranial nerve deficit  Forgetful, slight L facial droop  Skin: Skin is warm and dry  No rash noted  He is not diaphoretic  No erythema  No pallor  Didn't examine sacral area, R knee and left medial ankle old incision line  Psychiatric: He has a normal mood and affect  His behavior is normal    Poor eye contact   Nursing note and vitals reviewed  Pertinent Laboratory/Diagnostic Studies: The following labs/studies were reviewed please see chart or hospital paperwork for details  CT ABDOMEN AND PELVIS WITH IV CONTRAST     INDICATION:   Nausea/vomiting  History of recent duodenal perforation repair and percutaneous abscess drainage      COMPARISON:  CT abdomen pelvis 1/26/2020; CTA abdomen pelvis 1/11/2020     TECHNIQUE:  CT examination of the abdomen and pelvis was performed  Axial, sagittal, and coronal 2D reformatted images were created from the source data and submitted for interpretation      Radiation dose length product (DLP) for this visit:  413 16 mGy-cm     This examination, like all CT scans performed in the [facility], was performed utilizing techniques to minimize radiation dose exposure, including the use of iterative   reconstruction and automated exposure control      IV Contrast:  90 mL of iohexol (OMNIPAQUE)  Enteric Contrast:  Enteric contrast was administered      FINDINGS:     ABDOMEN     LOWER CHEST:  Stable enhancing left basilar atelectasis with trace pleural effusion  Heart is enlarged with no significant pericardial effusion identified  Surgical changes of the anterior lower chest/ventral abdominal wall identified concerning for   wound dehiscence  Please correlate with clinical scenario      LIVER/BILIARY TREE:  One or more subcentimeter sharply circumscribed low-density hepatic lesion(s) are noted, too small to accurately characterize, but statistically most likely to represent subcentimeter hepatic cysts  No suspicious solid hepatic   lesion is identified  Hepatic contours are normal   Mild intrahepatic biliary duct dilatation stable which may be sequela of cholecystectomy      GALLBLADDER:  Gallbladder is surgically absent      SPLEEN:  Unremarkable      PANCREAS:  Embolization coils at the level of the gastroduodenal artery  No discrete pancreatic mass nor dilatation of the main duct      ADRENAL GLANDS:  Bilateral adrenal adenomas suspected      KIDNEYS/URETERS:  One or more simple renal cyst(s) is noted  Otherwise unremarkable kidneys  No hydronephrosis      STOMACH AND BOWEL:  Left upper quadrant gastrojejunostomy tube again noted, its tip in the transverse portion the duodenum  No evidence for gastric distention or small bowel dilatation  Asymmetric wall thickening of the mid transverse colon causing   extrinsic compression upon the lumen of the colon  A tiny enhancing air-fluid collection within the wall of the colon measures 19 x 11 mm (601/24) concerning for developing intramural abscess, likely sequela of fistulous communication with the lesser   sac collection    Close surveillance recommended      APPENDIX:  No findings to suggest appendicitis      ABDOMINOPELVIC CAVITY:  A percutaneous drainage catheter is now seen within the lesser sac collection which has diminished in size measuring 4 5 x 2 9 cm previously 6 6 x 4 3 cm  However, the collection is inseparable from focal thickening of the   adjacent transverse colon resulting in suspected secondary evolving intramural abscess  Close surveillance recommended  Minimal ascites in the lower abdomen and pelvis noted      VESSELS:  Atherosclerotic changes are present  No evidence of aneurysm  IVC filter noted      PELVIS     REPRODUCTIVE ORGANS:  Unremarkable for patient's age      URINARY BLADDER:  Unremarkable      ABDOMINAL WALL/INGUINAL REGIONS:  Again, suspected dehiscence of the proximal surgical wound at the level of the lower chest/upper abdomen      OSSEOUS STRUCTURES:  Posterior spinal fusion at L4-L5 with chronic multilevel degenerative disc disease and multilevel compression fracture deformities  Healed left-sided rib fractures stable      IMPRESSION:        1  Diminished size of lesser sac abscess status post percutaneous drainage  However, there is persistent focal thickening in the adjacent transverse colon with a tiny air-fluid collection within its wall measuring 19 x 11 mm concerning for developing   intramural abscess  Fistulous communication between the colonic wall and collection suspected  Close surveillance recommended  2   Percutaneous gastrojejunostomy tube terminates in the transverse duodenum  No gastric distention or small bowel obstruction  3   Minimal ascites  4   Stable left basilar atelectasis with trace effusion  5   Possible early dehiscence of proximal ventral abdominal wound    Surveillance recommended            I personally discussed this study with Arleth Fabian on 2/3/2020 at 4:26 PM           Ref Range & Units 2/10/20 0613   Sodium 136 - 145 mmol/L 138    Potassium 3 5 - 5 3 mmol/L 4 0    Chloride 100 - 108 mmol/L 105    CO2 21 - 32 mmol/L 29    ANION GAP 4 - 13 mmol/L 4 BUN 5 - 25 mg/dL 7    Creatinine 0 60 - 1 30 mg/dL 0 58Low     Comment: Standardized to IDMS reference method   Glucose 65 - 140 mg/dL 168High     Comment:    If the patient is fasting, the ADA then defines impaired fasting glucose as > 100 mg/dL and diabetes as > or equal to 123 mg/dL  Specimen collection should occur prior to Sulfasalazine administration due to the potential for falsely depressed results  Specimen collection should occur prior to Sulfapyridine administration due to the potential for falsely elevated results  Calcium 8 3 - 10 1 mg/dL 8 4    eGFR ml/min/1 73sq m 105       Narrative   WBC 4 31 - 10 16 Thousand/uL 6 06    RBC 3 88 - 5 62 Million/uL 2 78Low     Hemoglobin 12 0 - 17 0 g/dL 8 4Low     Hematocrit 36 5 - 49 3 % 26 5Low     MCV 82 - 98 fL 95    MCH 26 8 - 34 3 pg 30 2    MCHC 31 4 - 37 4 g/dL 31 7    RDW 11 6 - 15 1 % 17  3High     Platelets 945 - 105 Thousands/uL 314    MPV 8 9 - 12 7 fL 9 2          Geronimo Martinez MD  2020 2:42 PM      Name: Beatriz Garland  : 1950  MRN: [id number]  DOS: 2020  BILLING CODE: [id number]  Diagnoses:   Diagnosis ICD-10-CM Associated Orders   1  Ambulatory dysfunction R26 2    2  Anemia, unspecified type D64 9    3  Duodenal ulcer K26 9    4  Back pain without sciatica M54 89    5  Tobacco abuse Z72 0    6  Essential hypertension I10    7   COPD without exacerbation ([facility] ) J44 9

## 2020-02-13 NOTE — ASSESSMENT & PLAN NOTE
On Cymbalta, Gabapentin, Mirtazapine, Tazodone, and seroquel  Will continue with monitoring, will check labs tomorrow

## 2020-02-13 NOTE — ASSESSMENT & PLAN NOTE
With perforation ,S/P surgery, on Pantoprazole, will have f/u with surgery on 2/18/2020  On Oxycodone received PRN once daily  Will monitor closely if frequent use of PRN will put him on scheduled oxycodone  Will add tylenol scheduled for now

## 2020-02-13 NOTE — ASSESSMENT & PLAN NOTE
Per hospital discharge meds was on Nicotine patch 14mg w/o any decreasing dose, changed to be on 7 mg after one week of current dose

## 2020-02-17 ENCOUNTER — OFFICE VISIT (OUTPATIENT)
Dept: SURGERY | Facility: CLINIC | Age: 70
End: 2020-02-17

## 2020-02-17 VITALS
HEIGHT: 68 IN | TEMPERATURE: 98.5 F | HEART RATE: 81 BPM | SYSTOLIC BLOOD PRESSURE: 154 MMHG | BODY MASS INDEX: 24.24 KG/M2 | DIASTOLIC BLOOD PRESSURE: 84 MMHG

## 2020-02-17 DIAGNOSIS — K26.9 DUODENAL ULCER: Primary | ICD-10-CM

## 2020-02-17 PROCEDURE — 99024 POSTOP FOLLOW-UP VISIT: CPT | Performed by: STUDENT IN AN ORGANIZED HEALTH CARE EDUCATION/TRAINING PROGRAM

## 2020-02-17 PROCEDURE — 3079F DIAST BP 80-89 MM HG: CPT | Performed by: STUDENT IN AN ORGANIZED HEALTH CARE EDUCATION/TRAINING PROGRAM

## 2020-02-17 PROCEDURE — 1124F ACP DISCUSS-NO DSCNMKR DOCD: CPT | Performed by: STUDENT IN AN ORGANIZED HEALTH CARE EDUCATION/TRAINING PROGRAM

## 2020-02-17 PROCEDURE — 4040F PNEUMOC VAC/ADMIN/RCVD: CPT | Performed by: STUDENT IN AN ORGANIZED HEALTH CARE EDUCATION/TRAINING PROGRAM

## 2020-02-17 PROCEDURE — 1160F RVW MEDS BY RX/DR IN RCRD: CPT | Performed by: STUDENT IN AN ORGANIZED HEALTH CARE EDUCATION/TRAINING PROGRAM

## 2020-02-17 PROCEDURE — 3077F SYST BP >= 140 MM HG: CPT | Performed by: STUDENT IN AN ORGANIZED HEALTH CARE EDUCATION/TRAINING PROGRAM

## 2020-02-17 PROCEDURE — 1111F DSCHRG MED/CURRENT MED MERGE: CPT | Performed by: STUDENT IN AN ORGANIZED HEALTH CARE EDUCATION/TRAINING PROGRAM

## 2020-02-17 NOTE — PROGRESS NOTES
Office Visit - General Surgery  Bonnie Ferguson MRN: [id number]  Encounter: [id number]    Assessment and Plan    Problem List Items Addressed This Visit        Digestive    Duodenal ulcer - Primary        - continue protonix  - keep GJ tube, may discontinue supplemental tube feeds  - referral to IR for CAMPBELL drain check and possible removal  - continue local wound care  - follow-up after drain check, in 2 weeks    Chief Complaint:  Bonnie Ferguson is a 81 y o  male who presents for Post-op (Exploratory lap)    Subjective  Doing well in nursing home  Tolerating PO diet, as well as supplemental tube feeds with normal bowel function  Some pain every few days  Denies fevers, chills, nausea, vomiting  Past Medical History  Past Medical History:   Diagnosis Date    BPH (benign prostatic hyperplasia)     CVA [facility]cerebral vascular accident) ([facility] [address] )     Head injury     Hypertension     Metatarsal fracture     TIA (transient ischemic attack)        Past Surgical History  Past Surgical History:   Procedure Laterality Date    ABDOMINAL SURGERY      ANKLE SURGERY      BACK SURGERY      CT GUIDED [facility] DRAINAGE CATHETER PLACEMENT  1/27/2020    ELBOW SURGERY      IR VISCERAL ANGIOGRAPHY / INTERVENTION  1/11/2020    JOINT REPLACEMENT      KNEE SURGERY      LAPAROTOMY N/A 1/11/2020    Procedure: LAPAROTOMY EXPLORATORY,  OVERSEW  OF GASTRO DUODENAL ARTERY, THAL PATCH OF DUODENUM, VICKY GASTRO JEJUNOSTOMY TUBE;  Surgeon: Sydni Jacob DO;  Location: [facility];  Service: General    LIVER SURGERY         Family History  History reviewed  No pertinent family history      Social History  Social History     Socioeconomic History    Marital status: Single     Spouse name: None    Number of children: None    Years of education: None    Highest education level: None   Occupational History    None   Social Needs    Financial resource strain: None    Food insecurity:     Worry: None     Inability: None    Transportation needs: Medical: None     Non-medical: None   Tobacco Use    Smoking status: Current Every Day Smoker     Packs/day: 1 00     Types: Cigarettes    Smokeless tobacco: Never Used    Tobacco comment: started age 14, 3 quit attempts   Substance and Sexual Activity    Alcohol use: Yes     Frequency: Monthly or less     Comment: half a handle of vodka daily as per family    Drug use: Yes     Types: Marijuana     Comment: history polysubstance use including IVDA on record- every now then will smoke weed     Sexual activity: None   Lifestyle    Physical activity:     Days per week: None     Minutes per session: None    Stress: None   Relationships    Social connections:     Talks on phone: None     Gets together: None     Attends Worship service: None     Active member of club or organization: None     Attends meetings of clubs or organizations: None     Relationship status: None    Intimate partner violence:     Fear of current or ex partner: None     Emotionally abused: None     Physically abused: None     Forced sexual activity: None   Other Topics Concern    None   Social History Narrative    Used to be  for about 27 yrs, unemployed since fell off roof onto concrete - pt does not recall what year    1 son with whom he lives,         Medications  Current Outpatient Medications on File Prior to Visit   Medication Sig Dispense Refill    atorvastatin (LIPITOR) 40 mg tablet TAKE ONE TABLET BY MOUTH ONCE DAILY 30 tablet 0    Cholecalciferol 1000 units tablet Take 1 tablet (1,000 Units total) by mouth daily 90 tablet 1    DULoxetine (CYMBALTA) 30 mg delayed release capsule Take 3 capsules (90 mg total) by mouth daily 90 capsule 1    fluticasone (FLONASE) 50 mcg/act nasal spray 2 sprays into each nostril daily 18 2 mL 2    fluticasone-vilanterol (BREO ELLIPTA) 100-25 mcg/inh inhaler Inhale 1 puff daily Rinse mouth after use   1 Inhaler 3    folic acid (FOLVITE) 1 mg tablet Take 1 tablet (1 mg total) by mouth daily 90 tablet 1    gabapentin (NEURONTIN) 300 mg capsule Take 1 capsule (300 mg total) by mouth 3 (three) times a day  0    guaiFENesin (MUCINEX) 600 mg 12 hr tablet Take 600 mg by mouth every 12 (twelve) hours as needed for cough      Heparin Sodium, Porcine, (HEPARIN, PORCINE,) 5,000 units/mL Inject 1 mL (5,000 Units total) under the skin every 8 (eight) hours 1 mL 0    metoprolol tartrate (LOPRESSOR) 50 mg tablet Take 1 tablet (50 mg total) by mouth every 12 (twelve) hours  0    mirtazapine (REMERON) 15 mg tablet Take 1 tablet (15 mg total) by mouth daily at bedtime 30 tablet 1    nicotine (NICODERM CQ) 14 mg/24hr TD 24 hr patch Place 1 patch on the skin daily 28 patch 0    oxyCODONE (ROXICODONE) 10 MG TABS Take 1 tablet (10 mg total) by mouth every 4 (four) hours as needed for severe pain for up to 10 daysMax Daily Amount: 60 mg 30 tablet 0    pantoprazole (PROTONIX) 40 mg tablet Take 1 tablet (40 mg total) by mouth 2 (two) times a day before meals  0    QUEtiapine (SEROquel) 200 mg tablet Take 1 tablet (200 mg total) by mouth daily 30 tablet 1    QUEtiapine (SEROquel) 300 mg tablet Take 2 tablets (600 mg total) by mouth daily at bedtime 60 tablet 1    thiamine 100 MG tablet Take 1 tablet (100 mg total) by mouth daily 90 tablet 1    traZODone (DESYREL) 50 mg tablet Take 1 tablet (50 mg total) by mouth daily at bedtime as needed for sleep 30 tablet 1    Wound Dressings (COMFEEL PLUS ULCER DRESSING) PADS Apply 1 each topically every other day 10 each 0     No current facility-administered medications on file prior to visit  Allergies  No Known Allergies    Review of Systems   Constitutional: Negative for activity change, appetite change, chills and fever  HENT: Negative  Eyes: Negative  Respiratory: Negative  Cardiovascular: Negative  Gastrointestinal: Negative for abdominal distention, constipation, diarrhea, nausea and vomiting     Endocrine: Negative  Genitourinary: Negative  Musculoskeletal: Negative  Skin: Negative  Allergic/Immunologic: Negative  Neurological: Negative  Hematological: Negative  Psychiatric/Behavioral: Negative  Objective  Vitals:    02/17/20 0823   BP: 154/84   Pulse: 81   Temp: 98 5 °F (36 9 °C)       Physical Exam   Constitutional: He appears well-developed and well-nourished  HENT:   Head: Normocephalic  Eyes: Pupils are equal, round, and reactive to light  Neck: Normal range of motion  Cardiovascular: Normal rate  Pulmonary/Chest: Effort normal  No respiratory distress     Abdominal:   Soft, non-tender, non-distended  Incision c/d/i with staples, superior aspect with some purulence and underlying granulation tissue  GJ in place  CAMPBELL in place, mixed output

## 2020-02-18 ENCOUNTER — NURSING HOME VISIT (OUTPATIENT)
Dept: GERIATRICS | Facility: OTHER | Age: 70
End: 2020-02-18
Payer: MEDICARE

## 2020-02-18 DIAGNOSIS — F33.1 MODERATE EPISODE OF RECURRENT MAJOR DEPRESSIVE DISORDER (HCC): ICD-10-CM

## 2020-02-18 DIAGNOSIS — D64.89 ANEMIA DUE TO OTHER CAUSE, NOT CLASSIFIED: Primary | ICD-10-CM

## 2020-02-18 DIAGNOSIS — I10 ESSENTIAL HYPERTENSION: ICD-10-CM

## 2020-02-18 DIAGNOSIS — M54.89 BACK PAIN WITHOUT SCIATICA: ICD-10-CM

## 2020-02-18 DIAGNOSIS — R26.2 AMBULATORY DYSFUNCTION: ICD-10-CM

## 2020-02-18 DIAGNOSIS — K26.9 DUODENAL ULCER: ICD-10-CM

## 2020-02-18 PROCEDURE — 99309 SBSQ NF CARE MODERATE MDM 30: CPT | Performed by: FAMILY MEDICINE

## 2020-02-18 NOTE — PROGRESS NOTES
[facility]  [address]  [phone]  Facility: [address]/          NAME: Leo Jones  AGE: 81 y o  SEX: male    DATE OF ENCOUNTER: 2/18/2020    Chief Complaint     No new complaint     History of Present Illness     HPI    The following portions of the patient's history were reviewed and updated as appropriate (from facility chart and hospital records): allergies, current medications, past family history, past medical history, past social history, past surgical history and problem list   Pt was seen and examined for f/u on Duodenal order, back pain, HTN, COPD, ambulatory dysfunction, and anemia  Pt had lab today and H&H is stable, no sign or symptoms of COPD exacerbation, BP has been overall stable  Has started walking with therapy  Pain is overall stable an better controlled  Continues with therapy and NH care  Had surgeon f/u appointment yesterday and will have his CAMPBELL tube checked and  removed in IR  Tube feeding was D/C'd by surgeon since pt is eating well, and no need for FT  No other issues or concerns  Review of Systems     Review of Systems   Constitutional: Negative  HENT: Negative  Eyes: Negative  Respiratory: Negative  Cardiovascular: Negative  Gastrointestinal: Negative  Abdominal pain at the site of surgery at times   Endocrine: Negative  Genitourinary: Negative  Musculoskeletal: Negative  Skin: Negative  Allergic/Immunologic: Negative  Neurological: Negative  Hematological: Negative  Psychiatric/Behavioral: Negative  All other systems reviewed and are negative        Active Problem List     Patient Active Problem List   Diagnosis    Major depressive disorder, recurrent, severe with psychotic features ([address] )    Essential hypertension    Uncomplicated alcohol dependence ([address] )    History of substance abuse [facility] )    Encounter to establish care with new doctor    History of sustained ventricular tachycardia    Hypokinesia of left ventricle    Bilateral lower extremity edema    Tachycardia    History of transient ischemic attack (TIA)    Back pain without sciatica    History of cerebrovascular accident (CVA) with residual deficit    Elevated fasting glucose    Ambulatory dysfunction    Poor mobility    Overweight (BMI 25 0-29  9)    Vitamin D deficiency    Abnormal echocardiogram    Syncope    Chest pain    Suicidal ideation    Alcohol abuse    MDD (major depressive disorder)    History of CVA (cerebrovascular accident)    COPD without exacerbation (HCC)    Dyslipidemia    Chronic pain    Acute blood loss anemia    Tobacco abuse    Inadequate oral nutritional intake    Duodenal ulcer    Anemia       Objective     Vitals: WT:161  1IBs   BP:138/68  TX:76  RR:20  SHANNON 96% in RA    Physical Exam   Constitutional: He is oriented to person, place, and time  He appears well-developed and well-nourished  No distress  HENT:   Head: Normocephalic and atraumatic  Right Ear: External ear normal    Left Ear: External ear normal    Nose: Nose normal    Mouth/Throat: Oropharynx is clear and moist  No oropharyngeal exudate  Eyes: Pupils are equal, round, and reactive to light  Conjunctivae and EOM are normal  Right eye exhibits no discharge  Left eye exhibits no discharge  No scleral icterus  Neck: Normal range of motion  Neck supple  Cardiovascular: Normal rate and regular rhythm  Exam reveals no gallop and no friction rub  No murmur heard  Pulmonary/Chest: Effort normal and breath sounds normal  No stridor  No respiratory distress  He has no wheezes  He has no rales  He exhibits no tenderness  Abdominal: Soft  Bowel sounds are normal  He exhibits no distension and no mass  There is no tenderness  There is no rebound and no guarding  No hernia  Mid line incision line has staples on, upper end of incision is open with some drainage      Genitourinary:   Genitourinary Comments: deferred Musculoskeletal: He exhibits edema (trace edema of LEs)  In bed, lying, limited ROM of UEs and LEs   Lymphadenopathy:     He has no cervical adenopathy  Neurological: He is alert and oriented to person, place, and time  No cranial nerve deficit  ???L facial droop (was present on admission as well)   Skin: He is not diaphoretic  Nursing note and vitals reviewed  Pertinent Laboratory/Diagnostic Studies:  CBC, CMP done today    Current Medications   Medication list in facility chart was reviewed and no changes made  Assessment and Plan     Anemia  H&H stable today, will monitor closely  Ambulatory dysfunction  Improving with therapy, needs NH care  Duodenal ulcer  S/P surgery, okayed by surgeon to stop tube feeding, will monitor closely  Will continue with Pantoprazole  Essential hypertension  BP has been stable, on Metoprolol  will continue with monitoring  Back pain without sciatica  Stable with oxycodone  MDD (major depressive disorder)  Will continue with seroquel, Mirtazapine, and Cymbalta  Stable  Name: Jeannie Tsai  : 1950  MRN: [id number]  DOS: 2020  BILLING CODE: [id number]  Diagnoses:   Diagnosis ICD-10-CM Associated Orders   1  Anemia due to other cause, not classified D64 89    2  Ambulatory dysfunction R26 2    3  Duodenal ulcer K26 9    4  Essential hypertension I10    5  Back pain without sciatica M54 89    6   Moderate episode of recurrent major depressive disorder [facility]) F33 1          Ehsan Lee MD  [id number] PM

## 2020-02-18 NOTE — ASSESSMENT & PLAN NOTE
S/P surgery, okayed by surgeon to stop tube feeding, will monitor closely  Will continue with Pantoprazole

## 2020-02-24 ENCOUNTER — HOSPITAL ENCOUNTER (OUTPATIENT)
Dept: RADIOLOGY | Facility: HOSPITAL | Age: 70
Discharge: HOME | End: 2020-02-24
Payer: MEDICARE

## 2020-02-24 DIAGNOSIS — K26.9 DUODENAL ULCER: ICD-10-CM

## 2020-02-24 PROCEDURE — 76080 X-RAY EXAM OF FISTULA: CPT | Performed by: RADIOLOGY

## 2020-02-24 PROCEDURE — 49424 ASSESS CYST CONTRAST INJECT: CPT | Performed by: RADIOLOGY

## 2020-02-24 PROCEDURE — 76080 X-RAY EXAM OF FISTULA: CPT

## 2020-02-24 PROCEDURE — 49424 ASSESS CYST CONTRAST INJECT: CPT

## 2020-02-24 RX ADMIN — IOHEXOL 5 ML: 300 INJECTION, SOLUTION INTRAVENOUS at 12:20

## 2020-02-24 NOTE — SEDATION DOCUMENTATION
Abdominal drain removed  Site dressed with gauze and tape  Pt tolerated procedure well   Pt given manor care paperwork

## 2020-02-24 NOTE — DISCHARGE INSTRUCTIONS
Drainage Tube Removal    Your drainage tube was removed today  What you need know at home:   Keep a clean dry dressing at the tube site until the small opening closes  It will take twenty four to forty eight hours  Keep the site dry until it heals  A small amount of drainage on your dressing is normal  Resume your normal diet  Small sips of flat soda will help with any nausea  Contact Interventional Radiology for any of the following: You have pain, fever greater than 101, shaking chills  If you have increased redness or swelling at the site  I the drainage from your site does not stop  If the site drains pus or has a bad odor       Contact Interventional Radiology at [phone]   Roxanna PATIENTS: Contact Interventional Radiology at [phone]) Igor Gaxiola PATIENTS: Contact Interventional Radiology at [phone]) if:

## 2020-02-27 ENCOUNTER — NURSING HOME VISIT (OUTPATIENT)
Dept: GERIATRICS | Facility: OTHER | Age: 70
End: 2020-02-27
Payer: MEDICARE

## 2020-02-27 DIAGNOSIS — I10 ESSENTIAL HYPERTENSION: ICD-10-CM

## 2020-02-27 DIAGNOSIS — R26.2 AMBULATORY DYSFUNCTION: Primary | ICD-10-CM

## 2020-02-27 DIAGNOSIS — D64.89 ANEMIA DUE TO OTHER CAUSE, NOT CLASSIFIED: ICD-10-CM

## 2020-02-27 DIAGNOSIS — M54.89 BACK PAIN WITHOUT SCIATICA: ICD-10-CM

## 2020-02-27 PROBLEM — Z86.73 HISTORY OF TRANSIENT ISCHEMIC ATTACK (TIA): Status: RESOLVED | Noted: 2019-08-28 | Resolved: 2020-02-27

## 2020-02-27 PROBLEM — Z86.73 HISTORY OF CVA (CEREBROVASCULAR ACCIDENT): Status: RESOLVED | Noted: 2020-01-08 | Resolved: 2020-02-27

## 2020-02-27 PROBLEM — Z86.79 HISTORY OF SUSTAINED VENTRICULAR TACHYCARDIA: Status: RESOLVED | Noted: 2019-08-21 | Resolved: 2020-02-27

## 2020-02-27 PROBLEM — Z76.89 ENCOUNTER TO ESTABLISH CARE WITH NEW DOCTOR: Status: RESOLVED | Noted: 2019-08-21 | Resolved: 2020-02-27

## 2020-02-27 PROBLEM — F19.11 HISTORY OF SUBSTANCE ABUSE (HCC): Status: RESOLVED | Noted: 2019-08-21 | Resolved: 2020-02-27

## 2020-02-27 PROBLEM — I69.30 HISTORY OF CEREBROVASCULAR ACCIDENT (CVA) WITH RESIDUAL DEFICIT: Status: RESOLVED | Noted: 2019-10-01 | Resolved: 2020-02-27

## 2020-02-27 PROCEDURE — 99309 SBSQ NF CARE MODERATE MDM 30: CPT | Performed by: FAMILY MEDICINE

## 2020-02-27 NOTE — PROGRESS NOTES
[facility]  [address]  [phone]  Facility: [place] [address] [address]          NAME: Dee Goff  AGE: 81 y o  SEX: male    DATE OF ENCOUNTER: 2/27/2020    Chief Complaint     Pt has no complaint     History of Present Illness     HPI    The following portions of the patient's history were reviewed and updated as appropriate (from facility chart and hospital records): allergies, current medications, past family history, past medical history, past social history, past surgical history and problem list   Pt was seen and examined for f/u on ambulatory dysfunction, Anemia, HTN, Back pain, MDD< and duodenal ulcer  Pt has been stable, has f/u with surgery on 3/5  His CAMPBELL tubed was removed in IR on 2/24/2020  Continues with NH care and therapy, and improving with therapy  Pain is controlled  Mood is stable  BP stable, H&H stable on 2/18 labs  Overall stable, no specific issues or concerns, pt tolerated mechanical soft diet  Eats % of his meals  Some wt loss  ST on board  BP is overall stable with few at higher side  Review of Systems     Review of Systems   Constitutional: Negative  HENT: Negative  Eyes: Negative  Respiratory: Negative  Cardiovascular: Negative  Gastrointestinal: Negative  Endocrine: Negative  Genitourinary: Negative  Musculoskeletal: Negative  Skin: Negative  Allergic/Immunologic: Negative  Neurological: Negative  Hematological: Negative  Psychiatric/Behavioral: Negative  All other systems reviewed and are negative        Active Problem List     Patient Active Problem List   Diagnosis    Major depressive disorder, recurrent, severe with psychotic features ([address] )    Essential hypertension    Uncomplicated alcohol dependence ([address] )    Hypokinesia of left ventricle    Bilateral lower extremity edema    Tachycardia    Back pain without sciatica    Elevated fasting glucose    Ambulatory dysfunction    Poor mobility    Overweight (BMI 25 0-29  9)    Vitamin D deficiency    Abnormal echocardiogram    Syncope    Chest pain    Suicidal ideation    Alcohol abuse    MDD (major depressive disorder)    COPD without exacerbation (HCC)    Dyslipidemia    Chronic pain    Acute blood loss anemia    Tobacco abuse    Inadequate oral nutritional intake    Duodenal ulcer    Anemia       Objective     Vitals:Wt:156 6Ibs   BP:154/83   Temp:97 9    Physical Exam   Constitutional: He is oriented to person, place, and time  He appears well-developed and well-nourished  No distress  HENT:   Head: Normocephalic and atraumatic  Right Ear: External ear normal    Left Ear: External ear normal    Nose: Nose normal    Mouth/Throat: Oropharynx is clear and moist  No oropharyngeal exudate  Missing teeth  Eyes: Pupils are equal, round, and reactive to light  Conjunctivae and EOM are normal  Right eye exhibits no discharge  Left eye exhibits no discharge  No scleral icterus  Neck: Normal range of motion  Neck supple  Cardiovascular: Normal rate, regular rhythm and normal heart sounds  Exam reveals no gallop and no friction rub  No murmur heard  Pulmonary/Chest: Effort normal and breath sounds normal  No stridor  No respiratory distress  He has no wheezes  He has no rales  He exhibits no tenderness  Abdominal: Soft  Bowel sounds are normal  He exhibits no distension and no mass  There is no tenderness  There is no rebound and no guarding  No hernia  Mid Abdomen staples in place on top end of incision line the open area is some drainage, smaller in size comparing to visit on 2/17/2020, FT in place    Genitourinary:   Genitourinary Comments: deferred   Musculoskeletal: He exhibits no edema (trace edema of LEs ), tenderness or deformity  In bed, moving extremities, limited ROM of LEs and UE while lying in bed  Lymphadenopathy:     He has no cervical adenopathy     Neurological: He is alert and oriented to person, place, and time  Cranial nerve deficit: ??? L facial droop  Follows commands, awake  Skin: Skin is warm and dry  No rash noted  He is not diaphoretic  No erythema  No pallor  Didn't examine sacral area   Psychiatric: He has a normal mood and affect  His behavior is normal    Nursing note and vitals reviewed  Pertinent Laboratory/Diagnostic Studies:  BMP, CBC, on 2020    Current Medications   Medication list in facility chart was reviewed and no changes made  Assessment and Plan     Ambulatory dysfunction  Improving with therapy, needs NH care  Stable  Essential hypertension  BP is stable with Metoprolol  Stable  Back pain without sciatica  Wt has been stable with current pain management  Anemia  Stable H&H on Folic acid  Name: Maximo  : 1950  MRN: [id number]  DOS: 2020  BILLING CODE: [id number]  Diagnoses:   Diagnosis ICD-10-CM Associated Orders   1  Ambulatory dysfunction R26 2    2  Essential hypertension I10    3  Back pain without sciatica M54 89    4   Anemia due to other cause, not classified D64 89          Cheryl Sandoval MD  /50177:16 PM

## 2020-03-05 ENCOUNTER — OFFICE VISIT (OUTPATIENT)
Dept: SURGERY | Facility: CLINIC | Age: 70
End: 2020-03-05

## 2020-03-05 VITALS — DIASTOLIC BLOOD PRESSURE: 92 MMHG | SYSTOLIC BLOOD PRESSURE: 138 MMHG | HEART RATE: 84 BPM | TEMPERATURE: 96.6 F

## 2020-03-05 DIAGNOSIS — K26.9 DUODENAL ULCER: Primary | ICD-10-CM

## 2020-03-05 PROCEDURE — 3075F SYST BP GE 130 - 139MM HG: CPT | Performed by: SURGERY

## 2020-03-05 PROCEDURE — 1160F RVW MEDS BY RX/DR IN RCRD: CPT | Performed by: SURGERY

## 2020-03-05 PROCEDURE — 1111F DSCHRG MED/CURRENT MED MERGE: CPT | Performed by: SURGERY

## 2020-03-05 PROCEDURE — 3080F DIAST BP >= 90 MM HG: CPT | Performed by: SURGERY

## 2020-03-05 PROCEDURE — 99024 POSTOP FOLLOW-UP VISIT: CPT | Performed by: SURGERY

## 2020-03-05 PROCEDURE — 4040F PNEUMOC VAC/ADMIN/RCVD: CPT | Performed by: SURGERY

## 2020-03-05 PROCEDURE — 4004F PT TOBACCO SCREEN RCVD TLK: CPT | Performed by: SURGERY

## 2020-03-05 NOTE — PROGRESS NOTES
Office Visit - General Surgery  Jacob Bauer MRN: [id number]  Encounter: [id number]    Assessment and Plan    Problem List Items Addressed This Visit     None      69M s/p exlap for perf duodenal ulcer  - CAMPBELL drained removed by IR prior  - bernard PO diet; will keep GJ tube for now  - staples removed in office  - will f/u x2wks for wound check to upper midline wound --> c/w daily [address]      Chief Complaint:  Jacob Bauer is a 81 y o  male who presents for Follow-up (Duodenal ulcer  Patient states appetite good  Bowel and bladder working with a little constipation)    Subjective  S/p exlap  Pt  bernard more PO intake  No new complaints of pain, n/v, f/c  Upper midline wound w granulation and small area of purulence which has decreased per patient   +BM    Past Medical History  Past Medical History:   Diagnosis Date    BPH (benign prostatic hyperplasia)     [facility]A [facility]cerebral [facility] accident) [facility] [address] )     Encounter to establish care with new doctor 8/21/2019    Head injury     History of cerebrovascular accident (CVA) with residual deficit 10/1/2019    History of CVA (cerebrovascular accident) 1/8/2020    History of substance abuse ([facility] ) 8/21/2019    Polysubstance     History of sustained ventricular tachycardia 8/21/2019    History of transient ischemic attack (TIA) 8/28/2019    Hypertension     Metatarsal fracture     TIA (transient ischemic attack)        Past Surgical History  Past Surgical History:   Procedure Laterality Date    ABDOMINAL SURGERY      ANKLE SURGERY      BACK SURGERY      CT GUIDED [facility] DRAINAGE CATHETER PLACEMENT  1/27/2020    ELBOW SURGERY      IR VISCERAL ANGIOGRAPHY / INTERVENTION  1/11/2020    JOINT REPLACEMENT      KNEE SURGERY      LAPAROTOMY N/A 1/11/2020    Procedure: LAPAROTOMY EXPLORATORY,  OVERSEW  OF GASTRO DUODENAL ARTERY, THAL PATCH OF DUODENUM, VICKY GASTRO JEJUNOSTOMY TUBE;  Surgeon: Carmine Birmingham DO;  [facility]: [facility];  Service: General    LIVER SURGERY Family History  Family History   Problem Relation Age of Onset    No Known Problems Mother     No Known Problems Father        Social History  Social History     Socioeconomic History    Marital status: Single     Spouse name: None    Number of children: None    Years of education: None    Highest education level: None   Occupational History    None   Social Needs    Financial resource strain: None    Food insecurity:     Worry: None     Inability: None    Transportation needs:     Medical: None     Non-medical: None   Tobacco Use    Smoking status: Current Every Day Smoker     Packs/day: 1 00     Types: Cigarettes    Smokeless tobacco: Never Used    Tobacco comment: started age 14, 3 quit attempts   Substance and Sexual Activity    Alcohol use: Yes     Frequency: Monthly or less     Comment: half a handle of vodka daily as per family    Drug use: Yes     Types: Marijuana     Comment: history polysubstance use including IVDA on record- every now then will smoke weed     Sexual activity: None   Lifestyle    Physical activity:     Days per week: None     Minutes per session: None    Stress: None   Relationships    Social connections:     Talks on phone: None     Gets together: None     Attends [employer] service: None     Active member of club or organization: None     Attends meetings of clubs or organizations: None     Relationship status: None    Intimate partner violence:     Fear of current or ex partner: None     Emotionally abused: None     Physically abused: None     Forced sexual activity: None   Other Topics Concern    None   Social History Narrative    Used to be  for about 27 yrs, unemployed since fell off roof onto concrete - pt does not recall what year    1 son with whom he lives,         Medications  Current Outpatient Medications on File Prior to Visit   Medication Sig Dispense Refill    atorvastatin (LIPITOR) 40 mg tablet TAKE ONE TABLET BY MOUTH ONCE DAILY 30 tablet 0    Cholecalciferol 1000 units tablet Take 1 tablet (1,000 Units total) by mouth daily 90 tablet 1    DULoxetine (CYMBALTA) 30 mg delayed release capsule Take 3 capsules (90 mg total) by mouth daily 90 capsule 1    fluticasone (FLONASE) 50 mcg/act nasal spray 2 sprays into each nostril daily 18 2 mL 2    fluticasone-vilanterol (BREO ELLIPTA) 100-25 mcg/inh inhaler Inhale 1 puff daily Rinse mouth after use  1 Inhaler 3    folic acid (FOLVITE) 1 mg tablet Take 1 tablet (1 mg total) by mouth daily 90 tablet 1    gabapentin (NEURONTIN) 300 mg capsule Take 1 capsule (300 mg total) by mouth 3 (three) times a day  0    guaiFENesin (MUCINEX) 600 mg 12 hr tablet Take 600 mg by mouth every 12 (twelve) hours as needed for cough      Heparin Sodium, Porcine, (HEPARIN, PORCINE,) 5,000 units/mL Inject 1 mL (5,000 Units total) under the skin every 8 (eight) hours 1 mL 0    metoprolol tartrate (LOPRESSOR) 50 mg tablet Take 1 tablet (50 mg total) by mouth every 12 (twelve) hours  0    mirtazapine (REMERON) 15 mg tablet Take 1 tablet (15 mg total) by mouth daily at bedtime 30 tablet 1    nicotine (NICODERM CQ) 14 mg/24hr TD 24 hr patch Place 1 patch on the skin daily 28 patch 0    pantoprazole (PROTONIX) 40 mg tablet Take 1 tablet (40 mg total) by mouth 2 (two) times a day before meals  0    QUEtiapine (SEROquel) 200 mg tablet Take 1 tablet (200 mg total) by mouth daily 30 tablet 1    QUEtiapine (SEROquel) 300 mg tablet Take 2 tablets (600 mg total) by mouth daily at bedtime 60 tablet 1    thiamine 100 MG tablet Take 1 tablet (100 mg total) by mouth daily 90 tablet 1    traZODone (DESYREL) 50 mg tablet Take 1 tablet (50 mg total) by mouth daily at bedtime as needed for sleep 30 tablet 1    Wound Dressings (COMFEEL PLUS ULCER DRESSING) PADS Apply 1 each topically every other day 10 each 0     No current facility-administered medications on file prior to visit          Allergies  No Known Allergies    Review of Systems   All other systems reviewed and are negative  Objective  Vitals:    03/05/20 0950   BP: 138/92   Pulse: 84   Temp: (!) 96 6 °F (35 9 °C)       Physical Exam   Constitutional: He appears well-nourished  HENT:   Head: Atraumatic  Eyes: EOM are normal    Neck: Neck supple  Cardiovascular: Normal rate  Pulmonary/Chest: Effort normal    Abdominal: Soft  He exhibits no distension  There is no tenderness  There is no guarding  Wound to upper midline incision w area of granulation tissue and small opening draining pus   No surrounding erythema

## 2020-03-10 ENCOUNTER — NURSING HOME VISIT (OUTPATIENT)
Dept: GERIATRICS | Facility: OTHER | Age: 70
End: 2020-03-10
Payer: MEDICARE

## 2020-03-10 DIAGNOSIS — J44.9 COPD WITHOUT EXACERBATION (HCC): ICD-10-CM

## 2020-03-10 DIAGNOSIS — K26.9 DUODENAL ULCER: ICD-10-CM

## 2020-03-10 DIAGNOSIS — D64.89 ANEMIA DUE TO OTHER CAUSE, NOT CLASSIFIED: ICD-10-CM

## 2020-03-10 DIAGNOSIS — Z72.0 TOBACCO ABUSE: ICD-10-CM

## 2020-03-10 DIAGNOSIS — I10 ESSENTIAL HYPERTENSION: ICD-10-CM

## 2020-03-10 DIAGNOSIS — F33.3 MAJOR DEPRESSIVE DISORDER, RECURRENT, SEVERE WITH PSYCHOTIC FEATURES (HCC): Chronic | ICD-10-CM

## 2020-03-10 DIAGNOSIS — R26.2 AMBULATORY DYSFUNCTION: ICD-10-CM

## 2020-03-10 DIAGNOSIS — M54.89 BACK PAIN WITHOUT SCIATICA: ICD-10-CM

## 2020-03-10 DIAGNOSIS — R52 PAIN: Primary | ICD-10-CM

## 2020-03-10 PROCEDURE — 99316 NF DSCHRG MGMT 30 MIN+: CPT | Performed by: FAMILY MEDICINE

## 2020-03-10 RX ORDER — OXYCODONE HYDROCHLORIDE 5 MG/1
5 TABLET ORAL EVERY 4 HOURS PRN
Qty: 30 TABLET | Refills: 0
Start: 2020-03-10 | End: 2020-03-12 | Stop reason: CLARIF

## 2020-03-10 NOTE — PROGRESS NOTES
[facility]  Nina Castaneda 81  [phone]  Facility: [facility]  Discharge note          NAME: Margery Lundborg  AGE: 81 y o  SEX: male    DATE OF ENCOUNTER: 3/10/2020    Chief Complaint     Pt has no new complaint     History of Present Illness     HPI    The following portions of the patient's history were reviewed and updated as appropriate (from facility chart and hospital records): allergies, current medications, past family history, past medical history, past social history, past surgical history and problem list   Pt was seen and examined for f/u on ambulatory dysfunction, PUD, anemia, back pain, , MDD, HTN, COPD  Pt has been stable, had surgery appointment on 3/5/202o when the staples were removed, pt is improving with therapy, mood is stable, has next appt with surgery on 3/20  Pt will be DC home on 3/12 to continue with care at home, no COPD exacerbation, back pain is well controlled with in average 2 tabs of oxycodone per day  BP and wt have been stable, no specific issues or concerns  H&H in Feb stable  Eats and tolerates food well, but still has FT in place which gets flushed daily in each shift  Review of Systems     Review of Systems   Constitutional: Negative  Pt has no new complaint    HENT: Negative  Eyes: Negative  Respiratory: Negative  Cardiovascular: Negative  Gastrointestinal: Negative  Mild abdominal pain at FT site  Endocrine: Negative  Genitourinary: Negative  Musculoskeletal: Negative  Skin: Negative  Allergic/Immunologic: Negative  Neurological: Negative  Hematological: Negative  Psychiatric/Behavioral: Negative  All other systems reviewed and are negative        Active Problem List     Patient Active Problem List   Diagnosis    Major depressive disorder, recurrent, severe with psychotic features ([address] )    Essential hypertension    Uncomplicated alcohol dependence ([address] )    Hypokinesia of left ventricle    Bilateral lower extremity edema    Tachycardia    Back pain without sciatica    Elevated fasting glucose    Ambulatory dysfunction    Poor mobility    Overweight (BMI 25 0-29  9)    Vitamin D deficiency    Abnormal echocardiogram    Syncope    Chest pain    Suicidal ideation    Alcohol abuse    MDD (major depressive disorder)    COPD without exacerbation (HCC)    Dyslipidemia    Chronic pain    Acute blood loss anemia    Tobacco abuse    Inadequate oral nutritional intake    Duodenal ulcer    Anemia       Objective     Vitals: Wt:159 2Ibs  BP:131/65  ND:72  RR:16    Physical Exam   Constitutional: He is oriented to person, place, and time  He appears well-developed and well-nourished  No distress  HENT:   Head: Normocephalic and atraumatic  Right Ear: External ear normal    Left Ear: External ear normal    Nose: Nose normal    Mouth/Throat: Oropharynx is clear and moist  No oropharyngeal exudate  Eyes: Pupils are equal, round, and reactive to light  Conjunctivae and EOM are normal  Right eye exhibits no discharge  Left eye exhibits no discharge  No scleral icterus  Neck: Normal range of motion  Neck supple  Cardiovascular: Normal rate, regular rhythm and normal heart sounds  Exam reveals no gallop and no friction rub  No murmur heard  Pulmonary/Chest: Effort normal and breath sounds normal  No stridor  No respiratory distress  He has no wheezes  He has no rales  He exhibits no tenderness  Abdominal: Soft  Bowel sounds are normal  He exhibits no distension and no mass  There is no tenderness  There is no rebound and no guarding  No hernia  TF is in place    Genitourinary:   Genitourinary Comments: deferred   Musculoskeletal: Normal range of motion  He exhibits no edema, tenderness or deformity  Lymphadenopathy:     He has no cervical adenopathy  Neurological: He is alert and oriented to person, place, and time  Skin: Skin is warm and dry  No rash noted  He is not diaphoretic  No erythema  No pallor  Didn't examine sacral area  Mid abdominal site incision line I/C/D with top of wound with mild drainage    Psychiatric: He has a normal mood and affect  His behavior is normal    Nursing note and vitals reviewed  Pertinent Laboratory/Diagnostic Studies:  2020 CBC, CMP    Current Medications   Medication list in facility chart was reviewed and script for all meds written       Assessment and Plan   Ambulatory dysfunction  Improving with therapy, will be DC home on 3/12 to continue with PT/OT RNat home to improve gait, endurance, transfer, strength and ADLS, and med management  Script for all meds written  Referral for [facility] written  Back pain without sciatica  Well controlled with prn oxycodone, script for 30 pills was sent electronically to his pharmacy  Duodenal ulcer  S/P surgery, f/u with surgeon on 3/20/2020, awaiting for pt's surgeon office call for further instruction and plan on his TF  For now son will be educated on how to flush it, pt is on pantoprazole  Needs daily wound care of surgical site  COPD without exacerbation ([facility] )  No exacerbation, will continue with inhaler  Essential hypertension  BP is stable with Metoprolol  Major depressive disorder, recurrent, severe with psychotic features ([address] )  Stable with Gabapentin, Remeron, Cymbalta, and Seroquel  Needs f/u with his PCP, next f/u is on 3/19/2020    Anemia  Anemia of chronic disease, last H&H stable in mid Feb, needs close f/u  Tobacco abuse  On weaning dose of nicotine patch  Total time spent was 40 mins with more than 50% of time spent on counseling and coordinating care and on discharge plan and discharge arrangement, referral to [facility] for PT/OT/RN written, face to face completed, and script for all meds written, script for 30 pills of oxycodone was sent to pt's pharmacy     Name: Jeannie Tsai  : 1950  MRN: [id number]  DOS: 3/10/2020  BILLING CODE: [id number]    Diagnoses:   Diagnosis ICD-10-CM Associated Orders   1  Pain R52 oxyCODONE (ROXICODONE) 5 mg immediate release tablet   2  Ambulatory dysfunction R26 2    3  Back pain without sciatica M54 89    4  Duodenal ulcer K26 9    5  COPD without exacerbation ([facility] ) J44 9    6  Essential hypertension I10    7  Major depressive disorder, recurrent, severe with psychotic features ([facility] ) F33 3    8  Anemia due to other cause, not classified D64 89    9   Tobacco abuse Z72 0          Huang Flanagan MD  1/12/587664:92 AM

## 2020-03-10 NOTE — ASSESSMENT & PLAN NOTE
S/P surgery, f/u with surgeon on 3/20/2020, awaiting for pt's surgeon office call for further instruction and plan on his TF  For now son will be educated on how to flush it, pt is on pantoprazole  Needs daily wound care of surgical site

## 2020-03-10 NOTE — ASSESSMENT & PLAN NOTE
Improving with therapy, will be DC home on 3/12 to continue with PT/OT RNat home to improve gait, endurance, transfer, strength and ADLS, and med management  Script for all meds written  Referral for [facility] written

## 2020-03-10 NOTE — ASSESSMENT & PLAN NOTE
Stable with Gabapentin, Remeron, Cymbalta, and Seroquel   Needs f/u with his PCP, next f/u is on 3/19/2020

## 2020-03-12 ENCOUNTER — TELEPHONE (OUTPATIENT)
Dept: GERIATRICS | Age: 70
End: 2020-03-12

## 2020-03-12 DIAGNOSIS — R52 PAIN: Primary | ICD-10-CM

## 2020-03-12 RX ORDER — OXYCODONE HYDROCHLORIDE 5 MG/1
5 TABLET ORAL EVERY 4 HOURS PRN
Qty: 30 TABLET | Refills: 0 | Status: SHIPPED | OUTPATIENT
Start: 2020-03-12 | End: 2020-11-03

## 2020-03-12 NOTE — TELEPHONE ENCOUNTER
883 Mahsa Bhanu left voicemail message ([phone])  Did not yet receive the expected 3-10-20 prescription for oxyCODONE (ROXICODONE) 5 mg immediate release tablet [[id number]]

## 2020-03-17 ENCOUNTER — TELEPHONE (OUTPATIENT)
Dept: FAMILY MEDICINE CLINIC | Facility: CLINIC | Age: 70
End: 2020-03-17

## 2020-03-17 NOTE — TELEPHONE ENCOUNTER
Per visiting nurse- toe nail grew into toe and dug a hole- has been red, they have been monitoring it and the redness is getting worse  Deshawn Flash she she is going to tiger text you a picture of his toe to see  She also stated they have another nurse going out today to check on his toe to make sure the redness is not spreading  Carolyn Schroeder is requesting foam for in between his toes to help with the rubbing and redness  I explained to Carolyn Schroeder that we do not have the tools in the office to remove his toenail or help lift it so it grow correctly  I told her that pt should make an appt with podiatry ASAP  Carolyn Schroeder stated the pt will most likely not go but she will advise him to call to make an appt

## 2020-03-17 NOTE — TELEPHONE ENCOUNTER
Called Su Elliott and she gave me sons Jean Marie Hansen- [phone]  I called and spoke to Rupinder Whitman and he stated he would prefer not to take his dad out today due to him get very tired after doing a lot and he has home care, and OT coming today  I explained to son that if the toe is bad he should come in today  Son stated he doesn't think that he needs to come in today he can wait for his TCM appt 3/19/20

## 2020-03-17 NOTE — TELEPHONE ENCOUNTER
Latoya Estrella from [facility] called to see if provider is willing to sign orders  Orders will be faxed today for review at patients appt tomorrow

## 2020-03-17 NOTE — TELEPHONE ENCOUNTER
Here's a problem- pt has not been in to our office since November last year, so no homecare orders can be issued by me until I see him  He is on my schedule for 03/17- can we get him in to my open spot today?  Please also notify the homecare nurse of this fact

## 2020-03-17 NOTE — TELEPHONE ENCOUNTER
Devang Hutchinson visiting nurse left message on machine on 3/16 @4:58 [phone]    He has an open area on his right 4th toe pinky toenail digging in area red wants to know what kind of wound care he can have also told him he can call his family dr or podiatry

## 2020-03-19 ENCOUNTER — OFFICE VISIT (OUTPATIENT)
Dept: FAMILY MEDICINE CLINIC | Facility: CLINIC | Age: 70
End: 2020-03-19
Payer: MEDICARE

## 2020-03-19 VITALS
TEMPERATURE: 98.2 F | RESPIRATION RATE: 17 BRPM | WEIGHT: 154 LBS | HEART RATE: 102 BPM | BODY MASS INDEX: 23.42 KG/M2 | SYSTOLIC BLOOD PRESSURE: 144 MMHG | DIASTOLIC BLOOD PRESSURE: 86 MMHG | OXYGEN SATURATION: 97 %

## 2020-03-19 DIAGNOSIS — Z98.890 HISTORY OF EXPLORATORY LAPAROTOMY: ICD-10-CM

## 2020-03-19 DIAGNOSIS — F10.20 UNCOMPLICATED ALCOHOL DEPENDENCE (HCC): ICD-10-CM

## 2020-03-19 DIAGNOSIS — S91.109A OPEN TOE WOUND, INITIAL ENCOUNTER: ICD-10-CM

## 2020-03-19 DIAGNOSIS — K26.6 PERFORATED DUODENAL ULCER WITH HEMORRHAGE (HCC): ICD-10-CM

## 2020-03-19 DIAGNOSIS — J44.9 CHRONIC OBSTRUCTIVE PULMONARY DISEASE, UNSPECIFIED COPD TYPE (HCC): ICD-10-CM

## 2020-03-19 DIAGNOSIS — F20.9 SCHIZOPHRENIA, UNSPECIFIED TYPE (HCC): ICD-10-CM

## 2020-03-19 DIAGNOSIS — J44.9 COPD WITHOUT EXACERBATION (HCC): ICD-10-CM

## 2020-03-19 DIAGNOSIS — Z87.898 HISTORY OF SYNCOPE: ICD-10-CM

## 2020-03-19 DIAGNOSIS — I10 ESSENTIAL HYPERTENSION: ICD-10-CM

## 2020-03-19 DIAGNOSIS — T81.49XA SURGICAL WOUND INFECTION: ICD-10-CM

## 2020-03-19 DIAGNOSIS — Z74.09 POOR MOBILITY: ICD-10-CM

## 2020-03-19 DIAGNOSIS — Z87.19 HISTORY OF GI BLEED: ICD-10-CM

## 2020-03-19 DIAGNOSIS — IMO0001 TRANSITION OF CARE PERFORMED WITH SHARING OF CLINICAL SUMMARY: Primary | ICD-10-CM

## 2020-03-19 DIAGNOSIS — F33.3 MAJOR DEPRESSIVE DISORDER, RECURRENT, SEVERE WITH PSYCHOTIC FEATURES (HCC): Chronic | ICD-10-CM

## 2020-03-19 DIAGNOSIS — R26.2 AMBULATORY DYSFUNCTION: ICD-10-CM

## 2020-03-19 DIAGNOSIS — R00.0 TACHYCARDIA: ICD-10-CM

## 2020-03-19 DIAGNOSIS — L60.2 LONG TOENAIL: ICD-10-CM

## 2020-03-19 DIAGNOSIS — Z86.59 HISTORY OF SUICIDAL IDEATION: ICD-10-CM

## 2020-03-19 PROBLEM — R07.9 CHEST PAIN: Status: RESOLVED | Noted: 2020-01-08 | Resolved: 2020-03-19

## 2020-03-19 PROCEDURE — 99496 TRANSJ CARE MGMT HIGH F2F 7D: CPT | Performed by: FAMILY MEDICINE

## 2020-03-19 RX ORDER — ALBUTEROL SULFATE 90 UG/1
2 AEROSOL, METERED RESPIRATORY (INHALATION) EVERY 6 HOURS PRN
Qty: 1 INHALER | Refills: 5 | Status: SHIPPED | OUTPATIENT
Start: 2020-03-19 | End: 2020-03-23 | Stop reason: CLARIF

## 2020-03-19 RX ORDER — CEPHALEXIN 500 MG/1
500 CAPSULE ORAL EVERY 6 HOURS SCHEDULED
Qty: 28 CAPSULE | Refills: 0 | Status: SHIPPED | OUTPATIENT
Start: 2020-03-19 | End: 2020-03-26

## 2020-03-19 NOTE — PROGRESS NOTES
Assessment/Plan:          Diagnoses and all orders for this visit:    Transition of care performed with sharing of clinical summary    Surgical wound infection  -     cephalexin (KEFLEX) 500 mg capsule; Take 1 capsule (500 mg total) by mouth every 6 (six) hours for 7 days    History of GI bleed    History of exploratory laparotomy    Perforated duodenal ulcer with hemorrhage ([facility] )  Comments:  treated, s/p expl lap     Open toe wound, initial encounter  Comments:  due to poor foot care/uncontrolled growth of toenails- pt has been in nursing facility for several weeks, prior in hospital, back home now son will bring to pod  Orders:  -     cephalexin (KEFLEX) 500 mg capsule; Take 1 capsule (500 mg total) by mouth every 6 (six) hours for 7 days    History of syncope    Schizophrenia, unspecified type ([facility] )  Comments:  hospitalization was for psych reasons initially, then complicated by finding of perf duod ulcer with GI bleed, needed acute phys rehab at d/c from hosp    Major depressive disorder, recurrent, severe with psychotic features ([facility] )  Comments:  managed by psych, cpm    History of suicidal ideation    Chronic obstructive pulmonary disease, unspecified COPD type ([facility] )  Comments:  baseline, cpm  Orders:  -     Discontinue: albuterol (PROVENTIL HFA,VENTOLIN HFA) 90 mcg/act inhaler; Inhale 2 puffs every 6 (six) hours as needed for wheezing or shortness of breath    Tachycardia  Comments:  very mild, new finding on exam today, asympomatic, monitor    Essential hypertension  Comments:  cpm    Uncomplicated alcohol dependence ([facility] )  Comments:  controlled currently, cpm    Ambulatory dysfunction  Comments:  cpm    Poor mobility    COPD without exacerbation ([facility] )    Long toenail  Comments:  possible home visit podiatrist         Subjective:  Chief Complaint   Patient presents with    Transition of Care Management     Discharged from [facility] on March 12  Feeling "okay" since discharged, lack of appetitte    Unable to fully review medication list, unsure what is currently being taking and no list present     Medication Refill     Would like a refill of Oxycodone  Patient ID: Manny Mott is a 81 y o  male  Per c/c, RUSLAN and hosp notes reviewed by me  Initially admitted for suicidal ideation and syncope, then found to have a duodenal perforation and an upper gastrointestinal bleed; IR performed embolization of the gastroduodenal artery; General surgery performed exploratory laparotomy with GJ tube placement 1/11/20; then 01/27/2020 IR drained lesser sac abscess; he was sent to [facility] for acute phys rehab    Pt d/c back home 03/14, here with his son today, with whom pt lives  See also telephone note docum from [facility] regarding toe wound discovered by her at visit 2 days ago  Was in [facility] for 3 weeks to a month  Constipated from the opioids per son, no laxative on med list from [facility], but they report they have whole jar full at all   abd wound area of concern last 2-3 days per son, has small opening near top and yellowish drainage; [facility] due today  No fevers or chills  Remains w/c bound as at previous visits      Eileen Mcclain    3/17/20 7:32 AM   Note   Keyanna Ramirze visiting nurse left message on machine on 3/16 @4:58 [phone] has an open area on his right 4th toe pinky toenail digging in area red wants to know what kind of wound care he can have also told him he can call his family dr or podiatry    3/17/20 7:32 AM   Eileen Mcclain routed this conversation to [address] [place] [address]    3/17/20 7:53 AM   Note    Per visiting nurse- toe nail grew into toe and dug a hole- has been red, they have been monitoring it and the redness is getting worse  Theta Bonds she she is going to tiger text you a picture of his toe to see   She also stated they have another nurse going out today to check on his toe to make sure the redness is not spreading    Darline is requesting foam for in between his toes to help with the rubbing and redness     I explained to Reinaldo Fadia that we do not have the tools in the office to remove his toenail or help lift it so it grow correctly  I told her that pt should make an appt with podiatry ASAP    Anneliese Cooper stated the pt will most likely not go but she will advise him to call to make an appt             /8/2020 - 2/11/2020 (34 days)  [address] [facility]  Admission Date: 1/8/2020    Discharge Date: 2/11/2020   Admitting Diagnosis: Suicidal ideation (R45 851)  Dizziness (R42)  Alcohol abuse (F10 10)  Syncope (R55)  Chest pain (R07 9)   Discharge Diagnosis: Perforated duodenal ulcer complicated by midline surgical site infection   Attending and Service: Dr Diane Franks, Acute Care Surgical Services    Consulting Team(s): Interventional radiology, gastroenterology, psychiatry, neurology, podiatry   Imaging and Procedures Performed:   Orders Placed This Encounter  Procedures  [facility]   1/11 EGD, duodenal ulcer clipping, injection, hemospray  1/11 GDA embolization - IR  1/11 Exploratory laparotomy, closure of tissue over 4cm 1st portion duodenal perforation, Thal patch with colon, GJ placement - Dr Diane Franks  1/27 Lesser sack drainage with drain placement - IR   Pathology: None   Secondary to complexity of patient hospital course and prolonged hospital stay; recommend close follow-up and thorough chart review of patient hospital course prior to re-evaluating patient as an outpatient  Hospital Course: Reba Ferguson is a 57-year-old male with PMH of HTN, HLD, stroke, COPD, alcohol and tobacco abuse who initially presented after syncope episode and suicidal ideation  He was evaluated by psychiatry and during course of admission and was determined to have resolution of suicidal ideation   GI was initially consulted and an EGD was performed on 1/11/2020 during which the patient was found to have a duodenal perforation and an upper gastrointestinal bleed and IR was consulted for embolization of the gastroduodenal artery  General surgery was consulted and risks and benefits were discussed with the patient's POA and it was agreed to proceed with exploratory laparotomy with GJ tube placement  Patient was taken to the OR on 1/11/20 for  exploratory laparotomy with GJ tube placement was taken to PACU in stable condition and then transferred to the ICU postoperatively  Patient in the ICU did well he was closely monitored and his condition slowly improved to stable for discharge to the floor   Joshua Lion was also noted to have on 01/27/2020 s/p IR drainage of lesser sac abscess with delayed gastric emptying  He would then go on on 01/30/2020 to have an IR drain check  Drain was placed to continue to monitor output of abscess  His endpoints remained stable and the patient progressed with PT and OT to be recommended to rehab  He continued with a wound manager status post EC fistula formation  Wound care was provided for patient  Clinical condition improved    On discharge, the patient is instructed to follow-up with the patient's primary care provider within the next 2 weeks to review the events of the recent hospitalization  The patient is instructed to follow-up with the Acute Care Surgical Services as scheduled on 02/17/2020 at 8:30 a m    The patient is instructed to follow the provided discharge instructions     On discharge, the patient's hospital course was reviewed with the primary care provider as well as incidental findings were reviewed with both the primary care provider in [address] [place] [address]  Patient instructions were thoroughly reviewed    He was continue on his current medication regimen that was given in the hospital    Condition at Discharge: fair    Discharge instructions/Information to patient and family:   See after visit summary for information provided to patient and family     Genevieve Lee for Follow-Up Care:  See after visit summary for information related to follow-up care and any pertinent home health orders      Disposition: [facility] Readmission: Yes pending outpatient management                   Review of Systems   Constitutional: Positive for fatigue  Negative for appetite change, chills, diaphoresis, fever and unexpected weight change  HENT: Negative for mouth sores, nosebleeds, sore throat, trouble swallowing and voice change  Eyes: Negative  Respiratory: Positive for shortness of breath (chronic, unchanged)  Negative for apnea, choking, chest tightness, wheezing and stridor  Cardiovascular: Negative  Gastrointestinal: Negative for abdominal distention, abdominal pain, anal bleeding, blood in stool, diarrhea, nausea, rectal pain and vomiting  Endocrine: Negative  Genitourinary: Negative for decreased urine volume, difficulty urinating, dysuria, flank pain, frequency and hematuria  Skin: Negative  Neurological: Negative for dizziness, tremors, seizures, syncope, facial asymmetry, speech difficulty, light-headedness, numbness and headaches  Hematological: Negative  Objective:     Physical Exam   Constitutional: He appears well-developed  He is cooperative  Non-toxic appearance  He has a sickly appearance  No distress  Appears much older than stated age, sits in w/c for entire visit   HENT:   Head: Normocephalic and atraumatic  Mouth/Throat: Uvula is midline, oropharynx is clear and moist and mucous membranes are normal    Eyes: Pupils are equal, round, and reactive to light  Conjunctivae and lids are normal    Neck: Trachea normal  Neck supple  No JVD present  No thyroid mass and no thyromegaly present  Cardiovascular: Normal rate, regular rhythm and normal heart sounds  Pulmonary/Chest: Effort normal  No stridor  He has decreased breath sounds in the right lower field and the left lower field  He has no wheezes  He has no rhonchi  He has no rales  Abdominal: Soft   Bowel sounds are normal  He exhibits no distension, no abdominal bruit and no mass  There is no hepatosplenomegaly  There is no tenderness  Musculoskeletal:        Feet:    Lymphadenopathy:     He has no cervical adenopathy  Right: No supraclavicular adenopathy present  Left: No supraclavicular adenopathy present  Neurological: He is alert  He displays no atrophy and no tremor  No cranial nerve deficit  Skin: Skin is warm and dry  He is not diaphoretic  No cyanosis  There is pallor  No sacral wounds, drain tubes in place   Psychiatric: He has a normal mood and affect  His behavior is normal  Thought content normal  Cognition and memory are impaired  Nursing note and vitals reviewed  Vitals:    03/19/20 0945   BP: 144/86   BP Location: Left arm   Patient Position: Sitting   Pulse: 102   Resp: 17   Temp: 98 2 °F (36 8 °C)   TempSrc: Tympanic   SpO2: 97%   Weight: 69 9 kg (154 lb)       Transitional Care Management Review:  Reba Ferguson is a 81 y o  male here for TCM follow up       During the TCM phone call patient stated:    TCM Call (since 2/24/2020)     None      TCM Call (since 2/24/2020)     None          Teja Pelayo DO

## 2020-03-23 ENCOUNTER — TELEPHONE (OUTPATIENT)
Dept: FAMILY MEDICINE CLINIC | Facility: CLINIC | Age: 70
End: 2020-03-23

## 2020-03-23 DIAGNOSIS — J44.9 CHRONIC OBSTRUCTIVE PULMONARY DISEASE, UNSPECIFIED COPD TYPE (HCC): Primary | ICD-10-CM

## 2020-03-23 RX ORDER — ALBUTEROL SULFATE 90 UG/1
2 AEROSOL, METERED RESPIRATORY (INHALATION) EVERY 6 HOURS PRN
Qty: 1 INHALER | Refills: 1 | Status: SHIPPED | OUTPATIENT
Start: 2020-03-23 | End: 2021-04-09 | Stop reason: SDUPTHER

## 2020-03-23 NOTE — TELEPHONE ENCOUNTER
Fax received from [facility] stating Albuterol HFA inhaler is not listed on patients formulary  A temporary supply was given to patient  Alternative medications on formulary of ProAir and Levalbuterol HFA  Please advise on which alternative you would like for patient

## 2020-03-24 ENCOUNTER — TELEPHONE (OUTPATIENT)
Dept: FAMILY MEDICINE CLINIC | Facility: CLINIC | Age: 70
End: 2020-03-24

## 2020-03-24 DIAGNOSIS — S99.929S: ICD-10-CM

## 2020-03-24 DIAGNOSIS — S91.109A OPEN TOE WOUND, INITIAL ENCOUNTER: Primary | ICD-10-CM

## 2020-03-24 DIAGNOSIS — L60.2 LONG TOENAIL: ICD-10-CM

## 2020-03-24 NOTE — TELEPHONE ENCOUNTER
Called and spoke to patients daughter in law, [facility] confirmed that podiatry has never came to the home for the patient  Please place order for podiatry

## 2020-03-24 NOTE — TELEPHONE ENCOUNTER
Hailey Feliciano from [facility] called that son was contacting her regarding patients referral to Podiatrist      I do not see an order for Podiatry in chart  Ivette Chew would like us to contact son once this is being handled  Ivette Chew said patient has a sore on his toe and his toe nails are really long

## 2020-03-24 NOTE — TELEPHONE ENCOUNTER
Not sure if thinking of a different patient, but I think they had said at visit that they had a podiatrist that used to come to the home for him? ? Please check with son/patient - if not, then I'll order for our podiatry

## 2020-03-26 PROBLEM — Z87.898 HISTORY OF SYNCOPE: Status: ACTIVE | Noted: 2020-03-26

## 2020-03-26 PROBLEM — K26.6 PERFORATED DUODENAL ULCER WITH HEMORRHAGE (HCC): Status: ACTIVE | Noted: 2020-03-26

## 2020-03-26 PROBLEM — Z86.59 HISTORY OF SUICIDAL IDEATION: Status: ACTIVE | Noted: 2020-03-26

## 2020-03-26 PROBLEM — Z98.890 HISTORY OF EXPLORATORY LAPAROTOMY: Status: ACTIVE | Noted: 2020-03-26

## 2020-03-26 PROBLEM — J44.9 CHRONIC OBSTRUCTIVE PULMONARY DISEASE (HCC): Status: ACTIVE | Noted: 2020-03-26

## 2020-03-26 PROBLEM — T81.49XA SURGICAL WOUND INFECTION: Status: ACTIVE | Noted: 2020-03-26

## 2020-03-26 PROBLEM — Z87.19 HISTORY OF GI BLEED: Status: ACTIVE | Noted: 2020-03-26

## 2020-03-26 PROBLEM — L60.2 LONG TOENAIL: Status: ACTIVE | Noted: 2020-03-26

## 2020-03-26 PROBLEM — S91.109A OPEN TOE WOUND, INITIAL ENCOUNTER: Status: ACTIVE | Noted: 2020-03-26

## 2020-03-26 PROBLEM — R45.851 SUICIDAL IDEATION: Status: RESOLVED | Noted: 2020-01-08 | Resolved: 2020-03-26

## 2020-03-27 ENCOUNTER — TELEPHONE (OUTPATIENT)
Dept: SURGERY | Facility: CLINIC | Age: 70
End: 2020-03-27

## 2020-03-31 ENCOUNTER — TELEPHONE (OUTPATIENT)
Dept: FAMILY MEDICINE CLINIC | Facility: CLINIC | Age: 70
End: 2020-03-31

## 2020-03-31 NOTE — TELEPHONE ENCOUNTER
Called General Surgery and spoke with Raphael Cabrales  Described to Raphael Cabrales what physical therapist told office about tube  Raphael Cabrales states that patient should come in for evaluation for possible infection  Appointment scheduled for April 1 at 10 am    Called patients daughter in [facility], and informed her of appointment  [place] understood and states that patient will be at appointment

## 2020-03-31 NOTE — TELEPHONE ENCOUNTER
Roopa Og called to report BP is high and it is  160/90    Also his stomach tube is very uncomfortable for him and draining coming around the tube, it is dark brownish/greenish

## 2020-03-31 NOTE — TELEPHONE ENCOUNTER
Called and spoke with Cami Goff is a phsyical therapist working with patient  Cami Goff states one concern of hers today was patients blood pressure  Today reading was 160 90  Cami Goff states that she knows patients blood pressure runs a little higher than normal but needs to inform provider  Patient denies and chest pain, arm pain, shortness of breath or dizziness  Second concern was patients GJ tube  Patient states that it is bothersome today  More drainage than normal  Asked if general surgery was notified Cami Goff states that patients homecare nurse was notified as she has been seeing what area has been looking like  Tube was to be removed last week but patient did not go to the appointment  Review of chart does show that general surgery was contacted yesterday

## 2020-03-31 NOTE — TELEPHONE ENCOUNTER
Please call general surgeon's office and make them aware of the report by physical therapist of the dark brown/green drainage coming around the tube

## 2020-04-01 ENCOUNTER — TELEPHONE (OUTPATIENT)
Dept: SURGERY | Facility: CLINIC | Age: 70
End: 2020-04-01

## 2020-04-03 ENCOUNTER — CONSULT (OUTPATIENT)
Dept: SURGERY | Facility: CLINIC | Age: 70
End: 2020-04-03

## 2020-04-03 VITALS
DIASTOLIC BLOOD PRESSURE: 104 MMHG | SYSTOLIC BLOOD PRESSURE: 152 MMHG | HEIGHT: 68 IN | HEART RATE: 64 BPM | TEMPERATURE: 96.7 F | BODY MASS INDEX: 23.42 KG/M2

## 2020-04-03 DIAGNOSIS — Z09 POSTOP CHECK: ICD-10-CM

## 2020-04-03 DIAGNOSIS — K26.6 PERFORATED DUODENAL ULCER WITH HEMORRHAGE (HCC): Primary | ICD-10-CM

## 2020-04-03 PROCEDURE — 4040F PNEUMOC VAC/ADMIN/RCVD: CPT | Performed by: SURGERY

## 2020-04-03 PROCEDURE — 1111F DSCHRG MED/CURRENT MED MERGE: CPT | Performed by: SURGERY

## 2020-04-03 PROCEDURE — 1160F RVW MEDS BY RX/DR IN RCRD: CPT | Performed by: SURGERY

## 2020-04-03 PROCEDURE — 3077F SYST BP >= 140 MM HG: CPT | Performed by: SURGERY

## 2020-04-03 PROCEDURE — 3080F DIAST BP >= 90 MM HG: CPT | Performed by: SURGERY

## 2020-04-03 PROCEDURE — 99024 POSTOP FOLLOW-UP VISIT: CPT | Performed by: SURGERY

## 2020-04-08 ENCOUNTER — TELEPHONE (OUTPATIENT)
Dept: GERIATRICS | Age: 70
End: 2020-04-08

## 2020-04-14 ENCOUNTER — TELEPHONE (OUTPATIENT)
Dept: FAMILY MEDICINE CLINIC | Facility: CLINIC | Age: 70
End: 2020-04-14

## 2020-04-16 DIAGNOSIS — Z86.73 HISTORY OF TRANSIENT ISCHEMIC ATTACK (TIA): ICD-10-CM

## 2020-04-16 DIAGNOSIS — F10.20 UNCOMPLICATED ALCOHOL DEPENDENCE (HCC): Chronic | ICD-10-CM

## 2020-04-16 DIAGNOSIS — F33.3 MAJOR DEPRESSIVE DISORDER, RECURRENT, SEVERE WITH PSYCHOTIC FEATURES (HCC): Chronic | ICD-10-CM

## 2020-04-16 DIAGNOSIS — Z72.89 ALCOHOL USE: ICD-10-CM

## 2020-04-16 DIAGNOSIS — E55.9 VITAMIN D DEFICIENCY: ICD-10-CM

## 2020-04-16 DIAGNOSIS — E78.5 HYPERLIPIDEMIA: ICD-10-CM

## 2020-04-17 ENCOUNTER — TELEMEDICINE (OUTPATIENT)
Dept: FAMILY MEDICINE CLINIC | Facility: CLINIC | Age: 70
End: 2020-04-17
Payer: MEDICARE

## 2020-04-17 DIAGNOSIS — Z72.0 TOBACCO ABUSE: ICD-10-CM

## 2020-04-17 DIAGNOSIS — J44.9 CHRONIC OBSTRUCTIVE PULMONARY DISEASE, UNSPECIFIED COPD TYPE (HCC): Primary | ICD-10-CM

## 2020-04-17 DIAGNOSIS — Z74.09 POOR MOBILITY: ICD-10-CM

## 2020-04-17 PROBLEM — L60.2 LONG TOENAIL: Status: RESOLVED | Noted: 2020-03-26 | Resolved: 2020-04-17

## 2020-04-17 PROCEDURE — 99442 PR PHYS/QHP TELEPHONE EVALUATION 11-20 MIN: CPT | Performed by: FAMILY MEDICINE

## 2020-04-17 RX ORDER — ATORVASTATIN CALCIUM 40 MG/1
40 TABLET, FILM COATED ORAL DAILY
Qty: 30 TABLET | Refills: 1 | Status: SHIPPED | OUTPATIENT
Start: 2020-04-17 | End: 2020-06-22 | Stop reason: SDUPTHER

## 2020-04-17 RX ORDER — DULOXETIN HYDROCHLORIDE 30 MG/1
90 CAPSULE, DELAYED RELEASE ORAL DAILY
Qty: 90 CAPSULE | Refills: 1 | OUTPATIENT
Start: 2020-04-17

## 2020-04-17 RX ORDER — QUETIAPINE FUMARATE 200 MG/1
200 TABLET, FILM COATED ORAL DAILY
Qty: 30 TABLET | Refills: 1 | OUTPATIENT
Start: 2020-04-17

## 2020-04-17 RX ORDER — FOLIC ACID 1 MG/1
1 TABLET ORAL DAILY
Qty: 90 TABLET | Refills: 1 | Status: SHIPPED | OUTPATIENT
Start: 2020-04-17 | End: 2020-08-14 | Stop reason: SDUPTHER

## 2020-04-17 RX ORDER — QUETIAPINE FUMARATE 300 MG/1
600 TABLET, FILM COATED ORAL
Qty: 60 TABLET | Refills: 1 | OUTPATIENT
Start: 2020-04-17

## 2020-05-27 DIAGNOSIS — J98.4 SMALL AIRWAYS DISEASE: ICD-10-CM

## 2020-05-27 DIAGNOSIS — J44.9 CHRONIC OBSTRUCTIVE PULMONARY DISEASE, UNSPECIFIED COPD TYPE (HCC): Primary | ICD-10-CM

## 2020-05-27 DIAGNOSIS — Z72.0 TOBACCO ABUSE: ICD-10-CM

## 2020-05-27 DIAGNOSIS — J18.1 CONSOLIDATION OF LEFT UPPER LOBE OF LUNG (HCC): ICD-10-CM

## 2020-06-22 DIAGNOSIS — Z86.73 HISTORY OF TRANSIENT ISCHEMIC ATTACK (TIA): ICD-10-CM

## 2020-06-22 DIAGNOSIS — I10 ESSENTIAL HYPERTENSION: ICD-10-CM

## 2020-06-22 DIAGNOSIS — E78.5 HYPERLIPIDEMIA: ICD-10-CM

## 2020-06-22 RX ORDER — METOPROLOL TARTRATE 50 MG/1
50 TABLET, FILM COATED ORAL EVERY 12 HOURS SCHEDULED
Qty: 60 TABLET | Refills: 1
Start: 2020-06-22 | End: 2020-08-14 | Stop reason: SDUPTHER

## 2020-06-22 RX ORDER — ATORVASTATIN CALCIUM 40 MG/1
40 TABLET, FILM COATED ORAL DAILY
Qty: 30 TABLET | Refills: 1 | Status: SHIPPED | OUTPATIENT
Start: 2020-06-22 | End: 2020-11-03 | Stop reason: SDUPTHER

## 2020-07-01 ENCOUNTER — TELEMEDICINE (OUTPATIENT)
Dept: FAMILY MEDICINE CLINIC | Facility: CLINIC | Age: 70
End: 2020-07-01
Payer: MEDICARE

## 2020-07-01 DIAGNOSIS — R23.3 SPONTANEOUS BRUISING: Primary | ICD-10-CM

## 2020-07-01 DIAGNOSIS — F10.20 UNCOMPLICATED ALCOHOL DEPENDENCE (HCC): Chronic | ICD-10-CM

## 2020-07-01 PROCEDURE — 4004F PT TOBACCO SCREEN RCVD TLK: CPT | Performed by: FAMILY MEDICINE

## 2020-07-01 PROCEDURE — 3077F SYST BP >= 140 MM HG: CPT | Performed by: FAMILY MEDICINE

## 2020-07-01 PROCEDURE — 4040F PNEUMOC VAC/ADMIN/RCVD: CPT | Performed by: FAMILY MEDICINE

## 2020-07-01 PROCEDURE — 99213 OFFICE O/P EST LOW 20 MIN: CPT | Performed by: FAMILY MEDICINE

## 2020-07-01 PROCEDURE — 1160F RVW MEDS BY RX/DR IN RCRD: CPT | Performed by: FAMILY MEDICINE

## 2020-07-01 PROCEDURE — 3080F DIAST BP >= 90 MM HG: CPT | Performed by: FAMILY MEDICINE

## 2020-07-01 NOTE — PROGRESS NOTES
Virtual Regular Visit      Assessment/Plan:    Problem List Items Addressed This Visit        Other    Uncomplicated alcohol dependence ([facility] [address] ) (Chronic)    Relevant Orders    Comprehensive metabolic panel    Spontaneous bruising - Primary    Relevant Orders    CBC and differential    Protime-INR    Comprehensive metabolic panel               Reason for visit is "marks on arms"    Chief Complaint   Patient presents with    Virtual Regular Visit    Virtual Regular Visit        Encounter provider Silvia Milner DO    Provider located at [address] [place] [address], [address]  [address] [facility] Aniyah Malave      Recent Visits  Date Type Provider Dept   07/01/20 Telemedicine Silvia Milner  [facility] recent visits within past 7 days and meeting all other requirements     Future Appointments  No visits were found meeting these conditions  Showing future appointments within next 150 days and meeting all other requirements        The patient was identified by name and date of birth  Fernando Schmitz was informed that this is a telemedicine visit and that the visit is being conducted through [employer] and patient was informed that this is not a secure, HIPAA-complaint platform  He agrees to proceed     My office door was closed  No one else was in the room  He acknowledged consent and understanding of privacy and security of the video platform  The patient has agreed to participate and understands they can discontinue the visit at any time  Patient is aware this is a billable service  Subjective  Fernando Schmitz is a 81 y o  male     "Got these marks all over my arm, black and red, they look a little better than they were, noticed 4-5 days ago, they don't hurt, they don't itch, never had them before"    HPI     Past Medical History:   Diagnosis Date    BPH (benign prostatic hyperplasia)     CVA (cerebral vascular accident) ([facility] [address] )     Encounter to establish care with new doctor 8/21/2019    Head injury     History of cerebrovascular accident (CVA) with residual deficit 10/1/2019    History of CVA (cerebrovascular accident) 1/8/2020    History of substance abuse ([facility] [address] ) 8/21/2019    Polysubstance     History of sustained ventricular tachycardia 8/21/2019    History of transient ischemic attack (TIA) 8/28/2019    Hypertension     Metatarsal fracture     TIA (transient ischemic attack)        Past Surgical History:   Procedure Laterality Date    ABDOMINAL SURGERY      ANKLE SURGERY      BACK SURGERY      CT GUIDED [facility] DRAINAGE CATHETER PLACEMENT  1/27/2020    ELBOW SURGERY      IR VISCERAL ANGIOGRAPHY / INTERVENTION  1/11/2020    JOINT REPLACEMENT      KNEE SURGERY      LAPAROTOMY N/A 1/11/2020    Procedure: LAPAROTOMY EXPLORATORY,  OVERSEW  OF GASTRO DUODENAL ARTERY, THAL PATCH OF DUODENUM, VICKY GASTRO JEJUNOSTOMY TUBE;  Surgeon: Jane Mai DO;  [facility]: [facility];  Service: General    LIVER SURGERY         Current Outpatient Medications   Medication Sig Dispense Refill    albuterol (ProAir HFA) 90 mcg/act inhaler Inhale 2 puffs every 6 (six) hours as needed for wheezing 1 Inhaler 1    atorvastatin (LIPITOR) 40 mg tablet Take 1 tablet (40 mg total) by mouth daily 30 tablet 1    Cholecalciferol 25 MCG (1000 UT) tablet Take 1 tablet (1,000 Units total) by mouth daily 30 tablet 1    DULoxetine (CYMBALTA) 30 mg delayed release capsule Take 3 capsules (90 mg total) by mouth daily 90 capsule 1    fluticasone (FLONASE) 50 mcg/act nasal spray 2 sprays into each nostril daily 18 2 mL 2    fluticasone-vilanterol (BREO ELLIPTA) 100-25 mcg/inh inhaler Inhale 1 puff daily Rinse mouth after use   1 Inhaler 3    folic acid (FOLVITE) 1 mg tablet Take 1 tablet (1 mg total) by mouth daily 90 tablet 1    gabapentin (NEURONTIN) 300 mg capsule Take 1 capsule (300 mg total) by mouth 3 (three) times a day  0    guaiFENesin (MUCINEX) 600 mg 12 hr tablet Take 600 mg by mouth every 12 (twelve) hours as needed for cough      Heparin Sodium, Porcine, (HEPARIN, PORCINE,) 5,000 units/mL Inject 1 mL (5,000 Units total) under the skin every 8 (eight) hours 1 mL 0    metoprolol tartrate (LOPRESSOR) 50 mg tablet Take 1 tablet (50 mg total) by mouth every 12 (twelve) hours 60 tablet 1    mirtazapine (REMERON) 15 mg tablet Take 1 tablet (15 mg total) by mouth daily at bedtime 30 tablet 1    nicotine (NICODERM CQ) 14 mg/24hr TD 24 hr patch Place 1 patch on the skin daily 28 patch 0    oxyCODONE (ROXICODONE) 5 mg immediate release tablet Take 1 tablet (5 mg total) by mouth every 4 (four) hours as needed for moderate painMax Daily Amount: 30 mg 30 tablet 0    pantoprazole (PROTONIX) 40 mg tablet Take 1 tablet (40 mg total) by mouth 2 (two) times a day before meals  0    QUEtiapine (SEROquel) 200 mg tablet Take 1 tablet (200 mg total) by mouth daily 30 tablet 1    QUEtiapine (SEROquel) 300 mg tablet Take 2 tablets (600 mg total) by mouth daily at bedtime 60 tablet 1    thiamine 100 MG tablet Take 1 tablet (100 mg total) by mouth daily 90 tablet 1    traZODone (DESYREL) 50 mg tablet Take 1 tablet (50 mg total) by mouth daily at bedtime as needed for sleep 30 tablet 1    Wound Dressings (COMFEEL PLUS ULCER DRESSING) PADS Apply 1 each topically every other day 10 each 0     No current facility-administered medications for this visit  No Known Allergies    Review of Systems    Video Exam    There were no vitals filed for this visit  Physical Exam   Skin:        Clustered small what appear to be ecchymoses right upper forearm and 1-2 left upper forearm, no contusion or laceration, no scale or raised lesion        Bertin Valle was seen today for virtual regular visit and virtual regular visit  Diagnoses and all orders for this visit:    Spontaneous bruising  -     CBC and differential; Future  -     Protime-INR;  Future  -     Comprehensive metabolic panel; Future    Uncomplicated alcohol dependence (HCC)  -     Comprehensive metabolic panel; Future      As we were conducting visit, after HPI and exam, pt's video call cut out/disconnected; I attempted video calling back twice, then telephone to same number with no answer, then I telephoned pt's home phone and someone answered, then hung up  I printed orders for workup that I would have discussed with pt had the video call continued or had he answered any attempted callbacks    I spent 8 minutes directly with the patient during this visit      VIRTUAL VISIT DISCLAIMER    Dakota Escalona acknowledges that he has consented to an online visit or consultation  He understands that the online visit is based solely on information provided by him, and that, in the absence of a face-to-face physical evaluation by the physician, the diagnosis he receives is both limited and provisional in terms of accuracy and completeness  This is not intended to replace a full medical face-to-face evaluation by the physician  Dakota Escalona understands and accepts these terms

## 2020-07-02 ENCOUNTER — TELEMEDICINE (OUTPATIENT)
Dept: FAMILY MEDICINE CLINIC | Facility: CLINIC | Age: 70
End: 2020-07-02
Payer: MEDICARE

## 2020-07-02 DIAGNOSIS — R23.3 SPONTANEOUS BRUISING: Primary | ICD-10-CM

## 2020-07-02 PROCEDURE — 4004F PT TOBACCO SCREEN RCVD TLK: CPT | Performed by: FAMILY MEDICINE

## 2020-07-02 PROCEDURE — 4040F PNEUMOC VAC/ADMIN/RCVD: CPT | Performed by: FAMILY MEDICINE

## 2020-07-02 PROCEDURE — 3077F SYST BP >= 140 MM HG: CPT | Performed by: FAMILY MEDICINE

## 2020-07-02 PROCEDURE — 1160F RVW MEDS BY RX/DR IN RCRD: CPT | Performed by: FAMILY MEDICINE

## 2020-07-02 PROCEDURE — 3080F DIAST BP >= 90 MM HG: CPT | Performed by: FAMILY MEDICINE

## 2020-07-02 PROCEDURE — 99213 OFFICE O/P EST LOW 20 MIN: CPT | Performed by: FAMILY MEDICINE

## 2020-07-02 NOTE — PROGRESS NOTES
Virtual Regular Visit      Assessment/Plan:    Problem List Items Addressed This Visit        Other    Spontaneous bruising - Primary               Reason for visit is additional information from yesterday  Chief Complaint   Patient presents with    Virtual Regular Visit        Encounter provider Merline Rainwater, DO    Provider located at [address] [facility], [address]  [address] [facility] [address] [facility] [address]      Recent Visits  Date Type Provider Dept   07/01/20 Telemedicine Merline Rainwater, [address] [place], [place] [address] recent visits within past 7 days and meeting all other requirements     Today's Visits  Date Type Provider Dept   07/02/20 Telemedicine Merline Rainwater, DO [facility] [address] [facility] today's visits and meeting all other requirements     Future Appointments  No visits were found meeting these conditions  Showing future appointments within next 150 days and meeting all other requirements        The patient was identified by name and date of birth  Sadie Lopez was informed that this is a telemedicine visit and that the visit is being conducted through [facility] and patient was informed that this is not a secure, HIPAA-complaint platform  He agrees to proceed     My office door was closed  No one else was in the room  He acknowledged consent and understanding of privacy and security of the video platform  The patient has agreed to participate and understands they can discontinue the visit at any time  Patient is aware this is a billable service  Subjective  Sadie Lopez is a 81 y o  male      Was having problem with his phone yesterday, so unable to advise him of assessment and plan, he has no new problems today, states he has trouble getting transportation to get testing done, because his son and DIL work and he does not drive    HPI     Past Medical History:   Diagnosis Date    BPH (benign prostatic hyperplasia)     CVA [facility]cerebral vascular accident) [facility])     Encounter to establish care with new doctor 8/21/2019    Head injury     History of cerebrovascular accident (CVA) with residual deficit 10/1/2019    History of CVA (cerebrovascular accident) 1/8/2020    History of substance abuse ([address] ) 8/21/2019    Polysubstance     History of sustained ventricular tachycardia 8/21/2019    History of transient ischemic attack (TIA) 8/28/2019    Hypertension     Metatarsal fracture     TIA (transient ischemic attack)        Past Surgical History:   Procedure Laterality Date    ABDOMINAL SURGERY      ANKLE SURGERY      BACK SURGERY      CT GUIDED [facility] DRAINAGE CATHETER PLACEMENT  1/27/2020    ELBOW SURGERY      IR VISCERAL ANGIOGRAPHY / INTERVENTION  1/11/2020    JOINT REPLACEMENT      KNEE SURGERY      LAPAROTOMY N/A 1/11/2020    Procedure: LAPAROTOMY EXPLORATORY,  OVERSEW  OF GASTRO DUODENAL ARTERY, THAL PATCH OF DUODENUM, VICKY GASTRO JEJUNOSTOMY TUBE;  Surgeon: Samy Wiley DO;  [facility];  Service: General    LIVER SURGERY         Current Outpatient Medications   Medication Sig Dispense Refill    albuterol (ProAir HFA) 90 mcg/act inhaler Inhale 2 puffs every 6 (six) hours as needed for wheezing 1 Inhaler 1    atorvastatin (LIPITOR) 40 mg tablet Take 1 tablet (40 mg total) by mouth daily 30 tablet 1    Cholecalciferol 25 MCG (1000 UT) tablet Take 1 tablet (1,000 Units total) by mouth daily 30 tablet 1    DULoxetine (CYMBALTA) 30 mg delayed release capsule Take 3 capsules (90 mg total) by mouth daily 90 capsule 1    fluticasone (FLONASE) 50 mcg/act nasal spray 2 sprays into each nostril daily 18 2 mL 2    fluticasone-vilanterol (BREO ELLIPTA) 100-25 mcg/inh inhaler Inhale 1 puff daily Rinse mouth after use   1 Inhaler 3    folic acid (FOLVITE) 1 mg tablet Take 1 tablet (1 mg total) by mouth daily 90 tablet 1    gabapentin (NEURONTIN) 300 mg capsule Take 1 capsule (300 mg total) by mouth 3 (three) times a day  0    guaiFENesin (MUCINEX) 600 mg 12 hr tablet Take 600 mg by mouth every 12 (twelve) hours as needed for cough      metoprolol tartrate (LOPRESSOR) 50 mg tablet Take 1 tablet (50 mg total) by mouth every 12 (twelve) hours 60 tablet 1    mirtazapine (REMERON) 15 mg tablet Take 1 tablet (15 mg total) by mouth daily at bedtime 30 tablet 1    nicotine (NICODERM CQ) 14 mg/24hr TD 24 hr patch Place 1 patch on the skin daily 28 patch 0    oxyCODONE (ROXICODONE) 5 mg immediate release tablet Take 1 tablet (5 mg total) by mouth every 4 (four) hours as needed for moderate painMax Daily Amount: 30 mg 30 tablet 0    pantoprazole (PROTONIX) 40 mg tablet Take 1 tablet (40 mg total) by mouth 2 (two) times a day before meals  0    QUEtiapine (SEROquel) 200 mg tablet Take 1 tablet (200 mg total) by mouth daily 30 tablet 1    QUEtiapine (SEROquel) 300 mg tablet Take 2 tablets (600 mg total) by mouth daily at bedtime 60 tablet 1    thiamine 100 MG tablet Take 1 tablet (100 mg total) by mouth daily 90 tablet 1    traZODone (DESYREL) 50 mg tablet Take 1 tablet (50 mg total) by mouth daily at bedtime as needed for sleep 30 tablet 1    Heparin Sodium, Porcine, (HEPARIN, PORCINE,) 5,000 units/mL Inject 1 mL (5,000 Units total) under the skin every 8 (eight) hours 1 mL 0    Wound Dressings (COMFEEL PLUS ULCER DRESSING) PADS Apply 1 each topically every other day 10 each 0     No current facility-administered medications for this visit  No Known Allergies    Review of Systems   Constitutional: Negative for chills, diaphoresis, fever and unexpected weight change  HENT: Negative for mouth sores and nosebleeds  Genitourinary: Negative for decreased urine volume and hematuria  Hematological: Negative for adenopathy  Video Exam    There were no vitals filed for this visit  Physical Exam   Constitutional: He is oriented to person, place, and time  He appears well-developed  He is cooperative  Non-toxic appearance  He does not have a sickly appearance  He does not appear ill  No distress  HENT:   Head: Normocephalic and atraumatic  Mouth/Throat: Oropharynx is clear and moist and mucous membranes are normal    Eyes: Conjunctivae and lids are normal    Neck: Phonation normal    Pulmonary/Chest: Effort normal    Neurological: He is alert and oriented to person, place, and time  Skin: He is not diaphoretic  No cyanosis  No pallor  Unchanged ecchymotic lesions on arms   Psychiatric: He has a normal mood and affect  Praveen Francis was seen today for virtual regular visit  Diagnoses and all orders for this visit:    Spontaneous bruising  Comments:  staff will assist in contacting mobile lab to obtain bloodwork    f/u after testing       I spent 6 minutes directly with the patient during this visit      VIRTUAL VISIT DISCLAIMER    Lucita Cabrera acknowledges that he has consented to an online visit or consultation  He understands that the online visit is based solely on information provided by him, and that, in the absence of a face-to-face physical evaluation by the physician, the diagnosis he receives is both limited and provisional in terms of accuracy and completeness  This is not intended to replace a full medical face-to-face evaluation by the physician  Lucita Cabrera understands and accepts these terms

## 2020-07-14 ENCOUNTER — TELEPHONE (OUTPATIENT)
Dept: LAB | Facility: HOSPITAL | Age: 70
End: 2020-07-14

## 2020-07-17 ENCOUNTER — APPOINTMENT (OUTPATIENT)
Dept: LAB | Facility: HOSPITAL | Age: 70
End: 2020-07-17
Payer: MEDICARE

## 2020-07-17 DIAGNOSIS — F10.20 UNCOMPLICATED ALCOHOL DEPENDENCE (HCC): Chronic | ICD-10-CM

## 2020-07-17 DIAGNOSIS — I10 ESSENTIAL HYPERTENSION: ICD-10-CM

## 2020-07-17 DIAGNOSIS — J44.9 COPD (CHRONIC OBSTRUCTIVE PULMONARY DISEASE) (HCC): ICD-10-CM

## 2020-07-17 DIAGNOSIS — E78.5 HYPERLIPIDEMIA: ICD-10-CM

## 2020-07-17 DIAGNOSIS — Z11.59 NEED FOR HEPATITIS C SCREENING TEST: ICD-10-CM

## 2020-07-17 DIAGNOSIS — R23.3 SPONTANEOUS BRUISING: ICD-10-CM

## 2020-07-17 DIAGNOSIS — R73.01 ELEVATED FASTING GLUCOSE: ICD-10-CM

## 2020-07-17 DIAGNOSIS — R60.0 BILATERAL LOWER EXTREMITY EDEMA: ICD-10-CM

## 2020-07-17 DIAGNOSIS — Z12.5 PROSTATE CANCER SCREENING: ICD-10-CM

## 2020-07-17 LAB
ALBUMIN SERPL BCP-MCNC: 2.7 G/DL (ref 3.5–5)
ALP SERPL-CCNC: 77 U/L (ref 46–116)
ALT SERPL W P-5'-P-CCNC: 7 U/L (ref 12–78)
ANION GAP SERPL CALCULATED.3IONS-SCNC: 14 MMOL/L (ref 4–13)
AST SERPL W P-5'-P-CCNC: 39 U/L (ref 5–45)
BILIRUB SERPL-MCNC: 0.52 MG/DL (ref 0.2–1)
BUN SERPL-MCNC: 10 MG/DL (ref 5–25)
CALCIUM SERPL-MCNC: 9.1 MG/DL (ref 8.3–10.1)
CHLORIDE SERPL-SCNC: 103 MMOL/L (ref 100–108)
CHOLEST SERPL-MCNC: 99 MG/DL (ref 50–200)
CO2 SERPL-SCNC: 17 MMOL/L (ref 21–32)
CREAT SERPL-MCNC: 0.88 MG/DL (ref 0.6–1.3)
GFR SERPL CREATININE-BSD FRML MDRD: 89 ML/MIN/1.73SQ M
GLUCOSE SERPL-MCNC: 59 MG/DL (ref 65–140)
HDLC SERPL-MCNC: 62 MG/DL
INR PPP: 0.96 (ref 0.84–1.19)
LDLC SERPL CALC-MCNC: 26 MG/DL (ref 0–100)
NONHDLC SERPL-MCNC: 37 MG/DL
POTASSIUM SERPL-SCNC: 5 MMOL/L (ref 3.5–5.3)
PROT SERPL-MCNC: 7.5 G/DL (ref 6.4–8.2)
PROTHROMBIN TIME: 12.3 SECONDS (ref 11.6–14.5)
SODIUM SERPL-SCNC: 134 MMOL/L (ref 136–145)
TRIGL SERPL-MCNC: 53 MG/DL

## 2020-07-17 PROCEDURE — 85610 PROTHROMBIN TIME: CPT

## 2020-07-17 PROCEDURE — G0103 PSA SCREENING: HCPCS

## 2020-07-17 PROCEDURE — 80061 LIPID PANEL: CPT

## 2020-07-17 PROCEDURE — 36415 COLL VENOUS BLD VENIPUNCTURE: CPT

## 2020-07-17 PROCEDURE — 86803 HEPATITIS C AB TEST: CPT

## 2020-07-17 PROCEDURE — 80053 COMPREHEN METABOLIC PANEL: CPT

## 2020-07-18 LAB
HCV AB SER QL: ABNORMAL
PSA SERPL-MCNC: 2 NG/ML (ref 0–4)

## 2020-07-24 ENCOUNTER — TELEPHONE (OUTPATIENT)
Dept: FAMILY MEDICINE CLINIC | Facility: CLINIC | Age: 70
End: 2020-07-24

## 2020-07-24 DIAGNOSIS — R76.8 HEPATITIS C ANTIBODY TEST POSITIVE: Primary | ICD-10-CM

## 2020-07-24 DIAGNOSIS — R23.3 SPONTANEOUS BRUISING: ICD-10-CM

## 2020-07-24 DIAGNOSIS — F10.20 UNCOMPLICATED ALCOHOL DEPENDENCE (HCC): Chronic | ICD-10-CM

## 2020-07-27 NOTE — TELEPHONE ENCOUNTER
1  Not all the bloodwork was drawn- 2 tests remain active in chart  2   Was supposed to schedule an appt for routine f/u and to discuss results

## 2020-07-29 NOTE — TELEPHONE ENCOUNTER
Daughter stated they had a difficult getting blood for the testing so they didn't get enough  She has mobile number to set up appointment to have rest completed  I suggested that he drink plenty of water, that may help  Patient scheduled for August 14, 2020

## 2020-07-30 NOTE — TELEPHONE ENCOUNTER
[id number] Jackie Aguillon, thank you  Since it will be another 2 weeks before pt can get in for the f/u visit, please let pt/son/daughter-in-law know that his hepatitis c screen is positive, and I ordered viral titer testing to be obtained with the new blood draw (that they are doing for the previously ordered active labs for which they couldn't get enough blood)  Also, he will need to see liver specialist- order placed    Please advise there is very, very small chance of transmissal to family if toothbrushes or other oral items that could have microscopic blood on them are shared

## 2020-07-30 NOTE — TELEPHONE ENCOUNTER
Called and informed patients daughter in law  Patients daughter in law understood   Order for liver specialist mailed per request

## 2020-08-05 ENCOUNTER — APPOINTMENT (OUTPATIENT)
Dept: LAB | Facility: HOSPITAL | Age: 70
End: 2020-08-05
Payer: MEDICARE

## 2020-08-05 DIAGNOSIS — R76.8 HEPATITIS C ANTIBODY TEST POSITIVE: ICD-10-CM

## 2020-08-05 LAB
BASOPHILS # BLD AUTO: 0.03 THOUSANDS/ΜL (ref 0–0.1)
BASOPHILS NFR BLD AUTO: 1 % (ref 0–1)
EOSINOPHIL # BLD AUTO: 0.06 THOUSAND/ΜL (ref 0–0.61)
EOSINOPHIL NFR BLD AUTO: 1 % (ref 0–6)
ERYTHROCYTE [DISTWIDTH] IN BLOOD BY AUTOMATED COUNT: 15.3 % (ref 11.6–15.1)
EST. AVERAGE GLUCOSE BLD GHB EST-MCNC: 105 MG/DL
HBA1C MFR BLD: 5.3 %
HCT VFR BLD AUTO: 47.1 % (ref 36.5–49.3)
HGB BLD-MCNC: 14.8 G/DL (ref 12–17)
IMM GRANULOCYTES # BLD AUTO: 0.01 THOUSAND/UL (ref 0–0.2)
IMM GRANULOCYTES NFR BLD AUTO: 0 % (ref 0–2)
LYMPHOCYTES # BLD AUTO: 1.79 THOUSANDS/ΜL (ref 0.6–4.47)
LYMPHOCYTES NFR BLD AUTO: 34 % (ref 14–44)
MCH RBC QN AUTO: 31 PG (ref 26.8–34.3)
MCHC RBC AUTO-ENTMCNC: 31.4 G/DL (ref 31.4–37.4)
MCV RBC AUTO: 99 FL (ref 82–98)
MONOCYTES # BLD AUTO: 0.74 THOUSAND/ΜL (ref 0.17–1.22)
MONOCYTES NFR BLD AUTO: 14 % (ref 4–12)
NEUTROPHILS # BLD AUTO: 2.72 THOUSANDS/ΜL (ref 1.85–7.62)
NEUTS SEG NFR BLD AUTO: 50 % (ref 43–75)
NRBC BLD AUTO-RTO: 0 /100 WBCS
PLATELET # BLD AUTO: 243 THOUSANDS/UL (ref 149–390)
PMV BLD AUTO: 10.7 FL (ref 8.9–12.7)
RBC # BLD AUTO: 4.78 MILLION/UL (ref 3.88–5.62)
WBC # BLD AUTO: 5.35 THOUSAND/UL (ref 4.31–10.16)

## 2020-08-05 PROCEDURE — 36415 COLL VENOUS BLD VENIPUNCTURE: CPT

## 2020-08-05 PROCEDURE — 87522 HEPATITIS C REVRS TRNSCRPJ: CPT

## 2020-08-05 PROCEDURE — 85025 COMPLETE CBC W/AUTO DIFF WBC: CPT

## 2020-08-05 PROCEDURE — 83036 HEMOGLOBIN GLYCOSYLATED A1C: CPT

## 2020-08-09 LAB
HCV RNA SERPL NAA+PROBE-ACNC: NORMAL IU/ML
TEST INFORMATION: NORMAL

## 2020-08-14 ENCOUNTER — OFFICE VISIT (OUTPATIENT)
Dept: FAMILY MEDICINE CLINIC | Facility: CLINIC | Age: 70
End: 2020-08-14
Payer: MEDICARE

## 2020-08-14 VITALS
TEMPERATURE: 97.5 F | HEART RATE: 112 BPM | OXYGEN SATURATION: 100 % | SYSTOLIC BLOOD PRESSURE: 190 MMHG | BODY MASS INDEX: 21.98 KG/M2 | WEIGHT: 145 LBS | DIASTOLIC BLOOD PRESSURE: 110 MMHG | HEIGHT: 68 IN

## 2020-08-14 DIAGNOSIS — K26.9 DUODENAL ULCER: ICD-10-CM

## 2020-08-14 DIAGNOSIS — Z74.09 POOR MOBILITY: Primary | ICD-10-CM

## 2020-08-14 DIAGNOSIS — Z72.89 ALCOHOL USE: ICD-10-CM

## 2020-08-14 DIAGNOSIS — J44.9 COPD (CHRONIC OBSTRUCTIVE PULMONARY DISEASE) (HCC): ICD-10-CM

## 2020-08-14 DIAGNOSIS — M54.89 BACK PAIN WITHOUT SCIATICA: ICD-10-CM

## 2020-08-14 DIAGNOSIS — F33.3 MAJOR DEPRESSIVE DISORDER, RECURRENT, SEVERE WITH PSYCHOTIC FEATURES (HCC): Chronic | ICD-10-CM

## 2020-08-14 DIAGNOSIS — R09.81 NASAL CONGESTION: ICD-10-CM

## 2020-08-14 DIAGNOSIS — R52 PAIN: ICD-10-CM

## 2020-08-14 DIAGNOSIS — I10 ESSENTIAL HYPERTENSION: ICD-10-CM

## 2020-08-14 DIAGNOSIS — E55.9 VITAMIN D DEFICIENCY: ICD-10-CM

## 2020-08-14 DIAGNOSIS — F10.20 UNCOMPLICATED ALCOHOL DEPENDENCE (HCC): Chronic | ICD-10-CM

## 2020-08-14 DIAGNOSIS — R26.2 AMBULATORY DYSFUNCTION: ICD-10-CM

## 2020-08-14 LAB
CREAT UR-MCNC: 44.5 MG/DL
MICROALBUMIN UR-MCNC: <5 MG/L (ref 0–20)
MICROALBUMIN/CREAT 24H UR: <11 MG/G CREATININE (ref 0–30)

## 2020-08-14 PROCEDURE — 3080F DIAST BP >= 90 MM HG: CPT | Performed by: FAMILY MEDICINE

## 2020-08-14 PROCEDURE — 99214 OFFICE O/P EST MOD 30 MIN: CPT | Performed by: FAMILY MEDICINE

## 2020-08-14 PROCEDURE — 4004F PT TOBACCO SCREEN RCVD TLK: CPT | Performed by: FAMILY MEDICINE

## 2020-08-14 PROCEDURE — 82570 ASSAY OF URINE CREATININE: CPT | Performed by: FAMILY MEDICINE

## 2020-08-14 PROCEDURE — 3077F SYST BP >= 140 MM HG: CPT | Performed by: FAMILY MEDICINE

## 2020-08-14 PROCEDURE — 82043 UR ALBUMIN QUANTITATIVE: CPT | Performed by: FAMILY MEDICINE

## 2020-08-14 PROCEDURE — 1160F RVW MEDS BY RX/DR IN RCRD: CPT | Performed by: FAMILY MEDICINE

## 2020-08-14 PROCEDURE — 4040F PNEUMOC VAC/ADMIN/RCVD: CPT | Performed by: FAMILY MEDICINE

## 2020-08-14 RX ORDER — OXYCODONE HYDROCHLORIDE 5 MG/1
5 TABLET ORAL EVERY 4 HOURS PRN
Qty: 30 TABLET | Refills: 0 | OUTPATIENT
Start: 2020-08-14

## 2020-08-14 RX ORDER — FLUTICASONE FUROATE AND VILANTEROL 100; 25 UG/1; UG/1
1 POWDER RESPIRATORY (INHALATION) DAILY
Qty: 1 INHALER | Refills: 3 | Status: SHIPPED | OUTPATIENT
Start: 2020-08-14 | End: 2021-04-09 | Stop reason: SDUPTHER

## 2020-08-14 RX ORDER — METOPROLOL TARTRATE 50 MG/1
50 TABLET, FILM COATED ORAL EVERY 12 HOURS SCHEDULED
Qty: 60 TABLET | Refills: 1
Start: 2020-08-14 | End: 2020-11-03 | Stop reason: SDUPTHER

## 2020-08-14 RX ORDER — FLUTICASONE PROPIONATE 50 MCG
2 SPRAY, SUSPENSION (ML) NASAL DAILY
Qty: 18.2 ML | Refills: 2 | Status: SHIPPED | OUTPATIENT
Start: 2020-08-14 | End: 2021-04-09 | Stop reason: SDUPTHER

## 2020-08-14 RX ORDER — PANTOPRAZOLE SODIUM 40 MG/1
40 TABLET, DELAYED RELEASE ORAL
Refills: 0
Start: 2020-08-14 | End: 2020-11-03 | Stop reason: SDUPTHER

## 2020-08-14 RX ORDER — FOLIC ACID 1 MG/1
1 TABLET ORAL DAILY
Qty: 90 TABLET | Refills: 1 | Status: SHIPPED | OUTPATIENT
Start: 2020-08-14 | End: 2021-03-17 | Stop reason: SDUPTHER

## 2020-08-14 RX ORDER — TRAZODONE HYDROCHLORIDE 50 MG/1
50 TABLET ORAL
Qty: 30 TABLET | Refills: 1 | Status: SHIPPED | OUTPATIENT
Start: 2020-08-14

## 2020-08-14 NOTE — PROGRESS NOTES
Assessment/Plan:         Diagnoses and all orders for this visit:    Poor mobility  -     Ambulatory referral to Pain Management; Future    Essential hypertension  -     metoprolol tartrate (LOPRESSOR) 50 mg tablet; Take 1 tablet (50 mg total) by mouth every 12 (twelve) hours  -     Microalbumin / creatinine urine ratio    Nasal congestion  -     fluticasone (FLONASE) 50 mcg/act nasal spray; 2 sprays into each nostril daily    COPD (chronic obstructive pulmonary disease) ([facility] [address] )  Comments:  advised must quit smoking  Orders:  -     fluticasone-vilanterol (BREO ELLIPTA) 100-25 mcg/inh inhaler; Inhale 1 puff daily Rinse mouth after use  Back pain without sciatica  -     Ambulatory referral to Pain Management; Future    Ambulatory dysfunction  -     Ambulatory referral to Pain Management; Future          Subjective:   Chief Complaint   Patient presents with    Follow-up        Patient ID: Romain Parekh is a 81 y o  male      Routine f/u   has appt with liver specialist [place] [address] today that he thinks he threw out his metoprolol by mistake, so bp high      The following portions of the patient's history were reviewed and updated as appropriate: allergies, current medications, past family history, past medical history, past social history, past surgical history and problem list     Review of Systems      Objective:      BP (!) 190/110   Pulse (!) 112   Temp 97 5 °F (36 4 °C)   Ht 5' 8" (1 727 m)   Wt 65 8 kg (145 lb)   SpO2 100%   BMI 22 05 kg/m²          Physical Exam

## 2020-10-15 DIAGNOSIS — F33.3 MAJOR DEPRESSIVE DISORDER, RECURRENT, SEVERE WITH PSYCHOTIC FEATURES (HCC): Chronic | ICD-10-CM

## 2020-10-15 RX ORDER — TRAZODONE HYDROCHLORIDE 50 MG/1
50 TABLET ORAL
Qty: 30 TABLET | Refills: 1 | OUTPATIENT
Start: 2020-10-15

## 2020-10-23 ENCOUNTER — CLINICAL SUPPORT (OUTPATIENT)
Dept: FAMILY MEDICINE CLINIC | Facility: CLINIC | Age: 70
End: 2020-10-23
Payer: COMMERCIAL

## 2020-10-23 DIAGNOSIS — Z23 ENCOUNTER FOR IMMUNIZATION: ICD-10-CM

## 2020-10-23 PROCEDURE — G0008 ADMIN INFLUENZA VIRUS VAC: HCPCS

## 2020-10-23 PROCEDURE — 90662 IIV NO PRSV INCREASED AG IM: CPT

## 2020-11-03 ENCOUNTER — OFFICE VISIT (OUTPATIENT)
Dept: FAMILY MEDICINE CLINIC | Facility: CLINIC | Age: 70
End: 2020-11-03
Payer: COMMERCIAL

## 2020-11-03 VITALS
BODY MASS INDEX: 23.32 KG/M2 | WEIGHT: 140 LBS | SYSTOLIC BLOOD PRESSURE: 178 MMHG | HEART RATE: 100 BPM | DIASTOLIC BLOOD PRESSURE: 110 MMHG | TEMPERATURE: 97 F | OXYGEN SATURATION: 99 % | HEIGHT: 65 IN

## 2020-11-03 DIAGNOSIS — J44.9 CHRONIC OBSTRUCTIVE PULMONARY DISEASE, UNSPECIFIED COPD TYPE (HCC): ICD-10-CM

## 2020-11-03 DIAGNOSIS — Z72.0 TOBACCO ABUSE: ICD-10-CM

## 2020-11-03 DIAGNOSIS — I10 ESSENTIAL HYPERTENSION: ICD-10-CM

## 2020-11-03 DIAGNOSIS — Z98.890 HISTORY OF EXPLORATORY LAPAROTOMY: ICD-10-CM

## 2020-11-03 DIAGNOSIS — I70.0 AORTIC ATHEROSCLEROSIS (HCC): ICD-10-CM

## 2020-11-03 DIAGNOSIS — Z23 IMMUNIZATION DUE: ICD-10-CM

## 2020-11-03 DIAGNOSIS — K26.6 PERFORATED DUODENAL ULCER WITH HEMORRHAGE (HCC): ICD-10-CM

## 2020-11-03 DIAGNOSIS — K26.9 DUODENAL ULCER: ICD-10-CM

## 2020-11-03 DIAGNOSIS — Z00.00 MEDICARE ANNUAL WELLNESS VISIT, SUBSEQUENT: Primary | ICD-10-CM

## 2020-11-03 DIAGNOSIS — Z87.19 HISTORY OF DUODENAL ULCER: ICD-10-CM

## 2020-11-03 DIAGNOSIS — F33.3 MAJOR DEPRESSIVE DISORDER, RECURRENT, SEVERE WITH PSYCHOTIC FEATURES (HCC): Chronic | ICD-10-CM

## 2020-11-03 DIAGNOSIS — Z86.73 HISTORY OF TRANSIENT ISCHEMIC ATTACK (TIA): ICD-10-CM

## 2020-11-03 DIAGNOSIS — R20.9 COLD HANDS AND FEET: ICD-10-CM

## 2020-11-03 DIAGNOSIS — E78.5 HYPERLIPIDEMIA: ICD-10-CM

## 2020-11-03 DIAGNOSIS — Z87.19 HISTORY OF GI BLEED: ICD-10-CM

## 2020-11-03 PROBLEM — Z86.2 HISTORY OF ANEMIA: Status: ACTIVE | Noted: 2020-01-08

## 2020-11-03 PROCEDURE — 1125F AMNT PAIN NOTED PAIN PRSNT: CPT | Performed by: FAMILY MEDICINE

## 2020-11-03 PROCEDURE — G0009 ADMIN PNEUMOCOCCAL VACCINE: HCPCS

## 2020-11-03 PROCEDURE — 1170F FXNL STATUS ASSESSED: CPT | Performed by: FAMILY MEDICINE

## 2020-11-03 PROCEDURE — 90732 PPSV23 VACC 2 YRS+ SUBQ/IM: CPT

## 2020-11-03 PROCEDURE — 99214 OFFICE O/P EST MOD 30 MIN: CPT | Performed by: FAMILY MEDICINE

## 2020-11-03 PROCEDURE — G0439 PPPS, SUBSEQ VISIT: HCPCS | Performed by: FAMILY MEDICINE

## 2020-11-03 RX ORDER — HYDROXYZINE PAMOATE 50 MG/1
CAPSULE ORAL
COMMUNITY
Start: 2020-10-14

## 2020-11-03 RX ORDER — METOPROLOL TARTRATE 50 MG/1
50 TABLET, FILM COATED ORAL EVERY 12 HOURS SCHEDULED
Qty: 60 TABLET | Refills: 1 | Status: SHIPPED | OUTPATIENT
Start: 2020-11-03 | End: 2021-04-09 | Stop reason: SDUPTHER

## 2020-11-03 RX ORDER — ATORVASTATIN CALCIUM 40 MG/1
40 TABLET, FILM COATED ORAL DAILY
Qty: 30 TABLET | Refills: 1 | Status: SHIPPED | OUTPATIENT
Start: 2020-11-03 | End: 2021-03-17 | Stop reason: SDUPTHER

## 2020-11-03 RX ORDER — PANTOPRAZOLE SODIUM 40 MG/1
40 TABLET, DELAYED RELEASE ORAL
Refills: 0
Start: 2020-11-03 | End: 2021-03-17 | Stop reason: SDUPTHER

## 2020-11-03 RX ORDER — MIRTAZAPINE 15 MG/1
15 TABLET, FILM COATED ORAL
Qty: 30 TABLET | Refills: 1 | Status: SHIPPED | OUTPATIENT
Start: 2020-11-03 | End: 2021-04-09

## 2020-11-23 ENCOUNTER — TELEPHONE (OUTPATIENT)
Dept: VASCULAR SURGERY | Facility: CLINIC | Age: 70
End: 2020-11-23

## 2020-11-24 ENCOUNTER — CONSULT (OUTPATIENT)
Dept: VASCULAR SURGERY | Facility: CLINIC | Age: 70
End: 2020-11-24
Payer: COMMERCIAL

## 2020-11-24 VITALS
DIASTOLIC BLOOD PRESSURE: 70 MMHG | BODY MASS INDEX: 24.66 KG/M2 | HEIGHT: 65 IN | SYSTOLIC BLOOD PRESSURE: 174 MMHG | HEART RATE: 86 BPM | WEIGHT: 148 LBS

## 2020-11-24 DIAGNOSIS — F20.9 SCHIZOPHRENIA, UNSPECIFIED TYPE (HCC): ICD-10-CM

## 2020-11-24 DIAGNOSIS — Z86.73 HISTORY OF TRANSIENT ISCHEMIC ATTACK (TIA): ICD-10-CM

## 2020-11-24 DIAGNOSIS — I70.0 AORTIC ATHEROSCLEROSIS (HCC): ICD-10-CM

## 2020-11-24 DIAGNOSIS — E78.5 HYPERLIPIDEMIA: ICD-10-CM

## 2020-11-24 DIAGNOSIS — E78.5 DYSLIPIDEMIA: ICD-10-CM

## 2020-11-24 DIAGNOSIS — Z72.0 TOBACCO ABUSE: ICD-10-CM

## 2020-11-24 DIAGNOSIS — R20.9 COLD HANDS AND FEET: ICD-10-CM

## 2020-11-24 DIAGNOSIS — I73.00 RAYNAUD'S DISEASE WITHOUT GANGRENE: Primary | ICD-10-CM

## 2020-11-24 DIAGNOSIS — I10 ESSENTIAL HYPERTENSION: ICD-10-CM

## 2020-11-24 PROCEDURE — 1160F RVW MEDS BY RX/DR IN RCRD: CPT | Performed by: PHYSICIAN ASSISTANT

## 2020-11-24 PROCEDURE — 4040F PNEUMOC VAC/ADMIN/RCVD: CPT | Performed by: PHYSICIAN ASSISTANT

## 2020-11-24 PROCEDURE — 99214 OFFICE O/P EST MOD 30 MIN: CPT | Performed by: PHYSICIAN ASSISTANT

## 2020-11-24 PROCEDURE — 3078F DIAST BP <80 MM HG: CPT | Performed by: PHYSICIAN ASSISTANT

## 2020-11-24 PROCEDURE — 3008F BODY MASS INDEX DOCD: CPT | Performed by: PHYSICIAN ASSISTANT

## 2020-11-24 PROCEDURE — 4004F PT TOBACCO SCREEN RCVD TLK: CPT | Performed by: PHYSICIAN ASSISTANT

## 2020-11-24 PROCEDURE — 3077F SYST BP >= 140 MM HG: CPT | Performed by: PHYSICIAN ASSISTANT

## 2020-12-02 NOTE — SOCIAL WORK
OPTIONS paperwork was faxed to [facility]  Please get pa.  Current meds not woking and I think are making her worse.

## 2021-01-28 NOTE — SOCIAL WORK
Patient reviewed  CM awaiting medical clearance for discharge planning  PT/OT recommending rehab  Patient needs an accepting facility  CM will continue to follow for clearance  [Benefits of weight loss discussed] : Benefits of weight loss discussed [Encouraged to maintain food diary] : Encouraged to maintain food diary [Encouraged to increase physical activity] : Encouraged to increase physical activity [Encouraged to use exercise tracking device] : Encouraged to use exercise tracking device [Weigh Self Weekly] : weigh self weekly [Decrease Portions] : decrease portions

## 2021-02-04 ENCOUNTER — PATIENT OUTREACH (OUTPATIENT)
Dept: FAMILY MEDICINE CLINIC | Facility: CLINIC | Age: 71
End: 2021-02-04

## 2021-02-04 DIAGNOSIS — Z71.89 COMPLEX CARE COORDINATION: Primary | ICD-10-CM

## 2021-02-04 NOTE — PROGRESS NOTES
Outpatient Care Management Note:  RE: In person at practice when provider discussed that pt has not showed for today's visit but may need care management  Will place referral order

## 2021-02-05 ENCOUNTER — PATIENT OUTREACH (OUTPATIENT)
Dept: FAMILY MEDICINE CLINIC | Facility: CLINIC | Age: 71
End: 2021-02-05

## 2021-02-05 NOTE — PROGRESS NOTES
Outpatient Care Management Note:  RE: Called and spoke with pt as he was a no show at PCP appt yesterday  Pt reports that he has dementia and if he doesn't get a reminder call, he does not usually remember when he appts are  Pt uses a cane and has a stairglide in the home  He states that his legs are always shaky when walking  He lives with his son an there is a female friend who comes 3x a week to cook for pt  He states that he has shrunk in height and isnt as hungry as he used to be  He reports that he now is 5'4" and 140 lbs  He denies having waiver or paid caregiver  He reports that he is in the middle of something and he would like a call back next week  Reminded him to reschedule with Zettie Drilling and he states he will

## 2021-02-10 ENCOUNTER — PATIENT OUTREACH (OUTPATIENT)
Dept: FAMILY MEDICINE CLINIC | Facility: CLINIC | Age: 71
End: 2021-02-10

## 2021-02-10 NOTE — PROGRESS NOTES
Outpatient Care Management Note:  RE:Spoke with pt who did not make his next PCP appt  He would like office staff to call him to schedule  He reports that he is forgetful  Also, pt's son does laundry, there is a family friend ho cooks some meals for them  His sons friend takes pt to his appointments "when he remembers"  He needs Eye appointment because his glasses are old, wants his hearing checked and needs dentures  He also needs to schedule for pain management  When looking back in chart, pt had this order placed in 8/2020  He doesn't remember who PCP wanted him to go to

## 2021-02-11 ENCOUNTER — PATIENT OUTREACH (OUTPATIENT)
Dept: FAMILY MEDICINE CLINIC | Facility: CLINIC | Age: 71
End: 2021-02-11

## 2021-02-11 NOTE — PROGRESS NOTES
Outpatient Care Management Note:  RE:Luciano with pt's PCP Dr Glinda Klinefelter  The pain management specialist that she recommended is a Dr Madison with [facility] @ [phone]  Will notify pt  Also, will provide information on [facility] regarding where he can go for that

## 2021-02-17 ENCOUNTER — PATIENT OUTREACH (OUTPATIENT)
Dept: FAMILY MEDICINE CLINIC | Facility: CLINIC | Age: 71
End: 2021-02-17

## 2021-02-17 NOTE — PROGRESS NOTES
Outpatient Care Management Note:  RE:Provided pt with information on the pain management specialist as well as the local number for [facility] to him  Reminded pt to reschedule with PCP for appt and he states that he will do so

## 2021-03-02 ENCOUNTER — TELEPHONE (OUTPATIENT)
Dept: FAMILY MEDICINE CLINIC | Facility: CLINIC | Age: 71
End: 2021-03-02

## 2021-03-02 DIAGNOSIS — R41.89 COGNITIVE DECLINE: Primary | ICD-10-CM

## 2021-03-03 NOTE — TELEPHONE ENCOUNTER
Please ask family to obtain new labwork per orders- can be done same day as appt next week if that makes it easier for pt and family

## 2021-03-08 ENCOUNTER — OFFICE VISIT (OUTPATIENT)
Dept: PAIN MEDICINE | Facility: CLINIC | Age: 71
End: 2021-03-08
Payer: COMMERCIAL

## 2021-03-08 VITALS
DIASTOLIC BLOOD PRESSURE: 92 MMHG | HEART RATE: 99 BPM | SYSTOLIC BLOOD PRESSURE: 178 MMHG | WEIGHT: 138.2 LBS | HEIGHT: 65 IN | BODY MASS INDEX: 23.03 KG/M2

## 2021-03-08 DIAGNOSIS — M54.9 MID BACK PAIN: ICD-10-CM

## 2021-03-08 DIAGNOSIS — M54.16 LUMBAR RADICULOPATHY: ICD-10-CM

## 2021-03-08 DIAGNOSIS — G89.29 OTHER CHRONIC PAIN: ICD-10-CM

## 2021-03-08 DIAGNOSIS — M54.2 NECK PAIN: Primary | ICD-10-CM

## 2021-03-08 PROCEDURE — 3008F BODY MASS INDEX DOCD: CPT | Performed by: ANESTHESIOLOGY

## 2021-03-08 PROCEDURE — 1160F RVW MEDS BY RX/DR IN RCRD: CPT | Performed by: ANESTHESIOLOGY

## 2021-03-08 PROCEDURE — 3080F DIAST BP >= 90 MM HG: CPT | Performed by: ANESTHESIOLOGY

## 2021-03-08 PROCEDURE — 3077F SYST BP >= 140 MM HG: CPT | Performed by: ANESTHESIOLOGY

## 2021-03-08 PROCEDURE — 4004F PT TOBACCO SCREEN RCVD TLK: CPT | Performed by: ANESTHESIOLOGY

## 2021-03-08 PROCEDURE — 99214 OFFICE O/P EST MOD 30 MIN: CPT | Performed by: ANESTHESIOLOGY

## 2021-03-08 RX ORDER — DICLOFENAC SODIUM 75 MG/1
75 TABLET, DELAYED RELEASE ORAL 2 TIMES DAILY
Qty: 60 TABLET | Refills: 1 | Status: SHIPPED | OUTPATIENT
Start: 2021-03-08 | End: 2021-05-07 | Stop reason: HOSPADM

## 2021-03-08 RX ORDER — GABAPENTIN 600 MG/1
600 TABLET ORAL 3 TIMES DAILY
Qty: 90 TABLET | Refills: 1 | Status: SHIPPED | OUTPATIENT
Start: 2021-03-08 | End: 2021-06-14 | Stop reason: SDUPTHER

## 2021-03-08 NOTE — PROGRESS NOTES
Assessment:  1  Neck pain    2  Other chronic pain    3  Mid back pain    4  Lumbar radiculopathy        Plan:  Patient is a 72-year-old male with complaints of left shoulder pain, bilateral hand pain, low back pain, bilateral leg pain with a history significant for CVA as a fall from 30 feet onto concrete 10 years ago presents office for  Follow-up visit  1   We will not prescribe opioids secondary patient's record of suicidal ideation on 05/01/2019 with a working diagnosis of major depressive disorder  Patient also smokes marijuana twice month  2  We will titrate up gabapentin to 600 mg p o  T i d   3  We will trial diclofenac 75 mg p o  B i d  For low back pain  4  Follow-up in 2 months         History of Present Illness: The patient is a 78 y o  male who presents for a follow up office visit in regards to Back Pain, Shoulder Pain, Neck Pain, Hip Pain, Knee Pain, and Ankle Pain  The patients current symptoms include   10/10 constant sharp, throbbing, shooting pain which is worse in the morning, evening, nighttime  Current pain medications includes:  none  I have personally reviewed and/or updated the patient's past medical history, past surgical history, family history, social history, current medications, allergies, and vital signs today  Review of Systems  Review of Systems   Gastrointestinal: Positive for constipation  Musculoskeletal: Positive for gait problem  Decreased range of motion  Joint stiffness     Neurological: Positive for dizziness  Memory loss     All other systems reviewed and are negative          Past Medical History:   Diagnosis Date    BPH (benign prostatic hyperplasia)     CVA (cerebral vascular accident) ([facility] [address] )     Encounter to establish care with new doctor 8/21/2019    Head injury     History of cerebrovascular accident (CVA) with residual deficit 10/1/2019    History of CVA (cerebrovascular accident) 1/8/2020    History of substance abuse ([facility] [address] ) 8/21/2019    Polysubstance     History of sustained ventricular tachycardia 8/21/2019    History of transient ischemic attack (TIA) 8/28/2019    Hypertension     Metatarsal fracture     TIA (transient ischemic attack)        Past Surgical History:   Procedure Laterality Date    ABDOMINAL SURGERY      ANKLE SURGERY      BACK SURGERY      CT GUIDED [facility] DRAINAGE CATHETER PLACEMENT  1/27/2020    ELBOW SURGERY      IR VISCERAL ANGIOGRAPHY / INTERVENTION  1/11/2020    JOINT REPLACEMENT      KNEE SURGERY      LAPAROTOMY N/A 1/11/2020    Procedure: LAPAROTOMY EXPLORATORY,  OVERSEW  OF GASTRO DUODENAL ARTERY, THAL PATCH OF DUODENUM, VICKY GASTRO JEJUNOSTOMY TUBE;  Surgeon: Feng Padilla DO;  Location: [facility];  Service: General    LIVER SURGERY         Family History   Problem Relation Age of Onset    No Known Problems Mother     No Known Problems Father        Social History     Occupational History    Not on file   Tobacco Use    Smoking status: Current Every Day Smoker     Packs/day: 1 00     Types: Cigarettes    Smokeless tobacco: Never Used    Tobacco comment: started age 14, 3 quit attempts   Substance and Sexual Activity    Alcohol use: Yes     Frequency: Monthly or less     Comment: half a handle of vodka daily as per family    Drug use: Not Currently     Types: Marijuana     Comment: history polysubstance use including IVDA on record- every now then will smoke weed     Sexual activity: Not Currently     Partners: Female         Current Outpatient Medications:     albuterol (ProAir HFA) 90 mcg/act inhaler, Inhale 2 puffs every 6 (six) hours as needed for wheezing, Disp: 1 Inhaler, Rfl: 1    atorvastatin (LIPITOR) 40 mg tablet, Take 1 tablet (40 mg total) by mouth daily, Disp: 30 tablet, Rfl: 1    Cholecalciferol 25 MCG (1000 UT) tablet, Take 1 tablet (1,000 Units total) by mouth daily, Disp: 30 tablet, Rfl: 1    DULoxetine (CYMBALTA) 30 mg delayed release capsule, Take 3 capsules (90 mg total) by mouth daily, Disp: 90 capsule, Rfl: 1    fluticasone (FLONASE) 50 mcg/act nasal spray, 2 sprays into each nostril daily, Disp: 18 2 mL, Rfl: 2    fluticasone-vilanterol (BREO ELLIPTA) 100-25 mcg/inh inhaler, Inhale 1 puff daily Rinse mouth after use   (Patient not taking: Reported on 11/3/2020), Disp: 1 Inhaler, Rfl: 3    folic acid (FOLVITE) 1 mg tablet, Take 1 tablet (1 mg total) by mouth daily, Disp: 90 tablet, Rfl: 1    gabapentin (NEURONTIN) 300 mg capsule, Take 1 capsule (300 mg total) by mouth 3 (three) times a day, Disp: , Rfl: 0    guaiFENesin (MUCINEX) 600 mg 12 hr tablet, Take 600 mg by mouth every 12 (twelve) hours as needed for cough, Disp: , Rfl:     hydrOXYzine pamoate (VISTARIL) 50 mg capsule, take 1 capsule by mouth three times a day if needed for anxiety, Disp: , Rfl:     metoprolol tartrate (LOPRESSOR) 50 mg tablet, Take 1 tablet (50 mg total) by mouth every 12 (twelve) hours, Disp: 60 tablet, Rfl: 1    mirtazapine (REMERON) 15 mg tablet, Take 1 tablet (15 mg total) by mouth daily at bedtime, Disp: 30 tablet, Rfl: 1    pantoprazole (PROTONIX) 40 mg tablet, Take 1 tablet (40 mg total) by mouth 2 (two) times a day before meals, Disp: , Rfl: 0    QUEtiapine (SEROquel) 200 mg tablet, Take 1 tablet (200 mg total) by mouth daily, Disp: 30 tablet, Rfl: 1    QUEtiapine (SEROquel) 300 mg tablet, Take 2 tablets (600 mg total) by mouth daily at bedtime, Disp: 60 tablet, Rfl: 1    traZODone (DESYREL) 50 mg tablet, Take 1 tablet (50 mg total) by mouth daily at bedtime as needed for sleep, Disp: 30 tablet, Rfl: 1    Wound Dressings (COMFEEL PLUS ULCER DRESSING) PADS, Apply 1 each topically every other day (Patient not taking: Reported on 11/3/2020), Disp: 10 each, Rfl: 0    No Known Allergies    Physical Exam:    BP (!) 178/92 (BP Location: Right arm, Patient Position: Sitting, Cuff Size: Large)   Pulse 99   Ht 5' 4 5" (1 638 m)   Wt 62 7 kg (138 lb 3 2 oz)   BMI 23 36 kg/m²     Constitutional:normal, well developed, well nourished, alert, in no distress and non-toxic and no overt pain behavior  Eyes:anicteric  HEENT:grossly intact  Neck:supple, symmetric, trachea midline and no masses   Pulmonary:even and unlabored  Cardiovascular:No edema or pitting edema present  Skin:Normal without rashes or lesions and well hydrated  Psychiatric:Mood and affect appropriate  Neurologic:Cranial Nerves II-XII grossly intact  Musculoskeletal:normal    Imaging  CT CERVICAL SPINE - WITHOUT CONTRAST     INDICATION:   c7 tenderness      COMPARISON:  None      TECHNIQUE:  CT examination of the cervical spine was performed without intravenous contrast   Contiguous axial images were obtained  Sagittal and coronal reconstructions were performed        Radiation dose length product (DLP) for this visit:  398 51 mGy-cm   This examination, like all CT scans performed in the [facility], was performed utilizing techniques to minimize radiation dose exposure, including the use of iterative   reconstruction and automated exposure control        IMAGE QUALITY:  Diagnostic      FINDINGS:     ALIGNMENT:  There is 1 mm posterior subluxation of C5 on C6     VERTEBRAL BODIES:  No fracture      DEGENERATIVE CHANGES:  Ankylosis of the C6 and C7 vertebral bodies     PREVERTEBRAL AND PARASPINAL SOFT TISSUES:  Moderate     THORACIC INLET:  Normal      IMPRESSION:     No cervical spine fracture or traumatic malalignment  No orders of the defined types were placed in this encounter

## 2021-03-08 NOTE — PATIENT INSTRUCTIONS

## 2021-03-17 DIAGNOSIS — E55.9 VITAMIN D DEFICIENCY: ICD-10-CM

## 2021-03-17 DIAGNOSIS — Z86.73 HISTORY OF TRANSIENT ISCHEMIC ATTACK (TIA): ICD-10-CM

## 2021-03-17 DIAGNOSIS — F10.20 UNCOMPLICATED ALCOHOL DEPENDENCE (HCC): Chronic | ICD-10-CM

## 2021-03-17 DIAGNOSIS — E78.5 HYPERLIPIDEMIA: ICD-10-CM

## 2021-03-17 DIAGNOSIS — Z72.89 ALCOHOL USE: ICD-10-CM

## 2021-03-17 DIAGNOSIS — K26.9 DUODENAL ULCER: ICD-10-CM

## 2021-03-17 DIAGNOSIS — F33.3 MAJOR DEPRESSIVE DISORDER, RECURRENT, SEVERE WITH PSYCHOTIC FEATURES (HCC): Chronic | ICD-10-CM

## 2021-03-17 RX ORDER — DULOXETIN HYDROCHLORIDE 30 MG/1
90 CAPSULE, DELAYED RELEASE ORAL DAILY
Qty: 90 CAPSULE | Refills: 1 | Status: CANCELLED | OUTPATIENT
Start: 2021-03-17

## 2021-03-17 NOTE — TELEPHONE ENCOUNTER
Last refill date:     Atorvastatin  11/3/2020  Cholecalciferol  2/14/1820  Folic acid  5/05/8867  Pantoprazole  11/3/2020  Vistaril  10/14/2020      Last appointment:  11/3/2020  Next appointment:  4/9/2021

## 2021-03-18 RX ORDER — PANTOPRAZOLE SODIUM 40 MG/1
40 TABLET, DELAYED RELEASE ORAL
Refills: 0
Start: 2021-03-18 | End: 2021-04-09 | Stop reason: SDUPTHER

## 2021-03-18 RX ORDER — HYDROXYZINE PAMOATE 50 MG/1
50 CAPSULE ORAL 3 TIMES DAILY PRN
Qty: 30 CAPSULE | Refills: 0 | OUTPATIENT
Start: 2021-03-18

## 2021-03-18 RX ORDER — FOLIC ACID 1 MG/1
1 TABLET ORAL DAILY
Qty: 90 TABLET | Refills: 1 | Status: SHIPPED | OUTPATIENT
Start: 2021-03-18 | End: 2021-09-14

## 2021-03-18 RX ORDER — ATORVASTATIN CALCIUM 40 MG/1
40 TABLET, FILM COATED ORAL DAILY
Qty: 30 TABLET | Refills: 1 | Status: SHIPPED | OUTPATIENT
Start: 2021-03-18 | End: 2021-06-14 | Stop reason: SDUPTHER

## 2021-04-08 ENCOUNTER — TELEPHONE (OUTPATIENT)
Dept: FAMILY MEDICINE CLINIC | Facility: CLINIC | Age: 71
End: 2021-04-08

## 2021-04-08 NOTE — TELEPHONE ENCOUNTER
Lucia Bill, Nurse Practitioner called stating Kelvin King has an appointment with you tomorrow and wanted to pass this information along to you:    Patient is very forgetful    Patient states his brother was a vascular surgeon and passed away from an aneurysm  Patient states when his pain gets excruciating he becomes suicidal     He was Tachycardia today and vitals were as follows:  R/P 104  B/P /103   B/P /04   Temp 97 7     O2 97%    She stated she did not see that he is taking any antihypertensive medications at this time      Patrick Boateng stated if you have questions you can give her a call at [phone]

## 2021-04-09 ENCOUNTER — OFFICE VISIT (OUTPATIENT)
Dept: FAMILY MEDICINE CLINIC | Facility: CLINIC | Age: 71
End: 2021-04-09
Payer: COMMERCIAL

## 2021-04-09 VITALS
SYSTOLIC BLOOD PRESSURE: 148 MMHG | DIASTOLIC BLOOD PRESSURE: 92 MMHG | TEMPERATURE: 97.7 F | BODY MASS INDEX: 25.95 KG/M2 | HEART RATE: 112 BPM | WEIGHT: 152 LBS | OXYGEN SATURATION: 98 % | HEIGHT: 64 IN

## 2021-04-09 DIAGNOSIS — I70.0 AORTIC ATHEROSCLEROSIS (HCC): ICD-10-CM

## 2021-04-09 DIAGNOSIS — R00.0 TACHYCARDIA: ICD-10-CM

## 2021-04-09 DIAGNOSIS — I73.00 RAYNAUD'S DISEASE WITHOUT GANGRENE: ICD-10-CM

## 2021-04-09 DIAGNOSIS — F20.9 SCHIZOPHRENIA, UNSPECIFIED TYPE (HCC): Primary | ICD-10-CM

## 2021-04-09 DIAGNOSIS — K26.9 DUODENAL ULCER: ICD-10-CM

## 2021-04-09 DIAGNOSIS — D68.9 COAGULATION DEFECT, UNSPECIFIED (HCC): ICD-10-CM

## 2021-04-09 DIAGNOSIS — J44.9 COPD (CHRONIC OBSTRUCTIVE PULMONARY DISEASE) (HCC): ICD-10-CM

## 2021-04-09 DIAGNOSIS — R26.2 AMBULATORY DYSFUNCTION: ICD-10-CM

## 2021-04-09 DIAGNOSIS — R39.14 FEELING OF INCOMPLETE BLADDER EMPTYING: ICD-10-CM

## 2021-04-09 DIAGNOSIS — J44.9 CHRONIC OBSTRUCTIVE PULMONARY DISEASE, UNSPECIFIED COPD TYPE (HCC): ICD-10-CM

## 2021-04-09 DIAGNOSIS — Z12.5 PROSTATE CANCER SCREENING: ICD-10-CM

## 2021-04-09 DIAGNOSIS — F10.29 ALCOHOL DEPENDENCE WITH UNSPECIFIED ALCOHOL-INDUCED DISORDER (HCC): ICD-10-CM

## 2021-04-09 DIAGNOSIS — R41.3 MEMORY DYSFUNCTION: ICD-10-CM

## 2021-04-09 DIAGNOSIS — J44.9 COPD WITHOUT EXACERBATION (HCC): ICD-10-CM

## 2021-04-09 DIAGNOSIS — R35.0 URINARY FREQUENCY: ICD-10-CM

## 2021-04-09 DIAGNOSIS — Z87.11 HISTORY OF BLEEDING ULCERS: ICD-10-CM

## 2021-04-09 DIAGNOSIS — R73.01 ELEVATED FASTING GLUCOSE: ICD-10-CM

## 2021-04-09 DIAGNOSIS — I10 ESSENTIAL HYPERTENSION: ICD-10-CM

## 2021-04-09 DIAGNOSIS — R09.81 NASAL CONGESTION: ICD-10-CM

## 2021-04-09 PROBLEM — T81.49XA SURGICAL WOUND INFECTION: Status: RESOLVED | Noted: 2020-03-26 | Resolved: 2021-04-09

## 2021-04-09 PROCEDURE — 1160F RVW MEDS BY RX/DR IN RCRD: CPT | Performed by: FAMILY MEDICINE

## 2021-04-09 PROCEDURE — 3077F SYST BP >= 140 MM HG: CPT | Performed by: FAMILY MEDICINE

## 2021-04-09 PROCEDURE — 4004F PT TOBACCO SCREEN RCVD TLK: CPT | Performed by: FAMILY MEDICINE

## 2021-04-09 PROCEDURE — 99214 OFFICE O/P EST MOD 30 MIN: CPT | Performed by: FAMILY MEDICINE

## 2021-04-09 PROCEDURE — 3080F DIAST BP >= 90 MM HG: CPT | Performed by: FAMILY MEDICINE

## 2021-04-09 PROCEDURE — 3008F BODY MASS INDEX DOCD: CPT | Performed by: FAMILY MEDICINE

## 2021-04-09 RX ORDER — ALBUTEROL SULFATE 90 UG/1
2 AEROSOL, METERED RESPIRATORY (INHALATION) EVERY 6 HOURS PRN
Qty: 1 INHALER | Refills: 1 | Status: SHIPPED | OUTPATIENT
Start: 2021-04-09 | End: 2021-09-13

## 2021-04-09 RX ORDER — METOPROLOL TARTRATE 50 MG/1
50 TABLET, FILM COATED ORAL EVERY 12 HOURS SCHEDULED
Qty: 60 TABLET | Refills: 1 | Status: ON HOLD | OUTPATIENT
Start: 2021-04-09 | End: 2021-05-07 | Stop reason: SDUPTHER

## 2021-04-09 RX ORDER — MIRTAZAPINE 45 MG/1
45 TABLET, FILM COATED ORAL
COMMUNITY
Start: 2021-04-06

## 2021-04-09 RX ORDER — FLUTICASONE PROPIONATE 50 MCG
2 SPRAY, SUSPENSION (ML) NASAL DAILY
Qty: 18.2 ML | Refills: 2 | Status: SHIPPED | OUTPATIENT
Start: 2021-04-09

## 2021-04-09 RX ORDER — PANTOPRAZOLE SODIUM 40 MG/1
40 TABLET, DELAYED RELEASE ORAL
Refills: 0 | Status: ON HOLD
Start: 2021-04-09 | End: 2021-05-07 | Stop reason: SDUPTHER

## 2021-04-09 RX ORDER — FLUTICASONE FUROATE AND VILANTEROL 100; 25 UG/1; UG/1
1 POWDER RESPIRATORY (INHALATION) DAILY
Qty: 1 INHALER | Refills: 3 | Status: SHIPPED | OUTPATIENT
Start: 2021-04-09

## 2021-04-09 NOTE — PROGRESS NOTES
Assessment/Plan:         Diagnoses and all orders for this visit:    Schizophrenia, unspecified type [facility])  -     Ambulatory referral to Neurology; Future    Tachycardia    Essential hypertension  -     metoprolol tartrate (LOPRESSOR) 50 mg tablet; Take 1 tablet (50 mg total) by mouth every 12 (twelve) hours  -     Comprehensive metabolic panel; Future  -     Microalbumin / creatinine urine ratio    Aortic atherosclerosis (HCC)    Ambulatory dysfunction    Raynaud's disease without gangrene  -     Ambulatory referral to Neurology; Future    Memory dysfunction  -     Ambulatory referral to Neurology; Future    Alcohol dependence with unspecified alcohol-induced disorder [facility])  -     Ambulatory referral to Neurology; Future    COPD (chronic obstructive pulmonary disease) ([place] [address] )  Comments:  advised must quit smoking  Orders:  -     fluticasone-vilanterol (BREO ELLIPTA) 100-25 mcg/inh inhaler; Inhale 1 puff daily Rinse mouth after use  COPD without exacerbation ([facility])    Chronic obstructive pulmonary disease, unspecified COPD type ([place] [address] )  -     albuterol (ProAir HFA) 90 mcg/act inhaler; Inhale 2 puffs every 6 (six) hours as needed for wheezing    Duodenal ulcer  -     pantoprazole (PROTONIX) 40 mg tablet; Take 1 tablet (40 mg total) by mouth 2 (two) times a day before meals    History of bleeding ulcers  -     CBC and differential; Future    Coagulation defect, unspecified (HCC)  -     CBC and differential; Future    Feeling of incomplete bladder emptying    Urinary frequency  -     UA w Reflex to Microscopic w Reflex to Culture -Lab Collect    Elevated fasting glucose  -     Comprehensive metabolic panel; Future  -     HEMOGLOBIN A1C W/ EAG ESTIMATION; Future  -     Comprehensive metabolic panel; Future    Nasal congestion  -     fluticasone (FLONASE) 50 mcg/act nasal spray; 2 sprays into each nostril daily    Prostate cancer screening  -     PSA, Total Screen;  Future  -     UA w Reflex to Microscopic w Reflex to Culture -Lab Collect    Other orders  -     Cancel: POCT urine dip  -     mirtazapine (REMERON) 45 MG tablet          Subjective:   Chief Complaint   Patient presents with    Follow-up     Patient states his B/P has been high   Urinary Frequency     Patient states he can not finish urinating when he goes  Patient unable to provide urine specimen  Patient ID: Dav Telles is a 78 y o  male  Routine f/u  C/o "Blood pressure was migue high- yesterday a nurse came to the house and it was 178/115"  States smoking a little more than he was before, "get aggravated and frustrated" admits breathing has been worse since smoking more  Getting 1st dose of COVID vaccine on Monday   Urinary issues past few weeks- feels incomplete emptying, urgency and frequency      The following portions of the patient's history were reviewed and updated as appropriate: allergies, current medications, past family history, past medical history, past social history, past surgical history and problem list     Review of Systems   All other systems reviewed and are negative  Objective:      /92 (BP Location: Left arm, Patient Position: Sitting, Cuff Size: Standard)   Pulse (!) 112   Temp 97 7 °F (36 5 °C) (Tympanic)   Ht 5' 4" (1 626 m)   Wt 68 9 kg (152 lb)   SpO2 98%   BMI 26 09 kg/m²          Physical Exam  Vitals signs and nursing note reviewed  Constitutional:       General: He is not in acute distress  Appearance: He is well-developed  He is not toxic-appearing or diaphoretic  Comments: Chronically-ill appearing   HENT:      Head: Normocephalic and atraumatic  Eyes:      General: Lids are normal       Conjunctiva/sclera: Conjunctivae normal       Pupils: Pupils are equal, round, and reactive to light  Neck:      Musculoskeletal: Neck supple  Thyroid: No thyroid mass or thyromegaly  Vascular: No JVD        Trachea: Trachea normal    Cardiovascular:      Rate and Rhythm: Normal rate and regular rhythm  Heart sounds: S1 normal and S2 normal  No friction rub  No gallop  Pulmonary:      Effort: Pulmonary effort is normal       Breath sounds: Normal breath sounds  Abdominal:      General: Bowel sounds are normal  There is no distension or abdominal bruit  Palpations: Abdomen is soft  There is no hepatomegaly, splenomegaly or mass  Tenderness: There is no abdominal tenderness  Musculoskeletal:      Right lower leg: No edema  Left lower leg: No edema  Lymphadenopathy:      Cervical: No cervical adenopathy  Upper Body:      Right upper body: No supraclavicular adenopathy  Left upper body: No supraclavicular adenopathy  Skin:     General: Skin is warm and dry  Coloration: Skin is not pale  Neurological:      Mental Status: He is alert  Mental status is at baseline  Comments: Requires w/c   Psychiatric:         Mood and Affect: Affect is flat  Behavior: Behavior normal  Behavior is cooperative

## 2021-04-19 ENCOUNTER — APPOINTMENT (EMERGENCY)
Dept: CT IMAGING | Facility: HOSPITAL | Age: 71
DRG: 881 | End: 2021-04-19
Payer: COMMERCIAL

## 2021-04-19 ENCOUNTER — HOSPITAL ENCOUNTER (INPATIENT)
Facility: HOSPITAL | Age: 71
LOS: 1 days | DRG: 881 | End: 2021-04-20
Attending: FAMILY MEDICINE | Admitting: PSYCHIATRY & NEUROLOGY
Payer: COMMERCIAL

## 2021-04-19 DIAGNOSIS — F10.29 ALCOHOL DEPENDENCE WITH UNSPECIFIED ALCOHOL-INDUCED DISORDER (HCC): ICD-10-CM

## 2021-04-19 DIAGNOSIS — R45.851 SUICIDAL IDEATION: Primary | ICD-10-CM

## 2021-04-19 DIAGNOSIS — I95.9 HYPOTENSION: ICD-10-CM

## 2021-04-19 DIAGNOSIS — R11.2 NAUSEA & VOMITING: ICD-10-CM

## 2021-04-19 DIAGNOSIS — I10 ESSENTIAL HYPERTENSION: ICD-10-CM

## 2021-04-19 DIAGNOSIS — I51.89 HYPOKINESIA OF LEFT VENTRICLE: ICD-10-CM

## 2021-04-19 DIAGNOSIS — E55.9 VITAMIN D DEFICIENCY: ICD-10-CM

## 2021-04-19 DIAGNOSIS — R60.0 BILATERAL LOWER EXTREMITY EDEMA: ICD-10-CM

## 2021-04-19 DIAGNOSIS — K59.00 CONSTIPATION: ICD-10-CM

## 2021-04-19 LAB
ALBUMIN SERPL BCP-MCNC: 4.2 G/DL (ref 3.5–5.7)
ALP SERPL-CCNC: 53 U/L (ref 55–165)
ALT SERPL W P-5'-P-CCNC: 7 U/L (ref 7–52)
AMPHETAMINES SERPL QL SCN: NEGATIVE
ANION GAP SERPL CALCULATED.3IONS-SCNC: 11 MMOL/L (ref 4–13)
AST SERPL W P-5'-P-CCNC: 19 U/L (ref 13–39)
BARBITURATES UR QL: NEGATIVE
BASOPHILS # BLD AUTO: 0 THOUSANDS/ΜL (ref 0–0.1)
BASOPHILS NFR BLD AUTO: 1 % (ref 0–2)
BENZODIAZ UR QL: NEGATIVE
BILIRUB SERPL-MCNC: 0.5 MG/DL (ref 0.2–1)
BILIRUB UR QL STRIP: NEGATIVE
BUN SERPL-MCNC: 15 MG/DL (ref 7–25)
CALCIUM SERPL-MCNC: 10.5 MG/DL (ref 8.6–10.5)
CHLORIDE SERPL-SCNC: 92 MMOL/L (ref 98–107)
CK SERPL-CCNC: 34 U/L (ref 30–308)
CLARITY UR: CLEAR
CO2 SERPL-SCNC: 31 MMOL/L (ref 21–31)
COCAINE UR QL: NEGATIVE
COLOR UR: YELLOW
CREAT SERPL-MCNC: 0.97 MG/DL (ref 0.7–1.3)
EOSINOPHIL # BLD AUTO: 0 THOUSAND/ΜL (ref 0–0.61)
EOSINOPHIL NFR BLD AUTO: 1 % (ref 0–5)
ERYTHROCYTE [DISTWIDTH] IN BLOOD BY AUTOMATED COUNT: 14.6 % (ref 11.5–14.5)
FLUAV RNA RESP QL NAA+PROBE: NEGATIVE
FLUBV RNA RESP QL NAA+PROBE: NEGATIVE
GFR SERPL CREATININE-BSD FRML MDRD: 80 ML/MIN/1.73SQ M
GLUCOSE SERPL-MCNC: 102 MG/DL (ref 65–140)
GLUCOSE SERPL-MCNC: 108 MG/DL (ref 65–99)
GLUCOSE UR STRIP-MCNC: NEGATIVE MG/DL
HCT VFR BLD AUTO: 45.8 % (ref 42–47)
HGB BLD-MCNC: 16 G/DL (ref 14–18)
HGB UR QL STRIP.AUTO: NEGATIVE
KETONES UR STRIP-MCNC: ABNORMAL MG/DL
LACTATE SERPL-SCNC: 1 MMOL/L (ref 0.5–2)
LEUKOCYTE ESTERASE UR QL STRIP: NEGATIVE
LIPASE SERPL-CCNC: 19 U/L (ref 11–82)
LYMPHOCYTES # BLD AUTO: 1.3 THOUSANDS/ΜL (ref 0.6–4.47)
LYMPHOCYTES NFR BLD AUTO: 17 % (ref 21–51)
MAGNESIUM SERPL-MCNC: 1.9 MG/DL (ref 1.9–2.7)
MCH RBC QN AUTO: 33.5 PG (ref 26–34)
MCHC RBC AUTO-ENTMCNC: 35 G/DL (ref 31–37)
MCV RBC AUTO: 96 FL (ref 81–99)
METHADONE UR QL: NEGATIVE
MONOCYTES # BLD AUTO: 0.5 THOUSAND/ΜL (ref 0.17–1.22)
MONOCYTES NFR BLD AUTO: 6 % (ref 2–12)
NEUTROPHILS # BLD AUTO: 5.7 THOUSANDS/ΜL (ref 1.4–6.5)
NEUTS SEG NFR BLD AUTO: 75 % (ref 42–75)
NITRITE UR QL STRIP: NEGATIVE
OPIATES UR QL SCN: NEGATIVE
OXYCODONE+OXYMORPHONE UR QL SCN: NEGATIVE
PCP UR QL: NEGATIVE
PH UR STRIP.AUTO: 6.5 [PH]
PLATELET # BLD AUTO: 371 THOUSANDS/UL (ref 149–390)
PMV BLD AUTO: 7 FL (ref 8.6–11.7)
POTASSIUM SERPL-SCNC: 3.8 MMOL/L (ref 3.5–5.5)
PROT SERPL-MCNC: 8.3 G/DL (ref 6.4–8.9)
PROT UR STRIP-MCNC: NEGATIVE MG/DL
RBC # BLD AUTO: 4.78 MILLION/UL (ref 4.3–5.9)
RSV RNA RESP QL NAA+PROBE: NEGATIVE
SARS-COV-2 RNA RESP QL NAA+PROBE: NEGATIVE
SODIUM SERPL-SCNC: 134 MMOL/L (ref 134–143)
SP GR UR STRIP.AUTO: 1.01 (ref 1–1.03)
THC UR QL: POSITIVE
TROPONIN I SERPL-MCNC: <0.03 NG/ML
UROBILINOGEN UR QL STRIP.AUTO: 0.2 E.U./DL
WBC # BLD AUTO: 7.5 THOUSAND/UL (ref 4.8–10.8)

## 2021-04-19 PROCEDURE — 83690 ASSAY OF LIPASE: CPT | Performed by: PHYSICIAN ASSISTANT

## 2021-04-19 PROCEDURE — 36415 COLL VENOUS BLD VENIPUNCTURE: CPT | Performed by: PHYSICIAN ASSISTANT

## 2021-04-19 PROCEDURE — 99285 EMERGENCY DEPT VISIT HI MDM: CPT

## 2021-04-19 PROCEDURE — 96375 TX/PRO/DX INJ NEW DRUG ADDON: CPT

## 2021-04-19 PROCEDURE — 85025 COMPLETE CBC W/AUTO DIFF WBC: CPT | Performed by: PHYSICIAN ASSISTANT

## 2021-04-19 PROCEDURE — 96361 HYDRATE IV INFUSION ADD-ON: CPT

## 2021-04-19 PROCEDURE — 82948 REAGENT STRIP/BLOOD GLUCOSE: CPT

## 2021-04-19 PROCEDURE — 83735 ASSAY OF MAGNESIUM: CPT | Performed by: PHYSICIAN ASSISTANT

## 2021-04-19 PROCEDURE — 96374 THER/PROPH/DIAG INJ IV PUSH: CPT

## 2021-04-19 PROCEDURE — G1004 CDSM NDSC: HCPCS

## 2021-04-19 PROCEDURE — 83605 ASSAY OF LACTIC ACID: CPT | Performed by: PHYSICIAN ASSISTANT

## 2021-04-19 PROCEDURE — 82550 ASSAY OF CK (CPK): CPT | Performed by: PHYSICIAN ASSISTANT

## 2021-04-19 PROCEDURE — 0241U HB NFCT DS VIR RESP RNA 4 TRGT: CPT | Performed by: PHYSICIAN ASSISTANT

## 2021-04-19 PROCEDURE — 81003 URINALYSIS AUTO W/O SCOPE: CPT | Performed by: PHYSICIAN ASSISTANT

## 2021-04-19 PROCEDURE — 80307 DRUG TEST PRSMV CHEM ANLYZR: CPT | Performed by: PHYSICIAN ASSISTANT

## 2021-04-19 PROCEDURE — 80053 COMPREHEN METABOLIC PANEL: CPT | Performed by: PHYSICIAN ASSISTANT

## 2021-04-19 PROCEDURE — 93005 ELECTROCARDIOGRAM TRACING: CPT

## 2021-04-19 PROCEDURE — 74177 CT ABD & PELVIS W/CONTRAST: CPT

## 2021-04-19 PROCEDURE — 99284 EMERGENCY DEPT VISIT MOD MDM: CPT | Performed by: PHYSICIAN ASSISTANT

## 2021-04-19 PROCEDURE — 84484 ASSAY OF TROPONIN QUANT: CPT | Performed by: PHYSICIAN ASSISTANT

## 2021-04-19 RX ORDER — TRAZODONE HYDROCHLORIDE 50 MG/1
50 TABLET ORAL
Status: DISCONTINUED | OUTPATIENT
Start: 2021-04-19 | End: 2021-04-20 | Stop reason: HOSPADM

## 2021-04-19 RX ORDER — KETOROLAC TROMETHAMINE 30 MG/ML
30 INJECTION, SOLUTION INTRAMUSCULAR; INTRAVENOUS ONCE
Status: COMPLETED | OUTPATIENT
Start: 2021-04-19 | End: 2021-04-19

## 2021-04-19 RX ORDER — VANCOMYCIN HYDROCHLORIDE 1 G/200ML
15 INJECTION, SOLUTION INTRAVENOUS ONCE
Status: DISCONTINUED | OUTPATIENT
Start: 2021-04-19 | End: 2021-04-19

## 2021-04-19 RX ORDER — MAGNESIUM CARB/ALUMINUM HYDROX 105-160MG
296 TABLET,CHEWABLE ORAL ONCE
Status: COMPLETED | OUTPATIENT
Start: 2021-04-19 | End: 2021-04-19

## 2021-04-19 RX ORDER — MORPHINE SULFATE 4 MG/ML
4 INJECTION, SOLUTION INTRAMUSCULAR; INTRAVENOUS ONCE
Status: COMPLETED | OUTPATIENT
Start: 2021-04-19 | End: 2021-04-19

## 2021-04-19 RX ADMIN — KETOROLAC TROMETHAMINE 30 MG: 30 INJECTION, SOLUTION INTRAMUSCULAR; INTRAVENOUS at 14:02

## 2021-04-19 RX ADMIN — MAGNESIUM CITRATE 296 ML: 1.75 LIQUID ORAL at 14:03

## 2021-04-19 RX ADMIN — TRAZODONE HYDROCHLORIDE 50 MG: 50 TABLET ORAL at 21:16

## 2021-04-19 RX ADMIN — MORPHINE SULFATE 4 MG: 4 INJECTION INTRAVENOUS at 11:28

## 2021-04-19 RX ADMIN — SODIUM CHLORIDE 1000 ML: 0.9 INJECTION, SOLUTION INTRAVENOUS at 11:26

## 2021-04-19 RX ADMIN — IOHEXOL 100 ML: 350 INJECTION, SOLUTION INTRAVENOUS at 12:25

## 2021-04-19 RX ADMIN — QUETIAPINE FUMARATE 600 MG: 200 TABLET ORAL at 21:16

## 2021-04-19 NOTE — ED NOTES
Patient presented to the ED and stated he was suicidal with no plan patient also stated that he is depressed with no motivation  Patient rep[roted that he knew of a 100 ways to kill himself and hed just cut his neck if he wanted to die  He is tired of living and doing the same thing day in and day out  Decrease in sleep appetite and caring for adls  Denies HI/AV hallucinations  Patient does not feel he is able to contract for safety outside the hospital and has asked to sign a 201  Spoke with attedning signed and and a 201 was completed      JK-CIS

## 2021-04-19 NOTE — ED NOTES
Patient wished for RN to call and update daughter in-law Margi Chandler  RN contacted patient at [phone]  Per patient he does not wish for his family to know why he is staying for psychiatric evaluation  Patient family updated to the best of this RN's ability  Patient continues to rest comfortably at this time        Lesa Gowers, RN  04/19/21 [address]  04/19/21 [id number]

## 2021-04-19 NOTE — ED NOTES
Patients daughter in [place] and son presented to the ED, geovanna in tone requesting to know why patient was here  CW explained that once patient came out of bathroom we would discuss since she was not on as an emergency contact  Patient came from the bathroom in which patient, daughter in law Lev Swan and [place] spoke  Patient was requesting to leave explained that he signed a 201 and was not able to just leave    Explained that once he was placed on an IP until for behavioral health due to suicidal ideations that he would spend a minium of 72 hours there    Michelle Green

## 2021-04-19 NOTE — ED PROVIDER NOTES
History  Chief Complaint   Patient presents with    Weakness - Generalized     weakness, loss of appitie, abd pain since COVID vaccine last monday   Abdominal Pain    Psychiatric Evaluation     Patient states has had multiple thoughts of hurting self, ststaes doesn't want to live with the chronic pain, when asked for plan p[atient stated there are multiple ways to do it  74-year-old male presents to the emergency department complaining generalized weakness loss of appetite generalized abdominal pain since receiving a COVID vaccine last Monday  He is not appear to be acutely distressed however does appear somewhat dehydrated  He does express some thoughts of hurting himself  He states he does not want to live  He reports having chronic pain  He denies chest pain shortness of breath  Allergies reviewed          Prior to Admission Medications   Prescriptions Last Dose Informant Patient Reported? Taking? Cholecalciferol 25 MCG (1000 UT) tablet   No No   Sig: Take 1 tablet (1,000 Units total) by mouth daily   QUEtiapine (SEROquel) 200 mg tablet  Outside Facility (Specify) No No   Sig: Take 1 tablet (200 mg total) by mouth daily   QUEtiapine (SEROquel) 300 mg tablet  Outside Facility (Specify) No No   Sig: Take 2 tablets (600 mg total) by mouth daily at bedtime   albuterol (ProAir HFA) 90 mcg/act inhaler   No No   Sig: Inhale 2 puffs every 6 (six) hours as needed for wheezing   atorvastatin (LIPITOR) 40 mg tablet   No No   Sig: Take 1 tablet (40 mg total) by mouth daily   diclofenac (VOLTAREN) 75 mg EC tablet   No No   Sig: Take 1 tablet (75 mg total) by mouth 2 (two) times a day   fluticasone (FLONASE) 50 mcg/act nasal spray   No No   Si sprays into each nostril daily   fluticasone-vilanterol (BREO ELLIPTA) 100-25 mcg/inh inhaler   No No   Sig: Inhale 1 puff daily Rinse mouth after use     folic acid (FOLVITE) 1 mg tablet   No No   Sig: Take 1 tablet (1 mg total) by mouth daily   gabapentin (NEURONTIN) 600 MG tablet   No No   Sig: Take 1 tablet (600 mg total) by mouth 3 (three) times a day   guaiFENesin (MUCINEX) 600 mg 12 hr tablet  Outside Facility (Specify) Yes No   Sig: Take 600 mg by mouth every 12 (twelve) hours as needed for cough   hydrOXYzine pamoate (VISTARIL) 50 mg capsule   Yes No   Sig: take 1 capsule by mouth three times a day if needed for anxiety   metoprolol tartrate (LOPRESSOR) 50 mg tablet   No No   Sig: Take 1 tablet (50 mg total) by mouth every 12 (twelve) hours   mirtazapine (REMERON) 45 MG tablet   Yes No   pantoprazole (PROTONIX) 40 mg tablet   No No   Sig: Take 1 tablet (40 mg total) by mouth 2 (two) times a day before meals   traZODone (DESYREL) 50 mg tablet   No No   Sig: Take 1 tablet (50 mg total) by mouth daily at bedtime as needed for sleep      Facility-Administered Medications: None       Past Medical History:   Diagnosis Date    BPH (benign prostatic hyperplasia)     CVA [facility]cerebral vascular accident) ([facility] )     Encounter to establish care with new doctor 8/21/2019    Head injury     History of cerebrovascular accident (CVA) with residual deficit 10/1/2019    History of CVA (cerebrovascular accident) 1/8/2020    History of substance abuse ([facility] ) 8/21/2019    Polysubstance     History of sustained ventricular tachycardia 8/21/2019    History of transient ischemic attack (TIA) 8/28/2019    Hypertension     Metatarsal fracture     TIA (transient ischemic attack)        Past Surgical History:   Procedure Laterality Date    ABDOMINAL SURGERY      ANKLE SURGERY      BACK SURGERY      CT GUIDED [facility] DRAINAGE CATHETER PLACEMENT  1/27/2020    ELBOW SURGERY      IR VISCERAL ANGIOGRAPHY / INTERVENTION  1/11/2020    JOINT REPLACEMENT      KNEE SURGERY      LAPAROTOMY N/A 1/11/2020    Procedure: LAPAROTOMY EXPLORATORY,  OVERSEW  OF GASTRO DUODENAL ARTERY, THAL PATCH OF DUODENUM, VICKY GASTRO JEJUNOSTOMY TUBE;  Surgeon: Ramo Velazquez DO;  Location: [facility]; Service: [facility] Kruger LIVER SURGERY         Family History   Problem Relation Age of Onset    No Known Problems Mother     No Known Problems Father      I have reviewed and agree with the history as documented  E-Cigarette/Vaping    E-Cigarette Use Never User      E-Cigarette/Vaping Substances     Social History     Tobacco Use    Smoking status: Current Every Day Smoker     Packs/day: 1 00     Types: Cigarettes    Smokeless tobacco: Never Used    Tobacco comment: started age 14, 4 quit attempts   Substance Use Topics    Alcohol use: Yes     Frequency: Monthly or less     Comment: a couple beers    Drug use: Yes     Types: Marijuana     Comment: history polysubstance use including IVDA on record- every now then will smoke weed        Review of Systems   Constitutional: Positive for activity change, appetite change and fatigue  Negative for chills and fever  HENT: Negative for congestion, ear pain, rhinorrhea, sinus pressure, sneezing and sore throat  Eyes: Negative for pain and discharge  Respiratory: Negative for cough, choking, chest tightness, shortness of breath and wheezing  Cardiovascular: Negative for chest pain and palpitations  Gastrointestinal: Positive for abdominal pain  Negative for constipation, diarrhea, nausea and vomiting  Genitourinary: Negative for difficulty urinating and dysuria  Musculoskeletal: Negative for back pain, gait problem, neck pain and neck stiffness  Neurological: Negative for dizziness, light-headedness and headaches  All other systems reviewed and are negative  Physical Exam  Physical Exam  Vitals signs and nursing note reviewed  Constitutional:       General: He is not in acute distress  Appearance: He is well-developed  He is ill-appearing  HENT:      Head: Normocephalic and atraumatic        Right Ear: External ear normal       Left Ear: External ear normal       Nose: Nose normal    Eyes:      Pupils: Pupils are equal, round, and reactive to light  Neck:      Musculoskeletal: Normal range of motion and neck supple  Cardiovascular:      Rate and Rhythm: Normal rate and regular rhythm  Heart sounds: Normal heart sounds  No murmur  No friction rub  No gallop  Pulmonary:      Effort: Pulmonary effort is normal  No respiratory distress  Breath sounds: Normal breath sounds  No stridor  No wheezing or rales  Abdominal:      General: Abdomen is flat  Bowel sounds are normal  There is no distension  Palpations: Abdomen is soft  Tenderness: There is generalized abdominal tenderness  There is guarding  Musculoskeletal: Normal range of motion  General: No tenderness  Skin:     General: Skin is warm  Capillary Refill: Capillary refill takes less than 2 seconds  Comments: Poor skin turgor  Neurological:      Mental Status: He is alert and oriented to person, place, and time  Psychiatric:         Attention and Perception: Attention normal          Mood and Affect: Mood and affect normal          Behavior: Behavior is cooperative  Thought Content: Thought content includes suicidal ideation           Vital Signs  ED Triage Vitals [04/19/21 1022]   Temperature Pulse Respirations Blood Pressure SpO2   97 8 °F (36 6 °C) 93 18 152/78 96 %      Temp Source Heart Rate Source Patient Position - Orthostatic VS BP Location FiO2 (%)   Temporal Monitor Lying Left arm --      Pain Score       8           Vitals:    04/19/21 1022 04/19/21 1130 04/19/21 1245 04/19/21 1500   BP: 152/78 (!) 171/86 (!) 184/75 (!) 174/99   Pulse: 93 88 79 84   Patient Position - Orthostatic VS: Lying   Lying         Visual Acuity      ED Medications  Medications   sodium chloride 0 9 % bolus 1,000 mL (0 mL Intravenous Stopped 4/19/21 1351)   morphine (PF) 4 mg/mL injection 4 mg (4 mg Intravenous Given 4/19/21 1128)   iohexol (OMNIPAQUE) 350 MG/ML injection (MULTI-DOSE) 100 mL (100 mL Intravenous Given 4/19/21 1225)   magnesium citrate (CITROMA) oral solution 296 mL (296 mL Oral Given 4/19/21 1403)   ketorolac (TORADOL) injection 30 mg (30 mg Intravenous Given 4/19/21 1402)       Diagnostic Studies  Results Reviewed     Procedure Component Value Units Date/Time    Rapid drug screen, urine [[id number]]  (Abnormal) Collected: 04/19/21 1119    Lab Status: Final result Specimen: Urine, Clean Catch Updated: 04/19/21 1357     Amph/Meth UR Negative     Barbiturate Ur Negative     Benzodiazepine Urine Negative     Cocaine Urine Negative     Methadone Urine Negative     Opiate Urine Negative     PCP Ur Negative     THC Urine Positive     Oxycodone Urine Negative    Narrative:      Presumptive report  If requested, specimen will be sent to reference lab for confirmation  FOR MEDICAL PURPOSES ONLY  IF CONFIRMATION NEEDED PLEASE CONTACT THE LAB WITHIN 5 DAYS  Drug Screen Cutoff Levels:  AMPHETAMINE/METHAMPHETAMINES  1000 ng/mL  BARBITURATES     200 ng/mL  BENZODIAZEPINES     200 ng/mL  COCAINE      300 ng/mL  METHADONE      300 ng/mL  OPIATES      300 ng/mL  PHENCYCLIDINE     25 ng/mL  THC       50 ng/mL  OXYCODONE      100 ng/mL    COVID19, Influenza A/B, RSV PCR, [facility] [[id number]]  (Normal) Collected: 04/19/21 1119    Lab Status: Final result Specimen: Nares from Nasopharyngeal Swab Updated: 04/19/21 1217     SARS-CoV-2 Negative     INFLUENZA A PCR Negative     INFLUENZA B PCR Negative     RSV PCR Negative    Narrative: This test has been authorized by FDA under an EUA (Emergency Use Assay) for use by authorized laboratories  Clinical caution and judgement should be used with the interpretation of these results with consideration of the clinical impression and other laboratory testing  Testing reported as "Positive" or "Negative" has been proven to be accurate according to standard laboratory validation requirements  All testing is performed with control materials showing appropriate reactivity at standard intervals      Comprehensive metabolic panel [[id number]]  (Abnormal) Collected: 04/19/21 1119    Lab Status: Final result Specimen: Blood from Arm, Right Updated: 04/19/21 1154     Sodium 134 mmol/L      Potassium 3 8 mmol/L      Chloride 92 mmol/L      CO2 31 mmol/L      ANION GAP 11 mmol/L      BUN 15 mg/dL      Creatinine 0 97 mg/dL      Glucose 108 mg/dL      Calcium 10 5 mg/dL      AST 19 U/L      ALT 7 U/L      Alkaline Phosphatase 53 U/L      Total Protein 8 3 g/dL      Albumin 4 2 g/dL      Total Bilirubin 0 50 mg/dL      eGFR 80 ml/min/1 73sq m     Narrative:      [place] guidelines for Chronic Kidney Disease (CKD):     Stage 1 with normal or high GFR (GFR > 90 mL/min/1 73 square meters)    Stage 2 Mild CKD (GFR = 60-89 mL/min/1 73 square meters)    Stage 3A Moderate CKD (GFR = 45-59 mL/min/1 73 square meters)    Stage 3B Moderate CKD (GFR = 30-44 mL/min/1 73 square meters)    Stage 4 Severe CKD (GFR = 15-29 mL/min/1 73 square meters)    Stage 5 End Stage CKD (GFR <15 mL/min/1 73 square meters)  Note: GFR calculation is accurate only with a steady state creatinine    Troponin I [[id number]]  (Normal) Collected: 04/19/21 1119    Lab Status: Final result Specimen: Blood from Arm, Right Updated: 04/19/21 1153     Troponin I <0 03 ng/mL     Lipase [[id number]]  (Normal) Collected: 04/19/21 1119    Lab Status: Final result Specimen: Blood from Arm, Right Updated: 04/19/21 1150     Lipase 19 u/L     Magnesium [[id number]]  (Normal) Collected: 04/19/21 1119    Lab Status: Final result Specimen: Blood from Arm, Right Updated: 04/19/21 1150     Magnesium 1 9 mg/dL     CK Total with Reflex CKMB [[id number]]  (Normal) Collected: 04/19/21 1119    Lab Status: Final result Specimen: Blood from Arm, Right Updated: 04/19/21 1150     Total CK 34 U/L     Lactic acid [[id number]]  (Normal) Collected: 04/19/21 1119    Lab Status: Final result Specimen: Blood from Arm, Right Updated: 04/19/21 1149     LACTIC ACID 1 0 mmol/L Narrative:      Result may be elevated if tourniquet was used during collection  UA (URINE) with reflex to Scope [[id number]]  (Abnormal) Collected: 04/19/21 1119    Lab Status: Final result Specimen: Urine, Clean Catch Updated: 04/19/21 1140     Color, UA Yellow     Clarity, UA Clear     Specific Gravity, UA 1 015     pH, UA 6 5     Leukocytes, UA Negative     Nitrite, UA Negative     Protein, UA Negative mg/dl      Glucose, UA Negative mg/dl      Ketones, UA Trace mg/dl      Urobilinogen, UA 0 2 E U /dl      Bilirubin, UA Negative     Blood, UA Negative    CBC and differential [[id number]]  (Abnormal) Collected: 04/19/21 1119    Lab Status: Final result Specimen: Blood from Arm, Right Updated: 04/19/21 1128     WBC 7 50 Thousand/uL      RBC 4 78 Million/uL      Hemoglobin 16 0 g/dL      Hematocrit 45 8 %      MCV 96 fL      MCH 33 5 pg      MCHC 35 0 g/dL      RDW 14 6 %      MPV 7 0 fL      Platelets 040 Thousands/uL      Neutrophils Relative 75 %      Lymphocytes Relative 17 %      Monocytes Relative 6 %      Eosinophils Relative 1 %      Basophils Relative 1 %      Neutrophils Absolute 5 70 Thousands/µL      Lymphocytes Absolute 1 30 Thousands/µL      Monocytes Absolute 0 50 Thousand/µL      Eosinophils Absolute 0 00 Thousand/µL      Basophils Absolute 0 00 Thousands/µL     Fingerstick Glucose (POCT) [[id number]]  (Normal) Collected: 04/19/21 1103    Lab Status: Final result Updated: 04/19/21 1104     POC Glucose 102 mg/dl                  CT abdomen pelvis with contrast   Final Result by Cheryl Gonzalez MD (04/19 1257)      No acute intra-abdominal pathology  Constipation              Workstation performed: [id number]                    Procedures  Procedures         ED Course  ED Course as of Apr 19 1509   Mon Apr 19, 2021   1353 Medically clear for crisis evaluation                                SBIRT 22yo+      Most Recent Value   SBIRT (25 yo +)   In order to provide better care to our patients, we are screening all of our patients for alcohol and drug use  Would it be okay to ask you these screening questions? Unable to answer at this time Filed at: 04/19/2021 1125                    [facility]  Number of Diagnoses or Management Options  Suicidal ideation:   Diagnosis management comments: Patient reports trialing multiple stool softeners yesterday for constipation  Mag citrate given in the emergency department  CT shows constipation  No acute pathology  Patient was evaluated by crisis  Patient wishing to sign himself into psychiatric facility for further treatment evaluation  Disposition  Final diagnoses:   Suicidal ideation   Constipation     Time reflects when diagnosis was documented in both MDM as applicable and the Disposition within this note     Time User Action Codes Description Comment    4/19/2021  1:53 PM Rios, [address] Suicidal ideation     4/19/2021  3:09 PM Semaj Linder Add [K59 00] Constipation       ED Disposition     ED Disposition Condition Date/Time Comment    Transfer to [facility] Apr 19, 2021  1:53 PM [place] Friday should be transferred out to  and has been medically cleared  Follow-up Information    None         Patient's Medications   Discharge Prescriptions    No medications on file     No discharge procedures on file      PDMP Review       Value Time User    PDMP Reviewed  Yes 3/12/2020  1:15 PM Santos Cintron MD          ED Provider  Electronically Signed by           Balta Starr PA-C  04/19/21 6544

## 2021-04-20 ENCOUNTER — HOSPITAL ENCOUNTER (INPATIENT)
Facility: HOSPITAL | Age: 71
LOS: 1 days | DRG: 378 | End: 2021-04-21
Attending: INTERNAL MEDICINE | Admitting: INTERNAL MEDICINE
Payer: COMMERCIAL

## 2021-04-20 ENCOUNTER — APPOINTMENT (INPATIENT)
Dept: RADIOLOGY | Facility: HOSPITAL | Age: 71
DRG: 378 | End: 2021-04-20
Payer: COMMERCIAL

## 2021-04-20 VITALS
WEIGHT: 136.02 LBS | HEIGHT: 64 IN | BODY MASS INDEX: 23.22 KG/M2 | TEMPERATURE: 97.9 F | HEART RATE: 120 BPM | DIASTOLIC BLOOD PRESSURE: 56 MMHG | RESPIRATION RATE: 16 BRPM | SYSTOLIC BLOOD PRESSURE: 91 MMHG | OXYGEN SATURATION: 95 %

## 2021-04-20 DIAGNOSIS — K92.2 ACUTE UPPER GI BLEED: Primary | ICD-10-CM

## 2021-04-20 DIAGNOSIS — I95.89 HYPOTENSION DUE TO HYPOVOLEMIA: ICD-10-CM

## 2021-04-20 DIAGNOSIS — F33.1 MODERATE EPISODE OF RECURRENT MAJOR DEPRESSIVE DISORDER (HCC): ICD-10-CM

## 2021-04-20 DIAGNOSIS — E86.1 HYPOTENSION DUE TO HYPOVOLEMIA: ICD-10-CM

## 2021-04-20 LAB
ALBUMIN SERPL BCP-MCNC: 3.2 G/DL (ref 3.5–5.7)
ALP SERPL-CCNC: 31 U/L (ref 55–165)
ALT SERPL W P-5'-P-CCNC: 5 U/L (ref 7–52)
ANION GAP SERPL CALCULATED.3IONS-SCNC: 12 MMOL/L (ref 4–13)
AST SERPL W P-5'-P-CCNC: 14 U/L (ref 13–39)
ATRIAL RATE: 90 BPM
BASOPHILS # BLD AUTO: 0 THOUSANDS/ΜL (ref 0–0.1)
BASOPHILS # BLD AUTO: 0 THOUSANDS/ΜL (ref 0–0.1)
BASOPHILS NFR BLD AUTO: 0 % (ref 0–2)
BASOPHILS NFR BLD AUTO: 0 % (ref 0–2)
BILIRUB SERPL-MCNC: 0.3 MG/DL (ref 0.2–1)
BUN SERPL-MCNC: 42 MG/DL (ref 7–25)
CALCIUM ALBUM COR SERPL-MCNC: 9.2 MG/DL (ref 8.3–10.1)
CALCIUM SERPL-MCNC: 8.6 MG/DL (ref 8.6–10.5)
CHLORIDE SERPL-SCNC: 93 MMOL/L (ref 98–107)
CO2 SERPL-SCNC: 29 MMOL/L (ref 21–31)
CREAT SERPL-MCNC: 0.99 MG/DL (ref 0.7–1.3)
EOSINOPHIL # BLD AUTO: 0 THOUSAND/ΜL (ref 0–0.61)
EOSINOPHIL # BLD AUTO: 0 THOUSAND/ΜL (ref 0–0.61)
EOSINOPHIL NFR BLD AUTO: 0 % (ref 0–5)
EOSINOPHIL NFR BLD AUTO: 0 % (ref 0–5)
ERYTHROCYTE [DISTWIDTH] IN BLOOD BY AUTOMATED COUNT: 14.1 % (ref 11.5–14.5)
ERYTHROCYTE [DISTWIDTH] IN BLOOD BY AUTOMATED COUNT: 14.4 % (ref 11.5–14.5)
GFR SERPL CREATININE-BSD FRML MDRD: 78 ML/MIN/1.73SQ M
GLUCOSE SERPL-MCNC: 101 MG/DL (ref 65–99)
HCT VFR BLD AUTO: 30.4 % (ref 42–47)
HCT VFR BLD AUTO: 34.7 % (ref 42–47)
HGB BLD-MCNC: 10.4 G/DL (ref 14–18)
HGB BLD-MCNC: 11.8 G/DL (ref 14–18)
INR PPP: 1.03 (ref 0.84–1.19)
INR PPP: 1.07 (ref 0.84–1.19)
LACTATE SERPL-SCNC: 0.9 MMOL/L (ref 0.5–2)
LYMPHOCYTES # BLD AUTO: 1.1 THOUSANDS/ΜL (ref 0.6–4.47)
LYMPHOCYTES # BLD AUTO: 1.7 THOUSANDS/ΜL (ref 0.6–4.47)
LYMPHOCYTES NFR BLD AUTO: 16 % (ref 21–51)
LYMPHOCYTES NFR BLD AUTO: 8 % (ref 21–51)
MCH RBC QN AUTO: 32.7 PG (ref 26–34)
MCH RBC QN AUTO: 32.8 PG (ref 26–34)
MCHC RBC AUTO-ENTMCNC: 34.1 G/DL (ref 31–37)
MCHC RBC AUTO-ENTMCNC: 34.1 G/DL (ref 31–37)
MCV RBC AUTO: 96 FL (ref 81–99)
MCV RBC AUTO: 97 FL (ref 81–99)
MONOCYTES # BLD AUTO: 0.6 THOUSAND/ΜL (ref 0.17–1.22)
MONOCYTES # BLD AUTO: 0.7 THOUSAND/ΜL (ref 0.17–1.22)
MONOCYTES NFR BLD AUTO: 5 % (ref 2–12)
MONOCYTES NFR BLD AUTO: 7 % (ref 2–12)
NEUTROPHILS # BLD AUTO: 11.4 THOUSANDS/ΜL (ref 1.4–6.5)
NEUTROPHILS # BLD AUTO: 7.6 THOUSANDS/ΜL (ref 1.4–6.5)
NEUTS SEG NFR BLD AUTO: 76 % (ref 42–75)
NEUTS SEG NFR BLD AUTO: 87 % (ref 42–75)
P AXIS: 83 DEGREES
PLATELET # BLD AUTO: 343 THOUSANDS/UL (ref 149–390)
PLATELET # BLD AUTO: 366 THOUSANDS/UL (ref 149–390)
PMV BLD AUTO: 7.5 FL (ref 8.6–11.7)
PMV BLD AUTO: 7.5 FL (ref 8.6–11.7)
POTASSIUM SERPL-SCNC: 4.2 MMOL/L (ref 3.5–5.5)
PR INTERVAL: 156 MS
PROT SERPL-MCNC: 5.9 G/DL (ref 6.4–8.9)
PROTHROMBIN TIME: 13.4 SECONDS (ref 11.6–14.5)
PROTHROMBIN TIME: 13.8 SECONDS (ref 11.6–14.5)
QRS AXIS: 72 DEGREES
QRSD INTERVAL: 84 MS
QT INTERVAL: 372 MS
QTC INTERVAL: 455 MS
RBC # BLD AUTO: 3.15 MILLION/UL (ref 4.3–5.9)
RBC # BLD AUTO: 3.62 MILLION/UL (ref 4.3–5.9)
SODIUM SERPL-SCNC: 134 MMOL/L (ref 134–143)
T WAVE AXIS: 60 DEGREES
TROPONIN I SERPL-MCNC: 0.03 NG/ML
VENTRICULAR RATE: 90 BPM
WBC # BLD AUTO: 10.1 THOUSAND/UL (ref 4.8–10.8)
WBC # BLD AUTO: 13.1 THOUSAND/UL (ref 4.8–10.8)

## 2021-04-20 PROCEDURE — 99223 1ST HOSP IP/OBS HIGH 75: CPT | Performed by: NURSE PRACTITIONER

## 2021-04-20 PROCEDURE — 85025 COMPLETE CBC W/AUTO DIFF WBC: CPT | Performed by: NURSE PRACTITIONER

## 2021-04-20 PROCEDURE — 85025 COMPLETE CBC W/AUTO DIFF WBC: CPT | Performed by: PHYSICIAN ASSISTANT

## 2021-04-20 PROCEDURE — 86880 COOMBS TEST DIRECT: CPT | Performed by: INTERNAL MEDICINE

## 2021-04-20 PROCEDURE — 85610 PROTHROMBIN TIME: CPT | Performed by: NURSE PRACTITIONER

## 2021-04-20 PROCEDURE — 82746 ASSAY OF FOLIC ACID SERUM: CPT | Performed by: FAMILY MEDICINE

## 2021-04-20 PROCEDURE — 05HY33Z INSERTION OF INFUSION DEVICE INTO UPPER VEIN, PERCUTANEOUS APPROACH: ICD-10-PCS | Performed by: EMERGENCY MEDICINE

## 2021-04-20 PROCEDURE — 82607 VITAMIN B-12: CPT | Performed by: FAMILY MEDICINE

## 2021-04-20 PROCEDURE — 85610 PROTHROMBIN TIME: CPT | Performed by: PHYSICIAN ASSISTANT

## 2021-04-20 PROCEDURE — 84484 ASSAY OF TROPONIN QUANT: CPT | Performed by: NURSE PRACTITIONER

## 2021-04-20 PROCEDURE — 93010 ELECTROCARDIOGRAM REPORT: CPT | Performed by: INTERNAL MEDICINE

## 2021-04-20 PROCEDURE — 86850 RBC ANTIBODY SCREEN: CPT | Performed by: NURSE PRACTITIONER

## 2021-04-20 PROCEDURE — 86901 BLOOD TYPING SEROLOGIC RH(D): CPT | Performed by: NURSE PRACTITIONER

## 2021-04-20 PROCEDURE — 71045 X-RAY EXAM CHEST 1 VIEW: CPT

## 2021-04-20 PROCEDURE — 80053 COMPREHEN METABOLIC PANEL: CPT | Performed by: NURSE PRACTITIONER

## 2021-04-20 PROCEDURE — 82306 VITAMIN D 25 HYDROXY: CPT | Performed by: FAMILY MEDICINE

## 2021-04-20 PROCEDURE — 83605 ASSAY OF LACTIC ACID: CPT | Performed by: NURSE PRACTITIONER

## 2021-04-20 PROCEDURE — 86900 BLOOD TYPING SEROLOGIC ABO: CPT | Performed by: NURSE PRACTITIONER

## 2021-04-20 PROCEDURE — C9113 INJ PANTOPRAZOLE SODIUM, VIA: HCPCS | Performed by: NURSE PRACTITIONER

## 2021-04-20 RX ORDER — OCTREOTIDE ACETATE 100 UG/ML
50 INJECTION, SOLUTION INTRAVENOUS; SUBCUTANEOUS ONCE
Status: COMPLETED | OUTPATIENT
Start: 2021-04-20 | End: 2021-04-20

## 2021-04-20 RX ORDER — ACETAMINOPHEN 325 MG/1
650 TABLET ORAL EVERY 4 HOURS PRN
Status: CANCELLED | OUTPATIENT
Start: 2021-04-20

## 2021-04-20 RX ORDER — OCTREOTIDE ACETATE 50 UG/ML
50 INJECTION, SOLUTION INTRAVENOUS; SUBCUTANEOUS ONCE
Status: DISCONTINUED | OUTPATIENT
Start: 2021-04-20 | End: 2021-04-20

## 2021-04-20 RX ORDER — FLUTICASONE PROPIONATE 50 MCG
2 SPRAY, SUSPENSION (ML) NASAL DAILY
Status: CANCELLED | OUTPATIENT
Start: 2021-04-21

## 2021-04-20 RX ORDER — HYDROXYZINE HYDROCHLORIDE 25 MG/1
25 TABLET, FILM COATED ORAL
Status: CANCELLED | OUTPATIENT
Start: 2021-04-20

## 2021-04-20 RX ORDER — POLYETHYLENE GLYCOL 3350 17 G/17G
17 POWDER, FOR SOLUTION ORAL DAILY PRN
Status: DISCONTINUED | OUTPATIENT
Start: 2021-04-20 | End: 2021-04-20 | Stop reason: HOSPADM

## 2021-04-20 RX ORDER — ONDANSETRON 4 MG/1
4 TABLET, ORALLY DISINTEGRATING ORAL EVERY 6 HOURS PRN
Status: CANCELLED | OUTPATIENT
Start: 2021-04-20

## 2021-04-20 RX ORDER — NICOTINE 21 MG/24HR
1 PATCH, TRANSDERMAL 24 HOURS TRANSDERMAL DAILY
Status: DISCONTINUED | OUTPATIENT
Start: 2021-04-21 | End: 2021-04-21 | Stop reason: HOSPADM

## 2021-04-20 RX ORDER — LOPERAMIDE HYDROCHLORIDE 2 MG/1
2 CAPSULE ORAL 4 TIMES DAILY PRN
Status: CANCELLED | OUTPATIENT
Start: 2021-04-20

## 2021-04-20 RX ORDER — TRAZODONE HYDROCHLORIDE 50 MG/1
50 TABLET ORAL
Status: CANCELLED | OUTPATIENT
Start: 2021-04-20

## 2021-04-20 RX ORDER — LANOLIN ALCOHOL/MO/W.PET/CERES
100 CREAM (GRAM) TOPICAL DAILY
Status: DISCONTINUED | OUTPATIENT
Start: 2021-04-20 | End: 2021-04-20 | Stop reason: HOSPADM

## 2021-04-20 RX ORDER — LORAZEPAM 1 MG/1
1 TABLET ORAL
Status: DISCONTINUED | OUTPATIENT
Start: 2021-04-20 | End: 2021-04-20 | Stop reason: HOSPADM

## 2021-04-20 RX ORDER — TRAZODONE HYDROCHLORIDE 50 MG/1
50 TABLET ORAL
Status: DISCONTINUED | OUTPATIENT
Start: 2021-04-20 | End: 2021-04-20 | Stop reason: HOSPADM

## 2021-04-20 RX ORDER — PANTOPRAZOLE SODIUM 40 MG/1
40 TABLET, DELAYED RELEASE ORAL
Status: DISCONTINUED | OUTPATIENT
Start: 2021-04-21 | End: 2021-04-20 | Stop reason: HOSPADM

## 2021-04-20 RX ORDER — FLUTICASONE FUROATE AND VILANTEROL 100; 25 UG/1; UG/1
1 POWDER RESPIRATORY (INHALATION) DAILY
Status: DISCONTINUED | OUTPATIENT
Start: 2021-04-21 | End: 2021-04-21 | Stop reason: HOSPADM

## 2021-04-20 RX ORDER — FLUTICASONE FUROATE AND VILANTEROL 100; 25 UG/1; UG/1
1 POWDER RESPIRATORY (INHALATION) DAILY
Status: CANCELLED | OUTPATIENT
Start: 2021-04-21

## 2021-04-20 RX ORDER — FLUTICASONE PROPIONATE 50 MCG
2 SPRAY, SUSPENSION (ML) NASAL DAILY
Status: DISCONTINUED | OUTPATIENT
Start: 2021-04-20 | End: 2021-04-20 | Stop reason: HOSPADM

## 2021-04-20 RX ORDER — ONDANSETRON 4 MG/1
4 TABLET, ORALLY DISINTEGRATING ORAL EVERY 6 HOURS PRN
Status: DISCONTINUED | OUTPATIENT
Start: 2021-04-20 | End: 2021-04-20 | Stop reason: HOSPADM

## 2021-04-20 RX ORDER — PANTOPRAZOLE SODIUM 40 MG/1
40 INJECTION, POWDER, FOR SOLUTION INTRAVENOUS EVERY 12 HOURS SCHEDULED
Status: CANCELLED | OUTPATIENT
Start: 2021-04-20

## 2021-04-20 RX ORDER — ALBUTEROL SULFATE 90 UG/1
2 AEROSOL, METERED RESPIRATORY (INHALATION) EVERY 6 HOURS PRN
Status: DISCONTINUED | OUTPATIENT
Start: 2021-04-20 | End: 2021-04-21 | Stop reason: HOSPADM

## 2021-04-20 RX ORDER — CALCIUM CARBONATE 200(500)MG
500 TABLET,CHEWABLE ORAL DAILY PRN
Status: DISCONTINUED | OUTPATIENT
Start: 2021-04-20 | End: 2021-04-20 | Stop reason: HOSPADM

## 2021-04-20 RX ORDER — GABAPENTIN 400 MG/1
400 CAPSULE ORAL 3 TIMES DAILY
Status: DISCONTINUED | OUTPATIENT
Start: 2021-04-20 | End: 2021-04-20 | Stop reason: HOSPADM

## 2021-04-20 RX ORDER — ALBUTEROL SULFATE 90 UG/1
2 AEROSOL, METERED RESPIRATORY (INHALATION) EVERY 6 HOURS PRN
Status: CANCELLED | OUTPATIENT
Start: 2021-04-20

## 2021-04-20 RX ORDER — GABAPENTIN 400 MG/1
400 CAPSULE ORAL 3 TIMES DAILY
Status: CANCELLED | OUTPATIENT
Start: 2021-04-20

## 2021-04-20 RX ORDER — AMOXICILLIN 250 MG
1 CAPSULE ORAL DAILY PRN
Status: CANCELLED | OUTPATIENT
Start: 2021-04-20

## 2021-04-20 RX ORDER — LORAZEPAM 0.5 MG/1
0.5 TABLET ORAL
Status: DISCONTINUED | OUTPATIENT
Start: 2021-04-20 | End: 2021-04-20 | Stop reason: HOSPADM

## 2021-04-20 RX ORDER — FLUTICASONE FUROATE AND VILANTEROL 100; 25 UG/1; UG/1
1 POWDER RESPIRATORY (INHALATION) DAILY
Status: DISCONTINUED | OUTPATIENT
Start: 2021-04-20 | End: 2021-04-20 | Stop reason: HOSPADM

## 2021-04-20 RX ORDER — MAGNESIUM HYDROXIDE/ALUMINUM HYDROXICE/SIMETHICONE 120; 1200; 1200 MG/30ML; MG/30ML; MG/30ML
30 SUSPENSION ORAL EVERY 4 HOURS PRN
Status: CANCELLED | OUTPATIENT
Start: 2021-04-20

## 2021-04-20 RX ORDER — HYDROXYZINE HYDROCHLORIDE 25 MG/1
25 TABLET, FILM COATED ORAL
Status: DISCONTINUED | OUTPATIENT
Start: 2021-04-20 | End: 2021-04-20 | Stop reason: HOSPADM

## 2021-04-20 RX ORDER — ACETAMINOPHEN 325 MG/1
975 TABLET ORAL EVERY 6 HOURS PRN
Status: CANCELLED | OUTPATIENT
Start: 2021-04-20

## 2021-04-20 RX ORDER — MAGNESIUM HYDROXIDE/ALUMINUM HYDROXICE/SIMETHICONE 120; 1200; 1200 MG/30ML; MG/30ML; MG/30ML
30 SUSPENSION ORAL EVERY 4 HOURS PRN
Status: DISCONTINUED | OUTPATIENT
Start: 2021-04-20 | End: 2021-04-20 | Stop reason: HOSPADM

## 2021-04-20 RX ORDER — OLANZAPINE 5 MG/1
5 TABLET ORAL
Status: DISCONTINUED | OUTPATIENT
Start: 2021-04-20 | End: 2021-04-20 | Stop reason: HOSPADM

## 2021-04-20 RX ORDER — IBUPROFEN 600 MG/1
600 TABLET ORAL EVERY 6 HOURS
Status: CANCELLED | OUTPATIENT
Start: 2021-04-20

## 2021-04-20 RX ORDER — LORAZEPAM 2 MG/ML
0.5 INJECTION INTRAMUSCULAR EVERY 6 HOURS PRN
Status: DISCONTINUED | OUTPATIENT
Start: 2021-04-20 | End: 2021-04-21 | Stop reason: HOSPADM

## 2021-04-20 RX ORDER — PANTOPRAZOLE SODIUM 40 MG/1
40 INJECTION, POWDER, FOR SOLUTION INTRAVENOUS EVERY 12 HOURS SCHEDULED
Status: DISCONTINUED | OUTPATIENT
Start: 2021-04-20 | End: 2021-04-20 | Stop reason: HOSPADM

## 2021-04-20 RX ORDER — RISPERIDONE 0.5 MG/1
0.5 TABLET, ORALLY DISINTEGRATING ORAL
Status: DISCONTINUED | OUTPATIENT
Start: 2021-04-20 | End: 2021-04-20 | Stop reason: HOSPADM

## 2021-04-20 RX ORDER — LORAZEPAM 2 MG/ML
1 INJECTION INTRAMUSCULAR
Status: DISCONTINUED | OUTPATIENT
Start: 2021-04-20 | End: 2021-04-20 | Stop reason: HOSPADM

## 2021-04-20 RX ORDER — LORAZEPAM 0.5 MG/1
0.5 TABLET ORAL
Status: CANCELLED | OUTPATIENT
Start: 2021-04-20

## 2021-04-20 RX ORDER — OLANZAPINE 10 MG/1
5 INJECTION, POWDER, LYOPHILIZED, FOR SOLUTION INTRAMUSCULAR
Status: DISCONTINUED | OUTPATIENT
Start: 2021-04-20 | End: 2021-04-20 | Stop reason: HOSPADM

## 2021-04-20 RX ORDER — FOLIC ACID 1 MG/1
1 TABLET ORAL DAILY
Status: DISCONTINUED | OUTPATIENT
Start: 2021-04-20 | End: 2021-04-20 | Stop reason: HOSPADM

## 2021-04-20 RX ORDER — CALCIUM CARBONATE 200(500)MG
500 TABLET,CHEWABLE ORAL DAILY PRN
Status: CANCELLED | OUTPATIENT
Start: 2021-04-20

## 2021-04-20 RX ORDER — QUETIAPINE FUMARATE 400 MG/1
400 TABLET, FILM COATED ORAL
Status: CANCELLED | OUTPATIENT
Start: 2021-04-20

## 2021-04-20 RX ORDER — POLYETHYLENE GLYCOL 3350 17 G/17G
17 POWDER, FOR SOLUTION ORAL DAILY PRN
Status: CANCELLED | OUTPATIENT
Start: 2021-04-20

## 2021-04-20 RX ORDER — METOPROLOL TARTRATE 50 MG/1
50 TABLET, FILM COATED ORAL EVERY 12 HOURS SCHEDULED
Status: CANCELLED | OUTPATIENT
Start: 2021-04-20

## 2021-04-20 RX ORDER — NICOTINE 21 MG/24HR
1 PATCH, TRANSDERMAL 24 HOURS TRANSDERMAL DAILY
Status: CANCELLED | OUTPATIENT
Start: 2021-04-21

## 2021-04-20 RX ORDER — OLANZAPINE 5 MG/1
5 TABLET ORAL
Status: CANCELLED | OUTPATIENT
Start: 2021-04-20

## 2021-04-20 RX ORDER — METOPROLOL TARTRATE 5 MG/5ML
5 INJECTION INTRAVENOUS EVERY 8 HOURS
Status: DISCONTINUED | OUTPATIENT
Start: 2021-04-20 | End: 2021-04-21 | Stop reason: HOSPADM

## 2021-04-20 RX ORDER — ALBUTEROL SULFATE 90 UG/1
2 AEROSOL, METERED RESPIRATORY (INHALATION) EVERY 6 HOURS PRN
Status: DISCONTINUED | OUTPATIENT
Start: 2021-04-20 | End: 2021-04-20 | Stop reason: HOSPADM

## 2021-04-20 RX ORDER — MELATONIN
1000 DAILY
Status: DISCONTINUED | OUTPATIENT
Start: 2021-04-20 | End: 2021-04-20 | Stop reason: HOSPADM

## 2021-04-20 RX ORDER — LORAZEPAM 1 MG/1
1 TABLET ORAL
Status: CANCELLED | OUTPATIENT
Start: 2021-04-20

## 2021-04-20 RX ORDER — ATORVASTATIN CALCIUM 40 MG/1
40 TABLET, FILM COATED ORAL DAILY
Status: CANCELLED | OUTPATIENT
Start: 2021-04-21

## 2021-04-20 RX ORDER — FOLIC ACID 1 MG/1
1 TABLET ORAL DAILY
Status: CANCELLED | OUTPATIENT
Start: 2021-04-21

## 2021-04-20 RX ORDER — ACETAMINOPHEN 325 MG/1
650 TABLET ORAL EVERY 4 HOURS PRN
Status: DISCONTINUED | OUTPATIENT
Start: 2021-04-20 | End: 2021-04-20 | Stop reason: HOSPADM

## 2021-04-20 RX ORDER — QUETIAPINE FUMARATE 400 MG/1
400 TABLET, FILM COATED ORAL
Status: DISCONTINUED | OUTPATIENT
Start: 2021-04-20 | End: 2021-04-20 | Stop reason: HOSPADM

## 2021-04-20 RX ORDER — LOPERAMIDE HYDROCHLORIDE 2 MG/1
2 CAPSULE ORAL 4 TIMES DAILY PRN
Status: DISCONTINUED | OUTPATIENT
Start: 2021-04-20 | End: 2021-04-20 | Stop reason: HOSPADM

## 2021-04-20 RX ORDER — OLANZAPINE 10 MG/1
5 INJECTION, POWDER, LYOPHILIZED, FOR SOLUTION INTRAMUSCULAR
Status: CANCELLED | OUTPATIENT
Start: 2021-04-20

## 2021-04-20 RX ORDER — LANOLIN ALCOHOL/MO/W.PET/CERES
100 CREAM (GRAM) TOPICAL DAILY
Status: CANCELLED | OUTPATIENT
Start: 2021-04-21

## 2021-04-20 RX ORDER — RISPERIDONE 0.5 MG/1
0.5 TABLET, ORALLY DISINTEGRATING ORAL
Status: CANCELLED | OUTPATIENT
Start: 2021-04-20

## 2021-04-20 RX ORDER — ACETAMINOPHEN 325 MG/1
975 TABLET ORAL EVERY 6 HOURS PRN
Status: DISCONTINUED | OUTPATIENT
Start: 2021-04-20 | End: 2021-04-20 | Stop reason: HOSPADM

## 2021-04-20 RX ORDER — LORAZEPAM 2 MG/ML
1 INJECTION INTRAMUSCULAR
Status: CANCELLED | OUTPATIENT
Start: 2021-04-20

## 2021-04-20 RX ORDER — NICOTINE 21 MG/24HR
21 PATCH, TRANSDERMAL 24 HOURS TRANSDERMAL ONCE
Status: DISCONTINUED | OUTPATIENT
Start: 2021-04-20 | End: 2021-04-20

## 2021-04-20 RX ORDER — PANTOPRAZOLE SODIUM 40 MG/1
40 TABLET, DELAYED RELEASE ORAL
Status: CANCELLED | OUTPATIENT
Start: 2021-04-21

## 2021-04-20 RX ORDER — ATORVASTATIN CALCIUM 40 MG/1
40 TABLET, FILM COATED ORAL DAILY
Status: DISCONTINUED | OUTPATIENT
Start: 2021-04-20 | End: 2021-04-20 | Stop reason: HOSPADM

## 2021-04-20 RX ORDER — METOPROLOL TARTRATE 50 MG/1
50 TABLET, FILM COATED ORAL EVERY 12 HOURS SCHEDULED
Status: DISCONTINUED | OUTPATIENT
Start: 2021-04-20 | End: 2021-04-20 | Stop reason: HOSPADM

## 2021-04-20 RX ORDER — MELATONIN
1000 DAILY
Status: CANCELLED | OUTPATIENT
Start: 2021-04-21

## 2021-04-20 RX ORDER — NICOTINE 21 MG/24HR
1 PATCH, TRANSDERMAL 24 HOURS TRANSDERMAL DAILY
Status: DISCONTINUED | OUTPATIENT
Start: 2021-04-20 | End: 2021-04-20 | Stop reason: HOSPADM

## 2021-04-20 RX ORDER — NICOTINE 21 MG/24HR
1 PATCH, TRANSDERMAL 24 HOURS TRANSDERMAL DAILY
Status: DISCONTINUED | OUTPATIENT
Start: 2021-04-20 | End: 2021-04-20

## 2021-04-20 RX ORDER — AMOXICILLIN 250 MG
1 CAPSULE ORAL DAILY PRN
Status: DISCONTINUED | OUTPATIENT
Start: 2021-04-20 | End: 2021-04-20 | Stop reason: HOSPADM

## 2021-04-20 RX ORDER — SODIUM CHLORIDE, SODIUM GLUCONATE, SODIUM ACETATE, POTASSIUM CHLORIDE, MAGNESIUM CHLORIDE, SODIUM PHOSPHATE, DIBASIC, AND POTASSIUM PHOSPHATE .53; .5; .37; .037; .03; .012; .00082 G/100ML; G/100ML; G/100ML; G/100ML; G/100ML; G/100ML; G/100ML
125 INJECTION, SOLUTION INTRAVENOUS CONTINUOUS
Status: DISCONTINUED | OUTPATIENT
Start: 2021-04-20 | End: 2021-04-21 | Stop reason: HOSPADM

## 2021-04-20 RX ORDER — IBUPROFEN 600 MG/1
600 TABLET ORAL EVERY 6 HOURS
Status: DISCONTINUED | OUTPATIENT
Start: 2021-04-20 | End: 2021-04-20 | Stop reason: HOSPADM

## 2021-04-20 RX ADMIN — OCTREOTIDE ACETATE 50 MCG/HR: 500 INJECTION, SOLUTION INTRAVENOUS; SUBCUTANEOUS at 22:56

## 2021-04-20 RX ADMIN — MORPHINE SULFATE 2 MG: 2 INJECTION, SOLUTION INTRAMUSCULAR; INTRAVENOUS at 21:41

## 2021-04-20 RX ADMIN — METOPROLOL TARTRATE 5 MG: 5 INJECTION INTRAVENOUS at 22:35

## 2021-04-20 RX ADMIN — SODIUM CHLORIDE, SODIUM GLUCONATE, SODIUM ACETATE, POTASSIUM CHLORIDE, MAGNESIUM CHLORIDE, SODIUM PHOSPHATE, DIBASIC, AND POTASSIUM PHOSPHATE 125 ML/HR: .53; .5; .37; .037; .03; .012; .00082 INJECTION, SOLUTION INTRAVENOUS at 23:27

## 2021-04-20 RX ADMIN — CALCIUM CARBONATE (ANTACID) CHEW TAB 500 MG 500 MG: 500 CHEW TAB at 12:56

## 2021-04-20 RX ADMIN — PANTOPRAZOLE SODIUM 80 MG: 40 INJECTION, POWDER, FOR SOLUTION INTRAVENOUS at 23:00

## 2021-04-20 RX ADMIN — SODIUM CHLORIDE 500 ML: 0.9 INJECTION, SOLUTION INTRAVENOUS at 22:00

## 2021-04-20 RX ADMIN — OCTREOTIDE ACETATE 50 MCG: 100 INJECTION, SOLUTION INTRAVENOUS; SUBCUTANEOUS at 22:37

## 2021-04-20 RX ADMIN — NICOTINE 21 MG: 21 PATCH, EXTENDED RELEASE TRANSDERMAL at 11:49

## 2021-04-20 RX ADMIN — LORAZEPAM 0.5 MG: 2 INJECTION INTRAMUSCULAR; INTRAVENOUS at 21:42

## 2021-04-20 RX ADMIN — PANTOPRAZOLE SODIUM 8 MG/HR: 40 INJECTION, POWDER, FOR SOLUTION INTRAVENOUS at 23:24

## 2021-04-20 RX ADMIN — LOPERAMIDE HYDROCHLORIDE 2 MG: 2 CAPSULE ORAL at 15:45

## 2021-04-20 RX ADMIN — ONDANSETRON 4 MG: 4 TABLET, ORALLY DISINTEGRATING ORAL at 15:45

## 2021-04-20 NOTE — NURSING NOTE
Report given via telephone to Landon Primrose at this time  Patient transferred to ICU; belongings with patient

## 2021-04-20 NOTE — H&P
Muñiz,Den#  RBR: Justen Bedolla  GY    [facility]:[id number]  Adm Date: 2021 1021  10:21 AM   ATT PHY: Richy Fajardo, [address]         Chief Complaint:  Worsening depression symptoms along with suicidal ideations    History of Presenting Illness: Lucita Cabrera is a(n) 78y o  year old male who was admitted through the emergency department where she presented with worsening depression symptoms along with suicidal ideations  Patient reported that he knows 100 ways to kill himself  Patient examined at bedside  Patient denied any active suicidal homicidal ideations  No Known Allergies    No current facility-administered medications on file prior to encounter  Current Outpatient Medications on File Prior to Encounter   Medication Sig Dispense Refill    QUEtiapine (SEROquel) 300 mg tablet Take 2 tablets (600 mg total) by mouth daily at bedtime 60 tablet 1    traZODone (DESYREL) 50 mg tablet Take 1 tablet (50 mg total) by mouth daily at bedtime as needed for sleep 30 tablet 1    albuterol (ProAir HFA) 90 mcg/act inhaler Inhale 2 puffs every 6 (six) hours as needed for wheezing 1 Inhaler 1    atorvastatin (LIPITOR) 40 mg tablet Take 1 tablet (40 mg total) by mouth daily 30 tablet 1    Cholecalciferol 25 MCG (1000 UT) tablet Take 1 tablet (1,000 Units total) by mouth daily 30 tablet 1    diclofenac (VOLTAREN) 75 mg EC tablet Take 1 tablet (75 mg total) by mouth 2 (two) times a day 60 tablet 1    fluticasone (FLONASE) 50 mcg/act nasal spray 2 sprays into each nostril daily 18 2 mL 2    fluticasone-vilanterol (BREO ELLIPTA) 100-25 mcg/inh inhaler Inhale 1 puff daily Rinse mouth after use   1 Inhaler 3    folic acid (FOLVITE) 1 mg tablet Take 1 tablet (1 mg total) by mouth daily 90 tablet 1    gabapentin (NEURONTIN) 600 MG tablet Take 1 tablet (600 mg total) by mouth 3 (three) times a day 90 tablet 1    guaiFENesin (MUCINEX) 600 mg 12 hr tablet Take 600 mg by mouth every 12 (twelve) hours as needed for cough      hydrOXYzine pamoate (VISTARIL) 50 mg capsule take 1 capsule by mouth three times a day if needed for anxiety      metoprolol tartrate (LOPRESSOR) 50 mg tablet Take 1 tablet (50 mg total) by mouth every 12 (twelve) hours 60 tablet 1    mirtazapine (REMERON) 45 MG tablet       pantoprazole (PROTONIX) 40 mg tablet Take 1 tablet (40 mg total) by mouth 2 (two) times a day before meals  0    QUEtiapine (SEROquel) 200 mg tablet Take 1 tablet (200 mg total) by mouth daily 30 tablet 1       Active Ambulatory Problems     Diagnosis Date Noted    Major depressive disorder, recurrent, severe with psychotic features ([place] [address] ) 04/09/2019    Essential hypertension 03/17/2019    Alcohol dependence with unspecified alcohol-induced disorder ([place] [address] ) 05/03/2019    Hypokinesia of left ventricle 08/21/2019    Bilateral lower extremity edema 08/21/2019    Tachycardia 08/28/2019    Back pain without sciatica 08/28/2019    Elevated fasting glucose 10/01/2019    Ambulatory dysfunction 10/01/2019    Poor mobility 10/01/2019    Overweight (BMI 25 0-29 9) 10/01/2019    Vitamin D deficiency 10/02/2019    Abnormal echocardiogram 12/11/2019    Syncope 01/08/2020    Alcohol abuse 01/08/2020    MDD (major depressive disorder) 01/08/2020    COPD without exacerbation ([facility] ) 01/08/2020    Dyslipidemia 01/08/2020    Chronic pain 01/08/2020    History of anemia 01/08/2020    Tobacco abuse 01/08/2020    Inadequate oral nutritional intake 01/09/2020    History of duodenal ulcer 01/08/2020    Anemia 02/13/2020    Schizophrenia ([facility] ) 03/19/2020    Open toe wound, initial encounter 03/26/2020    Chronic obstructive pulmonary disease ([facility] ) 03/26/2020    History of bleeding ulcers 03/26/2020    History of exploratory laparotomy 03/26/2020    History of syncope 03/26/2020    History of GI bleed 03/26/2020    History of suicidal ideation 03/26/2020  Spontaneous bruising 07/01/2020    Medicare annual wellness visit, subsequent 11/03/2020    Cold hands and feet 11/03/2020    Aortic atherosclerosis ([facility] ) 11/03/2020    Raynaud's disease without gangrene 11/24/2020     Resolved Ambulatory Problems     Diagnosis Date Noted    TIA (transient ischemic attack) 05/03/2019    Brain TIA 05/03/2019    Metatarsal fracture 02/23/2015    History of substance abuse ([facility] ) 08/21/2019    Encounter to establish care with new doctor 08/21/2019    History of sustained ventricular tachycardia 08/21/2019    History of transient ischemic attack (TIA) 08/28/2019    History of cerebrovascular accident (CVA) with residual deficit 10/01/2019    Chest pain 01/08/2020    Suicidal ideation 01/08/2020    History of CVA (cerebrovascular accident) 01/08/2020    Dehydration 01/09/2020    Hemorrhagic shock ([facility] ) 01/13/2020    Hypotension 01/17/2020    Encephalopathy 01/17/2020    Surgical wound infection 03/26/2020    Long toenail 03/26/2020     Past Medical History:   Diagnosis Date    BPH (benign prostatic hyperplasia)     CVA (cerebral vascular accident) ([facility] )     DJD (degenerative joint disease)     Gait abnormality     Head injury     Hypertension        Past Surgical History:   Procedure Laterality Date    ABDOMINAL SURGERY      ANKLE SURGERY      BACK SURGERY      CT GUIDED [facility] DRAINAGE CATHETER PLACEMENT  1/27/2020    ELBOW SURGERY      IR VISCERAL ANGIOGRAPHY / INTERVENTION  1/11/2020    JOINT REPLACEMENT      KNEE SURGERY      LAPAROTOMY N/A 1/11/2020    Procedure: LAPAROTOMY EXPLORATORY,  OVERSEW  OF GASTRO DUODENAL ARTERY, THAL PATCH OF DUODENUM, VICKY GASTRO JEJUNOSTOMY TUBE;  Surgeon: James Tubbs DO;  Location: [facility];  Service: General    LIVER SURGERY         Social History:   Social History     Socioeconomic History    Marital status: Single     Spouse name: None    Number of children: None    Years of education: None    Highest education level: None   Occupational History    None   Social Needs    Financial resource strain: None    Food insecurity     Worry: None     Inability: None    Transportation needs     Medical: No     Non-medical: No   Tobacco Use    Smoking status: Current Every Day Smoker     Packs/day: 1 00     Types: Cigarettes    Smokeless tobacco: Never Used    Tobacco comment: started age 14, 4 quit attempts   Substance and Sexual Activity    Alcohol use: Yes     Frequency: Monthly or less     Comment: a couple beers    Drug use: Yes     Types: Marijuana     Comment: history polysubstance use including IVDA on record- every now then will smoke weed     Sexual activity: Not Currently     Partners: Female   Lifestyle    Physical activity     Days per week: 0 days     Minutes per session: None    Stress: None   Relationships    Social connections     Talks on phone: None     Gets together: None     Attends Gnosticist service: None     Active member of club or organization: None     Attends meetings of clubs or organizations: None     Relationship status: None    Intimate partner violence     Fear of current or ex partner: None     Emotionally abused: None     Physically abused: None     Forced sexual activity: None   Other Topics Concern    None   Social History Narrative    Used to be  for about 27 yrs, unemployed since fell off roof onto concrete - pt does not recall what year    1 son with whom he lives,        Family History:   Family History   Problem Relation Age of Onset    No Known Problems Mother     No Known Problems Father        Review of Systems   Gastrointestinal: Positive for diarrhea, nausea and vomiting  Musculoskeletal: Positive for arthralgias, back pain and gait problem  Neurological: Positive for weakness  All other systems reviewed and are negative        Physical Exam   Vitals: Blood pressure 118/78, pulse (!) 110, temperature (!) 97 2 °F (36 2 °C), temperature source Tympanic, resp  rate 16, weight 68 kg (150 lb), SpO2 96 %  ,Body mass index is 25 75 kg/m²  Constitutional: Awake and Alert  Well-developed and well-nourished  No distress  HENT: PERR,EOMI, conjunctiva normal  Head: Normocephalic and atraumatic  Mouth/Throat: Oropharynx is clear and moist     Eyes: Conjunctivae and EOM are normal  Pupils are equal, round, and reactive to light  Right and left eye exhibits no discharge  Neck: Neck supple  No tracheal deviation present  No thyromegaly present  Cardiovascular: Normal rate, regular rhythm and normal heart sounds  Exam reveals no friction rub  No murmur heard  Pulmonary/Chest: Effort normal and breath sounds normal  No respiratory distress  She has no wheezes  Abdominal: Soft  Bowel sounds are normal  She exhibits no distension  There is no tenderness  There is no rebound and no guarding  Neurological: Cranial Nerves grossly intact  No sensory deficit  Coordination normal    Musculoskeletal:   Nontender spine  Skin: Skin is warm and dry  No rash noted  No diaphoresis  No erythema  No edema  No cyanosis  Assessment     Bindu Sahni is a(n) 78y o  year old male with MDD with suicidal ideations    1  Cardiac with history of Hypertension   Metoprolol 50 mg b i d  daily along with Atorvastatin 40 mg daily  2  Asthma/COPD  Patient is on Breo Ellipta 100-25 mcg per inhalation, Albuterol inhaler as needed  3  GERD  Patient is on Protonix   4  Alcohol Abuse  Thiamine, folic acid, and multivitamin  5  Tobacco Abuse  Nicotine Transdermal Patch  6  DJD/OA with muscle spasms and chronic pain syndrome  Patient may take  Tylenol as needed for mild/moderate pain and  ibuprofen 600 mg every 6 hours  7  Vitamin-D deficiency  Patient is on vitamin-D supplements  I will get vitamin-D levels for the patient  8  Allergic rhinitis  Patient is on Flonase nasal spray  9  Neuropathy  Patient is on gabapentin      10  Nausea vomiting and diarrhea episodes  Patient may get Zofran along with Imodium on as needed basis  6  Psych with MDD along with suicidal ideations    Patient is being managed by psych        Prognosis: Fair  Discharge Plan: In progress  Advanced Directives: I have discussed in detail the patient the advanced directives  Patient has appointed his son Denisse Estrella as his POA and his phone number is [phone]  Patient has no living will with advanced healthcare directives  When discussing cardiac and pulmonary resuscitation efforts with the patient, the patient wishes to be FULL CODE  I have spent more than 50 minutes gathering data, doing physical examination, and discussing the advanced directives, which was witnessed by caring staff

## 2021-04-20 NOTE — ED NOTES
EVS was dialed, but automated system provides an unusual response  Per EVS, patient "may be active" but "may not be active" with dates specified  Call to [facility]  Spoke with Arnaldo Jiang, who verified that they have him listed as active with regular Medicare, and "Dave Max" and active with [facility], but added that he is scheduled to term on 4/30/21  Patient will require precert with [facility] ([phone]) and COB with [facility] once an accepting facility is identified

## 2021-04-20 NOTE — ED NOTES
Tc Suicide Risk Assessment deferred, as unable to assess while patient sleeping  Behavioral Health Assessment deferred as patient is sleeping and would benefit from additional rest   Vital signs deferred until patient awake, no signs or symptoms of respiratory distress at this time  Once patient is awake and able to participate, will complete assessments         Janes Frey RN  04/20/21 0082

## 2021-04-20 NOTE — ED NOTES
Tc Suicide Risk Assessment deferred, as unable to assess while patient sleeping  Behavioral Health Assessment deferred as patient is sleeping and would benefit from additional rest   Vital signs deferred until patient awake, no signs or symptoms of respiratory distress at this time  Once patient is awake and able to participate, will complete assessments         Felisha Yanes RN  04/20/21 8039

## 2021-04-20 NOTE — NURSING NOTE
Pt admitted to [facility] [place] ED for suicidal ideations and voicing there are many ways to do it  Pt continues to have abdominal pain for which he came into ED, son called ambulance to bring him in  Pt declines to sign admission paperwork due to being in pain  Pt has been having frequent stools and vomiting in ED today  Pt had magnesium citrate yesterday in ED  Prn imodium and Zofran given upon arrival  Pt could not participate in admission process due to abdominal pain  Pt is currently resting in bed  Pt has history of suicide attempt  Pt currently denies suicidal or homicidal ideations  Discussed with Héctor Herrera suicide risk assessment, Q 7 minute safety checks initiated  Pt rates anxiety 7/10 and depression 9/10 currently  Monitoring continues

## 2021-04-20 NOTE — NURSING NOTE
Pt vomited moderate amount of brown mucous  BP dropping to 70s/40s and becoming tachy HR 120s Orders obtained from Dr Violetta Zarate  Unable to initiate IV, multiple attempts by department nurses  Labs ordered stat  Pt will be transferred to ICU for possible UGI bleed  Daughter in law aware

## 2021-04-20 NOTE — ED NOTES
Delay in charting due to patient care  Patient daughter in law, Coco Chase called requesting update  Per patient, she may be given an update  Update given to family member        Burt Monterroso RN  04/20/21 8139

## 2021-04-20 NOTE — ED NOTES
Help pt  In bathroom to clean up had bm    Clean clothing were given        Jo Erickson  04/20/21 0700

## 2021-04-20 NOTE — ED NOTES
Insurance Authorization for admission:   Phone call placed to [employer]  Phone number: [phone]  Spoke to GIVVER, who stated that the case was initiated already through navinet  Pending Reference #  [id number]  Transferred to Care ManagerJoseph    7 days approved  Level of care: [facility] (201)  Review on 4/26  Authorization # [id number]  UR to call [facility], [address] [place] [address] Road @ [phone]  Terms on 4/30/21  EVS (Eligibility Verification System) called - [phone]  Automated system indicates: eligible for [employer]  COB completed with [facility] requested a call upon arrival and d/c forms when d/c'd  Insurance Authorization for Transportation:    Transport not required       Becky Mejia, MARLYS  04/20/21  [id number]

## 2021-04-20 NOTE — PROGRESS NOTES
Patient came in with the followin pr white socks  1 yellow shirt  1 grey "long beach sweat shirt"  1 pr brown slippers  1 pr grey sweat shirt    Locked in contraband:   1 grey jacket  1 black beanie hat      Behind the nurses    1 pack [place]  1 green lighter with 3/17 on it  1 red apple cell phone w/no   1 [employer] ins card    Jcarlos Henderson has items in safe:   Bag # [id number]  No dentures  No glasses    All items have been signed for by the patient

## 2021-04-20 NOTE — PROGRESS NOTES
Per patient request, daughter-in-law, Steve Simon notified of current status and pending transfer to ICU

## 2021-04-20 NOTE — ED NOTES
[facility] is accepted at [facility]  Patient is accepted by Dr Carrie Reynolds  per Ch Form             Nurse report is to be called to [phone]  prior to patient transfer      Patient can go to floor at 1400

## 2021-04-20 NOTE — PLAN OF CARE
Problem: Ineffective Coping  Goal: Cooperates with admission process  Description: Interventions:   - Complete admission process  Outcome: Progressing  Goal: Identifies ineffective coping skills  Outcome: Progressing  Goal: Identifies healthy coping skills  Outcome: Progressing  Goal: Demonstrates healthy coping skills  Outcome: Progressing  Goal: Participates in unit activities  Description: Interventions:  - Provide therapeutic environment   - Provide required programming   - Redirect inappropriate behaviors   Outcome: Progressing  Goal: Patient/Family participate in treatment and DC plans  Description: Interventions:  - Provide therapeutic environment  Outcome: Progressing  Goal: Patient/Family verbalizes awareness of resources  Outcome: Progressing  Goal: Understands least restrictive measures  Description: Interventions:  - Utilize least restrictive behavior  Outcome: Progressing  Goal: Free from restraint events  Description: - Utilize least restrictive measures   - Provide behavioral interventions   - Redirect inappropriate behaviors   Outcome: Progressing     Problem: Risk for Self Injury/Neglect  Goal: Treatment Goal: Remain safe during length of stay, learn and adopt new coping skills, and be free of self-injurious ideation, impulses and acts at the time of discharge  Outcome: Progressing  Goal: Verbalize thoughts and feelings  Description: Interventions:  - Assess and re-assess patient's lethality and potential for self-injury  - Engage patient in 1:1 interactions, daily, for a minimum of 15 minutes  - Encourage patient to express feelings, fears, frustrations, hopes  - Establish rapport/trust with patient   Outcome: Progressing  Goal: Refrain from harming self  Description: Interventions:  - Monitor patient closely, per order  - Develop a trusting relationship  - Supervise medication ingestion, monitor effects and side effects   Outcome: Progressing  Goal: Attend and participate in unit activities, including therapeutic, recreational, and educational groups  Description: Interventions:  - Provide therapeutic and educational activities daily, encourage attendance and participation, and document same in the medical record  - Obtain collateral information, encourage visitation and family involvement in care   Outcome: Progressing  Goal: Recognize maladaptive responses and adopt new coping mechanisms  Outcome: Progressing  Goal: Complete daily ADLs, including personal hygiene independently, as able  Description: Interventions:  - Observe, teach, and assist patient with ADLS  - Monitor and promote a balance of rest/activity, with adequate nutrition and elimination  Outcome: Progressing     Problem: Depression  Goal: Treatment Goal: Demonstrate behavioral control of depressive symptoms, verbalize feelings of improved mood/affect, and adopt new coping skills prior to discharge  Outcome: Progressing  Goal: Verbalize thoughts and feelings  Description: Interventions:  - Assess and re-assess patient's level of risk   - Engage patient in 1:1 interactions, daily, for a minimum of 15 minutes   - Encourage patient to express feelings, fears, frustrations, hopes   Outcome: Progressing  Goal: Refrain from harming self  Description: Interventions:  - Monitor patient closely, per order   - Supervise medication ingestion, monitor effects and side effects   Outcome: Progressing  Goal: Refrain from isolation  Description: Interventions:  - Develop a trusting relationship   - Encourage socialization   Outcome: Progressing  Goal: Refrain from self-neglect  Outcome: Progressing  Goal: Attend and participate in unit activities, including therapeutic, recreational, and educational groups  Description: Interventions:  - Provide therapeutic and educational activities daily, encourage attendance and participation, and document same in the medical record   Outcome: Progressing  Goal: Complete daily ADLs, including personal hygiene independently, as able  Description: Interventions:  - Observe, teach, and assist patient with ADLS  -  Monitor and promote a balance of rest/activity, with adequate nutrition and elimination   Outcome: Progressing     Problem: Anxiety  Goal: Anxiety is at manageable level  Description: Interventions:  - Assess and monitor patient's anxiety level  - Monitor for signs and symptoms (heart palpitations, chest pain, shortness of breath, headaches, nausea, feeling jumpy, restlessness, irritable, apprehensive)  - Collaborate with interdisciplinary team and initiate plan and interventions as ordered    - Cozad patient to unit/surroundings  - Explain treatment plan  - Encourage participation in care  - Encourage verbalization of concerns/fears  - Identify coping mechanisms  - Assist in developing anxiety-reducing skills  - Administer/offer alternative therapies  - Limit or eliminate stimulants  Outcome: Progressing

## 2021-04-20 NOTE — ED NOTES
[facility] has no OA beds but has referral     [place]-no beds  [facility]-no beds  [facility]-no beds  [facility] - no beds

## 2021-04-20 NOTE — ED NOTES
Patient ambulated to bathroom  Appears unstable, holding onto tv stand, doors, and walls while walking       Giana Rawls  04/20/21 8349

## 2021-04-21 ENCOUNTER — APPOINTMENT (INPATIENT)
Dept: GASTROENTEROLOGY | Facility: HOSPITAL | Age: 71
DRG: 378 | End: 2021-04-21
Attending: INTERNAL MEDICINE
Payer: COMMERCIAL

## 2021-04-21 ENCOUNTER — PATIENT OUTREACH (OUTPATIENT)
Dept: FAMILY MEDICINE CLINIC | Facility: CLINIC | Age: 71
End: 2021-04-21

## 2021-04-21 ENCOUNTER — ANESTHESIA (INPATIENT)
Dept: GASTROENTEROLOGY | Facility: HOSPITAL | Age: 71
DRG: 378 | End: 2021-04-21
Payer: COMMERCIAL

## 2021-04-21 ENCOUNTER — HOSPITAL ENCOUNTER (INPATIENT)
Facility: HOSPITAL | Age: 71
LOS: 16 days | Discharge: HOME HEALTH | DRG: 378 | End: 2021-05-07
Attending: SURGERY | Admitting: INTERNAL MEDICINE
Payer: COMMERCIAL

## 2021-04-21 ENCOUNTER — ANESTHESIA EVENT (INPATIENT)
Dept: GASTROENTEROLOGY | Facility: HOSPITAL | Age: 71
DRG: 378 | End: 2021-04-21
Payer: COMMERCIAL

## 2021-04-21 VITALS
BODY MASS INDEX: 24.02 KG/M2 | SYSTOLIC BLOOD PRESSURE: 154 MMHG | TEMPERATURE: 98.9 F | WEIGHT: 140.7 LBS | DIASTOLIC BLOOD PRESSURE: 73 MMHG | OXYGEN SATURATION: 99 % | RESPIRATION RATE: 17 BRPM | HEART RATE: 83 BPM | HEIGHT: 64 IN

## 2021-04-21 DIAGNOSIS — F10.29 ALCOHOL DEPENDENCE WITH UNSPECIFIED ALCOHOL-INDUCED DISORDER (HCC): ICD-10-CM

## 2021-04-21 DIAGNOSIS — I95.9 HYPOTENSION: ICD-10-CM

## 2021-04-21 DIAGNOSIS — I10 ESSENTIAL HYPERTENSION: ICD-10-CM

## 2021-04-21 DIAGNOSIS — K26.9 DUODENAL ULCER: ICD-10-CM

## 2021-04-21 DIAGNOSIS — K92.2 GI BLEED: Primary | ICD-10-CM

## 2021-04-21 DIAGNOSIS — Z72.0 TOBACCO ABUSE: ICD-10-CM

## 2021-04-21 DIAGNOSIS — R26.2 AMBULATORY DYSFUNCTION: ICD-10-CM

## 2021-04-21 DIAGNOSIS — R11.2 NAUSEA & VOMITING: ICD-10-CM

## 2021-04-21 DIAGNOSIS — R10.9 ABDOMINAL PAIN: ICD-10-CM

## 2021-04-21 DIAGNOSIS — K92.2 ACUTE UPPER GI BLEED: ICD-10-CM

## 2021-04-21 DIAGNOSIS — F20.9 SCHIZOPHRENIA, UNSPECIFIED TYPE (HCC): ICD-10-CM

## 2021-04-21 DIAGNOSIS — D62 ACUTE BLOOD LOSS ANEMIA: ICD-10-CM

## 2021-04-21 LAB
25(OH)D3 SERPL-MCNC: 48.4 NG/ML (ref 30–100)
ABO GROUP BLD: NORMAL
ABO GROUP BLD: NORMAL
ALBUMIN SERPL BCP-MCNC: 2.5 G/DL (ref 3.5–5)
ALP SERPL-CCNC: 37 U/L (ref 46–116)
ALT SERPL W P-5'-P-CCNC: 10 U/L (ref 12–78)
ANION GAP SERPL CALCULATED.3IONS-SCNC: 7 MMOL/L (ref 4–13)
ANION GAP SERPL CALCULATED.3IONS-SCNC: 7 MMOL/L (ref 4–13)
APTT PPP: 31 SECONDS (ref 23–37)
AST SERPL W P-5'-P-CCNC: 18 U/L (ref 5–45)
ATRIAL RATE: 81 BPM
BASE EXCESS BLDA CALC-SCNC: 0.2 MMOL/L
BASOPHILS # BLD AUTO: 0.05 THOUSANDS/ΜL (ref 0–0.1)
BASOPHILS NFR BLD AUTO: 1 % (ref 0–1)
BILIRUB SERPL-MCNC: 0.34 MG/DL (ref 0.2–1)
BLD GP AB SCN SERPL QL: POSITIVE
BLD GP AB SCN SERPL QL: POSITIVE
BUN SERPL-MCNC: 22 MG/DL (ref 5–25)
BUN SERPL-MCNC: 37 MG/DL (ref 7–25)
CALCIUM ALBUM COR SERPL-MCNC: 9.1 MG/DL (ref 8.3–10.1)
CALCIUM SERPL-MCNC: 7.6 MG/DL (ref 8.6–10.5)
CALCIUM SERPL-MCNC: 7.9 MG/DL (ref 8.3–10.1)
CHLORIDE SERPL-SCNC: 103 MMOL/L (ref 100–108)
CHLORIDE SERPL-SCNC: 99 MMOL/L (ref 98–107)
CO2 SERPL-SCNC: 24 MMOL/L (ref 21–32)
CO2 SERPL-SCNC: 28 MMOL/L (ref 21–31)
CREAT SERPL-MCNC: 0.73 MG/DL (ref 0.6–1.3)
CREAT SERPL-MCNC: 0.94 MG/DL (ref 0.7–1.3)
DAT POLY-SP REAG RBC QL: NEGATIVE
EOSINOPHIL # BLD AUTO: 0.07 THOUSAND/ΜL (ref 0–0.61)
EOSINOPHIL NFR BLD AUTO: 1 % (ref 0–6)
ERYTHROCYTE [DISTWIDTH] IN BLOOD BY AUTOMATED COUNT: 14.3 % (ref 11.6–15.1)
FOLATE SERPL-MCNC: >20 NG/ML (ref 3.1–17.5)
GFR SERPL CREATININE-BSD FRML MDRD: 83 ML/MIN/1.73SQ M
GFR SERPL CREATININE-BSD FRML MDRD: 95 ML/MIN/1.73SQ M
GLUCOSE SERPL-MCNC: 105 MG/DL (ref 65–140)
GLUCOSE SERPL-MCNC: 112 MG/DL (ref 65–99)
HCO3 BLDA-SCNC: 24.1 MMOL/L (ref 22–28)
HCT VFR BLD AUTO: 24.4 % (ref 42–47)
HCT VFR BLD AUTO: 25.8 % (ref 36.5–49.3)
HCT VFR BLD AUTO: 29.3 % (ref 42–47)
HGB BLD-MCNC: 10 G/DL (ref 14–18)
HGB BLD-MCNC: 8.6 G/DL (ref 12–17)
HGB BLD-MCNC: 8.6 G/DL (ref 14–18)
IMM GRANULOCYTES # BLD AUTO: 0.05 THOUSAND/UL (ref 0–0.2)
IMM GRANULOCYTES NFR BLD AUTO: 1 % (ref 0–2)
INR PPP: 1.08 (ref 0.84–1.19)
LACTATE SERPL-SCNC: 1 MMOL/L (ref 0.5–2)
LYMPHOCYTES # BLD AUTO: 1.48 THOUSANDS/ΜL (ref 0.6–4.47)
LYMPHOCYTES NFR BLD AUTO: 19 % (ref 14–44)
MCH RBC QN AUTO: 32.8 PG (ref 26.8–34.3)
MCHC RBC AUTO-ENTMCNC: 33.3 G/DL (ref 31.4–37.4)
MCV RBC AUTO: 99 FL (ref 82–98)
MONOCYTES # BLD AUTO: 0.56 THOUSAND/ΜL (ref 0.17–1.22)
MONOCYTES NFR BLD AUTO: 7 % (ref 4–12)
NEUTROPHILS # BLD AUTO: 5.6 THOUSANDS/ΜL (ref 1.85–7.62)
NEUTS SEG NFR BLD AUTO: 71 % (ref 43–75)
NRBC BLD AUTO-RTO: 0 /100 WBCS
O2 CT BLDA-SCNC: 12.4 ML/DL (ref 16–23)
OXYHGB MFR BLDA: 94.9 % (ref 94–97)
P AXIS: 80 DEGREES
PCO2 BLDA: 35.8 MM HG (ref 36–44)
PH BLDA: 7.45 [PH] (ref 7.35–7.45)
PLATELET # BLD AUTO: 298 THOUSANDS/UL (ref 149–390)
PMV BLD AUTO: 8.8 FL (ref 8.9–12.7)
PO2 BLDA: 84.6 MM HG (ref 75–129)
POTASSIUM SERPL-SCNC: 4.1 MMOL/L (ref 3.5–5.5)
POTASSIUM SERPL-SCNC: 4.2 MMOL/L (ref 3.5–5.3)
PR INTERVAL: 171 MS
PROT SERPL-MCNC: 5.9 G/DL (ref 6.4–8.2)
PROTHROMBIN TIME: 14.1 SECONDS (ref 11.6–14.5)
QRS AXIS: 51 DEGREES
QRSD INTERVAL: 88 MS
QT INTERVAL: 383 MS
QTC INTERVAL: 445 MS
RBC # BLD AUTO: 2.62 MILLION/UL (ref 3.88–5.62)
RH BLD: NEGATIVE
RH BLD: NEGATIVE
SODIUM SERPL-SCNC: 134 MMOL/L (ref 134–143)
SODIUM SERPL-SCNC: 134 MMOL/L (ref 136–145)
SPECIMEN EXPIRATION DATE: NORMAL
SPECIMEN EXPIRATION DATE: NORMAL
SPECIMEN SOURCE: ABNORMAL
T WAVE AXIS: 50 DEGREES
VENTRICULAR RATE: 81 BPM
VIT B12 SERPL-MCNC: 329 PG/ML (ref 100–900)
WBC # BLD AUTO: 7.81 THOUSAND/UL (ref 4.31–10.16)

## 2021-04-21 PROCEDURE — 83605 ASSAY OF LACTIC ACID: CPT | Performed by: STUDENT IN AN ORGANIZED HEALTH CARE EDUCATION/TRAINING PROGRAM

## 2021-04-21 PROCEDURE — 86900 BLOOD TYPING SEROLOGIC ABO: CPT | Performed by: REGISTERED NURSE

## 2021-04-21 PROCEDURE — 85025 COMPLETE CBC W/AUTO DIFF WBC: CPT | Performed by: STUDENT IN AN ORGANIZED HEALTH CARE EDUCATION/TRAINING PROGRAM

## 2021-04-21 PROCEDURE — 85018 HEMOGLOBIN: CPT | Performed by: NURSE PRACTITIONER

## 2021-04-21 PROCEDURE — 88305 TISSUE EXAM BY PATHOLOGIST: CPT | Performed by: PATHOLOGY

## 2021-04-21 PROCEDURE — C9113 INJ PANTOPRAZOLE SODIUM, VIA: HCPCS | Performed by: PHYSICIAN ASSISTANT

## 2021-04-21 PROCEDURE — 86870 RBC ANTIBODY IDENTIFICATION: CPT | Performed by: INTERNAL MEDICINE

## 2021-04-21 PROCEDURE — 86905 BLOOD TYPING RBC ANTIGENS: CPT

## 2021-04-21 PROCEDURE — 99232 SBSQ HOSP IP/OBS MODERATE 35: CPT | Performed by: SPECIALIST

## 2021-04-21 PROCEDURE — 99222 1ST HOSP IP/OBS MODERATE 55: CPT | Performed by: SURGERY

## 2021-04-21 PROCEDURE — 93010 ELECTROCARDIOGRAM REPORT: CPT | Performed by: INTERNAL MEDICINE

## 2021-04-21 PROCEDURE — C9113 INJ PANTOPRAZOLE SODIUM, VIA: HCPCS | Performed by: NURSE PRACTITIONER

## 2021-04-21 PROCEDURE — 86870 RBC ANTIBODY IDENTIFICATION: CPT | Performed by: SURGERY

## 2021-04-21 PROCEDURE — 99239 HOSP IP/OBS DSCHRG MGMT >30: CPT | Performed by: INTERNAL MEDICINE

## 2021-04-21 PROCEDURE — 82805 BLOOD GASES W/O2 SATURATION: CPT | Performed by: STUDENT IN AN ORGANIZED HEALTH CARE EDUCATION/TRAINING PROGRAM

## 2021-04-21 PROCEDURE — 99232 SBSQ HOSP IP/OBS MODERATE 35: CPT | Performed by: INTERNAL MEDICINE

## 2021-04-21 PROCEDURE — 99221 1ST HOSP IP/OBS SF/LOW 40: CPT | Performed by: NURSE PRACTITIONER

## 2021-04-21 PROCEDURE — 85014 HEMATOCRIT: CPT | Performed by: NURSE PRACTITIONER

## 2021-04-21 PROCEDURE — 85018 HEMOGLOBIN: CPT | Performed by: REGISTERED NURSE

## 2021-04-21 PROCEDURE — 85610 PROTHROMBIN TIME: CPT | Performed by: STUDENT IN AN ORGANIZED HEALTH CARE EDUCATION/TRAINING PROGRAM

## 2021-04-21 PROCEDURE — 86921 COMPATIBILITY TEST INCUBATE: CPT

## 2021-04-21 PROCEDURE — 93005 ELECTROCARDIOGRAM TRACING: CPT

## 2021-04-21 PROCEDURE — 86922 COMPATIBILITY TEST ANTIGLOB: CPT

## 2021-04-21 PROCEDURE — 36600 WITHDRAWAL OF ARTERIAL BLOOD: CPT

## 2021-04-21 PROCEDURE — 0DB78ZX EXCISION OF STOMACH, PYLORUS, VIA NATURAL OR ARTIFICIAL OPENING ENDOSCOPIC, DIAGNOSTIC: ICD-10-PCS | Performed by: INTERNAL MEDICINE

## 2021-04-21 PROCEDURE — 80053 COMPREHEN METABOLIC PANEL: CPT | Performed by: STUDENT IN AN ORGANIZED HEALTH CARE EDUCATION/TRAINING PROGRAM

## 2021-04-21 PROCEDURE — 85014 HEMATOCRIT: CPT | Performed by: REGISTERED NURSE

## 2021-04-21 PROCEDURE — 85730 THROMBOPLASTIN TIME PARTIAL: CPT | Performed by: STUDENT IN AN ORGANIZED HEALTH CARE EDUCATION/TRAINING PROGRAM

## 2021-04-21 PROCEDURE — 86901 BLOOD TYPING SEROLOGIC RH(D): CPT | Performed by: REGISTERED NURSE

## 2021-04-21 PROCEDURE — NC001 PR NO CHARGE: Performed by: SURGERY

## 2021-04-21 PROCEDURE — 86850 RBC ANTIBODY SCREEN: CPT | Performed by: REGISTERED NURSE

## 2021-04-21 PROCEDURE — 80048 BASIC METABOLIC PNL TOTAL CA: CPT | Performed by: NURSE PRACTITIONER

## 2021-04-21 RX ORDER — HYDRALAZINE HYDROCHLORIDE 20 MG/ML
5 INJECTION INTRAMUSCULAR; INTRAVENOUS EVERY 6 HOURS PRN
Status: DISCONTINUED | OUTPATIENT
Start: 2021-04-21 | End: 2021-05-02

## 2021-04-21 RX ORDER — TRAZODONE HYDROCHLORIDE 50 MG/1
50 TABLET ORAL
Status: CANCELLED | OUTPATIENT
Start: 2021-04-21

## 2021-04-21 RX ORDER — ALBUTEROL SULFATE 90 UG/1
2 AEROSOL, METERED RESPIRATORY (INHALATION) EVERY 6 HOURS PRN
Status: CANCELLED | OUTPATIENT
Start: 2021-04-21

## 2021-04-21 RX ORDER — SODIUM CHLORIDE, SODIUM GLUCONATE, SODIUM ACETATE, POTASSIUM CHLORIDE, MAGNESIUM CHLORIDE, SODIUM PHOSPHATE, DIBASIC, AND POTASSIUM PHOSPHATE .53; .5; .37; .037; .03; .012; .00082 G/100ML; G/100ML; G/100ML; G/100ML; G/100ML; G/100ML; G/100ML
125 INJECTION, SOLUTION INTRAVENOUS CONTINUOUS
Status: CANCELLED | OUTPATIENT
Start: 2021-04-21

## 2021-04-21 RX ORDER — TRAZODONE HYDROCHLORIDE 50 MG/1
50 TABLET ORAL
Status: DISCONTINUED | OUTPATIENT
Start: 2021-04-21 | End: 2021-04-21 | Stop reason: HOSPADM

## 2021-04-21 RX ORDER — FLUTICASONE FUROATE AND VILANTEROL 100; 25 UG/1; UG/1
1 POWDER RESPIRATORY (INHALATION) DAILY
Status: CANCELLED | OUTPATIENT
Start: 2021-04-22

## 2021-04-21 RX ORDER — PROPOFOL 10 MG/ML
INJECTION, EMULSION INTRAVENOUS AS NEEDED
Status: DISCONTINUED | OUTPATIENT
Start: 2021-04-21 | End: 2021-04-21

## 2021-04-21 RX ORDER — NICOTINE 21 MG/24HR
1 PATCH, TRANSDERMAL 24 HOURS TRANSDERMAL DAILY
Status: CANCELLED | OUTPATIENT
Start: 2021-04-22

## 2021-04-21 RX ORDER — LABETALOL 20 MG/4 ML (5 MG/ML) INTRAVENOUS SYRINGE
10 EVERY 6 HOURS PRN
Status: DISCONTINUED | OUTPATIENT
Start: 2021-04-21 | End: 2021-05-02

## 2021-04-21 RX ORDER — LORAZEPAM 2 MG/ML
0.5 INJECTION INTRAMUSCULAR EVERY 6 HOURS PRN
Status: CANCELLED | OUTPATIENT
Start: 2021-04-21

## 2021-04-21 RX ORDER — QUETIAPINE FUMARATE 100 MG/1
300 TABLET, FILM COATED ORAL
Status: CANCELLED | OUTPATIENT
Start: 2021-04-21

## 2021-04-21 RX ORDER — ONDANSETRON 2 MG/ML
INJECTION INTRAMUSCULAR; INTRAVENOUS
Status: COMPLETED
Start: 2021-04-21 | End: 2021-04-21

## 2021-04-21 RX ORDER — OLANZAPINE 10 MG/1
10 TABLET, ORALLY DISINTEGRATING ORAL
Status: DISCONTINUED | OUTPATIENT
Start: 2021-04-21 | End: 2021-04-22

## 2021-04-21 RX ORDER — CHLORHEXIDINE GLUCONATE 0.12 MG/ML
15 RINSE ORAL EVERY 12 HOURS SCHEDULED
Status: DISCONTINUED | OUTPATIENT
Start: 2021-04-21 | End: 2021-04-21

## 2021-04-21 RX ORDER — QUETIAPINE FUMARATE 100 MG/1
300 TABLET, FILM COATED ORAL
Status: DISCONTINUED | OUTPATIENT
Start: 2021-04-21 | End: 2021-04-21 | Stop reason: HOSPADM

## 2021-04-21 RX ORDER — LIDOCAINE HYDROCHLORIDE 20 MG/ML
INJECTION, SOLUTION EPIDURAL; INFILTRATION; INTRACAUDAL; PERINEURAL AS NEEDED
Status: DISCONTINUED | OUTPATIENT
Start: 2021-04-21 | End: 2021-04-21

## 2021-04-21 RX ORDER — ALBUTEROL SULFATE 90 UG/1
2 AEROSOL, METERED RESPIRATORY (INHALATION) EVERY 6 HOURS PRN
Status: DISCONTINUED | OUTPATIENT
Start: 2021-04-21 | End: 2021-04-22

## 2021-04-21 RX ORDER — SODIUM CHLORIDE, SODIUM GLUCONATE, SODIUM ACETATE, POTASSIUM CHLORIDE, MAGNESIUM CHLORIDE, SODIUM PHOSPHATE, DIBASIC, AND POTASSIUM PHOSPHATE .53; .5; .37; .037; .03; .012; .00082 G/100ML; G/100ML; G/100ML; G/100ML; G/100ML; G/100ML; G/100ML
100 INJECTION, SOLUTION INTRAVENOUS CONTINUOUS
Status: DISCONTINUED | OUTPATIENT
Start: 2021-04-21 | End: 2021-04-23

## 2021-04-21 RX ORDER — METOPROLOL TARTRATE 5 MG/5ML
5 INJECTION INTRAVENOUS EVERY 8 HOURS
Status: CANCELLED | OUTPATIENT
Start: 2021-04-21

## 2021-04-21 RX ORDER — KETAMINE HCL IN NACL, ISO-OSM 100MG/10ML
SYRINGE (ML) INJECTION AS NEEDED
Status: DISCONTINUED | OUTPATIENT
Start: 2021-04-21 | End: 2021-04-21

## 2021-04-21 RX ORDER — HYDROMORPHONE HCL IN WATER/PF 6 MG/30 ML
0.2 PATIENT CONTROLLED ANALGESIA SYRINGE INTRAVENOUS EVERY 4 HOURS PRN
Status: DISCONTINUED | OUTPATIENT
Start: 2021-04-21 | End: 2021-04-26

## 2021-04-21 RX ORDER — LABETALOL 20 MG/4 ML (5 MG/ML) INTRAVENOUS SYRINGE
Status: DISCONTINUED
Start: 2021-04-21 | End: 2021-04-21 | Stop reason: WASHOUT

## 2021-04-21 RX ORDER — FLUTICASONE FUROATE AND VILANTEROL 100; 25 UG/1; UG/1
1 POWDER RESPIRATORY (INHALATION) DAILY
Status: DISCONTINUED | OUTPATIENT
Start: 2021-04-22 | End: 2021-05-07 | Stop reason: HOSPADM

## 2021-04-21 RX ADMIN — PANTOPRAZOLE SODIUM 8 MG/HR: 40 INJECTION, POWDER, FOR SOLUTION INTRAVENOUS at 08:25

## 2021-04-21 RX ADMIN — PROPOFOL 130 MG: 10 INJECTION, EMULSION INTRAVENOUS at 13:03

## 2021-04-21 RX ADMIN — OLANZAPINE 10 MG: 10 TABLET, ORALLY DISINTEGRATING ORAL at 22:44

## 2021-04-21 RX ADMIN — METOPROLOL TARTRATE 5 MG: 5 INJECTION INTRAVENOUS at 05:20

## 2021-04-21 RX ADMIN — HYDROMORPHONE HYDROCHLORIDE 0.2 MG: 0.2 INJECTION, SOLUTION INTRAMUSCULAR; INTRAVENOUS; SUBCUTANEOUS at 22:44

## 2021-04-21 RX ADMIN — MORPHINE SULFATE 2 MG: 2 INJECTION, SOLUTION INTRAMUSCULAR; INTRAVENOUS at 08:12

## 2021-04-21 RX ADMIN — NICOTINE 1 PATCH: 21 PATCH, EXTENDED RELEASE TRANSDERMAL at 08:12

## 2021-04-21 RX ADMIN — MORPHINE SULFATE 2 MG: 2 INJECTION, SOLUTION INTRAMUSCULAR; INTRAVENOUS at 13:54

## 2021-04-21 RX ADMIN — FLUTICASONE FUROATE AND VILANTEROL TRIFENATATE 1 PUFF: 100; 25 POWDER RESPIRATORY (INHALATION) at 08:24

## 2021-04-21 RX ADMIN — LIDOCAINE HYDROCHLORIDE 50 MG: 20 INJECTION, SOLUTION EPIDURAL; INFILTRATION; INTRACAUDAL; PERINEURAL at 13:03

## 2021-04-21 RX ADMIN — ONDANSETRON 4 MG: 2 INJECTION INTRAMUSCULAR; INTRAVENOUS at 16:54

## 2021-04-21 RX ADMIN — LABETALOL 20 MG/4 ML (5 MG/ML) INTRAVENOUS SYRINGE 10 MG: at 19:20

## 2021-04-21 RX ADMIN — HYDRALAZINE HYDROCHLORIDE 5 MG: 20 INJECTION INTRAMUSCULAR; INTRAVENOUS at 22:48

## 2021-04-21 RX ADMIN — MORPHINE SULFATE 2 MG: 2 INJECTION, SOLUTION INTRAMUSCULAR; INTRAVENOUS at 04:38

## 2021-04-21 RX ADMIN — HYDROMORPHONE HYDROCHLORIDE 0.2 MG: 0.2 INJECTION, SOLUTION INTRAMUSCULAR; INTRAVENOUS; SUBCUTANEOUS at 18:29

## 2021-04-21 RX ADMIN — OCTREOTIDE ACETATE 50 MCG/HR: 500 INJECTION, SOLUTION INTRAVENOUS; SUBCUTANEOUS at 08:14

## 2021-04-21 RX ADMIN — SODIUM CHLORIDE, SODIUM GLUCONATE, SODIUM ACETATE, POTASSIUM CHLORIDE, MAGNESIUM CHLORIDE, SODIUM PHOSPHATE, DIBASIC, AND POTASSIUM PHOSPHATE 125 ML/HR: .53; .5; .37; .037; .03; .012; .00082 INJECTION, SOLUTION INTRAVENOUS at 08:14

## 2021-04-21 RX ADMIN — SODIUM CHLORIDE 8 MG/HR: 9 INJECTION, SOLUTION INTRAVENOUS at 18:23

## 2021-04-21 RX ADMIN — METOPROLOL TARTRATE 5 MG: 5 INJECTION INTRAVENOUS at 13:54

## 2021-04-21 RX ADMIN — Medication 20 MG: at 13:03

## 2021-04-21 RX ADMIN — MORPHINE SULFATE 2 MG: 2 INJECTION, SOLUTION INTRAMUSCULAR; INTRAVENOUS at 16:54

## 2021-04-21 NOTE — ASSESSMENT & PLAN NOTE
· The patient initially presented with generalized abdominal pain however was having suicidal ideation and was admitted to [facility]  · Today the patient had an episode of coffee-ground emesis with hypotension  · Will admit the patient to ICU  · Consult gastroenterology, critical care  · Aggressive IV fluid  · Noted to have a drop of hemoglobin from 16->11 8    The patient however has not had any repeated vomiting since he came down from older adult  · Will hold off on blood transfusion at this time  · Will monitor H&H closely if drop less than 8 will transfuse   · Will start the patient on pantoprazole drip and octreotide drip
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
## 2021-04-21 NOTE — CONSULTS
Consultation - General Surgery   Mazin Smith 78 y o  male MRN: [id number]  Unit/Bed#: ICU 05 Encounter: [id number]    Assessment/Plan     Assessment:  The patient is a 82-year-old white male with a history of alcohol use presenting with upper GI bleed, and found to have a large duodenal ulcer on endoscopy  The patient was not bleeding at the time of endoscopy, below are the endoscopist's findings  FINDINGS:  · Single large, cratered ulcer in the duodenal bulb with nonbleeding visible vessel (Sadiq IIA)  Huge ulcer in distal duodenal bulb involving inferior and posterior duodenal bulb with what appeared to be 2 visible vessels  There was no bleeding  Ulcer measured approximately 5 cm in greatest diameter  · Significant amount of hematin in the fundus of the stomach and body of the stomach, which obscured visualization  Large amount of dark maroon and black blood in the stomach  Many clots  No site of bleeding noted in the stomach  · Performed multiple biopsies in the body of the stomach and antrum    The patient underwent exploratory laparotomy, duodenotomy and over-sewing of a bleeding duodenal ulcer 1 year ago at [facility]  This was the 2nd intervention for peptic ulcer disease that he had received, the details of the 1st surgery are not available  Plan:  It appears appropriate to plan for an antrectomy with a truncal vagotomy and BII reconstruction for this complicated recurrent ulcer disease  I feel he is best served by transfer to a higher level of care for this procedure, and would discuss transfer with [address] [place] [address] Surgery Service  Continue protonix drip  Monitor in ICU    History of Present Illness   HPI:  Mazin Smith is a 78 y o  male who presents with significant upper GI bleed, and is documented to have a large duodenal ulcer with exposed vessels    The patient apparently has had 2 prior surgeries for bleeding duodenal ulcer, and unfortunately continues to drink and smoke  Inpatient consult to Acute Care Surgery  Consult performed by: Singh Linda MD  Consult ordered by: Tate Guzman MD          Review of Systems   Constitutional: Positive for fatigue  Negative for unexpected weight change  HENT: Negative for trouble swallowing  Respiratory: Negative for shortness of breath  Cardiovascular: Negative for chest pain  Gastrointestinal: Positive for abdominal pain and blood in stool  Negative for nausea and vomiting  Endocrine: Negative  Genitourinary: Negative for difficulty urinating  Musculoskeletal:        Chronic back pain   Skin: Positive for pallor  Neurological: Positive for weakness     Psychiatric/Behavioral:        History of suicidal ideation       Historical Information   Past Medical History:   Diagnosis Date    Alcohol abuse     BPH (benign prostatic hyperplasia)     CVA [facility]cerebral vascular accident) [facility] [address] [facility]     DJD (degenerative joint disease)     Encounter to establish care with new doctor 8/21/2019    Gait abnormality     Head injury     History of cerebrovascular accident (CVA) with residual deficit 10/1/2019    History of CVA (cerebrovascular accident) 1/8/2020    History of substance abuse ([facility] [address] ) 8/21/2019    Polysubstance     History of sustained ventricular tachycardia 8/21/2019    History of transient ischemic attack (TIA) 8/28/2019    Hypertension     Metatarsal fracture     TIA (transient ischemic attack)     Tobacco abuse      Past Surgical History:   Procedure Laterality Date    ABDOMINAL SURGERY      ANKLE SURGERY      BACK SURGERY      CT GUIDED [facility] DRAINAGE CATHETER PLACEMENT  1/27/2020    ELBOW SURGERY      IR VISCERAL ANGIOGRAPHY / INTERVENTION  1/11/2020    JOINT REPLACEMENT      KNEE SURGERY      LAPAROTOMY N/A 1/11/2020    Procedure: LAPAROTOMY EXPLORATORY,  OVERSEW  OF GASTRO DUODENAL ARTERY, THAL PATCH OF DUODENUM, VICKY GASTRO JEJUNOSTOMY TUBE;  Surgeon: Fred Handley DO;  Location: [facility];  Service: General    LIVER SURGERY       Social History   Social History     Substance and Sexual Activity   Alcohol Use Yes    Alcohol/week: 4 0 standard drinks    Types: 4 Cans of beer per week    Frequency: Monthly or less    Drinks per session: 1 or 2    Binge frequency: Never    Comment: a couple beers     Social History     Substance and Sexual Activity   Drug Use Yes    Types: Marijuana    Comment: history polysubstance use including IVDA on record- every now then will smoke weed      E-Cigarette/Vaping    E-Cigarette Use Never User      E-Cigarette/Vaping Substances     Social History     Tobacco Use   Smoking Status Current Every Day Smoker    Packs/day: 1 00    Types: Cigarettes   Smokeless Tobacco Never Used   Tobacco Comment    started age 14, 3 quit attempts     Family History:   Family History   Problem Relation Age of Onset    No Known Problems Mother     No Known Problems Father        Meds/Allergies   all current active meds have been reviewed and current meds:   Current Facility-Administered Medications   Medication Dose Route Frequency    albuterol (PROVENTIL HFA,VENTOLIN HFA) inhaler 2 puff  2 puff Inhalation Q6H PRN    fluticasone-vilanterol (BREO ELLIPTA) 100-25 mcg/inh inhaler 1 puff  1 puff Inhalation Daily    LORazepam (ATIVAN) injection 0 5 mg  0 5 mg Intravenous Q6H PRN    metoprolol (LOPRESSOR) injection 5 mg  5 mg Intravenous Q8H    morphine injection 2 mg  2 mg Intravenous Q4H PRN    multi-electrolyte (PLASMALYTE-A/ISOLYTE-S PH 7 4) IV solution  125 mL/hr Intravenous Continuous    nicotine (NICODERM CQ) 21 mg/24 hr TD 24 hr patch 1 patch  1 patch Transdermal Daily    octreotide (SandoSTATIN) 500 mcg in sodium chloride 0 9 % 250 mL infusion  50 mcg/hr Intravenous Continuous    pantoprazole (PROTONIX) 80 mg in sodium chloride 0 9 % 100 mL infusion  8 mg/hr Intravenous Continuous    QUEtiapine (SEROquel) tablet 300 mg  300 mg Oral HS    traZODone (DESYREL) tablet 50 mg  50 mg Oral HS     No Known Allergies    Objective   First Vitals:   Blood Pressure: 109/58 (04/20/21 2100)  Pulse: (!) 119 (04/20/21 2100)  Temperature: 98 7 °F (37 1 °C) (04/20/21 2200)  Temp Source: Temporal (04/20/21 2200)  Respirations: 22 (04/20/21 2100)  Height: 5' 4" (162 6 cm) (04/21/21 0438)  Weight - Scale: 63 8 kg (140 lb 11 2 oz) (04/21/21 0438)  SpO2: 99 % (04/20/21 2100)    Current Vitals:   Blood Pressure: 154/73 (04/21/21 1000)  Pulse: 83 (04/21/21 1000)  Temperature: 98 9 °F (37 2 °C) (04/21/21 0800)  Temp Source: Temporal (04/21/21 0800)  Respirations: 17 (04/21/21 1000)  Height: 5' 4" (162 6 cm) (04/21/21 0438)  Weight - Scale: 63 8 kg (140 lb 11 2 oz) (04/21/21 0438)  SpO2: 99 % (04/21/21 1000)      Intake/Output Summary (Last 24 hours) at 4/21/2021 1525  Last data filed at 4/21/2021 0825  Gross per 24 hour   Intake 1964 75 ml   Output 900 ml   Net 1064 75 ml       Invasive Devices     Central Venous Catheter Line            CVC Central Lines 04/20/21 less than 1 day          Drain            Gastrostomy/Enterostomy Gastrostomy-jejunostomy 22 Fr   days    Open Drain Midline Abdomen 445 days    External Urinary Catheter 443 days                Physical Exam  Constitutional:       General: He is not in acute distress  Appearance: He is ill-appearing  HENT:      Head: Normocephalic and atraumatic  Eyes:      General: No scleral icterus  Cardiovascular:      Rate and Rhythm: Tachycardia present  Heart sounds: No murmur  Pulmonary:      Effort: Pulmonary effort is normal       Breath sounds: Normal breath sounds  Abdominal:      General: Abdomen is flat  Palpations: Abdomen is soft  Tenderness: There is abdominal tenderness  Comments: Prominent midline cicatrix from previous surgery   Genitourinary:     Comments: Deferred  Musculoskeletal:         General: No swelling  Skin:     General: Skin is warm and dry        Coloration: Skin is not jaundiced  Neurological:      General: No focal deficit present  Mental Status: He is oriented to person, place, and time  Psychiatric:         Mood and Affect: Mood normal          Lab Results:   I have personally reviewed pertinent lab results  , CBC:   Lab Results   Component Value Date    WBC 10 10 04/20/2021    HGB 10 0 (L) 04/21/2021    HCT 29 3 (L) 04/21/2021    MCV 97 04/20/2021     04/20/2021    MCH 32 8 04/20/2021    MCHC 34 1 04/20/2021    RDW 14 4 04/20/2021    MPV 7 5 (L) 04/20/2021   , CMP:   Lab Results   Component Value Date    SODIUM 134 04/21/2021    K 4 1 04/21/2021    CL 99 04/21/2021    CO2 28 04/21/2021    BUN 37 (H) 04/21/2021    CREATININE 0 94 04/21/2021    CALCIUM 7 6 (L) 04/21/2021    AST 14 04/20/2021    ALT 5 (L) 04/20/2021    ALKPHOS 31 (L) 04/20/2021    EGFR 83 04/21/2021   , Coagulation:   Lab Results   Component Value Date    INR 1 07 04/20/2021   , Urinalysis: No results found for: Tesha Nikolas, SPECGRAV, PHUR, LEUKOCYTESUR, NITRITE, PROTEINUA, GLUCOSEU, KETONESU, BILIRUBINUR, BLOODU, Lipase: No results found for: LIPASE  Imaging: I have personally reviewed pertinent reports  and I have personally reviewed pertinent films in PACS  EKG, Pathology, and Other Studies: I have personally reviewed pertinent reports  Counseling / Coordination of Care  Total floor / unit time spent today 30 minutes  Greater than 50% of total time was spent with the patient and / or family counseling and / or coordination of care  A description of the counseling / coordination of care: Including discussion with primary service

## 2021-04-21 NOTE — ASSESSMENT & PLAN NOTE
· With perforation status post repair in January of 2020  · Will continue treatment as above  · CT scan of the abdomen/pelvis with no acute findings

## 2021-04-21 NOTE — ASSESSMENT & PLAN NOTE
· The patient does have history of tachycardia  · The last recorded heart rate at PCPs office shows heart rate 112  · Currently heart rate is 120s the patient is on metoprolol at home  · Worsening tachycardia is likely due to upper GI bleed and volume depletion with anxiety  · Will give aggressive IV fluid  · Will change metoprolol to IV

## 2021-04-21 NOTE — ASSESSMENT & PLAN NOTE
· Likely secondary to bleeding duodenal ulcer  · EGD was performed today which showed old blood in stomach however no active bleeding noted  · Patient did have a duodenal ulcer with 2 visible vessels noted  · GI recommended surgical evaluation as patient was high risk for rebleeding  · Consulted our surgical team who recommended transfer to [facility]  · I contacted the transfer center and patient has been accepted by the surgical service at [facility]    Accepting physician is Dr Srivastava Smoker

## 2021-04-21 NOTE — DISCHARGE SUMMARY
[facility]  Discharge- Edson Halo 1950, 78 y o  male MRN: [id number]  Unit/Bed#: ICU 05 Encounter: [id number]  Primary Care Provider: Ghada Diop DO   Date and time admitted to hospital: 4/20/2021  8:51 PM    * Acute upper GI bleed  Assessment & Plan  · Patient with coffee-ground emesis and hypotension while on Behavioral Health unit  Patient was transferred down to medical floor for further evaluation of possible GI bleed  · Patient with 6 g hemoglobin drop since admission  · Will continue IV fluids  · Monitor H&H  · Transfuse as needed  · EGD done today which showed duodenal ulcer with visible vessels  GI recommends surgery evaluation who recommended transfer to [facility] for further evaluation and possible surgical treatment  · Patient accepted by surgical service at [facility]  · Will continue octreotide and PPI  · Currently NPO    Acute blood loss anemia  Assessment & Plan  · Likely secondary to bleeding duodenal ulcer  · EGD was performed today which showed old blood in stomach however no active bleeding noted  · Patient did have a duodenal ulcer with 2 visible vessels noted  · GI recommended surgical evaluation as patient was high risk for rebleeding  · Consulted our surgical team who recommended transfer to [facility]  · I contacted the transfer center and patient has been accepted by the surgical service at [facility]    Accepting physician is Dr Dinesh Monge    History of duodenal ulcer  Assessment & Plan  · With perforation status post repair in January of 2020  · Will continue treatment as above  · CT scan of the abdomen/pelvis with no acute findings    Major depressive disorder, recurrent, severe with psychotic features [facility])  Assessment & Plan  · Management per psychiatry team  · Patient was initially in [facility]  · Patient will need psychiatry clearance prior to discharge once he is medically stable      Discharging Physician / Practitioner: Cecilio Lebron MD  PCP: Silvia Milner DO  Admission Date:   Admission Orders (From admission, onward)     Ordered        04/20/21 2054  Inpatient Admission  Once                   Discharge Date: 04/21/21    Resolved Problems  Date Reviewed: 4/20/2021    None          Consultations During Hospital Stay:  · Gastroenterology, surgery    Procedures Performed:   · EGD    Significant Findings / Test Results:   · Duodenal ulcer    Incidental Findings:   · None     Test Results Pending at Discharge (will require follow up): · None     Outpatient Tests Requested:  · Routine labs with PCP    Complications:     None    Reason for Admission:  GI bleed    Hospital Course:     Fernando Schmitz is a 78 y o  male patient who originally presented to the hospital on 4/20/2021 due to GI bleed  Please see H&P for details regarding initial admission information  Patient was admitted for suspected GI bleed and monitored in the ICU  Initially hypotensive however improved with IV fluids and PPI/octreotide drip  Patient was seen by GI who performed an EGD on him today  EGD showed duodenal ulcer with 2 visible vessels noted  GI recommended surgical evaluation  Patient was evaluated by surgical team who recommended transfer to [facility] as patient's case required higher level of care  Spoke with transfer center, patient has been accepted by surgical team at [facility]  Patient be transferred via helicopter per transfer center  Please see above list of diagnoses and related plan for additional information  Condition at Discharge: stable     Discharge Day Visit / Exam:     Subjective:  No complaints at this time  Patient understands that he needs to be transferred for further evaluation by surgical team at [facility]      Vitals: Blood Pressure: 154/73 (04/21/21 1000)  Pulse: 83 (04/21/21 1000)  Temperature: 98 9 °F (37 2 °C) (04/21/21 0800)  Temp Source: Temporal (04/21/21 0800)  Respirations: 17 (04/21/21 1000)  Height: 5' 4" (162 6 cm) (04/21/21 0438)  Weight - Scale: 63 8 kg (140 lb 11 2 oz) (04/21/21 0438)  SpO2: 99 % (04/21/21 1000)     Exam:   Physical Exam  Constitutional:       General: He is not in acute distress  HENT:      Head: Normocephalic and atraumatic  Nose: Nose normal       Mouth/Throat:      Mouth: Mucous membranes are dry  Eyes:      Extraocular Movements: Extraocular movements intact  Conjunctiva/sclera: Conjunctivae normal    Neck:      Musculoskeletal: Normal range of motion and neck supple  Cardiovascular:      Rate and Rhythm: Normal rate and regular rhythm  Pulmonary:      Effort: Pulmonary effort is normal  No respiratory distress  Abdominal:      Palpations: Abdomen is soft  Tenderness: There is no abdominal tenderness  Musculoskeletal: Normal range of motion  Comments: Generalized weakness   Skin:     General: Skin is warm and dry  Neurological:      General: No focal deficit present  Mental Status: He is alert  Cranial Nerves: No cranial nerve deficit  Psychiatric:         Mood and Affect: Mood is depressed  Discussion with Family:  Spoke with patient's son over the phone regarding discharge plan    Discharge instructions/Information to patient and family:   See after visit summary for information provided to patient and family  Provisions for Follow-Up Care:  See after visit summary for information related to follow-up care and any pertinent home health orders  Disposition:     [address] Transfer to [facility]      Planned Readmission:    Patient will be readmitted to [facility]     Discharge Statement:  I spent 35 minutes discharging the patient  This time was spent on the day of discharge  I had direct contact with the patient on the day of discharge   Greater than 50% of the total time was spent examining patient, answering all patient questions, arranging and discussing plan of care with patient as well as directly providing post-discharge instructions  Additional time then spent on discharge activities  Discharge Medications:  See after visit summary for reconciled discharge medications provided to patient and family        ** Please Note: This note has been constructed using a voice recognition system **

## 2021-04-21 NOTE — UTILIZATION REVIEW
Initial Clinical Review    Admission: Date/Time/Statement:   Admission Orders (From admission, onward)     Ordered        04/20/21 2054  Inpatient Admission  Once                   Orders Placed This Encounter   Procedures    Inpatient Admission     Standing Status:   Standing     Number of Occurrences:   1     Order Specific Question:   Level of Care     Answer:   Critical Care [15]     Order Specific Question:   Estimated length of stay     Answer:   More than 2 Midnights     Order Specific Question:   Certification     Answer:   I certify that inpatient services are medically necessary for this patient for a duration of greater than two midnights  See H&P and MD Progress Notes for additional information about the patient's course of treatment  ED Arrival Information     Patient not seen in ED                     Initial Presentation:   72y Male, transfer from [address] [address] [facility] to ICU for dark colored vomit, moderate amount of "dark colored vomit" suspicious of coffee-ground emesis due to his prior history of duodenal ulcer bleeding and perforation  In addition noted with hypotension with SBP in the 70s  Recently seen in ED on 4/19 with c/o generalized abdominal pain and suicidal ideation  CT without any acute finding, he was then [facility] for Schizophrenia, Tachycardia, COPD, Duodenal ulcer with perforation in Jan 2020 - S/p Ir embolization, Exploratory Laparotomy with GJ tube placement, Alcohol use and Tobacco abuse  Admit Inpatient level of care for Acute upper GI bleed and Tachycardia worsening  Today with episode of coffee-ground emesis with hypotension  Gastroenterology consult  Aggressive IVFs  Noted drop in Hgb from 16 to 11 8  Hold off on bld transfusion at this time as no further vomiting noted  Monitor H&H closely if drop less than 8 will transfuse  Start Pantoprazole and Octreotide gtt  Current HR is 120s on metoprolol   Worsening tachycardia likely due to upper GI bleed and volume depletion with anxiety  Change Metoprolol to Iv schedule  Date: 4/21  Day 2:   Progress notes; Did have an initial drop in hemoglobin however over the last 2 checks appears to be stable  Continue IVFs  Currently NPO for possible EGD today  Monitor H&H  Continue PPI and octreotide gtt  Gastroenterology (GI) consult pending  Psychiatry consult  Pt still with intermittent abdominal pain but improved from yesterday  No bleeding episodes overnight  GI cons; Upper GI Bleed  EGD today  Hgb dropped from 16-10  Continues to have epigastric pain  History of alcohol abuse  Admits to 3 beers per day  Psychiatry cons; At this time, patient denies any AVH/SI/HI and displays no signs or symptoms of psychosis  Will reassess need for inpatient psychiatric admission once medically stable  Add Seroquel 300mg PO QHS and Trazodone 50mg PO QHS  S/p EGD;  IMPRESSION:  Huge duodenal ulcer with 2 visible vessels  No active bleeding  This ulcer was oversewn 1 year ago  Risks of endoscopic treatment outweighed potential benefits      RECOMMENDATION:  Check pathology  Recommend surgery for definitive treatment of this huge ulcer        Vitals   Temperature Pulse Respirations Blood Pressure SpO2   04/20/21 2200 04/20/21 2100 04/20/21 2100 04/20/21 2100 04/20/21 2100   98 7 °F (37 1 °C) (!) 119 22 109/58 99 %      Temp Source Heart Rate Source Patient Position - Orthostatic VS BP Location FiO2 (%)   04/20/21 2200 04/20/21 2100 04/21/21 0300 04/20/21 2100 --   Temporal Monitor Lying Left arm       Pain Score       04/20/21 2141       8          Wt Readings from Last 1 Encounters:   04/21/21 63 8 kg (140 lb 11 2 oz)     Additional Vital Signs:  04/21/21 0700  --  74  13  160/79  113  97 %  --  --   04/21/21 0600  --  74  22  150/72  104  95 %  --  --   04/21/21 0500  --  79  22  152/72  104  95 %  None (Room air)  Lying   04/21/21 0400  98 7 °F (37 1 °C)  89  14 187/83  Abnormal   119  98 %  --       04/21/21 0000  --  90  21  129/68  93  99 %  --  --   04/20/21 2300  --  93  17  138/80  103  96 %  --  --   04/20/21 2200  98 7 °F (37 1 °C)  105  24Abnormal   114/67  85  98 %         Pertinent Labs/Diagnostic Test Results:   4/19 CT Abd/Pelvis - No acute intra-abdominal pathology  Constipation  4/20 PCXR - Right internal jugular central venous catheter tip within the superior cavoatrial junction    No pneumothorax      Results from last 7 days   Lab Units 04/19/21  1119   SARS-COV-2  Negative     Lab Units 04/21/21  0416 04/20/21  2229 04/20/21  1841   WBC Thousand/uL  --  10 10 13 10*   HEMOGLOBIN g/dL 10 0* 10 4* 11 8*   HEMATOCRIT % 29 3* 30 4* 34 7*   PLATELETS Thousands/uL  --  343 366   NEUTROS ABS Thousands/µL  --  7 60* 11 40*         Lab Units 04/21/21  0417 04/20/21  2229   SODIUM mmol/L 134 134   POTASSIUM mmol/L 4 1 4 2   CHLORIDE mmol/L 99 93*   CO2 mmol/L 28 29   ANION GAP mmol/L 7 12   BUN mg/dL 37* 42*   CREATININE mg/dL 0 94 0 99   EGFR ml/min/1 73sq m 83 78   CALCIUM mg/dL 7 6* 8 6   MAGNESIUM mg/dL  --   --      Lab Units 04/20/21  2229   AST U/L 14   ALT U/L 5*   ALK PHOS U/L 31*   TOTAL PROTEIN g/dL 5 9*   ALBUMIN g/dL 3 2*   TOTAL BILIRUBIN mg/dL 0 30       Lab Units 04/21/21  0417 04/20/21  2229   GLUCOSE RANDOM mg/dL 112* 101*       Lab Units 04/20/21  2229   TROPONIN I ng/mL 0 03         Results from last 7 days   Lab Units 04/20/21  2229 04/20/21  1841   PROTIME seconds 13 8 13 4   INR  1 07 1 03       Lab Units 04/20/21  2229   LACTIC ACID mmol/L 0 9       Past Medical History:   Diagnosis Date    Alcohol abuse     BPH (benign prostatic hyperplasia)     CVA (cerebral vascular accident) ([address] )     DJD (degenerative joint disease)     Encounter to establish care with new doctor 8/21/2019    Gait abnormality     Head injury     History of cerebrovascular accident (CVA) with residual deficit 10/1/2019    History of CVA (cerebrovascular accident) 1/8/2020    History of substance abuse ([facility] ) 8/21/2019    Polysubstance     History of sustained ventricular tachycardia 8/21/2019    History of transient ischemic attack (TIA) 8/28/2019    Hypertension     Metatarsal fracture     TIA (transient ischemic attack)     Tobacco abuse      Present on Admission:   COPD without exacerbation ([facility] )   Tobacco abuse   [facility]ia ([email or web address] )   Tachycardia      Admitting Diagnosis: GI bleed [K92 2]  Age/Sex: 78 y o  male     Admission Orders:  Scheduled Medications:  fluticasone-vilanterol, 1 puff, Inhalation, Daily  metoprolol, 5 mg, Intravenous, Q8H  nicotine, 1 patch, Transdermal, Daily      Continuous IV Infusions:  multi-electrolyte, 125 mL/hr, Intravenous, Continuous  octreotide, 50 mcg/hr, Intravenous, Continuous  pantoprozole (PROTONIX) infusion (Continuous), 8 mg/hr, Intravenous, Continuous      PRN Meds:  albuterol, 2 puff, Inhalation, Q6H PRN  LORazepam, 0 5 mg, Intravenous, Q6H PRN  morphine injection, 2 mg, Intravenous, Q4H PRN 4/20 x1, 4/21 x2      NPO  EGD  IP CONSULT TO GASTROENTEROLOGY  IP CONSULT TO CASE MANAGEMENT  IP CONSULT TO PSYCHIATRY    Network Utilization Review Department  ATTENTION: Please call with any questions or concerns to [phone] and carefully listen to the prompts so that you are directed to the right person  All voicemails are confidential   [place] Doing all requests for admission clinical reviews, approved or denied determinations and any other requests to dedicated fax number below belonging to the campus where the patient is receiving treatment   List of dedicated fax numbers for the Facilities:  [address] DENIALS (Administrative/Medical Necessity) [phone]   [address] (Maternity/NICU/Pediatrics) 261 [facility] [address] [place] [address] Randell Cristal [address] [place] [address] [phone] [place] [address] [address] [place] [address] [phone]

## 2021-04-21 NOTE — ED PROCEDURE NOTE
Procedure  Central Line    Date/Time: 4/20/2021 8:00 PM  Performed by: Amberly Gruber PA-C  Authorized by: Amberly Gruber PA-C     Patient location:  ED  Consent:     Consent obtained:  Verbal and emergent situation    Consent given by:  Patient    Risks discussed:  Arterial puncture, incorrect placement, pneumothorax, infection and bleeding    Alternatives discussed:  Alternative treatment  Universal protocol:     Procedure explained and questions answered to patient or proxy's satisfaction: yes      Patient identity confirmed:  Verbally with patient and arm band  Pre-procedure details:     Hand hygiene: Hand hygiene performed prior to insertion      Sterile barrier technique: All elements of maximal sterile technique followed      Skin preparation:  2% chlorhexidine    Skin preparation agent: Skin preparation agent completely dried prior to procedure    Indications:     Central line indications: medications requiring central line and hemodynamic monitoring    Anesthesia (see MAR for exact dosages): Anesthesia method:  Local infiltration    Local anesthetic:  Lidocaine 1% w/o epi  Procedure details:     Location:  Right internal jugular    Vessel type: vein      Laterality:  Right    Patient position:  Flat    Catheter type:  Triple lumen    Ultrasound guidance: yes      Ultrasound image availability:  Not obtained due to urgency    Number of attempts:  1    Successful placement: yes    Post-procedure details:     Post-procedure:  Dressing applied and line sutured    Assessment:  Blood return through all ports, no pneumothorax on x-ray and placement verified by x-ray    Patient tolerance of procedure:   Tolerated well, no immediate complications  Comments:      Assisted by ICU nurse Donovan Pedroza PA-C  04/20/21 2028

## 2021-04-21 NOTE — H&P
H&P Exam - Critical Care   Mari Gamboa 78 y o  male MRN: [id number]  Unit/Bed#: ICU 09 Encounter: [id number]      -------------------------------------------------------------------------------------------------------------  Chief Complaint: 69yo male with a PMHx of alcohol abuse, HTN, COPD, schizophrenia, and MDD was transferred from [facility] for upper GI bleed from a duodenal ulcer  The pt originally presented to the [facility] on 4/19 for suicidal ideation with a plan 2/2 to having abdominal pain  After a negative CT, he was admitted to the behavioral unit then he started to experience coffee-ground emesis and was transferred to critical care for GI bleed  Pt's Hbg dropped from 16 to 10 and was sent for EGD on 4/21 which showed 5cm duodenal ulcer with 2 visible vessels with no active bleeding  Biopsies were obtained and GI recommended surgery for definitive treatment so pt was transferred to [facility]  History obtained from chart review  -------------------------------------------------------------------------------------------------------------  Assessment and Plan:    Neuro:    Diagnosis: Schizophrenia and depression  o On seroquel 200mg daily and 300mg qhs at home (held now NPO)  o Trazodone 50mg for sleep   o 10mg zyprexa ODT at night time    Diagnosis: Suicidal Ideation (not active)  o Pt was admitted to behavioral health unit at previous hospital, however seems like it was a reactionary statement from being in abdominal pain  o Currently denies SI      Pain control  o 0 2 dilaudid q4h   Alcohol abuse   o CIWA protocol   o Pt states he drinks 4 beers a day, hasnt had a drink in 4 days, has been in withdrawal before years ago       CV:    Diagnosis: HTN  o On metoprolol at home 50mg  o PRN labetalol 10mg q6h for SBP >180    Dx: hyperlipemia   o On home statin held for now    Pulm:   Diagnosis: COPD  o On albuterol q6h PRN   o Breo once daily     GI:    Dx: Duodenal ulcer, not actively bleeding  o Plan for evaluation by GI in am   o EGD on 4/21 shows 5cm ulcer with 2 visualized vessels, recommendation for surgical management   o Hbg at midnight then another on morning labs   Protonix drip       :    Diagnosis: no acute issues   o Cr 0 73    F/E/N:    No acute issues    Replete as necessary    NPO   Maintenance       Heme/Onc:    Diagnosis: blood loss anemia   o Continue to monitor   o Hbg 8 6 before transfer -> 8 6 on arrival, repeat mid-night  o Type and screen ordered      Endo:    Diagnosis: no acute issues     ID:    No acute issues    Monitor temp and WBCs    MSK/Skin:    No acute issues       Disposition: Admit to Critical Care   Code Status: Level 1 - Full Code  --------------------------------------------------------------------------------------------------------------  Review of Systems   Constitutional: Negative for chills and fever  HENT: Negative for congestion, ear pain, rhinorrhea and sore throat  Eyes: Negative for pain  Respiratory: Negative for apnea, cough, choking, chest tightness, shortness of breath, wheezing and stridor  Cardiovascular: Negative for chest pain, palpitations and leg swelling  Gastrointestinal: Positive for abdominal pain  Negative for constipation, diarrhea, nausea and vomiting  Genitourinary: Negative for hematuria  Musculoskeletal: Negative for arthralgias and back pain  Skin: Negative for rash and wound  Neurological: Negative for dizziness  Psychiatric/Behavioral: Negative for agitation and hallucinations  All other systems reviewed and are negative  A 12-point, complete review of systems was reviewed and negative except as stated above     Physical Exam  Vitals signs reviewed  Constitutional:       General: He is not in acute distress  Appearance: He is well-developed  HENT:      Head: Normocephalic and atraumatic        Right Ear: External ear normal       Left Ear: External ear normal       Nose: Nose normal       Mouth/Throat:      Mouth: Mucous membranes are moist    Eyes:      Extraocular Movements: Extraocular movements intact  Conjunctiva/sclera: Conjunctivae normal       Pupils: Pupils are equal, round, and reactive to light  Neck:      Musculoskeletal: Normal range of motion and neck supple  No neck rigidity  Cardiovascular:      Rate and Rhythm: Normal rate and regular rhythm  Heart sounds: Normal heart sounds  Pulmonary:      Effort: Pulmonary effort is normal  No respiratory distress  Breath sounds: Normal breath sounds  No wheezing or rales  Abdominal:      Palpations: Abdomen is soft  Tenderness: There is abdominal tenderness  There is no guarding  Comments: Scars from previous abdominal surgeries    Musculoskeletal: Normal range of motion  Skin:     General: Skin is warm  Neurological:      General: No focal deficit present  Mental Status: He is alert and oriented to person, place, and time  Psychiatric:         Mood and Affect: Mood normal        --------------------------------------------------------------------------------------------------------------  Vitals:   Vitals:    04/21/21 1840 04/21/21 1930 04/21/21 2000 04/21/21 2015   BP: (!) 192/94 (!) 195/116  169/87   BP Location:       Pulse: 78 74 76 74   Resp: 19 20  18   Temp:       TempSrc:       SpO2: 97% 98%  99%   Weight:       Height:         Temp  Min: 97 2 °F (36 2 °C)  Max: 99 7 °F (37 6 °C)  IBW (Ideal Body Weight): 59 2 kg  Height: 5' 4" (162 6 cm)  Body mass index is 25 01 kg/m²    PAP:      Laboratory and Diagnostics:  Results from last 7 days   Lab Units 04/21/21  1819 04/21/21  1621 04/21/21  0416 04/20/21  2229 04/20/21  1841 04/19/21  1119   WBC Thousand/uL 7 81  --   --  10 10 13 10* 7 50   HEMOGLOBIN g/dL 8 6* 8 6* 10 0* 10 4* 11 8* 16 0   HEMATOCRIT % 25 8* 24 4* 29 3* 30 4* 34 7* 45 8   PLATELETS Thousands/uL 298  --   --  343 366 371   NEUTROS PCT % 71  --   --  76* 87* 75   MONOS PCT % 7  --   --  7 5 6     Results from last 7 days   Lab Units 04/21/21  1819 04/21/21  0417 04/20/21 2229 04/19/21  1119   SODIUM mmol/L 134* 134 134 134   POTASSIUM mmol/L 4 2 4 1 4 2 3 8   CHLORIDE mmol/L 103 99 93* 92*   CO2 mmol/L 24 28 29 31   ANION GAP mmol/L 7 7 12 11   BUN mg/dL 22 37* 42* 15   CREATININE mg/dL 0 73 0 94 0 99 0 97   CALCIUM mg/dL 7 9* 7 6* 8 6 10 5   GLUCOSE RANDOM mg/dL 105 112* 101* 108*   ALT U/L 10*  --  5* 7   AST U/L 18  --  14 19   ALK PHOS U/L 37*  --  31* 53*   ALBUMIN g/dL 2 5*  --  3 2* 4 2   TOTAL BILIRUBIN mg/dL 0 34  --  0 30 0 50     Results from last 7 days   Lab Units 04/19/21  1119   MAGNESIUM mg/dL 1 9      Results from last 7 days   Lab Units 04/21/21 1819 04/20/21 2229 04/20/21  1841   INR  1 08 1 07 1 03   PTT seconds 31  --   --       Results from last 7 days   Lab Units 04/20/21 2229 04/19/21  1119   TROPONIN I ng/mL 0 03 <0 03     Results from last 7 days   Lab Units 04/21/21 1819 04/20/21 2229 04/19/21  1119   LACTIC ACID mmol/L 1 0 0 9 1 0     ABG:  Results from last 7 days   Lab Units 04/21/21  1826   PH ART  7 446   PCO2 ART mm Hg 35 8*   PO2 ART mm Hg 84 6   HCO3 ART mmol/L 24 1   BASE EXC ART mmol/L 0 2   ABG SOURCE  Artery     VBG:  Results from last 7 days   Lab Units 04/21/21  1826   ABG SOURCE  Artery           Micro:        EKG: see tele  Imaging: I have personally reviewed pertinent reports        Historical Information   Past Medical History:   Diagnosis Date    Alcohol abuse     BPH (benign prostatic hyperplasia)     [facility]     DJD (degenerative joint disease)     Encounter to establish care with new doctor 8/21/2019    Gait abnormality     Head injury     History of cerebrovascular accident (CVA) with residual deficit 10/1/2019    History of CVA (cerebrovascular accident) 1/8/2020    History of substance abuse ([facility] ) 8/21/2019    Polysubstance     History of sustained ventricular tachycardia 8/21/2019  History of transient ischemic attack (TIA) 8/28/2019    Hypertension     Metatarsal fracture     TIA (transient ischemic attack)     Tobacco abuse      Past Surgical History:   Procedure Laterality Date    ABDOMINAL SURGERY      ANKLE SURGERY      BACK SURGERY      CT GUIDED [facility] DRAINAGE CATHETER PLACEMENT  1/27/2020    ELBOW SURGERY      IR VISCERAL ANGIOGRAPHY / INTERVENTION  1/11/2020    JOINT REPLACEMENT      KNEE SURGERY      LAPAROTOMY N/A 1/11/2020    Procedure: LAPAROTOMY EXPLORATORY,  OVERSEW  OF GASTRO DUODENAL ARTERY, THAL PATCH OF DUODENUM, VICKY GASTRO JEJUNOSTOMY TUBE;  Surgeon: Le Ayers DO;  Location: [facility];  Service: General    LIVER SURGERY       Social History   Social History     Substance and Sexual Activity   Alcohol Use Yes    Alcohol/week: 4 0 standard drinks    Types: 4 Cans of beer per week    Frequency: Monthly or less    Drinks per session: 1 or 2    Binge frequency: Never    Comment: a couple beers     Social History     Substance and Sexual Activity   Drug Use Yes    Types: Marijuana    Comment: history polysubstance use including IVDA on record- every now then will smoke weed      Social History     Tobacco Use   Smoking Status Current Every Day Smoker    Packs/day: 1 00    Types: Cigarettes   Smokeless Tobacco Never Used   Tobacco Comment    started age 14, 3 quit attempts     Exercise History:   Family History:   Family History   Problem Relation Age of Onset    No Known Problems Mother     No Known Problems Father      I have reviewed this patient's family history and commented on sigificant items within the HPI      Medications:  Current Facility-Administered Medications   Medication Dose Route Frequency    albuterol (PROVENTIL HFA,VENTOLIN HFA) inhaler 2 puff  2 puff Inhalation Q6H PRN    [START ON 4/22/2021] fluticasone-vilanterol (BREO ELLIPTA) 100-25 mcg/inh inhaler 1 puff  1 puff Inhalation Daily    HYDROmorphone HCl (DILAUDID) injection 0 2 mg  0 2 mg Intravenous Q4H PRN    Labetalol HCl (NORMODYNE) injection 10 mg  10 mg Intravenous Q6H PRN    multi-electrolyte (PLASMALYTE-A/ISOLYTE-S PH 7 4) IV solution  100 mL/hr Intravenous Continuous    [START ON 2021] nicotine (NICODERM CQ) 7 mg/24hr TD 24 hr patch 7 mg  7 mg Transdermal Daily    OLANZapine (ZyPREXA ZYDIS) dispersible tablet 10 mg  10 mg Sublingual HS    pantoprazole (PROTONIX) 80 mg in sodium chloride 0 9 % 100 mL infusion  8 mg/hr Intravenous Continuous     Home medications:  Prior to Admission Medications   Prescriptions Last Dose Informant Patient Reported? Taking? Cholecalciferol 25 MCG (1000 UT) tablet   No No   Sig: Take 1 tablet (1,000 Units total) by mouth daily   QUEtiapine (SEROquel) 200 mg tablet  Outside Facility (Specify) No No   Sig: Take 1 tablet (200 mg total) by mouth daily   QUEtiapine (SEROquel) 300 mg tablet  Outside Facility (Specify) No No   Sig: Take 2 tablets (600 mg total) by mouth daily at bedtime   albuterol (ProAir HFA) 90 mcg/act inhaler   No No   Sig: Inhale 2 puffs every 6 (six) hours as needed for wheezing   atorvastatin (LIPITOR) 40 mg tablet   No No   Sig: Take 1 tablet (40 mg total) by mouth daily   diclofenac (VOLTAREN) 75 mg EC tablet   No No   Sig: Take 1 tablet (75 mg total) by mouth 2 (two) times a day   fluticasone (FLONASE) 50 mcg/act nasal spray   No No   Si sprays into each nostril daily   fluticasone-vilanterol (BREO ELLIPTA) 100-25 mcg/inh inhaler   No No   Sig: Inhale 1 puff daily Rinse mouth after use     folic acid (FOLVITE) 1 mg tablet   No No   Sig: Take 1 tablet (1 mg total) by mouth daily   gabapentin (NEURONTIN) 600 MG tablet   No No   Sig: Take 1 tablet (600 mg total) by mouth 3 (three) times a day   guaiFENesin (MUCINEX) 600 mg 12 hr tablet  Outside Facility (Specify) Yes No   Sig: Take 600 mg by mouth every 12 (twelve) hours as needed for cough   hydrOXYzine pamoate (VISTARIL) 50 mg capsule   Yes No   Sig: take 1 capsule by mouth three times a day if needed for anxiety   metoprolol tartrate (LOPRESSOR) 50 mg tablet   No No   Sig: Take 1 tablet (50 mg total) by mouth every 12 (twelve) hours   mirtazapine (REMERON) 45 MG tablet   Yes No   pantoprazole (PROTONIX) 40 mg tablet   No No   Sig: Take 1 tablet (40 mg total) by mouth 2 (two) times a day before meals   traZODone (DESYREL) 50 mg tablet   No No   Sig: Take 1 tablet (50 mg total) by mouth daily at bedtime as needed for sleep      Facility-Administered Medications: None     Allergies:  No Known Allergies  ------------------------------------------------------------------------------------------------------------  Advance Directive and Living Will:      Power of :    POLST:    ------------------------------------------------------------------------------------------------------------  Anticipated Length of Stay is > 2 midnights      [address], DO        Portions of the record may have been created with voice recognition software  Occasional wrong word or "sound a like" substitutions may have occurred due to the inherent limitations of voice recognition software    Read the chart carefully and recognize, using context, where substitutions have occurred

## 2021-04-21 NOTE — PROGRESS NOTES
Outpatient Care Management Note:  RE:Chart review done as pt currently inpt at [facility]

## 2021-04-21 NOTE — PROGRESS NOTES
[address] [facility]  Progress Note - Juan Pierre 1950, 78 y o  male MRN: [id number]  Unit/Bed#: ICU 05 Encounter: [id number]  Primary Care Provider: Brayden Henderson DO   Date and time admitted to hospital: 4/20/2021  8:51 PM    * Acute upper GI bleed  Assessment & Plan  · Patient with coffee-ground emesis and hypotension while on Behavioral Health unit  Patient was transferred down to medical floor for further evaluation of possible GI bleed  · Patient did have an initial drop in hemoglobin however over the last 2 checks appears to be stable  · Continue IV fluids  · Currently NPO  · Type and screen ordered, will transfuse as needed  · Monitor H&H  · GI consulted  · Continue PPI and octreotide    Schizophrenia [facility])  Assessment & Plan  · Consult psychiatry  · The patient denies any suicidal or homicidal ideation currently    History of duodenal ulcer  Assessment & Plan  · With perforation status post repair in 2020  · Will continue treatment as above  · CT scan of the abdomen/pelvis with no acute findings    Tobacco abuse  Assessment & Plan  · Will use nicotine patch while in house    COPD without exacerbation ([facility] [address] )  Assessment & Plan  · Will continue Breo  · Albuterol p r n  VTE Prophylaxis:  Held due to GI bleed    Patient Centered Rounds: I have performed bedside rounds with nursing staff today  Discussions with Specialists or Other Care Team Provider: yes  Education and Discussions with Family / Patient:  Updated patient regarding plan of care    Current Length of Stay: 1 day(s)    Current Patient Status: Inpatient   Certification Statement: The patient will continue to require additional inpatient hospital stay due to gi bleed    Discharge Plan:  Pending hospital course    Code Status: Level 1 - Full Code    Subjective:   Patient was transferred down to the medical floor yesterday due to suspected GI bleed  Patient currently NPO for possible EGD today    Patient still with intermittent abdominal pain however improved from admission  No bleeding episodes noted overnight    Objective:     Vitals:   Temp (24hrs), Av 7 °F (37 1 °C), Min:98 7 °F (37 1 °C), Max:98 7 °F (37 1 °C)    Temp:  [98 7 °F (37 1 °C)] 98 7 °F (37 1 °C)  HR:  [] 74  Resp:  [13-24] 13  BP: (109-187)/(58-86) 160/79  SpO2:  [95 %-99 %] 97 %  Body mass index is 24 15 kg/m²  Input and Output Summary (last 24 hours): Intake/Output Summary (Last 24 hours) at 2021 0800  Last data filed at 2021 0600  Gross per 24 hour   Intake 1661 41 ml   Output 700 ml   Net 961 41 ml       Physical Exam:   Physical Exam  Constitutional:       General: He is not in acute distress  HENT:      Head: Normocephalic and atraumatic  Nose: Nose normal       Mouth/Throat:      Mouth: Mucous membranes are dry  Eyes:      Extraocular Movements: Extraocular movements intact  Conjunctiva/sclera: Conjunctivae normal    Neck:      Musculoskeletal: Normal range of motion and neck supple  Cardiovascular:      Rate and Rhythm: Normal rate and regular rhythm  Pulmonary:      Effort: Pulmonary effort is normal       Breath sounds: Normal breath sounds  Abdominal:      Palpations: Abdomen is soft  Tenderness: There is no abdominal tenderness  Musculoskeletal: Normal range of motion  General: No swelling  Skin:     General: Skin is warm and dry  Neurological:      General: No focal deficit present  Mental Status: He is alert  Cranial Nerves: No cranial nerve deficit     Psychiatric:         Mood and Affect: Mood normal          Behavior: Behavior normal          Additional Data:     Labs:    Results from last 7 days   Lab Units 21  0416 21  2229   WBC Thousand/uL  --  10 10   HEMOGLOBIN g/dL 10 0* 10 4*   HEMATOCRIT % 29 3* 30 4*   PLATELETS Thousands/uL  --  343   NEUTROS PCT %  --  76*   LYMPHS PCT %  --  16*   MONOS PCT %  --  7   EOS PCT %  --  0     Results from last 7 days   Lab Units 04/21/21  0417 04/20/21  2229   SODIUM mmol/L 134 134   POTASSIUM mmol/L 4 1 4 2   CHLORIDE mmol/L 99 93*   CO2 mmol/L 28 29   BUN mg/dL 37* 42*   CREATININE mg/dL 0 94 0 99   CALCIUM mg/dL 7 6* 8 6   ALK PHOS U/L  --  31*   ALT U/L  --  5*   AST U/L  --  14     Results from last 7 days   Lab Units 04/20/21  2229   INR  1 07     Results from last 7 days   Lab Units 04/19/21  1103   POC GLUCOSE mg/dl 102           * I Have Reviewed All Lab Data Listed Above  * Additional Pertinent Lab Tests Reviewed: [address] Admission  Reviewed    Imaging:  Imaging Reports Reviewed Today Include:  No new imaging    Recent Cultures (last 7 days):           Last 24 Hours Medication List:   Current Facility-Administered Medications   Medication Dose Route Frequency Provider Last Rate    albuterol  2 puff Inhalation Q6H PRN Sherial Lints, CRNP      fluticasone-vilanterol  1 puff Inhalation Daily Sherial Lints, CRNP      LORazepam  0 5 mg Intravenous Q6H PRN Sherial Lints, CRNP      metoprolol  5 mg Intravenous Q8H Sherial Lints, CRNP      morphine injection  2 mg Intravenous Q4H PRN Sherial Lints, CRNP      multi-electrolyte  125 mL/hr Intravenous Continuous Sherial Lints, CRNP 125 mL/hr (04/21/21 0600)    nicotine  1 patch Transdermal Daily Sherial Lints, CRNP      octreotide  50 mcg/hr Intravenous Continuous Sherial Lints, CRNP 50 mcg/hr (04/21/21 0600)    pantoprozole (PROTONIX) infusion (Continuous)  8 mg/hr Intravenous Continuous Sherial Lints, CRNP 8 mg/hr (04/21/21 0600)        Today, Patient Was Seen By: Radha Regan MD    ** Please Note: Dictation voice to text software may have been used in the creation of this document   **

## 2021-04-21 NOTE — PROGRESS NOTES
Patient belongings  At bedside    slippers   Glasses   Sweat pants   Sweat shirt   Shirt   Socks   insurance card   Cell phone         In med room   Cigs & Lighter       Has safe bag with hospital security

## 2021-04-21 NOTE — ASSESSMENT & PLAN NOTE
· Patient with coffee-ground emesis and hypotension while on Behavioral Health unit    Patient was transferred down to medical floor for further evaluation of possible GI bleed  · Patient did have an initial drop in hemoglobin however over the last 2 checks appears to be stable  · Continue IV fluids  · Currently NPO  · Type and screen ordered, will transfuse as needed  · Monitor H&H  · GI consulted  · Continue PPI and octreotide

## 2021-04-21 NOTE — ASSESSMENT & PLAN NOTE
· Management per psychiatry team  · Patient was initially in [facility] Unit  · Patient will need psychiatry clearance prior to discharge once he is medically stable

## 2021-04-21 NOTE — CONSULTS
Consultation - GI   Perla Aranda 78 y o  male MRN: [id number]  Unit/Bed#: ICU 05 Encounter: [id number]      Assessment/Plan     Assessment:  Upper GI bleed  Plan:  EGD today    History of Present Illness   Physician Requesting Consult: Rancho Lambert MD  Reason for Consult / Principal Problem:  Hematemesis  Hx and PE limited by:   HPI: Perla Aranda is a 78y o  year old male who presents with abdominal pain for the past week along with hematemesis yesterday  Hemoglobin dropped from 16-10  Patient continues to have epigastric pain  Has a history of alcohol abuse  Admits to 3 beers per day      Consults    Review of Systems    Historical Information   Past Medical History:   Diagnosis Date    Alcohol abuse     BPH (benign prostatic hyperplasia)     CVA (cerebral vascular accident) ([facility] [address] )     DJD (degenerative joint disease)     Encounter to establish care with new doctor 8/21/2019    Gait abnormality     Head injury     History of cerebrovascular accident (CVA) with residual deficit 10/1/2019    History of CVA (cerebrovascular accident) 1/8/2020    History of substance abuse ([facility] [address] ) 8/21/2019    Polysubstance     History of sustained ventricular tachycardia 8/21/2019    History of transient ischemic attack (TIA) 8/28/2019    Hypertension     Metatarsal fracture     TIA (transient ischemic attack)     Tobacco abuse      Past Surgical History:   Procedure Laterality Date    ABDOMINAL SURGERY      ANKLE SURGERY      BACK SURGERY      CT GUIDED [facility] DRAINAGE CATHETER PLACEMENT  1/27/2020    ELBOW SURGERY      IR VISCERAL ANGIOGRAPHY / INTERVENTION  1/11/2020    JOINT REPLACEMENT      KNEE SURGERY      LAPAROTOMY N/A 1/11/2020    Procedure: LAPAROTOMY EXPLORATORY,  OVERSEW  OF GASTRO DUODENAL ARTERY, THAL PATCH OF DUODENUM, VICKY GASTRO JEJUNOSTOMY TUBE;  Surgeon: Azaila Alcocer DO;  Location: [facility];  Service: General    LIVER SURGERY       Social History   Social History     Substance and Sexual Activity   Alcohol Use Yes    Alcohol/week: 4 0 standard drinks    Types: 4 Cans of beer per week    Frequency: Monthly or less    Drinks per session: 1 or 2    Binge frequency: Never    Comment: a couple beers     Social History     Substance and Sexual Activity   Drug Use Yes    Types: Marijuana    Comment: history polysubstance use including IVDA on record- every now then will smoke weed      E-Cigarette/Vaping    E-Cigarette Use Never User      E-Cigarette/Vaping Substances     Social History     Tobacco Use   Smoking Status Current Every Day Smoker    Packs/day: 1 00    Types: Cigarettes   Smokeless Tobacco Never Used   Tobacco Comment    started age 14, 4 quit attempts     Family History: non-contributory    Meds/Allergies   all current active meds have been reviewed    No Known Allergies    Objective       Intake/Output Summary (Last 24 hours) at 4/21/2021 1359  Last data filed at 4/21/2021 0825  Gross per 24 hour   Intake 1964 75 ml   Output 900 ml   Net 1064 75 ml       Invasive Devices:   CVC Central Lines 04/20/21 (Active)   Reasons to continue CVC line  No peripheral vascular access 04/21/21 0800   Goal for Removal No longer needed- Will place order to discontinue 04/21/21 0800   Line Necessity Reviewed Yes, reviewed with provider 04/21/21 0000   Site Assessment WDL 04/21/21 0800   Proximal Lumen Status Infusing 04/21/21 0800   Medial Lumen Status Infusing 04/21/21 0800   Distal Lumen Status Infusing 04/21/21 0800   Dressing Type Transparent; Chlorhexidine dressing 04/21/21 0800   Dressing Status Clean;Dry; Intact 04/21/21 0800   Dressing Change Due 04/27/21 04/21/21 0000       Open Drain Midline Abdomen (Active)       Gastrostomy/Enterostomy Gastrostomy-jejunostomy 22 Fr  LLQ (Active)       External Urinary Catheter (Active)       Physical Exam  Constitutional:       Appearance: He is ill-appearing  HENT:      Head: Normocephalic     Cardiovascular:      Rate and Rhythm: Tachycardia present  Pulmonary:      Effort: Pulmonary effort is normal       Breath sounds: Normal breath sounds  Abdominal:      General: Abdomen is flat  Bowel sounds are normal       Palpations: Abdomen is soft  Tenderness: There is abdominal tenderness (Epigastric)  Neurological:      General: No focal deficit present  Lab Results: I have personally reviewed pertinent reports  Imaging Studies: I have personally reviewed pertinent reports  EKG, Pathology, and Other Studies: I have personally reviewed pertinent reports  VTE Prophylaxis: Reason for no pharmacologic prophylaxis GI bleed    Counseling / Coordination of Care  Total floor / unit time spent today 30 minutes  Greater than 50% of total time was spent with the patient and / or family counseling and / or coordination of care  A description of the counseling / coordination of care:  Case discussed with hospital physician

## 2021-04-21 NOTE — ANESTHESIA POSTPROCEDURE EVALUATION
Post-Op Assessment Note    CV Status:  Stable  Pain Score: 0    Pain management: adequate     Mental Status:  Awake   PONV Controlled:  None   Airway Patency:  Patent      Post Op Vitals Reviewed: Yes      Staff: CRNA         No complications documented      BP   124/67   Temp      Pulse  70   Resp   20   SpO2   100

## 2021-04-21 NOTE — ANESTHESIA PREPROCEDURE EVALUATION
Procedure:  EGD    Relevant Problems   CARDIO   (+) Aortic atherosclerosis (HCC)   (+) Essential hypertension      GI/HEPATIC   (+) Acute upper GI bleed      HEMATOLOGY   (+) Anemia      MUSCULOSKELETAL   (+) Back pain without sciatica      NEURO/PSYCH   (+) Chronic pain   (+) History of GI bleed   (+) History of anemia   (+) History of bleeding ulcers   (+) History of duodenal ulcer   (+) History of suicidal ideation   (+) History of syncope   (+) MDD (major depressive disorder)   (+) Major depressive disorder, recurrent, severe with psychotic features (HCC)   (+) Schizophrenia ([facility] [address] )      PULMONARY   (+) COPD without exacerbation (HCC)   (+) Chronic obstructive pulmonary disease (HCC)      Other   (+) Alcohol dependence with unspecified alcohol-induced disorder (HCC)      2-3 drinks EtOH daily  <1 PPD smoker    Lab Results   Component Value Date    WBC 10 10 04/20/2021    HGB 10 0 (L) 04/21/2021    HCT 29 3 (L) 04/21/2021    MCV 97 04/20/2021     04/20/2021     Lab Results   Component Value Date     (L) 02/08/2015    K 4 1 04/21/2021    CO2 28 04/21/2021    CL 99 04/21/2021    BUN 37 (H) 04/21/2021    CREATININE 0 94 04/21/2021     Lab Results   Component Value Date    INR 1 07 04/20/2021    INR 1 03 04/20/2021    INR 0 96 07/17/2020    PROTIME 13 8 04/20/2021    PROTIME 13 4 04/20/2021    PROTIME 12 3 07/17/2020     Lab Results   Component Value Date    PTT 38 (H) 01/16/2020       Lab Results   Component Value Date    GLUCOSE 120 01/16/2020    GLUCOSE 176 (H) 01/11/2020    GLUCOSE 191 (H) 01/11/2020       Lab Results   Component Value Date    HGBA1C 5 3 08/05/2020       Type and Screen:  A      TTE 1/10/2020  SUMMARY     LEFT VENTRICLE:  Size was normal   Systolic function was normal  Ejection fraction was estimated to be 60 %  Wall thickness was mildly increased    Left ventricular diastolic function parameters were normal      RIGHT VENTRICLE:  The size was normal   Systolic function was normal      TRICUSPID VALVE:  There was trace regurgitation  Physical Exam    Airway    Mallampati score: I  TM Distance: >3 FB  Neck ROM: full     Dental   Comment: Edentulous,     Cardiovascular      Pulmonary      Other Findings        Anesthesia Plan  ASA Score- 3     Anesthesia Type- IV sedation with anesthesia with ASA Monitors  Additional Monitors:   Airway Plan:     Comment: Possible GETA as backup  Appropriately NPO  Nauseous but no vomiting since yesterday  Plan Factors-Exercise tolerance (METS): >4 METS  Chart reviewed  Existing labs reviewed  Patient summary reviewed  Patient is a current smoker  Induction- intravenous  Postoperative Plan-     Informed Consent- Anesthetic plan and risks discussed with patient  I personally reviewed this patient with the CRNA  Discussed and agreed on the Anesthesia Plan with the CRNA  Hanna Dunbar

## 2021-04-21 NOTE — NURSING NOTE
Handoff report given to Carmen Fabian RN at [facility]  Patient stable at time of discharge  Karrie Nissen

## 2021-04-21 NOTE — PLAN OF CARE
Problem: PAIN - ADULT  Goal: Verbalizes/displays adequate comfort level or baseline comfort level  Description: Interventions:  - Encourage patient to monitor pain and request assistance  - Assess pain using appropriate pain scale  - Administer analgesics based on type and severity of pain and evaluate response  - Implement non-pharmacological measures as appropriate and evaluate response  - Consider cultural and social influences on pain and pain management  - Notify physician/advanced practitioner if interventions unsuccessful or patient reports new pain  Outcome: Progressing     Problem: INFECTION - ADULT  Goal: Absence or prevention of progression during hospitalization  Description: INTERVENTIONS:  - Assess and monitor for signs and symptoms of infection  - Monitor lab/diagnostic results  - Monitor all insertion sites, i e  indwelling lines, tubes, and drains  - Monitor endotracheal if appropriate and nasal secretions for changes in amount and color  - [place] appropriate cooling/warming therapies per order  - Administer medications as ordered  - Instruct and encourage patient and family to use good hand hygiene technique  - Identify and instruct in appropriate isolation precautions for identified infection/condition  Outcome: Progressing  Goal: Absence of fever/infection during neutropenic period  Description: INTERVENTIONS:  - Monitor WBC    Outcome: Progressing     Problem: SAFETY ADULT  Goal: Patient will remain free of falls  Description: INTERVENTIONS:  - Assess patient frequently for physical needs  -  Identify cognitive and physical deficits and behaviors that affect risk of falls    -  [place] fall precautions as indicated by assessment   - Educate patient/family on patient safety including physical limitations  - Instruct patient to call for assistance with activity based on assessment  - Modify environment to reduce risk of injury  - Consider OT/PT consult to assist with strengthening/mobility  Outcome: Progressing  Goal: Maintain or return to baseline ADL function  Description: INTERVENTIONS:  -  Assess patient's ability to carry out ADLs; assess patient's baseline for ADL function and identify physical deficits which impact ability to perform ADLs (bathing, care of mouth/teeth, toileting, grooming, dressing, etc )  - Assess/evaluate cause of self-care deficits   - Assess range of motion  - Assess patient's mobility; develop plan if impaired  - Assess patient's need for assistive devices and provide as appropriate  - Encourage maximum independence but intervene and supervise when necessary  - Involve family in performance of ADLs  - Assess for home care needs following discharge   - Consider OT consult to assist with ADL evaluation and planning for discharge  - Provide patient education as appropriate  Outcome: Progressing  Goal: Maintain or return mobility status to optimal level  Description: INTERVENTIONS:  - Assess patient's baseline mobility status (ambulation, transfers, stairs, etc )    - Identify cognitive and physical deficits and behaviors that affect mobility  - Identify mobility aids required to assist with transfers and/or ambulation (gait belt, sit-to-stand, lift, walker, cane, etc )  - [place] fall precautions as indicated by assessment  - Record patient progress and toleration of activity level on Mobility SBAR; progress patient to next Phase/Stage  - Instruct patient to call for assistance with activity based on assessment  - Consider rehabilitation consult to assist with strengthening/weightbearing, etc   Outcome: Progressing     Problem: Knowledge Deficit  Goal: Patient/family/caregiver demonstrates understanding of disease process, treatment plan, medications, and discharge instructions  Description: Complete learning assessment and assess knowledge base    Interventions:  - Provide teaching at level of understanding  - Provide teaching via preferred learning methods  Outcome: Progressing     Problem: NEUROSENSORY - ADULT  Goal: Achieves maximal functionality and self care  Description: INTERVENTIONS  - Monitor swallowing and airway patency with patient fatigue and changes in neurological status  - Encourage and assist patient to increase activity and self care     - Encourage visually impaired, hearing impaired and aphasic patients to use assistive/communication devices  Outcome: Progressing     Problem: RESPIRATORY - ADULT  Goal: Achieves optimal ventilation and oxygenation  Description: INTERVENTIONS:  - Assess for changes in respiratory status  - Assess for changes in mentation and behavior  - Position to facilitate oxygenation and minimize respiratory effort  - Oxygen administered by appropriate delivery if ordered  - Initiate smoking cessation education as indicated  - Encourage broncho-pulmonary hygiene including cough, deep breathe, Incentive Spirometry  - Assess the need for suctioning and aspirate as needed  - Assess and instruct to report SOB or any respiratory difficulty  - Respiratory Therapy support as indicated  Outcome: Progressing     Problem: CARDIOVASCULAR - ADULT  Goal: Maintains optimal cardiac output and hemodynamic stability  Description: INTERVENTIONS:  - Monitor I/O, vital signs and rhythm  - Monitor for S/S and trends of decreased cardiac output  - Administer and titrate ordered vasoactive medications to optimize hemodynamic stability  - Assess quality of pulses, skin color and temperature  - Assess for signs of decreased coronary artery perfusion  - Instruct patient to report change in severity of symptoms  Outcome: Progressing  Goal: Absence of cardiac dysrhythmias or at baseline rhythm  Description: INTERVENTIONS:  - Continuous cardiac monitoring, vital signs, obtain 12 lead EKG if ordered  - Administer antiarrhythmic and heart rate control medications as ordered  - Monitor electrolytes and administer replacement therapy as ordered  Outcome: Progressing     Problem: GASTROINTESTINAL - ADULT  Goal: Minimal or absence of nausea and/or vomiting  Description: INTERVENTIONS:  - Administer IV fluids if ordered to ensure adequate hydration  - Maintain NPO status until nausea and vomiting are resolved  - Nasogastric tube if ordered  - Administer ordered antiemetic medications as needed  - Provide nonpharmacologic comfort measures as appropriate  - Advance diet as tolerated, if ordered  - Consider nutrition services referral to assist patient with adequate nutrition and appropriate food choices  Outcome: Progressing  Goal: Maintains or returns to baseline bowel function  Description: INTERVENTIONS:  - Assess bowel function  - Encourage oral fluids to ensure adequate hydration  - Administer IV fluids if ordered to ensure adequate hydration  - Administer ordered medications as needed  - Encourage mobilization and activity  - Consider nutritional services referral to assist patient with adequate nutrition and appropriate food choices  Outcome: Progressing  Goal: Maintains adequate nutritional intake  Description: INTERVENTIONS:  - Monitor percentage of each meal consumed  - Identify factors contributing to decreased intake, treat as appropriate  - Assist with meals as needed  - Monitor I&O, weight, and lab values if indicated  - Obtain nutrition services referral as needed  Outcome: Progressing     Problem: METABOLIC, FLUID AND ELECTROLYTES - ADULT  Goal: Electrolytes maintained within normal limits  Description: INTERVENTIONS:  - Monitor labs and assess patient for signs and symptoms of electrolyte imbalances  - Administer electrolyte replacement as ordered  - Monitor response to electrolyte replacements, including repeat lab results as appropriate  - Instruct patient on fluid and nutrition as appropriate  Outcome: Progressing  Goal: Fluid balance maintained  Description: INTERVENTIONS:  - Monitor labs   - Monitor I/O and WT  - Instruct patient on fluid and nutrition as appropriate  - Assess for signs & symptoms of volume excess or deficit  Outcome: Progressing  Goal: Glucose maintained within target range  Description: INTERVENTIONS:  - Monitor Blood Glucose as ordered  - Assess for signs and symptoms of hyperglycemia and hypoglycemia  - Administer ordered medications to maintain glucose within target range  - Assess nutritional intake and initiate nutrition service referral as needed  Outcome: Progressing     Problem: HEMATOLOGIC - ADULT  Goal: Maintains hematologic stability  Description: INTERVENTIONS  - Assess for signs and symptoms of bleeding or hemorrhage  - Monitor labs  - Administer supportive blood products/factors as ordered and appropriate  Outcome: Progressing     Problem: MUSCULOSKELETAL - ADULT  Goal: Maintain or return mobility to safest level of function  Description: INTERVENTIONS:  - Assess patient's ability to carry out ADLs; assess patient's baseline for ADL function and identify physical deficits which impact ability to perform ADLs (bathing, care of mouth/teeth, toileting, grooming, dressing, etc )  - Assess/evaluate cause of self-care deficits   - Assess range of motion  - Assess patient's mobility  - Assess patient's need for assistive devices and provide as appropriate  - Encourage maximum independence but intervene and supervise when necessary  - Involve family in performance of ADLs  - Assess for home care needs following discharge   - Consider OT consult to assist with ADL evaluation and planning for discharge  - Provide patient education as appropriate  Outcome: Progressing  Goal: Maintain proper alignment of affected body part  Description: INTERVENTIONS:  - Support, maintain and protect limb and body alignment  - Provide patient/ family with appropriate education  Outcome: Progressing

## 2021-04-21 NOTE — H&P
[facility] 1950[facility] 78 [facility]  [facility] MRN: [id number]  Unit/Bed#: ICU 05 Encounter: [id number]  Primary Care Provider: Shannan He DO   Date and time admitted to hospital: 4/20/2021  8:51 PM    * Acute upper GI bleed  Assessment & Plan  · The patient initially presented with generalized abdominal pain however was having suicidal ideation and was admitted to Older Adult behavior health  · Today the patient had an episode of coffee-ground emesis with hypotension  · Will admit the patient to ICU  · Consult gastroenterology, critical care  · Aggressive IV fluid  · Noted to have a drop of hemoglobin from 16->11 8    The patient however has not had any repeated vomiting since he came down from older adult  · Will hold off on blood transfusion at this time  · Will monitor H&H closely if drop less than 8 will transfuse   · Will start the patient on pantoprazole drip and octreotide drip    Schizophrenia ([facility] )  Assessment & Plan  · Consult psychiatry  · The patient denies any suicidal or homicidal ideation currently    History of duodenal ulcer  Assessment & Plan  · With perforation status post repair 2020  · Please see treatment outlined above    Tobacco abuse  Assessment & Plan  · Will use nicotine patch while in house    [facility]  Assessment & Plan  · Continue with inhaler    Tachycardia  Assessment & Plan  · The patient does have history of tachycardia  · The last recorded heart rate at PCPs office shows heart rate 112  · Currently heart rate is 120s the patient is on metoprolol at home  · Worsening tachycardia is likely due to upper GI bleed and volume depletion with anxiety  · Will give aggressive IV fluid  · Will change metoprolol to IV    VTE Prophylaxis: SCDs only   Code Status: Full code   Discussion with family: daughter in law    Anticipated Length of Stay:  Patient will be admitted on an Inpatient basis with an anticipated length of stay of  > 2 midnights  Justification for Hospital Stay:  Upper GI bleed    Chief Complaint:   Dark colored vomit    History of Present Illness:    Finis Nyhan is a 78 y o  male who presents with dark colored vomit  The patient with past medical history of schizophrenia, duodenal ulcer with perforation and history of alcohol use  He initially presented to the emergency department on 04/19/2021 with complaint of generalized abdominal pain  Additionally he also has suicidal ideation and after CT of abdomen and pelvis shows no acute finding the patient subsequently was admitted to Older behavior health unit  Today, patient had a moderate amount of dark colored vomit" which is suspicious of coffee-ground emesis due to his prior history of duodenal ulcer bleeding and perforation  The patient also noted to have hypotension with systolic blood pressure in the 70s  The patient subsequently is being transferred from behavirol health to the ICU  Of note, the patient was admitted at [facility] in January of 2020 with syncopal episodes and was found to have duodenal perforation with upper GI bleeding  The patient underwent an IR embolization and underwent an exploratory laparotomy with GJ tube placement by surgery  Review of Systems:    Review of Systems   Constitutional: Negative for chills, fatigue and fever  HENT: Negative for congestion, ear pain, postnasal drip and sore throat  Eyes: Negative for discharge, itching and visual disturbance  Respiratory: Negative for cough, chest tightness and shortness of breath  Cardiovascular: Negative for chest pain, palpitations and leg swelling  Gastrointestinal: Positive for vomiting (Dark coffee-ground emesis)  Negative for abdominal pain and nausea  Endocrine: Negative for cold intolerance and heat intolerance  Genitourinary: Negative for difficulty urinating, dysuria and hematuria     Musculoskeletal: Negative for back pain, gait problem and neck pain  Skin: Negative for rash and wound  Neurological: Negative for dizziness, speech difficulty, weakness, light-headedness and headaches  Psychiatric/Behavioral: Negative for behavioral problems, confusion and decreased concentration  The patient is nervous/anxious  Past Medical and Surgical History:     Past Medical History:   Diagnosis Date    Alcohol abuse     BPH (benign prostatic hyperplasia)     CVA (cerebral vascular accident) ([facility]     DJD (degenerative joint disease)     Encounter to establish care with new doctor 8/21/2019    Gait abnormality     Head injury     History of cerebrovascular accident (CVA) with residual deficit 10/1/2019    History of CVA (cerebrovascular accident) 1/8/2020    History of substance abuse ([facility] ) 8/21/2019    Polysubstance     History of sustained ventricular tachycardia 8/21/2019    History of transient ischemic attack (TIA) 8/28/2019    Hypertension     Metatarsal fracture     TIA (transient ischemic attack)     Tobacco abuse        Past Surgical History:   Procedure Laterality Date    ABDOMINAL SURGERY      ANKLE SURGERY      BACK SURGERY      CT GUIDED [facility] DRAINAGE CATHETER PLACEMENT  1/27/2020    ELBOW SURGERY      IR VISCERAL ANGIOGRAPHY / INTERVENTION  1/11/2020    JOINT REPLACEMENT      KNEE SURGERY      LAPAROTOMY N/A 1/11/2020    Procedure: LAPAROTOMY EXPLORATORY,  OVERSEW  OF GASTRO DUODENAL ARTERY, THAL PATCH OF DUODENUM, VICKY GASTRO JEJUNOSTOMY TUBE;  Surgeon: Chente Brothers DO;  [facility]: [facility];  Service: General    LIVER SURGERY         Meds/Allergies:    Prior to Admission medications    Medication Sig Start Date End Date Taking?  Authorizing Provider   albuterol (ProAir HFA) 90 mcg/act inhaler Inhale 2 puffs every 6 (six) hours as needed for wheezing 4/9/21   Prachi Pineda DO   atorvastatin (LIPITOR) 40 mg tablet Take 1 tablet (40 mg total) by mouth daily 3/18/21   Prachi Pineda DO   Cholecalciferol 25 MCG (1000 UT) tablet Take 1 tablet (1,000 Units total) by mouth daily 3/18/21   Prachi Pineda DO   diclofenac (VOLTAREN) 75 mg EC tablet Take 1 tablet (75 mg total) by mouth 2 (two) times a day 3/8/21 4/7/21  Nikhil Madison MD   fluticasone (FLONASE) 50 mcg/act nasal spray 2 sprays into each nostril daily 4/9/21   Michelle Maldonado DO   fluticasone-vilanterol (BREO ELLIPTA) 100-25 mcg/inh inhaler Inhale 1 puff daily Rinse mouth after use   4/9/21   Prachi Pineda DO   folic acid (FOLVITE) 1 mg tablet Take 1 tablet (1 mg total) by mouth daily 3/18/21   Prachi Pineda DO   gabapentin (NEURONTIN) 600 MG tablet Take 1 tablet (600 mg total) by mouth 3 (three) times a day 3/8/21 4/9/21  Nikhil Madison MD   guaiFENesin (MUCINEX) 600 mg 12 hr tablet Take 600 mg by mouth every 12 (twelve) hours as needed for cough    Historical Provider, MD   hydrOXYzine pamoate (VISTARIL) 50 mg capsule take 1 capsule by mouth three times a day if needed for anxiety 10/14/20   Historical Provider, MD   metoprolol tartrate (LOPRESSOR) 50 mg tablet Take 1 tablet (50 mg total) by mouth every 12 (twelve) hours 4/9/21   Prachi Pineda DO   mirtazapine (REMERON) 45 MG tablet  4/6/21   Historical Provider, MD   pantoprazole (PROTONIX) 40 mg tablet Take 1 tablet (40 mg total) by mouth 2 (two) times a day before meals 4/9/21   Prachi Pineda DO   QUEtiapine (SEROquel) 200 mg tablet Take 1 tablet (200 mg total) by mouth daily 5/15/19   MAVERICK Morelos   QUEtiapine (SEROquel) 300 mg tablet Take 2 tablets (600 mg total) by mouth daily at bedtime 5/14/19   MAVERICK Morelos   traZODone (DESYREL) 50 mg tablet Take 1 tablet (50 mg total) by mouth daily at bedtime as needed for sleep 8/14/20   Prachi Pineda, DO     all medications and allergies reviewed    Allergies: No Known Allergies    Social History:     Marital Status: Single   Occupation: n/a   Patient Pre-hospital Living Situation: family   Patient Pre-hospital Level of Mobility: independent   Patient Pre-hospital Diet Restrictions: none   Substance Use History:   Social History     Substance and Sexual Activity   Alcohol Use Yes    Frequency: Monthly or less    Drinks per session: 1 or 2    Binge frequency: Never    Comment: a couple beers     Social History     Tobacco Use   Smoking Status Current Every Day Smoker    Packs/day: 1 00    Types: Cigarettes   Smokeless Tobacco Never Used   Tobacco Comment    started age 14, 3 quit attempts     Social History     Substance and Sexual Activity   Drug Use Yes    Types: Marijuana    Comment: history polysubstance use including IVDA on record- every now then will smoke weed        Family History:  I have reviewed the patient's family history    Physical Exam:     Vitals:        Physical Exam  Vitals signs and nursing note reviewed  Constitutional:       General: He is not in acute distress  Appearance: Normal appearance  HENT:      Head: Normocephalic and atraumatic  Right Ear: External ear normal       Left Ear: External ear normal       Nose: Nose normal       Mouth/Throat:      Mouth: Mucous membranes are moist       Pharynx: Oropharynx is clear  Eyes:      General:         Right eye: No discharge  Left eye: No discharge  Extraocular Movements: Extraocular movements intact  Pupils: Pupils are equal, round, and reactive to light  Neck:      Musculoskeletal: Normal range of motion and neck supple  No neck rigidity  Cardiovascular:      Rate and Rhythm: Normal rate and regular rhythm  Pulses: Normal pulses  Heart sounds: Normal heart sounds  No murmur  Pulmonary:      Effort: Pulmonary effort is normal  No respiratory distress  Breath sounds: Normal breath sounds  No wheezing or rales  Abdominal:      General: Bowel sounds are normal  There is no distension  Palpations: Abdomen is soft  There is no mass  Tenderness: There is no abdominal tenderness  Musculoskeletal: Normal range of motion  General: No swelling, tenderness or deformity  Skin:     General: Skin is warm and dry  Capillary Refill: Capillary refill takes less than 2 seconds  Coloration: Skin is not pale  Findings: No erythema  Neurological:      General: No focal deficit present  Mental Status: He is alert and oriented to person, place, and time  Mental status is at baseline  Psychiatric:         Mood and Affect: Mood normal          Behavior: Behavior normal          Thought Content: Thought content normal          Judgment: Judgment normal          Additional Data:     Lab Results: I have personally reviewed pertinent reports  Results from last 7 days   Lab Units 04/20/21  1841   WBC Thousand/uL 13 10*   HEMOGLOBIN g/dL 11 8*   HEMATOCRIT % 34 7*   PLATELETS Thousands/uL 366   NEUTROS PCT % 87*   LYMPHS PCT % 8*   MONOS PCT % 5   EOS PCT % 0     Results from last 7 days   Lab Units 04/19/21  1119   SODIUM mmol/L 134   POTASSIUM mmol/L 3 8   CHLORIDE mmol/L 92*   CO2 mmol/L 31   BUN mg/dL 15   CREATININE mg/dL 0 97   ANION GAP mmol/L 11   CALCIUM mg/dL 10 5   ALBUMIN g/dL 4 2   TOTAL BILIRUBIN mg/dL 0 50   ALK PHOS U/L 53*   ALT U/L 7   AST U/L 19   GLUCOSE RANDOM mg/dL 108*     Results from last 7 days   Lab Units 04/20/21  1841   INR  1 03     Results from last 7 days   Lab Units 04/19/21  1103   POC GLUCOSE mg/dl 102         Results from last 7 days   Lab Units 04/19/21  1119   LACTIC ACID mmol/L 1 0       Imaging: I have personally reviewed pertinent reports  XR chest portable    (Results Pending)         EKG, Pathology, and Other Studies Reviewed on Admission:   · EKG:  Sinus tachycardia heart rate 120    Epic / [facility] Everywhere Records Reviewed: Yes    ** Please Note: This note has been constructed using a voice recognition system   **

## 2021-04-21 NOTE — ASSESSMENT & PLAN NOTE
· Patient with coffee-ground emesis and hypotension while on Behavioral Health unit  Patient was transferred down to medical floor for further evaluation of possible GI bleed  · Patient with 6 g hemoglobin drop since admission  · Will continue IV fluids  · Monitor H&H  · Transfuse as needed  · EGD done today which showed duodenal ulcer with visible vessels    GI recommends surgery evaluation who recommended transfer to [facility] for further evaluation and possible surgical treatment  · Patient accepted by surgical service at [facility]  · Will continue octreotide and PPI  · Currently NPO

## 2021-04-21 NOTE — NURSING NOTE
Patient transferred via med a vac to [facility] ICU; accepting Physician Dr Dinesh Monge; for acute surgical intervention for GI bleed  Patient medicated for pain with Morphine 2 mg IV and Zofran 4 mg IV prior to transfer

## 2021-04-21 NOTE — CASE MANAGEMENT
[facility] pt was transferred down to ICU for GI bleed, pt went for EGD today, pt needs to be transferred for higher level of care at [place] , pt to be transport [facility], pt is in agreement wit the transfer, permission mar to call his family, reena bakerkory Vickers at 16:59 to # [phone] and I made her aware of the transferto [facility], she is in agreement with the transfer, I then called Shayla Montoya at 17:00 to # [phone] and made him aware of the transfer and he is in agreement with the transfer  Cm will check on auth for transport in the morning when the insurance company is open

## 2021-04-21 NOTE — ASSESSMENT & PLAN NOTE
· With perforation status post repair in 2020  · Will continue treatment as above  · CT scan of the abdomen/pelvis with no acute findings

## 2021-04-22 ENCOUNTER — ANESTHESIA EVENT (INPATIENT)
Dept: GASTROENTEROLOGY | Facility: HOSPITAL | Age: 71
DRG: 378 | End: 2021-04-22
Payer: COMMERCIAL

## 2021-04-22 ENCOUNTER — ANESTHESIA (INPATIENT)
Dept: GASTROENTEROLOGY | Facility: HOSPITAL | Age: 71
DRG: 378 | End: 2021-04-22
Payer: COMMERCIAL

## 2021-04-22 ENCOUNTER — APPOINTMENT (INPATIENT)
Dept: GASTROENTEROLOGY | Facility: HOSPITAL | Age: 71
DRG: 378 | End: 2021-04-22
Payer: COMMERCIAL

## 2021-04-22 ENCOUNTER — PATIENT OUTREACH (OUTPATIENT)
Dept: FAMILY MEDICINE CLINIC | Facility: CLINIC | Age: 71
End: 2021-04-22

## 2021-04-22 PROBLEM — I10 ESSENTIAL HYPERTENSION: Status: RESOLVED | Noted: 2019-03-17 | Resolved: 2021-04-22

## 2021-04-22 PROBLEM — I10 HYPERTENSION: Status: ACTIVE | Noted: 2021-04-22

## 2021-04-22 LAB
ALBUMIN SERPL BCP-MCNC: 2.5 G/DL (ref 3.5–5)
ALBUMIN SERPL BCP-MCNC: 2.7 G/DL (ref 3.5–5)
ALP SERPL-CCNC: 39 U/L (ref 46–116)
ALP SERPL-CCNC: 41 U/L (ref 46–116)
ALT SERPL W P-5'-P-CCNC: 10 U/L (ref 12–78)
ALT SERPL W P-5'-P-CCNC: 9 U/L (ref 12–78)
ANION GAP SERPL CALCULATED.3IONS-SCNC: 8 MMOL/L (ref 4–13)
ANION GAP SERPL CALCULATED.3IONS-SCNC: 9 MMOL/L (ref 4–13)
AST SERPL W P-5'-P-CCNC: 17 U/L (ref 5–45)
AST SERPL W P-5'-P-CCNC: 18 U/L (ref 5–45)
BASOPHILS # BLD AUTO: 0.03 THOUSANDS/ΜL (ref 0–0.1)
BASOPHILS # BLD AUTO: 0.04 THOUSANDS/ΜL (ref 0–0.1)
BASOPHILS NFR BLD AUTO: 0 % (ref 0–1)
BASOPHILS NFR BLD AUTO: 1 % (ref 0–1)
BILIRUB SERPL-MCNC: 0.33 MG/DL (ref 0.2–1)
BILIRUB SERPL-MCNC: 0.37 MG/DL (ref 0.2–1)
BLOOD GROUP ANTIBODIES SERPL: NORMAL
BLOOD GROUP ANTIBODIES SERPL: NORMAL
BUN SERPL-MCNC: 10 MG/DL (ref 5–25)
BUN SERPL-MCNC: 14 MG/DL (ref 5–25)
CA-I BLD-SCNC: 1.05 MMOL/L (ref 1.12–1.32)
CALCIUM ALBUM COR SERPL-MCNC: 9.2 MG/DL (ref 8.3–10.1)
CALCIUM ALBUM COR SERPL-MCNC: 9.6 MG/DL (ref 8.3–10.1)
CALCIUM SERPL-MCNC: 8 MG/DL (ref 8.3–10.1)
CALCIUM SERPL-MCNC: 8.6 MG/DL (ref 8.3–10.1)
CHLORIDE SERPL-SCNC: 101 MMOL/L (ref 100–108)
CHLORIDE SERPL-SCNC: 99 MMOL/L (ref 100–108)
CO2 SERPL-SCNC: 24 MMOL/L (ref 21–32)
CO2 SERPL-SCNC: 25 MMOL/L (ref 21–32)
CREAT SERPL-MCNC: 0.64 MG/DL (ref 0.6–1.3)
CREAT SERPL-MCNC: 0.79 MG/DL (ref 0.6–1.3)
EOSINOPHIL # BLD AUTO: 0.07 THOUSAND/ΜL (ref 0–0.61)
EOSINOPHIL # BLD AUTO: 0.08 THOUSAND/ΜL (ref 0–0.61)
EOSINOPHIL NFR BLD AUTO: 1 % (ref 0–6)
EOSINOPHIL NFR BLD AUTO: 1 % (ref 0–6)
ERYTHROCYTE [DISTWIDTH] IN BLOOD BY AUTOMATED COUNT: 13.8 % (ref 11.6–15.1)
ERYTHROCYTE [DISTWIDTH] IN BLOOD BY AUTOMATED COUNT: 14.2 % (ref 11.6–15.1)
FERRITIN SERPL-MCNC: 55 NG/ML (ref 8–388)
FOLATE SERPL-MCNC: >20 NG/ML (ref 3.1–17.5)
FY SUP(A) AG RBC QL: NEGATIVE
GFR SERPL CREATININE-BSD FRML MDRD: 101 ML/MIN/1.73SQ M
GFR SERPL CREATININE-BSD FRML MDRD: 92 ML/MIN/1.73SQ M
GLUCOSE SERPL-MCNC: 127 MG/DL (ref 65–140)
GLUCOSE SERPL-MCNC: 89 MG/DL (ref 65–140)
HCT VFR BLD AUTO: 23.8 % (ref 36.5–49.3)
HCT VFR BLD AUTO: 24.9 % (ref 36.5–49.3)
HCT VFR BLD AUTO: 25 % (ref 36.5–49.3)
HCT VFR BLD AUTO: 25.2 % (ref 36.5–49.3)
HCT VFR BLD AUTO: 25.5 % (ref 36.5–49.3)
HGB BLD-MCNC: 8.1 G/DL (ref 12–17)
HGB BLD-MCNC: 8.3 G/DL (ref 12–17)
HGB BLD-MCNC: 8.3 G/DL (ref 12–17)
HGB BLD-MCNC: 8.5 G/DL (ref 12–17)
HGB BLD-MCNC: 8.7 G/DL (ref 12–17)
IMM GRANULOCYTES # BLD AUTO: 0.05 THOUSAND/UL (ref 0–0.2)
IMM GRANULOCYTES # BLD AUTO: 0.05 THOUSAND/UL (ref 0–0.2)
IMM GRANULOCYTES NFR BLD AUTO: 1 % (ref 0–2)
IMM GRANULOCYTES NFR BLD AUTO: 1 % (ref 0–2)
INR PPP: 1.07 (ref 0.84–1.19)
INR PPP: 1.09 (ref 0.84–1.19)
IRON SATN MFR SERPL: 22 %
IRON SERPL-MCNC: 52 UG/DL (ref 65–175)
LYMPHOCYTES # BLD AUTO: 1.17 THOUSANDS/ΜL (ref 0.6–4.47)
LYMPHOCYTES # BLD AUTO: 1.74 THOUSANDS/ΜL (ref 0.6–4.47)
LYMPHOCYTES NFR BLD AUTO: 14 % (ref 14–44)
LYMPHOCYTES NFR BLD AUTO: 23 % (ref 14–44)
MAGNESIUM SERPL-MCNC: 2.3 MG/DL (ref 1.6–2.6)
MCH RBC QN AUTO: 32.9 PG (ref 26.8–34.3)
MCH RBC QN AUTO: 33.5 PG (ref 26.8–34.3)
MCHC RBC AUTO-ENTMCNC: 33.3 G/DL (ref 31.4–37.4)
MCHC RBC AUTO-ENTMCNC: 34.1 G/DL (ref 31.4–37.4)
MCV RBC AUTO: 98 FL (ref 82–98)
MCV RBC AUTO: 99 FL (ref 82–98)
MONOCYTES # BLD AUTO: 0.51 THOUSAND/ΜL (ref 0.17–1.22)
MONOCYTES # BLD AUTO: 0.52 THOUSAND/ΜL (ref 0.17–1.22)
MONOCYTES NFR BLD AUTO: 6 % (ref 4–12)
MONOCYTES NFR BLD AUTO: 7 % (ref 4–12)
NEUTROPHILS # BLD AUTO: 5.16 THOUSANDS/ΜL (ref 1.85–7.62)
NEUTROPHILS # BLD AUTO: 6.8 THOUSANDS/ΜL (ref 1.85–7.62)
NEUTS SEG NFR BLD AUTO: 68 % (ref 43–75)
NEUTS SEG NFR BLD AUTO: 77 % (ref 43–75)
NRBC BLD AUTO-RTO: 0 /100 WBCS
NRBC BLD AUTO-RTO: 0 /100 WBCS
PHOSPHATE SERPL-MCNC: 2 MG/DL (ref 2.3–4.1)
PLATELET # BLD AUTO: 300 THOUSANDS/UL (ref 149–390)
PLATELET # BLD AUTO: 342 THOUSANDS/UL (ref 149–390)
PMV BLD AUTO: 8.9 FL (ref 8.9–12.7)
PMV BLD AUTO: 8.9 FL (ref 8.9–12.7)
POTASSIUM SERPL-SCNC: 3.8 MMOL/L (ref 3.5–5.3)
POTASSIUM SERPL-SCNC: 3.9 MMOL/L (ref 3.5–5.3)
PROT SERPL-MCNC: 5.7 G/DL (ref 6.4–8.2)
PROT SERPL-MCNC: 6.3 G/DL (ref 6.4–8.2)
PROTHROMBIN TIME: 13.9 SECONDS (ref 11.6–14.5)
PROTHROMBIN TIME: 14.1 SECONDS (ref 11.6–14.5)
RBC # BLD AUTO: 2.52 MILLION/UL (ref 3.88–5.62)
RBC # BLD AUTO: 2.6 MILLION/UL (ref 3.88–5.62)
SODIUM SERPL-SCNC: 132 MMOL/L (ref 136–145)
SODIUM SERPL-SCNC: 134 MMOL/L (ref 136–145)
TIBC SERPL-MCNC: 235 UG/DL (ref 250–450)
VIT B12 SERPL-MCNC: 383 PG/ML (ref 100–900)
WBC # BLD AUTO: 7.57 THOUSAND/UL (ref 4.31–10.16)
WBC # BLD AUTO: 8.65 THOUSAND/UL (ref 4.31–10.16)

## 2021-04-22 PROCEDURE — 43255 EGD CONTROL BLEEDING ANY: CPT | Performed by: INTERNAL MEDICINE

## 2021-04-22 PROCEDURE — 82728 ASSAY OF FERRITIN: CPT | Performed by: STUDENT IN AN ORGANIZED HEALTH CARE EDUCATION/TRAINING PROGRAM

## 2021-04-22 PROCEDURE — 80053 COMPREHEN METABOLIC PANEL: CPT | Performed by: PHYSICIAN ASSISTANT

## 2021-04-22 PROCEDURE — 99232 SBSQ HOSP IP/OBS MODERATE 35: CPT | Performed by: SURGERY

## 2021-04-22 PROCEDURE — 85014 HEMATOCRIT: CPT | Performed by: PHYSICIAN ASSISTANT

## 2021-04-22 PROCEDURE — 0D598ZZ DESTRUCTION OF DUODENUM, VIA NATURAL OR ARTIFICIAL OPENING ENDOSCOPIC: ICD-10-PCS | Performed by: INTERNAL MEDICINE

## 2021-04-22 PROCEDURE — 85610 PROTHROMBIN TIME: CPT | Performed by: PHYSICIAN ASSISTANT

## 2021-04-22 PROCEDURE — 83735 ASSAY OF MAGNESIUM: CPT | Performed by: REGISTERED NURSE

## 2021-04-22 PROCEDURE — 83540 ASSAY OF IRON: CPT | Performed by: STUDENT IN AN ORGANIZED HEALTH CARE EDUCATION/TRAINING PROGRAM

## 2021-04-22 PROCEDURE — 85025 COMPLETE CBC W/AUTO DIFF WBC: CPT | Performed by: REGISTERED NURSE

## 2021-04-22 PROCEDURE — 85018 HEMOGLOBIN: CPT | Performed by: PHYSICIAN ASSISTANT

## 2021-04-22 PROCEDURE — 82607 VITAMIN B-12: CPT | Performed by: PHYSICIAN ASSISTANT

## 2021-04-22 PROCEDURE — NC001 PR NO CHARGE: Performed by: EMERGENCY MEDICINE

## 2021-04-22 PROCEDURE — C9113 INJ PANTOPRAZOLE SODIUM, VIA: HCPCS | Performed by: PHYSICIAN ASSISTANT

## 2021-04-22 PROCEDURE — 80053 COMPREHEN METABOLIC PANEL: CPT | Performed by: REGISTERED NURSE

## 2021-04-22 PROCEDURE — 82746 ASSAY OF FOLIC ACID SERUM: CPT | Performed by: PHYSICIAN ASSISTANT

## 2021-04-22 PROCEDURE — 84100 ASSAY OF PHOSPHORUS: CPT | Performed by: REGISTERED NURSE

## 2021-04-22 PROCEDURE — 82306 VITAMIN D 25 HYDROXY: CPT | Performed by: PHYSICIAN ASSISTANT

## 2021-04-22 PROCEDURE — 85610 PROTHROMBIN TIME: CPT | Performed by: REGISTERED NURSE

## 2021-04-22 PROCEDURE — 0D568ZZ DESTRUCTION OF STOMACH, VIA NATURAL OR ARTIFICIAL OPENING ENDOSCOPIC: ICD-10-PCS | Performed by: INTERNAL MEDICINE

## 2021-04-22 PROCEDURE — 82330 ASSAY OF CALCIUM: CPT | Performed by: REGISTERED NURSE

## 2021-04-22 PROCEDURE — 85025 COMPLETE CBC W/AUTO DIFF WBC: CPT | Performed by: PHYSICIAN ASSISTANT

## 2021-04-22 PROCEDURE — 83550 IRON BINDING TEST: CPT | Performed by: STUDENT IN AN ORGANIZED HEALTH CARE EDUCATION/TRAINING PROGRAM

## 2021-04-22 PROCEDURE — 99291 CRITICAL CARE FIRST HOUR: CPT | Performed by: EMERGENCY MEDICINE

## 2021-04-22 PROCEDURE — 99223 1ST HOSP IP/OBS HIGH 75: CPT | Performed by: INTERNAL MEDICINE

## 2021-04-22 RX ORDER — PANTOPRAZOLE SODIUM 40 MG/1
40 INJECTION, POWDER, FOR SOLUTION INTRAVENOUS EVERY 12 HOURS SCHEDULED
Status: DISCONTINUED | OUTPATIENT
Start: 2021-04-22 | End: 2021-04-22

## 2021-04-22 RX ORDER — QUETIAPINE FUMARATE 100 MG/1
300 TABLET, FILM COATED ORAL
Status: DISCONTINUED | OUTPATIENT
Start: 2021-04-22 | End: 2021-04-23

## 2021-04-22 RX ORDER — ONDANSETRON 4 MG/1
4 TABLET, ORALLY DISINTEGRATING ORAL EVERY 6 HOURS PRN
Status: DISCONTINUED | OUTPATIENT
Start: 2021-04-22 | End: 2021-05-07 | Stop reason: HOSPADM

## 2021-04-22 RX ORDER — FLUTICASONE FUROATE AND VILANTEROL 100; 25 UG/1; UG/1
1 POWDER RESPIRATORY (INHALATION) DAILY
Status: DISCONTINUED | OUTPATIENT
Start: 2021-04-23 | End: 2021-04-22

## 2021-04-22 RX ORDER — METOPROLOL TARTRATE 50 MG/1
50 TABLET, FILM COATED ORAL EVERY 12 HOURS SCHEDULED
Status: DISCONTINUED | OUTPATIENT
Start: 2021-04-22 | End: 2021-04-22

## 2021-04-22 RX ORDER — ALBUTEROL SULFATE 90 UG/1
2 AEROSOL, METERED RESPIRATORY (INHALATION) EVERY 6 HOURS PRN
Status: DISCONTINUED | OUTPATIENT
Start: 2021-04-22 | End: 2021-04-22

## 2021-04-22 RX ORDER — METOPROLOL TARTRATE 5 MG/5ML
5 INJECTION INTRAVENOUS EVERY 8 HOURS
Status: DISCONTINUED | OUTPATIENT
Start: 2021-04-22 | End: 2021-04-23

## 2021-04-22 RX ORDER — QUETIAPINE FUMARATE 100 MG/1
200 TABLET, FILM COATED ORAL DAILY
Status: DISCONTINUED | OUTPATIENT
Start: 2021-04-23 | End: 2021-05-07 | Stop reason: HOSPADM

## 2021-04-22 RX ORDER — TRAZODONE HYDROCHLORIDE 50 MG/1
50 TABLET ORAL
Status: DISCONTINUED | OUTPATIENT
Start: 2021-04-22 | End: 2021-04-22

## 2021-04-22 RX ORDER — SODIUM CHLORIDE, SODIUM GLUCONATE, SODIUM ACETATE, POTASSIUM CHLORIDE, MAGNESIUM CHLORIDE, SODIUM PHOSPHATE, DIBASIC, AND POTASSIUM PHOSPHATE .53; .5; .37; .037; .03; .012; .00082 G/100ML; G/100ML; G/100ML; G/100ML; G/100ML; G/100ML; G/100ML
125 INJECTION, SOLUTION INTRAVENOUS CONTINUOUS
Status: DISCONTINUED | OUTPATIENT
Start: 2021-04-22 | End: 2021-04-22

## 2021-04-22 RX ORDER — RISPERIDONE 0.5 MG/1
0.5 TABLET, ORALLY DISINTEGRATING ORAL
Status: DISCONTINUED | OUTPATIENT
Start: 2021-04-22 | End: 2021-04-22

## 2021-04-22 RX ORDER — LORAZEPAM 2 MG/ML
0.5 INJECTION INTRAMUSCULAR EVERY 6 HOURS PRN
Status: DISCONTINUED | OUTPATIENT
Start: 2021-04-22 | End: 2021-05-02

## 2021-04-22 RX ORDER — EPHEDRINE SULFATE 50 MG/ML
INJECTION INTRAVENOUS AS NEEDED
Status: DISCONTINUED | OUTPATIENT
Start: 2021-04-22 | End: 2021-04-22

## 2021-04-22 RX ORDER — CALCIUM CARBONATE 200(500)MG
500 TABLET,CHEWABLE ORAL DAILY PRN
Status: DISCONTINUED | OUTPATIENT
Start: 2021-04-22 | End: 2021-05-07 | Stop reason: HOSPADM

## 2021-04-22 RX ORDER — FOLIC ACID 1 MG/1
1 TABLET ORAL DAILY
Status: DISCONTINUED | OUTPATIENT
Start: 2021-04-23 | End: 2021-04-22

## 2021-04-22 RX ORDER — GABAPENTIN 400 MG/1
400 CAPSULE ORAL 3 TIMES DAILY
Status: DISCONTINUED | OUTPATIENT
Start: 2021-04-22 | End: 2021-04-22

## 2021-04-22 RX ORDER — MELATONIN
1000 DAILY
Status: DISCONTINUED | OUTPATIENT
Start: 2021-04-23 | End: 2021-05-07 | Stop reason: HOSPADM

## 2021-04-22 RX ORDER — FENTANYL CITRATE 50 UG/ML
INJECTION, SOLUTION INTRAMUSCULAR; INTRAVENOUS AS NEEDED
Status: DISCONTINUED | OUTPATIENT
Start: 2021-04-22 | End: 2021-04-22

## 2021-04-22 RX ORDER — ATORVASTATIN CALCIUM 40 MG/1
40 TABLET, FILM COATED ORAL DAILY
Status: DISCONTINUED | OUTPATIENT
Start: 2021-04-23 | End: 2021-05-07 | Stop reason: HOSPADM

## 2021-04-22 RX ORDER — METOCLOPRAMIDE HYDROCHLORIDE 5 MG/ML
10 INJECTION INTRAMUSCULAR; INTRAVENOUS ONCE
Status: COMPLETED | OUTPATIENT
Start: 2021-04-22 | End: 2021-04-22

## 2021-04-22 RX ORDER — LIDOCAINE HYDROCHLORIDE 10 MG/ML
INJECTION, SOLUTION EPIDURAL; INFILTRATION; INTRACAUDAL; PERINEURAL AS NEEDED
Status: DISCONTINUED | OUTPATIENT
Start: 2021-04-22 | End: 2021-04-22

## 2021-04-22 RX ORDER — CALCIUM GLUCONATE 20 MG/ML
1 INJECTION, SOLUTION INTRAVENOUS ONCE
Status: COMPLETED | OUTPATIENT
Start: 2021-04-22 | End: 2021-04-22

## 2021-04-22 RX ORDER — PROPOFOL 10 MG/ML
INJECTION, EMULSION INTRAVENOUS AS NEEDED
Status: DISCONTINUED | OUTPATIENT
Start: 2021-04-22 | End: 2021-04-22

## 2021-04-22 RX ORDER — LANOLIN ALCOHOL/MO/W.PET/CERES
100 CREAM (GRAM) TOPICAL DAILY
Status: DISCONTINUED | OUTPATIENT
Start: 2021-04-23 | End: 2021-04-22

## 2021-04-22 RX ORDER — NICOTINE 21 MG/24HR
1 PATCH, TRANSDERMAL 24 HOURS TRANSDERMAL DAILY
Status: DISCONTINUED | OUTPATIENT
Start: 2021-04-23 | End: 2021-04-22

## 2021-04-22 RX ORDER — FLUTICASONE PROPIONATE 50 MCG
2 SPRAY, SUSPENSION (ML) NASAL DAILY
Status: DISCONTINUED | OUTPATIENT
Start: 2021-04-23 | End: 2021-04-28

## 2021-04-22 RX ORDER — QUETIAPINE FUMARATE 100 MG/1
400 TABLET, FILM COATED ORAL
Status: DISCONTINUED | OUTPATIENT
Start: 2021-04-22 | End: 2021-04-22

## 2021-04-22 RX ORDER — IBUPROFEN 600 MG/1
600 TABLET ORAL EVERY 6 HOURS
Status: DISCONTINUED | OUTPATIENT
Start: 2021-04-22 | End: 2021-04-22

## 2021-04-22 RX ORDER — PANTOPRAZOLE SODIUM 40 MG/1
40 TABLET, DELAYED RELEASE ORAL
Status: DISCONTINUED | OUTPATIENT
Start: 2021-04-23 | End: 2021-04-22

## 2021-04-22 RX ORDER — QUETIAPINE FUMARATE 100 MG/1
300 TABLET, FILM COATED ORAL
Status: DISCONTINUED | OUTPATIENT
Start: 2021-04-22 | End: 2021-04-22

## 2021-04-22 RX ORDER — LOPERAMIDE HYDROCHLORIDE 2 MG/1
2 CAPSULE ORAL 4 TIMES DAILY PRN
Status: DISCONTINUED | OUTPATIENT
Start: 2021-04-22 | End: 2021-04-22

## 2021-04-22 RX ORDER — NICOTINE 21 MG/24HR
1 PATCH, TRANSDERMAL 24 HOURS TRANSDERMAL DAILY
Status: DISCONTINUED | OUTPATIENT
Start: 2021-04-23 | End: 2021-05-07 | Stop reason: HOSPADM

## 2021-04-22 RX ORDER — TRAZODONE HYDROCHLORIDE 50 MG/1
50 TABLET ORAL
Status: DISCONTINUED | OUTPATIENT
Start: 2021-04-22 | End: 2021-05-07 | Stop reason: HOSPADM

## 2021-04-22 RX ADMIN — LIDOCAINE HYDROCHLORIDE 50 MG: 10 INJECTION, SOLUTION EPIDURAL; INFILTRATION; INTRACAUDAL; PERINEURAL at 14:11

## 2021-04-22 RX ADMIN — PROPOFOL 50 MG: 10 INJECTION, EMULSION INTRAVENOUS at 14:28

## 2021-04-22 RX ADMIN — PHENYLEPHRINE HYDROCHLORIDE 200 MCG: 10 INJECTION INTRAVENOUS at 14:30

## 2021-04-22 RX ADMIN — FOLIC ACID: 5 INJECTION, SOLUTION INTRAMUSCULAR; INTRAVENOUS; SUBCUTANEOUS at 09:10

## 2021-04-22 RX ADMIN — METOCLOPRAMIDE 10 MG: 5 INJECTION, SOLUTION INTRAMUSCULAR; INTRAVENOUS at 07:32

## 2021-04-22 RX ADMIN — PROPOFOL 50 MG: 10 INJECTION, EMULSION INTRAVENOUS at 14:17

## 2021-04-22 RX ADMIN — EPHEDRINE SULFATE 5 MG: 50 INJECTION, SOLUTION INTRAVENOUS at 14:40

## 2021-04-22 RX ADMIN — LABETALOL 20 MG/4 ML (5 MG/ML) INTRAVENOUS SYRINGE 10 MG: at 01:13

## 2021-04-22 RX ADMIN — SODIUM CHLORIDE 8 MG/HR: 9 INJECTION, SOLUTION INTRAVENOUS at 04:42

## 2021-04-22 RX ADMIN — PROPOFOL 50 MG: 10 INJECTION, EMULSION INTRAVENOUS at 14:35

## 2021-04-22 RX ADMIN — PROPOFOL 50 MG: 10 INJECTION, EMULSION INTRAVENOUS at 14:23

## 2021-04-22 RX ADMIN — NICOTINE 7 MG/24 HR DAILY TRANSDERMAL PATCH 7 MG: at 08:55

## 2021-04-22 RX ADMIN — PROPOFOL 50 MG: 10 INJECTION, EMULSION INTRAVENOUS at 14:12

## 2021-04-22 RX ADMIN — PHENYLEPHRINE HYDROCHLORIDE 100 MCG: 10 INJECTION INTRAVENOUS at 14:36

## 2021-04-22 RX ADMIN — PROPOFOL 50 MG: 10 INJECTION, EMULSION INTRAVENOUS at 14:31

## 2021-04-22 RX ADMIN — PROPOFOL 50 MG: 10 INJECTION, EMULSION INTRAVENOUS at 14:14

## 2021-04-22 RX ADMIN — QUETIAPINE FUMARATE 300 MG: 100 TABLET ORAL at 22:47

## 2021-04-22 RX ADMIN — PROPOFOL 100 MG: 10 INJECTION, EMULSION INTRAVENOUS at 14:11

## 2021-04-22 RX ADMIN — PROPOFOL 50 MG: 10 INJECTION, EMULSION INTRAVENOUS at 14:20

## 2021-04-22 RX ADMIN — CALCIUM GLUCONATE 1 G: 20 INJECTION, SOLUTION INTRAVENOUS at 08:54

## 2021-04-22 RX ADMIN — HYDROMORPHONE HYDROCHLORIDE 0.2 MG: 0.2 INJECTION, SOLUTION INTRAMUSCULAR; INTRAVENOUS; SUBCUTANEOUS at 17:11

## 2021-04-22 RX ADMIN — METOROPROLOL TARTRATE 5 MG: 5 INJECTION, SOLUTION INTRAVENOUS at 21:24

## 2021-04-22 RX ADMIN — SODIUM CHLORIDE, SODIUM GLUCONATE, SODIUM ACETATE, POTASSIUM CHLORIDE, MAGNESIUM CHLORIDE, SODIUM PHOSPHATE, DIBASIC, AND POTASSIUM PHOSPHATE 100 ML/HR: .53; .5; .37; .037; .03; .012; .00082 INJECTION, SOLUTION INTRAVENOUS at 03:23

## 2021-04-22 RX ADMIN — EPHEDRINE SULFATE 5 MG: 50 INJECTION, SOLUTION INTRAVENOUS at 14:36

## 2021-04-22 RX ADMIN — FENTANYL CITRATE 25 MCG: 50 INJECTION INTRAMUSCULAR; INTRAVENOUS at 14:11

## 2021-04-22 RX ADMIN — TRAZODONE HYDROCHLORIDE 50 MG: 50 TABLET ORAL at 22:48

## 2021-04-22 RX ADMIN — LABETALOL 20 MG/4 ML (5 MG/ML) INTRAVENOUS SYRINGE 10 MG: at 10:13

## 2021-04-22 RX ADMIN — PHENYLEPHRINE HYDROCHLORIDE 200 MCG: 10 INJECTION INTRAVENOUS at 14:40

## 2021-04-22 RX ADMIN — HYDROMORPHONE HYDROCHLORIDE 0.2 MG: 0.2 INJECTION, SOLUTION INTRAMUSCULAR; INTRAVENOUS; SUBCUTANEOUS at 09:04

## 2021-04-22 RX ADMIN — FLUTICASONE FUROATE AND VILANTEROL TRIFENATATE 1 PUFF: 100; 25 POWDER RESPIRATORY (INHALATION) at 10:14

## 2021-04-22 RX ADMIN — SODIUM CHLORIDE 8 MG/HR: 9 INJECTION, SOLUTION INTRAVENOUS at 15:59

## 2021-04-22 NOTE — PROGRESS NOTES
Consultation -  [facility]  Jose Juan Fairchild 78 y o  male MRN: [id number]  Unit/Bed#: ICU 09 Encounter: [id number]        Consults    Reason for Consult / Principal Problem:     GI Bleed      ASSESSMENT AND PLAN:      79 yo M with past med hx of perforated duodenal ulcer s/p ex lap and repair, alcohol abuse, tobacco abuse, HTN, MDD/schizophrenia who is admitted to [facility] with acute GI bleed 2/2 to large duodenal bulb  Gastroenterology was consulted for the same  GI Bleed  Episodes of coffee ground emesis  Likely due to large duodenal ulcer  EGD 1 year ago showed large ulcer treated with epi, clipping and hemospray  Repeat EGD yesterday once again showed large duodenal ulcer with 2 visible vessels and no active bleeding  Hgb on presentation to [facility] was 16, trended down to 8 3, likely large component of hemoconcentration  Currently stable  BUN 16  Transferred for surgical evaluation  · Repeat EGD may benefit patient to monitor for bleeding in order to spare him from major surgery  Patient is agreeable  Will plan on EGD today  · Continue NPO status  · Continue IVF  · Continue Protonix gtt for at least 72 hours  · Continue to monitor Hgb, transfuse if less than 7, of note patient has Castro antibodies and blood bank is currently cross matching patient's blood  · General surgery following  · Continue to monitor vitals    Duodenal Ulcer  Patient has multiple risk factors for ulcers including alcohol and tobacco abuse  Denies NSAID use  Hx of ex lap with oversewn repair of duodenum in the past    · Continue NPO status  · Plan for repeat EGD today  · Continue Protonix gtt   · Management as described above    Anemia  2/2 to upper GI bleeding from duodenal ulcer  Hgb currently 8 3, stable  · Check iron panel and supplement as necessary  · Continue to monitor Hgb, transfuse if less than 7, of note patient has Castro antibodies and blood bank is currently cross matching patient's blood    · Management as described above    Alcohol abuse  Actively drinking 2-3 beers per day for many years  Risk factor for ulcer  No laboratory stigmata of cirrhosis  · Encourage alcohol cessation  · Recommend thiamine and folate supplementation  · Monitor for withdrawal    Rest of care per primary team    Please wait for attendings attestation for official recommendations  ______________________________________________________________________    HPI:  79 yo M with past med hx of duodenal ulcer, alcohol abuse, tobacco abuse, HTN, MDD/schizophrenia who is admitted to [facility] with acute GI bleed 2/2 to large duodenal bulb  Gastroenterology was consulted for the same  Patient intitially presented to [facility] with suicidal ideation and abdominal pain  Was briefly in the [facility] before having multiple episodes of coffee ground emesis  Abdominal pain was described by patient to be acute on chronic in the epigastric region, non radiating  He denies any bright red blood in vomitus  Last bowel movement was a few days ago was dark according to patient  Hortensia any NSAID use  Smokes 1/2 ppd since age 17  Also drink 2-3 beers daily for "many years " Denies any drug use  He has known duodenal ulcer treated in the past   On presentation he appeard hypertensive but otherwise hemodynamically stable  His initial hgb was 16 and trended down to 8 3  He has received no transfusions so far  He was evaluated by GI who performed a repeat EGD which showed large duodenal ulcer not actively bleeding with 2 visible vessels  He was recommended for transfer under the surgery service for possible surgical intervention  Patient was seen and examined today  He still complained of epigastric pain that has remained the same  He denied any coffee ground emesis overnight  He also denies any melena or hematochezia  Rest of ROS was negative  REVIEW OF SYSTEMS:    CONSTITUTIONAL: Denies any fever, chills, rigors, and weight loss  HEENT: No earache or tinnitus   Denies hearing loss or visual disturbances  CARDIOVASCULAR: No chest pain or palpitations  RESPIRATORY: Denies any cough, hemoptysis, shortness of breath or dyspnea on exertion  GASTROINTESTINAL: As noted in the History of Present Illness  GENITOURINARY: No problems with urination  Denies any hematuria or dysuria  NEUROLOGIC: No dizziness or vertigo, denies headaches  MUSCULOSKELETAL: Denies any muscle or joint pain  SKIN: Denies skin rashes or itching  ENDOCRINE: Denies excessive thirst  Denies intolerance to heat or cold  PSYCHOSOCIAL: Denies depression or anxiety  Denies any recent memory loss         Historical Information   Past Medical History:   Diagnosis Date    Alcohol abuse     BPH (benign prostatic hyperplasia)     CVA (cerebral vascular accident) ([facility] [address]     DJD (degenerative joint disease)     Encounter to establish care with new doctor 8/21/2019    Gait abnormality     Head injury     History of cerebrovascular accident (CVA) with residual deficit 10/1/2019    History of CVA (cerebrovascular accident) 1/8/2020    History of substance abuse ([facility] [address] [facility] 8/21/2019    Polysubstance     History of sustained ventricular tachycardia 8/21/2019    History of transient ischemic attack (TIA) 8/28/2019    Hypertension     Metatarsal fracture     TIA (transient ischemic attack)     Tobacco abuse      Past Surgical History:   Procedure Laterality Date    ABDOMINAL SURGERY      ANKLE SURGERY      BACK SURGERY      CT GUIDED [facility] DRAINAGE CATHETER PLACEMENT  1/27/2020    ELBOW SURGERY      IR VISCERAL ANGIOGRAPHY / INTERVENTION  1/11/2020    JOINT REPLACEMENT      KNEE SURGERY      LAPAROTOMY N/A 1/11/2020    Procedure: LAPAROTOMY EXPLORATORY,  OVERSEW  OF GASTRO DUODENAL ARTERY, THAL PATCH OF DUODENUM, VICKY GASTRO JEJUNOSTOMY TUBE;  Surgeon: Jesus Junior DO;  Location: [facility];  Service: General    LIVER SURGERY       Social History   Social History     Substance and Sexual Activity   Alcohol Use Yes    Alcohol/week: 4 0 standard drinks    Types: 4 Cans of beer per week    Frequency: Monthly or less    Drinks per session: 1 or 2    Binge frequency: Never    Comment: a couple beers     Social History     Substance and Sexual Activity   Drug Use Yes    Types: Marijuana    Comment: history polysubstance use including IVDA on record- every now then will smoke weed      Social History     Tobacco Use   Smoking Status Current Every Day Smoker    Packs/day: 1 00    Types: Cigarettes   Smokeless Tobacco Never Used   Tobacco Comment    started age 14, 4 quit attempts     Family History   Problem Relation Age of Onset    No Known Problems Mother     No Known Problems Father        Meds/Allergies     Medications Prior to Admission   Medication    albuterol (ProAir HFA) 90 mcg/act inhaler    atorvastatin (LIPITOR) 40 mg tablet    Cholecalciferol 25 MCG (1000 UT) tablet    diclofenac (VOLTAREN) 75 mg EC tablet    fluticasone (FLONASE) 50 mcg/act nasal spray    fluticasone-vilanterol (BREO ELLIPTA) 100-25 mcg/inh inhaler    folic acid (FOLVITE) 1 mg tablet    gabapentin (NEURONTIN) 600 MG tablet    guaiFENesin (MUCINEX) 600 mg 12 hr tablet    hydrOXYzine pamoate (VISTARIL) 50 mg capsule    metoprolol tartrate (LOPRESSOR) 50 mg tablet    mirtazapine (REMERON) 45 MG tablet    pantoprazole (PROTONIX) 40 mg tablet    QUEtiapine (SEROquel) 200 mg tablet    QUEtiapine (SEROquel) 300 mg tablet    traZODone (DESYREL) 50 mg tablet     Current Facility-Administered Medications   Medication Dose Route Frequency    albuterol (PROVENTIL HFA,VENTOLIN HFA) inhaler 2 puff  2 puff Inhalation Q6H PRN    fluticasone-vilanterol (BREO ELLIPTA) 100-25 mcg/inh inhaler 1 puff  1 puff Inhalation Daily    hydrALAZINE (APRESOLINE) injection 5 mg  5 mg Intravenous Q6H PRN    HYDROmorphone HCl (DILAUDID) injection 0 2 mg  0 2 mg Intravenous Q4H PRN    Labetalol HCl (NORMODYNE) injection 10 mg  10 mg Intravenous Q6H PRN    metoclopramide (REGLAN) injection 10 mg  10 mg Intravenous Once    multi-electrolyte (PLASMALYTE-A/ISOLYTE-S PH 7 4) IV solution  100 mL/hr Intravenous Continuous    nicotine (NICODERM CQ) 7 mg/24hr TD 24 hr patch 7 mg  7 mg Transdermal Daily    OLANZapine (ZyPREXA ZYDIS) dispersible tablet 10 mg  10 mg Sublingual HS    pantoprazole (PROTONIX) 80 mg in sodium chloride 0 9 % 100 mL infusion  8 mg/hr Intravenous Continuous       No Known Allergies        Objective     Blood pressure (!) 173/72, pulse 82, temperature 97 9 °F (36 6 °C), temperature source Oral, resp  rate 12, height 5' 4" (1 626 m), weight 60 9 kg (134 lb 4 2 oz), SpO2 96 %  Body mass index is 23 05 kg/m²  Intake/Output Summary (Last 24 hours) at 4/22/2021 0725  Last data filed at 4/22/2021 4093  Gross per 24 hour   Intake 1308 33 ml   Output 2245 ml   Net -936 67 ml         PHYSICAL EXAM:      General Appearance:   Alert, cooperative, no distress, dishevel   HEENT:   Normocephalic, atraumatic, anicteric      Neck:  Supple, symmetrical, trachea midline   Lungs:   Clear to auscultation bilaterally; no rales, rhonchi or wheezing; respirations unlabored    Heart[de-identified]   Regular rate and rhythm; no murmur, rub, or gallop     Abdomen:   Soft, tender across epigastric region, non-distended; normal bowel sounds; no masses, no organomegaly    Genitalia:   Deferred    Rectal:   Deferred    Extremities:  No cyanosis, clubbing or edema    Pulses:  2+ and symmetric all extremities    Skin:  No jaundice, rashes, or lesions    Lymph nodes:  No palpable cervical lymphadenopathy        Lab Results:   Admission on 04/21/2021   Component Date Value    WBC 04/21/2021 7 81     RBC 04/21/2021 2 62*    Hemoglobin 04/21/2021 8 6*    Hematocrit 04/21/2021 25 8*    MCV 04/21/2021 99*    MCH 04/21/2021 32 8     MCHC 04/21/2021 33 3     RDW 04/21/2021 14 3     MPV 04/21/2021 8 8*    Platelets 52/23/6402 298     nRBC 04/21/2021 0     Neutrophils Relative 04/21/2021 71     Immat GRANS % 04/21/2021 1     Lymphocytes Relative 04/21/2021 19     Monocytes Relative 04/21/2021 7     Eosinophils Relative 04/21/2021 1     Basophils Relative 04/21/2021 1     Neutrophils Absolute 04/21/2021 5 60     Immature Grans Absolute 04/21/2021 0 05     Lymphocytes Absolute 04/21/2021 1 48     Monocytes Absolute 04/21/2021 0 56     Eosinophils Absolute 04/21/2021 0 07     Basophils Absolute 04/21/2021 0 05     Protime 04/21/2021 14 1     INR 04/21/2021 1 08     PTT 04/21/2021 31     Sodium 04/21/2021 134*    Potassium 04/21/2021 4 2     Chloride 04/21/2021 103     CO2 04/21/2021 24     ANION GAP 04/21/2021 7     BUN 04/21/2021 22     Creatinine 04/21/2021 0 73     Glucose 04/21/2021 105     Calcium 04/21/2021 7 9*    Corrected Calcium 04/21/2021 9 1     AST 04/21/2021 18     ALT 04/21/2021 10*    Alkaline Phosphatase 04/21/2021 37*    Total Protein 04/21/2021 5 9*    Albumin 04/21/2021 2 5*    Total Bilirubin 04/21/2021 0 34     eGFR 04/21/2021 95     LACTIC ACID 04/21/2021 1 0     pH, Arterial 04/21/2021 7 446     pCO2, Arterial 04/21/2021 35 8*    pO2, Arterial 04/21/2021 84 6     HCO3, Arterial 04/21/2021 24 1     Base Excess, Arterial 04/21/2021 0 2     O2 Content, Arterial 04/21/2021 12 4*    O2 HGB,Arterial  04/21/2021 94 9     SOURCE 04/21/2021 Artery     Ventricular Rate 04/21/2021 81     Atrial Rate 04/21/2021 81     NE Interval 04/21/2021 171     QRSD Interval 04/21/2021 88     QT Interval 04/21/2021 383     QTC Interval 04/21/2021 445     P Axis 04/21/2021 80     QRS Axis 04/21/2021 51     T Wave Axis 04/21/2021 50     ABO Grouping 04/21/2021 A     Rh Factor 04/21/2021 Negative     Antibody Screen 04/21/2021 Positive     Specimen Expiration Date 04/21/2021 [id number]     ANTIBODY ID   #1 04/21/2021 Anti-Fya     Hemoglobin 04/21/2021 8 3*    Hematocrit 04/21/2021 25 0*    Unit Product Code 04/22/2021 H9800G39     Unit Number 04/22/2021 [id number]     Unit ABO 04/22/2021 A     Unit [facility] 04/22/2021 NEG     Crossmatch 04/22/2021 Compatible     Unit Dispense Status 04/22/2021 Crossmatched     Unit Product Code 04/22/2021 P7129T97     Unit Number 04/22/2021 [id number]     Unit ABO 04/22/2021 A     Unit [facility] 04/22/2021 NEG     Crossmatch 04/22/2021 Compatible     Unit Dispense Status 04/22/2021 Crossmatched     Calcium, Ionized 04/22/2021 1 05*    WBC 04/22/2021 8 65     RBC 04/22/2021 2 52*    Hemoglobin 04/22/2021 8 3*    Hematocrit 04/22/2021 24 9*    MCV 04/22/2021 99*    MCH 04/22/2021 32 9     MCHC 04/22/2021 33 3     RDW 04/22/2021 14 2     MPV 04/22/2021 8 9     Platelets 92/77/1534 300     nRBC 04/22/2021 0     Neutrophils Relative 04/22/2021 77*    Immat GRANS % 04/22/2021 1     Lymphocytes Relative 04/22/2021 14     Monocytes Relative 04/22/2021 6     Eosinophils Relative 04/22/2021 1     Basophils Relative 04/22/2021 1     Neutrophils Absolute 04/22/2021 6 80     Immature Grans Absolute 04/22/2021 0 05     Lymphocytes Absolute 04/22/2021 1 17     Monocytes Absolute 04/22/2021 0 51     Eosinophils Absolute 04/22/2021 0 08     Basophils Absolute 04/22/2021 0 04     Sodium 04/22/2021 134*    Potassium 04/22/2021 3 9     Chloride 04/22/2021 101     CO2 04/22/2021 24     ANION GAP 04/22/2021 9     BUN 04/22/2021 14     Creatinine 04/22/2021 0 64     Glucose 04/22/2021 89     Calcium 04/22/2021 8 0*    Corrected Calcium 04/22/2021 9 2     AST 04/22/2021 17     ALT 04/22/2021 10*    Alkaline Phosphatase 04/22/2021 39*    Total Protein 04/22/2021 5 7*    Albumin 04/22/2021 2 5*    Total Bilirubin 04/22/2021 0 37     eGFR 04/22/2021 101     Magnesium 04/22/2021 2 3     Phosphorus 04/22/2021 2 0*    Protime 04/22/2021 13 9     INR 04/22/2021 1 07        Imaging Studies: I have personally reviewed pertinent imaging studies  [address]    Internal Medicine PGY-2  4/22/2021 7:25 AM

## 2021-04-22 NOTE — CASE MANAGEMENT
Cm placed a call  to [facility] at 12:48 to # [phone] until 13:40 to work on obtaining an authorization for the life flight transport yesterday to [facility] ref# [id number] to fax# [phone] clinical was faxed as requested,

## 2021-04-22 NOTE — ASSESSMENT & PLAN NOTE
· Patient on folate and thiamine at this time  · We have been monitoring the patient for any signs of alcohol withdrawal here in the ICU; no signs of alcohol withdrawal at this time  · May consider CIWA protocol per primary team

## 2021-04-22 NOTE — UTILIZATION REVIEW
Initial Clinical Review    Admission: Date/Time/Statement:   Admission Orders (From admission, onward)     Ordered        04/21/21 1812  Inpatient Admission  Once                   Orders Placed This Encounter   Procedures    Inpatient Admission     Standing Status:   Standing     Number of Occurrences:   1     Order Specific Question:   Level of Care     Answer:   Critical Care [15]     Order Specific Question:   Estimated length of stay     Answer:   More than 2 Midnights     Order Specific Question:   Certification     Answer:   I certify that inpatient services are medically necessary for this patient for a duration of greater than two midnights  See H&P and MD Progress Notes for additional information about the patient's course of treatment  Initial Presentation: transferred from [place] [id number]   34AP male with a PMHx of alcohol abuse, HTN, COPD, schizophrenia, and MDD was transferred from [facility] for upper GI bleed from a duodenal ulcer  The pt originally presented to the [facility] on 4/19 for suicidal ideation with a plan 2/2 to having abdominal pain  After a negative CT, he was admitted to the behavioral unit then he started to experience coffee-ground emesis and was transferred to critical care for GI bleed  Pt's Hbg dropped from 16 to 10 and was sent for EGD on 4/21 which showed 5cm duodenal ulcer with 2 visible vessels with no active bleeding  Biopsies were obtained and GI recommended surgery for definitive treatment so pt was transferred to [facility]  Admitted to inpatient status for GI bleed, surgery consulted  Per surg:  History of perforated duodenal ulcer s/p ex lap and oversew of GDA/ thal patch of duodenum 1/2020, HTN, COPD, schizophrenia, and EtOH abuse who presents with concern for UGI bleed 4/21  Plan:  · NPO  · IVF  · PPI gtt  · Serial Hgb  GI consult    Day 2  Date: 4/22/21    Per GI:  GI Bleed: Episodes of coffee ground emesis  Likely due to large duodenal ulcer   EGD 1 year ago showed large ulcer treated with epi, clipping and hemospray  Repeat EGD yesterday once again showed large duodenal ulcer with 2 visible vessels and no active bleeding  · Repeat EGD may benefit patient to monitor for bleeding in order to spare him from major surgery  Patient is agreeable  Will plan on EGD today  · Continue IVF & Protonix gtt   · Continue to monitor Hgb, transfuse if less than 7, of note patient has Castro antibodies and blood bank is currently cross matching patient's blood  ED Triage Vitals [04/21/21 1740]   Temperature Pulse Respirations Blood Pressure SpO2   99 7 °F (37 6 °C) 84 22 (!) 189/95 96 %      Temp Source Heart Rate Source Patient Position - Orthostatic VS BP Location FiO2 (%)   Oral Monitor Lying Left arm --      Pain Score       8          Wt Readings from Last 1 Encounters:   04/22/21 60 9 kg (134 lb 4 2 oz)     Additional Vital Signs:   04/22/21 0210  --  82  12  176/79Abnormal   124  97 %  --  --   04/22/21 0110  --  94  12  198/85Abnormal   123  98 %  --  --   04/22/21 0010  --  78  12  178/90Abnormal   124  98 %  None (Room air)  --   04/22/21 0000  --  84  --  --  --  --  --  --   04/21/21 2248  --  --  --  189/85Abnormal   --  --  --  --   04/21/21 2246  --  76  20  183/87Abnormal   129  98 %  --  --   04/21/21 2140  97 8 °F (36 6 °C)  80  20  173/87Abnormal   131  99 %  --  --     Pertinent Labs/Diagnostic Test Results:   Results from last 7 days   Lab Units 04/19/21  1119   SARS-COV-2  Negative     Results from last 7 days   Lab Units 04/22/21  0906 04/22/21  0448 04/21/21  2354 04/21/21  1819 04/21/21  1621  04/20/21  2229   WBC Thousand/uL  --  8 65  --  7 81  --   --  10 10   HEMOGLOBIN g/dL 8 5* 8 3* 8 3* 8 6* 8 6*   < > 10 4*   HEMATOCRIT % 25 2* 24 9* 25 0* 25 8* 24 4*   < > 30 4*   PLATELETS Thousands/uL  --  300  --  298  --   --  343   NEUTROS ABS Thousands/µL  --  6 80  --  5 60  --   --  7 60*    < > = values in this interval not displayed       Results from last 7 days   Lab Units 04/22/21 0448 04/21/21 1819 04/21/21 0417 04/20/21 2229 04/19/21  1119   SODIUM mmol/L 134* 134* 134 134 134   POTASSIUM mmol/L 3 9 4 2 4 1 4 2 3 8   CHLORIDE mmol/L 101 103 99 93* 92*   CO2 mmol/L 24 24 28 29 31   ANION GAP mmol/L 9 7 7 12 11   BUN mg/dL 14 22 37* 42* 15   CREATININE mg/dL 0 64 0 73 0 94 0 99 0 97   EGFR ml/min/1 73sq m 101 95 83 78 80   CALCIUM mg/dL 8 0* 7 9* 7 6* 8 6 10 5   CALCIUM, IONIZED mmol/L 1 05*  --   --   --   --    MAGNESIUM mg/dL 2 3  --   --   --  1 9   PHOSPHORUS mg/dL 2 0*  --   --   --   --      Results from last 7 days   Lab Units 04/22/21 0448 04/21/21 1819 04/20/21 2229 04/19/21  1119   AST U/L 17 18 14 19   ALT U/L 10* 10* 5* 7   ALK PHOS U/L 39* 37* 31* 53*   TOTAL PROTEIN g/dL 5 7* 5 9* 5 9* 8 3   ALBUMIN g/dL 2 5* 2 5* 3 2* 4 2   TOTAL BILIRUBIN mg/dL 0 37 0 34 0 30 0 50     Results from last 7 days   Lab Units 04/19/21  1103   POC GLUCOSE mg/dl 102     Results from last 7 days   Lab Units 04/22/21 0448 04/21/21 1819 04/21/21 0417 04/20/21 2229 04/19/21  1119   GLUCOSE RANDOM mg/dL 89 105 112* 101* 108*     Results from last 7 days   Lab Units 04/21/21  1826   PH ART  7 446   PCO2 ART mm Hg 35 8*   PO2 ART mm Hg 84 6   HCO3 ART mmol/L 24 1   BASE EXC ART mmol/L 0 2   O2 CONTENT ART mL/dL 12 4*   O2 HGB, ARTERIAL % 94 9   ABG SOURCE  Artery     Results from last 7 days   Lab Units 04/19/21  1119   CK TOTAL U/L 34     Results from last 7 days   Lab Units 04/20/21 2229 04/19/21  1119   TROPONIN I ng/mL 0 03 <0 03     Results from last 7 days   Lab Units 04/22/21  0448 04/21/21  1819 04/20/21  2229   PROTIME seconds 13 9 14 1 13 8   INR  1 07 1 08 1 07   PTT seconds  --  31  --      Results from last 7 days   Lab Units 04/21/21  1819 04/20/21  2229 04/19/21  1119   LACTIC ACID mmol/L 1 0 0 9 1 0     Results from last 7 days   Lab Units 04/22/21  0448   FERRITIN ng/mL 55     Results from last 7 days   Lab Units 04/19/21  1119   LIPASE u/L 19 Results from last 7 days   Lab Units 04/19/21  1119   CLARITY UA  Clear   COLOR UA  Yellow   SPEC GRAV UA  1 015   PH UA  6 5   GLUCOSE UA mg/dl Negative   KETONES UA mg/dl Trace*   BLOOD UA  Negative   PROTEIN UA mg/dl Negative   NITRITE UA  Negative   BILIRUBIN UA  Negative   UROBILINOGEN UA E U /dl 0 2   LEUKOCYTES UA  Negative     Results from last 7 days   Lab Units 04/19/21  1119   INFLUENZA A PCR  Negative   INFLUENZA B PCR  Negative   RSV PCR  Negative     Results from last 7 days   Lab Units 04/19/21  1119   AMPH/METH  Negative   BARBITURATE UR  Negative   BENZODIAZEPINE UR  Negative   COCAINE UR  Negative   METHADONE URINE  Negative   OPIATE UR  Negative   PCP UR  Negative   THC UR  Positive*     Past Medical History:   Diagnosis Date    Alcohol abuse     BPH (benign prostatic hyperplasia)     CVA (cerebral vascular accident) ([facility] )     DJD (degenerative joint disease)     Encounter to establish care with new doctor 8/21/2019    Gait abnormality     Head injury     History of cerebrovascular accident (CVA) with residual deficit 10/1/2019    History of CVA (cerebrovascular accident) 1/8/2020    History of substance abuse ([facility] ) 8/21/2019    Polysubstance     History of sustained ventricular tachycardia 8/21/2019    History of transient ischemic attack (TIA) 8/28/2019    Hypertension     Metatarsal fracture     TIA (transient ischemic attack)     Tobacco abuse      Present on Admission:   Alcohol dependence with unspecified alcohol-induced disorder ([facility] )   Major depressive disorder, recurrent, severe with psychotic features ([facility] )   Essential hypertension   Ambulatory dysfunction   Overweight (BMI 25 0-29  9)   Dyslipidemia   COPD without exacerbation (HCC)   Chronic pain   Tobacco abuse   Schizophrenia ([facility] )   Acute blood loss anemia   GI bleed    Admitting Diagnosis: GI bleed [K92 2]  Age/Sex: 78 y o  male  Admission Orders:  Neuro checks q4h  Cont pulse ox  Scd/footp umps  CIWA protocol  Consult GI  Nursing dysphagia assessment    Scheduled Medications:  fluticasone-vilanterol, 1 puff, Inhalation, Daily  folic acid 1 mg, thiamine 100 mg in 0 9% sodium chloride 100 mL IVPB, , Intravenous, Daily  nicotine, 7 mg, Transdermal, Daily  OLANZapine, 10 mg, Sublingual, HS    Continuous IV Infusions:  multi-electrolyte, 100 mL/hr, Intravenous, Continuous  pantoprozole (PROTONIX) infusion (Continuous), 8 mg/hr, Intravenous, Continuous    PRN Meds:  albuterol, 2 puff, Inhalation, Q6H PRN  hydrALAZINE, 5 mg, Intravenous, Q6H PRN  HYDROmorphone, 0 2 mg, Intravenous, Q4H PRN  Labetalol HCl, 10 mg, Intravenous, Q6H PRN    Network Utilization Review Department  ATTENTION: Please call with any questions or concerns to [phone] and carefully listen to the prompts so that you are directed to the right person  All voicemails are confidential   Yvonne Carrasco all requests for admission clinical reviews, approved or denied determinations and any other requests to dedicated fax number below belonging to the campus where the patient is receiving treatment   List of dedicated fax numbers for the Facilities:  [address] DENIALS (Administrative/Medical Necessity) [phone]   [address] (Maternity/NICU/Pediatrics) [phone]   [address] [place] [address] [address] [place] [address] [id number] [place] [address] [address] [facility] [place] [address] [phone]

## 2021-04-22 NOTE — PROGRESS NOTES
Daily Progress Note - Critical Care   Jose Juan Fairchild 78 y o  male MRN: [id number]  Unit/Bed#: ICU 09 Encounter: [id number]        ----------------------------------------------------------------------------------------  HPI/24hr events: 67yo male with a PMHx of alcohol abuse, HTN, COPD, schizophrenia, and MDD was transferred from [facility] for upper GI bleed from a duodenal ulcer  The pt originally presented to the [facility] on 4/19 for suicidal ideation with a plan 2/2 to having abdominal pain  After a negative CT, he was admitted to the behavioral unit then he started to experience coffee-ground emesis and was transferred to critical care for GI bleed  Pt's Hbg dropped from 16 to 10 and was sent for EGD on 4/21 which showed 5cm duodenal ulcer with 2 visible vessels with no active bleeding  Biopsies were obtained and GI recommended surgery for definitive treatment so pt was transferred to [facility]          No acute events overnight    ---------------------------------------------------------------------------------------  SUBJECTIVE      Review of Systems   Constitutional: Negative for chills and fever  HENT: Negative for congestion, ear pain, rhinorrhea and sore throat  Eyes: Negative for pain  Respiratory: Negative for apnea, cough, choking, chest tightness, shortness of breath, wheezing and stridor  Cardiovascular: Negative for chest pain, palpitations and leg swelling  Gastrointestinal: Positive for abdominal pain and nausea  Negative for constipation, diarrhea and vomiting  Genitourinary: Negative for hematuria  Musculoskeletal: Negative for arthralgias and back pain  Skin: Negative for rash and wound  Neurological: Negative for dizziness  Psychiatric/Behavioral: Negative for agitation and hallucinations  All other systems reviewed and are negative      Review of systems was reviewed and negative unless stated above in HPI/24-hour events ---------------------------------------------------------------------------------------  Assessment and Plan:    Neuro:   · Diagnosis: Schizophrenia and depression  ? On seroquel 200mg daily and 300mg qhs at home (held now NPO)  ? Trazodone 50mg for sleep   ? 10mg zyprexa ODT at night time   · Diagnosis: Suicidal Ideation (not active)  ? Pt was admitted to behavioral health unit at previous hospital, however seems like it was a reactionary statement from being in abdominal pain  ? Currently denies SI     · Pain control  ? 0 2 dilaudid q4h  · Alcohol abuse   ? CIWA protocol   ? Pt states he drinks 4 beers a day, hasnt had a drink in 4 days, has been in withdrawal before years ago       CV:   · Diagnosis: HTN  ? On metoprolol at home 50mg  ? PRN labetalol 10mg q6h for SBP >180   ? PRN hydralazine 5mg q6h  · Dx: hyperlipemia   ? On home statin held for now     Pulm:  · Diagnosis: COPD  ? On albuterol q6h PRN   ? Breo once daily      GI:   · Dx: Duodenal ulcer, not actively bleeding  ? Plan for evaluation by GI in am   ? EGD on 4/21 shows 5cm ulcer with 2 visualized vessels, recommendation for surgical management   ? Hbg at midnight then another on morning labs  · Protonix drip         :   · Diagnosis: no acute issues   ? Cr 0 73     F/E/N:   · No acute issues   · Replete as necessary   · NPO  · Maintenance         Heme/Onc:   · Diagnosis: blood loss anemia   ? Continue to monitor   ? Hbg 8 6 before transfer -> 8 6 on arrival 8 3 this am   ? Type and screen ordered, Pt has Dully antibodies, 1 unit prepared since it will take longer to cross match this pt in an emergency due to his antibodies  ?  Lactate 1 0        Endo:   · Diagnosis: no acute issues      ID:   · No acute issues   · Monitor temp and WBCs     MSK/Skin:   · No acute issues     Disposition: Continue Critical Care   Code Status: Level 1 - Full Code  ---------------------------------------------------------------------------------------  ICU CORE MEASURES    Prophylaxis   VTE Pharmacologic Prophylaxis: Pharmacologic VTE Prophylaxis contraindicated due to duodenal ulcer  VTE Mechanical Prophylaxis: sequential compression device  Stress Ulcer Prophylaxis: Pantoprazole IV     ABCDE Protocol (if indicated)  Plan to perform spontaneous awakening trial today? Not applicable  Plan to perform spontaneous breathing trial today? Not applicable  Obvious barriers to extubation? Not applicable   CAM-ICU: Negative    Invasive Devices Review  Invasive Devices     Central Venous Catheter Line            CVC Central Lines 21 1 day              Can any invasive devices be discontinued today? Not applicable  ---------------------------------------------------------------------------------------  OBJECTIVE    Vitals   Vitals:    21 0010 21 0110 21 0210 21 0240   BP: (!) 178/90 (!) 198/85 (!) 176/79 162/73   Pulse: 78 94 82 82   Resp: 12 12 12 18   Temp:       TempSrc:       SpO2: 98% 98% 97% 96%   Weight:       Height:         Temp (24hrs), Av 8 °F (37 1 °C), Min:97 8 °F (36 6 °C), Max:99 7 °F (37 6 °C)  Current: Temperature: 97 8 °F (36 6 °C)    Respiratory:  SpO2: SpO2: 96 %       Invasive/non-invasive ventilation settings   Respiratory    Lab Data (Last 4 hours)    None         O2/Vent Data (Last 4 hours)    None                Physical Exam  Vitals signs reviewed  Constitutional:       General: He is not in acute distress  Appearance: He is well-developed  HENT:      Head: Normocephalic and atraumatic  Right Ear: External ear normal       Left Ear: External ear normal       Nose: Nose normal       Mouth/Throat:      Mouth: Mucous membranes are moist    Eyes:      Extraocular Movements: Extraocular movements intact  Conjunctiva/sclera: Conjunctivae normal       Pupils: Pupils are equal, round, and reactive to light  Neck:      Musculoskeletal: Normal range of motion and neck supple  No neck rigidity     Cardiovascular: Rate and Rhythm: Normal rate and regular rhythm  Heart sounds: Normal heart sounds  Pulmonary:      Effort: Pulmonary effort is normal  No respiratory distress  Breath sounds: Normal breath sounds  No wheezing or rales  Abdominal:      Palpations: Abdomen is soft  Tenderness: There is no abdominal tenderness  Comments: Surgical scars   Musculoskeletal: Normal range of motion  Skin:     General: Skin is warm  Neurological:      General: No focal deficit present  Mental Status: He is alert and oriented to person, place, and time     Psychiatric:         Mood and Affect: Mood normal          Laboratory and Diagnostics:  Results from last 7 days   Lab Units 04/21/21  2354 04/21/21 1819 04/21/21  1621 04/21/21  0416 04/20/21 2229 04/20/21  1841 04/19/21  1119   WBC Thousand/uL  --  7 81  --   --  10 10 13 10* 7 50   HEMOGLOBIN g/dL 8 3* 8 6* 8 6* 10 0* 10 4* 11 8* 16 0   HEMATOCRIT % 25 0* 25 8* 24 4* 29 3* 30 4* 34 7* 45 8   PLATELETS Thousands/uL  --  298  --   --  343 366 371   NEUTROS PCT %  --  71  --   --  76* 87* 75   MONOS PCT %  --  7  --   --  7 5 6     Results from last 7 days   Lab Units 04/21/21 1819 04/21/21 0417 04/20/21 2229 04/19/21  1119   SODIUM mmol/L 134* 134 134 134   POTASSIUM mmol/L 4 2 4 1 4 2 3 8   CHLORIDE mmol/L 103 99 93* 92*   CO2 mmol/L 24 28 29 31   ANION GAP mmol/L 7 7 12 11   BUN mg/dL 22 37* 42* 15   CREATININE mg/dL 0 73 0 94 0 99 0 97   CALCIUM mg/dL 7 9* 7 6* 8 6 10 5   GLUCOSE RANDOM mg/dL 105 112* 101* 108*   ALT U/L 10*  --  5* 7   AST U/L 18  --  14 19   ALK PHOS U/L 37*  --  31* 53*   ALBUMIN g/dL 2 5*  --  3 2* 4 2   TOTAL BILIRUBIN mg/dL 0 34  --  0 30 0 50     Results from last 7 days   Lab Units 04/19/21  1119   MAGNESIUM mg/dL 1 9      Results from last 7 days   Lab Units 04/21/21 1819 04/20/21 2229 04/20/21  1841   INR  1 08 1 07 1 03   PTT seconds 31  --   --       Results from last 7 days   Lab Units 04/20/21  2226 04/19/21  1110 TROPONIN I ng/mL 0 03 <0 03     Results from last 7 days   Lab Units 04/21/21  1819 04/20/21  2229 04/19/21  1119   LACTIC ACID mmol/L 1 0 0 9 1 0     ABG:  Results from last 7 days   Lab Units 04/21/21  1826   PH ART  7 446   PCO2 ART mm Hg 35 8*   PO2 ART mm Hg 84 6   HCO3 ART mmol/L 24 1   BASE EXC ART mmol/L 0 2   ABG SOURCE  Artery     VBG:  Results from last 7 days   Lab Units 04/21/21  1826   ABG SOURCE  Artery           Micro        EKG:   Imaging:  I have personally reviewed pertinent reports  Intake and Output  I/O       04/20 0701 - 04/21 0700 04/21 0701 - 04/22 0700    I V  (mL/kg)  874 5 (13 2)    Total Intake(mL/kg)  874 5 (13 2)    Urine (mL/kg/hr)  1645    Total Output  1645    Net  -770 5                Height and Weights   Height: 5' 4" (162 6 cm)  IBW (Ideal Body Weight): 59 2 kg  Body mass index is 25 01 kg/m²  Weight (last 2 days)     Date/Time   Weight    04/21/21 1740   66 1 (145 72)                Nutrition       Diet Orders   (From admission, onward)             Start     Ordered    04/21/21 1812  Diet NPO  Diet effective now     Question Answer Comment   Diet Type NPO    RD to adjust diet per protocol?  Yes        04/21/21 1812                    Active Medications  Scheduled Meds:  Current Facility-Administered Medications   Medication Dose Route Frequency Provider Last Rate    albuterol  2 puff Inhalation Q6H PRN Anyi Melara CRNP      fluticasone-vilanterol  1 puff Inhalation Daily SHAHRZAD KamaraNP      hydrALAZINE  5 mg Intravenous Q6H PRN Anyi Melara, CRNP      HYDROmorphone  0 2 mg Intravenous Q4H PRN Katlin Tabor, DO      Labetalol HCl  10 mg Intravenous Q6H PRN Anyi Melara, CRNP      multi-electrolyte  100 mL/hr Intravenous Continuous Anyi Bejaime, CRNP 100 mL/hr (04/22/21 0323)    nicotine  7 mg Transdermal Daily Anyi Bejaime, CRNP      OLANZapine  10 mg Sublingual HS Anyi Melara CRNP      pantoprozole (PROTONIX) infusion (Continuous)  8 mg/hr Intravenous Continuous Dre Romero PA-C 8 mg/hr (04/21/21 1823)     Continuous Infusions:  multi-electrolyte, 100 mL/hr, Last Rate: 100 mL/hr (04/22/21 0323)  pantoprozole (PROTONIX) infusion (Continuous), 8 mg/hr, Last Rate: 8 mg/hr (04/21/21 1823)      PRN Meds:   albuterol, 2 puff, Q6H PRN  hydrALAZINE, 5 mg, Q6H PRN  HYDROmorphone, 0 2 mg, Q4H PRN  Labetalol HCl, 10 mg, Q6H PRN        Allergies   No Known Allergies  ---------------------------------------------------------------------------------------  Advance Directive and Living Will:      Power of :    POLST:    ---------------------------------------------------------------------------------------  Care Time Delivered:   Upon my evaluation, this patient had a high probability of imminent or life-threatening deterioration due to bleed, which required my direct attention, intervention, and personal management  I have personally provided 35 minutes (  to  ) of critical care time, exclusive of procedures, teaching, family meetings, and any prior time recorded by providers other than myself  Nella Cullen,       Portions of the record may have been created with voice recognition software  Occasional wrong word or "sound a like" substitutions may have occurred due to the inherent limitations of voice recognition software    Read the chart carefully and recognize, using context, where substitutions have occurred

## 2021-04-22 NOTE — ASSESSMENT & PLAN NOTE
· Secondary to gastrointestinal bleeding, patient had a drop of hemoglobin from 16-10  · Hemoglobin prior to EGD today was 8 5    · Postprocedural hemoglobin stable at 8 1   · Continue monitoring hemoglobin q 6 hours

## 2021-04-22 NOTE — ASSESSMENT & PLAN NOTE
· Patient on Seroquel 200 mg daily and 300 mg q h s   At home - held here in the setting of NPO status  · Patient typically on trazodone 50 mg per sleep  · 10 mg Zyprexa at nighttime

## 2021-04-22 NOTE — CONSULTS
Consultation - General Surgery  Dav Telles 78 y o  male MRN: [id number]  Unit/Bed#: ICU 09 Encounter: [id number]        Assessment/Plan     Assessment:  78 M w/ history of perforated duodenal ulcer s/p ex lap and oversew of GDA/ thal patch of duodenum 1/2020, HTN, COPD, schizophrenia, and EtOH abuse who presents with concern for UGI bleed 4/21    Plan:   NPO   IVF   PPI gtt   Serial Hgb   GI consult   General Surgery will follow    History of Present Illness     HPI:  Dav Telles is a 78 y o  male with history of perforated duodenal ulcer s/p ex lap and oversew of GDA/ thal patch of duodenum 1/2020  Patient has a past medical history alcohol abuse and schizophrenia  He developed coffee-ground emesis and hypertension while admitted at [facility]  Evaluation done by GI showed a duodenal ulcer with visible vessels which prompted transfer to [facility] for further care  GI EGD impression Huge duodenal ulcer with 2 visible vessels  No active bleeding  This ulcer was oversewn 1 year ago  Risks of endoscopic treatment outweighed potential benefits        Review of Systems   Constitutional: Negative for chills and fever  HENT: Negative for ear pain and sore throat  Eyes: Negative for pain and visual disturbance  Respiratory: Negative for cough and shortness of breath  Cardiovascular: Negative for chest pain and palpitations  Gastrointestinal: Negative for abdominal pain, constipation and vomiting  Genitourinary: Negative for dysuria and hematuria  Musculoskeletal: Negative for arthralgias and back pain  Skin: Negative for color change and rash  Neurological: Negative for seizures and syncope  All other systems reviewed and are negative          Historical Information   Past Medical History:   Diagnosis Date    Alcohol abuse     BPH (benign prostatic hyperplasia)     CVA (cerebral vascular accident) [facility]     DJD (degenerative joint disease)     Encounter to establish care with new doctor 8/21/2019    Gait abnormality     Head injury     History of cerebrovascular accident (CVA) with residual deficit 10/1/2019    History of CVA (cerebrovascular accident) 1/8/2020    History of substance abuse ([address] ) 8/21/2019    Polysubstance     History of sustained ventricular tachycardia 8/21/2019    History of transient ischemic attack (TIA) 8/28/2019    Hypertension     Metatarsal fracture     TIA (transient ischemic attack)     Tobacco abuse      Past Surgical History:   Procedure Laterality Date    ABDOMINAL SURGERY      ANKLE SURGERY      BACK SURGERY      CT GUIDED [facility] DRAINAGE CATHETER PLACEMENT  1/27/2020    ELBOW SURGERY      IR VISCERAL ANGIOGRAPHY / INTERVENTION  1/11/2020    JOINT REPLACEMENT      KNEE SURGERY      LAPAROTOMY N/A 1/11/2020    Procedure: LAPAROTOMY EXPLORATORY,  OVERSEW  OF GASTRO DUODENAL ARTERY, THAL PATCH OF DUODENUM, VICKY GASTRO JEJUNOSTOMY TUBE;  Surgeon: Pieter Pettit DO;  Location: [facility];  Service: General    LIVER SURGERY       Social History   Social History     Substance and Sexual Activity   Alcohol Use Yes    Alcohol/week: 4 0 standard drinks    Types: 4 Cans of beer per week    Frequency: Monthly or less    Drinks per session: 1 or 2    Binge frequency: Never    Comment: a couple beers     Social History     Substance and Sexual Activity   Drug Use Yes    Types: Marijuana    Comment: history polysubstance use including IVDA on record- every now then will smoke weed      Social History     Tobacco Use   Smoking Status Current Every Day Smoker    Packs/day: 1 00    Types: Cigarettes   Smokeless Tobacco Never Used   Tobacco Comment    started age 14, 3 quit attempts     Family History:   Family History   Problem Relation Age of Onset    No Known Problems Mother     No Known Problems Father        Meds/Allergies   PTA meds:   Prior to Admission Medications   Prescriptions Last Dose Informant Patient Reported? Taking? Cholecalciferol 25 MCG (1000 UT) tablet   No No   Sig: Take 1 tablet (1,000 Units total) by mouth daily   QUEtiapine (SEROquel) 200 mg tablet  Outside Facility (Specify) No No   Sig: Take 1 tablet (200 mg total) by mouth daily   QUEtiapine (SEROquel) 300 mg tablet  Outside Facility (Specify) No No   Sig: Take 2 tablets (600 mg total) by mouth daily at bedtime   albuterol (ProAir HFA) 90 mcg/act inhaler   No No   Sig: Inhale 2 puffs every 6 (six) hours as needed for wheezing   atorvastatin (LIPITOR) 40 mg tablet   No No   Sig: Take 1 tablet (40 mg total) by mouth daily   diclofenac (VOLTAREN) 75 mg EC tablet   No No   Sig: Take 1 tablet (75 mg total) by mouth 2 (two) times a day   fluticasone (FLONASE) 50 mcg/act nasal spray   No No   Si sprays into each nostril daily   fluticasone-vilanterol (BREO ELLIPTA) 100-25 mcg/inh inhaler   No No   Sig: Inhale 1 puff daily Rinse mouth after use     folic acid (FOLVITE) 1 mg tablet   No No   Sig: Take 1 tablet (1 mg total) by mouth daily   gabapentin (NEURONTIN) 600 MG tablet   No No   Sig: Take 1 tablet (600 mg total) by mouth 3 (three) times a day   guaiFENesin (MUCINEX) 600 mg 12 hr tablet  Outside Facility (Specify) Yes No   Sig: Take 600 mg by mouth every 12 (twelve) hours as needed for cough   hydrOXYzine pamoate (VISTARIL) 50 mg capsule   Yes No   Sig: take 1 capsule by mouth three times a day if needed for anxiety   metoprolol tartrate (LOPRESSOR) 50 mg tablet   No No   Sig: Take 1 tablet (50 mg total) by mouth every 12 (twelve) hours   mirtazapine (REMERON) 45 MG tablet   Yes No   pantoprazole (PROTONIX) 40 mg tablet   No No   Sig: Take 1 tablet (40 mg total) by mouth 2 (two) times a day before meals   traZODone (DESYREL) 50 mg tablet   No No   Sig: Take 1 tablet (50 mg total) by mouth daily at bedtime as needed for sleep      Facility-Administered Medications: None     No Known Allergies    Objective   First Vitals:   Blood Pressure: (!) 189/95 (04/21/21 1740)  Pulse: 84 (04/21/21 1740)  Temperature: 99 7 °F (37 6 °C) (04/21/21 1740)  Temp Source: Oral (04/21/21 1740)  Respirations: 22 (04/21/21 1740)  Height: 5' 4" (162 6 cm) (04/21/21 1740)  Weight - Scale: 66 1 kg (145 lb 11 6 oz) (04/21/21 1740)  SpO2: 96 % (04/21/21 1740)    Current Vitals:   Blood Pressure: 169/87 (04/21/21 2015)  Pulse: 74 (04/21/21 2015)  Temperature: 99 7 °F (37 6 °C) (04/21/21 1740)  Temp Source: Oral (04/21/21 1740)  Respirations: 18 (04/21/21 2015)  Height: 5' 4" (162 6 cm) (04/21/21 1740)  Weight - Scale: 66 1 kg (145 lb 11 6 oz) (04/21/21 1740)  SpO2: 99 % (04/21/21 2015)      Intake/Output Summary (Last 24 hours) at 4/21/2021 2127  Last data filed at 4/21/2021 2000  Gross per 24 hour   Intake 214 5 ml   Output 420 ml   Net -205 5 ml       Invasive Devices     Central Venous Catheter Line            CVC Central Lines 04/20/21 1 day                Physical Exam  Vitals signs reviewed  Constitutional:       General: He is not in acute distress  Appearance: He is not toxic-appearing  HENT:      Head: Normocephalic and atraumatic  Right Ear: External ear normal       Left Ear: External ear normal       Nose: Nose normal       Mouth/Throat:      Mouth: Mucous membranes are moist       Pharynx: Oropharynx is clear  Eyes:      Extraocular Movements: Extraocular movements intact  Conjunctiva/sclera: Conjunctivae normal       Pupils: Pupils are equal, round, and reactive to light  Neck:      Musculoskeletal: Neck supple  No neck rigidity  Cardiovascular:      Rate and Rhythm: Normal rate and regular rhythm  Pulmonary:      Effort: Pulmonary effort is normal  No respiratory distress  Abdominal:      General: Abdomen is flat  There is no distension  Palpations: Abdomen is soft  Tenderness: There is no abdominal tenderness  Musculoskeletal: Normal range of motion  General: No swelling or deformity  Skin:     General: Skin is warm and dry  Capillary Refill: Capillary refill takes less than 2 seconds  Findings: No erythema  Neurological:      General: No focal deficit present  Mental Status: He is oriented to person, place, and time  Psychiatric:         Mood and Affect: Mood normal          Behavior: Behavior normal            Lab Results: I have personally reviewed pertinent lab results  Imaging: I have personally reviewed pertinent reports  EKG, Pathology, and Other Studies: I have personally reviewed pertinent reports        Code Status: Level 1 - Full Code  Advance Directive and Living Will:      Power of :    POLST:

## 2021-04-22 NOTE — ANESTHESIA POSTPROCEDURE EVALUATION
Post-Op Assessment Note    CV Status:  Stable  Pain Score: 0    Pain management: adequate     Mental Status:  Awake   Hydration Status:  Euvolemic   PONV Controlled:  Controlled   Airway Patency:  Patent      Post Op Vitals Reviewed: Yes      Staff: CRNA   Comments: Pt awake, able to maintain own airway, VSS, report to recovery RN        No complications documented      BP  107/51   Temp      Pulse  94   Resp   16   SpO2   97% RA

## 2021-04-22 NOTE — ANESTHESIA PREPROCEDURE EVALUATION
Procedure:  EGD    Relevant Problems   CARDIO   (+) Aortic atherosclerosis (HCC)   (+) Essential hypertension      GI/HEPATIC   (+) Acute upper GI bleed   (+) GI bleed      HEMATOLOGY   (+) Acute blood loss anemia   (+) Anemia      MUSCULOSKELETAL   (+) Back pain without sciatica      NEURO/PSYCH   (+) Chronic pain   (+) History of GI bleed   (+) History of anemia   (+) History of bleeding ulcers   (+) History of duodenal ulcer   (+) History of suicidal ideation   (+) History of syncope   (+) MDD (major depressive disorder)   (+) Major depressive disorder, recurrent, severe with psychotic features ([facility] )   (+) Schizophrenia ([facility] )      PULMONARY   (+) COPD without exacerbation ([facility])   (+) Chronic obstructive pulmonary disease (HCC)        Physical Exam    Airway    Mallampati score: II  TM Distance: >3 FB  Neck ROM: full     Dental   No notable dental hx     Cardiovascular      Pulmonary      Other Findings        Anesthesia Plan  ASA Score- 3     Anesthesia Type- IV sedation with anesthesia with ASA Monitors  Additional Monitors:   Airway Plan:           Plan Factors-Exercise tolerance (METS): >4 METS  Chart reviewed  EKG reviewed  Existing labs reviewed  Patient summary reviewed  Patient is not a current smoker  Induction-     Postoperative Plan-     Informed Consent- Anesthetic plan and risks discussed with patient  I personally reviewed this patient with the CRNA  Discussed and agreed on the Anesthesia Plan with the CRNA  Karrie Nissen

## 2021-04-22 NOTE — ASSESSMENT & PLAN NOTE
· Patient was found to have coffee-ground emesis at his behavioral health unit; at that time, patient's hemoglobin dropped from 16-10 and was sent for an EGD yesterday  · On EGD yesterday, patient was found to have a 5 cm duodenal ulcer with 2 visible vessels and no active bleeding  Patient was sent to [facility] for a higher level of care and for definitive EGD today  · Patient underwent EGD today: Large, cratered, irregular ulcer in the duodenal bulb; induced coagulation with bipolar cautery  There were 3 pigmented spots which were treated with bipolar cautery  There was area of at  1 o'clock position likely suture from previous surgery versus eschar  Bipolar cautery was attempted in this area  Single small ulcer in the body of the stomach "   · GI recommending PPI drip x3 days and a clear liquid diet  · If patient has episode of rebleeding, GI recommends IR embolization  · Pathology sent based on EGD findings  · GI continues to follow

## 2021-04-22 NOTE — CASE MANAGEMENT
Dante received a call from Ning at [facility], she stated she received my clinical , she will review the information and let me know if she approve the Life flight, she will let me know tomorrow

## 2021-04-22 NOTE — CONSULTS
Consultation -  [facility]  Юлия Ndiaye 78 y o  male MRN: [id number]  Unit/Bed#: ICU 09 Encounter: [id number]        Consults    Reason for Consult / Principal Problem:     GI Bleed      ASSESSMENT AND PLAN:      79 yo M with past med hx of perforated duodenal ulcer s/p ex lap and repair, alcohol abuse, tobacco abuse, HTN, MDD/schizophrenia who is admitted to [facility] with acute GI bleed 2/2 to large duodenal bulb  Gastroenterology was consulted for the same  GI Bleed  Episodes of coffee ground emesis  Likely due to large duodenal ulcer  EGD 1 year ago showed large ulcer treated with epi, clipping and hemospray  Repeat EGD yesterday once again showed large duodenal ulcer with 2 visible vessels and no active bleeding  Hgb on presentation to [facility] was 16, trended down to 8 3, likely large component of hemoconcentration  Currently stable  BUN 16  Transferred for surgical evaluation  · Repeat EGD may benefit patient to monitor for bleeding in order to spare him from major surgery  Patient is agreeable  Will plan on EGD today  · Continue NPO status  · Continue IVF  · Avoid all NSAIDs  · Continue Protonix gtt for at least 72 hours  · Continue to monitor Hgb, transfuse if less than 7, of note patient has Castro antibodies and blood bank is currently cross matching patient's blood  · General surgery following  · Continue to monitor vitals    Duodenal Ulcer  Patient has multiple risk factors for ulcers including alcohol and tobacco abuse  Denies NSAID use    Hx of ex lap with oversewn repair of duodenum in the past    · Continue NPO status  · Plan for repeat EGD today  · Continue Protonix gtt   · Management as described above    Anemia  2/2 to upper GI bleeding from duodenal ulcer  Hgb currently 8 3, stable  · Check iron panel and supplement as necessary  · Continue to monitor Hgb, transfuse if less than 7, of note patient has Castro antibodies and blood bank is currently cross matching patient's blood   · Management as described above    Alcohol abuse  Actively drinking 2-3 beers per day for many years  Risk factor for ulcer  No laboratory stigmata of cirrhosis  · Encourage alcohol cessation  · Recommend thiamine and folate supplementation  · Monitor for withdrawal    Rest of care per primary team    Please wait for attendings attestation for official recommendations  ______________________________________________________________________    HPI:  79 yo M with past med hx of duodenal ulcer, alcohol abuse, tobacco abuse, HTN, MDD/schizophrenia who is admitted to [facility] with acute GI bleed 2/2 to large duodenal bulb  Gastroenterology was consulted for the same  Patient intitially presented to [facility] with suicidal ideation and abdominal pain  Was briefly in the [facility] before having multiple episodes of coffee ground emesis  Abdominal pain was described by patient to be acute on chronic in the epigastric region, non radiating  He denies any bright red blood in vomitus  Last bowel movement was a few days ago was dark according to patient  Hortensia any NSAID use  Smokes 1/2 ppd since age 17  Also drink 2-3 beers daily for "many years " Denies any drug use  He has known duodenal ulcer treated in the past   On presentation he appeard hypertensive but otherwise hemodynamically stable  His initial hgb was 16 and trended down to 8 3  He has received no transfusions so far  He was evaluated by GI who performed a repeat EGD which showed large duodenal ulcer not actively bleeding with 2 visible vessels  He was recommended for transfer under the surgery service for possible surgical intervention  Patient was seen and examined today  He still complained of epigastric pain that has remained the same  He denied any coffee ground emesis overnight  He also denies any melena or hematochezia  Rest of ROS was negative  REVIEW OF SYSTEMS:    CONSTITUTIONAL: Denies any fever, chills, rigors, and weight loss    HEENT: No earache or tinnitus  Denies hearing loss or visual disturbances  CARDIOVASCULAR: No chest pain or palpitations  RESPIRATORY: Denies any cough, hemoptysis, shortness of breath or dyspnea on exertion  GASTROINTESTINAL: As noted in the History of Present Illness  GENITOURINARY: No problems with urination  Denies any hematuria or dysuria  NEUROLOGIC: No dizziness or vertigo, denies headaches  MUSCULOSKELETAL: Denies any muscle or joint pain  SKIN: Denies skin rashes or itching  ENDOCRINE: Denies excessive thirst  Denies intolerance to heat or cold  PSYCHOSOCIAL: Denies depression or anxiety  Denies any recent memory loss         Historical Information   Past Medical History:   Diagnosis Date    Alcohol abuse     BPH (benign prostatic hyperplasia)     CVA [facility]cerebral vascular accident) [facility] [address] [facility]     DJD (degenerative joint disease)     Encounter to establish care with new doctor 8/21/2019    Gait abnormality     Head injury     History of cerebrovascular accident (CVA) with residual deficit 10/1/2019    History of CVA (cerebrovascular accident) 1/8/2020    History of substance abuse ([facility] [address] ) 8/21/2019    Polysubstance     History of sustained ventricular tachycardia 8/21/2019    History of transient ischemic attack (TIA) 8/28/2019    Hypertension     Metatarsal fracture     TIA (transient ischemic attack)     Tobacco abuse      Past Surgical History:   Procedure Laterality Date    ABDOMINAL SURGERY      ANKLE SURGERY      BACK SURGERY      CT GUIDED [facility] DRAINAGE CATHETER PLACEMENT  1/27/2020    ELBOW SURGERY      IR VISCERAL ANGIOGRAPHY / INTERVENTION  1/11/2020    JOINT REPLACEMENT      KNEE SURGERY      LAPAROTOMY N/A 1/11/2020    Procedure: LAPAROTOMY EXPLORATORY,  OVERSEW  OF GASTRO DUODENAL ARTERY, THAL PATCH OF DUODENUM, VICKY GASTRO JEJUNOSTOMY TUBE;  Surgeon: Jesus Junior DO;  Location: [facility];  Service: General    LIVER SURGERY       Social History   Social History     Substance and Sexual Activity   Alcohol Use Yes    Alcohol/week: 4 0 standard drinks    Types: 4 Cans of beer per week    Frequency: Monthly or less    Drinks per session: 1 or 2    Binge frequency: Never    Comment: a couple beers     Social History     Substance and Sexual Activity   Drug Use Yes    Types: Marijuana    Comment: history polysubstance use including IVDA on record- every now then will smoke weed      Social History     Tobacco Use   Smoking Status Current Every Day Smoker    Packs/day: 1 00    Types: Cigarettes   Smokeless Tobacco Never Used   Tobacco Comment    started age 14, 4 quit attempts     Family History   Problem Relation Age of Onset    No Known Problems Mother     No Known Problems Father        Meds/Allergies     Medications Prior to Admission   Medication    albuterol (ProAir HFA) 90 mcg/act inhaler    atorvastatin (LIPITOR) 40 mg tablet    Cholecalciferol 25 MCG (1000 UT) tablet    diclofenac (VOLTAREN) 75 mg EC tablet    fluticasone (FLONASE) 50 mcg/act nasal spray    fluticasone-vilanterol (BREO ELLIPTA) 100-25 mcg/inh inhaler    folic acid (FOLVITE) 1 mg tablet    gabapentin (NEURONTIN) 600 MG tablet    guaiFENesin (MUCINEX) 600 mg 12 hr tablet    hydrOXYzine pamoate (VISTARIL) 50 mg capsule    metoprolol tartrate (LOPRESSOR) 50 mg tablet    mirtazapine (REMERON) 45 MG tablet    pantoprazole (PROTONIX) 40 mg tablet    QUEtiapine (SEROquel) 200 mg tablet    QUEtiapine (SEROquel) 300 mg tablet    traZODone (DESYREL) 50 mg tablet     Current Facility-Administered Medications   Medication Dose Route Frequency    albuterol (PROVENTIL HFA,VENTOLIN HFA) inhaler 2 puff  2 puff Inhalation Q6H PRN    fluticasone-vilanterol (BREO ELLIPTA) 100-25 mcg/inh inhaler 1 puff  1 puff Inhalation Daily    hydrALAZINE (APRESOLINE) injection 5 mg  5 mg Intravenous Q6H PRN    HYDROmorphone HCl (DILAUDID) injection 0 2 mg  0 2 mg Intravenous Q4H PRN    Labetalol HCl (NORMODYNE) injection 10 mg  10 mg Intravenous Q6H PRN    metoclopramide (REGLAN) injection 10 mg  10 mg Intravenous Once    multi-electrolyte (PLASMALYTE-A/ISOLYTE-S PH 7 4) IV solution  100 mL/hr Intravenous Continuous    nicotine (NICODERM CQ) 7 mg/24hr TD 24 hr patch 7 mg  7 mg Transdermal Daily    OLANZapine (ZyPREXA ZYDIS) dispersible tablet 10 mg  10 mg Sublingual HS    pantoprazole (PROTONIX) 80 mg in sodium chloride 0 9 % 100 mL infusion  8 mg/hr Intravenous Continuous       No Known Allergies        Objective     Blood pressure (!) 173/72, pulse 82, temperature 97 9 °F (36 6 °C), temperature source Oral, resp  rate 12, height 5' 4" (1 626 m), weight 60 9 kg (134 lb 4 2 oz), SpO2 96 %  Body mass index is 23 05 kg/m²  Intake/Output Summary (Last 24 hours) at 4/22/2021 0725  Last data filed at 4/22/2021 6487  Gross per 24 hour   Intake 1308 33 ml   Output 2245 ml   Net -936 67 ml         PHYSICAL EXAM:      General Appearance:   Alert, cooperative, no distress, dishevel   HEENT:   Normocephalic, atraumatic, anicteric      Neck:  Supple, symmetrical, trachea midline   Lungs:   Clear to auscultation bilaterally; no rales, rhonchi or wheezing; respirations unlabored    Heart[de-identified]   Regular rate and rhythm; no murmur, rub, or gallop     Abdomen:   Soft, tender across epigastric region, non-distended; normal bowel sounds; no masses, no organomegaly    Genitalia:   Deferred    Rectal:   Deferred    Extremities:  No cyanosis, clubbing or edema    Pulses:  2+ and symmetric all extremities    Skin:  No jaundice, rashes, or lesions    Lymph nodes:  No palpable cervical lymphadenopathy        Lab Results:   Admission on 04/21/2021   Component Date Value    WBC 04/21/2021 7 81     RBC 04/21/2021 2 62*    Hemoglobin 04/21/2021 8 6*    Hematocrit 04/21/2021 25 8*    MCV 04/21/2021 99*    MCH 04/21/2021 32 8     MCHC 04/21/2021 33 3     RDW 04/21/2021 14 3     MPV 04/21/2021 8 8*    Platelets 55/99/2062 298     nRBC 04/21/2021 0  Neutrophils Relative 04/21/2021 71     Immat GRANS % 04/21/2021 1     Lymphocytes Relative 04/21/2021 19     Monocytes Relative 04/21/2021 7     Eosinophils Relative 04/21/2021 1     Basophils Relative 04/21/2021 1     Neutrophils Absolute 04/21/2021 5 60     Immature Grans Absolute 04/21/2021 0 05     Lymphocytes Absolute 04/21/2021 1 48     Monocytes Absolute 04/21/2021 0 56     Eosinophils Absolute 04/21/2021 0 07     Basophils Absolute 04/21/2021 0 05     Protime 04/21/2021 14 1     INR 04/21/2021 1 08     PTT 04/21/2021 31     Sodium 04/21/2021 134*    Potassium 04/21/2021 4 2     Chloride 04/21/2021 103     CO2 04/21/2021 24     ANION GAP 04/21/2021 7     BUN 04/21/2021 22     Creatinine 04/21/2021 0 73     Glucose 04/21/2021 105     Calcium 04/21/2021 7 9*    Corrected Calcium 04/21/2021 9 1     AST 04/21/2021 18     ALT 04/21/2021 10*    Alkaline Phosphatase 04/21/2021 37*    Total Protein 04/21/2021 5 9*    Albumin 04/21/2021 2 5*    Total Bilirubin 04/21/2021 0 34     eGFR 04/21/2021 95     LACTIC ACID 04/21/2021 1 0     pH, Arterial 04/21/2021 7 446     pCO2, Arterial 04/21/2021 35 8*    pO2, Arterial 04/21/2021 84 6     HCO3, Arterial 04/21/2021 24 1     Base Excess, Arterial 04/21/2021 0 2     O2 Content, Arterial 04/21/2021 12 4*    O2 HGB,Arterial  04/21/2021 94 9     SOURCE 04/21/2021 Artery     Ventricular Rate 04/21/2021 81     Atrial Rate 04/21/2021 81     AZ Interval 04/21/2021 171     QRSD Interval 04/21/2021 88     QT Interval 04/21/2021 383     QTC Interval 04/21/2021 445     P Axis 04/21/2021 80     QRS Axis 04/21/2021 51     T Wave Axis 04/21/2021 50     ABO Grouping 04/21/2021 A     Rh Factor 04/21/2021 Negative     Antibody Screen 04/21/2021 Positive     Specimen Expiration Date 04/21/2021 [id number]     ANTIBODY ID   #1 04/21/2021 Anti-Fya     Hemoglobin 04/21/2021 8 3*    Hematocrit 04/21/2021 25 0*    Unit Product Code 04/22/2021 I3230U65     Unit Number 04/22/2021 [id number]     Unit ABO 04/22/2021 A     Unit [facility] 04/22/2021 NEG     Crossmatch 04/22/2021 Compatible     Unit Dispense Status 04/22/2021 Crossmatched     Unit Product Code 04/22/2021 G8860U78     Unit Number 04/22/2021 [id number]     Unit ABO 04/22/2021 A     Unit [facility] 04/22/2021 NEG     Crossmatch 04/22/2021 Compatible     Unit Dispense Status 04/22/2021 Crossmatched     Calcium, Ionized 04/22/2021 1 05*    WBC 04/22/2021 8 65     RBC 04/22/2021 2 52*    Hemoglobin 04/22/2021 8 3*    Hematocrit 04/22/2021 24 9*    MCV 04/22/2021 99*    MCH 04/22/2021 32 9     MCHC 04/22/2021 33 3     RDW 04/22/2021 14 2     MPV 04/22/2021 8 9     Platelets 19/66/2222 300     nRBC 04/22/2021 0     Neutrophils Relative 04/22/2021 77*    Immat GRANS % 04/22/2021 1     Lymphocytes Relative 04/22/2021 14     Monocytes Relative 04/22/2021 6     Eosinophils Relative 04/22/2021 1     Basophils Relative 04/22/2021 1     Neutrophils Absolute 04/22/2021 6 80     Immature Grans Absolute 04/22/2021 0 05     Lymphocytes Absolute 04/22/2021 1 17     Monocytes Absolute 04/22/2021 0 51     Eosinophils Absolute 04/22/2021 0 08     Basophils Absolute 04/22/2021 0 04     Sodium 04/22/2021 134*    Potassium 04/22/2021 3 9     Chloride 04/22/2021 101     CO2 04/22/2021 24     ANION GAP 04/22/2021 9     BUN 04/22/2021 14     Creatinine 04/22/2021 0 64     Glucose 04/22/2021 89     Calcium 04/22/2021 8 0*    Corrected Calcium 04/22/2021 9 2     AST 04/22/2021 17     ALT 04/22/2021 10*    Alkaline Phosphatase 04/22/2021 39*    Total Protein 04/22/2021 5 7*    Albumin 04/22/2021 2 5*    Total Bilirubin 04/22/2021 0 37     eGFR 04/22/2021 101     Magnesium 04/22/2021 2 3     Phosphorus 04/22/2021 2 0*    Protime 04/22/2021 13 9     INR 04/22/2021 1 07        Imaging Studies: I have personally reviewed pertinent imaging studies      Carmen James D O   Internal Medicine PGY-2  4/22/2021 7:25 AM

## 2021-04-22 NOTE — ASSESSMENT & PLAN NOTE
· On metoprolol at home 50 mg  · P r n  Labetalol 10 mg q 6 hours for systolic blood pressure greater than 180  · P r n   Hydralazine 5 mg q 6 hours for systolic blood pressure greater than 180

## 2021-04-22 NOTE — PROGRESS NOTES
[facility]  Transfer Summary - Lovett Friday 1950, 78 y o  male MRN: [id number]  Unit/Bed#: ICU 09 Encounter: [id number]  Primary Care Provider: Rox Stone DO   Date and time admitted to hospital: 4/21/2021  5:34 PM    Alcohol dependence with unspecified alcohol-induced disorder [facility])  Assessment & Plan  · Patient on folate and thiamine at this time  · We have been monitoring the patient for any signs of alcohol withdrawal here in the ICU; no signs of alcohol withdrawal at this time  · May consider CIWA protocol per primary team    Hypertension  Assessment & Plan  · On metoprolol at home 50 mg  · P r n  Labetalol 10 mg q 6 hours for systolic blood pressure greater than 180  · P r n  Hydralazine 5 mg q 6 hours for systolic blood pressure greater than 180    Acute blood loss anemia  Assessment & Plan  · Secondary to gastrointestinal bleeding, patient had a drop of hemoglobin from 16-10  · Hemoglobin prior to EGD today was 8 5  · Postprocedural hemoglobin stable at 8 1   · Continue monitoring hemoglobin q 6 hours    Acute upper GI bleed  Assessment & Plan  · Patient was found to have coffee-ground emesis at his behavioral health unit; at that time, patient's hemoglobin dropped from 16-10 and was sent for an EGD yesterday  · On EGD yesterday, patient was found to have a 5 cm duodenal ulcer with 2 visible vessels and no active bleeding  Patient was sent to hospitals for a higher level of care and for definitive EGD today  · Patient underwent EGD today: Large, cratered, irregular ulcer in the duodenal bulb; induced coagulation with bipolar cautery  There were 3 pigmented spots which were treated with bipolar cautery  There was area of at  1 o'clock position likely suture from previous surgery versus eschar  Bipolar cautery was attempted in this area  Single small ulcer in the body of the stomach "   · GI recommending PPI drip x3 days and a clear liquid diet    · If patient has episode of rebleeding, GI recommends IR embolization  · Pathology sent based on EGD findings  · GI continues to follow  Schizophrenia ([facility] )  Assessment & Plan  · Patient on Seroquel 200 mg daily and 300 mg q h s  At home - held here in the setting of NPO status  · Patient typically on trazodone 50 mg per sleep  · 10 mg Zyprexa at nighttime    Tobacco abuse  Assessment & Plan  · Nicotine patch ordered    Chronic pain  Assessment & Plan  · Multimodal pain regimen in place  · Management per primary team    Dyslipidemia  Assessment & Plan  · Patient takes a statin at home    COPD without exacerbation ([facility] )  Assessment & Plan  · Continue [facility]      Physical Exam  Vitals signs and nursing note reviewed  Constitutional:       General: He is not in acute distress  Appearance: Normal appearance  He is normal weight  He is not ill-appearing, toxic-appearing or diaphoretic  HENT:      Head: Normocephalic and atraumatic  Nose: Nose normal       Mouth/Throat:      Mouth: Mucous membranes are moist    Eyes:      General: No scleral icterus  Right eye: No discharge  Left eye: No discharge  Extraocular Movements: Extraocular movements intact  Conjunctiva/sclera: Conjunctivae normal    Cardiovascular:      Rate and Rhythm: Normal rate and regular rhythm  Pulses: Normal pulses  Heart sounds: Normal heart sounds  No murmur  No friction rub  No gallop  Pulmonary:      Effort: Pulmonary effort is normal  No respiratory distress  Breath sounds: Normal breath sounds  No stridor  No wheezing, rhonchi or rales  Chest:      Chest wall: No tenderness  Abdominal:      General: Abdomen is flat  Comments: Surgical scars present  Mild tenderness to palpation diffusely throughout abdomen  No suprapubic tenderness  Negative rebound  Negative Rovsing  Negative McBurney's point tenderness  Non peritoneal   Musculoskeletal:      Right lower leg: No edema  Left lower leg: No edema  Skin:     General: Skin is warm and dry  Capillary Refill: Capillary refill takes less than 2 seconds  Coloration: Skin is not jaundiced or pale  Neurological:      General: No focal deficit present  Mental Status: He is alert and oriented to person, place, and time  Mental status is at baseline     Psychiatric:         Mood and Affect: Mood normal          Behavior: Behavior normal

## 2021-04-23 PROBLEM — K26.9 DUODENAL ULCER: Status: ACTIVE | Noted: 2021-04-23

## 2021-04-23 LAB
25(OH)D3 SERPL-MCNC: 29.4 NG/ML (ref 30–100)
ANION GAP SERPL CALCULATED.3IONS-SCNC: 8 MMOL/L (ref 4–13)
BASOPHILS # BLD AUTO: 0.03 THOUSANDS/ΜL (ref 0–0.1)
BASOPHILS NFR BLD AUTO: 1 % (ref 0–1)
BUN SERPL-MCNC: 9 MG/DL (ref 5–25)
CA-I BLD-SCNC: 1.11 MMOL/L (ref 1.12–1.32)
CALCIUM SERPL-MCNC: 8.4 MG/DL (ref 8.3–10.1)
CHLORIDE SERPL-SCNC: 100 MMOL/L (ref 100–108)
CO2 SERPL-SCNC: 24 MMOL/L (ref 21–32)
CREAT SERPL-MCNC: 0.73 MG/DL (ref 0.6–1.3)
EOSINOPHIL # BLD AUTO: 0.08 THOUSAND/ΜL (ref 0–0.61)
EOSINOPHIL NFR BLD AUTO: 2 % (ref 0–6)
ERYTHROCYTE [DISTWIDTH] IN BLOOD BY AUTOMATED COUNT: 14 % (ref 11.6–15.1)
GFR SERPL CREATININE-BSD FRML MDRD: 95 ML/MIN/1.73SQ M
GLUCOSE SERPL-MCNC: 103 MG/DL (ref 65–140)
HCT VFR BLD AUTO: 23.6 % (ref 36.5–49.3)
HCT VFR BLD AUTO: 24.8 % (ref 36.5–49.3)
HGB BLD-MCNC: 8 G/DL (ref 12–17)
HGB BLD-MCNC: 8.4 G/DL (ref 12–17)
IMM GRANULOCYTES # BLD AUTO: 0.03 THOUSAND/UL (ref 0–0.2)
IMM GRANULOCYTES NFR BLD AUTO: 1 % (ref 0–2)
LYMPHOCYTES # BLD AUTO: 1 THOUSANDS/ΜL (ref 0.6–4.47)
LYMPHOCYTES NFR BLD AUTO: 20 % (ref 14–44)
MAGNESIUM SERPL-MCNC: 2.4 MG/DL (ref 1.6–2.6)
MCH RBC QN AUTO: 32.8 PG (ref 26.8–34.3)
MCHC RBC AUTO-ENTMCNC: 33.9 G/DL (ref 31.4–37.4)
MCV RBC AUTO: 97 FL (ref 82–98)
MONOCYTES # BLD AUTO: 0.48 THOUSAND/ΜL (ref 0.17–1.22)
MONOCYTES NFR BLD AUTO: 9 % (ref 4–12)
NEUTROPHILS # BLD AUTO: 3.49 THOUSANDS/ΜL (ref 1.85–7.62)
NEUTS SEG NFR BLD AUTO: 67 % (ref 43–75)
NRBC BLD AUTO-RTO: 0 /100 WBCS
PHOSPHATE SERPL-MCNC: 2.4 MG/DL (ref 2.3–4.1)
PLATELET # BLD AUTO: 311 THOUSANDS/UL (ref 149–390)
PMV BLD AUTO: 9 FL (ref 8.9–12.7)
POTASSIUM SERPL-SCNC: 3.6 MMOL/L (ref 3.5–5.3)
RBC # BLD AUTO: 2.44 MILLION/UL (ref 3.88–5.62)
SODIUM SERPL-SCNC: 132 MMOL/L (ref 136–145)
WBC # BLD AUTO: 5.11 THOUSAND/UL (ref 4.31–10.16)

## 2021-04-23 PROCEDURE — 83735 ASSAY OF MAGNESIUM: CPT | Performed by: PHYSICIAN ASSISTANT

## 2021-04-23 PROCEDURE — 99232 SBSQ HOSP IP/OBS MODERATE 35: CPT | Performed by: INTERNAL MEDICINE

## 2021-04-23 PROCEDURE — 99232 SBSQ HOSP IP/OBS MODERATE 35: CPT | Performed by: SURGERY

## 2021-04-23 PROCEDURE — 84100 ASSAY OF PHOSPHORUS: CPT | Performed by: PHYSICIAN ASSISTANT

## 2021-04-23 PROCEDURE — 82330 ASSAY OF CALCIUM: CPT | Performed by: PHYSICIAN ASSISTANT

## 2021-04-23 PROCEDURE — 85018 HEMOGLOBIN: CPT | Performed by: INTERNAL MEDICINE

## 2021-04-23 PROCEDURE — C9113 INJ PANTOPRAZOLE SODIUM, VIA: HCPCS | Performed by: PHYSICIAN ASSISTANT

## 2021-04-23 PROCEDURE — 80048 BASIC METABOLIC PNL TOTAL CA: CPT | Performed by: PHYSICIAN ASSISTANT

## 2021-04-23 PROCEDURE — 85025 COMPLETE CBC W/AUTO DIFF WBC: CPT | Performed by: PHYSICIAN ASSISTANT

## 2021-04-23 PROCEDURE — 85014 HEMATOCRIT: CPT | Performed by: INTERNAL MEDICINE

## 2021-04-23 RX ORDER — QUETIAPINE FUMARATE 300 MG/1
600 TABLET, FILM COATED ORAL
Status: DISCONTINUED | OUTPATIENT
Start: 2021-04-23 | End: 2021-05-07 | Stop reason: HOSPADM

## 2021-04-23 RX ORDER — LANOLIN ALCOHOL/MO/W.PET/CERES
100 CREAM (GRAM) TOPICAL DAILY
Status: DISCONTINUED | OUTPATIENT
Start: 2021-04-23 | End: 2021-05-07 | Stop reason: HOSPADM

## 2021-04-23 RX ORDER — FOLIC ACID 1 MG/1
1 TABLET ORAL DAILY
Status: DISCONTINUED | OUTPATIENT
Start: 2021-04-23 | End: 2021-05-07 | Stop reason: HOSPADM

## 2021-04-23 RX ADMIN — FLUTICASONE FUROATE AND VILANTEROL TRIFENATATE 1 PUFF: 100; 25 POWDER RESPIRATORY (INHALATION) at 08:54

## 2021-04-23 RX ADMIN — QUETIAPINE FUMARATE 600 MG: 300 TABLET ORAL at 23:17

## 2021-04-23 RX ADMIN — Medication 1000 UNITS: at 08:54

## 2021-04-23 RX ADMIN — SODIUM CHLORIDE 8 MG/HR: 9 INJECTION, SOLUTION INTRAVENOUS at 12:12

## 2021-04-23 RX ADMIN — FLUTICASONE PROPIONATE 2 SPRAY: 50 SPRAY, METERED NASAL at 08:54

## 2021-04-23 RX ADMIN — Medication 1 PATCH: at 08:54

## 2021-04-23 RX ADMIN — TRAZODONE HYDROCHLORIDE 50 MG: 50 TABLET ORAL at 23:17

## 2021-04-23 RX ADMIN — QUETIAPINE FUMARATE 200 MG: 100 TABLET ORAL at 08:54

## 2021-04-23 RX ADMIN — FOLIC ACID 1 MG: 1 TABLET ORAL at 16:50

## 2021-04-23 RX ADMIN — METOROPROLOL TARTRATE 5 MG: 5 INJECTION, SOLUTION INTRAVENOUS at 04:59

## 2021-04-23 RX ADMIN — ATORVASTATIN CALCIUM 40 MG: 40 TABLET, FILM COATED ORAL at 08:54

## 2021-04-23 RX ADMIN — THIAMINE HCL TAB 100 MG 100 MG: 100 TAB at 16:50

## 2021-04-23 RX ADMIN — Medication 1 TABLET: at 16:50

## 2021-04-23 RX ADMIN — SODIUM CHLORIDE 8 MG/HR: 9 INJECTION, SOLUTION INTRAVENOUS at 03:00

## 2021-04-23 NOTE — ASSESSMENT & PLAN NOTE
Hemoglobin ranging between 8-9; consider transfusion if hemoglobin drops under 7 or if hemodynamically instability occurs in the setting of active bleeding    Follow CBC and H&H

## 2021-04-23 NOTE — PROGRESS NOTES
Progress Note- Lucia Muñoz 78 y o  male MRN: [id number]    Unit/Bed#: [facility] 929-01 Encounter: [id number]      Assessment and Plan:    79 yo M with past med hx of perforated duodenal ulcer s/p ex lap and repair, alcohol abuse, tobacco abuse, HTN, MDD/schizophrenia who is admitted to [facility] with acute GI bleed 2/2 to large duodenal bulb  Gastroenterology was consulted for the same       GI Bleed  Episodes of coffee ground emesis  Likely due to large duodenal ulcer  EGD performed yesterday once again showed large duodenal ulcer, treated with gold probe  Hgb currently 8 7, stable  · Continue clear liquids for 1 more day, advance to non ulcerogenic diet tomorrow  · Avoid all NSAIDs  · Follow up biopsies  · Continue Protonix gtt for at least 72 hours  · Continue to monitor Hgb, transfuse if less than 7, of note patient has Castro antibodies and blood bank is currently cross matching patient's blood  · General surgery following, no surgical intervention at this time  · If patient rebleeds, consider IR consult  · Continue to monitor vitals     Duodenal Ulcer  Patient has multiple risk factors for ulcers including alcohol and tobacco abuse  Denies NSAID use  Hx of ex lap with oversewn repair of duodenum in the past    EGD yesterday shows persistent large ulcer treated with gold probe  · Advance diet, non ulcerogenic tomorrow  · Continue Protonix gtt   · Management as described above     Anemia  2/2 to upper GI bleeding from duodenal ulcer  Hgb currently 8 7, stable  · Iron panel reviewed, no iron deficiency at this time  · Continue to monitor Hgb, transfuse if less than 7, of note patient has Castro antibodies and blood bank is currently cross matching patient's blood  · Management as described above     Alcohol abuse  Actively drinking 2-3 beers per day for many years  Risk factor for ulcer   No laboratory stigmata of cirrhosis  · Encourage alcohol cessation  · Recommend thiamine and folate supplementation  · Monitor for withdrawal     Tobacco Abuse  Smoking 1/2 ppd  Risk factor for ulcer  · Encourage smoking cessation  · Outpatient follow up  Rest of care per primary team    Please wait for attendings attestation for official recommendations  ______________________________________________________________________    Subjective:     Patient seen and examined lying in bed in no acute distress  Patient reports improvement in his abdominal pain  He denies any nausea, vomiting, hematemesis, melena, hematochezia  Last bowel movement was yesterday, normal per patient  Tolerating clear liquids  Rest of ROS negative       Medication Administration - last 24 hours from 04/22/2021 0719 to 04/23/2021 0719       Date/Time Order Dose Route Action Action by     04/23/2021 0300 pantoprazole (PROTONIX) 80 mg in sodium chloride 0 9 % 100 mL infusion 8 mg/hr Intravenous [address] Purnima, [address] [place] [address]     04/23/2021 0259 pantoprazole (PROTONIX) 80 mg in sodium chloride 0 9 % 100 mL infusion 0 mg/hr Intravenous Stopped GARO Felton     04/22/2021 1559 pantoprazole (PROTONIX) 80 mg in sodium chloride 0 9 % 100 mL infusion 8 mg/hr Intravenous [address] Tyree Gray RN     04/22/2021 1408 pantoprazole (PROTONIX) 80 mg in sodium chloride 0 9 % 100 mL infusion 8 mg/hr Intravenous Continue from Pre-op Sav Perry CRNA     04/22/2021 1950 HYDROmorphone HCl (DILAUDID) injection 0 2 mg   Intravenous MAR Unhold GARO Felton     04/22/2021 1907 HYDROmorphone HCl (DILAUDID) injection 0 2 mg   Intravenous MAR Hold Automatic Transfer Provider     04/22/2021 1711 HYDROmorphone HCl (DILAUDID) injection 0 2 mg 0 2 mg Intravenous Given Tyree Gray RN     04/22/2021 3379 HYDROmorphone HCl (DILAUDID) injection 0 2 mg 0 2 mg Intravenous Given Tyree Gray RN     04/22/2021 2115 nicotine (NICODERM CQ) 7 mg/24hr TD 24 hr patch 7 mg 7 mg Transdermal Patch Removed GARO Felton     04/22/2021 1950 nicotine (NICODERM CQ) 7 mg/24hr TD 24 hr patch 7 mg   Transdermal MAR Unhold Carrol Hinson RN     04/22/2021 1907 nicotine (NICODERM CQ) 7 mg/24hr TD 24 hr patch 7 mg   Transdermal MAR Hold Automatic Transfer Provider     04/22/2021 0855 nicotine (NICODERM CQ) 7 mg/24hr TD 24 hr patch 7 mg 7 mg Transdermal Medication Applied Maricruz Emanuel RN     04/22/2021 1950 fluticasone-vilanterol (BREO ELLIPTA) 100-25 mcg/inh inhaler 1 puff   Inhalation MAR Unhold Carrol Hinson RN     04/22/2021 1907 fluticasone-vilanterol (BREO ELLIPTA) 100-25 mcg/inh inhaler 1 puff   Inhalation MAR Hold Automatic Transfer Provider     04/22/2021 1014 fluticasone-vilanterol (BREO ELLIPTA) 100-25 mcg/inh inhaler 1 puff 1 puff Inhalation Given Maricruz Emanuel RN     04/22/2021 1950 albuterol (PROVENTIL HFA,VENTOLIN HFA) inhaler 2 puff   Inhalation MAR Unhold Carrol Hinson RN     04/22/2021 1907 albuterol (PROVENTIL HFA,VENTOLIN HFA) inhaler 2 puff   Inhalation MAR Hold Automatic Transfer Provider     04/22/2021 1950 Labetalol HCl (NORMODYNE) injection 10 mg   Intravenous MAR Unhold Carrol Hinson RN     04/22/2021 1907 Labetalol HCl (NORMODYNE) injection 10 mg   Intravenous MAR Hold Automatic Transfer Provider     04/22/2021 1013 Labetalol HCl (NORMODYNE) injection 10 mg 10 mg Intravenous Given Maricruz Emanuel RN     04/22/2021 1832 multi-electrolyte (PLASMALYTE-A/ISOLYTE-S PH 7 4) IV solution 0 mL/hr Intravenous Stopped Maricruz Emanuel RN     04/22/2021 1408 multi-electrolyte (PLASMALYTE-A/ISOLYTE-S PH 7 4) IV solution   Intravenous Continue from Pre-op Ana Maria Perry CRNA     04/22/2021 1950 OLANZapine (ZyPREXA ZYDIS) dispersible tablet 10 mg   Sublingual MAR Unhold Carrol Hinson RN     04/22/2021 1907 OLANZapine (ZyPREXA ZYDIS) dispersible tablet 10 mg   Sublingual MAR Hold Automatic Transfer Provider     04/22/2021 1950 hydrALAZINE (APRESOLINE) injection 5 mg   Intravenous OMKAR Randolph, RN     04/22/2021 1907 hydrALAZINE (APRESOLINE) injection 5 mg   Intravenous MAR Hold Automatic Transfer Provider     04/22/2021 0732 metoclopramide (REGLAN) injection 10 mg 10 mg Intravenous Given [facility]on, RN     77/94/0885 1735 folic acid 1 mg, thiamine (VITAMIN B1) 100 mg in sodium chloride 0 9 % 100 mL IV piggyback   Intravenous MAR [address], RN     36/43/3232 4226 folic acid 1 mg, thiamine (VITAMIN B1) 100 mg in sodium chloride 0 9 % 100 mL IV piggyback   Intravenous MAR Hold Automatic Transfer Provider     51/83/7643 6205 folic acid 1 mg, thiamine (VITAMIN B1) 100 mg in sodium chloride 0 9 % 100 mL IV piggyback   Intravenous Stopped [facility], RN     39/80/8211 9602 folic acid 1 mg, thiamine (VITAMIN B1) 100 mg in sodium chloride 0 9 % 100 mL IV piggyback   Intravenous [address] [facility]on, RN     04/22/2021 0930 calcium gluconate 1 g in sodium chloride 0 9% 50 mL (premix) 0 g Intravenous Stopped [facility], RN     04/22/2021 0854 calcium gluconate 1 g in sodium chloride 0 9% 50 mL (premix) 1 g Intravenous [facility], RN     04/23/2021 0459 metoprolol (LOPRESSOR) injection 5 mg 5 mg Intravenous [facility]     04/22/2021 2124 metoprolol (LOPRESSOR) injection 5 mg 5 mg Intravenous Given Westley, RN     04/22/2021 2055 octreotide (SandoSTATIN) 500 mcg in sodium chloride 0 9 % 250 mL infusion 50 mcg/hr Intravenous Not [facility], [facility]     04/22/2021 2114 pantoprazole (PROTONIX) 80 mg in sodium chloride 0 9 % 100 mL infusion 8 mg/hr Intravenous Not Given Westley, RN     04/22/2021 2054 ibuprofen (MOTRIN) tablet 600 mg 600 mg Oral Not Given [facility], RN     04/22/2021 2056 octreotide (SandoSTATIN) 500 mcg in sodium chloride 0 9 % 250 mL infusion 25 mcg/hr Intravenous Not Given Purnima, RN     04/22/2021 2055 multi-electrolyte (PLASMALYTE-A/ISOLYTE-S PH 7 4) IV solution 125 mL/hr Intravenous Not Given GARO Felton     04/22/2021 0868 QUEtiapine (SEROquel) tablet 300 mg 300 mg Oral Given Raymond, RN     04/22/2021 2248 traZODone (DESYREL) tablet 50 mg 50 mg Oral Given Raymond RN          Objective:     Vitals: Blood pressure 152/67, pulse 83, temperature 98 °F (36 7 °C), temperature source Oral, resp  rate 17, height 5' 4" (1 626 m), weight 60 kg (132 lb 4 4 oz), SpO2 98 %  ,Body mass index is 22 71 kg/m²  Intake/Output Summary (Last 24 hours) at 4/23/2021 0719  Last data filed at 4/23/2021 0259  Gross per 24 hour   Intake 1723 33 ml   Output 1985 ml   Net -261 67 ml       Physical Exam:   Physical Exam:   GENERAL: NAD, elderly, disheveled  HEENT:  NC/AT, PERRL, EOMI, MMM, no scleral icterus  CARDIAC:  RRR, +S1/S2, no S3/S4 heard, no m/g/r  PULMONARY:  CTA B/L, no wheezing/rales/rhonci, non-labored breathing  ABDOMEN:  Soft, minimal epigastric tenderness, +BS, no rebound/guarding/rigidity  Extremities:  2+ Pulses in DP/PT  No edema, cyanosis, or clubbing  NEUROLOGIC:  Alert/oriented x3   No motor or sensory deficits  SKIN:  No rashes or erythema        Invasive Devices     Peripheral Intravenous Line            Peripheral IV 04/22/21 Right Antecubital less than 1 day    Peripheral IV 04/22/21 Upper;Left Arm less than 1 day                Lab Results:  Admission on 04/21/2021   Component Date Value    WBC 04/21/2021 7 81     RBC 04/21/2021 2 62*    Hemoglobin 04/21/2021 8 6*    Hematocrit 04/21/2021 25 8*    MCV 04/21/2021 99*    MCH 04/21/2021 32 8     MCHC 04/21/2021 33 3     RDW 04/21/2021 14 3     MPV 04/21/2021 8 8*    Platelets 64/52/8533 298     nRBC 04/21/2021 0     Neutrophils Relative 04/21/2021 71     Immat GRANS % 04/21/2021 1     Lymphocytes Relative 04/21/2021 19     Monocytes Relative 04/21/2021 7     Eosinophils Relative 04/21/2021 1     Basophils Relative 04/21/2021 1     Neutrophils Absolute 04/21/2021 5 60     Immature Grans Absolute 04/21/2021 0 05     Lymphocytes Absolute 04/21/2021 1 48     Monocytes Absolute 04/21/2021 0 56     Eosinophils Absolute 04/21/2021 0 07     Basophils Absolute 04/21/2021 0 05     Protime 04/21/2021 14 1     INR 04/21/2021 1 08     PTT 04/21/2021 31     Sodium 04/21/2021 134*    Potassium 04/21/2021 4 2     Chloride 04/21/2021 103     CO2 04/21/2021 24     ANION GAP 04/21/2021 7     BUN 04/21/2021 22     Creatinine 04/21/2021 0 73     Glucose 04/21/2021 105     Calcium 04/21/2021 7 9*    Corrected Calcium 04/21/2021 9 1     AST 04/21/2021 18     ALT 04/21/2021 10*    Alkaline Phosphatase 04/21/2021 37*    Total Protein 04/21/2021 5 9*    Albumin 04/21/2021 2 5*    Total Bilirubin 04/21/2021 0 34     eGFR 04/21/2021 95     LACTIC ACID 04/21/2021 1 0     pH, Arterial 04/21/2021 7 446     pCO2, Arterial 04/21/2021 35 8*    pO2, Arterial 04/21/2021 84 6     HCO3, Arterial 04/21/2021 24 1     Base Excess, Arterial 04/21/2021 0 2     O2 Content, Arterial 04/21/2021 12 4*    O2 HGB,Arterial  04/21/2021 94 9     SOURCE 04/21/2021 Artery     Ventricular Rate 04/21/2021 81     Atrial Rate 04/21/2021 81     ND Interval 04/21/2021 171     QRSD Interval 04/21/2021 88     QT Interval 04/21/2021 383     QTC Interval 04/21/2021 445     P Axis 04/21/2021 80     QRS Axis 04/21/2021 51     T Wave Axis 04/21/2021 50     ABO Grouping 04/21/2021 A     Rh Factor 04/21/2021 Negative     Antibody Screen 04/21/2021 Positive     Specimen Expiration Date 04/21/2021 [id number]     ANTIBODY ID  #1 04/21/2021 Anti-Fya     ANTIBODY ID   #2 04/21/2021 Undetermined Specificity     Hemoglobin 04/21/2021 8 3*    Hematocrit 04/21/2021 25 0*    Unit Product Code 04/22/2021 [id number]     Unit Number 04/22/2021 [id number]     Unit ABO 04/22/2021 A     Unit [facility] 04/22/2021 NEG     Crossmatch 04/22/2021 Compatible     Unit Dispense Status 04/22/2021 Crossmatched     Unit Product Code 04/22/2021 [id number]     Unit Number 04/22/2021 [id number]     Unit ABO 04/22/2021 A     Unit RH 04/22/2021 NEG     Crossmatch 04/22/2021 Compatible     Unit Dispense Status 04/22/2021 Crossmatched     Calcium, Ionized 04/22/2021 1 05*    WBC 04/22/2021 8 65     RBC 04/22/2021 2 52*    Hemoglobin 04/22/2021 8 3*    Hematocrit 04/22/2021 24 9*    MCV 04/22/2021 99*    MCH 04/22/2021 32 9     MCHC 04/22/2021 33 3     RDW 04/22/2021 14 2     MPV 04/22/2021 8 9     Platelets 76/02/5384 300     nRBC 04/22/2021 0     Neutrophils Relative 04/22/2021 77*    Immat GRANS % 04/22/2021 1     Lymphocytes Relative 04/22/2021 14     Monocytes Relative 04/22/2021 6     Eosinophils Relative 04/22/2021 1     Basophils Relative 04/22/2021 1     Neutrophils Absolute 04/22/2021 6 80     Immature Grans Absolute 04/22/2021 0 05     Lymphocytes Absolute 04/22/2021 1 17     Monocytes Absolute 04/22/2021 0 51     Eosinophils Absolute 04/22/2021 0 08     Basophils Absolute 04/22/2021 0 04     Sodium 04/22/2021 134*    Potassium 04/22/2021 3 9     Chloride 04/22/2021 101     CO2 04/22/2021 24     ANION GAP 04/22/2021 9     BUN 04/22/2021 14     Creatinine 04/22/2021 0 64     Glucose 04/22/2021 89     Calcium 04/22/2021 8 0*    Corrected Calcium 04/22/2021 9 2     AST 04/22/2021 17     ALT 04/22/2021 10*    Alkaline Phosphatase 04/22/2021 39*    Total Protein 04/22/2021 5 7*    Albumin 04/22/2021 2 5*    Total Bilirubin 04/22/2021 0 37     eGFR 04/22/2021 101     Magnesium 04/22/2021 2 3     Phosphorus 04/22/2021 2 0*    Protime 04/22/2021 13 9     INR 04/22/2021 1 07     Iron Saturation 04/22/2021 22     TIBC 04/22/2021 235*    Iron 04/22/2021 52*    Ferritin 04/22/2021 55     Hemoglobin 04/22/2021 8 5*    Hematocrit 04/22/2021 25 2*    GERONIMO A ANTIGEN 04/22/2021 Negative     Hemoglobin 04/22/2021 8 1*    Hematocrit 04/22/2021 23 8*    WBC 04/22/2021 7 57     RBC 04/22/2021 2 60*    Hemoglobin 04/22/2021 8 7*    Hematocrit 04/22/2021 25 5*    MCV 04/22/2021 98     MCH 04/22/2021 33 5     MCHC 04/22/2021 34 1     RDW 04/22/2021 13 8     MPV 04/22/2021 8 9     Platelets 21/01/1114 342     nRBC 04/22/2021 0     Neutrophils Relative 04/22/2021 68     Immat GRANS % 04/22/2021 1     Lymphocytes Relative 04/22/2021 23     Monocytes Relative 04/22/2021 7     Eosinophils Relative 04/22/2021 1     Basophils Relative 04/22/2021 0     Neutrophils Absolute 04/22/2021 5 16     Immature Grans Absolute 04/22/2021 0 05     Lymphocytes Absolute 04/22/2021 1 74     Monocytes Absolute 04/22/2021 0 52     Eosinophils Absolute 04/22/2021 0 07     Basophils Absolute 04/22/2021 0 03     Sodium 04/22/2021 132*    Potassium 04/22/2021 3 8     Chloride 04/22/2021 99*    CO2 04/22/2021 25     ANION GAP 04/22/2021 8     BUN 04/22/2021 10     Creatinine 04/22/2021 0 79     Glucose 04/22/2021 127     Calcium 04/22/2021 8 6     Corrected Calcium 04/22/2021 9 6     AST 04/22/2021 18     ALT 04/22/2021 9*    Alkaline Phosphatase 04/22/2021 41*    Total Protein 04/22/2021 6 3*    Albumin 04/22/2021 2 7*    Total Bilirubin 04/22/2021 0 33     eGFR 04/22/2021 92     Folate 04/22/2021 >20 0*    Protime 04/22/2021 14 1     INR 04/22/2021 1 09     Vitamin B-12 04/22/2021 383        Imaging Studies: I have personally reviewed pertinent imaging studies  [address]     Internal Medicine PGY-2  4/23/2021 7:20 AM

## 2021-04-23 NOTE — CASE MANAGEMENT
LOS: 2  Not a bundle  Readmission risk: Green  Pt transferred from [facility] ICU  Prior to ICU stay at [facility], pt was admitted on IBHU at [facility]  Met with pt and discussed role of CM  Pt prior to hospitalization pt lives with his son in a 2-story home with 1 step at entrance  Pt is independent in ADLs  Pt has DME including: cane  No prior [facility]  Pt denies having preference for pharmacy  Pt denies having MH/D&A tx hx--however prior to transfer to [facility] ICU, pt was admitted to [address]  Pt has hx schizophrenia and MDD  Pt also has noted hx ETOH abuse in chart  PCP- Shawn Hamilton DO ([phone])  No POA  Main contact: Son- Henrietta Nguyen ([phone])  CM to follow if pt will need to return to [facility] upon d/c     CM reviewed d/c planning process including the following: identifying help at home, patient preference for d/c planning needs, Discharge Lounge, [facility] Meds to Bed program, availability of treatment team to discuss questions or concerns patient and/or family may have regarding understanding medications and recognizing signs and symptoms once discharged  CM also encouraged patient to follow up with all recommended appointments after discharge  Patient advised of importance for patient and family to participate in managing patients medical well being  Patient/caregiver received discharge checklist  Content reviewed  Patient/caregiver encouraged to participate in discharge plan of care prior to discharge home

## 2021-04-23 NOTE — ASSESSMENT & PLAN NOTE
CIWA scores persistently at 0; no evidence of withdrawal during my assessment  Continue CIWA  Balderas transition to p o , thiamine and multivitamin tablets initiated  Educated on the importance of abstaining from further alcohol use given underlying conditions including duodenal ulcers with a history of bleeding and hospitalized for the same  Verbalized understanding

## 2021-04-23 NOTE — ASSESSMENT & PLAN NOTE
Metoprolol tartrate 50 mg b i d  At home on hold to avoid maskin of rebleed; re-initiated metoprolol tartrate 25 mg b i d  Tomorrow morning  Consider increasing to home dose based on clinical progression

## 2021-04-23 NOTE — ASSESSMENT & PLAN NOTE
Presented with signs and symptoms consistent with upper GI bleed  Baseline hemoglobin appears to be normal; acute blood loss anemia during his hospital stay secondary to bleeding with hemoglobin ranging between 8 and 9  Hemodynamically stable otherwise  Status post EGD on 04/22/2021 with finding consistent of multiple duodenal ulcers with evidence of bleeding, status post electrocauterization  GI on board; continue PPI drip  for least 24 more hours to complete a total 72 hours  Will likely require transition of b i d  Oral PPI at the time  EGD biopsies negative for H pylori  No evidence of further bleeding; will trend 1 more H&H today, otherwise follow CBC in the morning  Diet advanced to non-ulcerogenic, tolerating well  IV fluids discontinued because of the same  General surgery on board, no current surgical intervention indicated; consider interventional radiology consult from [facility] of rebleeding occurs  Pending BMP from this morning, however BUN had previously normalized

## 2021-04-23 NOTE — PLAN OF CARE
Problem: Prexisting or High Potential for Compromised Skin Integrity  Goal: Skin integrity is maintained or improved  Description: INTERVENTIONS:  - Identify patients at risk for skin breakdown  - Assess and monitor skin integrity  - Assess and monitor nutrition and hydration status  - Monitor labs   - Assess for incontinence   - Turn and reposition patient  - Assist with mobility/ambulation  - Relieve pressure over bony prominences  - Avoid friction and shearing  - Provide appropriate hygiene as needed including keeping skin clean and dry  - Evaluate need for skin moisturizer/barrier cream  - Collaborate with interdisciplinary team   - Patient/family teaching  - Consider wound care consult   Outcome: Progressing     Problem: Potential for Falls  Goal: Patient will remain free of falls  Description: INTERVENTIONS:  - Assess patient frequently for physical needs  -  Identify cognitive and physical deficits and behaviors that affect risk of falls    -  [place] fall precautions as indicated by assessment   - Educate patient/family on patient safety including physical limitations  - Instruct patient to call for assistance with activity based on assessment  - Modify environment to reduce risk of injury  - Consider OT/PT consult to assist with strengthening/mobility  Outcome: Progressing     Problem: PAIN - ADULT  Goal: Verbalizes/displays adequate comfort level or baseline comfort level  Description: Interventions:  - Encourage patient to monitor pain and request assistance  - Assess pain using appropriate pain scale  - Administer analgesics based on type and severity of pain and evaluate response  - Implement non-pharmacological measures as appropriate and evaluate response  - Consider cultural and social influences on pain and pain management  - Notify physician/advanced practitioner if interventions unsuccessful or patient reports new pain  Outcome: Progressing     Problem: INFECTION - ADULT  Goal: Absence or prevention of progression during hospitalization  Description: INTERVENTIONS:  - Assess and monitor for signs and symptoms of infection  - Monitor lab/diagnostic results  - Monitor all insertion sites, i e  indwelling lines, tubes, and drains  - Monitor endotracheal if appropriate and nasal secretions for changes in amount and color  - [place] appropriate cooling/warming therapies per order  - Administer medications as ordered  - Instruct and encourage patient and family to use good hand hygiene technique  - Identify and instruct in appropriate isolation precautions for identified infection/condition  Outcome: Progressing  Goal: Absence of fever/infection during neutropenic period  Description: INTERVENTIONS:  - Monitor WBC    Outcome: Progressing     Problem: SAFETY ADULT  Goal: Patient will remain free of falls  Description: INTERVENTIONS:  - Assess patient frequently for physical needs  -  Identify cognitive and physical deficits and behaviors that affect risk of falls    -  [place] fall precautions as indicated by assessment   - Educate patient/family on patient safety including physical limitations  - Instruct patient to call for assistance with activity based on assessment  - Modify environment to reduce risk of injury  - Consider OT/PT consult to assist with strengthening/mobility  Outcome: Progressing  Goal: Maintain or return to baseline ADL function  Description: INTERVENTIONS:  -  Assess patient's ability to carry out ADLs; assess patient's baseline for ADL function and identify physical deficits which impact ability to perform ADLs (bathing, care of mouth/teeth, toileting, grooming, dressing, etc )  - Assess/evaluate cause of self-care deficits   - Assess range of motion  - Assess patient's mobility; develop plan if impaired  - Assess patient's need for assistive devices and provide as appropriate  - Encourage maximum independence but intervene and supervise when necessary  - Involve family in performance of ADLs  - Assess for home care needs following discharge   - Consider OT consult to assist with ADL evaluation and planning for discharge  - Provide patient education as appropriate  Outcome: Progressing  Goal: Maintain or return mobility status to optimal level  Description: INTERVENTIONS:  - Assess patient's baseline mobility status (ambulation, transfers, stairs, etc )    - Identify cognitive and physical deficits and behaviors that affect mobility  - Identify mobility aids required to assist with transfers and/or ambulation (gait belt, sit-to-stand, lift, walker, cane, etc )  - [place] fall precautions as indicated by assessment  - Record patient progress and toleration of activity level on Mobility SBAR; progress patient to next Phase/Stage  - Instruct patient to call for assistance with activity based on assessment  - Consider rehabilitation consult to assist with strengthening/weightbearing, etc   Outcome: Progressing     Problem: DISCHARGE PLANNING  Goal: Discharge to home or other facility with appropriate resources  Description: INTERVENTIONS:  - Identify barriers to discharge w/patient and caregiver  - Arrange for needed discharge resources and transportation as appropriate  - Identify discharge learning needs (meds, wound care, etc )  - Arrange for interpretive services to assist at discharge as needed  - Refer to Case Management Department for coordinating discharge planning if the patient needs post-hospital services based on physician/advanced practitioner order or complex needs related to functional status, cognitive ability, or social support system  Outcome: Progressing

## 2021-04-23 NOTE — PROGRESS NOTES
Progress Note - General Surgery   Brayden Huntley 78 y o  male MRN: [id number]  Unit/Bed#: [facility] 929-01 Encounter: [id number]    Assessment:  78 M w/ history of perforated duodenal ulcer s/p ex lap and oversew of GDA/ thal patch of duodenum 1/2020, HTN, COPD, schizophrenia, and EtOH abuse who presents with concern for UGI bleed 4/21    S/p EGD treatment with bipolar with cautery (4/22)    Plan:   Clear liquid diet, per GI   Currently on PPI gtt   Serial H/H: stable, pending this AM    Subjective/Objective     Subjective:   No acute events  No new complaints  Denies hematemesis or bloody / dark BMs  Objective:    Blood pressure 133/90, pulse 83, temperature 98 8 °F (37 1 °C), resp  rate 17, height 5' 4" (1 626 m), weight 60 kg (132 lb 4 4 oz), SpO2 98 %  ,Body mass index is 22 71 kg/m²  Intake/Output Summary (Last 24 hours) at 4/23/2021 0824  Last data filed at 4/23/2021 0600  Gross per 24 hour   Intake 1623 16 ml   Output 1785 ml   Net -161 84 ml       Invasive Devices     Peripheral Intravenous Line            Peripheral IV 04/22/21 Right Antecubital less than 1 day    Peripheral IV 04/22/21 Upper;Left Arm less than 1 day                Physical Exam:   GEN: NAD  HEENT: MMM  CV: warm/well perfused  Lung: normal effort  Ab: Soft, NT/ND  Extrem: No CCE  Neuro: A+Ox3, motor and sensation grossly intact      Results from last 7 days   Lab Units 04/22/21  1822 04/22/21  1604 04/22/21  0906 04/22/21  0448  04/21/21  1819   WBC Thousand/uL 7 57  --   --  8 65  --  7 81   HEMOGLOBIN g/dL 8 7* 8 1* 8 5* 8 3*   < > 8 6*   HEMATOCRIT % 25 5* 23 8* 25 2* 24 9*   < > 25 8*   PLATELETS Thousands/uL 342  --   --  300  --  298    < > = values in this interval not displayed       Results from last 7 days   Lab Units 04/22/21  2034 04/22/21  0448 04/21/21  1819   POTASSIUM mmol/L 3 8 3 9 4 2   CHLORIDE mmol/L 99* 101 103   CO2 mmol/L 25 24 24   BUN mg/dL 10 14 22   CREATININE mg/dL 0 79 0 64 0 73   CALCIUM mg/dL 8 6 8 0* 7 9* Results from last 7 days   Lab Units 04/22/21 2034 04/22/21  0448 04/21/21  1819   INR  1 09 1 07 1 08   PTT seconds  --   --  31        I have personally reviewed pertinent films in PACS      Medications:   Scheduled Meds:  Current Facility-Administered Medications   Medication Dose Route Frequency Provider Last Rate    atorvastatin  40 mg Oral Daily Leanne Ocampo PA-C      calcium carbonate  500 mg Oral Daily PRN Leanne Ocampo PA-C      cholecalciferol  1,000 Units Oral Daily Leanne Ocampo PA-C      fluticasone  2 spray Nasal Daily Leanne Ocampo PA-C      fluticasone-vilanterol  1 puff Inhalation Daily Mckay Summers PA-C      folic acid 1 mg, thiamine 100 mg in 0 9% sodium chloride 100 mL IVPB   Intravenous Daily Donnie Vidales PA-C Stopped (04/22/21 0940)    hydrALAZINE  5 mg Intravenous Q6H PRN Mariano Canddebbie Summers PA-C      HYDROmorphone  0 2 mg Intravenous Q4H PRN Donnie BRIGITTE Vidales      Labetalol HCl  10 mg Intravenous Q6H PRN Mariano Canddebbie Summers PA-C      LORazepam  0 5 mg Intravenous Q6H PRN Leanne Ocampo PA-C      metoprolol  5 mg Intravenous Q8H Leanne Ocampo PA-C      multi-electrolyte  100 mL/hr Intravenous Continuous Mckay Summers PA-C Stopped (04/22/21 1832)    nicotine  1 patch Transdermal Daily Leanne Ocampo PA-C      ondansetron  4 mg Oral Q6H PRN Leanne Ocampo PA-C      pantoprozole (PROTONIX) infusion (Continuous)  8 mg/hr Intravenous Continuous Mckay Summers PA-C 8 mg/hr (04/23/21 0300)    QUEtiapine  200 mg Oral Daily Leanne Ocampo PA-C      QUEtiapine  300 mg Oral HS Leanne Ocampo PA-C      traZODone  50 mg Oral HS Leanne Ocampo PA-C       Continuous Infusions:multi-electrolyte, 100 mL/hr, Last Rate: Stopped (04/22/21 1832)  pantoprozole (PROTONIX) infusion (Continuous), 8 mg/hr, Last Rate: 8 mg/hr (04/23/21 0300)      PRN Meds:  calcium carbonate, 500 mg, Daily PRN  hydrALAZINE, 5 mg, Q6H PRN  HYDROmorphone, 0 2 mg, Q4H PRN  Labetalol HCl, 10 mg, Q6H PRN  LORazepam, 0 5 mg, Q6H PRN  ondansetron, 4 mg, Q6H PRN      VTE Pharmacologic Prophylaxis: held  VTE Mechanical Prophylaxis: sequential compression device

## 2021-04-23 NOTE — ASSESSMENT & PLAN NOTE
Appear stable without evidence of hallucinations or active psychosis at this time  States he takes 600 mg of seroquel at nighttime and 200 mg in the morning, medications readjusted accordingly  Continue trazodone as well

## 2021-04-23 NOTE — PLAN OF CARE
Problem: Prexisting or High Potential for Compromised Skin Integrity  Goal: Skin integrity is maintained or improved  Description: INTERVENTIONS:  - Identify patients at risk for skin breakdown  - Assess and monitor skin integrity  - Assess and monitor nutrition and hydration status  - Monitor labs   - Assess for incontinence   - Turn and reposition patient  - Assist with mobility/ambulation  - Relieve pressure over bony prominences  - Avoid friction and shearing  - Provide appropriate hygiene as needed including keeping skin clean and dry  - Evaluate need for skin moisturizer/barrier cream  - Collaborate with interdisciplinary team   - Patient/family teaching  - Consider wound care consult   Outcome: Progressing     Problem: Potential for Falls  Goal: Patient will remain free of falls  Description: INTERVENTIONS:  - Assess patient frequently for physical needs  -  Identify cognitive and physical deficits and behaviors that affect risk of falls    -  [place] fall precautions as indicated by assessment   - Educate patient/family on patient safety including physical limitations  - Instruct patient to call for assistance with activity based on assessment  - Modify environment to reduce risk of injury  - Consider OT/PT consult to assist with strengthening/mobility  Outcome: Progressing     Problem: PAIN - ADULT  Goal: Verbalizes/displays adequate comfort level or baseline comfort level  Description: Interventions:  - Encourage patient to monitor pain and request assistance  - Assess pain using appropriate pain scale  - Administer analgesics based on type and severity of pain and evaluate response  - Implement non-pharmacological measures as appropriate and evaluate response  - Consider cultural and social influences on pain and pain management  - Notify physician/advanced practitioner if interventions unsuccessful or patient reports new pain  Outcome: Progressing     Problem: INFECTION - ADULT  Goal: Absence or prevention of progression during hospitalization  Description: INTERVENTIONS:  - Assess and monitor for signs and symptoms of infection  - Monitor lab/diagnostic results  - Monitor all insertion sites, i e  indwelling lines, tubes, and drains  - Monitor endotracheal if appropriate and nasal secretions for changes in amount and color  - [place] appropriate cooling/warming therapies per order  - Administer medications as ordered  - Instruct and encourage patient and family to use good hand hygiene technique  - Identify and instruct in appropriate isolation precautions for identified infection/condition  Outcome: Progressing  Goal: Absence of fever/infection during neutropenic period  Description: INTERVENTIONS:  - Monitor WBC    Outcome: Progressing     Problem: SAFETY ADULT  Goal: Patient will remain free of falls  Description: INTERVENTIONS:  - Assess patient frequently for physical needs  -  Identify cognitive and physical deficits and behaviors that affect risk of falls    -  [place] fall precautions as indicated by assessment   - Educate patient/family on patient safety including physical limitations  - Instruct patient to call for assistance with activity based on assessment  - Modify environment to reduce risk of injury  - Consider OT/PT consult to assist with strengthening/mobility  Outcome: Progressing  Goal: Maintain or return to baseline ADL function  Description: INTERVENTIONS:  -  Assess patient's ability to carry out ADLs; assess patient's baseline for ADL function and identify physical deficits which impact ability to perform ADLs (bathing, care of mouth/teeth, toileting, grooming, dressing, etc )  - Assess/evaluate cause of self-care deficits   - Assess range of motion  - Assess patient's mobility; develop plan if impaired  - Assess patient's need for assistive devices and provide as appropriate  - Encourage maximum independence but intervene and supervise when necessary  - Involve family in performance of ADLs  - Assess for home care needs following discharge   - Consider OT consult to assist with ADL evaluation and planning for discharge  - Provide patient education as appropriate  Outcome: Progressing  Goal: Maintain or return mobility status to optimal level  Description: INTERVENTIONS:  - Assess patient's baseline mobility status (ambulation, transfers, stairs, etc )    - Identify cognitive and physical deficits and behaviors that affect mobility  - Identify mobility aids required to assist with transfers and/or ambulation (gait belt, sit-to-stand, lift, walker, cane, etc )  - [place] fall precautions as indicated by assessment  - Record patient progress and toleration of activity level on Mobility SBAR; progress patient to next Phase/Stage  - Instruct patient to call for assistance with activity based on assessment  - Consider rehabilitation consult to assist with strengthening/weightbearing, etc   Outcome: Progressing     Problem: DISCHARGE PLANNING  Goal: Discharge to home or other facility with appropriate resources  Description: INTERVENTIONS:  - Identify barriers to discharge w/patient and caregiver  - Arrange for needed discharge resources and transportation as appropriate  - Identify discharge learning needs (meds, wound care, etc )  - Arrange for interpretive services to assist at discharge as needed  - Refer to Case Management Department for coordinating discharge planning if the patient needs post-hospital services based on physician/advanced practitioner order or complex needs related to functional status, cognitive ability, or social support system  Outcome: Progressing

## 2021-04-23 NOTE — ASSESSMENT & PLAN NOTE
On nicotine patch  Educated on importance of abstaining from further smoking as this can also exacerbate underlying duodenal ulcers

## 2021-04-23 NOTE — PROGRESS NOTES
[address] [facility]  Progress Note - Victor Manuel Sena 1950, 78 y o  male MRN: [id number]  Unit/Bed#: [facility] 929-01 Encounter: [id number]  Primary Care Provider: Homar Price DO   Date and time admitted to hospital: 4/21/2021  5:34 PM    * GI bleed  Assessment & Plan  Presented with signs and symptoms consistent with upper GI bleed  Baseline hemoglobin appears to be normal; acute blood loss anemia during his hospital stay secondary to bleeding with hemoglobin ranging between 8 and 9  Hemodynamically stable otherwise  Status post EGD on 04/22/2021 with finding consistent of multiple duodenal ulcers with evidence of bleeding, status post electrocauterization  GI on board; continue PPI drip  for least 24 more hours to complete a total 72 hours  Will likely require transition of b i d  Oral PPI at the time  EGD biopsies negative for H pylori  No evidence of further bleeding; will trend 1 more H&H today, otherwise follow CBC in the morning  Diet advanced to non-ulcerogenic, tolerating well  IV fluids discontinued because of the same  General surgery on board, no current surgical intervention indicated; consider interventional radiology consult from [facility] station of rebleeding occurs  Pending BMP from this morning, however BUN had previously normalized  Duodenal ulcer  Assessment & Plan  See above for further management plan    Acute blood loss anemia  Assessment & Plan  Hemoglobin ranging between 8-9; consider transfusion if hemoglobin drops under 7 or if hemodynamically instability occurs in the setting of active bleeding  Follow CBC and H&H  Hypertension  Assessment & Plan  Metoprolol tartrate 50 mg b i d  At home on hold to avoid maskin of rebleed; re-initiated metoprolol tartrate 25 mg b i d  Tomorrow morning  Consider increasing to home dose based on clinical progression      Schizophrenia [facility])  Assessment & Plan  Appear stable without evidence of hallucinations or active psychosis at this time  States he takes 600 mg of seroquel at nighttime and 200 mg in the morning, medications readjusted accordingly  Continue trazodone as well  Tobacco abuse  Assessment & Plan  On nicotine patch  Educated on importance of abstaining from further smoking as this can also exacerbate underlying duodenal ulcers  Dyslipidemia  Assessment & Plan  Continue statin therapy    COPD without exacerbation [facility])  Assessment & Plan  Not in current exacerbation  Continue inhalers  Ambulatory dysfunction  Assessment & Plan  PT/OT on board    Alcohol dependence with unspecified alcohol-induced disorder ([facility] [address] )  Assessment & Plan  CIWA scores persistently at 0; no evidence of withdrawal during my assessment  Continue CIWA  Balderas transition to p o , thiamine and multivitamin tablets initiated  Educated on the importance of abstaining from further alcohol use given underlying conditions including duodenal ulcers with a history of bleeding and hospitalized for the same  Verbalized understanding  Disposition: Anticipated discharge within 24-48 hours  VTE Prophylaxis - SCDs; medications on hold due to recent upper GI bleed    Education and Discussions with Family / Patient:  Educated patient at bedside    Current Length of Stay: 2 day(s)  Current Patient Status: Inpatient     Code Status: Level 1 - Full Code      Subjective:   Seen and examined at bedside  Overnight events noted  Patient denies any further episodes of hematemesis or dark or bloody stool; no nausea or vomiting or significant abdominal pain  Tolerating diet well  Otherwise denies shortness of breath, cough, chest pain, palpitations,  changes, hallucinations      Objective:     Vitals:   Temp (24hrs), Av 5 °F (36 9 °C), Min:97 9 °F (36 6 °C), Max:99 4 °F (37 4 °C)    Temp:  [97 9 °F (36 6 °C)-99 4 °F (37 4 °C)] 99 4 °F (37 4 °C)  HR:  [74-86] 77  Resp:  [17-22] 20  BP: (103-178)/(56-93) 103/56  SpO2:  [95 %-100 %] 95 %  Body mass index is 22 71 kg/m²  Invasive Devices     Peripheral Intravenous Line            Peripheral IV 04/22/21 Right Antecubital less than 1 day                Input and Output Summary (last 24 hours): Intake/Output Summary (Last 24 hours) at 4/23/2021 1532  Last data filed at 4/23/2021 1212  Gross per 24 hour   Intake 1033 16 ml   Output 1760 ml   Net -726 84 ml       Physical Exam:     Gen: in bed; room air  HEENT: NC/AT, PERRLA, moist mucous membranes  RS: symmetric, speaking in full sentences, CTA bilaterally  CVS:  RRR, S1-S2 heard without murmurs, no peripheral edema, radial pulses 2+, capillary refill less than 2 seconds  Abdomen:  Soft, nondistended, mild epigastric tenderness noted on deep palpation, otherwise nontender, bowel sounds normoactive  No rebound tenderness or involuntary guarding  MSK:  Warm  Moves all 4 extremities  :  No Balderas  Neuro:  AAO x4  Psych:  Cooperative        Additional Data:     Labs:    Results from last 7 days   Lab Units 04/23/21  1155   WBC Thousand/uL 5 11   HEMOGLOBIN g/dL 8 0*   HEMATOCRIT % 23 6*   PLATELETS Thousands/uL 311   NEUTROS PCT % 67   LYMPHS PCT % 20   MONOS PCT % 9   EOS PCT % 2     Results from last 7 days   Lab Units 04/22/21 2034   POTASSIUM mmol/L 3 8   CHLORIDE mmol/L 99*   CO2 mmol/L 25   BUN mg/dL 10   CREATININE mg/dL 0 79   CALCIUM mg/dL 8 6   ALK PHOS U/L 41*   ALT U/L 9*   AST U/L 18     Results from last 7 days   Lab Units 04/22/21 2034   INR  1 09       * I Have Reviewed All Lab Data Listed Above  * Additional Pertinent Lab Tests Reviewed:  [address] 68 Admission Reviewed    Imaging:    Imaging Reports Reviewed Today Include:  EGD  Imaging Personally Reviewed by Myself Includes:  None    Recent Cultures (last 7 days):           Last 24 Hours Medication List:   Current Facility-Administered Medications   Medication Dose Route Frequency Provider Last Rate    atorvastatin  40 mg Oral Daily Leanne BRIGITTE Ocampo      calcium carbonate  500 mg Oral Daily PRN Leanne Ocampo PA-C      cholecalciferol  1,000 Units Oral Daily Leanne Ocampo PA-C      fluticasone  2 spray Nasal Daily Leanne Ocampo PA-C      fluticasone-vilanterol  1 puff Inhalation Daily Mckay Summers PA-C      folic acid  1 mg Oral Daily Perla Tomas MD      hydrALAZINE  5 mg Intravenous Q6H PRN Nabil Camarillo PA-C      HYDROmorphone  0 2 mg Intravenous Q4H PRN Nabil Camarillo PA-C      Labetalol HCl  10 mg Intravenous Q6H PRN Maritza Summers PA-C      LORazepam  0 5 mg Intravenous Q6H PRN Leanne Ocampo PA-C      [START ON 4/24/2021] metoprolol tartrate  25 mg Oral Q12H [facility] Perla Tomas MD      multivitamin-minerals  1 tablet Oral Daily Perla Tomas MD      nicotine  1 patch Transdermal Daily Leanne Ocampo PA-C      ondansetron  4 mg Oral Q6H PRN Leanne Ocampo PA-C      pantoprozole (PROTONIX) infusion (Continuous)  8 mg/hr Intravenous Continuous Mckay uSmmers PA-C 8 mg/hr (04/23/21 1212)    QUEtiapine  200 mg Oral Daily Leanne Ocampo PA-C      QUEtiapine  600 mg Oral HS Perla Tomas MD      thiamine  100 mg Oral Daily Perla Tomas MD      traZODone  50 mg Oral HS Leanne Ocampo PA-C              ** Please Note: This note has been constructed using a voice recognition system   **

## 2021-04-24 LAB
ALBUMIN SERPL BCP-MCNC: 2.4 G/DL (ref 3.5–5)
ALP SERPL-CCNC: 39 U/L (ref 46–116)
ALT SERPL W P-5'-P-CCNC: 9 U/L (ref 12–78)
ANION GAP SERPL CALCULATED.3IONS-SCNC: 3 MMOL/L (ref 4–13)
AST SERPL W P-5'-P-CCNC: 25 U/L (ref 5–45)
BASOPHILS # BLD AUTO: 0.02 THOUSANDS/ΜL (ref 0–0.1)
BASOPHILS NFR BLD AUTO: 0 % (ref 0–1)
BILIRUB SERPL-MCNC: 0.36 MG/DL (ref 0.2–1)
BUN SERPL-MCNC: 15 MG/DL (ref 5–25)
CALCIUM ALBUM COR SERPL-MCNC: 9.5 MG/DL (ref 8.3–10.1)
CALCIUM SERPL-MCNC: 8.2 MG/DL (ref 8.3–10.1)
CHLORIDE SERPL-SCNC: 106 MMOL/L (ref 100–108)
CO2 SERPL-SCNC: 26 MMOL/L (ref 21–32)
CREAT SERPL-MCNC: 0.89 MG/DL (ref 0.6–1.3)
EOSINOPHIL # BLD AUTO: 0.16 THOUSAND/ΜL (ref 0–0.61)
EOSINOPHIL NFR BLD AUTO: 3 % (ref 0–6)
ERYTHROCYTE [DISTWIDTH] IN BLOOD BY AUTOMATED COUNT: 14.1 % (ref 11.6–15.1)
GFR SERPL CREATININE-BSD FRML MDRD: 88 ML/MIN/1.73SQ M
GLUCOSE SERPL-MCNC: 106 MG/DL (ref 65–140)
HCT VFR BLD AUTO: 23.9 % (ref 36.5–49.3)
HGB BLD-MCNC: 8.2 G/DL (ref 12–17)
IMM GRANULOCYTES # BLD AUTO: 0.02 THOUSAND/UL (ref 0–0.2)
IMM GRANULOCYTES NFR BLD AUTO: 0 % (ref 0–2)
LYMPHOCYTES # BLD AUTO: 1.58 THOUSANDS/ΜL (ref 0.6–4.47)
LYMPHOCYTES NFR BLD AUTO: 28 % (ref 14–44)
MAGNESIUM SERPL-MCNC: 2.4 MG/DL (ref 1.6–2.6)
MCH RBC QN AUTO: 33.2 PG (ref 26.8–34.3)
MCHC RBC AUTO-ENTMCNC: 34.3 G/DL (ref 31.4–37.4)
MCV RBC AUTO: 97 FL (ref 82–98)
MONOCYTES # BLD AUTO: 0.58 THOUSAND/ΜL (ref 0.17–1.22)
MONOCYTES NFR BLD AUTO: 10 % (ref 4–12)
NEUTROPHILS # BLD AUTO: 3.22 THOUSANDS/ΜL (ref 1.85–7.62)
NEUTS SEG NFR BLD AUTO: 59 % (ref 43–75)
NRBC BLD AUTO-RTO: 0 /100 WBCS
PHOSPHATE SERPL-MCNC: 2.8 MG/DL (ref 2.3–4.1)
PLATELET # BLD AUTO: 332 THOUSANDS/UL (ref 149–390)
PMV BLD AUTO: 8.9 FL (ref 8.9–12.7)
POTASSIUM SERPL-SCNC: 4.1 MMOL/L (ref 3.5–5.3)
PROT SERPL-MCNC: 6.1 G/DL (ref 6.4–8.2)
RBC # BLD AUTO: 2.47 MILLION/UL (ref 3.88–5.62)
SODIUM SERPL-SCNC: 135 MMOL/L (ref 136–145)
WBC # BLD AUTO: 5.58 THOUSAND/UL (ref 4.31–10.16)

## 2021-04-24 PROCEDURE — 85025 COMPLETE CBC W/AUTO DIFF WBC: CPT | Performed by: INTERNAL MEDICINE

## 2021-04-24 PROCEDURE — 84100 ASSAY OF PHOSPHORUS: CPT | Performed by: INTERNAL MEDICINE

## 2021-04-24 PROCEDURE — 83735 ASSAY OF MAGNESIUM: CPT | Performed by: INTERNAL MEDICINE

## 2021-04-24 PROCEDURE — C9113 INJ PANTOPRAZOLE SODIUM, VIA: HCPCS | Performed by: PHYSICIAN ASSISTANT

## 2021-04-24 PROCEDURE — 99232 SBSQ HOSP IP/OBS MODERATE 35: CPT | Performed by: INTERNAL MEDICINE

## 2021-04-24 PROCEDURE — 80053 COMPREHEN METABOLIC PANEL: CPT | Performed by: INTERNAL MEDICINE

## 2021-04-24 RX ORDER — ALBUTEROL SULFATE 90 UG/1
2 AEROSOL, METERED RESPIRATORY (INHALATION) EVERY 4 HOURS PRN
Status: DISCONTINUED | OUTPATIENT
Start: 2021-04-24 | End: 2021-05-07 | Stop reason: HOSPADM

## 2021-04-24 RX ADMIN — QUETIAPINE FUMARATE 200 MG: 100 TABLET ORAL at 09:20

## 2021-04-24 RX ADMIN — SODIUM CHLORIDE 8 MG/HR: 9 INJECTION, SOLUTION INTRAVENOUS at 00:58

## 2021-04-24 RX ADMIN — FLUTICASONE PROPIONATE 2 SPRAY: 50 SPRAY, METERED NASAL at 09:19

## 2021-04-24 RX ADMIN — SODIUM CHLORIDE 8 MG/HR: 9 INJECTION, SOLUTION INTRAVENOUS at 17:21

## 2021-04-24 RX ADMIN — METOPROLOL TARTRATE 25 MG: 25 TABLET, FILM COATED ORAL at 09:20

## 2021-04-24 RX ADMIN — QUETIAPINE FUMARATE 600 MG: 300 TABLET ORAL at 21:54

## 2021-04-24 RX ADMIN — Medication 1 TABLET: at 09:20

## 2021-04-24 RX ADMIN — Medication 1 PATCH: at 09:20

## 2021-04-24 RX ADMIN — FOLIC ACID 1 MG: 1 TABLET ORAL at 09:20

## 2021-04-24 RX ADMIN — Medication 1000 UNITS: at 09:20

## 2021-04-24 RX ADMIN — ATORVASTATIN CALCIUM 40 MG: 40 TABLET, FILM COATED ORAL at 09:20

## 2021-04-24 RX ADMIN — FLUTICASONE FUROATE AND VILANTEROL TRIFENATATE 1 PUFF: 100; 25 POWDER RESPIRATORY (INHALATION) at 09:19

## 2021-04-24 RX ADMIN — THIAMINE HCL TAB 100 MG 100 MG: 100 TAB at 09:20

## 2021-04-24 RX ADMIN — TRAZODONE HYDROCHLORIDE 50 MG: 50 TABLET ORAL at 21:54

## 2021-04-24 RX ADMIN — METOPROLOL TARTRATE 25 MG: 25 TABLET, FILM COATED ORAL at 21:55

## 2021-04-24 NOTE — ASSESSMENT & PLAN NOTE
Presented with signs and symptoms consistent with upper GI bleed  Baseline hemoglobin appears to be normal; acute blood loss anemia during his hospital stay secondary to bleeding with hemoglobin ranging between 8 and 9  Hemodynamically stable otherwise  Status post EGD on 04/22/2021 with finding consistent of multiple duodenal ulcers with evidence of bleeding, status post electrocauterization  IV Protonixthrough this afternoon  Will likely require transition of b i d  Oral PPI at the time  EGD biopsies negative for H pylori  No evidence of further bleeding; will trend 1 more H&H today, otherwise follow CBC in the morning  Diet advanced to non-ulcerogenic, tolerating well  IV fluids discontinued because of the same  General surgery on board, no current surgical intervention indicated; consider interventional radiology consult from [facility] of rebleeding occurs    Pending BMP from this morning, however BUN had previously normalized

## 2021-04-24 NOTE — ASSESSMENT & PLAN NOTE
Patient was admitted at [facility] for suicidal ideation  His hospital course was complicated by abdominal pain with hematemesis and he subsequently was transferred to the ICU there  Upon drop of his hemoglobin he was transferred to [facility] [place] [facility] [place]  Patient is hemodynamically stable  However, given recent hospitalization for schizophrenia would consult Psychiatry prior to discharge    Patient denies any suicidal ideation or homicidal ideation

## 2021-04-24 NOTE — PROGRESS NOTES
[facility]  Progress Note - Adrianna Byrd 1950, 78 y o  male MRN: [id number]  Unit/Bed#: [facility] 929-01 Encounter: [id number]  Primary Care Provider: Nataliya Robertson DO   Date and time admitted to hospital: 4/21/2021  5:34 PM    * GI bleed  Assessment & Plan  Presented with signs and symptoms consistent with upper GI bleed  Baseline hemoglobin appears to be normal; acute blood loss anemia during his hospital stay secondary to bleeding with hemoglobin ranging between 8 and 9  Hemodynamically stable otherwise  Status post EGD on 04/22/2021 with finding consistent of multiple duodenal ulcers with evidence of bleeding, status post electrocauterization  IV Protonixthrough this afternoon  Will likely require transition of b i d  Oral PPI at the time  EGD biopsies negative for H pylori  No evidence of further bleeding; will trend 1 more H&H today, otherwise follow CBC in the morning  Diet advanced to non-ulcerogenic, tolerating well  IV fluids discontinued because of the same  General surgery on board, no current surgical intervention indicated; consider interventional radiology consult from [facility] of rebleeding occurs  Pending BMP from this morning, however BUN had previously normalized        Duodenal ulcer  Assessment & Plan  PPI drip  Status post Sandostatin  Hemoglobin stable  Transition oral PPI twice a day    Hypertension  Assessment & Plan  Metoprolol    Schizophrenia [facility])  Assessment & Plan  Patient was admitted at [facility] hospital for suicidal ideation  His hospital course was complicated by abdominal pain with hematemesis and he subsequently was transferred to the ICU there  Upon drop of his hemoglobin [facility]  Patient is hemodynamically stable  However, given recent hospitalization for schizophrenia would consult Psychiatry prior to discharge    Patient denies any suicidal ideation or homicidal ideation    COPD without exacerbation ([address] )  Assessment & Plan  Continue with current meds with    Alcohol dependence with unspecified alcohol-induced disorder [facility])  Assessment & Plan  Alcohol cessation counseling      VTE Pharmacologic Prophylaxis:   Pharmacologic: bleed  Mechanical VTE Prophylaxis in Place: No    Patient Centered Rounds: I have performed bedside rounds with nursing staff today  Time Spent for Care: 15 minutes  More than 50% of total time spent on counseling and coordination of care as described above  Current Length of Stay: 3 day(s)    Current Patient Status: Inpatient       Code Status: Level 1 - Full Code      Subjective:   nad    Objective:     Vitals:   Temp (24hrs), Av 8 °F (37 1 °C), Min:98 2 °F (36 8 °C), Max:99 4 °F (37 4 °C)    Temp:  [98 2 °F (36 8 °C)-99 4 °F (37 4 °C)] 98 8 °F (37 1 °C)  HR:  [77-82] 77  Resp:  [14-20] 14  BP: (103-113)/(56-72) 113/62  SpO2:  [97 %] 97 %  Body mass index is 22 82 kg/m²  Input and Output Summary (last 24 hours): Intake/Output Summary (Last 24 hours) at 2021 1017  Last data filed at 2021 0601  Gross per 24 hour   Intake 582 67 ml   Output 400 ml   Net 182 67 ml       Physical Exam:     Physical Exam  HENT:      Head: Normocephalic and atraumatic  Cardiovascular:      Rate and Rhythm: Normal rate and regular rhythm  Pulses: Normal pulses  Heart sounds: Normal heart sounds  No murmur  Pulmonary:      Effort: Pulmonary effort is normal       Breath sounds: Normal breath sounds  Musculoskeletal:         General: No swelling  Skin:     General: Skin is warm and dry  Neurological:      General: No focal deficit present  Mental Status: He is oriented to person, place, and time           Additional Data:     Labs:    Results from last 7 days   Lab Units 21  0649   WBC Thousand/uL 5 58   HEMOGLOBIN g/dL 8 2*   HEMATOCRIT % 23 9*   PLATELETS Thousands/uL 332   NEUTROS PCT % 59   LYMPHS PCT % 28 MONOS PCT % 10   EOS PCT % 3     Results from last 7 days   Lab Units 04/24/21  0649   POTASSIUM mmol/L 4 1   CHLORIDE mmol/L 106   CO2 mmol/L 26   BUN mg/dL 15   CREATININE mg/dL 0 89   CALCIUM mg/dL 8 2*   ALK PHOS U/L 39*   ALT U/L 9*   AST U/L 25     Results from last 7 days   Lab Units 04/22/21  2034   INR  1 09       * I Have Reviewed All Lab Data Listed Above  * Additional Pertinent Lab Tests Reviewed:  All Labs Within Last 24 Hours Reviewed        Recent Cultures (last 7 days):           Last 24 Hours Medication List:   Current Facility-Administered Medications   Medication Dose Route Frequency Provider Last Rate    atorvastatin  40 mg Oral Daily Leanne Ocampo PA-C      calcium carbonate  500 mg Oral Daily PRN Leanne Ocampo PA-C      cholecalciferol  1,000 Units Oral Daily Leanne Ocampo PA-C      fluticasone  2 spray Nasal Daily Leanne Ocampo PA-C      fluticasone-vilanterol  1 puff Inhalation Daily Mckay Summers PA-C      folic acid  1 mg Oral Daily Ingrid Nunez MD      hydrALAZINE  5 mg Intravenous Q6H PRN Altamesammy Linda PA-C      HYDROmorphone  0 2 mg Intravenous Q4H PRN Jory Summers PA-C      Labetalol HCl  10 mg Intravenous Q6H PRN Jory Summers PA-C      LORazepam  0 5 mg Intravenous Q6H PRN Leanne Ocampo PA-C      metoprolol tartrate  25 mg Oral Q12H [address] Ingrid Nunez MD      multivitamin-minerals  1 tablet Oral Daily Ingrid Nunez MD      nicotine  1 patch Transdermal Daily Leanne Ocampo PA-C      ondansetron  4 mg Oral Q6H PRN Leanne Ocampo PA-C      pantoprozole (PROTONIX) infusion (Continuous)  8 mg/hr Intravenous Continuous Mckay Summers PA-C 8 mg/hr (04/24/21 0601)    QUEtiapine  200 mg Oral Daily Leanne Ocampo PA-C      QUEtiapine  600 mg Oral HS Ingrid Nunez MD      thiamine  100 mg Oral Daily Ingrid Nunez MD      traZODone  50 mg Oral HS Leanne Ocampo PA-C          Today, Patient Was Seen By: Von Wheat, DO    ** Please Note: Dictation voice to text software may have been used in the creation of this document   **

## 2021-04-25 LAB
ABO GROUP BLD BPU: NORMAL
ABO GROUP BLD BPU: NORMAL
BASOPHILS # BLD AUTO: 0.03 THOUSANDS/ΜL (ref 0–0.1)
BASOPHILS NFR BLD AUTO: 1 % (ref 0–1)
BPU ID: NORMAL
BPU ID: NORMAL
CROSSMATCH: NORMAL
CROSSMATCH: NORMAL
EOSINOPHIL # BLD AUTO: 0.3 THOUSAND/ΜL (ref 0–0.61)
EOSINOPHIL NFR BLD AUTO: 5 % (ref 0–6)
ERYTHROCYTE [DISTWIDTH] IN BLOOD BY AUTOMATED COUNT: 14.6 % (ref 11.6–15.1)
HCT VFR BLD AUTO: 23.9 % (ref 36.5–49.3)
HGB BLD-MCNC: 8 G/DL (ref 12–17)
IMM GRANULOCYTES # BLD AUTO: 0.02 THOUSAND/UL (ref 0–0.2)
IMM GRANULOCYTES NFR BLD AUTO: 0 % (ref 0–2)
LYMPHOCYTES # BLD AUTO: 1.75 THOUSANDS/ΜL (ref 0.6–4.47)
LYMPHOCYTES NFR BLD AUTO: 31 % (ref 14–44)
MCH RBC QN AUTO: 33.1 PG (ref 26.8–34.3)
MCHC RBC AUTO-ENTMCNC: 33.5 G/DL (ref 31.4–37.4)
MCV RBC AUTO: 99 FL (ref 82–98)
MONOCYTES # BLD AUTO: 0.87 THOUSAND/ΜL (ref 0.17–1.22)
MONOCYTES NFR BLD AUTO: 15 % (ref 4–12)
NEUTROPHILS # BLD AUTO: 2.74 THOUSANDS/ΜL (ref 1.85–7.62)
NEUTS SEG NFR BLD AUTO: 48 % (ref 43–75)
NRBC BLD AUTO-RTO: 0 /100 WBCS
PLATELET # BLD AUTO: 377 THOUSANDS/UL (ref 149–390)
PMV BLD AUTO: 9.1 FL (ref 8.9–12.7)
RBC # BLD AUTO: 2.42 MILLION/UL (ref 3.88–5.62)
UNIT DISPENSE STATUS: NORMAL
UNIT DISPENSE STATUS: NORMAL
UNIT PRODUCT CODE: NORMAL
UNIT PRODUCT CODE: NORMAL
UNIT RH: NORMAL
UNIT RH: NORMAL
WBC # BLD AUTO: 5.71 THOUSAND/UL (ref 4.31–10.16)

## 2021-04-25 PROCEDURE — 99232 SBSQ HOSP IP/OBS MODERATE 35: CPT | Performed by: INTERNAL MEDICINE

## 2021-04-25 PROCEDURE — 85025 COMPLETE CBC W/AUTO DIFF WBC: CPT | Performed by: STUDENT IN AN ORGANIZED HEALTH CARE EDUCATION/TRAINING PROGRAM

## 2021-04-25 PROCEDURE — C9113 INJ PANTOPRAZOLE SODIUM, VIA: HCPCS | Performed by: PHYSICIAN ASSISTANT

## 2021-04-25 RX ADMIN — METOPROLOL TARTRATE 25 MG: 25 TABLET, FILM COATED ORAL at 09:35

## 2021-04-25 RX ADMIN — Medication 1 TABLET: at 09:35

## 2021-04-25 RX ADMIN — ATORVASTATIN CALCIUM 40 MG: 40 TABLET, FILM COATED ORAL at 09:35

## 2021-04-25 RX ADMIN — Medication 1000 UNITS: at 09:35

## 2021-04-25 RX ADMIN — FLUTICASONE PROPIONATE 2 SPRAY: 50 SPRAY, METERED NASAL at 09:50

## 2021-04-25 RX ADMIN — FOLIC ACID 1 MG: 1 TABLET ORAL at 09:35

## 2021-04-25 RX ADMIN — QUETIAPINE FUMARATE 600 MG: 300 TABLET ORAL at 21:07

## 2021-04-25 RX ADMIN — HYDROMORPHONE HYDROCHLORIDE 0.2 MG: 0.2 INJECTION, SOLUTION INTRAMUSCULAR; INTRAVENOUS; SUBCUTANEOUS at 21:06

## 2021-04-25 RX ADMIN — HYDROMORPHONE HYDROCHLORIDE 0.2 MG: 0.2 INJECTION, SOLUTION INTRAMUSCULAR; INTRAVENOUS; SUBCUTANEOUS at 16:35

## 2021-04-25 RX ADMIN — TRAZODONE HYDROCHLORIDE 50 MG: 50 TABLET ORAL at 21:06

## 2021-04-25 RX ADMIN — SODIUM CHLORIDE 8 MG/HR: 9 INJECTION, SOLUTION INTRAVENOUS at 12:36

## 2021-04-25 RX ADMIN — LORAZEPAM 0.5 MG: 2 INJECTION INTRAMUSCULAR; INTRAVENOUS at 19:51

## 2021-04-25 RX ADMIN — FLUTICASONE FUROATE AND VILANTEROL TRIFENATATE 1 PUFF: 100; 25 POWDER RESPIRATORY (INHALATION) at 09:50

## 2021-04-25 RX ADMIN — SODIUM CHLORIDE 8 MG/HR: 9 INJECTION, SOLUTION INTRAVENOUS at 02:27

## 2021-04-25 RX ADMIN — HYDROMORPHONE HYDROCHLORIDE 0.2 MG: 0.2 INJECTION, SOLUTION INTRAMUSCULAR; INTRAVENOUS; SUBCUTANEOUS at 09:55

## 2021-04-25 RX ADMIN — THIAMINE HCL TAB 100 MG 100 MG: 100 TAB at 09:50

## 2021-04-25 RX ADMIN — Medication 1 PATCH: at 09:35

## 2021-04-25 RX ADMIN — QUETIAPINE FUMARATE 200 MG: 100 TABLET ORAL at 09:35

## 2021-04-25 NOTE — PROGRESS NOTES
[facility]  Progress Note - Brayden Huntley 1950, 78 y o  male MRN: [id number]  Unit/Bed#: [facility] 929-01 Encounter: [id number]  Primary Care Provider: Shelly Zhu DO   Date and time admitted to hospital: 4/21/2021  5:34 PM    * GI bleed  Assessment & Plan  Presented with signs and symptoms consistent with upper GI bleed  Baseline hemoglobin appears to be normal; acute blood loss anemia during his hospital stay secondary to bleeding with hemoglobin ranging between 8 and 9  Hemodynamically stable otherwise  Status post EGD on 04/22/2021 with finding consistent of multiple duodenal ulcers with evidence of bleeding, status post electrocauterization  IV Protonixthrough this afternoon  Will likely require transition of b i d  Oral PPI at the time  EGD biopsies negative for H pylori  No evidence of further bleeding; will trend 1 more H&H today, otherwise follow CBC in the morning  Diet advanced to non-ulcerogenic, tolerating well  IV fluids discontinued because of the same  Pending BMP from this morning, however BUN had previously normalized        Duodenal ulcer  Assessment & Plan  PPI drip  Status post Sandostatin  Hemoglobin stable  Transition oral PPI twice a day    Hypertension  Assessment & Plan  Metoprolol    COPD without exacerbation ([facility] [address] )  Assessment & Plan  Continue with current meds with    Alcohol dependence with unspecified alcohol-induced disorder ([facility] [address] )  Assessment & Plan  Alcohol cessation counseling        VTE Pharmacologic Prophylaxis:   Pharmacologic: GI bleed  Mechanical VTE Prophylaxis in Place: No    Patient Centered Rounds: I have performed bedside rounds with nursing staff today  Time Spent for Care: 15 minutes  More than 50% of total time spent on counseling and coordination of care as described above      Current Length of Stay: 4 day(s)    Current Patient Status: Inpatient   Certification Statement: The patient will continue to require additional inpatient hospital stay due to Need to monitor symptoms        Code Status: Level 1 - Full Code      Subjective:   No acute distress    Objective:     Vitals:   Temp (24hrs), Av 3 °F (36 8 °C), Min:97 6 °F (36 4 °C), Max:98 9 °F (37 2 °C)    Temp:  [97 6 °F (36 4 °C)-98 9 °F (37 2 °C)] 98 5 °F (36 9 °C)  HR:  [76-79] 78  Resp:  [17-18] 17  BP: (113-116)/(62-68) 116/64  SpO2:  [96 %] 96 %  Body mass index is 22 82 kg/m²  Input and Output Summary (last 24 hours): Intake/Output Summary (Last 24 hours) at 2021 1033  Last data filed at 2021 2216  Gross per 24 hour   Intake 307 33 ml   Output 450 ml   Net -142 67 ml       Physical Exam:     Physical Exam  Constitutional:       Appearance: Normal appearance  HENT:      Head: Normocephalic and atraumatic  Cardiovascular:      Rate and Rhythm: Normal rate and regular rhythm  Pulses: Normal pulses  Heart sounds: Normal heart sounds  Pulmonary:      Effort: Pulmonary effort is normal       Breath sounds: Normal breath sounds  Abdominal:      General: Abdomen is flat  Palpations: Abdomen is soft  Musculoskeletal:         General: No swelling or tenderness  Skin:     General: Skin is warm and dry  Coloration: Skin is not jaundiced  Neurological:      General: No focal deficit present  Mental Status: He is alert and oriented to person, place, and time           Additional Data:     Labs:    Results from last 7 days   Lab Units 21  0649   WBC Thousand/uL 5 58   HEMOGLOBIN g/dL 8 2*   HEMATOCRIT % 23 9*   PLATELETS Thousands/uL 332   NEUTROS PCT % 59   LYMPHS PCT % 28   MONOS PCT % 10   EOS PCT % 3     Results from last 7 days   Lab Units 21  0649   POTASSIUM mmol/L 4 1   CHLORIDE mmol/L 106   CO2 mmol/L 26   BUN mg/dL 15   CREATININE mg/dL 0 89   CALCIUM mg/dL 8 2*   ALK PHOS U/L 39*   ALT U/L 9*   AST U/L 25     Results from last 7 days   Lab Units 21   INR  1 09       * I Have Reviewed All Lab Data Listed Above  * Additional Pertinent Lab Tests Reviewed: All Labs Within Last 24 Hours Reviewed      Recent Cultures (last 7 days):           Last 24 Hours Medication List:   Current Facility-Administered Medications   Medication Dose Route Frequency Provider Last Rate    albuterol  2 puff Inhalation Q4H PRN Leanne Ocampo PA-C      atorvastatin  40 mg Oral Daily Leanne Ocampo PA-C      calcium carbonate  500 mg Oral Daily PRN Leanne Ocampo PA-C      cholecalciferol  1,000 Units Oral Daily Leanne Ocampo PA-C      fluticasone  2 spray Nasal Daily Leanne Ocampo PA-C      fluticasone-vilanterol  1 puff Inhalation Daily Mckay Summers PA-C      folic acid  1 mg Oral Daily Scotty Mchugh MD      hydrALAZINE  5 mg Intravenous Q6H PRN Donnie NestBRIGITTE lewis      HYDROmorphone  0 2 mg Intravenous Q4H PRN Donnie NestBRIGITTE lewis      Labetalol HCl  10 mg Intravenous Q6H PRN Mariano Summers PA-C      LORazepam  0 5 mg Intravenous Q6H PRN Leanne Ocampo PA-C      metoprolol tartrate  25 mg Oral Q12H [address] Scotty Mchugh MD      multivitamin-minerals  1 tablet Oral Daily Scotty Mchugh MD      nicotine  1 patch Transdermal Daily Leanne Ocampo PA-C      ondansetron  4 mg Oral Q6H PRN Leanne Ocampo PA-C      pantoprozole (PROTONIX) infusion (Continuous)  8 mg/hr Intravenous Continuous Mckay Summers PA-C 8 mg/hr (04/25/21 0227)    QUEtiapine  200 mg Oral Daily Leanne Ocampo PA-C      QUEtiapine  600 mg Oral HS Scotty Mchugh MD      thiamine  100 mg Oral Daily Scotty Mchugh MD      traZODone  50 mg Oral HS Leanne Ocampo PA-C          Today, Patient Was Seen By: Latrell Ruffin DO    ** Please Note: Dictation voice to text software may have been used in the creation of this document   **

## 2021-04-25 NOTE — ASSESSMENT & PLAN NOTE
Presented with signs and symptoms consistent with upper GI bleed  Baseline hemoglobin appears to be normal; acute blood loss anemia during his hospital stay secondary to bleeding with hemoglobin ranging between 8 and 9  Hemodynamically stable otherwise  Status post EGD on 04/22/2021 with finding consistent of multiple duodenal ulcers with evidence of bleeding, status post electrocauterization  IV Protonixthrough this afternoon  Will likely require transition of b i d  Oral PPI at the time  EGD biopsies negative for H pylori  No evidence of further bleeding; will trend 1 more H&H today, otherwise follow CBC in the morning  Diet advanced to non-ulcerogenic, tolerating well  IV fluids discontinued because of the same    Pending BMP from this morning, however BUN had previously normalized

## 2021-04-25 NOTE — NURSING NOTE
120 ml of gastrografin was administered via the NG tube per order  Patient tolerated the procedure well

## 2021-04-26 LAB
ANION GAP SERPL CALCULATED.3IONS-SCNC: 6 MMOL/L (ref 4–13)
BASOPHILS # BLD AUTO: 0.03 THOUSANDS/ΜL (ref 0–0.1)
BASOPHILS NFR BLD AUTO: 1 % (ref 0–1)
BUN SERPL-MCNC: 12 MG/DL (ref 5–25)
CALCIUM SERPL-MCNC: 8.5 MG/DL (ref 8.3–10.1)
CHLORIDE SERPL-SCNC: 106 MMOL/L (ref 100–108)
CO2 SERPL-SCNC: 24 MMOL/L (ref 21–32)
CREAT SERPL-MCNC: 0.91 MG/DL (ref 0.6–1.3)
EOSINOPHIL # BLD AUTO: 0.25 THOUSAND/ΜL (ref 0–0.61)
EOSINOPHIL NFR BLD AUTO: 5 % (ref 0–6)
ERYTHROCYTE [DISTWIDTH] IN BLOOD BY AUTOMATED COUNT: 14.8 % (ref 11.6–15.1)
GFR SERPL CREATININE-BSD FRML MDRD: 86 ML/MIN/1.73SQ M
GLUCOSE SERPL-MCNC: 109 MG/DL (ref 65–140)
HCT VFR BLD AUTO: 22.9 % (ref 36.5–49.3)
HGB BLD-MCNC: 7.5 G/DL (ref 12–17)
IMM GRANULOCYTES # BLD AUTO: 0.03 THOUSAND/UL (ref 0–0.2)
IMM GRANULOCYTES NFR BLD AUTO: 1 % (ref 0–2)
LYMPHOCYTES # BLD AUTO: 1.75 THOUSANDS/ΜL (ref 0.6–4.47)
LYMPHOCYTES NFR BLD AUTO: 31 % (ref 14–44)
MCH RBC QN AUTO: 32.9 PG (ref 26.8–34.3)
MCHC RBC AUTO-ENTMCNC: 32.8 G/DL (ref 31.4–37.4)
MCV RBC AUTO: 100 FL (ref 82–98)
MONOCYTES # BLD AUTO: 0.88 THOUSAND/ΜL (ref 0.17–1.22)
MONOCYTES NFR BLD AUTO: 16 % (ref 4–12)
NEUTROPHILS # BLD AUTO: 2.64 THOUSANDS/ΜL (ref 1.85–7.62)
NEUTS SEG NFR BLD AUTO: 46 % (ref 43–75)
NRBC BLD AUTO-RTO: 0 /100 WBCS
PLATELET # BLD AUTO: 403 THOUSANDS/UL (ref 149–390)
PMV BLD AUTO: 9.4 FL (ref 8.9–12.7)
POTASSIUM SERPL-SCNC: 4.4 MMOL/L (ref 3.5–5.3)
RBC # BLD AUTO: 2.28 MILLION/UL (ref 3.88–5.62)
SODIUM SERPL-SCNC: 136 MMOL/L (ref 136–145)
WBC # BLD AUTO: 5.58 THOUSAND/UL (ref 4.31–10.16)

## 2021-04-26 PROCEDURE — 97163 PT EVAL HIGH COMPLEX 45 MIN: CPT

## 2021-04-26 PROCEDURE — 99232 SBSQ HOSP IP/OBS MODERATE 35: CPT | Performed by: INTERNAL MEDICINE

## 2021-04-26 PROCEDURE — 80048 BASIC METABOLIC PNL TOTAL CA: CPT | Performed by: INTERNAL MEDICINE

## 2021-04-26 PROCEDURE — 97167 OT EVAL HIGH COMPLEX 60 MIN: CPT

## 2021-04-26 PROCEDURE — 99222 1ST HOSP IP/OBS MODERATE 55: CPT | Performed by: PSYCHIATRY & NEUROLOGY

## 2021-04-26 PROCEDURE — C9113 INJ PANTOPRAZOLE SODIUM, VIA: HCPCS | Performed by: PHYSICIAN ASSISTANT

## 2021-04-26 PROCEDURE — 85025 COMPLETE CBC W/AUTO DIFF WBC: CPT | Performed by: INTERNAL MEDICINE

## 2021-04-26 RX ORDER — PANTOPRAZOLE SODIUM 40 MG/1
40 TABLET, DELAYED RELEASE ORAL
Status: DISCONTINUED | OUTPATIENT
Start: 2021-04-26 | End: 2021-05-07 | Stop reason: HOSPADM

## 2021-04-26 RX ORDER — SENNA AND DOCUSATE SODIUM 50; 8.6 MG/1; MG/1
1 TABLET, FILM COATED ORAL DAILY
Qty: 7 TABLET | Refills: 0 | Status: CANCELLED | OUTPATIENT
Start: 2021-04-26

## 2021-04-26 RX ORDER — POLYETHYLENE GLYCOL 3350 17 G/17G
17 POWDER, FOR SOLUTION ORAL
Status: DISCONTINUED | OUTPATIENT
Start: 2021-04-26 | End: 2021-05-07 | Stop reason: HOSPADM

## 2021-04-26 RX ORDER — ACETAMINOPHEN 325 MG/1
650 TABLET ORAL EVERY 6 HOURS PRN
Qty: 30 TABLET | Refills: 0 | Status: CANCELLED | OUTPATIENT
Start: 2021-04-26

## 2021-04-26 RX ORDER — OXYCODONE HYDROCHLORIDE 5 MG/1
2.5 TABLET ORAL EVERY 4 HOURS PRN
Status: DISCONTINUED | OUTPATIENT
Start: 2021-04-26 | End: 2021-04-27

## 2021-04-26 RX ORDER — ACETAMINOPHEN 325 MG/1
650 TABLET ORAL EVERY 6 HOURS PRN
Status: DISCONTINUED | OUTPATIENT
Start: 2021-04-26 | End: 2021-05-07 | Stop reason: HOSPADM

## 2021-04-26 RX ORDER — CALCIUM CARBONATE 200(500)MG
500 TABLET,CHEWABLE ORAL DAILY PRN
Qty: 30 TABLET | Refills: 0 | Status: CANCELLED | OUTPATIENT
Start: 2021-04-26

## 2021-04-26 RX ORDER — TRAMADOL HYDROCHLORIDE 50 MG/1
50 TABLET ORAL EVERY 6 HOURS PRN
Status: DISCONTINUED | OUTPATIENT
Start: 2021-04-26 | End: 2021-04-26

## 2021-04-26 RX ORDER — OXYCODONE HYDROCHLORIDE 5 MG/1
5 TABLET ORAL EVERY 4 HOURS PRN
Status: DISCONTINUED | OUTPATIENT
Start: 2021-04-26 | End: 2021-04-27

## 2021-04-26 RX ORDER — ACETAMINOPHEN 325 MG/1
650 TABLET ORAL EVERY 6 HOURS PRN
Status: DISCONTINUED | OUTPATIENT
Start: 2021-04-26 | End: 2021-04-26

## 2021-04-26 RX ORDER — SENNOSIDES 8.6 MG
2 TABLET ORAL ONCE
Status: COMPLETED | OUTPATIENT
Start: 2021-04-26 | End: 2021-04-26

## 2021-04-26 RX ORDER — NICOTINE 21 MG/24HR
1 PATCH, TRANSDERMAL 24 HOURS TRANSDERMAL DAILY
Qty: 28 PATCH | Refills: 0 | Status: CANCELLED | OUTPATIENT
Start: 2021-04-27

## 2021-04-26 RX ADMIN — POLYETHYLENE GLYCOL 3350 17 G: 17 POWDER, FOR SOLUTION ORAL at 11:26

## 2021-04-26 RX ADMIN — HYDROMORPHONE HYDROCHLORIDE 0.2 MG: 0.2 INJECTION, SOLUTION INTRAMUSCULAR; INTRAVENOUS; SUBCUTANEOUS at 06:27

## 2021-04-26 RX ADMIN — OXYCODONE HYDROCHLORIDE 5 MG: 5 TABLET ORAL at 19:40

## 2021-04-26 RX ADMIN — QUETIAPINE FUMARATE 200 MG: 100 TABLET ORAL at 09:16

## 2021-04-26 RX ADMIN — CALCIUM CARBONATE (ANTACID) CHEW TAB 500 MG 500 MG: 500 CHEW TAB at 01:17

## 2021-04-26 RX ADMIN — HYDROMORPHONE HYDROCHLORIDE 0.2 MG: 0.2 INJECTION, SOLUTION INTRAMUSCULAR; INTRAVENOUS; SUBCUTANEOUS at 01:11

## 2021-04-26 RX ADMIN — OXYCODONE HYDROCHLORIDE 5 MG: 5 TABLET ORAL at 14:14

## 2021-04-26 RX ADMIN — LORAZEPAM 0.5 MG: 2 INJECTION INTRAMUSCULAR; INTRAVENOUS at 09:29

## 2021-04-26 RX ADMIN — TRAMADOL HYDROCHLORIDE 50 MG: 50 TABLET, FILM COATED ORAL at 10:45

## 2021-04-26 RX ADMIN — METOPROLOL TARTRATE 25 MG: 25 TABLET, FILM COATED ORAL at 09:16

## 2021-04-26 RX ADMIN — Medication 1000 UNITS: at 09:16

## 2021-04-26 RX ADMIN — THIAMINE HCL TAB 100 MG 100 MG: 100 TAB at 09:16

## 2021-04-26 RX ADMIN — PANTOPRAZOLE SODIUM 40 MG: 40 TABLET, DELAYED RELEASE ORAL at 16:15

## 2021-04-26 RX ADMIN — FOLIC ACID 1 MG: 1 TABLET ORAL at 09:16

## 2021-04-26 RX ADMIN — ATORVASTATIN CALCIUM 40 MG: 40 TABLET, FILM COATED ORAL at 09:16

## 2021-04-26 RX ADMIN — Medication 1 TABLET: at 09:16

## 2021-04-26 RX ADMIN — ALBUTEROL SULFATE 2 PUFF: 90 AEROSOL, METERED RESPIRATORY (INHALATION) at 22:39

## 2021-04-26 RX ADMIN — QUETIAPINE FUMARATE 600 MG: 300 TABLET ORAL at 22:39

## 2021-04-26 RX ADMIN — TRAZODONE HYDROCHLORIDE 50 MG: 50 TABLET ORAL at 22:39

## 2021-04-26 RX ADMIN — Medication 1 PATCH: at 09:18

## 2021-04-26 RX ADMIN — METOPROLOL TARTRATE 25 MG: 25 TABLET, FILM COATED ORAL at 22:39

## 2021-04-26 RX ADMIN — SENNOSIDES 17.2 MG: 8.6 TABLET, FILM COATED ORAL at 11:26

## 2021-04-26 RX ADMIN — FLUTICASONE PROPIONATE 2 SPRAY: 50 SPRAY, METERED NASAL at 09:14

## 2021-04-26 RX ADMIN — FLUTICASONE FUROATE AND VILANTEROL TRIFENATATE 1 PUFF: 100; 25 POWDER RESPIRATORY (INHALATION) at 09:14

## 2021-04-26 RX ADMIN — SODIUM CHLORIDE 8 MG/HR: 9 INJECTION, SOLUTION INTRAVENOUS at 01:11

## 2021-04-26 NOTE — PROGRESS NOTES
[facility]  Progress Note - Aleisha Egkory 1950, 78 y o  male MRN: [id number]  [facility] Encounter: [id number]  Primary Care Provider: Stanton Trinidad DO   Date and time admitted to hospital: 4/21/2021  5:34 PM      * GI bleed  Assessment & Plan  Presented with signs and symptoms consistent with upper GI bleed  Baseline hemoglobin appears to be normal; acute blood loss anemia during his hospital stay secondary to bleeding with hemoglobin ranging between 8 and 9  Hemodynamically stable otherwise  Hemoglobin trended down to 7 5; however, BUN within normal range and actually constipated arguing against continued duodenal ulcer bleed  Will trend CBC and BMP in am  Status post EGD on 04/22/2021-multiple duodenal ulcers with evidence of bleeding status post electrocauterization  Status post Protonix drip, transition to oral Protonix twice per day today  Requires outpatient GI follow-up  Counseled extensively on alcohol on smoking cessation to prevent progression of underlying ulcers  EGD biopsies negative for H pylori  Continue non ulcerogenic GI diet         Duodenal ulcer  Assessment & Plan  Is see above management plan    Schizophrenia [facility])  Assessment & Plan  Patient was admitted at [address] [place] [address] for suicidal ideation  His hospital course was complicated by abdominal pain with hematemesis and he subsequently was transferred to the ICU there  Upon drop of his hemoglobin he was transferred to [facility] for GI evaluation  Patient is hemodynamically stable  Patient denies any suicidal ideation or homicidal ideation  Status post psychiatric evaluation earlier today, clear for outpatient follow-up from a psychiatric standpoint  Tobacco abuse  Assessment & Plan  Using nicotine patch  Counseled on smoking cessation    Chronic pain  Assessment & Plan  Complaining of chronic back pain, severe and interfering with activities of daily living  States severe unrelenting pain is well actually let him to developed suicidal ideation  Will initiate oxycodone 2 5 mg p r n  Moderate pain, 5 mg p r n  Severe pain  COPD without exacerbation [facility])  Assessment & Plan  Continue with current meds     Ambulatory dysfunction  Assessment & Plan  PT/OT evaluated the patient recommending rehabilitation services after discharge  Patient is now agreeable;  on board  Alcohol dependence with unspecified alcohol-induced disorder [facility])  Assessment & Plan  Alcohol cessation counseling; refusing inpatient rehab  CIWA discontinued given persistent scores of 0    PM&R consulted, appreciate recommendations      Disposition: Anticipated discharge medically clear pending rehab placement    VTE Prophylaxis - SCDs    Education and Discussions with Family / Patient:  Discussed with patient and patient's son  Current Length of Stay: 5 day(s)  Current Patient Status: Inpatient     Code Status: Level 1 - Full Code      Subjective:   Seen and examined at bedside  In no acute distress  Complaining of constipation along with chronic back pain not currently well controlled; no further episodes of nausea or vomiting or melanotic stools  Does complain of intermittent epigastric pain  Agreeable for rehab placement recommended by Physical therapy  Objective:     Vitals:   Temp (24hrs), Av 6 °F (37 °C), Min:98 2 °F (36 8 °C), Max:99 °F (37 2 °C)    Temp:  [98 2 °F (36 8 °C)-99 °F (37 2 °C)] 98 2 °F (36 8 °C)  HR:  [77-80] 78  Resp:  [18-20] 20  BP: (108-129)/(54-64) 129/64  SpO2:  [95 %-96 %] 95 %  Body mass index is 23 01 kg/m²  Invasive Devices     Peripheral Intravenous Line            Peripheral IV 21 Distal;Right;Upper; Lateral Arm 2 days    Peripheral IV 21 Right Wrist less than 1 day                Input and Output Summary (last 24 hours):        Intake/Output Summary (Last 24 hours) at 2021 1345  Last data filed at 2021 1247  Gross per 24 hour   Intake 848 ml   Output 1850 ml   Net -1002 ml       Physical Exam:     Gen: in bed; room air  HEENT: NC/AT, PERRLA, no conjunctival pallor or scleral icterus, moist mucous membranes  RS: symmetric, speaking in full sentences, CTA bilaterally  CVS:  RRR, S1-S2 heard without murmurs, no peripheral edema, radial pulses 2+, capillary refill less than 2 seconds  Abdomen:  Soft,ND, mild discomfort on palpation diffusely, bowel sounds heard  MSK:  Warm  Moves all 4 extremities  :  No Balderas  Neuro:  AAO x4  Psych:  Cooperative, denies suicidal ideation or thoughts or homicidal thoughts        Additional Data:     Labs:    Results from last 7 days   Lab Units 04/26/21  0617   WBC Thousand/uL 5 58   HEMOGLOBIN g/dL 7 5*   HEMATOCRIT % 22 9*   PLATELETS Thousands/uL 403*   NEUTROS PCT % 46   LYMPHS PCT % 31   MONOS PCT % 16*   EOS PCT % 5     Results from last 7 days   Lab Units 04/26/21  0617 04/24/21  0649   POTASSIUM mmol/L 4 4 4 1   CHLORIDE mmol/L 106 106   CO2 mmol/L 24 26   BUN mg/dL 12 15   CREATININE mg/dL 0 91 0 89   CALCIUM mg/dL 8 5 8 2*   ALK PHOS U/L  --  39*   ALT U/L  --  9*   AST U/L  --  25     Results from last 7 days   Lab Units 04/22/21  2034   INR  1 09       * I Have Reviewed All Lab Data Listed Above  * Additional Pertinent Lab Tests Reviewed:  [address] 68 Admission Reviewed    Imaging:    Imaging Reports Reviewed Today Include:  None  Imaging Personally Reviewed by Myself Includes:  None    Recent Cultures (last 7 days):           Last 24 Hours Medication List:   Current Facility-Administered Medications   Medication Dose Route Frequency Provider Last Rate    acetaminophen  650 mg Oral Q6H PRN Garima Chan MD      albuterol  2 puff Inhalation Q4H PRN Leanne Ocampo PA-C      atorvastatin  40 mg Oral Daily Leanne Ocampo PA-C      calcium carbonate  500 mg Oral Daily PRN Leanne Ocampo PA-C      cholecalciferol  1,000 Units Oral Daily Leanne Ocampo PA-C  fluticasone  2 spray Nasal Daily Leanne Ocampo PA-C      fluticasone-vilanterol  1 puff Inhalation Daily Danielle Negrete PA-C      folic acid  1 mg Oral Daily Fanny Mariano MD      hydrALAZINE  5 mg Intravenous Q6H PRN Danielle Negrete PA-C      Labetalol HCl  10 mg Intravenous Q6H PRN Alexey Kendra Summers PA-C      LORazepam  0 5 mg Intravenous Q6H PRN Leanne Ocampo PA-C      metoprolol tartrate  25 mg Oral Q12H [facility] Fanny Mariano MD      multivitamin-minerals  1 tablet Oral Daily Fanny Mariano MD      nicotine  1 patch Transdermal Daily Leanne Ocampo PA-C      ondansetron  4 mg Oral Q6H PRN Leanne Ocampo PA-C      pantoprazole  40 mg Oral BID AC Fanny Mariano MD      polyethylene glycol  17 g Oral BID AC Fanny Mariano MD      QUEtiapine  200 mg Oral Daily Leanne Ocampo PA-C      QUEtiapine  600 mg Oral HS Fanny Mariano MD      thiamine  100 mg Oral Daily Fanny Mariano MD      traMADol  50 mg Oral Q6H PRN Fanny Mariano MD      traZODone  50 mg Oral HS Leanne Ocampo PA-C              ** Please Note: This note has been constructed using a voice recognition system   **
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
## 2021-04-26 NOTE — ASSESSMENT & PLAN NOTE
PT/OT evaluated the patient recommending rehabilitation services after discharge  Patient is now agreeable;  on board
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
## 2021-04-26 NOTE — ASSESSMENT & PLAN NOTE
Alcohol cessation counseling; refusing inpatient rehab    CIWA discontinued given persistent scores of 0

## 2021-04-26 NOTE — ASSESSMENT & PLAN NOTE
Complaining of chronic back pain, severe and interfering with activities of daily living  States severe unrelenting pain is well actually let him to developed suicidal ideation  Will initiate oxycodone 2 5 mg p r n  Moderate pain, 5 mg p r n  Severe pain

## 2021-04-26 NOTE — PLAN OF CARE
Problem: OCCUPATIONAL THERAPY ADULT  Goal: Performs self-care activities at highest level of function for planned discharge setting  See evaluation for individualized goals  Description: Treatment Interventions: ADL retraining, Functional transfer training, UE strengthening/ROM, Endurance training, Patient/family training, Equipment evaluation/education, Compensatory technique education, Continued evaluation, Energy conservation, Activityengagement          See flowsheet documentation for full assessment, interventions and recommendations  Note: Limitation: Decreased ADL status, Decreased UE strength, Decreased Safe judgement during ADL, Decreased endurance, Decreased self-care trans, Decreased high-level ADLs  Prognosis: Good  Assessment: Pt is a 78 y o  male admitted to [facility] on 4/21/2021 w/ GI bleed + suicidal ideation  has a past medical history of Alcohol abuse, BPH (benign prostatic hyperplasia), CVA (cerebral vascular accident) ([facility] [address] ), DJD (degenerative joint disease), Encounter to establish care with new doctor (8/21/2019), Gait abnormality, Head injury, History of cerebrovascular accident (CVA) with residual deficit (10/1/2019), History of CVA (cerebrovascular accident) (1/8/2020), History of substance abuse ([facility] [address] ) (8/21/2019), History of sustained ventricular tachycardia (8/21/2019), History of transient ischemic attack (TIA) (8/28/2019), Hypertension, Metatarsal fracture, TIA (transient ischemic attack), and Tobacco abuse  Pt with active OT orders and activity as tolerated orders  As per pt report, pta, resides in a 2STH, 1STE with his son  Pt was I w/ ADLS ("mostly") and reports increased difficulty with IADLS  Upon evaluation, pt currently requires CGA for sup to sit, MOD A x 2 STS and MIN A x 2 for safety during fnxl mobility with VCs required for RW safety  Pt currently  requires S UB ADLs, MIN A LB ADLs, and MIN A toileting   Pt is limited at this time 2*: pain, endurance, activity tolerance, functional mobility, balance, functional standing tolerance, unsupportive home environment, decreased I w/ ADLS/IADLS, strength and decreased safety awareness  The following Occupational Performance Areas to address include: eating, grooming, bathing/shower, toilet hygiene, dressing, health maintenance, functional mobility, community mobility and clothing management  Pt to benefit from immediate acute skilled OT to address above deficits, improve overall functional independence, maximize fnxl mobility and reduce caregiver burden  From OT standpoint, recommendation at time of d/c would be STR - pt reports son works during the day and he has been having difficulty at home during these times  Pt was left in chair with alarm on after session with all current needs met  The patient's raw score on the AM-PAC Daily Activity inpatient short form is 18, standardized score is 38 66, less than 39 4  Patients at this level are likely to benefit from discharge to post-acute rehabilitation services  Please refer to the recommendation of the Occupational Therapist for safe discharge planning    Recommendation: Psych Consult  OT Discharge Recommendation: Post acute rehabilitation services  OT - OK to Discharge: Yes(when medically stable )

## 2021-04-26 NOTE — PLAN OF CARE
Problem: Prexisting or High Potential for Compromised Skin Integrity  Goal: Skin integrity is maintained or improved  Description: INTERVENTIONS:  - Identify patients at risk for skin breakdown  - Assess and monitor skin integrity  - Assess and monitor nutrition and hydration status  - Monitor labs   - Assess for incontinence   - Turn and reposition patient  - Assist with mobility/ambulation  - Relieve pressure over bony prominences  - Avoid friction and shearing  - Provide appropriate hygiene as needed including keeping skin clean and dry  - Evaluate need for skin moisturizer/barrier cream  - Collaborate with interdisciplinary team   - Patient/family teaching  - Consider wound care consult   Outcome: Progressing     Problem: Potential for Falls  Goal: Patient will remain free of falls  Description: INTERVENTIONS:  - Assess patient frequently for physical needs  -  Identify cognitive and physical deficits and behaviors that affect risk of falls    -  [place] fall precautions as indicated by assessment   - Educate patient/family on patient safety including physical limitations  - Instruct patient to call for assistance with activity based on assessment  - Modify environment to reduce risk of injury  - Consider OT/PT consult to assist with strengthening/mobility  Outcome: Progressing     Problem: PAIN - ADULT  Goal: Verbalizes/displays adequate comfort level or baseline comfort level  Description: Interventions:  - Encourage patient to monitor pain and request assistance  - Assess pain using appropriate pain scale  - Administer analgesics based on type and severity of pain and evaluate response  - Implement non-pharmacological measures as appropriate and evaluate response  - Consider cultural and social influences on pain and pain management  - Notify physician/advanced practitioner if interventions unsuccessful or patient reports new pain  Outcome: Progressing     Problem: INFECTION - ADULT  Goal: Absence or prevention of progression during hospitalization  Description: INTERVENTIONS:  - Assess and monitor for signs and symptoms of infection  - Monitor lab/diagnostic results  - Monitor all insertion sites, i e  indwelling lines, tubes, and drains  - Monitor endotracheal if appropriate and nasal secretions for changes in amount and color  - [place] appropriate cooling/warming therapies per order  - Administer medications as ordered  - Instruct and encourage patient and family to use good hand hygiene technique  - Identify and instruct in appropriate isolation precautions for identified infection/condition  Outcome: Progressing  Goal: Absence of fever/infection during neutropenic period  Description: INTERVENTIONS:  - Monitor WBC    Outcome: Progressing     Problem: SAFETY ADULT  Goal: Patient will remain free of falls  Description: INTERVENTIONS:  - Assess patient frequently for physical needs  -  Identify cognitive and physical deficits and behaviors that affect risk of falls    -  [place] fall precautions as indicated by assessment   - Educate patient/family on patient safety including physical limitations  - Instruct patient to call for assistance with activity based on assessment  - Modify environment to reduce risk of injury  - Consider OT/PT consult to assist with strengthening/mobility  Outcome: Progressing  Goal: Maintain or return to baseline ADL function  Description: INTERVENTIONS:  -  Assess patient's ability to carry out ADLs; assess patient's baseline for ADL function and identify physical deficits which impact ability to perform ADLs (bathing, care of mouth/teeth, toileting, grooming, dressing, etc )  - Assess/evaluate cause of self-care deficits   - Assess range of motion  - Assess patient's mobility; develop plan if impaired  - Assess patient's need for assistive devices and provide as appropriate  - Encourage maximum independence but intervene and supervise when necessary  - Involve family in performance of ADLs  - Assess for home care needs following discharge   - Consider OT consult to assist with ADL evaluation and planning for discharge  - Provide patient education as appropriate  Outcome: Progressing  Goal: Maintain or return mobility status to optimal level  Description: INTERVENTIONS:  - Assess patient's baseline mobility status (ambulation, transfers, stairs, etc )    - Identify cognitive and physical deficits and behaviors that affect mobility  - Identify mobility aids required to assist with transfers and/or ambulation (gait belt, sit-to-stand, lift, walker, cane, etc )  - [place] fall precautions as indicated by assessment  - Record patient progress and toleration of activity level on Mobility SBAR; progress patient to next Phase/Stage  - Instruct patient to call for assistance with activity based on assessment  - Consider rehabilitation consult to assist with strengthening/weightbearing, etc   Outcome: Progressing     Problem: DISCHARGE PLANNING  Goal: Discharge to home or other facility with appropriate resources  Description: INTERVENTIONS:  - Identify barriers to discharge w/patient and caregiver  - Arrange for needed discharge resources and transportation as appropriate  - Identify discharge learning needs (meds, wound care, etc )  - Arrange for interpretive services to assist at discharge as needed  - Refer to Case Management Department for coordinating discharge planning if the patient needs post-hospital services based on physician/advanced practitioner order or complex needs related to functional status, cognitive ability, or social support system  Outcome: Progressing

## 2021-04-26 NOTE — PHYSICAL THERAPY NOTE
Physical Therapy Evaluation     Patient's Name: [facility] [place]    Admitting Diagnosis  GI bleed [K92 2]    Problem List  Patient Active Problem List   Diagnosis    Major depressive disorder, recurrent, severe with psychotic features ([facility] )    Alcohol dependence with unspecified alcohol-induced disorder ([facility] )    Hypokinesia of left ventricle    Bilateral lower extremity edema    Tachycardia    Back pain without sciatica    Elevated fasting glucose    Ambulatory dysfunction    Poor mobility    Overweight (BMI 25 0-29  9)    Vitamin D deficiency    Abnormal echocardiogram    Syncope    Alcohol abuse    MDD (major depressive disorder)    COPD without exacerbation [facility]    Dyslipidemia    Chronic pain    History of anemia    Tobacco abuse    Inadequate oral nutritional intake    History of duodenal ulcer    Anemia    Schizophrenia (HCC)    Open toe wound, initial encounter    Chronic obstructive pulmonary disease (HCC)    History of bleeding ulcers    History of exploratory laparotomy    History of syncope    History of GI bleed    History of suicidal ideation    Spontaneous bruising    Medicare annual wellness visit, subsequent    Cold hands and feet    Aortic atherosclerosis ([facility] )    Raynaud's disease without gangrene    Acute upper GI bleed    Acute blood loss anemia    GI bleed    Hypertension    Duodenal ulcer       Past Medical History  Past Medical History:   Diagnosis Date    Alcohol abuse     BPH (benign prostatic hyperplasia)     CVA (cerebral vascular accident) ([facility] )     DJD (degenerative joint disease)     Encounter to establish care with new doctor 8/21/2019    Gait abnormality     Head injury     History of cerebrovascular accident (CVA) with residual deficit 10/1/2019    History of CVA (cerebrovascular accident) 1/8/2020    History of substance abuse ([facility] ) 8/21/2019    Polysubstance     History of sustained ventricular tachycardia 8/21/2019    History of transient ischemic attack (TIA) 8/28/2019    Hypertension     Metatarsal fracture     TIA (transient ischemic attack)     Tobacco abuse        Past Surgical History  Past Surgical History:   Procedure Laterality Date    ABDOMINAL SURGERY      ANKLE SURGERY      BACK SURGERY      CT GUIDED [facility] DRAINAGE CATHETER PLACEMENT  1/27/2020    ELBOW SURGERY      IR VISCERAL ANGIOGRAPHY / INTERVENTION  1/11/2020    JOINT REPLACEMENT      KNEE SURGERY      LAPAROTOMY N/A 1/11/2020    Procedure: LAPAROTOMY EXPLORATORY,  OVERSEW  OF GASTRO DUODENAL ARTERY, THAL PATCH OF DUODENUM, VICKY GASTRO JEJUNOSTOMY TUBE;  Surgeon: Samy Wiley DO;  Location: [facility];  Service: General    LIVER SURGERY          04/26/21 1104   PT Last Visit   PT Visit Date 04/26/21   Note Type   Note type Evaluation   Pain Assessment   Pain Assessment Tool 0-10   Pain Score 8   Pain Location/Orientation Location: Abdomen   Patient's Stated Pain Goal No pain   Hospital Pain Intervention(s) Repositioned; Ambulation/increased activity   Home Living   Type of [address] Two level  (1 RENETTA)   Home Equipment Cane   Prior Function   Level of [place] Independent with ADLs and functional mobility   Lives With Son   Receives Help From Family   ADL Assistance Independent   IADLs Needs assistance   Falls in the last 6 months 1 to 4   Restrictions/Precautions   Weight Bearing Precautions Per Order No   Other Precautions Cognitive; Chair Alarm; Bed Alarm; Fall Risk;Pain;Telemetry;Multiple lines   General   Family/Caregiver Present No   Cognition   Arousal/Participation Alert   Orientation Level Oriented X4   Memory Decreased recall of precautions   Following Commands Follows one step commands with increased time or repetition   Comments Pt agreeable to participate in therapy session  Pt with decreased safety awareness and insight on current deficits      RLE Assessment   RLE Assessment   (Grossly 3+/5 )   LLE Assessment   LLE Assessment (Grossly 3+/5 )   Bed Mobility   Supine to Sit   (CGA )   Additional items Assist x 1;HOB elevated; Increased time required   Sit to Supine Unable to assess   Additional Comments Pt supine in bed upon arrival  Pt sat EOB at S level  Pt sitting upright in bedside chair with chair alarm intact/functioning with all needs within reach at the end of therapy session    Transfers   Sit to Stand 3  Moderate assistance   Additional items Assist x 2; Increased time required;Verbal cues   Stand to Sit 3  Moderate assistance   Additional items Assist x 2; Increased time required;Verbal cues   Additional Comments transfers with RW; cues for hand placement and sequencing    Ambulation/Elevation   Gait pattern Excessively slow; Short stride;Decreased foot clearance; Foward flexed   Gait Assistance 4  Minimal assist   Additional items Assist x 2;Verbal cues; Tactile cues   Assistive Device Rolling walker   Distance 3ft to chair    Balance   Static Sitting Fair   Dynamic Sitting Fair -   Static Standing Poor   Dynamic Standing Poor -   Ambulatory Poor -   Endurance Deficit   Endurance Deficit Yes   Endurance Deficit Description fatigue, weakness, pain    Activity Tolerance   Activity Tolerance Patient limited by fatigue;Patient limited by pain   Medical Staff [address]    Nurse Made Aware RN cleared pt to participate in therapy session    Assessment   Prognosis Fair   Problem List Decreased strength;Decreased endurance; Impaired balance;Decreased mobility; Decreased cognition; Impaired judgement;Decreased safety awareness;Pain   Assessment Pt seen for high complexity PT evaluation  Pt with active PT eval/treat orders  Pt is a 78 y o  male who was admitted to [facility] on 4/21/2021 with GI bleed  Pt's active impairments include: duodenal ulcer, hypertension, COPD, alcohol dependence with unspecified alcohol-induced disorder   Pt  has a past medical history of Alcohol abuse, BPH (benign prostatic hyperplasia), CVA [facility]cerebral vascular accident) ([facility] ), DJD (degenerative joint disease), Encounter to establish care with new doctor (8/21/2019), Gait abnormality, Head injury, History of cerebrovascular accident (CVA) with residual deficit (10/1/2019), History of CVA (cerebrovascular accident) (1/8/2020), History of substance abuse ([facility] ) (8/21/2019), History of sustained ventricular tachycardia (8/21/2019), History of transient ischemic attack (TIA) (8/28/2019), Hypertension, Metatarsal fracture, TIA (transient ischemic attack), and Tobacco abuse  Pt resides with son in 2  with 1 RENETTA and was independent prior to hospital admission  Pt has limitations in strength, balance, endurance, and overall functional mobility  Pt performed bed mobility tasks with CGA; HOB elevated, bedrails utilized and increased time required to complete tasks  Pt performed STS from EOB to RW with mod Ax2; cues for hand placement and sequencing required  Pt ambulated from EOB to bedside chair with RW and min Ax2; cues for RW management and safety required with poor understanding/carryover  Pt was left sitting upright in bedside chair with chair alarm intact/functioning at the end of PT session with all needs in reach  Pt would benefit from continued PT services while in hospital to address remaining limitations  PT to continue to follow pt and recommends post-acute rehab services after d/c  The patient's AM-PAC Basic Mobility Inpatient Short Form Low Function Raw Score 18 , Standardized Score is 29 25  A standardized score less 42 9 suggests the patient may benefit from discharge to post-acute rehab services  Please also refer to the recommendation of the Physical Therapist for safe discharge planning  Barriers to Discharge Inaccessible home environment;Decreased caregiver support  (son works during day )   Goals   Patient Goals To rest    STG Expiration Date 05/10/21   Short Term Goal #1 STG 1   Pt will be able to perform bed mobility with mod I in order to improve overall functional mobility and assist in safe d/c  STG 2  Pt will be able to perform functional transfer with mod I in order to improve overall functional mobility and assist in safe d/c  STG 3  Pt will be able to ambulate at least 150 feet with least restrictive device with mod I A in order to improve overall functional mobility and assist in safe d/c  STG 4  Pt will improve sitting/standing static/dynamic balance 1 grade in order to improve functional mobility and assist in safe d/c  STG 5  Pt will improve LE strength by one grade in order to improve functional mobility and assist in safe d/c     Plan   Treatment/Interventions ADL retraining;Functional transfer training;LE strengthening/ROM; Endurance training;Patient/family training;Equipment eval/education; Bed mobility;Gait training;Spoke to nursing;OT   PT Frequency Other (Comment)  (3-5x/wk )   Recommendation   PT Discharge Recommendation Post acute rehabilitation services   Equipment Recommended [facility] Recommended Wheeled walker   PT - OK to Discharge   (to rehab once medically cleared )   [address] Mobility Inpatient   Turning in Bed Without Bedrails 3   Lying on Back to Sitting on Edge of Flat Bed 3   Moving Bed to Chair 1   Standing Up From Chair 1   Walk in Room 1   Climb 3-5 Stairs 1   Basic Mobility Inpatient Raw Score 10   Turning Head Towards Sound 4   Follow Simple Instructions 4   Low Function Basic Mobility Raw Score 18   Low Function Basic Mobility Standardized Score 29 25           Lorena Flores, PT, DPT

## 2021-04-26 NOTE — ASSESSMENT & PLAN NOTE
Patient was admitted at [facility] for suicidal ideation  His hospital course was complicated by abdominal pain with hematemesis and he subsequently was transferred to the ICU there  Upon drop of his hemoglobin he was transferred to [facility] for GI evaluation  Patient is hemodynamically stable  Patient denies any suicidal ideation or homicidal ideation  Status post psychiatric evaluation earlier today, clear for outpatient follow-up from a psychiatric standpoint

## 2021-04-26 NOTE — CASE MANAGEMENT
CM informed pt cleared by psychiatry--does not need inpt psych admission upon d/c  CM informed by PT/OT pt would be recommended for STR  CM met with pt to discuss same  Pt declining STR and [facility] services   CM also declined interest in any tx for ETOH abuse advising "I know who to call "

## 2021-04-26 NOTE — CASE MANAGEMENT
CM met w/ pt to discuss aftercare recommendations  Pt is agreeable to go to rehab and CM provided pt with SNF list for pt to review and discuss with family  CM will follow-up with pt for SNF choices  CM will follow

## 2021-04-26 NOTE — ASSESSMENT & PLAN NOTE
Presented with signs and symptoms consistent with upper GI bleed  Baseline hemoglobin appears to be normal; acute blood loss anemia during his hospital stay secondary to bleeding with hemoglobin ranging between 8 and 9  Hemodynamically stable otherwise  Hemoglobin trended down to 7 5; however, BUN within normal range and actually constipated arguing against continued duodenal ulcer bleed  Will trend CBC and BMP in am  Status post EGD on 04/22/2021-multiple duodenal ulcers with evidence of bleeding status post electrocauterization  Status post Protonix drip, transition to oral Protonix twice per day today    Requires outpatient GI follow-up  Counseled extensively on alcohol on smoking cessation to prevent progression of underlying ulcers  EGD biopsies negative for H pylori  Continue non ulcerogenic GI diet

## 2021-04-26 NOTE — PLAN OF CARE
Problem: PHYSICAL THERAPY ADULT  Goal: Performs mobility at highest level of function for planned discharge setting  See evaluation for individualized goals  Description: Treatment/Interventions: ADL retraining, Functional transfer training, LE strengthening/ROM, Endurance training, Patient/family training, Equipment eval/education, Bed mobility, Gait training, Spoke to nursing, OT  Equipment Recommended: Xi Rodriguez       See flowsheet documentation for full assessment, interventions and recommendations  Note: Prognosis: Fair  Problem List: Decreased strength, Decreased endurance, Impaired balance, Decreased mobility, Decreased cognition, Impaired judgement, Decreased safety awareness, Pain  Assessment: Pt seen for high complexity PT evaluation  Pt with active PT eval/treat orders  Pt is a 78 y o  male who was admitted to [facility] on 4/21/2021 with GI bleed  Pt's active impairments include: duodenal ulcer, hypertension, COPD, alcohol dependence with unspecified alcohol-induced disorder  Pt  has a past medical history of Alcohol abuse, BPH (benign prostatic hyperplasia), CVA (cerebral vascular accident) ([facility] ), DJD (degenerative joint disease), Encounter to establish care with new doctor (8/21/2019), Gait abnormality, Head injury, History of cerebrovascular accident (CVA) with residual deficit (10/1/2019), History of CVA (cerebrovascular accident) (1/8/2020), History of substance abuse ([facility] ) (8/21/2019), History of sustained ventricular tachycardia (8/21/2019), History of transient ischemic attack (TIA) (8/28/2019), Hypertension, Metatarsal fracture, TIA (transient ischemic attack), and Tobacco abuse  Pt resides with son in 2  with 1 RUST and was independent prior to hospital admission  Pt has limitations in strength, balance, endurance, and overall functional mobility  Pt performed bed mobility tasks with CGA; HOB elevated, bedrails utilized and increased time required to complete tasks   Pt performed STS from EOB to RW with mod Ax2; cues for hand placement and sequencing required  Pt ambulated from EOB to bedside chair with RW and min Ax2; cues for RW management and safety required with poor understanding/carryover  Pt was left sitting upright in bedside chair with chair alarm intact/functioning at the end of PT session with all needs in reach  Pt would benefit from continued PT services while in hospital to address remaining limitations  PT to continue to follow pt and recommends post-acute rehab services after d/c  The patient's AM-PAC Basic Mobility Inpatient Short Form Low Function Raw Score 18 , Standardized Score is 29 25  A standardized score less 42 9 suggests the patient may benefit from discharge to post-acute rehab services  Please also refer to the recommendation of the Physical Therapist for safe discharge planning  Barriers to Discharge: (S) Inaccessible home environment, Decreased caregiver support(son works during day )        PT Discharge Recommendation: Post acute rehabilitation services     PT - OK to Discharge: (to rehab once medically cleared )    See flowsheet documentation for full assessment

## 2021-04-26 NOTE — OCCUPATIONAL THERAPY NOTE
Occupational Therapy Evaluation     Patient Name: Eli Corado  PKBWD'R Date: 4/26/2021  Problem List  Principal Problem:    GI bleed  Active Problems:    Major depressive disorder, recurrent, severe with psychotic features ([facility] )    Alcohol dependence with unspecified alcohol-induced disorder ([facility] )    Ambulatory dysfunction    Overweight (BMI 25 0-29  9)    COPD without exacerbation (HCC)    Dyslipidemia    Chronic pain    Tobacco abuse    Schizophrenia ([facility] )    Acute upper GI bleed    Acute blood loss anemia    Hypertension    Duodenal ulcer    Past Medical History  Past Medical History:   Diagnosis Date    Alcohol abuse     BPH (benign prostatic hyperplasia)     CVA (cerebral vascular accident) ([facility] )     DJD (degenerative joint disease)     Encounter to establish care with new doctor 8/21/2019    Gait abnormality     Head injury     History of cerebrovascular accident (CVA) with residual deficit 10/1/2019    History of CVA (cerebrovascular accident) 1/8/2020    History of substance abuse ([facility] ) 8/21/2019    Polysubstance     History of sustained ventricular tachycardia 8/21/2019    History of transient ischemic attack (TIA) 8/28/2019    Hypertension     Metatarsal fracture     TIA (transient ischemic attack)     Tobacco abuse      Past Surgical History  Past Surgical History:   Procedure Laterality Date    ABDOMINAL SURGERY      ANKLE SURGERY      BACK SURGERY      CT GUIDED [facility] DRAINAGE CATHETER PLACEMENT  1/27/2020    ELBOW SURGERY      IR VISCERAL ANGIOGRAPHY / INTERVENTION  1/11/2020    JOINT REPLACEMENT      KNEE SURGERY      LAPAROTOMY N/A 1/11/2020    Procedure: LAPAROTOMY EXPLORATORY,  OVERSEW  OF GASTRO DUODENAL ARTERY, THAL PATCH OF DUODENUM, VICKY GASTRO JEJUNOSTOMY TUBE;  Surgeon: Evelin Cortez DO;  Location: [facility];  Service: General    LIVER SURGERY           04/26/21 1105   OT Last Visit   OT Visit Date 04/26/21   Note Type   Note type Evaluation Restrictions/Precautions   Weight Bearing Precautions Per Order No   Other Precautions Cognitive; Chair Alarm; Bed Alarm; Fall Risk;Pain   Pain Assessment   Pain Assessment Tool 0-10   Pain Score 8   Pain Location/Orientation Location: Abdomen   Patient's Stated Pain Goal No pain   Hospital Pain Intervention(s) Repositioned; Ambulation/increased activity   Home Living   Type of [address] [facility] Two [address]; Able to live on main level with bedroom/bathroom  (1STE)   Bathroom Shower/Tub Tub/shower unit   [address] [place]   [address] [facility]   Prior Function   Level of [address] with ADLs and functional mobility   Lives With Son   Receives Help From Family   ADL Assistance Independent   IADLs Needs assistance   Falls in the last 6 months 1 to 4   Vocational Retired   Lifestyle   Autonomy PTA pt was I with ADLs, reports having increased difficulties performing IADLs when son is at work    Reciprocal Relationships Son    Service to Others Retired    Intrinsic Gratification "I used to like drinking and smoking but I can't do that anymore"   Psychosocial   Psychosocial (WDL) X   Patient Behaviors/Mood Flat affect;Depressed   Subjective   Subjective "I have trouble making myself something to eat"   ADL   Where Assessed Edge of bed   Eating Assistance 5  Supervision/Setup   Grooming Assistance 5  Supervision/Setup   UB Bathing Assistance 5  Supervision/Setup   LB Bathing Assistance 4  Minimal [address] 5  Supervision/Setup   LB Dressing Assistance 4  [address]  4  Minimal Assistance   Bed Mobility   Supine to Sit   (CGA)   Additional items Assist x 1; Increased time required;HOB elevated   Sit to Supine Unable to assess   Transfers   Sit to Stand 3  Moderate assistance   Additional items Assist x 2; Increased time required   Stand to Sit 3  Moderate assistance   Additional items Assist x 2; Increased time required   Additional Comments Transfers with RW, cues for hand placement    Functional Mobility   Functional Mobility 4  Minimal assistance   Additional Comments Ax2 for safety   Additional items Rolling walker   Balance   Static Sitting Fair   Dynamic Sitting Fair -   Static Standing Poor   Dynamic Standing Poor -   Ambulatory Poor -   Activity Tolerance   Activity Tolerance Patient limited by fatigue;Patient limited by pain   Medical Staff Made Aware PT Dennis Hassan   Nurse Made Aware RN clearance for session    RUE Assessment   RUE Assessment WFL   LUE Assessment   LUE Assessment WFL   Hand Function   Gross Motor Coordination Functional   Fine Motor Coordination Functional   Sensation   Light Touch No apparent deficits   Vision-Basic Assessment   Current Vision Wears glasses all the time   Vision - Complex Assessment   Ocular Range of Motion WFL   Head Position WDL   Tracking Able to track stimulus in all quads without difficulty   Cognition   Arousal/Participation Alert; Responsive; Cooperative   Attention Within functional limits   Orientation Level Oriented X4   Memory Decreased recall of precautions   Following Commands Follows one step commands with increased time or repetition   Comments Pt cooperative t/o session    Assessment   Limitation Decreased ADL status; Decreased UE strength;Decreased Safe judgement during ADL;Decreased endurance;Decreased self-care trans;Decreased high-level ADLs   Prognosis Good   Assessment Pt is a 78 y o  male admitted to [facility] on 4/21/2021 w/ GI bleed + suicidal ideation    has a past medical history of Alcohol abuse, BPH (benign prostatic hyperplasia), CVA (cerebral vascular accident) ([address] ), DJD (degenerative joint disease), Encounter to establish care with new doctor (8/21/2019), Gait abnormality, Head injury, History of cerebrovascular accident (CVA) with residual deficit (10/1/2019), History of CVA (cerebrovascular accident) (1/8/2020), History of substance abuse ([address] ) (8/21/2019), History of sustained ventricular tachycardia (8/21/2019), History of transient ischemic attack (TIA) (8/28/2019), Hypertension, Metatarsal fracture, TIA (transient ischemic attack), and Tobacco abuse  Pt with active OT orders and activity as tolerated orders  As per pt report, heriberto, resides in a 2STH, 1STE with his son  Pt was I w/ ADLS ("mostly") and reports increased difficulty with IADLS  Upon evaluation, pt currently requires CGA for sup to sit, MOD A x 2 STS and MIN A x 2 for safety during fnxl mobility with VCs required for RW safety  Pt currently  requires S UB ADLs, MIN A LB ADLs, and MIN A toileting  Pt is limited at this time 2*: pain, endurance, activity tolerance, functional mobility, balance, functional standing tolerance, unsupportive home environment, decreased I w/ ADLS/IADLS, strength and decreased safety awareness  The following Occupational Performance Areas to address include: eating, grooming, bathing/shower, toilet hygiene, dressing, health maintenance, functional mobility, community mobility and clothing management  Pt to benefit from immediate acute skilled OT to address above deficits, improve overall functional independence, maximize fnxl mobility and reduce caregiver burden  From OT standpoint, recommendation at time of d/c would be STR - pt reports son works during the day and he has been having difficulty at home during these times  Pt was left in chair with alarm on after session with all current needs met  The patient's raw score on the AM-PAC Daily Activity inpatient short form is 18, standardized score is 38 66, less than 39 4  Patients at this level are likely to benefit from discharge to post-acute rehabilitation services  Please refer to the recommendation of the Occupational Therapist for safe discharge planning  Goals   Patient Goals to rest    LTG Time Frame 10-14   Plan   Treatment Interventions ADL retraining;Functional transfer training;UE strengthening/ROM; Endurance training;Patient/family training;Equipment evaluation/education; Compensatory technique education;Continued evaluation; Energy conservation; Activityengagement   Goal Expiration Date 05/06/21   OT Frequency 3-5x/wk   Recommendation   Recommendation Psych Consult   OT Discharge Recommendation Post acute rehabilitation services   OT - OK to Discharge Yes  (when medically stable )   AM-PAC Daily Activity Inpatient   Lower Body Dressing 3   Bathing 3   Toileting 3   Upper Body Dressing 3   Grooming 3   Eating 3   Daily Activity Raw Score 18   Daily Activity Standardized Score (Calc for Raw Score >=11) 38 66   AM-PAC Applied Cognition Inpatient   Following a Speech/Presentation 4   Understanding Ordinary Conversation 4   Taking Medications 3   Remembering Where Things Are Placed or Put Away 3   Remembering List of 4-5 Errands 3   Taking Care of Complicated Tasks 3   Applied Cognition Raw Score 20   Applied Cognition Standardized Score 41 76       GOALS    1) Pt will increase activity tolerance to G for 30 min txment sessions    2) Pt will complete UB/LB dressing/self care w/ mod I using adaptive device and DME as needed    3) Pt will complete bathing w/ Mod I w/ use of AE and DME as needed    4) Pt will complete toileting w/ mod I w/ G hygiene/thoroughness using DME as needed    5) Pt will improve functional transfers to Mod I on/off all surfaces using DME as needed w/ G balance/safety     6) Pt will improve functional mobility during ADL/IADL/leisure tasks to Mod I using DME as needed w/ G balance/safety     7) Pt will participate in simulated IADL management task with DME as needed to increase independence to  w/ G safety and endurance    8) Pt will demonstrate G carryover of pt/caregiver education and training as appropriate      9) Pt will demonstrate 100% carryover of energy conservation techniques t/o functional I/ADL/leisure tasks w/o cues s/p skilled education    10) Pt will engage in ongoing cognitive assessment w/ G participation to assist w/ safe d/c planning/recommendations    Luis Billy MS, OTR/L

## 2021-04-27 LAB
ANION GAP SERPL CALCULATED.3IONS-SCNC: 6 MMOL/L (ref 4–13)
BASOPHILS # BLD AUTO: 0.03 THOUSANDS/ΜL (ref 0–0.1)
BASOPHILS NFR BLD AUTO: 1 % (ref 0–1)
BUN SERPL-MCNC: 13 MG/DL (ref 5–25)
CALCIUM SERPL-MCNC: 8.8 MG/DL (ref 8.3–10.1)
CHLORIDE SERPL-SCNC: 103 MMOL/L (ref 100–108)
CO2 SERPL-SCNC: 25 MMOL/L (ref 21–32)
CREAT SERPL-MCNC: 1.03 MG/DL (ref 0.6–1.3)
EOSINOPHIL # BLD AUTO: 0.23 THOUSAND/ΜL (ref 0–0.61)
EOSINOPHIL NFR BLD AUTO: 4 % (ref 0–6)
ERYTHROCYTE [DISTWIDTH] IN BLOOD BY AUTOMATED COUNT: 14.8 % (ref 11.6–15.1)
GFR SERPL CREATININE-BSD FRML MDRD: 74 ML/MIN/1.73SQ M
GLUCOSE SERPL-MCNC: 117 MG/DL (ref 65–140)
HCT VFR BLD AUTO: 24.4 % (ref 36.5–49.3)
HGB BLD-MCNC: 8.1 G/DL (ref 12–17)
IMM GRANULOCYTES # BLD AUTO: 0.04 THOUSAND/UL (ref 0–0.2)
IMM GRANULOCYTES NFR BLD AUTO: 1 % (ref 0–2)
LYMPHOCYTES # BLD AUTO: 1.8 THOUSANDS/ΜL (ref 0.6–4.47)
LYMPHOCYTES NFR BLD AUTO: 30 % (ref 14–44)
MCH RBC QN AUTO: 33.1 PG (ref 26.8–34.3)
MCHC RBC AUTO-ENTMCNC: 33.2 G/DL (ref 31.4–37.4)
MCV RBC AUTO: 100 FL (ref 82–98)
MONOCYTES # BLD AUTO: 0.96 THOUSAND/ΜL (ref 0.17–1.22)
MONOCYTES NFR BLD AUTO: 16 % (ref 4–12)
NEUTROPHILS # BLD AUTO: 3.03 THOUSANDS/ΜL (ref 1.85–7.62)
NEUTS SEG NFR BLD AUTO: 48 % (ref 43–75)
NRBC BLD AUTO-RTO: 0 /100 WBCS
PLATELET # BLD AUTO: 416 THOUSANDS/UL (ref 149–390)
PMV BLD AUTO: 9.2 FL (ref 8.9–12.7)
POTASSIUM SERPL-SCNC: 4.2 MMOL/L (ref 3.5–5.3)
RBC # BLD AUTO: 2.45 MILLION/UL (ref 3.88–5.62)
SODIUM SERPL-SCNC: 134 MMOL/L (ref 136–145)
WBC # BLD AUTO: 6.09 THOUSAND/UL (ref 4.31–10.16)

## 2021-04-27 PROCEDURE — 97110 THERAPEUTIC EXERCISES: CPT

## 2021-04-27 PROCEDURE — 97116 GAIT TRAINING THERAPY: CPT

## 2021-04-27 PROCEDURE — 99223 1ST HOSP IP/OBS HIGH 75: CPT

## 2021-04-27 PROCEDURE — 85025 COMPLETE CBC W/AUTO DIFF WBC: CPT | Performed by: INTERNAL MEDICINE

## 2021-04-27 PROCEDURE — 99232 SBSQ HOSP IP/OBS MODERATE 35: CPT | Performed by: INTERNAL MEDICINE

## 2021-04-27 PROCEDURE — 80048 BASIC METABOLIC PNL TOTAL CA: CPT | Performed by: INTERNAL MEDICINE

## 2021-04-27 RX ORDER — BISACODYL 10 MG
10 SUPPOSITORY, RECTAL RECTAL ONCE
Status: DISCONTINUED | OUTPATIENT
Start: 2021-04-27 | End: 2021-04-29

## 2021-04-27 RX ORDER — OXYCODONE HYDROCHLORIDE 5 MG/1
2.5 TABLET ORAL EVERY 4 HOURS PRN
Status: DISCONTINUED | OUTPATIENT
Start: 2021-04-27 | End: 2021-04-28

## 2021-04-27 RX ORDER — DOCUSATE SODIUM 100 MG/1
100 CAPSULE, LIQUID FILLED ORAL 2 TIMES DAILY
Status: DISCONTINUED | OUTPATIENT
Start: 2021-04-27 | End: 2021-05-07 | Stop reason: HOSPADM

## 2021-04-27 RX ADMIN — POLYETHYLENE GLYCOL 3350 17 G: 17 POWDER, FOR SOLUTION ORAL at 17:07

## 2021-04-27 RX ADMIN — OXYCODONE HYDROCHLORIDE 2.5 MG: 5 TABLET ORAL at 21:43

## 2021-04-27 RX ADMIN — FLUTICASONE FUROATE AND VILANTEROL TRIFENATATE 1 PUFF: 100; 25 POWDER RESPIRATORY (INHALATION) at 10:21

## 2021-04-27 RX ADMIN — QUETIAPINE FUMARATE 200 MG: 100 TABLET ORAL at 10:22

## 2021-04-27 RX ADMIN — FLUTICASONE PROPIONATE 2 SPRAY: 50 SPRAY, METERED NASAL at 10:21

## 2021-04-27 RX ADMIN — OXYCODONE HYDROCHLORIDE 5 MG: 5 TABLET ORAL at 00:24

## 2021-04-27 RX ADMIN — PANTOPRAZOLE SODIUM 40 MG: 40 TABLET, DELAYED RELEASE ORAL at 06:14

## 2021-04-27 RX ADMIN — METOPROLOL TARTRATE 25 MG: 25 TABLET, FILM COATED ORAL at 21:37

## 2021-04-27 RX ADMIN — OXYCODONE HYDROCHLORIDE 5 MG: 5 TABLET ORAL at 06:13

## 2021-04-27 RX ADMIN — THIAMINE HCL TAB 100 MG 100 MG: 100 TAB at 10:22

## 2021-04-27 RX ADMIN — ATORVASTATIN CALCIUM 40 MG: 40 TABLET, FILM COATED ORAL at 10:23

## 2021-04-27 RX ADMIN — ALBUTEROL SULFATE 2 PUFF: 90 AEROSOL, METERED RESPIRATORY (INHALATION) at 21:44

## 2021-04-27 RX ADMIN — OXYCODONE HYDROCHLORIDE 5 MG: 5 TABLET ORAL at 10:22

## 2021-04-27 RX ADMIN — FOLIC ACID 1 MG: 1 TABLET ORAL at 10:23

## 2021-04-27 RX ADMIN — Medication 1000 UNITS: at 10:23

## 2021-04-27 RX ADMIN — PANTOPRAZOLE SODIUM 40 MG: 40 TABLET, DELAYED RELEASE ORAL at 17:07

## 2021-04-27 RX ADMIN — Medication 1 PATCH: at 10:56

## 2021-04-27 RX ADMIN — POLYETHYLENE GLYCOL 3350 17 G: 17 POWDER, FOR SOLUTION ORAL at 06:14

## 2021-04-27 RX ADMIN — TRAZODONE HYDROCHLORIDE 50 MG: 50 TABLET ORAL at 21:38

## 2021-04-27 RX ADMIN — Medication 1 TABLET: at 10:21

## 2021-04-27 RX ADMIN — METOPROLOL TARTRATE 25 MG: 25 TABLET, FILM COATED ORAL at 10:24

## 2021-04-27 RX ADMIN — DOCUSATE SODIUM 100 MG: 100 CAPSULE, LIQUID FILLED ORAL at 21:36

## 2021-04-27 RX ADMIN — OXYCODONE HYDROCHLORIDE 2.5 MG: 5 TABLET ORAL at 17:07

## 2021-04-27 RX ADMIN — QUETIAPINE FUMARATE 600 MG: 300 TABLET ORAL at 21:38

## 2021-04-27 RX ADMIN — OXYCODONE HYDROCHLORIDE 5 MG: 5 TABLET ORAL at 13:33

## 2021-04-27 NOTE — CASE MANAGEMENT
CM met with pt to discuss aftercare plans  Pt is agreeable to go to rehab after d/c  Per pt CONNIE should speak with his son, Elgin Brown and he was okay with going to whichever facility his son choose  CM s/w pt's son and explained that they were recommending that pt go to an acute level of care for rehab  Per pt's son they would prefer pt go to [facility] and their second option would be [facility] as second choice  CM sent the referral as requested

## 2021-04-27 NOTE — ASSESSMENT & PLAN NOTE
Patient was admitted at [facility] for suicidal ideation  His hospital course was complicated by abdominal pain with hematemesis and he subsequently was transferred to the ICU there  Upon drop of his hemoglobin he was transferred to [facility] for GI evaluation  Patient is hemodynamically stable  Patient denies any suicidal ideation or homicidal ideation  Status post psychiatric evaluation, clear for outpatient follow-up from a psychiatric standpoint

## 2021-04-27 NOTE — PHYSICAL THERAPY NOTE
Physical Therapy Treatment Note    Patient's Name: Edson Flores  : 1950 1315   PT Last Visit   PT Visit Date 21   Note Type   Note Type Treatment   Pain Assessment   Pain Assessment Tool FLACC   Pain Rating: FLACC (Rest) - Face 0   Pain Rating: FLACC (Rest) - Legs 0   Pain Rating: FLACC (Rest) - Activity 0   Pain Rating: FLACC (Rest) - Cry 0   Pain Rating: FLACC (Rest) - Consolability 0   Score: FLACC (Rest) 0   Pain Rating: FLACC (Activity) - Face 0   Pain Rating: FLACC (Activity) - Legs 0   Pain Rating: FLACC (Activity) - Activity 0   Pain Rating: FLACC (Activity) - Cry 0   Pain Rating: FLACC (Activity) - Consolability 0   Score: FLACC (Activity) 0   Restrictions/Precautions   Weight Bearing Precautions Per Order No   Other Precautions Cognitive; Chair Alarm; Bed Alarm;Pain; Fall Risk   General   Chart Reviewed Yes   Family/Caregiver Present No   Cognition   Overall Cognitive Status Impaired   Arousal/Participation Lethargic   Attention Attends with cues to redirect   Orientation Level Oriented X4   Memory Decreased recall of precautions   Following Commands Follows one step commands with increased time or repetition   Comments Pt agreeable to therapy with some encouragement  Bed Mobility   Supine to Sit 5  Supervision   Additional items HOB elevated; Increased time required   Sit to Supine 4  Minimal assistance   Additional items Assist x 1; Increased time required;Verbal cues;LE management   Additional Comments Pt ended session back in bed with alarm on and all needs in reach  Transfers   Sit to Stand 3  Moderate assistance   Additional items Assist x 1; Increased time required;Verbal cues   Stand to Sit 3  Moderate assistance   Additional items Assist x 1; Increased time required;Verbal cues   Stand pivot 3  Moderate assistance   Additional items Assist x 1; Increased time required;Verbal cues   Additional Comments Transfers with RW  VCs for proper hand placement, limited carryover  Ambulation/Elevation   Gait pattern Excessively slow;Decreased foot clearance; Foward flexed; Short stride; Improper Weight shift   Gait Assistance 4  Minimal assist   Additional items Assist x 1;Verbal cues   Assistive Device Rolling walker   Distance 3ft from bed to chair x2 + and additional 10ft with VCs for improved posture  Balance   Static Sitting Fair   Dynamic Sitting Fair -   Static Standing Poor +   Dynamic Standing Poor   Ambulatory Poor   Endurance Deficit   Endurance Deficit Yes   Endurance Deficit Description weakness, fatigue   Activity Tolerance   Activity Tolerance Patient limited by fatigue;Patient limited by pain   Nurse Made Aware ok to see per RN   Exercises   Knee AROM Long Arc Quad Sitting;20 reps;AROM; Bilateral  (2x10)   Ankle Pumps Sitting;20 reps;AROM; Bilateral   Marching Sitting;20 reps;AROM; Bilateral   Assessment   Prognosis Fair   Problem List Decreased strength;Decreased endurance; Impaired balance;Decreased mobility; Decreased cognition;Decreased safety awareness;Pain   Assessment Pt continues to present with weakness, fatigue, and decreased activity tolerance  Pt has made good progress with mobility since IE  Pt was able to perform bed mobility with Wong, transfers with modA, and short distance ambulation with Wong  Further ambulation not performed this session 2* pt fatigue, however pt agreeable to seated TE  Pt would benefit from continued acute skilled PT to address above deficits  Continuing to recommend rehab upon d/c     Barriers to Discharge Inaccessible home environment;Decreased caregiver support   Goals   Patient Goals to nap   STG Expiration Date 05/10/21   PT Treatment Day 1   Plan   Treatment/Interventions Functional transfer training;LE strengthening/ROM; Elevations; Therapeutic exercise; Endurance training;Bed mobility;Gait training;Spoke to nursing;Spoke to case management;OT   Progress Progressing toward goals   PT Frequency Other (Comment)  (3-5x/wk) Recommendation   PT Discharge Recommendation Post acute rehabilitation services   AM-PAC Basic Mobility Inpatient   Turning in Bed Without Bedrails 3   Lying on Back to Sitting on Edge of Flat Bed 3   Moving Bed to Chair 2   Standing Up From Chair 2   Walk in Room 2   Climb 3-5 Stairs 1   Basic Mobility Inpatient Raw Score 13   Basic Mobility Standardized Score 33 99       Tim Adams, PT, DPT

## 2021-04-27 NOTE — CASE MANAGEMENT
CM informed by Thi-admissions [facility], pt's insurance [facility]'s system currently down--unable to submit for auth at this time   Thi to attempt again tomorrow AM

## 2021-04-27 NOTE — PROGRESS NOTES
[facility]  Progress Note - Angelo Hurley 1950, 78 y o  male MRN: [id number]  Unit/Bed#: [facility] 929-01 Encounter: [id number]  Primary Care Provider: Narendra Sánchez DO   Date and time admitted to hospital: 4/21/2021  5:34 PM    * GI bleed  Assessment & Plan  Presented with signs and symptoms consistent with upper GI bleed  Baseline hemoglobin appears to be normal; acute blood loss anemia during his hospital stay secondary to bleeding with hemoglobin ranging between 8 and 9  Hemodynamically stable otherwise  Hemoglobin trended down to 7 5; however, BUN within normal range and actually constipated arguing against continued duodenal ulcer bleed  Will trend CBC and BMP in am  Status post EGD on 04/22/2021-multiple duodenal ulcers with evidence of bleeding status post electrocauterization  Status post Protonix drip, transition to oral Protonix twice per day today  Requires outpatient GI follow-up  Counseled extensively on alcohol on smoking cessation to prevent progression of underlying ulcers  EGD biopsies negative for H pylori  Continue non ulcerogenic GI diet         Duodenal ulcer  Assessment & Plan  Is see above management plan    Hypertension  Assessment & Plan  Metoprolol    Schizophrenia [facility])  Assessment & Plan  Patient was admitted at [address] [place] [address] for suicidal ideation  His hospital course was complicated by abdominal pain with hematemesis and he subsequently was transferred to the ICU there  Upon drop of his hemoglobin [facility]  Patient is hemodynamically stable  Patient denies any suicidal ideation or homicidal ideation  Status post psychiatric evaluation, clear for outpatient follow-up from a psychiatric standpoint  Tobacco abuse  Assessment & Plan  Using nicotine patch    Counseled on smoking cessation    Chronic pain  Assessment & Plan  Complaining of chronic back pain, severe and interfering with activities of daily living  However, appeared slightly drowsy this morning, will decrease oxycodone 2 5 mg p r n  Moderate or severe pain  Plan on transitioning to tramadol tomorrow as a p r n  COPD without exacerbation [facility])  Assessment & Plan  Continue with current meds     Ambulatory dysfunction  Assessment & Plan  PT/OT evaluated the patient recommending rehabilitation services after discharge  Patient is now agreeable; status post PMNR evaluation with recommendations of acute rehabilitation services; pending bed availability   on board  Alcohol dependence with unspecified alcohol-induced disorder [facility])  Assessment & Plan  Alcohol cessation counseling; refusing inpatient rehab  CIWA discontinued given persistent scores of 0            Disposition: Anticipated discharge pending placement in a rehabilitation facility  Medically clear    VTE Prophylaxis - SCDs; has an underlying large duodenal ulcer at high risk for rebleed causing hemodynamic instability    Education and Discussions with Family / Patient:  Discussed with patient at bedside    Current Length of Stay: 6 day(s)  Current Patient Status: Inpatient     Code Status: Level 1 - Full Code      Subjective:   Seen and examined  Still complaining of chronic back pain although well controlled during my assessment, did appear drowsy during my assessment as well but oriented x4  Otherwise no current complaints  Objective:     Vitals:   Temp (24hrs), Av 5 °F (36 9 °C), Min:98 °F (36 7 °C), Max:98 9 °F (37 2 °C)    Temp:  [98 °F (36 7 °C)-98 9 °F (37 2 °C)] 98 9 °F (37 2 °C)  HR:  [67-78] 67  Resp:  [18] 18  BP: (110-133)/(60-68) 110/60  SpO2:  [96 %-98 %] 97 %  Body mass index is 23 07 kg/m²  Invasive Devices     Peripheral Intravenous Line            Peripheral IV 21 Right Wrist 1 day                Input and Output Summary (last 24 hours):        Intake/Output Summary (Last 24 hours) at 2021 1344  Last data filed at 4/27/2021 1456  Gross per 24 hour   Intake 600 ml   Output 2375 ml   Net -1775 ml       Physical Exam:     Gen: in bed; room air  HEENT: NC/AT, PERRLA, no conjunctival pallor or scleral icterus, moist mucous membranes  RS: symmetric, speaking in full sentences, CTA bilaterally  CVS:  RRR, S1-S2 heard without murmurs, no peripheral edema, radial pulses 2+, capillary refill less than 2 seconds  Abdomen:  Soft,ND, mild discomfort on palpation diffusely but improved compared to yesterday's assessment, bowel sounds heard  MSK:  Warm  Moves all 4 extremities  :  No Balderas  Neuro:   oriented x4, slightly drowsy during my assessment  Psych:  Cooperative, denies suicidal ideation or thoughts or homicidal thoughts        Additional Data:     Labs:    Results from last 7 days   Lab Units 04/27/21  0548   WBC Thousand/uL 6 09   HEMOGLOBIN g/dL 8 1*   HEMATOCRIT % 24 4*   PLATELETS Thousands/uL 416*   NEUTROS PCT % 48   LYMPHS PCT % 30   MONOS PCT % 16*   EOS PCT % 4     Results from last 7 days   Lab Units 04/27/21  0548  04/24/21  0649   POTASSIUM mmol/L 4 2   < > 4 1   CHLORIDE mmol/L 103   < > 106   CO2 mmol/L 25   < > 26   BUN mg/dL 13   < > 15   CREATININE mg/dL 1 03   < > 0 89   CALCIUM mg/dL 8 8   < > 8 2*   ALK PHOS U/L  --   --  39*   ALT U/L  --   --  9*   AST U/L  --   --  25    < > = values in this interval not displayed  Results from last 7 days   Lab Units 04/22/21  2034   INR  1 09       * I Have Reviewed All Lab Data Listed Above  * Additional Pertinent Lab Tests Reviewed:  [facility]    Imaging:    Imaging Reports Reviewed Today Include:  None  Imaging Personally Reviewed by Myself Includes:  None    Recent Cultures (last 7 days):           Last 24 Hours Medication List:   Current Facility-Administered Medications   Medication Dose Route Frequency Provider Last Rate    acetaminophen  650 mg Oral Q6H PRN Aden Chen MD      albuterol  2 puff Inhalation Q4H PRN Leanne Ocampo PA-C      atorvastatin  40 mg Oral Daily Leanne Ocampo PA-C      calcium carbonate  500 mg Oral Daily PRN Leanne Ocampo PA-C      cholecalciferol  1,000 Units Oral Daily Leanne Ocampo PA-C      fluticasone  2 spray Nasal Daily Leanne Ocampo PA-C      fluticasone-vilanterol  1 puff Inhalation Daily Mckay Summers PA-C      folic acid  1 mg Oral Daily Eryn Veras MD      hydrALAZINE  5 mg Intravenous Q6H PRN Norm Valentine PA-C      Labetalol HCl  10 mg Intravenous Q6H PRN Gali Summers PA-C      LORazepam  0 5 mg Intravenous Q6H PRN Leanne Ocampo PA-C      metoprolol tartrate  25 mg Oral Q12H [facility] Eryn Veras MD      multivitamin-minerals  1 tablet Oral Daily Eryn Veras MD      naloxone  0 04 mg Intravenous Q1MIN PRN Eryn Veras MD      nicotine  1 patch Transdermal Daily Leanne Ocampo PA-C      ondansetron  4 mg Oral Q6H PRN Leanne Ocampo PA-C      oxyCODONE  2 5 mg Oral Q4H PRN Eryn Veras MD      pantoprazole  40 mg Oral BID AC Eryn Veras MD      polyethylene glycol  17 g Oral BID AC Eryn Veras MD      QUEtiapine  200 mg Oral Daily Leanne Ocampo PA-C      QUEtiapine  600 mg Oral HS Eryn Veras MD      thiamine  100 mg Oral Daily Eryn Veras MD      traZODone  50 mg Oral HS Leanne Ocampo PA-C              ** Please Note: This note has been constructed using a voice recognition system   **

## 2021-04-27 NOTE — ASSESSMENT & PLAN NOTE
PT/OT evaluated the patient recommending rehabilitation services after discharge  Patient is now agreeable; status post PMNR evaluation with recommendations of acute rehabilitation services; pending bed availability   on board

## 2021-04-27 NOTE — PROGRESS NOTES
Per PM&R consulting team patient is [facility] appropriate  Per Shahid at [address] [place] [address] Dr  system is down to initiate auth and admissions team will need to call again  Acute rehab will be covered at 100% by [employer]  Will attempt to submit for insurance authorization in the morning  Patient is in agreement to bed at [facility] if approved by insurance

## 2021-04-27 NOTE — PLAN OF CARE
Problem: PHYSICAL THERAPY ADULT  Goal: Performs mobility at highest level of function for planned discharge setting  See evaluation for individualized goals  Description: Treatment/Interventions: ADL retraining, Functional transfer training, LE strengthening/ROM, Endurance training, Patient/family training, Equipment eval/education, Bed mobility, Gait training, Spoke to nursing, OT  Equipment Recommended: Derrick Clemente       See flowsheet documentation for full assessment, interventions and recommendations  Outcome: Progressing  Note: Prognosis: Fair  Problem List: Decreased strength, Decreased endurance, Impaired balance, Decreased mobility, Decreased cognition, Decreased safety awareness, Pain  Assessment: Pt continues to present with weakness, fatigue, and decreased activity tolerance  Pt has made good progress with mobility since IE  Pt was able to perform bed mobility with Wong, transfers with modA, and short distance ambulation with Wong  Further ambulation not performed this session 2* pt fatigue, however pt agreeable to seated TE  Pt would benefit from continued acute skilled PT to address above deficits  Continuing to recommend rehab upon d/c    Barriers to Discharge: Inaccessible home environment, Decreased caregiver support        PT Discharge Recommendation: Post acute rehabilitation services     PT - OK to Discharge: (to rehab once medically cleared )    See flowsheet documentation for full assessment

## 2021-04-27 NOTE — CASE MANAGEMENT
CM s/w pt's son and provided him with an update on the status of the referrals  [facility] is not able to accept as they do not have any beds  [facility] was not able to accept as they do do not have any beds but there was a bed available at [facility]  CM s /w the pt's son and he was okay with accepting the bed at [facility]  CM updated the pt  CM will follow

## 2021-04-27 NOTE — ASSESSMENT & PLAN NOTE
Complaining of chronic back pain, severe and interfering with activities of daily living  However, appeared slightly drowsy this morning, will decrease oxycodone 2 5 mg p r n  Moderate or severe pain  Plan on transitioning to tramadol tomorrow as a p r n  Hanna Dunbar

## 2021-04-27 NOTE — PLAN OF CARE
Problem: Prexisting or High Potential for Compromised Skin Integrity  Goal: Skin integrity is maintained or improved  Description: INTERVENTIONS:  - Identify patients at risk for skin breakdown  - Assess and monitor skin integrity  - Assess and monitor nutrition and hydration status  - Monitor labs   - Assess for incontinence   - Turn and reposition patient  - Assist with mobility/ambulation  - Relieve pressure over bony prominences  - Avoid friction and shearing  - Provide appropriate hygiene as needed including keeping skin clean and dry  - Evaluate need for skin moisturizer/barrier cream  - Collaborate with interdisciplinary team   - Patient/family teaching  - Consider wound care consult   Outcome: Progressing     Problem: Potential for Falls  Goal: Patient will remain free of falls  Description: INTERVENTIONS:  - Assess patient frequently for physical needs  -  Identify cognitive and physical deficits and behaviors that affect risk of falls    -  [place] fall precautions as indicated by assessment   - Educate patient/family on patient safety including physical limitations  - Instruct patient to call for assistance with activity based on assessment  - Modify environment to reduce risk of injury  - Consider OT/PT consult to assist with strengthening/mobility  Outcome: Progressing     Problem: PAIN - ADULT  Goal: Verbalizes/displays adequate comfort level or baseline comfort level  Description: Interventions:  - Encourage patient to monitor pain and request assistance  - Assess pain using appropriate pain scale  - Administer analgesics based on type and severity of pain and evaluate response  - Implement non-pharmacological measures as appropriate and evaluate response  - Consider cultural and social influences on pain and pain management  - Notify physician/advanced practitioner if interventions unsuccessful or patient reports new pain  Outcome: Progressing     Problem: INFECTION - ADULT  Goal: Absence or prevention of progression during hospitalization  Description: INTERVENTIONS:  - Assess and monitor for signs and symptoms of infection  - Monitor lab/diagnostic results  - Monitor all insertion sites, i e  indwelling lines, tubes, and drains  - Monitor endotracheal if appropriate and nasal secretions for changes in amount and color  - [place] appropriate cooling/warming therapies per order  - Administer medications as ordered  - Instruct and encourage patient and family to use good hand hygiene technique  - Identify and instruct in appropriate isolation precautions for identified infection/condition  Outcome: Progressing  Goal: Absence of fever/infection during neutropenic period  Description: INTERVENTIONS:  - Monitor WBC    Outcome: Progressing     Problem: SAFETY ADULT  Goal: Patient will remain free of falls  Description: INTERVENTIONS:  - Assess patient frequently for physical needs  -  Identify cognitive and physical deficits and behaviors that affect risk of falls    -  [place] fall precautions as indicated by assessment   - Educate patient/family on patient safety including physical limitations  - Instruct patient to call for assistance with activity based on assessment  - Modify environment to reduce risk of injury  - Consider OT/PT consult to assist with strengthening/mobility  Outcome: Progressing  Goal: Maintain or return to baseline ADL function  Description: INTERVENTIONS:  -  Assess patient's ability to carry out ADLs; assess patient's baseline for ADL function and identify physical deficits which impact ability to perform ADLs (bathing, care of mouth/teeth, toileting, grooming, dressing, etc )  - Assess/evaluate cause of self-care deficits   - Assess range of motion  - Assess patient's mobility; develop plan if impaired  - Assess patient's need for assistive devices and provide as appropriate  - Encourage maximum independence but intervene and supervise when necessary  - Involve family in performance of ADLs  - Assess for home care needs following discharge   - Consider OT consult to assist with ADL evaluation and planning for discharge  - Provide patient education as appropriate  Outcome: Progressing  Goal: Maintain or return mobility status to optimal level  Description: INTERVENTIONS:  - Assess patient's baseline mobility status (ambulation, transfers, stairs, etc )    - Identify cognitive and physical deficits and behaviors that affect mobility  - Identify mobility aids required to assist with transfers and/or ambulation (gait belt, sit-to-stand, lift, walker, cane, etc )  - [place] fall precautions as indicated by assessment  - Record patient progress and toleration of activity level on Mobility SBAR; progress patient to next Phase/Stage  - Instruct patient to call for assistance with activity based on assessment  - Consider rehabilitation consult to assist with strengthening/weightbearing, etc   Outcome: Progressing     Problem: DISCHARGE PLANNING  Goal: Discharge to home or other facility with appropriate resources  Description: INTERVENTIONS:  - Identify barriers to discharge w/patient and caregiver  - Arrange for needed discharge resources and transportation as appropriate  - Identify discharge learning needs (meds, wound care, etc )  - Arrange for interpretive services to assist at discharge as needed  - Refer to Case Management Department for coordinating discharge planning if the patient needs post-hospital services based on physician/advanced practitioner order or complex needs related to functional status, cognitive ability, or social support system  Outcome: Progressing

## 2021-04-27 NOTE — CONSULTS
PHYSICAL MEDICINE AND REHABILITATION CONSULT NOTE  Jennifer Ramirez 78 y o  male MRN: [id number]  [facility] Encounter: [id number]    Requested by (Physician/Service): Ivy Crisostomo MD  Reason for Consultation:  Assessment of rehabilitation needs    Assessment:  Rehabilitation Diagnosis:    Debility    Impaired mobility and self care   Impaired cognition     Recommendations:  Rehabilitation Plan:   Continue PT/OT while on acute care   Recommend follow up with neurology for memory clinic   Recommend adding ducolax suppository for constipation   The patient was not on opioids at home and is groggy on exam   Would recommend weaning off of opioids   The patient is a candidate for acute inpatient rehabilitation   Covid-19 Testing: [facility] rehabilitation units require testing within 48 hours of potential admission for all general admissions  Please re-test if needed  *Re-testing is NOT required for patients recovering from COVID-19 infection if isolation has been discontinued per CDC criteria  Medical Co-morbidities Plan:  · GI bleed due to duodenal ulcer  · Schizophrenia   · Tobacco abuse   · ETOH abuse  · Chronic pain  · COPD  · Ambulatory dysfunction  · DVT ppx: SCD    Thank you for this consultation  Do not hesitate to contact service with further questions  MAVERICK Capps  PM&R    History of Present Illness:  Jennifer Ramirez is a 78 y o  male with a PMH of ETOH abuse, HTN, COPD, schizophrenia and MDD who initially presented to the [facility] [place] [address] on 4/19/21 for suicidal ideation and abdominal pain  CT was negative and he was admitted to the behavioral health unit  While there he had coffee-ground emesis and was transferred to critical care for GI bleed  EGD on 4/21/21 showed 5 cm duodenal ulcer with two visible vessels with no active bleeding  He was [facility] to [facility]    He was placed on a PPI drip and transitioned to oral Protonix BID   Biopsies were negative for H pylori  Phychiatric evaluation was done and he was cleared for outpt follow up  He denied any further suicidal ideation  PM&R are consulted for rehabilitation recommendations  The patient was seen in his room  He is reporting left sided abdominal tenderness  He is reporting chronic pain and constipation  The patient reports a hx of falling off a roof and sustaining a back injury as well as 3 back surgeries  MOCA was done and was scored 18/30  Per son the patient is more groggy then usual and was not on pain medications at home  Review of Systems: 10 point ROS negative except for what is noted in HPI    Function:  Prior level of function and living situation:  The patient lives in a 2 level home with 1 RENETTA  He reports living in the basement and has access to a stair glide  DME include a shower chair and cane  He was independent for ADLs and lives with his son who works during the day  His son does see him daily and is able to provide daily checks  Current level of function:  Physical Therapy: Moderate assist for transfers, minimal assist for ambulation  Occupational Therapy:  Supervision for eating, grooming, UB dressing/bathing, minimal assist for LB bathing/dressing and toileting  Physical Exam:  /68   Pulse 68   Temp 98 5 °F (36 9 °C)   Resp 18   Ht 5' 4" (1 626 m)   Wt 61 kg (134 lb 6 4 oz)   SpO2 96%   BMI 23 07 kg/m²        Intake/Output Summary (Last 24 hours) at 4/27/2021 0927  Last data filed at 4/27/2021 0846  Gross per 24 hour   Intake 530 ml   Output 2275 ml   Net -1745 ml       Body mass index is 23 07 kg/m²  Physical Exam  Constitutional:       Comments: Fatigued appearing and falling asleep at times during exam    HENT:      Head: Atraumatic  Mouth/Throat:      Comments: Poor dentition   Pulmonary:      Breath sounds: Normal breath sounds     Abdominal:      General: Bowel sounds are normal  Musculoskeletal: Normal range of motion  Skin:     Coloration: Skin is pale  Neurological:      Mental Status: He is alert and oriented to person, place, and time  Psychiatric:         Mood and Affect: Affect is flat  Social History:    Social History     Socioeconomic History    Marital status: Single     Spouse name: Not on file    Number of children: Not on file    Years of education: Not on file    Highest education level: Not on file   Occupational History    Not on file   Social Needs    Financial resource strain: Not on file    Food insecurity     Worry: Not on file     Inability: Not on file    Transportation needs     Medical: No     Non-medical: No   Tobacco Use    Smoking status: Current Every Day Smoker     Packs/day: 1 00     Types: Cigarettes    Smokeless tobacco: Never Used    Tobacco comment: started age 14, 4 quit attempts   Substance and Sexual Activity    Alcohol use:  Yes     Alcohol/week: 4 0 standard drinks     Types: 4 Cans of beer per week     Frequency: Monthly or less     Drinks per session: 1 or 2     Binge frequency: Never     Comment: a couple beers    Drug use: Yes     Types: Marijuana     Comment: history polysubstance use including IVDA on record- every now then will smoke weed     Sexual activity: Not Currently     Partners: Female   Lifestyle    Physical activity     Days per week: 0 days     Minutes per session: Not on file    Stress: Not on file   Relationships    Social connections     Talks on phone: Not on file     Gets together: Not on file     Attends Yarsanism service: Not on file     Active member of club or organization: Not on file     Attends meetings of clubs or organizations: Not on file     Relationship status: Not on file    Intimate partner violence     Fear of current or ex partner: Not on file     Emotionally abused: Not on file     Physically abused: Not on file     Forced sexual activity: Not on file   Other Topics Concern    Not on file   Social History Narrative    Used to be  for about 30 yrs, unemployed since fell off roof onto concrete - pt does not recall what year    1 son with whom he lives,         Family History:    Family History   Problem Relation Age of Onset    No Known Problems Mother     No Known Problems Father          Medications:     Current Facility-Administered Medications:     acetaminophen (TYLENOL) tablet 650 mg, 650 mg, Oral, Q6H PRN, Martha Grissom MD    albuterol (PROVENTIL HFA,VENTOLIN HFA) inhaler 2 puff, 2 puff, Inhalation, Q4H PRN, Leanne Ocampo PA-C, 2 puff at 04/26/21 2239    atorvastatin (LIPITOR) tablet 40 mg, 40 mg, Oral, Daily, Leanne Ocampo PA-C, 40 mg at 04/26/21 0916    calcium carbonate (TUMS) chewable tablet 500 mg, 500 mg, Oral, Daily PRN, Leanne Ocampo PA-C, 500 mg at 04/26/21 0117    cholecalciferol (VITAMIN D3) tablet 1,000 Units, 1,000 Units, Oral, Daily, Leanne Ocampo PA-C, 1,000 Units at 04/26/21 0916    fluticasone (FLONASE) 50 mcg/act nasal spray 2 spray, 2 spray, Nasal, Daily, Leanne Ocampo PA-C, 2 spray at 04/26/21 0914    fluticasone-vilanterol (BREO ELLIPTA) 100-25 mcg/inh inhaler 1 puff, 1 puff, Inhalation, Daily, Mckay Summers PA-C, 1 puff at 43/73/63 3053    folic acid (FOLVITE) tablet 1 mg, 1 mg, Oral, Daily, Martha Grissom MD, 1 mg at 04/26/21 0916    hydrALAZINE (APRESOLINE) injection 5 mg, 5 mg, Intravenous, Q6H PRN, Matthew Barajas PA-C, 5 mg at 04/21/21 2248    Labetalol HCl (NORMODYNE) injection 10 mg, 10 mg, Intravenous, Q6H PRN, Matthew Barajas PA-C, 10 mg at 04/22/21 1013    LORazepam (ATIVAN) injection 0 5 mg, 0 5 mg, Intravenous, Q6H PRN, Leanne Ocampo PA-C, 0 5 mg at 04/26/21 0929    metoprolol tartrate (LOPRESSOR) tablet 25 mg, 25 mg, Oral, Q12H [facility], Martha Grissom MD, 25 mg at 04/26/21 2239    multivitamin-minerals (CENTRUM) tablet 1 tablet, 1 tablet, Oral, Daily, Martha Grissom MD, 1 tablet at 04/26/21 0916    naloxone (NARCAN) 0 04 mg/mL syringe 0 04 mg, 0 04 mg, Intravenous, Q1MIN PRN, Darlin Stone MD    nicotine (NICODERM CQ) 21 mg/24 hr TD 24 hr patch 1 patch, 1 patch, Transdermal, Daily, Leanne Ocampo PA-C, 1 patch at 04/26/21 0918    ondansetron (ZOFRAN-ODT) dispersible tablet 4 mg, 4 mg, Oral, Q6H PRN, Leanne Ocampo PA-C    oxyCODONE (ROXICODONE) IR tablet 2 5 mg, 2 5 mg, Oral, Q4H PRN, Darlin Stone MD    oxyCODONE (ROXICODONE) IR tablet 5 mg, 5 mg, Oral, Q4H PRN, Darlin Stone MD, 5 mg at 04/27/21 7320    pantoprazole (PROTONIX) EC tablet 40 mg, 40 mg, Oral, BID AC, Darlin Stone MD, 40 mg at 04/27/21 8499    polyethylene glycol (MIRALAX) packet 17 g, 17 g, Oral, BID AC, Darlin Stone MD, 17 g at 04/27/21 8705    QUEtiapine (SEROquel) tablet 200 mg, 200 mg, Oral, Daily, Leanne Ocampo PA-C, 200 mg at 04/26/21 6024    QUEtiapine (SEROquel) tablet 600 mg, 600 mg, Oral, HS, Darlin Stone MD, 600 mg at 04/26/21 2239    thiamine tablet 100 mg, 100 mg, Oral, Daily, Darlni Stone MD, 100 mg at 04/26/21 0916    traZODone (DESYREL) tablet 50 mg, 50 mg, Oral, HS, Leanne Ocampo PA-C, 50 mg at 04/26/21 2239    Past Medical History:     Past Medical History:   Diagnosis Date    Alcohol abuse     BPH (benign prostatic hyperplasia)     [facility]     DJD (degenerative joint disease)     Encounter to establish care with new doctor 8/21/2019    Gait abnormality     Head injury     History of cerebrovascular accident (CVA) with residual deficit 10/1/2019    History of CVA (cerebrovascular accident) 1/8/2020    History of substance abuse ([address] ) 8/21/2019    Polysubstance     History of sustained ventricular tachycardia 8/21/2019    History of transient ischemic attack (TIA) 8/28/2019    Hypertension     Metatarsal fracture     TIA (transient ischemic attack)     Tobacco abuse         Past Surgical History:     Past Surgical History:   Procedure Laterality Date    ABDOMINAL SURGERY      ANKLE SURGERY      BACK SURGERY      CT GUIDED PERC DRAINAGE CATHETER PLACEMENT  1/27/2020    ELBOW SURGERY      IR VISCERAL ANGIOGRAPHY / INTERVENTION  1/11/2020    JOINT REPLACEMENT      KNEE SURGERY      LAPAROTOMY N/A 1/11/2020    Procedure: LAPAROTOMY EXPLORATORY,  OVERSEW  OF GASTRO DUODENAL ARTERY, THAL PATCH OF DUODENUM, VICKY GASTRO JEJUNOSTOMY TUBE;  Surgeon: Le Ayers DO;  Location: [facility];  Service: General    LIVER SURGERY           Allergies:     No Known Allergies        LABORATORY RESULTS:      Lab Results   Component Value Date    HGB 8 1 (L) 04/27/2021    HGB 10 9 (L) 02/08/2015    HCT 24 4 (L) 04/27/2021    HCT 32 8 (L) 02/08/2015    WBC 6 09 04/27/2021    WBC 5 72 02/08/2015     Lab Results   Component Value Date    BUN 13 04/27/2021    BUN 11 02/08/2015     (L) 02/08/2015    K 4 2 04/27/2021    K 4 3 02/08/2015     04/27/2021    CL 98 (L) 02/08/2015    GLUCOSE 120 01/16/2020    GLUCOSE 108 02/08/2015    CREATININE 1 03 04/27/2021    CREATININE 0 57 (L) 02/08/2015     Lab Results   Component Value Date    PROTIME 14 1 04/22/2021    PROTIME 12 7 01/22/2015    INR 1 09 04/22/2021    INR 0 93 01/22/2015        DIAGNOSTIC STUDIES: Reviewed  No results found

## 2021-04-28 LAB
ANION GAP SERPL CALCULATED.3IONS-SCNC: 5 MMOL/L (ref 4–13)
BASOPHILS # BLD AUTO: 0.03 THOUSANDS/ΜL (ref 0–0.1)
BASOPHILS NFR BLD AUTO: 0 % (ref 0–1)
BUN SERPL-MCNC: 16 MG/DL (ref 5–25)
CALCIUM SERPL-MCNC: 8.7 MG/DL (ref 8.3–10.1)
CHLORIDE SERPL-SCNC: 103 MMOL/L (ref 100–108)
CO2 SERPL-SCNC: 25 MMOL/L (ref 21–32)
CREAT SERPL-MCNC: 0.94 MG/DL (ref 0.6–1.3)
EOSINOPHIL # BLD AUTO: 0.2 THOUSAND/ΜL (ref 0–0.61)
EOSINOPHIL NFR BLD AUTO: 3 % (ref 0–6)
ERYTHROCYTE [DISTWIDTH] IN BLOOD BY AUTOMATED COUNT: 14.8 % (ref 11.6–15.1)
GFR SERPL CREATININE-BSD FRML MDRD: 83 ML/MIN/1.73SQ M
GLUCOSE SERPL-MCNC: 142 MG/DL (ref 65–140)
HCT VFR BLD AUTO: 26.7 % (ref 36.5–49.3)
HGB BLD-MCNC: 8.7 G/DL (ref 12–17)
IMM GRANULOCYTES # BLD AUTO: 0.03 THOUSAND/UL (ref 0–0.2)
IMM GRANULOCYTES NFR BLD AUTO: 0 % (ref 0–2)
LYMPHOCYTES # BLD AUTO: 1.35 THOUSANDS/ΜL (ref 0.6–4.47)
LYMPHOCYTES NFR BLD AUTO: 18 % (ref 14–44)
MCH RBC QN AUTO: 32.3 PG (ref 26.8–34.3)
MCHC RBC AUTO-ENTMCNC: 32.6 G/DL (ref 31.4–37.4)
MCV RBC AUTO: 99 FL (ref 82–98)
MONOCYTES # BLD AUTO: 0.8 THOUSAND/ΜL (ref 0.17–1.22)
MONOCYTES NFR BLD AUTO: 11 % (ref 4–12)
NEUTROPHILS # BLD AUTO: 4.96 THOUSANDS/ΜL (ref 1.85–7.62)
NEUTS SEG NFR BLD AUTO: 68 % (ref 43–75)
NRBC BLD AUTO-RTO: 0 /100 WBCS
PLATELET # BLD AUTO: 451 THOUSANDS/UL (ref 149–390)
PMV BLD AUTO: 8.9 FL (ref 8.9–12.7)
POTASSIUM SERPL-SCNC: 4.1 MMOL/L (ref 3.5–5.3)
RBC # BLD AUTO: 2.69 MILLION/UL (ref 3.88–5.62)
SODIUM SERPL-SCNC: 133 MMOL/L (ref 136–145)
WBC # BLD AUTO: 7.37 THOUSAND/UL (ref 4.31–10.16)

## 2021-04-28 PROCEDURE — 99232 SBSQ HOSP IP/OBS MODERATE 35: CPT | Performed by: INTERNAL MEDICINE

## 2021-04-28 PROCEDURE — 85025 COMPLETE CBC W/AUTO DIFF WBC: CPT | Performed by: INTERNAL MEDICINE

## 2021-04-28 PROCEDURE — 80048 BASIC METABOLIC PNL TOTAL CA: CPT | Performed by: INTERNAL MEDICINE

## 2021-04-28 RX ORDER — LIDOCAINE 50 MG/G
1 PATCH TOPICAL DAILY
Status: DISCONTINUED | OUTPATIENT
Start: 2021-04-28 | End: 2021-05-07 | Stop reason: HOSPADM

## 2021-04-28 RX ORDER — TRAMADOL HYDROCHLORIDE 50 MG/1
50 TABLET ORAL EVERY 6 HOURS PRN
Status: DISCONTINUED | OUTPATIENT
Start: 2021-04-28 | End: 2021-05-07 | Stop reason: HOSPADM

## 2021-04-28 RX ORDER — FLUTICASONE PROPIONATE 50 MCG
2 SPRAY, SUSPENSION (ML) NASAL
Status: DISCONTINUED | OUTPATIENT
Start: 2021-04-28 | End: 2021-05-07 | Stop reason: HOSPADM

## 2021-04-28 RX ADMIN — TRAMADOL HYDROCHLORIDE 50 MG: 50 TABLET, FILM COATED ORAL at 21:35

## 2021-04-28 RX ADMIN — LIDOCAINE 1 PATCH: 50 PATCH TOPICAL at 12:20

## 2021-04-28 RX ADMIN — PANTOPRAZOLE SODIUM 40 MG: 40 TABLET, DELAYED RELEASE ORAL at 06:10

## 2021-04-28 RX ADMIN — QUETIAPINE FUMARATE 600 MG: 300 TABLET ORAL at 21:38

## 2021-04-28 RX ADMIN — DOCUSATE SODIUM 100 MG: 100 CAPSULE, LIQUID FILLED ORAL at 17:22

## 2021-04-28 RX ADMIN — POLYETHYLENE GLYCOL 3350 17 G: 17 POWDER, FOR SOLUTION ORAL at 17:23

## 2021-04-28 RX ADMIN — QUETIAPINE FUMARATE 200 MG: 100 TABLET ORAL at 09:29

## 2021-04-28 RX ADMIN — FLUTICASONE FUROATE AND VILANTEROL TRIFENATATE 1 PUFF: 100; 25 POWDER RESPIRATORY (INHALATION) at 09:28

## 2021-04-28 RX ADMIN — METOPROLOL TARTRATE 25 MG: 25 TABLET, FILM COATED ORAL at 21:37

## 2021-04-28 RX ADMIN — Medication 1 PATCH: at 09:29

## 2021-04-28 RX ADMIN — OXYCODONE HYDROCHLORIDE 2.5 MG: 5 TABLET ORAL at 09:40

## 2021-04-28 RX ADMIN — METOPROLOL TARTRATE 25 MG: 25 TABLET, FILM COATED ORAL at 09:29

## 2021-04-28 RX ADMIN — POLYETHYLENE GLYCOL 3350 17 G: 17 POWDER, FOR SOLUTION ORAL at 06:10

## 2021-04-28 RX ADMIN — Medication 1 TABLET: at 09:29

## 2021-04-28 RX ADMIN — ALBUTEROL SULFATE 2 PUFF: 90 AEROSOL, METERED RESPIRATORY (INHALATION) at 22:22

## 2021-04-28 RX ADMIN — DOCUSATE SODIUM 100 MG: 100 CAPSULE, LIQUID FILLED ORAL at 09:29

## 2021-04-28 RX ADMIN — PANTOPRAZOLE SODIUM 40 MG: 40 TABLET, DELAYED RELEASE ORAL at 17:22

## 2021-04-28 RX ADMIN — FLUTICASONE PROPIONATE 2 SPRAY: 50 SPRAY, METERED NASAL at 09:28

## 2021-04-28 RX ADMIN — TRAMADOL HYDROCHLORIDE 50 MG: 50 TABLET, FILM COATED ORAL at 15:30

## 2021-04-28 RX ADMIN — ATORVASTATIN CALCIUM 40 MG: 40 TABLET, FILM COATED ORAL at 09:29

## 2021-04-28 RX ADMIN — THIAMINE HCL TAB 100 MG 100 MG: 100 TAB at 09:29

## 2021-04-28 RX ADMIN — FOLIC ACID 1 MG: 1 TABLET ORAL at 09:29

## 2021-04-28 RX ADMIN — TRAZODONE HYDROCHLORIDE 50 MG: 50 TABLET ORAL at 21:39

## 2021-04-28 RX ADMIN — Medication 1000 UNITS: at 09:29

## 2021-04-28 NOTE — UTILIZATION REVIEW
Inpatient Admission Authorization Request   NOTIFICATION OF INPATIENT ADMISSION/INPATIENT AUTHORIZATION REQUEST   SERVICING FACILITY:   [address] [place] [address], [facility] [address], [facility]  Tax ID: [id number]  NPI: [id number]  Place of Service: Inpatient [facility] [facility]  [address]  Place of Service Code: 24     ATTENDING PROVIDER:  Attending Name and NPI#: Radha Regan Md [[id number]]  Address: [address] [place] [address], [facility] [address], [facility]  Phone: [phone]     UTILIZATION REVIEW CONTACT:  Brenda Arce Utilization   Network Utilization Review Department  Phone: [phone]  Fax: [phone]  Email: [email or web address]     PHYSICIAN ADVISORY SERVICES:  FOR [id number] REVIEW - MEDICAL NECESSITY DENIAL  Phone: [phone]  Fax: [phone]  Email: [email or web address]     TYPE OF REQUEST:  Inpatient Status     ADMISSION INFORMATION:  ADMISSION DATE/TIME: 4/20/21 2051  PATIENT DIAGNOSIS CODE/DESCRIPTION:  GI bleed [K92 2]  DISCHARGE DATE/TIME: 4/21/2021  5:27 PM  DISCHARGE DISPOSITION (IF DISCHARGED): [facility]     IMPORTANT INFORMATION:  Please contact the Brenda rAce directly with any questions or concerns regarding this request  Department voicemails are confidential     Send requests for admission clinical reviews, concurrent reviews, approvals, and administrative denials due to lack of clinical to fax [phone]

## 2021-04-28 NOTE — PROGRESS NOTES
[facility] [facility]  Progress Note - Angelo Hurley 1950, 78 y o  male MRN: [id number]  Unit/Bed#: [facility] 929-01 Encounter: [id number]  Primary Care Provider: Narendra Sánchez DO   Date and time admitted to hospital: 4/21/2021  5:34 PM    * GI bleed  Assessment & Plan  Presented with signs and symptoms consistent with upper GI bleed  Baseline hemoglobin appears to be normal; acute blood loss anemia during his hospital stay secondary to bleeding with hemoglobin ranging between 8 and 9  Hemodynamically stable otherwise  Hemoglobin stable  Status post EGD on 04/22/2021-multiple duodenal ulcers with evidence of bleeding status post electrocauterization  Status post Protonix drip, transition to oral Protonix twice per day today  Requires outpatient GI follow-up  Previously counseled extensively on alcohol on smoking cessation to prevent progression of underlying ulcers  EGD biopsies negative for H pylori  Continue non ulcerogenic GI diet         Duodenal ulcer  Assessment & Plan  see above management plan    Hypertension  Assessment & Plan  Metoprolol    Schizophrenia [facility])  Assessment & Plan  Patient was admitted at [address] [place] [address] for suicidal ideation  His hospital course was complicated by abdominal pain with hematemesis and he subsequently was transferred to the ICU there  Upon drop of his hemoglobin he was transferred to [facility] for GI evaluation  Patient is hemodynamically stable  Patient denies any suicidal ideation or homicidal ideation  Status post psychiatric evaluation, clear for outpatient follow-up from a psychiatric standpoint  Tobacco abuse  Assessment & Plan  Using nicotine patch  Counseled on smoking cessation    Chronic pain  Assessment & Plan  Complaining of chronic back pain, severe; reviewed PDMP, has not been on opioids or other controlled substances outpatient even though he mentions chronic morphine use at home    Will discontinue oxycodone; transition to tramadol p r n  Moderate or Severe pain and Tylenol along with lidocaine patch  COPD without exacerbation [facility])  Assessment & Plan  Continue with current meds     Ambulatory dysfunction  Assessment & Plan  PT/OT evaluated the patient recommending rehabilitation services after discharge  Patient is now agreeable; status post PMNR evaluation with recommendations of acute rehabilitation services; pending bed availability   on board  Alcohol dependence with unspecified alcohol-induced disorder [facility])  Assessment & Plan  Alcohol cessation counseling; refusing inpatient rehab  CIWA discontinued given persistent scores of 0            Disposition: Anticipated discharge medically clear pending bed availability an ARC  VTE Prophylaxis - SCD; large duodenal ulcer at risk for significant GI bleed    Education and Discussions with Family / Patient:  Discussed with patient at bedside    Current Length of Stay: 7 day(s)  Current Patient Status: Inpatient     Code Status: Level 1 - Full Code      Subjective:   Seen and examined at bedside  Questioning why oxycodone was decreased, complaining of severe low back pain although prior to my assessment, patient was sleeping comfortably  Otherwise has no active complaints  Objective:     Vitals:   Temp (24hrs), Av 6 °F (37 °C), Min:98 2 °F (36 8 °C), Max:98 9 °F (37 2 °C)    Temp:  [98 2 °F (36 8 °C)-98 9 °F (37 2 °C)] 98 2 °F (36 8 °C)  HR:  [64-67] 66  Resp:  [18] 18  BP: (110-120)/(60-61) 119/61  SpO2:  [67 %-98 %] 98 %  Body mass index is 23 09 kg/m²  Invasive Devices     Peripheral Intravenous Line            Peripheral IV 21 Right Wrist 2 days                Input and Output Summary (last 24 hours):        Intake/Output Summary (Last 24 hours) at 2021 1417  Last data filed at 2021 1300  Gross per 24 hour   Intake 800 ml   Output 2750 ml   Net -1950 ml       Physical Exam:     Gen: in bed; room air  HEENT: NC/AT, PERRLA, no conjunctival pallor or scleral icterus, moist mucous membranes  RS: symmetric, speaking in full sentences, CTA bilaterally  CVS:  RRR, S1-S2 heard without murmurs, no peripheral edema, radial pulses 2+, capillary refill less than 2 seconds  Abdomen:  Soft,ND, nontender, bowel sounds heard  MSK:  Warm  Babetta Pee all 4 extremities  :  No Balderas  Neuro:   AAOx4  Psych:  Cooperative, denies suicidal ideation or thoughts or homicidal thoughts        Additional Data:     Labs:    Results from last 7 days   Lab Units 04/28/21  0942   WBC Thousand/uL 7 37   HEMOGLOBIN g/dL 8 7*   HEMATOCRIT % 26 7*   PLATELETS Thousands/uL 451*   NEUTROS PCT % 68   LYMPHS PCT % 18   MONOS PCT % 11   EOS PCT % 3     Results from last 7 days   Lab Units 04/28/21  0942  04/24/21  0649   POTASSIUM mmol/L 4 1   < > 4 1   CHLORIDE mmol/L 103   < > 106   CO2 mmol/L 25   < > 26   BUN mg/dL 16   < > 15   CREATININE mg/dL 0 94   < > 0 89   CALCIUM mg/dL 8 7   < > 8 2*   ALK PHOS U/L  --   --  39*   ALT U/L  --   --  9*   AST U/L  --   --  25    < > = values in this interval not displayed  Results from last 7 days   Lab Units 04/22/21  2034   INR  1 09       * I Have Reviewed All Lab Data Listed Above  * Additional Pertinent Lab Tests Reviewed:  [facility] 66 Admission Reviewed    Imaging:    Imaging Reports Reviewed Today Include:  None  Imaging Personally Reviewed by Myself Includes:  None    Recent Cultures (last 7 days):           Last 24 Hours Medication List:   Current Facility-Administered Medications   Medication Dose Route Frequency Provider Last Rate    acetaminophen  650 mg Oral Q6H PRN Ana Maria Marrufo MD      albuterol  2 puff Inhalation Q4H PRN Leanne Ocampo PA-C      atorvastatin  40 mg Oral Daily Leanne Ocampo PA-C      bisacodyl  10 mg Rectal Once Bharati Paris MD      calcium carbonate  500 mg Oral Daily PRN Leanne Ocampo PA-C      cholecalciferol  1,000 Units Oral Daily Leanne Ocampo PA-C  docusate sodium  100 mg Oral BID Antonio Kimball MD      fluticasone  2 spray Nasal Daily Leanne Ocampo PA-C      fluticasone-vilanterol  1 puff Inhalation Daily Mckay Summers PA-C      folic acid  1 mg Oral Daily Viridiana Dacosta MD      hydrALAZINE  5 mg Intravenous Q6H PRN Jose Olmedo PA-C      Labetalol HCl  10 mg Intravenous Q6H PRN Jose Olmedo PA-C      lidocaine  1 patch Topical Daily Viridiana Dacosta MD      LORazepam  0 5 mg Intravenous Q6H PRN Leanne Ocampo PA-C      metoprolol tartrate  25 mg Oral Q12H [address] Viridiana Dacosta MD      multivitamin-minerals  1 tablet Oral Daily Viridiana Dacosta MD      naloxone  0 04 mg Intravenous Q1MIN PRN Viridiana Dacosta MD      nicotine  1 patch Transdermal Daily Leanne Ocampo PA-C      ondansetron  4 mg Oral Q6H PRN Leanne Ocampo PA-C      pantoprazole  40 mg Oral BID AC Viridiana Dacosta MD      polyethylene glycol  17 g Oral BID AC Viridiana Dacosta MD      QUEtiapine  200 mg Oral Daily Leanne Ocampo PA-C      QUEtiapine  600 mg Oral HS Viridiana Dacosta MD      thiamine  100 mg Oral Daily Viridiana Dacosta MD      traMADol  50 mg Oral Q6H PRN Viridiana Dacosta MD      traZODone  50 mg Oral HS Leanne Ocampo PA-C              ** Please Note: This note has been constructed using a voice recognition system   **

## 2021-04-28 NOTE — ASSESSMENT & PLAN NOTE
Presented with signs and symptoms consistent with upper GI bleed  Baseline hemoglobin appears to be normal; acute blood loss anemia during his hospital stay secondary to bleeding with hemoglobin ranging between 8 and 9  Hemodynamically stable otherwise  Hemoglobin stable  Status post EGD on 04/22/2021-multiple duodenal ulcers with evidence of bleeding status post electrocauterization  Status post Protonix drip, transition to oral Protonix twice per day today    Requires outpatient GI follow-up  Previously counseled extensively on alcohol on smoking cessation to prevent progression of underlying ulcers  EGD biopsies negative for H pylori  Continue non ulcerogenic GI diet

## 2021-04-28 NOTE — CASE MANAGEMENT
Pt is still pending auth at this time to transfer to [facility]  Cm notified pt and pt's son of pending auth and d/c  CM will follow

## 2021-04-28 NOTE — ASSESSMENT & PLAN NOTE
Patient was admitted at [facility] for suicidal ideation  His hospital course was complicated by abdominal pain with hematemesis and he subsequently was transferred to the ICU there  Upon drop of his hemoglobin he was transferred to [facility] for GI evaluation  Patient is hemodynamically stable  Patient denies any suicidal ideation or homicidal ideation  Status post psychiatric evaluation, clear for outpatient follow-up from a psychiatric standpoint

## 2021-04-28 NOTE — ASSESSMENT & PLAN NOTE
Alcohol cessation counseling; refusing inpatient rehab    CIWA discontinued given persistent scores of 0 no

## 2021-04-28 NOTE — PLAN OF CARE
Problem: Prexisting or High Potential for Compromised Skin Integrity  Goal: Skin integrity is maintained or improved  Description: INTERVENTIONS:  - Identify patients at risk for skin breakdown  - Assess and monitor skin integrity  - Assess and monitor nutrition and hydration status  - Monitor labs   - Assess for incontinence   - Turn and reposition patient  - Assist with mobility/ambulation  - Relieve pressure over bony prominences  - Avoid friction and shearing  - Provide appropriate hygiene as needed including keeping skin clean and dry  - Evaluate need for skin moisturizer/barrier cream  - Collaborate with interdisciplinary team   - Patient/family teaching  - Consider wound care consult   Outcome: Progressing     Problem: Potential for Falls  Goal: Patient will remain free of falls  Description: INTERVENTIONS:  - Assess patient frequently for physical needs  -  Identify cognitive and physical deficits and behaviors that affect risk of falls    -  [place] fall precautions as indicated by assessment   - Educate patient/family on patient safety including physical limitations  - Instruct patient to call for assistance with activity based on assessment  - Modify environment to reduce risk of injury  - Consider OT/PT consult to assist with strengthening/mobility  Outcome: Progressing     Problem: PAIN - ADULT  Goal: Verbalizes/displays adequate comfort level or baseline comfort level  Description: Interventions:  - Encourage patient to monitor pain and request assistance  - Assess pain using appropriate pain scale  - Administer analgesics based on type and severity of pain and evaluate response  - Implement non-pharmacological measures as appropriate and evaluate response  - Consider cultural and social influences on pain and pain management  - Notify physician/advanced practitioner if interventions unsuccessful or patient reports new pain  Outcome: Progressing     Problem: INFECTION - ADULT  Goal: Absence or prevention of progression during hospitalization  Description: INTERVENTIONS:  - Assess and monitor for signs and symptoms of infection  - Monitor lab/diagnostic results  - Monitor all insertion sites, i e  indwelling lines, tubes, and drains  - Monitor endotracheal if appropriate and nasal secretions for changes in amount and color  - [place] appropriate cooling/warming therapies per order  - Administer medications as ordered  - Instruct and encourage patient and family to use good hand hygiene technique  - Identify and instruct in appropriate isolation precautions for identified infection/condition  Outcome: Progressing  Goal: Absence of fever/infection during neutropenic period  Description: INTERVENTIONS:  - Monitor WBC    Outcome: Progressing     Problem: SAFETY ADULT  Goal: Patient will remain free of falls  Description: INTERVENTIONS:  - Assess patient frequently for physical needs  -  Identify cognitive and physical deficits and behaviors that affect risk of falls    -  [place] fall precautions as indicated by assessment   - Educate patient/family on patient safety including physical limitations  - Instruct patient to call for assistance with activity based on assessment  - Modify environment to reduce risk of injury  - Consider OT/PT consult to assist with strengthening/mobility  Outcome: Progressing  Goal: Maintain or return to baseline ADL function  Description: INTERVENTIONS:  -  Assess patient's ability to carry out ADLs; assess patient's baseline for ADL function and identify physical deficits which impact ability to perform ADLs (bathing, care of mouth/teeth, toileting, grooming, dressing, etc )  - Assess/evaluate cause of self-care deficits   - Assess range of motion  - Assess patient's mobility; develop plan if impaired  - Assess patient's need for assistive devices and provide as appropriate  - Encourage maximum independence but intervene and supervise when necessary  - Involve family in performance of ADLs  - Assess for home care needs following discharge   - Consider OT consult to assist with ADL evaluation and planning for discharge  - Provide patient education as appropriate  Outcome: Progressing  Goal: Maintain or return mobility status to optimal level  Description: INTERVENTIONS:  - Assess patient's baseline mobility status (ambulation, transfers, stairs, etc )    - Identify cognitive and physical deficits and behaviors that affect mobility  - Identify mobility aids required to assist with transfers and/or ambulation (gait belt, sit-to-stand, lift, walker, cane, etc )  - [place] fall precautions as indicated by assessment  - Record patient progress and toleration of activity level on Mobility SBAR; progress patient to next Phase/Stage  - Instruct patient to call for assistance with activity based on assessment  - Consider rehabilitation consult to assist with strengthening/weightbearing, etc   Outcome: Progressing     Problem: DISCHARGE PLANNING  Goal: Discharge to home or other facility with appropriate resources  Description: INTERVENTIONS:  - Identify barriers to discharge w/patient and caregiver  - Arrange for needed discharge resources and transportation as appropriate  - Identify discharge learning needs (meds, wound care, etc )  - Arrange for interpretive services to assist at discharge as needed  - Refer to Case Management Department for coordinating discharge planning if the patient needs post-hospital services based on physician/advanced practitioner order or complex needs related to functional status, cognitive ability, or social support system  Outcome: Progressing

## 2021-04-28 NOTE — ASSESSMENT & PLAN NOTE
Complaining of chronic back pain, severe; reviewed PDMP, has not been on opioids or other controlled substances outpatient even though he mentions chronic morphine use at home  Will discontinue oxycodone; transition to tramadol p r n  Moderate or Severe pain and Tylenol along with lidocaine patch

## 2021-04-28 NOTE — PROGRESS NOTES
[facility] system now functional and clinical information has been faxed  Will follow up with CM when determination is received  Of note patient would need negative COVID test resulted within 48 hours of admissions if approved by insurance

## 2021-04-28 NOTE — UTILIZATION REVIEW
Initial Clinical Review    Admission: Date/Time/Statement:   Admission Orders (From admission, onward)     Ordered        04/20/21 2054  Inpatient Admission  Once                   Orders Placed This Encounter   Procedures    Inpatient Admission     Standing Status:   Standing     Number of Occurrences:   1     Order Specific Question:   Level of Care     Answer:   Critical Care [15]     Order Specific Question:   Estimated length of stay     Answer:   More than 2 Midnights     Order Specific Question:   Certification     Answer:   I certify that inpatient services are medically necessary for this patient for a duration of greater than two midnights  See H&P and MD Progress Notes for additional information about the patient's course of treatment  ED Arrival Information     Patient not seen in ED                     Initial Presentation:   72y Male, transfer from [address] [facility] to ICU for dark colored vomit, moderate amount of "dark colored vomit" suspicious of coffee-ground emesis due to his prior history of duodenal ulcer bleeding and perforation  In addition noted with hypotension with SBP in the 70s  Recently seen in ED on 4/19 with c/o generalized abdominal pain and suicidal ideation  CT without any acute finding, [facility] for Sch[facility]ia, Tachycardia, COPD, Duodenal ulcer with perforation in Jan 2020 - S/p Ir embolization, Exploratory Laparotomy with GJ tube placement, Alcohol use and Tobacco abuse  Admit Inpatient level of care for Acute upper GI bleed and Tachycardia worsening  Today with episode of coffee-ground emesis with hypotension  Gastroenterology consult  Aggressive IVFs  Noted drop in Hgb from 16 to 11 8  Hold off on bld transfusion at this time as no further vomiting noted  Monitor H&H closely if drop less than 8 will transfuse  Start Pantoprazole and Octreotide gtt  Current HR is 120s on metoprolol   Worsening tachycardia likely due to upper GI bleed and volume depletion with anxiety  Change Metoprolol to Iv schedule  Date: 4/21  Day 2:   Progress notes; Did have an initial drop in hemoglobin however over the last 2 checks appears to be stable  Continue IVFs  Currently NPO for possible EGD today  Monitor H&H  Continue PPI and octreotide gtt  Gastroenterology (GI) consult pending  Psychiatry consult  Pt still with intermittent abdominal pain but improved from yesterday  No bleeding episodes overnight  GI cons; Upper GI Bleed  EGD today  Hgb dropped from 16-10  Continues to have epigastric pain  History of alcohol abuse  Admits to 3 beers per day  Psychiatry cons; At this time, patient denies any AVH/SI/HI and displays no signs or symptoms of psychosis  Will reassess need for inpatient psychiatric admission once medically stable  Add Seroquel 300mg PO QHS and Trazodone 50mg PO QHS  S/p EGD;  IMPRESSION:  Huge duodenal ulcer with 2 visible vessels  No active bleeding  This ulcer was oversewn 1 year ago  Risks of endoscopic treatment outweighed potential benefits      RECOMMENDATION:  Check pathology  Recommend surgery for definitive treatment of this huge ulcer        Vitals   Temperature Pulse Respirations Blood Pressure SpO2   04/20/21 2200 04/20/21 2100 04/20/21 2100 04/20/21 2100 04/20/21 2100   98 7 °F (37 1 °C) (!) 119 22 109/58 99 %      Temp Source Heart Rate Source Patient Position - Orthostatic VS BP Location FiO2 (%)   04/20/21 2200 04/20/21 2100 04/21/21 0300 04/20/21 2100 --   Temporal Monitor Lying Left arm       Pain Score       04/20/21 2141       8          Wt Readings from Last 1 Encounters:   04/28/21 61 kg (134 lb 8 oz)     Additional Vital Signs:  04/21/21 0700  --  74  13  160/79  113  97 %  --  --   04/21/21 0600  --  74  22  150/72  104  95 %  --  --   04/21/21 0500  --  79  22  152/72  104  95 %  None (Room air)  Lying   04/21/21 0400  98 7 °F (37 1 °C)  89  14  187/83  Abnormal 119  98 %  --       04/21/21 0000  --  90  21  129/68  93  99 %  --  --   04/20/21 2300  --  93  17  138/80  103  96 %  --  --   04/20/21 2200  98 7 °F (37 1 °C)  105  24Abnormal   114/67  85  98 %         Pertinent Labs/Diagnostic Test Results:   4/19 CT Abd/Pelvis - No acute intra-abdominal pathology  Constipation  4/20 PCXR - Right internal jugular central venous catheter tip within the superior cavoatrial junction    No pneumothorax          Lab Units 04/21/21  0416 04/20/21  2229 04/20/21  1841   WBC Thousand/uL  --  10 10 13 10*   HEMOGLOBIN g/dL 10 0* 10 4* 11 8*   HEMATOCRIT % 29 3* 30 4* 34 7*   PLATELETS Thousands/uL  --  343 366   NEUTROS ABS Thousands/µL  --  7 60* 11 40*         Lab Units 04/21/21  0417 04/20/21  2229   SODIUM mmol/L 134 134   POTASSIUM mmol/L 4 1 4 2   CHLORIDE mmol/L 99 93*   CO2 mmol/L 28 29   ANION GAP mmol/L 7 12   BUN mg/dL 37* 42*   CREATININE mg/dL 0 94 0 99   EGFR ml/min/1 73sq m 83 78   CALCIUM mg/dL 7 6* 8 6   MAGNESIUM mg/dL  --   --      Lab Units 04/20/21  2229   AST U/L 14   ALT U/L 5*   ALK PHOS U/L 31*   TOTAL PROTEIN g/dL 5 9*   ALBUMIN g/dL 3 2*   TOTAL BILIRUBIN mg/dL 0 30       Lab Units 04/21/21  0417 04/20/21  2229   GLUCOSE RANDOM mg/dL 112* 101*       Lab Units 04/20/21  2229   TROPONIN I ng/mL 0 03         Results from last 7 days   Lab Units 04/22/21  2034 04/22/21  0448 04/21/21  1819   PROTIME seconds 14 1 13 9 14 1   INR  1 09 1 07 1 08   PTT seconds  --   --  31       Lab Units 04/20/21  2229   LACTIC ACID mmol/L 0 9       Past Medical History:   Diagnosis Date    Alcohol abuse     BPH (benign prostatic hyperplasia)     CVA [facility]cerebral vascular accident) [facility] [address] )     DJD (degenerative joint disease)     Encounter to establish care with new doctor 8/21/2019    Gait abnormality     Head injury     History of cerebrovascular accident (CVA) with residual deficit 10/1/2019    History of CVA (cerebrovascular accident) 1/8/2020    History of substance abuse ([facility] ) 8/21/2019    Polysubstance     History of sustained ventricular tachycardia 8/21/2019    History of transient ischemic attack (TIA) 8/28/2019    Hypertension     Metatarsal fracture     TIA (transient ischemic attack)     Tobacco abuse      Present on Admission:   COPD without exacerbation ([facility] )   Tobacco abuse   Schizophrenia ([place] [address] )   Tachycardia   Major depressive disorder, recurrent, severe with psychotic features ([place] [address] )      Admitting Diagnosis: GI bleed [K92 2]  Age/Sex: 78 y o  male     Admission Orders:  Scheduled Medications:  No current facility-administered medications for this encounter  Continuous IV Infusions:  No current facility-administered medications for this encounter  PRN Meds:  albuterol, 2 puff, Inhalation, Q6H PRN  LORazepam, 0 5 mg, Intravenous, Q6H PRN  morphine injection, 2 mg, Intravenous, Q4H PRN 4/20 x1, 4/21 x2      NPO  EGD  IP CONSULT TO GASTROENTEROLOGY  IP CONSULT TO CASE MANAGEMENT  IP CONSULT TO PSYCHIATRY  IP CONSULT TO ACUTE CARE SURGERY    Network Utilization Review Department  ATTENTION: Please call with any questions or concerns to [phone] and carefully listen to the prompts so that you are directed to the right person  All voicemails are confidential   Sj Garcia all requests for admission clinical reviews, approved or denied determinations and any other requests to dedicated fax number below belonging to the campus where the patient is receiving treatment   List of dedicated fax numbers for the Facilities:  [address] DENIALS (Administrative/Medical Necessity) [phone]   [address] [place] [address] (Maternity/NICU/Pediatrics) [address] [place] [address],[address] [place] [address] [facility] [address] [address] [facility] [place] [address] [address] [facility] [phone]

## 2021-04-29 LAB
ANION GAP SERPL CALCULATED.3IONS-SCNC: 4 MMOL/L (ref 4–13)
BASOPHILS # BLD AUTO: 0.04 THOUSANDS/ΜL (ref 0–0.1)
BASOPHILS NFR BLD AUTO: 1 % (ref 0–1)
BUN SERPL-MCNC: 17 MG/DL (ref 5–25)
CALCIUM SERPL-MCNC: 9 MG/DL (ref 8.3–10.1)
CHLORIDE SERPL-SCNC: 103 MMOL/L (ref 100–108)
CO2 SERPL-SCNC: 27 MMOL/L (ref 21–32)
CREAT SERPL-MCNC: 0.93 MG/DL (ref 0.6–1.3)
EOSINOPHIL # BLD AUTO: 0.18 THOUSAND/ΜL (ref 0–0.61)
EOSINOPHIL NFR BLD AUTO: 3 % (ref 0–6)
ERYTHROCYTE [DISTWIDTH] IN BLOOD BY AUTOMATED COUNT: 14.7 % (ref 11.6–15.1)
GFR SERPL CREATININE-BSD FRML MDRD: 84 ML/MIN/1.73SQ M
GLUCOSE SERPL-MCNC: 108 MG/DL (ref 65–140)
HCT VFR BLD AUTO: 25.4 % (ref 36.5–49.3)
HGB BLD-MCNC: 8.3 G/DL (ref 12–17)
IMM GRANULOCYTES # BLD AUTO: 0.03 THOUSAND/UL (ref 0–0.2)
IMM GRANULOCYTES NFR BLD AUTO: 1 % (ref 0–2)
LYMPHOCYTES # BLD AUTO: 1.92 THOUSANDS/ΜL (ref 0.6–4.47)
LYMPHOCYTES NFR BLD AUTO: 33 % (ref 14–44)
MCH RBC QN AUTO: 32.8 PG (ref 26.8–34.3)
MCHC RBC AUTO-ENTMCNC: 32.7 G/DL (ref 31.4–37.4)
MCV RBC AUTO: 100 FL (ref 82–98)
MONOCYTES # BLD AUTO: 0.96 THOUSAND/ΜL (ref 0.17–1.22)
MONOCYTES NFR BLD AUTO: 17 % (ref 4–12)
NEUTROPHILS # BLD AUTO: 2.67 THOUSANDS/ΜL (ref 1.85–7.62)
NEUTS SEG NFR BLD AUTO: 45 % (ref 43–75)
NRBC BLD AUTO-RTO: 0 /100 WBCS
PLATELET # BLD AUTO: 313 THOUSANDS/UL (ref 149–390)
PMV BLD AUTO: 10.1 FL (ref 8.9–12.7)
POTASSIUM SERPL-SCNC: 4 MMOL/L (ref 3.5–5.3)
RBC # BLD AUTO: 2.53 MILLION/UL (ref 3.88–5.62)
SODIUM SERPL-SCNC: 134 MMOL/L (ref 136–145)
WBC # BLD AUTO: 5.8 THOUSAND/UL (ref 4.31–10.16)

## 2021-04-29 PROCEDURE — 97535 SELF CARE MNGMENT TRAINING: CPT

## 2021-04-29 PROCEDURE — 97530 THERAPEUTIC ACTIVITIES: CPT

## 2021-04-29 PROCEDURE — 80048 BASIC METABOLIC PNL TOTAL CA: CPT | Performed by: INTERNAL MEDICINE

## 2021-04-29 PROCEDURE — 85025 COMPLETE CBC W/AUTO DIFF WBC: CPT | Performed by: INTERNAL MEDICINE

## 2021-04-29 PROCEDURE — 99232 SBSQ HOSP IP/OBS MODERATE 35: CPT | Performed by: INTERNAL MEDICINE

## 2021-04-29 PROCEDURE — 97116 GAIT TRAINING THERAPY: CPT

## 2021-04-29 RX ADMIN — FLUTICASONE PROPIONATE 2 SPRAY: 50 SPRAY, METERED NASAL at 21:40

## 2021-04-29 RX ADMIN — Medication 1000 UNITS: at 08:48

## 2021-04-29 RX ADMIN — PANTOPRAZOLE SODIUM 40 MG: 40 TABLET, DELAYED RELEASE ORAL at 06:27

## 2021-04-29 RX ADMIN — FOLIC ACID 1 MG: 1 TABLET ORAL at 08:47

## 2021-04-29 RX ADMIN — POLYETHYLENE GLYCOL 3350 17 G: 17 POWDER, FOR SOLUTION ORAL at 06:27

## 2021-04-29 RX ADMIN — POLYETHYLENE GLYCOL 3350 17 G: 17 POWDER, FOR SOLUTION ORAL at 17:54

## 2021-04-29 RX ADMIN — QUETIAPINE FUMARATE 600 MG: 300 TABLET ORAL at 21:40

## 2021-04-29 RX ADMIN — FLUTICASONE FUROATE AND VILANTEROL TRIFENATATE 1 PUFF: 100; 25 POWDER RESPIRATORY (INHALATION) at 10:20

## 2021-04-29 RX ADMIN — LIDOCAINE 1 PATCH: 50 PATCH TOPICAL at 08:48

## 2021-04-29 RX ADMIN — THIAMINE HCL TAB 100 MG 100 MG: 100 TAB at 08:47

## 2021-04-29 RX ADMIN — ALBUTEROL SULFATE 2 PUFF: 90 AEROSOL, METERED RESPIRATORY (INHALATION) at 21:40

## 2021-04-29 RX ADMIN — TRAZODONE HYDROCHLORIDE 50 MG: 50 TABLET ORAL at 21:40

## 2021-04-29 RX ADMIN — ATORVASTATIN CALCIUM 40 MG: 40 TABLET, FILM COATED ORAL at 08:48

## 2021-04-29 RX ADMIN — TRAMADOL HYDROCHLORIDE 50 MG: 50 TABLET, FILM COATED ORAL at 08:48

## 2021-04-29 RX ADMIN — PANTOPRAZOLE SODIUM 40 MG: 40 TABLET, DELAYED RELEASE ORAL at 17:55

## 2021-04-29 RX ADMIN — DOCUSATE SODIUM 100 MG: 100 CAPSULE, LIQUID FILLED ORAL at 08:47

## 2021-04-29 RX ADMIN — METOPROLOL TARTRATE 25 MG: 25 TABLET, FILM COATED ORAL at 21:40

## 2021-04-29 RX ADMIN — METOPROLOL TARTRATE 25 MG: 25 TABLET, FILM COATED ORAL at 08:48

## 2021-04-29 RX ADMIN — DOCUSATE SODIUM 100 MG: 100 CAPSULE, LIQUID FILLED ORAL at 17:55

## 2021-04-29 RX ADMIN — TRAMADOL HYDROCHLORIDE 50 MG: 50 TABLET, FILM COATED ORAL at 21:40

## 2021-04-29 RX ADMIN — Medication 1 PATCH: at 08:47

## 2021-04-29 RX ADMIN — QUETIAPINE FUMARATE 200 MG: 100 TABLET ORAL at 08:48

## 2021-04-29 RX ADMIN — Medication 1 TABLET: at 08:47

## 2021-04-29 NOTE — PLAN OF CARE
Problem: Prexisting or High Potential for Compromised Skin Integrity  Goal: Skin integrity is maintained or improved  Description: INTERVENTIONS:  - Identify patients at risk for skin breakdown  - Assess and monitor skin integrity  - Assess and monitor nutrition and hydration status  - Monitor labs   - Assess for incontinence   - Turn and reposition patient  - Assist with mobility/ambulation  - Relieve pressure over bony prominences  - Avoid friction and shearing  - Provide appropriate hygiene as needed including keeping skin clean and dry  - Evaluate need for skin moisturizer/barrier cream  - Collaborate with interdisciplinary team   - Patient/family teaching  - Consider wound care consult   Outcome: Progressing     Problem: Potential for Falls  Goal: Patient will remain free of falls  Description: INTERVENTIONS:  - Assess patient frequently for physical needs  -  Identify cognitive and physical deficits and behaviors that affect risk of falls    -  [place] fall precautions as indicated by assessment   - Educate patient/family on patient safety including physical limitations  - Instruct patient to call for assistance with activity based on assessment  - Modify environment to reduce risk of injury  - Consider OT/PT consult to assist with strengthening/mobility  Outcome: Progressing     Problem: PAIN - ADULT  Goal: Verbalizes/displays adequate comfort level or baseline comfort level  Description: Interventions:  - Encourage patient to monitor pain and request assistance  - Assess pain using appropriate pain scale  - Administer analgesics based on type and severity of pain and evaluate response  - Implement non-pharmacological measures as appropriate and evaluate response  - Consider cultural and social influences on pain and pain management  - Notify physician/advanced practitioner if interventions unsuccessful or patient reports new pain  Outcome: Progressing     Problem: INFECTION - ADULT  Goal: Absence or prevention of progression during hospitalization  Description: INTERVENTIONS:  - Assess and monitor for signs and symptoms of infection  - Monitor lab/diagnostic results  - Monitor all insertion sites, i e  indwelling lines, tubes, and drains  - Monitor endotracheal if appropriate and nasal secretions for changes in amount and color  - [place] appropriate cooling/warming therapies per order  - Administer medications as ordered  - Instruct and encourage patient and family to use good hand hygiene technique  - Identify and instruct in appropriate isolation precautions for identified infection/condition  Outcome: Progressing  Goal: Absence of fever/infection during neutropenic period  Description: INTERVENTIONS:  - Monitor WBC    Outcome: Progressing     Problem: SAFETY ADULT  Goal: Patient will remain free of falls  Description: INTERVENTIONS:  - Assess patient frequently for physical needs  -  Identify cognitive and physical deficits and behaviors that affect risk of falls    -  [place] fall precautions as indicated by assessment   - Educate patient/family on patient safety including physical limitations  - Instruct patient to call for assistance with activity based on assessment  - Modify environment to reduce risk of injury  - Consider OT/PT consult to assist with strengthening/mobility  Outcome: Progressing  Goal: Maintain or return to baseline ADL function  Description: INTERVENTIONS:  -  Assess patient's ability to carry out ADLs; assess patient's baseline for ADL function and identify physical deficits which impact ability to perform ADLs (bathing, care of mouth/teeth, toileting, grooming, dressing, etc )  - Assess/evaluate cause of self-care deficits   - Assess range of motion  - Assess patient's mobility; develop plan if impaired  - Assess patient's need for assistive devices and provide as appropriate  - Encourage maximum independence but intervene and supervise when necessary  - Involve family in performance of ADLs  - Assess for home care needs following discharge   - Consider OT consult to assist with ADL evaluation and planning for discharge  - Provide patient education as appropriate  Outcome: Progressing  Goal: Maintain or return mobility status to optimal level  Description: INTERVENTIONS:  - Assess patient's baseline mobility status (ambulation, transfers, stairs, etc )    - Identify cognitive and physical deficits and behaviors that affect mobility  - Identify mobility aids required to assist with transfers and/or ambulation (gait belt, sit-to-stand, lift, walker, cane, etc )  - [place] fall precautions as indicated by assessment  - Record patient progress and toleration of activity level on Mobility SBAR; progress patient to next Phase/Stage  - Instruct patient to call for assistance with activity based on assessment  - Consider rehabilitation consult to assist with strengthening/weightbearing, etc   Outcome: Progressing     Problem: DISCHARGE PLANNING  Goal: Discharge to home or other facility with appropriate resources  Description: INTERVENTIONS:  - Identify barriers to discharge w/patient and caregiver  - Arrange for needed discharge resources and transportation as appropriate  - Identify discharge learning needs (meds, wound care, etc )  - Arrange for interpretive services to assist at discharge as needed  - Refer to Case Management Department for coordinating discharge planning if the patient needs post-hospital services based on physician/advanced practitioner order or complex needs related to functional status, cognitive ability, or social support system  Outcome: Progressing

## 2021-04-29 NOTE — PROGRESS NOTES
Reached out to [facility] as we have not heard from them for determination for [facility]  Per rep at [facility] case was sent to the Medical Director at 9pm on 9/28 and determination not yet received  CM made aware and will continue to update CM  Call received from [facility]  Case was denied by the medical director, Magda Eason, with rationale that patient does not have complex medical needs that require acute rehab  PM&R consulting physician made aware of denial and is in agreement to complete the peer to peer  Will need to be completed by 4:30 today by calling [phone] option 2 with reference # [id number]  Information provided to consulting physiatrist and CM updated

## 2021-04-29 NOTE — PHYSICAL THERAPY NOTE
PHYSICAL THERAPY NOTE          Patient Name: Slava Fox  JBEGR'X Date: 4/29/2021 04/29/21 0919   PT Last Visit   PT Visit Date 04/29/21   Note Type   Note Type Treatment   Pain Assessment   Pain Assessment Tool 0-10   Pain Score 7   Pain Location/Orientation Location: Abdomen; Location: Back   Patient's Stated Pain Goal No pain   Hospital Pain Intervention(s) Repositioned; Ambulation/increased activity   Restrictions/Precautions   Weight Bearing Precautions Per Order No   Other Precautions Cognitive; Chair Alarm; Bed Alarm; Fall Risk;Pain   General   Chart Reviewed Yes   Response to Previous Treatment Patient with no complaints from previous session  Family/Caregiver Present No   Cognition   Arousal/Participation Alert   Attention Attends with cues to redirect   Orientation Level Oriented to person;Oriented to place;Oriented to time  (year/month )   Memory Decreased recall of precautions   Following Commands Follows one step commands with increased time or repetition   Comments Pt agreeable to therapy session with some encouragment  Pt required frequent cuing to re-direction/ focus on task at hand  Pt demonstrated slow motor processing    Bed Mobility   Supine to Sit   (CGA )   Additional items Assist x 1;HOB elevated; Increased time required; Bedrails   Sit to Supine Unable to assess   Additional Comments Pt supine in bed upon arrival  Pt sat EOB at S level  Pt sitting upright in bedside chair with chair alarm intact with all needs wihtin reach at the end of therapy session    Transfers   Sit to Stand 3  Moderate assistance  (progressed to min x1 )   Additional items Assist x 1; Increased time required;Verbal cues   Stand to Sit 4  Minimal assistance   Additional items Assist x 1; Increased time required;Verbal cues   Additional Comments Transfers with RW; 4 STS performed- initially mod Ax1 progressed to min Ax1 Ambulation/Elevation   Gait pattern Excessively slow; Foward flexed; Short stride;Decreased foot clearance; Improper Weight shift   Gait Assistance 3  Moderate assist  (progressed to min assist for 2nd gait trial )   Additional items Assist x 1;Verbal cues; Tactile cues   Assistive Device Rolling walker   Distance 2x 10ft; 2x15 ft   (seated rest break between trials)   Balance   Static Sitting Fair +   Dynamic Sitting Fair   Static Standing Poor +   Dynamic Standing Poor   Ambulatory Poor   Endurance Deficit   Endurance Deficit Yes   Endurance Deficit Description weakness, fatigue, pain    Activity Tolerance   Activity Tolerance Patient limited by fatigue;Patient limited by pain   Medical Staff Made Aware NANCI Correia    Nurse Made Aware RN cleared pt to particiapte in therapy session    Exercises   Knee AROM Long Arc Quad Sitting;Bilateral;15 reps  (x2)   Ankle Pumps Sitting;Bilateral;15 reps   Marching Sitting;Bilateral;15 reps   Assessment   Prognosis Fair   Problem List Decreased strength;Decreased endurance; Impaired balance;Decreased mobility; Decreased cognition;Decreased safety awareness;Pain   Assessment Pt seen for PT treatment session this date  Therapy session focused on bed mobility, functional transfers, sitting/standing dynamic balance, gait training, endurance training, and therapeutic activity in order to improve overall mobility and independence  Pt requires assist of 1 for all mobility performed this date  Pt performed bed mobility tasks with CGA; HOB elevated, bed rails utilized and increased time required to complete task  Pt performed STS from EOB/bedside chair with mod Ax1 initially, pt able to progress to min Ax1  Pt stood for ~ 1 min performing dynamic reaching activities to assist in hygiene tasks, required min A when no UE support and CGA with 1 UE support  Pt ambulated with RW and mod Ax1; pt able to progress to min Ax1 for second gait trial after seated rest break   Increased forward flexion noted this date- cues for posture and safety  Increased cuing for RW management and safety while performing rotational turns  Pt performed/ tolerated seated BLE therex program to hips, knees, and ankles with no complaints of pain/discomfort  Pt making steady progress toward goals  Pt was left sitting upright in bedside chair with alarm on at the end of PT session with all needs in reach  Pt would benefit from continued PT services while in hospital to address remaining limitations  PT to continue to follow pt and recommends post-acute rehab services after d/c  The patient's AM-PAC Basic Mobility Inpatient Short Form Raw Score is 13, Standardized Score is 33 99  A standardized score less than 42 9 suggests the patient may benefit from discharge to post-acute rehabilitation services  Please also refer to the recommendation of the Physical Therapist for safe discharge planning  Barriers to Discharge Inaccessible home environment;Decreased caregiver support   Goals   Patient Goals To rest    STG Expiration Date 05/10/21   PT Treatment Day 2   Plan   Treatment/Interventions ADL retraining;Functional transfer training;LE strengthening/ROM; Therapeutic exercise; Endurance training;Patient/family training;Equipment eval/education; Bed mobility;Gait training;Spoke to nursing;OT   Progress Progressing toward goals   PT Frequency Other (Comment)  (3-5x/wk )   Recommendation   PT Discharge Recommendation Post acute rehabilitation services   Equipment Recommended [address] [facility] [place] Recommended Wheeled walker   PT - OK to Discharge Yes  (to rehab once medically cleared )   [address] Inpatient   Turning in Bed Without Bedrails 3   Lying on Back to Sitting on Edge of Flat Bed 3   Moving Bed to Chair 2   Standing Up From Chair 2   Walk in Room 2   Climb 3-5 Stairs 1   Basic Mobility Inpatient Raw Score 13   Basic Mobility Standardized Score 33 99     Chemo Curran, PT, DPT

## 2021-04-29 NOTE — CASE MANAGEMENT
Cm spoke to Osmin Perkins at [place], I was told that [employer] is not covered and I did not need to obtain an auth and the referral was cx,

## 2021-04-29 NOTE — PLAN OF CARE
Problem: PHYSICAL THERAPY ADULT  Goal: Performs mobility at highest level of function for planned discharge setting  See evaluation for individualized goals  Description: Treatment/Interventions: ADL retraining, Functional transfer training, LE strengthening/ROM, Endurance training, Patient/family training, Equipment eval/education, Bed mobility, Gait training, Spoke to nursing, OT  Equipment Recommended: [place]       See flowsheet documentation for full assessment, interventions and recommendations  Outcome: Progressing  Note: Prognosis: Fair  Problem List: Decreased strength, Decreased endurance, Impaired balance, Decreased mobility, Decreased cognition, Decreased safety awareness, Pain  Assessment: Pt seen for PT treatment session this date  Therapy session focused on bed mobility, functional transfers, sitting/standing dynamic balance, gait training, endurance training, and therapeutic activity in order to improve overall mobility and independence  Pt requires assist of 1 for all mobility performed this date  Pt performed bed mobility tasks with CGA; HOB elevated, bed rails utilized and increased time required to complete task  Pt performed STS from EOB/bedside chair with mod Ax1 initially, pt able to progress to min Ax1  Pt stood for ~ 1 min performing dynamic reaching activities to assist in hygiene tasks, required min A when no UE support and CGA with 1 UE support  Pt ambulated with RW and mod Ax1; pt able to progress to min Ax1 for second gait trial after seated rest break  Increased forward flexion noted this date- cues for posture and safety  Increased cuing for RW management and safety while performing rotational turns  Pt performed/ tolerated seated BLE therex program to hips, knees, and ankles with no complaints of pain/discomfort  Pt making steady progress toward goals  Pt was left sitting upright in bedside chair with alarm on at the end of PT session with all needs in reach   Pt would benefit from continued PT services while in hospital to address remaining limitations  PT to continue to follow pt and recommends post-acute rehab services after d/c  The patient's AM-PAC Basic Mobility Inpatient Short Form Raw Score is 13, Standardized Score is 33 99  A standardized score less than 42 9 suggests the patient may benefit from discharge to post-acute rehabilitation services  Please also refer to the recommendation of the Physical Therapist for safe discharge planning  Barriers to Discharge: Inaccessible home environment, Decreased caregiver support        PT Discharge Recommendation: Post acute rehabilitation services     PT - OK to Discharge: Yes(to rehab once medically cleared )    See flowsheet documentation for full assessment

## 2021-04-29 NOTE — PLAN OF CARE
Problem: OCCUPATIONAL THERAPY ADULT  Goal: Performs self-care activities at highest level of function for planned discharge setting  See evaluation for individualized goals  Description: Treatment Interventions: ADL retraining, Functional transfer training, UE strengthening/ROM, Endurance training, Patient/family training, Equipment evaluation/education, Compensatory technique education, Continued evaluation, Energy conservation, Activityengagement          See flowsheet documentation for full assessment, interventions and recommendations  Outcome: Progressing  Note: Limitation: Decreased ADL status, Decreased UE strength, Decreased Safe judgement during ADL, Decreased endurance, Decreased self-care trans, Decreased high-level ADLs  Prognosis: Good  Assessment: Patient participated in Skilled OT session this date with interventions consisting of ADL re training with the use of correct body mechnaics, Energy Conservation techniques, safety awareness and fall prevention techniques,  therapeutic activities to: increase activity tolerance and increase OOB/ sitting tolerance   Upon arrival patient was found supine in bed  Pt performed sup to sit transfer with CGA  Initially requires MOD A STS from EOB; however, does progress to MIN A for remainder of session  Pt also initially requires MOD A for fnxl ambulation but progresses to MIN A towards end of session - does require CS of another person for safety  Pt performs UB/LB dressing and bathing with S; however, does require MIN A to maintain standing position to wash buttocks and pull underwear of hips  Pt is slow to initiate and complete all tasks  Demonstrates poor activity tolerance, endurance, and standing tolerance  Poor safety awareness  Pt does require cues for RW mgmt  Patient continues to be functioning below baseline level, occupational performance remains limited secondary to factors listed above and increased risk for falls and injury     From OT standpoint, recommendation at time of d/c would be Short Term Rehab  Patient to benefit from continued Occupational Therapy treatment while in the hospital to address deficits as defined above and maximize level of functional independence with ADLs and functional mobility  Pt was left in chair with alarm on after session with all current needs met  The patient's raw score on the AM-PAC Daily Activity inpatient short form is 18, standardized score is 38 66, less than 39 4  Patients at this level are likely to benefit from discharge to post-acute rehabilitation services  Please refer to the recommendation of the Occupational Therapist for safe discharge planning    Recommendation: Psych Consult  OT Discharge Recommendation: Post acute rehabilitation services  OT - OK to Discharge: Yes(to rehab when medically stable )

## 2021-04-29 NOTE — QUICK NOTE
[id number] performed     Specialty trained physical medicine & rehabilitation physician:  Deliliah Fothergill, MD    Disposition recommended by inpatient rehab specialist:  Inpatient [address]: [facility] physician provider (specialty): Liam Woodard (surgeon)    Insurance cited criteria for admission to acute rehab:   [address] rational for denial:    · It does not look like per patent's PT notes that member is needing a rehab physician oversight 3 times per week  · It also does not look like patient needs intensive rehab   · Stated patient does not have acute medical issues that would need to be actively managed  · When asked to give examples of what active management would be appropriate for acute rehab; provider cited - active wound care mgmt, IV therapies, irregular blood sugars; etc  · PT/OT/ST can be provided in SNF setting and lower level of care     Physical Medicine & Rehabilitation Attending Physician rationale:  A) The following culmination of conditions is expected to significantly benefit from close rehabilitation physician oversight (at least 3x/week) to optimally monitor and manage in post-acute rehab settin  Recent significant GI bleed with large duodenal ulcer with hx of EtOH abuse and risk of re-bleed; positional lightheadedness - monitor labs, vitals, exam, GI function closely   2  COPD saturating on RA but on multiple meds and poor activity tolerance - monitor labs, vitals, exam, function closely   3  Schizophrenia, depression, with recent SI and psychiatric hospitalization on intensive psychiatric med management Seroquel 200mg/600mg; Trazodone with increased risk for oversedation, delirium, uncontrolled mood and falls that would significantly benefit from frequent physician encounters not available in SNF setting  4   Acute on chronic pain with multitude of spine and MSK surgeries with prior opiate dependence who was is recently placed back on with high risk of interaction of with psychiatric medications, unwanted side effects, falls, opiate dependence reoccurrence,oversedation, falls with increased risk for oversedation, delirium, uncontrolled mood and falls that would significantly benefit from frequent physician encounters to ensure optimal pain control but decreased risk of complications not available in SNF setting  5  Cognitive impairment MOCA 16/30 recently mod cognitive impairment, Impaired recall; slow motor processing time; impaired balance; decreased mobility; Decreased cognition;Decreased safety awareness -  6  Significant bowel dysfunction LBM over 1 week ago; on opiates     a  - If pain and psych meds are too strong its going increase fall and injury  b  - If pain and psych meds are not adequate enough it will delay his functional recovery  c  - Narrow window is going to require frequent in person patient assessments by the physicians, talking directly with nursing and therapy, and adjusting meds/mgmt appropriately  d  Also of psychology resources available     B) Patient was living in a 2 level home with 1 RENETTA  He reports living in the basement and has access to a stair glide  DME include a shower chair and cane  He was independent for ADLs and lives with his son who works during the day  His son does see him daily and is able to provide daily checks and now have new significant functional deficits:   - Min-mod assist transfers   - Ambulation 15 ft mod assist    - ADLs min assist-supervision   - Per family patient's cognition remains decreased from baseline    - [address] [place] 18/30 showing mod cognitive impairment - impairments noted in memory, problem solving and function; cognitive impairments impacting function and safety noted by PT/OT; Impaired recall; slow motor processing time; Impaired balance;Decreased mobility; Decreased cognition;Decreased safety awareness;Pain    C) Patient is able to actively participate in intensive rehab setting with ability to get to appointments and has realistic probability to return to community in reasonable time        Insurance company decision:  [employer] denial  > Patient remains denied coverage for acute rehabilitation     > Patient and family can appeal decision     Zeinab Delgadillo MD, [address] [facility]  Physical Medicine and Rehabilitation  Brain Injury Medicine

## 2021-04-29 NOTE — CASE MANAGEMENT
CM s/w pt's son's sig other and notified her the peer to peer was denied  CM provided insurance company's phone number so that they could initiate family appeal  CM will follow

## 2021-04-29 NOTE — OCCUPATIONAL THERAPY NOTE
OccupationalTherapy Progress Note     Patient Name: Flo Melendez  [id number] Date: 4/29/2021  Problem List  Principal Problem:    GI bleed  Active Problems:    Major depressive disorder, recurrent, severe with psychotic features ([facility] )    Alcohol dependence with unspecified alcohol-induced disorder ([facility] )    Ambulatory dysfunction    Overweight (BMI 25 0-29  9)    COPD without exacerbation ([facility] )    Dyslipidemia    Chronic pain    Tobacco abuse    Schizophrenia ([place] [address] )    Acute upper GI bleed    Acute blood loss anemia    Hypertension    Duodenal ulcer          04/29/21 0920   OT Last Visit   OT Visit Date 04/29/21   Note Type   Note Type Treatment   Restrictions/Precautions   Weight Bearing Precautions Per Order No   Other Precautions Cognitive; Chair Alarm; Bed Alarm; Fall Risk;Pain   General   Response to Previous Treatment Patient with no complaints from previous session   Lifestyle   Autonomy PTA pt was I with ADLs, reports having increased difficulties performing IADLs when son is at work    Reciprocal Relationships Son    Service to Others Retired    Intrinsic Gratification Likes the movie [place]    Pain Assessment   Pain Assessment Tool 0-10   Pain Score 7   Pain Location/Orientation Location: Abdomen; Location: Back   Pain Onset/Description Onset: Ongoing   Effect of Pain on Daily Activities Pt unable to straighten up during ambulation 2* back pain    Patient's Stated Pain Goal No pain   Hospital Pain Intervention(s) Repositioned; Ambulation/increased activity   ADL   Where Assessed Chair   Eating Assistance 5  Supervision/Setup   Grooming Assistance 5  Supervision/Setup   UB Bathing Assistance 5  Supervision/Setup   LB Bathing Assistance 5  Supervision/Setup   UB Dressing Assistance 5  Supervision/Setup   LB Dressing Assistance 5  Supervision/Setup   Functional Standing Tolerance   Time ~ 1 min    Activity Washing buttocks   Comments Requires MIN A to maintain stance    Bed Mobility   Supine to Sit   (CGA)   Additional items Assist x 1; Increased time required   Sit to Supine Unable to assess   Transfers   Sit to Stand 4  Minimal assistance   Additional items Assist x 1; Increased time required   Stand to Sit 4  Minimal assistance   Additional items Assist x 1; Increased time required   Additional Comments Initally requires MOD A STS from EOB  Progresses during session    Functional Mobility   Functional Mobility 4  Minimal assistance   Additional Comments Ax1 + CS from another for safety  Initally requires MOD A for fnxl ambulation with RW but progresses to MIN    Additional items Rolling walker   Cognition   Overall Cognitive Status Impaired   Arousal/Participation Lethargic   Attention Attends with cues to redirect   Orientation Level Oriented X4   Memory Decreased recall of precautions   Following Commands Follows one step commands with increased time or repetition   Comments Pt lethargic during session, flat affect  Slow to complete tasks   Activity Tolerance   Activity Tolerance Patient limited by fatigue;Patient limited by pain   Medical Staff Made Aware PT Cindy    Assessment   Assessment Patient participated in Skilled OT session this date with interventions consisting of ADL re training with the use of correct body mechnaics, Energy Conservation techniques, safety awareness and fall prevention techniques,  therapeutic activities to: increase activity tolerance and increase OOB/ sitting tolerance   Upon arrival patient was found supine in bed  Pt performed sup to sit transfer with CGA  Initially requires MOD A STS from EOB; however, does progress to MIN A for remainder of session  Pt also initially requires MOD A for fnxl ambulation but progresses to MIN A towards end of session - does require CS of another person for safety  Pt performs UB/LB dressing and bathing with S; however, does require MIN A to maintain standing position to wash buttocks and pull underwear of hips   Pt is slow to initiate and complete all tasks  Demonstrates poor activity tolerance, endurance, and standing tolerance  Poor safety awareness  Pt does require cues for RW mgmt  Patient continues to be functioning below baseline level, occupational performance remains limited secondary to factors listed above and increased risk for falls and injury  From OT standpoint, recommendation at time of d/c would be Short Term Rehab  Patient to benefit from continued Occupational Therapy treatment while in the hospital to address deficits as defined above and maximize level of functional independence with ADLs and functional mobility  Pt was left in chair with alarm on after session with all current needs met  The patient's raw score on the AM-PAC Daily Activity inpatient short form is 18, standardized score is 38 66, less than 39 4  Patients at this level are likely to benefit from discharge to post-acute rehabilitation services  Please refer to the recommendation of the Occupational Therapist for safe discharge planning  Plan   Treatment Interventions ADL retraining;Functional transfer training;UE strengthening/ROM; Endurance training;Cognitive reorientation;Equipment evaluation/education;Patient/family training; Compensatory technique education;Continued evaluation; Energy conservation; Activityengagement   Goal Expiration Date 05/06/21   OT Treatment Day 1   OT Frequency 3-5x/wk   Recommendation   OT Discharge Recommendation Post acute rehabilitation services   OT - OK to Discharge Yes  (to rehab when medically stable )   [facility] Daily Activity Inpatient   Lower Body Dressing 3   Bathing 3   Toileting 3   Upper Body Dressing 3   Grooming 3   Eating 3   Daily Activity Raw Score 18   Daily Activity Standardized Score (Calc for Raw Score >=11) 38 66   [facility] Applied Cognition Inpatient   Following a Speech/Presentation 4   Understanding Ordinary Conversation 4   Taking Medications 3   Remembering Where Things Are Placed or Put Away 3 Remembering List of 4-5 Errands 3   Taking Care of Complicated Tasks 3   Applied Cognition Raw Score 20   Applied Cognition Standardized Score 41 76          Angelica Arriaza MS, OTR/L

## 2021-04-29 NOTE — ASSESSMENT & PLAN NOTE
Patient was admitted at [facility] for suicidal ideation  His hospital course was complicated by abdominal pain with hematemesis and he subsequently was transferred to the ICU there  Upon drop of his hemoglobin he was transferred to [facility] for GI evaluation  Patient is hemodynamically stable  Patient denies any suicidal ideation or homicidal ideation  Status post psychiatric evaluation, clear for outpatient follow-up from a psychiatric standpoint

## 2021-04-29 NOTE — PROGRESS NOTES
[facility]  Progress Note - Sadi Mena 1950, 78 y o  male MRN: [id number]  Unit/Bed#: [facility] 929-01 Encounter: [id number]  Primary Care Provider: Jasper Dupont DO   Date and time admitted to hospital: 4/21/2021  5:34 PM    * GI bleed  Assessment & Plan  Presented with signs and symptoms consistent with upper GI bleed  Baseline hemoglobin appears to be normal; acute blood loss anemia during his hospital stay secondary to bleeding with hemoglobin ranging between 8 and 9  Hemodynamically stable otherwise  Hemoglobin stable  Status post EGD on 04/22/2021-multiple duodenal ulcers with evidence of bleeding status post electrocauterization  Status post Protonix drip, transition to oral Protonix twice per day today  Requires outpatient GI follow-up  Previously counseled extensively on alcohol on smoking cessation to prevent progression of underlying ulcers  EGD biopsies negative for H pylori  Continue non ulcerogenic GI diet         Duodenal ulcer  Assessment & Plan  see above management plan    Hypertension  Assessment & Plan  Metoprolol    Schizophrenia [facility])  Assessment & Plan  Patient was admitted at [facility] hospital for suicidal ideation  His hospital course was complicated by abdominal pain with hematemesis and he subsequently was transferred to the ICU there  Upon drop of his hemoglobin he was transferred to [facility] for GI evaluation  Patient is hemodynamically stable  Patient denies any suicidal ideation or homicidal ideation  Status post psychiatric evaluation, clear for outpatient follow-up from a psychiatric standpoint  Tobacco abuse  Assessment & Plan  Using nicotine patch  Counseled on smoking cessation    Chronic pain  Assessment & Plan  Complaining of chronic back pain, severe; reviewed PDMP, has not been on opioids or other controlled substances outpatient even though he mentions chronic morphine use at home    Will discontinue oxycodone; transition to tramadol p r n  Moderate or Severe pain and Tylenol along with lidocaine patch  COPD without exacerbation [facility])  Assessment & Plan  Continue with current meds     Ambulatory dysfunction  Assessment & Plan  PT/OT evaluated the patient recommending rehabilitation services after discharge  Patient is now agreeable; status post PMNR evaluation with recommendations of acute rehabilitation services; pending bed availability   on board  Alcohol dependence with unspecified alcohol-induced disorder [facility])  Assessment & Plan  Alcohol cessation counseling; refusing inpatient rehab  CIWA discontinued given persistent scores of 0            Disposition: Anticipated discharge pending insurance authorization;    VTE Prophylaxis - Enoxaparin (Lovenox)    Education and Discussions with Family / Patient:  Discussed with patient at bedside    Current Length of Stay: 8 day(s)  Current Patient Status: Inpatient     Code Status: Level 1 - Full Code      Subjective:   Seen and examined at bedside  Complaining of chronic low back pain , intermittent, nonradiating, denies flank pain, difficulty urinating or stool incontinence or lower extremity anesthesia  Objective:     Vitals:   Temp (24hrs), Av 5 °F (36 9 °C), Min:98 4 °F (36 9 °C), Max:98 7 °F (37 1 °C)    Temp:  [98 4 °F (36 9 °C)-98 7 °F (37 1 °C)] 98 4 °F (36 9 °C)  HR:  [69-76] 69  Resp:  [16] 16  BP: (117-127)/(58-70) 127/70  SpO2:  [96 %-97 %] 96 %  Body mass index is 23 31 kg/m²  Invasive Devices     Peripheral Intravenous Line            Peripheral IV 21 Right Wrist 3 days                Input and Output Summary (last 24 hours):        Intake/Output Summary (Last 24 hours) at 2021 1512  Last data filed at 2021 1201  Gross per 24 hour   Intake 440 ml   Output 1850 ml   Net -1410 ml       Physical Exam:     Gen: in bed; room air  HEENT: NC/AT, PERRLA, no conjunctival pallor or scleral icterus, moist mucous membranes  RS: symmetric, speaking in full sentences, CTA bilaterally  CVS:  RRR, S1-S2 heard without murmurs, no peripheral edema, radial pulses 2+, capillary refill less than 2 seconds  Abdomen:  Soft,ND, nontender, bowel sounds heard  MSK:  Warm  Zee Labor all 4 extremities  Low back tenderness to palpation in paraspinal area, nonradiating, exacerbated by movement  :  No Balderas  Neuro:   AAOx4  Psych:  Cooperative, denies suicidal ideation or thoughts or homicidal thoughts        Additional Data:     Labs:    Results from last 7 days   Lab Units 04/29/21  0548   WBC Thousand/uL 5 80   HEMOGLOBIN g/dL 8 3*   HEMATOCRIT % 25 4*   PLATELETS Thousands/uL 313   NEUTROS PCT % 45   LYMPHS PCT % 33   MONOS PCT % 17*   EOS PCT % 3     Results from last 7 days   Lab Units 04/29/21  0548  04/24/21  0649   POTASSIUM mmol/L 4 0   < > 4 1   CHLORIDE mmol/L 103   < > 106   CO2 mmol/L 27   < > 26   BUN mg/dL 17   < > 15   CREATININE mg/dL 0 93   < > 0 89   CALCIUM mg/dL 9 0   < > 8 2*   ALK PHOS U/L  --   --  39*   ALT U/L  --   --  9*   AST U/L  --   --  25    < > = values in this interval not displayed  Results from last 7 days   Lab Units 04/22/21  2034   INR  1 09       * I Have Reviewed All Lab Data Listed Above  * Additional Pertinent Lab Tests Reviewed:  [facility] Admission Reviewed    Imaging:    Imaging Reports Reviewed Today Include:  None  Imaging Personally Reviewed by Myself Includes:  None    Recent Cultures (last 7 days):           Last 24 Hours Medication List:   Current Facility-Administered Medications   Medication Dose Route Frequency Provider Last Rate    acetaminophen  650 mg Oral Q6H PRN Blas Savage MD      albuterol  2 puff Inhalation Q4H PRN Leanne Ocampo PA-C      atorvastatin  40 mg Oral Daily Leanne Oacmpo PA-C      calcium carbonate  500 mg Oral Daily PRN Leanne Ocampo PA-C      cholecalciferol  1,000 Units Oral Daily Leanne Ocampo PA-C      docusate sodium  100 mg Oral BID Munira Cain MD      fluticasone  2 spray Nasal HS Leanne Ocampo PA-C      fluticasone-vilanterol  1 puff Inhalation Daily Jovitafarzad Ngael PA-C      folic acid  1 mg Oral Daily Winnie Person MD      hydrALAZINE  5 mg Intravenous Q6H PRN Jovita LabBRIGITTE sullivan      Labetalol HCl  10 mg Intravenous Q6H PRN Ojvita BRIGITTE Nagel      lidocaine  1 patch Topical Daily Winnie Person MD      LORazepam  0 5 mg Intravenous Q6H PRN Leanne Ocampo PA-C      metoprolol tartrate  25 mg Oral Q12H [address] Winnie Person MD      multivitamin-minerals  1 tablet Oral Daily Winnie Person MD      naloxone  0 04 mg Intravenous Q1MIN PRN Winnie Person MD      nicotine  1 patch Transdermal Daily Leanne Ocampo PA-C      ondansetron  4 mg Oral Q6H PRN Leanne Ocampo PA-C      pantoprazole  40 mg Oral BID AC Winnie Person MD      polyethylene glycol  17 g Oral BID AC Winnie Person MD      QUEtiapine  200 mg Oral Daily Leanne Ocampo PA-C      QUEtiapine  600 mg Oral HS Winnie Person MD      thiamine  100 mg Oral Daily Winnie Person MD      traMADol  50 mg Oral Q6H PRN Winnie Person MD      traZODone  50 mg Oral HS Leanne Ocampo PA-C              ** Please Note: This note has been constructed using a voice recognition system   **

## 2021-04-29 NOTE — CASE MANAGEMENT
CM s/w Kurtis Hagan, per Pedro Belling would not allow the family appeal unless they had a POA  CONNIE and Kurtis Hagan discussed SNF referrals, Per Kurtis Hagan 1)[facility] (2) [facility]  Per Kurtis Hagan if either facility does not accept then she would be okay with a referral being sent to [address] [place] [address] sent the referrals to [address] and [facility] as requested

## 2021-04-30 PROCEDURE — 99232 SBSQ HOSP IP/OBS MODERATE 35: CPT | Performed by: INTERNAL MEDICINE

## 2021-04-30 RX ADMIN — TRAMADOL HYDROCHLORIDE 50 MG: 50 TABLET, FILM COATED ORAL at 22:24

## 2021-04-30 RX ADMIN — POLYETHYLENE GLYCOL 3350 17 G: 17 POWDER, FOR SOLUTION ORAL at 06:46

## 2021-04-30 RX ADMIN — DOCUSATE SODIUM 100 MG: 100 CAPSULE, LIQUID FILLED ORAL at 18:29

## 2021-04-30 RX ADMIN — PANTOPRAZOLE SODIUM 40 MG: 40 TABLET, DELAYED RELEASE ORAL at 18:28

## 2021-04-30 RX ADMIN — FLUTICASONE PROPIONATE 2 SPRAY: 50 SPRAY, METERED NASAL at 22:26

## 2021-04-30 RX ADMIN — METOPROLOL TARTRATE 25 MG: 25 TABLET, FILM COATED ORAL at 10:09

## 2021-04-30 RX ADMIN — QUETIAPINE FUMARATE 600 MG: 300 TABLET ORAL at 22:26

## 2021-04-30 RX ADMIN — METOPROLOL TARTRATE 25 MG: 25 TABLET, FILM COATED ORAL at 22:25

## 2021-04-30 RX ADMIN — FOLIC ACID 1 MG: 1 TABLET ORAL at 10:08

## 2021-04-30 RX ADMIN — FLUTICASONE FUROATE AND VILANTEROL TRIFENATATE 1 PUFF: 100; 25 POWDER RESPIRATORY (INHALATION) at 10:10

## 2021-04-30 RX ADMIN — PANTOPRAZOLE SODIUM 40 MG: 40 TABLET, DELAYED RELEASE ORAL at 06:46

## 2021-04-30 RX ADMIN — Medication 1 PATCH: at 10:07

## 2021-04-30 RX ADMIN — Medication 1000 UNITS: at 10:08

## 2021-04-30 RX ADMIN — Medication 1 TABLET: at 10:08

## 2021-04-30 RX ADMIN — ALBUTEROL SULFATE 2 PUFF: 90 AEROSOL, METERED RESPIRATORY (INHALATION) at 22:26

## 2021-04-30 RX ADMIN — QUETIAPINE FUMARATE 200 MG: 100 TABLET ORAL at 10:08

## 2021-04-30 RX ADMIN — LIDOCAINE 1 PATCH: 50 PATCH TOPICAL at 10:10

## 2021-04-30 RX ADMIN — TRAZODONE HYDROCHLORIDE 50 MG: 50 TABLET ORAL at 22:24

## 2021-04-30 RX ADMIN — ATORVASTATIN CALCIUM 40 MG: 40 TABLET, FILM COATED ORAL at 10:08

## 2021-04-30 RX ADMIN — THIAMINE HCL TAB 100 MG 100 MG: 100 TAB at 10:08

## 2021-04-30 NOTE — PLAN OF CARE
Medicare Annual Wellness Visit  Are Name: Meagan Nick Date: 2020   MRN: [id number] Sex: Female   Age: 81 y.o. Ethnicity: Non-/Non    : 1951 Race: Luis Enrique Payton is here for Ashley Gerardo [facility]    Screenings for behavioral, psychosocial and functional/safety risks, and cognitive dysfunction are all negative except as indicated below. These results, as well as other patient data from the [address] [place], operated by [facility] form, are documented in Flowsheets linked to this Encounter. Allergies   Allergen Reactions    Motrin [Ibuprofen]     Aspirin Rash    Blue Dyes (Parenteral) Rash    Hctz [Hydrochlorothiazide] Rash     Fixed drug reaction    Sulfa Antibiotics Rash    Tetracyclines & Related Rash         Prior to Visit Medications    Medication Sig Taking?  Authorizing Provider   acetaminophen (TYLENOL) 325 MG tablet Take 2 tablets by mouth every 6 hours as needed for Pain Yes Rola Chappell MD   levothyroxine (SYNTHROID) 25 MCG tablet TAKE 1 TABLET BY MOUTH ONCE DAILY **CALL  FOR  APPOINTMENT  BEFORE  ANY  OTHER  REFILLS** Yes Rola Chappell MD   amLODIPine (NORVASC) 10 MG tablet Take 1 tablet by mouth once daily Yes Rola Chappell MD   famotidine (PEPCID) 20 MG tablet Take 20 mg by mouth 2 times daily Yes Historical Provider, MD   mirabegron (MYRBETRIQ) 50 MG TB24 Take 50 mg by mouth daily Pt gets samples Yes Historical Provider, MD   QUEtiapine (SEROQUEL) 200 MG tablet Take 200 mg by mouth 3 times daily Yes Historical Provider, MD   budesonide-formoterol (SYMBICORT) 160-4.5 MCG/ACT AERO Inhale 2 puffs into the lungs 2 times daily Yes Rola Chappell MD   albuterol sulfate HFA (PROAIR HFA) 108 (90 Base) MCG/ACT inhaler Inhale 2 puffs into the lungs 2 times daily as needed for Wheezing (no more than 4 times daily) Yes Rola Chappell MD   mometasone (NASONEX) 50 MCG/ACT nasal spray 2 sprays by Nasal route daily 1 spray each nostril daily Yes Historical Provider, Problem: Prexisting or High Potential for Compromised Skin Integrity  Goal: Skin integrity is maintained or improved  Description: INTERVENTIONS:  - Identify patients at risk for skin breakdown  - Assess and monitor skin integrity  - Assess and monitor nutrition and hydration status  - Monitor labs   - Assess for incontinence   - Turn and reposition patient  - Assist with mobility/ambulation  - Relieve pressure over bony prominences  - Avoid friction and shearing  - Provide appropriate hygiene as needed including keeping skin clean and dry  - Evaluate need for skin moisturizer/barrier cream  - Collaborate with interdisciplinary team   - Patient/family teaching  - Consider wound care consult   Outcome: Progressing     Problem: Potential for Falls  Goal: Patient will remain free of falls  Description: INTERVENTIONS:  - Assess patient frequently for physical needs  -  Identify cognitive and physical deficits and behaviors that affect risk of falls    -  [place] fall precautions as indicated by assessment   - Educate patient/family on patient safety including physical limitations  - Instruct patient to call for assistance with activity based on assessment  - Modify environment to reduce risk of injury  - Consider OT/PT consult to assist with strengthening/mobility  Outcome: Progressing     Problem: PAIN - ADULT  Goal: Verbalizes/displays adequate comfort level or baseline comfort level  Description: Interventions:  - Encourage patient to monitor pain and request assistance  - Assess pain using appropriate pain scale  - Administer analgesics based on type and severity of pain and evaluate response  - Implement non-pharmacological measures as appropriate and evaluate response  - Consider cultural and social influences on pain and pain management  - Notify physician/advanced practitioner if interventions unsuccessful or patient reports new pain  Outcome: Progressing     Problem: INFECTION - ADULT  Goal: Absence or MD   montelukast (SINGULAIR) 10 MG tablet Take 10 mg by mouth nightly Yes Historical Provider, MD   FLUoxetine (PROZAC) 40 MG capsule Take 40 mg by mouth daily Yes Historical Provider, MD   ipratropium (ATROVENT) 0.03 % nasal spray 2 sprays by Nasal route 3 times daily as needed for Rhinitis  Yes Historical Provider, MD   lithium 300 MG capsule Take 300 mg by mouth 2 times daily (with meals). Yes Historical Provider, MD         Past Medical History:   Diagnosis Date    Anxiety     Arrhythmia     Asthma     Bipolar 1 disorder ([facility] 2/14/2019    Bipolar disorder ([facility]     Chronic diarrhea 2/14/2019    COPD (chronic obstructive pulmonary disease) (HCC)     Depression     Essential hypertension     GERD (gastroesophageal reflux disease)     GERD (gastroesophageal reflux disease)     History of stroke 8/9/2018    Hyperlipidemia     Hypertension     Hypothyroidism     Mild persistent asthma without complication 4/98/7945    OAB (overactive bladder)     Obesity (BMI 30-39. 9) 8/19/2016    Osteoarthritis     Pulmonary fibrosis (HCC)     sjogrens syndrome    Pulmonary hypertension (HCC)     Pure hypercholesterolemia     Sleep apnea     [facility]     Unspecified sleep apnea     on cpap    Urinary incontinence        Past Surgical History:   Procedure Laterality Date    ANKLE SURGERY Right 09/11/2019    COLONOSCOPY  04/2015    COLONOSCOPY  2018             Family History   Problem Relation Age of Onset    Arthritis Mother     Depression Mother     Heart Disease Mother     High Blood Pressure Father     High Cholesterol Father     Stroke Father     High Blood Pressure Brother        CareTeam (Including outside providers/suppliers regularly involved in providing care):   Patient Care Team:  Chantel Montenegro MD as PCP - Quin De Leon MD as PCP - [facility] Empaneled Provider  Alessandro Schroeder MD as Consulting Physician (Gastroenterology)  Sherry Niño MD as Consulting prevention of progression during hospitalization  Description: INTERVENTIONS:  - Assess and monitor for signs and symptoms of infection  - Monitor lab/diagnostic results  - Monitor all insertion sites, i e  indwelling lines, tubes, and drains  - Monitor endotracheal if appropriate and nasal secretions for changes in amount and color  - [place] appropriate cooling/warming therapies per order  - Administer medications as ordered  - Instruct and encourage patient and family to use good hand hygiene technique  - Identify and instruct in appropriate isolation precautions for identified infection/condition  Outcome: Progressing  Goal: Absence of fever/infection during neutropenic period  Description: INTERVENTIONS:  - Monitor WBC    Outcome: Progressing     Problem: SAFETY ADULT  Goal: Patient will remain free of falls  Description: INTERVENTIONS:  - Assess patient frequently for physical needs  -  Identify cognitive and physical deficits and behaviors that affect risk of falls    -  [place] fall precautions as indicated by assessment   - Educate patient/family on patient safety including physical limitations  - Instruct patient to call for assistance with activity based on assessment  - Modify environment to reduce risk of injury  - Consider OT/PT consult to assist with strengthening/mobility  Outcome: Progressing  Goal: Maintain or return to baseline ADL function  Description: INTERVENTIONS:  -  Assess patient's ability to carry out ADLs; assess patient's baseline for ADL function and identify physical deficits which impact ability to perform ADLs (bathing, care of mouth/teeth, toileting, grooming, dressing, etc )  - Assess/evaluate cause of self-care deficits   - Assess range of motion  - Assess patient's mobility; develop plan if impaired  - Assess patient's need for assistive devices and provide as appropriate  - Encourage maximum independence but intervene and supervise when necessary  - Involve family in Physician (Pulmonology)  Franco Clement MD as Surgeon (Orthopedic Surgery)  Shahnaz Juarez MD as Consulting Physician (Infectious Diseases)    Wt Readings from Last 3 Encounters:   08/13/20 204 lb (92.5 kg)   07/30/20 201 lb 9.6 oz (91.4 kg)   05/15/20 199 lb (90.3 kg)      No flowsheet data found. There is no height or weight on file to calculate BMI. Based upon direct observation of the patient, evaluation of cognition reveals recent and remote memory intact. Wt Readings from Last 3 Encounters:   08/13/20 204 lb (92.5 kg)   07/30/20 201 lb 9.6 oz (91.4 kg)   05/15/20 199 lb (90.3 kg)     Temp Readings from Last 3 Encounters:   07/30/20 97.4 °F (36.3 °C) (Temporal)   05/12/20 97.4 °F (36.3 °C)   03/12/20 97.6 °F (36.4 °C) (Oral)     BP Readings from Last 3 Encounters:   08/13/20 (!) 143/85   07/30/20 128/78   05/12/20 115/73     Pulse Readings from Last 3 Encounters:   08/13/20 76   07/30/20 90   05/12/20 101       Patient's complete Health Risk Assessment and screening values have been reviewed and are found in Flowsheets. The following problems were reviewed today and where indicated follow up appointments were made and/or referrals ordered. Positive Risk Factor Screenings with Interventions:     Fall Risk:  2 or more falls in past year?: (!) yes(tripped)  Fall with injury in past year?: (!) yes(broke ankle)  Fall Risk Interventions:    · Home safety tips provided    Health Habits/Nutrition:  Health Habits/Nutrition  Do you exercise for at least 20 minutes 2-3 times per week?: Yes  Have you lost any weight without trying in the past 3 months?: No  Do you eat fewer than 2 meals per day?: No  Have you seen a dentist within the past year?: (!) No  There is no height or weight on file to calculate BMI.   Health Habits/Nutrition Interventions:  · Dental exam overdue:  patient encouraged to make appointment with his/her dentist    Hearing/Vision:  No exam data present  Hearing/Vision  Do you or your performance of ADLs  - Assess for home care needs following discharge   - Consider OT consult to assist with ADL evaluation and planning for discharge  - Provide patient education as appropriate  Outcome: Progressing  Goal: Maintain or return mobility status to optimal level  Description: INTERVENTIONS:  - Assess patient's baseline mobility status (ambulation, transfers, stairs, etc )    - Identify cognitive and physical deficits and behaviors that affect mobility  - Identify mobility aids required to assist with transfers and/or ambulation (gait belt, sit-to-stand, lift, walker, cane, etc )  - [place] fall precautions as indicated by assessment  - Record patient progress and toleration of activity level on Mobility SBAR; progress patient to next Phase/Stage  - Instruct patient to call for assistance with activity based on assessment  - Consider rehabilitation consult to assist with strengthening/weightbearing, etc   Outcome: Progressing     Problem: DISCHARGE PLANNING  Goal: Discharge to home or other facility with appropriate resources  Description: INTERVENTIONS:  - Identify barriers to discharge w/patient and caregiver  - Arrange for needed discharge resources and transportation as appropriate  - Identify discharge learning needs (meds, wound care, etc )  - Arrange for interpretive services to assist at discharge as needed  - Refer to Case Management Department for coordinating discharge planning if the patient needs post-hospital services based on physician/advanced practitioner order or complex needs related to functional status, cognitive ability, or social support system  Outcome: Progressing family notice any trouble with your hearing?: No  Do you have difficulty driving, watching TV, or doing any of your daily activities because of your eyesight?: No  Have you had an eye exam within the past year?: (!) No  Hearing/Vision Interventions:  · Vision concerns:  patient encouraged to make appointment with his/her eye specialist    ADL:  ADLs  In the past 7 days, did you need help from others to perform any of the following everyday activities? Eating, dressing, grooming, bathing, toileting, or walking/balance?: None  In the past 7 days, did you need help from others to take care of any of the following? Laundry, housekeeping, banking/finances, shopping, telephone use, food preparation, transportation, or taking medications?: (!) Laundry, Housekeeping  ADL Interventions:  · Referred for Home Health Services    Personalized Preventive Plan   Current Health Maintenance Status    There is no immunization history on file for this patient. Health Maintenance   Topic Date Due    Annual Wellness Visit (AWV)  05/29/2019    Pneumococcal 65+ years Vaccine (1 of 1 - PPSV23) 02/13/2021 (Originally 4/22/2018)    DTaP/Tdap/Td vaccine (1 - Tdap) 08/13/2021 (Originally 4/22/1970)    Shingles Vaccine (1 of 2) 08/13/2021 (Originally 4/22/2003)    Flu vaccine (1) 09/01/2020    TSH testing  07/16/2021    Potassium monitoring  08/13/2021    Creatinine monitoring  08/13/2021    Breast cancer screen  08/24/2022    Lipid screen  08/13/2025    Colon cancer screen colonoscopy  09/25/2028    DEXA (modify frequency per FRAX score)  Completed    Hepatitis C screen  Completed    Hepatitis A vaccine  Aged Out    Hepatitis B vaccine  Aged Out    Hib vaccine  Aged Out    Meningococcal (ACWY) vaccine  Aged Out     Recommendations for [place] Due: see orders and patient instructions/AVS.  .   Recommended screening schedule for the next 5-10 years is provided to the patient in written form: see Patient Instructions/AVS.    Antonieta Arredondo was seen today for medicare awv. Diagnoses and all orders for this visit:    Routine general medical examination at a health care facility  -     [address] [place] [address] [employer]  -     External Referral To Cynthia Thapa Juhi is a 81 y.o. female being evaluated by a Virtual Visit (phone) encounter to address concerns as mentioned above. A caregiver was present when appropriate. Due to this being a TeleHealth encounter (During TWUCV-90 public health emergency), evaluation of the following organ systems was limited: Vitals/Constitutional/EENT/Resp/CV/GI//MS/Neuro/Skin/Heme-Lymph-Imm. Pursuant to the emergency declaration under the [address] [place] [address], [address] [place] [address] authority and the [employer] and [employer] Act, this Virtual Visit was conducted with patient's (and/or legal guardian's) consent, to reduce the patient's risk of exposure to COVID-19 and provide necessary medical care. The patient (and/or legal guardian) has also been advised to contact this office for worsening conditions or problems, and seek emergency medical treatment and/or call 911 if deemed necessary. Patient identification was verified at the start of the visit: Yes    Services were provided through phone to substitute for in-person clinic visit. Patient and provider were located at their individual homes. --MANDEEP Ruiz CNP on 8/28/2020 at 1:52 PM    An electronic signature was used to authenticate this note.

## 2021-04-30 NOTE — ASSESSMENT & PLAN NOTE
Presented with signs and symptoms consistent with upper GI bleed  Baseline hemoglobin appears to be normal; acute blood loss anemia during his hospital stay secondary to bleeding with hemoglobin ranging between 8 and 9  Hemodynamically stable otherwise  Hemoglobin stable  Status post EGD on 04/22/2021-multiple duodenal ulcers with evidence of bleeding status post electrocauterization  Status post Protonix drip, transition to oral Protonix twice per day today  Requires outpatient GI follow-up  Previously counseled extensively on alcohol on smoking cessation to prevent progression of underlying ulcers  EGD biopsies negative for H pylori  Continue non ulcerogenic GI diet  Did have a bowel movement over past 24 hours which he mentions was dark but not black without blood, no further bowel movements    Will obtain CBC in the morning

## 2021-04-30 NOTE — UTILIZATION REVIEW
Continued Stay Review    Date: 4/30/21                          Current Patient Class: IP  Current Level of Care: MS    HPI:70 y o  male initially admitted on 4/21 with GI Bleed, depression, felt suicidal, h/o HTN, COPD, Alcohol abuse, Schizophrenia  EGD on 04/22/2021-multiple duodenal ulcers with evidence of bleeding status post electrocauterization      Assessment/Plan:   Vitals stable  Used Albuterol inhaler and Tramadol yesterday  No c/o abd pain today  Lungs are clear  Labs are stable today with Na at 134  Heart rate is controlled  Pt is weak and fatigued  Pt is being considered for placement and will need optioning through the [facility]        Vital Signs:   04/30/21 1008  --  --  --  --  --  92 %  --  --   04/30/21 07:54:14  98 8 °F (37 1 °C)  68  --  128/66  87  96 %  --  --   04/30/21 07:54:01  98 8 °F (37 1 °C)  68  --  128/66  87  96 %  --  --   04/29/21 2140  --  --  --  --  --  --  None (Room air)  --   04/29/21 2133  98 4 °F (36 9 °C)  96  17  137/67  90  94 %  --  Lying   04/29/21 15:35:48  98 1 °F (36 7 °C)  62  16  122/65  84  97 %  --  --   04/29/21 0800  --  --  --  --  --  --  None (Room air)  --   04/29/21 07:35:20  98 4 °F (36 9 °C)  69  --  127/70  89  96 %  --  --   04/29/21 07:34:39  98 4 °F (36 9 °C)  --  --  127/70  89  --  --  --   04/28/21 2137  --  76  --  --  --  --  --  --   04/28/21 21:34:19  --  --  --  127/69  88  --  --  --   04/28/21 2100  --  --  --  --  --  --  None (Room air)  --   04/28/21 15:40:15  98 7 °F (37 1 °C)  70  16  117/58  78  97 %  --  --   04/28/21 0930  --  --  --  --  --  --  None (Room air)  --   04/28/21 08:41:10  98 2 °F (36 8 °C)  66  18  119/61  80  98 %  --  --   04/28/21 0000  --  --  --  --  --  97 %  None (Room air)  --     Pertinent Labs/Diagnostic Results:       Results from last 7 days   Lab Units 04/29/21  0548 04/28/21  0942 04/27/21  0548 04/26/21  0617 04/25/21  1651   WBC Thousand/uL 5 80 7 37 6 09 5 58 5 71   HEMOGLOBIN g/dL 8 3* 8 7* 8  1* 7 5* 8 0*   HEMATOCRIT % 25 4* 26 7* 24 4* 22 9* 23 9*   PLATELETS Thousands/uL 313 451* 416* 403* 377   NEUTROS ABS Thousands/µL 2 67 4 96 3 03 2 64 2 74         Results from last 7 days   Lab Units 04/29/21  0548 04/28/21  0942 04/27/21  0548 04/26/21  0617 04/24/21  0649   SODIUM mmol/L 134* 133* 134* 136 135*   POTASSIUM mmol/L 4 0 4 1 4 2 4 4 4 1   CHLORIDE mmol/L 103 103 103 106 106   CO2 mmol/L 27 25 25 24 26   ANION GAP mmol/L 4 5 6 6 3*   BUN mg/dL 17 16 13 12 15   CREATININE mg/dL 0 93 0 94 1 03 0 91 0 89   EGFR ml/min/1 73sq m 84 83 74 86 88   CALCIUM mg/dL 9 0 8 7 8 8 8 5 8 2*   MAGNESIUM mg/dL  --   --   --   --  2 4   PHOSPHORUS mg/dL  --   --   --   --  2 8     Results from last 7 days   Lab Units 04/24/21  0649   AST U/L 25   ALT U/L 9*   ALK PHOS U/L 39*   TOTAL PROTEIN g/dL 6 1*   ALBUMIN g/dL 2 4*   TOTAL BILIRUBIN mg/dL 0 36         Results from last 7 days   Lab Units 04/29/21  0548 04/28/21  0942 04/27/21  0548 04/26/21  0617 04/24/21  0649   GLUCOSE RANDOM mg/dL 108 142* 117 109 106     Medications:   Scheduled Medications:  atorvastatin, 40 mg, Oral, Daily  cholecalciferol, 1,000 Units, Oral, Daily  docusate sodium, 100 mg, Oral, BID  fluticasone, 2 spray, Nasal, HS  fluticasone-vilanterol, 1 puff, Inhalation, Daily  folic acid, 1 mg, Oral, Daily  lidocaine, 1 patch, Topical, Daily  metoprolol tartrate, 25 mg, Oral, Q12H [address]  multivitamin-minerals, 1 tablet, Oral, Daily  nicotine, 1 patch, Transdermal, Daily  pantoprazole, 40 mg, Oral, BID AC  polyethylene glycol, 17 g, Oral, BID AC  QUEtiapine, 200 mg, Oral, Daily  QUEtiapine, 600 mg, Oral, HS  thiamine, 100 mg, Oral, Daily  traZODone, 50 mg, Oral, HS      Continuous IV Infusions:     PRN Meds:  acetaminophen, 650 mg, Oral, Q6H PRN  albuterol, 2 puff, Inhalation, Q4H PRN - x 1 4/29  calcium carbonate, 500 mg, Oral, Daily PRN  hydrALAZINE, 5 mg, Intravenous, Q6H PRN  Labetalol HCl, 10 mg, Intravenous, Q6H PRN  LORazepam, 0 5 mg, Intravenous, Q6H PRN  naloxone, 0 04 mg, Intravenous, Q1MIN PRN  ondansetron, 4 mg, Oral, Q6H PRN  traMADol, 50 mg, Oral, Q6H PRN - x 2 4/29    Discharge Plan: D    Network Utilization Review Department  ATTENTION: Please call with any questions or concerns to [phone] and carefully listen to the prompts so that you are directed to the right person  All voicemails are confidential   [place] all requests for admission clinical reviews, approved or denied determinations and any other requests to dedicated fax number below belonging to the campus where the patient is receiving treatment   List of dedicated fax numbers for the Facilities:  [address] DENIALS (Administrative/Medical Necessity) [phone]   [address] (Maternity/NICU/Pediatrics) [phone]   [address] [employer] [address] [address] [place] [address] [address] [facility] [place] [address] [phone]

## 2021-04-30 NOTE — PROGRESS NOTES
[facility]  Progress Note - Victor Manuel Sena 1950, 78 y o  male MRN: [id number]  Unit/Bed#: [facility] 929-01 Encounter: [id number]  Primary Care Provider: Homar Price DO   Date and time admitted to hospital: 4/21/2021  5:34 PM    * GI bleed  Assessment & Plan  Presented with signs and symptoms consistent with upper GI bleed  Baseline hemoglobin appears to be normal; acute blood loss anemia during his hospital stay secondary to bleeding with hemoglobin ranging between 8 and 9  Hemodynamically stable otherwise  Hemoglobin stable  Status post EGD on 04/22/2021-multiple duodenal ulcers with evidence of bleeding status post electrocauterization  Status post Protonix drip, transition to oral Protonix twice per day today  Requires outpatient GI follow-up  Previously counseled extensively on alcohol on smoking cessation to prevent progression of underlying ulcers  EGD biopsies negative for H pylori  Continue non ulcerogenic GI diet  Did have a bowel movement over past 24 hours which he mentions was dark but not black without blood, no further bowel movements  Will obtain CBC in the morning         Duodenal ulcer  Assessment & Plan  see above management plan    Hypertension  Assessment & Plan  Metoprolol    Schizophrenia [facility])  Assessment & Plan  Patient was admitted at [facility] hospital for suicidal ideation  His hospital course was complicated by abdominal pain with hematemesis and he subsequently was transferred to the ICU there  Upon drop of his hemoglobin he was transferred to [facility] for GI evaluation  Patient is hemodynamically stable  Patient denies any suicidal ideation or homicidal ideation  Status post psychiatric evaluation, clear for outpatient follow-up from a psychiatric standpoint  Tobacco abuse  Assessment & Plan  Using nicotine patch    Counseled on smoking cessation    Chronic pain  Assessment & Plan  Complaining of chronic back pain, severe; reviewed PDMP, has not been on opioids or other controlled substances outpatient even though he mentions chronic morphine use at home  Will discontinue oxycodone; transition to tramadol p r n  Moderate or Severe pain and Tylenol along with lidocaine patch  COPD without exacerbation [facility])  Assessment & Plan  Continue with current meds     Ambulatory dysfunction  Assessment & Plan  PT/OT evaluated the patient recommending rehabilitation services after discharge  Patient is now agreeable; status post PMNR evaluation with recommendations of acute rehabilitation services; pending bed availability   on board  Alcohol dependence with unspecified alcohol-induced disorder [facility])  Assessment & Plan  Alcohol cessation counseling; refusing inpatient rehab  CIWA discontinued given persistent scores of 0            Disposition: Anticipated discharge pending placement  Medically clear    VTE Prophylaxis - SCDs    Education and Discussions with Family / Patient:  Discussed with patient at bedside    Current Length of Stay: 9 day(s)  Current Patient Status: Inpatient     Code Status: Level 1 - Full Code      Subjective:   Seen examined at bedside  Had a bowel movement over the past 24 hours (previously constipated), states he was darkish but not black without blood and much better compared to his previous bowel movements which were melanotic  Otherwise, denies worsening shortness of breath, chest pain, palpitations, diarrhea,  changes, hallucinations, suicidal ideation  Objective:     Vitals:   Temp (24hrs), Av 7 °F (37 1 °C), Min:98 4 °F (36 9 °C), Max:98 8 °F (37 1 °C)    Temp:  [98 4 °F (36 9 °C)-98 8 °F (37 1 °C)] 98 8 °F (37 1 °C)  HR:  [68-96] 69  Resp:  [17-18] 18  BP: (125-137)/(65-67) 125/65  SpO2:  [92 %-96 %] 96 %  Body mass index is 23 31 kg/m²       Invasive Devices     Peripheral Intravenous Line            Peripheral IV 21 Right Wrist 4 days                Input and Output Summary (last 24 hours): Intake/Output Summary (Last 24 hours) at 4/30/2021 1601  Last data filed at 4/30/2021 1430  Gross per 24 hour   Intake --   Output 1100 ml   Net -1100 ml       Physical Exam:     Gen: in bed; room air  HEENT: NC/AT, PERRLA, no conjunctival pallor or scleral icterus, moist mucous membranes  RS: symmetric, speaking in full sentences, CTA bilaterally  CVS:  RRR, S1-S2 heard without murmurs, no peripheral edema, radial pulses 2+, capillary refill less than 2 seconds  Abdomen:  Soft,ND, nontender, bowel sounds heard  MSK:  Warm  Divya Oates all 4 extremities  :  No [place]der[place]  Neuro:   AAOx4  Psych:  Cooperative, denies suicidal ideation or thoughts or homicidal thoughts        Additional Data:     Labs:    Results from last 7 days   Lab Units 04/29/21  0548   WBC Thousand/uL 5 80   HEMOGLOBIN g/dL 8 3*   HEMATOCRIT % 25 4*   PLATELETS Thousands/uL 313   NEUTROS PCT % 45   LYMPHS PCT % 33   MONOS PCT % 17*   EOS PCT % 3     Results from last 7 days   Lab Units 04/29/21  0548  04/24/21  0649   POTASSIUM mmol/L 4 0   < > 4 1   CHLORIDE mmol/L 103   < > 106   CO2 mmol/L 27   < > 26   BUN mg/dL 17   < > 15   CREATININE mg/dL 0 93   < > 0 89   CALCIUM mg/dL 9 0   < > 8 2*   ALK PHOS U/L  --   --  39*   ALT U/L  --   --  9*   AST U/L  --   --  25    < > = values in this interval not displayed  * I Have Reviewed All Lab Data Listed Above  * Additional Pertinent Lab Tests Reviewed:  Oleg 68 Admission Reviewed none    Imaging:    Imaging Reports Reviewed Today Include:  None  Imaging Personally Reviewed by Myself Includes:  None    Recent Cultures (last 7 days):           Last 24 Hours Medication List:   Current Facility-Administered Medications   Medication Dose Route Frequency Provider Last Rate    acetaminophen  650 mg Oral Q6H PRN Angel Lopez MD      albuterol  2 puff Inhalation Q4H PRN Leanne Ocampo PA-C      atorvastatin  40 mg Oral Daily Leanne Ocampo PA-C      calcium carbonate  500 mg Oral Daily PRN Leanne Ocampo PA-C      cholecalciferol  1,000 Units Oral Daily Leanne Ocampo PA-C      docusate sodium  100 mg Oral BID Ilia Jean MD      fluticasone  2 spray Nasal HS Leanne Ocampo PA-C      fluticasone-vilanterol  1 puff Inhalation Daily Mckay Summers PA-C      folic acid  1 mg Oral Daily Best Lucia MD      hydrALAZINE  5 mg Intravenous Q6H PRN Joi Malcolm PA-C      Labetalol HCl  10 mg Intravenous Q6H PRN Joi Malcolm PA-C      lidocaine  1 patch Topical Daily Best Lucia MD      LORazepam  0 5 mg Intravenous Q6H PRN Leanne Ocampo PA-C      metoprolol tartrate  25 mg Oral Q12H [address] Best Lucia MD      multivitamin-minerals  1 tablet Oral Daily Best Lucia MD      naloxone  0 04 mg Intravenous Q1MIN PRN Best Lucia MD      nicotine  1 patch Transdermal Daily Leanne Ocampo PA-C      ondansetron  4 mg Oral Q6H PRN Leanne Ocampo PA-C      pantoprazole  40 mg Oral BID AC Best Lucia MD      polyethylene glycol  17 g Oral BID AC Best Lucia, MD      QUEtiapine  200 mg Oral Daily Leanne Ocampo PA-C      QUEtiapine  600 mg Oral HS Best Lucia MD      thiamine  100 mg Oral Daily Best Lucia MD      traMADol  50 mg Oral Q6H PRN Best Lucia MD      traZODone  50 mg Oral HS Leanne Ocampo PA-C              ** Please Note: This note has been constructed using a voice recognition system   **

## 2021-04-30 NOTE — ASSESSMENT & PLAN NOTE
Patient was admitted at [address] [place] [address] for suicidal ideation  His hospital course was complicated by abdominal pain with hematemesis and he subsequently was transferred to the ICU there  Upon drop of his hemoglobin he was transferred to [facility] for GI evaluation  Patient is hemodynamically stable  Patient denies any suicidal ideation or homicidal ideation  Status post psychiatric evaluation, clear for outpatient follow-up from a psychiatric standpoint

## 2021-04-30 NOTE — CASE MANAGEMENT
CM was informed that pt was accepted to  [facility] pending auth  CM was notified that  [facility] is willing to accept pt w/o the Options approval letter  CM uploaded the Options paper work to [address] [place] [address] Drive and completed task [employer] to initiate [facility]   CM will follow

## 2021-04-30 NOTE — CASE MANAGEMENT
CM  faxed updated clinicals to [facility] for options approval letter  CM will follow

## 2021-05-01 PROBLEM — R53.81 PHYSICAL DECONDITIONING: Status: ACTIVE | Noted: 2019-10-01

## 2021-05-01 PROBLEM — D75.839 THROMBOCYTOSIS: Status: ACTIVE | Noted: 2021-05-01

## 2021-05-01 PROBLEM — E87.1 HYPONATREMIA: Status: ACTIVE | Noted: 2021-05-01

## 2021-05-01 LAB
ALBUMIN SERPL BCP-MCNC: 2.3 G/DL (ref 3.5–5)
ALP SERPL-CCNC: 46 U/L (ref 46–116)
ALT SERPL W P-5'-P-CCNC: 11 U/L (ref 12–78)
ANION GAP SERPL CALCULATED.3IONS-SCNC: 6 MMOL/L (ref 4–13)
AST SERPL W P-5'-P-CCNC: 12 U/L (ref 5–45)
BASOPHILS # BLD AUTO: 0.02 THOUSANDS/ΜL (ref 0–0.1)
BASOPHILS NFR BLD AUTO: 0 % (ref 0–1)
BILIRUB SERPL-MCNC: 0.21 MG/DL (ref 0.2–1)
BUN SERPL-MCNC: 20 MG/DL (ref 5–25)
CALCIUM ALBUM COR SERPL-MCNC: 10.2 MG/DL (ref 8.3–10.1)
CALCIUM SERPL-MCNC: 8.8 MG/DL (ref 8.3–10.1)
CHLORIDE SERPL-SCNC: 107 MMOL/L (ref 100–108)
CO2 SERPL-SCNC: 24 MMOL/L (ref 21–32)
CREAT SERPL-MCNC: 0.84 MG/DL (ref 0.6–1.3)
EOSINOPHIL # BLD AUTO: 0.13 THOUSAND/ΜL (ref 0–0.61)
EOSINOPHIL NFR BLD AUTO: 3 % (ref 0–6)
ERYTHROCYTE [DISTWIDTH] IN BLOOD BY AUTOMATED COUNT: 14.6 % (ref 11.6–15.1)
GFR SERPL CREATININE-BSD FRML MDRD: 90 ML/MIN/1.73SQ M
GLUCOSE SERPL-MCNC: 112 MG/DL (ref 65–140)
HCT VFR BLD AUTO: 24.6 % (ref 36.5–49.3)
HGB BLD-MCNC: 8.2 G/DL (ref 12–17)
IMM GRANULOCYTES # BLD AUTO: 0.01 THOUSAND/UL (ref 0–0.2)
IMM GRANULOCYTES NFR BLD AUTO: 0 % (ref 0–2)
LYMPHOCYTES # BLD AUTO: 1.64 THOUSANDS/ΜL (ref 0.6–4.47)
LYMPHOCYTES NFR BLD AUTO: 31 % (ref 14–44)
MCH RBC QN AUTO: 33.5 PG (ref 26.8–34.3)
MCHC RBC AUTO-ENTMCNC: 33.3 G/DL (ref 31.4–37.4)
MCV RBC AUTO: 100 FL (ref 82–98)
MONOCYTES # BLD AUTO: 0.76 THOUSAND/ΜL (ref 0.17–1.22)
MONOCYTES NFR BLD AUTO: 14 % (ref 4–12)
NEUTROPHILS # BLD AUTO: 2.71 THOUSANDS/ΜL (ref 1.85–7.62)
NEUTS SEG NFR BLD AUTO: 52 % (ref 43–75)
NRBC BLD AUTO-RTO: 0 /100 WBCS
PLATELET # BLD AUTO: 454 THOUSANDS/UL (ref 149–390)
PMV BLD AUTO: 8.9 FL (ref 8.9–12.7)
POTASSIUM SERPL-SCNC: 4.1 MMOL/L (ref 3.5–5.3)
PROT SERPL-MCNC: 6.1 G/DL (ref 6.4–8.2)
RBC # BLD AUTO: 2.45 MILLION/UL (ref 3.88–5.62)
SODIUM SERPL-SCNC: 137 MMOL/L (ref 136–145)
WBC # BLD AUTO: 5.27 THOUSAND/UL (ref 4.31–10.16)

## 2021-05-01 PROCEDURE — 85025 COMPLETE CBC W/AUTO DIFF WBC: CPT | Performed by: INTERNAL MEDICINE

## 2021-05-01 PROCEDURE — 80053 COMPREHEN METABOLIC PANEL: CPT | Performed by: INTERNAL MEDICINE

## 2021-05-01 PROCEDURE — 99232 SBSQ HOSP IP/OBS MODERATE 35: CPT | Performed by: INTERNAL MEDICINE

## 2021-05-01 RX ADMIN — POLYETHYLENE GLYCOL 3350 17 G: 17 POWDER, FOR SOLUTION ORAL at 06:04

## 2021-05-01 RX ADMIN — DOCUSATE SODIUM 100 MG: 100 CAPSULE, LIQUID FILLED ORAL at 08:04

## 2021-05-01 RX ADMIN — Medication 1 TABLET: at 08:04

## 2021-05-01 RX ADMIN — ATORVASTATIN CALCIUM 40 MG: 40 TABLET, FILM COATED ORAL at 08:04

## 2021-05-01 RX ADMIN — Medication 1 PATCH: at 08:04

## 2021-05-01 RX ADMIN — TRAMADOL HYDROCHLORIDE 50 MG: 50 TABLET, FILM COATED ORAL at 16:47

## 2021-05-01 RX ADMIN — ALBUTEROL SULFATE 2 PUFF: 90 AEROSOL, METERED RESPIRATORY (INHALATION) at 21:32

## 2021-05-01 RX ADMIN — QUETIAPINE FUMARATE 200 MG: 100 TABLET ORAL at 08:03

## 2021-05-01 RX ADMIN — FOLIC ACID 1 MG: 1 TABLET ORAL at 08:03

## 2021-05-01 RX ADMIN — QUETIAPINE FUMARATE 600 MG: 300 TABLET ORAL at 21:31

## 2021-05-01 RX ADMIN — TRAZODONE HYDROCHLORIDE 50 MG: 50 TABLET ORAL at 21:31

## 2021-05-01 RX ADMIN — LIDOCAINE 1 PATCH: 50 PATCH TOPICAL at 08:04

## 2021-05-01 RX ADMIN — Medication 1000 UNITS: at 08:03

## 2021-05-01 RX ADMIN — METOPROLOL TARTRATE 25 MG: 25 TABLET, FILM COATED ORAL at 21:31

## 2021-05-01 RX ADMIN — POLYETHYLENE GLYCOL 3350 17 G: 17 POWDER, FOR SOLUTION ORAL at 15:26

## 2021-05-01 RX ADMIN — METOPROLOL TARTRATE 25 MG: 25 TABLET, FILM COATED ORAL at 08:03

## 2021-05-01 RX ADMIN — PANTOPRAZOLE SODIUM 40 MG: 40 TABLET, DELAYED RELEASE ORAL at 06:04

## 2021-05-01 RX ADMIN — PANTOPRAZOLE SODIUM 40 MG: 40 TABLET, DELAYED RELEASE ORAL at 15:23

## 2021-05-01 RX ADMIN — FLUTICASONE PROPIONATE 2 SPRAY: 50 SPRAY, METERED NASAL at 21:32

## 2021-05-01 RX ADMIN — FLUTICASONE FUROATE AND VILANTEROL TRIFENATATE 1 PUFF: 100; 25 POWDER RESPIRATORY (INHALATION) at 08:04

## 2021-05-01 RX ADMIN — THIAMINE HCL TAB 100 MG 100 MG: 100 TAB at 08:03

## 2021-05-01 NOTE — PLAN OF CARE
Problem: Prexisting or High Potential for Compromised Skin Integrity  Goal: Skin integrity is maintained or improved  Description: INTERVENTIONS:  - Identify patients at risk for skin breakdown  - Assess and monitor skin integrity  - Assess and monitor nutrition and hydration status  - Monitor labs   - Assess for incontinence   - Turn and reposition patient  - Assist with mobility/ambulation  - Relieve pressure over bony prominences  - Avoid friction and shearing  - Provide appropriate hygiene as needed including keeping skin clean and dry  - Evaluate need for skin moisturizer/barrier cream  - Collaborate with interdisciplinary team   - Patient/family teaching  - Consider wound care consult   Outcome: Progressing     Problem: Potential for Falls  Goal: Patient will remain free of falls  Description: INTERVENTIONS:  - Assess patient frequently for physical needs  -  Identify cognitive and physical deficits and behaviors that affect risk of falls    -  [place] fall precautions as indicated by assessment   - Educate patient/family on patient safety including physical limitations  - Instruct patient to call for assistance with activity based on assessment  - Modify environment to reduce risk of injury  - Consider OT/PT consult to assist with strengthening/mobility  Outcome: Progressing     Problem: PAIN - ADULT  Goal: Verbalizes/displays adequate comfort level or baseline comfort level  Description: Interventions:  - Encourage patient to monitor pain and request assistance  - Assess pain using appropriate pain scale  - Administer analgesics based on type and severity of pain and evaluate response  - Implement non-pharmacological measures as appropriate and evaluate response  - Consider cultural and social influences on pain and pain management  - Notify physician/advanced practitioner if interventions unsuccessful or patient reports new pain  Outcome: Progressing     Problem: INFECTION - ADULT  Goal: Absence or prevention of progression during hospitalization  Description: INTERVENTIONS:  - Assess and monitor for signs and symptoms of infection  - Monitor lab/diagnostic results  - Monitor all insertion sites, i e  indwelling lines, tubes, and drains  - Monitor endotracheal if appropriate and nasal secretions for changes in amount and color  - [place] appropriate cooling/warming therapies per order  - Administer medications as ordered  - Instruct and encourage patient and family to use good hand hygiene technique  - Identify and instruct in appropriate isolation precautions for identified infection/condition  Outcome: Progressing  Goal: Absence of fever/infection during neutropenic period  Description: INTERVENTIONS:  - Monitor WBC    Outcome: Progressing     Problem: SAFETY ADULT  Goal: Patient will remain free of falls  Description: INTERVENTIONS:  - Assess patient frequently for physical needs  -  Identify cognitive and physical deficits and behaviors that affect risk of falls    -  [place] fall precautions as indicated by assessment   - Educate patient/family on patient safety including physical limitations  - Instruct patient to call for assistance with activity based on assessment  - Modify environment to reduce risk of injury  - Consider OT/PT consult to assist with strengthening/mobility  Outcome: Progressing  Goal: Maintain or return to baseline ADL function  Description: INTERVENTIONS:  -  Assess patient's ability to carry out ADLs; assess patient's baseline for ADL function and identify physical deficits which impact ability to perform ADLs (bathing, care of mouth/teeth, toileting, grooming, dressing, etc )  - Assess/evaluate cause of self-care deficits   - Assess range of motion  - Assess patient's mobility; develop plan if impaired  - Assess patient's need for assistive devices and provide as appropriate  - Encourage maximum independence but intervene and supervise when necessary  - Involve family in performance of ADLs  - Assess for home care needs following discharge   - Consider OT consult to assist with ADL evaluation and planning for discharge  - Provide patient education as appropriate  Outcome: Progressing  Goal: Maintain or return mobility status to optimal level  Description: INTERVENTIONS:  - Assess patient's baseline mobility status (ambulation, transfers, stairs, etc )    - Identify cognitive and physical deficits and behaviors that affect mobility  - Identify mobility aids required to assist with transfers and/or ambulation (gait belt, sit-to-stand, lift, walker, cane, etc )  - [place] fall precautions as indicated by assessment  - Record patient progress and toleration of activity level on Mobility SBAR; progress patient to next Phase/Stage  - Instruct patient to call for assistance with activity based on assessment  - Consider rehabilitation consult to assist with strengthening/weightbearing, etc   Outcome: Progressing     Problem: DISCHARGE PLANNING  Goal: Discharge to home or other facility with appropriate resources  Description: INTERVENTIONS:  - Identify barriers to discharge w/patient and caregiver  - Arrange for needed discharge resources and transportation as appropriate  - Identify discharge learning needs (meds, wound care, etc )  - Arrange for interpretive services to assist at discharge as needed  - Refer to Case Management Department for coordinating discharge planning if the patient needs post-hospital services based on physician/advanced practitioner order or complex needs related to functional status, cognitive ability, or social support system  Outcome: Progressing

## 2021-05-01 NOTE — ASSESSMENT & PLAN NOTE
S/p cauterization for bleeding as noted above in plan for acute blood loss anemia  Continue PPI regimen

## 2021-05-01 NOTE — PLAN OF CARE
Problem: Prexisting or High Potential for Compromised Skin Integrity  Goal: Skin integrity is maintained or improved  Description: INTERVENTIONS:  - Identify patients at risk for skin breakdown  - Assess and monitor skin integrity  - Assess and monitor nutrition and hydration status  - Monitor labs   - Assess for incontinence   - Turn and reposition patient  - Assist with mobility/ambulation  - Relieve pressure over bony prominences  - Avoid friction and shearing  - Provide appropriate hygiene as needed including keeping skin clean and dry  - Evaluate need for skin moisturizer/barrier cream  - Collaborate with interdisciplinary team   - Patient/family teaching  - Consider wound care consult   5/1/2021 0821 by Fe Metzger RN  Outcome: Progressing  5/1/2021 0558 by Fe Metzger RN  Outcome: Progressing     Problem: Potential for Falls  Goal: Patient will remain free of falls  Description: INTERVENTIONS:  - Assess patient frequently for physical needs  -  Identify cognitive and physical deficits and behaviors that affect risk of falls    -  [place] fall precautions as indicated by assessment   - Educate patient/family on patient safety including physical limitations  - Instruct patient to call for assistance with activity based on assessment  - Modify environment to reduce risk of injury  - Consider OT/PT consult to assist with strengthening/mobility  5/1/2021 0821 by Fe Metzger RN  Outcome: Progressing  5/1/2021 0558 by Fe Metzger RN  Outcome: Progressing     Problem: PAIN - ADULT  Goal: Verbalizes/displays adequate comfort level or baseline comfort level  Description: Interventions:  - Encourage patient to monitor pain and request assistance  - Assess pain using appropriate pain scale  - Administer analgesics based on type and severity of pain and evaluate response  - Implement non-pharmacological measures as appropriate and evaluate response  - Consider cultural and social influences on pain and pain management  - Notify physician/advanced practitioner if interventions unsuccessful or patient reports new pain  5/1/2021 0821 by Juanito Omalley RN  Outcome: Progressing  5/1/2021 0558 by Juanito Omalley RN  Outcome: Progressing     Problem: INFECTION - ADULT  Goal: Absence or prevention of progression during hospitalization  Description: INTERVENTIONS:  - Assess and monitor for signs and symptoms of infection  - Monitor lab/diagnostic results  - Monitor all insertion sites, i e  indwelling lines, tubes, and drains  - Monitor endotracheal if appropriate and nasal secretions for changes in amount and color  - [place] appropriate cooling/warming therapies per order  - Administer medications as ordered  - Instruct and encourage patient and family to use good hand hygiene technique  - Identify and instruct in appropriate isolation precautions for identified infection/condition  5/1/2021 0821 by Juanito Omalley RN  Outcome: Progressing  5/1/2021 0558 by Juanito Omalley RN  Outcome: Progressing  Goal: Absence of fever/infection during neutropenic period  Description: INTERVENTIONS:  - Monitor WBC    5/1/2021 0821 by Juanito Omalley RN  Outcome: Progressing  5/1/2021 0558 by Juanito Omalley RN  Outcome: Progressing     Problem: SAFETY ADULT  Goal: Patient will remain free of falls  Description: INTERVENTIONS:  - Assess patient frequently for physical needs  -  Identify cognitive and physical deficits and behaviors that affect risk of falls    -  [place] fall precautions as indicated by assessment   - Educate patient/family on patient safety including physical limitations  - Instruct patient to call for assistance with activity based on assessment  - Modify environment to reduce risk of injury  - Consider OT/PT consult to assist with strengthening/mobility  5/1/2021 0821 by Juanito Omalley RN  Outcome: Progressing  5/1/2021 0558 by Juanito Omalley RN  Outcome: Progressing  Goal: Maintain or return to baseline ADL function  Description: INTERVENTIONS:  -  Assess patient's ability to carry out ADLs; assess patient's baseline for ADL function and identify physical deficits which impact ability to perform ADLs (bathing, care of mouth/teeth, toileting, grooming, dressing, etc )  - Assess/evaluate cause of self-care deficits   - Assess range of motion  - Assess patient's mobility; develop plan if impaired  - Assess patient's need for assistive devices and provide as appropriate  - Encourage maximum independence but intervene and supervise when necessary  - Involve family in performance of ADLs  - Assess for home care needs following discharge   - Consider OT consult to assist with ADL evaluation and planning for discharge  - Provide patient education as appropriate  5/1/2021 0821 by Arlene Colmenares RN  Outcome: Progressing  5/1/2021 0558 by Arlene Colmenares RN  Outcome: Progressing  Goal: Maintain or return mobility status to optimal level  Description: INTERVENTIONS:  - Assess patient's baseline mobility status (ambulation, transfers, stairs, etc )    - Identify cognitive and physical deficits and behaviors that affect mobility  - Identify mobility aids required to assist with transfers and/or ambulation (gait belt, sit-to-stand, lift, walker, cane, etc )  - [place] fall precautions as indicated by assessment  - Record patient progress and toleration of activity level on Mobility SBAR; progress patient to next Phase/Stage  - Instruct patient to call for assistance with activity based on assessment  - Consider rehabilitation consult to assist with strengthening/weightbearing, etc   5/1/2021 0821 by Arlene Colmenares RN  Outcome: Progressing  5/1/2021 0558 by Arlene Colmenares RN  Outcome: Progressing     Problem: DISCHARGE PLANNING  Goal: Discharge to home or other facility with appropriate resources  Description: INTERVENTIONS:  - Identify barriers to discharge w/patient and caregiver  - Arrange for needed discharge resources and transportation as appropriate  - Identify discharge learning needs (meds, wound care, etc )  - Arrange for interpretive services to assist at discharge as needed  - Refer to Case Management Department for coordinating discharge planning if the patient needs post-hospital services based on physician/advanced practitioner order or complex needs related to functional status, cognitive ability, or social support system  5/1/2021 0821 by Colleen Loya RN  Outcome: Progressing  5/1/2021 0558 by Colleen Loya RN  Outcome: Progressing

## 2021-05-01 NOTE — ASSESSMENT & PLAN NOTE
Cessation counseling - refusing alcohol rehab  Supplemental thiamine/multivitamin/folic acid on board

## 2021-05-01 NOTE — ASSESSMENT & PLAN NOTE
Hemoglobin initially in normal range two weeks ago -> progressively dropped but now stabilized at 8 2 today  EGD noted a large bleeding duodenal ulcer s/p cauterization - continue PPI regimen  On SCDs for DVT prophylaxis

## 2021-05-01 NOTE — PROGRESS NOTES
[facility] [facility]  Progress Note - Doylemelissa Cutting 1950, 78 y o  male MRN: [id number]  Unit/Bed#: [facility] 929-01 Encounter: [id number]  Primary Care Provider: Shawn Hamilton DO   Date and time admitted to hospital: 4/21/2021  5:34 PM      * Acute blood loss anemia  Assessment & Plan  Hemoglobin initially in normal range two weeks ago -> progressively dropped but now stabilized at 8 2 today  EGD noted a large bleeding duodenal ulcer s/p cauterization - continue PPI regimen  On SCDs for DVT prophylaxis    Duodenal ulcer  Assessment & Plan  S/p cauterization for bleeding as noted above in plan for acute blood loss anemia  Continue PPI regimen    Schizophrenia  Assessment & Plan  Initially admitted at the [place] [address] for suicidal ideation and eventually transferred to the [facility] due to hematemesis requiring gastroenterology intervention   Okay for outpatient follow-up once medically stable per psychiatry  Continue Seroquel/Trazodone regimen    Thrombocytosis  Assessment & Plan  Likely reactive due to acute medical issue(s)  Monitor platelet count    Hyponatremia  Assessment & Plan  Currently normalized  Monitor serum sodium level    History of alcohol abuse  Assessment & Plan  Cessation counseling - refusing alcohol rehab  Supplemental thiamine/multivitamin/folic acid on board    Physical deconditioning  Assessment & Plan  Appreciate PT/evaluation -> plan for discharge to skilled rehab once bed obtained    COPD  Assessment & Plan  Stable/asymptomatic  Continue Breo-Ellipta - PRN Albuterol on board    Essential hypertension  Assessment & Plan  Continue Lopressor    Tobacco abuse  Assessment & Plan  Cessation counseling  Transdermal nicotine patch on board      DVT Prophylaxis:  SCDs      Patient Centered Rounds:  I have performed bedside rounds and discussed plan of care with nursing today      Discussions with Specialists or Other Care Team Provider:  see above assessments if applicable    Education and Discussions with Family / Patient:  Patient at bedside    Time Spent for Care:  32 minutes  More than 50% of total time spent on counseling and coordination of care as described above  Current Length of Stay: 10 day(s)    Current Patient Status: Inpatient   Certification Statement:  Patient will continue to require additional hospital stay due to assessments as noted above  Code Status: Level 1 - Full Code        Subjective:     No new complaints this time  Abdominal discomfort has improved today  Remains generally weak/fatigued  Objective:     Vitals:   Temp (24hrs), Av 6 °F (37 °C), Min:98 4 °F (36 9 °C), Max:98 7 °F (37 1 °C)    Temp:  [98 4 °F (36 9 °C)-98 7 °F (37 1 °C)] 98 6 °F (37 °C)  HR:  [67-72] 67  Resp:  [16-18] 16  BP: (120-125)/(64-65) 120/64  SpO2:  [96 %-97 %] 96 %  Body mass index is 23 5 kg/m²  Input and Output Summary (last 24 hours):        Intake/Output Summary (Last 24 hours) at 2021 1422  Last data filed at 2021 0900  Gross per 24 hour   Intake 480 ml   Output 900 ml   Net -420 ml       Physical Exam:     GENERAL:  Weak/fatigued  HEAD:  Normocephalic - atraumatic  EYES: PERRL - EOMI   MOUTH:  Mucosa moist  NECK:  Supple - full range of motion  CARDIAC:  Rate controlled - S1/S2 positive  PULMONARY:  Clear breath sounds bilaterally - nonlabored respirations  ABDOMEN:  Soft - improved generalized tenderness - nondistended - active bowel sounds  MUSCULOSKELETAL:  Motor strength/range of motion deconditioned  NEUROLOGIC:  Alert/oriented at baseline  SKIN:  Chronic wrinkles/blemishes   PSYCHIATRIC:  Mood/affect flat      Additional Data:     Labs & Recent Cultures:    Results from last 7 days   Lab Units 21  0526   WBC Thousand/uL 5 27   HEMOGLOBIN g/dL 8 2*   HEMATOCRIT % 24 6*   PLATELETS Thousands/uL 454*   NEUTROS PCT % 52   LYMPHS PCT % 31   MONOS PCT % 14*   EOS PCT % 3     Results from last 7 days   Lab Units 05/01/21  0526   POTASSIUM mmol/L 4 1   CHLORIDE mmol/L 107   CO2 mmol/L 24   BUN mg/dL 20   CREATININE mg/dL 0 84   CALCIUM mg/dL 8 8   ALK PHOS U/L 46   ALT U/L 11*   AST U/L 12                                 Last 24 Hours Medication List:   Current Facility-Administered Medications   Medication Dose Route Frequency Provider Last Rate    acetaminophen  650 mg Oral Q6H PRN Angel Batch, MD      albuterol  2 puff Inhalation Q4H PRN Leanne Ocampo PA-C      atorvastatin  40 mg Oral Daily Leanne Ocampo PA-C      calcium carbonate  500 mg Oral Daily PRN Leanne Ocampo PA-C      cholecalciferol  1,000 Units Oral Daily Leanne Ocampo PA-C      docusate sodium  100 mg Oral BID Eli Oliver MD      fluticasone  2 spray Nasal HS Leanne Ocampo PA-C      fluticasone-vilanterol  1 puff Inhalation Daily Mckay Summers PA-C      folic acid  1 mg Oral Daily Angel Batch, MD      hydrALAZINE  5 mg Intravenous Q6H PRN Baudilioonettkory LaundryBRIGITTE      Labetalol HCl  10 mg Intravenous Q6H PRN Anthonette LaundryBRIGITTE      lidocaine  1 patch Topical Daily Angel Batch, MD      LORazepam  0 5 mg Intravenous Q6H PRN Leanne Ocampo PA-C      metoprolol tartrate  25 mg Oral Q12H Desi Scott MD      multivitamin-minerals  1 tablet Oral Daily Angel Batch, MD      naloxone  0 04 mg Intravenous Q1MIN PRN Angel Batch, MD      nicotine  1 patch Transdermal Daily Leanne Ocampo PA-C      ondansetron  4 mg Oral Q6H PRN Leanne Ocampo PA-C      pantoprazole  40 mg Oral BID AC Angel Batch, MD      polyethylene glycol  17 g Oral BID AC Angel Batch, MD      QUEtiapine  200 mg Oral Daily Leanne Ocampo PA-C      QUEtiapine  600 mg Oral HS Angel Batch, MD      thiamine  100 mg Oral Daily Angel Batch, MD      traMADol  50 mg Oral Q6H PRN Angel Batch, MD      traZODone  50 mg Oral HS Leanne Ocampo PA-C                    ** Please Note: This note is constructed using a voice recognition dictation system  An occasional wrong word/phrase or sound-a-like substitution may have been picked up by dictation device due to the inherent limitations of voice recognition software  Read the chart carefully and recognize, using reasonable context, where substitutions may have occurred  **

## 2021-05-01 NOTE — ASSESSMENT & PLAN NOTE
Initially admitted at the [facility]53 for suicidal ideation and eventually transferred to the [facility] due to hematemesis requiring gastroenterology intervention   Okay for outpatient follow-up once medically stable per psychiatry  Continue Seroquel/Trazodone regimen

## 2021-05-02 LAB
ANION GAP SERPL CALCULATED.3IONS-SCNC: 3 MMOL/L (ref 4–13)
BASOPHILS # BLD AUTO: 0.03 THOUSANDS/ΜL (ref 0–0.1)
BASOPHILS NFR BLD AUTO: 1 % (ref 0–1)
BLOOD GROUP ANTIBODIES SERPL: NORMAL
BUN SERPL-MCNC: 14 MG/DL (ref 5–25)
CALCIUM SERPL-MCNC: 8.6 MG/DL (ref 8.3–10.1)
CHLORIDE SERPL-SCNC: 110 MMOL/L (ref 100–108)
CO2 SERPL-SCNC: 18 MMOL/L (ref 21–32)
CREAT SERPL-MCNC: 0.84 MG/DL (ref 0.6–1.3)
EOSINOPHIL # BLD AUTO: 0.12 THOUSAND/ΜL (ref 0–0.61)
EOSINOPHIL NFR BLD AUTO: 2 % (ref 0–6)
ERYTHROCYTE [DISTWIDTH] IN BLOOD BY AUTOMATED COUNT: 14.6 % (ref 11.6–15.1)
GFR SERPL CREATININE-BSD FRML MDRD: 90 ML/MIN/1.73SQ M
GLUCOSE SERPL-MCNC: 83 MG/DL (ref 65–140)
HCT VFR BLD AUTO: 25.8 % (ref 36.5–49.3)
HGB BLD-MCNC: 8.4 G/DL (ref 12–17)
IMM GRANULOCYTES # BLD AUTO: 0.01 THOUSAND/UL (ref 0–0.2)
IMM GRANULOCYTES NFR BLD AUTO: 0 % (ref 0–2)
LYMPHOCYTES # BLD AUTO: 1.2 THOUSANDS/ΜL (ref 0.6–4.47)
LYMPHOCYTES NFR BLD AUTO: 20 % (ref 14–44)
MCH RBC QN AUTO: 32.6 PG (ref 26.8–34.3)
MCHC RBC AUTO-ENTMCNC: 32.6 G/DL (ref 31.4–37.4)
MCV RBC AUTO: 100 FL (ref 82–98)
MONOCYTES # BLD AUTO: 0.63 THOUSAND/ΜL (ref 0.17–1.22)
MONOCYTES NFR BLD AUTO: 11 % (ref 4–12)
NEUTROPHILS # BLD AUTO: 3.97 THOUSANDS/ΜL (ref 1.85–7.62)
NEUTS SEG NFR BLD AUTO: 66 % (ref 43–75)
NRBC BLD AUTO-RTO: 0 /100 WBCS
PLATELET # BLD AUTO: 423 THOUSANDS/UL (ref 149–390)
PMV BLD AUTO: 9.1 FL (ref 8.9–12.7)
POTASSIUM SERPL-SCNC: 4.5 MMOL/L (ref 3.5–5.3)
RBC # BLD AUTO: 2.58 MILLION/UL (ref 3.88–5.62)
SODIUM SERPL-SCNC: 131 MMOL/L (ref 136–145)
WBC # BLD AUTO: 5.96 THOUSAND/UL (ref 4.31–10.16)

## 2021-05-02 PROCEDURE — 85025 COMPLETE CBC W/AUTO DIFF WBC: CPT | Performed by: INTERNAL MEDICINE

## 2021-05-02 PROCEDURE — 80048 BASIC METABOLIC PNL TOTAL CA: CPT | Performed by: INTERNAL MEDICINE

## 2021-05-02 PROCEDURE — 99232 SBSQ HOSP IP/OBS MODERATE 35: CPT | Performed by: INTERNAL MEDICINE

## 2021-05-02 RX ORDER — LORAZEPAM 0.5 MG/1
0.5 TABLET ORAL EVERY 6 HOURS PRN
Status: DISCONTINUED | OUTPATIENT
Start: 2021-05-02 | End: 2021-05-07 | Stop reason: HOSPADM

## 2021-05-02 RX ADMIN — PANTOPRAZOLE SODIUM 40 MG: 40 TABLET, DELAYED RELEASE ORAL at 09:08

## 2021-05-02 RX ADMIN — Medication 1 TABLET: at 09:08

## 2021-05-02 RX ADMIN — TRAMADOL HYDROCHLORIDE 50 MG: 50 TABLET, FILM COATED ORAL at 09:07

## 2021-05-02 RX ADMIN — DOCUSATE SODIUM 100 MG: 100 CAPSULE, LIQUID FILLED ORAL at 09:07

## 2021-05-02 RX ADMIN — PANTOPRAZOLE SODIUM 40 MG: 40 TABLET, DELAYED RELEASE ORAL at 18:00

## 2021-05-02 RX ADMIN — METOPROLOL TARTRATE 25 MG: 25 TABLET, FILM COATED ORAL at 22:10

## 2021-05-02 RX ADMIN — LIDOCAINE 1 PATCH: 50 PATCH TOPICAL at 09:10

## 2021-05-02 RX ADMIN — FOLIC ACID 1 MG: 1 TABLET ORAL at 09:08

## 2021-05-02 RX ADMIN — FLUTICASONE FUROATE AND VILANTEROL TRIFENATATE 1 PUFF: 100; 25 POWDER RESPIRATORY (INHALATION) at 09:02

## 2021-05-02 RX ADMIN — QUETIAPINE FUMARATE 600 MG: 300 TABLET ORAL at 22:10

## 2021-05-02 RX ADMIN — ALBUTEROL SULFATE 2 PUFF: 90 AEROSOL, METERED RESPIRATORY (INHALATION) at 22:11

## 2021-05-02 RX ADMIN — TRAZODONE HYDROCHLORIDE 50 MG: 50 TABLET ORAL at 22:10

## 2021-05-02 RX ADMIN — Medication 1000 UNITS: at 09:07

## 2021-05-02 RX ADMIN — THIAMINE HCL TAB 100 MG 100 MG: 100 TAB at 09:07

## 2021-05-02 RX ADMIN — Medication 1 PATCH: at 09:03

## 2021-05-02 RX ADMIN — QUETIAPINE FUMARATE 200 MG: 100 TABLET ORAL at 09:08

## 2021-05-02 RX ADMIN — FLUTICASONE PROPIONATE 2 SPRAY: 50 SPRAY, METERED NASAL at 22:10

## 2021-05-02 RX ADMIN — METOPROLOL TARTRATE 25 MG: 25 TABLET, FILM COATED ORAL at 09:07

## 2021-05-02 RX ADMIN — POLYETHYLENE GLYCOL 3350 17 G: 17 POWDER, FOR SOLUTION ORAL at 09:10

## 2021-05-02 RX ADMIN — ATORVASTATIN CALCIUM 40 MG: 40 TABLET, FILM COATED ORAL at 09:07

## 2021-05-02 NOTE — ASSESSMENT & PLAN NOTE
Appreciate PT/evaluation -> plan for discharge to skilled rehab once bed obtained - patient is currently "OPTIONED"

## 2021-05-02 NOTE — ASSESSMENT & PLAN NOTE
Initially admitted at the [facility] for suicidal ideation and eventually transferred to the [address] [place] [address] Road due to hematemesis requiring gastroenterology intervention   Okay for outpatient follow-up once medically stable per psychiatry  Continue Seroquel/Trazodone regimen

## 2021-05-02 NOTE — PROGRESS NOTES
[address] [facility]  Progress Note - Perla Ramp 1950, 78 y o  male MRN: [id number]  [facility] Encounter: [id number]  Primary Care Provider: Kayleigh Jade DO   Date and time admitted to hospital: 4/21/2021  5:34 PM      * Acute blood loss anemia  Assessment & Plan  Hemoglobin initially in normal range two weeks ago -> progressively dropped but now stabilized at 8 4 today  EGD noted a large bleeding duodenal ulcer s/p cauterization - continue PPI regimen  On SCDs for DVT prophylaxis    Duodenal ulcer  Assessment & Plan  S/p cauterization for bleeding as noted above in plan for acute blood loss anemia  Continue PPI regimen    Schizophrenia  Assessment & Plan  Initially admitted at the [place] [address] for suicidal ideation and eventually transferred to the [address] [place] [address] due to hematemesis requiring gastroenterology intervention   Okay for outpatient follow-up once medically stable per psychiatry  Continue Seroquel/Trazodone regimen    Thrombocytosis  Assessment & Plan  Likely reactive due to acute medical issue(s)  Monitor platelet count    Hyponatremia  Assessment & Plan  Monitor serum sodium level  Waxing/waning but essentially stable    History of alcohol abuse  Assessment & Plan  Cessation counseling - refusing alcohol rehab  Supplemental thiamine/multivitamin/folic acid on board    Physical deconditioning  Assessment & Plan  Appreciate PT/evaluation -> plan for discharge to skilled rehab once bed obtained - patient is currently "OPTIONED"    COPD  Assessment & Plan  Stable/asymptomatic  Continue Breo-Ellipta - PRN Albuterol on board    Essential hypertension  Assessment & Plan  Continue Lopressor    Tobacco abuse  Assessment & Plan  Cessation counseling  Transdermal nicotine patch on board      DVT Prophylaxis:  SCDs       Patient Centered Rounds:  I have performed bedside rounds and discussed plan of care with nursing today      Discussions with Specialists or Other Care Team Provider:  see above assessments if applicable    Education and Discussions with Family / Patient: Patient at bedside    Time Spent for Care:  32 minutes  More than 50% of total time spent on counseling and coordination of care as described above  Current Length of Stay: 11 day(s)    Current Patient Status: Inpatient   Certification Statement:  Patient will continue to require additional hospital stay due to assessments as noted above  Code Status: Level 1 - Full Code        Subjective:     No new complaints this time  States abdominal discomfort has improved today  Objective:     Vitals:   Temp (24hrs), Av 6 °F (37 °C), Min:98 1 °F (36 7 °C), Max:99 °F (37 2 °C)    Temp:  [98 1 °F (36 7 °C)-99 °F (37 2 °C)] 98 8 °F (37 1 °C)  HR:  [71-74] 74  Resp:  [18] 18  BP: (119-137)/(58-64) 123/64  SpO2:  [96 %-97 %] 96 %  Body mass index is 23 5 kg/m²  Input and Output Summary (last 24 hours):        Intake/Output Summary (Last 24 hours) at 2021 1744  Last data filed at 2021 1545  Gross per 24 hour   Intake 720 ml   Output 1800 ml   Net -1080 ml       Physical Exam:     GENERAL:  Weak/fatigued  HEAD:  Normocephalic - atraumatic  EYES: PERRL - EOMI   MOUTH:  Mucosa moist  NECK:  Supple - full range of motion  CARDIAC:  Rate controlled - S1/S2 positive  PULMONARY:  Fairly clear to auscultation - nonlabored respirations  ABDOMEN:  Soft - nontender/nondistended - active bowel sounds  MUSCULOSKELETAL:  Motor strength/range of motion deconditioned  NEUROLOGIC:  Alert/oriented at baseline  SKIN:  Chronic wrinkles/blemishes   PSYCHIATRIC:  Mood/affect flat      Additional Data:     Labs & Recent Cultures:    Results from last 7 days   Lab Units 21  1149   WBC Thousand/uL 5 96   HEMOGLOBIN g/dL 8 4*   HEMATOCRIT % 25 8*   PLATELETS Thousands/uL 423*   NEUTROS PCT % 66   LYMPHS PCT % 20   MONOS PCT % 11   EOS PCT % 2     Results from last 7 days   Lab Units 05/02/21  0551 05/01/21  0526   POTASSIUM mmol/L 4 5 4 1   CHLORIDE mmol/L 110* 107   CO2 mmol/L 18* 24   BUN mg/dL 14 20   CREATININE mg/dL 0 84 0 84   CALCIUM mg/dL 8 6 8 8   ALK PHOS U/L  --  46   ALT U/L  --  11*   AST U/L  --  12             Last 24 Hours Medication List:   Current Facility-Administered Medications   Medication Dose Route Frequency Provider Last Rate    acetaminophen  650 mg Oral Q6H PRN Robles Whitlock MD      albuterol  2 puff Inhalation Q4H PRN Leanne Ocampo PA-C      atorvastatin  40 mg Oral Daily Leanne Ocampo PA-C      calcium carbonate  500 mg Oral Daily PRN Leanne Ocampo PA-C      cholecalciferol  1,000 Units Oral Daily Leanne Ocampo PA-C      docusate sodium  100 mg Oral BID Julia Jerez MD      fluticasone  2 spray Nasal HS Leanne Ocampo PA-C      fluticasone-vilanterol  1 puff Inhalation Daily Mckay Summers PA-C      folic acid  1 mg Oral Daily Robles Whitlock MD      lidocaine  1 patch Topical Daily Robles Whitlock MD      LORazepam  0 5 mg Oral Q6H PRN Julia Jerez MD      metoprolol tartrate  25 mg Oral Q12H [address] Robles Whitlock MD      multivitamin-minerals  1 tablet Oral Daily Robles Whitlock MD      naloxone  0 04 mg Intravenous Q1MIN PRN Robles Whitlock MD      nicotine  1 patch Transdermal Daily Leanne Ocampo PA-C      ondansetron  4 mg Oral Q6H PRN Leanne Ocampo PA-C      pantoprazole  40 mg Oral BID AC Robles Whitlock MD      polyethylene glycol  17 g Oral BID AC Robles Whitlock MD      QUEtiapine  200 mg Oral Daily Leanne Ocampo PA-C      QUEtiapine  600 mg Oral HS Robles Whitlock MD      thiamine  100 mg Oral Daily Robles Whitlock MD      traMADol  50 mg Oral Q6H PRN Robles Whitlock MD      traZODone  50 mg Oral HS Leanne Ocampo PA-C                  ** Please Note: This note is constructed using a voice recognition dictation system    An occasional wrong word/phrase or sound-a-like substitution may have been picked up by dictation device due to the inherent limitations of voice recognition software  Read the chart carefully and recognize, using reasonable context, where substitutions may have occurred  **

## 2021-05-02 NOTE — ASSESSMENT & PLAN NOTE
Hemoglobin initially in normal range two weeks ago -> progressively dropped but now stabilized at 8 4 today  EGD noted a large bleeding duodenal ulcer s/p cauterization - continue PPI regimen  On SCDs for DVT prophylaxis

## 2021-05-03 LAB
ANION GAP SERPL CALCULATED.3IONS-SCNC: 6 MMOL/L (ref 4–13)
BASOPHILS # BLD AUTO: 0.02 THOUSANDS/ΜL (ref 0–0.1)
BASOPHILS NFR BLD AUTO: 0 % (ref 0–1)
BUN SERPL-MCNC: 15 MG/DL (ref 5–25)
CALCIUM SERPL-MCNC: 8.8 MG/DL (ref 8.3–10.1)
CHLORIDE SERPL-SCNC: 107 MMOL/L (ref 100–108)
CO2 SERPL-SCNC: 24 MMOL/L (ref 21–32)
CREAT SERPL-MCNC: 0.93 MG/DL (ref 0.6–1.3)
EOSINOPHIL # BLD AUTO: 0.13 THOUSAND/ΜL (ref 0–0.61)
EOSINOPHIL NFR BLD AUTO: 3 % (ref 0–6)
ERYTHROCYTE [DISTWIDTH] IN BLOOD BY AUTOMATED COUNT: 14.6 % (ref 11.6–15.1)
GFR SERPL CREATININE-BSD FRML MDRD: 84 ML/MIN/1.73SQ M
GLUCOSE SERPL-MCNC: 115 MG/DL (ref 65–140)
HCT VFR BLD AUTO: 25.4 % (ref 36.5–49.3)
HGB BLD-MCNC: 8.4 G/DL (ref 12–17)
IMM GRANULOCYTES # BLD AUTO: 0.03 THOUSAND/UL (ref 0–0.2)
IMM GRANULOCYTES NFR BLD AUTO: 1 % (ref 0–2)
LYMPHOCYTES # BLD AUTO: 1.31 THOUSANDS/ΜL (ref 0.6–4.47)
LYMPHOCYTES NFR BLD AUTO: 28 % (ref 14–44)
MCH RBC QN AUTO: 33.1 PG (ref 26.8–34.3)
MCHC RBC AUTO-ENTMCNC: 33.1 G/DL (ref 31.4–37.4)
MCV RBC AUTO: 100 FL (ref 82–98)
MONOCYTES # BLD AUTO: 0.62 THOUSAND/ΜL (ref 0.17–1.22)
MONOCYTES NFR BLD AUTO: 13 % (ref 4–12)
NEUTROPHILS # BLD AUTO: 2.65 THOUSANDS/ΜL (ref 1.85–7.62)
NEUTS SEG NFR BLD AUTO: 55 % (ref 43–75)
NRBC BLD AUTO-RTO: 0 /100 WBCS
PLATELET # BLD AUTO: 436 THOUSANDS/UL (ref 149–390)
PMV BLD AUTO: 8.6 FL (ref 8.9–12.7)
POTASSIUM SERPL-SCNC: 4.2 MMOL/L (ref 3.5–5.3)
RBC # BLD AUTO: 2.54 MILLION/UL (ref 3.88–5.62)
SODIUM SERPL-SCNC: 137 MMOL/L (ref 136–145)
WBC # BLD AUTO: 4.76 THOUSAND/UL (ref 4.31–10.16)

## 2021-05-03 PROCEDURE — 80048 BASIC METABOLIC PNL TOTAL CA: CPT | Performed by: INTERNAL MEDICINE

## 2021-05-03 PROCEDURE — 85025 COMPLETE CBC W/AUTO DIFF WBC: CPT | Performed by: INTERNAL MEDICINE

## 2021-05-03 PROCEDURE — 99232 SBSQ HOSP IP/OBS MODERATE 35: CPT | Performed by: INTERNAL MEDICINE

## 2021-05-03 PROCEDURE — 97116 GAIT TRAINING THERAPY: CPT

## 2021-05-03 RX ADMIN — ATORVASTATIN CALCIUM 40 MG: 40 TABLET, FILM COATED ORAL at 09:15

## 2021-05-03 RX ADMIN — DOCUSATE SODIUM 100 MG: 100 CAPSULE, LIQUID FILLED ORAL at 09:15

## 2021-05-03 RX ADMIN — TRAZODONE HYDROCHLORIDE 50 MG: 50 TABLET ORAL at 21:21

## 2021-05-03 RX ADMIN — QUETIAPINE FUMARATE 600 MG: 300 TABLET ORAL at 21:21

## 2021-05-03 RX ADMIN — FLUTICASONE FUROATE AND VILANTEROL TRIFENATATE 1 PUFF: 100; 25 POWDER RESPIRATORY (INHALATION) at 09:15

## 2021-05-03 RX ADMIN — FLUTICASONE PROPIONATE 2 SPRAY: 50 SPRAY, METERED NASAL at 21:21

## 2021-05-03 RX ADMIN — METOPROLOL TARTRATE 25 MG: 25 TABLET, FILM COATED ORAL at 21:21

## 2021-05-03 RX ADMIN — METOPROLOL TARTRATE 25 MG: 25 TABLET, FILM COATED ORAL at 09:15

## 2021-05-03 RX ADMIN — PANTOPRAZOLE SODIUM 40 MG: 40 TABLET, DELAYED RELEASE ORAL at 06:30

## 2021-05-03 RX ADMIN — QUETIAPINE FUMARATE 200 MG: 100 TABLET ORAL at 09:14

## 2021-05-03 RX ADMIN — ALBUTEROL SULFATE 2 PUFF: 90 AEROSOL, METERED RESPIRATORY (INHALATION) at 21:21

## 2021-05-03 RX ADMIN — THIAMINE HCL TAB 100 MG 100 MG: 100 TAB at 09:15

## 2021-05-03 RX ADMIN — Medication 1 PATCH: at 09:11

## 2021-05-03 RX ADMIN — FOLIC ACID 1 MG: 1 TABLET ORAL at 09:15

## 2021-05-03 RX ADMIN — LIDOCAINE 1 PATCH: 50 PATCH TOPICAL at 09:12

## 2021-05-03 RX ADMIN — PANTOPRAZOLE SODIUM 40 MG: 40 TABLET, DELAYED RELEASE ORAL at 17:09

## 2021-05-03 RX ADMIN — POLYETHYLENE GLYCOL 3350 17 G: 17 POWDER, FOR SOLUTION ORAL at 06:30

## 2021-05-03 RX ADMIN — Medication 1000 UNITS: at 09:14

## 2021-05-03 RX ADMIN — Medication 1 TABLET: at 09:15

## 2021-05-03 NOTE — PHYSICAL THERAPY NOTE
PHYSICAL THERAPY NOTE          Patient Name: Юлия TORRES Date: 5/3/2021     05/03/21 1058   PT Last Visit   PT Visit Date 05/03/21   Note Type   Note Type Treatment   Pain Assessment   Pain Assessment Tool 0-10   Pain Score 7   Pain Location/Orientation Location: Abdomen; Location: Back   Patient's Stated Pain Goal No pain   Hospital Pain Intervention(s) Repositioned; Ambulation/increased activity   Restrictions/Precautions   Weight Bearing Precautions Per Order No   Other Precautions Cognitive; Chair Alarm; Bed Alarm; Fall Risk;Pain   General   Chart Reviewed Yes   Response to Previous Treatment Patient with no complaints from previous session  Family/Caregiver Present No   Cognition   Overall Cognitive Status Impaired   Arousal/Participation Lethargic   Attention Attends with cues to redirect   Memory Decreased recall of precautions   Following Commands Follows one step commands with increased time or repetition   Comments Pt lethargic throughout therapy session, requires frequent cuing throuhgout session  Impulsive with poor safety awareness  Decreased motor processing; slow to complete tasks  Bed Mobility   Supine to Sit   (CGA )   Additional items Assist x 1;HOB elevated; Impulsive; Increased time required;LE management   Sit to Supine Unable to assess   Additional Comments Pt supine in bed upon arrival  Pt sitting upright in bedside chair with chair alarm intact/funcitoning with all needs within reach at the end of therapy session  Transfers   Sit to Stand 4  Minimal assistance   Additional items Assist x 1; Impulsive   Stand to Sit 4  Minimal assistance   Additional items Assist x 1; Impulsive   Additional Comments Pt performed 2x STS from EOB/bedside chair with min Ax1; cues required for sequencing and safety    Ambulation/Elevation   Gait pattern Excessively slow; Short stride;Decreased foot clearance; Foward flexed; Wide ELENA  (poor RW management )   Gait Assistance 4  Minimal assist   Additional items Assist x 1;Verbal cues; Tactile cues   Assistive Device Rolling walker   Distance 2x 30ft; 2 x 10 ft   (chair follow for increased safety )   Balance   Static Sitting Fair +   Dynamic Sitting Fair   Static Standing Fair -   Dynamic Standing Poor   Ambulatory Poor   Endurance Deficit   Endurance Deficit Yes   Endurance Deficit Description weakness, fatigue    Activity Tolerance   Activity Tolerance Patient limited by fatigue;Patient limited by pain   Medical Staff Made Aware Rehab aide Cindi    Nurse Made Aware RN cleared pt to particiapte in therapy session    Assessment   Prognosis Fair   Problem List Decreased strength;Decreased endurance; Impaired balance;Decreased mobility; Decreased cognition;Decreased safety awareness;Pain   Assessment Pt seen for PT treatment session this date  Therapy session focused on bed mobility, functional transfers, gait training and endurance training in order to improve overall mobility and independence  Pt requires assist of 1 for all mobility performed this date  Pt with decreased safety awareness and increased impulsiveness noted this date; frequent cuing for safety and sequencing required  Pt with decreased assistance required for functional transfers and gait training  Pt making steady progress toward goals  Pt was left sitting upright in bedside chair with chair alarm intact/functioning at the end of PT session with all needs in reach  Pt would benefit from continued PT services while in hospital to address remaining limitations  PT to continue to follow pt and recommends post-acute rehab services after d/c  The patient's AM-PAC Basic Mobility Inpatient Short Form Raw Score is 17, Standardized Score is 39 67  A standardized score less than 42 9 suggests the patient may benefit from discharge to post-acute rehabilitation services   Please also refer to the recommendation of the Physical Therapist for safe discharge planning  Barriers to Discharge Inaccessible home environment;Decreased caregiver support   Goals   Patient Goals to rest    STG Expiration Date 05/10/21   PT Treatment Day 3   Plan   Treatment/Interventions ADL retraining;Functional transfer training;LE strengthening/ROM; Patient/family training;Equipment eval/education; Bed mobility;Gait training;Spoke to nursing   Progress Progressing toward goals   PT Frequency Other (Comment)  (3-5x/wk )   Recommendation   PT Discharge Recommendation Post acute rehabilitation services   Equipment Recommended [facility] Recommended Wheeled walker   PT - OK to Discharge Yes  (to rehab once medically cleared )   [address] Inpatient   Turning in Bed Without Bedrails 3   Lying on Back to Sitting on Edge of Flat Bed 3   Moving Bed to Chair 3   Standing Up From Chair 3   Walk in Room 3   Climb 3-5 Stairs 2   Basic Mobility Inpatient Raw Score 17   Basic Mobility Standardized Score 39 67     Faraz Sos, PT, DPT

## 2021-05-03 NOTE — CASE MANAGEMENT
CM s/w Pao Nagel and notified her that [facility] is not in network w/ pt's insurance  CM emailed [facility] short term rehab list to review and discuss with family  CM will follow for choices  A post acute care recommendation was made by your care team for STR  Discussed Freedom of Choice with caregiver  List of facilities given to caregiver via emailed  caregiver aware the list is custom filtered for them by insurance and that [facility] post acute providers are designated

## 2021-05-03 NOTE — PROGRESS NOTES
[address] [place] [address]  Progress Note - Ela Mosqueda 1950, 78 y o  male MRN: [id number]  Unit/Bed#: [facility] 929-01 Encounter: [id number]  Primary Care Provider: Johnathon Kinsey DO   Date and time admitted to hospital: 4/21/2021  5:34 PM      * Acute blood loss anemia  Assessment & Plan  Hemoglobin initially in normal range two weeks ago -> progressively dropped but now stabilized at 8 4 over last two days  EGD noted a large bleeding duodenal ulcer s/p cauterization - continue PPI regimen   On SCDs for DVT prophylaxis    Duodenal ulcer  Assessment & Plan  S/p cauterization for bleeding as noted above in plan for acute blood loss anemia  Continue PPI regimen    Schizophrenia  Assessment & Plan  Initially admitted at the [place] [address] for suicidal ideation and eventually transferred to the [address] [place] [address] due to hematemesis requiring gastroenterology intervention   Okay for outpatient follow-up once medically stable per psychiatry  Continue Seroquel/Trazodone regimen    Thrombocytosis  Assessment & Plan  Likely reactive due to acute medical issue(s)  Monitor platelet count    Hyponatremia  Assessment & Plan  Monitor serum sodium level  Waxing/waning but essentially stable - normalized today    History of alcohol abuse  Assessment & Plan  Cessation counseling - refusing alcohol rehab  Supplemental thiamine/multivitamin/folic acid on board    Physical deconditioning  Assessment & Plan  Appreciate PT/evaluation -> plan for discharge to skilled rehab once bed obtained - patient is currently "OPTIONED"    COPD  Assessment & Plan  Stable/asymptomatic  Continue Breo-Ellipta - PRN Albuterol on board    Essential hypertension  Assessment & Plan  Continue Lopressor    Tobacco abuse  Assessment & Plan  Cessation counseling  Transdermal nicotine patch on board      DVT Prophylaxis:  SCDs      Patient Centered Rounds:  I have performed bedside rounds and discussed plan of care with nursing today  Discussions with Specialists or Other Care Team Provider:  see above assessments if applicable    Education and Discussions with Family / Patient:  Patient at bedside    Time Spent for Care:  32 minutes  More than 50% of total time spent on counseling and coordination of care as described above  Current Length of Stay: 12 day(s)    Current Patient Status: Inpatient   Certification Statement:  Patient will continue to require additional hospital stay due to assessments as noted above  Code Status: Level 1 - Full Code        Subjective:     No new complaints this time  Notes that his abdominal discomfort continues to improve  Remains generally weak/fatigued  Objective:     Vitals:   Temp (24hrs), Av 4 °F (36 9 °C), Min:98 3 °F (36 8 °C), Max:98 5 °F (36 9 °C)    Temp:  [98 3 °F (36 8 °C)-98 5 °F (36 9 °C)] 98 3 °F (36 8 °C)  HR:  [68-71] 69  Resp:  [17-18] 18  BP: (128-138)/(60-66) 129/60  SpO2:  [95 %-96 %] 95 %  Body mass index is 23 5 kg/m²  Input and Output Summary (last 24 hours):        Intake/Output Summary (Last 24 hours) at 5/3/2021 1604  Last data filed at 5/3/2021 1101  Gross per 24 hour   Intake 900 ml   Output 1525 ml   Net -625 ml       Physical Exam:     GENERAL:  Weak/fatigued  HEAD:  Normocephalic - atraumatic  EYES: PERRL - EOMI   MOUTH:  Mucosa moist  NECK:  Supple - full range of motion  CARDIAC:  Rate controlled - S1/S2 positive  PULMONARY:  Clear breath sounds bilaterally - nonlabored respirations  ABDOMEN:  Soft - nontender/nondistended - active bowel sounds  MUSCULOSKELETAL:  Motor strength/range of motion deconditioned  NEUROLOGIC:  Alert/oriented at baseline  SKIN:  Chronic wrinkles/blemishes   PSYCHIATRIC:  Mood/affect flat      Additional Data:     Labs & Recent Cultures:    Results from last 7 days   Lab Units 21  0846   WBC Thousand/uL 4 76   HEMOGLOBIN g/dL 8 4*   HEMATOCRIT % 25 4*   PLATELETS Thousands/uL 436*   NEUTROS PCT % 55   LYMPHS PCT % 28   MONOS PCT % 13*   EOS PCT % 3     Results from last 7 days   Lab Units 05/03/21  0846  05/01/21  0526   POTASSIUM mmol/L 4 2   < > 4 1   CHLORIDE mmol/L 107   < > 107   CO2 mmol/L 24   < > 24   BUN mg/dL 15   < > 20   CREATININE mg/dL 0 93   < > 0 84   CALCIUM mg/dL 8 8   < > 8 8   ALK PHOS U/L  --   --  46   ALT U/L  --   --  11*   AST U/L  --   --  12    < > = values in this interval not displayed                                   Last 24 Hours Medication List:   Current Facility-Administered Medications   Medication Dose Route Frequency Provider Last Rate    acetaminophen  650 mg Oral Q6H PRN Zeyad Leslie MD      albuterol  2 puff Inhalation Q4H PRN Leanne Ocampo PA-C      atorvastatin  40 mg Oral Daily Leanne Ocampo PA-C      calcium carbonate  500 mg Oral Daily PRN Leanne Ocampo PA-C      cholecalciferol  1,000 Units Oral Daily Leanne Ocampo PA-C      docusate sodium  100 mg Oral BID Carlene Sal MD      fluticasone  2 spray Nasal HS Leanne Ocampo PA-C      fluticasone-vilanterol  1 puff Inhalation Daily Mckay Summers PA-C      folic acid  1 mg Oral Daily Zeyad Leslie MD      lidocaine  1 patch Topical Daily Zeyad Leslie MD      LORazepam  0 5 mg Oral Q6H PRN Carlene Sal MD      metoprolol tartrate  25 mg Oral Q12H [address] Zeyad Leslie MD      multivitamin-minerals  1 tablet Oral Daily Zeyad Leslie MD      naloxone  0 04 mg Intravenous Q1MIN PRN Zeyad Leslie MD      nicotine  1 patch Transdermal Daily Leanne Ocampo PA-C      ondansetron  4 mg Oral Q6H PRN Leanne Ocampo PA-C      pantoprazole  40 mg Oral BID AC Zeyad Leslie MD      polyethylene glycol  17 g Oral BID AC Zeyad Leslie MD      QUEtiapine  200 mg Oral Daily Leanne Ocampo PA-C      QUEtiapine  600 mg Oral HS Zeyad Leslie MD      thiamine  100 mg Oral Daily Zeyad Leslie MD      traMADol  50 mg Oral Q6H PRN Zeyad Leslie MD      traZODone  50 mg Oral HS Leanne BRIGITTE Ocampo              ** Please Note: This note is constructed using a voice recognition dictation system  An occasional wrong word/phrase or sound-a-like substitution may have been picked up by dictation device due to the inherent limitations of voice recognition software  Read the chart carefully and recognize, using reasonable context, where substitutions may have occurred  **

## 2021-05-03 NOTE — ASSESSMENT & PLAN NOTE
Hemoglobin initially in normal range two weeks ago -> progressively dropped but now stabilized at 8 4 over last two days  EGD noted a large bleeding duodenal ulcer s/p cauterization - continue PPI regimen   On SCDs for DVT prophylaxis

## 2021-05-03 NOTE — ASSESSMENT & PLAN NOTE
Initially admitted at the [facility] for suicidal ideation and eventually transferred to the [facility] due to hematemesis requiring gastroenterology intervention   Okay for outpatient follow-up once medically stable per psychiatry  Continue Seroquel/Trazodone regimen

## 2021-05-03 NOTE — PLAN OF CARE
Problem: PHYSICAL THERAPY ADULT  Goal: Performs mobility at highest level of function for planned discharge setting  See evaluation for individualized goals  Description: Treatment/Interventions: ADL retraining, Functional transfer training, LE strengthening/ROM, Endurance training, Patient/family training, Equipment eval/education, Bed mobility, Gait training, Spoke to nursing, OT  Equipment Recommended: Bianca Wade       See flowsheet documentation for full assessment, interventions and recommendations  Outcome: Progressing  Note: Prognosis: Fair  Problem List: Decreased strength, Decreased endurance, Impaired balance, Decreased mobility, Decreased cognition, Decreased safety awareness, Pain  Assessment: Pt seen for PT treatment session this date  Therapy session focused on bed mobility, functional transfers, gait training and endurance training in order to improve overall mobility and independence  Pt requires assist of 1 for all mobility performed this date  Pt with decreased safety awareness and increased impulsiveness noted this date; frequent cuing for safety and sequencing required  Pt with decreased assistance required for functional transfers and gait training  Pt making steady progress toward goals  Pt was left sitting upright in bedside chair with chair alarm intact/functioning at the end of PT session with all needs in reach  Pt would benefit from continued PT services while in hospital to address remaining limitations  PT to continue to follow pt and recommends post-acute rehab services after d/c  The patient's AM-PAC Basic Mobility Inpatient Short Form Raw Score is 17, Standardized Score is 39 67  A standardized score less than 42 9 suggests the patient may benefit from discharge to post-acute rehabilitation services  Please also refer to the recommendation of the Physical Therapist for safe discharge planning    Barriers to Discharge: Inaccessible home environment, Decreased caregiver support PT Discharge Recommendation: Post acute rehabilitation services     PT - OK to Discharge: Yes(to rehab once medically cleared )    See flowsheet documentation for full assessment

## 2021-05-04 LAB
ANION GAP SERPL CALCULATED.3IONS-SCNC: 6 MMOL/L (ref 4–13)
BASOPHILS # BLD AUTO: 0.02 THOUSANDS/ΜL (ref 0–0.1)
BASOPHILS NFR BLD AUTO: 1 % (ref 0–1)
BUN SERPL-MCNC: 14 MG/DL (ref 5–25)
CALCIUM SERPL-MCNC: 8.9 MG/DL (ref 8.3–10.1)
CHLORIDE SERPL-SCNC: 106 MMOL/L (ref 100–108)
CO2 SERPL-SCNC: 25 MMOL/L (ref 21–32)
CREAT SERPL-MCNC: 0.9 MG/DL (ref 0.6–1.3)
EOSINOPHIL # BLD AUTO: 0.14 THOUSAND/ΜL (ref 0–0.61)
EOSINOPHIL NFR BLD AUTO: 3 % (ref 0–6)
ERYTHROCYTE [DISTWIDTH] IN BLOOD BY AUTOMATED COUNT: 14.6 % (ref 11.6–15.1)
GFR SERPL CREATININE-BSD FRML MDRD: 87 ML/MIN/1.73SQ M
GLUCOSE SERPL-MCNC: 117 MG/DL (ref 65–140)
HCT VFR BLD AUTO: 26.2 % (ref 36.5–49.3)
HGB BLD-MCNC: 8.5 G/DL (ref 12–17)
IMM GRANULOCYTES # BLD AUTO: 0.01 THOUSAND/UL (ref 0–0.2)
IMM GRANULOCYTES NFR BLD AUTO: 0 % (ref 0–2)
LYMPHOCYTES # BLD AUTO: 1.47 THOUSANDS/ΜL (ref 0.6–4.47)
LYMPHOCYTES NFR BLD AUTO: 33 % (ref 14–44)
MCH RBC QN AUTO: 32.3 PG (ref 26.8–34.3)
MCHC RBC AUTO-ENTMCNC: 32.4 G/DL (ref 31.4–37.4)
MCV RBC AUTO: 100 FL (ref 82–98)
MONOCYTES # BLD AUTO: 0.61 THOUSAND/ΜL (ref 0.17–1.22)
MONOCYTES NFR BLD AUTO: 14 % (ref 4–12)
NEUTROPHILS # BLD AUTO: 2.19 THOUSANDS/ΜL (ref 1.85–7.62)
NEUTS SEG NFR BLD AUTO: 49 % (ref 43–75)
NRBC BLD AUTO-RTO: 0 /100 WBCS
PLATELET # BLD AUTO: 426 THOUSANDS/UL (ref 149–390)
PMV BLD AUTO: 8.7 FL (ref 8.9–12.7)
POTASSIUM SERPL-SCNC: 3.9 MMOL/L (ref 3.5–5.3)
RBC # BLD AUTO: 2.63 MILLION/UL (ref 3.88–5.62)
SODIUM SERPL-SCNC: 137 MMOL/L (ref 136–145)
WBC # BLD AUTO: 4.44 THOUSAND/UL (ref 4.31–10.16)

## 2021-05-04 PROCEDURE — 97535 SELF CARE MNGMENT TRAINING: CPT

## 2021-05-04 PROCEDURE — 85025 COMPLETE CBC W/AUTO DIFF WBC: CPT | Performed by: INTERNAL MEDICINE

## 2021-05-04 PROCEDURE — 99232 SBSQ HOSP IP/OBS MODERATE 35: CPT | Performed by: INTERNAL MEDICINE

## 2021-05-04 PROCEDURE — 80048 BASIC METABOLIC PNL TOTAL CA: CPT | Performed by: INTERNAL MEDICINE

## 2021-05-04 PROCEDURE — 97110 THERAPEUTIC EXERCISES: CPT

## 2021-05-04 RX ADMIN — Medication 1000 UNITS: at 09:43

## 2021-05-04 RX ADMIN — METOPROLOL TARTRATE 25 MG: 25 TABLET, FILM COATED ORAL at 09:42

## 2021-05-04 RX ADMIN — THIAMINE HCL TAB 100 MG 100 MG: 100 TAB at 09:43

## 2021-05-04 RX ADMIN — ATORVASTATIN CALCIUM 40 MG: 40 TABLET, FILM COATED ORAL at 09:42

## 2021-05-04 RX ADMIN — FOLIC ACID 1 MG: 1 TABLET ORAL at 09:42

## 2021-05-04 RX ADMIN — METOPROLOL TARTRATE 25 MG: 25 TABLET, FILM COATED ORAL at 20:14

## 2021-05-04 RX ADMIN — DOCUSATE SODIUM 100 MG: 100 CAPSULE, LIQUID FILLED ORAL at 09:43

## 2021-05-04 RX ADMIN — POLYETHYLENE GLYCOL 3350 17 G: 17 POWDER, FOR SOLUTION ORAL at 09:40

## 2021-05-04 RX ADMIN — Medication 1 TABLET: at 09:42

## 2021-05-04 RX ADMIN — PANTOPRAZOLE SODIUM 40 MG: 40 TABLET, DELAYED RELEASE ORAL at 06:00

## 2021-05-04 RX ADMIN — TRAMADOL HYDROCHLORIDE 50 MG: 50 TABLET, FILM COATED ORAL at 09:42

## 2021-05-04 RX ADMIN — TRAZODONE HYDROCHLORIDE 50 MG: 50 TABLET ORAL at 22:18

## 2021-05-04 RX ADMIN — LIDOCAINE 1 PATCH: 50 PATCH TOPICAL at 09:44

## 2021-05-04 RX ADMIN — PANTOPRAZOLE SODIUM 40 MG: 40 TABLET, DELAYED RELEASE ORAL at 18:40

## 2021-05-04 RX ADMIN — FLUTICASONE FUROATE AND VILANTEROL TRIFENATATE 1 PUFF: 100; 25 POWDER RESPIRATORY (INHALATION) at 09:40

## 2021-05-04 RX ADMIN — ALBUTEROL SULFATE 2 PUFF: 90 AEROSOL, METERED RESPIRATORY (INHALATION) at 22:19

## 2021-05-04 RX ADMIN — QUETIAPINE FUMARATE 200 MG: 100 TABLET ORAL at 09:42

## 2021-05-04 RX ADMIN — Medication 1 PATCH: at 09:45

## 2021-05-04 RX ADMIN — POLYETHYLENE GLYCOL 3350 17 G: 17 POWDER, FOR SOLUTION ORAL at 18:40

## 2021-05-04 RX ADMIN — QUETIAPINE FUMARATE 600 MG: 300 TABLET ORAL at 22:18

## 2021-05-04 RX ADMIN — FLUTICASONE PROPIONATE 2 SPRAY: 50 SPRAY, METERED NASAL at 22:17

## 2021-05-04 NOTE — RESTORATIVE TECHNICIAN NOTE
Restorative Specialist Mobility Note       Activity: Ambulate in rojas, Ambulate in room, Bathroom privileges, Chair, Dangle, Stand at bedside(Educated/encouraged pt to ambulate with assistance 3-4 x's/day  Bed alarm on   Pt callbell, phone/tray within reach )     Assistive Device: Front wheel walker          [employer] BS, Restorative Technician, [employer]

## 2021-05-04 NOTE — PROGRESS NOTES
[address]  Progress Note - Juan Snare 1950, 78 y o  male MRN: [id number]  [facility] Encounter: [id number]  Primary Care Provider: Brayden Henderson DO   Date and time admitted to hospital: 4/21/2021  5:34 PM    * Acute blood loss anemia  Assessment & Plan  Hemoglobin initially in normal range two weeks ago -> progressively dropped but now stabilized over last 3-4 days  EGD noted a large bleeding duodenal ulcer s/p cauterization - continue PPI regimen   On SCDs for DVT prophylaxis    Duodenal ulcer  Assessment & Plan  S/p cauterization for bleeding as noted above in plan for acute blood loss anemia  Continue PPI regimen    Essential hypertension  Assessment & Plan  Continue Lopressor    Schizophrenia  Assessment & Plan  Initially admitted at the [place] [address] for suicidal ideation and eventually transferred to the [address] [place] [address] due to hematemesis requiring gastroenterology intervention   Okay for outpatient follow-up once medically stable per psychiatry  Continue Seroquel/Trazodone regimen    COPD  Assessment & Plan  Stable/asymptomatic  Continue Breo-Ellipta - PRN Albuterol on board    Physical deconditioning  Assessment & Plan  Appreciate PT/evaluation -> plan for discharge to skilled rehab once bed obtained - patient is currently "OPTIONED"    History of alcohol abuse  Assessment & Plan  Cessation counseling - refusing alcohol rehab  Supplemental thiamine/multivitamin/folic acid on board        VTE Pharmacologic Prophylaxis:   Pharmacologic: Pharmacologic VTE Prophylaxis contraindicated due to GI bleed  Mechanical VTE Prophylaxis in Place: Yes    Patient Centered Rounds: I have performed bedside rounds with nursing staff today  Discussions with Specialists or Other Care Team Provider:     Education and Discussions with Family / Patient: pt  Offered to call family but pt refused    Time Spent for Care: 20 minutes    More than 50% of total time spent on counseling and coordination of care as described above  Current Length of Stay: 13 day(s)    Current Patient Status: Inpatient   Certification Statement: The patient will continue to require additional inpatient hospital stay due to above    Discharge Plan: pending placment    Code Status: Level 1 - Full Code      Subjective:   Pt seen and examined by me this morning  Pt complain of pain in his abdomen which is slowly improving every day from admission  Denies any nausea or vomiting  Tolerating diet well  Also complained of chronic back and extremity pain  Objective:     Vitals:   Temp (24hrs), Av 5 °F (36 9 °C), Min:98 3 °F (36 8 °C), Max:98 8 °F (37 1 °C)    Temp:  [98 3 °F (36 8 °C)-98 8 °F (37 1 °C)] 98 4 °F (36 9 °C)  HR:  [69-71] 71  Resp:  [18-20] 20  BP: (126-129)/(60-68) 129/63  SpO2:  [95 %-100 %] 100 %  Body mass index is 23 39 kg/m²  Input and Output Summary (last 24 hours): Intake/Output Summary (Last 24 hours) at 2021 1423  Last data filed at 2021 1100  Gross per 24 hour   Intake 400 ml   Output 1825 ml   Net -1425 ml       Physical Exam:     Physical Exam    Constitutional: Pt appears comfortable  Not in any acute distress  Cardiovascular: Normal rate, regular rhythm, normal heart sounds  No murmur heard  Pulmonary/Chest: Effort normal, air entry b/l equal  No respiratory distress  Pt has no wheezes or crackles  Abdominal: Soft  Non-distended, Non-tender  Bowel sounds are normal    Neurological: awake, alert  Moving all extremities spontaneously  Psychiatric: normal mood and affect       Additional Data:     Labs:    Results from last 7 days   Lab Units 21  0557   WBC Thousand/uL 4 44   HEMOGLOBIN g/dL 8 5*   HEMATOCRIT % 26 2*   PLATELETS Thousands/uL 426*   NEUTROS PCT % 49   LYMPHS PCT % 33   MONOS PCT % 14*   EOS PCT % 3     Results from last 7 days   Lab Units 21  0557  21  0526   SODIUM mmol/L 137   < > 137   POTASSIUM mmol/L 3 9   < > 4 1   CHLORIDE mmol/L 106   < > 107   CO2 mmol/L 25   < > 24   BUN mg/dL 14   < > 20   CREATININE mg/dL 0 90   < > 0 84   ANION GAP mmol/L 6   < > 6   CALCIUM mg/dL 8 9   < > 8 8   ALBUMIN g/dL  --   --  2 3*   TOTAL BILIRUBIN mg/dL  --   --  0 21   ALK PHOS U/L  --   --  46   ALT U/L  --   --  11*   AST U/L  --   --  12   GLUCOSE RANDOM mg/dL 117   < > 112    < > = values in this interval not displayed  * I Have Reviewed All Lab Data Listed Above  * Additional Pertinent Lab Tests Reviewed:  Oleg 68 Admission Reviewed    Imaging:    Imaging Reports Reviewed Today Include:   Imaging Personally Reviewed by Myself Includes:      Recent Cultures (last 7 days):           Last 24 Hours Medication List:   Current Facility-Administered Medications   Medication Dose Route Frequency Provider Last Rate    acetaminophen  650 mg Oral Q6H PRN Jose Covarrubias MD      albuterol  2 puff Inhalation Q4H PRN Leanne Ocampo PA-C      atorvastatin  40 mg Oral Daily Leanne Ocampo PA-C      calcium carbonate  500 mg Oral Daily PRN Leanne Ocampo PA-C      cholecalciferol  1,000 Units Oral Daily Leanne Ocampo PA-C      docusate sodium  100 mg Oral BID Carrie Fisher MD      fluticasone  2 spray Nasal HS Leanne Ocampo PA-C      fluticasone-vilanterol  1 puff Inhalation Daily Mckay Summers PA-C      folic acid  1 mg Oral Daily Jose Covarrubias MD      lidocaine  1 patch Topical Daily Jose Covarrubias MD      LORazepam  0 5 mg Oral Q6H PRN Carrie Fisher MD      metoprolol tartrate  25 mg Oral Q12H [address] Jose Covarrubias MD      multivitamin-minerals  1 tablet Oral Daily Jose Covarrubias MD      naloxone  0 04 mg Intravenous Q1MIN PRN Jose Covarrubias MD      nicotine  1 patch Transdermal Daily Leanne Ocampo PA-C      ondansetron  4 mg Oral Q6H PRN Leanne Ocampo PA-C      pantoprazole  40 mg Oral BID AC Jose Covarrubias MD      polyethylene glycol  17 g Oral BID AC Nadeem Tian MD      QUEtiapine  200 mg Oral Daily Leanne Ocampo PA-C      QUEtiapine  600 mg Oral HS Nadeem Tian MD      thiamine  100 mg Oral Daily Nadeem Tian MD      traMADol  50 mg Oral Q6H PRN Nadeem Tian MD      traZODone  50 mg Oral HS Alix Woodson PA-C          Today, Patient Was Seen By: Cristóbal Chaidez MD    ** Please Note: Dictation voice to text software may have been used in the creation of this document   **

## 2021-05-04 NOTE — ASSESSMENT & PLAN NOTE
Hemoglobin initially in normal range two weeks ago -> progressively dropped but now stabilized over last 3-4 days  EGD noted a large bleeding duodenal ulcer s/p cauterization - continue PPI regimen   On SCDs for DVT prophylaxis

## 2021-05-04 NOTE — OCCUPATIONAL THERAPY NOTE
OccupationalTherapy Progress Note     Patient Name: Ivan Santos  CRRMD'B Date: 5/4/2021  Problem List  Principal Problem:    Acute blood loss anemia  Active Problems:    History of alcohol abuse    Physical deconditioning    COPD    Tobacco abuse    Schizophrenia    Essential hypertension    Duodenal ulcer    Thrombocytosis    Hyponatremia        05/04/21 1159   OT Last Visit   OT Visit Date 05/04/21   Note Type   Note Type Treatment   Restrictions/Precautions   Weight Bearing Precautions Per Order No   Other Precautions Cognitive; Chair Alarm; Bed Alarm; Fall Risk   General   Response to Previous Treatment Patient with no complaints from previous session   Lifestyle   Autonomy PTA pt was I with ADLs, reports having increased difficulties performing IADLs when son is at work    Reciprocal Relationships Son    Service to Others Retired    Intrinsic Gratification Likes the movie [employer]    ADL   Where [facility]   [address] [place] [address] 5  [address]; Perineal area;Right upper leg;Left upper leg   UB Dressing Assistance Sergio Waller [phone]  Supervision/Setup   LB Dressing Deficit Thread RLE into pants; Thread LLE into pants; Thread RLE into underwear; Thread LLE into underwear;Don/doff R sock; Don/doff L sock; Increased time to complete   LB Dressing Comments Pt needs seated break while donning pants 2* increased fatigue  S to thread, CGA for balance while pulling over hips    Functional Standing Tolerance   Time 1 min   Activity static standing    Comments CGA with RW    Bed Mobility   Supine to Sit 5  Supervision   Additional items Bedrails; Increased time required   Sit to Supine Unable to assess   Transfers   Sit to Stand 5  Supervision   Additional items Assist x 1   Stand to Sit 5  Supervision   Additional items Assist x 1   Additional Comments Transfers with RW    Functional Mobility Functional Mobility   (CGA)   Additional Comments SHHD   Additional items Rolling walker   Therapeutic Excerise-Strength   UE Strength Yes   Right Upper Extremity- Strength   R Shoulder Horizontal ABduction; Flexion; Extension;ABduction   R Position Seated   Equipment Theraband   R Weight/Reps/Sets x30   RUE Strength Comment light resistance theraband    Left Upper Extremity-Strength   L Shoulder ABduction; Flexion; Extension;Horizontal ABduction   L Position Seated   Equipment Theraband   L Weights/Reps/Sets x30   LUE Strength Comment light resistance theraband    Cognition   Arousal/Participation Responsive   Attention Attends with cues to redirect   Memory Decreased recall of precautions   Following Commands Follows one step commands with increased time or repetition   Comments Pt continues to have flat affect  Slow to complete tasks  Requires frequent rest breaks    Activity Tolerance   Activity Tolerance Patient limited by fatigue   Medical Staff Made Aware RN clearance for session    Assessment   Assessment Patient participated in Skilled OT session this date with interventions consisting of ADL re training with the use of correct body mechnaics, Energy Conservation techniques, deep breathing technique, safety awareness and fall prevention techniques, therapeutic exercise to: increase functional use of BUEs, increase BUE muscle strength ,  therapeutic activities to: increase activity tolerance and increase OOB/ sitting tolerance   Upon arrival patient was found supine in bed  Pt demonstrated the following tasks: S sup to sit, S STS, CGA fnxl ambulation SHHD with RW  Pt threads underwear, socks, and pants with S but requires CGA to maintain standing balance to pull pants and underwear over hips  Pt washes proximal legs and lateral buttocks with S seated EOB, CGA to wash rest of buttocks in standing  S to do/don hospital gown  Pt also engaged in UE therex, as well as static standing   Pt does require frequent rest breaks t/o session and does easily fatigue  Patient continues to be functioning below baseline level, occupational performance remains limited secondary to factors listed above and increased risk for falls and injury  From OT standpoint, recommendation at time of d/c would be Short Term Rehab  Patient to benefit from continued Occupational Therapy treatment while in the hospital to address deficits as defined above and maximize level of functional independence with ADLs and functional mobility  Pt was left in chair with alarm on after session with all current needs met  The patient's raw score on the AM-PAC Daily Activity inpatient short form is 18, standardized score is 38 66, less than 39 4  Patients at this level are likely to benefit from discharge to post-acute rehabilitation services  Please refer to the recommendation of the Occupational Therapist for safe discharge planning  Plan   Treatment Interventions ADL retraining;Functional transfer training;UE strengthening/ROM; Endurance training;Patient/family training;Equipment evaluation/education; Compensatory technique education;Continued evaluation; Energy conservation; Activityengagement   Goal Expiration Date 05/06/21   OT Treatment Day 2   OT Frequency 3-5x/wk   Recommendation   OT Discharge Recommendation Post acute rehabilitation services   OT - OK to Discharge Yes  (to rehab when medically stable )   [facility]   Lower Body Dressing 3   Bathing 3   Toileting 3   Upper Body Dressing 3   Grooming 3   Eating 3   Daily Activity Raw Score 18   Daily Activity Standardized Score (Calc for Raw Score >=11) 38 66   AM-[facility] Applied Cognition Inpatient   Following a Speech/Presentation 4   Understanding Ordinary Conversation 4   Taking Medications 3   Remembering Where Things Are Placed or Put Away 3   Remembering List of 4-5 Errands 3   Taking Care of Complicated Tasks 3   Applied Cognition Raw Score 20   Applied Cognition Standardized Score 41 76     Claudette Chacon MS, OTR/L

## 2021-05-04 NOTE — PLAN OF CARE
Problem: OCCUPATIONAL THERAPY ADULT  Goal: Performs self-care activities at highest level of function for planned discharge setting  See evaluation for individualized goals  Description: Treatment Interventions: ADL retraining, Functional transfer training, UE strengthening/ROM, Endurance training, Patient/family training, Equipment evaluation/education, Compensatory technique education, Continued evaluation, Energy conservation, Activityengagement          See flowsheet documentation for full assessment, interventions and recommendations  Outcome: Progressing  Note: Limitation: Decreased ADL status, Decreased UE strength, Decreased Safe judgement during ADL, Decreased endurance, Decreased self-care trans, Decreased high-level ADLs  Prognosis: Good  Assessment: Patient participated in Skilled OT session this date with interventions consisting of ADL re training with the use of correct body mechnaics, Energy Conservation techniques, deep breathing technique, safety awareness and fall prevention techniques, therapeutic exercise to: increase functional use of BUEs, increase BUE muscle strength ,  therapeutic activities to: increase activity tolerance and increase OOB/ sitting tolerance   Upon arrival patient was found supine in bed  Pt demonstrated the following tasks: S sup to sit, S STS, CGA fnxl ambulation SHHD with RW  Pt threads underwear, socks, and pants with S but requires CGA to maintain standing balance to pull pants and underwear over hips  Pt washes proximal legs and lateral buttocks with S seated EOB, CGA to wash rest of buttocks in standing  S to [facility]/don hospital gown  Pt also engaged in UE therex, as well as static standing  Pt does require frequent rest breaks t/o session and does easily fatigue  Patient continues to be functioning below baseline level, occupational performance remains limited secondary to factors listed above and increased risk for falls and injury     From OT standpoint, recommendation at time of d/c would be Short Term Rehab  Patient to benefit from continued Occupational Therapy treatment while in the hospital to address deficits as defined above and maximize level of functional independence with ADLs and functional mobility  Pt was left in chair with alarm on after session with all current needs met  The patient's raw score on the AM-PAC Daily Activity inpatient short form is 18, standardized score is 38 66, less than 39 4  Patients at this level are likely to benefit from discharge to post-acute rehabilitation services  Please refer to the recommendation of the Occupational Therapist for safe discharge planning    Recommendation: Psych Consult  OT Discharge Recommendation: Post acute rehabilitation services  OT - OK to Discharge: Yes(to rehab when medically stable )

## 2021-05-04 NOTE — ASSESSMENT & PLAN NOTE
Initially admitted at the [facility] for suicidal ideation and eventually [place] [facility] [place] to [place]emesis requiring gastroenterology intervention   Okay for outpatient follow-up once medically stable per psychiatry  Continue Seroquel/Trazodone regimen

## 2021-05-04 NOTE — UTILIZATION REVIEW
Continued Stay Review    Date:  5/4/21                      Current Patient Class: IP  Current Level of Care: MS    HPI:70 y o  male initially admitted on 4/21 with GI Bleed, depression, felt suicidal, h/o HTN, COPD, Alcohol abuse, Schizophrenia   EGD on 04/22/2021-multiple duodenal ulcers with evidence of bleeding status post electrocauterization    Assessment/Plan:   5/1 - hgb stabilized today  Continue with PPI post EGD  Per Psych pt is good for OP psych f/u post d/c  Remains weak and fatigues  Abd discomfort improved  Albuterol used x 1 today and Tramadol x 1      5/2 - pt will need ST rehab post d/c  Abdominal discomfort continues to improve  Pt is afebrile  H/H stable  Na at 131  Glucose stable  Albuterol used x 1 today and Tramadol x 1        5/3 - stable H/H  CM working on d/c placement  Feels tired and fatigued but abd discomfort is improving  Albuterol used x 1 today - no  Tramadol today  5/4 - stable labs and H/H  Feeling tired  today  Used 1 dose of Tramadol this morning  Labs WNL and sugars are well controlled  Pt needs to go through the OPTIONs process for approval for SNF level rehab d/t previous psych history  Still c/o abd pain        Vital Signs:   05/04/21 08:07:06  98 4 °F (36 9 °C)  71  20  129/63  85  100 %  --   05/03/21 2121  98 8 °F (37 1 °C)  70  18  126/68  --  --  --   05/03/21 2000  --  --  --  --  --  --  None (Room air)   05/03/21 15:43:41  98 3 °F (36 8 °C)  69  --  129/60  83  95 %  --   05/03/21 15:43:23  98 3 °F (36 8 °C)  --  18  129/60  83  --  --   05/03/21 0915  --  --  --  --  --  --  None (Room air)   05/03/21 08:01:34  98 5 °F (36 9 °C)  71  17  138/61  87  95 %  --   05/02/21 22:15:05  98 5 °F (36 9 °C)  68  18  128/66  87  96 %  --   05/02/21 15:45:02  98 8 °F (37 1 °C)  74  18  123/64  84  96 %  --   05/02/21 0907  --  --  --  --  --  --  None (Room air)   05/02/21 0700  99 °F (37 2 °C)  72  18  137/64  --  97 %  --        05/01/21   21:43:31   05/01/21 15:20:25   Vital Signs   Temp   98 1 °F (36 7  °C)   98 °F (36 7 °C)   Temp src   --   --   Pulse   71   66   Heart Rate Source   --   --   Resp   18   17   BP   119/58   132/63   MAP (mmHg)   78   86          05/01/21   07:59:12    Vital Signs   Temp   98 6 °F (37 °C)    Temp src   --    Pulse   67    Heart Rate Source   --    Resp   16    BP   120/64    MAP (mmHg)   83      Pertinent Labs/Diagnostic Results:       Results from last 7 days   Lab Units 05/04/21  0557 05/03/21  0846 05/02/21  1149 05/01/21  0526 04/29/21  0548   WBC Thousand/uL 4 44 4 76 5 96 5 27 5 80   HEMOGLOBIN g/dL 8 5* 8 4* 8 4* 8 2* 8 3*   HEMATOCRIT % 26 2* 25 4* 25 8* 24 6* 25 4*   PLATELETS Thousands/uL 426* 436* 423* 454* 313   NEUTROS ABS Thousands/µL 2 19 2 65 3 97 2 71 2 67         Results from last 7 days   Lab Units 05/04/21  0557 05/03/21  0846 05/02/21  0551 05/01/21  0526 04/29/21  0548   SODIUM mmol/L 137 137 131* 137 134*   POTASSIUM mmol/L 3 9 4 2 4 5 4 1 4 0   CHLORIDE mmol/L 106 107 110* 107 103   CO2 mmol/L 25 24 18* 24 27   ANION GAP mmol/L 6 6 3* 6 4   BUN mg/dL 14 15 14 20 17   CREATININE mg/dL 0 90 0 93 0 84 0 84 0 93   EGFR ml/min/1 73sq m 87 84 90 90 84   CALCIUM mg/dL 8 9 8 8 8 6 8 8 9 0     Results from last 7 days   Lab Units 05/01/21  0526   AST U/L 12   ALT U/L 11*   ALK PHOS U/L 46   TOTAL PROTEIN g/dL 6 1*   ALBUMIN g/dL 2 3*   TOTAL BILIRUBIN mg/dL 0 21         Results from last 7 days   Lab Units 05/04/21  0557 05/03/21  0846 05/02/21  0551 05/01/21  0526 04/29/21  0548 04/28/21  0942   GLUCOSE RANDOM mg/dL 117 115 83 112 108 142*     Medications:   Scheduled Medications:  atorvastatin, 40 mg, Oral, Daily  cholecalciferol, 1,000 Units, Oral, Daily  docusate sodium, 100 mg, Oral, BID  fluticasone, 2 spray, Nasal, HS  fluticasone-vilanterol, 1 puff, Inhalation, Daily  folic acid, 1 mg, Oral, Daily  lidocaine, 1 patch, Topical, Daily  metoprolol tartrate, 25 mg, Oral, Q12H [address]  multivitamin-minerals, 1 tablet, Oral, Daily  nicotine, 1 patch, Transdermal, Daily  pantoprazole, 40 mg, Oral, BID AC  polyethylene glycol, 17 g, Oral, BID AC  QUEtiapine, 200 mg, Oral, Daily  QUEtiapine, 600 mg, Oral, HS  thiamine, 100 mg, Oral, Daily  traZODone, 50 mg, Oral, HS      Continuous IV Infusions:     PRN Meds:  acetaminophen, 650 mg, Oral, Q6H PRN  albuterol, 2 puff, Inhalation, Q4H PRN - x 5/1, 5/2, 5/3  calcium carbonate, 500 mg, Oral, Daily PRN  LORazepam, 0 5 mg, Oral, Q6H PRN  naloxone, 0 04 mg, Intravenous, Q1MIN PRN  ondansetron, 4 mg, Oral, Q6H PRN  traMADol, 50 mg, Oral, Q6H PRN - x 1 5/4, 5/2, 5/1    Discharge Plan: [address] [place] [address]  rehab     Network Utilization Review Department  ATTENTION: Please call with any questions or concerns to [phone] and carefully listen to the prompts so that you are directed to the right person  All voicemails are confidential   María Bee all requests for admission clinical reviews, approved or denied determinations and any other requests to dedicated fax number below belonging to the campus where the patient is receiving treatment   List of dedicated fax numbers for the Facilities:  [address] DENIALS (Administrative/Medical Necessity) [phone]   [address] (Maternity/NICU/Pediatrics) [phone]    [address] [place] [address] [facility] [place] [id number] [address] [place] [address] [phone]

## 2021-05-04 NOTE — PLAN OF CARE
Problem: Prexisting or High Potential for Compromised Skin Integrity  Goal: Skin integrity is maintained or improved  Description: INTERVENTIONS:  - Identify patients at risk for skin breakdown  - Assess and monitor skin integrity  - Assess and monitor nutrition and hydration status  - Monitor labs   - Assess for incontinence   - Turn and reposition patient  - Assist with mobility/ambulation  - Relieve pressure over bony prominences  - Avoid friction and shearing  - Provide appropriate hygiene as needed including keeping skin clean and dry  - Evaluate need for skin moisturizer/barrier cream  - Collaborate with interdisciplinary team   - Patient/family teaching  - Consider wound care consult   Outcome: Progressing     Problem: Potential for Falls  Goal: Patient will remain free of falls  Description: INTERVENTIONS:  - Assess patient frequently for physical needs  -  Identify cognitive and physical deficits and behaviors that affect risk of falls    -  [place] fall precautions as indicated by assessment   - Educate patient/family on patient safety including physical limitations  - Instruct patient to call for assistance with activity based on assessment  - Modify environment to reduce risk of injury  - Consider OT/PT consult to assist with strengthening/mobility  Outcome: Progressing     Problem: PAIN - ADULT  Goal: Verbalizes/displays adequate comfort level or baseline comfort level  Description: Interventions:  - Encourage patient to monitor pain and request assistance  - Assess pain using appropriate pain scale  - Administer analgesics based on type and severity of pain and evaluate response  - Implement non-pharmacological measures as appropriate and evaluate response  - Consider cultural and social influences on pain and pain management  - Notify physician/advanced practitioner if interventions unsuccessful or patient reports new pain  Outcome: Progressing     Problem: INFECTION - ADULT  Goal: Absence or prevention of progression during hospitalization  Description: INTERVENTIONS:  - Assess and monitor for signs and symptoms of infection  - Monitor lab/diagnostic results  - Monitor all insertion sites, i e  indwelling lines, tubes, and drains  - Monitor endotracheal if appropriate and nasal secretions for changes in amount and color  - [place] appropriate cooling/warming therapies per order  - Administer medications as ordered  - Instruct and encourage patient and family to use good hand hygiene technique  - Identify and instruct in appropriate isolation precautions for identified infection/condition  Outcome: Progressing  Goal: Absence of fever/infection during neutropenic period  Description: INTERVENTIONS:  - Monitor WBC    Outcome: Progressing     Problem: SAFETY ADULT  Goal: Patient will remain free of falls  Description: INTERVENTIONS:  - Assess patient frequently for physical needs  -  Identify cognitive and physical deficits and behaviors that affect risk of falls    -  [place] fall precautions as indicated by assessment   - Educate patient/family on patient safety including physical limitations  - Instruct patient to call for assistance with activity based on assessment  - Modify environment to reduce risk of injury  - Consider OT/PT consult to assist with strengthening/mobility  Outcome: Progressing  Goal: Maintain or return to baseline ADL function  Description: INTERVENTIONS:  -  Assess patient's ability to carry out ADLs; assess patient's baseline for ADL function and identify physical deficits which impact ability to perform ADLs (bathing, care of mouth/teeth, toileting, grooming, dressing, etc )  - Assess/evaluate cause of self-care deficits   - Assess range of motion  - Assess patient's mobility; develop plan if impaired  - Assess patient's need for assistive devices and provide as appropriate  - Encourage maximum independence but intervene and supervise when necessary  - Involve family in performance of ADLs  - Assess for home care needs following discharge   - Consider OT consult to assist with ADL evaluation and planning for discharge  - Provide patient education as appropriate  Outcome: Progressing  Goal: Maintain or return mobility status to optimal level  Description: INTERVENTIONS:  - Assess patient's baseline mobility status (ambulation, transfers, stairs, etc )    - Identify cognitive and physical deficits and behaviors that affect mobility  - Identify mobility aids required to assist with transfers and/or ambulation (gait belt, sit-to-stand, lift, walker, cane, etc )  - [place] fall precautions as indicated by assessment  - Record patient progress and toleration of activity level on Mobility SBAR; progress patient to next Phase/Stage  - Instruct patient to call for assistance with activity based on assessment  - Consider rehabilitation consult to assist with strengthening/weightbearing, etc   Outcome: Progressing     Problem: DISCHARGE PLANNING  Goal: Discharge to home or other facility with appropriate resources  Description: INTERVENTIONS:  - Identify barriers to discharge w/patient and caregiver  - Arrange for needed discharge resources and transportation as appropriate  - Identify discharge learning needs (meds, wound care, etc )  - Arrange for interpretive services to assist at discharge as needed  - Refer to Case Management Department for coordinating discharge planning if the patient needs post-hospital services based on physician/advanced practitioner order or complex needs related to functional status, cognitive ability, or social support system  Outcome: Progressing     Problem: Nutrition/Hydration-ADULT  Goal: Nutrient/Hydration intake appropriate for improving, restoring or maintaining nutritional needs  Description: Monitor and assess patient's nutrition/hydration status for malnutrition  Collaborate with interdisciplinary team and initiate plan and interventions as ordered  Monitor patient's weight and dietary intake as ordered or per policy  Utilize nutrition screening tool and intervene as necessary  Determine patient's food preferences and provide high-protein, high-caloric foods as appropriate       INTERVENTIONS:  - Monitor oral intake, urinary output, labs, and treatment plans  - Assess nutrition and hydration status and recommend course of action  - Evaluate amount of meals eaten  - Assist patient with eating if necessary   - Allow adequate time for meals  - Recommend/ encourage appropriate diets, oral nutritional supplements, and vitamin/mineral supplements  - Order, calculate, and assess calorie counts as needed  - Recommend, monitor, and adjust tube feedings and TPN/PPN based on assessed needs  - Assess need for intravenous fluids  - Provide specific nutrition/hydration education as appropriate  - Include patient/family/caregiver in decisions related to nutrition  Outcome: Progressing

## 2021-05-05 PROCEDURE — 99232 SBSQ HOSP IP/OBS MODERATE 35: CPT | Performed by: INTERNAL MEDICINE

## 2021-05-05 RX ORDER — DICYCLOMINE HYDROCHLORIDE 10 MG/1
10 CAPSULE ORAL
Status: DISCONTINUED | OUTPATIENT
Start: 2021-05-05 | End: 2021-05-07 | Stop reason: HOSPADM

## 2021-05-05 RX ORDER — SUCRALFATE 1 G/1
1 TABLET ORAL EVERY 6 HOURS SCHEDULED
Status: DISCONTINUED | OUTPATIENT
Start: 2021-05-05 | End: 2021-05-07 | Stop reason: HOSPADM

## 2021-05-05 RX ADMIN — TRAZODONE HYDROCHLORIDE 50 MG: 50 TABLET ORAL at 21:51

## 2021-05-05 RX ADMIN — Medication 1 PATCH: at 09:42

## 2021-05-05 RX ADMIN — Medication 1000 UNITS: at 11:05

## 2021-05-05 RX ADMIN — FOLIC ACID 1 MG: 1 TABLET ORAL at 11:05

## 2021-05-05 RX ADMIN — QUETIAPINE FUMARATE 200 MG: 100 TABLET ORAL at 11:05

## 2021-05-05 RX ADMIN — METOPROLOL TARTRATE 25 MG: 25 TABLET, FILM COATED ORAL at 21:55

## 2021-05-05 RX ADMIN — QUETIAPINE FUMARATE 600 MG: 300 TABLET ORAL at 21:54

## 2021-05-05 RX ADMIN — DOCUSATE SODIUM 100 MG: 100 CAPSULE, LIQUID FILLED ORAL at 18:09

## 2021-05-05 RX ADMIN — DICYCLOMINE HYDROCHLORIDE 10 MG: 10 CAPSULE ORAL at 18:08

## 2021-05-05 RX ADMIN — ONDANSETRON 4 MG: 4 TABLET, ORALLY DISINTEGRATING ORAL at 09:40

## 2021-05-05 RX ADMIN — TRAMADOL HYDROCHLORIDE 50 MG: 50 TABLET, FILM COATED ORAL at 09:41

## 2021-05-05 RX ADMIN — ATORVASTATIN CALCIUM 40 MG: 40 TABLET, FILM COATED ORAL at 11:05

## 2021-05-05 RX ADMIN — FLUTICASONE PROPIONATE 2 SPRAY: 50 SPRAY, METERED NASAL at 21:53

## 2021-05-05 RX ADMIN — SUCRALFATE 1 G: 1 TABLET ORAL at 18:08

## 2021-05-05 RX ADMIN — METOPROLOL TARTRATE 25 MG: 25 TABLET, FILM COATED ORAL at 11:05

## 2021-05-05 RX ADMIN — POLYETHYLENE GLYCOL 3350 17 G: 17 POWDER, FOR SOLUTION ORAL at 11:03

## 2021-05-05 RX ADMIN — FLUTICASONE FUROATE AND VILANTEROL TRIFENATATE 1 PUFF: 100; 25 POWDER RESPIRATORY (INHALATION) at 09:45

## 2021-05-05 RX ADMIN — LIDOCAINE 1 PATCH: 50 PATCH TOPICAL at 09:43

## 2021-05-05 RX ADMIN — DICYCLOMINE HYDROCHLORIDE 10 MG: 10 CAPSULE ORAL at 21:54

## 2021-05-05 RX ADMIN — THIAMINE HCL TAB 100 MG 100 MG: 100 TAB at 11:05

## 2021-05-05 RX ADMIN — DOCUSATE SODIUM 100 MG: 100 CAPSULE, LIQUID FILLED ORAL at 11:05

## 2021-05-05 RX ADMIN — Medication 1 TABLET: at 11:05

## 2021-05-05 RX ADMIN — SUCRALFATE 1 G: 1 TABLET ORAL at 23:57

## 2021-05-05 RX ADMIN — PANTOPRAZOLE SODIUM 40 MG: 40 TABLET, DELAYED RELEASE ORAL at 05:46

## 2021-05-05 RX ADMIN — ALBUTEROL SULFATE 2 PUFF: 90 AEROSOL, METERED RESPIRATORY (INHALATION) at 21:51

## 2021-05-05 RX ADMIN — PANTOPRAZOLE SODIUM 40 MG: 40 TABLET, DELAYED RELEASE ORAL at 18:09

## 2021-05-05 NOTE — RESTORATIVE TECHNICIAN NOTE
Restorative Specialist Mobility Note       Activity: Ambulate in rojas, Ambulate in room, Bathroom privileges, Chair, Dangle, Stand at bedside(Educated/encouraged pt to ambulate with assistance 3-4 x's/day  Bed alarm on   Pt callbell, phone/tray within reach )     Assistive Device: Front wheel walker          Nikia TAVERAS, Restorative Technician, [employer] [facility]

## 2021-05-05 NOTE — ASSESSMENT & PLAN NOTE
S/p cauterization for bleeding as noted above in plan for acute blood loss anemia  Continue PPI regimen  Patient still complained of intermittent abdominal pain  Will start Bentyl and Carafate as well

## 2021-05-05 NOTE — CASE MANAGEMENT
[address] spoke with Citlaly Connelly SRM:[id number]  Admitting Dr: Benson Mariano [id number]    [facility] primary  Nilson Keene pending [phone]  Faxed clinicals to [phone]    Call ref# [id number]

## 2021-05-05 NOTE — PROGRESS NOTES
[facility]  Progress Note - Angelo Hurley 1950, 78 y o  male MRN: [id number]  [facility] Encounter: [id number]  Primary Care Provider: Narendra Sánchez DO   Date and time admitted to hospital: 4/21/2021  5:34 PM    * Acute blood loss anemia  Assessment & Plan  Hemoglobin initially in normal range two weeks ago -> progressively dropped but now stabilized over last 3-4 days  EGD noted a large bleeding duodenal ulcer s/p cauterization - continue PPI regimen   On SCDs for DVT prophylaxis    Duodenal ulcer  Assessment & Plan  S/p cauterization for bleeding as noted above in plan for acute blood loss anemia  Continue PPI regimen  Patient still complained of intermittent abdominal pain  Will start Bentyl and Carafate as well  Essential hypertension  Assessment & Plan  Continue Lopressor    COPD  Assessment & Plan  Stable/asymptomatic  Continue Breo-Ellipta - PRN Albuterol on board    History of alcohol abuse  Assessment & Plan  Cessation counseling - refusing alcohol rehab  Supplemental thiamine/multivitamin/folic acid on board        VTE Pharmacologic Prophylaxis:   Pharmacologic: Pharmacologic VTE Prophylaxis contraindicated due to Recent GI bleed  Mechanical VTE Prophylaxis in Place: Yes    Patient Centered Rounds: I have performed bedside rounds with nursing staff today  Discussions with Specialists or Other Care Team Provider:     Education and Discussions with Family / Patient: pt  Called and updated Nhung    Time Spent for Care: 30 minutes  More than 50% of total time spent on counseling and coordination of care as described above  Current Length of Stay: 14 day(s)    Current Patient Status: Inpatient   Certification Statement: The patient will continue to require additional inpatient hospital stay due to above    Discharge Plan:  Pending placement    Code Status: Level 1 - Full Code      Subjective:   Pt seen and examined by me this morning   Pt complain now persistent intermittent abdominal pain  Overall improving slowly  Tolerating diet  Objective:     Vitals:   Temp (24hrs), Av 2 °F (36 8 °C), Min:97 4 °F (36 3 °C), Max:98 9 °F (37 2 °C)    Temp:  [97 4 °F (36 3 °C)-98 9 °F (37 2 °C)] 98 9 °F (37 2 °C)  HR:  [63-68] 68  Resp:  [18] 18  BP: (114-146)/(60-84) 146/84  SpO2:  [96 %-99 %] 99 %  Body mass index is 24 03 kg/m²  Input and Output Summary (last 24 hours): Intake/Output Summary (Last 24 hours) at 2021 1446  Last data filed at 2021 1112  Gross per 24 hour   Intake 1440 ml   Output 1750 ml   Net -310 ml       Physical Exam:     Physical Exam    Constitutional: Pt appears comfortable  Not in any acute distress  Cardiovascular: Normal rate, regular rhythm, normal heart sounds  No murmur heard  Pulmonary/Chest: Effort normal, air entry b/l equal  No respiratory distress  Pt has no wheezes or crackles  Abdominal: Soft  Non-distended, Non-tender  Bowel sounds are normal    Neurological: awake, alert  Moving all extremities spontaneously  Psychiatric: normal mood and affect  Additional Data:     Labs:    Results from last 7 days   Lab Units 21  0557   WBC Thousand/uL 4 44   HEMOGLOBIN g/dL 8 5*   HEMATOCRIT % 26 2*   PLATELETS Thousands/uL 426*   NEUTROS PCT % 49   LYMPHS PCT % 33   MONOS PCT % 14*   EOS PCT % 3     Results from last 7 days   Lab Units 21  0557  21  0526   SODIUM mmol/L 137   < > 137   POTASSIUM mmol/L 3 9   < > 4 1   CHLORIDE mmol/L 106   < > 107   CO2 mmol/L 25   < > 24   BUN mg/dL 14   < > 20   CREATININE mg/dL 0 90   < > 0 84   ANION GAP mmol/L 6   < > 6   CALCIUM mg/dL 8 9   < > 8 8   ALBUMIN g/dL  --   --  2 3*   TOTAL BILIRUBIN mg/dL  --   --  0 21   ALK PHOS U/L  --   --  46   ALT U/L  --   --  11*   AST U/L  --   --  12   GLUCOSE RANDOM mg/dL 117   < > 112    < > = values in this interval not displayed  * I Have Reviewed All Lab Data Listed Above    * Additional Pertinent Lab Tests Reviewed:  [place] [facility] Admission Reviewed    Imaging:    Imaging Reports Reviewed Today Include:   Imaging Personally Reviewed by Myself Includes:     Recent Cultures (last 7 days):           Last 24 Hours Medication List:   Current Facility-Administered Medications   Medication Dose Route Frequency Provider Last Rate    acetaminophen  650 mg Oral Q6H PRN Lenora Goldmann, MD      albuterol  2 puff Inhalation Q4H PRN Leanne Ocampo PA-C      atorvastatin  40 mg Oral Daily Leanne Ocampo PA-C      calcium carbonate  500 mg Oral Daily PRN Leanne Ocampo PA-C      cholecalciferol  1,000 Units Oral Daily Leanne Ocampo PA-C      dicyclomine  10 mg Oral 4x Daily (AC & HS) Daisy Major MD      docusate sodium  100 mg Oral BID Abdon Fernandez MD      fluticasone  2 spray Nasal HS Leanne Ocampo PA-C      fluticasone-vilanterol  1 puff Inhalation Daily Mckay Summers PA-C      folic acid  1 mg Oral Daily Lenora Goldmann, MD      lidocaine  1 patch Topical Daily Lenora Goldmann, MD      LORazepam  0 5 mg Oral Q6H PRN Abdon Fernandez MD      metoprolol tartrate  25 mg Oral Q12H [facility] Lenora Goldmann, MD      multivitamin-minerals  1 tablet Oral Daily Lenora Goldmann, MD      naloxone  0 04 mg Intravenous Q1MIN PRN Lenora Goldmann, MD      nicotine  1 patch Transdermal Daily Leanne Ocampo PA-C      ondansetron  4 mg Oral Q6H PRN Leanne Ocampo PA-C      pantoprazole  40 mg Oral BID AC Lenora Goldmann, MD      polyethylene glycol  17 g Oral BID AC Lenora Goldmann, MD      QUEtiapine  200 mg Oral Daily Leanne Ocampo PA-C      QUEtiapine  600 mg Oral HS Lenora Goldmann, MD      sucralfate  1,000 mg Oral Q6H [facility] Daisy Major MD      thiamine  100 mg Oral Daily Lenora Goldmann, MD      traMADol  50 mg Oral Q6H PRN Lenora Goldmann, MD      traZODone  50 mg Oral HS Leanne Ocampo PA-C          Today, Patient Was Seen By: Daisy Major, MD    ** Please Note: Dictation voice to text software may have been used in the creation of this document   **

## 2021-05-06 PROCEDURE — 97530 THERAPEUTIC ACTIVITIES: CPT

## 2021-05-06 PROCEDURE — 99232 SBSQ HOSP IP/OBS MODERATE 35: CPT | Performed by: INTERNAL MEDICINE

## 2021-05-06 RX ADMIN — DICYCLOMINE HYDROCHLORIDE 10 MG: 10 CAPSULE ORAL at 06:00

## 2021-05-06 RX ADMIN — DOCUSATE SODIUM 100 MG: 100 CAPSULE, LIQUID FILLED ORAL at 18:15

## 2021-05-06 RX ADMIN — Medication 1 PATCH: at 09:42

## 2021-05-06 RX ADMIN — METOPROLOL TARTRATE 25 MG: 25 TABLET, FILM COATED ORAL at 20:28

## 2021-05-06 RX ADMIN — THIAMINE HCL TAB 100 MG 100 MG: 100 TAB at 09:38

## 2021-05-06 RX ADMIN — SUCRALFATE 1 G: 1 TABLET ORAL at 06:00

## 2021-05-06 RX ADMIN — DICYCLOMINE HYDROCHLORIDE 10 MG: 10 CAPSULE ORAL at 20:30

## 2021-05-06 RX ADMIN — ALBUTEROL SULFATE 2 PUFF: 90 AEROSOL, METERED RESPIRATORY (INHALATION) at 20:31

## 2021-05-06 RX ADMIN — DOCUSATE SODIUM 100 MG: 100 CAPSULE, LIQUID FILLED ORAL at 09:39

## 2021-05-06 RX ADMIN — POLYETHYLENE GLYCOL 3350 17 G: 17 POWDER, FOR SOLUTION ORAL at 09:43

## 2021-05-06 RX ADMIN — FLUTICASONE PROPIONATE 2 SPRAY: 50 SPRAY, METERED NASAL at 20:31

## 2021-05-06 RX ADMIN — POLYETHYLENE GLYCOL 3350 17 G: 17 POWDER, FOR SOLUTION ORAL at 18:16

## 2021-05-06 RX ADMIN — Medication 1000 UNITS: at 09:39

## 2021-05-06 RX ADMIN — FOLIC ACID 1 MG: 1 TABLET ORAL at 09:41

## 2021-05-06 RX ADMIN — SUCRALFATE 1 G: 1 TABLET ORAL at 18:15

## 2021-05-06 RX ADMIN — TRAZODONE HYDROCHLORIDE 50 MG: 50 TABLET ORAL at 20:30

## 2021-05-06 RX ADMIN — PANTOPRAZOLE SODIUM 40 MG: 40 TABLET, DELAYED RELEASE ORAL at 06:01

## 2021-05-06 RX ADMIN — ATORVASTATIN CALCIUM 40 MG: 40 TABLET, FILM COATED ORAL at 09:38

## 2021-05-06 RX ADMIN — LIDOCAINE 1 PATCH: 50 PATCH TOPICAL at 09:41

## 2021-05-06 RX ADMIN — PANTOPRAZOLE SODIUM 40 MG: 40 TABLET, DELAYED RELEASE ORAL at 18:15

## 2021-05-06 RX ADMIN — FLUTICASONE FUROATE AND VILANTEROL TRIFENATATE 1 PUFF: 100; 25 POWDER RESPIRATORY (INHALATION) at 09:43

## 2021-05-06 RX ADMIN — DICYCLOMINE HYDROCHLORIDE 10 MG: 10 CAPSULE ORAL at 12:00

## 2021-05-06 RX ADMIN — QUETIAPINE FUMARATE 200 MG: 100 TABLET ORAL at 09:39

## 2021-05-06 RX ADMIN — TRAMADOL HYDROCHLORIDE 50 MG: 50 TABLET, FILM COATED ORAL at 09:37

## 2021-05-06 RX ADMIN — Medication 1 TABLET: at 09:39

## 2021-05-06 RX ADMIN — TRAMADOL HYDROCHLORIDE 50 MG: 50 TABLET, FILM COATED ORAL at 20:35

## 2021-05-06 RX ADMIN — SUCRALFATE 1 G: 1 TABLET ORAL at 12:00

## 2021-05-06 RX ADMIN — DICYCLOMINE HYDROCHLORIDE 10 MG: 10 CAPSULE ORAL at 18:15

## 2021-05-06 RX ADMIN — QUETIAPINE FUMARATE 600 MG: 300 TABLET ORAL at 20:31

## 2021-05-06 RX ADMIN — METOPROLOL TARTRATE 25 MG: 25 TABLET, FILM COATED ORAL at 09:38

## 2021-05-06 NOTE — PHYSICAL THERAPY NOTE
PHYSICAL THERAPY NOTE          Patient Name: Dav TYSON Date: 5/6/2021 05/06/21 0826   PT Last Visit   PT Visit Date 05/06/21   Pain Assessment   Pain Assessment Tool 0-10   Pain Score 8   Pain Location/Orientation Location: Back   Patient's Stated Pain Goal No pain   Hospital Pain Intervention(s) Ambulation/increased activity;Repositioned   Restrictions/Precautions   Weight Bearing Precautions Per Order No   Other Precautions Cognitive; Chair Alarm; Bed Alarm; Fall Risk;Pain   General   Family/Caregiver Present No   Cognition   Attention Attends with cues to redirect   Orientation Level Oriented X4   Memory Decreased recall of precautions   Following Commands Follows one step commands with increased time or repetition   Comments Pt requires increased encouragement to participate in therapy session  Pt with flat affect and slighlty irritable this am     Bed Mobility   Supine to Sit 5  Supervision   Additional items Bedrails; Increased time required   Sit to Supine 5  Supervision   Additional items Increased time required   Additional Comments Pt supine in bed upon arrival  Pt sat EOB 5 min at S level performing BLE theerex program  Cuse for head/ neck extension required frequently  Pt deferred sitting up in bedside chair at the end of therapy session all though educated on importance of sitting OOB, pt repositioned comforrtably supine in bed with alarm on and all needs within reach at the end of therapy session  Transfers   Sit to Stand 5  Supervision   Additional items Assist x 1; Increased time required   Stand to Sit 5  Supervision   Additional items Assist x 1; Increased time required   Additional Comments Transfers with RW   Ambulation/Elevation   Gait pattern Excessively slow; Short stride;Decreased foot clearance; Foward flexed   Gait Assistance   (CGA )   Additional items Assist x 1;Verbal cues; Tactile cues Assistive Device Rolling walker   Distance 2 x 40 ft; poor RW management skills- frequent cues for sequencing and upright posture    Balance   Static Sitting Fair +   Dynamic Sitting Fair   Static Standing Fair -  (with RW )   Dynamic Standing Poor +  (with RW )   Ambulatory Poor +  (with RW )   Endurance Deficit   Endurance Deficit Yes   Endurance Deficit Description fatigue, pain    Activity Tolerance   Activity Tolerance Patient limited by fatigue;Patient tolerated treatment well   Nurse Made Aware RN cleared pt to participate in therapy session    Assessment   Prognosis Fair   Problem List Decreased endurance; Impaired balance;Decreased mobility; Decreased safety awareness;Pain   Assessment Pt seen for PT treatment session this date  Therapy session focused on bed mobility, functional transfers, gait training, therapeutic exercise and endurance training in order to improve overall mobility and independence  Pt requires assist of 1 for all mobility performed this date  Pt performed bed mobility tasks at S level; HOB elevated and bedrails utilized  Pt sat EOB performed therapeutic exercise to hip, knees and ankles (2x 10 marches, LAQs and ankle pumps performed)  Pt sat EOB with frequent cues required for head/neck extension  Pt performed 2x STS from EOB to RW at close S level  Pt ambulated in room/hallway with RW  Pt with increased flexed posture and poor RW management skills noted (especially on rotational turns)  Pt educated on posture with poor carryover/understnaidng- stating "I can't do that"  Pt making slow progress toward goals due to decreased activity tolerance  Pt was left supine in bed with alarm on at the end of PT session with all needs in reach  Pt would benefit from continued PT services while in hospital to address remaining limitations  PT to continue to follow pt and recommends post-acute rehab services after d/c   The patient's AM-PAC Basic Mobility Inpatient Short Form Raw Score is 17, Standardized Score is 39 67  A standardized score less than 42 9 suggests the patient may benefit from discharge to post-acute rehabilitation services  Please also refer to the recommendation of the Physical Therapist for safe discharge planning  Barriers to Discharge Inaccessible home environment;Decreased caregiver support   Goals   Patient Goals To go back to bed    STG Expiration Date 05/10/21   PT Treatment Day 4   Plan   Treatment/Interventions ADL retraining;Functional transfer training;LE strengthening/ROM; Therapeutic exercise; Endurance training;Patient/family training;Equipment eval/education;Gait training;Bed mobility;Spoke to nursing   PT Frequency Other (Comment)  (3-5x/wk )   Recommendation   PT Discharge Recommendation Post acute rehabilitation services   Equipment Recommended [facility] Recommended Wheeled walker   PT - OK to Discharge Yes  (to rehab once medically cleared )   [address] Inpatient   Turning in Bed Without Bedrails 3   Lying on Back to Sitting on Edge of Flat Bed 3   Moving Bed to Chair 3   Standing Up From Chair 3   Walk in Room 3   Climb 3-5 Stairs 2   Basic Mobility Inpatient Raw Score 17   Basic Mobility Standardized Score 39 67     Galina Medicus, PT, DPT

## 2021-05-06 NOTE — PROGRESS NOTES
[address] [facility]  Progress Note - Sadie Lopez 1950, 78 y o  male MRN: [id number]  [facility] Encounter: [id number]  Primary Care Provider: Merline Rainwater, DO   Date and time admitted to hospital: 4/21/2021  5:34 PM    * Acute blood loss anemia  Assessment & Plan  Hemoglobin initially in normal range two weeks ago -> progressively dropped but now stabilized over last 3-4 days  EGD noted a large bleeding duodenal ulcer s/p cauterization - continue PPI regimen   On SCDs for DVT prophylaxis    Duodenal ulcer  Assessment & Plan  S/p cauterization for bleeding as noted above in plan for acute blood loss anemia  Continue PPI regimen  Patient reports improvement in abdominal pain today  Continue Bentyl and Carafate as well  Essential hypertension  Assessment & Plan  Continue Lopressor    Schizophrenia  Assessment & Plan  Initially admitted at the [facility] for suicidal ideation and eventually transferred to the [facility] due to hematemesis requiring gastroenterology intervention   Okay for outpatient follow-up once medically stable per psychiatry  Continue Seroquel/Trazodone regimen          VTE Pharmacologic Prophylaxis:   Pharmacologic: Pharmacologic VTE Prophylaxis contraindicated due to recent GIB  Mechanical VTE Prophylaxis in Place: Yes    Patient Centered Rounds: I have performed bedside rounds with nursing staff today  Discussions with Specialists or Other Care Team Provider:     Education and Discussions with Family / Patient: pt    Time Spent for Care: 20 minutes  More than 50% of total time spent on counseling and coordination of care as described above      Current Length of Stay: 15 day(s)    Current Patient Status: Inpatient   Certification Statement: The patient will continue to require additional inpatient hospital stay due to Pending placement    Discharge Plan:  Pending placement    Code Status: Level 1 - Full Code      Subjective:   Pt seen and examined by me this morning  Pt said his abdominal pain is better than yesterday  Tolerating diet well  Objective:     Vitals:   Temp (24hrs), Av 1 °F (36 7 °C), Min:97 8 °F (36 6 °C), Max:98 5 °F (36 9 °C)    Temp:  [97 8 °F (36 6 °C)-98 5 °F (36 9 °C)] 97 8 °F (36 6 °C)  HR:  [65-67] 65  Resp:  [16-18] 17  BP: (122-155)/(61-76) 122/63  SpO2:  [96 %-97 %] 97 %  Body mass index is 24 22 kg/m²  Input and Output Summary (last 24 hours): Intake/Output Summary (Last 24 hours) at 2021 1607  Last data filed at 2021 1300  Gross per 24 hour   Intake 1200 ml   Output 1800 ml   Net -600 ml       Physical Exam:     Physical Exam    Constitutional: Pt appears comfortable  Not in any acute distress  Cardiovascular: Normal rate, regular rhythm, normal heart sounds  No murmur heard  Pulmonary/Chest: Effort normal, air entry b/l equal  No respiratory distress  Pt has no wheezes or crackles  Abdominal: Soft  Non-distended, Non-tender  Bowel sounds are normal    Musculoskeletal: Normal range of motion  Neurological: awake, alert  Moving all extremities spontaneously  Psychiatric: normal mood and affect       Additional Data:     Labs:    Results from last 7 days   Lab Units 21  0557   WBC Thousand/uL 4 44   HEMOGLOBIN g/dL 8 5*   HEMATOCRIT % 26 2*   PLATELETS Thousands/uL 426*   NEUTROS PCT % 49   LYMPHS PCT % 33   MONOS PCT % 14*   EOS PCT % 3     Results from last 7 days   Lab Units 21  0557  21  0526   SODIUM mmol/L 137   < > 137   POTASSIUM mmol/L 3 9   < > 4 1   CHLORIDE mmol/L 106   < > 107   CO2 mmol/L 25   < > 24   BUN mg/dL 14   < > 20   CREATININE mg/dL 0 90   < > 0 84   ANION GAP mmol/L 6   < > 6   CALCIUM mg/dL 8 9   < > 8 8   ALBUMIN g/dL  --   --  2 3*   TOTAL BILIRUBIN mg/dL  --   --  0 21   ALK PHOS U/L  --   --  46   ALT U/L  --   --  11*   AST U/L  --   --  12   GLUCOSE RANDOM mg/dL 117   < > 112    < > = values in this interval not displayed  * I Have Reviewed All Lab Data Listed Above  * Additional Pertinent Lab Tests Reviewed:  Oleg 68 Admission Reviewed    Imaging:    Imaging Reports Reviewed Today Include:   Imaging Personally Reviewed by Myself Includes:      Recent Cultures (last 7 days):           Last 24 Hours Medication List:   Current Facility-Administered Medications   Medication Dose Route Frequency Provider Last Rate    acetaminophen  650 mg Oral Q6H PRN Zeyad Leslie MD      albuterol  2 puff Inhalation Q4H PRN Leanne Ocampo PA-C      atorvastatin  40 mg Oral Daily Leanne Ocampo PA-C      calcium carbonate  500 mg Oral Daily PRN Leanne Ocampo PA-C      cholecalciferol  1,000 Units Oral Daily Leanne Ocampo PA-C      dicyclomine  10 mg Oral 4x Daily (AC & HS) Abdon Pruett MD      docusate sodium  100 mg Oral BID Carlene Sal MD      fluticasone  2 spray Nasal HS Leanne Ocampo PA-C      fluticasone-vilanterol  1 puff Inhalation Daily Mckay Summers PA-C      folic acid  1 mg Oral Daily Zeyad Leslie MD      lidocaine  1 patch Topical Daily Zeyad Leslie MD      LORazepam  0 5 mg Oral Q6H PRN Carlene Sal MD      metoprolol tartrate  25 mg Oral Q12H [address] Zeyad Leslie MD      multivitamin-minerals  1 tablet Oral Daily Zeyad Leslie MD      naloxone  0 04 mg Intravenous Q1MIN PRN Zeyad Leslie MD      nicotine  1 patch Transdermal Daily Leanne Ocampo PA-C      ondansetron  4 mg Oral Q6H PRN Leanne Ocampo PA-C      pantoprazole  40 mg Oral BID AC Zeyad Leslie MD      polyethylene glycol  17 g Oral BID AC Zeyad Leslie MD      QUEtiapine  200 mg Oral Daily Leanne Ocampo PA-C      QUEtiapine  600 mg Oral HS Zeyad Leslie MD      sucralfate  1 g Oral Q6H [address] Abdon Pruett MD      thiamine  100 mg Oral Daily Zeyad Leslie MD      traMADol  50 mg Oral Q6H PRN Zeyad Leslie MD      traZODone  50 mg Oral HS Jonah Holguin PA-C          Today, Patient Was Seen By: Anisa Lawrence MD    ** Please Note: Dictation voice to text software may have been used in the creation of this document   **

## 2021-05-06 NOTE — PLAN OF CARE
Problem: PHYSICAL THERAPY ADULT  Goal: Performs mobility at highest level of function for planned discharge setting  See evaluation for individualized goals  Description: Treatment/Interventions: ADL retraining, Functional transfer training, LE strengthening/ROM, Endurance training, Patient/family training, Equipment eval/education, Bed mobility, Gait training, Spoke to nursing  Equipment Recommended: Hafsa Clayton       See flowsheet documentation for full assessment, interventions and recommendations  Outcome: Progressing  Note: Prognosis: Fair  Problem List: Decreased endurance, Impaired balance, Decreased mobility, Decreased safety awareness, Pain  Assessment: Pt seen for PT treatment session this date  Therapy session focused on bed mobility, functional transfers, gait training, therapeutic exercise and endurance training in order to improve overall mobility and independence  Pt requires assist of 1 for all mobility performed this date  Pt performed bed mobility tasks at S level; HOB elevated and bedrails utilized  Pt sat EOB performed therapeutic exercise to hip, knees and ankles (2x 10 marches, LAQs and ankle pumps performed)  Pt sat EOB with frequent cues required for head/neck extension  Pt performed 2x STS from EOB to RW at close S level  Pt ambulated in room/hallway with RW  Pt with increased flexed posture and poor RW management skills noted (especially on rotational turns)  Pt educated on posture with poor carryover/understnaidng- stating "I can't do that"  Pt making progress toward goals  Pt was left supine in bed with alarm on at the end of PT session with all needs in reach  Pt would benefit from continued PT services while in hospital to address remaining limitations  PT to continue to follow pt and recommends post-acute rehab services after d/c  The patient's AM-PAC Basic Mobility Inpatient Short Form Raw Score is 17, Standardized Score is 39 67   A standardized score less than 42 9 suggests the patient may benefit from discharge to post-acute rehabilitation services  Please also refer to the recommendation of the Physical Therapist for safe discharge planning  Barriers to Discharge: Inaccessible home environment, Decreased caregiver support        PT Discharge Recommendation: Post acute rehabilitation services     PT - OK to Discharge: Yes(to rehab once medically cleared )    See flowsheet documentation for full assessment

## 2021-05-06 NOTE — ASSESSMENT & PLAN NOTE
S/p cauterization for bleeding as noted above in plan for acute blood loss anemia  Continue PPI regimen  Patient reports improvement in abdominal pain today  Continue Bentyl and Carafate as well

## 2021-05-06 NOTE — ASSESSMENT & PLAN NOTE
Initially admitted at the [facility] 1753 for suicidal ideation and eventually transferred to the [facility] due to hematemesis requiring gastroenterology intervention   Okay for outpatient follow-up once medically stable per psychiatry  Continue Seroquel/Trazodone regimen

## 2021-05-06 NOTE — CASE MANAGEMENT
Per Tia Yoder @ [facility] has been denied  No SNF level of care is required based on medical necessity  Pt can receive less intense rehab  Determined by Dr Ta Gutierrez    P2P available until 12 noon tomorrow 5/7/21 @ [phone]    Pt has appeal rights @ [phone]

## 2021-05-07 ENCOUNTER — PATIENT OUTREACH (OUTPATIENT)
Dept: FAMILY MEDICINE CLINIC | Facility: CLINIC | Age: 71
End: 2021-05-07

## 2021-05-07 VITALS
SYSTOLIC BLOOD PRESSURE: 115 MMHG | TEMPERATURE: 97.5 F | HEART RATE: 65 BPM | BODY MASS INDEX: 24.09 KG/M2 | RESPIRATION RATE: 18 BRPM | DIASTOLIC BLOOD PRESSURE: 61 MMHG | WEIGHT: 141.09 LBS | OXYGEN SATURATION: 99 % | HEIGHT: 64 IN

## 2021-05-07 DIAGNOSIS — F32.A DEPRESSION, UNSPECIFIED DEPRESSION TYPE: Primary | ICD-10-CM

## 2021-05-07 LAB
ERYTHROCYTE [DISTWIDTH] IN BLOOD BY AUTOMATED COUNT: 14.3 % (ref 11.6–15.1)
HCT VFR BLD AUTO: 29.4 % (ref 36.5–49.3)
HGB BLD-MCNC: 9.4 G/DL (ref 12–17)
MCH RBC QN AUTO: 31.5 PG (ref 26.8–34.3)
MCHC RBC AUTO-ENTMCNC: 32 G/DL (ref 31.4–37.4)
MCV RBC AUTO: 99 FL (ref 82–98)
PLATELET # BLD AUTO: 400 THOUSANDS/UL (ref 149–390)
PMV BLD AUTO: 8.7 FL (ref 8.9–12.7)
RBC # BLD AUTO: 2.98 MILLION/UL (ref 3.88–5.62)
WBC # BLD AUTO: 5.31 THOUSAND/UL (ref 4.31–10.16)

## 2021-05-07 PROCEDURE — 85027 COMPLETE CBC AUTOMATED: CPT | Performed by: INTERNAL MEDICINE

## 2021-05-07 PROCEDURE — 99239 HOSP IP/OBS DSCHRG MGMT >30: CPT | Performed by: INTERNAL MEDICINE

## 2021-05-07 RX ORDER — DOCUSATE SODIUM 100 MG/1
100 CAPSULE, LIQUID FILLED ORAL 2 TIMES DAILY
Qty: 10 CAPSULE | Refills: 0 | Status: SHIPPED | OUTPATIENT
Start: 2021-05-07 | End: 2021-05-13 | Stop reason: SDUPTHER

## 2021-05-07 RX ORDER — NICOTINE 21 MG/24HR
1 PATCH, TRANSDERMAL 24 HOURS TRANSDERMAL DAILY
Qty: 28 PATCH | Refills: 0 | Status: SHIPPED | OUTPATIENT
Start: 2021-05-08 | End: 2021-05-18

## 2021-05-07 RX ORDER — LANOLIN ALCOHOL/MO/W.PET/CERES
100 CREAM (GRAM) TOPICAL DAILY
Qty: 30 TABLET | Refills: 0 | Status: SHIPPED | OUTPATIENT
Start: 2021-05-08 | End: 2021-06-14 | Stop reason: SDUPTHER

## 2021-05-07 RX ORDER — PANTOPRAZOLE SODIUM 40 MG/1
40 TABLET, DELAYED RELEASE ORAL
Qty: 60 TABLET | Refills: 0
Start: 2021-05-07 | End: 2021-05-13 | Stop reason: SDUPTHER

## 2021-05-07 RX ORDER — SUCRALFATE 1 G/1
1 TABLET ORAL EVERY 6 HOURS SCHEDULED
Qty: 40 TABLET | Refills: 0 | Status: SHIPPED | OUTPATIENT
Start: 2021-05-07 | End: 2021-05-18 | Stop reason: SDUPTHER

## 2021-05-07 RX ORDER — DICYCLOMINE HYDROCHLORIDE 10 MG/1
10 CAPSULE ORAL
Qty: 40 CAPSULE | Refills: 0 | Status: SHIPPED | OUTPATIENT
Start: 2021-05-07 | End: 2021-05-13 | Stop reason: SDUPTHER

## 2021-05-07 RX ORDER — ACETAMINOPHEN 325 MG/1
650 TABLET ORAL EVERY 6 HOURS PRN
Qty: 30 TABLET | Refills: 0 | Status: SHIPPED | OUTPATIENT
Start: 2021-05-07

## 2021-05-07 RX ORDER — METOPROLOL TARTRATE 50 MG/1
25 TABLET, FILM COATED ORAL EVERY 12 HOURS SCHEDULED
Qty: 30 TABLET | Refills: 0 | Status: SHIPPED | OUTPATIENT
Start: 2021-05-07 | End: 2021-07-18

## 2021-05-07 RX ADMIN — DOCUSATE SODIUM 100 MG: 100 CAPSULE, LIQUID FILLED ORAL at 18:19

## 2021-05-07 RX ADMIN — DICYCLOMINE HYDROCHLORIDE 10 MG: 10 CAPSULE ORAL at 05:11

## 2021-05-07 RX ADMIN — FOLIC ACID 1 MG: 1 TABLET ORAL at 08:25

## 2021-05-07 RX ADMIN — THIAMINE HCL TAB 100 MG 100 MG: 100 TAB at 08:24

## 2021-05-07 RX ADMIN — PANTOPRAZOLE SODIUM 40 MG: 40 TABLET, DELAYED RELEASE ORAL at 05:11

## 2021-05-07 RX ADMIN — TRAMADOL HYDROCHLORIDE 50 MG: 50 TABLET, FILM COATED ORAL at 08:25

## 2021-05-07 RX ADMIN — QUETIAPINE FUMARATE 200 MG: 100 TABLET ORAL at 08:24

## 2021-05-07 RX ADMIN — METOPROLOL TARTRATE 25 MG: 25 TABLET, FILM COATED ORAL at 08:24

## 2021-05-07 RX ADMIN — DICYCLOMINE HYDROCHLORIDE 10 MG: 10 CAPSULE ORAL at 16:46

## 2021-05-07 RX ADMIN — PANTOPRAZOLE SODIUM 40 MG: 40 TABLET, DELAYED RELEASE ORAL at 16:46

## 2021-05-07 RX ADMIN — Medication 1000 UNITS: at 08:24

## 2021-05-07 RX ADMIN — POLYETHYLENE GLYCOL 3350 17 G: 17 POWDER, FOR SOLUTION ORAL at 08:25

## 2021-05-07 RX ADMIN — LIDOCAINE 1 PATCH: 50 PATCH TOPICAL at 08:25

## 2021-05-07 RX ADMIN — SUCRALFATE 1 G: 1 TABLET ORAL at 12:07

## 2021-05-07 RX ADMIN — DICYCLOMINE HYDROCHLORIDE 10 MG: 10 CAPSULE ORAL at 12:07

## 2021-05-07 RX ADMIN — FLUTICASONE FUROATE AND VILANTEROL TRIFENATATE 1 PUFF: 100; 25 POWDER RESPIRATORY (INHALATION) at 12:08

## 2021-05-07 RX ADMIN — SUCRALFATE 1 G: 1 TABLET ORAL at 00:23

## 2021-05-07 RX ADMIN — DOCUSATE SODIUM 100 MG: 100 CAPSULE, LIQUID FILLED ORAL at 08:24

## 2021-05-07 RX ADMIN — ATORVASTATIN CALCIUM 40 MG: 40 TABLET, FILM COATED ORAL at 08:24

## 2021-05-07 RX ADMIN — SUCRALFATE 1 G: 1 TABLET ORAL at 05:11

## 2021-05-07 RX ADMIN — Medication 1 TABLET: at 08:24

## 2021-05-07 RX ADMIN — Medication 1 PATCH: at 08:26

## 2021-05-07 RX ADMIN — SUCRALFATE 1 G: 1 TABLET ORAL at 18:19

## 2021-05-07 NOTE — ASSESSMENT & PLAN NOTE
Initially admitted at the [facility] for suicidal ideation and eventually transferred to the [facility] due to hematemesis requiring gastroenterology intervention   Okay for outpatient follow-up once medically stable per psychiatry  Continue Seroquel/Trazodone regimen

## 2021-05-07 NOTE — CASE MANAGEMENT
CM was notified by pt's attending that the peer to peer was denied  CM notified pt's contact Lindsey Calles of the decision  CONNIE discussed the aftercare plans with Karla Quintana that pt will need home care services at d/c  Per Karla Quintana she would like a referral to [facility]  CM sent referral as request  Karla Quintana requested time to make arrangements and stated that she would call back CM at the end of the day  CONNIE will follow

## 2021-05-07 NOTE — CASE MANAGEMENT
CM s/w pt's contact Yana Kumari and notified her that [facility] does not have any availability to accept the pt  CM asked for additional referrals and per Yana Kumari she would like a referral sent to  [facility]  CM sent the referral as requested  Per Yana Kumari she will be able to come at 7:00 PM to  the pt today  CM will follow

## 2021-05-07 NOTE — ASSESSMENT & PLAN NOTE
Hemoglobin initially in normal range two weeks ago -> progressively dropped but now stabilized over last 3-4 days  EGD noted a large bleeding duodenal ulcer s/p cauterization - continue PPI regimen   On SCDs for DVT prophylaxis  Hb stable and trending up

## 2021-05-07 NOTE — ASSESSMENT & PLAN NOTE
Appreciate PT/evaluation -> plan for discharge to skilled rehab once bed obtained - PT OT recommended rehab  But apparently was refused by his insurance  So family to take the patient home with home care

## 2021-05-07 NOTE — PLAN OF CARE
Problem: Prexisting or High Potential for Compromised Skin Integrity  Goal: Skin integrity is maintained or improved  Description: INTERVENTIONS:  - Identify patients at risk for skin breakdown  - Assess and monitor skin integrity  - Assess and monitor nutrition and hydration status  - Monitor labs   - Assess for incontinence   - Turn and reposition patient  - Assist with mobility/ambulation  - Relieve pressure over bony prominences  - Avoid friction and shearing  - Provide appropriate hygiene as needed including keeping skin clean and dry  - Evaluate need for skin moisturizer/barrier cream  - Collaborate with interdisciplinary team   - Patient/family teaching  - Consider wound care consult   Outcome: Progressing     Problem: Potential for Falls  Goal: Patient will remain free of falls  Description: INTERVENTIONS:  - Assess patient frequently for physical needs  -  Identify cognitive and physical deficits and behaviors that affect risk of falls    -  [place] fall precautions as indicated by assessment   - Educate patient/family on patient safety including physical limitations  - Instruct patient to call for assistance with activity based on assessment  - Modify environment to reduce risk of injury  - Consider OT/PT consult to assist with strengthening/mobility  Outcome: Progressing     Problem: PAIN - ADULT  Goal: Verbalizes/displays adequate comfort level or baseline comfort level  Description: Interventions:  - Encourage patient to monitor pain and request assistance  - Assess pain using appropriate pain scale  - Administer analgesics based on type and severity of pain and evaluate response  - Implement non-pharmacological measures as appropriate and evaluate response  - Consider cultural and social influences on pain and pain management  - Notify physician/advanced practitioner if interventions unsuccessful or patient reports new pain  Outcome: Progressing     Problem: INFECTION - ADULT  Goal: Absence or prevention of progression during hospitalization  Description: INTERVENTIONS:  - Assess and monitor for signs and symptoms of infection  - Monitor lab/diagnostic results  - Monitor all insertion sites, i e  indwelling lines, tubes, and drains  - Monitor endotracheal if appropriate and nasal secretions for changes in amount and color  - [place] appropriate cooling/warming therapies per order  - Administer medications as ordered  - Instruct and encourage patient and family to use good hand hygiene technique  - Identify and instruct in appropriate isolation precautions for identified infection/condition  Outcome: Progressing  Goal: Absence of fever/infection during neutropenic period  Description: INTERVENTIONS:  - Monitor WBC    Outcome: Progressing     Problem: SAFETY ADULT  Goal: Patient will remain free of falls  Description: INTERVENTIONS:  - Assess patient frequently for physical needs  -  Identify cognitive and physical deficits and behaviors that affect risk of falls    -  [place] fall precautions as indicated by assessment   - Educate patient/family on patient safety including physical limitations  - Instruct patient to call for assistance with activity based on assessment  - Modify environment to reduce risk of injury  - Consider OT/PT consult to assist with strengthening/mobility  Outcome: Progressing  Goal: Maintain or return to baseline ADL function  Description: INTERVENTIONS:  -  Assess patient's ability to carry out ADLs; assess patient's baseline for ADL function and identify physical deficits which impact ability to perform ADLs (bathing, care of mouth/teeth, toileting, grooming, dressing, etc )  - Assess/evaluate cause of self-care deficits   - Assess range of motion  - Assess patient's mobility; develop plan if impaired  - Assess patient's need for assistive devices and provide as appropriate  - Encourage maximum independence but intervene and supervise when necessary  - Involve family in performance of ADLs  - Assess for home care needs following discharge   - Consider OT consult to assist with ADL evaluation and planning for discharge  - Provide patient education as appropriate  Outcome: Progressing  Goal: Maintain or return mobility status to optimal level  Description: INTERVENTIONS:  - Assess patient's baseline mobility status (ambulation, transfers, stairs, etc )    - Identify cognitive and physical deficits and behaviors that affect mobility  - Identify mobility aids required to assist with transfers and/or ambulation (gait belt, sit-to-stand, lift, walker, cane, etc )  - [place] fall precautions as indicated by assessment  - Record patient progress and toleration of activity level on Mobility SBAR; progress patient to next Phase/Stage  - Instruct patient to call for assistance with activity based on assessment  - Consider rehabilitation consult to assist with strengthening/weightbearing, etc   Outcome: Progressing     Problem: DISCHARGE PLANNING  Goal: Discharge to home or other facility with appropriate resources  Description: INTERVENTIONS:  - Identify barriers to discharge w/patient and caregiver  - Arrange for needed discharge resources and transportation as appropriate  - Identify discharge learning needs (meds, wound care, etc )  - Arrange for interpretive services to assist at discharge as needed  - Refer to Case Management Department for coordinating discharge planning if the patient needs post-hospital services based on physician/advanced practitioner order or complex needs related to functional status, cognitive ability, or social support system  Outcome: Progressing     Problem: Nutrition/Hydration-ADULT  Goal: Nutrient/Hydration intake appropriate for improving, restoring or maintaining nutritional needs  Description: Monitor and assess patient's nutrition/hydration status for malnutrition  Collaborate with interdisciplinary team and initiate plan and interventions as ordered  Monitor patient's weight and dietary intake as ordered or per policy  Utilize nutrition screening tool and intervene as necessary  Determine patient's food preferences and provide high-protein, high-caloric foods as appropriate       INTERVENTIONS:  - Monitor oral intake, urinary output, labs, and treatment plans  - Assess nutrition and hydration status and recommend course of action  - Evaluate amount of meals eaten  - Assist patient with eating if necessary   - Allow adequate time for meals  - Recommend/ encourage appropriate diets, oral nutritional supplements, and vitamin/mineral supplements  - Order, calculate, and assess calorie counts as needed  - Recommend, monitor, and adjust tube feedings and TPN/PPN based on assessed needs  - Assess need for intravenous fluids  - Provide specific nutrition/hydration education as appropriate  - Include patient/family/caregiver in decisions related to nutrition  Outcome: Progressing

## 2021-05-07 NOTE — DISCHARGE SUMMARY
[facility]  Discharge- Perla Ramp 1950, 78 y o  male MRN: [id number]  [facility] Encounter: [id number]  Primary Care Provider: Kayleigh Jade DO   Date and time admitted to hospital: 4/21/2021  5:34 PM    * Acute blood loss anemia  Assessment & Plan  Hemoglobin initially in normal range two weeks ago -> progressively dropped but now stabilized over last 3-4 days  EGD noted a large bleeding duodenal ulcer s/p cauterization - continue PPI regimen   On SCDs for DVT prophylaxis  Hb stable and trending up    Duodenal ulcer  Assessment & Plan  S/p cauterization for bleeding as noted above in plan for acute blood loss anemia  Continue PPI regimen  Patient reports persistent abdominal pain  Continue Bentyl and Carafate as well  Tolerating diet well  Outpatient follow-up GI    Essential hypertension  Assessment & Plan  Continue Lopressor    Schizophrenia  Assessment & Plan  Initially admitted at the [place] [address] for suicidal ideation and eventually transferred to the [address] [place] [address] due to hematemesis requiring gastroenterology intervention   Okay for outpatient follow-up once medically stable per psychiatry  Continue Seroquel/Trazodone regimen    Physical deconditioning  Assessment & Plan  Appreciate PT/evaluation -> plan for discharge to skilled rehab once bed obtained - PT OT recommended rehab  But apparently was refused by his insurance  So family to take the patient home with home care      History of alcohol abuse  Assessment & Plan  Cessation counseling - refusing alcohol rehab  Supplemental thiamine/multivitamin/folic acid on board          Discharging Physician / Practitioner: Jasmine Sousa MD  PCP: Kayleigh Jade DO  Admission Date:   Admission Orders (From admission, onward)     Ordered        04/21/21 1812  Inpatient Admission  Once                   Discharge Date: 05/07/21    Resolved Problems  Date Reviewed: 5/3/2021 Resolved    Essential hypertension 4/22/2021     Resolved by  Carolina Ewing PA-C          Consultations During Hospital Stay:  · GI, Psychiatry    Procedures Performed:   · EGD as above       Outpatient Tests Requested:  · CBC w diff and CMP in 1 week    Complications:  none    Reason for Admission:  Acute blood loss anemia due to bleeding duodenal ulcer    Hospital Course:     Dakota Escalona is a 78 y o  male patient who originally presented to the hospital on 4/21/2021 due to acute blood loss anemia due to bleeding duodenal ulcer  Patient initially presented to [facility] with abdominal pain and suicidal ideation  After negative CT, patient was admitted to behavior unit  He started to experience coffee-ground emesis and was transferred to critical care for GI bleed  Patient's hemoglobin dropped  Patient was transferred to [facility] for further GI evaluation intervention  I had EGD that showed bleeding duodenal ulcer that was stopped  Hemoglobin stabilized been improving  Patient was allowed by Psychiatry  Denies any suicidal or homicidal ideations  Cleared for discharge from Psychiatry standpoint with outpatient psych follow-up  Patient's insurance refused rehab  Family take the patient home today with home health care  Close outpatient follow-up PCP and GI  Repeat CBC in 1 week  Please see above list of diagnoses and related plan for additional information  Condition at Discharge: fair     Discharge Day Visit / Exam:     Subjective:  Pt seen and examined by me this morning  Pt complains of constant abdominal pain without any aggravating or elevating factors  Tolerating diet without any nausea or vomiting      Vitals: Blood Pressure: 156/81 (05/07/21 0804)  Pulse: 62 (05/07/21 0804)  Temperature: 97 6 °F (36 4 °C) (05/07/21 0804)  Temp Source: Oral (05/06/21 0749)  Respirations: 13 (05/07/21 0804)  Height: 5' 4" (162 6 cm) (04/22/21 1913)  Weight - Scale: 64 kg (141 lb 1 5 oz) (05/07/21 0600)  SpO2: 96 % (05/07/21 0804)     Exam:   Physical Exam    Constitutional: Pt appears comfortable  Not in any acute distress  Cardiovascular: Normal rate, regular rhythm, normal heart sounds  No murmur heard  Pulmonary/Chest: Effort normal, air entry b/l equal  No respiratory distress  Pt has no wheezes or crackles  Abdominal: Soft  Non-distended, mild upper abdominal tenderness, no guarding or rigidity  Bowel sounds are normal    Neurological: awake, alert  Moving all extremities spontaneously  Psychiatric: normal mood and affect  Discussion with Family: pt  Tried calling Trammell Bimler give updates unable to reach her so left message  Discharge instructions/Information to patient and family:   See after visit summary for information provided to patient and family  Provisions for Follow-Up Care:  See after visit summary for information related to follow-up care and any pertinent home health orders  Disposition:     Home    For Discharges to [facility] SNF:   · Not Applicable to this Patient - Not Applicable to this Patient    Planned Readmission: no     Discharge Statement:  I spent 40 minutes discharging the patient  This time was spent on the day of discharge  I had direct contact with the patient on the day of discharge  Greater than 50% of the total time was spent examining patient, answering all patient questions, arranging and discussing plan of care with patient as well as directly providing post-discharge instructions  Additional time then spent on discharge activities  Discharge Medications:  See after visit summary for reconciled discharge medications provided to patient and family        ** Please Note: This note has been constructed using a voice recognition system **

## 2021-05-07 NOTE — ASSESSMENT & PLAN NOTE
S/p cauterization for bleeding as noted above in plan for acute blood loss anemia  Continue PPI regimen  Patient reports persistent abdominal pain  Continue Bentyl and Carafate as well    Tolerating diet well  Outpatient follow-up GI

## 2021-05-07 NOTE — PROGRESS NOTES
Outpatient Care Management Note:  RE:Chart review completed as pt is inpt  Will request addition of LSW teammate for when pt is D/C'd

## 2021-05-07 NOTE — CASE MANAGEMENT
CM s/w pt's contact Yana Kumrai and notified her that [facility] was also unable to accept the pt as they are currently closed to referrals  Per Yana Kumari she is agreeable to CM sending a blanket referral  CM sent referral as requested  CM s/w pt to review equipment needs at d/c per pt he forgot to mention that he has a walker and a wheel chair at home so he has what he needs at home  CM will follow

## 2021-05-07 NOTE — CASE MANAGEMENT
Per Dr Kavitha Banks pt is medically stable for d/c  Pt is being d/c home today at 7:00 PM with [employer] [facility]  Pt's family will be providing transportation home  CM notified pt, pt's contact Fei Rodriguez, pt's attending and pt's nurse of d/c  CM will follow

## 2021-05-10 ENCOUNTER — PATIENT OUTREACH (OUTPATIENT)
Dept: FAMILY MEDICINE CLINIC | Facility: CLINIC | Age: 71
End: 2021-05-10

## 2021-05-10 ENCOUNTER — TRANSITIONAL CARE MANAGEMENT (OUTPATIENT)
Dept: FAMILY MEDICINE CLINIC | Facility: CLINIC | Age: 71
End: 2021-05-10

## 2021-05-10 NOTE — PROGRESS NOTES
OP CM called to pt to follow up on psych needs s/p dc from the hospital   Pt has dx of schizophrenia and MDD  Pt states he already has a psychiatrist at [facility] [employer] [phone] and he sees her once every two months  Pt does not have an OP [facility] therapist and does not want one  Pt states his last psych admission was bc his pain was so bad from his bleeding ulcer that he felt like dying  Pt denies any current SI/HI  Pt resides with son Ofelia Tom in a home with one step to enter  Pt sleeps in the living room or bedroom  Pt states his bedroom is in the basement but pt has a stair glide  Pt uses a cane mostly at home  Pt also has a roller walker and wheelchair  Pt states his son has a friend that drives him to appts  Pt states his son works during the day  Pt is charlene to prepare his own meals  Pt has [facility] home care following him  Pt was getting SSD but he states they stopped payments when he got out of [email or web address] about 1-2 years ago  Pt states he will be calling social security because the last time he spoke to them he was advised he is owed 8000 already  Pt states that he last drank alcohol three weeks ago  Pt states he has not had a drink since he has been home from the hospital on Friday  Pt is aware his ulcer is from his etoh intake and pt states that really scared him so he does not want to drink anymore  Offered to assist pt with etoh concerns but pt does not feel he needs help at this time  OP CM will continue to follow

## 2021-05-12 ENCOUNTER — RA CDI HCC (OUTPATIENT)
Dept: OTHER | Facility: HOSPITAL | Age: 71
End: 2021-05-12

## 2021-05-12 NOTE — PROGRESS NOTES
[facility]  coding opportunities          Chart reviewed, no opportunity found: CHART REVIEWED, NO OPPORTUNITY FOUND              Patients insurance company: [facility] [address]  (Medicare Advantage and Commercial)

## 2021-05-13 ENCOUNTER — TELEPHONE (OUTPATIENT)
Dept: FAMILY MEDICINE CLINIC | Facility: CLINIC | Age: 71
End: 2021-05-13

## 2021-05-13 DIAGNOSIS — R10.9 ABDOMINAL PAIN: ICD-10-CM

## 2021-05-13 DIAGNOSIS — K26.9 DUODENAL ULCER: ICD-10-CM

## 2021-05-13 NOTE — TELEPHONE ENCOUNTER
Patients emergency contact, Leti Arriaga, is calling for medication refills  Leti Arriaga states that patient does not have enough medication to last until TCM appointment on May 18  Leti Arriaga is wondering if a refill can be given for Pantoprazole, Dicyclomine and Docusate, medications were prescribed at discharge  Pharmacy is [facility]

## 2021-05-14 NOTE — UTILIZATION REVIEW
Notification of Discharge   This is a Notification of Discharge from our facility [address]  Please be advised that this patient has been discharge from our facility  Below you will find the admission and discharge date and time including the patients disposition  UTILIZATION REVIEW CONTACT:  Anabel Stephens  Utilization   Network Utilization Review Department  Phone: [phone] x carefully listen to the prompts  All voicemails are confidential   Email: [email or web address]     PHYSICIAN ADVISORY SERVICES:  FOR ACAB-KW-PACQ REVIEW - MEDICAL NECESSITY DENIAL  Phone: [phone]  Fax: [phone]  Email: [email or web address]     PRESENTATION DATE: 4/21/2021  5:34 PM  OBERVATION ADMISSION DATE:   INPATIENT ADMISSION DATE: 4/21/21 1734   DISCHARGE DATE: 5/7/2021  8:00 PM  DISPOSITION: Home with [place] with [address] [place] [address] Road INFORMATION:  Send all requests for admission clinical reviews, approved or denied determinations and any other requests to dedicated fax number below belonging to the campus where the patient is receiving treatment   List of dedicated fax numbers:  [address] [place] [address] DENIALS (Administrative/Medical Necessity) [phone]   [address] [place] [address] (Maternity/NICU/Pediatrics) [phone]   Kavya Kelly [phone]   Chris Moles [phone]   Mode Arreola [phone]   [facility] [address] [facility] [phone]   [facility]  [phone]   [address] [facility]  [facility] [address] [facility] [phone]

## 2021-05-15 RX ORDER — DOCUSATE SODIUM 100 MG/1
100 CAPSULE, LIQUID FILLED ORAL 2 TIMES DAILY
Qty: 10 CAPSULE | Refills: 0 | Status: SHIPPED | OUTPATIENT
Start: 2021-05-15

## 2021-05-15 RX ORDER — PANTOPRAZOLE SODIUM 40 MG/1
40 TABLET, DELAYED RELEASE ORAL
Qty: 60 TABLET | Refills: 0 | Status: SHIPPED | OUTPATIENT
Start: 2021-05-15 | End: 2021-06-14 | Stop reason: SDUPTHER

## 2021-05-15 RX ORDER — DICYCLOMINE HYDROCHLORIDE 10 MG/1
10 CAPSULE ORAL
Qty: 40 CAPSULE | Refills: 0 | Status: SHIPPED | OUTPATIENT
Start: 2021-05-15 | End: 2021-06-14 | Stop reason: SDUPTHER

## 2021-05-18 ENCOUNTER — APPOINTMENT (OUTPATIENT)
Dept: LAB | Facility: CLINIC | Age: 71
End: 2021-05-18
Payer: COMMERCIAL

## 2021-05-18 ENCOUNTER — TRANSCRIBE ORDERS (OUTPATIENT)
Dept: LAB | Facility: CLINIC | Age: 71
End: 2021-05-18

## 2021-05-18 ENCOUNTER — OFFICE VISIT (OUTPATIENT)
Dept: FAMILY MEDICINE CLINIC | Facility: CLINIC | Age: 71
End: 2021-05-18
Payer: COMMERCIAL

## 2021-05-18 VITALS
DIASTOLIC BLOOD PRESSURE: 80 MMHG | BODY MASS INDEX: 25.44 KG/M2 | RESPIRATION RATE: 17 BRPM | WEIGHT: 149 LBS | HEART RATE: 79 BPM | SYSTOLIC BLOOD PRESSURE: 146 MMHG | OXYGEN SATURATION: 99 % | HEIGHT: 64 IN | TEMPERATURE: 97.6 F

## 2021-05-18 DIAGNOSIS — I10 ESSENTIAL HYPERTENSION: ICD-10-CM

## 2021-05-18 DIAGNOSIS — R26.2 AMBULATORY DYSFUNCTION: ICD-10-CM

## 2021-05-18 DIAGNOSIS — J44.9 CHRONIC OBSTRUCTIVE PULMONARY DISEASE, UNSPECIFIED COPD TYPE (HCC): ICD-10-CM

## 2021-05-18 DIAGNOSIS — D62 ACUTE BLOOD LOSS ANEMIA: ICD-10-CM

## 2021-05-18 DIAGNOSIS — Z92.89 HISTORY OF RECENT HOSPITALIZATION: ICD-10-CM

## 2021-05-18 DIAGNOSIS — Z12.5 PROSTATE CANCER SCREENING: ICD-10-CM

## 2021-05-18 DIAGNOSIS — R41.89 COGNITIVE DECLINE: ICD-10-CM

## 2021-05-18 DIAGNOSIS — R35.0 URINARY FREQUENCY: ICD-10-CM

## 2021-05-18 DIAGNOSIS — J96.01 ACUTE RESPIRATORY FAILURE WITH HYPOXIA (HCC): ICD-10-CM

## 2021-05-18 DIAGNOSIS — K92.2 ACUTE UPPER GI BLEED: ICD-10-CM

## 2021-05-18 DIAGNOSIS — R73.01 ELEVATED FASTING GLUCOSE: ICD-10-CM

## 2021-05-18 DIAGNOSIS — R10.9 ABDOMINAL PAIN: ICD-10-CM

## 2021-05-18 DIAGNOSIS — Z09 HOSPITAL DISCHARGE FOLLOW-UP: Primary | ICD-10-CM

## 2021-05-18 DIAGNOSIS — G81.91 HEMIPLEGIA AFFECTING RIGHT DOMINANT SIDE, UNSPECIFIED ETIOLOGY, UNSPECIFIED HEMIPLEGIA TYPE (HCC): ICD-10-CM

## 2021-05-18 DIAGNOSIS — I10 ESSENTIAL HYPERTENSION, MALIGNANT: Primary | ICD-10-CM

## 2021-05-18 DIAGNOSIS — Z87.11 HISTORY OF BLEEDING ULCERS: ICD-10-CM

## 2021-05-18 DIAGNOSIS — D64.89 ANEMIA DUE TO OTHER CAUSE, NOT CLASSIFIED: ICD-10-CM

## 2021-05-18 DIAGNOSIS — D68.9 COAGULATION DEFECT, UNSPECIFIED (HCC): ICD-10-CM

## 2021-05-18 DIAGNOSIS — Z87.19 HISTORY OF GI BLEED: ICD-10-CM

## 2021-05-18 DIAGNOSIS — K26.9 DUODENAL ULCER: ICD-10-CM

## 2021-05-18 LAB
25(OH)D3 SERPL-MCNC: 38.3 NG/ML (ref 30–100)
ALBUMIN SERPL BCP-MCNC: 3.3 G/DL (ref 3.5–5)
ALP SERPL-CCNC: 62 U/L (ref 46–116)
ALT SERPL W P-5'-P-CCNC: 8 U/L (ref 12–78)
ANION GAP SERPL CALCULATED.3IONS-SCNC: 8 MMOL/L (ref 4–13)
AST SERPL W P-5'-P-CCNC: 8 U/L (ref 5–45)
BASOPHILS # BLD AUTO: 0.04 THOUSANDS/ΜL (ref 0–0.1)
BASOPHILS NFR BLD AUTO: 1 % (ref 0–1)
BILIRUB SERPL-MCNC: 0.36 MG/DL (ref 0.2–1)
BUN SERPL-MCNC: 7 MG/DL (ref 5–25)
CALCIUM ALBUM COR SERPL-MCNC: 10.3 MG/DL (ref 8.3–10.1)
CALCIUM SERPL-MCNC: 9.7 MG/DL (ref 8.3–10.1)
CHLORIDE SERPL-SCNC: 105 MMOL/L (ref 100–108)
CO2 SERPL-SCNC: 25 MMOL/L (ref 21–32)
CREAT SERPL-MCNC: 0.82 MG/DL (ref 0.6–1.3)
EOSINOPHIL # BLD AUTO: 0.12 THOUSAND/ΜL (ref 0–0.61)
EOSINOPHIL NFR BLD AUTO: 3 % (ref 0–6)
ERYTHROCYTE [DISTWIDTH] IN BLOOD BY AUTOMATED COUNT: 13.7 % (ref 11.6–15.1)
EST. AVERAGE GLUCOSE BLD GHB EST-MCNC: 108 MG/DL
GFR SERPL CREATININE-BSD FRML MDRD: 91 ML/MIN/1.73SQ M
GLUCOSE SERPL-MCNC: 104 MG/DL (ref 65–140)
HBA1C MFR BLD: 5.4 %
HCT VFR BLD AUTO: 30.6 % (ref 36.5–49.3)
HGB BLD-MCNC: 10 G/DL (ref 12–17)
IMM GRANULOCYTES # BLD AUTO: 0.01 THOUSAND/UL (ref 0–0.2)
IMM GRANULOCYTES NFR BLD AUTO: 0 % (ref 0–2)
LYMPHOCYTES # BLD AUTO: 1.66 THOUSANDS/ΜL (ref 0.6–4.47)
LYMPHOCYTES NFR BLD AUTO: 36 % (ref 14–44)
MCH RBC QN AUTO: 31 PG (ref 26.8–34.3)
MCHC RBC AUTO-ENTMCNC: 32.7 G/DL (ref 31.4–37.4)
MCV RBC AUTO: 95 FL (ref 82–98)
MONOCYTES # BLD AUTO: 0.51 THOUSAND/ΜL (ref 0.17–1.22)
MONOCYTES NFR BLD AUTO: 11 % (ref 4–12)
NEUTROPHILS # BLD AUTO: 2.27 THOUSANDS/ΜL (ref 1.85–7.62)
NEUTS SEG NFR BLD AUTO: 49 % (ref 43–75)
NRBC BLD AUTO-RTO: 0 /100 WBCS
PLATELET # BLD AUTO: 307 THOUSANDS/UL (ref 149–390)
PMV BLD AUTO: 9 FL (ref 8.9–12.7)
POTASSIUM SERPL-SCNC: 3.7 MMOL/L (ref 3.5–5.3)
PROT SERPL-MCNC: 7.7 G/DL (ref 6.4–8.2)
PSA SERPL-MCNC: 0.9 NG/ML (ref 0–4)
RBC # BLD AUTO: 3.23 MILLION/UL (ref 3.88–5.62)
SODIUM SERPL-SCNC: 138 MMOL/L (ref 136–145)
TSH SERPL DL<=0.05 MIU/L-ACNC: 1.14 UIU/ML (ref 0.36–3.74)
VIT B12 SERPL-MCNC: 425 PG/ML (ref 100–900)
WBC # BLD AUTO: 4.61 THOUSAND/UL (ref 4.31–10.16)

## 2021-05-18 PROCEDURE — 83036 HEMOGLOBIN GLYCOSYLATED A1C: CPT

## 2021-05-18 PROCEDURE — 82306 VITAMIN D 25 HYDROXY: CPT

## 2021-05-18 PROCEDURE — 84443 ASSAY THYROID STIM HORMONE: CPT

## 2021-05-18 PROCEDURE — 36415 COLL VENOUS BLD VENIPUNCTURE: CPT

## 2021-05-18 PROCEDURE — 1111F DSCHRG MED/CURRENT MED MERGE: CPT | Performed by: FAMILY MEDICINE

## 2021-05-18 PROCEDURE — 80053 COMPREHEN METABOLIC PANEL: CPT

## 2021-05-18 PROCEDURE — 85025 COMPLETE CBC W/AUTO DIFF WBC: CPT

## 2021-05-18 PROCEDURE — 99496 TRANSJ CARE MGMT HIGH F2F 7D: CPT | Performed by: FAMILY MEDICINE

## 2021-05-18 PROCEDURE — 82607 VITAMIN B-12: CPT

## 2021-05-18 PROCEDURE — G0103 PSA SCREENING: HCPCS

## 2021-05-18 RX ORDER — SUCRALFATE 1 G/1
1 TABLET ORAL 4 TIMES DAILY
Qty: 120 TABLET | Refills: 0 | Status: SHIPPED | OUTPATIENT
Start: 2021-05-18 | End: 2021-06-17

## 2021-05-18 NOTE — PROGRESS NOTES
Assessment/Plan:     No problem-specific Assessment & Plan notes found for this encounter  Diagnoses and all orders for this visit:    Hospital discharge follow-up    History of recent hospitalization    Duodenal ulcer  Comments:  resume carafate  Orders:  -     Ambulatory Referral to [address] [place] [address]; Future  -     sucralfate (CARAFATE) 1 g tablet; Take 1 tablet (1 g total) by mouth 4 (four) times a day (Patient not taking: Reported on 5/25/2021)    Abdominal pain  -     Ambulatory Referral to Home Health; Future  -     sucralfate (CARAFATE) 1 g tablet; Take 1 tablet (1 g total) by mouth 4 (four) times a day (Patient not taking: Reported on 5/25/2021)    Hemiplegia affecting right dominant side, unspecified etiology, unspecified hemiplegia type [facility])  -     Ambulatory Referral to [address] [place] [address]; Future    Acute respiratory failure with hypoxia [facility])    Ambulatory dysfunction  -     Ambulatory Referral to Home Health; Future    Chronic obstructive pulmonary disease, unspecified COPD type ([facility] [address] )  -     Ambulatory Referral to Home Health; Future    History of GI bleed  -     Ambulatory Referral to Home Health; Future    Acute upper GI bleed  -     Ambulatory Referral to Home Health; Future  -     CBC and differential; Future    Anemia due to other cause, not classified    Acute blood loss anemia  -     Ambulatory Referral to Home Health; Future  -     CBC and differential; Future         Subjective:  Chief Complaint   Patient presents with    Transition of Care Management     Discharged 5/7 from [facility]  Doing okay since discharge    Hypertension     States systolic number has been high     Medication Management     Was prescribed Sucralfate at dischage  Unsure if to continue medication   Anxiety     Seems more anxious since discharge         Patient ID: Romain Parekh is a 78 y o  male      Here with his son   Per c/c, RUSLAN and hosp notes reviewed by me  Bp at home 170-180 since discharge- Son states, "he's been telling me he's stressed"- wrist cuff being used for home bp readings  Per son, Natasha Chappell gave his 2nd COVID vaccine last week Monday or Tuesday- was 05/10 per son's calendar  Completely quit all alcohol  Psychiatrist calls once a month         4/21/2021 - 5/7/2021 (16 days)  [facility]  Acute blood loss anemia  Assessment & Plan  Hemoglobin initially in normal range two weeks ago -> progressively dropped but now stabilized over last 3-4 days  EGD noted a large bleeding duodenal ulcer s/p cauterization - continue PPI regimen   On SCDs for DVT prophylaxis  Hb stable and trending up  Duodenal ulcer  Assessment & Plan  S/p cauterization for bleeding as noted above in plan for acute blood loss anemia  Continue PPI regimen  Patient reports persistent abdominal pain  Continue Bentyl and Carafate as well  Tolerating diet well  Outpatient follow-up GI  Essential hypertension  Assessment & Plan  Continue Lopressor  Schizophrenia  Assessment & Plan  Initially admitted at the [facility] for suicidal ideation and eventually transferred to the [facility] due to hematemesis requiring gastroenterology intervention   Okay for outpatient follow-up once medically stable per psychiatry  Continue Seroquel/Trazodone regimen  Physical deconditioning  Assessment & Plan  Appreciate PT/evaluation -> plan for discharge to skilled rehab once bed obtained - PT OT recommended rehab  But apparently was refused by his insurance  So family to take the patient home with home care  History of alcohol abuse  Assessment & Plan  Cessation counseling - refusing alcohol rehab  Supplemental thiamine/multivitamin/folic acid on board                Review of Systems   Constitutional: Negative for appetite change, chills, diaphoresis, fever and unexpected weight change  HENT: Negative for mouth sores and nosebleeds      Respiratory: Negative for apnea, choking, chest tightness, wheezing and stridor  Cardiovascular: Negative  Genitourinary: Negative for decreased urine volume, difficulty urinating and hematuria  Hematological: Negative  Objective:     Physical Exam  Constitutional:       General: He is not in acute distress  Appearance: He is well-groomed  He is ill-appearing (chronically-ill appearance unchnged)  He is not toxic-appearing or diaphoretic  HENT:      Head: Normocephalic and atraumatic  Eyes:      General: Lids are normal       Conjunctiva/sclera: Conjunctivae normal    Neck:      Musculoskeletal: Neck supple  Thyroid: No thyroid mass or thyromegaly  Vascular: No JVD  Trachea: Phonation normal    Cardiovascular:      Rate and Rhythm: Normal rate and regular rhythm  Heart sounds: S1 normal and S2 normal  No friction rub  No gallop  Pulmonary:      Effort: Pulmonary effort is normal       Breath sounds: Normal breath sounds  Abdominal:      General: Bowel sounds are normal       Palpations: Abdomen is soft  There is no hepatomegaly, splenomegaly or mass  Tenderness: There is no abdominal tenderness  Musculoskeletal:      Right lower leg: No edema  Left lower leg: No edema  Skin:     Coloration: Skin is not pale  Neurological:      Mental Status: He is alert  Psychiatric:         Mood and Affect: Mood normal          Behavior: Behavior is cooperative  Vitals:    05/18/21 1056   BP: 146/80   BP Location: Left arm   Patient Position: Sitting   Pulse: 79   Resp: 17   Temp: 97 6 °F (36 4 °C)   SpO2: 99%   Weight: 67 6 kg (149 lb)   Height: 5' 4" (1 626 m)       Transitional Care Management Review:  Elle Michelle is a 78 y o  male here for TCM follow up       During the TCM phone call patient stated:    TCM Call (since 4/24/2021)     Date and time call was made  5/10/2021  8:43 AM    Hospital care reviewed  Records reviewed    Patient was hospitialized at  [facility]        Date of Admission  04/21/21 Date of discharge  05/07/21    Diagnosis  Acute blood loss anemia    Disposition  Home    Current Symptoms  None      TCM Call (since 4/24/2021)     Post hospital issues  None    Should patient be enrolled in anticoag monitoring? No    Scheduled for follow up?   Yes    Patients specialists  Other (comment)    Other specialists names  Gastroenterology     Did you obtain your prescribed medications  Yes    Do you need help managing your prescriptions or medications  No    Is transportation to your appointment needed  No    I have advised the patient to call PCP with any new or worsening symptoms  Consuelo Rodriguez MA    Living Arrangements  Family members    Are you recieving any outpatient services  No    Are you recieving home care services  Yes    Types of home care services  Nurse visit    Are you using any community resources  No    Current waiver services  No    Have you fallen in the last 12 months  No    Interperter language line needed  No    Counseling  Patient          Shannan He, DO

## 2021-05-19 ENCOUNTER — APPOINTMENT (OUTPATIENT)
Dept: LAB | Facility: CLINIC | Age: 71
End: 2021-05-19
Payer: COMMERCIAL

## 2021-05-19 LAB
BILIRUB UR QL STRIP: NEGATIVE
CLARITY UR: CLEAR
COLOR UR: NORMAL
CREAT UR-MCNC: 135 MG/DL
GLUCOSE UR STRIP-MCNC: NEGATIVE MG/DL
HGB UR QL STRIP.AUTO: NEGATIVE
KETONES UR STRIP-MCNC: NEGATIVE MG/DL
LEUKOCYTE ESTERASE UR QL STRIP: NEGATIVE
MICROALBUMIN UR-MCNC: 9.6 MG/L (ref 0–20)
MICROALBUMIN/CREAT 24H UR: 7 MG/G CREATININE (ref 0–30)
NITRITE UR QL STRIP: NEGATIVE
PH UR STRIP.AUTO: 6 [PH]
PROT UR STRIP-MCNC: NEGATIVE MG/DL
SP GR UR STRIP.AUTO: 1.01 (ref 1–1.03)
UROBILINOGEN UR QL STRIP.AUTO: 0.2 E.U./DL

## 2021-05-19 PROCEDURE — 82043 UR ALBUMIN QUANTITATIVE: CPT

## 2021-05-19 PROCEDURE — 81003 URINALYSIS AUTO W/O SCOPE: CPT

## 2021-05-19 PROCEDURE — 82570 ASSAY OF URINE CREATININE: CPT

## 2021-05-25 ENCOUNTER — OFFICE VISIT (OUTPATIENT)
Dept: URGENT CARE | Facility: CLINIC | Age: 71
End: 2021-05-25
Payer: COMMERCIAL

## 2021-05-25 ENCOUNTER — TELEPHONE (OUTPATIENT)
Dept: FAMILY MEDICINE CLINIC | Facility: CLINIC | Age: 71
End: 2021-05-25

## 2021-05-25 VITALS
BODY MASS INDEX: 25.44 KG/M2 | TEMPERATURE: 97.4 F | HEIGHT: 64 IN | RESPIRATION RATE: 20 BRPM | HEART RATE: 75 BPM | DIASTOLIC BLOOD PRESSURE: 82 MMHG | SYSTOLIC BLOOD PRESSURE: 122 MMHG | OXYGEN SATURATION: 100 % | WEIGHT: 149 LBS

## 2021-05-25 DIAGNOSIS — R42 DIZZY: ICD-10-CM

## 2021-05-25 DIAGNOSIS — R60.0 LOWER EXTREMITY EDEMA: Primary | ICD-10-CM

## 2021-05-25 PROCEDURE — 93005 ELECTROCARDIOGRAM TRACING: CPT | Performed by: NURSE PRACTITIONER

## 2021-05-25 PROCEDURE — 99213 OFFICE O/P EST LOW 20 MIN: CPT | Performed by: NURSE PRACTITIONER

## 2021-05-25 PROCEDURE — S9083 URGENT CARE CENTER GLOBAL: HCPCS | Performed by: NURSE PRACTITIONER

## 2021-05-25 NOTE — TELEPHONE ENCOUNTER
Rupesh Gama called and stated that Bella Abrams woke up this morning with swollen hands and lips  His face appeared puffy  Patient had no swelling in his legs or ankles, and no SOB  Patient was advised to go to an ER or Walk in Care for evaluation as we have no available appointments for today

## 2021-05-25 NOTE — PROGRESS NOTES
[address] Now        NAME: Mari Gamboa is a 78 y o  male  : 1950    MRN: [id number]  DATE: May 25, 2021  TIME: 5:35 PM    Assessment and Plan   Lower extremity edema [R60 0]  1  Lower extremity edema  Transfer to other facility    ECG 12 lead   2  Dizzy  Transfer to other facility    ECG 12 lead     Called ER and spoke with Alan Alfonso  Patient Instructions     Proceed to ER for further evaluation  Patient's son to drive patient to ER  Patient agreeable to plan of care  Chief Complaint     Chief Complaint   Patient presents with    facial and hand swelling     per Son, "his facial swelling was much worse this morning, it was the worst its ever been but better now "           History of Present Illness        Patient is a 72-year-old male who presents with worsening upper and lower extremity edema over the past few weeks  Patient's son states that this morning he noticed that his face was swollen, states that has since resolved  Patient is complaining of swelling of bilateral hands and ankles  Patient denies any history of CHF and not currently on any diuretics  Patient denies any chest pain, shortness of breath, or palpitations  Also notes feeling dizzy and lightheaded  Denies any headache, neck or back pain  Denies any syncopal episodes or change of mental status  Denies any numbness, tingling, or weakness of extremities  Denies any facial droop or slurred speech  Patient does have hx of cva  Review of Systems   Review of Systems   Constitutional: Negative for chills, diaphoresis, fatigue and fever  HENT: Positive for facial swelling  Respiratory: Negative for cough, chest tightness, shortness of breath, wheezing and stridor  Cardiovascular: Positive for leg swelling  Negative for chest pain and palpitations  Gastrointestinal: Negative  Musculoskeletal: Negative for arthralgias, back pain, joint swelling, myalgias, neck pain and neck stiffness     Neurological: Positive for dizziness, weakness and light-headedness  Negative for tremors, seizures, syncope, facial asymmetry, speech difficulty, numbness and headaches           Current Medications       Current Outpatient Medications:     acetaminophen (TYLENOL) 325 mg tablet, Take 2 tablets (650 mg total) by mouth every 6 (six) hours as needed for mild pain, Disp: 30 tablet, Rfl: 0    albuterol (ProAir HFA) 90 mcg/act inhaler, Inhale 2 puffs every 6 (six) hours as needed for wheezing, Disp: 1 Inhaler, Rfl: 1    atorvastatin (LIPITOR) 40 mg tablet, Take 1 tablet (40 mg total) by mouth daily, Disp: 30 tablet, Rfl: 1    Cholecalciferol 25 MCG (1000 UT) tablet, Take 1 tablet (1,000 Units total) by mouth daily, Disp: 30 tablet, Rfl: 1    dicyclomine (BENTYL) 10 mg capsule, Take 1 capsule (10 mg total) by mouth 4 (four) times a day (before meals and at bedtime) for 10 days, Disp: 40 capsule, Rfl: 0    docusate sodium (COLACE) 100 mg capsule, Take 1 capsule (100 mg total) by mouth 2 (two) times a day, Disp: 10 capsule, Rfl: 0    fluticasone (FLONASE) 50 mcg/act nasal spray, 2 sprays into each nostril daily, Disp: 18 2 mL, Rfl: 2    fluticasone-vilanterol (BREO ELLIPTA) 100-25 mcg/inh inhaler, Inhale 1 puff daily Rinse mouth after use , Disp: 1 Inhaler, Rfl: 3    folic acid (FOLVITE) 1 mg tablet, Take 1 tablet (1 mg total) by mouth daily, Disp: 90 tablet, Rfl: 1    gabapentin (NEURONTIN) 600 MG tablet, Take 1 tablet (600 mg total) by mouth 3 (three) times a day, Disp: 90 tablet, Rfl: 1    metoprolol tartrate (LOPRESSOR) 50 mg tablet, Take 0 5 tablets (25 mg total) by mouth every 12 (twelve) hours, Disp: 30 tablet, Rfl: 0    mirtazapine (REMERON) 45 MG tablet, , Disp: , Rfl:     pantoprazole (PROTONIX) 40 mg tablet, Take 1 tablet (40 mg total) by mouth 2 (two) times a day before meals, Disp: 60 tablet, Rfl: 0    QUEtiapine (SEROquel) 200 mg tablet, Take 1 tablet (200 mg total) by mouth daily, Disp: 30 tablet, Rfl: 1    QUEtiapine (SEROquel) 300 mg tablet, Take 2 tablets (600 mg total) by mouth daily at bedtime, Disp: 60 tablet, Rfl: 1    thiamine 100 MG tablet, Take 1 tablet (100 mg total) by mouth daily, Disp: 30 tablet, Rfl: 0    traZODone (DESYREL) 50 mg tablet, Take 1 tablet (50 mg total) by mouth daily at bedtime as needed for sleep, Disp: 30 tablet, Rfl: 1    guaiFENesin (MUCINEX) 600 mg 12 hr tablet, Take 600 mg by mouth every 12 (twelve) hours as needed for cough, Disp: , Rfl:     hydrOXYzine pamoate (VISTARIL) 50 mg capsule, take 1 capsule by mouth three times a day if needed for anxiety, Disp: , Rfl:     sucralfate (CARAFATE) 1 g tablet, Take 1 tablet (1 g total) by mouth 4 (four) times a day (Patient not taking: Reported on 5/25/2021), Disp: 120 tablet, Rfl: 0    Current Allergies     Allergies as of 05/25/2021    (No Known Allergies)            The following portions of the patient's history were reviewed and updated as appropriate: allergies, current medications, past family history, past medical history, past social history, past surgical history and problem list      Past Medical History:   Diagnosis Date    Alcohol abuse     BPH (benign prostatic hyperplasia)     CVA [facility]cere[facility] vascular accident[facility] [address] )     DJD (degenerative joint disease)     Encounter to establish care with new doctor 8/21/2019    Gait abnormality     Head injury     History of cerebrovascular accident (CVA) with residual deficit 10/1/2019    History of CVA (cerebrovascular accident) 1/8/2020    History of substance abuse ([facility] [address] ) 8/21/2019    Polysubstance     History of sustained ventricular tachycardia 8/21/2019    History of transient ischemic attack (TIA) 8/28/2019    Hypertension     Metatarsal fracture     TIA (transient ischemic attack)     Tobacco abuse        Past Surgical History:   Procedure Laterality Date    ABDOMINAL SURGERY      ANKLE SURGERY      BACK SURGERY      CT GUIDED [facility] DRAINAGE CATHETER PLACEMENT  1/27/2020  ELBOW SURGERY      IR VISCERAL ANGIOGRAPHY / INTERVENTION  2020    JOINT REPLACEMENT      KNEE SURGERY      LAPAROTOMY N/A 2020    Procedure: LAPAROTOMY EXPLORATORY,  OVERSEW  OF GASTRO DUODENAL ARTERY, THAL PATCH OF DUODENUM, VICKY GASTRO JEJUNOSTOMY TUBE;  Surgeon: James Tubbs DO;  [facility]: [facility];  Service: General    LIVER SURGERY         Family History   Problem Relation Age of Onset    No Known Problems Mother     No Known Problems Father          Medications have been verified  Objective   /82 (BP Location: Left arm, Patient Position: Sitting, Cuff Size: Adult)   Pulse 75   Temp (!) 97 4 °F (36 3 °C) (Temporal)   Resp 20   Ht 5' 4" (1 626 m)   Wt 67 6 kg (149 lb)   SpO2 100%   BMI 25 58 kg/m²   No LMP for male patient  Physical Exam     Physical Exam  Constitutional:       General: He is not in acute distress  Appearance: He is well-developed  He is not diaphoretic  HENT:      Head: Normocephalic and atraumatic  Comments: No facial swelling, tongue or lip swelling visualized   Eyes:      Extraocular Movements: Extraocular movements intact  Pupils: Pupils are equal, round, and reactive to light  Cardiovascular:      Rate and Rhythm: Normal rate and regular rhythm  Pulses: Normal pulses  Heart sounds: Normal heart sounds, S1 normal and S2 normal       Comments: Bilateral hand swelling  +1 pitting  Pulmonary:      Effort: Pulmonary effort is normal       Breath sounds: Normal breath sounds  Abdominal:      General: Bowel sounds are normal  There is no distension  Palpations: Abdomen is soft  Abdomen is not rigid  There is no shifting dullness or mass  Tenderness: There is no abdominal tenderness  There is no guarding or rebound  Negative signs include Wells's sign and McBurney's sign  Musculoskeletal:      Right lower le+ Edema present  Left lower le+ Edema present     Skin:     General: Skin is warm and dry  Capillary Refill: Capillary refill takes less than 2 seconds  Neurological:      Mental Status: He is alert and oriented to person, place, and time  GCS: GCS eye subscore is 4  GCS verbal subscore is 5  GCS motor subscore is 6

## 2021-05-25 NOTE — TELEPHONE ENCOUNTER
Juan calling back in regards to patient  State that [place] was [place] [facility] and was told to follow up at the ER or with PCP  Patient refuses to go to ER  Patient is now scheduled with PCP tomorrow, 5/26  Obion is aware if symptoms progress or worsen for patient to be taken to the ER

## 2021-05-26 ENCOUNTER — OFFICE VISIT (OUTPATIENT)
Dept: FAMILY MEDICINE CLINIC | Facility: CLINIC | Age: 71
End: 2021-05-26
Payer: COMMERCIAL

## 2021-05-26 VITALS
SYSTOLIC BLOOD PRESSURE: 154 MMHG | TEMPERATURE: 97.7 F | DIASTOLIC BLOOD PRESSURE: 82 MMHG | HEIGHT: 64 IN | WEIGHT: 148 LBS | BODY MASS INDEX: 25.27 KG/M2 | HEART RATE: 75 BPM | OXYGEN SATURATION: 97 %

## 2021-05-26 DIAGNOSIS — R41.82 ALTERED MENTAL STATUS, UNSPECIFIED ALTERED MENTAL STATUS TYPE: ICD-10-CM

## 2021-05-26 DIAGNOSIS — R60.0 BILATERAL LOWER EXTREMITY EDEMA: ICD-10-CM

## 2021-05-26 DIAGNOSIS — I73.00 RAYNAUD'S DISEASE WITHOUT GANGRENE: ICD-10-CM

## 2021-05-26 DIAGNOSIS — J44.9 COPD WITHOUT EXACERBATION (HCC): ICD-10-CM

## 2021-05-26 DIAGNOSIS — I69.30 HISTORY OF CEREBROVASCULAR ACCIDENT (CVA) WITH RESIDUAL DEFICIT: ICD-10-CM

## 2021-05-26 DIAGNOSIS — R23.3 SPONTANEOUS BRUISING: ICD-10-CM

## 2021-05-26 DIAGNOSIS — R22.0 FACIAL SWELLING: Primary | ICD-10-CM

## 2021-05-26 DIAGNOSIS — E63.9 INADEQUATE ORAL NUTRITIONAL INTAKE: ICD-10-CM

## 2021-05-26 LAB
ATRIAL RATE: 71 BPM
P AXIS: 66 DEGREES
PR INTERVAL: 174 MS
QRS AXIS: -10 DEGREES
QRSD INTERVAL: 80 MS
QT INTERVAL: 396 MS
QTC INTERVAL: 430 MS
T WAVE AXIS: 19 DEGREES
VENTRICULAR RATE: 71 BPM

## 2021-05-26 PROCEDURE — 3079F DIAST BP 80-89 MM HG: CPT | Performed by: FAMILY MEDICINE

## 2021-05-26 PROCEDURE — 99214 OFFICE O/P EST MOD 30 MIN: CPT | Performed by: FAMILY MEDICINE

## 2021-05-26 PROCEDURE — 93010 ELECTROCARDIOGRAM REPORT: CPT | Performed by: INTERNAL MEDICINE

## 2021-05-26 PROCEDURE — 4004F PT TOBACCO SCREEN RCVD TLK: CPT | Performed by: FAMILY MEDICINE

## 2021-05-26 PROCEDURE — 3077F SYST BP >= 140 MM HG: CPT | Performed by: FAMILY MEDICINE

## 2021-05-26 PROCEDURE — 1111F DSCHRG MED/CURRENT MED MERGE: CPT | Performed by: FAMILY MEDICINE

## 2021-05-26 PROCEDURE — 1160F RVW MEDS BY RX/DR IN RCRD: CPT | Performed by: FAMILY MEDICINE

## 2021-05-26 RX ORDER — DICLOFENAC SODIUM 75 MG/1
TABLET, DELAYED RELEASE ORAL
COMMUNITY
Start: 2021-05-19 | End: 2021-06-17

## 2021-05-26 RX ORDER — CALORIC SUPPLEMENT
1 LIQUID (ML) ORAL 3 TIMES DAILY PRN
Qty: 237 ML | Refills: 1 | Status: SHIPPED | OUTPATIENT
Start: 2021-05-26

## 2021-05-26 NOTE — PROGRESS NOTES
Assessment/Plan:         Diagnoses and all orders for this visit:    Facial swelling  -     CT head wo contrast; Future    Altered mental status, unspecified altered mental status type  -     CT head wo contrast; Future    COPD without exacerbation (HCC)  -     XR chest pa & lateral; Future    Inadequate oral nutritional intake  -     Nutritional Supplements (Ensure Original) LIQD; Take 1 Can by mouth 3 (three) times a day as needed (nutrition)    Bilateral lower extremity edema    Spontaneous bruising    History of cerebrovascular accident (CVA) with residual deficit    Raynaud's disease without gangrene    Other orders  -     diclofenac (VOLTAREN) 75 mg EC tablet          Subjective:   Chief Complaint   Patient presents with    Follow-up     [facility] 5/25/2021    Edema     Swelling in hand, feet, face and lips    Memory Loss     Patient has become forgetful the last few days  Patient ID: Angelo Hurley is a 78 y o  male      Here with his son with acute problem facial swelling- was seen at [facility] yesterday for the problem and was sent to ER, but pt never went, son states pt refused to go- sx associated with dizziness and LE swelling, "has been bad every morning gets a little better as he day goes on, his lower lip isn't bad now- usually looks like Willetta Craze" per son  Oz Lyles also reports, "He has also been becoming very confused very easily, physical deterioration last couple weeks, putting weight on me when he's walking, doing simple things is harder"   no pain assoc or new pain  Also easy bruising- I asked about his arms that I see when I walked in to exam room have numerous scattered small bruises  - has been happening since July per son  Slightly anemic 10  Calcium slightly elevated - son states does take a calcium- told to hold the calcium and the vit d  Swelling is whole face, but worse around right eye and bottom lip  Son reports has in past used a mobile xray service to his home- would like to do cxr by this service again- [facility] [employer] is name of company      The following portions of the patient's history were reviewed and updated as appropriate: allergies, current medications, past family history, past medical history, past social history, past surgical history and problem list     Review of Systems   Constitutional: Negative for chills, diaphoresis, fever and unexpected weight change  HENT: Positive for facial swelling  Negative for congestion, mouth sores, nosebleeds, postnasal drip, sinus pressure, sinus pain, sore throat, trouble swallowing and voice change  Respiratory: Negative for apnea, choking, chest tightness and stridor  Cardiovascular: Negative for chest pain and palpitations  Gastrointestinal: Negative  Skin: Negative for color change, rash and wound  Neurological: Negative for tremors, seizures, syncope, facial asymmetry and headaches  Hematological: Negative for adenopathy  Bruises/bleeds easily  Psychiatric/Behavioral: Negative for agitation, hallucinations, self-injury and suicidal ideas  Objective:      /82 (BP Location: Left arm, Patient Position: Sitting, Cuff Size: Standard)   Pulse 75   Temp 97 7 °F (36 5 °C) (Tympanic)   Ht 5' 4" (1 626 m)   Wt 67 1 kg (148 lb)   SpO2 97%   BMI 25 40 kg/m²          Physical Exam  Constitutional:       General: He is not in acute distress  Appearance: He is well-developed  He is not toxic-appearing or diaphoretic  Comments: Unchanged chronically ill appearance   HENT:      Head: Atraumatic  No contusion, masses or left periorbital erythema  Comments: Diffuse mild edema right cheek/lateral mid face and periorbital area     Mouth/Throat:      Lips: Pink  Mouth: Mucous membranes are moist  Angioedema (lips mildly swollen ) present  No injury  Tongue: No lesions  Tongue does not deviate from midline  Palate: No mass and lesions  Pharynx: Oropharynx is clear   Uvula midline  No pharyngeal swelling, posterior oropharyngeal erythema or uvula swelling  Eyes:      General: Lids are normal       Conjunctiva/sclera: Conjunctivae normal    Neck:      Musculoskeletal: Neck supple  No edema or crepitus  Thyroid: No thyroid mass or thyromegaly  Vascular: No JVD  Trachea: Trachea normal    Cardiovascular:      Rate and Rhythm: Normal rate and regular rhythm  Heart sounds: S1 normal and S2 normal  No friction rub  No gallop  Comments: Trace ankle edema bilat  Pulmonary:      Effort: Pulmonary effort is normal       Breath sounds: No stridor  No decreased breath sounds, wheezing, rhonchi or rales  Abdominal:      General: There is no distension  Palpations: Abdomen is soft  There is no shifting dullness, hepatomegaly, splenomegaly or mass  Tenderness: There is no abdominal tenderness  Lymphadenopathy:      Head:      Right side of head: No submental, submandibular or occipital adenopathy  Left side of head: No submental, submandibular or occipital adenopathy  Cervical: No cervical adenopathy  Upper Body:      Right upper body: No supraclavicular adenopathy  Left upper body: No supraclavicular adenopathy  Skin:     General: Skin is warm and dry  Coloration: Skin is not pale  Comments: Numerous scattered small ecchymoses bilateral dorsal forearms   Neurological:      Mental Status: He is alert  Mental status is at baseline  Cranial Nerves: No facial asymmetry  Comments: Unchanged chronic deficits   Psychiatric:         Mood and Affect: Mood normal          Behavior: Behavior is cooperative

## 2021-06-01 PROBLEM — K92.2 ACUTE UPPER GI BLEED: Status: RESOLVED | Noted: 2021-04-20 | Resolved: 2021-06-01

## 2021-06-01 PROBLEM — R55 SYNCOPE: Status: RESOLVED | Noted: 2020-01-08 | Resolved: 2021-06-01

## 2021-06-07 ENCOUNTER — HOSPITAL ENCOUNTER (OUTPATIENT)
Dept: RADIOLOGY | Facility: MEDICAL CENTER | Age: 71
Discharge: HOME | End: 2021-06-07
Payer: COMMERCIAL

## 2021-06-07 DIAGNOSIS — J98.4 SMALL AIRWAYS DISEASE: ICD-10-CM

## 2021-06-07 DIAGNOSIS — R22.0 FACIAL SWELLING: ICD-10-CM

## 2021-06-07 DIAGNOSIS — R41.82 ALTERED MENTAL STATUS, UNSPECIFIED ALTERED MENTAL STATUS TYPE: ICD-10-CM

## 2021-06-07 DIAGNOSIS — Z72.0 TOBACCO ABUSE: ICD-10-CM

## 2021-06-07 DIAGNOSIS — J18.1 CONSOLIDATION OF LEFT UPPER LOBE OF LUNG (HCC): ICD-10-CM

## 2021-06-07 DIAGNOSIS — J44.9 CHRONIC OBSTRUCTIVE PULMONARY DISEASE, UNSPECIFIED COPD TYPE (HCC): ICD-10-CM

## 2021-06-07 PROCEDURE — G1004 CDSM NDSC: HCPCS

## 2021-06-07 PROCEDURE — 70450 CT HEAD/BRAIN W/O DYE: CPT

## 2021-06-08 ENCOUNTER — RA CDI HCC (OUTPATIENT)
Dept: OTHER | Facility: HOSPITAL | Age: 71
End: 2021-06-08

## 2021-06-08 NOTE — PROGRESS NOTES
[facility]  coding opportunities          Chart reviewed, no opportunity found: CHART REVIEWED, NO OPPORTUNITY FOUND              Patients insurance company: [facility] [address]  (Medicare Advantage and Commercial)

## 2021-06-14 ENCOUNTER — OFFICE VISIT (OUTPATIENT)
Dept: FAMILY MEDICINE CLINIC | Facility: CLINIC | Age: 71
End: 2021-06-14
Payer: COMMERCIAL

## 2021-06-14 VITALS
SYSTOLIC BLOOD PRESSURE: 160 MMHG | DIASTOLIC BLOOD PRESSURE: 100 MMHG | HEART RATE: 73 BPM | HEIGHT: 64 IN | BODY MASS INDEX: 25.95 KG/M2 | WEIGHT: 152 LBS | RESPIRATION RATE: 17 BRPM | TEMPERATURE: 97.7 F | OXYGEN SATURATION: 97 %

## 2021-06-14 DIAGNOSIS — M54.16 LUMBAR RADICULOPATHY: ICD-10-CM

## 2021-06-14 DIAGNOSIS — J44.9 CHRONIC OBSTRUCTIVE PULMONARY DISEASE, UNSPECIFIED COPD TYPE (HCC): ICD-10-CM

## 2021-06-14 DIAGNOSIS — R10.9 ABDOMINAL PAIN: ICD-10-CM

## 2021-06-14 DIAGNOSIS — G89.29 OTHER CHRONIC PAIN: ICD-10-CM

## 2021-06-14 DIAGNOSIS — I69.951 HEMIPLEGIA OF RIGHT DOMINANT SIDE AS LATE EFFECT OF CEREBROVASCULAR DISEASE, UNSPECIFIED CEREBROVASCULAR DISEASE TYPE, UNSPECIFIED HEMIPLEGIA TYPE (HCC): ICD-10-CM

## 2021-06-14 DIAGNOSIS — M79.89 SWELLING OF BOTH UPPER EXTREMITIES: ICD-10-CM

## 2021-06-14 DIAGNOSIS — Z12.11 SCREENING FOR COLORECTAL CANCER: ICD-10-CM

## 2021-06-14 DIAGNOSIS — F20.9 SCHIZOPHRENIA, UNSPECIFIED TYPE (HCC): ICD-10-CM

## 2021-06-14 DIAGNOSIS — M54.9 MID BACK PAIN: ICD-10-CM

## 2021-06-14 DIAGNOSIS — K26.9 DUODENAL ULCER: ICD-10-CM

## 2021-06-14 DIAGNOSIS — R41.82 ALTERED MENTAL STATUS, UNSPECIFIED ALTERED MENTAL STATUS TYPE: Primary | ICD-10-CM

## 2021-06-14 DIAGNOSIS — F10.21 ALCOHOL DEPENDENCE IN REMISSION (HCC): ICD-10-CM

## 2021-06-14 DIAGNOSIS — Z12.12 SCREENING FOR COLORECTAL CANCER: ICD-10-CM

## 2021-06-14 DIAGNOSIS — Z86.59 HISTORY OF SUICIDAL IDEATION: ICD-10-CM

## 2021-06-14 DIAGNOSIS — E63.9 INADEQUATE ORAL NUTRITIONAL INTAKE: ICD-10-CM

## 2021-06-14 DIAGNOSIS — I73.00 RAYNAUD'S DISEASE WITHOUT GANGRENE: ICD-10-CM

## 2021-06-14 DIAGNOSIS — R41.3 MEMORY DYSFUNCTION: ICD-10-CM

## 2021-06-14 DIAGNOSIS — R22.0 FACIAL SWELLING: ICD-10-CM

## 2021-06-14 DIAGNOSIS — Z86.73 HISTORY OF TRANSIENT ISCHEMIC ATTACK (TIA): ICD-10-CM

## 2021-06-14 DIAGNOSIS — Z87.19 HISTORY OF GI BLEED: ICD-10-CM

## 2021-06-14 DIAGNOSIS — E78.5 HYPERLIPIDEMIA: ICD-10-CM

## 2021-06-14 DIAGNOSIS — Z72.0 TOBACCO ABUSE: ICD-10-CM

## 2021-06-14 DIAGNOSIS — M54.2 NECK PAIN: ICD-10-CM

## 2021-06-14 PROCEDURE — 3008F BODY MASS INDEX DOCD: CPT | Performed by: FAMILY MEDICINE

## 2021-06-14 PROCEDURE — 99214 OFFICE O/P EST MOD 30 MIN: CPT | Performed by: FAMILY MEDICINE

## 2021-06-14 RX ORDER — ATORVASTATIN CALCIUM 40 MG/1
40 TABLET, FILM COATED ORAL DAILY
Qty: 90 TABLET | Refills: 1 | Status: SHIPPED | OUTPATIENT
Start: 2021-06-14

## 2021-06-14 RX ORDER — GABAPENTIN 600 MG/1
600 TABLET ORAL 3 TIMES DAILY
Qty: 270 TABLET | Refills: 1 | Status: SHIPPED | OUTPATIENT
Start: 2021-06-14 | End: 2021-12-03

## 2021-06-14 RX ORDER — DICYCLOMINE HYDROCHLORIDE 10 MG/1
10 CAPSULE ORAL
Qty: 40 CAPSULE | Refills: 0 | Status: SHIPPED | OUTPATIENT
Start: 2021-06-14 | End: 2021-06-24

## 2021-06-14 RX ORDER — PANTOPRAZOLE SODIUM 40 MG/1
40 TABLET, DELAYED RELEASE ORAL
Qty: 180 TABLET | Refills: 1 | Status: SHIPPED | OUTPATIENT
Start: 2021-06-14 | End: 2021-12-03

## 2021-06-14 RX ORDER — MIRTAZAPINE 45 MG/1
45 TABLET, FILM COATED ORAL
Status: CANCELLED | OUTPATIENT
Start: 2021-06-14

## 2021-06-14 RX ORDER — LANOLIN ALCOHOL/MO/W.PET/CERES
100 CREAM (GRAM) TOPICAL DAILY
Qty: 90 TABLET | Refills: 1 | Status: SHIPPED | OUTPATIENT
Start: 2021-06-14

## 2021-06-14 NOTE — PROGRESS NOTES
Assessment/Plan:       Diagnoses and all orders for this visit:    Altered mental status, unspecified altered mental status type  Comments:  d/w son& pt that CT brain showed chronic volume loss, and with his numerous chronic conditions which have end stage in sight, would benefit from palliative care  Orders:  -     Ambulatory referral to Palliative Care; Future    Swelling of both upper extremities  Comments:  etiol yet TBD  Orders:  -     CT neck and chest wo contrast; Future    Facial swelling  Comments:  not present on exam today, but still occuring per son  Orders:  -     CT neck and chest wo contrast; Future    Tobacco abuse  -     CT neck and chest wo contrast; Future  -     Ambulatory referral to Palliative Care; Future    Chronic obstructive pulmonary disease, unspecified COPD type ([place] [address] )  -     Ambulatory referral to Palliative Care; Future    Schizophrenia, unspecified type [facility])  -     Ambulatory referral to Palliative Care; Future    Abdominal pain  -     thiamine 100 MG tablet; Take 1 tablet (100 mg total) by mouth daily  -     dicyclomine (BENTYL) 10 mg capsule; Take 1 capsule (10 mg total) by mouth 4 (four) times a day (before meals and at bedtime) for 10 days    History of GI bleed  -     Ambulatory referral to Palliative Care; Future    Duodenal ulcer  -     pantoprazole (PROTONIX) 40 mg tablet; Take 1 tablet (40 mg total) by mouth 2 (two) times a day before meals    Hemiplegia of right dominant side as late effect of cerebrovascular disease, unspecified cerebrovascular disease type, unspecified hemiplegia type ([place] [address] )  -     Ambulatory referral to Palliative Care; Future    History of transient ischemic attack (TIA)  -     atorvastatin (LIPITOR) 40 mg tablet; Take 1 tablet (40 mg total) by mouth daily  -     Ambulatory referral to Palliative Care; Future    Lumbar radiculopathy  -     gabapentin (NEURONTIN) 600 MG tablet;  Take 1 tablet (600 mg total) by mouth 3 (three) times a day    Mid back pain  - gabapentin (NEURONTIN) 600 MG tablet; Take 1 tablet (600 mg total) by mouth 3 (three) times a day    Neck pain  -     gabapentin (NEURONTIN) 600 MG tablet; Take 1 tablet (600 mg total) by mouth 3 (three) times a day    Other chronic pain  -     gabapentin (NEURONTIN) 600 MG tablet; Take 1 tablet (600 mg total) by mouth 3 (three) times a day    Inadequate oral nutritional intake  -     Ambulatory referral to Palliative Care; Future    History of suicidal ideation  -     Ambulatory referral to Palliative Care; Future    Memory dysfunction  -     Ambulatory referral to Palliative Care; Future    Alcohol dependence in remission [facility])  -     Ambulatory referral to Palliative Care; Future    Raynaud's disease without gangrene  -     Ambulatory referral to Palliative Care; Future    Hyperlipidemia  -     atorvastatin (LIPITOR) 40 mg tablet; Take 1 tablet (40 mg total) by mouth daily    Screening for colorectal cancer  -     Occult Blood, Fecal Immunochemical (FIT); Future    Other orders  -     Cancel: Ambulatory referral to Gastroenterology; Future  -     Cancel: Cologuard; Future  -     Cancel: mirtazapine (REMERON) 45 MG tablet; Take 1 tablet (45 mg total) by mouth daily at bedtime          Subjective:   Chief Complaint   Patient presents with    Follow-up     Discuss CT scan and xray results         Patient ID: Brayden Huntley is a 78 y o  male      Here with his son for recheck of new problems- facial and UE swelling, alteration in mental cognition, workup ongoing  Son reports still swelling in face and upper extremities, and still declining cognitively  CT head showed chronic changes, labs unremarkable for etiol of sx        The following portions of the patient's history were reviewed and updated as appropriate: allergies, current medications, past family history, past medical history, past social history, past surgical history and problem list     Review of Systems   Constitutional: Negative for chills, diaphoresis and fever  HENT: Negative for nosebleeds  Respiratory: Negative for apnea, choking, chest tightness, wheezing and stridor  Cardiovascular: Negative for chest pain, palpitations and leg swelling  Gastrointestinal: Negative  Genitourinary: Negative for dysuria  Neurological: Negative for seizures and syncope  Hematological: Negative for adenopathy  Objective:      /100 (BP Location: Left arm, Patient Position: Sitting)   Pulse 73   Temp 97 7 °F (36 5 °C)   Resp 17   Ht 5' 4" (1 626 m)   Wt 68 9 kg (152 lb)   SpO2 97%   BMI 26 09 kg/m²          Physical Exam  Constitutional:       General: He is not in acute distress  Appearance: He is not toxic-appearing or diaphoretic  Comments: Unchanged, chronically ill appearance   HENT:      Head: Normocephalic and atraumatic  Mouth/Throat:      Mouth: Mucous membranes are moist       Pharynx: Oropharynx is clear  Uvula midline  Eyes:      General: Lids are normal       Extraocular Movements: Extraocular movements intact  Conjunctiva/sclera: Conjunctivae normal    Neck:      Thyroid: No thyroid mass or thyromegaly  Vascular: No JVD  Trachea: Trachea and phonation normal    Cardiovascular:      Rate and Rhythm: Normal rate and regular rhythm  Heart sounds: S1 normal and S2 normal  No friction rub  No gallop  Pulmonary:      Effort: Pulmonary effort is normal       Breath sounds: Normal breath sounds  Abdominal:      Palpations: Abdomen is soft  There is no hepatomegaly, splenomegaly or mass  Tenderness: There is no abdominal tenderness  Musculoskeletal:      Cervical back: Neck supple  No edema or crepitus  Right lower leg: No edema  Left lower leg: No edema  Comments: Left > Right entire arm and hand swelling/edema, no erythema or heat, no deformity   Lymphadenopathy:      Cervical: No cervical adenopathy        Upper Body:      Right upper body: No supraclavicular adenopathy  Left upper body: No supraclavicular adenopathy  Skin:     General: Skin is warm and dry  Coloration: Skin is not pale  Neurological:      Mental Status: He is alert  Mental status is at baseline  Comments: Unchanged exam   Psychiatric:         Mood and Affect: Mood normal          Behavior: Behavior is cooperative

## 2021-06-16 ENCOUNTER — APPOINTMENT (OUTPATIENT)
Dept: LAB | Facility: HOSPITAL | Age: 71
End: 2021-06-16
Payer: COMMERCIAL

## 2021-06-16 DIAGNOSIS — Z12.12 SCREENING FOR COLORECTAL CANCER: ICD-10-CM

## 2021-06-16 DIAGNOSIS — Z12.11 SCREENING FOR COLORECTAL CANCER: ICD-10-CM

## 2021-06-16 LAB — HEMOCCULT STL QL IA: POSITIVE

## 2021-06-16 PROCEDURE — G0328 FECAL BLOOD SCRN IMMUNOASSAY: HCPCS

## 2021-06-17 DIAGNOSIS — G89.29 CHRONIC PAIN OF RIGHT KNEE: Primary | ICD-10-CM

## 2021-06-17 DIAGNOSIS — R10.9 ABDOMINAL PAIN: ICD-10-CM

## 2021-06-17 DIAGNOSIS — M25.561 CHRONIC PAIN OF RIGHT KNEE: Primary | ICD-10-CM

## 2021-06-17 DIAGNOSIS — K26.9 DUODENAL ULCER: ICD-10-CM

## 2021-06-17 RX ORDER — SUCRALFATE 1 G/1
TABLET ORAL
Qty: 120 TABLET | Refills: 0 | Status: SHIPPED | OUTPATIENT
Start: 2021-06-17 | End: 2021-07-18

## 2021-06-17 RX ORDER — DICLOFENAC SODIUM 75 MG/1
TABLET, DELAYED RELEASE ORAL
Qty: 60 TABLET | Refills: 1 | Status: SHIPPED | OUTPATIENT
Start: 2021-06-17 | End: 2022-06-21 | Stop reason: ALTCHOICE

## 2021-06-18 ENCOUNTER — TELEPHONE (OUTPATIENT)
Dept: FAMILY MEDICINE CLINIC | Facility: CLINIC | Age: 71
End: 2021-06-18

## 2021-06-18 NOTE — TELEPHONE ENCOUNTER
Automated call which was already in progress when I answered call stating that CT without contrast was unable to be approved by the medical staff and a [id number] is available by 06/17/2021   [phone]  Case#:  [id number]

## 2021-06-20 PROBLEM — R41.82 ALTERED MENTAL STATUS: Status: ACTIVE | Noted: 2021-06-20

## 2021-06-20 PROBLEM — R22.0 FACIAL SWELLING: Status: ACTIVE | Noted: 2021-06-20

## 2021-06-20 PROBLEM — M79.89 SWELLING OF BOTH UPPER EXTREMITIES: Status: ACTIVE | Noted: 2021-06-20

## 2021-06-24 NOTE — SOCIAL WORK
Met with patient to talk about discharge needs  A post acute care recommendation was made by your care team for STR  Discussed Freedom of Choice with patient  List of facilities given to patient via in person  patient aware the list is custom filtered for them by zip code location and that [facility] post acute providers are designated  Explained to patient the need for OPTIONs assessment due to [address] diagnosis and past [facility] admissions  Patient expressed understanding but was unable to sign MA 51 and PASRR which CM signed with his consent  CM to follow up with son for assist with snf choices  Spoke with Pt and informed her that the note for CRA for her upcoming spinal injection has been completed and is being faxed over to Dr. Hahn's office now. Pt advised that I also spoke with Dr. Hahn's RN, Venessa and informed her of above. Pt verbalized understanding and agreement with plan.

## 2021-06-25 NOTE — TELEPHONE ENCOUNTER
[facility] from Pre Encounter called to see if this will be completed  Pt is nadege'd for Monday, 6/28  If it is not completed by 4pm today it will have to be canceled

## 2021-06-25 NOTE — TELEPHONE ENCOUNTER
Per insurance letter scanned yesterday, a CXR result needs to be sent as further information for the CT chest; the neck CT is not approved due to no neck mass finding on exam

## 2021-06-25 NOTE — TELEPHONE ENCOUNTER
This was sent to Cristiane Ward who handle the CT prior auth for our office   I received the information from [facility] in order to send it to her

## 2021-06-25 NOTE — TELEPHONE ENCOUNTER
[phone]  Sierra  Please call Kayce Shukla from [facility] to verify that the patient's CT is to be cancelled  Also call the patient to cancel it, in case he has medical questions  When I answered the phone, Kayce Shukla identified herself then demanded that I call this patient in  Once I did, she wanted me to verify that the patient's CT is to be cancelled  I told her I cannot verify that, the clinical staff will have to call her back  Then the above ensued

## 2021-07-12 ENCOUNTER — RA CDI HCC (OUTPATIENT)
Dept: OTHER | Facility: HOSPITAL | Age: 71
End: 2021-07-12

## 2021-07-12 NOTE — PROGRESS NOTES
[facility]  coding opportunities          Chart reviewed, no opportunity found: CHART REVIEWED, NO OPPORTUNITY FOUND                     Patients insurance company: [facility] [address]  (Medicare Advantage and Commercial)

## 2021-07-16 PROBLEM — I69.951 HEMIPLEGIA OF RIGHT DOMINANT SIDE AS LATE EFFECT OF CEREBROVASCULAR DISEASE (HCC): Status: ACTIVE | Noted: 2021-05-18

## 2021-07-28 ENCOUNTER — TELEPHONE (OUTPATIENT)
Dept: FAMILY MEDICINE CLINIC | Facility: CLINIC | Age: 71
End: 2021-07-28

## 2021-07-28 DIAGNOSIS — R39.14 FEELING OF INCOMPLETE BLADDER EMPTYING: ICD-10-CM

## 2021-07-28 DIAGNOSIS — R32 URINARY INCONTINENCE, UNSPECIFIED TYPE: Primary | ICD-10-CM

## 2021-07-28 NOTE — TELEPHONE ENCOUNTER
Please advise if there are any recommendations or if patient would need to be evaluated by urgent care as there are no appointments available

## 2021-07-28 NOTE — TELEPHONE ENCOUNTER
Raimundo Vazquez of [facility] called while with the patient stating that the patient has a diagnosis of BPH  Family reporting incontinence during sleep, has problems getting urination started, does not feel that he fully empties  What can be done with no sign of UTI? No abd pain, no burning, no fish smell, none of the classic signs  Raimundo Vazquez says he feels the patient will at some point need to see urology but is there something in the meantime to help  Thank you

## 2021-08-06 ENCOUNTER — HOSPITAL ENCOUNTER (EMERGENCY)
Facility: HOSPITAL | Age: 71
End: 2021-08-07
Attending: INTERNAL MEDICINE
Payer: COMMERCIAL

## 2021-08-06 ENCOUNTER — APPOINTMENT (EMERGENCY)
Dept: CT IMAGING | Facility: HOSPITAL | Age: 71
End: 2021-08-06
Payer: COMMERCIAL

## 2021-08-06 ENCOUNTER — APPOINTMENT (EMERGENCY)
Dept: RADIOLOGY | Facility: HOSPITAL | Age: 71
End: 2021-08-06
Payer: COMMERCIAL

## 2021-08-06 DIAGNOSIS — D64.9 ANEMIA: ICD-10-CM

## 2021-08-06 DIAGNOSIS — R53.1 GENERALIZED WEAKNESS: ICD-10-CM

## 2021-08-06 DIAGNOSIS — K27.9 PUD (PEPTIC ULCER DISEASE): ICD-10-CM

## 2021-08-06 DIAGNOSIS — K92.2 GI BLEED: Primary | ICD-10-CM

## 2021-08-06 LAB
ABO GROUP BLD: NORMAL
ALBUMIN SERPL BCP-MCNC: 3.1 G/DL (ref 3.5–5.7)
ALP SERPL-CCNC: 36 U/L (ref 55–165)
ALT SERPL W P-5'-P-CCNC: <3 U/L (ref 7–52)
ANION GAP SERPL CALCULATED.3IONS-SCNC: 6 MMOL/L (ref 4–13)
AST SERPL W P-5'-P-CCNC: 9 U/L (ref 13–39)
BASOPHILS # BLD AUTO: 0 THOUSANDS/ΜL (ref 0–0.1)
BASOPHILS NFR BLD AUTO: 1 % (ref 0–2)
BILIRUB SERPL-MCNC: 0.2 MG/DL (ref 0.2–1)
BILIRUB UR QL STRIP: ABNORMAL
BLD GP AB SCN SERPL QL: POSITIVE
BUN SERPL-MCNC: 17 MG/DL (ref 7–25)
CALCIUM ALBUM COR SERPL-MCNC: 9.3 MG/DL (ref 8.3–10.1)
CALCIUM SERPL-MCNC: 8.6 MG/DL (ref 8.6–10.5)
CHLORIDE SERPL-SCNC: 106 MMOL/L (ref 98–107)
CLARITY UR: CLEAR
CO2 SERPL-SCNC: 25 MMOL/L (ref 21–31)
COLOR UR: YELLOW
CREAT SERPL-MCNC: 1.01 MG/DL (ref 0.7–1.3)
EOSINOPHIL # BLD AUTO: 0.1 THOUSAND/ΜL (ref 0–0.61)
EOSINOPHIL NFR BLD AUTO: 3 % (ref 0–5)
ERYTHROCYTE [DISTWIDTH] IN BLOOD BY AUTOMATED COUNT: 17.5 % (ref 11.5–14.5)
GFR SERPL CREATININE-BSD FRML MDRD: 76 ML/MIN/1.73SQ M
GLUCOSE SERPL-MCNC: 97 MG/DL (ref 65–99)
GLUCOSE UR STRIP-MCNC: NEGATIVE MG/DL
HCT VFR BLD AUTO: 22.2 % (ref 42–47)
HGB BLD-MCNC: 6.9 G/DL (ref 14–18)
HGB UR QL STRIP.AUTO: NEGATIVE
KETONES UR STRIP-MCNC: NEGATIVE MG/DL
LEUKOCYTE ESTERASE UR QL STRIP: NEGATIVE
LYMPHOCYTES # BLD AUTO: 1.3 THOUSANDS/ΜL (ref 0.6–4.47)
LYMPHOCYTES NFR BLD AUTO: 28 % (ref 21–51)
MCH RBC QN AUTO: 24.3 PG (ref 26–34)
MCHC RBC AUTO-ENTMCNC: 31.1 G/DL (ref 31–37)
MCV RBC AUTO: 78 FL (ref 81–99)
MONOCYTES # BLD AUTO: 0.6 THOUSAND/ΜL (ref 0.17–1.22)
MONOCYTES NFR BLD AUTO: 12 % (ref 2–12)
NEUTROPHILS # BLD AUTO: 2.6 THOUSANDS/ΜL (ref 1.4–6.5)
NEUTS SEG NFR BLD AUTO: 56 % (ref 42–75)
NITRITE UR QL STRIP: NEGATIVE
PH UR STRIP.AUTO: 5.5 [PH]
PLATELET # BLD AUTO: 284 THOUSANDS/UL (ref 149–390)
PMV BLD AUTO: 8.1 FL (ref 8.6–11.7)
POTASSIUM SERPL-SCNC: 4.5 MMOL/L (ref 3.5–5.5)
PROT SERPL-MCNC: 6 G/DL (ref 6.4–8.9)
PROT UR STRIP-MCNC: NEGATIVE MG/DL
RBC # BLD AUTO: 2.83 MILLION/UL (ref 4.3–5.9)
RH BLD: NEGATIVE
SODIUM SERPL-SCNC: 137 MMOL/L (ref 134–143)
SP GR UR STRIP.AUTO: >=1.03 (ref 1–1.03)
SPECIMEN EXPIRATION DATE: NORMAL
TROPONIN I SERPL-MCNC: <0.03 NG/ML
UROBILINOGEN UR QL STRIP.AUTO: 0.2 E.U./DL
WBC # BLD AUTO: 4.7 THOUSAND/UL (ref 4.8–10.8)

## 2021-08-06 PROCEDURE — 99285 EMERGENCY DEPT VISIT HI MDM: CPT | Performed by: INTERNAL MEDICINE

## 2021-08-06 PROCEDURE — 84484 ASSAY OF TROPONIN QUANT: CPT | Performed by: INTERNAL MEDICINE

## 2021-08-06 PROCEDURE — 99285 EMERGENCY DEPT VISIT HI MDM: CPT

## 2021-08-06 PROCEDURE — 70450 CT HEAD/BRAIN W/O DYE: CPT

## 2021-08-06 PROCEDURE — G1004 CDSM NDSC: HCPCS

## 2021-08-06 PROCEDURE — 85025 COMPLETE CBC W/AUTO DIFF WBC: CPT | Performed by: INTERNAL MEDICINE

## 2021-08-06 PROCEDURE — 80053 COMPREHEN METABOLIC PANEL: CPT | Performed by: INTERNAL MEDICINE

## 2021-08-06 PROCEDURE — 36415 COLL VENOUS BLD VENIPUNCTURE: CPT | Performed by: INTERNAL MEDICINE

## 2021-08-06 PROCEDURE — 93005 ELECTROCARDIOGRAM TRACING: CPT

## 2021-08-06 PROCEDURE — 86900 BLOOD TYPING SEROLOGIC ABO: CPT | Performed by: INTERNAL MEDICINE

## 2021-08-06 PROCEDURE — 86901 BLOOD TYPING SEROLOGIC RH(D): CPT | Performed by: INTERNAL MEDICINE

## 2021-08-06 PROCEDURE — 96360 HYDRATION IV INFUSION INIT: CPT

## 2021-08-06 PROCEDURE — 81003 URINALYSIS AUTO W/O SCOPE: CPT | Performed by: INTERNAL MEDICINE

## 2021-08-06 PROCEDURE — 74177 CT ABD & PELVIS W/CONTRAST: CPT

## 2021-08-06 PROCEDURE — 86922 COMPATIBILITY TEST ANTIGLOB: CPT

## 2021-08-06 PROCEDURE — 96361 HYDRATE IV INFUSION ADD-ON: CPT

## 2021-08-06 PROCEDURE — 86921 COMPATIBILITY TEST INCUBATE: CPT

## 2021-08-06 PROCEDURE — 71045 X-RAY EXAM CHEST 1 VIEW: CPT

## 2021-08-06 PROCEDURE — 86870 RBC ANTIBODY IDENTIFICATION: CPT | Performed by: INTERNAL MEDICINE

## 2021-08-06 PROCEDURE — 86850 RBC ANTIBODY SCREEN: CPT | Performed by: INTERNAL MEDICINE

## 2021-08-06 RX ORDER — TRAZODONE HYDROCHLORIDE 50 MG/1
50 TABLET ORAL
Status: DISCONTINUED | OUTPATIENT
Start: 2021-08-06 | End: 2021-08-07 | Stop reason: HOSPADM

## 2021-08-06 RX ORDER — SODIUM CHLORIDE 9 MG/ML
125 INJECTION, SOLUTION INTRAVENOUS CONTINUOUS
Status: DISCONTINUED | OUTPATIENT
Start: 2021-08-06 | End: 2021-08-07 | Stop reason: HOSPADM

## 2021-08-06 RX ORDER — NICOTINE 21 MG/24HR
21 PATCH, TRANSDERMAL 24 HOURS TRANSDERMAL ONCE
Status: DISCONTINUED | OUTPATIENT
Start: 2021-08-06 | End: 2021-08-07 | Stop reason: HOSPADM

## 2021-08-06 RX ORDER — QUETIAPINE FUMARATE 200 MG/1
600 TABLET, FILM COATED ORAL
Status: DISCONTINUED | OUTPATIENT
Start: 2021-08-06 | End: 2021-08-07 | Stop reason: HOSPADM

## 2021-08-06 RX ORDER — AMLODIPINE BESYLATE 5 MG/1
5 TABLET ORAL ONCE
Status: COMPLETED | OUTPATIENT
Start: 2021-08-06 | End: 2021-08-06

## 2021-08-06 RX ADMIN — QUETIAPINE FUMARATE 600 MG: 200 TABLET ORAL at 23:10

## 2021-08-06 RX ADMIN — IOHEXOL 100 ML: 350 INJECTION, SOLUTION INTRAVENOUS at 16:40

## 2021-08-06 RX ADMIN — METOPROLOL TARTRATE 25 MG: 25 TABLET, FILM COATED ORAL at 17:41

## 2021-08-06 RX ADMIN — NICOTINE 21 MG: 21 PATCH, EXTENDED RELEASE TRANSDERMAL at 15:34

## 2021-08-06 RX ADMIN — SODIUM CHLORIDE 125 ML/HR: 0.9 INJECTION, SOLUTION INTRAVENOUS at 22:51

## 2021-08-06 RX ADMIN — AMLODIPINE BESYLATE 5 MG: 5 TABLET ORAL at 17:41

## 2021-08-06 RX ADMIN — TRAZODONE HYDROCHLORIDE 50 MG: 50 TABLET ORAL at 23:10

## 2021-08-06 RX ADMIN — SODIUM CHLORIDE 125 ML/HR: 0.9 INJECTION, SOLUTION INTRAVENOUS at 14:51

## 2021-08-06 NOTE — ED PROVIDER NOTES
History  No chief complaint on file  46-year-old male accompanied by his daughter-in-law presents emergency room with generalized weakness  Patient been in his normal state of health but upon checking on him this morning patient's daughter-in-law states he was unable to get out of bed by himself and he was very weak almost fell several times with assistance  He did complain of some chest tightness and shortness of breath with exertion  The daughter also felt his face and left side of his body seemed swollen  She states facial swelling has gone down  She states also his speech was cloudy, she denies any slurred speech or signs of hemiparesis  The patient did not have a fever, has not had any recent illness  Patient most recently was admitted to the hospital status post GI bleed secondary to peptic ulcer disease which was handled intraoperatively  Patient has long history of alcohol abuse, COPD  He denies any bleeding at this time he has no abdominal pain nausea vomiting, he has had no diarrhea constipation he states normal bowel movements no bright red blood per rectum or black tarry stools  Patient did receive both Bevelyn Finer vaccines in April and May  Cannot display prior to admission medications because the patient has not been admitted in this contact         Past Medical History:   Diagnosis Date    Alcohol abuse     BPH (benign prostatic hyperplasia)     CVA [facility]cerebral vascular accident) ([facility] [address] )     DJD (degenerative joint disease)     Encounter to establish care with new doctor 8/21/2019    Gait abnormality     Head injury     History of cerebrovascular accident (CVA) with residual deficit 10/1/2019    History of CVA (cerebrovascular accident) 1/8/2020    History of substance abuse ([facility] [address] ) 8/21/2019    Polysubstance     History of sustained ventricular tachycardia 8/21/2019    History of transient ischemic attack (TIA) 8/28/2019    Hypertension     Metatarsal fracture     TIA (transient ischemic attack)     Tobacco abuse        Past Surgical History:   Procedure Laterality Date    ABDOMINAL SURGERY      ANKLE SURGERY      BACK SURGERY      CT GUIDED [facility] DRAINAGE CATHETER PLACEMENT  1/27/2020    ELBOW SURGERY      IR VISCERAL ANGIOGRAPHY / INTERVENTION  1/11/2020    JOINT REPLACEMENT      KNEE SURGERY      LAPAROTOMY N/A 1/11/2020    Procedure: LAPAROTOMY EXPLORATORY,  OVERSEW  OF GASTRO DUODENAL ARTERY, THAL PATCH OF DUODENUM, VICKY GASTRO JEJUNOSTOMY TUBE;  Surgeon: Varsha John DO;  Location: [facility];  Service: General    LIVER SURGERY         Family History   Problem Relation Age of Onset    No Known Problems Mother     No Known Problems Father      I have reviewed and agree with the history as documented  E-Cigarette/Vaping    E-Cigarette Use Never User      E-Cigarette/Vaping Substances     Social History     Tobacco Use    Smoking status: Current Every Day Smoker     Packs/day: 1 00     Types: Cigarettes    Smokeless tobacco: Never Used    Tobacco comment: started age 14, 3 quit attempts   Vaping Use    Vaping Use: Never used   Substance Use Topics    Alcohol use: Not Currently     Alcohol/week: 4 0 standard drinks     Types: 4 Cans of beer per week     Comment: a couple beers    Drug use: Yes     Types: Marijuana     Comment: history polysubstance use including IVDA on record- every now then will smoke weed        Review of Systems   Constitutional: Positive for fatigue  Negative for fever  HENT: Negative  Respiratory: Positive for chest tightness and shortness of breath  Negative for wheezing  Cardiovascular: Positive for chest pain and leg swelling  Negative for palpitations  THE PATIENT WEARS COMPRESSION STOCKINGS 247 AND ALWAYS HAS SOME EDEMA  The patient's daughter-in-law feels it is slightly worse today  Gastrointestinal: Negative  Genitourinary: Negative  Musculoskeletal: Negative  Skin: Negative      Neurological: Positive for weakness  Hematological: Negative  Psychiatric/Behavioral: Negative  Physical Exam  Physical Exam  Vitals and nursing note reviewed  Exam conducted with a chaperone present  Constitutional:       General: He is not in acute distress  Appearance: Normal appearance  He is not ill-appearing, toxic-appearing or diaphoretic  HENT:      Head: Normocephalic and atraumatic  Eyes:      Extraocular Movements: Extraocular movements intact  Conjunctiva/sclera: Conjunctivae normal       Pupils: Pupils are equal, round, and reactive to light  Cardiovascular:      Rate and Rhythm: Normal rate and regular rhythm  Pulses: Normal pulses  Heart sounds: Normal heart sounds  Pulmonary:      Effort: Pulmonary effort is normal       Breath sounds: Rhonchi present  Comments: Fair effort equal expansion breath sounds are slightly distant with minimally decreased bi basilar  Abdominal:      General: Abdomen is flat  Bowel sounds are normal  There is no distension  Palpations: Abdomen is soft  Tenderness: There is no abdominal tenderness  There is no right CVA tenderness, left CVA tenderness, guarding or rebound  Comments: PATIENT WITH MULTIPLE SCARS STATUS POST MULTIPLE ABDOMINAL SURGERIES  However at this time his abdomen is nondistended there are bowel sounds in all 4 quadrants it is soft nontender no hepatosplenomegaly is appreciated  Rectal exam good tone no external hemorrhoids internal hemorrhoids  Patient with trace heme-positive stools  Genitourinary:     Penis: Normal     Musculoskeletal:         General: Deformity present  No tenderness  Normal range of motion  Cervical back: Normal range of motion and neck supple  Right lower leg: Edema present  Left lower leg: Edema present  Comments: Patient's right lower extremity has trace edema and his left lower extremity has trivial amount of edema  Minimal pitting bilaterally    The patient does have compression stockings on I did roll the stockings down to check for edema  Patient's left hand is somewhat edematous however his radial pulses 2+ very strong as his his own are an brachial pulse  Skin:     General: Skin is warm and dry  Neurological:      General: No focal deficit present  Mental Status: He is alert and oriented to person, place, and time  Mental status is at baseline  Psychiatric:         Mood and Affect: Mood normal          Behavior: Behavior normal          Thought Content: Thought content normal          Judgment: Judgment normal          Vital Signs  ED Triage Vitals   Temp Pulse Resp BP SpO2   -- -- -- -- --      Temp src Heart Rate Source Patient Position - Orthostatic VS BP Location FiO2 (%)   -- -- -- -- --      Pain Score       --           There were no vitals filed for this visit  Visual Acuity      ED Medications  Medications - No data to display    Diagnostic Studies  Results Reviewed     None                 No orders to display              Procedures  ECG 12 Lead Documentation Only    Date/Time: 8/6/2021 1:16 PM  Performed by: Valentina Swan MD  Authorized by: Valentina Swan MD     Indications / Diagnosis:  Weakness  ECG reviewed by me, the ED Provider: yes    Patient location:  ED  Interpretation:     Interpretation: normal    Rate:     ECG rate:  75    ECG rate assessment: normal    Rhythm:     Rhythm: sinus rhythm    Ectopy:     Ectopy: none    QRS:     QRS axis:  Normal  Conduction:     Conduction: normal    ST segments:     ST segments:  Normal  T waves:     T waves: normal               ED Course                                           MDM    Disposition  Final diagnoses:   None     ED Disposition     None      Follow-up Information    None         Patient's Medications   Discharge Prescriptions    No medications on file     No discharge procedures on file      PDMP Review       Value Time User    PDMP Reviewed  Yes 4/20/2021 11:02 AM Milton Jean Ning Zapien          ED Provider  Electronically Signed by           Homer Ramirez MD  08/07/21 1627

## 2021-08-06 NOTE — ED NOTES
Spoke with Mushtaq Barnett, daughter in law, to give patient update  Mushtaq Barnett requests that we continue to update her with changes in patient status       Juliene Ormond, RN  08/06/21 2047

## 2021-08-06 NOTE — ED NOTES
Maggi Saldivar, Daughter in law leaving bedside, requesting to be called with updates       Sánchez Bingham RN  08/06/21 8798

## 2021-08-07 ENCOUNTER — HOSPITAL ENCOUNTER (INPATIENT)
Facility: HOSPITAL | Age: 71
LOS: 9 days | Discharge: HOME | DRG: 378 | End: 2021-08-16
Attending: SURGERY | Admitting: SURGERY
Payer: COMMERCIAL

## 2021-08-07 VITALS
HEART RATE: 65 BPM | OXYGEN SATURATION: 95 % | HEIGHT: 70 IN | WEIGHT: 152 LBS | DIASTOLIC BLOOD PRESSURE: 82 MMHG | SYSTOLIC BLOOD PRESSURE: 177 MMHG | BODY MASS INDEX: 21.76 KG/M2 | RESPIRATION RATE: 16 BRPM | TEMPERATURE: 97.4 F

## 2021-08-07 DIAGNOSIS — D50.0 IRON DEFICIENCY ANEMIA DUE TO CHRONIC BLOOD LOSS: Primary | ICD-10-CM

## 2021-08-07 DIAGNOSIS — F20.9 SCHIZOPHRENIA, UNSPECIFIED TYPE (HCC): ICD-10-CM

## 2021-08-07 PROBLEM — R93.89 ABNORMAL FINDING ON CT SCAN: Status: ACTIVE | Noted: 2021-08-07

## 2021-08-07 PROBLEM — K92.1 MELENA: Status: ACTIVE | Noted: 2021-08-07

## 2021-08-07 PROBLEM — I69.30 HISTORY OF CVA WITH RESIDUAL DEFICIT: Status: RESOLVED | Noted: 2019-10-01 | Resolved: 2021-08-07

## 2021-08-07 PROBLEM — F10.11 HISTORY OF ALCOHOL ABUSE: Status: ACTIVE | Noted: 2019-08-21

## 2021-08-07 PROBLEM — D50.8 IRON DEFICIENCY ANEMIA SECONDARY TO INADEQUATE DIETARY IRON INTAKE: Status: ACTIVE | Noted: 2021-04-21

## 2021-08-07 LAB
ABO GROUP BLD: NORMAL
ATRIAL RATE: 75 BPM
BLD GP AB SCN SERPL QL: POSITIVE
ERYTHROCYTE [DISTWIDTH] IN BLOOD BY AUTOMATED COUNT: 16.9 % (ref 11.6–15.1)
HCT VFR BLD AUTO: 38.1 % (ref 36.5–49.3)
HGB BLD-MCNC: 11.7 G/DL (ref 12–17)
MCH RBC QN AUTO: 25.1 PG (ref 26.8–34.3)
MCHC RBC AUTO-ENTMCNC: 30.7 G/DL (ref 31.4–37.4)
MCV RBC AUTO: 82 FL (ref 82–98)
P AXIS: 58 DEGREES
PLATELET # BLD AUTO: 212 THOUSANDS/UL (ref 149–390)
PMV BLD AUTO: 10.1 FL (ref 8.9–12.7)
PR INTERVAL: 168 MS
QRS AXIS: 35 DEGREES
QRSD INTERVAL: 84 MS
QT INTERVAL: 386 MS
QTC INTERVAL: 431 MS
RBC # BLD AUTO: 4.67 MILLION/UL (ref 3.88–5.62)
RH BLD: NEGATIVE
SPECIMEN EXPIRATION DATE: NORMAL
T WAVE AXIS: 60 DEGREES
VENTRICULAR RATE: 75 BPM
WBC # BLD AUTO: 7.25 THOUSAND/UL (ref 4.31–10.16)

## 2021-08-07 PROCEDURE — 86902 BLOOD TYPE ANTIGEN DONOR EA: CPT

## 2021-08-07 PROCEDURE — 36430 TRANSFUSION BLD/BLD COMPNT: CPT

## 2021-08-07 PROCEDURE — 86900 BLOOD TYPING SEROLOGIC ABO: CPT | Performed by: SURGERY

## 2021-08-07 PROCEDURE — 86901 BLOOD TYPING SEROLOGIC RH(D): CPT | Performed by: SURGERY

## 2021-08-07 PROCEDURE — 85027 COMPLETE CBC AUTOMATED: CPT | Performed by: SURGERY

## 2021-08-07 PROCEDURE — C9113 INJ PANTOPRAZOLE SODIUM, VIA: HCPCS | Performed by: SURGERY

## 2021-08-07 PROCEDURE — 86850 RBC ANTIBODY SCREEN: CPT | Performed by: SURGERY

## 2021-08-07 PROCEDURE — 93010 ELECTROCARDIOGRAM REPORT: CPT | Performed by: INTERNAL MEDICINE

## 2021-08-07 PROCEDURE — P9016 RBC LEUKOCYTES REDUCED: HCPCS

## 2021-08-07 PROCEDURE — 96361 HYDRATE IV INFUSION ADD-ON: CPT

## 2021-08-07 PROCEDURE — 99253 IP/OBS CNSLTJ NEW/EST LOW 45: CPT | Performed by: STUDENT IN AN ORGANIZED HEALTH CARE EDUCATION/TRAINING PROGRAM

## 2021-08-07 RX ORDER — PANTOPRAZOLE SODIUM 40 MG/1
40 TABLET, DELAYED RELEASE ORAL
Status: DISCONTINUED | OUTPATIENT
Start: 2021-08-07 | End: 2021-08-07

## 2021-08-07 RX ORDER — ALBUTEROL SULFATE 90 UG/1
2 AEROSOL, METERED RESPIRATORY (INHALATION) EVERY 6 HOURS PRN
Status: DISCONTINUED | OUTPATIENT
Start: 2021-08-07 | End: 2021-08-16 | Stop reason: HOSPADM

## 2021-08-07 RX ORDER — SUCRALFATE 1 G/1
1 TABLET ORAL 4 TIMES DAILY
Status: DISCONTINUED | OUTPATIENT
Start: 2021-08-07 | End: 2021-08-16 | Stop reason: HOSPADM

## 2021-08-07 RX ORDER — QUETIAPINE FUMARATE 300 MG/1
600 TABLET, FILM COATED ORAL
Status: DISCONTINUED | OUTPATIENT
Start: 2021-08-07 | End: 2021-08-16 | Stop reason: HOSPADM

## 2021-08-07 RX ORDER — HYDROXYZINE HYDROCHLORIDE 25 MG/1
50 TABLET, FILM COATED ORAL 3 TIMES DAILY PRN
Status: DISCONTINUED | OUTPATIENT
Start: 2021-08-07 | End: 2021-08-16 | Stop reason: HOSPADM

## 2021-08-07 RX ORDER — TRAZODONE HYDROCHLORIDE 50 MG/1
50 TABLET ORAL
Status: DISCONTINUED | OUTPATIENT
Start: 2021-08-07 | End: 2021-08-16 | Stop reason: HOSPADM

## 2021-08-07 RX ORDER — QUETIAPINE FUMARATE 100 MG/1
200 TABLET, FILM COATED ORAL DAILY
Status: DISCONTINUED | OUTPATIENT
Start: 2021-08-08 | End: 2021-08-16 | Stop reason: HOSPADM

## 2021-08-07 RX ORDER — OXYCODONE HYDROCHLORIDE 10 MG/1
10 TABLET ORAL EVERY 4 HOURS PRN
Status: DISCONTINUED | OUTPATIENT
Start: 2021-08-07 | End: 2021-08-11

## 2021-08-07 RX ORDER — LABETALOL 20 MG/4 ML (5 MG/ML) INTRAVENOUS SYRINGE
10 EVERY 6 HOURS PRN
Status: DISCONTINUED | OUTPATIENT
Start: 2021-08-07 | End: 2021-08-16 | Stop reason: HOSPADM

## 2021-08-07 RX ORDER — SODIUM CHLORIDE 9 MG/ML
75 INJECTION, SOLUTION INTRAVENOUS CONTINUOUS
Status: DISCONTINUED | OUTPATIENT
Start: 2021-08-08 | End: 2021-08-11

## 2021-08-07 RX ORDER — FLUTICASONE PROPIONATE 50 MCG
2 SPRAY, SUSPENSION (ML) NASAL DAILY
Status: DISCONTINUED | OUTPATIENT
Start: 2021-08-07 | End: 2021-08-16 | Stop reason: HOSPADM

## 2021-08-07 RX ORDER — ATORVASTATIN CALCIUM 40 MG/1
40 TABLET, FILM COATED ORAL DAILY
Status: DISCONTINUED | OUTPATIENT
Start: 2021-08-07 | End: 2021-08-16 | Stop reason: HOSPADM

## 2021-08-07 RX ORDER — LANOLIN ALCOHOL/MO/W.PET/CERES
100 CREAM (GRAM) TOPICAL DAILY
Status: DISCONTINUED | OUTPATIENT
Start: 2021-08-07 | End: 2021-08-16 | Stop reason: HOSPADM

## 2021-08-07 RX ORDER — GABAPENTIN 300 MG/1
600 CAPSULE ORAL 3 TIMES DAILY
Status: DISCONTINUED | OUTPATIENT
Start: 2021-08-07 | End: 2021-08-16 | Stop reason: HOSPADM

## 2021-08-07 RX ORDER — SODIUM CHLORIDE 9 MG/ML
125 INJECTION, SOLUTION INTRAVENOUS CONTINUOUS
Status: DISCONTINUED | OUTPATIENT
Start: 2021-08-07 | End: 2021-08-07

## 2021-08-07 RX ORDER — OXYCODONE HYDROCHLORIDE 5 MG/1
5 TABLET ORAL EVERY 4 HOURS PRN
Status: DISCONTINUED | OUTPATIENT
Start: 2021-08-07 | End: 2021-08-11

## 2021-08-07 RX ORDER — FLUTICASONE FUROATE AND VILANTEROL 100; 25 UG/1; UG/1
1 POWDER RESPIRATORY (INHALATION) DAILY
Status: DISCONTINUED | OUTPATIENT
Start: 2021-08-07 | End: 2021-08-16 | Stop reason: HOSPADM

## 2021-08-07 RX ORDER — PANTOPRAZOLE SODIUM 40 MG/1
40 INJECTION, POWDER, FOR SOLUTION INTRAVENOUS EVERY 12 HOURS SCHEDULED
Status: DISCONTINUED | OUTPATIENT
Start: 2021-08-07 | End: 2021-08-11 | Stop reason: SDUPTHER

## 2021-08-07 RX ADMIN — MIRTAZAPINE 45 MG: 30 TABLET, FILM COATED ORAL at 21:35

## 2021-08-07 RX ADMIN — PANTOPRAZOLE SODIUM 40 MG: 40 INJECTION, POWDER, FOR SOLUTION INTRAVENOUS at 21:36

## 2021-08-07 RX ADMIN — SODIUM CHLORIDE 125 ML/HR: 0.9 INJECTION, SOLUTION INTRAVENOUS at 10:34

## 2021-08-07 RX ADMIN — METRONIDAZOLE 500 MG: 500 INJECTION, SOLUTION INTRAVENOUS at 19:38

## 2021-08-07 RX ADMIN — CEFTRIAXONE 1000 MG: 1 INJECTION, POWDER, FOR SOLUTION INTRAMUSCULAR; INTRAVENOUS at 20:38

## 2021-08-07 RX ADMIN — THIAMINE HCL TAB 100 MG 100 MG: 100 TAB at 14:28

## 2021-08-07 RX ADMIN — ATORVASTATIN CALCIUM 40 MG: 40 TABLET, FILM COATED ORAL at 14:28

## 2021-08-07 RX ADMIN — FLUTICASONE PROPIONATE 2 SPRAY: 50 SPRAY, METERED NASAL at 21:36

## 2021-08-07 RX ADMIN — SODIUM CHLORIDE 125 ML/HR: 0.9 INJECTION, SOLUTION INTRAVENOUS at 18:43

## 2021-08-07 RX ADMIN — OXYCODONE HYDROCHLORIDE 10 MG: 10 TABLET ORAL at 22:50

## 2021-08-07 RX ADMIN — QUETIAPINE FUMARATE 600 MG: 300 TABLET ORAL at 21:37

## 2021-08-07 RX ADMIN — SUCRALFATE 1 G: 1 TABLET ORAL at 21:36

## 2021-08-07 RX ADMIN — OXYCODONE HYDROCHLORIDE 10 MG: 10 TABLET ORAL at 10:39

## 2021-08-07 RX ADMIN — FLUTICASONE FUROATE AND VILANTEROL TRIFENATATE 1 PUFF: 100; 25 POWDER RESPIRATORY (INHALATION) at 15:32

## 2021-08-07 RX ADMIN — SUCRALFATE 1 G: 1 TABLET ORAL at 17:12

## 2021-08-07 RX ADMIN — GABAPENTIN 600 MG: 300 CAPSULE ORAL at 21:35

## 2021-08-07 RX ADMIN — METOPROLOL TARTRATE 25 MG: 25 TABLET, FILM COATED ORAL at 21:35

## 2021-08-07 NOTE — ED NOTES
Patient questioning if he can eat/drink - when he's leaving - informed patient he has ice chips and was allowed ice chips - will inform patient when informed of transfer arrangements     Xavi Johnson RN  08/06/21 1085

## 2021-08-07 NOTE — ED NOTES
Patient asleep - responding to verbal stimuli - blood transfusion infusing without difficulty - cardiac monitoring in place     Andrea Dunn RN  08/07/21 9519

## 2021-08-07 NOTE — PROGRESS NOTES
Pt BP elevated 191/93 and second reading 181/90- nothing ordered PRN   let white surgical resident know-

## 2021-08-07 NOTE — ED NOTES
IV infiltrated while blood infusing  MD made aware, patient ecchymotic area to the left arm  Blood stopped at this time           Bijan Ramirez, RN  08/07/21 5432

## 2021-08-07 NOTE — ED NOTES
Pt transferred to hospital bed for comfort with both side rails, locked and low to floor  Depends changed and pt changed into gown  Call bell, urinal, and cell phone within reach        [facility]  08/06/21 4441

## 2021-08-07 NOTE — ED NOTES
Per PACS room changed to  [id number]  All other details remain unchanged       Praful Bryant, RN  89/74/60 7106

## 2021-08-07 NOTE — ED NOTES
Patient voided 800 cc's of urine in urinal at bedside - back to sleep     Jasmyne Handy RN  08/07/21 3235

## 2021-08-07 NOTE — EMTALA/ACUTE CARE TRANSFER
[facility]  [address] [address]  [phone]  Dept: [phone]      EMTALA TRANSFER CONSENT    NAME Art Salcedo                                         1950                              MRN [id number]    I have been informed of my rights regarding examination, treatment, and transfer   by Dr Murray Delvalle MD    Benefits: Specialized equipment and/or services available at the receiving facility (Include comment)________________________    Risks: Potential for delay in receiving treatment, Potential deterioration of medical condition, Loss of IV, Possible worsening of condition or death during transfer      Transfer Request   I acknowledge that my medical condition has been evaluated and explained to me by the emergency department physician or other qualified medical person and/or my attending physician who has recommended and offered to me further medical examination and treatment  I understand the Hospital's obligation with respect to the treatment and stabilization of my emergency medical condition  I nevertheless request to be transferred  I release the Hospital, the doctor, and any other persons caring for me from all responsibility or liability for any injury or ill effects that may result from my transfer and agree to accept all responsibility for the consequences of my choice to transfer, rather than receive stabilizing treatment at the Hospital  I understand that because the transfer is my request, my insurance may not provide reimbursement for the services  The Hospital will assist and direct me and my family in how to make arrangements for transfer, but the hospital is not liable for any fees charged by the transport service  In spite of this understanding, I refuse to consent to further medical examination and treatment which has been offered to me, and request transfer to     I authorize the performance of emergency medical procedures and treatments upon me in both transit and upon arrival at the receiving facility  Additionally, I authorize the release of any and all medical records to the receiving facility and request they be transported with me, if possible  I authorize the performance of emergency medical procedures and treatments upon me in both transit and upon arrival at the receiving facility  Additionally, I authorize the release of any and all medical records to the receiving facility and request they be transported with me, if possible  I understand that the safest mode of transportation during a medical emergency is an ambulance and that the Hospital advocates the use of this mode of transport  Risks of traveling to the receiving facility by car, including absence of medical control, life sustaining equipment, such as oxygen, and medical personnel has been explained to me and I fully understand them  (DAVID CORRECT BOX BELOW)  [  ]  I consent to the stated transfer and to be transported by ambulance/helicopter  [  ]  I consent to the stated transfer, but refuse transportation by ambulance and accept full responsibility for my transportation by car  I understand the risks of non-ambulance transfers and I exonerate the Hospital and its staff from any deterioration in my condition that results from this refusal     X___________________________________________    DATE  21  TIME________  Signature of patient or legally responsible individual signing on patient behalf           RELATIONSHIP TO PATIENT_________________________          Provider Certification    NAME Umesh Smart                                        [facility] 1950                              MRN [id number]    A medical screening exam was performed on the above named patient  Based on the examination:    Condition Necessitating Transfer The primary encounter diagnosis was GI bleed   Diagnoses of Anemia, Generalized weakness, and PUD (peptic ulcer disease) were also pertinent to this visit  Patient Condition: The patient has been stabilized such that within reasonable medical probability, no material deterioration of the patient condition or the condition of the unborn child(mauricio) is likely to result from the transfer, An emergency transfer is being made prior to stabilization due to the need for definitive care and the benefit of transfer outweighs the risk    Reason for Transfer: Level of Care needed not available at this facility    Transfer Requirements: Facility     · Space available and qualified personnel available for treatment as acknowledged by    · Agreed to accept transfer and to provide appropriate medical treatment as acknowledged by       Dr Geovany Veronica  · Appropriate medical records of the examination and treatment of the patient are provided at the time of transfer   [address] [facility], [address] _______  · Transfer will be performed by qualified personnel from    and appropriate transfer equipment as required, including the use of necessary and appropriate life support measures      Provider Certification: I have examined the patient and explained the following risks and benefits of being transferred/refusing transfer to the patient/family:  General risk, such as traffic hazards, adverse weather conditions, rough terrain or turbulence, possible failure of equipment (including vehicle or aircraft), or consequences of actions of persons outside the control of the transport personnel, Unanticipated needs of medical equipment and personnel during transport, Risk of worsening condition, The possibility of a transport vehicle being unavailable, Consent was not obtained as patient is committed to psychiatric facility and transfer is mandated      Based on these reasonable risks and benefits to the patient and/or the unborn child(mauricio), and based upon the information available at the time of the patients examination, I certify that the medical benefits reasonably to be expected from the provision of appropriate medical treatments at another medical facility outweigh the increasing risks, if any, to the individuals medical condition, and in the case of labor to the unborn child, from effecting the transfer      X____________________________________________ DATE 08/06/21        TIME_______      ORIGINAL - SEND TO MEDICAL RECORDS   COPY - SEND WITH PATIENT DURING TRANSFER

## 2021-08-07 NOTE — H&P
H&P- Colorectal Surgery  Allie He 78 y o  male MRN: [id number]  Unit/Bed#: [facility] 510-01 Encounter: [id number]        Assessment/Plan     Assessment:  70M w/ iron deficiency anemia w/ generalized weakness, in hospital for iron deficiency anemia    S/p 2U pRBC and SL GH    Plan:  Lo Res Diet  Tomorrow start Bowel Prep and clears    Type and screen - will follow up on repeat Hemoglobin since patient was transfused at [facility]) [address] [place] [address]    Plan for Colonoscopy and EGD on Monday 8/9   - Protonix IV bid    History of Present Illness     HPI:  Allie He is a 78 y o  male who presents to [facility] with generalized weakness  The patient has been in his normal state of health but upon checking on an yesterday morning 08/06/2021 the patient's daughter-in-law states that he was weak enough to where he cannot get out of bed by himself and almost fell several times with assistance  The patient did complain of some chest tightness and shortness of breath with exertion  Patient denies any recent illness or fever  He does endorse melena  Patient was recently admitted to the hospital after a GI bleed secondary to a peptic ulcer which was treated intraoperatively  Patient does have some abdominal pain however abdominal exam is benign  Patient has a past medical history of alcohol abuse and COPD  Denies any bright red blood per rectum       Review of Systems   Constitutional: Negative  HENT: Negative  Eyes: Negative  Respiratory: Negative  Cardiovascular: Negative  Gastrointestinal: Negative  Endocrine: Negative  Genitourinary: Negative  Musculoskeletal: Negative  Skin: Negative  Allergic/Immunologic: Negative  Neurological: Positive for weakness  Hematological: Negative  Psychiatric/Behavioral: Negative          Historical Information   Past Medical History:   Diagnosis Date    Alcohol abuse     BPH (benign prostatic hyperplasia)     [address]A [facility]cerebral vascular accident) ([facility] [address]     DJD (degenerative joint disease)     Encounter to establish care with new doctor 8/21/2019    Gait abnormality     Head injury     History of cerebrovascular accident (CVA) with residual deficit 10/1/2019    History of CVA (cerebrovascular accident) 1/8/2020    History of substance abuse ([address] ) 8/21/2019    Polysubstance     History of sustained ventricular tachycardia 8/21/2019    History of transient ischemic attack (TIA) 8/28/2019    Hypertension     Metatarsal fracture     Palliative care patient     TIA (transient ischemic attack)     Tobacco abuse      Past Surgical History:   Procedure Laterality Date    ABDOMINAL SURGERY      ANKLE SURGERY      BACK SURGERY      CT GUIDED [facility] [place] DRAINAGE CATHETER PLACEMENT  1/27/2020    ELBOW SURGERY      IR VISCERAL ANGIOGRAPHY / INTERVENTION  1/11/2020    JOINT REPLACEMENT      KNEE SURGERY      LAPAROTOMY N/A 1/11/2020    Procedure: LAPAROTOMY EXPLORATORY,  OVERSEW  OF GASTRO DUODENAL ARTERY, THAL PATCH OF DUODENUM, VICKY GASTRO JEJUNOSTOMY TUBE;  Surgeon: Daja Shipley DO;  Location: [facility];  Service: General    LIVER SURGERY       Social History   Social History     Substance and Sexual Activity   Alcohol Use Not Currently    Alcohol/week: 4 0 standard drinks    Types: 4 Cans of beer per week    Comment: a couple beers     Social History     Substance and Sexual Activity   Drug Use Yes    Types: Marijuana    Comment: history polysubstance use including IVDA on record- every now then will smoke weed      Social History     Tobacco Use   Smoking Status Current Every Day Smoker    Packs/day: 1 00    Types: Cigarettes   Smokeless Tobacco Never Used   Tobacco Comment    started age 14, 3 quit attempts     Family History:   Family History   Problem Relation Age of Onset    No Known Problems Mother     No Known Problems Father        Meds/Allergies   PTA meds:   Prior to Admission Medications   Prescriptions Last Dose Informant Patient Reported? Taking? Cholecalciferol 25 MCG (1000 UT) tablet   No No   Sig: Take 1 tablet (1,000 Units total) by mouth daily   Nutritional Supplements (Ensure Original) LIQD   No No   Sig: Take 1 Can by mouth 3 (three) times a day as needed (nutrition)   QUEtiapine (SEROquel) 200 mg tablet  Outside Facility (Specify) No No   Sig: Take 1 tablet (200 mg total) by mouth daily   QUEtiapine (SEROquel) 300 mg tablet  Outside Facility (Specify) No No   Sig: Take 2 tablets (600 mg total) by mouth daily at bedtime   acetaminophen (TYLENOL) 325 mg tablet   No No   Sig: Take 2 tablets (650 mg total) by mouth every 6 (six) hours as needed for mild pain   albuterol (ProAir HFA) 90 mcg/act inhaler   No No   Sig: Inhale 2 puffs every 6 (six) hours as needed for wheezing   atorvastatin (LIPITOR) 40 mg tablet   No No   Sig: Take 1 tablet (40 mg total) by mouth daily   diclofenac (VOLTAREN) 75 mg EC tablet   No No   Sig: take 1 tablet by mouth twice a day   dicyclomine (BENTYL) 10 mg capsule   No No   Sig: Take 1 capsule (10 mg total) by mouth 4 (four) times a day (before meals and at bedtime) for 10 days   docusate sodium (COLACE) 100 mg capsule   No No   Sig: Take 1 capsule (100 mg total) by mouth 2 (two) times a day   fluticasone (FLONASE) 50 mcg/act nasal spray   No No   Si sprays into each nostril daily   fluticasone-vilanterol (BREO ELLIPTA) 100-25 mcg/inh inhaler   No No   Sig: Inhale 1 puff daily Rinse mouth after use     folic acid (FOLVITE) 1 mg tablet   No No   Sig: Take 1 tablet (1 mg total) by mouth daily   gabapentin (NEURONTIN) 600 MG tablet   No No   Sig: Take 1 tablet (600 mg total) by mouth 3 (three) times a day   hydrOXYzine pamoate (VISTARIL) 50 mg capsule   Yes No   Sig: take 1 capsule by mouth three times a day if needed for anxiety   metoprolol tartrate (LOPRESSOR) 50 mg tablet   No No   Si/2 tablet by mouth every 12 hours   mirtazapine (REMERON) 45 MG tablet   Yes No   Sig: Take 45 mg by mouth daily at bedtime pantoprazole (PROTONIX) 40 mg tablet   No No   Sig: Take 1 tablet (40 mg total) by mouth 2 (two) times a day before meals   sucralfate (CARAFATE) 1 g tablet   No No   Sig: take 1 tablet by mouth four times a day   thiamine 100 MG tablet   No No   Sig: Take 1 tablet (100 mg total) by mouth daily   traZODone (DESYREL) 50 mg tablet   No No   Sig: Take 1 tablet (50 mg total) by mouth daily at bedtime as needed for sleep      Facility-Administered Medications: None     No Known Allergies    Objective   First Vitals:   Blood Pressure: (!) 191/93 (08/07/21 1223)  Pulse: 75 (08/07/21 1223)  Temperature: 98 5 °F (36 9 °C) (08/07/21 1223)  Respirations: 16 (08/07/21 1223)  SpO2: 93 % (08/07/21 1223)    Current Vitals:   Blood Pressure: (!) 181/90 (08/07/21 1224)  Pulse: 78 (08/07/21 1224)  Temperature: 98 5 °F (36 9 °C) (08/07/21 1223)  Respirations: 16 (08/07/21 1223)  SpO2: 92 % (08/07/21 1224)      Intake/Output Summary (Last 24 hours) at 8/7/2021 1412  Last data filed at 8/7/2021 1034  Gross per 24 hour   Intake --   Output 200 ml   Net -200 ml       Invasive Devices     Peripheral Intravenous Line            Peripheral IV 08/07/21 Right Hand <1 day                Physical Exam  Constitutional:       Appearance: He is ill-appearing  HENT:      Head: Normocephalic  Nose: Nose normal       Mouth/Throat:      Mouth: Mucous membranes are dry  Eyes:      General: No scleral icterus  Cardiovascular:      Rate and Rhythm: Normal rate and regular rhythm  Pulmonary:      Effort: Pulmonary effort is normal    Abdominal:      General: There is no distension  Palpations: Abdomen is soft  Tenderness: There is abdominal tenderness (subjective tenderness in RUQ and RLQ without guarding or tensing)  There is no guarding  Musculoskeletal:         General: Normal range of motion  Cervical back: Neck supple  Skin:     General: Skin is warm  Neurological:      General: No focal deficit present        Mental Status: He is alert and oriented to person, place, and time  Psychiatric:         Mood and Affect: Mood normal          Lab Results: I have personally reviewed pertinent lab results  , CBC: No results found for: WBC, HGB, HCT, MCV, PLT, ADJUSTEDWBC, MCH, MCHC, RDW, MPV, NRBC, CMP: No results found for: SODIUM, K, CL, CO2, ANIONGAP, BUN, CREATININE, GLUCOSE, CALCIUM, AST, ALT, ALKPHOS, PROT, BILITOT, EGFR  Imaging: I have personally reviewed pertinent reports  EKG, Pathology, and Other Studies: I have personally reviewed pertinent reports        Code Status: Level 1 - Full Code  Advance Directive and Living Will:      Power of :    POLST:

## 2021-08-07 NOTE — ED NOTES
Received information that patient's  will be around 6:30am - patient to be transferred to [address] [place] [address] to service of Dr Roberto Benitez report to [phone] - patient informed of the same     Eda Davis RN  08/06/21 4854

## 2021-08-08 ENCOUNTER — APPOINTMENT (INPATIENT)
Dept: RADIOLOGY | Facility: HOSPITAL | Age: 71
DRG: 378 | End: 2021-08-08
Payer: COMMERCIAL

## 2021-08-08 LAB
ABO GROUP BLD BPU: NORMAL
ABO GROUP BLD BPU: NORMAL
ANION GAP SERPL CALCULATED.3IONS-SCNC: 5 MMOL/L (ref 4–13)
BPU ID: NORMAL
BPU ID: NORMAL
BUN SERPL-MCNC: 7 MG/DL (ref 5–25)
CALCIUM SERPL-MCNC: 8.5 MG/DL (ref 8.3–10.1)
CHLORIDE SERPL-SCNC: 108 MMOL/L (ref 100–108)
CO2 SERPL-SCNC: 24 MMOL/L (ref 21–32)
CREAT SERPL-MCNC: 0.77 MG/DL (ref 0.6–1.3)
CROSSMATCH: NORMAL
CROSSMATCH: NORMAL
ERYTHROCYTE [DISTWIDTH] IN BLOOD BY AUTOMATED COUNT: 16.9 % (ref 11.6–15.1)
FERRITIN SERPL-MCNC: 7 NG/ML (ref 8–388)
GFR SERPL CREATININE-BSD FRML MDRD: 93 ML/MIN/1.73SQ M
GLUCOSE SERPL-MCNC: 90 MG/DL (ref 65–140)
HCT VFR BLD AUTO: 30 % (ref 36.5–49.3)
HGB BLD-MCNC: 9.8 G/DL (ref 12–17)
IRON SATN MFR SERPL: 16 %
IRON SERPL-MCNC: 52 UG/DL (ref 65–175)
MCH RBC QN AUTO: 26.1 PG (ref 26.8–34.3)
MCHC RBC AUTO-ENTMCNC: 32.7 G/DL (ref 31.4–37.4)
MCV RBC AUTO: 80 FL (ref 82–98)
PLATELET # BLD AUTO: 287 THOUSANDS/UL (ref 149–390)
PMV BLD AUTO: 10.3 FL (ref 8.9–12.7)
POTASSIUM SERPL-SCNC: 3.3 MMOL/L (ref 3.5–5.3)
PROCALCITONIN SERPL-MCNC: <0.05 NG/ML
RBC # BLD AUTO: 3.75 MILLION/UL (ref 3.88–5.62)
SODIUM SERPL-SCNC: 137 MMOL/L (ref 136–145)
TIBC SERPL-MCNC: 322 UG/DL (ref 250–450)
UNIT DISPENSE STATUS: NORMAL
UNIT DISPENSE STATUS: NORMAL
UNIT PRODUCT CODE: NORMAL
UNIT PRODUCT CODE: NORMAL
UNIT PRODUCT VOLUME: 350 ML
UNIT PRODUCT VOLUME: 350 ML
UNIT RH: NORMAL
UNIT RH: NORMAL
WBC # BLD AUTO: 6.28 THOUSAND/UL (ref 4.31–10.16)

## 2021-08-08 PROCEDURE — 71046 X-RAY EXAM CHEST 2 VIEWS: CPT

## 2021-08-08 PROCEDURE — 84145 PROCALCITONIN (PCT): CPT | Performed by: STUDENT IN AN ORGANIZED HEALTH CARE EDUCATION/TRAINING PROGRAM

## 2021-08-08 PROCEDURE — 80048 BASIC METABOLIC PNL TOTAL CA: CPT | Performed by: SURGERY

## 2021-08-08 PROCEDURE — 85027 COMPLETE CBC AUTOMATED: CPT | Performed by: SURGERY

## 2021-08-08 PROCEDURE — 99232 SBSQ HOSP IP/OBS MODERATE 35: CPT | Performed by: PHYSICIAN ASSISTANT

## 2021-08-08 PROCEDURE — C9113 INJ PANTOPRAZOLE SODIUM, VIA: HCPCS | Performed by: SURGERY

## 2021-08-08 PROCEDURE — 97163 PT EVAL HIGH COMPLEX 45 MIN: CPT

## 2021-08-08 PROCEDURE — 82728 ASSAY OF FERRITIN: CPT | Performed by: STUDENT IN AN ORGANIZED HEALTH CARE EDUCATION/TRAINING PROGRAM

## 2021-08-08 PROCEDURE — 83540 ASSAY OF IRON: CPT | Performed by: STUDENT IN AN ORGANIZED HEALTH CARE EDUCATION/TRAINING PROGRAM

## 2021-08-08 PROCEDURE — 83550 IRON BINDING TEST: CPT | Performed by: STUDENT IN AN ORGANIZED HEALTH CARE EDUCATION/TRAINING PROGRAM

## 2021-08-08 RX ADMIN — METOPROLOL TARTRATE 25 MG: 25 TABLET, FILM COATED ORAL at 21:46

## 2021-08-08 RX ADMIN — QUETIAPINE FUMARATE 200 MG: 100 TABLET ORAL at 08:12

## 2021-08-08 RX ADMIN — SUCRALFATE 1 G: 1 TABLET ORAL at 11:38

## 2021-08-08 RX ADMIN — FLUTICASONE FUROATE AND VILANTEROL TRIFENATATE 1 PUFF: 100; 25 POWDER RESPIRATORY (INHALATION) at 08:12

## 2021-08-08 RX ADMIN — SUCRALFATE 1 G: 1 TABLET ORAL at 17:05

## 2021-08-08 RX ADMIN — METRONIDAZOLE 500 MG: 500 INJECTION, SOLUTION INTRAVENOUS at 11:39

## 2021-08-08 RX ADMIN — POLYETHYLENE GLYCOL 3350, SODIUM SULFATE ANHYDROUS, SODIUM BICARBONATE, SODIUM CHLORIDE, POTASSIUM CHLORIDE 4000 ML: 236; 22.74; 6.74; 5.86; 2.97 POWDER, FOR SOLUTION ORAL at 08:59

## 2021-08-08 RX ADMIN — THIAMINE HCL TAB 100 MG 100 MG: 100 TAB at 08:12

## 2021-08-08 RX ADMIN — PANTOPRAZOLE SODIUM 40 MG: 40 INJECTION, POWDER, FOR SOLUTION INTRAVENOUS at 21:46

## 2021-08-08 RX ADMIN — PANTOPRAZOLE SODIUM 40 MG: 40 INJECTION, POWDER, FOR SOLUTION INTRAVENOUS at 08:12

## 2021-08-08 RX ADMIN — GABAPENTIN 600 MG: 300 CAPSULE ORAL at 21:46

## 2021-08-08 RX ADMIN — QUETIAPINE FUMARATE 600 MG: 300 TABLET ORAL at 21:47

## 2021-08-08 RX ADMIN — METOPROLOL TARTRATE 25 MG: 25 TABLET, FILM COATED ORAL at 08:11

## 2021-08-08 RX ADMIN — METRONIDAZOLE 500 MG: 500 INJECTION, SOLUTION INTRAVENOUS at 21:55

## 2021-08-08 RX ADMIN — FLUTICASONE PROPIONATE 2 SPRAY: 50 SPRAY, METERED NASAL at 21:48

## 2021-08-08 RX ADMIN — SUCRALFATE 1 G: 1 TABLET ORAL at 08:11

## 2021-08-08 RX ADMIN — GABAPENTIN 600 MG: 300 CAPSULE ORAL at 17:05

## 2021-08-08 RX ADMIN — GABAPENTIN 600 MG: 300 CAPSULE ORAL at 08:11

## 2021-08-08 RX ADMIN — MIRTAZAPINE 45 MG: 30 TABLET, FILM COATED ORAL at 21:46

## 2021-08-08 RX ADMIN — ATORVASTATIN CALCIUM 40 MG: 40 TABLET, FILM COATED ORAL at 08:11

## 2021-08-08 RX ADMIN — SUCRALFATE 1 G: 1 TABLET ORAL at 21:46

## 2021-08-08 RX ADMIN — CEFTRIAXONE 1000 MG: 1 INJECTION, POWDER, FOR SOLUTION INTRAMUSCULAR; INTRAVENOUS at 21:54

## 2021-08-08 RX ADMIN — TRAZODONE HYDROCHLORIDE 50 MG: 50 TABLET ORAL at 21:54

## 2021-08-08 RX ADMIN — SODIUM CHLORIDE 75 ML/HR: 0.9 INJECTION, SOLUTION INTRAVENOUS at 09:07

## 2021-08-08 RX ADMIN — METRONIDAZOLE 500 MG: 500 INJECTION, SOLUTION INTRAVENOUS at 02:52

## 2021-08-08 NOTE — PLAN OF CARE
Problem: MOBILITY - ADULT  Goal: Maintain or return to baseline ADL function  Description: INTERVENTIONS:  -  Assess patient's ability to carry out ADLs; assess patient's baseline for ADL function and identify physical deficits which impact ability to perform ADLs (bathing, care of mouth/teeth, toileting, grooming, dressing, etc )  - Assess/evaluate cause of self-care deficits   - Assess range of motion  - Assess patient's mobility; develop plan if impaired  - Assess patient's need for assistive devices and provide as appropriate  - Encourage maximum independence but intervene and supervise when necessary  - Involve family in performance of ADLs  - Assess for home care needs following discharge   - Consider OT consult to assist with ADL evaluation and planning for discharge  - Provide patient education as appropriate  Outcome: Progressing  Goal: Maintains/Returns to pre admission functional level  Description: INTERVENTIONS:  - Perform BMAT or MOVE assessment daily    - Set and communicate daily mobility goal to care team and patient/family/caregiver  - Collaborate with rehabilitation services on mobility goals if consulted  - Perform Range of Motion 3 times a day  - Reposition patient every 3 hours    - Dangle patient 3 times a day  - Stand patient 3 times a day  - Ambulate patient 3 times a day  - Out of bed to chair 3 times a day   - Out of bed for meals 3 times a day  - Out of bed for toileting  - Record patient progress and toleration of activity level   Outcome: Progressing     Problem: Potential for Falls  Goal: Patient will remain free of falls  Description: INTERVENTIONS:  - Educate patient/family on patient safety including physical limitations  - Instruct patient to call for assistance with activity   - Consult OT/PT to assist with strengthening/mobility   - Keep Call bell within reach  - Keep bed low and locked with side rails adjusted as appropriate  - Keep care items and personal belongings within reach  - Initiate and maintain comfort rounds  - Make Fall Risk Sign visible to staff  - Offer Toileting every 3 Hours, in advance of need  - Apply yellow socks and bracelet for high fall risk patients  - Consider moving patient to room near nurses station  Outcome: Progressing     Problem: Prexisting or High Potential for Compromised Skin Integrity  Goal: Skin integrity is maintained or improved  Description: INTERVENTIONS:  - Identify patients at risk for skin breakdown  - Assess and monitor skin integrity  - Assess and monitor nutrition and hydration status  - Monitor labs   - Assess for incontinence   - Turn and reposition patient  - Assist with mobility/ambulation  - Relieve pressure over bony prominences  - Avoid friction and shearing  - Provide appropriate hygiene as needed including keeping skin clean and dry  - Evaluate need for skin moisturizer/barrier cream  - Collaborate with interdisciplinary team   - Patient/family teaching  - Consider wound care consult   Outcome: Progressing     Problem: Nutrition/Hydration-ADULT  Goal: Nutrient/Hydration intake appropriate for improving, restoring or maintaining nutritional needs  Description: Monitor and assess patient's nutrition/hydration status for malnutrition  Collaborate with interdisciplinary team and initiate plan and interventions as ordered  Monitor patient's weight and dietary intake as ordered or per policy  Utilize nutrition screening tool and intervene as necessary  Determine patient's food preferences and provide high-protein, high-caloric foods as appropriate       INTERVENTIONS:  - Monitor oral intake, urinary output, labs, and treatment plans  - Assess nutrition and hydration status and recommend course of action  - Evaluate amount of meals eaten  - Assist patient with eating if necessary   - Allow adequate time for meals  - Recommend/ encourage appropriate diets, oral nutritional supplements, and vitamin/mineral supplements  - Order, calculate, and assess calorie counts as needed  - Recommend, monitor, and adjust tube feedings and TPN/PPN based on assessed needs  - Assess need for intravenous fluids  - Provide specific nutrition/hydration education as appropriate  - Include patient/family/caregiver in decisions related to nutrition  Outcome: Progressing

## 2021-08-08 NOTE — ASSESSMENT & PLAN NOTE
· Presented to [facility] with DIL for generalized weakness, per DIL unable to get out of bed with multiple falls at home  · Baseline hgb 8-9, hgb on admission was 6 9, improved to 11 7 s/p 2U prbcs  · Given reports of melena at home, concern for UGI bleed  · Of note hx of perforated duodenal ulcer 1/2020 s/p ex lap, rebleeding 4/2021 s/p EGD with cauterization  · CT with "Mild thickening of the left colon, particularly at the splenic flexure, with slight adjacent stranding  This may represent inflammatory or infectious colitis  While there are colonic diverticula, particularly in the sigmoid colon, there does not appear to be an inflamed diverticula accounting for the inflammation    Ischemic colitis, including watershed ischemia given the splenic flexure involvement, is not excluded"   · Trend hgb, tranfuse for hgb < 7  · Protonix IV bid  · Plan for bowel prep 8/8 with EGD/colonoscopy 8/9 per colorectal team  · Management per colorectal surgery team

## 2021-08-08 NOTE — PLAN OF CARE
Problem: PHYSICAL THERAPY ADULT  Goal: Performs mobility at highest level of function for planned discharge setting  See evaluation for individualized goals  Description: Treatment/Interventions: Functional transfer training, LE strengthening/ROM, Therapeutic exercise, Elevations, Endurance training, Patient/family training, Equipment eval/education, Bed mobility, Gait training  Equipment Recommended: Naomi Barton (for now, TBD)       See flowsheet documentation for full assessment, interventions and recommendations  8/8/2021 1215 by Neyda Astorga, PT  Note: Prognosis: Fair  Problem List: Decreased strength, Impaired balance, Decreased endurance, Decreased mobility, Decreased coordination, Impaired judgement, Decreased cognition, Decreased safety awareness, Pain  Assessment: Patient seen for high complexity physical therapy evaluation  Patient is a 47-year-old male with history/comorbidities of COPD, alcohol abuse, schizophrenia, who is now admitted as a transfer from [facility] with complaints of generalized weakness, inability to get out of bed with multiple near falls, dyspnea on exertion, chest tightness  Diagnoses include iron deficiency anemia, pneumonia  Patient likely to undergo colonoscopy and EGD tomorrow  Due to acute medical issues, need for transfer to higher level of care, pain, fall risk, change of mental status, note unstable clinical picture  PT consult to assess mobility, DC needs  Patient presents with decreased functional mobility, standing balance, endurance, bilateral lower extremity strength and coordination, barriers at home  Patient will benefit from continued skilled acute care PT services to correct for the above problems  When medically stable, currently recommend rehab at discharge  PT Discharge Recommendation: Post acute rehabilitation services     PT - OK to Discharge: (S) Yes (when stable to rehab)    See flowsheet documentation for full assessment

## 2021-08-08 NOTE — ASSESSMENT & PLAN NOTE
· Not on O2 at home, was placed on 2L NC and has since been weaned back to room air, sating 95-96%  · Continue home inhalers  · Titrate O2 for SpO2 > 88%

## 2021-08-08 NOTE — PHYSICAL THERAPY NOTE
Physical Therapy Evaluation    Patient's Name: Lindajean Litten    Admitting Diagnosis  GI bleed [K92 2]    Problem List  Patient Active Problem List   Diagnosis    Major depressive disorder, recurrent, severe with psychotic features [facility]    History of alcohol abuse    History of alcohol abuse    Hypokinesia of left ventricle    Bilateral lower extremity edema    Tachycardia    History of transient ischemic attack (TIA)    Back pain without sciatica    Elevated fasting glucose    Physical deconditioning    Poor mobility    Overweight (BMI 25 0-29  9)    Vitamin D deficiency    Abnormal echocardiogram    Alcohol abuse    MDD (major depressive disorder)    COPD    Dyslipidemia    History of anemia    Tobacco abuse    Inadequate oral nutritional intake    History of duodenal ulcer    Anemia    Schizophrenia    Open toe wound, initial encounter    Chronic obstructive pulmonary disease (HCC)    History of bleeding ulcers    History of exploratory laparotomy    History of syncope    History of GI bleed    History of suicidal ideation    Spontaneous bruising    Medicare annual wellness visit, subsequent    Cold hands and feet    Aortic atherosclerosis ([facility]    Raynaud's disease without gangrene    Acute blood loss anemia    GI bleed    Essential hypertension    Duodenal ulcer    Thrombocytosis    Hyponatremia    Hemiplegia of right dominant side as late effect of cerebrovascular disease (HCC)    Altered mental status    Facial swelling    Swelling of both upper extremities    Iron deficiency anemia due to chronic blood loss    Abnormal finding on CT scan    Melena       Past Medical History  Past Medical History:   Diagnosis Date    Alcohol abuse     BPH (benign prostatic hyperplasia)     CVA [facility]cere[facility] vascular accident) [facility] )     DJD (degenerative joint disease)     Encounter to establish care with new doctor 8/21/2019    Gait abnormality     Head injury     History of cerebrovascular accident (CVA) with residual deficit 10/1/2019    History of CVA (cerebrovascular accident) 1/8/2020    History of substance abuse ([address] ) 8/21/2019    Polysubstance     History of sustained ventricular tachycardia 8/21/2019    History of transient ischemic attack (TIA) 8/28/2019    Hypertension     Metatarsal fracture     Palliative care patient     TIA (transient ischemic attack)     Tobacco abuse        Past Surgical History  Past Surgical History:   Procedure Laterality Date    ABDOMINAL SURGERY      ANKLE SURGERY      BACK SURGERY      CT GUIDED [facility] DRAINAGE CATHETER PLACEMENT  1/27/2020    ELBOW SURGERY      IR VISCERAL ANGIOGRAPHY / INTERVENTION  1/11/2020    JOINT REPLACEMENT      KNEE SURGERY      LAPAROTOMY N/A 1/11/2020    Procedure: LAPAROTOMY EXPLORATORY,  OVERSEW  OF GASTRO DUODENAL ARTERY, THAL PATCH OF DUODENUM, VICKY GASTRO JEJUNOSTOMY TUBE;  Surgeon: Kimberly Alcazar DO;  Location: [facility];  Service: General    LIVER SURGERY          08/08/21 0835   PT Last Visit   PT Visit Date 08/08/21   Note Type   Note type Evaluation   Pain Assessment   Pain Assessment Tool 0-10   Pain Score 5   Pain Location/Orientation Location: Generalized; Location: Abdomen   Patient's Stated Pain Goal No pain   Hospital Pain Intervention(s) Ambulation/increased activity   Home Living   Type of Home House   Additional Comments Resides w/ son in 2 story home w/ basement living area, w/ stair glide to enter  Indep self care, has cane/RW   Prior Function   Level of [place] Independent with ADLs and functional mobility   Falls in the last 6 months 1 to 4   Restrictions/Precautions   Weight Bearing Precautions Per Order No   Other Precautions Cognitive; Chair Alarm; Bed Alarm;Multiple lines; Fall Risk;Telemetry;Pain   General   Family/Caregiver Present No   Cognition   Overall Cognitive Status Impaired   Arousal/Participation Responsive   Orientation Level Oriented to person Memory Unable to assess   Following Commands Follows one step commands without difficulty   RLE Assessment   RLE Assessment   (strength grossly 3+ to 4-/5)   LLE Assessment   LLE Assessment   (strength grossly 3+ to 4-/5)   Coordination   Movements are Fluid and Coordinated 0   Coordination and Movement Description B LE ataxia   Bed Mobility   Supine to Sit 4  Minimal assistance   Additional items Assist x 1; Increased time required   Sit to Supine 4  Minimal assistance   Additional items Assist x 1   Transfers   Sit to Stand 3  Moderate assistance   Additional items Assist x 1; Increased time required   Stand to Sit 4  Minimal assistance   Additional items Assist x 1; Increased time required   Ambulation/Elevation   Gait pattern   (slow, ataxia, antalgic)   Gait Assistance 3  Moderate assist   Additional items Assist x 1   Assistive Device   (HHA- start w/ RW)   Distance 12'x1 from bed to stretcher in rojas   Balance   Static Sitting Fair +   Dynamic Sitting Fair -   Static Standing Poor +   Dynamic Standing Poor   Ambulatory Poor   Endurance Deficit   Endurance Deficit Yes   Endurance Deficit Description fatigue, weakness, pain   Activity Tolerance   Activity Tolerance Patient limited by fatigue;Patient limited by pain;Treatment limited secondary to medical complications (Comment)   Nurse Made Aware yes   Assessment   Prognosis Fair   Problem List Decreased strength; Impaired balance;Decreased endurance;Decreased mobility; Decreased coordination; Impaired judgement;Decreased cognition;Decreased safety awareness;Pain   Assessment Patient seen for high complexity physical therapy evaluation  Patient is a 68-year-old male with history/comorbidities of COPD, alcohol abuse, schizophrenia, who is now admitted as a transfer from [facility] with complaints of generalized weakness, inability to get out of bed with multiple near falls, dyspnea on exertion, chest tightness    Diagnoses include iron deficiency anemia, pneumonia  Patient likely to undergo colonoscopy and EGD tomorrow  Due to acute medical issues, need for transfer to higher level of care, pain, fall risk, change of mental status, note unstable clinical picture  PT consult to assess mobility, DC needs  Patient presents with decreased functional mobility, standing balance, endurance, bilateral lower extremity strength and coordination, barriers at home  Patient will benefit from continued skilled acute care PT services to correct for the above problems  When medically stable, currently recommend rehab at discharge  Goals   Patient Goals To eat his breakfast   STG Expiration Date 08/22/21   Short Term Goal #1 1-2 weeks:  Bed mobility and transfers with supervision/independent, standing balance to good/normal with device, ambulate 100-150 feet with roller walker and supervision, increased bilateral lower extremity strength by 1/2 to 1 full grade, if needed for discharge home, ambulate 3-7 stairs with supervision   PT Treatment Day 0   Plan   Treatment/Interventions Functional transfer training;LE strengthening/ROM; Therapeutic exercise;Elevations; Endurance training;Patient/family training;Equipment eval/education; Bed mobility;Gait training   PT Frequency Other (Comment)  (3-5x/wk)   Recommendation   PT Discharge Recommendation Post acute rehabilitation services   Equipment Recommended Walker  (for now, TBD)   Ezra Verma walker   Change/add to [employer]?  No   PT - OK to Discharge Yes  (when stable to rehab)   Luke 8 in Bed Without Bedrails 3   Lying on Back to Sitting on Edge of Flat Bed 3   Moving Bed to Chair 2   Standing Up From Chair 2   Walk in Room 2   Climb 3-5 Stairs 1   Basic Mobility Inpatient Raw Score 13   Basic Mobility Standardized Score 33 99           Cecil Elizondo PT, DPT, CSRS

## 2021-08-08 NOTE — ASSESSMENT & PLAN NOTE
· Presented to [facility] with DIL for generalized weakness  Found to have hemoglobin of 6 9 on admission and with reports of melena concerning for GIB  Transferred to [facility] and admitted under colorectal surgery service  Of note, hx of perforated duodenal ulcer 1/2020 s/p ex lap, rebleeding 4/2021 s/p EGD with cauterization  · CT abdomen/pelvis: "Mild thickening of the left colon, particularly at the splenic flexure, with slight adjacent stranding  This may represent inflammatory or infectious colitis  While there are colonic diverticula, particularly in the sigmoid colon, there does not appear to be an inflamed diverticula accounting for the inflammation    Ischemic colitis, including watershed ischemia given the splenic flexure involvement, is not excluded"   · Currently on IV ceftriaxone and Flagyl as per primary   · Iron panel noted however would avoid IV Venofer if concern for infection   · Hemoglobin improved to 11 7 following blood transfusion, 9 8 this morning   · Continue Protonix IV bid  · Plan EGD/colonoscopy tomorrow

## 2021-08-08 NOTE — ASSESSMENT & PLAN NOTE
· H/o perforated duodenal ulcer 1/2020 with GDA/thal patch, also admitted 4/2021 with ABLA, underwent EGD with cauterization of large bleeding duodenal ulcer  · Plan as above

## 2021-08-08 NOTE — UTILIZATION REVIEW
Initial Clinical Review    Admission: Date/Time/Statement:   Admission Orders (From admission, onward)     Ordered        08/07/21 1000  Inpatient Admission  Once                   Orders Placed This Encounter   Procedures    Inpatient Admission     Standing Status:   Standing     Number of Occurrences:   1     Order Specific Question:   Level of Care     Answer:   Med Surg [16]     Order Specific Question:   Estimated length of stay     Answer:   More than 2 Midnights     Order Specific Question:   Certification     Answer:   I certify that inpatient services are medically necessary for this patient for a duration of greater than two midnights  See H&P and MD Progress Notes for additional information about the patient's course of treatment  Initial Presentation: 77 y/o male transferred from [facility] ED to [address] [place] [address] d/t iron deficiency anemia w/ generalized weakness  The patient did complain of some chest tightness and shortness of breath with exertion  Patient denies any recent illness or fever  He does endorse melena  Patient was recently admitted to the hospital after a GI bleed secondary to a peptic ulcer which was treated intraoperatively  Patient does have some abdominal pain however abdominal exam is benign  Patient has a past medical history of alcohol abuse and COPD  Admitted to the Colorectal surgery service due to GI bleed: Hgb of 6 9 on arrival to ED, transfused 2U prbc and hgb now 11 7  CT a/p showing potential inflammatory or infectious colitis vs ischemic colitis  Pt started on IV ceftriaxone and flagyl  CT also showing "patchy left basilar opacity with small pleural effusion with elevation of L hemidiaphragm likely atelectasis", CXR pa/lat and procal in am for pneumonia workup  At this time pt complaining of mild SOB and pleuritic chest pain as well as a cough  He is afebrile at this time and has been weaned of supplemental O2    Colorectal surgery plan for EGD and colonoscopy 8/9  Will trend labs and transfuse PRN  Date: 8/8   Day 2: Colorectal Note:  Pt reports pain in the abdomen, mainly on the L side  Otherwise denies n/v, tolerating clears, passing gas LBM 2 days ago  Replete iron, bowel prep today for colonoscopy on 8/9, clears then NPO after MN, protonix after scope       Triage Vitals   Temperature Pulse Respirations Blood Pressure SpO2   08/07/21 1223 08/07/21 1223 08/07/21 1223 08/07/21 1223 08/07/21 1223   98 5 °F (36 9 °C) 75 16 (!) 191/93 93 %      Temp Source Heart Rate Source Patient Position - Orthostatic VS BP Location FiO2 (%)   08/07/21 1521 08/07/21 1521 08/07/21 1300 08/07/21 1300 --   Oral Monitor Lying Right arm       Pain Score       08/07/21 1039       8          Wt Readings from Last 1 Encounters:   08/07/21 68 9 kg (152 lb)     Additional Vital Signs:   Date/Time  Temp  Pulse  Resp  BP  MAP (mmHg)  SpO2  Calculated FIO2 (%) - Nasal Cannula  Nasal Cannula O2 Flow Rate (L/min)  O2 Device   08/08/21 14:41:11  98 °F (36 7 °C)  70  15  173/93Abnormal   120  96 %  --  --  --   08/08/21 1100  --  --  --  163/85  --  --  --  --  --   08/08/21 07:37:41  98 3 °F (36 8 °C)  80  19  181/91Abnormal   121  90 %  --  --  None (Room air)   08/08/21 0100  --  --  --  --  --  93 %  --  --  None (Room air)   08/08/21 0000  --  --  --  --  --  94 %  --  --  --   08/07/21 2300  --  --  16  --  --  --  28  2 L/min  Nasal cannula   08/07/21 22:59:56  98 4 °F (36 9 °C)  70  --  180/81Abnormal   114  93 %  --  --  --   08/07/21 1600  --  --  --  --  --  --  28  2 L/min  Nasal cannula   08/07/21 15:21:55  98 3 °F (36 8 °C)  82  16  167/95  119  93 %  --  --  --   08/07/21 1300  --  --  --  168/91  --  --  --  --  --   08/07/21 12:24:10  --  78  --  181/90Abnormal   120  92 %  --  --  --       Pertinent Labs/Diagnostic Test Results:   8/6 CXR:  Development of suspected left basal infiltrate/atelectasis and small effusion  8/6 CT HEAD:  Hypodensity in the left basal ganglia which is not definitely present previously and may represent acute to subacute lacunar infarcts  8/6 CT AP:  1  Mild thickening of the left colon, particularly at the splenic flexure, with slight adjacent stranding  This may represent inflammatory or infectious colitis  While there are colonic diverticula, particularly in the sigmoid colon, there does not   appear to be an inflamed diverticula accounting for the inflammation  Ischemic colitis, including watershed ischemia given the splenic flexure involvement, is not excluded      2   Patchy left basilar opacity with small pleural effusion  As there is elevation of the left hemidiaphragm this likely represents atelectasis  Pneumonia cannot be entirely excluded      3   Stable nodular thickening of the adrenal glands likely representing adenomatous hyperplasia    8/6 EKG:  NSR      Results from last 7 days   Lab Units 08/08/21  0514 08/07/21  1518 08/06/21  1350   WBC Thousand/uL 6 28 7 25 4 70*   HEMOGLOBIN g/dL 9 8* 11 7* 6 9*   HEMATOCRIT % 30 0* 38 1 22 2*   PLATELETS Thousands/uL 287 212 284   NEUTROS ABS Thousands/µL  --   --  2 60         Results from last 7 days   Lab Units 08/08/21  0514 08/06/21  1350   SODIUM mmol/L 137 137   POTASSIUM mmol/L 3 3* 4 5   CHLORIDE mmol/L 108 106   CO2 mmol/L 24 25   ANION GAP mmol/L 5 6   BUN mg/dL 7 17   CREATININE mg/dL 0 77 1 01   EGFR ml/min/1 73sq m 93 76   CALCIUM mg/dL 8 5 8 6     Results from last 7 days   Lab Units 08/06/21  1350   AST U/L 9*   ALT U/L <3*   ALK PHOS U/L 36*   TOTAL PROTEIN g/dL 6 0*   ALBUMIN g/dL 3 1*   TOTAL BILIRUBIN mg/dL 0 20     Results from last 7 days   Lab Units 08/08/21  0514 08/06/21  1350   GLUCOSE RANDOM mg/dL 90 97     Results from last 7 days   Lab Units 08/06/21  1350   TROPONIN I ng/mL <0 03     Results from last 7 days   Lab Units 08/08/21  0514   PROCALCITONIN ng/ml <0 05     Results from last 7 days   Lab Units 08/08/21  0514   FERRITIN ng/mL 7*     Results from last 7 days   Lab Units 08/06/21  1534   CLARITY UA  Clear   COLOR UA  Yellow   SPEC GRAV UA  >=1 030*   PH UA  5 5   GLUCOSE UA mg/dl Negative   KETONES UA mg/dl Negative   BLOOD UA  Negative   PROTEIN UA mg/dl Negative   NITRITE UA  Negative   BILIRUBIN UA  1+*   UROBILINOGEN UA E U /dl 0 2   LEUKOCYTES UA  Negative       Past Medical History:   Diagnosis Date    Alcohol abuse     BPH (benign prostatic hyperplasia)     [facility]     DJD (degenerative joint disease)     Encounter to establish care with new doctor 8/21/2019    Gait abnormality     Head injury     History of cerebrovascular accident (CVA) with residual deficit 10/1/2019    History of CVA (cerebrovascular accident) 1/8/2020    History of substance abuse ([facility] ) 8/21/2019    Polysubstance     History of sustained ventricular tachycardia 8/21/2019    History of transient ischemic attack (TIA) 8/28/2019    Hypertension     Metatarsal fracture     Palliative care patient     TIA (transient ischemic attack)     Tobacco abuse      Present on Admission:   Acute blood loss anemia   Schizophrenia   Essential hypertension   Alcohol abuse   COPD   Tobacco abuse      Admitting Diagnosis: GI bleed [K92 2]  Age/Sex: 78 y o  male  Admission Orders:  Scheduled Medications:  atorvastatin, 40 mg, Oral, Daily  cefTRIAXone, 1,000 mg, Intravenous, Q24H  fluticasone, 2 spray, Nasal, Daily  fluticasone-vilanterol, 1 puff, Inhalation, Daily  gabapentin, 600 mg, Oral, TID  metoprolol tartrate, 25 mg, Oral, Q12H FLOR  metroNIDAZOLE, 500 mg, Intravenous, Q8H  mirtazapine, 45 mg, Oral, HS  pantoprazole, 40 mg, Intravenous, Q12H FLOR  QUEtiapine, 200 mg, Oral, Daily  QUEtiapine, 600 mg, Oral, HS  sucralfate, 1 g, Oral, 4x Daily  thiamine, 100 mg, Oral, Daily      Continuous IV Infusions:  sodium chloride, 75 mL/hr, Intravenous, Continuous      PRN Meds:  albuterol, 2 puff, Inhalation, Q6H PRN  albuterol, 2 5 mg, Nebulization, Q6H PRN  hydrOXYzine HCL, 50 mg, Oral, TID PRN  Labetalol HCl, 10 mg, Intravenous, Q6H PRN  oxyCODONE, 10 mg, Oral, Q4H PRN x2 thus far  oxyCODONE, 5 mg, Oral, Q4H PRN  traZODone, 50 mg, Oral, HS PRN    NPO after MN  scd  IO    IP CONSULT TO CASE MANAGEMENT  IP CONSULT TO INTERNAL MEDICINE    Network Utilization Review Department  ATTENTION: Please call with any questions or concerns to [phone] and carefully listen to the prompts so that you are directed to the right person  All voicemails are confidential   Esme List all requests for admission clinical reviews, approved or denied determinations and any other requests to dedicated fax number below belonging to the campus where the patient is receiving treatment   List of dedicated fax numbers for the Facilities:  [address] DENIALS (Administrative/Medical Necessity) [phone]   [address] (Maternity/NICU/Pediatrics) [phone] [address] [address] [place] [address] [address] [facility] [place] [address] [phone]

## 2021-08-08 NOTE — PROGRESS NOTES
Progress Note - Colorectal  Shameka Mooring 78 y o  male MRN: [id number]  Unit/Bed#: [facility] 510-01 Encounter: [id number]    Assessment:  70M w/ iron deficiency anemia w/ generalized weakness, in hospital for iron deficiency anemia    Some systolics into 052-697 overnight  Fe 52, low  Ferritin 7, low  Hb 9 8    PO intake 1240  [id number]    Plan:  - replete iron  - bowel prep today  - colonoscopy 8/9  - clears, NPO at midnight  - protonix after scope    Subjective/Objective     Subjective:  Pt reports pain in the abdomen, mainly on the L side  Otherwise denies n/v, tolerating clears, passing gas LBM 2 days ago  OOB    Objective:    Blood pressure (!) 180/81, pulse 70, temperature 98 4 °F (36 9 °C), resp  rate 16, height 5' 10" (1 778 m), weight 68 9 kg (152 lb), SpO2 93 %  ,Body mass index is 21 81 kg/m²  Intake/Output Summary (Last 24 hours) at 8/8/2021 0716  Last data filed at 8/8/2021 0355  Gross per 24 hour   Intake 2649 58 ml   Output 2400 ml   Net 249 58 ml       Invasive Devices     Peripheral Intravenous Line            Peripheral IV 08/07/21 Right Hand <1 day                Physical Exam:   General: NAD  Skin: Warm, anicteric  HEENT: Normocephalic, atraumatic  CV: RRR, no m/r/g  Pulm: CTA b/l, no inc WOB  Abd: Soft, ND, TTP diffusely  MSK: Symmetric, no cyanosis, no edema  Neuro: AOx3    Lab, Imaging and other studies:I have personally reviewed pertinent lab results      VTE Pharmacologic Prophylaxis: Reason for no pharmacologic prophylaxis GI bleed  VTE Mechanical Prophylaxis: sequential compression device

## 2021-08-08 NOTE — ASSESSMENT & PLAN NOTE
· BP uncontrolled at this time   · Continue home dose metoprolol 25 mg bid   · IV Labetalol prn  · Monitor routinely for now and adjust regimen as needed

## 2021-08-08 NOTE — ASSESSMENT & PLAN NOTE
· H/o perforated duodenal ulcer 1/2020 with GDA/thal patch, also admitted 4/2021 with ABLA, underwent EGD with cauterization of large bleeding duodenal ulcer

## 2021-08-08 NOTE — ASSESSMENT & PLAN NOTE
· L basilar opacity and elevation of L hemidiaphragm noted on CTAP  · Low clinical suspicion for pneumonia given patient is afebrile, without leukocytosis, negative procalcitonin x 1  · PA and lateral CXR completed with radiology read pending   · Currently receiving IV ceftriaxone and flagyl per primary team for possible intraabdominal infection  · Monitor respiratory status

## 2021-08-08 NOTE — CONSULTS
Narendratyrese 1950, 78 y o  male MRN: [id number]  Unit/Bed#: [facility] 510-01 Encounter: [id number]  Primary Care Provider: Autumn Rizzo DO   Date and time admitted to hospital: 8/7/2021  9:47 AM    Inpatient consult to Internal Medicine  Consult performed by: Matilda Ontiveros PA-C  Consult ordered by: Sheron Braun MD          * Acute blood loss anemia  Assessment & Plan  · Presented to [facility] with DIL for generalized weakness, per DIL unable to get out of bed with multiple falls at home  · Baseline hgb 8-9, hgb on admission was 6 9, improved to 11 7 s/p 2U prbcs  · Given reports of melena at home, concern for UGI bleed  · Of note hx of perforated duodenal ulcer 1/2020 s/p ex lap, rebleeding 4/2021 s/p EGD with cauterization  · CT with "Mild thickening of the left colon, particularly at the splenic flexure, with slight adjacent stranding  This may represent inflammatory or infectious colitis  While there are colonic diverticula, particularly in the sigmoid colon, there does not appear to be an inflamed diverticula accounting for the inflammation  Ischemic colitis, including watershed ischemia given the splenic flexure involvement, is not excluded"   · Trend hgb, tranfuse for hgb < 7  · Protonix IV bid  · Plan for bowel prep 8/8 with EGD/colonoscopy 8/9 per colorectal team  · Management per colorectal surgery team    History of duodenal ulcer  Assessment & Plan  · H/o perforated duodenal ulcer 1/2020 with GDA/thal patch, also admitted 4/2021 with ABLA, underwent EGD with cauterization of large bleeding duodenal ulcer    Abnormal finding on CT scan  Assessment & Plan  · L basilar opacity noted on CTAP, likely atelectasis however cannot entirely exclude pneumonia  Elevation of L hemidiaphragm    · Afebrile, WBC wnl  · PT does not wear NC at home, was able to wean pt off NC, currently 95-96% on room air  · Check procal in AM  · F/u PA/lat XR  · Receiving IV abx per surgery for possible intraabdominal infection  · Low suspicion for pneumonia at this time    Melena  Assessment & Plan  · Plan as above    Essential hypertension  Assessment & Plan  · Elevated to 191/93 on admission, improving however still above goal   · Continue metoprolol 25 mg q12  · Labetalol prn  · Unclear if he has been taking his BP meds regularly, though he states his family manages his medication for him  Consider uptitration of metoprolol if BP consistently elevated above 140  COPD  Assessment & Plan  · Not on O2 at home, was placed on 2L NC and has since been weaned back to room air, sating 95-96%  · Continue home inhalers  · Titrate O2 for SpO2 > 88%     Alcohol abuse  Assessment & Plan  · With history of alcohol withdrawal  · Pt states he has not had a drink since his last admission  · Declined rehab upon discharge 4/2021  · Of note, during hospitalization in 2020 patient received 2 mg IV ativan per CIWA and then developed acute onset R sided facial droop and R sided weakness  Underwent stroke workup with negative MRI brain  Has known facial asymmetry, likely exacerbated by ativan at that time  · Stroke alert while in Behavioral health in 2020 for slurred speech and diplopia  Stroke workup negative  Tobacco abuse  Assessment & Plan  · Nicotine patch  · Smoking cessation    Schizophrenia  Assessment & Plan  · Continue remeron 45 mg qhs, seroquel 200 mg qd and 600 mg qhs, trazodone 50 mg qhs, atarax 50 mg tid prn for anxiety        VTE Prophylaxis:   High Risk (Score >/= 5) - Pharmacological DVT Prophylaxis Contraindicated  Sequential Compression Devices Ordered  Recommendations for Discharge:  · Pending hospital course    Counseling / Coordination of Care Time: 45 minutes Greater than 50% of total time spent on patient counseling and coordination of care  Collaboration of Care:  Were Recommendations Directly Discussed with Primary Treatment Team? Yes    History of Present Illness:  Dalila Valenzuela is a 78 y o  male who is originally admitted to the Colorectal surgery service due to GI bleed  We are consulted for medical management of multiple comorbidities and suspected pneumonia  Pt presented to [facility] for weakness and melena  Hgb of 6 9 on arrival to ED, transfused 2U prbc and hgb now 11 7  CT a/p showing potential inflammatory or infectious colitis vs ischemic colitis  Pt started on IV ceftriaxone and flagyl  CT also showing "patchy left basilar opacity with small pleural effusion with elevation of L hemidiaphragm likely atelectasis", CXR pa/lat and procal in am for pneumonia workup  At this time pt complaining of mild SOB and pleuritic chest pain as well as a cough  He is afebrile at this time and has been weaned of supplemental O2  Colorectal surgery plan for EGD and colonoscopy 8/9  Will trend labs and transfuse PRN  Review of Systems:  Review of Systems   Constitutional: Positive for fatigue  Negative for diaphoresis and fever  Eyes: Negative for visual disturbance  Respiratory: Positive for cough, chest tightness and shortness of breath  Negative for wheezing and stridor  Cardiovascular: Negative for chest pain, palpitations and leg swelling  Gastrointestinal: Positive for abdominal pain and blood in stool  Negative for diarrhea, nausea and vomiting  Genitourinary: Negative for dysuria, frequency and urgency  Neurological: Positive for weakness  Negative for dizziness, light-headedness, numbness and headaches         Past Medical and Surgical History:   Past Medical History:   Diagnosis Date    Alcohol abuse     BPH (benign prostatic hyperplasia)     CVA (cerebral vascular accident) ([address] )     DJD (degenerative joint disease)     Encounter to establish care with new doctor 8/21/2019    Gait abnormality     Head injury     History of cerebrovascular accident (CVA) with residual deficit 10/1/2019    History of CVA (cerebrovascular accident) 1/8/2020    History of substance abuse ([address] ) 8/21/2019    Polysubstance     History of sustained ventricular tachycardia 8/21/2019    History of transient ischemic attack (TIA) 8/28/2019    Hypertension     Metatarsal fracture     Palliative care patient     TIA (transient ischemic attack)     Tobacco abuse        Past Surgical History:   Procedure Laterality Date    ABDOMINAL SURGERY      ANKLE SURGERY      BACK SURGERY      CT GUIDED [facility] DRAINAGE CATHETER PLACEMENT  1/27/2020    ELBOW SURGERY      IR VISCERAL ANGIOGRAPHY / INTERVENTION  1/11/2020    JOINT REPLACEMENT      KNEE SURGERY      LAPAROTOMY N/A 1/11/2020    Procedure: LAPAROTOMY EXPLORATORY,  OVERSEW  OF GASTRO DUODENAL ARTERY, THAL PATCH OF DUODENUM, VICKY GASTRO JEJUNOSTOMY TUBE;  Surgeon: Vianney Guardado DO;  Location: [facility];  Service: General    LIVER SURGERY         Meds/Allergies:  all medications and allergies reviewed    Allergies: No Known Allergies    Social History:  Marital Status: Single  Substance Use History:   Social History     Substance and Sexual Activity   Alcohol Use Not Currently    Alcohol/week: 4 0 standard drinks    Types: 4 Cans of beer per week    Comment: a couple beers     Social History     Tobacco Use   Smoking Status Current Every Day Smoker    Packs/day: 1 00    Types: Cigarettes   Smokeless Tobacco Never Used   Tobacco Comment    started age 14, 3 quit attempts     Social History     Substance and Sexual Activity   Drug Use Yes    Types: Marijuana    Comment: history polysubstance use including IVDA on record- every now then will smoke weed        Family History:  Family History   Problem Relation Age of Onset    No Known Problems Mother     No Known Problems Father        Physical Exam:   Vitals:   Blood Pressure: 167/95 (08/07/21 1521)  Pulse: 82 (08/07/21 1521)  Temperature: 98 3 °F (36 8 °C) (08/07/21 1521)  Temp Source: Oral (08/07/21 1521)  Respirations: 16 (08/07/21 1521)  Height: 5' 10" (177 8 cm) (08/07/21 1521)  Weight - Scale: 68 9 kg (152 lb) (08/07/21 1521)  SpO2: 93 % (08/07/21 1521)    Physical Exam  Constitutional:       General: He is not in acute distress  Appearance: He is normal weight  He is not ill-appearing, toxic-appearing or diaphoretic  Cardiovascular:      Rate and Rhythm: Normal rate and regular rhythm  Heart sounds: Normal heart sounds  No murmur heard  No friction rub  No gallop  Pulmonary:      Effort: Pulmonary effort is normal       Breath sounds: Wheezing present  No rhonchi or rales  Abdominal:      General: Bowel sounds are normal  There is no distension  Tenderness: There is abdominal tenderness  Musculoskeletal:      Cervical back: Neck supple  Right lower leg: No edema  Left lower leg: No edema  Neurological:      General: No focal deficit present  Mental Status: He is alert  Mental status is at baseline           Additional Data:   Lab Results:    Results from last 7 days   Lab Units 08/07/21  1518 08/06/21  1350   WBC Thousand/uL 7 25 4 70*   HEMOGLOBIN g/dL 11 7* 6 9*   HEMATOCRIT % 38 1 22 2*   PLATELETS Thousands/uL 212 284   NEUTROS PCT %  --  56   LYMPHS PCT %  --  28   MONOS PCT %  --  12   EOS PCT %  --  3     Results from last 7 days   Lab Units 08/06/21  1350   SODIUM mmol/L 137   POTASSIUM mmol/L 4 5   CHLORIDE mmol/L 106   CO2 mmol/L 25   BUN mg/dL 17   CREATININE mg/dL 1 01   ANION GAP mmol/L 6   CALCIUM mg/dL 8 6   ALBUMIN g/dL 3 1*   TOTAL BILIRUBIN mg/dL 0 20   ALK PHOS U/L 36*   ALT U/L <3*   AST U/L 9*   GLUCOSE RANDOM mg/dL 97         Results from last 7 days   Lab Units 08/06/21  1350   TROPONIN I ng/mL <0 03     Lab Results   Component Value Date/Time    HGBA1C 5 4 05/18/2021 12:04 PM    HGBA1C 5 3 08/05/2020 07:16 AM    HGBA1C 5 2 01/11/2020 06:29 AM               Imaging: Reviewed radiology reports from this admission including: abdominal/pelvic CT  XR chest pa & lateral    (Results Pending)       EKG, Pathology, and Other Studies Reviewed on Admission:   · EKG: NSR  HR 75     ** Please Note: This note may have been constructed using a voice recognition system   **

## 2021-08-08 NOTE — PROGRESS NOTES
[address] [facility]  Progress Note - Sharif Nicholas 1950, 78 y o  male MRN: [id number]  Unit/Bed#: [facility] 510-01 Encounter: [id number]  Primary Care Provider: Jean Ferreira DO   Date and time admitted to hospital: 8/7/2021  9:47 AM    * Acute blood loss anemia  Assessment & Plan  · Presented to [facility] with DIL for generalized weakness  Found to have hemoglobin of 6 9 on admission and with reports of melena concerning for GIB  Transferred to [facility] and admitted under colorectal surgery service  Of note, hx of perforated duodenal ulcer 1/2020 s/p ex lap, rebleeding 4/2021 s/p EGD with cauterization  · CT abdomen/pelvis: "Mild thickening of the left colon, particularly at the splenic flexure, with slight adjacent stranding  This may represent inflammatory or infectious colitis  While there are colonic diverticula, particularly in the sigmoid colon, there does not appear to be an inflamed diverticula accounting for the inflammation    Ischemic colitis, including watershed ischemia given the splenic flexure involvement, is not excluded"   · Currently on IV ceftriaxone and Flagyl as per primary   · Iron panel noted however would avoid IV Venofer if concern for infection   · Hemoglobin improved to 11 7 following blood transfusion, 9 8 this morning   · Continue Protonix IV bid  · Plan EGD/colonoscopy tomorrow     Abnormal finding on CT scan  Assessment & Plan  · L basilar opacity and elevation of L hemidiaphragm noted on CTAP  · Low clinical suspicion for pneumonia given patient is afebrile, without leukocytosis, negative procalcitonin x 1  · PA and lateral CXR completed with radiology read pending   · Currently receiving IV ceftriaxone and flagyl per primary team for possible intraabdominal infection  · Monitor respiratory status     History of duodenal ulcer  Assessment & Plan  · H/o perforated duodenal ulcer 1/2020 with GDA/thal patch, also admitted 4/2021 with ABLA, underwent EGD with cauterization of large bleeding duodenal ulcer  · Plan as above     Melena  Assessment & Plan  · Plan as above    Essential hypertension  Assessment & Plan  · BP uncontrolled at this time   · Continue home dose metoprolol 25 mg bid   · IV Labetalol prn  · Monitor routinely for now and adjust regimen as needed     Alcohol abuse  Assessment & Plan  · With history of alcohol withdrawal however patient states he has not had a drink since his last admission  · Declined rehab upon discharge 4/2021  · Of note, during hospitalization in 2020 patient received 2 mg IV ativan per [facility]WA and then developed acute onset R sided facial droop and R sided weakness  Underwent stroke workup with negative MRI brain  Has known facial asymmetry, likely exacerbated by ativan at that time  · Stroke alert while in Behavioral health in 2020 for slurred speech and diplopia  Stroke workup negative  · Monitor for withdrawal symptoms     COPD  Assessment & Plan  · Without acute exacerbation   · Continue home inhalers    Schizophrenia  Assessment & Plan  · Continue remeron 45 mg qhs, seroquel 200 mg qd and 600 mg qhs, trazodone 50 mg qhs, atarax 50 mg tid prn for anxiety    Tobacco abuse  Assessment & Plan  · Nicotine patch  · Smoking cessation      VTE Pharmacologic Prophylaxis: VTE Score: 3 Moderate Risk (Score 3-4) - Pharmacological DVT Prophylaxis Contraindicated  Sequential Compression Devices Ordered  Patient Centered Rounds: I performed bedside rounds with nursing staff today  Discussions with Specialists or Other Care Team Provider: primary RN    Education and Discussions with Family / Patient: family updates per primary service  Time Spent for Care: 15 minutes  More than 50% of total time spent on counseling and coordination of care as described above      Current Length of Stay: 1 day(s)  Current Patient Status: Inpatient   Certification Statement: The patient will continue to require additional inpatient hospital stay due to pending EGD/colonoscopy, monitoring hemoglobin  Discharge Plan: [facility] is following this patient on consult  They are not yet medically stable for discharge secondary to pending EGD/colonoscopy   Code Status: Level 1 - Full Code    Subjective:   Patient sleeping upon entering the room, easily arouses to voice  Reports not feeling well but does not able to specify what's bothering him  He reports feeling tired  Denies chest pain or SOB  Endorses dry cough  Encouraged patient to drink bowl prep  Objective:     Vitals:   Temp (24hrs), Av 3 °F (36 8 °C), Min:98 °F (36 7 °C), Max:98 4 °F (36 9 °C)    Temp:  [98 °F (36 7 °C)-98 4 °F (36 9 °C)] 98 °F (36 7 °C)  HR:  [70-82] 70  Resp:  [15-19] 15  BP: (163-181)/(81-95) 173/93  SpO2:  [90 %-96 %] 96 %  Body mass index is 21 81 kg/m²  Input and Output Summary (last 24 hours): Intake/Output Summary (Last 24 hours) at 2021 1455  Last data filed at 2021 1301  Gross per 24 hour   Intake 2779 58 ml   Output 2800 ml   Net -20 42 ml       Physical Exam:   Physical Exam  Vitals and nursing note reviewed  Constitutional:       General: He is sleeping  He is not in acute distress  Cardiovascular:      Rate and Rhythm: Normal rate and regular rhythm  Pulmonary:      Effort: Pulmonary effort is normal  No respiratory distress  Breath sounds: Rhonchi present  No wheezing  Comments: Course breath sounds bilaterally   On room air  Abdominal:      General: Abdomen is flat  There is no distension  Palpations: Abdomen is soft  Tenderness: There is abdominal tenderness (mild, generalized)  Musculoskeletal:      Right lower leg: No edema  Left lower leg: No edema  Skin:     General: Skin is warm and dry  Coloration: Skin is not pale  Findings: No erythema  Neurological:      General: No focal deficit present  Mental Status: He is easily aroused  Mental status is at baseline            Additional Data:     Labs:  Results from last 7 days   Lab Units 08/08/21  0514 08/06/21  1350   WBC Thousand/uL 6 28 4 70*   HEMOGLOBIN g/dL 9 8* 6 9*   HEMATOCRIT % 30 0* 22 2*   PLATELETS Thousands/uL 287 284   NEUTROS PCT %  --  56   LYMPHS PCT %  --  28   MONOS PCT %  --  12   EOS PCT %  --  3     Results from last 7 days   Lab Units 08/08/21  0514 08/06/21  1350   SODIUM mmol/L 137 137   POTASSIUM mmol/L 3 3* 4 5   CHLORIDE mmol/L 108 106   CO2 mmol/L 24 25   BUN mg/dL 7 17   CREATININE mg/dL 0 77 1 01   ANION GAP mmol/L 5 6   CALCIUM mg/dL 8 5 8 6   ALBUMIN g/dL  --  3 1*   TOTAL BILIRUBIN mg/dL  --  0 20   ALK PHOS U/L  --  36*   ALT U/L  --  <3*   AST U/L  --  9*   GLUCOSE RANDOM mg/dL 90 97                 Results from last 7 days   Lab Units 08/08/21  0514   PROCALCITONIN ng/ml <0 05       Lines/Drains:  Invasive Devices     Peripheral Intravenous Line            Peripheral IV 08/07/21 Right Hand 1 day                      Imaging: No pertinent imaging reviewed      Recent Cultures (last 7 days):         Last 24 Hours Medication List:   Current Facility-Administered Medications   Medication Dose Route Frequency Provider Last Rate    albuterol  2 puff Inhalation Q6H PRN Simon Oscar MD      albuterol  2 5 mg Nebulization Q6H PRN Dawson Saldaña PA-C      atorvastatin  40 mg Oral Daily Simon Oscar MD      cefTRIAXone  1,000 mg Intravenous Q24H Simon Oscar MD Stopped (08/07/21 2135)    fluticasone  2 spray Nasal Daily Simon Oscar MD      fluticasone-vilanterol  1 puff Inhalation Daily Simon Oscar MD      gabapentin  600 mg Oral TID Simon Oscar MD      hydrOXYzine HCL  50 mg Oral TID PRN Simon Oscar MD      Labetalol HCl  10 mg Intravenous Q6H PRN Simon Oscar MD      metoprolol tartrate  25 mg Oral Q12H [address], [place]      metroNIDAZOLE  500 mg Intravenous [address]ir MD Stopped (08/08/21 1218)    mirtazapine  45 mg Oral HS Kinjal Childs MD      oxyCODONE  10 mg Oral Q4H PRN Kinjal Childs MD      oxyCODONE  5 mg Oral Q4H PRN Kinjal Childs MD      pantoprazole  40 mg Intravenous Q12H [address], [place]      QUEtiapine  200 mg Oral Daily Kinjal Childs MD      QUEtiapine  600 mg Oral HS Kinjal Childs MD      sodium chloride  75 mL/hr Intravenous Continuous Kinjal Childs MD 75 mL/hr (08/08/21 0907)    sucralfate  1 g Oral 4x Daily Kinjal Childs MD      thiamine  100 mg Oral Daily Kinjal Childs MD      traZODone  50 mg Oral HS PRN Kinjal Childs MD          Today, Patient Was Seen By: Dallas Scott PA-C    **Please Note: This note may have been constructed using a voice recognition system  **

## 2021-08-08 NOTE — ASSESSMENT & PLAN NOTE
· Elevated to 191/93 on admission, improving however still above goal   · Continue metoprolol 25 mg q12  · Labetalol prn  · Unclear if he has been taking his BP meds regularly, though he states his family manages his medication for him  Consider uptitration of metoprolol if BP consistently elevated above 140

## 2021-08-08 NOTE — ASSESSMENT & PLAN NOTE
· Continue remeron 45 mg qhs, seroquel 200 mg qd and 600 mg qhs, trazodone 50 mg qhs, atarax 50 mg tid prn for anxiety

## 2021-08-08 NOTE — ASSESSMENT & PLAN NOTE
· L basilar opacity noted on CTAP, likely atelectasis however cannot entirely exclude pneumonia  Elevation of L hemidiaphragm    · Afebrile, WBC wnl  · PT does not wear NC at home, was able to wean pt off NC, currently 95-96% on room air  · Check procal in AM  · F/u PA/lat XR  · Receiving IV abx per surgery for possible intraabdominal infection  · Low suspicion for pneumonia at this time

## 2021-08-08 NOTE — ASSESSMENT & PLAN NOTE
· With history of alcohol withdrawal however patient states he has not had a drink since his last admission  · Declined rehab upon discharge 4/2021  · Of note, during hospitalization in 2020 patient received 2 mg IV ativan per [facility]WA and then developed acute onset R sided facial droop and R sided weakness  Underwent stroke workup with negative MRI brain  Has known facial asymmetry, likely exacerbated by ativan at that time  · Stroke alert while in Behavioral health in 2020 for slurred speech and diplopia  Stroke workup negative    · Monitor for withdrawal symptoms

## 2021-08-08 NOTE — ASSESSMENT & PLAN NOTE
· With history of alcohol withdrawal  · Pt states he has not had a drink since his last admission  · Declined rehab upon discharge 4/2021  · Of note, during hospitalization in 2020 patient received 2 mg IV ativan per CIWA and then developed acute onset R sided facial droop and R sided weakness  Underwent stroke workup with negative MRI brain  Has known facial asymmetry, likely exacerbated by ativan at that time  · Stroke alert while in Behavioral health in 2020 for slurred speech and diplopia  Stroke workup negative

## 2021-08-09 ENCOUNTER — ANESTHESIA (INPATIENT)
Dept: GASTROENTEROLOGY | Facility: HOSPITAL | Age: 71
DRG: 378 | End: 2021-08-09
Payer: COMMERCIAL

## 2021-08-09 ENCOUNTER — ANESTHESIA EVENT (INPATIENT)
Dept: GASTROENTEROLOGY | Facility: HOSPITAL | Age: 71
DRG: 378 | End: 2021-08-09
Payer: COMMERCIAL

## 2021-08-09 ENCOUNTER — APPOINTMENT (INPATIENT)
Dept: GASTROENTEROLOGY | Facility: HOSPITAL | Age: 71
DRG: 378 | End: 2021-08-09
Payer: COMMERCIAL

## 2021-08-09 PROBLEM — E87.6 HYPOKALEMIA: Status: ACTIVE | Noted: 2021-08-09

## 2021-08-09 LAB
ANION GAP SERPL CALCULATED.3IONS-SCNC: 8 MMOL/L (ref 4–13)
BUN SERPL-MCNC: 4 MG/DL (ref 5–25)
CALCIUM SERPL-MCNC: 9.2 MG/DL (ref 8.3–10.1)
CHLORIDE SERPL-SCNC: 105 MMOL/L (ref 100–108)
CO2 SERPL-SCNC: 25 MMOL/L (ref 21–32)
CREAT SERPL-MCNC: 0.72 MG/DL (ref 0.6–1.3)
ERYTHROCYTE [DISTWIDTH] IN BLOOD BY AUTOMATED COUNT: 17.1 % (ref 11.6–15.1)
GFR SERPL CREATININE-BSD FRML MDRD: 96 ML/MIN/1.73SQ M
GLUCOSE SERPL-MCNC: 93 MG/DL (ref 65–140)
HCT VFR BLD AUTO: 35.2 % (ref 36.5–49.3)
HGB BLD-MCNC: 10.6 G/DL (ref 12–17)
MCH RBC QN AUTO: 25.2 PG (ref 26.8–34.3)
MCHC RBC AUTO-ENTMCNC: 30.1 G/DL (ref 31.4–37.4)
MCV RBC AUTO: 84 FL (ref 82–98)
PLATELET # BLD AUTO: 263 THOUSANDS/UL (ref 149–390)
PMV BLD AUTO: 9.9 FL (ref 8.9–12.7)
POTASSIUM SERPL-SCNC: 3 MMOL/L (ref 3.5–5.3)
PROCALCITONIN SERPL-MCNC: <0.05 NG/ML
RBC # BLD AUTO: 4.2 MILLION/UL (ref 3.88–5.62)
SODIUM SERPL-SCNC: 138 MMOL/L (ref 136–145)
WBC # BLD AUTO: 6.16 THOUSAND/UL (ref 4.31–10.16)

## 2021-08-09 PROCEDURE — 80048 BASIC METABOLIC PNL TOTAL CA: CPT | Performed by: SURGERY

## 2021-08-09 PROCEDURE — 85027 COMPLETE CBC AUTOMATED: CPT | Performed by: SURGERY

## 2021-08-09 PROCEDURE — 94760 N-INVAS EAR/PLS OXIMETRY 1: CPT

## 2021-08-09 PROCEDURE — C9113 INJ PANTOPRAZOLE SODIUM, VIA: HCPCS | Performed by: SURGERY

## 2021-08-09 PROCEDURE — 84145 PROCALCITONIN (PCT): CPT | Performed by: SURGERY

## 2021-08-09 PROCEDURE — 99232 SBSQ HOSP IP/OBS MODERATE 35: CPT | Performed by: PHYSICIAN ASSISTANT

## 2021-08-09 RX ORDER — SODIUM PHOSPHATE, DIBASIC AND SODIUM PHOSPHATE, MONOBASIC 7; 19 G/133ML; G/133ML
1 ENEMA RECTAL ONCE
Status: COMPLETED | OUTPATIENT
Start: 2021-08-10 | End: 2021-08-10

## 2021-08-09 RX ADMIN — ATORVASTATIN CALCIUM 40 MG: 40 TABLET, FILM COATED ORAL at 09:58

## 2021-08-09 RX ADMIN — IRON SUCROSE 300 MG: 20 INJECTION, SOLUTION INTRAVENOUS at 10:02

## 2021-08-09 RX ADMIN — SUCRALFATE 1 G: 1 TABLET ORAL at 17:38

## 2021-08-09 RX ADMIN — SUCRALFATE 1 G: 1 TABLET ORAL at 09:58

## 2021-08-09 RX ADMIN — METOPROLOL TARTRATE 25 MG: 25 TABLET, FILM COATED ORAL at 09:59

## 2021-08-09 RX ADMIN — METRONIDAZOLE 500 MG: 500 INJECTION, SOLUTION INTRAVENOUS at 13:16

## 2021-08-09 RX ADMIN — METRONIDAZOLE 500 MG: 500 INJECTION, SOLUTION INTRAVENOUS at 03:45

## 2021-08-09 RX ADMIN — MIRTAZAPINE 45 MG: 30 TABLET, FILM COATED ORAL at 21:25

## 2021-08-09 RX ADMIN — METOPROLOL TARTRATE 25 MG: 25 TABLET, FILM COATED ORAL at 21:29

## 2021-08-09 RX ADMIN — GABAPENTIN 600 MG: 300 CAPSULE ORAL at 09:58

## 2021-08-09 RX ADMIN — OXYCODONE HYDROCHLORIDE 10 MG: 10 TABLET ORAL at 16:16

## 2021-08-09 RX ADMIN — LABETALOL 20 MG/4 ML (5 MG/ML) INTRAVENOUS SYRINGE 10 MG: at 13:15

## 2021-08-09 RX ADMIN — METRONIDAZOLE 500 MG: 500 INJECTION, SOLUTION INTRAVENOUS at 21:26

## 2021-08-09 RX ADMIN — FLUTICASONE PROPIONATE 2 SPRAY: 50 SPRAY, METERED NASAL at 21:29

## 2021-08-09 RX ADMIN — SODIUM CHLORIDE 75 ML/HR: 0.9 INJECTION, SOLUTION INTRAVENOUS at 19:38

## 2021-08-09 RX ADMIN — GABAPENTIN 600 MG: 300 CAPSULE ORAL at 21:25

## 2021-08-09 RX ADMIN — THIAMINE HCL TAB 100 MG 100 MG: 100 TAB at 09:58

## 2021-08-09 RX ADMIN — PANTOPRAZOLE SODIUM 40 MG: 40 INJECTION, POWDER, FOR SOLUTION INTRAVENOUS at 09:58

## 2021-08-09 RX ADMIN — SUCRALFATE 1 G: 1 TABLET ORAL at 21:25

## 2021-08-09 RX ADMIN — OXYCODONE HYDROCHLORIDE 10 MG: 10 TABLET ORAL at 10:05

## 2021-08-09 RX ADMIN — ALBUTEROL SULFATE 2 PUFF: 90 AEROSOL, METERED RESPIRATORY (INHALATION) at 21:38

## 2021-08-09 RX ADMIN — CEFTRIAXONE 1000 MG: 1 INJECTION, POWDER, FOR SOLUTION INTRAMUSCULAR; INTRAVENOUS at 20:47

## 2021-08-09 RX ADMIN — GABAPENTIN 600 MG: 300 CAPSULE ORAL at 16:16

## 2021-08-09 RX ADMIN — SUCRALFATE 1 G: 1 TABLET ORAL at 13:15

## 2021-08-09 RX ADMIN — QUETIAPINE FUMARATE 200 MG: 100 TABLET ORAL at 09:59

## 2021-08-09 RX ADMIN — PANTOPRAZOLE SODIUM 40 MG: 40 INJECTION, POWDER, FOR SOLUTION INTRAVENOUS at 21:25

## 2021-08-09 RX ADMIN — TRAZODONE HYDROCHLORIDE 50 MG: 50 TABLET ORAL at 21:33

## 2021-08-09 RX ADMIN — QUETIAPINE FUMARATE 600 MG: 300 TABLET ORAL at 21:34

## 2021-08-09 NOTE — ASSESSMENT & PLAN NOTE
· With history of alcohol withdrawal however patient states he has not had a drink since his last admission  · Declined rehab upon discharge 4/2021  · Of note, during hospitalization in 2020 patient received 2 mg IV ativan per CIWA and then developed acute onset R sided facial droop and R sided weakness  Underwent stroke workup with negative MRI brain  Has known facial asymmetry, likely exacerbated by ativan at that time  · Stroke alert while in Behavioral health in 2020 for slurred speech and diplopia  Stroke workup negative    · Monitor for withdrawal symptoms  · On thiamine

## 2021-08-09 NOTE — PROGRESS NOTES
[address] [facility]  Progress Note - Chandler Almaraz 1950, 78 y o  male MRN: [id number]  Unit/Bed#: [facility] 510-01 Encounter: [id number]  Primary Care Provider: Kathy Prado DO   Date and time admitted to hospital: 8/7/2021  9:47 AM    * Acute blood loss anemia  Assessment & Plan  · Presented to [facility] with DIL for generalized weakness  Found to have hemoglobin of 6 9 on admission and with reports of melena concerning for GIB  Transferred to [facility] and admitted under colorectal surgery service  Of note, hx of perforated duodenal ulcer 1/2020 s/p ex lap, rebleeding 4/2021 s/p EGD with cauterization  · CT abdomen/pelvis: "Mild thickening of the left colon, particularly at the splenic flexure, with slight adjacent stranding  This may represent inflammatory or infectious colitis  While there are colonic diverticula, particularly in the sigmoid colon, there does not appear to be an inflamed diverticula accounting for the inflammation  Ischemic colitis, including watershed ischemia given the splenic flexure involvement, is not excluded"   · Currently on IV ceftriaxone and Flagyl as per primary   · On IV Venofer per primary  · Pain control measures  · Trend H/H  · Continue Protonix IV bid  · Plan EGD/colonoscopy today    Schizophrenia  Assessment & Plan  · Continue remeron 45 mg qhs, seroquel 200 mg qd and 600 mg qhs, trazodone 50 mg qhs, atarax 50 mg tid prn for anxiety    Hypokalemia  Assessment & Plan  3 3 on 8/8/21   Replete as needed; await bmp this am    Abnormal finding on CT scan  Assessment & Plan  · L basilar opacity and elevation of L hemidiaphragm noted on CTAP  · Low clinical suspicion for pneumonia given patient is afebrile, without leukocytosis, negative procalcitonin x 1  · PA and lateral CXR =LLL atelectasis  · Currently receiving IV ceftriaxone and flagyl per primary team for possible intraabdominal infection  · Monitor respiratory status     Essential hypertension  Assessment & Plan  · BP remains widely variable and uncontrolled at times, could be exacerbated by pain  · Continue home dose metoprolol 25 mg bid   · IV Labetalol prn  · Monitor routinely for now and adjust regimen as needed     COPD  Assessment & Plan  · Without acute exacerbation   · Tobacco cessation  · Continue home inhalers    Alcohol abuse  Assessment & Plan  · With history of alcohol withdrawal however patient states he has not had a drink since his last admission  · Declined rehab upon discharge 2021  · Of note, during hospitalization in  patient received 2 mg IV ativan per [place] and then developed acute onset R sided facial droop and R sided weakness  Underwent stroke workup with negative MRI brain  Has known facial asymmetry, likely exacerbated by ativan at that time  · Stroke alert while in Behavioral health in  for slurred speech and diplopia  Stroke workup negative  · Monitor for withdrawal symptoms  · On thiamine     VTE Pharmacologic Prophylaxis:   Pharmacologic: Pharmacologic VTE Prophylaxis contraindicated due to r/o GIB  Mechanical VTE Prophylaxis in Place: Yes    Patient Centered Rounds:spoke with RN    Discussions with Specialists or Other Care Team Provider:     Education and Discussions with Family / Patient:     Time Spent for Care: 20 minutes  More than 50% of total time spent on counseling and coordination of care as described above  Current Length of Stay: 2 day(s)    Current Patient Status: Inpatient   Certification Statement: The patient will continue to require additional inpatient hospital stay due to per primary    Discharge Plan: per primary    Code Status: Level 1 - Full Code      Subjective:   Patient provided limited history without coaxing  When asked, he did report pain in his abdomen  When asked, he reported cough and shortness of breath for 1-2 weeks      Objective:     Vitals:   Temp (24hrs), Av 4 °F (36 9 °C), Min:98 °F (36 7 °C), Max:98 7 °F (37 1 °C)    Temp:  [98 °F (36 7 °C)-98 7 °F (37 1 °C)] 98 6 °F (37 °C)  HR:  [70-77] 77  Resp:  [15-18] 18  BP: (162-188)/(76-93) 188/76  SpO2:  [96 %] 96 %  Body mass index is 21 81 kg/m²  Input and Output Summary (last 24 hours): Intake/Output Summary (Last 24 hours) at 8/9/2021 1000  Last data filed at 8/9/2021 0630  Gross per 24 hour   Intake 5080 ml   Output 3625 ml   Net 1455 ml       Physical Exam:     Physical Exam  Vitals reviewed  Constitutional:       General: He is not in acute distress  Appearance: He is not ill-appearing, toxic-appearing or diaphoretic  Comments: Resting comfortably in bed, easily awoken not lethargic   Cardiovascular:      Rate and Rhythm: Normal rate and regular rhythm  Heart sounds: No murmur heard  Pulmonary:      Effort: Pulmonary effort is normal  No respiratory distress  Breath sounds: No stridor  No wheezing, rhonchi or rales  Comments: Decreased breath sounds, no wheeze, distress or cough noted  Abdominal:      General: There is no distension  Tenderness: There is no guarding  Musculoskeletal:      Right lower leg: No edema  Left lower leg: No edema  Skin:     General: Skin is warm and dry  Coloration: Skin is not jaundiced or pale  Findings: No bruising, erythema, lesion or rash           Additional Data:     Labs:    Results from last 7 days   Lab Units 08/09/21  0418 08/06/21  1350   WBC Thousand/uL 6 16 4 70*   HEMOGLOBIN g/dL 10 6* 6 9*   HEMATOCRIT % 35 2* 22 2*   PLATELETS Thousands/uL 263 284   NEUTROS PCT %  --  56   LYMPHS PCT %  --  28   MONOS PCT %  --  12   EOS PCT %  --  3     Results from last 7 days   Lab Units 08/08/21  0514 08/06/21  1350   SODIUM mmol/L 137 137   POTASSIUM mmol/L 3 3* 4 5   CHLORIDE mmol/L 108 106   CO2 mmol/L 24 25   BUN mg/dL 7 17   CREATININE mg/dL 0 77 1 01   ANION GAP mmol/L 5 6   CALCIUM mg/dL 8 5 8 6   ALBUMIN g/dL  --  3 1*   TOTAL BILIRUBIN mg/dL  --  0 20   ALK PHOS U/L  --  36*   ALT U/L  --  <3*   AST U/L  --  9*   GLUCOSE RANDOM mg/dL 90 97                 Results from last 7 days   Lab Units 08/08/21  0514   PROCALCITONIN ng/ml <0 05     * I Have Reviewed All Lab Data Listed Above  * Additional Pertinent Lab Tests Reviewed:  Oleg 68 Admission Reviewed    Imaging:    Imaging Reports Reviewed Today Include:   Imaging Personally Reviewed by Myself Includes:      Recent Cultures (last 7 days):           Last 24 Hours Medication List:   Current Facility-Administered Medications   Medication Dose Route Frequency Provider Last Rate    albuterol  2 puff Inhalation Q6H PRN Katelyn Martinez MD      atorvastatin  40 mg Oral Daily Katelyn Martinez MD      cefTRIAXone  1,000 mg Intravenous Q24H Katelyn Martinez MD Stopped (08/08/21 2301)    fluticasone  2 spray Nasal Daily Katelyn Martinez MD      fluticasone-vilanterol  1 puff Inhalation Daily Katelyn Martinez MD      gabapentin  600 mg Oral TID Katelyn Martinez MD      hydrOXYzine HCL  50 mg Oral TID PRN Katelyn Martinez MD      iron sucrose  300 mg Intravenous Daily Rosanna Vivas MD      Labetalol HCl  10 mg Intravenous Q6H PRN Katelyn Martinez MD      metoprolol tartrate  25 mg Oral Q12H [address] Katelyn Martinez MD      metroNIDAZOLE  500 mg Intravenous Emma Suresh MD Stopped (08/09/21 0415)    mirtazapine  45 mg Oral HS Javon Thurman MD      oxyCODONE  10 mg Oral Q4H PRN Katelyn Martinez MD      oxyCODONE  5 mg Oral Q4H PRN Katelyn Martinez MD      pantoprazole  40 mg Intravenous Q12H Emma Suresh MD      QUEtiapine  200 mg Oral Daily Katelyn Martinez MD      QUEtiapine  600 mg Oral HS Katelyn Martinez MD      sodium chloride  75 mL/hr Intravenous Continuous Katelyn Martinez MD 75 mL/hr (08/08/21 0907)    sucralfate  1 g Oral 4x Daily Katelyn Martinez MD      thiamine  100 mg Oral Daily Clara Faith MD      traZODone  50 mg Oral HS PRN Clara Faith MD          Today, Patient Was Seen By: Lexie Blanco PA-C    ** Please Note: Dictation voice to text software may have been used in the creation of this document   **

## 2021-08-09 NOTE — PROGRESS NOTES
Anesthesia will not provide sedation because patient has taken his daily meds with water      Will reschedule foe 08/10/2021

## 2021-08-09 NOTE — ASSESSMENT & PLAN NOTE
· BP remains widely variable and uncontrolled at times, could be exacerbated by pain  · Continue home dose metoprolol 25 mg bid   · IV Labetalol prn  · Monitor routinely for now and adjust regimen as needed

## 2021-08-09 NOTE — ASSESSMENT & PLAN NOTE
· Presented to [facility] with DIL for generalized weakness  Found to have hemoglobin of 6 9 on admission and with reports of melena concerning for GIB  Transferred to \[facility]\"" and admitted under colorectal surgery service  Of note, hx of perforated duodenal ulcer 1/2020 s/p ex lap, rebleeding 4/2021 s/p EGD with cauterization  · CT abdomen/pelvis: "Mild thickening of the left colon, particularly at the splenic flexure, with slight adjacent stranding  This may represent inflammatory or infectious colitis  While there are colonic diverticula, particularly in the sigmoid colon, there does not appear to be an inflamed diverticula accounting for the inflammation    Ischemic colitis, including watershed ischemia given the splenic flexure involvement, is not excluded"   · Currently on IV ceftriaxone and Flagyl as per primary   · On IV Venofer per primary  · Pain control measures  · Trend H/H  · Continue Protonix IV bid  · Plan EGD/colonoscopy today

## 2021-08-09 NOTE — ASSESSMENT & PLAN NOTE
· L basilar opacity and elevation of L hemidiaphragm noted on CTAP  · Low clinical suspicion for pneumonia given patient is afebrile, without leukocytosis, negative procalcitonin x 1  · PA and lateral CXR =LLL atelectasis  · Currently receiving IV ceftriaxone and flagyl per primary team for possible intraabdominal infection  · Monitor respiratory status

## 2021-08-09 NOTE — PLAN OF CARE
Problem: MOBILITY - ADULT  Goal: Maintain or return to baseline ADL function  Description: INTERVENTIONS:  -  Assess patient's ability to carry out ADLs; assess patient's baseline for ADL function and identify physical deficits which impact ability to perform ADLs (bathing, care of mouth/teeth, toileting, grooming, dressing, etc )  - Assess/evaluate cause of self-care deficits   - Assess range of motion  - Assess patient's mobility; develop plan if impaired  - Assess patient's need for assistive devices and provide as appropriate  - Encourage maximum independence but intervene and supervise when necessary  - Involve family in performance of ADLs  - Assess for home care needs following discharge   - Consider OT consult to assist with ADL evaluation and planning for discharge  - Provide patient education as appropriate  Outcome: Progressing  Goal: Maintains/Returns to pre admission functional level  Description: INTERVENTIONS:  - Perform BMAT or MOVE assessment daily    - Set and communicate daily mobility goal to care team and patient/family/caregiver  - Collaborate with rehabilitation services on mobility goals if consulted  - Perform Range of Motion 2 times a day  - Reposition patient every 2 hours    - Dangle patient 2 times a day  - Stand patient 2 times a day  - Ambulate patient 2 times a day  - Out of bed to chair 2 times a day   - Out of bed for meals 2 times a day  - Out of bed for toileting  - Record patient progress and toleration of activity level   Outcome: Progressing     Problem: Potential for Falls  Goal: Patient will remain free of falls  Description: INTERVENTIONS:  - Educate patient/family on patient safety including physical limitations  - Instruct patient to call for assistance with activity   - Consult OT/PT to assist with strengthening/mobility   - Keep Call bell within reach  - Keep bed low and locked with side rails adjusted as appropriate  - Keep care items and personal belongings within reach  - Initiate and maintain comfort rounds  - Make Fall Risk Sign visible to staff  - Offer Toileting every 2 Hours, in advance of need  - Initiate/Maintain hsalarm  - Obtain necessary fall risk management equipment:   - Apply yellow socks and bracelet for high fall risk patients  - Consider moving patient to room near nurses station  Outcome: Progressing     Problem: Prexisting or High Potential for Compromised Skin Integrity  Goal: Skin integrity is maintained or improved  Description: INTERVENTIONS:  - Identify patients at risk for skin breakdown  - Assess and monitor skin integrity  - Assess and monitor nutrition and hydration status  - Monitor labs   - Assess for incontinence   - Turn and reposition patient  - Assist with mobility/ambulation  - Relieve pressure over bony prominences  - Avoid friction and shearing  - Provide appropriate hygiene as needed including keeping skin clean and dry  - Evaluate need for skin moisturizer/barrier cream  - Collaborate with interdisciplinary team   - Patient/family teaching  - Consider wound care consult   Outcome: Progressing     Problem: Nutrition/Hydration-ADULT  Goal: Nutrient/Hydration intake appropriate for improving, restoring or maintaining nutritional needs  Description: Monitor and assess patient's nutrition/hydration status for malnutrition  Collaborate with interdisciplinary team and initiate plan and interventions as ordered  Monitor patient's weight and dietary intake as ordered or per policy  Utilize nutrition screening tool and intervene as necessary  Determine patient's food preferences and provide high-protein, high-caloric foods as appropriate       INTERVENTIONS:  - Monitor oral intake, urinary output, labs, and treatment plans  - Assess nutrition and hydration status and recommend course of action  - Evaluate amount of meals eaten  - Assist patient with eating if necessary   - Allow adequate time for meals  - Recommend/ encourage appropriate diets, oral nutritional supplements, and vitamin/mineral supplements  - Order, calculate, and assess calorie counts as needed  - Recommend, monitor, and adjust tube feedings and TPN/PPN based on assessed needs  - Assess need for intravenous fluids  - Provide specific nutrition/hydration education as appropriate  - Include patient/family/caregiver in decisions related to nutrition  Outcome: Progressing

## 2021-08-09 NOTE — OCCUPATIONAL THERAPY NOTE
Occupational Therapy Cancellation Note       08/09/21 1121   Note Type   Note Type Treatment   Cancel Reasons Medical status         Orders received and chart reviewed  OT attempted to see, however Pt currently off of the unit at GI lab  Will continue to follow to be seen for OT treatment as appropriate/when medically cleared           Monty Miranda MS, OTR/L

## 2021-08-09 NOTE — PROGRESS NOTES
Progress Note - General Surgery   Lindajean Litten 78 y o  male MRN: [id number]  Unit/Bed#: [facility] 510-01 Encounter: [id number]    Assessment:  78year old male with iron deficiency anemia w/ generalized weakness and melena, thickening of sigmoid colon on CT    Plan:  Colonoscopy planned for today 8/9  Rocephin/flagyl  Protonix   Venofer held due to concern for infection      Subjective/Objective     Subjective: Complains of generalized pain in abdomen  Reports clear bowel movements with prep  No other new complaints overnight  Objective:     Blood pressure 162/79, pulse 70, temperature 98 7 °F (37 1 °C), resp  rate 15, height 5' 10" (1 778 m), weight 68 9 kg (152 lb), SpO2 96 %  ,Body mass index is 21 81 kg/m²  Intake/Output Summary (Last 24 hours) at 8/9/2021 0603  Last data filed at 8/9/2021 0415  Gross per 24 hour   Intake 4680 ml   Output 3450 ml   Net 1230 ml       Invasive Devices     Peripheral Intravenous Line            Peripheral IV 08/07/21 Right Hand 1 day                Physical Exam:     General: alert and oriented, resting comfortably in bed  NAD  Cardiac: regular rate and rhythm  Lungs: clear to auscultation bilaterally  Abdomen: active bowel sounds, generalized tenderness, voluntary guarding, no rebound  Extremities: no edema    Lab, Imaging and other studies:I have personally reviewed pertinent lab results      VTE Pharmacologic Prophylaxis: Sequential compression device (Venodyne)   VTE Mechanical Prophylaxis: sequential compression device

## 2021-08-10 ENCOUNTER — ANESTHESIA EVENT (INPATIENT)
Dept: GASTROENTEROLOGY | Facility: HOSPITAL | Age: 71
DRG: 378 | End: 2021-08-10
Payer: COMMERCIAL

## 2021-08-10 ENCOUNTER — ANESTHESIA (INPATIENT)
Dept: GASTROENTEROLOGY | Facility: HOSPITAL | Age: 71
DRG: 378 | End: 2021-08-10
Payer: COMMERCIAL

## 2021-08-10 ENCOUNTER — APPOINTMENT (INPATIENT)
Dept: GASTROENTEROLOGY | Facility: HOSPITAL | Age: 71
DRG: 378 | End: 2021-08-10
Payer: COMMERCIAL

## 2021-08-10 LAB
ANION GAP SERPL CALCULATED.3IONS-SCNC: 5 MMOL/L (ref 4–13)
BASOPHILS # BLD AUTO: 0.02 THOUSANDS/ΜL (ref 0–0.1)
BASOPHILS NFR BLD AUTO: 0 % (ref 0–1)
BUN SERPL-MCNC: 3 MG/DL (ref 5–25)
CALCIUM SERPL-MCNC: 8.6 MG/DL (ref 8.3–10.1)
CHLORIDE SERPL-SCNC: 108 MMOL/L (ref 100–108)
CO2 SERPL-SCNC: 25 MMOL/L (ref 21–32)
CREAT SERPL-MCNC: 0.8 MG/DL (ref 0.6–1.3)
EOSINOPHIL # BLD AUTO: 0.08 THOUSAND/ΜL (ref 0–0.61)
EOSINOPHIL NFR BLD AUTO: 1 % (ref 0–6)
ERYTHROCYTE [DISTWIDTH] IN BLOOD BY AUTOMATED COUNT: 17.2 % (ref 11.6–15.1)
GFR SERPL CREATININE-BSD FRML MDRD: 92 ML/MIN/1.73SQ M
GLUCOSE SERPL-MCNC: 106 MG/DL (ref 65–140)
HCT VFR BLD AUTO: 32.6 % (ref 36.5–49.3)
HGB BLD-MCNC: 10 G/DL (ref 12–17)
IMM GRANULOCYTES # BLD AUTO: 0.03 THOUSAND/UL (ref 0–0.2)
IMM GRANULOCYTES NFR BLD AUTO: 1 % (ref 0–2)
LYMPHOCYTES # BLD AUTO: 1.11 THOUSANDS/ΜL (ref 0.6–4.47)
LYMPHOCYTES NFR BLD AUTO: 18 % (ref 14–44)
MAGNESIUM SERPL-MCNC: 1.9 MG/DL (ref 1.6–2.6)
MCH RBC QN AUTO: 24.8 PG (ref 26.8–34.3)
MCHC RBC AUTO-ENTMCNC: 30.7 G/DL (ref 31.4–37.4)
MCV RBC AUTO: 81 FL (ref 82–98)
MONOCYTES # BLD AUTO: 0.91 THOUSAND/ΜL (ref 0.17–1.22)
MONOCYTES NFR BLD AUTO: 14 % (ref 4–12)
NEUTROPHILS # BLD AUTO: 4.21 THOUSANDS/ΜL (ref 1.85–7.62)
NEUTS SEG NFR BLD AUTO: 66 % (ref 43–75)
NRBC BLD AUTO-RTO: 0 /100 WBCS
PLATELET # BLD AUTO: 275 THOUSANDS/UL (ref 149–390)
PMV BLD AUTO: 10.1 FL (ref 8.9–12.7)
POTASSIUM SERPL-SCNC: 3.2 MMOL/L (ref 3.5–5.3)
PROCALCITONIN SERPL-MCNC: <0.05 NG/ML
RBC # BLD AUTO: 4.03 MILLION/UL (ref 3.88–5.62)
SODIUM SERPL-SCNC: 138 MMOL/L (ref 136–145)
WBC # BLD AUTO: 6.36 THOUSAND/UL (ref 4.31–10.16)

## 2021-08-10 PROCEDURE — 84145 PROCALCITONIN (PCT): CPT | Performed by: SURGERY

## 2021-08-10 PROCEDURE — 0DB68ZX EXCISION OF STOMACH, VIA NATURAL OR ARTIFICIAL OPENING ENDOSCOPIC, DIAGNOSTIC: ICD-10-PCS | Performed by: SURGERY

## 2021-08-10 PROCEDURE — C9113 INJ PANTOPRAZOLE SODIUM, VIA: HCPCS | Performed by: SURGERY

## 2021-08-10 PROCEDURE — 99232 SBSQ HOSP IP/OBS MODERATE 35: CPT | Performed by: PHYSICIAN ASSISTANT

## 2021-08-10 PROCEDURE — 88305 TISSUE EXAM BY PATHOLOGIST: CPT | Performed by: PATHOLOGY

## 2021-08-10 PROCEDURE — 85025 COMPLETE CBC W/AUTO DIFF WBC: CPT | Performed by: PHYSICIAN ASSISTANT

## 2021-08-10 PROCEDURE — 0DBP8ZX EXCISION OF RECTUM, VIA NATURAL OR ARTIFICIAL OPENING ENDOSCOPIC, DIAGNOSTIC: ICD-10-PCS | Performed by: SURGERY

## 2021-08-10 PROCEDURE — 83735 ASSAY OF MAGNESIUM: CPT | Performed by: PHYSICIAN ASSISTANT

## 2021-08-10 PROCEDURE — 0DBN8ZX EXCISION OF SIGMOID COLON, VIA NATURAL OR ARTIFICIAL OPENING ENDOSCOPIC, DIAGNOSTIC: ICD-10-PCS | Performed by: SURGERY

## 2021-08-10 PROCEDURE — 80048 BASIC METABOLIC PNL TOTAL CA: CPT | Performed by: PHYSICIAN ASSISTANT

## 2021-08-10 RX ORDER — PROPOFOL 10 MG/ML
INJECTION, EMULSION INTRAVENOUS AS NEEDED
Status: DISCONTINUED | OUTPATIENT
Start: 2021-08-10 | End: 2021-08-10

## 2021-08-10 RX ORDER — METRONIDAZOLE 500 MG/1
500 TABLET ORAL EVERY 8 HOURS SCHEDULED
Status: DISCONTINUED | OUTPATIENT
Start: 2021-08-10 | End: 2021-08-11

## 2021-08-10 RX ORDER — POTASSIUM CHLORIDE 14.9 MG/ML
20 INJECTION INTRAVENOUS ONCE
Status: COMPLETED | OUTPATIENT
Start: 2021-08-10 | End: 2021-08-10

## 2021-08-10 RX ORDER — ACETAMINOPHEN 325 MG/1
975 TABLET ORAL EVERY 6 HOURS PRN
Status: DISCONTINUED | OUTPATIENT
Start: 2021-08-10 | End: 2021-08-13

## 2021-08-10 RX ORDER — LIDOCAINE HYDROCHLORIDE 10 MG/ML
INJECTION, SOLUTION EPIDURAL; INFILTRATION; INTRACAUDAL; PERINEURAL AS NEEDED
Status: DISCONTINUED | OUTPATIENT
Start: 2021-08-10 | End: 2021-08-10

## 2021-08-10 RX ADMIN — MIRTAZAPINE 45 MG: 30 TABLET, FILM COATED ORAL at 21:20

## 2021-08-10 RX ADMIN — SUCRALFATE 1 G: 1 TABLET ORAL at 17:19

## 2021-08-10 RX ADMIN — PROPOFOL 20 MG: 10 INJECTION, EMULSION INTRAVENOUS at 11:10

## 2021-08-10 RX ADMIN — METOPROLOL TARTRATE 25 MG: 25 TABLET, FILM COATED ORAL at 20:05

## 2021-08-10 RX ADMIN — METOPROLOL TARTRATE 25 MG: 25 TABLET, FILM COATED ORAL at 13:01

## 2021-08-10 RX ADMIN — QUETIAPINE FUMARATE 600 MG: 300 TABLET ORAL at 21:21

## 2021-08-10 RX ADMIN — GABAPENTIN 600 MG: 300 CAPSULE ORAL at 20:04

## 2021-08-10 RX ADMIN — QUETIAPINE FUMARATE 200 MG: 100 TABLET ORAL at 12:59

## 2021-08-10 RX ADMIN — PROPOFOL 20 MG: 10 INJECTION, EMULSION INTRAVENOUS at 11:14

## 2021-08-10 RX ADMIN — LABETALOL 20 MG/4 ML (5 MG/ML) INTRAVENOUS SYRINGE 10 MG: at 09:38

## 2021-08-10 RX ADMIN — PROPOFOL 150 MG: 10 INJECTION, EMULSION INTRAVENOUS at 10:48

## 2021-08-10 RX ADMIN — LIDOCAINE HYDROCHLORIDE 50 MG: 10 INJECTION, SOLUTION EPIDURAL; INFILTRATION; INTRACAUDAL; PERINEURAL at 10:48

## 2021-08-10 RX ADMIN — METRONIDAZOLE 500 MG: 500 INJECTION, SOLUTION INTRAVENOUS at 04:53

## 2021-08-10 RX ADMIN — FLUTICASONE PROPIONATE 2 SPRAY: 50 SPRAY, METERED NASAL at 20:04

## 2021-08-10 RX ADMIN — ATORVASTATIN CALCIUM 40 MG: 40 TABLET, FILM COATED ORAL at 12:56

## 2021-08-10 RX ADMIN — SODIUM CHLORIDE 75 ML/HR: 0.9 INJECTION, SOLUTION INTRAVENOUS at 13:03

## 2021-08-10 RX ADMIN — GABAPENTIN 600 MG: 300 CAPSULE ORAL at 17:19

## 2021-08-10 RX ADMIN — OXYCODONE HYDROCHLORIDE 10 MG: 10 TABLET ORAL at 13:10

## 2021-08-10 RX ADMIN — TRAZODONE HYDROCHLORIDE 50 MG: 50 TABLET ORAL at 21:27

## 2021-08-10 RX ADMIN — IRON SUCROSE 300 MG: 20 INJECTION, SOLUTION INTRAVENOUS at 13:31

## 2021-08-10 RX ADMIN — PROPOFOL 20 MG: 10 INJECTION, EMULSION INTRAVENOUS at 11:05

## 2021-08-10 RX ADMIN — POTASSIUM CHLORIDE 20 MEQ: 14.9 INJECTION, SOLUTION INTRAVENOUS at 06:44

## 2021-08-10 RX ADMIN — OXYCODONE HYDROCHLORIDE 10 MG: 10 TABLET ORAL at 20:03

## 2021-08-10 RX ADMIN — PANTOPRAZOLE SODIUM 40 MG: 40 INJECTION, POWDER, FOR SOLUTION INTRAVENOUS at 20:05

## 2021-08-10 RX ADMIN — SUCRALFATE 1 G: 1 TABLET ORAL at 12:55

## 2021-08-10 RX ADMIN — SUCRALFATE 1 G: 1 TABLET ORAL at 21:20

## 2021-08-10 RX ADMIN — METRONIDAZOLE 500 MG: 500 TABLET ORAL at 21:20

## 2021-08-10 RX ADMIN — PROPOFOL 20 MG: 10 INJECTION, EMULSION INTRAVENOUS at 11:00

## 2021-08-10 RX ADMIN — PROPOFOL 30 MG: 10 INJECTION, EMULSION INTRAVENOUS at 10:55

## 2021-08-10 RX ADMIN — GABAPENTIN 600 MG: 300 CAPSULE ORAL at 12:55

## 2021-08-10 RX ADMIN — CEFTRIAXONE 1000 MG: 1 INJECTION, POWDER, FOR SOLUTION INTRAMUSCULAR; INTRAVENOUS at 19:49

## 2021-08-10 RX ADMIN — METRONIDAZOLE 500 MG: 500 INJECTION, SOLUTION INTRAVENOUS at 13:31

## 2021-08-10 RX ADMIN — PANTOPRAZOLE SODIUM 40 MG: 40 INJECTION, POWDER, FOR SOLUTION INTRAVENOUS at 09:37

## 2021-08-10 RX ADMIN — FLUTICASONE FUROATE AND VILANTEROL TRIFENATATE 1 PUFF: 100; 25 POWDER RESPIRATORY (INHALATION) at 10:00

## 2021-08-10 RX ADMIN — PROPOFOL 30 MG: 10 INJECTION, EMULSION INTRAVENOUS at 10:51

## 2021-08-10 RX ADMIN — POTASSIUM CHLORIDE 20 MEQ: 14.9 INJECTION, SOLUTION INTRAVENOUS at 09:41

## 2021-08-10 RX ADMIN — THIAMINE HCL TAB 100 MG 100 MG: 100 TAB at 12:57

## 2021-08-10 RX ADMIN — SODIUM PHOSPHATE 1 ENEMA: 7; 19 ENEMA RECTAL at 06:48

## 2021-08-10 NOTE — ASSESSMENT & PLAN NOTE
· BP remains elevated at times  · Continue home dose metoprolol 25 mg bid --  This was being held due to nothing by mouth status    Would resume asap  · IV Labetalol prn  · Monitor routinely for now and adjust regimen as needed

## 2021-08-10 NOTE — ASSESSMENT & PLAN NOTE
· Presented to [facility] with DIL for generalized weakness  Found to have hemoglobin of 6 9 on admission and with reports of melena concerning for GIB  Transferred to [facility] and admitted under colorectal surgery service  Of note, hx of perforated duodenal ulcer 1/2020 s/p ex lap, rebleeding 4/2021 s/p EGD with cauterization  · CT abdomen/pelvis: "Mild thickening of the left colon, particularly at the splenic flexure, with slight adjacent stranding  This may represent inflammatory or infectious colitis  While there are colonic diverticula, particularly in the sigmoid colon, there does not appear to be an inflamed diverticula accounting for the inflammation  Ischemic colitis, including watershed ischemia given the splenic flexure involvement, is not excluded"   · Currently on IV ceftriaxone and Flagyl as per primary   · On IV Venofer per primary  · Pain control measures  · Trending H/H--stable  · On Protonix IV bid  · Reviewed EGD/colonoscopy today-- Single small, cratered, round, benign-appearing ulcer in the pylorus with adherent clot

## 2021-08-10 NOTE — PROGRESS NOTES
Progress Note - Colorectal  Gwen Lobe 78 y o  male MRN: [id number]  Unit/Bed#: [facility] 510-01 Encounter: [id number]    Assessment:  70M w/ iron deficiency anemia w/ generalized weakness, in hospital for iron deficiency anemia    - Scope yesterday postponed     Plan:  NPO this AM  NS @ 75    Plan for scope today, likely discharge post procedure pending findings  Subjective/Objective     Subjective:  No further bloody bowel movements  Resting in bed comfortably  Objective:    Blood pressure 168/86, pulse 82, temperature 98 6 °F (37 °C), temperature source Oral, resp  rate 18, height 5' 10" (1 778 m), weight 68 9 kg (152 lb), SpO2 98 %  ,Body mass index is 21 81 kg/m²  Intake/Output Summary (Last 24 hours) at 8/10/2021 0541  Last data filed at 8/9/2021 2142  Gross per 24 hour   Intake 1480 ml   Output 1710 ml   Net -230 ml       Invasive Devices     Peripheral Intravenous Line            Peripheral IV 08/07/21 Right Hand 2 days                Physical Exam:   General: NAD  Skin: Warm, anicteric  HEENT: Normocephalic, atraumatic  CV: RRR, no m/r/g  Pulm: CTA b/l, no inc WOB  Abd: Soft, mild distention, mild tenderness  MSK: Symmetric, no cyanosis, no edema  Neuro: AOx3    Lab, Imaging and other studies:I have personally reviewed pertinent lab results      VTE Pharmacologic Prophylaxis: Reason for no pharmacologic prophylaxis GI bleed  VTE Mechanical Prophylaxis: sequential compression device

## 2021-08-10 NOTE — OCCUPATIONAL THERAPY NOTE
Occupational Therapy Cancellation Note       08/10/21 0842   Note Type   Note type Evaluation   Cancel Reasons Medical status       Orders received and chart reviewed  OT spoke to RN who reports Pt is currently hypertensive and not medically appropriate to participate in therapy at this time  Will continue to follow to be seen for OT evaluation as appropriate/when medically cleared         Yarely Smith MS, OTR/L

## 2021-08-10 NOTE — ANESTHESIA PREPROCEDURE EVALUATION
Procedure:  COLONOSCOPY  EGD    Relevant Problems   CARDIO   (+) Aortic atherosclerosis (HCC)   (+) Essential hypertension      GI/HEPATIC   (+) Duodenal ulcer   (+) GI bleed      HEMATOLOGY   (+) Acute blood loss anemia   (+) Anemia   (+) Iron deficiency anemia due to chronic blood loss      MUSCULOSKELETAL   (+) Back pain without sciatica      NEURO/PSYCH   (+) Hemiplegia of right dominant side as late effect of cerebrovascular disease (HCC)   (+) History of GI bleed   (+) History of alcohol abuse   (+) History of anemia   (+) History of bleeding ulcers   (+) History of duodenal ulcer   (+) History of suicidal ideation   (+) History of syncope   (+) History of transient ischemic attack (TIA)   (+) MDD (major depressive disorder)   (+) Major depressive disorder, recurrent, severe with psychotic features (HCC)   (+) Schizophrenia      PULMONARY   (+) COPD   (+) Chronic obstructive pulmonary disease (HCC)             Anesthesia Plan  ASA Score- 3     Anesthesia Type- IV sedation with anesthesia with ASA Monitors  Additional Monitors:   Airway Plan:           Plan Factors-Exercise tolerance (METS): >4 METS  Chart reviewed  Existing labs reviewed  Patient is not a current smoker  Patient did not smoke on day of surgery  Induction- intravenous  Postoperative Plan- Plan for postoperative opioid use  Informed Consent- Anesthetic plan and risks discussed with patient  I personally reviewed this patient with the CRNA  Discussed and agreed on the Anesthesia Plan with the CRNA  Margie Hartman

## 2021-08-10 NOTE — PHYSICAL THERAPY NOTE
Physical Therapy Cancellation Note       08/10/21 7645   PT Last Visit   PT Visit Date 08/10/21   Note Type   Note Type Treatment   Cancel Reasons Patient off floor/test     Pt chart reviewed  Pt currently off floor at GI lab  PT to continue to follow and see pt as appropriate and able      Junie Padilla, PT, DPT

## 2021-08-10 NOTE — ANESTHESIA POSTPROCEDURE EVALUATION
Post-Op Assessment Note    CV Status:  Stable    Pain management: adequate     Mental Status:  Sleepy and awake   Hydration Status:  Euvolemic   PONV Controlled:  None   Airway Patency:  Patent      Post Op Vitals Reviewed: Yes      Staff: Anesthesiologist, CRNA         No complications documented      /77 (08/10/21 1134)    Temp     Pulse 80 (08/10/21 1134)   Resp 16 (08/10/21 1134)    SpO2 100 % (08/10/21 1134)

## 2021-08-10 NOTE — ASSESSMENT & PLAN NOTE
· L basilar opacity and elevation of L hemidiaphragm noted on CTAP  · Low clinical suspicion for pneumonia given patient is afebrile, without leukocytosis, negative procalcitonin x 2  · PA and lateral CXR =LLL atelectasis  · Currently receiving IV ceftriaxone and flagyl per primary team for possible intraabdominal infection  · Monitor respiratory status
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
## 2021-08-11 PROBLEM — R50.9 FEVER: Status: ACTIVE | Noted: 2021-08-11

## 2021-08-11 LAB
ANION GAP SERPL CALCULATED.3IONS-SCNC: 5 MMOL/L (ref 4–13)
BUN SERPL-MCNC: 3 MG/DL (ref 5–25)
CALCIUM SERPL-MCNC: 8.4 MG/DL (ref 8.3–10.1)
CHLORIDE SERPL-SCNC: 108 MMOL/L (ref 100–108)
CO2 SERPL-SCNC: 24 MMOL/L (ref 21–32)
CREAT SERPL-MCNC: 0.88 MG/DL (ref 0.6–1.3)
GFR SERPL CREATININE-BSD FRML MDRD: 88 ML/MIN/1.73SQ M
GLUCOSE SERPL-MCNC: 128 MG/DL (ref 65–140)
MAGNESIUM SERPL-MCNC: 1.7 MG/DL (ref 1.6–2.6)
POTASSIUM SERPL-SCNC: 2.8 MMOL/L (ref 3.5–5.3)
SODIUM SERPL-SCNC: 137 MMOL/L (ref 136–145)

## 2021-08-11 PROCEDURE — 97167 OT EVAL HIGH COMPLEX 60 MIN: CPT

## 2021-08-11 PROCEDURE — 83735 ASSAY OF MAGNESIUM: CPT | Performed by: PHYSICIAN ASSISTANT

## 2021-08-11 PROCEDURE — 80048 BASIC METABOLIC PNL TOTAL CA: CPT | Performed by: PHYSICIAN ASSISTANT

## 2021-08-11 PROCEDURE — 97530 THERAPEUTIC ACTIVITIES: CPT

## 2021-08-11 PROCEDURE — 99232 SBSQ HOSP IP/OBS MODERATE 35: CPT | Performed by: PHYSICIAN ASSISTANT

## 2021-08-11 PROCEDURE — 99222 1ST HOSP IP/OBS MODERATE 55: CPT | Performed by: PSYCHIATRY & NEUROLOGY

## 2021-08-11 RX ORDER — LIDOCAINE 50 MG/G
1 PATCH TOPICAL DAILY
Status: DISCONTINUED | OUTPATIENT
Start: 2021-08-11 | End: 2021-08-16 | Stop reason: HOSPADM

## 2021-08-11 RX ORDER — POTASSIUM CHLORIDE 20 MEQ/1
40 TABLET, EXTENDED RELEASE ORAL EVERY 4 HOURS
Status: COMPLETED | OUTPATIENT
Start: 2021-08-11 | End: 2021-08-11

## 2021-08-11 RX ORDER — POTASSIUM CHLORIDE 20 MEQ/1
20 TABLET, EXTENDED RELEASE ORAL ONCE
Status: COMPLETED | OUTPATIENT
Start: 2021-08-11 | End: 2021-08-11

## 2021-08-11 RX ORDER — PANTOPRAZOLE SODIUM 40 MG/1
40 TABLET, DELAYED RELEASE ORAL
Status: DISCONTINUED | OUTPATIENT
Start: 2021-08-11 | End: 2021-08-16 | Stop reason: HOSPADM

## 2021-08-11 RX ADMIN — FLUTICASONE PROPIONATE 2 SPRAY: 50 SPRAY, METERED NASAL at 21:39

## 2021-08-11 RX ADMIN — METRONIDAZOLE 500 MG: 500 TABLET ORAL at 05:25

## 2021-08-11 RX ADMIN — MAGNESIUM OXIDE TAB 400 MG (241.3 MG ELEMENTAL MG) 400 MG: 400 (241.3 MG) TAB at 09:52

## 2021-08-11 RX ADMIN — SUCRALFATE 1 G: 1 TABLET ORAL at 21:39

## 2021-08-11 RX ADMIN — MIRTAZAPINE 45 MG: 30 TABLET, FILM COATED ORAL at 21:39

## 2021-08-11 RX ADMIN — GABAPENTIN 600 MG: 300 CAPSULE ORAL at 09:49

## 2021-08-11 RX ADMIN — LIDOCAINE 1 PATCH: 50 PATCH TOPICAL at 22:14

## 2021-08-11 RX ADMIN — ACETAMINOPHEN 975 MG: 325 TABLET, FILM COATED ORAL at 22:14

## 2021-08-11 RX ADMIN — POTASSIUM CHLORIDE 40 MEQ: 1500 TABLET, EXTENDED RELEASE ORAL at 09:52

## 2021-08-11 RX ADMIN — SUCRALFATE 1 G: 1 TABLET ORAL at 17:44

## 2021-08-11 RX ADMIN — FLUTICASONE FUROATE AND VILANTEROL TRIFENATATE 1 PUFF: 100; 25 POWDER RESPIRATORY (INHALATION) at 09:49

## 2021-08-11 RX ADMIN — OXYCODONE HYDROCHLORIDE 10 MG: 10 TABLET ORAL at 05:25

## 2021-08-11 RX ADMIN — TRAZODONE HYDROCHLORIDE 50 MG: 50 TABLET ORAL at 21:44

## 2021-08-11 RX ADMIN — SUCRALFATE 1 G: 1 TABLET ORAL at 12:39

## 2021-08-11 RX ADMIN — GABAPENTIN 600 MG: 300 CAPSULE ORAL at 17:45

## 2021-08-11 RX ADMIN — POTASSIUM CHLORIDE 20 MEQ: 1500 TABLET, EXTENDED RELEASE ORAL at 17:45

## 2021-08-11 RX ADMIN — GABAPENTIN 600 MG: 300 CAPSULE ORAL at 21:39

## 2021-08-11 RX ADMIN — SODIUM CHLORIDE 75 ML/HR: 0.9 INJECTION, SOLUTION INTRAVENOUS at 04:18

## 2021-08-11 RX ADMIN — SUCRALFATE 1 G: 1 TABLET ORAL at 09:49

## 2021-08-11 RX ADMIN — MAGNESIUM OXIDE TAB 400 MG (241.3 MG ELEMENTAL MG) 400 MG: 400 (241.3 MG) TAB at 17:45

## 2021-08-11 RX ADMIN — POTASSIUM CHLORIDE 40 MEQ: 1500 TABLET, EXTENDED RELEASE ORAL at 12:39

## 2021-08-11 RX ADMIN — ATORVASTATIN CALCIUM 40 MG: 40 TABLET, FILM COATED ORAL at 09:49

## 2021-08-11 RX ADMIN — METOPROLOL TARTRATE 25 MG: 25 TABLET, FILM COATED ORAL at 21:39

## 2021-08-11 RX ADMIN — PANTOPRAZOLE SODIUM 40 MG: 40 TABLET, DELAYED RELEASE ORAL at 17:44

## 2021-08-11 RX ADMIN — PANTOPRAZOLE SODIUM 40 MG: 40 TABLET, DELAYED RELEASE ORAL at 09:49

## 2021-08-11 RX ADMIN — THIAMINE HCL TAB 100 MG 100 MG: 100 TAB at 09:49

## 2021-08-11 RX ADMIN — ALBUTEROL SULFATE 2 PUFF: 90 AEROSOL, METERED RESPIRATORY (INHALATION) at 21:39

## 2021-08-11 RX ADMIN — METOPROLOL TARTRATE 25 MG: 25 TABLET, FILM COATED ORAL at 09:49

## 2021-08-11 RX ADMIN — QUETIAPINE FUMARATE 200 MG: 100 TABLET ORAL at 09:54

## 2021-08-11 RX ADMIN — QUETIAPINE FUMARATE 600 MG: 300 TABLET ORAL at 21:41

## 2021-08-11 NOTE — PROGRESS NOTES
Progress Note - Colorectal Surgery   Shameka Pike 78 y o  male MRN: [id number]  Unit/Bed#: [facility] 510-01 Encounter: [id number]    Assessment:  70M w/ iron deficiency anemia w/ generalized weakness, in hospital for iron deficiency anemia    8/10 EGD/Cscope: rectal polypectomy, normal, diverticula, no bleeding  Pyloric ulcer with clot  Urine 925    Plan:  -regular diet  -Glory@[email or web address]  -likely DC today- no active bleed  - consider outpatient medication for healed ulcer (sulcrafate)  -appreciate hospitalist recommendations  -PT/ OT not seen yet- follow up      Subjective/Objective     Subjective:   No acute events overnight  Having bowel movements without blood  + flatus  - nausea  - vomiting  Objective:     Blood pressure (!) 174/76, pulse 89, temperature 99 9 °F (37 7 °C), temperature source Oral, resp  rate 18, height 5' 10" (1 778 m), weight 68 9 kg (152 lb), SpO2 96 %  ,Body mass index is 21 81 kg/m²        Intake/Output Summary (Last 24 hours) at 8/11/2021 0022  Last data filed at 8/10/2021 2129  Gross per 24 hour   Intake 1240 ml   Output 925 ml   Net 315 ml       Invasive Devices     Peripheral Intravenous Line            Peripheral IV 08/10/21 Left;Ventral (anterior) Forearm <1 day                Physical Exam:   Gen: NAD, Comfortable  Neuro: A&O, No focal deficits  Head: Normal Cephalic, Atraumatic  Eye: EOMI, PERRLA, No scleral icterus  Neck: Supple, No JVD, Midline trachea  CV: RRR, Cap refill <2 sec  Pulm: Normal work of breathing, no respiratory distress  Abd: Soft, Non-Distended, Non-Tender  Ext: No edema, Non-tender  Skin: warm, dry, intact
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
## 2021-08-11 NOTE — PHYSICAL THERAPY NOTE
PHYSICAL THERAPY NOTE          Patient Name: Gwen Singh  [id number] Date: 8/11/2021 08/11/21 0948   PT Last Visit   PT Visit Date 08/11/21   Note Type   Note Type Treatment   Pain Assessment   Pain Assessment Tool Pain Assessment not indicated - pt denies pain   Restrictions/Precautions   Weight Bearing Precautions Per Order No   Other Precautions Impulsive;Cognitive; Chair Alarm; Bed Alarm;Multiple lines; Fall Risk   General   Chart Reviewed Yes   Response to Previous Treatment Patient with no complaints from previous session  Family/Caregiver Present No   Cognition   Overall Cognitive Status Impaired   Arousal/Participation Alert   Attention Attends with cues to redirect   Orientation Level Oriented to person;Oriented to time;Disoriented to situation;Oriented to place  (month and year only)   Memory Decreased recall of precautions;Decreased recall of recent events;Decreased short term memory   Following Commands Follows one step commands with increased time or repetition   Comments Pt initially reports "I don't know" when asked name, able to ID incorrect names when given choices  Pt with decreased safety awareness and insight into deficits  Pt mildly irritable and impulsive  Subjective   Subjective Pt agreeable to participate   Bed Mobility   Supine to Sit 4  Minimal assistance   Additional items Assist x 1;HOB elevated; Increased time required;Verbal cues;LE management   Additional Comments Supine in bed upon PT arrival   Sits EOB with supervision-Min A for trunk control  Pt noted to lean onto elbow in order to hold self up  Pt left upright in bedside chair with chair alarm intact and all needs in reach  Transfers   Sit to Stand 3  Moderate assistance   Additional items Assist x 1; Increased time required;Verbal cues   Stand to Sit 3  Moderate assistance   Additional items Assist x 1; Increased time required;Verbal cues   Additional Comments Transfers with [facility] for hand placement and safety  Ambulation/Elevation   Gait pattern Excessively slow; Short stride; Foward flexed;Decreased foot clearance; Improper Weight shift  (unsteady)   Gait Assistance 3  Moderate assist   Additional items Assist x 1;Verbal cues; Tactile cues   Assistive Device Rolling walker   Distance 3 ft x 1  (attempted more but pt reports significant dizziness)   Balance   Static Sitting Fair   Dynamic Sitting Fair -   Static Standing Poor +   Dynamic Standing Poor   Ambulatory Poor   Endurance Deficit   Endurance Deficit Yes   Endurance Deficit Description fatigue, weakness, cog   Activity Tolerance   Activity Tolerance Patient limited by fatigue; Other (Comment)  (cog)   Nurse Made Aware RN cleared pt to be seen by PT   Medical Staff Made Aware OT Ai   Assessment   Prognosis Fair   Problem List Decreased strength;Decreased endurance; Impaired balance;Decreased mobility; Decreased range of motion;Decreased cognition; Impaired judgement;Decreased safety awareness   Assessment Pt seen for PT treatment session with focus on bed mobility, functional transfers, functional mobility  Pt making slow progress toward goals this session 2/2 cognitive impairment  Pt continues to require physical assist for all bed mobility, transfers, and short distance ambulation due to remaining strength deficits  Balance impairments remain, evident by LOB while sitting EOB  Pt cognitive impairments result in decreased safety awareness and insight as well as increased risk for falls  Pt left upright in bedside chair with chair alarm intact and with all needs in reach  Pt will benefit from skilled therapy in order to address current impairments and functional limitations  PT to follow pt and recommending rehab once medically cleared  The patient's AM-PAC Basic Mobility Inpatient Short Form Raw Score is 13, Standardized Score is 33 99  A standardized score less than 42 9 suggests the patient may benefit from discharge to post-acute rehabilitation services  Please also refer to the recommendation of the Physical Therapist for safe discharge planning  Barriers to Discharge Decreased caregiver support; Inaccessible home environment   Goals   Patient Goals to rest   STG Expiration Date 08/22/21   Plan   Treatment/Interventions Functional transfer training;LE strengthening/ROM; Therapeutic exercise;Cognitive reorientation;Patient/family training; Endurance training;Bed mobility; Equipment eval/education;Gait training;Spoke to nursing   Progress Slow progress, cognitive deficits   PT Frequency Other (Comment)  (3-5x/wk)   Recommendation   PT Discharge Recommendation Post acute rehabilitation services   Equipment Recommended [facility] Recommended Wheeled walker   Change/add to [employer]?  No   PT - OK to Discharge Yes  (to rehab once medically cleared)   AM-PAC Basic Mobility Inpatient   Turning in Bed Without Bedrails 3   Lying on Back to Sitting on Edge of Flat Bed 3   Moving Bed to Chair 2   Standing Up From Chair 2   Walk in Room 2   Climb 3-5 Stairs 1   Basic Mobility Inpatient Raw Score 13   Basic Mobility Standardized Score 33 99     Jacki Hansen, PT, DPT

## 2021-08-11 NOTE — RESPIRATORY THERAPY NOTE
RT Protocol Note  Primus Berry 78 y o  male MRN: [id number]  Unit/Bed#: [facility] 510-01 Encounter: [id number]    Assessment    Principal Problem:    Acute blood loss anemia  Active Problems:    Alcohol abuse    COPD    Tobacco abuse    History of duodenal ulcer    Schizophrenia    Essential hypertension    Abnormal finding on CT scan    Melena    Hypokalemia      Home Pulmonary Medications:  na       Past Medical History:   Diagnosis Date    Alcohol abuse     BPH (benign prostatic hyperplasia)     [facility]cere[facility]) [facility] [address]     DJD (degenerative joint disease)     Encounter to establish care with new doctor 8/21/2019    Gait abnormality     Head injury     History of cerebrovascular accident (CVA) with residual deficit 10/1/2019    History of CVA (cerebrovascular accident) 1/8/2020    History of substance abuse ([facility] [address] ) 8/21/2019    Polysubstance     History of sustained ventricular tachycardia 8/21/2019    History of transient ischemic attack (TIA) 8/28/2019    Hypertension     Metatarsal fracture     Palliative care patient     TIA (transient ischemic attack)     Tobacco abuse      Social History     Socioeconomic History    Marital status: Single     Spouse name: None    Number of children: None    Years of education: None    Highest education level: None   Occupational History    None   Tobacco Use    Smoking status: Current Every Day Smoker     Packs/day: 1 00     Types: Cigarettes    Smokeless tobacco: Never Used    Tobacco comment: started age 14, 3 quit attempts   Vaping Use    Vaping Use: Never used   Substance and Sexual Activity    Alcohol use: Not Currently     Alcohol/week: 4 0 standard drinks     Types: 4 Cans of beer per week     Comment: a couple beers    Drug use: Yes     Types: Marijuana     Comment: history polysubstance use including IVDA on record- every now then will smoke weed     Sexual activity: Not Currently     Partners: Female   Other Topics Concern    None   Social History Narrative    Used to be  for about 27 yrs, unemployed since fell off roof onto concrete - pt does not recall what year    1 son with whom he lives,      Social Determinants of Health     Financial Resource Strain:     Difficulty of Paying Living Expenses:    Food Insecurity:     Worried About [address] in the Last Year:     [address] in the Last Year:    Transportation Needs: No Transportation Needs    Lack of Transportation (Medical): No    Lack of Transportation (Non-Medical): No   Physical Activity: Unknown    Days of Exercise per Week: 0 days    Minutes of Exercise per Session: Not on file   Stress:     Feeling of Stress :    Social Connections:     Frequency of Communication with Friends and Family:     Frequency of Social Gatherings with Friends and Family:     Attends Anabaptist Services:     Active Member of Clubs or Organizations:     Attends Club or Organization Meetings:     Marital Status:    Intimate Partner Violence:     Fear of Current or Ex-Partner:     Emotionally Abused:     Physically Abused:     Sexually Abused:        Subjective         Objective    Physical Exam:   Assessment Type: (P) Assess only  General Appearance: (P) Alert, Awake  Respiratory Pattern: (P) Normal  Chest Assessment: (P) Chest expansion symmetrical  Bilateral Breath Sounds: (P) Coarse, Diminished  Cough: Moist, Strong, Non-productive  O2 Device: (P) ra    Vitals:  Blood pressure 158/80, pulse 84, temperature 99 6 °F (37 6 °C), temperature source Oral, resp  rate 18, height 5' 10" (1 778 m), weight 68 9 kg (152 lb), SpO2 (!) 88 %  Imaging and other studies: I have personally reviewed pertinent reports        O2 Device: (P) ra     Plan    Respiratory Plan: Discontinue Protocol, No distress/Pulmonary history  Airway Clearance Plan: (P) Incentive Spirometer     Resp Comments: (P) pt instructed on the use of IS, pt has a good strong lnpc, cxr shows atelectasis, no indication for cpt/flutter valve, pt OOB in a chair, will continue to monitor per protocol

## 2021-08-11 NOTE — ASSESSMENT & PLAN NOTE
· BP elevated at times likely due in part to medications being held and also pain  · Continue home dose metoprolol 25 mg bid   IV Labetalol prn  · Monitor routinely for now and adjust regimen as needed

## 2021-08-11 NOTE — OCCUPATIONAL THERAPY NOTE
Occupational Therapy Evaluation      Shameka Glendy    8/11/2021    Principal Problem:    Acute blood loss anemia  Active Problems:    Alcohol abuse    COPD    Tobacco abuse    History of duodenal ulcer    Schizophrenia    Essential hypertension    Abnormal finding on CT scan    Melena    Hypokalemia    Fever      Past Medical History:   Diagnosis Date    Alcohol abuse     BPH (benign prostatic hyperplasia)     [facility] [address]     DJD (degenerative joint disease)     Encounter to establish care with new doctor 8/21/2019    Gait abnormality     Head injury     History of cerebrovascular accident (CVA) with residual deficit 10/1/2019    History of CVA (cerebrovascular accident) 1/8/2020    History of substance abuse ([facility] [address] ) 8/21/2019    Polysubstance     History of sustained ventricular tachycardia 8/21/2019    History of transient ischemic attack (TIA) 8/28/2019    Hypertension     Metatarsal fracture     Palliative care patient     TIA (transient ischemic attack)     Tobacco abuse        Past Surgical History:   Procedure Laterality Date    ABDOMINAL SURGERY      ANKLE SURGERY      BACK SURGERY      CT GUIDED [facility] DRAINAGE CATHETER PLACEMENT  1/27/2020    ELBOW SURGERY      IR VISCERAL ANGIOGRAPHY / INTERVENTION  1/11/2020    JOINT REPLACEMENT      KNEE SURGERY      LAPAROTOMY N/A 1/11/2020    Procedure: LAPAROTOMY EXPLORATORY,  OVERSEW  OF GASTRO DUODENAL ARTERY, THAL PATCH OF DUODENUM, VICKY GASTRO JEJUNOSTOMY TUBE;  Surgeon: Eugenia Ventura DO;  [facility]: [facility];  Service: General    LIVER SURGERY          08/11/21 0946   OT Last Visit   OT Visit Date 08/11/21   Note Type   Note type Evaluation   Restrictions/Precautions   Weight Bearing Precautions Per Order No   Other Precautions Impulsive;Cognitive; Chair Alarm; Bed Alarm;Multiple lines;Telemetry; Fall Risk;Pain   Pain Assessment   Pain Assessment Tool Pain Assessment not indicated - pt denies pain   Home Living Additional Comments Pt presents encephalopathic and is a poor/inconsistent historian and unable to recall all PLOF or home set up information  Per CM note, "Pt states he lives with his son and family in a 2sh with 1ste  Prior to admission pt used a SPC with ambulation and was independent with ADLS "    Prior Function   Level of [place] Independent with ADLs and functional mobility   Lives With Family; Son   Siva Help From Family   ADL Assistance Independent   IADLs Independent   Falls in the last 6 months 0   Vocational Retired   Lifestyle   Autonomy Per CM note, Pt was IND w/ all ADLS and ambulates w/ SPC PTA   Reciprocal Relationships Per CM note, Pt resides w/ his spouse and son - unclear level of support available upon D/C  Service to Others Pt is retired   Intrinsic Gratification Pt reports enjoying laying down   Psychosocial   Psychosocial (WDL) X   Patient Behaviors/Mood Flat affect;Irritable   ADL   Where Assessed Edge of bed   Eating Assistance 5  Supervision/Setup   Grooming Assistance 4  Minimal Assistance   UB Bathing Assistance 4  Minimal Assistance   LB Bathing Assistance 2  Maximal [address] 4  [address] 2  Maximal [address]  2  Maximal Assistance   Bed Mobility   Supine to Sit 4  Minimal assistance   Additional items Assist x 1; Increased time required;LE management;Verbal cues; Bedrails;HOB elevated   Sit to Supine Unable to assess   Additional Comments Pt laying supine in bed upon OT arrival  Pt seated OOB in chair w/ chair alarm activated and all needs in reach s/p OT session  Transfers   Sit to Stand 3  Moderate assistance   Additional items Assist x 1; Increased time required;Verbal cues;Armrests   Stand to Sit 3  Moderate assistance   Additional items Assist x 1; Increased time required;Verbal cues;Armrests   Additional Comments Transfers w/ RW  VC required for safety, sequencing, and hand placement  Functional Mobility   Functional Mobility 3  Moderate assistance   Additional Comments Pt demonstrated short distance household mobility EOB>chair w/ RW at Mod A level  Additional items Rolling walker   Balance   Static Sitting Fair   Dynamic Sitting Fair -   Static Standing Poor +   Dynamic Standing Poor   Ambulatory Poor   Activity Tolerance   Activity Tolerance Patient limited by fatigue;Patient limited by pain;Treatment limited secondary to medical complications (Comment)   Medical Staff Made Aware PT Merlin Jiang; Pt seen as a co-eval due to the patient's co-morbidities, clinically unstable presentation, and present impairments which are a regression from the patient's baseline  Nurse Made Aware RN cleared Pt for OT session   RUE Assessment   RUE Assessment WFL  (generalized weakness)   LUE Assessment   LUE Assessment WFL  (generalized weakness)   Hand Function   Gross Motor Coordination Impaired  (B/L UE swelling)   Fine Motor Coordination Impaired  (B/L UE swelling)   Sensation   Light Touch No apparent deficits   Vision-Basic Assessment   Current Vision Wears glasses all the time   Cognition   Overall Cognitive Status Impaired   Arousal/Participation Alert   Attention Attends with cues to redirect   Orientation Level Oriented to person;Oriented to place;Oriented to situation;Disoriented to time  (month and year)   Memory Decreased recall of precautions;Decreased recall of recent events;Decreased short term memory   Following Commands Follows one step commands with increased time or repetition   Comments Pt presents ecephalopathic and is mildly confused  Pt w/ decreased insight into deficits and decreased safety awareness  Pt presents w/ increased irritability and impulsivity as session progressed  Assessment   Limitation Decreased ADL status; Decreased UE strength;Decreased Safe judgement during ADL;Decreased cognition;Decreased endurance;Decreased fine motor control;Decreased high-level ADLs   Prognosis Fair   Assessment Pt is a 77 yo male seen for OT eval s/p adm to [facility] on 8/7/21 w/  generalized weakness dx'd w/ acute blood loss anemia  Pt  has a past medical history of Alcohol abuse, BPH (benign prostatic hyperplasia), CVA (cerebral vascular accident) ([facility] [address] DJD (degenerative joint disease), Encounter to establish care with new doctor (8/21/2019), Gait abnormality, Head injury, History of cerebrovascular accident (CVA) with residual deficit (10/1/2019), History of CVA (cerebrovascular accident) (1/8/2020), History of substance abuse ([facility] [address] ) (8/21/2019), History of sustained ventricular tachycardia (8/21/2019), History of transient ischemic attack (TIA) (8/28/2019), Hypertension, Metatarsal fracture, Palliative care patient, TIA (transient ischemic attack), and Tobacco abuse  Pt with active OT orders and activity as tolerated orders  Per CM note, Pt resides w/ spouse and son in [facility]   Unclear level of support available upon D/C  Pt was I w/ ADLS and IADLS, drove, & required use of DME PTA, including SPC for mobility  Pt is currently demonstrating the following occupational deficits: Min A UB bathing/dressing, Max A LB bathing/dressing, Max A toileting, Min A bed mobility, Mod A STS and taking few steps w/ RW  These deficits that are impacting pt's baseline areas of occupation are a result of the following impairments: pain, endurance, activity tolerance, functional mobility, forward functional reach, functional mobility, functional standing tolerance, unsupportive home environment, decreased I w/ ADLS/IADLS, strength, cognitive impairments, decreased safety awareness, decreased insight into deficits and coordination deficits  The following Occupational Performance Areas to address include: grooming, bathing/shower, toilet hygiene, dressing, medication management, health maintenance, functional mobility and clothing management    Based on the aforementioned OT evaluation, functional performance deficits, and assessments, pt has been identified as a high complexity evaluation  Recommend post-acute rehab services upon D/C  The patient's raw score on the AM-PAC Daily Activity inpatient short form is 15, standardized score is 34 69, less than 39 4  Patients at this level are likely to benefit from discharge to post-acute rehabilitation services  Please refer to the recommendation of the Occupational Therapist for safe discharge planning  Pt to continue to benefit from acute immediate OT services to address the following goals 3-5x/week to  w/in 10-14 days:    Goals   Patient Goals To sleep   LTG Time Frame 10-   Long Term Goal #1 Refer to goals below   Plan   Treatment Interventions ADL retraining;Visual perceptual retraining;Functional transfer training;UE strengthening/ROM; Endurance training;Cognitive reorientation;Patient/family training;Equipment evaluation/education; Compensatory technique education; Activityengagement; Energy conservation   Goal Expiration Date 21   OT Frequency 3-5x/wk   Recommendation   OT Discharge Recommendation Post acute rehabilitation services   OT - OK to Discharge Yes  (to rehab when medically cleared)   AM-[facility] Daily Activity Inpatient   Lower Body Dressing 2   Bathing 2   Toileting 2   Upper Body Dressing 3   Grooming 3   Eating 3   Daily Activity Raw Score 15   Daily Activity Standardized Score (Calc for Raw Score >=11) 34 69   AM-PAC Applied Cognition Inpatient   Following a Speech/Presentation 2   Understanding Ordinary Conversation 4   Taking Medications 2   Remembering Where Things Are Placed or Put Away 2   Remembering List of 4-5 Errands 2   Taking Care of Complicated Tasks 2   Applied Cognition Raw Score 14   Applied Cognition Standardized Score 32 02   Modified [place] Scale   Modified [place] Scale 4         GOALS    1) Pt will improve activity tolerance to G for min 30 min txment sessions for increase engagement in functional tasks    2) Pt will complete UB/LB dressing/self care w/ mod I using adaptive device and DME as needed    3) Pt will complete bathing w/ Mod I w/ use of AE and DME as needed    4) Pt will complete toileting w/ mod I w/ G hygiene/thoroughness using DME as needed    5) Pt will improve functional transfers to Mod I on/off all surfaces using DME as needed w/ G balance/safety     6) Pt will improve functional mobility during ADL/IADL/leisure tasks to Mod I using DME as needed w/ G balance/safety     7) Pt will participate in simulated IADL management task to increase independence to Mod I w/ G safety and endurance    8) Pt will be attentive 100% of the time during ongoing cognitive assessment w/ G participation to assist w/ safe d/c planning/recommendations    9) Pt will demonstrate G carryover of pt/caregiver education and training as appropriate w/o cues w/ good tolerance to increase safety during functional tasks    10) Pt will demonstrate 100% carryover of energy conservation techniques t/o functional I/ADL/leisure tasks w/o cues s/p skilled education to increase endurance during functional tasks             India Singletary MS, OTR/L

## 2021-08-11 NOTE — PROGRESS NOTES
[address] [facility]  Progress Note - Danica Celis 1950, 78 y o  male MRN: [id number]  Unit/Bed#: [facility] 510-01 Encounter: [id number]  Primary Care Provider: Miguelina Balbuena DO   Date and time admitted to hospital: 8/7/2021  9:47 AM    * Acute blood loss anemia  Assessment & Plan  · Presented to [facility] with DIL for generalized weakness  Found to have hemoglobin of 6 9 on admission and with reports of melena concerning for GIB  Transferred to [facility] and admitted under colorectal surgery service  Of note, hx of perforated duodenal ulcer 1/2020 s/p ex lap, rebleeding 4/2021 s/p EGD with cauterization  · CT abdomen/pelvis: "Mild thickening of the left colon, particularly at the splenic flexure, with slight adjacent stranding  This may represent inflammatory or infectious colitis  While there are colonic diverticula, particularly in the sigmoid colon, there does not appear to be an inflamed diverticula accounting for the inflammation  Ischemic colitis, including watershed ischemia given the splenic flexure involvement, is not excluded"   · Initially received IV ceftriaxone and Flagyl as per primary   · received IV Venofer per primary  · Pain control measures-- would limit/eliminate narcotics if no specific cause of pain is being treated given patient with notable sedation at times  · Trending H/H--stable  · On Protonix   · EGD/colonoscopy performed on August 10th-- Single small, cratered, round, benign-appearing ulcer in the pylorus with adherent clot  Also with polyp removed on colonoscopy    Hypokalemia  Assessment & Plan  ·  Potassium again noted to be significantly low at 2 8  Replete with 100 meq PO total over the course of today and recheck tomorrow am  Also add Mag Ox    Essential hypertension  Assessment & Plan  · BP elevated at times likely due in part to medications being held and also pain  · Continue home dose metoprolol 25 mg bid   IV Labetalol prn  · Monitor routinely for now and adjust regimen as needed     Schizophrenia  Assessment & Plan  · Continue remeron 45 mg qhs, seroquel 200 mg qd and 600 mg qhs, trazodone 50 mg qhs, atarax 50 mg tid prn for anxiety    Fever  Assessment & Plan  ·  Isolated event of fever noted yesterday  Patient has been receiving antibiotics since admission from the primary team (can be discontinued at this time per discussion with them)  · No specific alternative source of infection it is suspected at this time  procalcitonin was negative on several occasions and chest x-ray did not reveal any convincing evidence of pneumonia  Likely fever related to immobility and atelectasis  Again encouraged out of bed and incentive spirometry use  Trend vitals    COPD  Assessment & Plan  · Without acute exacerbation   · Tobacco cessation  · Continue home inhalers      VTE Pharmacologic Prophylaxis:   Pharmacologic: Pharmacologic VTE Prophylaxis contraindicated due to r/o GIB  Mechanical VTE Prophylaxis in Place: Yes    Patient Centered Rounds: I have performed bedside rounds with nursing staff today  Discussions with Specialists or Other Care Team Provider:  Case was discussed with my attending, with case management, respiratory therapy and with the primary service    Education and Discussions with Family / Patient:     Time Spent for Care: 30 minutes  More than 50% of total time spent on counseling and coordination of care as described above  Current Length of Stay: 4 day(s)    Current Patient Status: Inpatient   Certification Statement: The patient will continue to require additional inpatient hospital stay due to Patient requires rehab as per his physical therapy evaluation this morning    Discharge Plan: To rehab when bed available    Code Status: Level 1 - Full Code    Subjective:   Patient denies any increased shortness of breath or cough  He has not been doing any incentive spirometry and had not been getting out of bed much according to nursing  Patient lost IV access due to infiltrate again and is very frustrated and says he will not accept another IV  Objective:     Vitals:   Temp (24hrs), Av °F (37 8 °C), Min:99 6 °F (37 6 °C), Max:100 6 °F (38 1 °C)    Temp:  [99 6 °F (37 6 °C)-100 6 °F (38 1 °C)] 99 6 °F (37 6 °C)  HR:  [84-89] 84  Resp:  [18] 18  BP: (124-174)/(54-80) 158/80  SpO2:  [88 %] 88 %  Body mass index is 21 81 kg/m²  Input and Output Summary (last 24 hours): Intake/Output Summary (Last 24 hours) at 2021 1418  Last data filed at 2021 1000  Gross per 24 hour   Intake 1220 ml   Output 1642 ml   Net -422 ml       Physical Exam:     Physical Exam  Vitals reviewed  Constitutional:       General: He is not in acute distress  Appearance: He is not ill-appearing, toxic-appearing or diaphoretic  Cardiovascular:      Rate and Rhythm: Normal rate and regular rhythm  Heart sounds: No murmur heard  Pulmonary:      Effort: No respiratory distress  Breath sounds: No stridor  No wheezing or rhonchi  Comments: Patient with coarse crackles  No dyspnea, tachypnea, distress or hypoxia  Abdominal:      General: There is no distension  Palpations: Abdomen is soft  Tenderness: There is no guarding  Musculoskeletal:         General: Swelling (Significant swelling from IV infiltrate in his hand) present  Right lower leg: No edema  Left lower leg: No edema  Skin:     General: Skin is warm and dry  Coloration: Skin is not jaundiced or pale  Findings: Bruising present  No erythema, lesion or rash     Neurological:      Comments: Awake and interactive but drowsy/ slow to answer questions at times         Additional Data:     Labs:    Results from last 7 days   Lab Units 08/10/21  0641   WBC Thousand/uL 6 36   HEMOGLOBIN g/dL 10 0*   HEMATOCRIT % 32 6*   PLATELETS Thousands/uL 275   NEUTROS PCT % 66   LYMPHS PCT % 18   MONOS PCT % 14*   EOS PCT % 1     Results from last 7 days   Lab Units 08/11/21  0518 08/06/21  1350   SODIUM mmol/L 137 137   POTASSIUM mmol/L 2 8* 4 5   CHLORIDE mmol/L 108 106   CO2 mmol/L 24 25   BUN mg/dL 3* 17   CREATININE mg/dL 0 88 1 01   ANION GAP mmol/L 5 6   CALCIUM mg/dL 8 4 8 6   ALBUMIN g/dL  --  3 1*   TOTAL BILIRUBIN mg/dL  --  0 20   ALK PHOS U/L  --  36*   ALT U/L  --  <3*   AST U/L  --  9*   GLUCOSE RANDOM mg/dL 128 97                 Results from last 7 days   Lab Units 08/10/21  0641 08/09/21  1016 08/08/21  0514   PROCALCITONIN ng/ml <0 05 <0 05 <0 05       * I Have Reviewed All Lab Data Listed Above  * Additional Pertinent Lab Tests Reviewed:  All Labs Within Last 24 Hours Reviewed    Imaging:    Imaging Reports Reviewed Today Include:   Imaging Personally Reviewed by Myself Includes:      Recent Cultures (last 7 days):           Last 24 Hours Medication List:   Current Facility-Administered Medications   Medication Dose Route Frequency Provider Last Rate    acetaminophen  975 mg Oral Q6H PRN Gael Jean MD      albuterol  2 puff Inhalation Q6H PRN Geraldene Party, MD      atorvastatin  40 mg Oral Daily Geraldene Party, MD      fluticasone  2 spray Nasal Daily Geraldene Party, MD      fluticasone-vilanterol  1 puff Inhalation Daily Geraldene Party, MD      gabapentin  600 mg Oral TID Geraldene Party, MD      hydrOXYzine HCL  50 mg Oral TID PRN Geraldene Party, MD      iron sucrose  300 mg Intravenous Daily Martin Brewer MD      Labetalol HCl  10 mg Intravenous Q6H PRN Geraldene Party, MD      magnesium oxide  400 mg Oral BID Anyi Smith PA-C      metoprolol tartrate  25 mg Oral Q12H [facility] Sheron Party, MD      mirtazapine  45 mg Oral HS Geraldene Party, MD      oxyCODONE  10 mg Oral Q4H PRN Geraldene Party, MD      oxyCODONE  5 mg Oral Q4H PRN Geraldene Party, MD      pantoprazole  40 mg Oral BID AC Gerthong Party, MD      potassium chloride  20 mEq Oral Once Anyi Smith PA-C      QUEtiapine  200 mg Oral Daily Edgard Hunt MD      QUEtiapine  600 mg Oral HS Edgard Hunt MD      sucralfate  1 g Oral 4x Daily Edgard Hunt MD      thiamine  100 mg Oral Daily Edgard Hunt MD      traZODone  50 mg Oral HS PRN Edgard Hunt MD          Today, Patient Was Seen By: Gabriella Brady PA-C    ** Please Note: Dictation voice to text software may have been used in the creation of this document   **

## 2021-08-11 NOTE — PROGRESS NOTES
The pantoprazole has / have been converted to Oral per [facility] IV-to-PO Auto-Conversion Protocol for Adults as approved by the Pharmacy and Therapeutics Committee  The patient met all eligible criteria:  3 Age = 25years old   2) Received at least one dose of the IV form   3) Receiving at least one other scheduled oral/enteral medication   4) Tolerating an oral/enteral diet   and did not have any exclusions:   1) Critical care patient   2) Active GI bleed (IF assessing H2RAs or PPIs)   3) Continuous tube feeding (IF assessing cipro, doxycycline, levofloxacin, minocycline, rifampin, or voriconazole)   4) Receiving PO vancomycin (IF assessing metronidazole)   5) Persistent nausea and/or vomiting   6) Ileus or gastrointestinal obstruction   7) Sean/nasogastric tube set for continuous suction   8) Specific order not to automatically convert to PO (in the order's comments or if discussed in the most recent Infectious Disease or primary team's progress notes)     Thanks  Chelsey Yuen, Pharmacist

## 2021-08-11 NOTE — PLAN OF CARE
Problem: PHYSICAL THERAPY ADULT  Goal: Performs mobility at highest level of function for planned discharge setting  See evaluation for individualized goals  Description: Treatment/Interventions: Functional transfer training, LE strengthening/ROM, Therapeutic exercise, Elevations, Endurance training, Patient/family training, Equipment eval/education, Bed mobility, Gait training  Equipment Recommended: Melissa Ag (for now, TBD)       See flowsheet documentation for full assessment, interventions and recommendations  Outcome: Progressing  Note: Prognosis: Fair  Problem List: Decreased strength, Decreased endurance, Impaired balance, Decreased mobility, Decreased range of motion, Decreased cognition, Impaired judgement, Decreased safety awareness  Assessment: Pt seen for PT treatment session with focus on bed mobility, functional transfers, functional mobility  Pt making slow progress toward goals this session 2/2 cognitive impairment  Pt continues to require physical assist for all bed mobility, transfers, and short distance ambulation due to remaining strength deficits  Balance impairments remain, evident by LOB while sitting EOB  Pt cognitive impairments result in decreased safety awareness and insight as well as increased risk for falls  Pt left upright in bedside chair with chair alarm intact and with all needs in reach  Pt will benefit from skilled therapy in order to address current impairments and functional limitations  PT to follow pt and recommending rehab once medically cleared  The patient's AM-PAC Basic Mobility Inpatient Short Form Raw Score is 13, Standardized Score is 33 99  A standardized score less than 42 9 suggests the patient may benefit from discharge to post-acute rehabilitation services  Please also refer to the recommendation of the Physical Therapist for safe discharge planning    Barriers to Discharge: Decreased caregiver support, Inaccessible home environment        PT Discharge Recommendation: Post acute rehabilitation services     PT - OK to Discharge: Yes (to rehab once medically cleared)    See flowsheet documentation for full assessment

## 2021-08-11 NOTE — ASSESSMENT & PLAN NOTE
·  Isolated event of fever noted yesterday  Patient has been receiving antibiotics since admission from the primary team (can be discontinued at this time per discussion with them)  · No specific alternative source of infection it is suspected at this time  procalcitonin was negative on several occasions and chest x-ray did not reveal any convincing evidence of pneumonia  Likely fever related to immobility and atelectasis  Again encouraged out of bed and incentive spirometry use    Trend vitals

## 2021-08-11 NOTE — PROGRESS NOTES
Spoke with Precious Marquez, with [facility], made her aware that patients IV is infiltrated  Patient is refusing to allow staff to attempt to replace  Was informed it is okay that patient does not have IV

## 2021-08-11 NOTE — CONSULTS
Consultation - [address]  78 y o  male MRN: [id number]  Unit/Bed#: [facility] 510-01 Encounter: [id number]      Chief Complaint:  I have pain    History of Present Illness   Physician Requesting Consult: Carlene Dawson MD  Reason for Consult / Principal Problem:  Schizophrenia unspecified type, patient is a targeted    Adina Galeazzi is a 78 y o  male we the medical history of alcohol abuse, COPD , history of CVA, major depressive disorder  presents with iron deficiency anemia with generalized weakness  He had been evaluated because he need to go to SNF and needs psychiatric clearance  History of depression, he states that he had been doing cocaine he had been taking his psychiatric medication  He does complain of feeling weak and pain his neck  He denies any suicidal homicidal thoughts plans or intent he does not have any active psychotic symptoms at this moment denies any history manic episodes  He states that he follow with the same psychiatrist Adama Ross   He denies any suicidal homicidal thoughts plans or intent, he does not have any psychotic symptoms  Psychiatric Review Of Systems:  sleep: yes  appetite changes: no  weight changes: no  energy/anergy: no  interest/pleasure/anhedonia: no  somatic symptoms: no  anxiety/panic: no  elana: no  guilty/hopeless: no  self injurious behavior/risky behavior: no    Historical Information   Past Psychiatric History:   He had 3 prior inpatient psych admission at [facility]  Currently in treatment with Wilhemena Prime    Past Suicide attempts:  Yes  Past Violent behavior:  No  Past Psychiatric medication trial:  Seroquel, trazodone, Zyprexa, Ativan    Substance Abuse History:  He does have a history of marijuana use, also have history of alcohol use and other substance in the past    I have assessed this patient for substance use within the past 12 months     History of IP/OP rehabilitation program:  He went to rehab 3 times  Smoking history:  He smoked a pack a day  Family Psychiatric History:   Father had dementia and brother has alcohol abuse    Social History  Education: high school diploma/GED  Learning Disabilities: None  Marital history:   Living arrangement, social support: He live with his son  Occupational History: retired  Functioning Relationships: good support system    Other Pertinent History: He had multiple DUIs in the past, no  history    Traumatic History:   Abuse: None  Other Traumatic Events: None    Past Medical History:   Diagnosis Date    Alcohol abuse     BPH (benign prostatic hyperplasia)     [facility]) [facility] [address] [facility]     DJD (degenerative joint disease)     Encounter to establish care with new doctor 8/21/2019    Gait abnormality     Head injury     History of cerebrovascular accident (CVA) with residual deficit 10/1/2019    History of CVA (cerebrovascular accident) 1/8/2020    [facility] 8/[facility]    Polysubstance     History of sustained ventricular tachycardia 8/21/2019    History of transient ischemic attack (TIA) 8/28/2019    Hypertension     Metatarsal fracture     Palliative care patient     TIA (transient ischemic attack)     Tobacco abuse        Medical Review Of Systems:  Review of Systems - Negative except neck pain, abdominal pain, weakness all other systems reviewed were negative    Meds/Allergies   current meds:   Current Facility-Administered Medications   Medication Dose Route Frequency    acetaminophen (TYLENOL) tablet 975 mg  975 mg Oral Q6H PRN    albuterol (PROVENTIL HFA,VENTOLIN HFA) inhaler 2 puff  2 puff Inhalation Q6H PRN    atorvastatin (LIPITOR) tablet 40 mg  40 mg Oral Daily    fluticasone (FLONASE) 50 mcg/act nasal spray 2 spray  2 spray Nasal Daily    fluticasone-vilanterol (BREO ELLIPTA) 100-25 mcg/inh inhaler 1 puff  1 puff Inhalation Daily    gabapentin (NEURONTIN) capsule 600 mg  600 mg Oral TID    hydrOXYzine HCL (ATARAX) tablet 50 mg  50 mg Oral TID PRN    iron sucrose (VENOFER) 300 mg in sodium chloride 0 9 % 250 mL IVPB  300 mg Intravenous Daily    Labetalol HCl (NORMODYNE) injection 10 mg  10 mg Intravenous Q6H PRN    magnesium oxide (MAG-OX) tablet 400 mg  400 mg Oral BID    metoprolol tartrate (LOPRESSOR) tablet 25 mg  25 mg Oral Q12H FLOR    mirtazapine (REMERON) tablet 45 mg  45 mg Oral HS    oxyCODONE (ROXICODONE) immediate release tablet 10 mg  10 mg Oral Q4H PRN    oxyCODONE (ROXICODONE) IR tablet 5 mg  5 mg Oral Q4H PRN    pantoprazole (PROTONIX) EC tablet 40 mg  40 mg Oral BID AC    potassium chloride (K-DUR,KLOR-CON) CR tablet 20 mEq  20 mEq Oral Once    QUEtiapine (SEROquel) tablet 200 mg  200 mg Oral Daily    QUEtiapine (SEROquel) tablet 600 mg  600 mg Oral HS    sucralfate (CARAFATE) tablet 1 g  1 g Oral 4x Daily    thiamine tablet 100 mg  100 mg Oral Daily    traZODone (DESYREL) tablet 50 mg  50 mg Oral HS PRN     No Known Allergies    Objective   Vital signs in last 24 hours:  Temp:  [98 7 °F (37 1 °C)-99 9 °F (37 7 °C)] 98 7 °F (37 1 °C)  HR:  [74-84] 74  Resp:  [17-18] 17  BP: (124-159)/(54-80) 136/69      Intake/Output Summary (Last 24 hours) at 8/11/2021 1516  Last data filed at 8/11/2021 1200  Gross per 24 hour   Intake 1518 ml   Output 1642 ml   Net -124 ml       Mental Status Evaluation:  Appearance:  disheveled and older than stated age   Behavior:  Cooperative   Speech:  soft   Mood:  sad   Affect:  mood-congruent   Language: naming objects and repeating phrases   Thought Process:  goal directed   Associations: intact associations   Thought Content:  normal   Perceptual Disturbances: None   Risk Potential: Suicidal Ideations none, Homicidal Ideations none and Potential for Aggression No   Sensorium:  person, place, time/date and situation   Memory:  recent and remote memory grossly intact   Cognition:  recent and remote memory grossly intact   Consciousness:  alert and awake    Attention: attention span appeared shorter than expected for age   Intellect: normal   Fund of Knowledge: awareness of current events: Fair, past history: Fair and vocabulary: Fair   Insight:  fair   Judgment: fair   Muscle Strength and Tone: Within normal limits   Gait/Station: Unable to assess patient is in a chair   Motor Activity: no abnormal movements     Lab Results:    I have personally reviewed all pertinent laboratory/tests results  Labs in last 72 hours:   Recent Labs     08/10/21  0641 08/11/21  0518   WBC 6 36  --    RBC 4 03  --    HGB 10 0*  --    HCT 32 6*  --      --    RDW 17 2*  --    NEUTROABS 4 21  --    SODIUM 138 137   K 3 2* 2 8*    108   CO2 25 24   BUN 3* 3*   CREATININE 0 80 0 88   GLUC 106 128   CALCIUM 8 6 8 4       Code Status: )Level 1 - Full Code    Assessment/Plan     Assessment:  Junie Amin is a 78 y o  male with medical history of alcohol abuse, COPD, history of CVA, major depressive disorder presents to the hospital with iron deficiency anemia and generalized  weakness has been evaluated for psychiatric clearance to go to SNF  Patient states that he feels better, he only complained of neck pain and  feeling weak  He denies any suicidal homicidal thoughts plans or intent, does not have any psychotic symptoms she does not have any history manic episode  Diagnosis:  Major depressive disorder moderate without psychotic features  Plan:   Continue medical management  Continue current psychotropic medication  The patient is psychiatrically cleared to go to SNF  Discussed with primary team  No other intervention at this moment  I will sign off  Risks, benefits and possible side effects of Medications:   Risks, benefits, and possible side effects of medications explained to patient and patient verbalizes understanding             Livia Caro MD

## 2021-08-11 NOTE — ASSESSMENT & PLAN NOTE
·  Potassium again noted to be significantly low at 2 8   Replete with 100 meq PO total over the course of today and recheck tomorrow am  Also add Mag Ox

## 2021-08-11 NOTE — PLAN OF CARE
Problem: OCCUPATIONAL THERAPY ADULT  Goal: Performs self-care activities at highest level of function for planned discharge setting  See evaluation for individualized goals  Description: Treatment Interventions: ADL retraining, Visual perceptual retraining, Functional transfer training, UE strengthening/ROM, Endurance training, Cognitive reorientation, Patient/family training, Equipment evaluation/education, Compensatory technique education, Activityengagement, Energy conservation          See flowsheet documentation for full assessment, interventions and recommendations  Note: Limitation: Decreased ADL status, Decreased UE strength, Decreased Safe judgement during ADL, Decreased cognition, Decreased endurance, Decreased fine motor control, Decreased high-level ADLs  Prognosis: Fair  Assessment: Pt is a 79 yo male seen for OT eval s/p adm to B on 8/7/21 w/  generalized weakness dx'd w/ acute blood loss anemia  Pt  has a past medical history of Alcohol abuse, BPH (benign prostatic hyperplasia), CVA [facility]cere[facility] accident) ([facility] [address] DJD (degenerative joint disease), Encounter to establish care with new doctor (8/21/2019), Gait abnormality, Head injury, History of cerebrovascular accident (CVA) with residual deficit (10/1/2019), History of CVA (cerebrovascular accident) (1/8/2020), History of substance abuse ([facility] [address] ) (8/21/2019), History of sustained ventricular tachycardia (8/21/2019), History of transient ischemic attack (TIA) (8/28/2019), Hypertension, Metatarsal fracture, Palliative care patient, TIA (transient ischemic attack), and Tobacco abuse  Pt with active OT orders and activity as tolerated orders  Per CM note, Pt resides w/ spouse and son in [facility]   Unclear level of support available upon D/C  Pt was I w/ ADLS and IADLS, drove, & required use of DME PTA, including SPC for mobility   Pt is currently demonstrating the following occupational deficits: Min A UB bathing/dressing, Max A LB bathing/dressing, Max A toileting, Min A bed mobility, Mod A STS and taking few steps w/ RW  These deficits that are impacting pt's baseline areas of occupation are a result of the following impairments: pain, endurance, activity tolerance, functional mobility, forward functional reach, functional mobility, functional standing tolerance, unsupportive home environment, decreased I w/ ADLS/IADLS, strength, cognitive impairments, decreased safety awareness, decreased insight into deficits and coordination deficits  The following Occupational Performance Areas to address include: grooming, bathing/shower, toilet hygiene, dressing, medication management, health maintenance, functional mobility and clothing management  Based on the aforementioned OT evaluation, functional performance deficits, and assessments, pt has been identified as a high complexity evaluation  Recommend post-acute rehab services upon D/C  The patient's raw score on the AM-PAC Daily Activity inpatient short form is 15, standardized score is 34 69, less than 39 4  Patients at this level are likely to benefit from discharge to post-acute rehabilitation services  Please refer to the recommendation of the Occupational Therapist for safe discharge planning   Pt to continue to benefit from acute immediate OT services to address the following goals 3-5x/week to  w/in 10-14 days:      OT Discharge Recommendation: Post acute rehabilitation services  OT - OK to Discharge: Yes (to rehab when medically cleared)

## 2021-08-12 LAB
ANION GAP SERPL CALCULATED.3IONS-SCNC: 4 MMOL/L (ref 4–13)
BASOPHILS # BLD AUTO: 0.02 THOUSANDS/ΜL (ref 0–0.1)
BASOPHILS NFR BLD AUTO: 0 % (ref 0–1)
BUN SERPL-MCNC: 5 MG/DL (ref 5–25)
CALCIUM SERPL-MCNC: 8.6 MG/DL (ref 8.3–10.1)
CHLORIDE SERPL-SCNC: 109 MMOL/L (ref 100–108)
CO2 SERPL-SCNC: 26 MMOL/L (ref 21–32)
CREAT SERPL-MCNC: 0.72 MG/DL (ref 0.6–1.3)
EOSINOPHIL # BLD AUTO: 0.24 THOUSAND/ΜL (ref 0–0.61)
EOSINOPHIL NFR BLD AUTO: 3 % (ref 0–6)
ERYTHROCYTE [DISTWIDTH] IN BLOOD BY AUTOMATED COUNT: 18.6 % (ref 11.6–15.1)
GFR SERPL CREATININE-BSD FRML MDRD: 96 ML/MIN/1.73SQ M
GLUCOSE SERPL-MCNC: 116 MG/DL (ref 65–140)
HCT VFR BLD AUTO: 28.4 % (ref 36.5–49.3)
HGB BLD-MCNC: 8.9 G/DL (ref 12–17)
IMM GRANULOCYTES # BLD AUTO: 0.04 THOUSAND/UL (ref 0–0.2)
IMM GRANULOCYTES NFR BLD AUTO: 1 % (ref 0–2)
LYMPHOCYTES # BLD AUTO: 1.59 THOUSANDS/ΜL (ref 0.6–4.47)
LYMPHOCYTES NFR BLD AUTO: 22 % (ref 14–44)
MCH RBC QN AUTO: 25.5 PG (ref 26.8–34.3)
MCHC RBC AUTO-ENTMCNC: 31.3 G/DL (ref 31.4–37.4)
MCV RBC AUTO: 81 FL (ref 82–98)
MONOCYTES # BLD AUTO: 1.22 THOUSAND/ΜL (ref 0.17–1.22)
MONOCYTES NFR BLD AUTO: 17 % (ref 4–12)
NEUTROPHILS # BLD AUTO: 4.28 THOUSANDS/ΜL (ref 1.85–7.62)
NEUTS SEG NFR BLD AUTO: 57 % (ref 43–75)
NRBC BLD AUTO-RTO: 0 /100 WBCS
PLATELET # BLD AUTO: 279 THOUSANDS/UL (ref 149–390)
PMV BLD AUTO: 10.2 FL (ref 8.9–12.7)
POTASSIUM SERPL-SCNC: 3.8 MMOL/L (ref 3.5–5.3)
RBC # BLD AUTO: 3.49 MILLION/UL (ref 3.88–5.62)
SODIUM SERPL-SCNC: 139 MMOL/L (ref 136–145)
WBC # BLD AUTO: 7.39 THOUSAND/UL (ref 4.31–10.16)

## 2021-08-12 PROCEDURE — 99232 SBSQ HOSP IP/OBS MODERATE 35: CPT | Performed by: STUDENT IN AN ORGANIZED HEALTH CARE EDUCATION/TRAINING PROGRAM

## 2021-08-12 PROCEDURE — 85025 COMPLETE CBC W/AUTO DIFF WBC: CPT | Performed by: PHYSICIAN ASSISTANT

## 2021-08-12 PROCEDURE — 80048 BASIC METABOLIC PNL TOTAL CA: CPT | Performed by: PHYSICIAN ASSISTANT

## 2021-08-12 RX ORDER — POTASSIUM CHLORIDE 20 MEQ/1
20 TABLET, EXTENDED RELEASE ORAL ONCE
Status: COMPLETED | OUTPATIENT
Start: 2021-08-12 | End: 2021-08-12

## 2021-08-12 RX ADMIN — GABAPENTIN 600 MG: 300 CAPSULE ORAL at 16:50

## 2021-08-12 RX ADMIN — GABAPENTIN 600 MG: 300 CAPSULE ORAL at 21:34

## 2021-08-12 RX ADMIN — PANTOPRAZOLE SODIUM 40 MG: 40 TABLET, DELAYED RELEASE ORAL at 16:50

## 2021-08-12 RX ADMIN — LIDOCAINE 1 PATCH: 50 PATCH TOPICAL at 21:42

## 2021-08-12 RX ADMIN — METOPROLOL TARTRATE 25 MG: 25 TABLET, FILM COATED ORAL at 21:34

## 2021-08-12 RX ADMIN — QUETIAPINE FUMARATE 600 MG: 300 TABLET ORAL at 21:42

## 2021-08-12 RX ADMIN — ATORVASTATIN CALCIUM 40 MG: 40 TABLET, FILM COATED ORAL at 08:31

## 2021-08-12 RX ADMIN — POTASSIUM CHLORIDE 20 MEQ: 1500 TABLET, EXTENDED RELEASE ORAL at 08:30

## 2021-08-12 RX ADMIN — FLUTICASONE PROPIONATE 2 SPRAY: 50 SPRAY, METERED NASAL at 21:42

## 2021-08-12 RX ADMIN — SUCRALFATE 1 G: 1 TABLET ORAL at 11:42

## 2021-08-12 RX ADMIN — MAGNESIUM OXIDE TAB 400 MG (241.3 MG ELEMENTAL MG) 400 MG: 400 (241.3 MG) TAB at 17:27

## 2021-08-12 RX ADMIN — QUETIAPINE FUMARATE 200 MG: 100 TABLET ORAL at 08:30

## 2021-08-12 RX ADMIN — METOPROLOL TARTRATE 25 MG: 25 TABLET, FILM COATED ORAL at 08:30

## 2021-08-12 RX ADMIN — PANTOPRAZOLE SODIUM 40 MG: 40 TABLET, DELAYED RELEASE ORAL at 06:27

## 2021-08-12 RX ADMIN — MIRTAZAPINE 45 MG: 30 TABLET, FILM COATED ORAL at 21:34

## 2021-08-12 RX ADMIN — SUCRALFATE 1 G: 1 TABLET ORAL at 17:27

## 2021-08-12 RX ADMIN — SUCRALFATE 1 G: 1 TABLET ORAL at 08:31

## 2021-08-12 RX ADMIN — TRAZODONE HYDROCHLORIDE 50 MG: 50 TABLET ORAL at 21:47

## 2021-08-12 RX ADMIN — FLUTICASONE FUROATE AND VILANTEROL TRIFENATATE 1 PUFF: 100; 25 POWDER RESPIRATORY (INHALATION) at 08:31

## 2021-08-12 RX ADMIN — GABAPENTIN 600 MG: 300 CAPSULE ORAL at 08:30

## 2021-08-12 RX ADMIN — THIAMINE HCL TAB 100 MG 100 MG: 100 TAB at 08:31

## 2021-08-12 RX ADMIN — SUCRALFATE 1 G: 1 TABLET ORAL at 21:34

## 2021-08-12 RX ADMIN — MAGNESIUM OXIDE TAB 400 MG (241.3 MG ELEMENTAL MG) 400 MG: 400 (241.3 MG) TAB at 08:31

## 2021-08-12 NOTE — ASSESSMENT & PLAN NOTE
· Continue remeron 45 mg qhs, seroquel 200 mg qd and 600 mg qhs, trazodone 50 mg qhs, atarax 50 mg tid prn for anxiety  · Psychiatry was consulted  · Limit deliriogenic medications

## 2021-08-12 NOTE — ASSESSMENT & PLAN NOTE
· Presented to [facility] with DIL for generalized weakness  Found to have hemoglobin of 6 9 on admission and with reports of melena concerning for GIB  Transferred to [facility] and admitted under colorectal surgery service  Of note, hx of perforated duodenal ulcer 1/2020 s/p ex lap, rebleeding 4/2021 s/p EGD with cauterization  · CT abdomen/pelvis: "Mild thickening of the left colon, particularly at the splenic flexure, with slight adjacent stranding  This may represent inflammatory or infectious colitis  While there are colonic diverticula, particularly in the sigmoid colon, there does not appear to be an inflamed diverticula accounting for the inflammation  Ischemic colitis, including watershed ischemia given the splenic flexure involvement, is not excluded"   · Initially received IV ceftriaxone and Flagyl as per primary   · S/p IV Venofer per primary  · Pain control measures-- would limit/eliminate narcotics if no specific cause of pain is being treated given patient with notable sedation at times  · Trending H/H-- down to 8 9 today from 10  · On Protonix   · EGD/colonoscopy performed on August 10th-- Single small, cratered, round, benign-appearing ulcer in the pylorus with adherent clot   Also with polyp removed on colonoscopy  · On colorectal service

## 2021-08-12 NOTE — ASSESSMENT & PLAN NOTE
· Potassium again noted to be significantly low at 2 8 on 8/11  Repleted    · Started on oral Mag Ox  · Improved today

## 2021-08-12 NOTE — ASSESSMENT & PLAN NOTE
·  Isolated event of fever noted on 8/10  Patient had been receiving antibiotics since admission from the primary team; now discontinued  · Procalcitonin negative on 8/10  · No specific alternative source of infection it is suspected at this time  procalcitonin was negative on several occasions and chest x-ray did not reveal any convincing evidence of pneumonia  Likely fever related to immobility and atelectasis  Again encouraged out of bed and incentive spirometry use    Trend vitals  · Now resolved

## 2021-08-12 NOTE — PROGRESS NOTES
[address] [facility]  Progress Note - Umesh Smart 1950, 78 y o  male MRN: [id number]  Unit/Bed#: [facility] 510-01 Encounter: [id number]  Primary Care Provider: Tory Andrade DO   Date and time admitted to hospital: 8/7/2021  9:47 AM    * Acute blood loss anemia  Assessment & Plan  · Presented to [facility] with DIL for generalized weakness  Found to have hemoglobin of 6 9 on admission and with reports of melena concerning for GIB  Transferred to [facility] and admitted under colorectal surgery service  Of note, hx of perforated duodenal ulcer 1/2020 s/p ex lap, rebleeding 4/2021 s/p EGD with cauterization  · CT abdomen/pelvis: "Mild thickening of the left colon, particularly at the splenic flexure, with slight adjacent stranding  This may represent inflammatory or infectious colitis  While there are colonic diverticula, particularly in the sigmoid colon, there does not appear to be an inflamed diverticula accounting for the inflammation  Ischemic colitis, including watershed ischemia given the splenic flexure involvement, is not excluded"   · Initially received IV ceftriaxone and Flagyl as per primary   · S/p IV Venofer per primary  · Pain control measures-- would limit/eliminate narcotics if no specific cause of pain is being treated given patient with notable sedation at times  · Trending H/H-- down to 8 9 today from 10  · On Protonix   · EGD/colonoscopy performed on August 10th-- Single small, cratered, round, benign-appearing ulcer in the pylorus with adherent clot   Also with polyp removed on colonoscopy  · On colorectal service    Melena  Assessment & Plan  · Plan as above    Alcohol abuse  Assessment & Plan  · With history of alcohol withdrawal however patient states he has not had a drink since his last admission  · Declined rehab upon discharge 4/2021  · Of note, during hospitalization in 2020 patient received 2 mg IV ativan per [facility] and then developed acute onset R sided facial droop and R sided weakness  Underwent stroke workup with negative MRI brain  Has known facial asymmetry, likely exacerbated by ativan at that time  · Stroke alert while in [facility] in 2020 for slurred speech and diplopia  Stroke workup negative  · Monitor for withdrawal symptoms  · On thiamine     History of duodenal ulcer  Assessment & Plan  · H/o perforated duodenal ulcer 1/2020 with GDA/thal patch, also admitted 4/2021 with ABLA, underwent EGD with cauterization of large bleeding duodenal ulcer  · Plan as above     Schizophrenia  Assessment & Plan  · Continue remeron 45 mg qhs, seroquel 200 mg qd and 600 mg qhs, trazodone 50 mg qhs, atarax 50 mg tid prn for anxiety  · Psychiatry was consulted  · Limit deliriogenic medications    Abnormal finding on CT scan  Assessment & Plan  · L basilar opacity and elevation of L hemidiaphragm noted on CTAP  · Low clinical suspicion for pneumonia given patient is afebrile, without leukocytosis, negative procalcitonin x 2  · PA and lateral CXR =LLL atelectasis  · S/p IV ceftriaxone and flagyl per primary team for possible intraabdominal infection  · Monitor respiratory status     COPD  Assessment & Plan  · Without acute exacerbation   · Tobacco cessation  · Continue home inhalers    Fever  Assessment & Plan  ·  Isolated event of fever noted on 8/10  Patient had been receiving antibiotics since admission from the primary team; now discontinued  · Procalcitonin negative on 8/10  · No specific alternative source of infection it is suspected at this time  procalcitonin was negative on several occasions and chest x-ray did not reveal any convincing evidence of pneumonia  Likely fever related to immobility and atelectasis  Again encouraged out of bed and incentive spirometry use    Trend vitals  · Now resolved    Essential hypertension  Assessment & Plan  · BP elevated at times likely due in part to medications being held and also pain  · Continue home dose metoprolol 25 mg bid  IV Labetalol prn  · Monitor routinely for now and adjust regimen as needed     Hypokalemia  Assessment & Plan  · Potassium again noted to be significantly low at 2 8 on   Repleted  · Started on oral Mag Ox  · Improved today    Tobacco abuse  Assessment & Plan  · Nicotine patch  · Smoking cessation      VTE Pharmacologic Prophylaxis: VTE Score: 3 SCDs; hold chemoppx given anemia    Patient Centered Rounds: I performed bedside rounds with nursing staff today  Discussions with Specialists or Other Care Team Provider:     Education and Discussions with Family / Patient: Patient declined call to   Time Spent for Care: 30 minutes  More than 50% of total time spent on counseling and coordination of care as described above  Current Length of Stay: 5 day(s)  Current Patient Status: Inpatient   Certification Statement: The patient will continue to require additional inpatient hospital stay due to placement  Discharge Plan: Anticipate discharge in 24-48 hrs to rehab facility  Pending placement  Code Status: Level 1 - Full Code    Subjective: The patient states that he has pain all over, including his back, neck and legs  No other acute complaints  Objective:     Vitals:   Temp (24hrs), Av 5 °F (36 9 °C), Min:98 2 °F (36 8 °C), Max:98 7 °F (37 1 °C)    Temp:  [98 2 °F (36 8 °C)-98 7 °F (37 1 °C)] 98 2 °F (36 8 °C)  HR:  [74-86] 86  Resp:  [17-50] 50  BP: (136-179)/(60-87) 141/60  SpO2:  [94 %-96 %] 96 %  Body mass index is 21 81 kg/m²  Input and Output Summary (last 24 hours): Intake/Output Summary (Last 24 hours) at 2021 1054  Last data filed at 2021 4734  Gross per 24 hour   Intake 600 ml   Output 1360 ml   Net -760 ml       Physical Exam:   Physical Exam  Vitals and nursing note reviewed  Constitutional:       General: He is not in acute distress  Appearance: Normal appearance     HENT:      Mouth/Throat:      Mouth: Mucous membranes are moist    Eyes: Extraocular Movements: Extraocular movements intact  Cardiovascular:      Rate and Rhythm: Normal rate and regular rhythm  Pulses: Normal pulses  Heart sounds: No murmur heard  Pulmonary:      Effort: Pulmonary effort is normal  No respiratory distress  Breath sounds: Normal breath sounds  Abdominal:      General: Bowel sounds are normal  There is no distension  Palpations: Abdomen is soft  Tenderness: There is no abdominal tenderness  Musculoskeletal:      Right lower leg: No edema  Left lower leg: No edema  Skin:     General: Skin is warm and dry  Neurological:      General: No focal deficit present  Mental Status: He is alert and oriented to person, place, and time  Mental status is at baseline  Additional Data:     Labs:  Results from last 7 days   Lab Units 08/12/21  0626   WBC Thousand/uL 7 39   HEMOGLOBIN g/dL 8 9*   HEMATOCRIT % 28 4*   PLATELETS Thousands/uL 279   NEUTROS PCT % 57   LYMPHS PCT % 22   MONOS PCT % 17*   EOS PCT % 3     Results from last 7 days   Lab Units 08/12/21  0626 08/06/21  1350   SODIUM mmol/L 139 137   POTASSIUM mmol/L 3 8 4 5   CHLORIDE mmol/L 109* 106   CO2 mmol/L 26 25   BUN mg/dL 5 17   CREATININE mg/dL 0 72 1 01   ANION GAP mmol/L 4 6   CALCIUM mg/dL 8 6 8 6   ALBUMIN g/dL  --  3 1*   TOTAL BILIRUBIN mg/dL  --  0 20   ALK PHOS U/L  --  36*   ALT U/L  --  <3*   AST U/L  --  9*   GLUCOSE RANDOM mg/dL 116 97                 Results from last 7 days   Lab Units 08/10/21  0641 08/09/21  1016 08/08/21  0514   PROCALCITONIN ng/ml <0 05 <0 05 <0 05       Lines/Drains:  Invasive Devices     None                       Imaging: No pertinent imaging reviewed      Recent Cultures (last 7 days):         Last 24 Hours Medication List:   Current Facility-Administered Medications   Medication Dose Route Frequency Provider Last Rate    acetaminophen  975 mg Oral Q6H PRN Eyal Rider MD      albuterol  2 puff Inhalation Q6H PRN Simon Oscar MD      atorvastatin  40 mg Oral Daily Simon Oscar MD      fluticasone  2 spray Nasal Daily Simon Oscar MD      fluticasone-vilanterol  1 puff Inhalation Daily Simon Oscar MD      gabapentin  600 mg Oral TID Simon Oscar MD      hydrOXYzine HCL  50 mg Oral TID PRN Simon Oscar MD      Labetalol HCl  10 mg Intravenous Q6H PRN Simon Oscar MD      lidocaine  1 patch Topical Daily Leanne Ocampo PA-C      magnesium oxide  400 mg Oral BID Anyi Smith PA-C      metoprolol tartrate  25 mg Oral Q12H [address] Simon Oscar MD      mirtazapine  45 mg Oral HS Simon Oscar MD      pantoprazole  40 mg Oral BID AC Simon Oscar MD      QUEtiapine  200 mg Oral Daily Simon Oscar MD      QUEtiapine  600 mg Oral HS Simon Oscar MD      sucralfate  1 g Oral 4x Daily Simon Oscar MD      thiamine  100 mg Oral Daily Simon Oscar MD      traZODone  50 mg Oral HS PRN Simon Oscar MD          Today, Patient Was Seen By: Bridger Simmons MD    **Please Note: This note may have been constructed using a voice recognition system  **

## 2021-08-12 NOTE — PROGRESS NOTES
Progress Note - Veronica Sensor 78 y o  male MRN: [id number]    Unit/Bed#: [facility] 510-01 Encounter: [id number]      Assessment:  78 y o  male with iron deficiency anemia and generalized weakness  Scope 8/10 showed pyloric ulcer with clot  Plan:  -Likely discharge once SNF placement - per PT/OT  -Continue regular diet   -Normal saline at 75  -Incentive spirometer    Subjective:   History limited due to pt apparent drowsiness  Pt continues to have pain in his abdomen and legs  He states he has had a bowel movement and denies noting any blood  He is eating a little bit and denies issues  Pt denies N/V/D, shortness of breath or chest pain  He was up and walking minimally with PT/OT yesterday  Objective:     Vitals: Blood pressure 170/82, pulse 86, temperature 98 6 °F (37 °C), temperature source Oral, resp  rate 18, height 5' 10" (1 778 m), weight 68 9 kg (152 lb), SpO2 96 %  ,Body mass index is 21 81 kg/m²        Intake/Output Summary (Last 24 hours) at 8/12/2021 0549  Last data filed at 8/11/2021 2301  Gross per 24 hour   Intake 898 ml   Output 1777 ml   Net -879 ml       Physical Exam:  General appearance: no acute distress  Neuro: history limited, pt mumbles answers and falls back asleep between questioning  Cardiac: regular rate and rhythm  Pulmonary: no respiratory distress or increased work of breathing  Abdominal: soft, non-distended  Musculoskeletal: no lower extremity edema  Skin: warm, dry     VTE Mechanical Prophylaxis: sequential compression device     Candido Saldivar PA-C  8/12/2021  0602

## 2021-08-12 NOTE — RESTORATIVE TECHNICIAN NOTE
Restorative Technician Note      Patient Name: Fernando Jauregui     Mobility / Activity Provided: Pt refusing OOB at this time, Will continue to follow

## 2021-08-12 NOTE — ASSESSMENT & PLAN NOTE
· L basilar opacity and elevation of L hemidiaphragm noted on CTAP  · Low clinical suspicion for pneumonia given patient is afebrile, without leukocytosis, negative procalcitonin x 2  · PA and lateral CXR =LLL atelectasis  · S/p IV ceftriaxone and flagyl per primary team for possible intraabdominal infection  · Monitor respiratory status

## 2021-08-13 LAB
ANION GAP SERPL CALCULATED.3IONS-SCNC: 2 MMOL/L (ref 4–13)
BUN SERPL-MCNC: 5 MG/DL (ref 5–25)
CALCIUM SERPL-MCNC: 8.7 MG/DL (ref 8.3–10.1)
CHLORIDE SERPL-SCNC: 108 MMOL/L (ref 100–108)
CO2 SERPL-SCNC: 26 MMOL/L (ref 21–32)
CREAT SERPL-MCNC: 0.74 MG/DL (ref 0.6–1.3)
ERYTHROCYTE [DISTWIDTH] IN BLOOD BY AUTOMATED COUNT: 18.8 % (ref 11.6–15.1)
GFR SERPL CREATININE-BSD FRML MDRD: 95 ML/MIN/1.73SQ M
GLUCOSE SERPL-MCNC: 118 MG/DL (ref 65–140)
HCT VFR BLD AUTO: 29.5 % (ref 36.5–49.3)
HGB BLD-MCNC: 8.9 G/DL (ref 12–17)
MAGNESIUM SERPL-MCNC: 2.1 MG/DL (ref 1.6–2.6)
MCH RBC QN AUTO: 24.9 PG (ref 26.8–34.3)
MCHC RBC AUTO-ENTMCNC: 30.2 G/DL (ref 31.4–37.4)
MCV RBC AUTO: 83 FL (ref 82–98)
PLATELET # BLD AUTO: 276 THOUSANDS/UL (ref 149–390)
PMV BLD AUTO: 10.4 FL (ref 8.9–12.7)
POTASSIUM SERPL-SCNC: 3.9 MMOL/L (ref 3.5–5.3)
RBC # BLD AUTO: 3.57 MILLION/UL (ref 3.88–5.62)
SODIUM SERPL-SCNC: 136 MMOL/L (ref 136–145)
WBC # BLD AUTO: 6.34 THOUSAND/UL (ref 4.31–10.16)

## 2021-08-13 PROCEDURE — 99233 SBSQ HOSP IP/OBS HIGH 50: CPT | Performed by: INTERNAL MEDICINE

## 2021-08-13 PROCEDURE — 94664 DEMO&/EVAL PT USE INHALER: CPT

## 2021-08-13 PROCEDURE — 97530 THERAPEUTIC ACTIVITIES: CPT

## 2021-08-13 PROCEDURE — 80048 BASIC METABOLIC PNL TOTAL CA: CPT

## 2021-08-13 PROCEDURE — 83735 ASSAY OF MAGNESIUM: CPT | Performed by: PHYSICIAN ASSISTANT

## 2021-08-13 PROCEDURE — 85027 COMPLETE CBC AUTOMATED: CPT

## 2021-08-13 RX ORDER — ACETAMINOPHEN 325 MG/1
975 TABLET ORAL EVERY 8 HOURS SCHEDULED
Status: DISCONTINUED | OUTPATIENT
Start: 2021-08-13 | End: 2021-08-16 | Stop reason: HOSPADM

## 2021-08-13 RX ORDER — METHOCARBAMOL 500 MG/1
500 TABLET, FILM COATED ORAL EVERY 6 HOURS PRN
Status: DISCONTINUED | OUTPATIENT
Start: 2021-08-13 | End: 2021-08-16 | Stop reason: HOSPADM

## 2021-08-13 RX ORDER — OXYCODONE HYDROCHLORIDE 5 MG/1
2.5 TABLET ORAL EVERY 4 HOURS PRN
Status: DISCONTINUED | OUTPATIENT
Start: 2021-08-13 | End: 2021-08-16 | Stop reason: HOSPADM

## 2021-08-13 RX ADMIN — ENOXAPARIN SODIUM 40 MG: 40 INJECTION SUBCUTANEOUS at 12:16

## 2021-08-13 RX ADMIN — MAGNESIUM OXIDE TAB 400 MG (241.3 MG ELEMENTAL MG) 400 MG: 400 (241.3 MG) TAB at 09:09

## 2021-08-13 RX ADMIN — MAGNESIUM OXIDE TAB 400 MG (241.3 MG ELEMENTAL MG) 400 MG: 400 (241.3 MG) TAB at 17:12

## 2021-08-13 RX ADMIN — QUETIAPINE FUMARATE 600 MG: 300 TABLET ORAL at 21:27

## 2021-08-13 RX ADMIN — ACETAMINOPHEN 975 MG: 325 TABLET, FILM COATED ORAL at 09:11

## 2021-08-13 RX ADMIN — MIRTAZAPINE 45 MG: 30 TABLET, FILM COATED ORAL at 21:25

## 2021-08-13 RX ADMIN — METOPROLOL TARTRATE 25 MG: 25 TABLET, FILM COATED ORAL at 09:09

## 2021-08-13 RX ADMIN — FLUTICASONE FUROATE AND VILANTEROL TRIFENATATE 1 PUFF: 100; 25 POWDER RESPIRATORY (INHALATION) at 09:14

## 2021-08-13 RX ADMIN — SUCRALFATE 1 G: 1 TABLET ORAL at 11:22

## 2021-08-13 RX ADMIN — OXYCODONE HYDROCHLORIDE 2.5 MG: 5 TABLET ORAL at 17:12

## 2021-08-13 RX ADMIN — THIAMINE HCL TAB 100 MG 100 MG: 100 TAB at 09:08

## 2021-08-13 RX ADMIN — SUCRALFATE 1 G: 1 TABLET ORAL at 17:12

## 2021-08-13 RX ADMIN — PANTOPRAZOLE SODIUM 40 MG: 40 TABLET, DELAYED RELEASE ORAL at 06:05

## 2021-08-13 RX ADMIN — METOPROLOL TARTRATE 25 MG: 25 TABLET, FILM COATED ORAL at 20:09

## 2021-08-13 RX ADMIN — ATORVASTATIN CALCIUM 40 MG: 40 TABLET, FILM COATED ORAL at 09:10

## 2021-08-13 RX ADMIN — FLUTICASONE PROPIONATE 2 SPRAY: 50 SPRAY, METERED NASAL at 20:11

## 2021-08-13 RX ADMIN — GABAPENTIN 600 MG: 300 CAPSULE ORAL at 09:08

## 2021-08-13 RX ADMIN — TRAZODONE HYDROCHLORIDE 50 MG: 50 TABLET ORAL at 21:26

## 2021-08-13 RX ADMIN — ACETAMINOPHEN 975 MG: 325 TABLET, FILM COATED ORAL at 21:25

## 2021-08-13 RX ADMIN — OXYCODONE HYDROCHLORIDE 2.5 MG: 5 TABLET ORAL at 21:25

## 2021-08-13 RX ADMIN — SUCRALFATE 1 G: 1 TABLET ORAL at 21:26

## 2021-08-13 RX ADMIN — QUETIAPINE FUMARATE 200 MG: 100 TABLET ORAL at 09:08

## 2021-08-13 RX ADMIN — PANTOPRAZOLE SODIUM 40 MG: 40 TABLET, DELAYED RELEASE ORAL at 17:12

## 2021-08-13 RX ADMIN — GABAPENTIN 600 MG: 300 CAPSULE ORAL at 17:12

## 2021-08-13 RX ADMIN — OXYCODONE HYDROCHLORIDE 2.5 MG: 5 TABLET ORAL at 11:22

## 2021-08-13 RX ADMIN — LIDOCAINE 1 PATCH: 50 PATCH TOPICAL at 09:11

## 2021-08-13 RX ADMIN — SUCRALFATE 1 G: 1 TABLET ORAL at 09:09

## 2021-08-13 RX ADMIN — GABAPENTIN 600 MG: 300 CAPSULE ORAL at 20:09

## 2021-08-13 NOTE — PROGRESS NOTES
Progress Note - Colorectal   Fernando Dryer 78 y o  male MRN: [id number]  Unit/Bed#: [facility] 510-01 Encounter: [id number]    Assessment:  51-year-old male with past medical history of iron-deficiency anemia and generalized weakness, status post EGD and colonoscopy on 08/10 showing pyloric ulcer with clot and rectal polyp    Per PT/OT patient is refusing to get out of bed    Some high systolics and tachypnea overnight  Last hemoglobin 8 9  P o  Intake 1220  Urine output 2425    Plan:  - per PT/OT patient will need SNF placement on discharge, pending placement  - continue with non ulcerogenic diet  - pain and nausea control  Subjective/Objective   Subjective:   Patient gives limited history due to somnolence  Does report continued pain in abdomen, denies nausea, denies vomiting, passing gas, having nonbloody bowel movements  Patient is tolerating diet  Objective:     Blood pressure 168/88, pulse 82, temperature 99 °F (37 2 °C), temperature source Oral, resp  rate (!) 53, height 5' 10" (1 778 m), weight 68 9 kg (152 lb), SpO2 91 %  ,Body mass index is 21 81 kg/m²  Intake/Output Summary (Last 24 hours) at 8/13/2021 0450  Last data filed at 8/12/2021 2101  Gross per 24 hour   Intake 1220 ml   Output 1875 ml   Net -655 ml       Invasive Devices     None                 Physical Exam:   General:  Somnolent  Skin: Warm, anicteric  HEENT: Normocephalic, atraumatic  CV: RRR, no m/r/g  Pulm: CTA b/l, no inc WOB  Abd: Soft, ND/NT  MSK: Symmetric, no cyanosis, no edema    Lab, Imaging and other studies:I have personally reviewed pertinent lab results      VTE Pharmacologic Prophylaxis: Heparin  VTE Mechanical Prophylaxis: sequential compression device

## 2021-08-13 NOTE — ASSESSMENT & PLAN NOTE
· Continue remeron 45 mg qhs, seroquel 200 mg qd and 600 mg qhs, trazodone 50 mg qhs, atarax 50 mg tid prn for anxiety  · Psychiatry input appreciated

## 2021-08-13 NOTE — PLAN OF CARE
Problem: PHYSICAL THERAPY ADULT  Goal: Performs mobility at highest level of function for planned discharge setting  See evaluation for individualized goals  Description: Treatment/Interventions: Functional transfer training, LE strengthening/ROM, Therapeutic exercise, Elevations, Endurance training, Patient/family training, Equipment eval/education, Bed mobility, Gait training  Equipment Recommended: Lorene Breath (for now, TBD)       See flowsheet documentation for full assessment, interventions and recommendations  Outcome: Progressing  Note: Prognosis: Fair  Problem List: Decreased strength, Impaired balance, Decreased range of motion, Decreased endurance, Decreased mobility, Decreased cognition, Impaired judgement, Decreased safety awareness, Pain  Assessment: Pt seen for PT treatment session with focus on functional transfers, functional mobility  Pt making slow progress toward goals this session 2/2 cognitive impairment  Pt continues to present with decreased LE strength evident by need for physical assist for STS  Pt requires hands on assist for stability while ambulating due to balance impairments  Pt left upright in bedside chair with chair alarm intact and with all needs in reach  Pt will benefit from skilled therapy in order to address current impairments and functional limitations  PT to follow pt and recommending rehab once medically cleared  The patient's AM-PAC Basic Mobility Inpatient Short Form Raw Score is 13, Standardized Score is 33 99  A standardized score less than 42 9 suggests the patient may benefit from discharge to post-acute rehabilitation services  Please also refer to the recommendation of the Physical Therapist for safe discharge planning    Barriers to Discharge: Inaccessible home environment, Decreased caregiver support        PT Discharge Recommendation: Post acute rehabilitation services     PT - OK to Discharge: Yes (to rehab once medically cleared)    See flowsheet documentation for full assessment

## 2021-08-13 NOTE — PROGRESS NOTES
[address] [facility]  Progress Note - Ant De Leon 1950, 78 y o  male MRN: [id number]  Unit/Bed#: [facility] 510-01 Encounter: [id number]  Primary Care Provider: Daisy Rogel DO   Date and time admitted to hospital: 8/7/2021  9:47 AM    * Acute blood loss anemia  Assessment & Plan  · Presented to [facility] with DIL for generalized weakness  Found to have hemoglobin of 6 9 on admission and with reports of melena concerning for GIB  Transferred to [facility] and admitted under colorectal surgery service  Of note, hx of perforated duodenal ulcer 1/2020 s/p ex lap, rebleeding 4/2021 s/p EGD with cauterization  · CT abdomen/pelvis: "Mild thickening of the left colon, particularly at the splenic flexure, with slight adjacent stranding  This may represent inflammatory or infectious colitis  While there are colonic diverticula, particularly in the sigmoid colon, there does not appear to be an inflamed diverticula accounting for the inflammation  Ischemic colitis, including watershed ischemia given the splenic flexure involvement, is not excluded"   · Initially received IV ceftriaxone and Flagyl as per primary   · S/p IV Venofer per primary  · EGD/colonoscopy performed on August 10th-- Single small, cratered, round, benign-appearing ulcer in the pylorus with adherent clot  Also with polyp removed on colonoscopy  · On Protonix  · On colorectal service    History of duodenal ulcer  Assessment & Plan  · H/o perforated duodenal ulcer 1/2020 with GDA/thal patch, also admitted 4/2021 with ABLA, underwent EGD with cauterization of large bleeding duodenal ulcer  · Continue Protonix    Melena  Assessment & Plan  · Plan as above    Fever  Assessment & Plan  · Isolated event of fever noted on 8/10  Patient had been receiving antibiotics since admission from the primary team; now discontinued  · Procalcitonin negative on 8/10  · No specific alternative source of infection it is suspected at this time  procalcitonin was negative on several occasions and chest x-ray did not reveal any convincing evidence of pneumonia  Likely fever related to immobility and atelectasis  Again encouraged out of bed and incentive spirometry use  Trend vitals  · Now resolved    Essential hypertension  Assessment & Plan  · BP elevated at times likely due in part to medications being held and also pain  · Continue home dose metoprolol 25 mg bid  IV Labetalol prn  · Monitor routinely for now and adjust regimen as needed     Schizophrenia  Assessment & Plan  · Continue remeron 45 mg qhs, seroquel 200 mg qd and 600 mg qhs, trazodone 50 mg qhs, atarax 50 mg tid prn for anxiety  · Psychiatry input appreciated    Tobacco abuse  Assessment & Plan  · Nicotine patch  · Smoking cessation    COPD  Assessment & Plan  · Without acute exacerbation   · Tobacco cessation  · Continue home inhalers    Alcohol abuse  Assessment & Plan  · With history of alcohol withdrawal however patient states he has not had a drink since his last admission  · Declined rehab upon discharge 4/2021  · Of note, during hospitalization in 2020 patient received 2 mg IV ativan per PARKERWA and then developed acute onset R sided facial droop and R sided weakness  Underwent stroke workup with negative MRI brain  Has known facial asymmetry, likely exacerbated by ativan at that time  · Stroke alert while in Behavioral health in 2020 for slurred speech and diplopia  Stroke workup negative  · Monitor for withdrawal symptoms  · On thiamine     Patient medically stable for discharge  SLIM will sign off  Please call with questions    VTE Pharmacologic Prophylaxis: VTE Score: 3 Lovenox    Patient Centered Rounds: I performed bedside rounds with nursing staff today  Discussions with Specialists or Other Care Team Provider:       Time Spent for Care: 45 minutes  More than 50% of total time spent on counseling and coordination of care as described above      Current Length of Stay: 6 day(s)  Current Patient Status: Inpatient   Certification Statement: The patient will continue to require additional inpatient hospital stay due to Above  Discharge Plan: Rehab    Code Status: Level 1 - Full Code    Subjective:   Patient seen examined  Comfortable in bed  Complaining of pain all over including his back, neck and legs  No chest pain or shortness of    Objective:     Vitals:   Temp (24hrs), Av 7 °F (37 1 °C), Min:98 1 °F (36 7 °C), Max:99 °F (37 2 °C)    Temp:  [98 1 °F (36 7 °C)-99 °F (37 2 °C)] 98 1 °F (36 7 °C)  HR:  [80-83] 80  Resp:  [18-53] 18  BP: (150-180)/(73-93) 150/73  SpO2:  [91 %-93 %] 91 %  Body mass index is 21 81 kg/m²  Input and Output Summary (last 24 hours):      Intake/Output Summary (Last 24 hours) at 2021 1134  Last data filed at 2021 1038  Gross per 24 hour   Intake 740 ml   Output 2825 ml   Net -2085 ml       Physical Exam:   Physical Exam   Patient is awake alert acute distress  Lungs clear to auscultation bilateral  Heart positive S1-S2 no murmur  Abdomen soft nontender  Lower extremities no edema    Additional Data:     Labs:  Results from last 7 days   Lab Units 21  0524 21  0626   WBC Thousand/uL 6 34 7 39   HEMOGLOBIN g/dL 8 9* 8 9*   HEMATOCRIT % 29 5* 28 4*   PLATELETS Thousands/uL 276 279   NEUTROS PCT %  --  57   LYMPHS PCT %  --  22   MONOS PCT %  --  17*   EOS PCT %  --  3     Results from last 7 days   Lab Units 21  0524 21  1350   SODIUM mmol/L 136 137   POTASSIUM mmol/L 3 9 4 5   CHLORIDE mmol/L 108 106   CO2 mmol/L 26 25   BUN mg/dL 5 17   CREATININE mg/dL 0 74 1 01   ANION GAP mmol/L 2* 6   CALCIUM mg/dL 8 7 8 6   ALBUMIN g/dL  --  3 1*   TOTAL BILIRUBIN mg/dL  --  0 20   ALK PHOS U/L  --  36*   ALT U/L  --  <3*   AST U/L  --  9*   GLUCOSE RANDOM mg/dL 118 97                 Results from last 7 days   Lab Units 08/10/21  0641 21  1016 21  0514   PROCALCITONIN ng/ml <0 05 <0 05 <0 05 Lines/Drains:  Invasive Devices     None                       Imaging:  Reviewed    Recent Cultures (last 7 days):         Last 24 Hours Medication List:   Current Facility-Administered Medications   Medication Dose Route Frequency Provider Last Rate    acetaminophen  975 mg Oral [facility] Cindy Sarmiento DO      albuterol  2 puff Inhalation Q6H PRN Edgard Hunt MD      atorvastatin  40 mg Oral Daily Edgard Hunt MD      fluticasone  2 spray Nasal Daily Edgard Hunt MD      fluticasone-vilanterol  1 puff Inhalation Daily Edgard Hunt MD      gabapentin  600 mg Oral TID Edgard Hunt MD      hydrOXYzine HCL  50 mg Oral TID PRN Edgard Hunt MD      Labetalol HCl  10 mg Intravenous Q6H PRN Edgard Hunt MD      lidocaine  1 patch Topical Daily Leanne Ocampo PA-C      magnesium oxide  400 mg Oral BID Anyi Smith PA-C      metoprolol tartrate  25 mg Oral Q12H [address] Edgard Hunt MD      mirtazapine  45 mg Oral HS Edgard Hunt MD      oxyCODONE  2 5 mg Oral Q4H PRN Cindy Sarmiento DO      pantoprazole  40 mg Oral BID AC Edgard Hunt MD      QUEtiapine  200 mg Oral Daily Edgard Hunt MD      QUEtiapine  600 mg Oral HS Edgard Hunt MD      sucralfate  1 g Oral 4x Daily Edgard Hunt MD      thiamine  100 mg Oral Daily Edgard Hunt MD      traZODone  50 mg Oral HS PRN Edgard Hunt MD          Today, Patient Was Seen By: Cindy Sarmiento DO    **Please Note: This note may have been constructed using a voice recognition system  **

## 2021-08-13 NOTE — PHYSICAL THERAPY NOTE
PHYSICAL THERAPY NOTE          Patient Name: Brett Page  MKHSG'P Date: 8/13/2021 08/13/21 1120   PT Last Visit   PT Visit Date 08/13/21   Note Type   Note Type Treatment   Pain Assessment   Pain Assessment Tool 0-10   Pain Score 8   Pain Location/Orientation Location: Generalized   Restrictions/Precautions   Weight Bearing Precautions Per Order No   Other Precautions Cognitive; Chair Alarm; Bed Alarm; Impulsive;Multiple lines; Fall Risk;Pain   General   Chart Reviewed Yes   Response to Previous Treatment Patient with no complaints from previous session  Family/Caregiver Present No   Cognition   Overall Cognitive Status Impaired   Arousal/Participation Responsive   Attention Attends with cues to redirect   Memory Decreased recall of precautions;Decreased recall of recent events;Decreased short term memory   Following Commands Follows one step commands with increased time or repetition   Comments Pt with decreased safety awareness and insight into deficits  Subjective   Subjective Pt initially refusing, agreeable to walk to wall in room and back only  Bed Mobility   Additional Comments OOB in chair upon PT arrival   Pt left upright in bedside chair with all needs in reach and chair alarm intact  Transfers   Sit to Stand 3  Moderate assistance   Additional items Assist x 1; Increased time required;Verbal cues   Stand to Sit 3  Moderate assistance   Additional items Assist x 1; Increased time required;Verbal cues   Additional Comments Transfers with RW   VC for hand placement and safety awareness  Ambulation/Elevation   Gait pattern Excessively slow; Step to;Short stride; Foward flexed;Decreased foot clearance; Improper Weight shift  (unsteady)   Gait Assistance 3  Moderate assist   Additional items Assist x 1;Verbal cues; Tactile cues   Assistive Device Rolling walker   Distance 5 ft x 1  (pt refusing more)   Balance   Static Sitting Fair   Dynamic Sitting Fair -   Static Standing Poor +   Dynamic Standing Poor Ambulatory Poor   Endurance Deficit   Endurance Deficit Yes   Endurance Deficit Description fatigue, weakness, cog   Activity Tolerance   Activity Tolerance Patient limited by fatigue;Patient limited by pain; Other (Comment)  (cog)   Nurse Made Aware RN cleared pt to be seen by PT   Medical Staff Made Aware restorative tech Jacquelyn Parent   Assessment   Prognosis Fair   Problem List Decreased strength; Impaired balance;Decreased range of motion;Decreased endurance;Decreased mobility; Decreased cognition; Impaired judgement;Decreased safety awareness;Pain   Assessment Pt seen for PT treatment session with focus on functional transfers, functional mobility  Pt making slow progress toward goals this session 2/2 cognitive impairment  Pt continues to present with decreased LE strength evident by need for physical assist for STS  Pt requires hands on assist for stability while ambulating due to balance impairments  Pt left upright in bedside chair with chair alarm intact and with all needs in reach  Pt will benefit from skilled therapy in order to address current impairments and functional limitations  PT to follow pt and recommending rehab once medically cleared  The patient's AM-PAC Basic Mobility Inpatient Short Form Raw Score is 13, Standardized Score is 33 99  A standardized score less than 42 9 suggests the patient may benefit from discharge to post-acute rehabilitation services  Please also refer to the recommendation of the Physical Therapist for safe discharge planning  Barriers to Discharge Inaccessible home environment;Decreased caregiver support   Goals   Patient Goals to rest because his body hurts   STG Expiration Date 08/22/21   Plan   Treatment/Interventions Functional transfer training;LE strengthening/ROM; Therapeutic exercise; Endurance training;Cognitive reorientation;Patient/family training;Bed mobility; Equipment eval/education;Gait training;Spoke to nursing   Progress Slow progress, cognitive deficits   PT Frequency Other (Comment)  (3-5x/wk)   Recommendation   PT Discharge Recommendation Post acute rehabilitation services   Equipment Recommended [address] Recommended Wheeled walker   Change/add to [place]?  No   PT - OK to Discharge Yes  (to rehab once medically cleared)   AM-PAC Basic Mobility Inpatient   Turning in Bed Without Bedrails 3   Lying on Back to Sitting on Edge of Flat Bed 3   Moving Bed to Chair 2   Standing Up From Chair 2   Walk in Room 2   Climb 3-5 Stairs 1   Basic Mobility Inpatient Raw Score 13   Basic Mobility Standardized Score 33 99     Lilia Hansen, PT, DPT

## 2021-08-13 NOTE — ASSESSMENT & PLAN NOTE
· H/o perforated duodenal ulcer 1/2020 with GDA/thal patch, also admitted 4/2021 with ABLA, underwent EGD with cauterization of large bleeding duodenal ulcer  · Continue Protonix

## 2021-08-13 NOTE — ASSESSMENT & PLAN NOTE
· Presented to [facility] with DIL for generalized weakness  Found to have hemoglobin of 6 9 on admission and with reports of melena concerning for GIB  Transferred to [facility] and admitted under colorectal surgery service  Of note, hx of perforated duodenal ulcer 1/2020 s/p ex lap, rebleeding 4/2021 s/p EGD with cauterization  · CT abdomen/pelvis: "Mild thickening of the left colon, particularly at the splenic flexure, with slight adjacent stranding  This may represent inflammatory or infectious colitis  While there are colonic diverticula, particularly in the sigmoid colon, there does not appear to be an inflamed diverticula accounting for the inflammation  Ischemic colitis, including watershed ischemia given the splenic flexure involvement, is not excluded"   · Initially received IV ceftriaxone and Flagyl as per primary   · S/p IV Venofer per primary  · EGD/colonoscopy performed on August 10th-- Single small, cratered, round, benign-appearing ulcer in the pylorus with adherent clot   Also with polyp removed on colonoscopy  · On Protonix  · On colorectal service

## 2021-08-14 RX ADMIN — METOPROLOL TARTRATE 25 MG: 25 TABLET, FILM COATED ORAL at 23:35

## 2021-08-14 RX ADMIN — MAGNESIUM OXIDE TAB 400 MG (241.3 MG ELEMENTAL MG) 400 MG: 400 (241.3 MG) TAB at 17:40

## 2021-08-14 RX ADMIN — METHOCARBAMOL 500 MG: 500 TABLET, FILM COATED ORAL at 13:32

## 2021-08-14 RX ADMIN — OXYCODONE HYDROCHLORIDE 2.5 MG: 5 TABLET ORAL at 17:39

## 2021-08-14 RX ADMIN — SUCRALFATE 1 G: 1 TABLET ORAL at 09:14

## 2021-08-14 RX ADMIN — THIAMINE HCL TAB 100 MG 100 MG: 100 TAB at 09:14

## 2021-08-14 RX ADMIN — TRAZODONE HYDROCHLORIDE 50 MG: 50 TABLET ORAL at 23:12

## 2021-08-14 RX ADMIN — PANTOPRAZOLE SODIUM 40 MG: 40 TABLET, DELAYED RELEASE ORAL at 17:40

## 2021-08-14 RX ADMIN — GABAPENTIN 600 MG: 300 CAPSULE ORAL at 17:40

## 2021-08-14 RX ADMIN — ACETAMINOPHEN 975 MG: 325 TABLET, FILM COATED ORAL at 05:57

## 2021-08-14 RX ADMIN — SUCRALFATE 1 G: 1 TABLET ORAL at 23:02

## 2021-08-14 RX ADMIN — ACETAMINOPHEN 975 MG: 325 TABLET, FILM COATED ORAL at 13:32

## 2021-08-14 RX ADMIN — PANTOPRAZOLE SODIUM 40 MG: 40 TABLET, DELAYED RELEASE ORAL at 07:00

## 2021-08-14 RX ADMIN — OXYCODONE HYDROCHLORIDE 2.5 MG: 5 TABLET ORAL at 13:31

## 2021-08-14 RX ADMIN — QUETIAPINE FUMARATE 200 MG: 100 TABLET ORAL at 09:14

## 2021-08-14 RX ADMIN — ALBUTEROL SULFATE 2 PUFF: 90 AEROSOL, METERED RESPIRATORY (INHALATION) at 23:04

## 2021-08-14 RX ADMIN — ATORVASTATIN CALCIUM 40 MG: 40 TABLET, FILM COATED ORAL at 09:14

## 2021-08-14 RX ADMIN — SUCRALFATE 1 G: 1 TABLET ORAL at 13:32

## 2021-08-14 RX ADMIN — FLUTICASONE FUROATE AND VILANTEROL TRIFENATATE 1 PUFF: 100; 25 POWDER RESPIRATORY (INHALATION) at 09:14

## 2021-08-14 RX ADMIN — FLUTICASONE PROPIONATE 2 SPRAY: 50 SPRAY, METERED NASAL at 23:03

## 2021-08-14 RX ADMIN — MAGNESIUM OXIDE TAB 400 MG (241.3 MG ELEMENTAL MG) 400 MG: 400 (241.3 MG) TAB at 09:14

## 2021-08-14 RX ADMIN — ENOXAPARIN SODIUM 40 MG: 40 INJECTION SUBCUTANEOUS at 09:14

## 2021-08-14 RX ADMIN — SUCRALFATE 1 G: 1 TABLET ORAL at 17:40

## 2021-08-14 RX ADMIN — OXYCODONE HYDROCHLORIDE 2.5 MG: 5 TABLET ORAL at 23:12

## 2021-08-14 RX ADMIN — METOPROLOL TARTRATE 25 MG: 25 TABLET, FILM COATED ORAL at 09:14

## 2021-08-14 RX ADMIN — QUETIAPINE FUMARATE 600 MG: 300 TABLET ORAL at 23:02

## 2021-08-14 RX ADMIN — GABAPENTIN 600 MG: 300 CAPSULE ORAL at 09:14

## 2021-08-14 RX ADMIN — MIRTAZAPINE 45 MG: 30 TABLET, FILM COATED ORAL at 23:02

## 2021-08-14 RX ADMIN — ACETAMINOPHEN 975 MG: 325 TABLET, FILM COATED ORAL at 23:03

## 2021-08-14 RX ADMIN — GABAPENTIN 600 MG: 300 CAPSULE ORAL at 23:03

## 2021-08-14 NOTE — PROGRESS NOTES
Progress Note - Colorectal Surgery   Sharifa Headley 78 y o  male MRN: [id number]  Unit/Bed#: [facility] 510-01 Encounter: [id number]    Assessment:  80-year-old male with past medical history of iron-deficiency anemia and generalized weakness, status post EGD and colonoscopy on 08/10 showing pyloric ulcer with clot and rectal polyp  Stable VS in room air  No new episodes of melena or bloody BMs  PLAN:  - dispo planning; will follow up with CM on ultimate patient/family decision on placement  - continue PO intake as tolerated  - PRN pain and nausea control  Subjective:   - no new complaints  Objective:     Vitals: Temp:  [98 1 °F (36 7 °C)-100 °F (37 8 °C)] 100 °F (37 8 °C)  HR:  [69-80] 71  Resp:  [18-20] 20  BP: (142-158)/(65-77) 149/65  Body mass index is 21 81 kg/m²  I/O       08/12 0701 - 08/13 0700 08/13 0701 - 08/14 0700    P  O  1220 1200    Total Intake(mL/kg) 1220 (17 7) 1200 (17 4)    Urine (mL/kg/hr) 2425 (1 5) 2950 (1 8)    Total Output 2425 2950    Net -1205 -1750          Unmeasured Urine Occurrence  1 x          Physical Exam:  GEN: NAD  HEENT: atraumatic  CV: RRR  Lung: Normal effort  Ab: Soft, NT/ND  Extrem: No CCE  Neuro: A+Ox3    Lab, Imaging and other studies: I have personally reviewed pertinent reports    , CBC with diff: No results found for: WBC, HGB, HCT, MCV, PLT, ADJUSTEDWBC, MCH, MCHC, RDW, MPV, NRBC, BMP/CMP: No results found for: SODIUM, K, CL, CO2, ANIONGAP, BUN, CREATININE, GLUCOSE, CALCIUM, AST, ALT, ALKPHOS, PROT, BILITOT, EGFR  VTE Pharmacologic Prophylaxis: Enoxaparin (Lovenox)  VTE Mechanical Prophylaxis: sequential compression device

## 2021-08-15 PROCEDURE — 94640 AIRWAY INHALATION TREATMENT: CPT

## 2021-08-15 RX ORDER — PROMETHAZINE HYDROCHLORIDE 25 MG/ML
12.5 INJECTION, SOLUTION INTRAMUSCULAR; INTRAVENOUS ONCE
Status: COMPLETED | OUTPATIENT
Start: 2021-08-15 | End: 2021-08-15

## 2021-08-15 RX ORDER — PROMETHAZINE HYDROCHLORIDE 25 MG/ML
12.5 INJECTION, SOLUTION INTRAMUSCULAR; INTRAVENOUS ONCE
Status: DISCONTINUED | OUTPATIENT
Start: 2021-08-15 | End: 2021-08-15

## 2021-08-15 RX ADMIN — SUCRALFATE 1 G: 1 TABLET ORAL at 11:58

## 2021-08-15 RX ADMIN — SUCRALFATE 1 G: 1 TABLET ORAL at 09:38

## 2021-08-15 RX ADMIN — METOPROLOL TARTRATE 25 MG: 25 TABLET, FILM COATED ORAL at 09:39

## 2021-08-15 RX ADMIN — FLUTICASONE FUROATE AND VILANTEROL TRIFENATATE 1 PUFF: 100; 25 POWDER RESPIRATORY (INHALATION) at 09:41

## 2021-08-15 RX ADMIN — ALBUTEROL SULFATE 2 PUFF: 90 AEROSOL, METERED RESPIRATORY (INHALATION) at 21:33

## 2021-08-15 RX ADMIN — OXYCODONE HYDROCHLORIDE 2.5 MG: 5 TABLET ORAL at 06:31

## 2021-08-15 RX ADMIN — QUETIAPINE FUMARATE 200 MG: 100 TABLET ORAL at 09:38

## 2021-08-15 RX ADMIN — ACETAMINOPHEN 975 MG: 325 TABLET, FILM COATED ORAL at 06:32

## 2021-08-15 RX ADMIN — ENOXAPARIN SODIUM 40 MG: 40 INJECTION SUBCUTANEOUS at 09:39

## 2021-08-15 RX ADMIN — TRAZODONE HYDROCHLORIDE 50 MG: 50 TABLET ORAL at 21:37

## 2021-08-15 RX ADMIN — PANTOPRAZOLE SODIUM 40 MG: 40 TABLET, DELAYED RELEASE ORAL at 17:05

## 2021-08-15 RX ADMIN — SUCRALFATE 1 G: 1 TABLET ORAL at 17:05

## 2021-08-15 RX ADMIN — THIAMINE HCL TAB 100 MG 100 MG: 100 TAB at 09:39

## 2021-08-15 RX ADMIN — MAGNESIUM OXIDE TAB 400 MG (241.3 MG ELEMENTAL MG) 400 MG: 400 (241.3 MG) TAB at 17:05

## 2021-08-15 RX ADMIN — PROMETHAZINE HYDROCHLORIDE 12.5 MG: 25 INJECTION INTRAMUSCULAR; INTRAVENOUS at 20:27

## 2021-08-15 RX ADMIN — PANTOPRAZOLE SODIUM 40 MG: 40 TABLET, DELAYED RELEASE ORAL at 06:32

## 2021-08-15 RX ADMIN — OXYCODONE HYDROCHLORIDE 2.5 MG: 5 TABLET ORAL at 21:40

## 2021-08-15 RX ADMIN — MAGNESIUM OXIDE TAB 400 MG (241.3 MG ELEMENTAL MG) 400 MG: 400 (241.3 MG) TAB at 09:38

## 2021-08-15 RX ADMIN — MIRTAZAPINE 45 MG: 30 TABLET, FILM COATED ORAL at 21:25

## 2021-08-15 RX ADMIN — GABAPENTIN 600 MG: 300 CAPSULE ORAL at 09:38

## 2021-08-15 RX ADMIN — METOPROLOL TARTRATE 25 MG: 25 TABLET, FILM COATED ORAL at 21:25

## 2021-08-15 RX ADMIN — GABAPENTIN 600 MG: 300 CAPSULE ORAL at 21:37

## 2021-08-15 RX ADMIN — FLUTICASONE PROPIONATE 2 SPRAY: 50 SPRAY, METERED NASAL at 21:30

## 2021-08-15 RX ADMIN — SUCRALFATE 1 G: 1 TABLET ORAL at 21:25

## 2021-08-15 RX ADMIN — QUETIAPINE FUMARATE 600 MG: 300 TABLET ORAL at 21:31

## 2021-08-15 RX ADMIN — ATORVASTATIN CALCIUM 40 MG: 40 TABLET, FILM COATED ORAL at 09:38

## 2021-08-15 RX ADMIN — GABAPENTIN 600 MG: 300 CAPSULE ORAL at 17:05

## 2021-08-15 RX ADMIN — ACETAMINOPHEN 975 MG: 325 TABLET, FILM COATED ORAL at 21:43

## 2021-08-15 RX ADMIN — OXYCODONE HYDROCHLORIDE 2.5 MG: 5 TABLET ORAL at 11:58

## 2021-08-15 RX ADMIN — METHOCARBAMOL 500 MG: 500 TABLET, FILM COATED ORAL at 09:39

## 2021-08-15 NOTE — NURSING NOTE
Attempted to reach out to [place]-rectal Resident  Marimar Woods in regards to patients admission status  Patient's family wishes for admitting team to contact family when patient is ready for discharge  Per Jose Gray note (Case Mange ment), on Friday 8/13 patient will be D/C home to family per discussion over the phone

## 2021-08-15 NOTE — QUICK NOTE
Surgery  Quick note    Called by case management and pt's nurse with concern that patient's family want an update  Called patient's family who want to take the patient home now, and they are refusing rehab  Pt is now also refusing rehab  Pt had an outpatient procedure one week ago and has been stable for discharge, and is currently stable for discharge  Now family states that they are "out of town" and never planned on picking up patient this weekend and will have time to pick the patient up on 8/16"  I spoke with the family and provided them updates  Current plan is discharge home with [facility] on 8/16  Discussed with pt's family, and case charlie Arizmendi MD  8/15/21  12:41 PM

## 2021-08-16 RX ADMIN — THIAMINE HCL TAB 100 MG 100 MG: 100 TAB at 08:10

## 2021-08-16 RX ADMIN — GABAPENTIN 600 MG: 300 CAPSULE ORAL at 08:10

## 2021-08-16 RX ADMIN — PANTOPRAZOLE SODIUM 40 MG: 40 TABLET, DELAYED RELEASE ORAL at 06:14

## 2021-08-16 RX ADMIN — FLUTICASONE FUROATE AND VILANTEROL TRIFENATATE 1 PUFF: 100; 25 POWDER RESPIRATORY (INHALATION) at 08:13

## 2021-08-16 RX ADMIN — SUCRALFATE 1 G: 1 TABLET ORAL at 08:10

## 2021-08-16 RX ADMIN — OXYCODONE HYDROCHLORIDE 2.5 MG: 5 TABLET ORAL at 08:20

## 2021-08-16 RX ADMIN — MAGNESIUM OXIDE TAB 400 MG (241.3 MG ELEMENTAL MG) 400 MG: 400 (241.3 MG) TAB at 08:10

## 2021-08-16 RX ADMIN — METOPROLOL TARTRATE 25 MG: 25 TABLET, FILM COATED ORAL at 08:11

## 2021-08-16 RX ADMIN — QUETIAPINE FUMARATE 200 MG: 100 TABLET ORAL at 08:11

## 2021-08-16 RX ADMIN — ATORVASTATIN CALCIUM 40 MG: 40 TABLET, FILM COATED ORAL at 08:11

## 2021-08-16 RX ADMIN — ENOXAPARIN SODIUM 40 MG: 40 INJECTION SUBCUTANEOUS at 08:12

## 2021-08-16 RX ADMIN — ACETAMINOPHEN 975 MG: 325 TABLET, FILM COATED ORAL at 06:14

## 2021-08-16 NOTE — DISCHARGE SUMMARY
Discharge Summary - Neeta Flores 78 y o  male MRN: [id number]    Unit/Bed#: [facility] 510-01 Encounter: [id number]    Admission Date:   Admission Orders (From admission, onward)     Ordered        08/07/21 1000  Inpatient Admission  Once                     Admitting Diagnosis: GI bleed [K92 2]    HPI:  60-year-old male with past medical history of hypertension, COPD, peptic ulcer, schizophrenia, alcohol abuse, who presented to [address] [place] [address] on 08/06 with generalized weakness and abdominal pain, he was transfused 2 units of PRBCs at [address] [place] [address] ED  Patient had a recent GI bleed secondary to a peptic ulcer  CT of the abdomen and pelvis on 08/06 showed mild thickening of the left colon, particularly at the splenic flexure, with slight adjacent stranding, no evidence of inflamed diverticula  He was transferred to [facility] on 08/07 for further workup of his GI bleed by Colorectal surgery  Patient was originally scheduled for colonoscopy and endoscopy on 08/09 but took his medications with water prior to the procedure, so Anesthesia would not provide sedation, and procedure was moved to 8/10  On EGD patient was found to have single small, crater, round, benign-appearing ulcer in the pylorus with adherent clot and no active bleeding, esophagus, duodenum, and stomach appeared normal, with a small sliding hiatal hernia type 1 present  On colonoscopy patient was found to have 1 sessile, smooth polyp measuring 10 mm or larger in the rectum, completely removed EN bloc by hot snare with specimen retrieved  There were multiple small, moderate scattered diverticula containing no content with no bleeding in the sigmoid colon, all other observed locations in the descending colon, cecum, ileocecal valve, appeared normal   Patient was told a repeat colonoscopy in 3 years was recommended given the findings    Patient tolerated the procedure well, returned to the floor hemodynamically stable, pain was well controlled, patient regained bowel function, patient tolerated diet  Following the procedures on 08/10 patient was cleared from a surgical standpoint, but SNF rehab was recommended by PT/OT given his poor progress from their standpoint and refusal to get out of bed  Due to patient's psychiatric diagnosis and IP BH state earlier this year a level 2 option assessment was necessary along with a psychiatric consult  Patient was given list of SNF facilities to choose from, but is refusing rehab and SNF placement, family is also refusing rehab, patient is competent to make his own decisions  Despite advice from the medical team his family is insistent on patient returning home with them to be cared for  The risks, benefits, and alternatives were discussed with the patient and the family  Patient was discharged home with family on 08/16  Procedures Performed:  EGD and colonoscopy both done on 08/10    Summary of Hospital Course:  See above HPI    Significant Findings, Care, Treatment and Services Provided:  See above HPI    Complications:  None    Discharge Diagnosis:  GI bleed workup s/p EGD and colonoscopy with no active bleeding found    Medical Problems     Resolved Problems  Date Reviewed: 8/13/2021        Resolved    History of CVA with residual deficit 8/7/2021     Resolved by  Cruz Diana PA-C                Condition at Discharge: good         Discharge instructions/Information to patient and family:   See after visit summary for information provided to patient and family  Provisions for Follow-Up Care:  See after visit summary for information related to follow-up care and any pertinent home health orders  PCP: Tory Andrade DO    Disposition: Home    Planned Readmission: No      Discharge Statement   I spent 30 minutes discharging the patient  This time was spent on the day of discharge  I had direct contact with the patient on the day of discharge   Additional documentation is required if more than 30 minutes were spent on discharge  Discharge Medications:  See after visit summary for reconciled discharge medications provided to patient and family

## 2021-08-16 NOTE — DISCHARGE INSTRUCTIONS
Gastrointestinal Bleeding   WHAT YOU NEED TO KNOW:   Gastrointestinal (GI) bleeding may occur in any part of your digestive tract  This includes your esophagus, stomach, intestines, rectum, or anus  Bleeding may be mild to severe  Your bleeding may begin suddenly, or start slowly and last for a longer period of time  Bleeding that lasts for a longer period of time is called chronic GI bleeding  DISCHARGE INSTRUCTIONS:   Seek care immediately if:   · Your symptoms return  Contact your healthcare provider if:   · You have nausea or are vomiting  · You have heartburn  · You have questions or concerns about your condition or care  Activity:  Rest as directed  Ask when you can return to your usual activities, such as work  Slowly do more each day  Nutrition:  Ask if you need to be on a special diet  A special diet can help treat GI conditions and prevent problems such as GI bleeding  Eat small meals more often while your digestive system heals  Avoid or limit caffeine and spicy foods  Also avoid foods that cause heartburn, nausea, or diarrhea  Prevent GI bleeding:   · Manage GI conditions as directed  Examples of GI conditions include gastroesophageal reflux, peptic ulcer disease, and ulcerative colitis  Take all medicines for these conditions as directed  · Limit or do not take NSAIDs  Ask your healthcare provider if it is safe for you to take NSAIDs  NSAIDs can increase your risk for ulcers and GI bleeding  · Do not drink alcohol  Alcohol can cause ulcers and esophageal varices  Esophageal varices are swollen blood vessels in your esophagus  Over time the blood vessels become weak and may bleed  · Do not smoke  Nicotine and other chemicals in cigarettes and cigars can increase your risk for ulcers  Ask your healthcare provider for information if you currently smoke and need help to quit  E-cigarettes or smokeless tobacco still contain nicotine   Talk to your healthcare provider before you use these products  Follow up with your healthcare provider as directed: You may need to return for a colonoscopy, endoscopy, or other tests  These tests can make sure you do not have more bleeding  Write down your questions so you remember to ask them during your visits  © Copyright [employer] 2021 Information is for End User's use only and may not be sold, redistributed or otherwise used for commercial purposes  All illustrations and images included in CareNotes® are the copyrighted property of [employer]  or [employer]   The above information is an  only  It is not intended as medical advice for individual conditions or treatments  Talk to your doctor, nurse or pharmacist before following any medical regimen to see if it is safe and effective for you

## 2021-08-16 NOTE — NURSING NOTE
AVS reviewed with patient and daughter-in-law at the bedside  Belongings with patient  No changes to medication regimen  Patient discharged via wheelchair with family to home

## 2021-08-16 NOTE — PROGRESS NOTES
Progress Note - Fernando Dryer 78 y o  male MRN: [id number]    Unit/Bed#: [facility] 510-01 Encounter: [id number]      Assessment:  30-year-old male with past medical history of iron-deficiency anemia and generalized weakness, status post EGD and colonoscopy on 08/10 showing pyloric ulcer with clot and rectal polyp  Vss  Afebrile  Abdomen is soft, nontender, non distended  Plan:  Non ulcerogenic diet  lovenox dvt ppx  Discharge home with family today    Subjective:   Feels well  No complaints  Denied fever, chills, chest pain, shortness of breath, nausea, vomiting, or abdominal pain this morning  Objective:     Vitals: Blood pressure (!) 119/35, pulse 76, temperature 98 5 °F (36 9 °C), resp  rate 18, height 5' 10" (1 778 m), weight 68 9 kg (152 lb), SpO2 93 %  ,Body mass index is 21 81 kg/m²  Intake/Output Summary (Last 24 hours) at 8/15/2021 2351  Last data filed at 8/15/2021 1738  Gross per 24 hour   Intake 960 ml   Output 2050 ml   Net -1090 ml       Physical Exam  General: NAD  HEENT: NC/AT  MMM  Cv: RRR     Lungs: normal effort  Ab: Soft, NT/ND  Ex: no CCE  Neuro: AAOx3    Scheduled Meds:  Current Facility-Administered Medications   Medication Dose Route Frequency Provider Last Rate    acetaminophen  975 mg Oral [facility] Anahy Chawla DO      albuterol  2 puff Inhalation Q6H PRN Mark Prakash MD      atorvastatin  40 mg Oral Daily Mark Prakash MD      enoxaparin  40 mg Subcutaneous Q24H [address] Anahy Chawla DO      fluticasone  2 spray Nasal Daily Mark Prakash MD      fluticasone-vilanterol  1 puff Inhalation Daily Mark Prakash MD      gabapentin  600 mg Oral TID Mark Prakash MD      hydrOXYzine HCL  50 mg Oral TID PRN Mark Prakash MD      Labetalol HCl  10 mg Intravenous Q6H PRN Mark Prakash MD      lidocaine  1 patch Topical Daily Leanne Ocampo PA-C      magnesium oxide  400 mg Oral BID Lacy Parks PA-C      methocarbamol 500 mg Oral Q6H PRN Esme Freed, DO      metoprolol tartrate  25 mg Oral Q12H [address] Javon Swan MD      mirtazapine  45 mg Oral HS Maegan Pena MD      oxyCODONE  2 5 mg Oral Q4H PRN Esme Freed, DO      pantoprazole  40 mg Oral BID AC Javon Swan MD      QUEtiapine  200 mg Oral Daily Maegan Pena MD      QUEtiapine  600 mg Oral HS Maegan Pena MD      sucralfate  1 g Oral 4x Daily Maegan Pena MD      thiamine  100 mg Oral Daily Maegan Pena MD      traZODone  50 mg Oral HS PRN Maegan Pena MD       Continuous Infusions:   PRN Meds: albuterol    hydrOXYzine HCL    Labetalol HCl    methocarbamol    oxyCODONE    traZODone      Invasive Devices     None                 Lab, Imaging and other studies: I have personally reviewed pertinent reports      VTE Pharmacologic Prophylaxis: Sequential compression device (Venodyne)   VTE Mechanical Prophylaxis: sequential compression device

## 2021-08-17 ENCOUNTER — TRANSITIONAL CARE MANAGEMENT (OUTPATIENT)
Dept: FAMILY MEDICINE CLINIC | Facility: CLINIC | Age: 71
End: 2021-08-17

## 2021-08-18 VITALS
HEART RATE: 71 BPM | OXYGEN SATURATION: 98 % | RESPIRATION RATE: 18 BRPM | BODY MASS INDEX: 21.76 KG/M2 | DIASTOLIC BLOOD PRESSURE: 74 MMHG | TEMPERATURE: 98.8 F | SYSTOLIC BLOOD PRESSURE: 158 MMHG | WEIGHT: 152 LBS | HEIGHT: 70 IN

## 2021-08-19 ENCOUNTER — TELEPHONE (OUTPATIENT)
Dept: FAMILY MEDICINE CLINIC | Facility: CLINIC | Age: 71
End: 2021-08-19

## 2021-08-19 NOTE — PLAN OF CARE
Problem: Potential for Falls  Goal: Patient will remain free of falls  Description  INTERVENTIONS:  - Assess patient frequently for physical needs  -  Identify cognitive and physical deficits and behaviors that affect risk of falls    -  [place] fall precautions as indicated by assessment   - Educate patient/family on patient safety including physical limitations  - Instruct patient to call for assistance with activity based on assessment  - Modify environment to reduce risk of injury  - Consider OT/PT consult to assist with strengthening/mobility  Outcome: Progressing     Problem: PAIN - ADULT  Goal: Verbalizes/displays adequate comfort level or baseline comfort level  Description  Interventions:  - Encourage patient to monitor pain and request assistance  - Assess pain using appropriate pain scale  - Administer analgesics based on type and severity of pain and evaluate response  - Implement non-pharmacological measures as appropriate and evaluate response  - Consider cultural and social influences on pain and pain management  - Notify physician/advanced practitioner if interventions unsuccessful or patient reports new pain  Outcome: Progressing     Problem: INFECTION - ADULT  Goal: Absence or prevention of progression during hospitalization  Description  INTERVENTIONS:  - Assess and monitor for signs and symptoms of infection  - Monitor lab/diagnostic results  - Monitor all insertion sites, i e  indwelling lines, tubes, and drains  - Monitor endotracheal if appropriate and nasal secretions for changes in amount and color  - [place] appropriate cooling/warming therapies per order  - Administer medications as ordered  - Instruct and encourage patient and family to use good hand hygiene technique  - Identify and instruct in appropriate isolation precautions for identified infection/condition  Outcome: Progressing  Goal: Absence of fever/infection during neutropenic period  Description  INTERVENTIONS:  - Monitor Visit Summary for [facility] - Gender: Female - Date of Birth: [id number]  Date: [id number] - Duration: 8 minutes  Patient: ANGELICA   [facility]  Provider: Siri Whittington    Patient Contact Information  Address  [address]  [place]; [address]  [phone]    Visit Topics  stomach issues: other [Added By: Self - 2021-08-19]    Triage Questions   What is your current physical address in the event of a medical emergency? Answer [facility] [place], [place], [address]]  Are you allergic to any medications? Answer [no]  Are you now or could you be pregnant? Answer [no]  Do you have   any immune system compromise or chronic lung disease? Answer [no]  Do you have any vulnerable family members in the home (infant, pregnant, cancer, elderly)? Answer [no]     Conversation Transcripts  [0A][0A] [Notification] Jose Singletary, Global Staff, will help you prepare for your visit  She is assisting Family Bassem [de-identified] Lefty Ordaz, and thank you for connecting  While you are waiting for the doctor, are there any questions I   can answer for you about our service? Please contact customer service if you have questions about billing, insurance, or technical issues  Visits work best with a stable WiFi connection, so please make sure you are connected before we begin [0A][Sebastien Ordaz, and thank you for connecting  Iâm going to check if there is a provider who can see you more quickly  If so, would you like to be transferred? [0A][Notification] Jose Singletary has left the room  [0A][Notification] You are connected with Family Melissa Physician [0A][Notification] Kel Kyle is located in [place]  [0A][Notification] Kel Kyle has shared health history  Meg Caldera  [0A][Notification] Siri Whittington has added a diagnosis/procedure code  [0A]    Diagnosis  Other viral enteritis    Procedures  Value: [id number] Code: CPT-4 PHONE E/M PHYS/QHP 11-20 MIN    Medications Prescribed    Junel 1 5/30 (23)      Frequency :           levothyroxine      Frequency :           Amphetamine Salt Combo      Frequency :             Provider Notes  [[de-identified]][0A]   23Year old female, [place] state, name and  confirmed  Video visit  [0A]Reason for Consultation- stomach[0A]symptoms[0A]Patient[0A]states yesterday night , after having ate  tofu that smelled a bit off for lunch,  her stomach started hurting,   cramping, then loose stools  Ate dinner last night  and then this morning  ate a plain bagel  Was feeling a bit better  then again with  lunch today she got cramps and loose bowels  Mild nausea  No vomiting  No bloody[0A]stools   +bloating  No dysuria  No fever  [0A]She is lactose intolerant, but knows she did not have diary recently  [0A][0A]Travel[0A]history â none recent[0A]vaccinated for covid[0A]PMH - hypothyroid, adhd[0A]PSH:  none[0A]Meds:  ocp , Amphetamine,   levothyroxine[0A]Allergies:[0A]NKDA[0A]LMP - last week[0A]Exam: Alert,[0A]normal mental status and interaction, no visible distress, non- toxic[0A]appearance  NO abdominal tenderness on palpation  [0A]Assessment: Gastroenteritis, likely food   related[0A][0A]Diagnosis[0A]code: A08 39 Other viral[0A]enteritis[0A]Plan: Small sips of[0A]water or pedialyte every 5 minutes  [0A]Small bits of[0A]bland food as tolerated  [0A]may try pepto bismal sparingly and probiotics  [0A]Avoid[0A]Immodium  Goal is   to get the[0A]virus/infectious agent out of your body and make sure you keep hydrated  [0A]Please go to[0A]the Emergency room if you have any:  fast[0A]heart rate, dizziness, no urination in 12 hours, bloody vomit or diarrhea,[0A]severe abdominal pain     [0A]Discussed  precautions  Patient voiced understanding and[0A]agrees to plan  [0A]Follow up:[0A]1) If there[0A]are any questions or problems with the prescription, call [phone] anytime[0A]for assistance     Cristiane Hummel) Please[0A]re-connect for another   online visit or see an in-person provider should your[0A]symptoms WBC    Outcome: Progressing     Problem: SAFETY ADULT  Goal: Maintain or return to baseline ADL function  Description  INTERVENTIONS:  -  Assess patient's ability to carry out ADLs; assess patient's baseline for ADL function and identify physical deficits which impact ability to perform ADLs (bathing, care of mouth/teeth, toileting, grooming, dressing, etc )  - Assess/evaluate cause of self-care deficits   - Assess range of motion  - Assess patient's mobility; develop plan if impaired  - Assess patient's need for assistive devices and provide as appropriate  - Encourage maximum independence but intervene and supervise when necessary  - Involve family in performance of ADLs  - Assess for home care needs following discharge   - Consider OT consult to assist with ADL evaluation and planning for discharge  - Provide patient education as appropriate  Outcome: Progressing  Goal: Maintain or return mobility status to optimal level  Description  INTERVENTIONS:  - Assess patient's baseline mobility status (ambulation, transfers, stairs, etc )    - Identify cognitive and physical deficits and behaviors that affect mobility  - Identify mobility aids required to assist with transfers and/or ambulation (gait belt, sit-to-stand, lift, walker, cane, etc )  - [place] fall precautions as indicated by assessment  - Record patient progress and toleration of activity level on Mobility SBAR; progress patient to next Phase/Stage  - Instruct patient to call for assistance with activity based on assessment  - Consider rehabilitation consult to assist with strengthening/weightbearing, etc   Outcome: Progressing     Problem: DISCHARGE PLANNING  Goal: Discharge to home or other facility with appropriate resources  Description  INTERVENTIONS:  - Identify barriers to discharge w/patient and caregiver  - Arrange for needed discharge resources and transportation as appropriate  - Identify discharge learning needs (meds, wound care, etc )  - Arrange for interpretive services to assist at discharge as needed  - Refer to Case Management Department for coordinating discharge planning if the patient needs post-hospital services based on physician/advanced practitioner order or complex needs related to functional status, cognitive ability, or social support system  Outcome: Progressing     Problem: Knowledge Deficit  Goal: Patient/family/caregiver demonstrates understanding of disease process, treatment plan, medications, and discharge instructions  Description  Complete learning assessment and assess knowledge base  Interventions:  - Provide teaching at level of understanding  - Provide teaching via preferred learning methods  Outcome: Progressing     Problem: Nutrition/Hydration-ADULT  Goal: Nutrient/Hydration intake appropriate for improving, restoring or maintaining nutritional needs  Description  Monitor and assess patient's nutrition/hydration status for malnutrition  Collaborate with interdisciplinary team and initiate plan and interventions as ordered  Monitor patient's weight and dietary intake as ordered or per policy  Utilize nutrition screening tool and intervene as necessary  Determine patient's food preferences and provide high-protein, high-caloric foods as appropriate       INTERVENTIONS:  - Monitor oral intake, urinary output, labs, and treatment plans  - Assess nutrition and hydration status and recommend course of action  - Evaluate amount of meals eaten  - Assist patient with eating if necessary   - Allow adequate time for meals  - Recommend/ encourage appropriate diets, oral nutritional supplements, and vitamin/mineral supplements  - Order, calculate, and assess calorie counts as needed  - Recommend, monitor, and adjust tube feedings and TPN/PPN based on assessed needs  - Assess need for intravenous fluids  - Provide specific nutrition/hydration education as appropriate  - Include patient/family/caregiver in decisions related to worsen or persist   [0A]3) Taking a[0A]probiotic (either in pill form or by eating yogurt that contains probiotics)[0A]may help  [0A]4) Please[0A]print a copy of this note and send it to   your regular doctor, or take it to[0A]your next visit so it may be included in your medical record  [0A]    Electronically signed by: Sandy Ramachandran [phone]) nutrition  Outcome: Progressing     Problem: Prexisting or High Potential for Compromised Skin Integrity  Goal: Skin integrity is maintained or improved  Description  INTERVENTIONS:  - Identify patients at risk for skin breakdown  - Assess and monitor skin integrity  - Assess and monitor nutrition and hydration status  - Monitor labs   - Assess for incontinence   - Turn and reposition patient  - Assist with mobility/ambulation  - Relieve pressure over bony prominences  - Avoid friction and shearing  - Provide appropriate hygiene as needed including keeping skin clean and dry  - Evaluate need for skin moisturizer/barrier cream  - Collaborate with interdisciplinary team   - Patient/family teaching  - Consider wound care consult   Outcome: Progressing

## 2021-08-19 NOTE — TELEPHONE ENCOUNTER
Called and spoke with patients son  Reaching out in regards to scheduling TCM  Patients son is asking for the health care proxy form that he discussed with patients PCP  States that he needed it for the hospital stay but the hospital could not find it  Reviewed patients chart  Unable to find a health care proxy form  Spoke to provider whom also reviewed chart and states that a health care proxy form was not completed  Informed paitents son  Patients son states that he was not happy, that a whole appointment was taken to discuss this  Patients son states he will be following up with our office in regards to this  Patients son stated "Please don't take it that I am mad with you  You and the staff have always been great with me and my dad  She either lied to my face or just didn't do that form"  Patients son looked for a provider whom can come to the house to evaluate patient  Patients son states that he will try to schedule an appointment with Mary Saldana

## 2021-09-04 LAB — BLOOD GROUP ANTIBODIES SERPL: NORMAL

## 2021-09-14 DIAGNOSIS — F10.20 UNCOMPLICATED ALCOHOL DEPENDENCE (HCC): Chronic | ICD-10-CM

## 2021-09-14 DIAGNOSIS — Z72.89 ALCOHOL USE: ICD-10-CM

## 2021-09-14 RX ORDER — FOLIC ACID 1 MG/1
TABLET ORAL
Qty: 90 TABLET | Refills: 1 | Status: SHIPPED | OUTPATIENT
Start: 2021-09-14 | End: 2022-03-24

## 2021-09-16 ENCOUNTER — TELEPHONE (OUTPATIENT)
Dept: FAMILY MEDICINE CLINIC | Facility: CLINIC | Age: 71
End: 2021-09-16

## 2021-09-16 NOTE — TELEPHONE ENCOUNTER
09/16/21 12:47 PM     Thank you for your request  Your request has been received, reviewed, and the patient chart updated  The PCP has successfully been removed with a patient attribution note  This message will now be completed      Thank you  Sandra Handley

## 2021-09-16 NOTE — TELEPHONE ENCOUNTER
Call to patients son, Himanshu Holley, to verify if we are still patients PCP  Last month we were informed that patient would be transferring medical care  Due to receiving a refill request, Himanshu Holley was reached out to  Himanshu oHlley and other emergency contact, Christen Rodriguez both stated that patient now sees Delena Galeazzi

## 2022-01-06 PROCEDURE — U0003 INFECTIOUS AGENT DETECTION BY NUCLEIC ACID (DNA OR RNA); SEVERE ACUTE RESPIRATORY SYNDROME CORONAVIRUS 2 (SARS-COV-2) (CORONAVIRUS DISEASE [COVID-19]), AMPLIFIED PROBE TECHNIQUE, MAKING USE OF HIGH THROUGHPUT TECHNOLOGIES AS DESCRIBED BY CMS-2020-01-R: HCPCS | Performed by: INTERNAL MEDICINE

## 2022-01-07 ENCOUNTER — LAB REQUISITION (OUTPATIENT)
Dept: LAB | Facility: HOSPITAL | Age: 72
End: 2022-01-07
Payer: COMMERCIAL

## 2022-01-07 DIAGNOSIS — Z11.59 ENCOUNTER FOR SCREENING FOR OTHER VIRAL DISEASES: ICD-10-CM

## 2022-01-09 LAB — SARS-COV-2 RNA RESP QL NAA+PROBE: NEGATIVE

## 2022-03-24 DIAGNOSIS — F10.20 UNCOMPLICATED ALCOHOL DEPENDENCE (HCC): Chronic | ICD-10-CM

## 2022-03-24 DIAGNOSIS — Z72.89 ALCOHOL USE: ICD-10-CM

## 2022-03-24 RX ORDER — FOLIC ACID 1 MG/1
TABLET ORAL
Qty: 90 TABLET | Refills: 1 | Status: SHIPPED | OUTPATIENT
Start: 2022-03-24

## 2022-04-17 NOTE — ASSESSMENT & PLAN NOTE
Complaining of chronic back pain, severe; reviewed PDMP, has not been on opioids or other controlled substances outpatient even though he mentions chronic morphine use at home  Will discontinue oxycodone; transition to tramadol p r n  Moderate or Severe pain and Tylenol along with lidocaine patch  show

## 2022-06-01 ENCOUNTER — APPOINTMENT (EMERGENCY)
Dept: RADIOLOGY | Facility: HOSPITAL | Age: 72
DRG: 300 | End: 2022-06-01
Payer: COMMERCIAL

## 2022-06-01 ENCOUNTER — HOSPITAL ENCOUNTER (INPATIENT)
Facility: HOSPITAL | Age: 72
LOS: 4 days | Discharge: HOME HEALTH | DRG: 300 | End: 2022-06-05
Attending: EMERGENCY MEDICINE | Admitting: INTERNAL MEDICINE
Payer: COMMERCIAL

## 2022-06-01 ENCOUNTER — APPOINTMENT (EMERGENCY)
Dept: NON INVASIVE DIAGNOSTICS | Facility: HOSPITAL | Age: 72
DRG: 300 | End: 2022-06-01
Payer: COMMERCIAL

## 2022-06-01 DIAGNOSIS — I82.409 DVT (DEEP VENOUS THROMBOSIS) (HCC): Primary | ICD-10-CM

## 2022-06-01 DIAGNOSIS — S31.000A SACRAL WOUND: ICD-10-CM

## 2022-06-01 DIAGNOSIS — K27.9 PEPTIC ULCER DISEASE: ICD-10-CM

## 2022-06-01 DIAGNOSIS — K59.00 CONSTIPATION: ICD-10-CM

## 2022-06-01 LAB
2HR DELTA HS TROPONIN: -1 NG/L
ALBUMIN SERPL BCP-MCNC: 2.7 G/DL (ref 3.5–5)
ALP SERPL-CCNC: 41 U/L (ref 46–116)
ALT SERPL W P-5'-P-CCNC: 9 U/L (ref 12–78)
ANION GAP SERPL CALCULATED.3IONS-SCNC: 1 MMOL/L (ref 4–13)
APTT PPP: 29 SECONDS (ref 23–37)
AST SERPL W P-5'-P-CCNC: 24 U/L (ref 5–45)
BASOPHILS # BLD AUTO: 0.02 THOUSANDS/ΜL (ref 0–0.1)
BASOPHILS NFR BLD AUTO: 0 % (ref 0–1)
BILIRUB SERPL-MCNC: 0.36 MG/DL (ref 0.2–1)
BUN SERPL-MCNC: 14 MG/DL (ref 5–25)
CALCIUM ALBUM COR SERPL-MCNC: 9.9 MG/DL (ref 8.3–10.1)
CALCIUM SERPL-MCNC: 8.9 MG/DL (ref 8.3–10.1)
CARDIAC TROPONIN I PNL SERPL HS: 2 NG/L
CARDIAC TROPONIN I PNL SERPL HS: 3 NG/L
CHLORIDE SERPL-SCNC: 105 MMOL/L (ref 100–108)
CO2 SERPL-SCNC: 30 MMOL/L (ref 21–32)
CREAT SERPL-MCNC: 0.94 MG/DL (ref 0.6–1.3)
EOSINOPHIL # BLD AUTO: 0.07 THOUSAND/ΜL (ref 0–0.61)
EOSINOPHIL NFR BLD AUTO: 1 % (ref 0–6)
ERYTHROCYTE [DISTWIDTH] IN BLOOD BY AUTOMATED COUNT: 18.6 % (ref 11.6–15.1)
GFR SERPL CREATININE-BSD FRML MDRD: 82 ML/MIN/1.73SQ M
GLUCOSE SERPL-MCNC: 94 MG/DL (ref 65–140)
HCT VFR BLD AUTO: 37.4 % (ref 36.5–49.3)
HGB BLD-MCNC: 12.2 G/DL (ref 12–17)
IMM GRANULOCYTES # BLD AUTO: 0.02 THOUSAND/UL (ref 0–0.2)
IMM GRANULOCYTES NFR BLD AUTO: 0 % (ref 0–2)
LIPASE SERPL-CCNC: 131 U/L (ref 73–393)
LYMPHOCYTES # BLD AUTO: 1.72 THOUSANDS/ΜL (ref 0.6–4.47)
LYMPHOCYTES NFR BLD AUTO: 33 % (ref 14–44)
MCH RBC QN AUTO: 31.2 PG (ref 26.8–34.3)
MCHC RBC AUTO-ENTMCNC: 32.6 G/DL (ref 31.4–37.4)
MCV RBC AUTO: 96 FL (ref 82–98)
MONOCYTES # BLD AUTO: 0.65 THOUSAND/ΜL (ref 0.17–1.22)
MONOCYTES NFR BLD AUTO: 12 % (ref 4–12)
NEUTROPHILS # BLD AUTO: 2.79 THOUSANDS/ΜL (ref 1.85–7.62)
NEUTS SEG NFR BLD AUTO: 54 % (ref 43–75)
NRBC BLD AUTO-RTO: 0 /100 WBCS
PLATELET # BLD AUTO: 199 THOUSANDS/UL (ref 149–390)
PMV BLD AUTO: 10.6 FL (ref 8.9–12.7)
POTASSIUM SERPL-SCNC: 4.7 MMOL/L (ref 3.5–5.3)
PROT SERPL-MCNC: 6.3 G/DL (ref 6.4–8.2)
RBC # BLD AUTO: 3.91 MILLION/UL (ref 3.88–5.62)
SODIUM SERPL-SCNC: 136 MMOL/L (ref 136–145)
WBC # BLD AUTO: 5.27 THOUSAND/UL (ref 4.31–10.16)

## 2022-06-01 PROCEDURE — 85025 COMPLETE CBC W/AUTO DIFF WBC: CPT | Performed by: EMERGENCY MEDICINE

## 2022-06-01 PROCEDURE — 74176 CT ABD & PELVIS W/O CONTRAST: CPT

## 2022-06-01 PROCEDURE — 80053 COMPREHEN METABOLIC PANEL: CPT | Performed by: EMERGENCY MEDICINE

## 2022-06-01 PROCEDURE — 83690 ASSAY OF LIPASE: CPT | Performed by: EMERGENCY MEDICINE

## 2022-06-01 PROCEDURE — 96374 THER/PROPH/DIAG INJ IV PUSH: CPT

## 2022-06-01 PROCEDURE — 93971 EXTREMITY STUDY: CPT

## 2022-06-01 PROCEDURE — 84484 ASSAY OF TROPONIN QUANT: CPT | Performed by: EMERGENCY MEDICINE

## 2022-06-01 PROCEDURE — G1004 CDSM NDSC: HCPCS

## 2022-06-01 PROCEDURE — 93005 ELECTROCARDIOGRAM TRACING: CPT

## 2022-06-01 PROCEDURE — 71045 X-RAY EXAM CHEST 1 VIEW: CPT

## 2022-06-01 PROCEDURE — 36415 COLL VENOUS BLD VENIPUNCTURE: CPT | Performed by: EMERGENCY MEDICINE

## 2022-06-01 PROCEDURE — 99285 EMERGENCY DEPT VISIT HI MDM: CPT | Performed by: EMERGENCY MEDICINE

## 2022-06-01 PROCEDURE — 99285 EMERGENCY DEPT VISIT HI MDM: CPT

## 2022-06-01 PROCEDURE — 85730 THROMBOPLASTIN TIME PARTIAL: CPT | Performed by: EMERGENCY MEDICINE

## 2022-06-01 RX ORDER — KETOROLAC TROMETHAMINE 30 MG/ML
15 INJECTION, SOLUTION INTRAMUSCULAR; INTRAVENOUS ONCE
Status: COMPLETED | OUTPATIENT
Start: 2022-06-01 | End: 2022-06-01

## 2022-06-01 RX ORDER — HEPARIN SODIUM 1000 [USP'U]/ML
4400 INJECTION, SOLUTION INTRAVENOUS; SUBCUTANEOUS ONCE
Status: COMPLETED | OUTPATIENT
Start: 2022-06-01 | End: 2022-06-01

## 2022-06-01 RX ORDER — HEPARIN SODIUM 1000 [USP'U]/ML
2200 INJECTION, SOLUTION INTRAVENOUS; SUBCUTANEOUS
Status: DISCONTINUED | OUTPATIENT
Start: 2022-06-01 | End: 2022-06-03

## 2022-06-01 RX ORDER — HEPARIN SODIUM 1000 [USP'U]/ML
4400 INJECTION, SOLUTION INTRAVENOUS; SUBCUTANEOUS
Status: DISCONTINUED | OUTPATIENT
Start: 2022-06-01 | End: 2022-06-03

## 2022-06-01 RX ORDER — HEPARIN SODIUM 10000 [USP'U]/100ML
3-30 INJECTION, SOLUTION INTRAVENOUS
Status: DISCONTINUED | OUTPATIENT
Start: 2022-06-01 | End: 2022-06-03

## 2022-06-01 RX ORDER — NICOTINE 21 MG/24HR
21 PATCH, TRANSDERMAL 24 HOURS TRANSDERMAL ONCE
Status: COMPLETED | OUTPATIENT
Start: 2022-06-01 | End: 2022-06-02

## 2022-06-01 RX ADMIN — KETOROLAC TROMETHAMINE 15 MG: 30 INJECTION, SOLUTION INTRAMUSCULAR; INTRAVENOUS at 20:21

## 2022-06-01 RX ADMIN — NICOTINE 21 MG: 21 PATCH, EXTENDED RELEASE TRANSDERMAL at 22:02

## 2022-06-01 RX ADMIN — HEPARIN SODIUM 4400 UNITS: 1000 INJECTION INTRAVENOUS; SUBCUTANEOUS at 22:41

## 2022-06-01 RX ADMIN — HEPARIN SODIUM 18 UNITS/KG/HR: 10000 INJECTION, SOLUTION INTRAVENOUS at 22:42

## 2022-06-01 NOTE — ED PROVIDER NOTES
History  Chief Complaint   Patient presents with    Abdominal Pain     Pt reports left sided abdominal pain for a week  68-year-old male with history of CHF, dementia, schizophrenia, hypertension, BPH, multiple abdominal surgeries who presents for evaluation of abdominal pain  History is provided by patient's son and his girlfriend  They report that 2 weeks ago, patient started with bilateral lower extremity edema  He was placed on 1 week of Lasix by his primary care physician with improvement in the edema  After completion of the Lasix prescription, he had worsening of the edema  At this time, they have noted that the left lower extremity is significantly more swollen than the right  One week ago, he also began complaining of left-sided abdominal pain  They have noted distension particularly along the left side of his abdomen  He had 1 episode of nonbloody nonbilious vomiting several days ago  He has been tolerating oral intake since that time, however, has had poor appetite  He had 1 episode of diarrhea 3 days ago but has not had a bowel movement since  They are also concerned about urinary retention as he has been urinating less than usual and his urine has become dark in color  His visiting nurse also noted areas of erythema along his left ankle and sacrum and was concerned for cellulitis  He has not had fevers, cough  Patient endorses chest pain that has been intermittent over the past several months as well as occasional dyspnea on exertion  He does not feel short of breath or have chest pain at this time  None       No past medical history on file  No past surgical history on file  No family history on file  I have reviewed and agree with the history as documented  No existing history information found  No existing history information found  Review of Systems   Constitutional: Positive for appetite change  Negative for chills and fever     Eyes: Negative for visual disturbance  Respiratory: Positive for shortness of breath  Negative for cough and chest tightness  Cardiovascular: Positive for chest pain  Negative for leg swelling  Gastrointestinal: Positive for abdominal distention, abdominal pain, diarrhea and vomiting  Negative for nausea  Genitourinary: Positive for difficulty urinating  Negative for dysuria, flank pain, frequency and urgency  Musculoskeletal: Negative for back pain and gait problem  Skin: Negative for pallor and rash  Neurological: Negative for syncope, weakness, light-headedness and headaches  All other systems reviewed and are negative  Physical Exam  ED Triage Vitals   Temperature Pulse Respirations Blood Pressure SpO2   06/01/22 1847 06/01/22 1847 06/01/22 1847 06/01/22 1848 06/01/22 1847   98 5 °F (36 9 °C) 65 17 104/58 98 %      Temp Source Heart Rate Source Patient Position - Orthostatic VS BP Location FiO2 (%)   06/01/22 1847 -- -- -- --   Oral          Pain Score       --                    Orthostatic Vital Signs  Vitals:    06/01/22 1930 06/01/22 2000 06/01/22 2203 06/01/22 2245   BP: 110/59  108/60 113/55   Pulse: 66 64 65 62       Physical Exam  Vitals and nursing note reviewed  Constitutional:       General: He is not in acute distress  HENT:      Head: Normocephalic and atraumatic  Nose: Nose normal       Mouth/Throat:      Mouth: Mucous membranes are moist    Eyes:      Extraocular Movements: Extraocular movements intact  Cardiovascular:      Rate and Rhythm: Normal rate and regular rhythm  Heart sounds: No murmur heard  No friction rub  No gallop  Pulmonary:      Effort: Pulmonary effort is normal       Breath sounds: No rales  Comments: Coarse breath sounds bilaterally  Scattered expiratory wheezing  Abdominal:      Palpations: Abdomen is soft  Tenderness: There is abdominal tenderness in the left upper quadrant and left lower quadrant  There is no guarding or rebound  Comments: Distension noted along the left side of the abdomen  Multiple surgical scars noted  Musculoskeletal:         General: Normal range of motion  Cervical back: Normal range of motion and neck supple  Comments: 1+ pitting edema to the left lower extremity particularly in the foot and ankle  No edema noted to the right lower extremity  Skin:     General: Skin is warm and dry  Findings: No rash  Comments: Small area of erythema along the left lateral ankle  There is no warmth or fluctuance  There is erythema with a small open wound noted to the sacrum  No purulent drainage or fluctuance  Neurological:      General: No focal deficit present  Mental Status: He is alert and oriented to person, place, and time  Mental status is at baseline     Psychiatric:         Behavior: Behavior normal          ED Medications  Medications   nicotine (NICODERM CQ) 21 mg/24 hr TD 24 hr patch 21 mg (21 mg Transdermal Medication Applied 6/1/22 2202)   heparin (porcine) 25,000 units in 0 45% NaCl 250 mL infusion (premix) (18 Units/kg/hr × 55 kg (Order-Specific) Intravenous New Bag 6/1/22 2242)   heparin (porcine) injection 4,400 Units (has no administration in time range)   heparin (porcine) injection 2,200 Units (has no administration in time range)   ketorolac (TORADOL) injection 15 mg (15 mg Intravenous Given 6/1/22 2021)   heparin (porcine) injection 4,400 Units (4,400 Units Intravenous Given 6/1/22 2241)       Diagnostic Studies  Results Reviewed     Procedure Component Value Units Date/Time    HS Troponin I 2hr [[id number]]  (Normal) Collected: 06/01/22 2211    Lab Status: Final result Specimen: Blood from Arm, Left Updated: 06/01/22 2248     hs TnI 2hr 2 ng/L      Delta 2hr hsTnI -1 ng/L     APTT [[id number]]  (Normal) Collected: 06/01/22 2211    Lab Status: Final result Specimen: Blood from Arm, Left Updated: 06/01/22 2239     PTT 29 seconds     HS Troponin I 4hr [[id number]]     Lab Status: No result Specimen: Blood     HS Troponin 0hr (reflex protocol) [[id number]]  (Normal) Collected: 06/01/22 2023    Lab Status: Final result Specimen: Blood from Arm, Right Updated: 06/01/22 2128     hs TnI 0hr 3 ng/L     Comprehensive metabolic panel [[id number]]  (Abnormal) Collected: 06/01/22 2023    Lab Status: Final result Specimen: Blood from Arm, Right Updated: 06/01/22 2047     Sodium 136 mmol/L      Potassium 4 7 mmol/L      Chloride 105 mmol/L      CO2 30 mmol/L      ANION GAP 1 mmol/L      BUN 14 mg/dL      Creatinine 0 94 mg/dL      Glucose 94 mg/dL      Calcium 8 9 mg/dL      Corrected Calcium 9 9 mg/dL      AST 24 U/L      ALT 9 U/L      Alkaline Phosphatase 41 U/L      Total Protein 6 3 g/dL      Albumin 2 7 g/dL      Total Bilirubin 0 36 mg/dL      eGFR 82 ml/min/1 73sq m     Narrative:      [place] guidelines for Chronic Kidney Disease (CKD):     Stage 1 with normal or high GFR (GFR > 90 mL/min/1 73 square meters)    Stage 2 Mild CKD (GFR = 60-89 mL/min/1 73 square meters)    Stage 3A Moderate CKD (GFR = 45-59 mL/min/1 73 square meters)    Stage 3B Moderate CKD (GFR = 30-44 mL/min/1 73 square meters)    Stage 4 Severe CKD (GFR = 15-29 mL/min/1 73 square meters)    Stage 5 End Stage CKD (GFR <15 mL/min/1 73 square meters)  Note: GFR calculation is accurate only with a steady state creatinine    Lipase [[id number]]  (Normal) Collected: 06/01/22 2023    Lab Status: Final result Specimen: Blood from Arm, Right Updated: 06/01/22 2047     Lipase 131 u/L     CBC and differential [[id number]]  (Abnormal) Collected: 06/01/22 2023    Lab Status: Final result Specimen: Blood from Arm, Right Updated: 06/01/22 2029     WBC 5 27 Thousand/uL      RBC 3 91 Million/uL      Hemoglobin 12 2 g/dL      Hematocrit 37 4 %      MCV 96 fL      MCH 31 2 pg      MCHC 32 6 g/dL      RDW 18 6 %      MPV 10 6 fL      Platelets 728 Thousands/uL      nRBC 0 /100 WBCs      Neutrophils Relative 54 % Immat GRANS % 0 %      Lymphocytes Relative 33 %      Monocytes Relative 12 %      Eosinophils Relative 1 %      Basophils Relative 0 %      Neutrophils Absolute 2 79 Thousands/µL      Immature Grans Absolute 0 02 Thousand/uL      Lymphocytes Absolute 1 72 Thousands/µL      Monocytes Absolute 0 65 Thousand/µL      Eosinophils Absolute 0 07 Thousand/µL      Basophils Absolute 0 02 Thousands/µL                  CT abdomen pelvis wo contrast   Final Result by Adriane De DO (06/01 2044)      Airspace opacities within the left lower lobe which may represent infection  Recommend short-term follow-up chest CT scan in 3 months to evaluate for resolution  Age indeterminate compression deformity of L2 vertebral body  Large amount fecal material within the colon suggestive of constipation  The study was marked in [facility] for immediate notification  Workstation performed: [id number]         XR chest 1 view portable    (Results Pending)   VAS lower limb venous duplex study, unilateral/limited    (Results Pending)         Procedures  Procedures      ED Course  ED Course as of 06/01/22 2341 Wed Jun 01, 2022 2014 Procedure Note - Ultrasound Guided Peripheral IV    Ultrasound dynamic guidance was used for peripheral line insertion  Risks and benefits of the procedure were discussed with the patient  Discussed risks included pain with the procedure, bleeding, and risk of infection  Indication: Difficult non-ultrasound guided peripheral intravenous line insertion  Location: Laterally: right upper extremity  Procedure: The patient was prepped using standard ultrasound guided IV procedures  Using direct visualization of the intravenous catheter/needle, the vessel was successfully cannulated with return of blood and advancement of the catheter  The catheter was secured in the standard technique  Complications: None  Interpretation: Successful ultrasound guided peripheral IV      This is a billable ultrasound guided procedure  2031 CBC and differential(!)   2044 Procedure Note: EKG  Date/Time: 06/01/22 7:48 PM   Interpreted by: Luisa Cruz  Indications / Diagnosis: CP  ECG reviewed by me, the ED Provider: yes   The EKG demonstrates:  Rhythm: rate 65, normal sinus  Intervals: normal intervals  Axis: normal axis  QRS/Blocks: normal QRS  ST Changes: No acute ST Changes, no STD/RENETTA       2118 Vascular tech DVT in the prox femoral superficial; proximal deep distal femoral vein; popliteal vein; peroneal vein                             SBIRT 20yo+    Flowsheet Row Most Recent Value   SBIRT (25 yo +)    In order to provide better care to our patients, we are screening all of our patients for alcohol and drug use  Would it be okay to ask you these screening questions? No Filed at: 06/01/2022 0678                MDM  Number of Diagnoses or Management Options  Constipation: new and requires workup  DVT (deep venous thrombosis) [facility]): new and requires workup  Diagnosis management comments: 82-year-old male who presents for evaluation of abdominal pain as well as left lower extremity edema  Will obtain abdominal labs, CT abdomen pelvis, cardiac workup, left lower extremity duplex  Labs largely unremarkable, CT abdomen pelvis showing significant constipation likely causing patient's abdominal pain  Left lower extremity duplex significant for large burden DVT  Patient initiated on heparin  Discussed with slim and admitted to their service         Amount and/or Complexity of Data Reviewed  Clinical lab tests: ordered and reviewed  Tests in the radiology section of CPT®: ordered and reviewed  Obtain history from someone other than the patient: yes  Review and summarize past medical records: yes  Discuss the patient with other providers: yes    Risk of Complications, Morbidity, and/or Mortality  Presenting problems: high  Diagnostic procedures: low  Management options: moderate    Patient Progress  Patient progress: stable      Disposition  Final diagnoses:   Constipation   DVT (deep venous thrombosis) ([facility] [address] )     Time reflects when diagnosis was documented in both MDM as applicable and the Disposition within this note     Time User Action Codes Description Comment    6/1/2022 10:40 PM Cristóbal Zunigas [K59 00] Constipation     6/1/2022 10:40 PM Eual Monday Add [I82 409] DVT (deep venous thrombosis) ([facility] [address] )     6/1/2022 10:40 PM Nhung Pageary [K59 00] Constipation     6/1/2022 10:40 PM Eual Monday Modify [I82 409] DVT (deep venous thrombosis) [facility])       ED Disposition     ED Disposition   Admit    Condition   Stable    Date/Time   Wed Jun 1, 2022 10:40 PM    Comment   Case was discussed with ESTEFANY and the patient's admission status was agreed to be Admission Status: inpatient status to the service of Dr Laron Sanchez   Follow-up Information    None         Patient's Medications    No medications on file     No discharge procedures on file  PDMP Review     None           ED Provider  Attending physically available and evaluated  CTR THIEF RVR FALL  I managed the patient along with the ED Attending      Electronically Signed by         Glen Cardenas MD  06/01/22 9472

## 2022-06-02 ENCOUNTER — APPOINTMENT (INPATIENT)
Dept: NON INVASIVE DIAGNOSTICS | Facility: HOSPITAL | Age: 72
DRG: 300 | End: 2022-06-02
Payer: COMMERCIAL

## 2022-06-02 PROBLEM — I50.9 CHF (CONGESTIVE HEART FAILURE) (HCC): Status: ACTIVE | Noted: 2022-06-02

## 2022-06-02 PROBLEM — F20.9 SCHIZOPHRENIA (HCC): Status: ACTIVE | Noted: 2022-06-02

## 2022-06-02 PROBLEM — I82.409 DVT (DEEP VENOUS THROMBOSIS) (HCC): Status: ACTIVE | Noted: 2022-06-02

## 2022-06-02 PROBLEM — K59.00 CONSTIPATION: Status: ACTIVE | Noted: 2022-06-02

## 2022-06-02 PROBLEM — F10.11 HISTORY OF ALCOHOL ABUSE: Status: ACTIVE | Noted: 2022-06-02

## 2022-06-02 PROBLEM — I10 HYPERTENSION: Status: ACTIVE | Noted: 2022-06-02

## 2022-06-02 PROBLEM — K27.9 PEPTIC ULCER DISEASE: Status: ACTIVE | Noted: 2022-06-02

## 2022-06-02 LAB
ALBUMIN SERPL BCP-MCNC: 2.7 G/DL (ref 3.5–5)
ALP SERPL-CCNC: 43 U/L (ref 46–116)
ALT SERPL W P-5'-P-CCNC: 9 U/L (ref 12–78)
ANION GAP SERPL CALCULATED.3IONS-SCNC: 2 MMOL/L (ref 4–13)
AORTIC ROOT: 3.4 CM
APICAL FOUR CHAMBER EJECTION FRACTION: 51 %
APTT PPP: 84 SECONDS (ref 23–37)
APTT PPP: 85 SECONDS (ref 23–37)
ASCENDING AORTA: 3.3 CM
AST SERPL W P-5'-P-CCNC: 17 U/L (ref 5–45)
ATRIAL RATE: 65 BPM
BASOPHILS # BLD AUTO: 0.04 THOUSANDS/ΜL (ref 0–0.1)
BASOPHILS NFR BLD AUTO: 1 % (ref 0–1)
BILIRUB SERPL-MCNC: 0.41 MG/DL (ref 0.2–1)
BUN SERPL-MCNC: 19 MG/DL (ref 5–25)
CALCIUM ALBUM COR SERPL-MCNC: 9.8 MG/DL (ref 8.3–10.1)
CALCIUM SERPL-MCNC: 8.8 MG/DL (ref 8.3–10.1)
CHLORIDE SERPL-SCNC: 104 MMOL/L (ref 100–108)
CO2 SERPL-SCNC: 31 MMOL/L (ref 21–32)
CREAT SERPL-MCNC: 0.98 MG/DL (ref 0.6–1.3)
E WAVE DECELERATION TIME: 195 MS
EOSINOPHIL # BLD AUTO: 0.13 THOUSAND/ΜL (ref 0–0.61)
EOSINOPHIL NFR BLD AUTO: 2 % (ref 0–6)
ERYTHROCYTE [DISTWIDTH] IN BLOOD BY AUTOMATED COUNT: 18.5 % (ref 11.6–15.1)
FRACTIONAL SHORTENING: 30 % (ref 28–44)
GFR SERPL CREATININE-BSD FRML MDRD: 78 ML/MIN/1.73SQ M
GLUCOSE SERPL-MCNC: 104 MG/DL (ref 65–140)
HCT VFR BLD AUTO: 39.8 % (ref 36.5–49.3)
HGB BLD-MCNC: 13.1 G/DL (ref 12–17)
IMM GRANULOCYTES # BLD AUTO: 0.05 THOUSAND/UL (ref 0–0.2)
IMM GRANULOCYTES NFR BLD AUTO: 1 % (ref 0–2)
INTERVENTRICULAR SEPTUM IN DIASTOLE (PARASTERNAL SHORT AXIS VIEW): 1.3 CM
INTERVENTRICULAR SEPTUM: 1.3 CM (ref 0.6–1.1)
LAAS-AP2: 19.3 CM2
LAAS-AP4: 18.2 CM2
LEFT ATRIUM SIZE: 2.8 CM
LEFT INTERNAL DIMENSION IN SYSTOLE: 2.6 CM (ref 2.1–4)
LEFT VENTRICULAR INTERNAL DIMENSION IN DIASTOLE: 3.7 CM (ref 3.5–6)
LEFT VENTRICULAR POSTERIOR WALL IN END DIASTOLE: 1.2 CM
LEFT VENTRICULAR STROKE VOLUME: 31 ML
LVSV (TEICH): 31 ML
LYMPHOCYTES # BLD AUTO: 1.96 THOUSANDS/ΜL (ref 0.6–4.47)
LYMPHOCYTES NFR BLD AUTO: 26 % (ref 14–44)
MAGNESIUM SERPL-MCNC: 2.2 MG/DL (ref 1.6–2.6)
MCH RBC QN AUTO: 31.6 PG (ref 26.8–34.3)
MCHC RBC AUTO-ENTMCNC: 32.9 G/DL (ref 31.4–37.4)
MCV RBC AUTO: 96 FL (ref 82–98)
MONOCYTES # BLD AUTO: 0.84 THOUSAND/ΜL (ref 0.17–1.22)
MONOCYTES NFR BLD AUTO: 11 % (ref 4–12)
MV E'TISSUE VEL-SEP: 7 CM/S
MV PEAK A VEL: 1.01 M/S
MV PEAK E VEL: 80 CM/S
MV STENOSIS PRESSURE HALF TIME: 57 MS
MV VALVE AREA P 1/2 METHOD: 3.86 CM2
NEUTROPHILS # BLD AUTO: 4.63 THOUSANDS/ΜL (ref 1.85–7.62)
NEUTS SEG NFR BLD AUTO: 59 % (ref 43–75)
NRBC BLD AUTO-RTO: 0 /100 WBCS
NT-PROBNP SERPL-MCNC: 474 PG/ML
P AXIS: 67 DEGREES
PLATELET # BLD AUTO: 190 THOUSANDS/UL (ref 149–390)
PMV BLD AUTO: 9.5 FL (ref 8.9–12.7)
POTASSIUM SERPL-SCNC: 4.3 MMOL/L (ref 3.5–5.3)
PR INTERVAL: 166 MS
PROCALCITONIN SERPL-MCNC: <0.05 NG/ML
PROT SERPL-MCNC: 6.4 G/DL (ref 6.4–8.2)
QRS AXIS: 47 DEGREES
QRSD INTERVAL: 86 MS
QT INTERVAL: 396 MS
QTC INTERVAL: 411 MS
RA PRESSURE ESTIMATED: 10 MMHG
RBC # BLD AUTO: 4.15 MILLION/UL (ref 3.88–5.62)
RIGHT ATRIUM AREA SYSTOLE A4C: 13.5 CM2
RIGHT VENTRICLE ID DIMENSION: 3.8 CM
RV PSP: 52 MMHG
SL CV LEFT ATRIUM LENGTH A2C: 5 CM
SL CV LV EF: 64
SL CV PED ECHO LEFT VENTRICLE DIASTOLIC VOLUME (MOD BIPLANE) 2D: 57 ML
SL CV PED ECHO LEFT VENTRICLE SYSTOLIC VOLUME (MOD BIPLANE) 2D: 26 ML
SODIUM SERPL-SCNC: 137 MMOL/L (ref 136–145)
T WAVE AXIS: 49 DEGREES
TR MAX PG: 42 MMHG
TR PEAK VELOCITY: 3.3 M/S
TRICUSPID VALVE PEAK REGURGITATION VELOCITY: 3.25 M/S
VENTRICULAR RATE: 65 BPM
WBC # BLD AUTO: 7.65 THOUSAND/UL (ref 4.31–10.16)

## 2022-06-02 PROCEDURE — 80053 COMPREHEN METABOLIC PANEL: CPT | Performed by: INTERNAL MEDICINE

## 2022-06-02 PROCEDURE — 93971 EXTREMITY STUDY: CPT | Performed by: SURGERY

## 2022-06-02 PROCEDURE — 83735 ASSAY OF MAGNESIUM: CPT | Performed by: INTERNAL MEDICINE

## 2022-06-02 PROCEDURE — 99223 1ST HOSP IP/OBS HIGH 75: CPT | Performed by: INTERNAL MEDICINE

## 2022-06-02 PROCEDURE — 99222 1ST HOSP IP/OBS MODERATE 55: CPT | Performed by: INTERNAL MEDICINE

## 2022-06-02 PROCEDURE — 83880 ASSAY OF NATRIURETIC PEPTIDE: CPT | Performed by: STUDENT IN AN ORGANIZED HEALTH CARE EDUCATION/TRAINING PROGRAM

## 2022-06-02 PROCEDURE — 84145 PROCALCITONIN (PCT): CPT | Performed by: INTERNAL MEDICINE

## 2022-06-02 PROCEDURE — 85025 COMPLETE CBC W/AUTO DIFF WBC: CPT | Performed by: INTERNAL MEDICINE

## 2022-06-02 PROCEDURE — 93306 TTE W/DOPPLER COMPLETE: CPT

## 2022-06-02 PROCEDURE — 93306 TTE W/DOPPLER COMPLETE: CPT | Performed by: INTERNAL MEDICINE

## 2022-06-02 PROCEDURE — 85730 THROMBOPLASTIN TIME PARTIAL: CPT | Performed by: PHYSICIAN ASSISTANT

## 2022-06-02 PROCEDURE — 93010 ELECTROCARDIOGRAM REPORT: CPT | Performed by: INTERNAL MEDICINE

## 2022-06-02 PROCEDURE — 85730 THROMBOPLASTIN TIME PARTIAL: CPT | Performed by: INTERNAL MEDICINE

## 2022-06-02 PROCEDURE — 36415 COLL VENOUS BLD VENIPUNCTURE: CPT | Performed by: EMERGENCY MEDICINE

## 2022-06-02 RX ORDER — FOLIC ACID 1 MG/1
1 TABLET ORAL DAILY
Status: DISCONTINUED | OUTPATIENT
Start: 2022-06-02 | End: 2022-06-05 | Stop reason: HOSPADM

## 2022-06-02 RX ORDER — SENNOSIDES 8.6 MG
1 TABLET ORAL 2 TIMES DAILY
Status: DISCONTINUED | OUTPATIENT
Start: 2022-06-02 | End: 2022-06-04

## 2022-06-02 RX ORDER — HEPARIN SODIUM 1000 [USP'U]/ML
4400 INJECTION, SOLUTION INTRAVENOUS; SUBCUTANEOUS
Status: DISCONTINUED | OUTPATIENT
Start: 2022-06-02 | End: 2022-06-02

## 2022-06-02 RX ORDER — LANOLIN ALCOHOL/MO/W.PET/CERES
100 CREAM (GRAM) TOPICAL DAILY
Status: DISCONTINUED | OUTPATIENT
Start: 2022-06-02 | End: 2022-06-05 | Stop reason: HOSPADM

## 2022-06-02 RX ORDER — ACETAMINOPHEN 325 MG/1
650 TABLET ORAL EVERY 6 HOURS PRN
Status: DISCONTINUED | OUTPATIENT
Start: 2022-06-02 | End: 2022-06-05 | Stop reason: HOSPADM

## 2022-06-02 RX ORDER — PANTOPRAZOLE SODIUM 40 MG/1
40 TABLET, DELAYED RELEASE ORAL
Status: DISCONTINUED | OUTPATIENT
Start: 2022-06-03 | End: 2022-06-05 | Stop reason: HOSPADM

## 2022-06-02 RX ORDER — HEPARIN SODIUM 10000 [USP'U]/100ML
3-30 INJECTION, SOLUTION INTRAVENOUS
Status: DISCONTINUED | OUTPATIENT
Start: 2022-06-02 | End: 2022-06-02

## 2022-06-02 RX ORDER — QUETIAPINE FUMARATE 100 MG/1
200 TABLET, FILM COATED ORAL DAILY
Status: DISCONTINUED | OUTPATIENT
Start: 2022-06-02 | End: 2022-06-05 | Stop reason: HOSPADM

## 2022-06-02 RX ORDER — CITALOPRAM 10 MG/1
10 TABLET ORAL DAILY
Status: DISCONTINUED | OUTPATIENT
Start: 2022-06-02 | End: 2022-06-05 | Stop reason: HOSPADM

## 2022-06-02 RX ORDER — TRAZODONE HYDROCHLORIDE 50 MG/1
50 TABLET ORAL
Status: DISCONTINUED | OUTPATIENT
Start: 2022-06-02 | End: 2022-06-05 | Stop reason: HOSPADM

## 2022-06-02 RX ORDER — HEPARIN SODIUM 1000 [USP'U]/ML
2200 INJECTION, SOLUTION INTRAVENOUS; SUBCUTANEOUS
Status: DISCONTINUED | OUTPATIENT
Start: 2022-06-02 | End: 2022-06-02

## 2022-06-02 RX ORDER — POLYETHYLENE GLYCOL 3350 17 G/17G
17 POWDER, FOR SOLUTION ORAL DAILY
Status: DISCONTINUED | OUTPATIENT
Start: 2022-06-02 | End: 2022-06-04

## 2022-06-02 RX ORDER — QUETIAPINE FUMARATE 300 MG/1
600 TABLET, FILM COATED ORAL
Status: DISCONTINUED | OUTPATIENT
Start: 2022-06-02 | End: 2022-06-05 | Stop reason: HOSPADM

## 2022-06-02 RX ORDER — LIDOCAINE 50 MG/G
1 PATCH TOPICAL DAILY
Status: DISCONTINUED | OUTPATIENT
Start: 2022-06-02 | End: 2022-06-05 | Stop reason: HOSPADM

## 2022-06-02 RX ORDER — DOCUSATE SODIUM 100 MG/1
100 CAPSULE, LIQUID FILLED ORAL 2 TIMES DAILY
Status: DISCONTINUED | OUTPATIENT
Start: 2022-06-02 | End: 2022-06-02

## 2022-06-02 RX ORDER — NICOTINE 21 MG/24HR
21 PATCH, TRANSDERMAL 24 HOURS TRANSDERMAL DAILY
Status: DISCONTINUED | OUTPATIENT
Start: 2022-06-02 | End: 2022-06-05 | Stop reason: HOSPADM

## 2022-06-02 RX ORDER — ATORVASTATIN CALCIUM 40 MG/1
40 TABLET, FILM COATED ORAL
Status: DISCONTINUED | OUTPATIENT
Start: 2022-06-02 | End: 2022-06-05 | Stop reason: HOSPADM

## 2022-06-02 RX ORDER — PANTOPRAZOLE SODIUM 40 MG/1
40 TABLET, DELAYED RELEASE ORAL
Status: DISCONTINUED | OUTPATIENT
Start: 2022-06-02 | End: 2022-06-02

## 2022-06-02 RX ADMIN — ATORVASTATIN CALCIUM 40 MG: 40 TABLET, FILM COATED ORAL at 17:15

## 2022-06-02 RX ADMIN — THIAMINE HCL TAB 100 MG 100 MG: 100 TAB at 08:27

## 2022-06-02 RX ADMIN — HEPARIN SODIUM 18 UNITS/KG/HR: 10000 INJECTION, SOLUTION INTRAVENOUS at 23:31

## 2022-06-02 RX ADMIN — SENNOSIDES 8.6 MG: 8.6 TABLET, FILM COATED ORAL at 17:15

## 2022-06-02 RX ADMIN — FOLIC ACID 1 MG: 1 TABLET ORAL at 08:27

## 2022-06-02 RX ADMIN — QUETIAPINE FUMARATE 600 MG: 300 TABLET ORAL at 22:45

## 2022-06-02 RX ADMIN — LIDOCAINE 5% 1 PATCH: 700 PATCH TOPICAL at 21:55

## 2022-06-02 RX ADMIN — NICOTINE 21 MG: 21 PATCH, EXTENDED RELEASE TRANSDERMAL at 22:30

## 2022-06-02 RX ADMIN — METOPROLOL TARTRATE 25 MG: 50 TABLET, FILM COATED ORAL at 21:55

## 2022-06-02 RX ADMIN — TRAZODONE HYDROCHLORIDE 50 MG: 50 TABLET ORAL at 21:55

## 2022-06-02 RX ADMIN — METOPROLOL TARTRATE 25 MG: 50 TABLET, FILM COATED ORAL at 08:27

## 2022-06-02 RX ADMIN — CITALOPRAM HYDROBROMIDE 10 MG: 10 TABLET ORAL at 08:27

## 2022-06-02 RX ADMIN — PANTOPRAZOLE SODIUM 40 MG: 40 TABLET, DELAYED RELEASE ORAL at 08:27

## 2022-06-02 RX ADMIN — QUETIAPINE FUMARATE 200 MG: 100 TABLET ORAL at 08:27

## 2022-06-02 NOTE — PLAN OF CARE
Problem: MOBILITY - ADULT  Goal: Maintain or return to baseline ADL function  Description: INTERVENTIONS:  -  Assess patient's ability to carry out ADLs; assess patient's baseline for ADL function and identify physical deficits which impact ability to perform ADLs (bathing, care of mouth/teeth, toileting, grooming, dressing, etc )  - Assess/evaluate cause of self-care deficits   - Assess range of motion  - Assess patient's mobility; develop plan if impaired  - Assess patient's need for assistive devices and provide as appropriate  - Encourage maximum independence but intervene and supervise when necessary  - Involve family in performance of ADLs  - Assess for home care needs following discharge   - Consider OT consult to assist with ADL evaluation and planning for discharge  - Provide patient education as appropriate  6/2/2022 0800 by Promise Higuera RN  Outcome: Progressing  6/2/2022 0759 by Promise Higuera RN  Outcome: Progressing  Goal: Maintains/Returns to pre admission functional level  Description: INTERVENTIONS:  - Perform BMAT or MOVE assessment daily    - Set and communicate daily mobility goal to care team and patient/family/caregiver  - Collaborate with rehabilitation services on mobility goals if consulted  - Perform Range of Motion 2 times a day  - Reposition patient every 2 hours    - Dangle patient 2 times a day  - Stand patient 2 times a day  - Ambulate patient 2 times a day  - Out of bed to chair 2 times a day   - Out of bed for meals 2 times a day  - Out of bed for toileting  - Record patient progress and toleration of activity level   6/2/2022 0800 by Promise Higuera RN  Outcome: Progressing  6/2/2022 0759 by Promise Higuera RN  Outcome: Progressing     Problem: PAIN - ADULT  Goal: Verbalizes/displays adequate comfort level or baseline comfort level  Description: Interventions:  - Encourage patient to monitor pain and request assistance  - Assess pain using appropriate pain scale  - Administer analgesics based on type and severity of pain and evaluate response  - Implement non-pharmacological measures as appropriate and evaluate response  - Consider cultural and social influences on pain and pain management  - Notify physician/advanced practitioner if interventions unsuccessful or patient reports new pain  6/2/2022 0800 by Andry Reynaga RN  Outcome: Progressing  6/2/2022 0759 by Andry Reynaga RN  Outcome: Progressing     Problem: INFECTION - ADULT  Goal: Absence or prevention of progression during hospitalization  Description: INTERVENTIONS:  - Assess and monitor for signs and symptoms of infection  - Monitor lab/diagnostic results  - Monitor all insertion sites, i e  indwelling lines, tubes, and drains  - Monitor endotracheal if appropriate and nasal secretions for changes in amount and color  - Craig appropriate cooling/warming therapies per order  - Administer medications as ordered  - Instruct and encourage patient and family to use good hand hygiene technique  - Identify and instruct in appropriate isolation precautions for identified infection/condition  6/2/2022 0800 by Andry Reynaga RN  Outcome: Progressing  6/2/2022 0759 by Andry Reynaga RN  Outcome: Progressing     Problem: SAFETY ADULT  Goal: Maintain or return to baseline ADL function  Description: INTERVENTIONS:  -  Assess patient's ability to carry out ADLs; assess patient's baseline for ADL function and identify physical deficits which impact ability to perform ADLs (bathing, care of mouth/teeth, toileting, grooming, dressing, etc )  - Assess/evaluate cause of self-care deficits   - Assess range of motion  - Assess patient's mobility; develop plan if impaired  - Assess patient's need for assistive devices and provide as appropriate  - Encourage maximum independence but intervene and supervise when necessary  - Involve family in performance of ADLs  - Assess for home care needs following discharge   - Consider OT consult to assist with ADL evaluation and planning for discharge  - Provide patient education as appropriate  6/2/2022 0800 by Miriam Taylor RN  Outcome: Progressing  6/2/2022 0759 by Miriam Taylor RN  Outcome: Progressing  Goal: Maintains/Returns to pre admission functional level  Description: INTERVENTIONS:  - Perform BMAT or MOVE assessment daily    - Set and communicate daily mobility goal to care team and patient/family/caregiver  - Collaborate with rehabilitation services on mobility goals if consulted  - Perform Range of Motion 2 times a day  - Reposition patient every 2 hours    - Dangle patient 2 times a day  - Stand patient 2 times a day  - Ambulate patient 2 times a day  - Out of bed to chair 2 times a day   - Out of bed for meals 2 times a day  - Out of bed for toileting  - Record patient progress and toleration of activity level   6/2/2022 0800 by Miriam Taylor RN  Outcome: Progressing  6/2/2022 0759 by Miriam Taylor RN  Outcome: Progressing  Goal: Patient will remain free of falls  Description: INTERVENTIONS:  - Educate patient/family on patient safety including physical limitations  - Instruct patient to call for assistance with activity   - Consult OT/PT to assist with strengthening/mobility   - Keep Call bell within reach  - Keep bed low and locked with side rails adjusted as appropriate  - Keep care items and personal belongings within reach  - Initiate and maintain comfort rounds  - Make Fall Risk Sign visible to staff  - Offer Toileting every 2 Hours, in advance of need  - Initiate/Maintain 2alarm  - Obtain necessary fall risk management equipment: 2  - Apply yellow socks and bracelet for high fall risk patients  - Consider moving patient to room near nurses station  6/2/2022 0800 by Miriam Taylor RN  Outcome: Progressing  6/2/2022 0759 by Miriam Taylor RN  Outcome: Progressing     Problem: DISCHARGE PLANNING  Goal: Discharge to home or other facility with appropriate resources  Description: INTERVENTIONS:  - Identify barriers to discharge w/patient and caregiver  - Arrange for needed discharge resources and transportation as appropriate  - Identify discharge learning needs (meds, wound care, etc )  - Arrange for interpretive services to assist at discharge as needed  - Refer to Case Management Department for coordinating discharge planning if the patient needs post-hospital services based on physician/advanced practitioner order or complex needs related to functional status, cognitive ability, or social support system  6/2/2022 0800 by Barrera Candelaria RN  Outcome: Progressing  6/2/2022 0759 by Barrera Candelaria RN  Outcome: Progressing     Problem: Knowledge Deficit  Goal: Patient/family/caregiver demonstrates understanding of disease process, treatment plan, medications, and discharge instructions  Description: Complete learning assessment and assess knowledge base  Interventions:  - Provide teaching at level of understanding  - Provide teaching via preferred learning methods  6/2/2022 0800 by Barrera Candelaria RN  Outcome: Progressing  6/2/2022 0759 by Barrera Candelaria RN  Outcome: Progressing     Problem: Nutrition/Hydration-ADULT  Goal: Nutrient/Hydration intake appropriate for improving, restoring or maintaining nutritional needs  Description: Monitor and assess patient's nutrition/hydration status for malnutrition  Collaborate with interdisciplinary team and initiate plan and interventions as ordered  Monitor patient's weight and dietary intake as ordered or per policy  Utilize nutrition screening tool and intervene as necessary  Determine patient's food preferences and provide high-protein, high-caloric foods as appropriate       INTERVENTIONS:  - Monitor oral intake, urinary output, labs, and treatment plans  - Assess nutrition and hydration status and recommend course of action  - Evaluate amount of meals eaten  - Assist patient with eating if necessary   - Allow adequate time for meals  - Recommend/ encourage appropriate diets, oral nutritional supplements, and vitamin/mineral supplements  - Order, calculate, and assess calorie counts as needed  - Recommend, monitor, and adjust tube feedings and TPN/PPN based on assessed needs  - Assess need for intravenous fluids  - Provide specific nutrition/hydration education as appropriate  - Include patient/family/caregiver in decisions related to nutrition  6/2/2022 0800 by Tiera Hopson RN  Outcome: Progressing  6/2/2022 0759 by Tiera Hopson RN  Outcome: Progressing

## 2022-06-02 NOTE — CONSULTS
PULMONOLOGY CONSULT NOTE     Name: Tonya Bran   Age & Sex: 73 y o  male   MRN: [id number]  Unit/Bed#: -01   Encounter: [id number]        Reason for consultation: DVT    Requesting physician: Marinus Meigs, DO    Assessment:     DVT  - Acute, proximal, provoked by chronic immobility  - On systemic anticoagulation with heparin ggt  - With contrast shortage, CTA not performed to evaluate for PE  - Does not appear to have any objective evidence of PE  No tachycardia, hemodynamically stable, not hypoxic    - Echo pending    PUD  - History obtained from alternative chart MRN: [id number]  - Seems he had a significant UGIB in August of last year with pyloric ulcer on EGD and a hgb drop to 6 9  - Has been on PPI since then, hopefully well healed    Left hemidiaphragm elevation  - Appears chronic  with lung parenchymal changes above  Present on images as far back as 4/2019  - Not apparently symptomatic  - Consider A1AT, chronic aspiration, atelectasis given immobility  - May be slightly hypoventilating with chronic bicarb ~30    Ambulatory dysfunction  Schizophrenia  Prior alcohol abuse    Plan:  - Would favor continuing anticoagulation  - Does not seem motivated to improve mobility  Since this is the presumed provoking factor, may recommend lifelong AC with DOAC  - Obtain 2D echo  If evidence of increased RV pressures, will have to pursue evaluation for PE  - GI consultation recommended based on significance of last GIB and now need for anticoagulation  If anticoagulation is not favored by GI, may need an IVCF  - Outpatient follow up with pulmonary for diaphragm elevation may include sniff test, PFTs with MVV/MIP/MEP      History of Present Illness   HPI:  Tonya Bran is a 73 y o  male who presents for evaluation of abdominal discomfort and left lower extremtiy swelling  He noted bilateral lower extremity swelling and was started on a diuretic as an outpatient   This appears to have improved the right leg, however the left leg remains swollen and has become painful  On evaluation here he was found to have a nonocclusive proximal DVT in the left lower extremity  He has been bed bound for the last few months at a minimum  Of note, he had a large GIB last year in August with perforated pyloric ulcer  He has barbra on PPI since then and has not had known recurrence  However, he is a poor historian  Review of systems:  12 point review of systems was completed and was otherwise negative except as listed in HPI        Past Medical History:   Diagnosis Date    Alcohol abuse      BPH (benign prostatic hyperplasia)      CVA (cerebral vascular accident) ([facility] [address] )      DJD (degenerative joint disease)      Encounter to establish care with new doctor 8/21/2019    Gait abnormality      Head injury      History of cerebrovascular accident (CVA) with residual deficit 10/1/2019    History of CVA (cerebrovascular accident) 1/8/2020    History of substance abuse ([facility] [address] ) 8/21/2019     Polysubstance     History of sustained ventricular tachycardia 8/21/2019    History of transient ischemic attack (TIA) 8/28/2019    Hypertension      Metatarsal fracture      Palliative care patient      TIA (transient ischemic attack)      Tobacco abuse           Surgical History[]Expand by Default         Past Surgical History:   Procedure Laterality Date    ABDOMINAL SURGERY        ANKLE SURGERY        BACK SURGERY        CT GUIDED PERC DRAINAGE CATHETER PLACEMENT   1/27/2020    ELBOW SURGERY        IR VISCERAL ANGIOGRAPHY / INTERVENTION   1/11/2020    JOINT REPLACEMENT        KNEE SURGERY        LAPAROTOMY N/A 1/11/2020     Procedure: LAPAROTOMY EXPLORATORY,  OVERSEW  OF GASTRO DUODENAL ARTERY, THAL PATCH OF DUODENUM, IVCKY GASTRO JEJUNOSTOMY TUBE;  Surgeon: Shira Malin DO;  Location: [facility];  Service: General    LIVER SURGERY                   Social History     Social History           Substance and Sexual Activity   Alcohol Use Not Currently    Alcohol/week: 4 0 standard drinks    Types: 4 Cans of beer per week     Comment: a couple beers      Social History           Substance and Sexual Activity   Drug Use Yes    Types: Marijuana     Comment: history polysubstance use including IVDA on record- every now then will smoke weed       Social History           Tobacco Use   Smoking Status Current Every Day Smoker    Packs/day: 1 00    Types: Cigarettes   Smokeless Tobacco Never Used   Tobacco Comment     started age 14, 4 quit attempts      Family History:         Family History   Problem Relation Age of Onset    No Known Problems Mother      No Known Problems Father            Occupational History: Retired  No known exposure history    Social History: Former smoker  Denies alcohol or drug use  Meds/Allergies   Current Facility-Administered Medications   Medication Dose Route Frequency    atorvastatin (LIPITOR) tablet 40 mg  40 mg Oral Daily With Dinner    citalopram (CeleXA) tablet 10 mg  10 mg Oral Daily    docusate sodium (COLACE) capsule 100 mg  100 mg Oral BID    folic acid (FOLVITE) tablet 1 mg  1 mg Oral Daily    heparin (porcine) 25,000 units in 0 45% NaCl 250 mL infusion (premix)  3-30 Units/kg/hr (Order-Specific) Intravenous Titrated    heparin (porcine) injection 2,200 Units  2,200 Units Intravenous Q1H PRN    heparin (porcine) injection 4,400 Units  4,400 Units Intravenous Q1H PRN    metoprolol tartrate (LOPRESSOR) tablet 25 mg  25 mg Oral Q12H FLOR    nicotine (NICODERM CQ) 21 mg/24 hr TD 24 hr patch 21 mg  21 mg Transdermal Once    pantoprazole (PROTONIX) EC tablet 40 mg  40 mg Oral BID AC    polyethylene glycol (MIRALAX) packet 17 g  17 g Oral Daily    QUEtiapine (SEROquel) tablet 200 mg  200 mg Oral Daily    thiamine tablet 100 mg  100 mg Oral Daily    traZODone (DESYREL) tablet 50 mg  50 mg Oral HS PRN     No medications prior to admission       No Known Allergies    Vitals: Blood pressure 170/81, pulse 76, temperature 97 9 °F (36 6 °C), resp  rate 16, height 5' 5" (1 651 m), weight 63 kg (139 lb), SpO2 92 %  Body mass index is 23 13 kg/m²  Intake/Output Summary (Last 24 hours) at 6/2/2022 1600  Last data filed at 6/2/2022 1401  Gross per 24 hour   Intake --   Output 500 ml   Net -500 ml       Physical Exam  Vitals and nursing note reviewed  Constitutional:       General: He is not in acute distress  Appearance: Normal appearance  He is well-developed  HENT:      Head: Normocephalic and atraumatic  Mouth/Throat:      Mouth: Mucous membranes are moist       Pharynx: No oropharyngeal exudate or posterior oropharyngeal erythema  Eyes:      General: No scleral icterus  Conjunctiva/sclera: Conjunctivae normal    Cardiovascular:      Rate and Rhythm: Normal rate and regular rhythm  Heart sounds: No murmur heard  No friction rub  No gallop  Pulmonary:      Effort: Pulmonary effort is normal  No respiratory distress  Breath sounds: Normal breath sounds  No wheezing, rhonchi or rales  Abdominal:      Palpations: Abdomen is soft  Tenderness: There is abdominal tenderness  There is no guarding or rebound  Negative signs include Wells's sign  Musculoskeletal:      Cervical back: Neck supple  No tenderness  Right lower leg: No edema  Left lower leg: Edema present  Lymphadenopathy:      Cervical: No cervical adenopathy  Skin:     General: Skin is warm and dry  Neurological:      General: No focal deficit present  Mental Status: He is alert and oriented to person, place, and time  Motor: No weakness  Labs: I have personally reviewed pertinent lab results    Laboratory and Diagnostics  Results from last 7 days   Lab Units 06/02/22 0627 06/01/22 2023   WBC Thousand/uL 7 65 5 27   HEMOGLOBIN g/dL 13 1 12 2   HEMATOCRIT % 39 8 37 4   PLATELETS Thousands/uL 190 199   NEUTROS PCT % 59 54   MONOS PCT % 11 12     Results from last 7 days   Lab Units 06/02/22  0627 06/01/22 2023   SODIUM mmol/L 137 136   POTASSIUM mmol/L 4 3 4 7   CHLORIDE mmol/L 104 105   CO2 mmol/L 31 30   ANION GAP mmol/L 2* 1*   BUN mg/dL 19 14   CREATININE mg/dL 0 98 0 94   CALCIUM mg/dL 8 8 8 9   GLUCOSE RANDOM mg/dL 104 94   ALT U/L 9* 9*   AST U/L 17 24   ALK PHOS U/L 43* 41*   ALBUMIN g/dL 2 7* 2 7*   TOTAL BILIRUBIN mg/dL 0 41 0 36     Results from last 7 days   Lab Units 06/02/22  0627   MAGNESIUM mg/dL 2 2      Results from last 7 days   Lab Units 06/02/22  0813 06/01/22  2211   PTT seconds 85* 29                              Results from last 7 days   Lab Units 06/02/22  0439   PROCALCITONIN ng/ml <0 05       Imaging and other studies: I have personally reviewed pertinent films in PACS  CT abdomen pelvis wo contrast    Result Date: 6/1/2022  Impression: Airspace opacities within the left lower lobe which may represent infection  Recommend short-term follow-up chest CT scan in 3 months to evaluate for resolution  Age indeterminate compression deformity of L2 vertebral body  Large amount fecal material within the colon suggestive of constipation  The study was marked in [facility] for immediate notification  Workstation performed: [id number]       EKG, Pathology, and Other Studies: I have personally reviewed pertinent reports  Code Status: Level 1 - Full Code    VTE Pharmacologic Prophylaxis: Heparin     Disclaimer: Portions of the record may have been created with voice recognition software  Occasional wrong word or "sound a like" substitutions may have occurred due to the inherent limitations of voice recognition software  Careful consideration should be taken to recognize, using context, where substitutions have occurred      Maru Salgado DO  Pulmonary/Critical Care Fellowship PGY-IV  [facility]

## 2022-06-02 NOTE — ASSESSMENT & PLAN NOTE
· History of alcohol abuse in the past but reports cessation   · On PTA thiamin and folic acid, continue  · No evidence of any withdrawal symptoms at this time, if patient develops symptoms would start CIWA protocol

## 2022-06-02 NOTE — PROGRESS NOTES
[address] [facility]  Progress Note - Nahid Veliz 1950, 73 y o  male MRN: [id number]  Unit/Bed#: -01 Encounter: [id number]  Primary Care Provider: Sunday Navas MD   Date and time admitted to hospital: 6/1/2022  6:43 PM      DOS: 6/2/2022  POST ADMIT CHECK  * DVT (deep venous thrombosis) ([facility] )  Assessment & Plan  · Pt presented with abdominal pain and LLE edema   · Duplex LLE with acute non occlusive thrombus in the proximal and distal femoral vein and deep femoral vein, acute occlusive thrombus in the popliteal vein  · Likely provoked from chronic immobility  · Currently on IV heparin gtt  · Unable to obtain CTA due to contrast shortage therefore pulmonary consulted   · Pt does not have any tachycardia, shortness of breath or hypoxia, can likely hold off for now pending ECHO as noted below  · ECHO ordered, monitor for any strain   · Likely transition to DOAC once seen by pulmonary and ECHO completed     Peptic ulcer disease  Assessment & Plan  · Patient does have a history of peptic ulcer disease with bleeding ulcer in the past   · On PTA Protonix, continue 40 mg BID  · Denies any recent melena or bloody stools  · Monitor stool output while on anticoagulation   · Will need close GI follow up as an outpatient     Constipation  Assessment & Plan  · Noted on admission CT a/p  · Start on bowel regimen with colace and miralax  · Monitor stool output    Hypertension  Assessment & Plan  · BP fairly stable on review  · Continue Lopressor 25 mg BID   · Monitor     CHF (congestive heart failure) ([facility] )  Assessment & Plan  Wt Readings from Last 3 Encounters:   06/02/22 63 1 kg (139 lb 1 8 oz)     · Pt reports hx of CHF, unable to see recent ECHO, chart marked for merge  · Currently not maintained on any diuretics  · ECHO pending as above   · Continue Lopressor 25 mg BID      History of alcohol abuse  Assessment & Plan  · History of alcohol abuse in the past but reports cessation   · On PTA thiamin and folic acid, continue  · No evidence of any withdrawal symptoms at this time, if patient develops symptoms would start [facility] protocol     Schizophrenia ([facility] [address] )  Assessment & Plan  · Mood currently appears stable   · Continue Celexa 10 mg daily, Seroquel 200 mg daily and Trazodone 50 mg daily   · Monitor     VTE Pharmacologic Prophylaxis: VTE Score: 3 Moderate Risk (Score 3-4) - Pharmacological DVT Prophylaxis Ordered: heparin drip  Patient Centered Rounds: I evaluated the patient without nursing staff present due to speaking to nurse outside patient's room   Discussions with Specialists or Other Care Team Provider: Discussed with RN, CM and reviewed previous notes     Education and Discussions with Family / Patient: Patient declined call to   Time Spent for Care: 20 minutes  More than 50% of total time spent on counseling and coordination of care as described above  Current Length of Stay: 1 day(s)  Current Patient Status: Inpatient   Certification Statement: The patient will continue to require additional inpatient hospital stay due to IV heparin gtt, transition to PO a/c, ECHO, pulm consultation  Discharge Plan: Anticipate discharge in 24-48 hrs to discharge location to be determined pending rehab evaluations  Code Status: Level 1 - Full Code    Subjective:   Pt reports that he feels okay today  Is having some mild pain in the left lower extremity  Denies ever having a blot clot before  Denies any chest pain, shortness of breath, nausea or vomiting  Denies any issues with bowel or bladder function  Objective:     Vitals:   Temp (24hrs), Av 4 °F (36 9 °C), Min:98 2 °F (36 8 °C), Max:98 6 °F (37 °C)    Temp:  [98 2 °F (36 8 °C)-98 6 °F (37 °C)] 98 2 °F (36 8 °C)  HR:  [62-70] 67  Resp:  [16-19] 18  BP: (104-171)/(55-79) 154/74  SpO2:  [92 %-98 %] 92 %  Body mass index is 23 15 kg/m²       Input and Output Summary (last 24 hours):   No intake or output data in the 24 hours ending 06/02/22 1014    Physical Exam:   Physical Exam  Vitals reviewed  Constitutional:       General: He is not in acute distress  Appearance: He is not toxic-appearing  Comments: Pt is in no acute distress lying in his hospital bed resting comfortably    HENT:      Head: Normocephalic and atraumatic  Eyes:      Conjunctiva/sclera: Conjunctivae normal    Cardiovascular:      Rate and Rhythm: Normal rate and regular rhythm  Pulses: Normal pulses  Pulmonary:      Effort: Pulmonary effort is normal  No respiratory distress  Breath sounds: No wheezing  Abdominal:      General: Bowel sounds are normal  There is no distension  Palpations: Abdomen is soft  Tenderness: There is no abdominal tenderness  Musculoskeletal:      Right lower leg: No edema  Left lower leg: Edema (mild) present  Skin:     General: Skin is warm and dry  Neurological:      Mental Status: He is alert            Additional Data:     Labs:  Results from last 7 days   Lab Units 06/02/22  0627   WBC Thousand/uL 7 65   HEMOGLOBIN g/dL 13 1   HEMATOCRIT % 39 8   PLATELETS Thousands/uL 190   NEUTROS PCT % 59   LYMPHS PCT % 26   MONOS PCT % 11   EOS PCT % 2     Results from last 7 days   Lab Units 06/02/22  0627   SODIUM mmol/L 137   POTASSIUM mmol/L 4 3   CHLORIDE mmol/L 104   CO2 mmol/L 31   BUN mg/dL 19   CREATININE mg/dL 0 98   ANION GAP mmol/L 2*   CALCIUM mg/dL 8 8   ALBUMIN g/dL 2 7*   TOTAL BILIRUBIN mg/dL 0 41   ALK PHOS U/L 43*   ALT U/L 9*   AST U/L 17   GLUCOSE RANDOM mg/dL 104                 Results from last 7 days   Lab Units 06/02/22  0439   PROCALCITONIN ng/ml <0 05       Lines/Drains:  Invasive Devices  Report    Peripheral Intravenous Line  Duration           Peripheral IV 06/02/22 Left Hand <1 day                  Telemetry:  Telemetry Orders (From admission, onward)             24 Hour Telemetry Monitoring  Continuous x 24 Hours (Telem)        References:    Telemetry Guidelines   Question:  Reason for 24 Hour Telemetry  Answer:  Pulmonary Embolism - 24 hours without resp  compromise, dysrhythmias, hemodynamically stable                 Telemetry Reviewed: Normal Sinus Rhythm  Indication for Continued Telemetry Use: PE             Imaging: Reviewed radiology reports from this admission including: ultrasound(s)    Recent Cultures (last 7 days):         Last 24 Hours Medication List:   Current Facility-Administered Medications   Medication Dose Route Frequency Provider Last Rate    atorvastatin  40 mg Oral Daily With Dinner Mevelyn Jose, DO      citalopram  10 mg Oral Daily Mevelyn Jose, DO      docusate sodium  100 mg Oral BID Tisha Simms PA-C      folic acid  1 mg Oral Daily Mevelyn Jose, DO      heparin (porcine)  3-30 Units/kg/hr (Order-Specific) Intravenous Titrated Henny Torres MD 18 Units/kg/hr (06/01/22 2242)    heparin (porcine)  2,200 Units Intravenous Q1H PRN Henny Torres MD      heparin (porcine)  4,400 Units Intravenous Q1H PRN Henny Torres MD      metoprolol tartrate  25 mg Oral Q12H [address] Mevelyn Jose, DO      nicotine  21 mg Transdermal Once Henny Torres MD      pantoprazole  40 mg Oral BID AC Mevelyn Jose, DO      polyethylene glycol  17 g Oral Daily Tisha Simms PA-C      QUEtiapine  200 mg Oral Daily Mevelyn Jose, DO      thiamine  100 mg Oral Daily Mevelyn Jose, DO      traZODone  50 mg Oral HS PRN Mevelyn Jose, DO          Today, Patient Was Seen By: Nadia Armstrong PA-C    **Please Note: This note may have been constructed using a voice recognition system  **

## 2022-06-02 NOTE — ASSESSMENT & PLAN NOTE
-Abdominal Pain and Left Lower Extremity Edema   -CT A/P in ER: Airspace opacities within the left lower lobe which may represent infection  Recommend short-term follow-up chest CT scan in 3 months to evaluate for resolution  Age indeterminate compression deformity of L2 vertebral body  Large amount fecal material within the colon suggestive of constipation  -U/S LLE: Acute non occlusive thrombus noted in the proximal and distal femoral vein and  deep femoral vein  Acute occlusive thombus noted in the popliteal vein  No evidence of superficial thrombophlebitis noted  Augmentations limited due to the presence of thrombus  Popliteal, posterior tibial and anterior tibial arterial Doppler waveforms are monophasic   -No DVT in RLE  -started on therapeutic heparin drip in the ER  Plan  > Admit to medicine for treatment of acute lower extremity DVT  Continue heparin drip  Would have obtained CTA PE to evaluate for possible PE given DVT but given contrast shortage and that we are treating with therapeutic heparin will hold off overnight  Patient without any hypotension and not hypoxic  Consult pulmonary team regarding ordering the scan for the morning  > will need to closely monitor patient for any GI bleeding given that he is on a heparin drip for his acute DVT of the left lower extremity and that he has had a bleeding ulcer in the past   Patient denies any recent bloody or dark stools  > will order a 2D echo as patient with reported history of CHF and I was unable to find a previous echo  This may be because patient's chart has not yet been merged    > hold off any treatment for the findings in the left lower lobe as patient afebrile and no leukocytosis  > daily labs

## 2022-06-02 NOTE — ASSESSMENT & PLAN NOTE
History of alcohol abuse in the past but reports that he has stopped using  On PTA thiamin and folic acid  Plan  > monitor for any signs of alcohol withdrawal and start CIWA then

## 2022-06-02 NOTE — ED NOTES
Patients RN aware patient arrived to the floor, he met me in the room        Suleman Mahan  06/02/22 3419

## 2022-06-02 NOTE — ASSESSMENT & PLAN NOTE
Wt Readings from Last 3 Encounters:   06/01/22 59 9 kg (132 lb)       -reported history of CHF but unable to find recent echo  -was put on a week of Lasix by his primary care physician recently  Plan  > order 2D echo to further evaluate

## 2022-06-02 NOTE — CONSULTS
Consult Service: Gastroenterology      PATIENT INFORMATION      Canelo Hurt 73 y o  male MRN: [id number]  Unit/Bed#: -01 Encounter: [id number]  PCP: Ronny Mccormick MD  Date of Admission:  6/1/2022  Date of Consultation: 06/02/22  Requesting Physician: Flakita Zarate DO       ASSESSMENTS & PLAN     72 y/o M with PMH of Sadiq 1A duodenal ulcer failing endoscopist/IR embolization requiring exploratory laparotomy and repair requiring multiple blood transfusions, non ambulatory at baseline, chronic constipation, alcohol abuse presented for LLE DVT managed with heparin drip and being managed by pulmonology  GI was consulted for LLQ abdominal pain and anticoagulation management  1  H/o duodenal ulcer  -was noted to have Sadiq 1a duodenal ulcer in 2020, Sadiq 2a on 04/21 and forest 2b at same site in 8/21 was not advised repeat EGD given improving duodenal ulcer  -very low suspicion of active bleeding even Hb stable and WNL, BUN Cr ratio WNL, brown bowel movement today however Pt continues to be at high risk of rebleeding    2  LLQ pain likely 2/2 constipation  3  Tubular adenoma  4  LLE DVT, on heparin drip    Plan:-  -given prior H/o PD will continue PPI daily  -low threshold for EGD if noted to have Hb drop  -continue to trend Hb  -no contraindication for anticoagulation from GI standpoint  -bowel regimen with senna and MiraLax  -encourage ambulation and hydration as tolerated  -avoid NSAIDs  -colonoscopy screening after 2 years    REASON FOR CONSULTATION      LLQ abdominal pain and anticoagulation management for DVT/PE      HISTORY OF PRESENT ILLNESS      Canelo Hurt is a 73 y o  male with PMH of bleeding duodenal ulcer failing endoscopy/IR embolization requiring exploratory laparotomy, constipation, non ambulatory, HTN, alcohol abuse, schizophrenia presented for worsening LLE swelling and abdominal pain on LLQ    Found to have LLE extensive proximal and distal femoral vein and deep femoral vein DVT  CTA was not done given contrast shortage and Pt denies any SOB  Not hypoxic or tachycardic  Pulmonary following  Currently on heparin drip  States LLQ abdominal pain since last 2 months coming on and off  No hematochezia, melena, hematemesis  No N/V  No fever  No diarrhea  Does have constipation on presentation with last bowel movement 1 week ago  CT AP w/o any acute finding however it was done w/o contrast but did show presence of large stool burden  Had 1 bowel movement today which was large soft and brown in color  No active bleeding noted  Hb has been stable  Vitals stable  BUN Cr ratio WNL  Pt was started on heparin drip however given H/o severe bleeding with PUD GI was consulted regarding anticoagulation recommendation and LLQ abdominal pain  REVIEW OF SYSTEMS     A thorough 12-point review of systems has been conducted  Pertinent positives and negatives are mentioned in the history of present illness  PAST MEDICAL & SURGICAL HISTORY      No past medical history on file  No past surgical history on file        MEDICATIONS & ALLERGIES       Medications:   Prior to Admission medications    Not on File       Allergies: No Known Allergies      SOCIAL HISTORY      Marital Status: Unknown    Substance Use History:   Social History     Substance and Sexual Activity   Alcohol Use Not Currently     Social History     Tobacco Use   Smoking Status Current Every Day Smoker    Packs/day: 0 50    Years: 35 00    Pack years: 17 50    Types: Cigarettes   Smokeless Tobacco Not on file     Social History     Substance and Sexual Activity   Drug Use Yes    Types: Marijuana         FAMILY HISTORY      Non-Contributory      PHYSICAL EXAM     Vitals:   Blood Pressure: 154/73 (06/02/22 1114)  Pulse: 66 (06/02/22 1114)  Temperature: 98 5 °F (36 9 °C) (06/02/22 1114)  Temp Source: Oral (06/02/22 0806)  Respirations: 16 (06/02/22 1114)  Height: 5' 5" (165 1 cm) (06/02/22 1050)  Weight - Scale: 63 kg (139 lb) (06/02/22 1050)  SpO2: 96 % (06/02/22 1114)    Physical Exam:   GENERAL: NAD  HEENT:  NC/AT, no scleral icterus  CARDIAC:  RRR, +S1/S2  PULMONARY:  CTA B/L, no wheezing/rales/rhonci, non-labored breathing  ABDOMEN:  Soft, nondistended, LLQ tenderness on palpation, +BS, no rebound/guarding/rigidity  Extremities:  No edema, cyanosis, or clubbing  NEUROLOGIC:  Alert  SKIN:  No rashes or erythema      ADDITIONAL DATA     Lab Results:     Results from last 7 days   Lab Units 06/02/22  0627   WBC Thousand/uL 7 65   HEMOGLOBIN g/dL 13 1   HEMATOCRIT % 39 8   PLATELETS Thousands/uL 190   NEUTROS PCT % 59   LYMPHS PCT % 26   MONOS PCT % 11   EOS PCT % 2     Results from last 7 days   Lab Units 06/02/22  0627   POTASSIUM mmol/L 4 3   CHLORIDE mmol/L 104   CO2 mmol/L 31   BUN mg/dL 19   CREATININE mg/dL 0 98   CALCIUM mg/dL 8 8   ALK PHOS U/L 43*   ALT U/L 9*   AST U/L 17           Imaging:    CT abdomen pelvis wo contrast    Result Date: 6/1/2022  Narrative: CT ABDOMEN AND PELVIS WITHOUT IV CONTRAST INDICATION:   LLQ abdominal pain Left sided abdominal pain and distention x 1 week  COMPARISON:  None  TECHNIQUE:  CT examination of the abdomen and pelvis was performed without intravenous contrast  This examination was performed without intravenous contrast in the context of the critical nationwide Omnipaque shortage  Axial, sagittal, and coronal 2D reformatted images were created from the source data and submitted for interpretation  Radiation dose length product (DLP) for this visit:  362 61 mGy-cm   This examination, like all CT scans performed in the [facility], was performed utilizing techniques to minimize radiation dose exposure, including the use of iterative  reconstruction and automated exposure control  Enteric contrast was not administered  FINDINGS: ABDOMEN LOWER CHEST:  Airspace opacities in the left lower lobe are visualized   LIVER/BILIARY TREE:  Postsurgical changes in the liver are seen  GALLBLADDER:  Gallbladder is surgically absent  SPLEEN:  Unremarkable  PANCREAS:  Unremarkable  ADRENAL GLANDS:  Unremarkable  KIDNEYS/URETERS:  Nonspecific bilateral perinephric stranding is seen  Left-sided renal cyst is visualized  STOMACH AND BOWEL:  Large amount of fecal material within the colon is seen  Surgical clips are seen adjacent to the pylorus of the stomach  APPENDIX:  No findings to suggest appendicitis  ABDOMINOPELVIC CAVITY:  No ascites  No pneumoperitoneum  No lymphadenopathy  VESSELS:  IVC filter is visualized  PELVIS REPRODUCTIVE ORGANS:  Unremarkable for patient's age  URINARY BLADDER:  Unremarkable  ABDOMINAL WALL/INGUINAL REGIONS:  Unremarkable  OSSEOUS STRUCTURES:  Postsurgical changes in the lumbar spine are seen  Age indeterminate compression deformity of the L2 vertebral body is visualized  Impression: Airspace opacities within the left lower lobe which may represent infection  Recommend short-term follow-up chest CT scan in 3 months to evaluate for resolution  Age indeterminate compression deformity of L2 vertebral body  Large amount fecal material within the colon suggestive of constipation  The study was marked in [facility] for immediate notification  Workstation performed: [id number]     XR chest 1 view portable    Result Date: 6/2/2022  Narrative: CHEST INDICATION:   chest pain, SOB  COMPARISON:  None EXAM PERFORMED/VIEWS:  XR CHEST PORTABLE FINDINGS:  Elevated left hemidiaphragm  Cardiomediastinal silhouette appears unremarkable  Mild left basilar atelectasis, otherwise clear lungs  No pneumothorax or pleural effusion  Osseous structures appear within normal limits for patient age  Impression: Elevated left hemidiaphragm and mild left basilar atelectasis, otherwise clear lungs   Workstation performed: [id number]     VAS lower limb venous duplex study, unilateral/limited    Result Date: 6/2/2022  Narrative:  [facility] REPORT CLINICAL: Indications: Patient presents with left lower extremity edema x 7 days  Operative History: Patient denies any previous cardiovascular surgery Risk Factors The patient has history of DVT  FINDINGS:  Left       Impression              PFV        Non Occlusive Thrombus  FV Prox    Non Occlusive Thrombus  FV Dist    Non Occlusive Thrombus  Peroneal   Non Occlusive Thrombus  Popliteal  Occlusive Subsegmental     CONCLUSION:  Impression: RIGHT LOWER LIMB LIMITED: Evaluation shows no evidence of thrombus in the common femoral vein  Doppler evaluation shows a normal response to augmentation maneuvers  LEFT LOWER LIMB: Acute non occlusive thrombus noted in the proximal and distal femoral vein and deep femoral vein  Acute occlusive thrombus noted in the popliteal vein  No evidence of superficial thrombophlebitis noted  Augmentations limited due to the presence of thrombus  Popliteal, posterior tibial and anterior tibial arterial Doppler waveforms are monophasic  Technical findings were given to Dr Dhruv Arias 6/1/2022  SIGNATURE: Electronically Signed by: Patricia Avina on 2022-06-02 11:16:26 AM    Echo complete w/ contrast if indicated    Result Date: 6/2/2022  Narrative: [place]k [place]: Left ventricular cavity size is normal  Wall thickness is moderately increased  There is mild concentric hypertrophy  The left ventricular ejection fraction is 64%  Systolic function is normal  Wall motion is normal  Diastolic function is mildly abnormal, consistent with grade I (abnormal) relaxation    Left Atrium: The atrium is mildly dilated    Aortic Valve: There is aortic sclerosis    Tricuspid Valve: The right ventricular systolic pressure is moderately elevated  The estimated right ventricular systolic pressure is 25 24 mmHg  EKG, Pathology, and Other Studies Reviewed on Admission:   · EKG: Reviewed      Counseling / Coordination of Care Time: 30 total mins spent n consult   Greater than 50% of total time spent on patient counseling and coordination of care     ** Please Note: This note is constructed using a voice recognition dictation system   **

## 2022-06-02 NOTE — ED NOTES
Called unit for pt transfer - No telebox available at this time        [address], RN  06/02/22 4469

## 2022-06-02 NOTE — ED ATTENDING ATTESTATION
6/1/2022  IJayesh DO, saw and evaluated the patient  I have discussed the patient with the resident/non-physician practitioner and agree with the resident's/non-physician practitioner's findings, Plan of Care, and MDM as documented in the resident's/non-physician practitioner's note, except where noted  All available labs and Radiology studies were reviewed  I was present for key portions of any procedure(s) performed by the resident/non-physician practitioner and I was immediately available to provide assistance  At this point I agree with the current assessment done in the Emergency Department  I have conducted an independent evaluation of this patient a history and physical is as follows:    82-year-old male presents with abdominal pain  Patient has history of congestive heart failure, dementia, schizophrenia, hypertension and multiple dominant surgeries  Mostly history given by his son  They states that 2 weeks ago patient started with bilateral lower extremity edema and was placed on 1 week of Lasix by his primary care doctor that did improve the edema  Upon completion of that prescription he had worsening of the edema again  They also noted distension along the left side of his abdomen  Had 1 episode of nonbloody nonbilious vomiting several days ago  Has been tolerating oral intake since that time but has had a poor appetite  Denies shortness of breath  On exam-no acute distress, heart regular, lungs clear, abdomen soft mild tenderness on the left side, pitting edema bilateral lower extremities    Plan-CT abdomen, check labs, duplex left lower extremity    ED Course         Critical Care Time  Procedures

## 2022-06-02 NOTE — ASSESSMENT & PLAN NOTE
· Mood currently appears stable   · Continue Celexa 10 mg daily, Seroquel 200 mg daily and Trazodone 50 mg daily   · Monitor

## 2022-06-02 NOTE — ASSESSMENT & PLAN NOTE
Wt Readings from Last 3 Encounters:   06/02/22 63 1 kg (139 lb 1 8 oz)     · Pt reports hx of CHF, unable to see recent ECHO, chart marked for merge  · Currently not maintained on any diuretics  · ECHO pending as above   · Continue Lopressor 25 mg BID

## 2022-06-02 NOTE — ASSESSMENT & PLAN NOTE
· Noted on admission CT a/p  · Start on bowel regimen with colace and miralax  · Monitor stool output

## 2022-06-02 NOTE — ASSESSMENT & PLAN NOTE
Patient does have a history of peptic ulcer disease in the past related to his alcohol use for which he underwent an EGD and was found to have a bleeding ulcer  On PTA Protonix  Plan  > will need to closely monitor patient for any GI bleeding given that he is on a heparin drip for his acute DVT of the left lower extremity and that he has had a bleeding ulcer in the past   Patient denies any recent bloody or dark stools  > current benefit of treating for acute DVT thought to outweigh risk of GI bleed at the moment    Continual risk versus benefit assessment ongoing

## 2022-06-02 NOTE — ASSESSMENT & PLAN NOTE
· Patient does have a history of peptic ulcer disease with bleeding ulcer in the past   · On PTA Protonix, continue 40 mg BID  · Denies any recent melena or bloody stools  · Monitor stool output while on anticoagulation   · Will need close GI follow up as an outpatient

## 2022-06-02 NOTE — ASSESSMENT & PLAN NOTE
· Pt presented with abdominal pain and LLE edema   · Duplex LLE with acute non occlusive thrombus in the proximal and distal femoral vein and deep femoral vein, acute occlusive thrombus in the popliteal vein  · Likely provoked from chronic immobility  · Currently on IV heparin gtt  · Unable to obtain CTA due to contrast shortage therefore pulmonary consulted   · Pt does not have any tachycardia, shortness of breath or hypoxia, can likely hold off for now pending ECHO as noted below  · ECHO ordered, monitor for any strain   · Likely transition to [address] once seen by pulmonary and ECHO completed

## 2022-06-02 NOTE — CASE MANAGEMENT
Case Management Assessment & Discharge Planning Note    Patient name Tonya Bran  Location [address] [id number]/-[id number] MRN [id number]  : 1950 Date 2022       Current Admission Date: 2022  Current Admission Diagnosis:DVT (deep venous thrombosis) [facility])   Patient Active Problem List    Diagnosis Date Noted    Peptic ulcer disease 2022    DVT (deep venous thrombosis) ([facility] ) 2022    Schizophrenia ([facility] ) 2022    History of alcohol abuse 2022    CHF (congestive heart failure) ([facility] ) 2022    Hypertension 2022    Constipation 2022      LOS (days): 1  Geometric Mean LOS (GMLOS) (days): 2 20  Days to GMLOS:1 5     OBJECTIVE:    Risk of Unplanned Readmission Score: 9 63         Current admission status: Inpatient       Preferred Pharmacy: No Pharmacies Listed  Primary Care Provider: Pauline Bhatti MD    Primary Insurance: [facility]  Secondary Insurance:     ASSESSMENT[facility]  [facility] [address] [facility]   Primary Phone: [phone] (Mobile)               Advance Directives  Does patient have a [address]?: Yes  Does patient have Advance Directives?: Yes  Advance Directives: Power of  for health care, Power of  for finance  Primary Contact: Christina Ibrahim [phone]         Readmission Root Cause  30 Day Readmission: No    Patient Information  Admitted from[de-identified] Home  Mental Status: Alert  During Assessment patient was accompanied by: Not accompanied during assessment  Assessment information provided by[de-identified] Patient, Son, Daughter  Support Systems: Son, Daughter, Home care staff, Self  Home entry access options  Select all that apply : Stairs  Number of steps to enter home  : 1  Do the steps have railings?: No  Type of Current Residence: Apartment  Floor Level: 1  Upon entering residence, is there a bedroom on the main floor (no further steps)?: Yes  Upon entering residence, is there a bathroom on the main floor (no further steps)?: Yes  In the last 12 months, was there a time when you were not able to pay the mortgage or rent on time?: No  In the last 12 months, how many places have you lived?: 1  In the last 12 months, was there a time when you did not have a steady place to sleep or slept in a shelter (including now)?: No  Homeless/housing insecurity resource given?: N/A  Living Arrangements: Lives w/ Son    Activities of Daily Living Prior to Admission  Functional Status: Assistance  Completes ADLs independently?: No  Level of ADL dependence: Assistance  Ambulates independently?: No  Level of ambulatory dependence: Assistance  Does patient use assisted devices?: Yes  Assisted Devices (DME) used: Kyra Less, Wheelchair, Straight Cane  Does patient currently own DME?: Yes  What DME does the patient currently own?: Kyra Less, Wheelchair, Straight Cane, Shower Chair, Bedside Commode  Does patient have a history of Outpatient Therapy (PT/OT)?: No  Does the patient have a history of Short-Term Rehab?: Yes (Patient stated he doesn't remember what facility he was at)  Does patient have a history of HHC?: Yes  Does patient currently have [facility]?: Yes    Current Home Health Care  Type of Current Home Care Services: Home PT, Home OT, Nurse visit  Current Home Health Agency[de-identified] [address] [place] [address] Provider[de-identified] PCP    Patient Information Continued  Income Source: SSI/SSD  Does patient have prescription coverage?: Yes (Patient uses [address] for short term rx)  Within the past 12 months, you worried that your food would run out before you got the money to buy more : Never true  Within the past 12 months, the food you bought just didn't last and you didn't have money to get more : Never true  Food insecurity resource given?: N/A  Does patient receive dialysis treatments?: No  Does patient have a history of substance abuse?: Yes  Historical substance use preference: Alcohol/ETOH (Patient stated he used "everything" in the past)  Is patient currently in treatment for substance abuse?: N/A - sober (Patient reports he has been sober from ETOH for about 14 months, went to rehab for ETOH abuse  Patient reports he has been sober from drugs (except marijuana) for about 10 years  Patient reports he spokes marijuana for pain currently )  Does patient have a history of Mental Health Diagnosis?: Yes  Is patient receiving treatment for mental health?: Yes  Has patient received inpatient treatment related to mental health in the last 2 years?: Yes (Patient reports voluntary hospitalization about 2 years ago   Patient stated he had a suicide attempt about 10 years ago)         Means of Transportation  Means of Transport to Appts[de-identified] Family transport  In the past 12 months, has lack of transportation kept you from medical appointments or from getting medications?: No  In the past 12 months, has lack of transportation kept you from meetings, work, or from getting things needed for daily living?: No  Was application for public transport provided?: N/A        DISCHARGE DETAILS:    Discharge planning discussed with[de-identified] Patient, son, daughter in law  Freedom of Choice: Yes     CM contacted family/caregiver?: Yes  Were Treatment Team discharge recommendations reviewed with patient/caregiver?: Yes  Did patient/caregiver verbalize understanding of patient care needs?: Yes  Were patient/caregiver advised of the risks associated with not following Treatment Team discharge recommendations?: Yes    Contacts  Patient Contacts: Anna Gaxiola (son)  Relationship to Patient[de-identified] Family  Contact Method: Phone  Phone Number: [phone]  Reason/Outcome: Discharge [address]         Is the patient interested in [facility] at discharge?: Yes  [address] requested[de-identified] Occupational Therapy, Physical Therapy, [address] Name[de-identified] [address] Provider[de-identified] PCP  Home Health Services Needed[de-identified] Evaluate Functional Status and Safety, Gait/ADL Training, Strengthening/Theraputic Exercises to Improve Function, Other (comment) (medication administration)  Homebound Criteria Met[de-identified] Requires the Assistance of Another Person for Safe Ambulation or to Leave the Home, Uses an Assist Device (i e  cane, walker, etc)  Supporting Clincal Findings[de-identified] Limited Endurance, Fatigues Easliy in [employer]    DME Referral Provided  Referral made for DME?: No         Would you like to participate in our [address] service program?  : No - Declined                              Patient lives in 2 story apartment with son, bedroom and bathroom access on main level  Patient reports 1 RENETTA with no railing, then about 2 steps with a railing once inside  Patient has a caregiver [phone] Mon-Sat, home physician visits through waiver program  Daughter in law reports patient active with [facility] for SN, PT, OT - referral placed via [facility] to continue services after discharge  Daughter in law and son do not want patient to go to SNF as patient has all the support he needs set up at home with family/HHC/and waiver program  Daughter in law told CM that patient will need [facility] for transport home as patient's son does not drive and she had recent surgery/cannot drive at this time  Patient is vaccinated for covid-19 with Moderna vaccine  CM reviewed d/c planning process including the following: identifying help at home, patient preference for d/c planning needs, Discharge Lounge, [facility] Meds to Bed program, availability of treatment team to discuss questions or concerns patient and/or family may have regarding understanding medications and recognizing signs and symptoms once discharged  CM also encouraged patient to follow up with all recommended appointments after discharge  Patient advised of importance for patient and family to participate in managing patients medical well being

## 2022-06-02 NOTE — H&P
[address] [facility]  H&P- Davian Ordaz 50 1950, 73 y o  male MRN: [id number]  Unit/Bed#: ED 28 Encounter: [id number]  Primary Care Provider: Onelia Zarate MD   Date and time admitted to hospital: 6/1/2022  6:43 PM    * DVT (deep venous thrombosis) ([facility] [address] )  Assessment & Plan  -Abdominal Pain and Left Lower Extremity Edema   -CT A/P in ER: Airspace opacities within the left lower lobe which may represent infection  Recommend short-term follow-up chest CT scan in 3 months to evaluate for resolution  Age indeterminate compression deformity of L2 vertebral body  Large amount fecal material within the colon suggestive of constipation  -U/S LLE: Acute non occlusive thrombus noted in the proximal and distal femoral vein and  deep femoral vein  Acute occlusive thombus noted in the popliteal vein  No evidence of superficial thrombophlebitis noted  Augmentations limited due to the presence of thrombus  Popliteal, posterior tibial and anterior tibial arterial Doppler waveforms are monophasic   -No DVT in RLE  -started on therapeutic heparin drip in the ER  Plan  > Admit to medicine for treatment of acute lower extremity DVT  Continue heparin drip  Would have obtained CTA PE to evaluate for possible PE given DVT but given contrast shortage and that we are treating with therapeutic heparin will hold off overnight  Patient without any hypotension and not hypoxic  Consult pulmonary team regarding ordering the scan for the morning  > will need to closely monitor patient for any GI bleeding given that he is on a heparin drip for his acute DVT of the left lower extremity and that he has had a bleeding ulcer in the past   Patient denies any recent bloody or dark stools  > will order a 2D echo as patient with reported history of CHF and I was unable to find a previous echo  This may be because patient's chart has not yet been merged    > hold off any treatment for the findings in the left lower lobe as patient afebrile and no leukocytosis  > daily labs    Peptic ulcer disease  Assessment & Plan  Patient does have a history of peptic ulcer disease in the past related to his alcohol use for which he underwent an EGD and was found to have a bleeding ulcer  On PTA Protonix  Plan  > will need to closely monitor patient for any GI bleeding given that he is on a heparin drip for his acute DVT of the left lower extremity and that he has had a bleeding ulcer in the past   Patient denies any recent bloody or dark stools  > current benefit of treating for acute DVT thought to outweigh risk of GI bleed at the moment  Continual risk versus benefit assessment ongoing    CHF (congestive heart failure) (HCC)  Assessment & Plan  Wt Readings from Last 3 Encounters:   06/01/22 59 9 kg (132 lb)       -reported history of CHF but unable to find recent echo  -was put on a week of Lasix by his primary care physician recently  Plan  > order 2D echo to further evaluate      History of alcohol abuse  Assessment & Plan  History of alcohol abuse in the past but reports that he has stopped using  On PTA thiamin and folic acid  Plan  > monitor for any signs of alcohol withdrawal and start CIWA then    Schizophrenia [facility])  Assessment & Plan  Continue PTA Seroquel, Celexa, and trazodone p r n  VTE Prophylaxis: Heparin Drip  / sequential compression device   Code Status: Full Code     Anticipated Length of Stay:  Patient will be admitted on an Inpatient basis with an anticipated length of stay of  > 2 midnights  Justification for Hospital Stay: Please see detailed plans noted above      Chief Complaint:     Abdominal pain, left lower extremity swelling  History of Present Illness:  Livia Maurice is a 73 y o  male who has past medical history significant for CHF, schizophrenia, peptic ulcer disease, alcohol abuse who presented to [facility] on the evening of 6/1 with symptoms of bilateral lower extremity edema (LLL>RLE)  For the edema, patient was placed on 1 week of Lasix by his PCP with reported improvement in the swelling of the right lower extremity but the left lower extremity remained swollen  Patient also reports diffuse abdominal pain on the left lower quadrant and nonbilious nonbloody vomiting  Patient denies any fevers or cough  Patient does endorse some dyspnea on exertion but none at rest   Patient denies any current bright red blood in his stools or melena  Review of Systems:    Constitutional:  Denies fever or chills   Eyes:  Denies change in visual acuity   HENT:  Denies nasal congestion or sore throat   Respiratory:  Denies cough or shortness of breath   Cardiovascular:  Denies chest pain or edema   GI:  Positive for abdominal pain  :  Denies dysuria   Musculoskeletal:  Positive for left lower extremity swelling  Integument:  Denies rash   Neurologic:  Denies headache or sensory changes   Endocrine:  Denies polyuria or polydipsia   Lymphatic:  Denies swollen glands   Psychiatric:  Denies depression or anxiety     Past Medical and Surgical History:   No past medical history on file  No past surgical history on file  Meds/Allergies:  (Not in a hospital admission)      Allergies: No Known Allergies  History:    Substance Use History:   Social History     Substance and Sexual Activity   Alcohol Use Not on file     Social History     Tobacco Use   Smoking Status Not on file   Smokeless Tobacco Not on file     Social History     Substance and Sexual Activity   Drug Use Not on file       Family History:  No family history on file      Physical Exam:     Vitals:   Blood Pressure: 139/75 (06/02/22 0000)  Pulse: 64 (06/02/22 0000)  Temperature: 98 5 °F (36 9 °C) (06/01/22 1847)  Temp Source: Oral (06/01/22 1847)  Respirations: 19 (06/01/22 2203)  Weight - Scale: 59 9 kg (132 lb) (06/01/22 2207)  SpO2: 93 % (06/02/22 0000)    Constitutional:   non-toxic appearance   Eyes:  PERRL  HENT: Atraumatic  Respiratory:  No respiratory distress  Cardiovascular:  Normal rate, no murmurs  GI:  Soft, nondistended, no guarding   :  No costovertebral angle tenderness   Musculoskeletal:  Left lower extremity edema,  Back- no tenderness  Integument:  Well hydrated, no rash   Lymphatic:  No lymphadenopathy noted   Neurologic:  Alert &awake, communicative, CN 2-12 normal,  no focal deficits noted         Lab Results: I have personally reviewed pertinent reports  Results from last 7 days   Lab Units 06/01/22 2023   WBC Thousand/uL 5 27   HEMOGLOBIN g/dL 12 2   HEMATOCRIT % 37 4   PLATELETS Thousands/uL 199   NEUTROS PCT % 54   LYMPHS PCT % 33   MONOS PCT % 12   EOS PCT % 1     Results from last 7 days   Lab Units 06/01/22 2023   POTASSIUM mmol/L 4 7   CHLORIDE mmol/L 105   CO2 mmol/L 30   BUN mg/dL 14   CREATININE mg/dL 0 94   CALCIUM mg/dL 8 9   ALK PHOS U/L 41*   ALT U/L 9*   AST U/L 24             Imaging: I have personally reviewed pertinent reports  CT abdomen pelvis wo contrast    Result Date: 6/1/2022  Narrative: CT ABDOMEN AND PELVIS WITHOUT IV CONTRAST INDICATION:   LLQ abdominal pain Left sided abdominal pain and distention x 1 week  COMPARISON:  None  TECHNIQUE:  CT examination of the abdomen and pelvis was performed without intravenous contrast  This examination was performed without intravenous contrast in the context of the critical nationwide Omnipaque shortage  Axial, sagittal, and coronal 2D reformatted images were created from the source data and submitted for interpretation  Radiation dose length product (DLP) for this visit:  362 61 mGy-cm   This examination, like all CT scans performed in the [facility], was performed utilizing techniques to minimize radiation dose exposure, including the use of iterative  reconstruction and automated exposure control  Enteric contrast was not administered   FINDINGS: ABDOMEN LOWER CHEST:  Airspace opacities in the left lower lobe are visualized  LIVER/BILIARY TREE:  Postsurgical changes in the liver are seen  GALLBLADDER:  Gallbladder is surgically absent  SPLEEN:  Unremarkable  PANCREAS:  Unremarkable  ADRENAL GLANDS:  Unremarkable  KIDNEYS/URETERS:  Nonspecific bilateral perinephric stranding is seen  Left-sided renal cyst is visualized  STOMACH AND BOWEL:  Large amount of fecal material within the colon is seen  Surgical clips are seen adjacent to the pylorus of the stomach  APPENDIX:  No findings to suggest appendicitis  ABDOMINOPELVIC CAVITY:  No ascites  No pneumoperitoneum  No lymphadenopathy  VESSELS:  IVC filter is visualized  PELVIS REPRODUCTIVE ORGANS:  Unremarkable for patient's age  URINARY BLADDER:  Unremarkable  ABDOMINAL WALL/INGUINAL REGIONS:  Unremarkable  OSSEOUS STRUCTURES:  Postsurgical changes in the lumbar spine are seen  Age indeterminate compression deformity of the L2 vertebral body is visualized  Impression: Airspace opacities within the left lower lobe which may represent infection  Recommend short-term follow-up chest CT scan in 3 months to evaluate for resolution  Age indeterminate compression deformity of L2 vertebral body  Large amount fecal material within the colon suggestive of constipation  The study was marked in [facility] for immediate notification  Workstation performed: [id number]         ** Please Note: Dragon 360 Dictation voice to text software was used in the creation of this document   **

## 2022-06-02 NOTE — UTILIZATION REVIEW
Initial Clinical Review    Admission: Date/Time/Statement:   Admission Orders (From admission, onward)     Ordered        06/01/22 2240  Inpatient Admission  Once                      Orders Placed This Encounter   Procedures    Inpatient Admission     Standing Status:   Standing     Number of Occurrences:   1     Order Specific Question:   Level of Care     Answer:   Med Surg [16]     Order Specific Question:   Estimated length of stay     Answer:   More than 2 Midnights     Order Specific Question:   Certification     Answer:   I certify that inpatient services are medically necessary for this patient for a duration of greater than two midnights  See H&P and MD Progress Notes for additional information about the patient's course of treatment  ED Arrival Information     Expected   -    Arrival   6/1/2022 18:43    Acuity   Urgent            Means of arrival   Ambulance    Escorted by   [facility]    Service   Hospitalist    Admission type   Urgent            Arrival complaint   LUQ Pain            Chief Complaint   Patient presents with    Abdominal Pain     Pt reports left sided abdominal pain for a week  Initial Presentation: 73 y o  male with PMH of CHF, schizophrenia, peptic ulcer disease, alcohol abuse presented to the ED from home via EMS with b/l LE edema, LLE>RLE and diffuse abdominal pain on the LLQ associated with vomiting    Pt reports that vPCP placed him on 1 week of Lasix with improvement in swelling of the RLE but LLE remained swollen  Reports dyspnea on exertion, none at rest    In the ED,   CT A/P in ER: Airspace opacities within the left lower lobe which may represent infection  Recommend short-term follow-up chest CT scan in 3 months to evaluate for resolution  Age indeterminate compression deformity of L2 vertebral body  Large amount fecal material within the colon suggestive of constipation    -U/S LLE: Acute non occlusive thrombus noted in the proximal and distal femoral vein and  deep femoral vein  Acute occlusive thombus noted in the popliteal vein  No evidence of superficial thrombophlebitis noted  Augmentations limited due to the presence of thrombus  Popliteal, posterior tibial and anterior tibial arterial Doppler waveforms are monophasic   -No DVT in RLE  Pt was started on Heparin gtt  Given Ketorolac x 1 in the ED  Plan: Inpatient admission for evaluation and treatment of DVT LLE: Cont heparin drip, obtained CTA PE to evaluate for possible PE  Mon for GI bleed, order 2D echo, hold tx for Left lower lobe, daily labs  Mon for alc w/drwal  Mon on telemetry    Date: 06/02   Day 2:   IM Notes: Pt reports that he still having some mild LLE pain  Denies sob, n/v or cp  Cont Hep gtt, Pulmonary consult d/t unable to obtain CTA d/t contrast shortage  ECHO ordered, monitor for any strain   Cont PTA protonix  Start on bowel regimen with colace and miralax  Monitor stool output  Continue Lopressor 25 mg BID  No s/s of alc w/drawal    PE: Pt alert, awake  LLE edema present   Pulmonary effort normal       ED Triage Vitals   Temperature Pulse Respirations Blood Pressure SpO2   06/01/22 1847 06/01/22 1847 06/01/22 1847 06/01/22 1848 06/01/22 1847   98 5 °F (36 9 °C) 65 17 104/58 98 %      Temp Source Heart Rate Source Patient Position - Orthostatic VS BP Location FiO2 (%)   06/01/22 1847 06/02/22 0700 -- -- --   Oral Monitor         Pain Score       06/02/22 0617       No Pain          Wt Readings from Last 1 Encounters:   06/02/22 63 kg (139 lb)     Additional Vital Signs:   Date/Time Temp Pulse Resp BP MAP (mmHg) SpO2   06/02/22 11:14:53 98 5 °F (36 9 °C) 66 16 154/73 100 96 %   06/02/22 1050 -- 67 -- 154/74 -- --   06/02/22 08:06:10 98 2 °F (36 8 °C) 67 -- 154/74 101 92 %   06/02/22 0800 98 2 °F (36 8 °C) 67 -- -- -- --   06/02/22 0700 -- 66 18 151/77 103 93 %   06/02/22 0617 98 6 °F (37 °C) 70 16 171/79 Abnormal  -- 96 %   06/02/22 0500 -- -- -- 157/73 105 --   06/02/22 0441 -- -- -- 170/76 107 --   06/02/22 0200 -- -- -- 148/74 104 --   06/02/22 0000 -- 64 -- 139/75 98 93 %   06/01/22 2245 -- 62 -- 113/55 77 95 %   06/01/22 2203 -- 65 19 108/60 -- --   06/01/22 2000 -- 64 -- -- -- 92 %   06/01/22 1930 -- 66 -- 110/59 79 94 %   06/01/22 1848 -- -- -- 104/58 -- --       Pertinent Labs/Diagnostic Test Results:   VAS lower limb venous duplex study, unilateral/limited   Final Result by Martha Aaron DO (06/02 1116)      XR chest 1 view portable   Final Result by Pedro Grant MD (06/02 7003)      Elevated left hemidiaphragm and mild left basilar atelectasis, otherwise clear lungs  Workstation performed: [id number]         CT abdomen pelvis wo contrast   Final Result by Re Barrett DO (06/01 2044)      Airspace opacities within the left lower lobe which may represent infection  Recommend short-term follow-up chest CT scan in 3 months to evaluate for resolution  Age indeterminate compression deformity of L2 vertebral body  Large amount fecal material within the colon suggestive of constipation  The study was marked in [facility] for immediate notification  Workstation performed: [id number]           06/01 EKG result: NSR  06/02 ECHO:    Left Ventricle: Left ventricular cavity size is normal  Wall thickness is moderately increased  There is mild concentric hypertrophy  The left ventricular ejection fraction is 64%  Systolic function is normal  Wall motion is normal  Diastolic function is mildly abnormal, consistent with grade I (abnormal) relaxation    Left Atrium: The atrium is mildly dilated    Aortic Valve: There is aortic sclerosis    Tricuspid Valve: The right ventricular systolic pressure is moderately elevated  The estimated right ventricular systolic pressure is 63 52 mmHg          Results from last 7 days   Lab Units 06/02/22  0627 06/01/22 2023   WBC Thousand/uL 7 65 5 27   HEMOGLOBIN g/dL 13 1 12 2   HEMATOCRIT % 39 8 37 4   PLATELETS Thousands/uL 190 199 NEUTROS ABS Thousands/µL 4 63 2 79         Results from last 7 days   Lab Units 06/02/22  0627 06/01/22 2023   SODIUM mmol/L 137 136   POTASSIUM mmol/L 4 3 4 7   CHLORIDE mmol/L 104 105   CO2 mmol/L 31 30   ANION GAP mmol/L 2* 1*   BUN mg/dL 19 14   CREATININE mg/dL 0 98 0 94   EGFR ml/min/1 73sq m 78 82   CALCIUM mg/dL 8 8 8 9   MAGNESIUM mg/dL 2 2  --      Results from last 7 days   Lab Units 06/02/22  0627 06/01/22 2023   AST U/L 17 24   ALT U/L 9* 9*   ALK PHOS U/L 43* 41*   TOTAL PROTEIN g/dL 6 4 6 3*   ALBUMIN g/dL 2 7* 2 7*   TOTAL BILIRUBIN mg/dL 0 41 0 36         Results from last 7 days   Lab Units 06/02/22  0627 06/01/22 2023   GLUCOSE RANDOM mg/dL 104 94       Results from last 7 days   Lab Units 06/01/22 2211 06/01/22 2023   HS TNI 0HR ng/L  --  3   HS TNI 2HR ng/L 2  --    HSTNI D2 ng/L -1  --          Results from last 7 days   Lab Units 06/02/22  0813 06/01/22 2211   PTT seconds 85* 29         Results from last 7 days   Lab Units 06/02/22  0439   PROCALCITONIN ng/ml <0 05                 Results from last 7 days   Lab Units 06/02/22 0627   NT-PRO BNP pg/mL 474*             Results from last 7 days   Lab Units 06/01/22 2023   LIPASE u/L 131     ED Treatment:   Medication Administration from 06/01/2022 1843 to 06/02/2022 0746       Date/Time Order Dose Route Action     06/01/2022 2021 ketorolac (TORADOL) injection 15 mg 15 mg Intravenous Given     06/01/2022 2202 nicotine (NICODERM CQ) 21 mg/24 hr TD 24 hr patch 21 mg 21 mg Transdermal Medication Applied     06/01/2022 2241 heparin (porcine) injection 4,400 Units 4,400 Units Intravenous Given     06/01/2022 2242 heparin (porcine) 25,000 units in 0 45% NaCl 250 mL infusion (premix) 18 Units/kg/hr Intravenous New Bag     06/02/2022 0620 heparin (VTE/PE) high 0 mL Intravenous Hold     06/02/2022 0642 heparin (porcine) 25,000 units in 0 45% NaCl 250 mL infusion (premix) 3 Units/kg/hr Intravenous Not Given        No past medical history on file   Present on Admission:  **None**      Admitting Diagnosis: Abdominal pain [R10 9]  Constipation [K59 00]  DVT (deep venous thrombosis) (HCC) [I82 409]  Age/Sex: 73 y o  male  Admission Orders:   SCD  PT/OT  Daily weights  I/O  24 Hr telemtry Monitoring    Scheduled Medications:  atorvastatin, 40 mg, Oral, Daily With Dinner  citalopram, 10 mg, Oral, Daily  docusate sodium, 100 mg, Oral, BID  folic acid, 1 mg, Oral, Daily  metoprolol tartrate, 25 mg, Oral, Q12H FLOR  nicotine, 21 mg, Transdermal, Once  pantoprazole, 40 mg, Oral, BID AC  polyethylene glycol, 17 g, Oral, Daily  QUEtiapine, 200 mg, Oral, Daily  thiamine, 100 mg, Oral, Daily      Continuous IV Infusions:  heparin (porcine), 3-30 Units/kg/hr (Order-Specific), Intravenous, Titrated      PRN Meds:  heparin (porcine), 2,200 Units, Intravenous, Q1H PRN  heparin (porcine), 4,400 Units, Intravenous, Q1H PRN  traZODone, 50 mg, Oral, HS PRN        IP CONSULT TO PULMONOLOGY  IP CONSULT TO GASTROENTEROLOGY    Network Utilization Review Department  ATTENTION: Please call with any questions or concerns to [phone] and carefully listen to the prompts so that you are directed to the right person  All voicemails are confidential   [place] all requests for admission clinical reviews, approved or denied determinations and any other requests to dedicated fax number below belonging to the campus where the patient is receiving treatment   List of dedicated fax numbers for the Facilities:  [address] DENIALS (Administrative/Medical Necessity) [phone]   [address] (Maternity/NICU/Pediatrics) [phone]   [address] 40 125 [facility]  [address] [place] [address] [place] [address] [phone] [phone]

## 2022-06-03 LAB
ERYTHROCYTE [DISTWIDTH] IN BLOOD BY AUTOMATED COUNT: 18.1 % (ref 11.6–15.1)
HCT VFR BLD AUTO: 40.9 % (ref 36.5–49.3)
HGB BLD-MCNC: 13.5 G/DL (ref 12–17)
MCH RBC QN AUTO: 31.2 PG (ref 26.8–34.3)
MCHC RBC AUTO-ENTMCNC: 33 G/DL (ref 31.4–37.4)
MCV RBC AUTO: 95 FL (ref 82–98)
PLATELET # BLD AUTO: 192 THOUSANDS/UL (ref 149–390)
PMV BLD AUTO: 10.5 FL (ref 8.9–12.7)
RBC # BLD AUTO: 4.33 MILLION/UL (ref 3.88–5.62)
WBC # BLD AUTO: 6.08 THOUSAND/UL (ref 4.31–10.16)

## 2022-06-03 PROCEDURE — 97167 OT EVAL HIGH COMPLEX 60 MIN: CPT

## 2022-06-03 PROCEDURE — 97163 PT EVAL HIGH COMPLEX 45 MIN: CPT

## 2022-06-03 PROCEDURE — 99232 SBSQ HOSP IP/OBS MODERATE 35: CPT | Performed by: PHYSICIAN ASSISTANT

## 2022-06-03 PROCEDURE — 85027 COMPLETE CBC AUTOMATED: CPT | Performed by: PHYSICIAN ASSISTANT

## 2022-06-03 RX ADMIN — NICOTINE 21 MG: 21 PATCH, EXTENDED RELEASE TRANSDERMAL at 21:30

## 2022-06-03 RX ADMIN — QUETIAPINE FUMARATE 600 MG: 300 TABLET ORAL at 21:29

## 2022-06-03 RX ADMIN — APIXABAN 10 MG: 5 TABLET, FILM COATED ORAL at 10:35

## 2022-06-03 RX ADMIN — CITALOPRAM HYDROBROMIDE 10 MG: 10 TABLET ORAL at 09:36

## 2022-06-03 RX ADMIN — PANTOPRAZOLE SODIUM 40 MG: 40 TABLET, DELAYED RELEASE ORAL at 05:41

## 2022-06-03 RX ADMIN — METOPROLOL TARTRATE 25 MG: 50 TABLET, FILM COATED ORAL at 21:29

## 2022-06-03 RX ADMIN — LIDOCAINE 5% 1 PATCH: 700 PATCH TOPICAL at 09:34

## 2022-06-03 RX ADMIN — ATORVASTATIN CALCIUM 40 MG: 40 TABLET, FILM COATED ORAL at 17:29

## 2022-06-03 RX ADMIN — METOPROLOL TARTRATE 25 MG: 50 TABLET, FILM COATED ORAL at 09:37

## 2022-06-03 RX ADMIN — QUETIAPINE FUMARATE 200 MG: 100 TABLET ORAL at 09:36

## 2022-06-03 RX ADMIN — APIXABAN 10 MG: 5 TABLET, FILM COATED ORAL at 17:37

## 2022-06-03 RX ADMIN — FOLIC ACID 1 MG: 1 TABLET ORAL at 09:33

## 2022-06-03 RX ADMIN — SENNOSIDES 8.6 MG: 8.6 TABLET, FILM COATED ORAL at 17:37

## 2022-06-03 RX ADMIN — SENNOSIDES 8.6 MG: 8.6 TABLET, FILM COATED ORAL at 09:32

## 2022-06-03 RX ADMIN — THIAMINE HCL TAB 100 MG 100 MG: 100 TAB at 09:36

## 2022-06-03 NOTE — ASSESSMENT & PLAN NOTE
· Pt presented with abdominal pain and LLE edema   · Duplex LLE with acute non occlusive thrombus in the proximal and distal femoral vein and deep femoral vein, acute occlusive thrombus in the popliteal vein  · Likely provoked from chronic immobility  · Currently on IV heparin gtt, will transition to PO Eliquis at 10 mg BID x 7 days followed by 5 mg BID thereafter  Cleared to start upon discussion with GI, will monitor for 24 hours to ensure no bleeding  · Unable to obtain CTA due to contrast shortage therefore pulmonary consulted   · Pt does not have any tachycardia, shortness of breath or hypoxia, ECHO ordered without any evidence of strain, no indication for repeat imaging at this time as would likely not  at this point  Pulmonary signed off

## 2022-06-03 NOTE — OCCUPATIONAL THERAPY NOTE
Occupational Therapy Evaluation      Tyrone Malhotra    6/3/2022    Principal Problem:    DVT (deep venous thrombosis) ([facility])  Active Problems:    Peptic ulcer disease    Schizophrenia ([facility] [address] )    History of alcohol abuse    CHF (congestive heart failure) ([facility] )    Hypertension    Constipation      No past medical history on file  No past surgical history on file  06/03/22 1016   OT Last Visit   OT Visit Date 06/03/22   Note Type   Note type Evaluation   Restrictions/Precautions   Weight Bearing Precautions Per Order No   Other Precautions Chair Alarm; Bed Alarm;Multiple lines; Fall Risk;Cognitive   Pain Assessment   Pain Assessment Tool 0-10   Pain Score No Pain   Home Living   Type of Home Apartment   Home Layout Performs ADLs on one level; Able to live on main level with bedroom/bathroom   Bathroom Toilet Raised   Bathroom Equipment Commode; [address]; Wheelchair-manual;Hospital bed;Grab bars   Additional Comments pt reports living w his son in an apartment   Prior Function   Level of [place] Needs assistance with ADLs and functional mobility; Needs assistance with IADLs   Lives With Son;Family   Receives Help From Personal care attendant; Family   ADL Assistance Needs assistance   IADLs Needs assistance   Falls in the last 6 months 1 to 4   Comments pt reports 3 recent falls  Has HHA 9-4 Monday - Saturday   Lifestyle   Autonomy pt reports having good days and bad days  on good days ,he can take himself to the bathroom, do own UB self care  on bad days he tends to stay in bed   Reciprocal Relationships supportive family   Psychosocial   Psychosocial (WDL) WDL   Subjective   Subjective "I have help at home"   ADL   Eating Assistance 5  Supervision/Setup   Eating Deficit Setup; Increased time to complete   Grooming Assistance 3  Moderate Assistance   Grooming Deficit Setup; Increased time to complete;Wash/dry hands; Wash/dry face; Teeth care;Brushing hair   UB Bathing Assistance 3  Moderate Assistance   UB Bathing Deficit Increased time to complete   LB Bathing Assistance 3  Moderate Assistance   LB Bathing Deficit Increased time to complete   UB Dressing Assistance 4  Minimal Assistance   UB Dressing Deficit Increased time to complete   LB Dressing Assistance 3  Moderate Assistance   LB Dressing Deficit Don/doff R sock; Don/doff L sock   Bed Mobility   Rolling R 5  Supervision   Rolling L 5  Supervision   Supine to Sit 5  Supervision   Sit to Supine 5  Supervision   Transfers   Sit to Stand 3  Moderate assistance   Additional items Assist x 1; Increased time required   Stand to Sit 3  Moderate assistance   Additional items Assist x 1; Increased time required   Stand pivot 3  Moderate assistance   Additional items Assist x 1; Increased time required;Verbal cues   Balance   Static Sitting Fair   Dynamic Sitting Fair -   Static Standing Poor   Dynamic Standing Poor   Activity Tolerance   Activity Tolerance Patient limited by fatigue;Patient tolerated treatment well   Medical Staff Made Aware PT Michelle   Nurse Made Aware ok to see   RUE Assessment   RUE Assessment WFL   LUE Assessment   LUE Assessment WFL   Hand Function   Gross Motor Coordination Functional   Fine Motor Coordination Functional   Vision - Complex Assessment   Tracking Able to track stimulus in all quads without difficulty   Cognition   Overall Cognitive Status Impaired   Arousal/Participation Alert; Cooperative   Attention Attends with cues to redirect   Orientation Level Oriented X4   Following Commands Follows one step commands without difficulty   Assessment   Limitation Decreased ADL status; Decreased Safe judgement during ADL;Decreased endurance;Decreased self-care trans;Decreased high-level ADLs   Assessment Pt is a 73 y o  male seen for OT evaluation s/p admit to [facility] on 6/1/2022 w/ DVT (deep venous thrombosis) ([facility] [address] )  Pt admitted with LE edema  Found to have LLE DVT    Comorbidities affecting pt's functional performance at time of assessment include: HTN and schizophrenia, alcohol abuse, CHF, PUD  Personal factors affecting pt at time of IE include:difficulty performing ADLS, limited insight into deficits, compliance and decreased initiation and engagement   Prior to admission, pt was living w family in an apartment  He has aides 7 hrs/day, 6 days per week  Pt reports he can sometimes do his own care, toilet himself, other times he needs assist  Reports 3 recent falls, c/o being weak and unsteady lately, spends most of his time in bed  Upon evaluation: Pt requires mod/max assist self care, mod assist sit to stand and SPT  Pt c/o feeling weak  He does present with  the following deficits impacting occupational performance: weakness, decreased strength, decreased balance, decreased tolerance, impaired problem solving and decreased safety awareness  Pt to benefit from continued skilled OT tx while in the hospital to address deficits as defined above and maximize level of functional independence w ADL's and functional mobility  Occupational Performance areas to address include: grooming, bathing/shower, toilet hygiene, dressing, functional mobility and clothing management  Based on findings from OT evaluation and functional performance deficits, pt has been identified as a  high complexity evaluation  The patient's raw score on the AM-PAC Daily Activity inpatient short form is 16, standardized score is 35 96, less than 39 4  Patients at this level are likely to benefit from discharge to post-acute rehabilitation services  HOWEVER, From OT standpoint, recommendation at time of d/c would be home w family support and home therapies  Pt appears to have a good support system w ample of assist at home  Goals   Patient Goals to stay in bed   Plan   Treatment Interventions ADL retraining;Functional transfer training;UE strengthening/ROM; Endurance training;Cognitive reorientation;Patient/family training;Equipment evaluation/education; Compensatory technique education; Energy conservation; Activityengagement   Goal Expiration Date 06/17/22   OT Frequency 2-3x/wk   Recommendation   OT Discharge Recommendation Home with home health rehabilitation   OT - OK to Discharge Yes   AM-PAC Daily Activity Inpatient   Lower Body Dressing 2   Bathing 2   Toileting 2   Upper Body Dressing 3   Grooming 3   Eating 4   Daily Activity Raw Score 16   Daily Activity Standardized Score (Calc for Raw Score >=11) 35 96     OT GOALS TO BE ACHIEVED IN 14 DAYS:    Patient will complete bed mobility mod I  With good safety     Pt will demonstrate good balance sitting at EOB x 10 min for increased safety w self care and in preparation for increased indpendence    Pt will complete grooming independently with set up     Pt will complete UB bathing and dressing independently    Pt will complete toileting w mod I and good safety     Pt will complete functional transfers with min assist and use of DME as needed demonstrating good safety     Pt will tolerate standing at sinkside x 5 min w F+ balance for increased safety w hygiene    Pt will complete functional mobility in room and bathroom w min assist and use of most appropriate DME    Pt will demonstrate good ECT/self pacing skills with all self care and functional mobility

## 2022-06-03 NOTE — PROGRESS NOTES
[facility]  Progress Note - Jacky Prabhakareron 1950, 73 y o  male MRN: [id number]  Unit/Bed#: -Afua Encounter: [id number]  Primary Care Provider: Earlene Chan MD   Date and time admitted to hospital: 6/1/2022  6:43 PM     DOS: 6/3/2022  * DVT (deep venous thrombosis) ([address] )  Assessment & Plan  · Pt presented with abdominal pain and LLE edema   · Duplex LLE with acute non occlusive thrombus in the proximal and distal femoral vein and deep femoral vein, acute occlusive thrombus in the popliteal vein  · Likely provoked from chronic immobility  · Currently on IV heparin gtt, will transition to PO Eliquis at 10 mg BID x 7 days followed by 5 mg BID thereafter  Cleared to start upon discussion with GI, will monitor for 24 hours to ensure no bleeding  · Unable to obtain CTA due to contrast shortage therefore pulmonary consulted   · Pt does not have any tachycardia, shortness of breath or hypoxia, ECHO ordered without any evidence of strain, no indication for repeat imaging at this time as would likely not  at this point  Pulmonary signed off       Peptic ulcer disease  Assessment & Plan  · Patient does have a history of peptic ulcer disease with bleeding ulcer in the past, multiple hospitalizations for this   · On PTA Protonix, continue 40 mg daily  · Denies any recent melena or bloody stools  · Monitor stool output while on anticoagulation   · Gi evaluated here,  · Okay to start PO Eliquis and monitor closely over next 24 hours   · Will need repeat hgb in one week once stable for discharge   · Continue miralax and senna for constipation symptoms, titrate up if indicated   · No plan for endoscopic evaluation here unless evidence of overt bleeding or down trending hgb  · Hgb stable at 13 5 today    Constipation  Assessment & Plan  · Noted on admission CT a/p  · Start on bowel regimen with colace and miralax, continue, up titrate if needed  · Pt's DIL states that he is on dulcolax BID as an outpatient   · GI following as above  · Monitor stool output    Hypertension  Assessment & Plan  · BP fairly stable on review  · Continue Lopressor 25 mg BID   · Monitor     CHF (congestive heart failure) (HCC)  Assessment & Plan  Wt Readings from Last 3 Encounters:   06/03/22 63 kg (138 lb 15 7 oz)     · Pt reports hx of CHF, unable to see recent ECHO, chart marked for merge  · Currently not maintained on any diuretics  · ECHO here with EF 64% and G1DD  · Continue Lopressor 25 mg BID  · Pt appears euvolemic at this time      History of alcohol abuse  Assessment & Plan  · History of alcohol abuse in the past but reports cessation   · On PTA thiamin and folic acid, continue  · No evidence of any withdrawal symptoms at this time, if patient develops symptoms would start CIWA protocol     Schizophrenia ([facility] [address] )  Assessment & Plan  · Mood currently appears stable   · Continue Celexa 10 mg daily, Seroquel 200 mg daily and Trazodone 50 mg daily   · Monitor       VTE Pharmacologic Prophylaxis: VTE Score: 3 Moderate Risk (Score 3-4) - Pharmacological DVT Prophylaxis Ordered: apixaban (Eliquis)  Patient Centered Rounds: I evaluated the patient without nursing staff present due to speaking to nurse outside patient's room   Discussions with Specialists or Other Care Team Provider: Discussed with GI, RN, CM and reviewed previous notes     Education and Discussions with Family / Patient: Updated  (son and daughter in law) via phone  Time Spent for Care: 20 minutes  More than 50% of total time spent on counseling and coordination of care as described above  Current Length of Stay: 2 day(s)  Current Patient Status: Inpatient   Certification Statement: The patient will continue to require additional inpatient hospital stay due to monitoring hgb with transition to PO Eliquis, constipation  Discharge Plan: Anticipate discharge in 24-48 hrs to home with home services      Code Status: Level 1 - Full Code    Subjective:   Pt reports that he feels okay today  Continues to have some discomfort in the left leg  Denies any chest pain, shortness of breath  Does have some abdominal pain, has not yet had a BM per nursing documentation  Pt's son and DIL state that pt is wheelchair and bed bound at home, will need wheelchair van once stable for d/c      Objective:     Vitals:   Temp (24hrs), Av 4 °F (36 9 °C), Min:97 9 °F (36 6 °C), Max:98 8 °F (37 1 °C)    Temp:  [97 9 °F (36 6 °C)-98 8 °F (37 1 °C)] 98 8 °F (37 1 °C)  HR:  [63-76] 63  Resp:  [16] 16  BP: (154-170)/(73-84) 163/84  SpO2:  [92 %-96 %] 94 %  Body mass index is 23 13 kg/m²  Input and Output Summary (last 24 hours): Intake/Output Summary (Last 24 hours) at 6/3/2022 1019  Last data filed at 6/3/2022 0636  Gross per 24 hour   Intake 435 81 ml   Output 2400 ml   Net -1964 19 ml       Physical Exam:   Physical Exam  Vitals reviewed  Constitutional:       General: He is not in acute distress  Appearance: He is not toxic-appearing  Comments: Pt is in no acute distress lying in his hospital bed resting comfortably   HENT:      Head: Normocephalic  Eyes:      Conjunctiva/sclera: Conjunctivae normal    Cardiovascular:      Rate and Rhythm: Normal rate and regular rhythm  Pulses: Normal pulses  Pulmonary:      Effort: Pulmonary effort is normal  No respiratory distress  Breath sounds: No wheezing  Abdominal:      General: Bowel sounds are normal  There is no distension  Palpations: Abdomen is soft  Tenderness: There is no abdominal tenderness  Musculoskeletal:      Right lower leg: No edema  Left lower leg: Edema (trace) present  Skin:     General: Skin is warm and dry  Neurological:      Mental Status: He is alert     Psychiatric:         Mood and Affect: Mood normal           Additional Data:     Labs:  Results from last 7 days   Lab Units 22  0541 22  0627   WBC Thousand/uL 6 08 7 65   HEMOGLOBIN g/dL 13 5 13 1   HEMATOCRIT % 40 9 39 8   PLATELETS Thousands/uL 192 190   NEUTROS PCT %  --  59   LYMPHS PCT %  --  26   MONOS PCT %  --  11   EOS PCT %  --  2     Results from last 7 days   Lab Units 06/02/22  0627   SODIUM mmol/L 137   POTASSIUM mmol/L 4 3   CHLORIDE mmol/L 104   CO2 mmol/L 31   BUN mg/dL 19   CREATININE mg/dL 0 98   ANION GAP mmol/L 2*   CALCIUM mg/dL 8 8   ALBUMIN g/dL 2 7*   TOTAL BILIRUBIN mg/dL 0 41   ALK PHOS U/L 43*   ALT U/L 9*   AST U/L 17   GLUCOSE RANDOM mg/dL 104                 Results from last 7 days   Lab Units 06/02/22  0439   PROCALCITONIN ng/ml <0 05       Lines/Drains:  Invasive Devices  Report    Peripheral Intravenous Line  Duration           Peripheral IV 06/02/22 Left Hand 1 day          Drain  Duration           External Urinary Catheter Small <1 day                      Imaging: No pertinent imaging reviewed      Recent Cultures (last 7 days):         Last 24 Hours Medication List:   Current Facility-Administered Medications   Medication Dose Route Frequency Provider Last Rate    acetaminophen  650 mg Oral Q6H PRN Bull Cordero PA-C      apixaban  10 mg Oral BID Murtaza Olguin PA-C      atorvastatin  40 mg Oral Daily With Dinner Christina Cutter, DO      citalopram  10 mg Oral Daily Christina Cutter, DO      folic acid  1 mg Oral Daily Christina Cutter, DO      lidocaine  1 patch Topical Daily Bull Cordero PA-C      metoprolol tartrate  25 mg Oral Q12H [facility] Christina Cutter, DO      nicotine  21 mg Transdermal Daily Bull Cordero PA-C      pantoprazole  40 mg Oral Early Morning Joseph Leach MD      polyethylene glycol  17 g Oral Daily Murtaza Olguin PA-C      QUEtiapine  200 mg Oral Daily Christina Cutter, DO      QUEtiapine  600 mg Oral HS Bull Cordero PA-C      senna  1 tablet Oral BID Joseph Leach MD      thiamine  100 mg Oral Daily Christina Cutter, DO      traZODone  50 mg Oral HS PRN Christina Cutter, DO Today, Patient Was Seen By: Tony Austin PA-C    **Please Note: This note may have been constructed using a voice recognition system  **

## 2022-06-03 NOTE — PHYSICAL THERAPY NOTE
Physical Therapy Evaluation    Patient's Name: Livia Maurice    Admitting Diagnosis  Abdominal pain [R10 9]  Constipation [K59 00]  DVT (deep venous thrombosis) ([facility] [address] ) [I82 409]    Problem List  Patient Active Problem List   Diagnosis    Peptic ulcer disease    DVT (deep venous thrombosis) ([facility])    Schizophrenia (HCC)    History of alcohol abuse    CHF (congestive heart failure) ([facility] [address] )    Hypertension    Constipation       Past Medical History  No past medical history on file  Past Surgical History  No past surgical history on file  06/03/22 1015   PT Last Visit   PT Visit Date 06/03/22   Note Type   Note type Evaluation   Pain Assessment   Pain Assessment Tool 0-10   Pain Score No Pain   Restrictions/Precautions   Weight Bearing Precautions Per Order No   Other Precautions Chair Alarm; Bed Alarm; Fall Risk;Multiple lines   Home Living   Type of Home Apartment   Home Layout Two level;Stairs to enter with rails; Performs ADLs on one level  (1+2 RENETTA)   Home Equipment Walker; Wheelchair-manual;Other (Comment)  (hospital bed)   Additional Comments Pt lives in a 2 story apt, [address]  Reports that "on good days" he is able to walk short distances with RW, but "on bad days" needs assistance to ambulate  Uses w/c for community mobility   Prior Function   Level of [place] Needs assistance with ADLs and functional mobility; Needs assistance with IADLs   Lives With Son;Other (Comment)  (caregiver)   Receives Help From Other (Comment)  (caregiver)   ADL Assistance Needs assistance   IADLs Needs assistance   Falls in the last 6 months 1 to 4  (3)   Comments Pt has a caregiver from 9am-4pm mon-sat   Cognition   Overall Cognitive Status Impaired   Attention Attends with cues to redirect   Orientation Level Oriented X4   Following Commands Follows one step commands without difficulty   RLE Assessment   RLE Assessment X   Strength RLE   R Hip Flexion 3-/5   R Knee Flexion 4-/5   R Knee Extension 4-/5   R Ankle Dorsiflexion 3+/5   R Ankle Plantar Flexion 3+/5   LLE Assessment   LLE Assessment X   Strength LLE   L Hip Flexion 3-/5   L Knee Flexion 4-/5   L Knee Extension 4-/5   L Ankle Dorsiflexion   (ankle fusion, limited AROM)   L Ankle Plantar Flexion   (ankle fused, limited AROM)   Bed Mobility   Supine to Sit 5  Supervision   Additional items HOB elevated; Bedrails; Increased time required   Sit to Supine 5  Supervision   Additional items Increased time required   Transfers   Sit to Stand 3  Moderate assistance   Additional items Assist x 1; Increased time required   Stand to Sit 3  Moderate assistance   Additional items Assist x 1; Increased time required   Additional Comments Transfers with RW  VCs for proper hand placement and safety   Ambulation/Elevation   Gait pattern Short stride; Excessively slow;Knees flexed;Decreased heel strike;Decreased foot clearance; Foward flexed   Gait Assistance 3  Moderate assist   Additional items Assist x 1   Assistive Device Rolling walker   Distance 2ft advance/retreat   Ambulation/Elevation Additional Comments distance limited by fatigue   Balance   Static Sitting Fair   Dynamic Sitting Fair -   Static Standing Poor +   Dynamic Standing Poor   Ambulatory Poor   Endurance Deficit   Endurance Deficit Yes   Endurance Deficit Description weakness, fatigue, deconditioning   Activity Tolerance   Activity Tolerance Patient limited by fatigue   Medical Staff Made Aware Seen with OT 2* pt's medical complexity and multiple comorbidities  PT focus on functional transfers, gait, and LE strength   Nurse Made Aware ok to see per RN   Assessment   Prognosis Fair   Problem List Decreased strength;Decreased range of motion;Decreased endurance; Impaired balance;Decreased mobility; Decreased cognition;Pain   Assessment Pt is a 73 y o  male seen for PT evaluation s/p admit to [facility] on 6/1/2022  Pt was admitted with a primary dx of: DVT    PT now consulted for assessment of mobility and d/c needs  Pt with PT evaluation orders  Pts current comorbidities effecting treatment include: HTN, schizophrenia, alcohol abuse, CHF, hx of L ankle fusion  Pts current clinical presentation is Unstable/ Unpredictable (high complexity) due to Ongoing medical management for primary dx, Decreased activity tolerance compared to baseline, Fall risk, Increased assistance needed from caregiver at current time, Trending lab values  Prior to admission, pt reports receiving A for ADLs  Pt reports being able to ambulate very short distances with RW "on good days," but "on bad days" needs physical assistance for mobility  Pt uses w/c in the community  Pt lives with his son in a 2 story apt [address]; has caregiver mon-sat 9am-4pm  Upon evaluation, pt currently is requiring supervision for bed mobility; modA for transfers; and modA for ambulation 2 ft advance/retreat w/ RW  Pt presents at PT eval functioning below baseline and currently w/ overall mobility deficits 2* to: BLE weakness, decreased ROM, impaired balance, decreased endurance, gait deviations, decreased activity tolerance compared to baseline, decreased functional mobility tolerance compared to baseline, fall risk  Pt currently at a fall risk 2* to impairments listed above  Pt will continue to benefit from skilled acute PT interventions to address stated impairments; to maximize functional mobility; for ongoing pt/ family training; and DME needs  At conclusion of PT session pt returned BTB and bed alarm engaged with phone and call bell within reach  Pt denies any further questions at this time  The patient's AM-PAC Basic Mobility Inpatient Short Form Raw Score is 12  A Raw score of less than or equal to 16 suggests the patient may benefit from discharge to post-acute rehabilitation services  However, this is close to pt's baseline, he has support at home, and has all necessary DME   Please also refer to the recommendation of the Physical Therapist for safe discharge planning  Recommend HHPT + continued support from son/caregiver upon hospital D/C  Goals   Patient Goals to go back to bed   STG Expiration Date 06/17/22   Short Term Goal #1 In 10-14 days pt will be able to: 1  Demonstrate ability to perform all aspects of bed mobility at mod (I) level to increase functional independence  2  Perform functional transfers at mod (I) level to facilitate safe return to previous living environment  3   Ambulate at least 75 ft with RW at mod (I) level with stable vitals to improve safety with household distances and reduce fall risk  4  Improve LE strength grades by 1 to increase ease of functional mobility with transfers and gait  5  Pt will demonstrate improved balance by one grade order to decrease risk of falls  6  Pt will be able to climb 2+1 steps with Wong in order to simulate entrance to home  PT Treatment Day 0   Plan   Treatment/Interventions Functional transfer training;LE strengthening/ROM; Endurance training;Elevations; Therapeutic exercise;Equipment eval/education; Bed mobility;Gait training;Spoke to nursing;OT   PT Frequency 3-5x/wk   Recommendation   PT Discharge Recommendation Home with home health rehabilitation   Equipment Recommended   (pt already owns RW + w/c)   AM-PAC Basic Mobility Inpatient   Turning in Bed Without Bedrails 3   Lying on Back to Sitting on Edge of Flat Bed 2   Moving Bed to Chair 2   Standing Up From Chair 2   Walk in Room 2   Climb 3-5 Stairs 1   Basic Mobility Inpatient Raw Score 12   Basic Mobility Standardized Score 32 23   Highest Level Of Mobility   -HLM Goal 4: Move to chair/commode   -HLM Achieved 5: Stand (1 or more minutes)   Modified Aeljandro Scale   Modified Alejandro Scale 4   End of Consult   Patient Position at End of Consult Supine;Bed/Chair alarm activated; All needs within reach       Bridget Avina PT, DPT

## 2022-06-03 NOTE — ASSESSMENT & PLAN NOTE
· Patient does have a history of peptic ulcer disease with bleeding ulcer in the past   · On PTA Protonix, continue 40 mg daily  · Denies any recent melena or bloody stools  · Monitor stool output while on anticoagulation   · Gi evaluated here,  · Okay to start PO Eliquis and monitor closely over next 24 hours   · Will need repeat hgb in one week once stable for discharge   · Continue miralax and senna for constipation symptoms, titrate up if indicated   · No plan for endoscopic evaluation here unless evidence of overt bleeding or down trending hgb  · Hgb stable at 13 5 today

## 2022-06-03 NOTE — ASSESSMENT & PLAN NOTE
Wt Readings from Last 3 Encounters:   06/03/22 63 kg (138 lb 15 7 oz)     · Pt reports hx of CHF, unable to see recent ECHO, chart marked for merge  · Currently not maintained on any diuretics  · ECHO here with EF 64% and G1DD  · Continue Lopressor 25 mg BID  · Pt appears euvolemic at this time

## 2022-06-03 NOTE — PLAN OF CARE
Problem: MOBILITY - ADULT  Goal: Maintain or return to baseline ADL function  Description: INTERVENTIONS:  -  Assess patient's ability to carry out ADLs; assess patient's baseline for ADL function and identify physical deficits which impact ability to perform ADLs (bathing, care of mouth/teeth, toileting, grooming, dressing, etc )  - Assess/evaluate cause of self-care deficits   - Assess range of motion  - Assess patient's mobility; develop plan if impaired  - Assess patient's need for assistive devices and provide as appropriate  - Encourage maximum independence but intervene and supervise when necessary  - Involve family in performance of ADLs  - Assess for home care needs following discharge   - Consider OT consult to assist with ADL evaluation and planning for discharge  - Provide patient education as appropriate  Outcome: Progressing  Goal: Maintains/Returns to pre admission functional level  Description: INTERVENTIONS:  - Perform BMAT or MOVE assessment daily    - Set and communicate daily mobility goal to care team and patient/family/caregiver  - Collaborate with rehabilitation services on mobility goals if consulted  - Perform Range of Motion  times a day  - Reposition patient every  hours    - Dangle patient  times a day  - Stand patient  times a day  - Ambulate patient  times a day  - Out of bed to chair  times a day   - Out of bed for meals  times a day  - Out of bed for toileting  - Record patient progress and toleration of activity level   Outcome: Progressing     Problem: PAIN - ADULT  Goal: Verbalizes/displays adequate comfort level or baseline comfort level  Description: Interventions:  - Encourage patient to monitor pain and request assistance  - Assess pain using appropriate pain scale  - Administer analgesics based on type and severity of pain and evaluate response  - Implement non-pharmacological measures as appropriate and evaluate response  - Consider cultural and social influences on pain and pain management  - Notify physician/advanced practitioner if interventions unsuccessful or patient reports new pain  Outcome: Progressing     Problem: INFECTION - ADULT  Goal: Absence or prevention of progression during hospitalization  Description: INTERVENTIONS:  - Assess and monitor for signs and symptoms of infection  - Monitor lab/diagnostic results  - Monitor all insertion sites, i e  indwelling lines, tubes, and drains  - Monitor endotracheal if appropriate and nasal secretions for changes in amount and color  - [place] appropriate cooling/warming therapies per order  - Administer medications as ordered  - Instruct and encourage patient and family to use good hand hygiene technique  - Identify and instruct in appropriate isolation precautions for identified infection/condition  Outcome: Progressing     Problem: SAFETY ADULT  Goal: Maintain or return to baseline ADL function  Description: INTERVENTIONS:  -  Assess patient's ability to carry out ADLs; assess patient's baseline for ADL function and identify physical deficits which impact ability to perform ADLs (bathing, care of mouth/teeth, toileting, grooming, dressing, etc )  - Assess/evaluate cause of self-care deficits   - Assess range of motion  - Assess patient's mobility; develop plan if impaired  - Assess patient's need for assistive devices and provide as appropriate  - Encourage maximum independence but intervene and supervise when necessary  - Involve family in performance of ADLs  - Assess for home care needs following discharge   - Consider OT consult to assist with ADL evaluation and planning for discharge  - Provide patient education as appropriate  Outcome: Progressing  Goal: Maintains/Returns to pre admission functional level  Description: INTERVENTIONS:  - Perform BMAT or MOVE assessment daily    - Set and communicate daily mobility goal to care team and patient/family/caregiver     - Collaborate with rehabilitation services on mobility goals if consulted  - Perform Range of Motion  times a day  - Reposition patient every  hours    - Dangle patient  times a day  - Stand patient  times a day  - Ambulate patient  times a day  - Out of bed to chair  times a day   - Out of bed for meals  times a day  - Out of bed for toileting  - Record patient progress and toleration of activity level   Outcome: Progressing

## 2022-06-03 NOTE — ASSESSMENT & PLAN NOTE
· Noted on admission CT a/p  · Start on bowel regimen with colace and miralax, continue, up titrate if needed  · GI following as above  · Monitor stool output

## 2022-06-03 NOTE — PLAN OF CARE
Problem: PHYSICAL THERAPY ADULT  Goal: Performs mobility at highest level of function for planned discharge setting  See evaluation for individualized goals  Description: Treatment/Interventions: Functional transfer training, LE strengthening/ROM, Endurance training, Elevations, Therapeutic exercise, Equipment eval/education, Bed mobility, Gait training, Spoke to nursing, OT  Equipment Recommended:  (pt already owns RW + w/c)       See flowsheet documentation for full assessment, interventions and recommendations  6/3/2022 1333 by Man Ray PT  Note: Prognosis: Fair  Problem List: Decreased strength, Decreased range of motion, Decreased endurance, Impaired balance, Decreased mobility, Decreased cognition, Pain  Assessment: Pt is a 73 y o  male seen for PT evaluation [facility]  Pt was admitted with a primary dx of: DVT  PT now consulted for assessment of mobility and d/c needs  Pt with PT evaluation orders  Pts current comorbidities effecting treatment include: HTN, schizophrenia, alcohol abuse, CHF, hx of L ankle fusion  Pts current clinical presentation is Unstable/ Unpredictable (high complexity) due to Ongoing medical management for primary dx, Decreased activity tolerance compared to baseline, Fall risk, Increased assistance needed from caregiver at current time, Trending lab values  Prior to admission, pt reports receiving A for ADLs  Pt reports being able to ambulate very short distances with RW "on good days," but "on bad days" needs physical assistance for mobility  Pt uses w/c in the community  Pt lives with his son in a 2 story apt [address]; has caregiver mon-sat 9am-4pm  Upon evaluation, pt currently is requiring supervision for bed mobility; modA for transfers; and modA for ambulation 2 ft advance/retreat w/ RW   Pt presents at PT eval functioning below baseline and currently w/ overall mobility deficits 2* to: BLE weakness, decreased ROM, impaired balance, decreased endurance, gait deviations, decreased activity tolerance compared to baseline, decreased functional mobility tolerance compared to baseline, fall risk  Pt currently at a fall risk 2* to impairments listed above  Pt will continue to benefit from skilled acute PT interventions to address stated impairments; to maximize functional mobility; for ongoing pt/ family training; and DME needs  At conclusion of PT session pt returned BTB and bed alarm engaged with phone and call bell within reach  Pt denies any further questions at this time  The patient's AM-PAC Basic Mobility Inpatient Short Form Raw Score is 12  A Raw score of less than or equal to 16 suggests the patient may benefit from discharge to post-acute rehabilitation services  However, this is close to pt's baseline, he has support at home, and has all necessary DME  Please also refer to the recommendation of the Physical Therapist for safe discharge planning  Recommend HHPT + continued support from son/caregiver upon hospital D/C  PT Discharge Recommendation: Home with home health rehabilitation          See flowsheet documentation for full assessment

## 2022-06-03 NOTE — WOUND OSTOMY CARE
Consult Note - Wound   Maribell Ceja 73 y o  male MRN: [id number]  Unit/Bed#: -01 Encounter: [id number]        History and Present Illness:  Patient is 72 yo male admitted to [facility] for treatment of DVT  Patient is incontinent of bowel and bladder, as witnessed on this assessment  Patient is min assist with turning from side to side  Patient can feed himself independently  Assessment Findings:   Patient assessed from head to toe  No acute wound findings on assessment  Heels and sacral/buttocks intact and blanching  Orders listed below and wound care will sign off, call or tiger text with questions  Bedside nurse updated of findings and orders  Flowsheets below with pictures and measurements  Skin care plans:  1-Hydraguard to bilateral sacrum, buttock and heels BID and PRN  2-Elevate heels to offload pressure  3-Ehob cushion in chair when out of bed  4-Moisturize skin daily with skin nourishing cream   5-Turn/reposition q2h or when medically stable for pressure re-distribution on skin  Wounds:  Wound 06/02/22 Pressure Injury Other (Comment) Buttocks Right (Active)   Wound Image   06/03/22 1101   Wound Description Epithelialization;Pink 06/03/22 1101   [place]-wound Assessment Clean;Dry; Intact 06/03/22 1101   Wound Length (cm) 0 cm 06/03/22 1101   Wound Width (cm) 0 cm 06/03/22 1101   Wound Depth (cm) 0 cm 06/03/22 1101   Wound Surface Area (cm^2) 0 cm^2 06/03/22 1101   Wound Volume (cm^3) 0 cm^3 06/03/22 1101   Calculated Wound Volume (cm^3) 0 cm^3 06/03/22 1101   Tunneling 0 cm 06/03/22 1101   Tunneling in depth located at 0 06/03/22 1101   Undermining 0 06/03/22 1101   Undermining is depth extending from 0 06/03/22 1101   Wound Site Closure MERVIN 06/03/22 1101   Drainage Amount None 06/03/22 1101   Non-staged Wound Description Not applicable 12/75/95 0369   Treatments Cleansed 06/03/22 1101   Dressing Moisture barrier 06/03/22 1101   Wound packed?  No 06/03/22 1101   Packing- # removed 0 06/03/22 1101   Packing- # inserted 0 06/03/22 1101   Dressing Changed New 06/03/22 1101   Patient Tolerance Tolerated well 06/03/22 1101   Dressing Status Clean;Dry; Intact 06/03/22 1101       Vernell TAVERASN, RN, [facility] [employer]

## 2022-06-03 NOTE — CASE MANAGEMENT
Case Management Discharge Planning Note    Patient name Allan Pope  Location [address] 571/-51 MRN [id number]  : 1950 Date 6/3/2022       Current Admission Date: 2022  Current Admission Diagnosis:DVT (deep venous thrombosis) [facility])   Patient Active Problem List    Diagnosis Date Noted    Peptic ulcer disease 2022    DVT (deep venous thrombosis) ([facility] ) 2022    Schizophrenia ([facility] ) 2022    History of alcohol abuse 2022    CHF (congestive heart failure) ([facility] ) 2022    Hypertension 2022    Constipation 2022      LOS (days): 2  Geometric Mean LOS (GMLOS) (days): 2 20  Days to GMLOS:0 5     OBJECTIVE:  Risk of Unplanned Readmission Score: 10 12         Current admission status: Inpatient   Preferred Pharmacy:   [address] [place] [address] 38 [address] [facility]  Phone: [phone] Fax: [phone]    Primary Care Provider: Vanessa Gurrola MD    Primary Insurance: [facility]  Secondary Insurance:     DISCHARGE DETAILS:    Discharge planning discussed with[de-identified] Patient, daughter in law  Freedom of Choice: Yes     CM contacted family/caregiver?: Yes  Were Treatment Team discharge recommendations reviewed with patient/caregiver?: Yes  Did patient/caregiver verbalize understanding of patient care needs?: Yes  Were patient/caregiver advised of the risks associated with not following Treatment Team discharge recommendations?: Yes    Contacts  Patient Contacts: Suzette Flores  Relationship to Patient[de-identified] Family  Contact Method: Phone  Phone Number: [phone]  Reason/Outcome: Discharge Planning              Other Referral/Resources/Interventions Provided:  Referral Comments: Patient and daughter updated on [address] to be provided by [facility] upon discharge  CM informed patient and daughter that eliquis is $0, scripts will be sent to [facility] pharmacy due to transport concerns at this time      Would you like to participate in our [address] service program?  : Yes                                        IMM Given (Date):: 06/03/22  IMM Given to[de-identified] Patient        IMM reviewed with patient, patient agrees with discharge determination

## 2022-06-03 NOTE — PLAN OF CARE
Problem: OCCUPATIONAL THERAPY ADULT  Goal: Performs self-care activities at highest level of function for planned discharge setting  See evaluation for individualized goals  Note: Limitation: Decreased ADL status, Decreased Safe judgement during ADL, Decreased endurance, Decreased self-care trans, Decreased high-level ADLs     Assessment: Pt is a 73 y o  male seen for OT evaluation s/p admit to [facility] on 6/1/2022 w/ DVT (deep venous thrombosis) ([facility] )  Pt admitted with LE edema  Found to have LLE DVT  Comorbidities affecting pt's functional performance at time of assessment include: HTN and schizophrenia, alcohol abuse, CHF, PUD  Personal factors affecting pt at time of IE include:difficulty performing ADLS, limited insight into deficits, compliance and decreased initiation and engagement   Prior to admission, pt was living w family in an apartment  He has aides 7 hrs/day, 6 days per week  Pt reports he can sometimes do his own care, toilet himself, other times he needs assist  Reports 3 recent falls, c/o being weak and unsteady lately, spends most of his time in bed  Upon evaluation: Pt requires mod/max assist self care, mod assist sit to stand and SPT  Pt c/o feeling weak  He does present with  the following deficits impacting occupational performance: weakness, decreased strength, decreased balance, decreased tolerance, impaired problem solving and decreased safety awareness  Pt to benefit from continued skilled OT tx while in the hospital to address deficits as defined above and maximize level of functional independence w ADL's and functional mobility  Occupational Performance areas to address include: grooming, bathing/shower, toilet hygiene, dressing, functional mobility and clothing management  Based on findings from OT evaluation and functional performance deficits, pt has been identified as a  high complexity evaluation   The patient's raw score on the AM-PAC Daily Activity inpatient short form is 16, standardized score is 35 96, less than 39 4  Patients at this level are likely to benefit from discharge to post-acute rehabilitation services  HOWEVER, From OT standpoint, recommendation at time of d/c would be home w family support and home therapies  Pt appears to have a good support system w ample of assist at home  OT Discharge Recommendation: Home with home health rehabilitation  OT - OK to Discharge:  Yes

## 2022-06-04 LAB
ERYTHROCYTE [DISTWIDTH] IN BLOOD BY AUTOMATED COUNT: 17.9 % (ref 11.6–15.1)
HCT VFR BLD AUTO: 41.1 % (ref 36.5–49.3)
HGB BLD-MCNC: 13.7 G/DL (ref 12–17)
MCH RBC QN AUTO: 30.9 PG (ref 26.8–34.3)
MCHC RBC AUTO-ENTMCNC: 33.3 G/DL (ref 31.4–37.4)
MCV RBC AUTO: 93 FL (ref 82–98)
PLATELET # BLD AUTO: 197 THOUSANDS/UL (ref 149–390)
PMV BLD AUTO: 9.8 FL (ref 8.9–12.7)
RBC # BLD AUTO: 4.43 MILLION/UL (ref 3.88–5.62)
WBC # BLD AUTO: 4.86 THOUSAND/UL (ref 4.31–10.16)

## 2022-06-04 PROCEDURE — 85027 COMPLETE CBC AUTOMATED: CPT | Performed by: PHYSICIAN ASSISTANT

## 2022-06-04 PROCEDURE — 99232 SBSQ HOSP IP/OBS MODERATE 35: CPT | Performed by: PHYSICIAN ASSISTANT

## 2022-06-04 RX ORDER — POLYETHYLENE GLYCOL 3350 17 G/17G
17 POWDER, FOR SOLUTION ORAL DAILY PRN
Status: DISCONTINUED | OUTPATIENT
Start: 2022-06-04 | End: 2022-06-05 | Stop reason: HOSPADM

## 2022-06-04 RX ADMIN — METOPROLOL TARTRATE 25 MG: 50 TABLET, FILM COATED ORAL at 08:11

## 2022-06-04 RX ADMIN — CITALOPRAM HYDROBROMIDE 10 MG: 10 TABLET ORAL at 08:11

## 2022-06-04 RX ADMIN — LIDOCAINE 5% 1 PATCH: 700 PATCH TOPICAL at 08:11

## 2022-06-04 RX ADMIN — ACETAMINOPHEN 650 MG: 325 TABLET, FILM COATED ORAL at 08:11

## 2022-06-04 RX ADMIN — NICOTINE 21 MG: 21 PATCH, EXTENDED RELEASE TRANSDERMAL at 21:38

## 2022-06-04 RX ADMIN — ATORVASTATIN CALCIUM 40 MG: 40 TABLET, FILM COATED ORAL at 17:59

## 2022-06-04 RX ADMIN — METOPROLOL TARTRATE 25 MG: 50 TABLET, FILM COATED ORAL at 21:14

## 2022-06-04 RX ADMIN — FOLIC ACID 1 MG: 1 TABLET ORAL at 08:11

## 2022-06-04 RX ADMIN — QUETIAPINE FUMARATE 600 MG: 300 TABLET ORAL at 21:14

## 2022-06-04 RX ADMIN — THIAMINE HCL TAB 100 MG 100 MG: 100 TAB at 08:11

## 2022-06-04 RX ADMIN — APIXABAN 10 MG: 5 TABLET, FILM COATED ORAL at 17:59

## 2022-06-04 RX ADMIN — APIXABAN 10 MG: 5 TABLET, FILM COATED ORAL at 08:11

## 2022-06-04 RX ADMIN — PANTOPRAZOLE SODIUM 40 MG: 40 TABLET, DELAYED RELEASE ORAL at 05:04

## 2022-06-04 RX ADMIN — QUETIAPINE FUMARATE 200 MG: 100 TABLET ORAL at 08:11

## 2022-06-04 NOTE — ASSESSMENT & PLAN NOTE
· Patient does have a history of peptic ulcer disease with bleeding ulcer in the past   · On PTA Protonix, continue 40 mg daily  · Denies any recent melena or bloody stools  · Monitor stool output while on anticoagulation   · Gi evaluated here,  · Cleared for PO Eliquis, started on 6/3 without any bleeding noted   · Will need repeat hgb in one week once stable for discharge   · Continue miralax and senna for constipation symptoms, has had multiple bowel movements here, use on PRN basis only   · No plan for endoscopic evaluation here unless evidence of overt bleeding or down trending hgb  · Hgb stable at 13 7 today

## 2022-06-04 NOTE — CASE MANAGEMENT
Case Management Discharge Planning Note    Patient name Matteo Sánchez  Location [address] 571/-59 MRN [id number]  : 1950 Date 2022       Current Admission Date: 2022  Current Admission Diagnosis:DVT (deep venous thrombosis) [facility])   Patient Active Problem List    Diagnosis Date Noted    Peptic ulcer disease 2022    [facility]deep venous thrombosis) ([facility] ) 2022    Schizophrenia ([facility] ) 2022    History of alcohol abuse 2022    CHF (congestive heart failure) ([facility] ) 2022    Hypertension 2022    Constipation 2022      LOS (days): 3  Geometric Mean LOS (GMLOS) (days): 2 20  Days to GMLOS:-0 5     OBJECTIVE:  Risk of Unplanned Readmission Score: 10 16         Current admission status: Inpatient   Preferred Pharmacy:   [address] [place] [address] 38 [address] [facility]  Phone: [phone] Fax: [phone]    Primary Care Provider: Antonia Galdamez MD    Primary Insurance: [facility]  Secondary Insurance:     DISCHARGE DETAILS:        Additional Comments: CM consulted by provider to arrange w/c [employer] transport for pt for d/c tomorrow  CM reached out to pt's DIL with no answer, VM left  CM attempted to reach pt at their listed phone number however pt's son, Kira Block answered  CM spoke to Kira Block about d/c tomorrow and transportation  Kira Block initially wanted pt to have w/c [employer] transport however, when CM reviewed the financial responsibilities of the pt Kira Block states that they will arrange their own transportation for pt to home but they need a time  CM confirmed with provider that d/c can be at 1400 on  and provided that information to [address] [place] [address]    CM received a call back from [facility], who states that needs to go via w/c [employer] and states that pt has [address] and [address] and does not believe there should be any cost for pt to have transportation home from the hospital   Kayy Verma states that she will contact [facility] and call CM back

## 2022-06-04 NOTE — ASSESSMENT & PLAN NOTE
· Pt presented with abdominal pain and LLE edema   · Duplex LLE with acute non occlusive thrombus in the proximal and distal femoral vein and deep femoral vein, acute occlusive thrombus in the popliteal vein  · Likely provoked from chronic immobility  · S/p IV heparin gtt, transitioned to PO Eliquis at 10 mg BID x 7 days followed by 5 mg BID thereafter  Cleared to start upon discussion with GI, no contraindication at this time  No evidence of any bleeding  · Will need follow up with hematology as an outpatient, continue a/c for at least 3-6 months per hematology recommendations  · Unable to obtain CTA due to contrast shortage therefore pulmonary consulted   · Pt does not have any tachycardia, shortness of breath or hypoxia, ECHO ordered without any evidence of strain, no indication for repeat imaging at this time as would likely not  at this point  Pulmonary signed off

## 2022-06-04 NOTE — PLAN OF CARE
Problem: MOBILITY - ADULT  Goal: Maintain or return to baseline ADL function  Description: INTERVENTIONS:  -  Assess patient's ability to carry out ADLs; assess patient's baseline for ADL function and identify physical deficits which impact ability to perform ADLs (bathing, care of mouth/teeth, toileting, grooming, dressing, etc )  - Assess/evaluate cause of self-care deficits   - Assess range of motion  - Assess patient's mobility; develop plan if impaired  - Assess patient's need for assistive devices and provide as appropriate  - Encourage maximum independence but intervene and supervise when necessary  - Involve family in performance of ADLs  - Assess for home care needs following discharge   - Consider OT consult to assist with ADL evaluation and planning for discharge  - Provide patient education as appropriate  Outcome: Progressing  Goal: Maintains/Returns to pre admission functional level  Description: INTERVENTIONS:  - Perform BMAT or MOVE assessment daily    - Set and communicate daily mobility goal to care team and patient/family/caregiver  - Collaborate with rehabilitation services on mobility goals if consulted  - Perform Range of Motion   times a day  - Reposition patient every   hours    - Dangle patient   times a day  - Stand patient   times a day  - Ambulate patient   times a day  - Out of bed to chair    times a day   - Out of bed for meals   times a day  - Out of bed for toileting  - Record patient progress and toleration of activity level   Outcome: Progressing     Problem: PAIN - ADULT  Goal: Verbalizes/displays adequate comfort level or baseline comfort level  Description: Interventions:  - Encourage patient to monitor pain and request assistance  - Assess pain using appropriate pain scale  - Administer analgesics based on type and severity of pain and evaluate response  - Implement non-pharmacological measures as appropriate and evaluate response  - Consider cultural and social influences on pain and pain management  - Notify physician/advanced practitioner if interventions unsuccessful or patient reports new pain  Outcome: Progressing     Problem: INFECTION - ADULT  Goal: Absence or prevention of progression during hospitalization  Description: INTERVENTIONS:  - Assess and monitor for signs and symptoms of infection  - Monitor lab/diagnostic results  - Monitor all insertion sites, i e  indwelling lines, tubes, and drains  - Monitor endotracheal if appropriate and nasal secretions for changes in amount and color  - [place] appropriate cooling/warming therapies per order  - Administer medications as ordered  - Instruct and encourage patient and family to use good hand hygiene technique  - Identify and instruct in appropriate isolation precautions for identified infection/condition  Outcome: Progressing     Problem: SAFETY ADULT  Goal: Maintain or return to baseline ADL function  Description: INTERVENTIONS:  -  Assess patient's ability to carry out ADLs; assess patient's baseline for ADL function and identify physical deficits which impact ability to perform ADLs (bathing, care of mouth/teeth, toileting, grooming, dressing, etc )  - Assess/evaluate cause of self-care deficits   - Assess range of motion  - Assess patient's mobility; develop plan if impaired  - Assess patient's need for assistive devices and provide as appropriate  - Encourage maximum independence but intervene and supervise when necessary  - Involve family in performance of ADLs  - Assess for home care needs following discharge   - Consider OT consult to assist with ADL evaluation and planning for discharge  - Provide patient education as appropriate  Outcome: Progressing  Goal: Maintains/Returns to pre admission functional level  Description: INTERVENTIONS:  - Perform BMAT or MOVE assessment daily    - Set and communicate daily mobility goal to care team and patient/family/caregiver     - Collaborate with rehabilitation services on mobility goals if consulted  - Perform Range of Motion   times a day  - Reposition patient every   hours    - Dangle patient   times a day  - Stand patient   times a day  - Ambulate patient   times a day  - Out of bed to chair   times a day   - Out of bed for meals   times a day  - Out of bed for toileting  - Record patient progress and toleration of activity level   Outcome: Progressing  Goal: Patient will remain free of falls  Description: INTERVENTIONS:  - Educate patient/family on patient safety including physical limitations  - Instruct patient to call for assistance with activity   - Consult OT/PT to assist with strengthening/mobility   - Keep Call bell within reach  - Keep bed low and locked with side rails adjusted as appropriate  - Keep care items and personal belongings within reach  - Initiate and maintain comfort rounds  - Make Fall Risk Sign visible to staff  - Offer Toileting every   Hours, in advance of need  - Initiate/Maintain  alarm  - Obtain necessary fall risk management equipment:     - Apply yellow socks and bracelet for high fall risk patients  - Consider moving patient to room near nurses station  Outcome: Progressing     Problem: DISCHARGE PLANNING  Goal: Discharge to home or other facility with appropriate resources  Description: INTERVENTIONS:  - Identify barriers to discharge w/patient and caregiver  - Arrange for needed discharge resources and transportation as appropriate  - Identify discharge learning needs (meds, wound care, etc )  - Arrange for interpretive services to assist at discharge as needed  - Refer to Case Management Department for coordinating discharge planning if the patient needs post-hospital services based on physician/advanced practitioner order or complex needs related to functional status, cognitive ability, or social support system  Outcome: Progressing

## 2022-06-04 NOTE — ASSESSMENT & PLAN NOTE
· Noted on admission CT a/p, chronic per discussion with patient's DIL  · Pt is maintained on BID dulcolax at home   · Started on daily miralax and colace BID here, now with multiple episodes of loose stools  · Will d/c scheduled regimen as above and place on PRN basis to continue at home  · GI following as above  · Monitor stool output

## 2022-06-04 NOTE — ASSESSMENT & PLAN NOTE
Wt Readings from Last 3 Encounters:   06/04/22 63 kg (138 lb 14 2 oz)     · Pt reports hx of chronic diastolic CHF as evidenced by ECHO of preserved EF with G1DD  · Currently not maintained on any diuretics  · Requiring daily weights, I&Os  · Continue Lopressor 25 mg BID  · Pt appears euvolemic at this time

## 2022-06-04 NOTE — PROGRESS NOTES
[facility]  Progress Note - Sha Dues 1950, 73 y o  male MRN: [id number]  Unit/Bed#: -01 Encounter: [id number]  Primary Care Provider: Michaela Saenz MD   Date and time admitted to hospital: 6/1/2022  6:43 PM      DOS: 6/4/2022  * DVT (deep venous thrombosis) ([facility] [address] )  Assessment & Plan  · Pt presented with abdominal pain and LLE edema   · Duplex LLE with acute non occlusive thrombus in the proximal and distal femoral vein and deep femoral vein, acute occlusive thrombus in the popliteal vein  · Likely provoked from chronic immobility  · S/p IV heparin gtt, transitioned to PO Eliquis at 10 mg BID x 7 days followed by 5 mg BID thereafter  Cleared to start upon discussion with GI, no contraindication at this time  No evidence of any bleeding  · Will need follow up with hematology as an outpatient, continue a/c for at least 3-6 months per hematology recommendations  · Unable to obtain CTA due to contrast shortage therefore pulmonary consulted   · Pt does not have any tachycardia, shortness of breath or hypoxia, ECHO ordered without any evidence of strain, no indication for repeat imaging at this time as would likely not  at this point  Pulmonary signed off       Peptic ulcer disease  Assessment & Plan  · Patient does have a history of peptic ulcer disease with bleeding ulcer in the past   · On PTA Protonix, continue 40 mg daily  · Denies any recent melena or bloody stools  · Monitor stool output while on anticoagulation   · Gi evaluated here,  · Cleared for PO Eliquis, started on 6/3 without any bleeding noted   · Will need repeat hgb in one week once stable for discharge   · Continue miralax and senna for constipation symptoms, has had multiple bowel movements here, use on PRN basis only   · No plan for endoscopic evaluation here unless evidence of overt bleeding or down trending hgb  · Hgb stable at 13 7 today    Constipation  Assessment & Plan  · Noted on admission CT a/p, chronic per discussion with patient's DIL  · Pt is maintained on BID dulcolax at home   · Started on daily miralax and colace BID here, now with multiple episodes of loose stools  · Will d/c scheduled regimen as above and place on PRN basis to continue at home  · GI following as above  · Monitor stool output    Hypertension  Assessment & Plan  · BP fairly stable on review  · Continue Lopressor 25 mg BID   · Monitor     CHF (congestive heart failure) ([facility] )  Assessment & Plan  Wt Readings from Last 3 Encounters:   06/04/22 63 kg (138 lb 14 2 oz)     · Pt reports hx of chronic diastolic CHF as evidenced by ECHO of preserved EF with G1DD  · Currently not maintained on any diuretics  · Requiring daily weights, I&Os  · Continue Lopressor 25 mg BID  · Pt appears euvolemic at this time      History of alcohol abuse  Assessment & Plan  · History of alcohol abuse in the past but reports cessation   · On PTA thiamin and folic acid, continue  · No evidence of any withdrawal symptoms at this time, if patient develops symptoms would start CIWA protocol     Schizophrenia ([facility] )  Assessment & Plan  · Mood currently appears stable   · Continue Celexa 10 mg daily, Seroquel 200 mg daily and Trazodone 50 mg daily   · Monitor       VTE Pharmacologic Prophylaxis: VTE Score: 3 Moderate Risk (Score 3-4) - Pharmacological DVT Prophylaxis Ordered: apixaban (Eliquis)  Patient Centered Rounds: I evaluated the patient without nursing staff present due to speaking to nurse outside patient's room   Discussions with Specialists or Other Care Team Provider: Discussed with RN, CONNIE and reviewed previous notes     Education and Discussions with Family / Patient: Updated  (daughter in law) via phone  Time Spent for Care: 30 minutes  More than 50% of total time spent on counseling and coordination of care as described above      Current Length of Stay: 3 day(s)  Current Patient Status: Inpatient Certification Statement: The patient will continue to require additional inpatient hospital stay due to monitoring stool output, hematology follow up, safe d/c planning  Discharge Plan: Anticipate discharge tomorrow to home with home services  Code Status: Level 1 - Full Code    Subjective:   Pt reports that he feels okay today, states he has been having multiple bowel movements since last night  No evidence of black or bloody stools upon discussion with nursing staff today  He continues to report some abdominal discomfort and discomfort in the leg  Reports appetite is decreased  Pt's DIL concerned about his DVT  Discussed that he is covered with the Eliquis and will need follow up with hematology in the outpatient setting for duration of therapy  Eliquis should cover him for additional clots including PE/stroke if taken as prescribed  She reports that since the patient is home bound, will need a provider that can do telemedicine visits or house visits  She reports that PT comes to the home and works with him but at times he gets confused and tries to get up by himself  We did discuss risks of falls while on Eliquis such as bleeding  Objective:     Vitals:   Temp (24hrs), Av 6 °F (37 °C), Min:98 2 °F (36 8 °C), Max:99 4 °F (37 4 °C)    Temp:  [98 2 °F (36 8 °C)-99 4 °F (37 4 °C)] 98 2 °F (36 8 °C)  HR:  [62-71] 64  Resp:  [18-19] 18  BP: (143-166)/(79-95) 143/79  SpO2:  [90 %-99 %] 97 %  Body mass index is 23 11 kg/m²  Input and Output Summary (last 24 hours): Intake/Output Summary (Last 24 hours) at 2022 1458  Last data filed at 2022 0450  Gross per 24 hour   Intake 240 ml   Output 775 ml   Net -535 ml       Physical Exam:   Physical Exam  Vitals reviewed  Constitutional:       General: He is not in acute distress  Comments: Pt is in no acute distress lying in his hospital bed resting comfortably   External urinary catheter in place draining clear yellow urine   Eyes: Conjunctiva/sclera: Conjunctivae normal    Cardiovascular:      Rate and Rhythm: Normal rate and regular rhythm  Pulses: Normal pulses  Pulmonary:      Effort: Pulmonary effort is normal  No respiratory distress  Breath sounds: No wheezing  Abdominal:      General: Bowel sounds are normal  There is no distension  Palpations: Abdomen is soft  Tenderness: There is no abdominal tenderness  Musculoskeletal:      Right lower leg: No edema  Left lower leg: Edema (mild, trace, extremities warm and pulses palpable) present  Skin:     General: Skin is warm and dry  Neurological:      Mental Status: He is alert  Psychiatric:         Mood and Affect: Mood normal           Additional Data:     Labs:  Results from last 7 days   Lab Units 06/04/22  0449 06/03/22  0541 06/02/22  0627   WBC Thousand/uL 4 86   < > 7 65   HEMOGLOBIN g/dL 13 7   < > 13 1   HEMATOCRIT % 41 1   < > 39 8   PLATELETS Thousands/uL 197   < > 190   NEUTROS PCT %  --   --  59   LYMPHS PCT %  --   --  26   MONOS PCT %  --   --  11   EOS PCT %  --   --  2    < > = values in this interval not displayed  Results from last 7 days   Lab Units 06/02/22  0627   SODIUM mmol/L 137   POTASSIUM mmol/L 4 3   CHLORIDE mmol/L 104   CO2 mmol/L 31   BUN mg/dL 19   CREATININE mg/dL 0 98   ANION GAP mmol/L 2*   CALCIUM mg/dL 8 8   ALBUMIN g/dL 2 7*   TOTAL BILIRUBIN mg/dL 0 41   ALK PHOS U/L 43*   ALT U/L 9*   AST U/L 17   GLUCOSE RANDOM mg/dL 104                 Results from last 7 days   Lab Units 06/02/22  0439   PROCALCITONIN ng/ml <0 05       Lines/Drains:  Invasive Devices  Report    Peripheral Intravenous Line  Duration           Peripheral IV 06/02/22 Left Hand 2 days          Drain  Duration           External Urinary Catheter Small 1 day                      Imaging: No pertinent imaging reviewed      Recent Cultures (last 7 days):         Last 24 Hours Medication List:   Current Facility-Administered Medications   Medication Dose Route Frequency Provider Last Rate    acetaminophen  650 mg Oral Q6H PRN Bull Cordero PA-C      apixaban  10 mg Oral BID Matthew Durant PA-C      atorvastatin  40 mg Oral Daily With Dinner Latoya Ashley,       citalopram  10 mg Oral Daily Latoya Ashley,       folic acid  1 mg Oral Daily Latoya Ashley, DO      lidocaine  1 patch Topical Daily Bull Cordero PA-C      metoprolol tartrate  25 mg Oral Q12H [facility] Latoya Ashley, DO      nicotine  21 mg Transdermal Daily Bull Cordero PA-C      pantoprazole  40 mg Oral Early Morning Scott Alejandro MD      polyethylene glycol  17 g Oral Daily PRN Matthew Durant PA-C      QUEtiapine  200 mg Oral Daily Latoya Ashley,       QUEtiapine  600 mg Oral HS Bull Cordero PA-C      thiamine  100 mg Oral Daily Latoya Ashley,       traZODone  50 mg Oral HS PRN Latoya Ashley,           Today, Patient Was Seen By: Kaden Wiggins PA-C    **Please Note: This note may have been constructed using a voice recognition system  **

## 2022-06-05 VITALS
OXYGEN SATURATION: 97 % | HEART RATE: 63 BPM | RESPIRATION RATE: 20 BRPM | BODY MASS INDEX: 22.77 KG/M2 | WEIGHT: 136.69 LBS | DIASTOLIC BLOOD PRESSURE: 89 MMHG | HEIGHT: 65 IN | SYSTOLIC BLOOD PRESSURE: 165 MMHG | TEMPERATURE: 98.7 F

## 2022-06-05 LAB
ERYTHROCYTE [DISTWIDTH] IN BLOOD BY AUTOMATED COUNT: 17.7 % (ref 11.6–15.1)
HCT VFR BLD AUTO: 43 % (ref 36.5–49.3)
HCV AB SER QL: ABNORMAL
HGB BLD-MCNC: 14.4 G/DL (ref 12–17)
MCH RBC QN AUTO: 31.7 PG (ref 26.8–34.3)
MCHC RBC AUTO-ENTMCNC: 33.5 G/DL (ref 31.4–37.4)
MCV RBC AUTO: 95 FL (ref 82–98)
PLATELET # BLD AUTO: 209 THOUSANDS/UL (ref 149–390)
PMV BLD AUTO: 9.8 FL (ref 8.9–12.7)
RBC # BLD AUTO: 4.54 MILLION/UL (ref 3.88–5.62)
WBC # BLD AUTO: 3.95 THOUSAND/UL (ref 4.31–10.16)

## 2022-06-05 PROCEDURE — 99239 HOSP IP/OBS DSCHRG MGMT >30: CPT | Performed by: PHYSICIAN ASSISTANT

## 2022-06-05 PROCEDURE — 86803 HEPATITIS C AB TEST: CPT | Performed by: INTERNAL MEDICINE

## 2022-06-05 PROCEDURE — 85027 COMPLETE CBC AUTOMATED: CPT | Performed by: PHYSICIAN ASSISTANT

## 2022-06-05 RX ORDER — PANTOPRAZOLE SODIUM 40 MG/1
40 TABLET, DELAYED RELEASE ORAL
Qty: 30 TABLET | Refills: 0 | Status: CANCELLED | OUTPATIENT
Start: 2022-06-06

## 2022-06-05 RX ORDER — POLYETHYLENE GLYCOL 3350 17 G/17G
17 POWDER, FOR SOLUTION ORAL DAILY PRN
Qty: 510 G | Refills: 0 | Status: SHIPPED | OUTPATIENT
Start: 2022-06-05

## 2022-06-05 RX ORDER — TAMSULOSIN HYDROCHLORIDE 0.4 MG/1
0.4 CAPSULE ORAL
COMMUNITY

## 2022-06-05 RX ORDER — DULOXETIN HYDROCHLORIDE 60 MG/1
60 CAPSULE, DELAYED RELEASE ORAL DAILY
COMMUNITY

## 2022-06-05 RX ORDER — MELATONIN
1000 DAILY
COMMUNITY

## 2022-06-05 RX ORDER — ATORVASTATIN CALCIUM 40 MG/1
40 TABLET, FILM COATED ORAL DAILY
COMMUNITY

## 2022-06-05 RX ORDER — FOLIC ACID 1 MG/1
TABLET ORAL DAILY
COMMUNITY

## 2022-06-05 RX ORDER — QUETIAPINE FUMARATE 200 MG/1
200 TABLET, FILM COATED ORAL
COMMUNITY

## 2022-06-05 RX ORDER — TRAZODONE HYDROCHLORIDE 50 MG/1
50 TABLET ORAL
COMMUNITY

## 2022-06-05 RX ORDER — LANOLIN ALCOHOL/MO/W.PET/CERES
100 CREAM (GRAM) TOPICAL DAILY
COMMUNITY

## 2022-06-05 RX ORDER — GABAPENTIN 600 MG/1
600 TABLET ORAL 4 TIMES DAILY
COMMUNITY

## 2022-06-05 RX ORDER — CITALOPRAM 10 MG/1
10 TABLET ORAL DAILY
Status: ON HOLD | COMMUNITY
End: 2022-06-05 | Stop reason: CLARIF

## 2022-06-05 RX ORDER — PREDNISONE 1 MG/1
TABLET ORAL DAILY
COMMUNITY

## 2022-06-05 RX ORDER — PANTOPRAZOLE SODIUM 40 MG/1
40 TABLET, DELAYED RELEASE ORAL DAILY
COMMUNITY

## 2022-06-05 RX ADMIN — THIAMINE HCL TAB 100 MG 100 MG: 100 TAB at 08:51

## 2022-06-05 RX ADMIN — LIDOCAINE 5% 1 PATCH: 700 PATCH TOPICAL at 08:51

## 2022-06-05 RX ADMIN — APIXABAN 10 MG: 5 TABLET, FILM COATED ORAL at 08:51

## 2022-06-05 RX ADMIN — QUETIAPINE FUMARATE 200 MG: 100 TABLET ORAL at 08:51

## 2022-06-05 RX ADMIN — FOLIC ACID 1 MG: 1 TABLET ORAL at 08:51

## 2022-06-05 RX ADMIN — CITALOPRAM HYDROBROMIDE 10 MG: 10 TABLET ORAL at 08:51

## 2022-06-05 RX ADMIN — PANTOPRAZOLE SODIUM 40 MG: 40 TABLET, DELAYED RELEASE ORAL at 06:19

## 2022-06-05 RX ADMIN — METOPROLOL TARTRATE 25 MG: 50 TABLET, FILM COATED ORAL at 08:51

## 2022-06-05 NOTE — ASSESSMENT & PLAN NOTE
· Pt presented with abdominal pain and LLE edema   · Duplex LLE with acute non occlusive thrombus in the proximal and distal femoral vein and deep femoral vein, acute occlusive thrombus in the popliteal vein  · Likely provoked from chronic immobility  · S/p IV heparin gtt, transitioned to PO Eliquis at 10 mg BID x 7 days followed by 5 mg BID thereafter  Cleared to start upon discussion with GI, no contraindication at this time  No evidence of any bleeding  · Will need follow up with hematology as an outpatient, continue a/c for at least 3-6 months per hematology recommendations  Will place [facility] to [facility] for telemedicine visit as an outpatient as pt is homebound   · Unable to obtain CTA due to contrast shortage therefore pulmonary consulted   · Pt does not have any tachycardia, shortness of breath or hypoxia, ECHO ordered without any evidence of strain, no indication for repeat imaging at this time as would likely not  at this point  Pulmonary signed off

## 2022-06-05 NOTE — ASSESSMENT & PLAN NOTE
· Patient does have a history of peptic ulcer disease with bleeding ulcer in the past   · On PTA Protonix, continue 40 mg daily  · Denies any recent melena or bloody stools  · Monitor stool output while on anticoagulation   · Gi evaluated here,  · Cleared for PO Eliquis, started on 6/3 without any bleeding noted   · Will need repeat hgb in one week once stable for discharge   · Continue miralax and senna for constipation symptoms, has had multiple bowel movements here, use on PRN basis only   · No plan for endoscopic evaluation here unless evidence of overt bleeding or down trending hgb  · Hgb stable at 14 4 today

## 2022-06-05 NOTE — PLAN OF CARE
Problem: MOBILITY - ADULT  Goal: Maintain or return to baseline ADL function  Description: INTERVENTIONS:  -  Assess patient's ability to carry out ADLs; assess patient's baseline for ADL function and identify physical deficits which impact ability to perform ADLs (bathing, care of mouth/teeth, toileting, grooming, dressing, etc )  - Assess/evaluate cause of self-care deficits   - Assess range of motion  - Assess patient's mobility; develop plan if impaired  - Assess patient's need for assistive devices and provide as appropriate  - Encourage maximum independence but intervene and supervise when necessary  - Involve family in performance of ADLs  - Assess for home care needs following discharge   - Consider OT consult to assist with ADL evaluation and planning for discharge  - Provide patient education as appropriate  Outcome: Progressing  Goal: Maintains/Returns to pre admission functional level  Description: INTERVENTIONS:  - Perform BMAT or MOVE assessment daily    - Set and communicate daily mobility goal to care team and patient/family/caregiver  - Collaborate with rehabilitation services on mobility goals if consulted  - Perform Range of Motion   times a day  - Reposition patient every   hours    - Dangle patient   times a day  - Stand patient    times a day  - Ambulate patient   times a day  - Out of bed to chair   times a day   - Out of bed for meals   times a day  - Out of bed for toileting  - Record patient progress and toleration of activity level   Outcome: Progressing     Problem: PAIN - ADULT  Goal: Verbalizes/displays adequate comfort level or baseline comfort level  Description: Interventions:  - Encourage patient to monitor pain and request assistance  - Assess pain using appropriate pain scale  - Administer analgesics based on type and severity of pain and evaluate response  - Implement non-pharmacological measures as appropriate and evaluate response  - Consider cultural and social influences on pain and pain management  - Notify physician/advanced practitioner if interventions unsuccessful or patient reports new pain  Outcome: Progressing     Problem: INFECTION - ADULT  Goal: Absence or prevention of progression during hospitalization  Description: INTERVENTIONS:  - Assess and monitor for signs and symptoms of infection  - Monitor lab/diagnostic results  - Monitor all insertion sites, i e  indwelling lines, tubes, and drains  - Monitor endotracheal if appropriate and nasal secretions for changes in amount and color  - [place] appropriate cooling/warming therapies per order  - Administer medications as ordered  - Instruct and encourage patient and family to use good hand hygiene technique  - Identify and instruct in appropriate isolation precautions for identified infection/condition  Outcome: Progressing     Problem: SAFETY ADULT  Goal: Maintain or return to baseline ADL function  Description: INTERVENTIONS:  -  Assess patient's ability to carry out ADLs; assess patient's baseline for ADL function and identify physical deficits which impact ability to perform ADLs (bathing, care of mouth/teeth, toileting, grooming, dressing, etc )  - Assess/evaluate cause of self-care deficits   - Assess range of motion  - Assess patient's mobility; develop plan if impaired  - Assess patient's need for assistive devices and provide as appropriate  - Encourage maximum independence but intervene and supervise when necessary  - Involve family in performance of ADLs  - Assess for home care needs following discharge   - Consider OT consult to assist with ADL evaluation and planning for discharge  - Provide patient education as appropriate  Outcome: Progressing  Goal: Maintains/Returns to pre admission functional level  Description: INTERVENTIONS:  - Perform BMAT or MOVE assessment daily    - Set and communicate daily mobility goal to care team and patient/family/caregiver     - Collaborate with rehabilitation services on mobility goals if consulted  - Perform Range of Motion   times a day  - Reposition patient every   hours    - Dangle patient   times a day  - Stand patient   times a day  - Ambulate patient   times a day  - Out of bed to chair   times a day   - Out of bed for meals   times a day  - Out of bed for toileting  - Record patient progress and toleration of activity level   Outcome: Progressing  Goal: Patient will remain free of falls  Description: INTERVENTIONS:  - Educate patient/family on patient safety including physical limitations  - Instruct patient to call for assistance with activity   - Consult OT/PT to assist with strengthening/mobility   - Keep Call bell within reach  - Keep bed low and locked with side rails adjusted as appropriate  - Keep care items and personal belongings within reach  - Initiate and maintain comfort rounds  - Make Fall Risk Sign visible to staff  - Offer Toileting every   Hours, in advance of need  - Initiate/Maintain  alarm  - Obtain necessary fall risk management equipment:      - Apply yellow socks and bracelet for high fall risk patients  - Consider moving patient to room near nurses station  Outcome: Progressing     Problem: DISCHARGE PLANNING  Goal: Discharge to home or other facility with appropriate resources  Description: INTERVENTIONS:  - Identify barriers to discharge w/patient and caregiver  - Arrange for needed discharge resources and transportation as appropriate  - Identify discharge learning needs (meds, wound care, etc )  - Arrange for interpretive services to assist at discharge as needed  - Refer to Case Management Department for coordinating discharge planning if the patient needs post-hospital services based on physician/advanced practitioner order or complex needs related to functional status, cognitive ability, or social support system  Outcome: Progressing     Problem: Knowledge Deficit  Goal: Patient/family/caregiver demonstrates understanding of disease process, treatment plan, medications, and discharge instructions  Description: Complete learning assessment and assess knowledge base  Interventions:  - Provide teaching at level of understanding  - Provide teaching via preferred learning methods  Outcome: Progressing     Problem: Nutrition/Hydration-ADULT  Goal: Nutrient/Hydration intake appropriate for improving, restoring or maintaining nutritional needs  Description: Monitor and assess patient's nutrition/hydration status for malnutrition  Collaborate with interdisciplinary team and initiate plan and interventions as ordered  Monitor patient's weight and dietary intake as ordered or per policy  Utilize nutrition screening tool and intervene as necessary  Determine patient's food preferences and provide high-protein, high-caloric foods as appropriate       INTERVENTIONS:  - Monitor oral intake, urinary output, labs, and treatment plans  - Assess nutrition and hydration status and recommend course of action  - Evaluate amount of meals eaten  - Assist patient with eating if necessary   - Allow adequate time for meals  - Recommend/ encourage appropriate diets, oral nutritional supplements, and vitamin/mineral supplements  - Order, calculate, and assess calorie counts as needed  - Recommend, monitor, and adjust tube feedings and TPN/PPN based on assessed needs  - Assess need for intravenous fluids  - Provide specific nutrition/hydration education as appropriate  - Include patient/family/caregiver in decisions related to nutrition  Outcome: Progressing     Problem: Potential for Falls  Goal: Patient will remain free of falls  Description: INTERVENTIONS:  - Educate patient/family on patient safety including physical limitations  - Instruct patient to call for assistance with activity   - Consult OT/PT to assist with strengthening/mobility   - Keep Call bell within reach  - Keep bed low and locked with side rails adjusted as appropriate  - Keep care items and personal belongings within reach  - Initiate and maintain comfort rounds  - Make Fall Risk Sign visible to staff  - Offer Toileting every   Hours, in advance of need  - Initiate/Maintain   alarm  - Obtain necessary fall risk management equipment:    - Apply yellow socks and bracelet for high fall risk patients  - Consider moving patient to room near nurses station  Outcome: Progressing     Problem: Prexisting or High Potential for Compromised Skin Integrity  Goal: Skin integrity is maintained or improved  Description: INTERVENTIONS:  - Identify patients at risk for skin breakdown  - Assess and monitor skin integrity  - Assess and monitor nutrition and hydration status  - Monitor labs   - Assess for incontinence   - Turn and reposition patient  - Assist with mobility/ambulation  - Relieve pressure over bony prominences  - Avoid friction and shearing  - Provide appropriate hygiene as needed including keeping skin clean and dry  - Evaluate need for skin moisturizer/barrier cream  - Collaborate with interdisciplinary team   - Patient/family teaching  - Consider wound care consult   Outcome: Progressing

## 2022-06-05 NOTE — ASSESSMENT & PLAN NOTE
· History of alcohol abuse in the past but reports cessation   · On PTA thiamin and folic acid, continue  · No evidence of any withdrawal symptoms at this time

## 2022-06-05 NOTE — CASE MANAGEMENT
Case Management Discharge Planning Note    Patient name Haydee Garcia  Location [address] 571/-76 MRN [id number]  : 1950 Date 2022       Current Admission Date: 2022  Current Admission Diagnosis:DVT (deep venous thrombosis) [facility])   Patient Active Problem List    Diagnosis Date Noted    Peptic ulcer disease 2022    DVT (deep venous thrombosis) ([facility] ) 2022    Schizophrenia ([facility] ) 2022    History of alcohol abuse 2022    CHF (congestive heart failure) ([facility] ) 2022    Hypertension 2022    Constipation 2022      LOS (days): 4  Geometric Mean LOS (GMLOS) (days): 3 10  Days to GMLOS:-0 4     OBJECTIVE:  Risk of Unplanned Readmission Score: 10 29         Current admission status: Inpatient   Preferred Pharmacy:   [address] [place] [address],9D, [facility] [address] [facility] [address] [facility]  Phone: [phone] Fax: [phone]    Primary Care Provider: Zack Hernandez MD    Primary Insurance: [facility]  Secondary Insurance:     DISCHARGE DETAILS:                                          Other Referral/Resources/Interventions Provided:  Referral Comments: CM spoke to daughter in law Kimani Curiel regarding transport plans  Kimani Curiel stated patient's son, Rena Armando, will be ubering to hospital to pick patient up at 1400  RN made aware to have patient in lobby B at that time  Kimani Curiel requesting scripts to be sent to [address] [place] [address] [facility] aware  Kimani Curiel also requesting dc instructions to be called to her, RN made aware  Kimani Curiel confirmed that waiver will be out this week to evaluate patient to increase caregiver hours and she has spoken to [facility] to resume services upon discharge  CM updated [facility] via [facility] of patient discharge           Treatment Team [facility]ation: [facility]  Discharge Destination Plan[de-identified] Home with [place] at Discharge : Family

## 2022-06-05 NOTE — DISCHARGE SUMMARY
[address] [facility]  Discharge- Matteo Sánchez 1950, 73 y o  male MRN: [id number]  Unit/Bed#: -01 Encounter: [id number]  Primary Care Provider: Antonia Galdamez MD   Date and time admitted to hospital: 6/1/2022  6:43 PM      DOS: 6/5/2022  * DVT (deep venous thrombosis) ([facility] [address] )  Assessment & Plan  · Pt presented with abdominal pain and LLE edema   · Duplex LLE with acute non occlusive thrombus in the proximal and distal femoral vein and deep femoral vein, acute occlusive thrombus in the popliteal vein  · Likely provoked from chronic immobility  · S/p IV heparin gtt, transitioned to PO Eliquis at 10 mg BID x 7 days followed by 5 mg BID thereafter  Cleared to start upon discussion with GI, no contraindication at this time  No evidence of any bleeding  · Will need follow up with hematology as an outpatient, continue a/c for at least 3-6 months per hematology recommendations  Will place referral to [facility] for telemedicine visit as an outpatient as pt is homebound   · Unable to obtain CTA due to contrast shortage therefore pulmonary consulted   · Pt does not have any tachycardia, shortness of breath or hypoxia, ECHO ordered without any evidence of strain, no indication for repeat imaging at this time as would likely not  at this point  Pulmonary signed off       Peptic ulcer disease  Assessment & Plan  · Patient does have a history of peptic ulcer disease with bleeding ulcer in the past   · On PTA Protonix, continue 40 mg daily  · Denies any recent melena or bloody stools  · Monitor stool output while on anticoagulation   · Gi evaluated here,  · Cleared for PO Eliquis, started on 6/3 without any bleeding noted   · Will need repeat hgb in one week once stable for discharge   · Continue miralax and senna for constipation symptoms, has had multiple bowel movements here, use on PRN basis only   · No plan for endoscopic evaluation here unless evidence of overt bleeding or down trending hgb  · Hgb stable at 14 4 today    Constipation  Assessment & Plan  · Noted on admission CT a/p, chronic per discussion with patient's DIL  · Pt is maintained on BID dulcolax at home   · Started on daily miralax and colace BID here, now with multiple bowel movements  · Will d/c scheduled regimen as above and place on PRN basis to continue at home  · GI following as above  · Monitor stool output    Hypertension  Assessment & Plan  · BP fairly stable on review  · Continue Lopressor 25 mg BID     CHF (congestive heart failure) ([facility] [address] )  Assessment & Plan  Wt Readings from Last 3 Encounters:   06/05/22 62 kg (136 lb 11 oz)     · Pt reports hx of chronic diastolic CHF as evidenced by ECHO of preserved EF with G1DD  · Currently not maintained on any diuretics  · Requiring daily weights, I&Os  · Continue Lopressor 25 mg BID  · Pt appears euvolemic at this time      History of alcohol abuse  Assessment & Plan  · History of alcohol abuse in the past but reports cessation   · On PTA thiamin and folic acid, continue  · No evidence of any withdrawal symptoms at this time    [facility])  Assessment & Plan  · Mood currently appears stable   · Continue Cymbalta 60 mg, Seroquel 200 mg daily and Trazodone 50 mg daily   · PTA medication list updated per discussion with patient's DIL today, he is no longer on celexa, this was discontinued   · Monitor       Medical Problems             Resolved Problems  Date Reviewed: 6/5/2022   None               Discharging Physician / Practitioner: Joanne Coyle PA-C  PCP: Sabrina Enciso MD  Admission Date:   Admission Orders (From admission, onward)     Ordered        06/01/22 2240  Inpatient Admission  Once                      Discharge Date: 06/05/22    Consultations During Hospital Stay:  · Pulmonary  · GI    Procedures Performed:   · CT abd/pelvis 6/1 - Airspace opacities within the left lower lobe may represent infection   Age indeterminate compression deformity of L2 vertebral body  Large amount of fecal material in the colon suggestive of constipation  · CXR 6/1 - Elevated left hemidiaphragm and mild left basilar atelectasis, otherwise clear lungs  · Venous duplex 6/1 - Acute non occlusive thrombus in the proximal and distal femoral vein and deep formal vein  Acute occlusive thrombus noted in the popliteal vein on the left  · ECHO 6/2 - EF 64%, G1DD    Significant Findings / Test Results:   ·    · WBC 3 95  · Procal negative  · Hep C antibody positive - checked per nursing report, pt's DIL reports that he has had Hep C in the past, follow up outpatient  LFTs WNL    Incidental Findings:   · Airspace opacities in the LLL and age indeterminate compression deformity of L2 vertebral body - follow up with CT scan in 3 months     Test Results Pending at Discharge (will require follow up): · None     Outpatient Tests Requested:  · Per PCP, likely check CBC in one week to monitor hgb    Complications:  None     Reason for Admission: lower extremity edema and abdominal pain     Hospital Course:   Eben Hendrickson is a 73 y o  male patient with significant past medical history of schizophrenia, CHF, PUD with prior duodenal bleeds who originally presented to the hospital on 6/1/2022 due to lower extremity edema and abdominal pain  Pt was seen by his outpatient PCP and placed on lasix without improvement of lower extremity edema  He had venous duplex here with evidence of acute DVT on the left  He was placed on a heparin gtt and evaluated by pulmonary  There were no findings to suggest PE and no indication for CTA at that time as this would not   GI also evaluated as pt with hx of PUD and significant bleeding ulcers in the past  There was no contraindication to anticoagulation from GI standpoint and patient was then transitioned to PO Eliquis  He was monitored closely while on Eliquis prior to discharge, no evidence of any bleeding   He was advised to follow up with GI and hematology in the outpatient setting for continued management of a/c  Pt is home bound and does not go to appointments, would benefit from telemedicine visits if possible with outpatient providers  Pt was discharged in stable condition  For additional information please refer to medical records  Medication changes include: Addition of PO Eliquis, PRN miralax      Please see above list of diagnoses and related plan for additional information  Condition at Discharge: stable    Discharge Day Visit / Exam:   Subjective:  Pt reports that he feels well today  Continues to have some pain in the left leg  Denies any chest pain or shortness of breath  Pt's daughter in law updated this provider with patient's PTA medications  Was not receiving some medications here, this was updated in the system  Pt's DIL states that patient seemed to be a little confused this morning, thinks this is likely related to his dementia and being in the hospital setting without getting some of his home medications  Advised to continue home medications and discussed changes with bowel regimen and addition of eliquis  Vitals: Blood Pressure: 154/92 (06/05/22 0724)  Pulse: 63 (06/05/22 0724)  Temperature: 97 8 °F (36 6 °C) (06/05/22 0724)  Temp Source: Oral (06/05/22 0724)  Respirations: 20 (06/05/22 0724)  Height: 5' 5" (165 1 cm) (06/02/22 1050)  Weight - Scale: 62 kg (136 lb 11 oz) (06/05/22 0600)  SpO2: 96 % (06/05/22 0724)  Exam:   Physical Exam  Vitals reviewed  Constitutional:       General: He is not in acute distress  Appearance: He is not toxic-appearing  Comments: Pt is in no acute distress lying in his hospital bed resting comfortably  External urinary catheter in place draining yellow urine   HENT:      Head: Normocephalic and atraumatic  Eyes:      Conjunctiva/sclera: Conjunctivae normal    Cardiovascular:      Rate and Rhythm: Normal rate and regular rhythm  Pulses: Normal pulses  Pulmonary:      Effort: Pulmonary effort is normal  No respiratory distress  Breath sounds: No wheezing  Abdominal:      General: Bowel sounds are normal  There is no distension  Palpations: Abdomen is soft  Tenderness: There is no abdominal tenderness  Musculoskeletal:      Right lower leg: No edema  Left lower leg: No edema  Skin:     General: Skin is warm and dry  Findings: No erythema  Neurological:      Mental Status: He is alert  Psychiatric:         Mood and Affect: Mood normal           Discussion with Family: Updated  (son and daughter in law) via phone  Discharge instructions/Information to patient and family:   See after visit summary for information provided to patient and family  Provisions for Follow-Up Care:  See after visit summary for information related to follow-up care and any pertinent home health orders  Disposition:   Home with [facility] (Reminder: Complete face to face encounter)    Planned Readmission: None     Discharge Statement:  I spent 50 minutes discharging the patient  This time was spent on the day of discharge  I had direct contact with the patient on the day of discharge  Greater than 50% of the total time was spent examining patient, answering all patient questions, arranging and discussing plan of care with patient as well as directly providing post-discharge instructions  Additional time then spent on discharge activities  Discharge Medications:  See after visit summary for reconciled discharge medications provided to patient and/or family        **Please Note: This note may have been constructed using a voice recognition system**

## 2022-06-05 NOTE — INCIDENTAL FINDINGS
The following findings require follow up:  Radiographic finding   Finding: Airspace opacities in the LLL, compression deformity of L2 vertebral body    Follow up required: Repeat CT chest    Follow up should be done within 3 month(s)    Please notify the following clinician to assist with the follow up:   Dr Bronson Siddiqi, PCP

## 2022-06-05 NOTE — NURSING NOTE
Cm notified this RN to call daughter in law to go over discharge instructions  Call placed with no answer  Voicemail left with RN number for call back  Second call placed with no answer  Discharge instructions given to patient

## 2022-06-05 NOTE — CASE MANAGEMENT
Case Management Progress Note    Patient name Yue Isaac  Location [address] 571/-81 MRN [id number]  : 1950 Date 2022       LOS (days): 4  Geometric Mean LOS (GMLOS) (days): 3 10  Days to GMLOS:-0 4        OBJECTIVE:        Current admission status: Inpatient  Preferred Pharmacy:   [address] [place] [address],9D, [facility] [address] [facility] [address] [facility]  Phone: [phone] Fax: [phone]    Primary Care Provider: Ana Cuevas MD    Primary Insurance: [facility] RE[facility]  Secondary Insurance:     PROGRESS NOTE:  CM called daughter in law, Isaac Hanson, left voicemail and requested call back to discuss transportation

## 2022-06-05 NOTE — ASSESSMENT & PLAN NOTE
Wt Readings from Last 3 Encounters:   06/05/22 62 kg (136 lb 11 oz)     · Pt reports hx of chronic diastolic CHF as evidenced by ECHO of preserved EF with G1DD  · Currently not maintained on any diuretics  · Requiring daily weights, I&Os  · Continue Lopressor 25 mg BID  · Pt appears euvolemic at this time

## 2022-06-05 NOTE — ASSESSMENT & PLAN NOTE
· Noted on admission CT a/p, chronic per discussion with patient's DIL  · Pt is maintained on BID dulcolax at home   · Started on daily miralax and colace BID here, now with multiple bowel movements  · Will d/c scheduled regimen as above and place on PRN basis to continue at home  · GI following as above  · Monitor stool output

## 2022-06-05 NOTE — DISCHARGE INSTRUCTIONS
Please follow up with your primary care provider within one week, recommend CBC in one week  Monitor for any signs of bleeding and continue Eliquis at home   Please follow up with GI and hematology for further management of blood thinners   Continue as needed miralax and stool softeners at home for constipation  If you begin to have any worsening pain in the leg, bleeding please come back to the hospital for further evaluation

## 2022-06-06 NOTE — UTILIZATION REVIEW
Notification of Discharge   This is a Notification of Discharge from our facility [address]  Please be advised that this patient has been discharge from our facility  Below you will find the admission and discharge date and time including the patients disposition  UTILIZATION REVIEW CONTACT:  Viviane Raymundo  Utilization   Network Utilization Review Department  Phone: [phone] x carefully listen to the prompts  All voicemails are confidential   Email: [email or web address]     PHYSICIAN ADVISORY SERVICES:  FOR [id number] REVIEW - MEDICAL NECESSITY DENIAL  Phone: [phone]  Fax: [phone]  Email: [email or web address]     PRESENTATION DATE: 6/1/2022  6:43 PM  OBERVATION ADMISSION DATE:   INPATIENT ADMISSION DATE: 6/1/22 10:40 PM   DISCHARGE DATE: 6/5/2022  4:08 PM  DISPOSITION: Home with [place] with [address] INFORMATION:  Send all requests for admission clinical reviews, approved or denied determinations and any other requests to dedicated fax number below belonging to the campus where the patient is receiving treatment   List of dedicated fax numbers:  [address] DENIALS (Administrative/Medical Necessity) [phone]   [address] (Maternity/NICU/Pediatrics) [phone]   [facility] [phone]   130 [facility] [phone]   [address] [place] [address] [phone]   2000 [facility] [address] [place] [address],[address] [phone]   [facility]  [phone]   [address] [place] [address], [address]  2401 [facility] 1000 [address] [facility] [phone]

## 2022-06-08 ENCOUNTER — TELEPHONE (OUTPATIENT)
Dept: HEMATOLOGY ONCOLOGY | Facility: CLINIC | Age: 72
End: 2022-06-08

## 2022-06-13 ENCOUNTER — VBI (OUTPATIENT)
Dept: ADMINISTRATIVE | Facility: OTHER | Age: 72
End: 2022-06-13

## 2022-06-20 ENCOUNTER — DOCUMENTATION (OUTPATIENT)
Dept: GASTROENTEROLOGY | Facility: CLINIC | Age: 72
End: 2022-06-20

## 2022-06-20 NOTE — PROGRESS NOTES
Virtual Regular Visit    Verification of patient location:    Patient is located in the following state in which I hold an active license [place]      Assessment/Plan:    Problem List Items Addressed This Visit        Digestive    Peptic ulcer disease - Primary       Cardiovascular and Mediastinum    DVT (deep venous thrombosis) ([facility] [address] )     Patient was noted to have left lower extremity DVT  He is currently on Eliquis  He will follow-up with Hematology regarding duration of treatment of his DVT  Other    History of duodenal ulcer     Patient does have a history of a duodenal ulcer with massive GI bleeding  This did fail endoscopic and IR intervention  He eventually need an exploratory laparotomy to control was bleeding  In August of 2021 he underwent follow-up EGD that revealed a smaller ulcer in the pylorus with adherent clot  The duodenum and stomach were otherwise normal   Biopsies have been negative for H pylori  Gastroenterology got involved again when he was diagnosed with a DVT and needed to anticoagulation  He has now been on anticoagulation for the last couple weeks  No sign of GI bleeding  Given history of peptic ulcer disease, I did recommend that he should continue to try to quit smoking as smoking can be associated with nonhealing ulcers  For now continue pantoprazole 40 mg twice a day  We can revisit this after he is off anticoagulation, but given history of life-threatening GI bleed due to ulcer disease, he should be on lifelong proton pump inhibitor  I did review review with he and his daughter-in-law the need to avoid all NSAIDs including Motrin, Advil, Aleve, ibuprofen, Celebrex, meloxicam, Excedrin, just to name a few  Given history of duodenal ulcer and GI bleeding and need for anticoagulation, I would recommend checking a CBCs an outpatient to be sure that he is not developing any anemia               History of GI bleed     Patient does have a history of massive GI bleeding April 2021  Please see comments under history of duodenal ulcer  Relevant Orders    CBC    Comprehensive metabolic panel    Constipation     Patient does have a history of constipation  He has been on Colace for quite some time  Recently MiraLax was added  His constipation is much improved with the addition of MiraLax  In fact they are considering cutting back his MiraLax dose to twice a week  He has been using it 3 times a week  Alternatively they could try giving a lower dose of MiraLax maybe 1/2 dose every other day to see if this helps limit the loose bowel movements  Hepatitis C antibody positive in blood     Patient was noted to have hepatitis-C antibody positivity while hospitalized  He did inform he has a history of hepatitis-C likely acquired through IV drug use many years ago  He also reports being treated for his hepatitis-C while in a nursing facility  He recalls receiving injections, I suspect he may have been treated with interferon and ribavirin  Need to check hepatitis C RNA quantitative test by PCR  Of hepatitis C RNA is positive, then we will need to check genotype, and other testing to set him up for direct acting antiviral therapy for hepatitis-C  Hopefully the hepatitis-C antibody is positive due to prior hepatitis-C and he has been adequately treatment and has a sustained virologic response  If hepatitis-C RNA is positive, he will need an ultrasound elastography  For now we will check hepatitis A serology and hepatitis-B serologies to be sure that he is immune to both hepatitis a and hepatitis-B    Ways to prevent transmission of Hepatitis C:  -Avoid sharing toothbrushes and other dental equipment   -Avoid sharing razors  -Do not donate blood  -If using Illicit drugs you should stop using and enter into substance abuse treatment program  -If bleeding make sure others are not exposed to your blood             Relevant Orders    Hepatitis C RNA, quantitative, PCR    Hepatitis B surface antibody    Hepatitis B surface antigen    Hepatitis B core antibody, total    Hepatitis A antibody, total    History of colon polyps     Patient does have a history of colon polyps  He was recommended that he undergo repeat colonoscopy in August of 2024  Other Visit Diagnoses     Medication management        Relevant Orders    Magnesium    Vitamin B12    Folate               Reason for visit is   Chief Complaint   Patient presents with    Peptic Ulcer Disease    Virtual Regular Visit        Encounter provider Vinnie Bernabe DO    Provider located at [address] [place] [id number] [phone]      Recent Visits  No visits were found meeting these conditions  Showing recent visits within past 7 days and meeting all other requirements  Today's Visits  Date Type Provider Dept   06/21/22 Telemedicine DO Tayo Strong   Showing today's visits and meeting all other requirements  Future Appointments  No visits were found meeting these conditions  Showing future appointments within next 150 days and meeting all other requirements       The patient was identified by name and date of birth  Gary Boo was informed that this is a telemedicine visit and that the visit is being conducted through [address] and patient was informed this is a secure, HIPAA-complaint platform  He agrees to proceed     My office door was closed  No one else was in the room  He acknowledged consent and understanding of privacy and security of the video platform  The patient has agreed to participate and understands they can discontinue the visit at any time  Patient is aware this is a billable service  Subjective  Gary Boo is a 73 y o  male who I am seeing in the gastroenterology office to follow up    His family has elected a virtual visit due to his immobility  I did spend about 30 minutes reviewing his records  He was just recently hospitalized in May and was noted to have a DVT  He was initially placed on a heparin drip and then transition to Eliquis  Gastroenterology was consulted while in the hospital as patient has a history of having had a massive GI bleed  This was not amendable to endoscopic treatment  Interventional radiology treatment options failed and patient did eventually undergo an exploratory laparotomy  He did undergo repeat EGD in August that revealed of residual smaller ulceration  He has not had a follow-up EGD since then  He also a colonoscopy in August of 2021  Patient's daughter was very helpful with this visit  She informed me that he stop drinking alcohol ever since his GI bleed in April of 2021  He was drinking at least a 6 pack of beer a day  Patient is prone to constipation and since his hospitalization has been using MiraLax every other day  In addition he takes 1 Colace twice a day  This is provided him with loose bowel movements  They are going to decrease the MiraLax dose frequency  There has been no rectal bleeding or melena  Patient continues to complain of musculoskeletal type pain  He also complains of left-sided abdominal pain  This abdominal pain does not change with a bowel movement  It does not change with eating  There is no associated nausea vomiting  This pain is constant  It does seem to be worse with movement  He does have a history of hepatitis-C  He reports being cured of hepatitis-C  He believes that he received injections while in a nursing home  His hepatitis-C antibody did test positive in the hospital   He does report a history of IV drug use with shared needles in the past   He does not use IV drugs for 25 years but  He does recall having a hepatitis like illness many many years ago as well  He did have jaundice      He also reports having had hepatitis-B in the past     He does smoke about 1/2 a pack of cigarettes per day  He used to smoke 1 pack a day up until March of 2022  He has not been drinking any alcohol since April 2021  He does use medical marijuana daily basis  Records reviewed in noted below  HPI   06/05/2022 laboratory data  WBC 3 95 HGB 14 4 HCT 43 MCV 95 platelet count [id number]  Hepatitis C antibody highly reactive     06/02/2022   AST 17   ALT 9   Alk-phos 43   Albumin 2 7   T bili 0 41     06/01/2022 GI consult       06/01/2022  through 06/05/2022 hospitalized [address]   Patient presented with abdominal pain left lower extremity edema  Noted to have an acute nonocclusive thrombus in the proximal distal femoral vein and deep femoral vein, acute occlusive thrombus in the popliteal vein  Placed on heparin drip followed by Eliquis 10 mg twice a day for 7 days followed by 5 mg twice a day there after  Reported history of peptic ulcer disease with GI bleeding in the past   Cleared by GI to go on Eliquis  06/01/2022 CT scan abdomen pelvis without contrast   Large amount of fecal material within the colon  Surgical clips adjacent to the pylorus of the stomach  Airspace opacities within the left lower lobe may represent infection  Recommend short-term follow-up CT chest in 3 months  08/10/2021 colonoscopy Dr Bo Vazquez   10 mm  Or larger sessile polyp in the rectum removed by hot snare  Tubular adenoma    pathology also reports polyp sigmoid colon-tubular adenoma no mention of a 2nd polyp in the colonoscopy report  Multiple small diverticula sigmoid colon  08/10/2021 EGD   Single small crater benign appearing ulcer in the pylorus with adherent clot  Duodenum and stomach otherwise normal   Small sliding hiatal hernia   Normal esophagus  Biopsies stomach insufficient for diagnosis        08/06/2021 CT scan of the abdomen and pelvis with IV contrast   Mild thickening left colon particularly splenic flexure with slight a decent stranding  May represent inflammatory infectious colitis  Diverticula are noted in the sigmoid colon there was not appear to be inflamed diverticula accounting for this inflammation  Ischemic colitis including watershed ischemia given splenic flexure involvement is not excluded   Patchy left basilar opacity with small pleural effusion  Elevation left hemidiaphragm  Stable nodular sticking adrenal glands likely represent adenomatous hyperplasia      04/22/2021 EGD for acute upper GI bleed    Dr Bill Nunez, irregular ulcer in duodenal bulb with pigmented spots and adherent clot  treated with BICAP  Single small ulcer in the gastric body   Esophagus normal     4/21/ 2021 EGD Dr Sung Kirkpatrick    Huge ulcer duodenal bulb involving inferior and posterior bulb with what appeared to be 2 visible vessels  No bleeding  Ulcer measured 5 cm  Significant amount of blood in the fundus and body of the stomach  Large amount of dark maroon and black blood in the stomach with many clots  Performed multiple biopsies  Biopsies negative for H pylori  Risk of endoscopic treatment of duodenal ulcer outweighed the  Potential benefits according to the endoscopist         01/11/2020 EGD [facility] for GI bleeding  Blood clotting in the fundus of the stomach  Fundus could not be visualized  Large ulcer with visible vessel in the duodenal sweep treated with epinephrine clips and hemo spray, persistent with active bleeding after treatment  IR was consult  Ppi drip failed IR intervention  Patient underwent exploratory laparotomy        Past Medical History:   Diagnosis Date    Alcohol abuse     BPH (benign prostatic hyperplasia)     CVA (cerebral vascular accident) ([address] )     DJD (degenerative joint disease)     Encounter to establish care with new doctor 8/21/2019    Gait abnormality     Head injury     History of cerebrovascular accident (CVA) with residual deficit 10/1/2019    History of CVA (cerebrovascular accident) 1/8/2020    History of substance abuse ([address] ) 8/21/2019    Polysubstance     History of sustained ventricular tachycardia 8/21/2019    History of transient ischemic attack (TIA) 8/28/2019    Hypertension     Metatarsal fracture     Palliative care patient     TIA (transient ischemic attack)     Tobacco abuse        Past Surgical History:   Procedure Laterality Date    ABDOMINAL SURGERY      ANKLE SURGERY      BACK SURGERY      CT GUIDED [facility] DRAINAGE CATHETER PLACEMENT  1/27/2020    ELBOW SURGERY      IR VISCERAL ANGIOGRAPHY / INTERVENTION  1/11/2020    JOINT REPLACEMENT      KNEE SURGERY      LAPAROTOMY N/A 1/11/2020    Procedure: LAPAROTOMY EXPLORATORY,  OVERSEW  OF GASTRO DUODENAL ARTERY, THAL PATCH OF DUODENUM, VICKY GASTRO JEJUNOSTOMY TUBE;  Surgeon: Yael Bob DO;  [facility]: [facility];  Service: General    LIVER SURGERY         Current Outpatient Medications   Medication Sig Dispense Refill    acetaminophen (TYLENOL) 325 mg tablet Take 2 tablets (650 mg total) by mouth every 6 (six) hours as needed for mild pain 30 tablet 0    albuterol (PROVENTIL HFA,VENTOLIN HFA) 90 mcg/act inhaler INHALE 2 PUFFS BY MOUTH EVERY 6 HOURS AS NEEDED FOR WHEEZING   8 g 0    apixaban (Eliquis) 5 mg Take 2 tablets (10 mg) BID x 4 days followed by 1 tablet (5 mg) BID thereafter 60 tablet 0    atorvastatin (LIPITOR) 40 mg tablet Take 1 tablet (40 mg total) by mouth daily 90 tablet 1    atorvastatin (LIPITOR) 40 mg tablet Take 40 mg by mouth daily      cholecalciferol (VITAMIN D3) 1,000 units tablet Take 1,000 Units by mouth daily      Cholecalciferol 25 MCG (1000 UT) tablet Take 1 tablet (1,000 Units total) by mouth daily 30 tablet 1    docusate sodium (COLACE) 100 mg capsule Take 1 capsule (100 mg total) by mouth 2 (two) times a day 10 capsule 0    DULoxetine (CYMBALTA) 60 mg delayed release capsule Take 60 mg by mouth daily      fluticasone (FLONASE) 50 mcg/act nasal spray 2 sprays into each nostril daily 18 2 mL 2    fluticasone-vilanterol (BREO ELLIPTA) 100-25 mcg/inh inhaler Inhale 1 puff daily Rinse mouth after use  1 Inhaler 3    folic acid (FOLVITE) 1 mg tablet take 1 tablet by mouth once daily 90 tablet 1    folic acid (FOLVITE) 1 mg tablet Take by mouth daily      furosemide (LASIX) 20 mg tablet take 1 tablet by mouth every morning for 7 days      gabapentin (NEURONTIN) 600 MG tablet take 1 tablet by mouth three times a day 270 tablet 1    gabapentin (NEURONTIN) 600 MG tablet Take 600 mg by mouth 4 (four) times a day      hydrOXYzine pamoate (VISTARIL) 50 mg capsule take 1 capsule by mouth three times a day if needed for anxiety      metoprolol tartrate (LOPRESSOR) 25 mg tablet Take 25 mg by mouth every 12 (twelve) hours      metoprolol tartrate (LOPRESSOR) 50 mg tablet 1/2 tablet by mouth every 12 hours 30 tablet 0    Nutritional Supplements (Ensure Original) LIQD Take 1 Can by mouth 3 (three) times a day as needed (nutrition) 237 mL 1    pantoprazole (PROTONIX) 40 mg tablet TAKE ONE TABLET BY MOUTH TWICE DAILY BEFORE MEALS 180 tablet 1    pantoprazole (PROTONIX) 40 mg tablet Take 40 mg by mouth daily      polyethylene glycol (MIRALAX) 17 g packet Take 17 g by mouth daily as needed (constipation) 510 g 0    predniSONE 5 mg tablet Take by mouth daily      QUEtiapine (SEROquel) 200 mg tablet Take 1 tablet (200 mg total) by mouth daily 30 tablet 1    QUEtiapine (SEROquel) 200 mg tablet Take 200 mg by mouth daily at bedtime 200 mg Qam and 600 mg QPM      QUEtiapine (SEROquel) 300 mg tablet Take 2 tablets (600 mg total) by mouth daily at bedtime 60 tablet 1    Skin Protectants, Misc   (Remedy Phytoplex Hydraguard) CREA Apply 1 application topically 2 (two) times a day 118 mL 0    tamsulosin (FLOMAX) 0 4 mg Take 0 4 mg by mouth daily with dinner      thiamine 100 MG tablet Take 1 tablet (100 mg total) by mouth daily 90 tablet 1  thiamine 100 MG tablet Take 100 mg by mouth daily      traZODone (DESYREL) 50 mg tablet Take 1 tablet (50 mg total) by mouth daily at bedtime as needed for sleep 30 tablet 1    traZODone (DESYREL) 50 mg tablet Take 50 mg by mouth daily at bedtime      citalopram (CeleXA) 10 mg tablet Take 10 mg by mouth daily      dicyclomine (BENTYL) 10 mg capsule Take 1 capsule (10 mg total) by mouth 4 (four) times a day (before meals and at bedtime) for 10 days 40 capsule 0    mirtazapine (REMERON) 45 MG tablet Take 45 mg by mouth daily at bedtime        No current facility-administered medications for this visit  No Known Allergies    Review of Systems     REVIEW OF SYSTEMS:    CONSTITUTIONAL: Denies any fever, chills, rigors, and weight loss  HEENT: No earache or tinnitus  Denies hearing loss or visual disturbances  CARDIOVASCULAR: No chest pain or palpitations  RESPIRATORY:  He does report cough that is been productive  There is no blood in the hemoptysis  He does report some dyspnea upon exertion and has COPD  GASTROINTESTINAL: As noted in the History of Present Illness  GENITOURINARY:  He did have issues emptying his bladder and started on time yellow some  Denies any hematuria or dysuria  NEUROLOGIC: No dizziness or vertigo, denies headaches  MUSCULOSKELETAL: He does report a lot of joint and muscle pain despite using Cymbalta prednisone  SKIN: Denies skin rashes or itching  ENDOCRINE: Denies excessive thirst  Denies intolerance to heat or cold  PSYCHOSOCIAL:  He does have anxiety, depression, and schizophrenia  PHYSICAL EXAMINATION:  Appearance and vitals taken from home devices    General Appearance:   Alert, cooperative, no distress  Leading to his left side   HEENT:  Normocephalic, atraumatic, anicteric  Neck supple, symmetrical, trachea midline  Lungs:   Equal chest rise and unlabored breathing, normal effort, no coughing  Cardiovascular:   No visualized JVD     Abdomen:   No abdominal distension  No rash on abdominal wall  Tender to palpation on left side  Skin:   No jaundice, rashes, or lesions  Musculoskeletal:   Limited mobility  He does have edema left lower extremity greater than right lower extremity  Currently wearing compression stockings  Psych:  Normal affect and normal insight  Neuro:  Alert and appropriate  Vitals:    06/21/22 1233   Weight: 61 2 kg (135 lb)   Height: 5' 5" (1 651 m)       Physical Exam see above  I spent 40 minutes directly with the patient during this visit    VIRTUAL VISIT DISCLAIMER      Geovany Medina verbally agrees to participate in [employer]  Pt is aware that [employer] could be limited without vital signs or the ability to perform a full hands-on physical exam  Benjamín Marcelo understands he or the provider may request at any time to terminate the video visit and request the patient to seek care or treatment in person

## 2022-06-20 NOTE — PROGRESS NOTES
Information collected in preparation for office visit 06/21/2022 06/01/2022  through 06/05/2022 [facility]   Patient presented with abdominal pain left lower extremity edema  Noted to have an acute nonocclusive thrombus in the proximal distal femoral vein and deep femoral vein, acute occlusive thrombus in the popliteal vein  Placed on heparin drip followed by Eliquis 10 mg twice a day for 7 days followed by 5 mg twice a day there after  Reported history of peptic ulcer disease with GI bleeding in the past   Cleared by GI to go on Eliquis  06/01/2022 CT scan abdomen pelvis without contrast   Large amount of fecal material within the colon  Surgical clips adjacent to the pylorus of the stomach  Airspace opacities within the left lower lobe may represent infection  Recommend short-term follow-up CT chest in 3 months  08/10/2021 colonoscopy Dr Nury Bernardo   10 mm  Or larger sessile polyp in the rectum removed by hot snare  Tubular adenoma    pathology also reports polyp sigmoid colon-tubular adenoma no mention of a 2nd polyp in the colonoscopy report  Multiple small diverticula sigmoid colon  08/10/2021 EGD   Single small crater benign appearing ulcer in the pylorus with adherent clot  Duodenum and stomach otherwise normal   Small sliding hiatal hernia   Normal esophagus  Biopsies stomach insufficient for diagnosis  08/06/2021 CT scan of the abdomen and pelvis with IV contrast   Mild thickening left colon particularly splenic flexure with slight a decent stranding  May represent inflammatory infectious colitis  Diverticula are noted in the sigmoid colon there was not appear to be inflamed diverticula accounting for this inflammation  Ischemic colitis including watershed ischemia given splenic flexure involvement is not excluded   Patchy left basilar opacity with small pleural effusion      Elevation left hemidiaphragm  Stable nodular sticking adrenal glands likely represent adenomatous hyperplasia      04/22/2021 EGD for acute upper GI bleed    Dr Adilia Castillo, irregular ulcer in duodenal bulb with pigmented spots and adherent clot  treated with BICAP  Single small ulcer in the gastric body   Esophagus normal     4/21/ 2021 EGD Dr Bobo Youngblood    Huge ulcer duodenal bulb involving inferior and posterior bulb with what appeared to be 2 visible vessels  No bleeding  Ulcer measured 5 cm  Significant amount of blood in the fundus and body of the stomach  Large amount of dark maroon and black blood in the stomach with many clots  Performed multiple biopsies  Biopsies negative for H pylori  Risk of endoscopic treatment of duodenal ulcer outweighed the  Potential benefits according to the endoscopist         01/11/2020 EGD [facility]  Blood clotting in the fundus of the stomach  Fundus could not be visualized  Large ulcer with visible vessel in the duodenal sweep treated with epinephrine clips and hemo spray, persistent with active bleeding after treatment  IR was consult    Ppi drip

## 2022-06-21 ENCOUNTER — TELEMEDICINE (OUTPATIENT)
Dept: GASTROENTEROLOGY | Facility: CLINIC | Age: 72
End: 2022-06-21
Payer: COMMERCIAL

## 2022-06-21 VITALS — WEIGHT: 135 LBS | BODY MASS INDEX: 22.49 KG/M2 | HEIGHT: 65 IN

## 2022-06-21 DIAGNOSIS — I82.412 DEEP VEIN THROMBOSIS (DVT) OF FEMORAL VEIN OF LEFT LOWER EXTREMITY, UNSPECIFIED CHRONICITY (HCC): ICD-10-CM

## 2022-06-21 DIAGNOSIS — Z87.19 HISTORY OF GI BLEED: ICD-10-CM

## 2022-06-21 DIAGNOSIS — Z87.19 HISTORY OF DUODENAL ULCER: ICD-10-CM

## 2022-06-21 DIAGNOSIS — Z79.899 MEDICATION MANAGEMENT: ICD-10-CM

## 2022-06-21 DIAGNOSIS — K59.00 CONSTIPATION, UNSPECIFIED CONSTIPATION TYPE: ICD-10-CM

## 2022-06-21 DIAGNOSIS — R76.8 HEPATITIS C ANTIBODY POSITIVE IN BLOOD: ICD-10-CM

## 2022-06-21 DIAGNOSIS — K27.9 PEPTIC ULCER DISEASE: Primary | ICD-10-CM

## 2022-06-21 DIAGNOSIS — Z86.010 HISTORY OF COLON POLYPS: ICD-10-CM

## 2022-06-21 PROCEDURE — 3008F BODY MASS INDEX DOCD: CPT | Performed by: INTERNAL MEDICINE

## 2022-06-21 PROCEDURE — 1124F ACP DISCUSS-NO DSCNMKR DOCD: CPT | Performed by: INTERNAL MEDICINE

## 2022-06-21 PROCEDURE — 1160F RVW MEDS BY RX/DR IN RCRD: CPT | Performed by: INTERNAL MEDICINE

## 2022-06-21 PROCEDURE — 4004F PT TOBACCO SCREEN RCVD TLK: CPT | Performed by: INTERNAL MEDICINE

## 2022-06-21 PROCEDURE — 99215 OFFICE O/P EST HI 40 MIN: CPT | Performed by: INTERNAL MEDICINE

## 2022-06-21 RX ORDER — FUROSEMIDE 20 MG/1
TABLET ORAL
COMMUNITY
Start: 2022-05-13

## 2022-06-21 NOTE — ASSESSMENT & PLAN NOTE
Patient does have a history of massive GI bleeding April 2021  Please see comments under history of duodenal ulcer

## 2022-06-21 NOTE — ASSESSMENT & PLAN NOTE
Patient was noted to have hepatitis-C antibody positivity while hospitalized  He did inform he has a history of hepatitis-C likely acquired through IV drug use many years ago  He also reports being treated for his hepatitis-C while in a nursing facility  He recalls receiving injections, I suspect he may have been treated with interferon and ribavirin  Need to check hepatitis C RNA quantitative test by PCR  Of hepatitis C RNA is positive, then we will need to check genotype, and other testing to set him up for direct acting antiviral therapy for hepatitis-C  Hopefully the hepatitis-C antibody is positive due to prior hepatitis-C and he has been adequately treatment and has a sustained virologic response  If hepatitis-C RNA is positive, he will need an ultrasound elastography  For now we will check hepatitis A serology and hepatitis-B serologies to be sure that he is immune to both hepatitis a and hepatitis-B    Ways to prevent transmission of Hepatitis C:  -Avoid sharing toothbrushes and other dental equipment   -Avoid sharing razors  -Do not donate blood  -If using Illicit drugs you should stop using and enter into substance abuse treatment program  -If bleeding make sure others are not exposed to your blood

## 2022-06-21 NOTE — ASSESSMENT & PLAN NOTE
Patient does have a history of a duodenal ulcer with massive GI bleeding  This did fail endoscopic and IR intervention  He eventually need an exploratory laparotomy to control was bleeding  In August of 2021 he underwent follow-up EGD that revealed a smaller ulcer in the pylorus with adherent clot  The duodenum and stomach were otherwise normal   Biopsies have been negative for H pylori  Gastroenterology got involved again when he was diagnosed with a DVT and needed to anticoagulation  He has now been on anticoagulation for the last couple weeks  No sign of GI bleeding  Given history of peptic ulcer disease, I did recommend that he should continue to try to quit smoking as smoking can be associated with nonhealing ulcers  For now continue pantoprazole 40 mg twice a day  We can revisit this after he is off anticoagulation, but given history of life-threatening GI bleed due to ulcer disease, he should be on lifelong proton pump inhibitor  I did review review with he and his daughter-in-law the need to avoid all NSAIDs including Motrin, Advil, Aleve, ibuprofen, Celebrex, meloxicam, Excedrin, just to name a few  Given history of duodenal ulcer and GI bleeding and need for anticoagulation, I would recommend checking a CBCs an outpatient to be sure that he is not developing any anemia

## 2022-06-21 NOTE — ASSESSMENT & PLAN NOTE
Patient does have a history of a duodenal ulcer with massive GI bleeding  This did fail endoscopic and IR intervention  He eventually need an exploratory laparotomy to control was bleeding  In August of 2021 he underwent follow-up EGD that revealed a smaller ulcer in the pylorus with adherent clot  The duodenum and stomach were otherwise normal   Biopsies have been negative for H pylori  Gastroenterology got involved again when he was diagnosed with a DVT and needed to anticoagulation  He has now been on anticoagulation for the last couple weeks  No sign of GI bleeding  Given history of peptic ulcer disease, I did recommend that he should continue to try to quit smoking as smoking can be associated with nonhealing ulcers  For now continue pantoprazole 40 mg twice a day  We can revisit this after he is off anticoagulation, but given history of life-threatening GI bleed due to ulcer disease, he should be on lifelong proton pump inhibitor  I did review review with he and his daughter-in-law the need to avoid all NSAIDs including Motrin, Advil, Aleve, ibuprofen, Celebrex, meloxicam, Excedrin, just to name a few

## 2022-06-21 NOTE — PATIENT INSTRUCTIONS
DVT (deep venous thrombosis) ([facility] [address] )  Patient was noted to have left lower extremity DVT  He is currently on Eliquis  He will follow-up with Hematology regarding duration of treatment of his DVT  Hepatitis C antibody positive in blood  Patient was noted to have hepatitis-C antibody positivity while hospitalized  He did inform he has a history of hepatitis-C likely acquired through IV drug use many years ago  He also reports being treated for his hepatitis-C while in a nursing facility  He recalls receiving injections, I suspect he may have been treated with interferon and ribavirin  Need to check hepatitis C RNA quantitative test by PCR  Of hepatitis C RNA is positive, then we will need to check genotype, and other testing to set him up for direct acting antiviral therapy for hepatitis-C  Hopefully the hepatitis-C antibody is positive due to prior hepatitis-C and he has been adequately treatment and has a sustained virologic response  If hepatitis-C RNA is positive, he will need an ultrasound elastography  For now we will check hepatitis A serology and hepatitis-B serologies to be sure that he is immune to both hepatitis a and hepatitis-B    Ways to prevent transmission of Hepatitis C:  -Avoid sharing toothbrushes and other dental equipment   -Avoid sharing razors  -Do not donate blood  -If using Illicit drugs you should stop using and enter into substance abuse treatment program  -If bleeding make sure others are not exposed to your blood  History of duodenal ulcer  Patient does have a history of a duodenal ulcer with massive GI bleeding  This did fail endoscopic and IR intervention  He eventually need an exploratory laparotomy to control was bleeding  In August of 2021 he underwent follow-up EGD that revealed a smaller ulcer in the pylorus with adherent clot  The duodenum and stomach were otherwise normal   Biopsies have been negative for H pylori    Gastroenterology got involved again when he was diagnosed with a DVT and needed to anticoagulation  He has now been on anticoagulation for the last couple weeks  No sign of GI bleeding  Given history of peptic ulcer disease, I did recommend that he should continue to try to quit smoking as smoking can be associated with nonhealing ulcers  For now continue pantoprazole 40 mg twice a day  We can revisit this after he is off anticoagulation, but given history of life-threatening GI bleed due to ulcer disease, he should be on lifelong proton pump inhibitor  I did review review with he and his daughter-in-law the need to avoid all NSAIDs including Motrin, Advil, Aleve, ibuprofen, Celebrex, meloxicam, Excedrin, just to name a few  Given history of duodenal ulcer and GI bleeding and need for anticoagulation, I would recommend checking a CBCs an outpatient to be sure that he is not developing any anemia  Constipation  Patient does have a history of constipation  He has been on Colace for quite some time  Recently MiraLax was added  His constipation is much improved with the addition of MiraLax  In fact they are considering cutting back his MiraLax dose to twice a week  He has been using it 3 times a week  Alternatively they could try giving a lower dose of MiraLax maybe 1/2 dose every other day to see if this helps limit the loose bowel movements  History of GI bleed  Patient does have a history of massive GI bleeding April 2021  Please see comments under history of duodenal ulcer  History of colon polyps  Patient does have a history of colon polyps  He was recommended that he undergo repeat colonoscopy in August of 2024

## 2022-06-21 NOTE — ASSESSMENT & PLAN NOTE
Patient does have a history of constipation  He has been on Colace for quite some time  Recently MiraLax was added  His constipation is much improved with the addition of MiraLax  In fact they are considering cutting back his MiraLax dose to twice a week  He has been using it 3 times a week  Alternatively they could try giving a lower dose of MiraLax maybe 1/2 dose every other day to see if this helps limit the loose bowel movements

## 2022-06-21 NOTE — ASSESSMENT & PLAN NOTE
Patient was noted to have left lower extremity DVT  He is currently on Eliquis  He will follow-up with Hematology regarding duration of treatment of his DVT

## 2022-06-21 NOTE — ASSESSMENT & PLAN NOTE
Patient does have a history of colon polyps  He was recommended that he undergo repeat colonoscopy in August of 2024

## 2022-06-21 NOTE — Clinical Note
Please send after visit summary  Also please mail lab slip requests to patient's caregiver, I believe this its daughter-in-law  She arranges outpatient labs with [facility]  Please also provide her with our phone number so they can call after laboratory tests are completed just in case we do not receive them    Thank you

## 2022-06-23 ENCOUNTER — TELEPHONE (OUTPATIENT)
Dept: GASTROENTEROLOGY | Facility: CLINIC | Age: 72
End: 2022-06-23

## 2022-06-23 NOTE — TELEPHONE ENCOUNTER
----- Message from Angelica Cooper DO sent at 6/21/2022  1:49 PM EDT -----  Please send after visit summary  Also please mail lab slip requests to patient's caregiver, I believe this its daughter-in-law  She arranges outpatient labs with [facility]  Please also provide her with our phone number so they can call after laboratory tests are completed just in case we do not receive them    Thank you

## 2022-07-06 ENCOUNTER — TELEPHONE (OUTPATIENT)
Dept: HEMATOLOGY ONCOLOGY | Facility: CLINIC | Age: 72
End: 2022-07-06

## 2022-07-06 NOTE — TELEPHONE ENCOUNTER
CALL RETURN FORM   Reason for patient call? Called to schedule patients hematology consult  Spoke with Valentín Centeno (Patients sons girlfriend) who explained that when patient was in the hospital his  assured them the consult could be virtual as pt is bedridden  Patient's primary oncologist? Dr Lam Belle   Name of person the patient was calling for? Monse Kirkland   Any additional information to add, if applicable? Scheduled pt on 07/26 @ 11:20AM with Dr Lam Belle in [place]  Please call Valentín Centeno at [phone] to discuss if a virtual consult is an option   Informed patient that the message will be forwarded to the team and someone will get back to them as soon as possible    Did you relay this information to the patient?  Yes

## 2022-07-08 ENCOUNTER — TELEPHONE (OUTPATIENT)
Dept: HEMATOLOGY ONCOLOGY | Facility: HOSPITAL | Age: 72
End: 2022-07-08

## 2022-07-08 NOTE — TELEPHONE ENCOUNTER
Spoke with [place] patient's daughter in law and Minto confirmed the virtual appt and does have iphone and ipad for the virtual visit

## 2022-07-20 ENCOUNTER — TELEPHONE (OUTPATIENT)
Dept: HEMATOLOGY ONCOLOGY | Facility: CLINIC | Age: 72
End: 2022-07-20

## 2022-07-20 NOTE — TELEPHONE ENCOUNTER
Left VM for patient's AMBAR Fishman Cost to let her know that the time for the patient's virtual appt is changed from 11 o'clock on 7/26 to 12 o'clock on 7/26  I left my direct number for her to callback with any questions/concerns

## 2022-07-26 ENCOUNTER — TELEPHONE (OUTPATIENT)
Dept: HEMATOLOGY ONCOLOGY | Facility: CLINIC | Age: 72
End: 2022-07-26

## 2022-07-26 NOTE — TELEPHONE ENCOUNTER
Left message for daughter in law for message was that sent back in regards to her father in [facility] virtual appointment that was moved to 7/29 at 0900 am  Pt stated that she will be unavailable that day  Awaiting call back from patient to discuss the next available time to have a video appointment

## 2022-07-26 NOTE — TELEPHONE ENCOUNTER
Appointment Confirmation (to confirm pre existing appointments - ONLY)  No need to route   Appointment with  Dr Abdifatah Ross   Appointment date & time 7/29/22 @ 9am   Location Virtual Visit/[facility]    Patient verbilized Understanding  patient would like Dr Abdifatah Ross to contact his attendant-Leobardo's cell number [phone]  Daughter in law will not be available

## 2022-07-26 NOTE — TELEPHONE ENCOUNTER
SPOKE WITH PATIENT'S DAUGHTER IN LAW IN REGARDS TO VIRTUAL APPT THAT WAS NOT COMPLETED TODAY DUE TO TECHNICAL ISSUES, PT WAS R/S TO July 29TH AT 0900 OK PER DR Mat Deal VERBALIZED UNDERSTANDING

## 2022-07-29 ENCOUNTER — TELEPHONE (OUTPATIENT)
Dept: HEMATOLOGY ONCOLOGY | Facility: CLINIC | Age: 72
End: 2022-07-29

## 2022-07-29 ENCOUNTER — TELEMEDICINE (OUTPATIENT)
Dept: HEMATOLOGY ONCOLOGY | Facility: CLINIC | Age: 72
End: 2022-07-29
Payer: COMMERCIAL

## 2022-07-29 DIAGNOSIS — I82.412 DEEP VEIN THROMBOSIS (DVT) OF FEMORAL VEIN OF LEFT LOWER EXTREMITY, UNSPECIFIED CHRONICITY (HCC): Primary | ICD-10-CM

## 2022-07-29 PROCEDURE — 99443 PR PHYS/QHP TELEPHONE EVALUATION 21-30 MIN: CPT | Performed by: INTERNAL MEDICINE

## 2022-07-31 NOTE — PROGRESS NOTES
Oncology Outpatient Consult Note Telemedicine  Ela Mosqueda 73 y o  male MRN: @ Encounter: [id number]        Date:  7/31/2022        CC:  LLE DVT      HPI:  Ela Mosqueda is seen for initial consultation 7/31/2022 regarding his extensive LLE DVT that was found during a hospital admission where he presented with abdominal pain  He was initially started on heparin and then transitioned to eliquis  He has never had a clot prior to this event  He is note having any problems with bleeding while on eliquis  He has a sigpastmedhx of being homebound due to multiple medical issues  He had a duodenal bleed from an ulcer 1 year ago  So far there has been no sign of melena or BRBPR  He is on a PPE  He also has a history of Etoh abuse and has not drank Etoh in 1 year  He also has mild dementia, CHF, CVA, and BPH  During the visit he was minimally interactive with me and the history was taked from his DIL who is a nurse  Since he has been home, his LLE is mostly better  Sometimes it will swell when he doesn't keep it elevated and has had a chronic area of skin break down even prior to the DVT  He has a podiatrist that makes home visits to manage this  He requires help with all of his ADLs  He denies any famhx of thrombosis or cancer  He is still smoking 1/2 PPD  He is doing PT/OT at home and is able to walk 80 ft with a walker and this severely wipes him out  ROS: As stated in history of present illness otherwise her 14 point review of systems today was negative  ECOG PS: 3      Test Results:    Imaging: No results found      Labs:   Lab Results   Component Value Date    WBC 3 95 (L) 06/05/2022    HGB 14 4 06/05/2022    HCT 43 0 06/05/2022    MCV 95 06/05/2022     06/05/2022     Lab Results   Component Value Date     (L) 02/08/2015    K 4 3 06/02/2022     06/02/2022    CO2 31 06/02/2022    ANIONGAP 3 (L) 02/08/2015    BUN 19 06/02/2022    CREATININE 0 98 06/02/2022    GLUCOSE 120 01/16/2020    CALCIUM 8 8 06/02/2022    CORRECTEDCA 9 8 06/02/2022    AST 17 06/02/2022    ALT 9 (L) 06/02/2022    ALKPHOS 43 (L) 06/02/2022    PROT 7 2 01/22/2015    BILITOT 0 6 01/22/2015    EGFR 78 06/02/2022         No results found for: SPEP, UPEP    Lab Results   Component Value Date    PSA 0 9 05/18/2021    PSA 2 0 07/17/2020       No results found for: CEA    No results found for: AFP    Lab Results   Component Value Date    IRON 52 (L) 08/08/2021    TIBC 322 08/08/2021    FERRITIN 7 (L) 08/08/2021       Lab Results   Component Value Date    DNIJAGEL70 425 05/18/2021               Active Problems:   Patient Active Problem List   Diagnosis    Major depressive disorder, recurrent, severe with psychotic features [facility]    History of alcohol abuse    History of alcohol abuse    Hypokinesia of left ventricle    Bilateral lower extremity edema    Tachycardia    History of transient ischemic attack (TIA)    Back pain without sciatica    Elevated fasting glucose    Physical deconditioning    Poor mobility    Overweight (BMI 25 0-29  9)    Vitamin D deficiency    Abnormal echocardiogram    Alcohol abuse    MDD (major depressive disorder)    COPD    Dyslipidemia    History of anemia    Tobacco abuse    Inadequate oral nutritional intake    History of duodenal ulcer    Anemia    Schizophrenia    Open toe wound, initial encounter    Chronic obstructive pulmonary disease (HCC)    History of bleeding ulcers    History of exploratory laparotomy    History of syncope    History of GI bleed    History of suicidal ideation    Spontaneous bruising    Medicare annual wellness visit, subsequent    Cold hands and feet    Aortic atherosclerosis ([facility] )    Raynaud's disease without gangrene    Acute blood loss anemia    GI bleed    Essential hypertension    Duodenal ulcer    Thrombocytosis    Hyponatremia    Hemiplegia of right dominant side as late effect of cerebrovascular disease ([facility] )    Altered mental status    Facial swelling    Swelling of both upper extremities    Iron deficiency anemia due to chronic blood loss    Abnormal finding on CT scan    Melena    Hypokalemia    Fever    Peptic ulcer disease    DVT (deep venous thrombosis) (HCC)    Schizophrenia (HCC)    History of alcohol abuse    CHF (congestive heart failure) (HCC)    Hypertension    Constipation    Hepatitis C antibody positive in blood    History of colon polyps       Past Medical History:   Past Medical History:   Diagnosis Date    Alcohol abuse     BPH (benign prostatic hyperplasia)     CVA (cerebral vascular accident) [facility]     DJD (degenerative joint disease)     Encounter to establish care with new doctor 8/21/2019    Gait abnormality     Head injury     History of cerebrovascular accident (CVA) with residual deficit 10/1/2019    History of CVA (cerebrovascular accident) 1/8/2020    History of substance abuse ([facility] ) 8/21/2019    Polysubstance     History of sustained ventricular tachycardia 8/21/2019    History of transient ischemic attack (TIA) 8/28/2019    Hypertension     Metatarsal fracture     Palliative care patient     TIA (transient ischemic attack)     Tobacco abuse        Surgical History:   Past Surgical History:   Procedure Laterality Date    ABDOMINAL SURGERY      ANKLE SURGERY      BACK SURGERY      CT GUIDED [address]  DRAINAGE CATHETER PLACEMENT  1/27/2020    ELBOW SURGERY      IR VISCERAL ANGIOGRAPHY / INTERVENTION  1/11/2020    JOINT REPLACEMENT      KNEE SURGERY      LAPAROTOMY N/A 1/11/2020    Procedure: LAPAROTOMY EXPLORATORY,  OVERSEW  OF GASTRO DUODENAL ARTERY, THAL PATCH OF DUODENUM, VICKY GASTRO JEJUNOSTOMY TUBE;  Surgeon: Xochitl Cardozo DO;  Location: [facility];  Service: General    LIVER SURGERY         Family History:    Family History   Problem Relation Age of Onset    No Known Problems Mother     No Known Problems Father        Cancer-related family history is not on file  Social History:   Social History     Socioeconomic History    Marital status: Unknown     Spouse name: Not on file    Number of children: Not on file    Years of education: Not on file    Highest education level: Not on file   Occupational History    Not on file   Tobacco Use    Smoking status: Current Every Day Smoker     Packs/day: 0 50     Years: 35 00     Pack years: 17 50     Types: Cigarettes    Smokeless tobacco: Never Used    Tobacco comment: started age 14, 3 quit attempts   Vaping Use    Vaping Use: Never used   Substance and Sexual Activity    Alcohol use: Not Currently     Comment: a couple beers    Drug use: Yes     Types: Marijuana     Comment: history polysubstance use including IVDA on record- every now then will smoke weed     Sexual activity: Not Currently     Partners: Female   Other Topics Concern    Not on file   Social History Narrative    ** Merged History Encounter **         Used to be  for about 27 yrs, unemployed since fell off roof onto concrete - pt does not recall what year  1 son with whom he lives,      Social Determinants of Health     Financial Resource Strain: Not on file   Food Insecurity: No Food Insecurity    Worried About Running Out of Food in the Last Year: Never true    [address] in the Last Year: Never true   Transportation Needs: No Transportation Needs    Lack of Transportation (Medical): No    Lack of Transportation (Non-Medical):  No   Physical Activity: Not on file   Stress: Not on file   Social Connections: Not on file   Intimate Partner Violence: Not on file   Housing Stability: Low Risk     Unable to Pay for Housing in the Last Year: No    Number of Places Lived in the Last Year: 1    Unstable Housing in the Last Year: No       Current Medications:   Current Outpatient Medications   Medication Sig Dispense Refill    acetaminophen (TYLENOL) 325 mg tablet Take 2 tablets (650 mg total) by mouth every 6 (six) hours as needed for mild pain 30 tablet 0    albuterol (PROVENTIL HFA,VENTOLIN HFA) 90 mcg/act inhaler INHALE 2 PUFFS BY MOUTH EVERY 6 HOURS AS NEEDED FOR WHEEZING  8 g 0    apixaban (Eliquis) 5 mg Take 2 tablets (10 mg) BID x 4 days followed by 1 tablet (5 mg) BID thereafter 60 tablet 0    atorvastatin (LIPITOR) 40 mg tablet Take 1 tablet (40 mg total) by mouth daily 90 tablet 1    atorvastatin (LIPITOR) 40 mg tablet Take 40 mg by mouth daily      cholecalciferol (VITAMIN D3) 1,000 units tablet Take 1,000 Units by mouth daily      Cholecalciferol 25 MCG (1000 UT) tablet Take 1 tablet (1,000 Units total) by mouth daily 30 tablet 1    citalopram (CeleXA) 10 mg tablet Take 10 mg by mouth daily      dicyclomine (BENTYL) 10 mg capsule Take 1 capsule (10 mg total) by mouth 4 (four) times a day (before meals and at bedtime) for 10 days 40 capsule 0    docusate sodium (COLACE) 100 mg capsule Take 1 capsule (100 mg total) by mouth 2 (two) times a day 10 capsule 0    DULoxetine (CYMBALTA) 60 mg delayed release capsule Take 60 mg by mouth daily      fluticasone (FLONASE) 50 mcg/act nasal spray 2 sprays into each nostril daily 18 2 mL 2    fluticasone-vilanterol (BREO ELLIPTA) 100-25 mcg/inh inhaler Inhale 1 puff daily Rinse mouth after use   1 Inhaler 3    folic acid (FOLVITE) 1 mg tablet take 1 tablet by mouth once daily 90 tablet 1    folic acid (FOLVITE) 1 mg tablet Take by mouth daily      furosemide (LASIX) 20 mg tablet take 1 tablet by mouth every morning for 7 days      gabapentin (NEURONTIN) 600 MG tablet take 1 tablet by mouth three times a day 270 tablet 1    gabapentin (NEURONTIN) 600 MG tablet Take 600 mg by mouth 4 (four) times a day      hydrOXYzine pamoate (VISTARIL) 50 mg capsule take 1 capsule by mouth three times a day if needed for anxiety      metoprolol tartrate (LOPRESSOR) 25 mg tablet Take 25 mg by mouth every 12 (twelve) hours      metoprolol tartrate (LOPRESSOR) 50 mg tablet 1/2 tablet by mouth every 12 hours 30 tablet 0    mirtazapine (REMERON) 45 MG tablet Take 45 mg by mouth daily at bedtime       Nutritional Supplements (Ensure Original) LIQD Take 1 Can by mouth 3 (three) times a day as needed (nutrition) 237 mL 1    pantoprazole (PROTONIX) 40 mg tablet TAKE ONE TABLET BY MOUTH TWICE DAILY BEFORE MEALS 180 tablet 1    pantoprazole (PROTONIX) 40 mg tablet Take 40 mg by mouth daily      polyethylene glycol (MIRALAX) 17 g packet Take 17 g by mouth daily as needed (constipation) 510 g 0    predniSONE 5 mg tablet Take by mouth daily      QUEtiapine (SEROquel) 200 mg tablet Take 1 tablet (200 mg total) by mouth daily 30 tablet 1    QUEtiapine (SEROquel) 200 mg tablet Take 200 mg by mouth daily at bedtime 200 mg Qam and 600 mg QPM      QUEtiapine (SEROquel) 300 mg tablet Take 2 tablets (600 mg total) by mouth daily at bedtime 60 tablet 1    Skin Protectants, Misc  (Remedy Phytoplex Hydraguard) CREA Apply 1 application topically 2 (two) times a day 118 mL 0    tamsulosin (FLOMAX) 0 4 mg Take 0 4 mg by mouth daily with dinner      thiamine 100 MG tablet Take 1 tablet (100 mg total) by mouth daily 90 tablet 1    thiamine 100 MG tablet Take 100 mg by mouth daily      traZODone (DESYREL) 50 mg tablet Take 1 tablet (50 mg total) by mouth daily at bedtime as needed for sleep 30 tablet 1    traZODone (DESYREL) 50 mg tablet Take 50 mg by mouth daily at bedtime       No current facility-administered medications for this visit  Allergies: No Known Allergies      Physical Exam:    There is no height or weight on file to calculate BSA      Wt Readings from Last 3 Encounters:   06/21/22 61 2 kg (135 lb)   06/05/22 62 kg (136 lb 11 oz)   08/08/21 68 9 kg (152 lb)        Temp Readings from Last 3 Encounters:   06/05/22 98 7 °F (37 1 °C)   08/18/21 98 8 °F (37 1 °C)   08/07/21 (!) 97 4 °F (36 3 °C) (Tympanic)        BP Readings from Last 3 Encounters:   06/05/22 165/89   08/18/21 158/74 08/07/21 (!) 177/82         Pulse Readings from Last 3 Encounters:   06/05/22 63   08/16/21 71   08/07/21 65     @LASTSAO2(3)@    Physical Exam     Constitutional   General appearance: No acute distress, disheaveled appearing and well nourished  Ext: venous stasis changes with small ulcer on left lateral heal           Assessment/ Plan:  74 yo male with a LLE DVT  I recommend long term eliquis due to his poor PS and active smoking status  He doesn't need any further testing as it will not  and this even is clearly provoked by how sedentary he is  I recommend that his PCP monitor his cbc and iron studies due to his history of GIB  If there would any issues with bleeding in the future or a need for an invasive procedure, I will be available for recommendations  I will see him on an as needed basis  It is difficult for him to even to a virtual visit because his DIL has to take time off work to help with it  I spent 30 minutes in chart review, face to face counseling, coordination of care, and documentation

## 2022-09-11 NOTE — CASE MANAGEMENT
Call received from daughter-in-law  Rishi Singh, [phone]  She reports patient has lived with his son, her and their 4 children for several years and they have made modification for patient to be cared for at home  [facility] works from home  Patient has private duty aide daily for 4 hours  He is open to [facility] [place] for RN, PT, OT and palliative care  [facility] referral was made  Son and gaston-in-law are health care proxy and she will provide copy to CM  Patient was unable to go to SNF last admission in May and family cared for him  He is generally uncooperative with hospital staff and does well at home with family and familiar caregivers  Patient could dress, toilet and shower independently  He uses a quad cane and wears depends due to recent bathroom urgency  Family does not want snf placement-feel that would be detrimental to patient  He has  Jose Roberto Ferguson with [facility] and son and his wife were approved as home caregivers  Message placed for MD to call family tomorrow  CM informed [facility] that patient told CM not to call family and [facility] understands this 
Cm confirmed with [place] family will transport pt home tomorrow afternoon 
Discussed patient with Jerry List, PAC with [facility]  Patient was seen by therapy and will need snf rehab  Due to his psychiatric diagnosis and IP [facility] stay earlier this year he will need  Level 2 OPTIONs assessment and a psychiatric consult  Informed KENDALL Novoa-surgery  Attempted to meet with the patient to discuss above  He is very sleepy and could not discuss discharge needs  When asked if CM may call his son or gaston-in-law he says "no"  SNF list left in room for patient to review and provide choices to CM 
Informed by Dr Clarisa Ortiz that patient will be discharged home today  Family has expressed desire to take patient home and NOT dc to snf  [facility] message was sent to [facility] 
Met with patient to discuss need for snf rehab  Patient has the list but did not review it for choices  Reviewed snf list with the patient who consents to referrals to facilities within 20 miles of his home  [facility] referrals were made  OPTIONs paperwork was sent to [address] [place] [address] Aging he needs OPTIONs Level 2 assessment 
OPTIONS paperwork was faxed to [facility] 
PT IS NOT A 30 DAYS READMISSION  PT IS NOT A BUNDLE  CM met with pt to discuss DCP and cm role  Pt states he lives with his son and family in a 2sh with 1ste  Prior to admission pt used a SPC with ambulation and was independent with ADLS  No prior hx of VNA  Pt's preference for pharmacy is [facility]  Pt has past inpt psych stay [facility] in April  He also has a hx of ETOH and smokes marijuana occasionally  Pt does not feel he needs HOST at this time  Cm will continue to follow for d/c planning  CM reviewed d/c planning process including the following: identifying help at home, patient preference for d/c planning needs, Discharge Lounge, [facility] Meds to Bed program, availability of treatment team to discuss questions or concerns patient and/or family may have regarding understanding medications and recognizing signs and symptoms once discharged  CM also encouraged patient to follow up with all recommended appointments after discharge  Patient advised of importance for patient and family to participate in managing patients medical well being 
[[phone]]

## 2022-10-12 PROBLEM — R50.9 FEVER: Status: RESOLVED | Noted: 2021-08-11 | Resolved: 2022-10-12

## 2022-10-13 ENCOUNTER — TELEPHONE (OUTPATIENT)
Dept: HEMATOLOGY ONCOLOGY | Facility: CLINIC | Age: 72
End: 2022-10-13

## 2022-12-12 ENCOUNTER — APPOINTMENT (EMERGENCY)
Dept: RADIOLOGY | Facility: HOSPITAL | Age: 72
End: 2022-12-12

## 2022-12-12 ENCOUNTER — APPOINTMENT (OUTPATIENT)
Dept: RADIOLOGY | Facility: HOSPITAL | Age: 72
End: 2022-12-12

## 2022-12-12 ENCOUNTER — HOSPITAL ENCOUNTER (INPATIENT)
Facility: HOSPITAL | Age: 72
LOS: 3 days | Discharge: HOME | End: 2022-12-16
Attending: SURGERY | Admitting: INTERNAL MEDICINE

## 2022-12-12 DIAGNOSIS — R60.0 LEG EDEMA: ICD-10-CM
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
## 2022-12-12 DIAGNOSIS — R41.82 ALTERED MENTAL STATUS: ICD-10-CM

## 2022-12-12 DIAGNOSIS — F03.90 DEMENTIA (HCC): ICD-10-CM

## 2022-12-12 PROBLEM — Z87.19 HISTORY OF GI BLEED: Status: ACTIVE | Noted: 2022-12-12

## 2022-12-12 PROBLEM — F19.11 HISTORY OF DRUG ABUSE (HCC): Status: ACTIVE | Noted: 2022-12-12

## 2022-12-12 PROBLEM — E87.1 HYPONATREMIA: Status: ACTIVE | Noted: 2022-12-12

## 2022-12-12 PROBLEM — I10 HYPERTENSION: Status: ACTIVE | Noted: 2022-12-12

## 2022-12-12 PROBLEM — K27.9 PEPTIC ULCER DISEASE: Status: ACTIVE | Noted: 2022-12-12

## 2022-12-12 PROBLEM — R55 SYNCOPE: Status: ACTIVE | Noted: 2022-12-12

## 2022-12-12 PROBLEM — Z86.718 HISTORY OF DVT (DEEP VEIN THROMBOSIS): Status: ACTIVE | Noted: 2022-12-12

## 2022-12-12 LAB
ABO GROUP BLD: NORMAL
ALBUMIN SERPL BCP-MCNC: 3.1 G/DL (ref 3.5–5)
ALP SERPL-CCNC: 49 U/L (ref 46–116)
ALT SERPL W P-5'-P-CCNC: 8 U/L (ref 12–78)
AMPHETAMINES SERPL QL SCN: NEGATIVE
ANION GAP SERPL CALCULATED.3IONS-SCNC: 6 MMOL/L (ref 4–13)
AST SERPL W P-5'-P-CCNC: 34 U/L (ref 5–45)
ATRIAL RATE: 83 BPM
BACTERIA UR QL AUTO: ABNORMAL /HPF
BARBITURATES UR QL: NEGATIVE
BASE EXCESS BLDA CALC-SCNC: 7 MMOL/L (ref -2–3)
BASOPHILS # BLD AUTO: 0.01 THOUSANDS/ÂΜL (ref 0–0.1)
BASOPHILS NFR BLD AUTO: 0 % (ref 0–1)
BENZODIAZ UR QL: NEGATIVE
BILIRUB SERPL-MCNC: 0.53 MG/DL (ref 0.2–1)
BILIRUB UR QL STRIP: NEGATIVE
BLD GP AB SCN SERPL QL: POSITIVE
BLOOD GROUP ANTIBODIES SERPL: NORMAL
BUN SERPL-MCNC: 11 MG/DL (ref 5–25)
CA-I BLD-SCNC: 0.98 MMOL/L (ref 1.12–1.32)
CALCIUM ALBUM COR SERPL-MCNC: 10.1 MG/DL (ref 8.3–10.1)
CALCIUM SERPL-MCNC: 9.4 MG/DL (ref 8.3–10.1)
CHLORIDE SERPL-SCNC: 97 MMOL/L (ref 96–108)
CLARITY UR: CLEAR
CO2 SERPL-SCNC: 29 MMOL/L (ref 21–32)
COCAINE UR QL: NEGATIVE
COLOR UR: YELLOW
CREAT SERPL-MCNC: 1.07 MG/DL (ref 0.6–1.3)
EOSINOPHIL # BLD AUTO: 0 THOUSAND/ÂΜL (ref 0–0.61)
EOSINOPHIL NFR BLD AUTO: 0 % (ref 0–6)
ERYTHROCYTE [DISTWIDTH] IN BLOOD BY AUTOMATED COUNT: 14.5 % (ref 11.6–15.1)
GFR SERPL CREATININE-BSD FRML MDRD: 70 ML/MIN/1.73SQ M
GLUCOSE SERPL-MCNC: 112 MG/DL (ref 65–140)
GLUCOSE SERPL-MCNC: 114 MG/DL (ref 65–140)
GLUCOSE SERPL-MCNC: 119 MG/DL (ref 65–140)
GLUCOSE UR STRIP-MCNC: NEGATIVE MG/DL
HCO3 BLDA-SCNC: 30.2 MMOL/L (ref 24–30)
HCT VFR BLD AUTO: 41.2 % (ref 36.5–49.3)
HCT VFR BLD CALC: 39 % (ref 36.5–49.3)
HGB BLD-MCNC: 13.5 G/DL (ref 12–17)
HGB BLDA-MCNC: 13.3 G/DL (ref 12–17)
HGB UR QL STRIP.AUTO: ABNORMAL
HYALINE CASTS #/AREA URNS LPF: ABNORMAL /LPF
IMM GRANULOCYTES # BLD AUTO: 0.04 THOUSAND/UL (ref 0–0.2)
IMM GRANULOCYTES NFR BLD AUTO: 1 % (ref 0–2)
KETONES UR STRIP-MCNC: ABNORMAL MG/DL
LEUKOCYTE ESTERASE UR QL STRIP: NEGATIVE
LYMPHOCYTES # BLD AUTO: 0.43 THOUSANDS/ÂΜL (ref 0.6–4.47)
LYMPHOCYTES NFR BLD AUTO: 11 % (ref 14–44)
MAGNESIUM SERPL-MCNC: 2.2 MG/DL (ref 1.6–2.6)
MCH RBC QN AUTO: 31.3 PG (ref 26.8–34.3)
MCHC RBC AUTO-ENTMCNC: 32.8 G/DL (ref 31.4–37.4)
MCV RBC AUTO: 95 FL (ref 82–98)
METHADONE UR QL: NEGATIVE
MONOCYTES # BLD AUTO: 0.43 THOUSAND/ÂΜL (ref 0.17–1.22)
MONOCYTES NFR BLD AUTO: 11 % (ref 4–12)
NEUTROPHILS # BLD AUTO: 3.09 THOUSANDS/ÂΜL (ref 1.85–7.62)
NEUTS SEG NFR BLD AUTO: 77 % (ref 43–75)
NITRITE UR QL STRIP: NEGATIVE
NON-SQ EPI CELLS URNS QL MICRO: ABNORMAL /HPF
NRBC BLD AUTO-RTO: 0 /100 WBCS
OPIATES UR QL SCN: NEGATIVE
OSMOLALITY UR: 319 MMOL/KG
OXYCODONE+OXYMORPHONE UR QL SCN: NEGATIVE
P AXIS: 79 DEGREES
PCO2 BLD: 31 MMOL/L (ref 21–32)
PCO2 BLD: 36.5 MM HG (ref 42–50)
PCP UR QL: NEGATIVE
PH BLD: 7.53 [PH] (ref 7.3–7.4)
PH UR STRIP.AUTO: 6.5 [PH]
PHOSPHATE SERPL-MCNC: 3.1 MG/DL (ref 2.3–4.1)
PLATELET # BLD AUTO: 240 THOUSANDS/UL (ref 149–390)
PMV BLD AUTO: 9.4 FL (ref 8.9–12.7)
PO2 BLD: 25 MM HG (ref 35–45)
POTASSIUM BLD-SCNC: 5.7 MMOL/L (ref 3.5–5.3)
POTASSIUM SERPL-SCNC: 3.9 MMOL/L (ref 3.5–5.3)
PR INTERVAL: 154 MS
PROT SERPL-MCNC: 7.7 G/DL (ref 6.4–8.4)
PROT UR STRIP-MCNC: NEGATIVE MG/DL
QRS AXIS: 68 DEGREES
QRSD INTERVAL: 82 MS
QT INTERVAL: 386 MS
QTC INTERVAL: 453 MS
RBC # BLD AUTO: 4.32 MILLION/UL (ref 3.88–5.62)
RBC #/AREA URNS AUTO: ABNORMAL /HPF
RH BLD: NEGATIVE
SAO2 % BLD FROM PO2: 53 % (ref 60–85)
SODIUM 24H UR-SCNC: 60 MOL/L
SODIUM BLD-SCNC: 128 MMOL/L (ref 136–145)
SODIUM SERPL-SCNC: 132 MMOL/L (ref 135–147)
SP GR UR STRIP.AUTO: 1.01 (ref 1–1.03)
SPECIMEN EXPIRATION DATE: NORMAL
SPECIMEN SOURCE: ABNORMAL
T WAVE AXIS: 72 DEGREES
THC UR QL: POSITIVE
TSH SERPL DL<=0.05 MIU/L-ACNC: 0.73 UIU/ML (ref 0.45–4.5)
UROBILINOGEN UR STRIP-ACNC: 2 MG/DL
VENTRICULAR RATE: 83 BPM
WBC # BLD AUTO: 4 THOUSAND/UL (ref 4.31–10.16)
WBC #/AREA URNS AUTO: ABNORMAL /HPF

## 2022-12-12 RX ORDER — FUROSEMIDE 20 MG/1
20 TABLET ORAL DAILY
Status: ON HOLD | COMMUNITY
End: 2022-12-16 | Stop reason: SDUPTHER

## 2022-12-12 RX ORDER — PANTOPRAZOLE SODIUM 40 MG/1
40 TABLET, DELAYED RELEASE ORAL 2 TIMES DAILY
COMMUNITY

## 2022-12-12 RX ORDER — MELATONIN
1000 DAILY
Status: DISCONTINUED | OUTPATIENT
Start: 2022-12-13 | End: 2022-12-16 | Stop reason: HOSPADM

## 2022-12-12 RX ORDER — PANTOPRAZOLE SODIUM 40 MG/1
40 TABLET, DELAYED RELEASE ORAL
Status: DISCONTINUED | OUTPATIENT
Start: 2022-12-12 | End: 2022-12-16 | Stop reason: HOSPADM

## 2022-12-12 RX ORDER — TAMSULOSIN HYDROCHLORIDE 0.4 MG/1
0.4 CAPSULE ORAL
Status: DISCONTINUED | OUTPATIENT
Start: 2022-12-12 | End: 2022-12-16 | Stop reason: HOSPADM

## 2022-12-12 RX ORDER — FUROSEMIDE 20 MG/1
20 TABLET ORAL DAILY
Status: DISCONTINUED | OUTPATIENT
Start: 2022-12-12 | End: 2022-12-14

## 2022-12-12 RX ORDER — PREDNISONE 1 MG/1
TABLET ORAL DAILY
COMMUNITY

## 2022-12-12 RX ORDER — DULOXETIN HYDROCHLORIDE 60 MG/1
60 CAPSULE, DELAYED RELEASE ORAL DAILY
COMMUNITY

## 2022-12-12 RX ORDER — ATORVASTATIN CALCIUM 40 MG/1
40 TABLET, FILM COATED ORAL DAILY
COMMUNITY

## 2022-12-12 RX ORDER — DULOXETIN HYDROCHLORIDE 60 MG/1
60 CAPSULE, DELAYED RELEASE ORAL DAILY
Status: DISCONTINUED | OUTPATIENT
Start: 2022-12-13 | End: 2022-12-16 | Stop reason: HOSPADM

## 2022-12-12 RX ORDER — FOLIC ACID 1 MG/1
TABLET ORAL DAILY
COMMUNITY

## 2022-12-12 RX ORDER — GABAPENTIN 600 MG/1
600 TABLET ORAL 4 TIMES DAILY
COMMUNITY
End: 2022-12-16

## 2022-12-12 RX ORDER — ONDANSETRON 2 MG/ML
4 INJECTION INTRAMUSCULAR; INTRAVENOUS EVERY 4 HOURS PRN
Status: DISCONTINUED | OUTPATIENT
Start: 2022-12-12 | End: 2022-12-16 | Stop reason: HOSPADM

## 2022-12-12 RX ORDER — OXYCODONE HYDROCHLORIDE AND ACETAMINOPHEN 5; 325 MG/1; MG/1
1 TABLET ORAL ONCE AS NEEDED
Status: COMPLETED | OUTPATIENT
Start: 2022-12-12 | End: 2022-12-13

## 2022-12-12 RX ORDER — TRAZODONE HYDROCHLORIDE 50 MG/1
50 TABLET ORAL
Status: DISCONTINUED | OUTPATIENT
Start: 2022-12-12 | End: 2022-12-14

## 2022-12-12 RX ORDER — TRAZODONE HYDROCHLORIDE 50 MG/1
50 TABLET ORAL
COMMUNITY
End: 2022-12-16

## 2022-12-12 RX ORDER — PREDNISONE 1 MG/1
5 TABLET ORAL DAILY
Status: DISCONTINUED | OUTPATIENT
Start: 2022-12-12 | End: 2022-12-16 | Stop reason: HOSPADM

## 2022-12-12 RX ORDER — GABAPENTIN 300 MG/1
600 CAPSULE ORAL 4 TIMES DAILY
Status: DISCONTINUED | OUTPATIENT
Start: 2022-12-12 | End: 2022-12-13

## 2022-12-12 RX ORDER — ATORVASTATIN CALCIUM 40 MG/1
40 TABLET, FILM COATED ORAL DAILY
Status: DISCONTINUED | OUTPATIENT
Start: 2022-12-13 | End: 2022-12-16 | Stop reason: HOSPADM

## 2022-12-12 RX ORDER — LANOLIN ALCOHOL/MO/W.PET/CERES
100 CREAM (GRAM) TOPICAL DAILY
COMMUNITY
End: 2022-12-12 | Stop reason: CLARIF

## 2022-12-12 RX ORDER — SODIUM CHLORIDE, SODIUM GLUCONATE, SODIUM ACETATE, POTASSIUM CHLORIDE, MAGNESIUM CHLORIDE, SODIUM PHOSPHATE, DIBASIC, AND POTASSIUM PHOSPHATE .53; .5; .37; .037; .03; .012; .00082 G/100ML; G/100ML; G/100ML; G/100ML; G/100ML; G/100ML; G/100ML
75 INJECTION, SOLUTION INTRAVENOUS CONTINUOUS
Status: DISPENSED | OUTPATIENT
Start: 2022-12-12 | End: 2022-12-13

## 2022-12-12 RX ORDER — ONDANSETRON 2 MG/ML
4 INJECTION INTRAMUSCULAR; INTRAVENOUS EVERY 4 HOURS PRN
Status: DISCONTINUED | OUTPATIENT
Start: 2022-12-12 | End: 2022-12-12

## 2022-12-12 RX ORDER — QUETIAPINE FUMARATE 200 MG/1
400 TABLET, FILM COATED ORAL
COMMUNITY

## 2022-12-12 RX ORDER — TAMSULOSIN HYDROCHLORIDE 0.4 MG/1
0.4 CAPSULE ORAL
COMMUNITY

## 2022-12-12 RX ORDER — MELATONIN
1000 DAILY
COMMUNITY

## 2022-12-12 RX ORDER — SULFAMETHOXAZOLE AND TRIMETHOPRIM 400; 80 MG/1; MG/1
2 TABLET ORAL EVERY 12 HOURS SCHEDULED
COMMUNITY
End: 2022-12-16

## 2022-12-12 RX ORDER — SULFAMETHOXAZOLE AND TRIMETHOPRIM 400; 80 MG/1; MG/1
2 TABLET ORAL EVERY 12 HOURS SCHEDULED
Status: DISPENSED | OUTPATIENT
Start: 2022-12-12 | End: 2022-12-13

## 2022-12-12 RX ADMIN — FUROSEMIDE 20 MG: 20 TABLET ORAL at 17:22

## 2022-12-12 RX ADMIN — NICOTINE 7 MG: 7 PATCH, EXTENDED RELEASE TRANSDERMAL at 23:56

## 2022-12-12 RX ADMIN — APIXABAN 5 MG: 5 TABLET, FILM COATED ORAL at 17:22

## 2022-12-12 RX ADMIN — TAMSULOSIN HYDROCHLORIDE 0.4 MG: 0.4 CAPSULE ORAL at 17:22

## 2022-12-12 RX ADMIN — GABAPENTIN 600 MG: 300 CAPSULE ORAL at 17:22

## 2022-12-12 RX ADMIN — PREDNISONE 5 MG: 5 TABLET ORAL at 17:22

## 2022-12-12 RX ADMIN — PANTOPRAZOLE SODIUM 40 MG: 40 TABLET, DELAYED RELEASE ORAL at 17:22

## 2022-12-12 NOTE — ASSESSMENT & PLAN NOTE
cognitive impairment at baseline  ,pre confused compared to his baseline per daughter  Delirium precautions  Rest as above  Will get psychiatry , decrease dose of gabapentin and seroquel is at 400mg for long time

## 2022-12-12 NOTE — ASSESSMENT & PLAN NOTE
· As per daughter, he does have dementia but to her, he is more confused than his baseline  · No focal neurological deficits  · Likely metabolic in setting of recent viral illness, volume depletion and presumed UTI  · Mx as above

## 2022-12-12 NOTE — ASSESSMENT & PLAN NOTE
Had massive GIB in 2021  Required exploratory laparotomy  Follows up with GI OP  Known h/o duodenal ulcer  Continue protonix B(ID  NO NSAIDS    Hb stable

## 2022-12-12 NOTE — ASSESSMENT & PLAN NOTE
Known h/o  Last use about a decade before as per patient and daughter  As per daughter, patient takes once a day prn percocet at home  PDMP checked  Last filled in 9-2022, 40 tabs for 10 days as needed without refills  He used medical marihuana for chronic pain

## 2022-12-12 NOTE — ASSESSMENT & PLAN NOTE
cognitive impairment at baseline  ,pre confused compared to his baseline per daughter    Delirium precautions  Rest as above

## 2022-12-12 NOTE — H&P
[facility]  H&P- Champ Colindres 1950, 73 y o  male MRN: [id number]  [facility] Encounter: [id number]  Primary Care Provider: Tawanna Garcia MD   Date and time admitted to hospital: 12/12/2022 10:32 AM    * Syncope vs fall  Assessment & Plan  · Unclear if he fell or had a syncopal event  · Patient does have cognitive impairment at baseline  · Was found by the side of the bed by daughter and care giver  · He was somewhat altered than his baseline per daughter but was able to recognize her and did tno have "stroke like symptoms"  · Was prescribed abx by PCP for presumed UTI (day 3/3)  · CT C spine: No cervical spine fracture or traumatic malalignment  · CT head: no acute intracranial abnormalities  · CXR: Streaky opacity in left lower lobe, likely subsegmental atelectasis  Emphysema  Healed right rib fracture deformities  · Na 132  · Last drug use a decade ago and last drink 2-3 years back per daughter and patient  · Suspect in setting of volume depletion as patient was recoring from viral illness and did not eat or drink well for last 3 days  PLAN:  · Check orthostatic vitals  · Check MRI brain  · Check UA  · Check UDS and serum drug screen  · Neuro checks  · If work up negative and concerns for seizures, obtain EEG and neurology eval   · IV hydration  · PT OT eval  · Complete bactrim course for presumed UTI      Altered mental status  Assessment & Plan  · As per daughter, he does have dementia but to her, he is more confused than his baseline  · No focal neurological deficits  · Likely metabolic in setting of recent viral illness, volume depletion and presumed UTI  · Mx as above      Hyponatremia  Assessment & Plan  Mild  Likely in setting of volume depltion  IVF  Check TSh, uric acid, serum and urine osm, urine Na  Trend BMP    Hypertension  Assessment & Plan  cotninue home dose lasix and metoprolol with holding parameters    History of drug abuse [facility])  Assessment & Plan  Known h/o  Last use about a decade before as per patient and daughter  As per daughter, patient takes once a day prn percocet at home  PDMP checked  Last filled in 9-2022, 40 tabs for 10 days as needed without refills  He used medical marihuana for chronic pain  Peptic ulcer disease  Assessment & Plan  As mentioned in GIB  Continue PPI BID    History of GI bleed  Assessment & Plan  Had massive GIB in 2021  Required exploratory laparotomy  Follows up with GI OP  Known h/o duodenal ulcer  Continue protonix B(ID  NO NSAIDS  Hb stable    History of DVT (deep vein thrombosis)  Assessment & Plan  Noted to have extensive LLE DVT in 7/2022  Follows up with hematology OP  Continue eliquis at home dose for anticoagulation  They recommended lifelong anticoagulation for now    Dementia [facility])  Assessment & Plan  cognitive impairment at baseline  ,pre confused compared to his baseline per daughter  Delirium precautions  Rest as above        VTE Prophylaxis: Apixaban (Eliquis)  / sequential compression device   Code Status: DNR DNI    Discussion with family: d/w daughter at bedside  Anticipated Length of Stay:  Patient will be admitted on an Observation basis with an anticipated length of stay of  Less than 2 midnights  Justification for Hospital Stay: ongoing eval    Total Time for Visit, including Counseling / Coordination of Care: 60 minutes  Greater than 50% of this total time spent on direct patient counseling and coordination of care  Chief Complaint:   Fall vs syncope, confusion    History of Present Illness:    Sonia Tapia is a 73 y o  male who was brought in by his daughter after being found down this morning  Unknown downtime  Unknown if he had loss of consciousness or head injury  As per daughter, patient has dementia at baseline but he is little vanessa confused than his baseline  As per her  He was awake when they found him   He recognized her and was able to tell his name and was moving all extremities but was fatigued  She cleaned and changed him (which is his baseline) and called EMS   Patient reports chronic pain, chronic dyspnea  As per daughter, everyone is family is recovering from viral illness last week  They all tested negative for COVID or Influenza  Patient was not eating or drinking well at all since Friday and only had some Gatorade to drink  He had foul smelling urine so his PCP prescribed bactrim and today is the last day for that  As per daughter, although does not have any focal neurological issues, she feels that his speech is intermittently nonsensical so she felt that he is more confused than his baseline  Patient has remote history of drug abuse and also alcohol abuse  Last drug use was a decade back and last drink was 2 years back per daughter  He loomis shave h/o massive GIB and is on Protonix BID  Known h/o recent DT and is on elqiuis  Review of Systems:    Review of Systems   Constitutional: Positive for activity change and fatigue  Negative for chills and fever  Respiratory: Positive for cough  Cardiovascular: Negative  Gastrointestinal: Negative  Genitourinary: Negative  Musculoskeletal: Positive for back pain and myalgias  Neurological: Negative for dizziness, facial asymmetry, weakness, light-headedness and numbness  Past Medical and Surgical History:     No past medical history on file  No past surgical history on file  Meds/Allergies:    Prior to Admission medications    Medication Sig Start Date End Date Taking?  Authorizing Provider   apixaban (Eliquis) 5 mg Take 5 mg by mouth 2 (two) times a day   Yes Historical Provider, MD   atorvastatin (LIPITOR) 40 mg tablet Take 40 mg by mouth daily   Yes Historical Provider, MD   cholecalciferol (VITAMIN D3) 1,000 units tablet Take 1,000 Units by mouth daily   Yes Historical Provider, MD   DULoxetine (CYMBALTA) 60 mg delayed release capsule Take 60 mg by mouth daily   Yes Historical Provider, MD   folic acid (FOLVITE) 1 mg tablet Take by mouth daily   Yes Historical Provider, MD   furosemide (LASIX) 20 mg tablet Take 20 mg by mouth daily   Yes Historical Provider, MD   gabapentin (NEURONTIN) 600 MG tablet Take 600 mg by mouth 4 (four) times a day   Yes Historical Provider, MD   metoprolol tartrate (LOPRESSOR) 25 mg tablet Take 12 5 mg by mouth every 12 (twelve) hours   Yes Historical Provider, MD   pantoprazole (PROTONIX) 40 mg tablet Take 40 mg by mouth 2 (two) times a day   Yes Historical Provider, MD   predniSONE 5 mg tablet Take by mouth daily   Yes Historical Provider, MD   QUEtiapine (SEROquel) 200 mg tablet Take 400 mg by mouth daily at bedtime   Yes Historical Provider, MD   sulfamethoxazole-trimethoprim (BACTRIM) 400-80 mg per tablet Take 2 tablets by mouth every 12 (twelve) hours   Yes Historical Provider, MD   tamsulosin (FLOMAX) 0 4 mg Take 0 4 mg by mouth daily with dinner   Yes Historical Provider, MD   traZODone (DESYREL) 50 mg tablet Take 50 mg by mouth daily at bedtime   Yes Historical Provider, MD   thiamine 100 MG tablet Take 100 mg by mouth daily  12/12/22 Yes Historical Provider, MD         Allergies: Not on File    Social History:     Marital Status: Unknown     Substance Use History:   Social History     Substance and Sexual Activity   Alcohol Use Not on file     Social History     Tobacco Use   Smoking Status Not on file   Smokeless Tobacco Not on file     Social History     Substance and Sexual Activity   Drug Use Not on file       Family History:    non-contributory    Physical Exam:     Vitals:   Blood Pressure: 138/84 (12/12/22 1514)  Pulse: 87 (12/12/22 1514)  Temperature: 97 7 °F (36 5 °C) (12/12/22 1514)  Temp Source: Tympanic (12/12/22 1035)  Respirations: 16 (12/12/22 1514)  Weight - Scale: 59 9 kg (132 lb 0 9 oz) (12/12/22 1044)  SpO2: 94 % (12/12/22 1514)    Physical Exam  Constitutional:       General: He is not in acute distress    Cardiovascular: Rate and Rhythm: Normal rate and regular rhythm  Heart sounds: Normal heart sounds  No murmur heard  Pulmonary:      Effort: No respiratory distress  Breath sounds: Normal breath sounds  No wheezing or rales  Abdominal:      General: Bowel sounds are normal  There is no distension  Palpations: Abdomen is soft  Tenderness: There is no abdominal tenderness  Musculoskeletal:         General: No swelling  Skin:     General: Skin is warm  Neurological:      Mental Status: He is alert  Comments: Awake alert and communicative  Squeezes fingers b/l and planter flexes feet b/l           Additional Data:     Lab Results: I have personally reviewed pertinent reports  Results from last 7 days   Lab Units 12/12/22  1319   WBC Thousand/uL 4 00*   HEMOGLOBIN g/dL 13 5   HEMATOCRIT % 41 2   PLATELETS Thousands/uL 240   NEUTROS PCT % 77*   LYMPHS PCT % 11*   MONOS PCT % 11   EOS PCT % 0     Results from last 7 days   Lab Units 12/12/22  1319   SODIUM mmol/L 132*   POTASSIUM mmol/L 3 9   CHLORIDE mmol/L 97   CO2 mmol/L 29   BUN mg/dL 11   CREATININE mg/dL 1 07   ANION GAP mmol/L 6   CALCIUM mg/dL 9 4   ALBUMIN g/dL 3 1*   TOTAL BILIRUBIN mg/dL 0 53   ALK PHOS U/L 49   ALT U/L 8*   AST U/L 34   GLUCOSE RANDOM mg/dL 112         Results from last 7 days   Lab Units 12/12/22  1143   POC GLUCOSE mg/dl 119               Imaging: I have personally reviewed pertinent reports  CT head without contrast   Final Result by Henok Corral MD (12/12 1101)      No acute intracranial abnormality  I personally discussed this study with Bebeto Diaz on 12/12/2022 at 10:59 AM                Workstation performed: [id number]         CT spine cervical without contrast   Final Result by Henok Corral MD (12/12 1100)      No cervical spine fracture or traumatic malalignment            I personally discussed this study with Bebeto Diaz on 12/12/2022 at 10:59 AM  Workstation performed: [id number]         XR trauma multiple   Final Result by Reagan Snyder MD (12/12 1105)      Right costophrenic angle is not fully imaged  - Streaky opacity in left lower lobe, likely subsegmental atelectasis  - Emphysema  - Healed right rib fracture deformities  Workstation performed: [id number]         XR chest 1 view   Final Result by Reagan Snyder MD (12/12 1114)      Right costophrenic angle is not fully imaged  - Streaky opacity in left lower lobe, likely subsegmental atelectasis  - Emphysema  - Healed right rib fracture deformities  Workstation performed: [id number]         MRI inpatient order    (Results Pending)       Allscripts / Epic Records Reviewed: Yes     ** Please Note: This note has been constructed using a voice recognition system   **

## 2022-12-12 NOTE — ED NOTES
Pt refusing blood work at this time twice       Augusto Molt  12/12/22 [address]  12/12/22 4934

## 2022-12-12 NOTE — ASSESSMENT & PLAN NOTE
Mild  Likely in setting of volume depltion  IVF  Check TSh, uric acid, serum and urine osm, urine Na  Trend BMP

## 2022-12-12 NOTE — ASSESSMENT & PLAN NOTE
· Unclear if he fell or had a syncopal event  · Patient does have cognitive impairment at baseline  · Was found by the side of the bed by daughter and care giver  · He was somewhat altered than his baseline per daughter but was able to recognize her and did tno have "stroke like symptoms"  · Was prescribed abx by PCP for presumed UTI (day 3/3)  · CT C spine: No cervical spine fracture or traumatic malalignment  · CT head: no acute intracranial abnormalities  · CXR: Streaky opacity in left lower lobe, likely subsegmental atelectasis  Emphysema  Healed right rib fracture deformities  · Na 132  · Last drug use a decade ago and last drink 2-3 years back per daughter and patient  · Suspect in setting of volume depletion as patient was recoring from viral illness and did not eat or drink well for last 3 days      PLAN:  · Check orthostatic vitals  · Check MRI brain  · Check UA  · Check UDS and serum drug screen  · Neuro checks  · If work up negative and concerns for seizures, obtain EEG and neurology eval   · IV hydration  · PT OT eval  · Complete bactrim course for presumed UTI

## 2022-12-12 NOTE — ASSESSMENT & PLAN NOTE
Noted to have extensive LLE DVT in 7/2022  Follows up with hematology OP  Continue eliquis at home dose for anticoagulation  They recommended lifelong anticoagulation for now

## 2022-12-12 NOTE — H&P
H&P - Trauma   Rhonda Holguin 73 y o  male MRN: [id number]  Unit/Bed#: TR 02 Encounter: [id number]    Trauma Alert: Level B   Model of Arrival: Ambulance    Trauma Team: Attending Susana Piña and Residents Mo  Consultants:     None     Assessment/Plan   Active Problems / Assessment:   Unwitnessed fall  DVT on Eliquis  Dementia  BPH  Prior CVA  LLE DVT  Hepatitis C  Prior IVDU and ETOH abuse  COPD  Peptic ulcer  Schizophrenia       Plan:   No acute traumatic injuries identified  Will need Medicine admission for ambulatory dysfunction, hyperkalemia, mental status changes  Trauma tertiary to be performed within 24hrs  Rest of care per Medicine team  Consider Tetanus vax if unable to assess recent administration within 10 years      History of Present Illness     Chief Complaint: n/a  Mechanism:Fall     HPI:    Rhonda Holguin is a 73 y o  male with PMHx HTN, COPD, peptic ulcer s/p prior exlap for UGI bleed, schizophrenia, ETOH abuse, IVDU, CVA, BPH, Hepatitis C, and prior CVA who presents s/p unwitnessed fall on Eliquis  Patient arrived via EMS with unknown down time  Unable to recall specific events surrounding fall including unknown LOC or head strike  Per EMS, patient with baseline dementia and progressive acute worsening in the past week  Chronic neck and back pain, however nontender on exam and nonfocal  CTH and cspine negative for acute traumatic injuries  FAST negative  IStat c/f hyponatremia 128, alkalosis pH 7 5, and hyperkalemia 5 7  Review of Systems   Constitutional: Negative  HENT: Negative  Eyes: Negative  Respiratory: Negative  Cardiovascular: Negative  Gastrointestinal: Negative  Endocrine: Negative  Genitourinary: Negative  Musculoskeletal: Positive for back pain  Skin: Negative  Allergic/Immunologic: Negative  Neurological: Negative  Hematological: Negative  Psychiatric/Behavioral: Negative  All other systems reviewed and are negative      12-point, complete review of systems was reviewed and negative except as stated above  Historical Information     No past medical history on file  No past surgical history on file  Unable to obtain history due to Unreliable historian         There is no immunization history on file for this patient  Last Tetanus: unclear per Epic and patient  Family History: Non-contributory    1  Before the illness or injury that brought you to the Emergency, did you need someone to help you on a regular basis? 1=Yes   2  Since the illness or injury that brought you to the Emergency, have you needed more help than usual to take care of yourself? 0=No   3  Have you been hospitalized for one or more nights during the past 6 months (excluding a stay in the Emergency Department)? 1=Yes   4  In general, do you see well? 0=Yes   5  In general, do you have serious problems with your memory? 1=Yes   6  Do you take more than three different medications everyday? 1=Yes   TOTAL   4     Did you order a geriatric consult if the score was 2 or greater?: yes     Meds/Allergies   current meds:   No current facility-administered medications for this encounter  and PTA meds:   None     Allergies have not been reviewed;   Not on File    Objective   Initial Vitals:   Temperature: 98 1 °F (36 7 °C) (12/12/22 1035)  Pulse: 92 (12/12/22 1035)  Respirations: 18 (12/12/22 1035)  Blood Pressure: 122/76 (12/12/22 1035)    Primary Survey:   Airway:        Status: patent;        Pre-hospital Interventions: none        Hospital Interventions: none  Breathing:        Pre-hospital Interventions: none       Effort: normal       Right breath sounds: normal       Left breath sounds: normal  Circulation:        Rhythm: regular       Rate: regular   Right Pulses Left Pulses    R radial: 2+    R pedal: 1+     L radial: 2+    L pedal: 1+       Disability:        GCS: Eye: 4; Verbal: 5 Motor: 6 Total: 15       Right Pupil: 2 mm;  round;  reactive         Left Pupil:  2 mm;  round;  reactive      R Motor Strength L Motor Strength    R : 5/5  R dorsiflex: 5/5  R plantarflex: 5/5 L : 5/5  L dorsiflex: 5/5  L plantarflex: 5/5        Sensory:  No sensory deficit  Exposure:       Completed: Yes      Secondary Survey:  Physical Exam  Vitals reviewed  Constitutional:       Appearance: Normal appearance  He is not toxic-appearing or diaphoretic  HENT:      Head: Normocephalic and atraumatic  Right Ear: External ear normal       Left Ear: External ear normal       Nose: Nose normal       Mouth/Throat:      Mouth: Mucous membranes are moist       Pharynx: Oropharynx is clear  Eyes:      Extraocular Movements: Extraocular movements intact  Conjunctiva/sclera: Conjunctivae normal    Neck:      Comments: Cervical collar in place, no neck tenderness  Cardiovascular:      Rate and Rhythm: Normal rate  Pulses: Normal pulses  Pulmonary:      Effort: Pulmonary effort is normal  No respiratory distress  Abdominal:      General: Abdomen is flat  There is no distension  Palpations: Abdomen is soft  Tenderness: There is no abdominal tenderness  Comments: Midline laparotomy scars well healed   Genitourinary:     Comments: No perineal hematoma  Musculoskeletal:         General: No tenderness or deformity  Normal range of motion  Cervical back: Normal range of motion  Right lower leg: No edema  Left lower leg: No edema  Comments: No palpable step offs, tenderness or deformities   Skin:     General: Skin is warm and dry  Comments: Left medial ankle surgical incision well healed, right knee surgical incision well healed   Neurological:      General: No focal deficit present  Mental Status: He is alert  Cranial Nerves: No cranial nerve deficit  Sensory: No sensory deficit  Motor: No weakness  Psychiatric:         Mood and Affect: Mood normal          Thought Content:  Thought content normal          Invasive Devices None               Lab Results:   BMP/CMP:   Lab Results   Component Value Date    CO2 31 12/12/2022    GLUCOSE 114 12/12/2022   , CBC:   Lab Results   Component Value Date    HGB 13 3 12/12/2022    HCT 39 12/12/2022   , Coagulation: No results found for: PT, INR, APTT, Lactate: No results found for: LACTATE, Amylase: No results found for: AMYLASE, Lipase: No results found for: LIPASE, AST: No results found for: AST, ALT: No results found for: ALT, Urinalysis: No results found for: [place] [facility], SPECGRAV, PHUR, LEUKOCYTESUR, NITRITE, PROTEINUA, GLUCOSEU, KETONESU, BILIRUBINUR, BLOODU, CK: No results found for: CKTOTAL, Troponin: No results found for: TROPONINI, EtOH: No results found for: ETOH, UDS: No components found for: RAPIDDRUGSCREEN, Pregnancy: No results found for: PREGTESTUR, ABG: No results found for: PHART, AOT1KND, PO2ART, SBF5WYW, F2AAIUZM, BEART, SOURCE and ISTAT: No components found for: VBG    Imaging Results: I have personally reviewed pertinent reports  Chest Xray(s): negative for acute findings Right costophrenic angle is not fully imaged  - Streaky opacity in left lower lobe, likely subsegmental atelectasis  - Emphysema  - Healed right rib fracture deformities       FAST exam(s): negative for acute findings   CT Scan(s): negative for acute findings   Additional Xray(s): N/A     Other Studies: n/a    Code Status: No Order  Advance Directive and Living Will:      Power of :    POLST:    I have spent 30 minutes with Patient  today in which greater than 50% of this time was spent in counseling/coordination of care regarding Diagnostic results, Prognosis, Risks and benefits of tx options, Intructions for management, Patient and family education, Importance of tx compliance, Risk factor reductions and Impressions

## 2022-12-12 NOTE — ASSESSMENT & PLAN NOTE
Had massive GIB in 2021  Required exploratory laparotomy  Follows up with GI OP  Known h/o duodenal ulcer  Continue protonix B(ID  NO NSAIDS

## 2022-12-12 NOTE — ED PROVIDER NOTES
Emergency Department Airway Evaluation and Management Form    History  Obtained from: ems, pt  Review of patient's allergies indicates no known allergies  Chief Complaint:  Trauma Alert    HPI: Pt is a 73 y o  male presents s/p fall and found on floor  Pt on blood thinner  Pt with no complaints  I have reviewed and agree with the history as documented  Physical Exam    Vitals:    12/12/22 1040   BP: 128/74   Pulse: 90   Resp: 20   Temp:    SpO2: 94%     Supplemental Oxygen: none    GCS: 14    Neuro: Alert and oriented  Psych: not combative, not anxious, cooperative for exam  Neck: In collar, No JVD, No midline tenderness  Cardio:  Normal  Respiratory: Normal  Mouth:  Normal  Pharynx: Normal    Monitor:  NSR      ED Medications    No current facility-administered medications for this encounter  No current outpatient medications on file        Intubation    No intubation required    Final Diagnosis:  Closed head injury    ED Provider  Electronically Signed by       Tremaine Giordano MD  12/12/22 5307

## 2022-12-12 NOTE — ED NOTES
Daughter at nurses station stating pt seemed confused to her  Daughter stating pt was rambling about something very random that she hasn't heard him say before  RN and ED tech at bedside to assess possible hypoglycemia or stroke like symptoms  RN performed full neuro assessment which pt performed within his baseline of arrival to ED  Pt blood glucose within normal limits  RN educated pt's daughter that pt had a CT scan done minutes before her arrival and any signs of a stroke would show up on the scan  RN also educated on other possibilities of brief moments of confusion like hospital induced delirium in the elderly  Pt appears comfortable and in no distress        Augusto Corley  12/12/22 7712

## 2022-12-12 NOTE — PROCEDURES
POC FAST US    Date/Time: 12/12/2022 10:47 AM  Performed by: Tisha Jacobo MD  Authorized by: Tisha Jacobo MD     Patient location:  Trauma  Other Assisting Provider: No    Procedure details:     Exam Type:  Diagnostic    Assess for:  Intra-abdominal fluid and pericardial effusion    Technique: FAST      Views obtained:  Heart - Pericardial sac, RUQ - Escobar's Pouch, LUQ - Splenorenal space and Suprapubic - Pouch of Chris    Image quality: diagnostic      Image availability:  Video obtained  FAST Findings:     RUQ (Hepatorenal) free fluid: absent      LUQ (Splenorenal) free fluid: absent      Suprapubic free fluid: absent      Cardiac wall motion: identified      Pericardial effusion: absent    Interpretation:     Impressions: negative    Comments:      Bilateral renal cysts

## 2022-12-13 LAB
ANION GAP SERPL CALCULATED.3IONS-SCNC: 9 MMOL/L (ref 4–13)
BASOPHILS # BLD AUTO: 0.02 THOUSANDS/ÂΜL (ref 0–0.1)
BASOPHILS NFR BLD AUTO: 0 % (ref 0–1)
BUN SERPL-MCNC: 14 MG/DL (ref 5–25)
CALCIUM SERPL-MCNC: 9 MG/DL (ref 8.3–10.1)
CHLORIDE SERPL-SCNC: 97 MMOL/L (ref 96–108)
CO2 SERPL-SCNC: 26 MMOL/L (ref 21–32)
CREAT SERPL-MCNC: 0.97 MG/DL (ref 0.6–1.3)
EOSINOPHIL # BLD AUTO: 0.01 THOUSAND/ÂΜL (ref 0–0.61)
EOSINOPHIL NFR BLD AUTO: 0 % (ref 0–6)
ERYTHROCYTE [DISTWIDTH] IN BLOOD BY AUTOMATED COUNT: 14.7 % (ref 11.6–15.1)
GFR SERPL CREATININE-BSD FRML MDRD: 79 ML/MIN/1.73SQ M
GLUCOSE SERPL-MCNC: 76 MG/DL (ref 65–140)
HCT VFR BLD AUTO: 41.4 % (ref 36.5–49.3)
HGB BLD-MCNC: 13.4 G/DL (ref 12–17)
IMM GRANULOCYTES # BLD AUTO: 0.05 THOUSAND/UL (ref 0–0.2)
IMM GRANULOCYTES NFR BLD AUTO: 1 % (ref 0–2)
LYMPHOCYTES # BLD AUTO: 0.82 THOUSANDS/ÂΜL (ref 0.6–4.47)
LYMPHOCYTES NFR BLD AUTO: 15 % (ref 14–44)
MCH RBC QN AUTO: 30.6 PG (ref 26.8–34.3)
MCHC RBC AUTO-ENTMCNC: 32.4 G/DL (ref 31.4–37.4)
MCV RBC AUTO: 95 FL (ref 82–98)
MONOCYTES # BLD AUTO: 0.58 THOUSAND/ÂΜL (ref 0.17–1.22)
MONOCYTES NFR BLD AUTO: 11 % (ref 4–12)
NEUTROPHILS # BLD AUTO: 3.91 THOUSANDS/ÂΜL (ref 1.85–7.62)
NEUTS SEG NFR BLD AUTO: 73 % (ref 43–75)
NRBC BLD AUTO-RTO: 0 /100 WBCS
PLATELET # BLD AUTO: 226 THOUSANDS/UL (ref 149–390)
PMV BLD AUTO: 9.5 FL (ref 8.9–12.7)
POTASSIUM SERPL-SCNC: 3.5 MMOL/L (ref 3.5–5.3)
RBC # BLD AUTO: 4.38 MILLION/UL (ref 3.88–5.62)
SODIUM SERPL-SCNC: 132 MMOL/L (ref 135–147)
WBC # BLD AUTO: 5.39 THOUSAND/UL (ref 4.31–10.16)

## 2022-12-13 RX ORDER — GABAPENTIN 400 MG/1
400 CAPSULE ORAL 4 TIMES DAILY
Status: DISCONTINUED | OUTPATIENT
Start: 2022-12-13 | End: 2022-12-16 | Stop reason: HOSPADM

## 2022-12-13 RX ADMIN — APIXABAN 5 MG: 5 TABLET, FILM COATED ORAL at 17:11

## 2022-12-13 RX ADMIN — Medication 12.5 MG: at 08:24

## 2022-12-13 RX ADMIN — GABAPENTIN 600 MG: 300 CAPSULE ORAL at 11:27

## 2022-12-13 RX ADMIN — PREDNISONE 5 MG: 5 TABLET ORAL at 08:24

## 2022-12-13 RX ADMIN — DULOXETINE HYDROCHLORIDE 60 MG: 60 CAPSULE, DELAYED RELEASE ORAL at 08:25

## 2022-12-13 RX ADMIN — GABAPENTIN 400 MG: 400 CAPSULE ORAL at 17:11

## 2022-12-13 RX ADMIN — ATORVASTATIN CALCIUM 40 MG: 40 TABLET, FILM COATED ORAL at 08:24

## 2022-12-13 RX ADMIN — TRAZODONE HYDROCHLORIDE 50 MG: 50 TABLET ORAL at 21:25

## 2022-12-13 RX ADMIN — TAMSULOSIN HYDROCHLORIDE 0.4 MG: 0.4 CAPSULE ORAL at 17:11

## 2022-12-13 RX ADMIN — PANTOPRAZOLE SODIUM 40 MG: 40 TABLET, DELAYED RELEASE ORAL at 08:25

## 2022-12-13 RX ADMIN — SULFAMETHOXAZOLE AND TRIMETHOPRIM 2 TABLET: 400; 80 TABLET ORAL at 08:25

## 2022-12-13 RX ADMIN — Medication 12.5 MG: at 21:26

## 2022-12-13 RX ADMIN — GABAPENTIN 400 MG: 400 CAPSULE ORAL at 21:26

## 2022-12-13 RX ADMIN — GABAPENTIN 600 MG: 300 CAPSULE ORAL at 08:24

## 2022-12-13 RX ADMIN — FUROSEMIDE 20 MG: 20 TABLET ORAL at 08:24

## 2022-12-13 RX ADMIN — OXYCODONE HYDROCHLORIDE AND ACETAMINOPHEN 1 TABLET: 5; 325 TABLET ORAL at 21:25

## 2022-12-13 RX ADMIN — PANTOPRAZOLE SODIUM 40 MG: 40 TABLET, DELAYED RELEASE ORAL at 17:12

## 2022-12-13 RX ADMIN — NICOTINE 7 MG: 7 PATCH, EXTENDED RELEASE TRANSDERMAL at 11:27

## 2022-12-13 RX ADMIN — QUETIAPINE FUMARATE 400 MG: 300 TABLET ORAL at 21:26

## 2022-12-13 RX ADMIN — APIXABAN 5 MG: 5 TABLET, FILM COATED ORAL at 08:24

## 2022-12-13 RX ADMIN — Medication 1000 UNITS: at 08:24

## 2022-12-13 NOTE — PLAN OF CARE
Problem: PAIN - ADULT  Goal: Verbalizes/displays adequate comfort level or baseline comfort level  Description: Interventions:  - Encourage patient to monitor pain and request assistance  - Assess pain using appropriate pain scale  - Administer analgesics based on type and severity of pain and evaluate response  - Implement non-pharmacological measures as appropriate and evaluate response  - Consider cultural and social influences on pain and pain management  - Notify physician/advanced practitioner if interventions unsuccessful or patient reports new pain  12/13/2022 0030 by Karla Cherry RN  Outcome: Progressing  12/12/2022 1949 by Karla Cherry RN  Outcome: Progressing     Problem: INFECTION - ADULT  Goal: Absence or prevention of progression during hospitalization  Description: INTERVENTIONS:  - Assess and monitor for signs and symptoms of infection  - Monitor lab/diagnostic results  - Monitor all insertion sites, i e  indwelling lines, tubes, and drains  - Monitor endotracheal if appropriate and nasal secretions for changes in amount and color  - [place] appropriate cooling/warming therapies per order  - Administer medications as ordered  - Instruct and encourage patient and family to use good hand hygiene technique  - Identify and instruct in appropriate isolation precautions for identified infection/condition  12/13/2022 0030 by Karla Cherry RN  Outcome: Progressing  12/12/2022 1949 by Karla Cherry RN  Outcome: Progressing

## 2022-12-13 NOTE — OCCUPATIONAL THERAPY NOTE
Occupational Therapy Evaluation     Patient Name: Concepcion ABERNATHYH Date: 12/13/2022  Problem List  Principal Problem:    Syncope vs fall  Active Problems:    Altered mental status    Dementia (HCC)    History of DVT (deep vein thrombosis)    History of GI bleed    Peptic ulcer disease    History of drug abuse ([facility] [address] )    Hypertension    Hyponatremia    Past Medical History  No past medical history on file  Past Surgical History  No past surgical history on file  12/13/22 1124   OT Last Visit   OT Visit Date 12/13/22   Note Type   Note type Evaluation   Pain Assessment   Pain Assessment Tool 0-10   Pain Score No Pain   Restrictions/Precautions   Weight Bearing Precautions Per Order No   Other Precautions Cognitive; Chair Alarm; Bed Alarm; Fall Risk;Pain   Home Living   Type of [address] level   Bathroom Toilet Raised   Bathroom Equipment Shower chair;Commode   Home Equipment Walker; Wheelchair-manual;Hospital bed;Grab bars   Additional Comments Per EMR, Pt resides with son in an apartment  Prior Function   Level of [place] Needs assistance with ADLs; Needs assistance with functional mobility; Needs assistance with IADLS   Lives With Son   Receives Help From Family;Home health  (10 hrs/day)   IADLs Family/Friend/Other provides transportation; Family/Friend/Other provides medication management; Family/Friend/Other provides meals   Falls in the last 6 months 1 to 4  (atleast x1 leading to admission)   Vocational Retired   Comments Per EMR, Pt requires A with ADLs/IADLs/functional mobility with RW short distances  To further clarify level of family/social support and PLOF with CM     Lifestyle   Autonomy A with ADLs/IADLs/functional mobility with RW short distances   Reciprocal Relationships Supportive son   Service to Others Retired   Intrinsic Gratification Will continue to assess   ADL   Where Assessed Edge of bed   Eating Assistance 5  Supervision/Setup   Grooming Assistance 4 Minimal Assistance   UB Bathing Assistance 3  Moderate Assistance   LB Bathing Assistance 2  Maximal Assistance   UB Dressing Assistance 3  Moderate Assistance   LB Dressing Assistance 2  Maximal [address]  3  Moderate Assistance   Functional Assistance 3  Moderate Assistance   Functional Deficit Increased time to complete;Supervision/safety;Setup   Bed Mobility   Supine to Sit 4  Minimal assistance   Additional items Assist x 1; Increased time required;Verbal cues;LE management   Sit to Supine Unable to assess   Additional Comments Pt greeted supine in bed  Orthostatics completed: supine BP: 164/103, seated BP: 153/98, and standing BP: 149/98  RN updated  Transfers   Sit to Stand 4  Minimal assistance   Additional items Assist x 1;Verbal cues; Increased time required   Stand to Sit 4  Minimal assistance   Additional items Assist x 1; Increased time required;Verbal cues   Additional Comments with RW   Functional Mobility   Functional Mobility 3  Moderate assistance   Additional Comments Mod AX1 with RW- few steps from EOB to recliner chair  Additional items Rolling walker   Balance   Static Sitting Fair -   Dynamic Sitting Poor +   Static Standing Poor +   Dynamic Standing Poor   Ambulatory Poor   Activity Tolerance   Activity Tolerance Patient limited by fatigue;Patient limited by pain; Other (Comment)  (cognition)   Medical Staff Made Aware Co-eval with PT 2* to Pt's medical complexity and decreased endurance  Nurse Made Aware RN cleared/updated     RUE Assessment   RUE Assessment WFL   LUE Assessment   LUE Assessment WFL   Hand Function   Gross Motor Coordination Functional   Fine Motor Coordination Functional   Sensation   Light Touch No apparent deficits   Psychosocial   Psychosocial (WDL) WDL   Cognition   Overall Cognitive Status (S)  Impaired   Arousal/Participation Lethargic   Attention Difficulty attending to directions   Orientation Level Oriented to person;Disoriented to place; Disoriented to time   Memory Decreased recall of precautions;Decreased recall of recent events;Decreased long term memory;Decreased short term memory   Following Commands Follows one step commands with increased time or repetition   Comments Pt lethargic upon arrival and requires one-step simple commands  Pt dysarthric and requires increased time for processing/response  Assessment   Limitation Decreased ADL status; Decreased UE strength;Decreased UE ROM; Decreased Safe judgement during ADL;Decreased cognition;Decreased endurance;Decreased self-care trans;Decreased high-level ADLs   Prognosis Fair   Assessment Pt is a 72 yo Male who presented to [facility] on 12/12/2022 s/p fall  Pt with diagnosis of syncope v  Fall  Pt with unknown HS/LOC  CT CS (-) and CT head (-)  Pt with pmhx of drug abuse, peptic ulcer, DVT, and dementia  Pt greeted bedside for OT evaluation on 12/13/2022  Per EMR, Pt resides with son in an apartment  Per EMR, Pt requires A with ADLs/IADLs/functional mobility with RW short distances  To further clarify level of family/social support and PLOF with CM  Pt demonstrating the following occupational deficits: mod A with UB ADLs, max A with LB ADLs, min A with bed mobility, min A with transfers, and mod A with functional mobility with RW  Limitations that impact functional performance include decreased ADL status, decreased UE ROM, decreased UE strength, decreased safe judgement during ADLs, decreased cognition, decreased endurance, decreased self care transfers and decreased high level ADLs  Occupational performance areas to address ADL retraining, functional transfer training, UE strengthening/ROM, endurance training, cognitive reorientation, Pt/caregiver education, equipment evaluation/education, compensatory technique education, energy conservation and activity engagement   Pt would benefit from continued skilled OT services while in hospital to maximize independence with ADLs   Will continue to follow Pt's progress  Pt would benefit from returning home with home health and continued assistance from home health/family upon DC to maximize safety and independence with ADLs and functional tasks of choice  Goals   Patient Goals None expressed  LTG Time Frame 10-14   Long Term Goal #1 See goals listed below  Plan   Treatment Interventions ADL retraining;Functional transfer training;UE strengthening/ROM; Endurance training;Cognitive reorientation;Patient/family training;Equipment evaluation/education; Compensatory technique education; Energy conservation; Activityengagement   Goal Expiration Date 12/27/22   OT Frequency Other (comment)  (2-4x/wk)   Recommendation   OT Discharge Recommendation Home with home health rehabilitation  (pending assistance/social support at home)   Additional Comments  The patient's raw score on the AM-PAC Daily Activity inpatient short form is 14, standardized score is 33 39, less than 39 4  Patients at this level are likely to benefit from discharge to post-acute rehabilitation services  Please refer to the recommendation of the Occupational Therapist for safe discharge planning  AM-PAC Daily Activity Inpatient   Lower Body Dressing 2   Bathing 2   Toileting 2   Upper Body Dressing 2   Grooming 3   Eating 3   Daily Activity Raw Score 14   Daily Activity Standardized Score (Calc for Raw Score >=11) 33 39   AM-[facility] Applied Cognition Inpatient   Following a Speech/Presentation 2   Understanding Ordinary Conversation 3   Taking Medications 2   Remembering Where Things Are Placed or Put Away 2   Remembering List of 4-5 Errands 2   Taking Care of Complicated Tasks 1   Applied Cognition Raw Score 12   Applied Cognition Standardized Score 28 82   End of Consult   Education Provided Yes   Patient Position at End of Consult Bedside chair; All needs within reach;Bed/Chair alarm activated   Nurse Communication Nurse aware of consult     Goals:  Pt will complete UB ADLs with I in order to maximize participation with ADLs  Pt will complete LB ADLs with I in order to maximize safety with ADLs  Pt will complete toileting routine (transfer, hygiene, and clothing management) with I in order to return to prior level of function  Pt will complete bed mobility with I in order to maximize participation with ADLs  Pt will complete functional transfers at I level in order to increase participation with ADLs  Pt will increase dynamic standing balance to F+ in order to increase safety with ADLs  Pt will increase standing tolerance x10 min in order to increase participation with ADLs  Pt will complete functional mobility with AD PRN for item retrieval task at Mi level in order to increase participation with ADLs  Pt will complete IADL tasks/simulation of IADLs tasks with I in order to return to PLOF  Pt will demonstrate G energy conservation techniques with ADLs/IADLs in order to reduce the risk of falls  Pt will be attentive 100% of the time for ongoing functional/formal cognitive assessment to assist with safe dc planning prjanessa Burnett MS, OTR/L

## 2022-12-13 NOTE — ASSESSMENT & PLAN NOTE
· Unclear if he fell or had a syncopal event  · Patient does have cognitive impairment at baseline  · Was found by the side of the bed by daughter and care giver  · He was somewhat altered than his baseline per daughter but was able to recognize her and did tno have "stroke like symptoms"  · Was prescribed abx by PCP for presumed UTI (day 3/3)  · CT C spine: No cervical spine fracture or traumatic malalignment  · CT head: no acute intracranial abnormalities  · CXR: Streaky opacity in left lower lobe, likely subsegmental atelectasis  Emphysema  Healed right rib fracture deformities  · Na 132  · Last drug use a decade ago and last drink 2-3 years back per daughter and patient  · Suspect in setting of volume depletion as patient was recoring from viral illness and did not eat or drink well for last 3 days      PLAN:  · Check orthostatic vitals  · UA reviewed  · Check UDS and serum drug screen-positive for stated  · Neuro checks  · If work up negative and concerns for seizures, obtain EEG and neurology eval   · IV hydration  · PT OT eval  · Complete bactrim course for presumed UTI  · MRI of the brain was not optimal study due to claustrophobia however no major acute infarct noted

## 2022-12-13 NOTE — PLAN OF CARE
Problem: PAIN - ADULT  Goal: Verbalizes/displays adequate comfort level or baseline comfort level  Description: Interventions:  - Encourage patient to monitor pain and request assistance  - Assess pain using appropriate pain scale  - Administer analgesics based on type and severity of pain and evaluate response  - Implement non-pharmacological measures as appropriate and evaluate response  - Consider cultural and social influences on pain and pain management  - Notify physician/advanced practitioner if interventions unsuccessful or patient reports new pain  Outcome: Progressing     Problem: INFECTION - ADULT  Goal: Absence or prevention of progression during hospitalization  Description: INTERVENTIONS:  - Assess and monitor for signs and symptoms of infection  - Monitor lab/diagnostic results  - Monitor all insertion sites, i e  indwelling lines, tubes, and drains  - Monitor endotracheal if appropriate and nasal secretions for changes in amount and color  - Moody appropriate cooling/warming therapies per order  - Administer medications as ordered  - Instruct and encourage patient and family to use good hand hygiene technique  - Identify and instruct in appropriate isolation precautions for identified infection/condition  Outcome: Progressing

## 2022-12-13 NOTE — PLAN OF CARE
Problem: OCCUPATIONAL THERAPY ADULT  Goal: Performs self-care activities at highest level of function for planned discharge setting  See evaluation for individualized goals  Description: Treatment Interventions: ADL retraining, Functional transfer training, UE strengthening/ROM, Endurance training, Cognitive reorientation, Patient/family training, Equipment evaluation/education, Compensatory technique education, Energy conservation, Activityengagement          See flowsheet documentation for full assessment, interventions and recommendations  Note: Limitation: Decreased ADL status, Decreased UE strength, Decreased UE ROM, Decreased Safe judgement during ADL, Decreased cognition, Decreased endurance, Decreased self-care trans, Decreased high-level ADLs  Prognosis: Fair  Assessment: Pt is a 74 yo Male who presented to [facility] on 12/12/2022 s/p fall  Pt with diagnosis of syncope v  Fall  Pt with unknown HS/LOC  CT CS (-) and CT head (-)  Pt with pmhx of drug abuse, peptic ulcer, DVT, and dementia  Pt greeted bedside for OT evaluation on 12/13/2022  Per EMR, Pt resides with son in an apartment  Per EMR, Pt requires A with ADLs/IADLs/functional mobility with RW short distances  To further clarify level of family/social support and PLOF with CM  Pt demonstrating the following occupational deficits: mod A with UB ADLs, max A with LB ADLs, min A with bed mobility, min A with transfers, and mod A with functional mobility with RW  Limitations that impact functional performance include decreased ADL status, decreased UE ROM, decreased UE strength, decreased safe judgement during ADLs, decreased cognition, decreased endurance, decreased self care transfers and decreased high level ADLs   Occupational performance areas to address ADL retraining, functional transfer training, UE strengthening/ROM, endurance training, cognitive reorientation, Pt/caregiver education, equipment evaluation/education, compensatory technique education, energy conservation and activity engagement   Pt would benefit from continued skilled OT services while in hospital to maximize independence with ADLs  Will continue to follow Pt's progress  Pt would benefit from returning home with home health and continued assistance from home health/family upon DC to maximize safety and independence with ADLs and functional tasks of choice       OT Discharge Recommendation: Home with home health rehabilitation (pending assistance/social support at home)

## 2022-12-13 NOTE — PROGRESS NOTES
[address] [facility]  Progress Note - Bassam Acosta 1950, 73 y o  male MRN: [id number]  Unit/Bed#: [facility] 803-01 Encounter: [id number]  Primary Care Provider: Mariam Snyder MD   Date and time admitted to hospital: 12/12/2022 10:32 AM    Hyponatremia  Assessment & Plan  - treat with IV fluids and trend BMP    Hypertension  Assessment & Plan  - continue lasix and lopressor    History of drug abuse (HCC)  Assessment & Plan  - obtained UDS and serum drug screen    Peptic ulcer disease  Assessment & Plan  - continue home protonix    History of GI bleed  Assessment & Plan  - continue home protonix  - avoid NSAIDs    History of DVT (deep vein thrombosis)  Assessment & Plan  - continue home eliquis    Dementia [facility])  Assessment & Plan  - delirium precautions  - recommend geriatrics consult for medicine reconciliation    Altered mental status  Assessment & Plan  - same management as for syncope    * Syncope vs fall  Assessment & Plan  - orthostatic vitals  - UA  - UDS  - neuro checks  - IV hydration   - rest of care per primary team        TRAUMA TERTIARY SURVEY NOTE    VTE Prophylaxis: eliquis     Disposition: Care per medicine team for further workup and treatment of syncope, AMS, and dementia    Code status:  Level 3 - DNAR and DNI    Consultants: None    Subjective   Transfer from: Directly brought by EMS to [facility]    Mechanism of Injury: Fall     Chief Complaint: "hurts"    HPI/Last 24 hour events: After presentation to the trauma bay, the patient was deemed to have no immediate trauma injuries and was therefore admitted to medicine for further workup of his syncope, AMS, and dementia/delirium  Overnight, the patient was refusing to take his medications, pulled out his IV, and when speaking with nursing he is refusing to answer questions voluntarily  On exam this morning the patient was not oriented to place or time   When asked a question, he would reply "yes" before the question was done being asked  Difficult to decipher whether the patient is refusing to answer or if he is delirious  Objective   Vitals:   Temp:  [97 7 °F (36 5 °C)-98 1 °F (36 7 °C)] 98 1 °F (36 7 °C)  HR:  [76-92] 76  Resp:  [16-20] 16  BP: (122-168)/(73-99) 162/90    I/O       12/11 0701  12/12 0700 12/12 0701  12/13 0700    Urine (mL/kg/hr)  600    Total Output  600    Net  -600                 Physical Exam:   Physical Exam  Constitutional:       General: He is not in acute distress  Comments: Not oriented to person or place  HENT:      Head: Normocephalic  Nose: Nose normal       Mouth/Throat:      Mouth: Mucous membranes are moist       Pharynx: Oropharynx is clear  Eyes:      Extraocular Movements: Extraocular movements intact  Pupils: Pupils are equal, round, and reactive to light  Cardiovascular:      Rate and Rhythm: Normal rate and regular rhythm  Pulses: Normal pulses  Heart sounds: Normal heart sounds  Pulmonary:      Effort: Pulmonary effort is normal  No respiratory distress  Abdominal:      General: There is no distension  Palpations: Abdomen is soft  Tenderness: There is no abdominal tenderness  Musculoskeletal:         General: Normal range of motion  Cervical back: Normal range of motion  Skin:     General: Skin is warm and dry  Neurological:      General: No focal deficit present  Mental Status: He is alert  He is disoriented  Invasive Devices     None                    1  Before the illness or injury that brought you to the Emergency, did you need someone to help you on a regular basis? N/a   2  Since the illness or injury that brought you to the Emergency, have you needed more help than usual to take care of yourself? N/a   3  Have you been hospitalized for one or more nights during the past 6 months (excluding a stay in the Emergency Department)? N/a   4  In general, do you see well? N/a   5   In general, do you have serious problems with your memory? N/a   6  Do you take more than three different medications everyday?  N/a   TOTAL   Patient was not answering questions appropriately     Did you order a geriatric consult if the score was 2 or greater?: no         Lab Results:   Results: I have personally reviewed all pertinent laboratory/tests results, BMP/CMP:   Lab Results   Component Value Date    SODIUM 132 (L) 12/12/2022    K 3 9 12/12/2022    CL 97 12/12/2022    CO2 29 12/12/2022    CO2 31 12/12/2022    BUN 11 12/12/2022    CREATININE 1 07 12/12/2022    GLUCOSE 114 12/12/2022    CALCIUM 9 4 12/12/2022    AST 34 12/12/2022    ALT 8 (L) 12/12/2022    ALKPHOS 49 12/12/2022    EGFR 70 12/12/2022   , CBC:   Lab Results   Component Value Date    WBC 4 00 (L) 12/12/2022    HGB 13 5 12/12/2022    HCT 41 2 12/12/2022    MCV 95 12/12/2022     12/12/2022    MCH 31 3 12/12/2022    MCHC 32 8 12/12/2022    RDW 14 5 12/12/2022    MPV 9 4 12/12/2022    NRBC 0 12/12/2022   , Coagulation: No results found for: PT, INR, APTT, Lactate: No results found for: LACTATE, Amylase: No results found for: AMYLASE, Lipase: No results found for: LIPASE, AST:   Lab Results   Component Value Date    AST 34 12/12/2022   , ALT:   Lab Results   Component Value Date    ALT 8 (L) 12/12/2022   , Urinalysis:   Lab Results   Component Value Date    COLORU Yellow 12/12/2022    CLARITYU Clear 12/12/2022    SPECGRAV 1 010 12/12/2022    PHUR 6 5 12/12/2022    LEUKOCYTESUR Negative 12/12/2022    NITRITE Negative 12/12/2022    GLUCOSEU Negative 12/12/2022    KETONESU Trace (A) 12/12/2022    BILIRUBINUR Negative 12/12/2022    BLOODU Trace (A) 12/12/2022   , CK: No results found for: CKTOTAL, Troponin: No results found for: TROPONINI, EtOH: No results found for: ETOH, UDS: No components found for: RAPIDDRUGSCREEN, Pregnancy: No results found for: PREGTESTUR, ABG: No results found for: PHART, PTA8ZRM, PO2ART, ASW4SXA, I8KQIJYO, BEART, SOURCE and ISTAT: No components found for: VBG    Imaging Results: I have personally reviewed pertinent reports  Chest Xray(s): positive for acute findings: segmental atelectasis, emphysema, healed right rib fractures   FAST exam(s): negative for acute findings   CT Scan(s): negative for acute findings   Additional Xray(s): N/A     Other Studies: MRI brain incomplete due to patient claustrophobia  No evidence of acute infarct  Limited evaluation

## 2022-12-13 NOTE — PROGRESS NOTES
[address] [facility]  Progress Note - Em Ibanez 1950, 73 y o  male MRN: [id number]  Unit/Bed#: [facility] 803-01 Encounter: [id number]  Primary Care Provider: Felicia Regan MD   Date and time admitted to hospital: 12/12/2022 10:32 AM    Hyponatremia  Assessment & Plan  Mild  Likely in setting of volume depltion  IVF  Check TSh, uric acid, serum and urine osm, urine Na  Trend BMP    Hypertension  Assessment & Plan  cotninue home dose lasix and metoprolol with holding parameters    History of drug abuse ([facility] [address] )  Assessment & Plan  Known h/o  Last use about a decade before as per patient and daughter  As per daughter, patient takes once a day prn percocet at home  PDMP checked  Last filled in 9-2022, 40 tabs for 10 days as needed without refills  He used medical marihuana for chronic pain  Peptic ulcer disease  Assessment & Plan  As mentioned in GIB  Continue PPI BID    History of GI bleed  Assessment & Plan  Had massive GIB in 2021  Required exploratory laparotomy  Follows up with GI OP  Known h/o duodenal ulcer  Continue protonix B(ID  NO NSAIDS  Hb stable    History of DVT (deep vein thrombosis)  Assessment & Plan  Noted to have extensive LLE DVT in 7/2022  Follows up with hematology OP  Continue eliquis at home dose for anticoagulation  They recommended lifelong anticoagulation for now    Dementia [facility])  Assessment & Plan  cognitive impairment at baseline  ,pre confused compared to his baseline per daughter  Delirium precautions  Rest as above  Will get psychiatry , decrease dose of gabapentin and seroquel is at 400mg for long time   Altered mental status  Assessment & Plan  · As per daughter, he does have dementia but to her, he is more confused than his baseline  · No focal neurological deficits  · Likely metabolic in setting of recent viral illness, volume depletion and presumed UTI  · Mx as above      * Syncope vs fall  Assessment & Plan  · Unclear if he fell or had a syncopal event  · Patient does have cognitive impairment at baseline  · Was found by the side of the bed by daughter and care giver  · He was somewhat altered than his baseline per daughter but was able to recognize her and did tno have "stroke like symptoms"  · Was prescribed abx by PCP for presumed UTI (day 3/3)  · CT C spine: No cervical spine fracture or traumatic malalignment  · CT head: no acute intracranial abnormalities  · CXR: Streaky opacity in left lower lobe, likely subsegmental atelectasis  Emphysema  Healed right rib fracture deformities  · Na 132  · Last drug use a decade ago and last drink 2-3 years back per daughter and patient  · Suspect in setting of volume depletion as patient was recoring from viral illness and did not eat or drink well for last 3 days  PLAN:  · Check orthostatic vitals  · Check MRI brain  · Check UA  · Check UDS and serum drug screen  · Neuro checks  · If work up negative and concerns for seizures, obtain EEG and neurology eval   · IV hydration  · PT OT eval  · Complete bactrim course for presumed UTI          VTE Pharmacologic Prophylaxis:   Moderate Risk (Score 3-4) - Pharmacological DVT Prophylaxis Ordered: apixaban (Eliquis)  Patient Centered Rounds: I performed bedside rounds with nursing staff today  Discussions with Specialists or Other Care Team Provider: nursing staff    Education and Discussions with Family / Patient: Updated  (son) via phone  Time Spent for Care: 45 minutes  More than 50% of total time spent on counseling and coordination of care as described above  Current Length of Stay: 0 day(s)  Current Patient Status: Observation   Certification Statement: The patient will continue to require additional inpatient hospital stay due to AMS , fall  Discharge Plan: Anticipate discharge in 48 hrs to home      Code Status: Level 3 - DNAR and DNI    Subjective:   Pt is sleepy , but wakens up , answers simple questions , he is bedbound at baseline , uses walker with one person assist , walks 15 feet ,he appears more confused as per family , more rapid and but at baseline he becomes confused , short term memory was intact ,  Pt likely has dementia , advancing , or underlying chronic alcohol /drug use in past   Objective:     Vitals:   Temp (24hrs), Av 7 °F (36 5 °C), Min:97 °F (36 1 °C), Max:98 1 °F (36 7 °C)    Temp:  [97 °F (36 1 °C)-98 1 °F (36 7 °C)] 97 9 °F (36 6 °C)  HR:  [76-87] 79  Resp:  [16-18] 16  BP: (138-180)/(84-99) 158/88  SpO2:  [87 %-94 %] 87 %  There is no height or weight on file to calculate BMI  Input and Output Summary (last 24 hours): Intake/Output Summary (Last 24 hours) at 2022 1251  Last data filed at 2022 0900  Gross per 24 hour   Intake 180 ml   Output 200 ml   Net -20 ml       Physical Exam:   Physical Exam   HEENT-PERRLA, moist oral mucosa  Neck-supple, no JVD elevation   Respiratory-equal air entry bilaterally, no rales or rhonchi  Cardiovascular system-S1, S2 heard, no murmur or gallops or rubs  Abdomen-soft, nontender, no guarding or rigidity, bowel sounds heard  Extremities-no pedal edema  Peripheral pulses palpable  Musculoskeletal-no contractures  Central nervous system-no acute focal neurological deficit ,no sensory or motor deficit noted    Skin-no rash noted        Additional Data:     Labs:  Results from last 7 days   Lab Units 22  0507   WBC Thousand/uL 5 39   HEMOGLOBIN g/dL 13 4   HEMATOCRIT % 41 4   PLATELETS Thousands/uL 226   NEUTROS PCT % 73   LYMPHS PCT % 15   MONOS PCT % 11   EOS PCT % 0     Results from last 7 days   Lab Units 22  0507 22  1319   SODIUM mmol/L 132* 132*   POTASSIUM mmol/L 3 5 3 9   CHLORIDE mmol/L 97 97   CO2 mmol/L 26 29   BUN mg/dL 14 11   CREATININE mg/dL 0 97 1 07   ANION GAP mmol/L 9 6   CALCIUM mg/dL 9 0 9 4   ALBUMIN g/dL  --  3 1*   TOTAL BILIRUBIN mg/dL  --  0 53   ALK PHOS U/L  --  49   ALT U/L  --  8*   AST U/L  --  34 GLUCOSE RANDOM mg/dL 76 112         Results from last 7 days   Lab Units 12/12/22  1143   POC GLUCOSE mg/dl 119               Lines/Drains:  Invasive Devices     None                   Telemetry:  Telemetry Orders (From admission, onward)             Telemetry monitoring  Continuous x 48 hours        References:    Telemetry Guidelines   Question:  Reason for 48 Hour Telemetry  Answer:  Acute MI, chest pain - R/O MI, or unstable angina                 Telemetry Reviewed: Normal Sinus Rhythm  Indication for Continued Telemetry Use: No indication for continued use  Will discontinue                Imaging: Reviewed radiology reports from this admission including: CT head    Recent Cultures (last 7 days):         Last 24 Hours Medication List:   Current Facility-Administered Medications   Medication Dose Route Frequency Provider Last Rate   • apixaban  5 mg Oral BID Vanita Escalante MD     • atorvastatin  40 mg Oral Daily Vanita Escalante MD     • cholecalciferol  1,000 Units Oral Daily Vanita Escalante MD     • DULoxetine  60 mg Oral Daily Vanita Escalante MD     • furosemide  20 mg Oral Daily Vanita Escalante MD     • gabapentin  400 mg Oral 4x Daily Samantha Alcantar MD     • metoprolol tartrate  12 5 mg Oral Q12H [facility] HOME Vanita Escalante MD     • nicotine  7 mg Transdermal Daily Caryl Scanlon PA-C     • ondansetron  4 mg Intramuscular Q4H PRN Jessie Valenzuela PA-C     • oxyCODONE-acetaminophen  1 tablet Oral Once PRN Vanita Escalante MD     • pantoprazole  40 mg Oral BID AC Vanita Escalante MD     • predniSONE  5 mg Oral Daily Vanita Escalante MD     • QUEtiapine  400 mg Oral HS Vanita Escalante MD     • sulfamethoxazole-trimethoprim  2 tablet Oral Q12H [facility] HOME Vanita Escalante MD     • tamsulosin  0 4 mg Oral Daily With Dominique Cunha MD     • traZODone  50 mg Oral HS Vanita Escalante MD          Today, Patient Was Seen By: Samantha Alcantar MD    **Please Note: This note may have been constructed using a voice recognition system  **

## 2022-12-13 NOTE — PLAN OF CARE
Problem: PHYSICAL THERAPY ADULT  Goal: Performs mobility at highest level of function for planned discharge setting  See evaluation for individualized goals  Description: Treatment/Interventions: Functional transfer training, LE strengthening/ROM, Therapeutic exercise, Endurance training, Patient/family training, Equipment eval/education, Bed mobility, Gait training, Compensatory technique education, Spoke to nursing, OT (balance training)  Equipment Recommended:  (pt already owns RW + W/C)       See flowsheet documentation for full assessment, interventions and recommendations  Note: Prognosis: Fair  Problem List: Decreased strength, Decreased endurance, Impaired balance, Decreased mobility, Decreased cognition, Impaired judgement, Decreased safety awareness  Assessment: Pt is 73 y o  male seen for a high complexity PT evaluation s/p admit to [facility] on 12/12/2022  Pt presenting after being found down at home, unclear if fall was related to syncope  Please see above for other active problem list / PMH  PT now consulted to assess functional mobility and needs for safe d/c planning  Prior to admission, pt required assistance for limited ambulation w/ RW (~15'), lives w/ son in an apt without stairs and has a home health aide 10 hours/day  Currently pt requires MinAx1 for bed skills; MinAx1 for functional transfers; ModAx1 for ambulation w/ RW  Pt presents w/ overall mobility deficits 2* to: decreased LE strength; generalized weakness/deconditioning; decreased endurance; impaired balance; gait deviations; dizziness; fatigue; impaired safety and judgement; limited insight into current deficits; bed/chair alarms; multiple lines  Pt currently at risk for falls   (Please find additional objective findings from PT assessment regarding body systems outlined above ) Pt will continue to benefit from skilled PT interventions to address stated impairments; to maximize functional potential; for ongoing pt/ family training; and DME needs  PT is currently recommending HHPT  Barriers to Discharge: None     PT Discharge Recommendation: Home with home health rehabilitation    See flowsheet documentation for full assessment

## 2022-12-13 NOTE — UTILIZATION REVIEW
OBSERVATION 12/12/22 @ [address] 12/13/22 @ 1510 DUE TO SYNCOPE VS FALL, ALTERED MENTAL STATUS, UTI  Initial Clinical Review    Admission: Date/Time/Statement:   Admission Orders (From admission, onward)     Ordered        12/13/22 1510  Inpatient Admission  Once                      Orders Placed This Encounter   Procedures   • Inpatient Admission     Standing Status:   Standing     Number of Occurrences:   1     Order Specific Question:   Level of Care     Answer:   Med Surg [16]     Order Specific Question:   Estimated length of stay     Answer:   More than 2 Midnights     Order Specific Question:   Certification     Answer:   I certify that inpatient services are medically necessary for this patient for a duration of greater than two midnights  See H&P and MD Progress Notes for additional information about the patient's course of treatment  ED Arrival Information     Expected   -    Arrival   12/12/2022 10:32    Acuity   Emergent            Means of arrival   Ambulance    Escorted by   [facility]    Service   Hospitalist    Admission type   Emergency            Arrival complaint   -           Chief Complaint   Patient presents with   • Fall     Unwitnessed fall on [place] last night  Initial Presentation: 73 y o  male to the ED from home via EMs with complaints of unwitness fall, unknown downtime  Baseline dementia worsening in the past week  Chornic back pain  H/O HTN, COPD, peptic ulcer s/p prior exlap for UGI bleed, schizophrenia, ETOH abuse, IVDU, CVA, BPH, Hepatitis C, and prior CVA   Sodium 132 on arrival   Last drug use was about 10 years ago, last etoh 2-3 years ago as per daughter and patient  Admitted to inpatient for syncope vs fall  Suspect in setting of volume depletion  Poor po intake in the past 3 days  Check UA, UDS, neuro checks  Started on iv fluids  Family recovering rom viral illness last week    Foul smelling urine so pcp started on bactrim of which he is almost complet  Date: 12/13    Day 2:    Continue with IV fluids, abx  SLeepy, but wakes up  ANswers simple questions  Appears more confused per family  GCS 14    Psychiatry:  Consider dc of trazodone and observe response  No SI at this time       ED Triage Vitals   Temperature Pulse Respirations Blood Pressure SpO2   12/12/22 1035 12/12/22 1035 12/12/22 1035 12/12/22 1035 12/12/22 1035   98 1 °F (36 7 °C) 92 18 122/76 94 %      Temp Source Heart Rate Source Patient Position - Orthostatic VS BP Location FiO2 (%)   12/12/22 1035 12/12/22 1035 12/12/22 1035 12/12/22 1035 --   Tympanic Monitor Lying Right arm       Pain Score       12/12/22 1947       8          Wt Readings from Last 1 Encounters:   12/14/22 58 6 kg (129 lb 3 oz)     Additional Vital Signs:   Time Temp Pulse Resp BP MAP (mmHg) SpO2 O2 Device Patient Position - Orthostatic VS   12/14/22 07:02:06 97 3 °F (36 3 °C) Abnormal  74 16 143/88 106 90 % -- --   12/13/22 21:46:06 97 7 °F (36 5 °C) 80 16 151/88 109 92 % -- --   12/13/22 2125 -- 82 -- 145/90 -- -- -- --   12/13/22 2040 -- -- -- -- -- -- None (Room air) --       Time Temp Pulse Resp BP MAP (mmHg) SpO2 O2 Device Patient Position - Orthostatic VS   12/13/22 07:07:55 97 °F (36 1 °C) Abnormal  79 16 166/90 115 87 % Abnormal  -- --   12/12/22 23:46:35 98 1 °F (36 7 °C) -- 16 162/90 114 -- -- Lying   12/12/22 19:47:20 97 7 °F (36 5 °C) 76 18 164/91 115 92 % -- --   12/12/22 17:24:19 97 9 °F (36 6 °C) 78 -- 168/92 117 92 % -- --   12/12/22 1530 -- -- -- -- -- -- None (Room air) --   12/12/22 15:14:54 97 7 °F (36 5 °C) 87 16 138/84 102 94 % -- --   12/12/22 1400 -- 82 -- 157/99 114 94 % None (Room air) Lying   12/12/22 1330 -- 83 -- 155/91 108 92 % None (Room air) Lying   12/12/22 1245 -- 85 -- 158/78 109 92 % None (Room air) Lying   12/12/22 1200 -- 84 19 156/78 -- 91 % None (Room air) Lying   12/12/22 1130 -- 83 19 156/83 -- 92 % None (Room air) Lying   12/12/22 1115 -- 81 19 166/73 -- 93 % None (Room air) Lying   12/12/22 1100 -- 79 20 158/81 -- 91 % None (Room air) Lying   12/12/22 1045 -- 87 18 122/76 -- 93 % None (Room air) Lying   12/12/22 10:40:26 -- 90 20 128/74 -- 94 % -- --   12/12/22 10:35:19 98 1 °F (36 7 °C) 92 18 122/76 -- 94 % -- Lying       Pertinent Labs/Diagnostic Test Results:   12/12 EKG: Normal sinus rhythm  Possible Left atrial enlargement  Borderline ECG  No previous ECGs available  MRI brain wo contrast   Final Result by Octavio Eagle MD (12/13 0123)      Incomplete exam due to patient claustrophobia  No evidence of acute infarct  Otherwise limited evaluation  Workstation performed: [id number]         CT head without contrast   Final Result by Minor Tomlinson MD (12/12 1101)      No acute intracranial abnormality  I personally discussed this study with Cynthia Leung on 12/12/2022 at 10:59 AM                Workstation performed: [id number]         CT spine cervical without contrast   Final Result by Minor Tomlinson MD (12/12 1100)      No cervical spine fracture or traumatic malalignment  I personally discussed this study with Cynthia Leung on 12/12/2022 at 10:59 AM                             Workstation performed: [id number]         XR trauma multiple   Final Result by Minor Tomlinson MD (12/12 1105)      Right costophrenic angle is not fully imaged  - Streaky opacity in left lower lobe, likely subsegmental atelectasis  - Emphysema  - Healed right rib fracture deformities  Workstation performed: [id number]         XR chest 1 view   Final Result by Minor Tomlinson MD (12/12 1114)      Right costophrenic angle is not fully imaged  - Streaky opacity in left lower lobe, likely subsegmental atelectasis  - Emphysema  - Healed right rib fracture deformities                 Workstation performed: [id number]               Results from last 7 days   Lab Units 12/14/22  0642 12/13/22  8869 12/12/22  1319 12/12/22  1044   WBC Thousand/uL 5 11 5 39 4 00*  --    HEMOGLOBIN g/dL 14 1 13 4 13 5  --    I STAT HEMOGLOBIN g/dl  --   --   --  13 3   HEMATOCRIT % 42 2 41 4 41 2  --    HEMATOCRIT, ISTAT %  --   --   --  39   PLATELETS Thousands/uL 229 226 240  --    NEUTROS ABS Thousands/µL  --  3 91 3 09  --          Results from last 7 days   Lab Units 12/14/22  0642 12/13/22  0507 12/12/22  1319 12/12/22  1044   SODIUM mmol/L 132* 132* 132*  --    POTASSIUM mmol/L 3 1* 3 5 3 9  --    CHLORIDE mmol/L 95* 97 97  --    CO2 mmol/L 30 26 29  --    CO2, I-STAT mmol/L  --   --   --  31   ANION GAP mmol/L 7 9 6  --    BUN mg/dL 21 14 11  --    CREATININE mg/dL 1 37* 0 97 1 07  --    EGFR ml/min/1 73sq m 52 79 70  --    CALCIUM mg/dL 9 0 9 0 9 4  --    CALCIUM, IONIZED, ISTAT mmol/L  --   --   --  0 98*   MAGNESIUM mg/dL  --   --  2 2  --    PHOSPHORUS mg/dL  --   --  3 1  --      Results from last 7 days   Lab Units 12/14/22  0642 12/12/22  1319   AST U/L 34 34   ALT U/L 8* 8*   ALK PHOS U/L 42* 49   TOTAL PROTEIN g/dL 7 2 7 7   ALBUMIN g/dL 2 9* 3 1*   TOTAL BILIRUBIN mg/dL 0 50 0 53   AMMONIA umol/L <10*  --      Results from last 7 days   Lab Units 12/12/22  1143   POC GLUCOSE mg/dl 119     Results from last 7 days   Lab Units 12/14/22  0642 12/13/22  0507 12/12/22  1319   GLUCOSE RANDOM mg/dL 86 76 112         Results from last 7 days   Lab Units 12/12/22  1044   PH, MESSI I-STAT  7 527*   PCO2, MESSI ISTAT mm HG 36 5*   PO2, MESSI ISTAT mm HG 25 0*   HCO3, MESSI ISTAT mmol/L 30 2*   I STAT BASE EXC mmol/L 7*   I STAT O2 SAT % 53*       Results from last 7 days   Lab Units 12/12/22  1319   TSH 3RD GENERATON uIU/mL 0 728         Results from last 7 days   Lab Units 12/12/22  1705   OSMO UR mmol/     Results from last 7 days   Lab Units 12/12/22  1705 12/12/22  1657   CLARITY UA   --  Clear   COLOR UA   --  Yellow   SPEC GRAV UA   --  1 010   PH UA   --  6 5   GLUCOSE UA mg/dl  --  Negative   KETONES UA mg/dl  -- Trace*   BLOOD UA   --  Trace*   PROTEIN UA mg/dl  --  Negative   NITRITE UA   --  Negative   BILIRUBIN UA   --  Negative   UROBILINOGEN UA (BE) mg/dl  --  2 0*   LEUKOCYTES UA   --  Negative   WBC UA /hpf  --  2-4*   RBC UA /hpf  --  2-4*   BACTERIA UA /hpf  --  None Seen   EPITHELIAL CELLS WET PREP /hpf  --  None Seen   SODIUM UR  60  --        Results from last 7 days   Lab Units 12/12/22  1206   AMPH/METH  Negative   BARBITURATE UR  Negative   BENZODIAZEPINE UR  Negative   COCAINE UR  Negative   METHADONE URINE  Negative   OPIATE UR  Negative   PCP UR  Negative   THC UR  Positive*         Admitting Diagnosis: Fall, initial encounter [W19  XXXA]  Unspecified multiple injuries, initial encounter [T07  XXXA]  Age/Sex: 73 y o  male  Admission Orders:  Scheduled Medications:  apixaban, 5 mg, Oral, BID  atorvastatin, 40 mg, Oral, Daily  cholecalciferol, 1,000 Units, Oral, Daily  DULoxetine, 60 mg, Oral, Daily  furosemide, 20 mg, Oral, Daily  gabapentin, 600 mg, Oral, 4x Daily  metoprolol tartrate, 12 5 mg, Oral, Q12H AZALIA  nicotine, 7 mg, Transdermal, Daily  pantoprazole, 40 mg, Oral, BID AC  predniSONE, 5 mg, Oral, Daily  QUEtiapine, 400 mg, Oral, HS  sulfamethoxazole-trimethoprim, 2 tablet, Oral, Q12H AZALIA  tamsulosin, 0 4 mg, Oral, Daily With Dinner  traZODone, 50 mg, Oral, HS      Continuous IV Infusions:     PRN Meds:  ondansetron, 4 mg, Intramuscular, Q4H PRN        IP CONSULT TO PSYCHIATRY    Network Utilization Review Department  ATTENTION: Please call with any questions or concerns to [phone] and carefully listen to the prompts so that you are directed to the right person  All voicemails are confidential   Amaris Ball all requests for admission clinical reviews, approved or denied determinations and any other requests to dedicated fax number below belonging to the campus where the patient is receiving treatment   List of dedicated fax numbers for the Facilities:  FACILITY NAME UR FAX NUMBER   ADMISSION DENIALS (Administrative/Medical Necessity) [phone]   [address] (Maternity/NICU/Pediatrics) Lily Gordon [address] Kaley Cox  [phone]   [address] [place] [id number] [place] [address] [address] [facility] [place] [address] [phone]

## 2022-12-13 NOTE — PHYSICAL THERAPY NOTE
Physical Therapy Evaluation    Patient's Name: Patt Cesar    Admitting Diagnosis  Fall, initial encounter [W19  XXXA]  Unspecified multiple injuries, initial encounter [T07  XXXA]    Problem List  Patient Active Problem List   Diagnosis    Syncope vs fall    Altered mental status    Dementia (HCC)    History of DVT (deep vein thrombosis)    History of GI bleed    Peptic ulcer disease    History of drug abuse ([facility] [address] )    Hypertension    Hyponatremia       Past Medical History  No past medical history on file  Past Surgical History  No past surgical history on file  12/13/22 1128   PT Last Visit   PT Visit Date 12/13/22   Note Type   Note type Evaluation   Pain Assessment   Pain Assessment Tool 0-10   Pain Score No Pain   Restrictions/Precautions   Weight Bearing Precautions Per Order No   Other Precautions Cognitive; Chair Alarm; Bed Alarm; Fall Risk   Home Living   Type of [address] [facility] One level   Bathroom Toilet Raised   [address] [facility] [address]; Wheelchair-manual;Hospital bed;Grab bars   Prior Function   Level of [place] Needs assistance with ADLs; Needs assistance with functional mobility; Needs assistance with IADLS  (Per CM at baseline pt walks ~15 with RW and assistance )   Lives With Son   Receives Help From Family;Home health  (home health aide 10 hours/day)   IADLs Family/Friend/Other provides transportation; Family/Friend/Other provides medication management; Family/Friend/Other provides meals   Falls in the last 6 months   (at least 1x leading to admission, pt unable to confirm # of falls)   Vocational Retired   [employer]   Additional Pertinent History Vitals assessed in supine (/103, HR 77), sitting (/98, HR 80), and standing (/98, HR 89)  RN aware     Family/Caregiver Present No   Cognition   Overall Cognitive Status Impaired   Attention Difficulty attending to directions   Orientation Level Oriented to person;Disoriented to place; Disoriented to time   Memory Decreased recall of precautions;Decreased recall of recent events;Decreased long term memory;Decreased short term memory   Following Commands Follows one step commands with increased time or repetition   Comments lethargic upon arrival, requires simple one-step commands + increased time for processing + response   Subjective   Subjective "Do I have to get OOB?"   RLE Assessment   RLE Assessment   (functionally at least 3/5)   LLE Assessment   LLE Assessment   (functionally at least 3/5)   Coordination   Movements are Fluid and Coordinated 1   Bed Mobility   Supine to Sit 4  Minimal assistance   Additional items Assist x 1; Increased time required;Verbal cues   Sit to Supine Unable to assess   Additional Comments Pt greeted in supine  Transfers   Sit to Stand 4  Minimal assistance   Additional items Assist x 1; Increased time required;Verbal cues  (elevated bed)   Stand to Sit 4  Minimal assistance   Additional items Assist x 1; Increased time required;Verbal cues   Additional Comments RW   Ambulation/Elevation   Gait pattern Excessively slow; Short stride; Foward flexed   Gait Assistance 3  Moderate assist   Additional items Assist x 1;Verbal cues; Tactile cues   Assistive Device Rolling walker   Distance 3' bed>chair   Balance   Static Sitting Fair -   Dynamic Sitting Poor +   Static Standing Poor +   Dynamic Standing Poor   Ambulatory Poor  (RW)   Endurance Deficit   Endurance Deficit Yes   Endurance Deficit Description lethargy, dizziness w/ sitting EOB   Activity Tolerance   Activity Tolerance Patient limited by fatigue  (cognition, dizziness)   Medical Staff Made Aware OT Cm Forbes Deputado [address] 136 yes - cleared + updated   Assessment   Prognosis Fair   Problem List Decreased strength;Decreased endurance; Impaired balance;Decreased mobility; Decreased cognition; Impaired judgement;Decreased safety awareness   Assessment Pt is 73 y o  male seen for a high complexity PT evaluation s/p admit to [facility] on 12/12/2022  Pt presenting after being found down at home, unclear if fall was related to syncope  Please see above for other active problem list / PMH  PT now consulted to assess functional mobility and needs for safe d/c planning  Prior to admission, pt required assistance for limited ambulation w/ RW (~15'), lives w/ son in an apt without stairs and has a home health aide 10 hours/day  Currently pt requires MinAx1 for bed skills; MinAx1 for functional transfers; ModAx1 for ambulation w/ RW  Pt presents w/ overall mobility deficits 2* to: decreased LE strength; generalized weakness/deconditioning; decreased endurance; impaired balance; gait deviations; dizziness; fatigue; impaired safety and judgement; limited insight into current deficits; bed/chair alarms; multiple lines  Pt currently at risk for falls  (Please find additional objective findings from PT assessment regarding body systems outlined above )     Pt will continue to benefit from skilled PT interventions to address stated impairments; to maximize functional potential; for ongoing pt/ family training; and DME needs  PT is currently recommending HHPT  Barriers to Discharge None   Goals   Patient Goals stay in bed   STG Expiration Date 12/27/22   Short Term Goal #1 In 14 days pt will complete: 1) Bed mobility skills with S to facilitate safe return to previous living environment  2) Functional transfers with CGA to facilitate safe return to previous living environment  3) Ambulation with RW 15' w/ CGA without LOB for safe ambulation in home/community environment  4) Improve balance scores by 1 grade to decrease fall risk  5) Improve LE strength grades by 1 to increase independence w/ all functional mobility, transfers and gait  6) PT for ongoing pt and family education; DME needs and D/C planning to promote highest level of function in least restrictive environment     PT Treatment Day 0   Plan Treatment/Interventions Functional transfer training;LE strengthening/ROM; Therapeutic exercise; Endurance training;Patient/family training;Equipment eval/education; Bed mobility;Gait training; Compensatory technique education;Spoke to nursing;OT  (balance training)   PT Frequency 1-2x/wk   Recommendation   PT Discharge Recommendation Home with home health rehabilitation   Equipment Recommended   (pt already owns RW + W/C)   AM-PAC Basic Mobility Inpatient   Turning in Bed Without Bedrails 3   Lying on Back to Sitting on Edge of Flat Bed 3   Moving Bed to Chair 2   Standing Up From Chair 3   Walk in Room 2   Climb 3-5 Stairs 1   Basic Mobility Inpatient Raw Score 14   Basic Mobility Standardized Score 35 55   Highest Level Of Mobility   JH-HLM Goal 4: Move to chair/commode   JH-HLM Achieved 4: Move to chair/commode   End of Consult   Patient Position at End of Consult Bedside chair;Bed/Chair alarm activated; All needs within reach  (on waffle cushion, in NAD)     Neal Mckeon, PT, DPT

## 2022-12-13 NOTE — PLAN OF CARE
Problem: PAIN - ADULT  Goal: Verbalizes/displays adequate comfort level or baseline comfort level  Description: Interventions:  - Encourage patient to monitor pain and request assistance  - Assess pain using appropriate pain scale  - Administer analgesics based on type and severity of pain and evaluate response  - Implement non-pharmacological measures as appropriate and evaluate response  - Consider cultural and social influences on pain and pain management  - Notify physician/advanced practitioner if interventions unsuccessful or patient reports new pain  Outcome: Progressing     Problem: INFECTION - ADULT  Goal: Absence or prevention of progression during hospitalization  Description: INTERVENTIONS:  - Assess and monitor for signs and symptoms of infection  - Monitor lab/diagnostic results  - Monitor all insertion sites, i e  indwelling lines, tubes, and drains  - Monitor endotracheal if appropriate and nasal secretions for changes in amount and color  - [place] appropriate cooling/warming therapies per order  - Administer medications as ordered  - Instruct and encourage patient and family to use good hand hygiene technique  - Identify and instruct in appropriate isolation precautions for identified infection/condition  Outcome: Progressing     Problem: DISCHARGE PLANNING  Goal: Discharge to home or other facility with appropriate resources  Description: INTERVENTIONS:  - Identify barriers to discharge w/patient and caregiver  - Arrange for needed discharge resources and transportation as appropriate  - Identify discharge learning needs (meds, wound care, etc )  - Arrange for interpretive services to assist at discharge as needed  - Refer to Case Management Department for coordinating discharge planning if the patient needs post-hospital services based on physician/advanced practitioner order or complex needs related to functional status, cognitive ability, or social support system  Outcome: Progressing Problem: Knowledge Deficit  Goal: Patient/family/caregiver demonstrates understanding of disease process, treatment plan, medications, and discharge instructions  Description: Complete learning assessment and assess knowledge base  Interventions:  - Provide teaching at level of understanding  - Provide teaching via preferred learning methods  Outcome: Progressing     Problem: Prexisting or High Potential for Compromised Skin Integrity  Goal: Skin integrity is maintained or improved  Description: INTERVENTIONS:  - Identify patients at risk for skin breakdown  - Assess and monitor skin integrity  - Assess and monitor nutrition and hydration status  - Monitor labs   - Assess for incontinence   - Turn and reposition patient  - Assist with mobility/ambulation  - Relieve pressure over bony prominences  - Avoid friction and shearing  - Provide appropriate hygiene as needed including keeping skin clean and dry  - Evaluate need for skin moisturizer/barrier cream  - Collaborate with interdisciplinary team   - Patient/family teaching  - Consider wound care consult   Outcome: Progressing     Problem: Nutrition/Hydration-ADULT  Goal: Nutrient/Hydration intake appropriate for improving, restoring or maintaining nutritional needs  Description: Monitor and assess patient's nutrition/hydration status for malnutrition  Collaborate with interdisciplinary team and initiate plan and interventions as ordered  Monitor patient's weight and dietary intake as ordered or per policy  Utilize nutrition screening tool and intervene as necessary  Determine patient's food preferences and provide high-protein, high-caloric foods as appropriate       INTERVENTIONS:  - Monitor oral intake, urinary output, labs, and treatment plans  - Assess nutrition and hydration status and recommend course of action  - Evaluate amount of meals eaten  - Assist patient with eating if necessary   - Allow adequate time for meals  - Recommend/ encourage appropriate diets, oral nutritional supplements, and vitamin/mineral supplements  - Order, calculate, and assess calorie counts as needed  - Recommend, monitor, and adjust tube feedings and TPN/PPN based on assessed needs  - Assess need for intravenous fluids  - Provide specific nutrition/hydration education as appropriate  - Include patient/family/caregiver in decisions related to nutrition  Outcome: Progressing     Problem: CARDIOVASCULAR - ADULT  Goal: Maintains optimal cardiac output and hemodynamic stability  Description: INTERVENTIONS:  - Monitor I/O, vital signs and rhythm  - Monitor for S/S and trends of decreased cardiac output  - Administer and titrate ordered vasoactive medications to optimize hemodynamic stability  - Assess quality of pulses, skin color and temperature  - Assess for signs of decreased coronary artery perfusion  - Instruct patient to report change in severity of symptoms  Outcome: Progressing  Goal: Absence of cardiac dysrhythmias or at baseline rhythm  Description: INTERVENTIONS:  - Continuous cardiac monitoring, vital signs, obtain 12 lead EKG if ordered  - Administer antiarrhythmic and heart rate control medications as ordered  - Monitor electrolytes and administer replacement therapy as ordered  Outcome: Progressing     Problem: GASTROINTESTINAL - ADULT  Goal: Maintains adequate nutritional intake  Description: INTERVENTIONS:  - Monitor percentage of each meal consumed  - Identify factors contributing to decreased intake, treat as appropriate  - Assist with meals as needed  - Monitor I&O, weight, and lab values if indicated  - Obtain nutrition services referral as needed  Outcome: Progressing     Problem: METABOLIC, FLUID AND ELECTROLYTES - ADULT  Goal: Electrolytes maintained within normal limits  Description: INTERVENTIONS:  - Monitor labs and assess patient for signs and symptoms of electrolyte imbalances  - Administer electrolyte replacement as ordered  - Monitor response to electrolyte replacements, including repeat lab results as appropriate  - Instruct patient on fluid and nutrition as appropriate  Outcome: Progressing

## 2022-12-13 NOTE — NURSING NOTE
When pt arrived on P8, his R wrist IV would not flush  After attempts by me and Olean Hodgkin, RN, pt refused to allow any further attempts  Pt also refused all meds, including meds for his stated nausea and assessed pain  Told that I wanted to help him by giving him something for his pain and/or nausea, pt responded, "No! Just leave me alone!" Provider aware pt is refusing IV access and all medications at this time

## 2022-12-13 NOTE — CASE MANAGEMENT
Case Management Assessment & Discharge Planning Note    Patient name Eugene Edgar  Location [address] 8[address]/PPHP 569-06 MRN [id number]  : 1950 Date 2022       Current Admission Date: 2022  Current Admission Diagnosis:Syncope vs fall   Patient Active Problem List    Diagnosis Date Noted   • Syncope vs fall 2022   • Altered mental status 2022   • Dementia ([facility] [address] ) 2022   • History of DVT (deep vein thrombosis) 2022   • History of GI bleed 2022   • Peptic ulcer disease 2022   • History of drug abuse ([facility] [address] ) 2022   • Hypertension 2022   • Hyponatremia 2022      LOS (days): 0  Geometric Mean LOS (GMLOS) (days):   Days to GMLOS:     OBJECTIVE:    Risk of Unplanned Readmission Score: 15 58         Current admission status: Inpatient       Preferred Pharmacy: No Pharmacies Listed  Primary Care Provider: Yuridia Prado MD    Primary Insurance: [facility]  Secondary Insurance:     ASSESSMENT:  Mehnaz Silverman Proxies    There are no active Health Care Proxies on file  Advance Directives  Does patient have a Health Care POA?: Yes  Primary Contact: Mary Martin (son)         Readmission Root Cause  30 Day Readmission: No    Patient Information  Admitted from[de-identified] Home  Mental Status: Confused  During Assessment patient was accompanied by: Not accompanied during assessment  Assessment information provided by[de-identified] Son  Primary Caregiver: Family  Caregiver's Name[de-identified] Soni Pa Relationship to Patient[de-identified] Family Member  Caregiver's Telephone Number[de-identified] [phone]  Support Systems: Son, Private Caregivers, Family members  [place] of Residence: [address] do you live in?: [address] entry access options   Select all that apply : Stairs  Number of steps to enter home : 1  Type of Current Residence: 3 story home  Upon entering residence, is there a bedroom on the main floor (no further steps)?: Yes  Upon entering residence, is there a bathroom on the main floor (no further steps)?: Yes  In the last 12 months, was there a time when you were not able to pay the mortgage or rent on time?: No  In the last 12 months, how many places have you lived?: 1  In the last 12 months, was there a time when you did not have a steady place to sleep or slept in a shelter (including now)?: No  Homeless/housing insecurity resource given?: N/A  Living Arrangements: Lives w/ Son    Activities of Daily Living Prior to Admission  Functional Status: Assistance  Completes ADLs independently?: No  Level of ADL dependence: Assistance  Ambulates independently?: No  Level of ambulatory dependence: Assistance  Does patient use assisted devices?: Yes  Assisted Devices (DME) used: Crumpler Silk, Wheelchair, Bedside Commode, Hospital Bed  Does patient currently own DME?: Yes  What DME does the patient currently own?: Hospital Bed, Walker, Wheelchair, Bedside Commode  Does the patient have a history of Short-Term Rehab?: Yes  Does patient have a history of HHC?: Yes  Does patient currently have [facility]?: Yes    Current Home Health Care  Type of Current Home Care Services: Nurse visit, Home PT  [address][de-identified] Other (please enter name in comment) ([facility])  [facility] Provider[de-identified] PCP    Patient Information Continued  Income Source: Unemployed  Does patient have prescription coverage?: Yes  Within the past 12 months, you worried that your food would run out before you got the money to buy more : Never true  Within the past 12 months, the food you bought just didn't last and you didn't have money to get more : Never true  Food insecurity resource given?: N/A  Does patient receive dialysis treatments?: No  Does patient have a history of substance abuse?: No, Yes  Historical substance use preference: Alcohol/ETOH, Other ("Everything" per pt's son )  Is patient currently in treatment for substance abuse?: N/A - sober  Does patient have a history of Mental Health Diagnosis?: Yes  Has patient received inpatient treatment related to mental health in the last 2 years?: No         Means of Transportation  Means of Transport to Appts[de-identified] Family transport  In the past 12 months, has lack of transportation kept you from medical appointments or from getting medications?: No  In the past 12 months, has lack of transportation kept you from meetings, work, or from getting things needed for daily living?: No  Was application for public transport provided?: N/A        DISCHARGE DETAILS:    Discharge planning discussed with[de-identified] Pt's son  Freedom of Choice: Yes  Comments - Freedom of Choice: Pt's son and [place], stated he will not consider STR under any circumstances    CM contacted family/caregiver?: Yes  Were Treatment Team discharge recommendations reviewed with patient/caregiver?: Yes  Did patient/caregiver verbalize understanding of patient care needs?: Yes  Were patient/caregiver advised of the risks associated with not following Treatment Team discharge recommendations?: Yes    Contacts  Patient Contacts: Shahid Hernandez  Relationship to Patient[de-identified] Family  Contact Method: Phone  Phone Number: [phone]  Reason/Outcome: Continuity of Care, Emergency Contact, Discharge Planning

## 2022-12-14 LAB
ALBUMIN SERPL BCP-MCNC: 2.9 G/DL (ref 3.5–5)
ALP SERPL-CCNC: 42 U/L (ref 46–116)
ALT SERPL W P-5'-P-CCNC: 8 U/L (ref 12–78)
AMMONIA PLAS-SCNC: <10 UMOL/L (ref 11–35)
ANION GAP SERPL CALCULATED.3IONS-SCNC: 7 MMOL/L (ref 4–13)
AST SERPL W P-5'-P-CCNC: 34 U/L (ref 5–45)
BILIRUB SERPL-MCNC: 0.5 MG/DL (ref 0.2–1)
BUN SERPL-MCNC: 21 MG/DL (ref 5–25)
CALCIUM ALBUM COR SERPL-MCNC: 9.9 MG/DL (ref 8.3–10.1)
CALCIUM SERPL-MCNC: 9 MG/DL (ref 8.3–10.1)
CHLORIDE SERPL-SCNC: 95 MMOL/L (ref 96–108)
CO2 SERPL-SCNC: 30 MMOL/L (ref 21–32)
CREAT SERPL-MCNC: 1.37 MG/DL (ref 0.6–1.3)
ERYTHROCYTE [DISTWIDTH] IN BLOOD BY AUTOMATED COUNT: 14.6 % (ref 11.6–15.1)
GFR SERPL CREATININE-BSD FRML MDRD: 52 ML/MIN/1.73SQ M
GLUCOSE SERPL-MCNC: 86 MG/DL (ref 65–140)
HCT VFR BLD AUTO: 42.2 % (ref 36.5–49.3)
HGB BLD-MCNC: 14.1 G/DL (ref 12–17)
MCH RBC QN AUTO: 31.1 PG (ref 26.8–34.3)
MCHC RBC AUTO-ENTMCNC: 33.4 G/DL (ref 31.4–37.4)
MCV RBC AUTO: 93 FL (ref 82–98)
PLATELET # BLD AUTO: 229 THOUSANDS/UL (ref 149–390)
PMV BLD AUTO: 9 FL (ref 8.9–12.7)
POTASSIUM SERPL-SCNC: 3.1 MMOL/L (ref 3.5–5.3)
PROT SERPL-MCNC: 7.2 G/DL (ref 6.4–8.4)
RBC # BLD AUTO: 4.54 MILLION/UL (ref 3.88–5.62)
SODIUM SERPL-SCNC: 132 MMOL/L (ref 135–147)
WBC # BLD AUTO: 5.11 THOUSAND/UL (ref 4.31–10.16)

## 2022-12-14 RX ORDER — POTASSIUM CHLORIDE 20 MEQ/1
40 TABLET, EXTENDED RELEASE ORAL ONCE
Status: COMPLETED | OUTPATIENT
Start: 2022-12-14 | End: 2022-12-14

## 2022-12-14 RX ADMIN — FUROSEMIDE 20 MG: 20 TABLET ORAL at 08:49

## 2022-12-14 RX ADMIN — QUETIAPINE FUMARATE 400 MG: 300 TABLET ORAL at 21:43

## 2022-12-14 RX ADMIN — Medication 12.5 MG: at 21:43

## 2022-12-14 RX ADMIN — APIXABAN 5 MG: 5 TABLET, FILM COATED ORAL at 17:00

## 2022-12-14 RX ADMIN — PREDNISONE 5 MG: 5 TABLET ORAL at 08:49

## 2022-12-14 RX ADMIN — TAMSULOSIN HYDROCHLORIDE 0.4 MG: 0.4 CAPSULE ORAL at 15:47

## 2022-12-14 RX ADMIN — POTASSIUM CHLORIDE 40 MEQ: 1500 TABLET, EXTENDED RELEASE ORAL at 21:43

## 2022-12-14 RX ADMIN — GABAPENTIN 400 MG: 400 CAPSULE ORAL at 21:43

## 2022-12-14 RX ADMIN — Medication 1000 UNITS: at 08:49

## 2022-12-14 RX ADMIN — APIXABAN 5 MG: 5 TABLET, FILM COATED ORAL at 08:49

## 2022-12-14 RX ADMIN — GABAPENTIN 400 MG: 400 CAPSULE ORAL at 08:49

## 2022-12-14 RX ADMIN — GABAPENTIN 400 MG: 400 CAPSULE ORAL at 15:46

## 2022-12-14 RX ADMIN — DULOXETINE HYDROCHLORIDE 60 MG: 60 CAPSULE, DELAYED RELEASE ORAL at 08:49

## 2022-12-14 RX ADMIN — Medication 12.5 MG: at 08:49

## 2022-12-14 RX ADMIN — PANTOPRAZOLE SODIUM 40 MG: 40 TABLET, DELAYED RELEASE ORAL at 06:01

## 2022-12-14 RX ADMIN — NICOTINE 7 MG: 7 PATCH, EXTENDED RELEASE TRANSDERMAL at 08:50

## 2022-12-14 RX ADMIN — PANTOPRAZOLE SODIUM 40 MG: 40 TABLET, DELAYED RELEASE ORAL at 15:47

## 2022-12-14 RX ADMIN — ATORVASTATIN CALCIUM 40 MG: 40 TABLET, FILM COATED ORAL at 08:49

## 2022-12-14 NOTE — TELEMEDICINE
TeleConsultation - [address] 73 y o  male MRN: [id number]  Unit/Bed#: [facility] 803-01 Encounter: [id number]        REQUIRED DOCUMENTATION:     1  This service was provided via Telemedicine  2  Provider located at [place]  3  TeleMed provider: Emma Easton MD   4  Identify all parties in room with patient during tele consult:  Patient  5  Patient was then informed that this was a Telemedicine visit and that the exam was being conducted confidentially over secure lines  My office door was closed  No one else was in the room  Patient acknowledged consent and understanding of privacy and security of the Telemedicine visit, and gave us permission to have the assistant stay in the room in order to assist with the history and to conduct the exam   I informed the patient that I have reviewed their record in Epic and presented the opportunity for them to ask any questions regarding the visit today  The patient agreed to participate  Assessment/Plan     Principal Problem:    Syncope vs fall  Active Problems:    Altered mental status    Dementia (HCC)    History of DVT (deep vein thrombosis)    History of GI bleed    Peptic ulcer disease    History of drug abuse ([facility] [address] )    Hypertension    Hyponatremia    Assessment:    History of unspecified dementia with likely superimposed encephalopathy/delirium likely multifactorial in nature    Treatment Plan:    Consider trial discontinuation of trazodone and observing response before further psychotropic medication adjustment  Reconsult psychiatry as needed  No suicide precautions appear indicated at this time        Current Medications:     Current Facility-Administered Medications   Medication Dose Route Frequency Provider Last Rate   • apixaban  5 mg Oral BID Mary Granados MD     • atorvastatin  40 mg Oral Daily Mary Granados MD     • cholecalciferol  1,000 Units Oral Daily Mary Granados MD     • DULoxetine  60 mg Oral Daily Mary Granados MD • furosemide  20 mg Oral Daily Aileen Cherry MD     • gabapentin  400 mg Oral 4x Daily Zo Dunlap MD     • metoprolol tartrate  12 5 mg Oral Q12H [address] Aileen Cherry MD     • nicotine  7 mg Transdermal Daily Caryl Trotter PA-C     • ondansetron  4 mg Intramuscular Q4H PRN Jolly Livingston PA-C     • pantoprazole  40 mg Oral BID AC Aileen Cherry MD     • predniSONE  5 mg Oral Daily Aileen Cherry MD     • QUEtiapine  400 mg Oral HS Alieen Cherry MD     • tamsulosin  0 4 mg Oral Daily With Sergey Kern MD     • traZODone  50 mg Oral HS Aileen Cherry MD         Risks / Benefits of Treatment:    Risks, benefits, and possible side effects of medications explained to patient and patient verbalizes understanding  Inpatient consult to Psychiatry  Consult performed by: Moe Batista MD  Consult ordered by: Zo Dunlap MD        Physician Requesting Consult: Kelly Scherer MD  Principal Problem:Syncope    Reason for Consult: History of dementia on high-dose Seroquel      History of Present Illness      Patient is a 73 y o  male who presented to the hospital with a provider document the following: "Sana Martin is a 73 y o  male with PMHx HTN, COPD, peptic ulcer s/p prior exlap for UGI bleed, schizophrenia, ETOH abuse, IVDU, CVA, BPH, Hepatitis C, and prior CVA who presents s/p unwitnessed fall on [address]  Patient arrived via EMS with unknown down time  Unable to recall specific events surrounding fall including unknown LOC or head strike  Per EMS, patient with baseline dementia and progressive acute worsening in the past week  Chronic neck and back pain, however nontender on exam and nonfocal  CTH and cspine negative for acute traumatic injuries  FAST negative  IStat c/f hyponatremia 128, alkalosis pH 7 5, and hyperkalemia 5 7  "    The patient serves as a poor historian    Nursing reports that he is has been alert and oriented to person, place and time but disoriented to situation  Nursing reports they understand this is baseline for the patient  No behavioral issues have been observed per nursing  Today he has been wanting to sleep but he did participate in physical therapy today  Mental status examination: The patient was alert and oriented to person and birthdate and was able to tell me it was December 2020  When asked where he was located he stated "I do not know where I am"  He was disoriented to situation as well  He did report feeling somewhat sad and depressed perhaps secondary to realizing he did not know where he was  He responded to reassurance  He denies suicidal homicidal ideation  When asked about hallucinations he reported "things popped out of the shadows" but remained very vague  Memory is impaired  Thought process was logical and linear  Insight and judgment are impaired at this time  No past medical history on file  Medical Review Of Systems:    Review of Systems    Meds/Allergies     all current active meds have been reviewed  No Known Allergies    Objective     Vital signs in last 24 hours:  Temp:  [97 3 °F (36 3 °C)-98 6 °F (37 °C)] 98 6 °F (37 °C)  HR:  [74-82] 74  Resp:  [16-20] 20  BP: (136-157)/(80-94) 136/80      Intake/Output Summary (Last 24 hours) at 12/14/2022 1542  Last data filed at 12/14/2022 3919  Gross per 24 hour   Intake --   Output 125 ml   Net -125 ml           Lab Results: I have personally reviewed all pertinent laboratory/tests results  Imaging Studies: CT head without contrast    Result Date: 12/12/2022  Narrative: CT BRAIN - WITHOUT CONTRAST INDICATION:   fall on elaquis  COMPARISON:  Same day CT cervical spine without contrast  TECHNIQUE:  CT examination of the brain was performed  In addition to axial images, sagittal and coronal 2D reformatted images were created and submitted for interpretation  Radiation dose length product (DLP) for this visit:  946 7 mGy-cm     This examination, like all CT scans performed in the [facility], was performed utilizing techniques to minimize radiation dose exposure, including the use of iterative reconstruction and automated exposure control  IMAGE QUALITY:  Diagnostic  FINDINGS: PARENCHYMA: Decreased attenuation is noted in periventricular and subcortical white matter demonstrating an appearance that is statistically most likely to represent moderate microangiopathic change  No CT signs of acute infarction  No intracranial mass, mass effect or midline shift  No acute parenchymal hemorrhage  Arterial calcifications of carotid siphons and bilateral intradural vertebral arteries  VENTRICLES AND EXTRA-AXIAL SPACES:  Normal for the patient's age  VISUALIZED ORBITS AND PARANASAL SINUSES:  Normal visualized orbits  Normal visualized paranasal sinuses  CALVARIUM AND EXTRACRANIAL SOFT TISSUES:  Normal  OTHER: Healed postraumatic deformity of left mandibular condylar neck and head  Impression: No acute intracranial abnormality  I personally discussed this study with Chapin Chaney on 12/12/2022 at 10:59 AM  Workstation performed: [id number]     CT spine cervical without contrast    Result Date: 12/12/2022  Narrative: CT CERVICAL SPINE - WITHOUT CONTRAST INDICATION:   fall on thinners  COMPARISON:  Same day CT head without contrast  TECHNIQUE:  CT examination of the cervical spine was performed without intravenous contrast   Contiguous axial images were obtained  Sagittal and coronal reconstructions were performed  Radiation dose length product (DLP) for this visit:  380 18 mGy-cm   This examination, like all CT scans performed in the [facility], was performed utilizing techniques to minimize radiation dose exposure, including the use of iterative  reconstruction and automated exposure control  IMAGE QUALITY:  Diagnostic  FINDINGS: ALIGNMENT:  Normal alignment of the cervical spine  No subluxation  VERTEBRAL BODIES:  No fracture  DEGENERATIVE CHANGES:  Moderate multilevel cervical degenerative changes are noted  No critical central canal stenosis  PREVERTEBRAL AND PARASPINAL SOFT TISSUES: Unremarkable THORACIC INLET:  Partially imaged emphysema  Impression: No cervical spine fracture or traumatic malalignment  I personally discussed this study with Aicha Jimenez on 12/12/2022 at 10:59 AM   Workstation performed: [id number]     MRI brain wo contrast    Result Date: 12/13/2022  Narrative: MRI BRAIN WITHOUT CONTRAST INDICATION: Altered mental status  COMPARISON:   None  TECHNIQUE: Sagittal T1 weighted images and axial diffusion weighted images were obtained  Exam could not be completed due to patient claustrophobia IMAGE QUALITY:  Diagnostic  FINDINGS: BRAIN PARENCHYMA: No evidence of restricted diffusion  Images limited by patient motion artifact  VENTRICLES:  Enlargement of the ventricles and sulci likely reflecting chronic moderate loss  No hydrocephalus or extra-axial collection  SELLA AND PITUITARY GLAND:  No sellar enlargement  ORBITS:  Limited evaluation  PARANASAL SINUSES:  Limited evaluation  VASCULATURE:  Not evaluated  CALVARIUM AND SKULL BASE:  Limited evaluation  EXTRACRANIAL SOFT TISSUES:  Limited evaluation  Impression: Incomplete exam due to patient claustrophobia  No evidence of acute infarct  Otherwise limited evaluation  Workstation performed: [id number]     XR chest 1 view    Result Date: 12/12/2022  Narrative: TRAUMA SERIES INDICATION:  fall on thinners  COMPARISON:  Same day CT head and CT cervical spine without contrast  VIEWS:  XR TRAUMA MULTIPLE Images: 2  FINDINGS: CHEST: Supine frontal view of the chest is obtained  Right costophrenic angle was not fully imaged  Cardiomediastinal silhouette is within normal limits accounting for technique and patient positioning  Emphysema  No focal consolidation  Streaky opacity in left lower lobe, likely subsegmental atelectasis   No layering pleural effusions detected  No pneumothorax is seen on this supine film  Upright images are more sensitive to detect anterior pneumothoraces if relevant  Healed right rib fracture deformities  Impression: Right costophrenic angle is not fully imaged  - Streaky opacity in left lower lobe, likely subsegmental atelectasis  - Emphysema  - Healed right rib fracture deformities  Workstation performed: [id number]     XR trauma multiple    Result Date: 12/12/2022  Narrative: TRAUMA SERIES INDICATION:  fall on thinners  COMPARISON:  Same day CT head and CT cervical spine without contrast  VIEWS:  XR TRAUMA MULTIPLE Images: 2  FINDINGS: CHEST: Supine frontal view of the chest is obtained  Right costophrenic angle was not fully imaged  Cardiomediastinal silhouette is within normal limits accounting for technique and patient positioning  Emphysema  No focal consolidation  Streaky opacity in left lower lobe, likely subsegmental atelectasis  No layering pleural effusions detected  No pneumothorax is seen on this supine film  Upright images are more sensitive to detect anterior pneumothoraces if relevant  Healed right rib fracture deformities  Impression: Right costophrenic angle is not fully imaged  - Streaky opacity in left lower lobe, likely subsegmental atelectasis  - Emphysema  - Healed right rib fracture deformities  Workstation performed: [id number]     [address] [facility] bedside procedure    Result Date: 12/12/2022  Narrative: 1 2 [id number] [id number]  3 [id number]  4 [id number]    EKG/Pathology/Other Studies:   Lab Results   Component Value Date    VENTRATE 83 12/12/2022    ATRIALRATE 83 12/12/2022    PRINT 154 12/12/2022    QRSDINT 82 12/12/2022    QTINT 386 12/12/2022    QTCINT 453 12/12/2022    PAXIS 79 12/12/2022    QRSAXIS 68 12/12/2022    TWAVEAXIS 72 12/12/2022        Code Status: Level 3 - DNAR and DNI  Advance Directive and Living Will:      Power of :    POLST:      Counseling / Coordination of Care:     Total floor / unit time spent today 30 minutes  Greater than 50% of total time was spent with the patient and / or family counseling and / or coordination of care  A description of the counseling / coordination of care: Chart review, patient evaluation, coordination and communication with staff, nursing and provider

## 2022-12-14 NOTE — NURSING NOTE
Asked patient if he was okay with getting an IV at first patient was agreeable  When looking for a site patient asked if it can wait until later  Will relay message to nurse after me to see if patient would be more agreeable later on in the morning

## 2022-12-14 NOTE — PLAN OF CARE
Problem: PAIN - ADULT  Goal: Verbalizes/displays adequate comfort level or baseline comfort level  Description: Interventions:  - Encourage patient to monitor pain and request assistance  - Assess pain using appropriate pain scale  - Administer analgesics based on type and severity of pain and evaluate response  - Implement non-pharmacological measures as appropriate and evaluate response  - Consider cultural and social influences on pain and pain management  - Notify physician/advanced practitioner if interventions unsuccessful or patient reports new pain  Outcome: Progressing     Problem: INFECTION - ADULT  Goal: Absence or prevention of progression during hospitalization  Description: INTERVENTIONS:  - Assess and monitor for signs and symptoms of infection  - Monitor lab/diagnostic results  - Monitor all insertion sites, i e  indwelling lines, tubes, and drains  - Monitor endotracheal if appropriate and nasal secretions for changes in amount and color  - [place] appropriate cooling/warming therapies per order  - Administer medications as ordered  - Instruct and encourage patient and family to use good hand hygiene technique  - Identify and instruct in appropriate isolation precautions for identified infection/condition  Outcome: Progressing     Problem: DISCHARGE PLANNING  Goal: Discharge to home or other facility with appropriate resources  Description: INTERVENTIONS:  - Identify barriers to discharge w/patient and caregiver  - Arrange for needed discharge resources and transportation as appropriate  - Identify discharge learning needs (meds, wound care, etc )  - Arrange for interpretive services to assist at discharge as needed  - Refer to Case Management Department for coordinating discharge planning if the patient needs post-hospital services based on physician/advanced practitioner order or complex needs related to functional status, cognitive ability, or social support system  Outcome: Progressing Problem: Knowledge Deficit  Goal: Patient/family/caregiver demonstrates understanding of disease process, treatment plan, medications, and discharge instructions  Description: Complete learning assessment and assess knowledge base  Interventions:  - Provide teaching at level of understanding  - Provide teaching via preferred learning methods  Outcome: Progressing     Problem: Prexisting or High Potential for Compromised Skin Integrity  Goal: Skin integrity is maintained or improved  Description: INTERVENTIONS:  - Identify patients at risk for skin breakdown  - Assess and monitor skin integrity  - Assess and monitor nutrition and hydration status  - Monitor labs   - Assess for incontinence   - Turn and reposition patient  - Assist with mobility/ambulation  - Relieve pressure over bony prominences  - Avoid friction and shearing  - Provide appropriate hygiene as needed including keeping skin clean and dry  - Evaluate need for skin moisturizer/barrier cream  - Collaborate with interdisciplinary team   - Patient/family teaching  - Consider wound care consult   Outcome: Progressing     Problem: Nutrition/Hydration-ADULT  Goal: Nutrient/Hydration intake appropriate for improving, restoring or maintaining nutritional needs  Description: Monitor and assess patient's nutrition/hydration status for malnutrition  Collaborate with interdisciplinary team and initiate plan and interventions as ordered  Monitor patient's weight and dietary intake as ordered or per policy  Utilize nutrition screening tool and intervene as necessary  Determine patient's food preferences and provide high-protein, high-caloric foods as appropriate       INTERVENTIONS:  - Monitor oral intake, urinary output, labs, and treatment plans  - Assess nutrition and hydration status and recommend course of action  - Evaluate amount of meals eaten  - Assist patient with eating if necessary   - Allow adequate time for meals  - Recommend/ encourage appropriate diets, oral nutritional supplements, and vitamin/mineral supplements  - Order, calculate, and assess calorie counts as needed  - Recommend, monitor, and adjust tube feedings and TPN/PPN based on assessed needs  - Assess need for intravenous fluids  - Provide specific nutrition/hydration education as appropriate  - Include patient/family/caregiver in decisions related to nutrition  Outcome: Progressing     Problem: CARDIOVASCULAR - ADULT  Goal: Maintains optimal cardiac output and hemodynamic stability  Description: INTERVENTIONS:  - Monitor I/O, vital signs and rhythm  - Monitor for S/S and trends of decreased cardiac output  - Administer and titrate ordered vasoactive medications to optimize hemodynamic stability  - Assess quality of pulses, skin color and temperature  - Assess for signs of decreased coronary artery perfusion  - Instruct patient to report change in severity of symptoms  Outcome: Progressing  Goal: Absence of cardiac dysrhythmias or at baseline rhythm  Description: INTERVENTIONS:  - Continuous cardiac monitoring, vital signs, obtain 12 lead EKG if ordered  - Administer antiarrhythmic and heart rate control medications as ordered  - Monitor electrolytes and administer replacement therapy as ordered  Outcome: Progressing     Problem: GASTROINTESTINAL - ADULT  Goal: Maintains adequate nutritional intake  Description: INTERVENTIONS:  - Monitor percentage of each meal consumed  - Identify factors contributing to decreased intake, treat as appropriate  - Assist with meals as needed  - Monitor I&O, weight, and lab values if indicated  - Obtain nutrition services referral as needed  Outcome: Progressing     Problem: METABOLIC, FLUID AND ELECTROLYTES - ADULT  Goal: Electrolytes maintained within normal limits  Description: INTERVENTIONS:  - Monitor labs and assess patient for signs and symptoms of electrolyte imbalances  - Administer electrolyte replacement as ordered  - Monitor response to electrolyte replacements, including repeat lab results as appropriate  - Instruct patient on fluid and nutrition as appropriate  Outcome: Progressing

## 2022-12-14 NOTE — PROGRESS NOTES
Pastoral Care Progress Note    2022  Patient: Anneliese Reddy : 1950  Admission Date & Time: 2022 1032  MRN: [id number] CSN: [id number]        Intro'd self/spiritual care to Pt, who said thanks, but I'm OK

## 2022-12-14 NOTE — RESTORATIVE TECHNICIAN NOTE
Restorative Technician Note      Patient Name: Sonia Tapia     Restorative Tech Visit Date: 12/14/22  Note Type: Mobility  Patient Position Upon Consult: Supine  Activity Performed: Ambulated; Transferred; Stood; Dangled (Attempted to ambulate, however patient stated complaints of dizziness with positional changes (no change in vitals though - all WNLs)  Unable to ambulate out of room due to dizziness )  Assistive Device: Standard walker; Other (Comment) (2nd person for chair follow)  Education Provided: Yes  Patient Position at End of Consult: Bedside chair;  All needs within reach; Bed/Chair alarm activated    Radha Rowan  DPT, Restorative Technician

## 2022-12-15 LAB
ANION GAP SERPL CALCULATED.3IONS-SCNC: 6 MMOL/L (ref 4–13)
BUN SERPL-MCNC: 19 MG/DL (ref 5–25)
CALCIUM SERPL-MCNC: 8.8 MG/DL (ref 8.3–10.1)
CHLORIDE SERPL-SCNC: 97 MMOL/L (ref 96–108)
CO2 SERPL-SCNC: 28 MMOL/L (ref 21–32)
CREAT SERPL-MCNC: 1.05 MG/DL (ref 0.6–1.3)
ERYTHROCYTE [DISTWIDTH] IN BLOOD BY AUTOMATED COUNT: 14.7 % (ref 11.6–15.1)
GFR SERPL CREATININE-BSD FRML MDRD: 72 ML/MIN/1.73SQ M
GLUCOSE SERPL-MCNC: 92 MG/DL (ref 65–140)
HCT VFR BLD AUTO: 36.1 % (ref 36.5–49.3)
HGB BLD-MCNC: 12 G/DL (ref 12–17)
MCH RBC QN AUTO: 30.7 PG (ref 26.8–34.3)
MCHC RBC AUTO-ENTMCNC: 33.2 G/DL (ref 31.4–37.4)
MCV RBC AUTO: 92 FL (ref 82–98)
PLATELET # BLD AUTO: 220 THOUSANDS/UL (ref 149–390)
PMV BLD AUTO: 9.4 FL (ref 8.9–12.7)
POTASSIUM SERPL-SCNC: 3.4 MMOL/L (ref 3.5–5.3)
RBC # BLD AUTO: 3.91 MILLION/UL (ref 3.88–5.62)
SODIUM SERPL-SCNC: 131 MMOL/L (ref 135–147)
WBC # BLD AUTO: 3.63 THOUSAND/UL (ref 4.31–10.16)

## 2022-12-15 RX ORDER — POTASSIUM CHLORIDE 20 MEQ/1
40 TABLET, EXTENDED RELEASE ORAL ONCE
Status: COMPLETED | OUTPATIENT
Start: 2022-12-15 | End: 2022-12-15

## 2022-12-15 RX ORDER — SODIUM CHLORIDE 1000 MG
1 TABLET, SOLUBLE MISCELLANEOUS
Status: DISCONTINUED | OUTPATIENT
Start: 2022-12-15 | End: 2022-12-16

## 2022-12-15 RX ADMIN — APIXABAN 5 MG: 5 TABLET, FILM COATED ORAL at 08:57

## 2022-12-15 RX ADMIN — QUETIAPINE FUMARATE 400 MG: 300 TABLET ORAL at 22:04

## 2022-12-15 RX ADMIN — PREDNISONE 5 MG: 5 TABLET ORAL at 08:58

## 2022-12-15 RX ADMIN — PANTOPRAZOLE SODIUM 40 MG: 40 TABLET, DELAYED RELEASE ORAL at 17:42

## 2022-12-15 RX ADMIN — Medication 12.5 MG: at 08:58

## 2022-12-15 RX ADMIN — Medication 1000 UNITS: at 08:58

## 2022-12-15 RX ADMIN — GABAPENTIN 400 MG: 400 CAPSULE ORAL at 17:42

## 2022-12-15 RX ADMIN — GABAPENTIN 400 MG: 400 CAPSULE ORAL at 08:58

## 2022-12-15 RX ADMIN — SODIUM CHLORIDE TAB 1 GM 1 G: 1 TAB at 17:41

## 2022-12-15 RX ADMIN — APIXABAN 5 MG: 5 TABLET, FILM COATED ORAL at 17:42

## 2022-12-15 RX ADMIN — NICOTINE 7 MG: 7 PATCH, EXTENDED RELEASE TRANSDERMAL at 08:59

## 2022-12-15 RX ADMIN — ATORVASTATIN CALCIUM 40 MG: 40 TABLET, FILM COATED ORAL at 08:57

## 2022-12-15 RX ADMIN — GABAPENTIN 400 MG: 400 CAPSULE ORAL at 12:34

## 2022-12-15 RX ADMIN — PANTOPRAZOLE SODIUM 40 MG: 40 TABLET, DELAYED RELEASE ORAL at 06:12

## 2022-12-15 RX ADMIN — Medication 12.5 MG: at 22:04

## 2022-12-15 RX ADMIN — DULOXETINE HYDROCHLORIDE 60 MG: 60 CAPSULE, DELAYED RELEASE ORAL at 08:57

## 2022-12-15 RX ADMIN — POTASSIUM CHLORIDE 40 MEQ: 1500 TABLET, EXTENDED RELEASE ORAL at 10:33

## 2022-12-15 RX ADMIN — TAMSULOSIN HYDROCHLORIDE 0.4 MG: 0.4 CAPSULE ORAL at 17:41

## 2022-12-15 RX ADMIN — GABAPENTIN 400 MG: 400 CAPSULE ORAL at 22:04

## 2022-12-15 NOTE — PROGRESS NOTES
[address] [facility]  Progress Note - Champ Colindres 1950, 73 y o  male MRN: [id number]  [facility] Encounter: [id number]  Primary Care Provider: Tawanna Garcia MD   Date and time admitted to hospital: 12/12/2022 10:32 AM    * Syncope vs fall  Assessment & Plan  · Unclear if he fell or had a syncopal event  · Patient does have cognitive impairment at baseline  · Was found by the side of the bed by daughter and care giver  · He was somewhat altered than his baseline per daughter but was able to recognize her and did tno have "stroke like symptoms"  · Was prescribed abx by PCP for presumed UTI (day 3/3)  · CT C spine: No cervical spine fracture or traumatic malalignment  · CT head: no acute intracranial abnormalities  · CXR: Streaky opacity in left lower lobe, likely subsegmental atelectasis  Emphysema  Healed right rib fracture deformities  · Na 132  · Last drug use a decade ago and last drink 2-3 years back per daughter and patient  · Suspect in setting of volume depletion as patient was recoring from viral illness and did not eat or drink well for last 3 days  PLAN:  · UA reviewed  · Check UDS and serum drug screen-positive for THC  · Neuro checks  · PT OT eval  · Complete bactrim course for presumed UTI  · MRI of the brain was not optimal study due to claustrophobia however no major acute infarct noted      Hyponatremia  Assessment & Plan  Mild  Likely in setting of volume depltion  Hold diuretics-patient refused IV fluids discussed with nursing about encouraging p o  intake  Salt tablets    Hypertension  Assessment & Plan  Due to persistent hyponatremia with decreased p o  intake we will hold Lasix    History of drug abuse ([facility] [address] )  Assessment & Plan  Known h/o  Last use about a decade before as per patient and daughter  As per daughter, patient takes once a day prn percocet at home  PDMP checked   Last filled in 9-2022, 40 tabs for 10 days as needed without refills  He used medical marihuana for chronic pain  Peptic ulcer disease  Assessment & Plan  As mentioned in GIB  Continue PPI BID    History of GI bleed  Assessment & Plan  Had massive GIB in 2021  Required exploratory laparotomy  Follows up with GI OP  Known h/o duodenal ulcer  Continue protonix B(ID  NO NSAIDS  Hb stable    History of DVT (deep vein thrombosis)  Assessment & Plan  Noted to have extensive LLE DVT in 7/2022  Follows up with hematology OP  Continue eliquis at home dose for anticoagulation  They recommended lifelong anticoagulation for now    Dementia [facility])  Assessment & Plan  cognitive impairment at baseline  ,pre confused compared to his baseline per daughter  Delirium precautions  Rest as above  Will get psychiatry , decreased dose of gabapentin and seroquel is at 400mg for long time -continue with Seroquel for now  Will DC trazodone per psychiatry    Altered mental status  Assessment & Plan  · Likely metabolic encephalopathy, present on admissio  · As per daughter, he does have dementia but to her, he is more confused than his baseline  · No focal neurological deficits  · Likely metabolic in setting of recent viral illness, volume depletion and presumed UTI  · Mx as above  · Psychiatric evaluation appreciated  Will DC trazodone        VTE Pharmacologic Prophylaxis:   eliquis    Patient Centered Rounds: I performed bedside rounds with nursing staff today  Discussions with Specialists or Other Care Team Provider:     Education and Discussions with Family / Patient: Updated  (son) via phone  Time Spent for Care: 30 minutes  More than 50% of total time spent on counseling and coordination of care as described above      Current Length of Stay: 2 day(s)  Current Patient Status: Inpatient   Certification Statement: The patient will continue to require additional inpatient hospital stay due to  hyponatremia  Discharge Plan: Anticipate discharge in 24-48 hrs to home with home services  Code Status: Level 3 - DNAR and DNI    Subjective:   Seen and examined  Discussed with the nurses about encouraging p o  intake  Patient   Refused IV fluids yesterday    Objective:     Vitals:   Temp (24hrs), Av 3 °F (36 8 °C), Min:97 7 °F (36 5 °C), Max:98 8 °F (37 1 °C)    Temp:  [97 7 °F (36 5 °C)-98 8 °F (37 1 °C)] 98 4 °F (36 9 °C)  HR:  [84] 84  Resp:  [16-17] 16  BP: (123-132)/(68-74) 132/68  Body mass index is 21 24 kg/m²  Input and Output Summary (last 24 hours): Intake/Output Summary (Last 24 hours) at 12/15/2022 181  Last data filed at 12/15/2022 1701  Gross per 24 hour   Intake 180 ml   Output 400 ml   Net -220 ml       Physical Exam:   Physical Exam  Constitutional:       Appearance: He is not ill-appearing  HENT:      Head: Normocephalic  Nose: Nose normal    Eyes:      General: No scleral icterus  Cardiovascular:      Rate and Rhythm: Normal rate and regular rhythm  Heart sounds: Normal heart sounds  Pulmonary:      Comments: Decreased breath sounds bilateral  Abdominal:      General: Abdomen is flat  There is no distension  Musculoskeletal:      Right lower leg: No edema  Left lower leg: No edema  Neurological:      General: No focal deficit present  Mental Status: He is alert  Cranial Nerves: No cranial nerve deficit  Additional Data:     Labs:  Results from last 7 days   Lab Units 12/15/22  0447 22  0642 22  0507   WBC Thousand/uL 3 63*   < > 5 39   HEMOGLOBIN g/dL 12 0   < > 13 4   HEMATOCRIT % 36 1*   < > 41 4   PLATELETS Thousands/uL 220   < > 226   NEUTROS PCT %  --   --  73   LYMPHS PCT %  --   --  15   MONOS PCT %  --   --  11   EOS PCT %  --   --  0    < > = values in this interval not displayed       Results from last 7 days   Lab Units 12/15/22  0447 22  0642   SODIUM mmol/L 131* 132*   POTASSIUM mmol/L 3 4* 3 1*   CHLORIDE mmol/L 97 95*   CO2 mmol/L 28 30   BUN mg/dL 19 21   CREATININE mg/dL 1  05 1 37*   ANION GAP mmol/L 6 7   CALCIUM mg/dL 8 8 9 0   ALBUMIN g/dL  --  2 9*   TOTAL BILIRUBIN mg/dL  --  0 50   ALK PHOS U/L  --  42*   ALT U/L  --  8*   AST U/L  --  34   GLUCOSE RANDOM mg/dL 92 86         Results from last 7 days   Lab Units 12/12/22  1143   POC GLUCOSE mg/dl 119               Lines/Drains:  Invasive Devices     None                       Imaging: No pertinent imaging reviewed  Recent Cultures (last 7 days):         Last 24 Hours Medication List:   Current Facility-Administered Medications   Medication Dose Route Frequency Provider Last Rate   • apixaban  5 mg Oral BID Albin Bagley MD     • atorvastatin  40 mg Oral Daily Albin Bagley MD     • cholecalciferol  1,000 Units Oral Daily Albin Bagley MD     • DULoxetine  60 mg Oral Daily Albin Bagley MD     • gabapentin  400 mg Oral 4x Daily Judah Stallworth MD     • metoprolol tartrate  12 5 mg Oral Q12H [address] Albin Bagley MD     • nicotine  7 mg Transdermal Daily Caryl Ann PA-C     • ondansetron  4 mg Intramuscular Q4H PRN Minal Maravilla PA-C     • pantoprazole  40 mg Oral BID AC Albin Bagley MD     • predniSONE  5 mg Oral Daily Albin Bagley MD     • QUEtiapine  400 mg Oral HS Albin Bagley MD     • sodium chloride  500 mL Intravenous Once Nikki Alvarez MD     • sodium chloride  1 g Oral TID With Meals Nikki Alvarez MD     • tamsulosin  0 4 mg Oral Daily With Bipin Moss MD          Today, Patient Was Seen By: Nikki Alvarez MD    **Please Note: This note may have been constructed using a voice recognition system  **

## 2022-12-15 NOTE — ASSESSMENT & PLAN NOTE
cognitive impairment at baseline  ,pre confused compared to his baseline per daughter  Delirium precautions  Rest as above  Will get psychiatry , decrease dose of gabapentin and seroquel is at 400mg for long time -continue with Seroquel for now    Will DC trazodone per psychiatry

## 2022-12-15 NOTE — ASSESSMENT & PLAN NOTE
Mild  Likely in setting of volume depltion  Hold diuretics-patient refused IV fluids discussed with nursing about encouraging p o  intake  Salt tablets

## 2022-12-15 NOTE — RESTORATIVE TECHNICIAN NOTE
Restorative Technician Note      Patient Name: Derick Lee     Restorative Tech Visit Date: 12/15/22  Note Type: Mobility  Patient Position Upon Consult: Supine  Activity Performed: Ambulated  Assistive Device: Standard walker; Other (Comment) (2nd person for chair follow)  Education Provided: Yes  Patient Position at End of Consult: Bedside chair;  All needs within reach    Mickey Mathews  DPT, Restorative Technician

## 2022-12-15 NOTE — OCCUPATIONAL THERAPY NOTE
Occupational Therapy Cancel Note       12/15/22 1317   OT Last Visit   OT Visit Date 12/15/22   Note Type   Note type Cancelled Session   Cancel Reasons Refusal       OT orders received and chart reviewed  Pt greeted bedside this PM and refused skilled OT services  Pt educated on importance of participating in ADLs, OOB activities, and exercise, however, Pt refused  Pt states "I was already in the chair today " Will continue to follow and complete OT treatment as Pt willing to participate         Christa Duckworth MS, OTR/L

## 2022-12-15 NOTE — ASSESSMENT & PLAN NOTE
· Unclear if he fell or had a syncopal event  · Patient does have cognitive impairment at baseline  · Was found by the side of the bed by daughter and care giver  · He was somewhat altered than his baseline per daughter but was able to recognize her and did tno have "stroke like symptoms"  · Was prescribed abx by PCP for presumed UTI (day 3/3)  · CT C spine: No cervical spine fracture or traumatic malalignment  · CT head: no acute intracranial abnormalities  · CXR: Streaky opacity in left lower lobe, likely subsegmental atelectasis  Emphysema  Healed right rib fracture deformities  · Na 132  · Last drug use a decade ago and last drink 2-3 years back per daughter and patient  · Suspect in setting of volume depletion as patient was recoring from viral illness and did not eat or drink well for last 3 days      PLAN:  · UA reviewed  · Check UDS and serum drug screen-positive for THC  · Neuro checks  · PT OT eval  · Complete bactrim course for presumed UTI  · MRI of the brain was not optimal study due to claustrophobia however no major acute infarct noted

## 2022-12-15 NOTE — ASSESSMENT & PLAN NOTE
Mild  Likely in setting of volume depltion  Hold diuretics-status post IVF Vitamin D 25 hydroxy added on. PTH can not be added on.   Osmany made aware and will stop in for PTH tomorrow. Lab placed.

## 2022-12-15 NOTE — ASSESSMENT & PLAN NOTE
· Likely metabolic encephalopathy, present on admissio  · As per daughter, he does have dementia but to her, he is more confused than his baseline  · No focal neurological deficits  · Likely metabolic in setting of recent viral illness, volume depletion and presumed UTI  · Mx as above  · Psychiatric evaluation appreciated    Will SILVIO keller

## 2022-12-15 NOTE — ASSESSMENT & PLAN NOTE
cognitive impairment at baseline  ,pre confused compared to his baseline per daughter  Delirium precautions  Rest as above  Will get psychiatry , decreased dose of gabapentin and seroquel is at 400mg for long time -continue with Seroquel for now    Will DC trazodone per psychiatry

## 2022-12-15 NOTE — PLAN OF CARE
Problem: PAIN - ADULT  Goal: Verbalizes/displays adequate comfort level or baseline comfort level  Description: Interventions:  - Encourage patient to monitor pain and request assistance  - Assess pain using appropriate pain scale  - Administer analgesics based on type and severity of pain and evaluate response  - Implement non-pharmacological measures as appropriate and evaluate response  - Consider cultural and social influences on pain and pain management  - Notify physician/advanced practitioner if interventions unsuccessful or patient reports new pain  Outcome: Progressing     Problem: INFECTION - ADULT  Goal: Absence or prevention of progression during hospitalization  Description: INTERVENTIONS:  - Assess and monitor for signs and symptoms of infection  - Monitor lab/diagnostic results  - Monitor all insertion sites, i e  indwelling lines, tubes, and drains  - Monitor endotracheal if appropriate and nasal secretions for changes in amount and color  - Trent appropriate cooling/warming therapies per order  - Administer medications as ordered  - Instruct and encourage patient and family to use good hand hygiene technique  - Identify and instruct in appropriate isolation precautions for identified infection/condition  Outcome: Progressing     Problem: DISCHARGE PLANNING  Goal: Discharge to home or other facility with appropriate resources  Description: INTERVENTIONS:  - Identify barriers to discharge w/patient and caregiver  - Arrange for needed discharge resources and transportation as appropriate  - Identify discharge learning needs (meds, wound care, etc )  - Arrange for interpretive services to assist at discharge as needed  - Refer to Case Management Department for coordinating discharge planning if the patient needs post-hospital services based on physician/advanced practitioner order or complex needs related to functional status, cognitive ability, or social support system  Outcome: Progressing Problem: Knowledge Deficit  Goal: Patient/family/caregiver demonstrates understanding of disease process, treatment plan, medications, and discharge instructions  Description: Complete learning assessment and assess knowledge base  Interventions:  - Provide teaching at level of understanding  - Provide teaching via preferred learning methods  Outcome: Progressing     Problem: Prexisting or High Potential for Compromised Skin Integrity  Goal: Skin integrity is maintained or improved  Description: INTERVENTIONS:  - Identify patients at risk for skin breakdown  - Assess and monitor skin integrity  - Assess and monitor nutrition and hydration status  - Monitor labs   - Assess for incontinence   - Turn and reposition patient  - Assist with mobility/ambulation  - Relieve pressure over bony prominences  - Avoid friction and shearing  - Provide appropriate hygiene as needed including keeping skin clean and dry  - Evaluate need for skin moisturizer/barrier cream  - Collaborate with interdisciplinary team   - Patient/family teaching  - Consider wound care consult   Outcome: Progressing     Problem: Nutrition/Hydration-ADULT  Goal: Nutrient/Hydration intake appropriate for improving, restoring or maintaining nutritional needs  Description: Monitor and assess patient's nutrition/hydration status for malnutrition  Collaborate with interdisciplinary team and initiate plan and interventions as ordered  Monitor patient's weight and dietary intake as ordered or per policy  Utilize nutrition screening tool and intervene as necessary  Determine patient's food preferences and provide high-protein, high-caloric foods as appropriate       INTERVENTIONS:  - Monitor oral intake, urinary output, labs, and treatment plans  - Assess nutrition and hydration status and recommend course of action  - Evaluate amount of meals eaten  - Assist patient with eating if necessary   - Allow adequate time for meals  - Recommend/ encourage appropriate diets, oral nutritional supplements, and vitamin/mineral supplements  - Order, calculate, and assess calorie counts as needed  - Recommend, monitor, and adjust tube feedings and TPN/PPN based on assessed needs  - Assess need for intravenous fluids  - Provide specific nutrition/hydration education as appropriate  - Include patient/family/caregiver in decisions related to nutrition  Outcome: Progressing     Problem: CARDIOVASCULAR - ADULT  Goal: Maintains optimal cardiac output and hemodynamic stability  Description: INTERVENTIONS:  - Monitor I/O, vital signs and rhythm  - Monitor for S/S and trends of decreased cardiac output  - Administer and titrate ordered vasoactive medications to optimize hemodynamic stability  - Assess quality of pulses, skin color and temperature  - Assess for signs of decreased coronary artery perfusion  - Instruct patient to report change in severity of symptoms  Outcome: Progressing  Goal: Absence of cardiac dysrhythmias or at baseline rhythm  Description: INTERVENTIONS:  - Continuous cardiac monitoring, vital signs, obtain 12 lead EKG if ordered  - Administer antiarrhythmic and heart rate control medications as ordered  - Monitor electrolytes and administer replacement therapy as ordered  Outcome: Progressing     Problem: GASTROINTESTINAL - ADULT  Goal: Maintains adequate nutritional intake  Description: INTERVENTIONS:  - Monitor percentage of each meal consumed  - Identify factors contributing to decreased intake, treat as appropriate  - Assist with meals as needed  - Monitor I&O, weight, and lab values if indicated  - Obtain nutrition services referral as needed  Outcome: Progressing     Problem: METABOLIC, FLUID AND ELECTROLYTES - ADULT  Goal: Electrolytes maintained within normal limits  Description: INTERVENTIONS:  - Monitor labs and assess patient for signs and symptoms of electrolyte imbalances  - Administer electrolyte replacement as ordered  - Monitor response to electrolyte replacements, including repeat lab results as appropriate  - Instruct patient on fluid and nutrition as appropriate  Outcome: Progressing

## 2022-12-15 NOTE — ASSESSMENT & PLAN NOTE
· As per daughter, he does have dementia but to her, he is more confused than his baseline  · No focal neurological deficits  · Likely metabolic in setting of recent viral illness, volume depletion and presumed UTI  · Mx as above  · Psychiatric evaluation appreciated    Will DC susane

## 2022-12-15 NOTE — PROGRESS NOTES
[facility]  Progress Note - Lydia Menjivar 1950, 73 y o  male MRN: [id number]  [facility] En[facility]: [id number]  Primary Care Provider: Katlin Manuel MD   Date and time admitted to hospital: 12/12/2022 10:32 AM    * Syncope vs fall  Assessment & Plan  · Unclear if he fell or had a syncopal event  · Patient does have cognitive impairment at baseline  · Was found by the side of the bed by daughter and care giver  · He was somewhat altered than his baseline per daughter but was able to recognize her and did tno have "stroke like symptoms"  · Was prescribed abx by PCP for presumed UTI (day 3/3)  · CT C spine: No cervical spine fracture or traumatic malalignment  · CT head: no acute intracranial abnormalities  · CXR: Streaky opacity in left lower lobe, likely subsegmental atelectasis  Emphysema  Healed right rib fracture deformities  · Na 132  · Last drug use a decade ago and last drink 2-3 years back per daughter and patient  · Suspect in setting of volume depletion as patient was recoring from viral illness and did not eat or drink well for last 3 days  PLAN:  · Check orthostatic vitals  · UA reviewed  · Check UDS and serum drug screen-positive for stated  · Neuro checks  · If work up negative and concerns for seizures, obtain EEG and neurology eval   · IV hydration  · PT OT eval  · Complete bactrim course for presumed UTI  · MRI of the brain was not optimal study due to claustrophobia however no major acute infarct noted      Hyponatremia  Assessment & Plan  Mild  Likely in setting of volume depltion  Hold diuretics-status post IVF    Hypertension  Assessment & Plan  cotninue home dose lasix and metoprolol with holding parameters    History of drug abuse ([facility] [place] [address] )  Assessment & Plan  Known h/o  Last use about a decade before as per patient and daughter  As per daughter, patient takes once a day prn percocet at home  PDMP checked   Last filled in 9-2022, 40 tabs for 10 days as needed without refills  He used medical marihuana for chronic pain  Peptic ulcer disease  Assessment & Plan  As mentioned in GIB  Continue PPI BID    History of GI bleed  Assessment & Plan  Had massive GIB in 2021  Required exploratory laparotomy  Follows up with GI OP  Known h/o duodenal ulcer  Continue protonix B(ID  NO NSAIDS  Hb stable    History of DVT (deep vein thrombosis)  Assessment & Plan  Noted to have extensive LLE DVT in 7/2022  Follows up with hematology OP  Continue eliquis at home dose for anticoagulation  They recommended lifelong anticoagulation for now    Dementia [facility])  Assessment & Plan  cognitive impairment at baseline  ,pre confused compared to his baseline per daughter  Delirium precautions  Rest as above  Will get psychiatry , decrease dose of gabapentin and seroquel is at 400mg for long time -continue with Seroquel for now  Will DC trazodone per psychiatry    Altered mental status  Assessment & Plan  · As per daughter, he does have dementia but to her, he is more confused than his baseline  · No focal neurological deficits  · Likely metabolic in setting of recent viral illness, volume depletion and presumed UTI  · Mx as above  · Psychiatric evaluation appreciated  Will DC trazodone      VTE Pharmacologic Prophylaxis:   apixaban     Patient Centered Rounds: d/w nursing  Discussions with Specialists or Other Care Team Provider:     Education and Discussions with Family / Patient: Updated  (son) via phone  Time Spent for Care: 30 minutes  More than 50% of total time spent on counseling and coordination of care as described above  Current Length of Stay: 1 day(s)  Current Patient Status: Inpatient   Certification Statement: The patient will continue to require additional inpatient hospital stay due to  hyponatremia  Discharge Plan: Anticipate discharge tomorrow to home with home services      Code Status: Level 3 - DNAR and DNI    Subjective:       Objective:     Vitals:   Temp (24hrs), Av 9 °F (36 6 °C), Min:97 3 °F (36 3 °C), Max:98 6 °F (37 °C)    Temp:  [97 3 °F (36 3 °C)-98 6 °F (37 °C)] 98 6 °F (37 °C)  HR:  [74-82] 74  Resp:  [16-20] 20  BP: (136-151)/(80-90) 136/80  SpO2:  [90 %-92 %] 90 %  Body mass index is 21 5 kg/m²  Input and Output Summary (last 24 hours): Intake/Output Summary (Last 24 hours) at 2022 1926  Last data filed at 2022 3773  Gross per 24 hour   Intake --   Output 125 ml   Net -125 ml       Physical Exam:   Physical Exam  Constitutional:       General: He is not in acute distress  Appearance: He is not ill-appearing  Comments: Chronic ill appearing male    HENT:      Head: Normocephalic and atraumatic  Nose: Nose normal    Eyes:      General: No scleral icterus  Cardiovascular:      Rate and Rhythm: Normal rate  Heart sounds: Normal heart sounds  Pulmonary:      Effort: Pulmonary effort is normal    Abdominal:      General: There is no distension  Musculoskeletal:      Right lower leg: No edema  Left lower leg: No edema  Skin:     General: Skin is warm  Neurological:      Mental Status: He is alert        Comments: Not oriented to time or person           Additional Data:     Labs:  Results from last 7 days   Lab Units 22  0642 22  0507   WBC Thousand/uL 5 11 5 39   HEMOGLOBIN g/dL 14 1 13 4   HEMATOCRIT % 42 2 41 4   PLATELETS Thousands/uL 229 226   NEUTROS PCT %  --  73   LYMPHS PCT %  --  15   MONOS PCT %  --  11   EOS PCT %  --  0     Results from last 7 days   Lab Units 22  0642   SODIUM mmol/L 132*   POTASSIUM mmol/L 3 1*   CHLORIDE mmol/L 95*   CO2 mmol/L 30   BUN mg/dL 21   CREATININE mg/dL 1 37*   ANION GAP mmol/L 7   CALCIUM mg/dL 9 0   ALBUMIN g/dL 2 9*   TOTAL BILIRUBIN mg/dL 0 50   ALK PHOS U/L 42*   ALT U/L 8*   AST U/L 34   GLUCOSE RANDOM mg/dL 86         Results from last 7 days   Lab Units 22  1143   POC GLUCOSE mg/dl 119               Lines/Drains:  Invasive Devices     None                       Imaging: Reviewed radiology reports from this admission including: MRI brain    Recent Cultures (last 7 days):         Last 24 Hours Medication List:   Current Facility-Administered Medications   Medication Dose Route Frequency Provider Last Rate   • apixaban  5 mg Oral BID Yokasta Hutton MD     • atorvastatin  40 mg Oral Daily Yokasta Hutton MD     • cholecalciferol  1,000 Units Oral Daily Yokasta Hutton MD     • DULoxetine  60 mg Oral Daily Yokasta Hutton MD     • gabapentin  400 mg Oral 4x Daily Bess Ewing MD     • metoprolol tartrate  12 5 mg Oral Q12H [address] Yokasta Hutton MD     • nicotine  7 mg Transdermal Daily Caryl Maya PA-C     • ondansetron  4 mg Intramuscular Q4H PRN Darrell Chavez PA-C     • pantoprazole  40 mg Oral BID AC Yokasta Hutton MD     • predniSONE  5 mg Oral Daily Yokasta Hutton MD     • QUEtiapine  400 mg Oral HS Yokasta Hutton MD     • tamsulosin  0 4 mg Oral Daily With Sukhi Oliveros MD          Today, Patient Was Seen By: Gene Barrios MD    **Please Note: This note may have been constructed using a voice recognition system  **

## 2022-12-16 VITALS
HEIGHT: 65 IN | RESPIRATION RATE: 16 BRPM | WEIGHT: 121.25 LBS | DIASTOLIC BLOOD PRESSURE: 73 MMHG | OXYGEN SATURATION: 90 % | HEART RATE: 71 BPM | BODY MASS INDEX: 20.2 KG/M2 | SYSTOLIC BLOOD PRESSURE: 138 MMHG | TEMPERATURE: 97.7 F

## 2022-12-16 LAB
ANION GAP SERPL CALCULATED.3IONS-SCNC: 5 MMOL/L (ref 4–13)
BUN SERPL-MCNC: 16 MG/DL (ref 5–25)
CALCIUM SERPL-MCNC: 8.9 MG/DL (ref 8.3–10.1)
CHLORIDE SERPL-SCNC: 101 MMOL/L (ref 96–108)
CO2 SERPL-SCNC: 29 MMOL/L (ref 21–32)
CREAT SERPL-MCNC: 0.79 MG/DL (ref 0.6–1.3)
ERYTHROCYTE [DISTWIDTH] IN BLOOD BY AUTOMATED COUNT: 14.7 % (ref 11.6–15.1)
GFR SERPL CREATININE-BSD FRML MDRD: 91 ML/MIN/1.73SQ M
GLUCOSE SERPL-MCNC: 131 MG/DL (ref 65–140)
HCT VFR BLD AUTO: 37.1 % (ref 36.5–49.3)
HGB BLD-MCNC: 11.9 G/DL (ref 12–17)
MCH RBC QN AUTO: 30.8 PG (ref 26.8–34.3)
MCHC RBC AUTO-ENTMCNC: 32.1 G/DL (ref 31.4–37.4)
MCV RBC AUTO: 96 FL (ref 82–98)
PLATELET # BLD AUTO: 207 THOUSANDS/UL (ref 149–390)
PMV BLD AUTO: 10 FL (ref 8.9–12.7)
POTASSIUM SERPL-SCNC: 3.5 MMOL/L (ref 3.5–5.3)
RBC # BLD AUTO: 3.86 MILLION/UL (ref 3.88–5.62)
SODIUM SERPL-SCNC: 135 MMOL/L (ref 135–147)
WBC # BLD AUTO: 3.33 THOUSAND/UL (ref 4.31–10.16)

## 2022-12-16 RX ORDER — FUROSEMIDE 20 MG/1
20 TABLET ORAL DAILY PRN
Refills: 0
Start: 2022-12-16

## 2022-12-16 RX ORDER — GABAPENTIN 400 MG/1
400 CAPSULE ORAL 4 TIMES DAILY
Qty: 120 CAPSULE | Refills: 0 | Status: SHIPPED | OUTPATIENT
Start: 2022-12-16

## 2022-12-16 RX ADMIN — GABAPENTIN 400 MG: 400 CAPSULE ORAL at 08:35

## 2022-12-16 RX ADMIN — Medication 1000 UNITS: at 08:35

## 2022-12-16 RX ADMIN — NICOTINE 7 MG: 7 PATCH, EXTENDED RELEASE TRANSDERMAL at 08:39

## 2022-12-16 RX ADMIN — ATORVASTATIN CALCIUM 40 MG: 40 TABLET, FILM COATED ORAL at 08:35

## 2022-12-16 RX ADMIN — SODIUM CHLORIDE TAB 1 GM 1 G: 1 TAB at 08:35

## 2022-12-16 RX ADMIN — APIXABAN 5 MG: 5 TABLET, FILM COATED ORAL at 08:37

## 2022-12-16 RX ADMIN — PANTOPRAZOLE SODIUM 40 MG: 40 TABLET, DELAYED RELEASE ORAL at 06:16

## 2022-12-16 RX ADMIN — Medication 12.5 MG: at 08:35

## 2022-12-16 RX ADMIN — PREDNISONE 5 MG: 5 TABLET ORAL at 08:35

## 2022-12-16 RX ADMIN — DULOXETINE HYDROCHLORIDE 60 MG: 60 CAPSULE, DELAYED RELEASE ORAL at 08:38

## 2022-12-16 NOTE — PLAN OF CARE
Problem: PAIN - ADULT  Goal: Verbalizes/displays adequate comfort level or baseline comfort level  Description: Interventions:  - Encourage patient to monitor pain and request assistance  - Assess pain using appropriate pain scale  - Administer analgesics based on type and severity of pain and evaluate response  - Implement non-pharmacological measures as appropriate and evaluate response  - Consider cultural and social influences on pain and pain management  - Notify physician/advanced practitioner if interventions unsuccessful or patient reports new pain  Outcome: Progressing     Problem: INFECTION - ADULT  Goal: Absence or prevention of progression during hospitalization  Description: INTERVENTIONS:  - Assess and monitor for signs and symptoms of infection  - Monitor lab/diagnostic results  - Monitor all insertion sites, i e  indwelling lines, tubes, and drains  - Monitor endotracheal if appropriate and nasal secretions for changes in amount and color  - [place] appropriate cooling/warming therapies per order  - Administer medications as ordered  - Instruct and encourage patient and family to use good hand hygiene technique  - Identify and instruct in appropriate isolation precautions for identified infection/condition  Outcome: Progressing     Problem: DISCHARGE PLANNING  Goal: Discharge to home or other facility with appropriate resources  Description: INTERVENTIONS:  - Identify barriers to discharge w/patient and caregiver  - Arrange for needed discharge resources and transportation as appropriate  - Identify discharge learning needs (meds, wound care, etc )  - Arrange for interpretive services to assist at discharge as needed  - Refer to Case Management Department for coordinating discharge planning if the patient needs post-hospital services based on physician/advanced practitioner order or complex needs related to functional status, cognitive ability, or social support system  Outcome: Progressing Problem: Knowledge Deficit  Goal: Patient/family/caregiver demonstrates understanding of disease process, treatment plan, medications, and discharge instructions  Description: Complete learning assessment and assess knowledge base  Interventions:  - Provide teaching at level of understanding  - Provide teaching via preferred learning methods  Outcome: Progressing     Problem: Prexisting or High Potential for Compromised Skin Integrity  Goal: Skin integrity is maintained or improved  Description: INTERVENTIONS:  - Identify patients at risk for skin breakdown  - Assess and monitor skin integrity  - Assess and monitor nutrition and hydration status  - Monitor labs   - Assess for incontinence   - Turn and reposition patient  - Assist with mobility/ambulation  - Relieve pressure over bony prominences  - Avoid friction and shearing  - Provide appropriate hygiene as needed including keeping skin clean and dry  - Evaluate need for skin moisturizer/barrier cream  - Collaborate with interdisciplinary team   - Patient/family teaching  - Consider wound care consult   Outcome: Progressing     Problem: Nutrition/Hydration-ADULT  Goal: Nutrient/Hydration intake appropriate for improving, restoring or maintaining nutritional needs  Description: Monitor and assess patient's nutrition/hydration status for malnutrition  Collaborate with interdisciplinary team and initiate plan and interventions as ordered  Monitor patient's weight and dietary intake as ordered or per policy  Utilize nutrition screening tool and intervene as necessary  Determine patient's food preferences and provide high-protein, high-caloric foods as appropriate       INTERVENTIONS:  - Monitor oral intake, urinary output, labs, and treatment plans  - Assess nutrition and hydration status and recommend course of action  - Evaluate amount of meals eaten  - Assist patient with eating if necessary   - Allow adequate time for meals  - Recommend/ encourage appropriate diets, oral nutritional supplements, and vitamin/mineral supplements  - Order, calculate, and assess calorie counts as needed  - Recommend, monitor, and adjust tube feedings and TPN/PPN based on assessed needs  - Assess need for intravenous fluids  - Provide specific nutrition/hydration education as appropriate  - Include patient/family/caregiver in decisions related to nutrition  Outcome: Progressing     Problem: CARDIOVASCULAR - ADULT  Goal: Maintains optimal cardiac output and hemodynamic stability  Description: INTERVENTIONS:  - Monitor I/O, vital signs and rhythm  - Monitor for S/S and trends of decreased cardiac output  - Administer and titrate ordered vasoactive medications to optimize hemodynamic stability  - Assess quality of pulses, skin color and temperature  - Assess for signs of decreased coronary artery perfusion  - Instruct patient to report change in severity of symptoms  Outcome: Progressing  Goal: Absence of cardiac dysrhythmias or at baseline rhythm  Description: INTERVENTIONS:  - Continuous cardiac monitoring, vital signs, obtain 12 lead EKG if ordered  - Administer antiarrhythmic and heart rate control medications as ordered  - Monitor electrolytes and administer replacement therapy as ordered  Outcome: Progressing     Problem: GASTROINTESTINAL - ADULT  Goal: Maintains adequate nutritional intake  Description: INTERVENTIONS:  - Monitor percentage of each meal consumed  - Identify factors contributing to decreased intake, treat as appropriate  - Assist with meals as needed  - Monitor I&O, weight, and lab values if indicated  - Obtain nutrition services referral as needed  Outcome: Progressing     Problem: METABOLIC, FLUID AND ELECTROLYTES - ADULT  Goal: Electrolytes maintained within normal limits  Description: INTERVENTIONS:  - Monitor labs and assess patient for signs and symptoms of electrolyte imbalances  - Administer electrolyte replacement as ordered  - Monitor response to electrolyte replacements, including repeat lab results as appropriate  - Instruct patient on fluid and nutrition as appropriate  Outcome: Progressing

## 2022-12-16 NOTE — DISCHARGE INSTR - AVS FIRST PAGE
Use Lasix as needed-if the weight increases more than 2 to 3 pounds above your baseline or lower extremity edema develop  BMP in a week-arrange through your family doctor

## 2022-12-16 NOTE — RESTORATIVE TECHNICIAN NOTE
Restorative Technician Note      Patient Name: Salo Martinez     Restorative Tech Visit Date: 12/16/22  Note Type: Mobility  Patient Position Upon Consult: Supine  Activity Performed: Ambulated; Stood (within room; performed ADLs EOB; gown change)  Assistive Device: Other (Comment) (HHA x1)  Patient Position at End of Consult: Bedside chair;  All needs within reach    Marcello Cronin  DPT, Restorative Technician

## 2022-12-16 NOTE — CASE MANAGEMENT
Case Management Discharge Planning Note    Patient name Charles Buchanan  Location [facility] 803/PPHP 136-83 MRN [id number]  : 1950 Date 2022       Current Admission Date: 2022  Current Admission Diagnosis:Syncope vs fall   Patient Active Problem List    Diagnosis Date Noted   • Syncope vs fall 2022   • Altered mental status 2022   • Dementia ([facility] [address] ) 2022   • History of DVT (deep vein thrombosis) 2022   • History of GI bleed 2022   • Peptic ulcer disease 2022   • History of drug abuse ([facility] [address] ) 2022   • Hypertension 2022   • Hyponatremia 2022      LOS (days): 3  Geometric Mean LOS (GMLOS) (days): 3 40  Days to GMLOS:0 5     OBJECTIVE:  Risk of Unplanned Readmission Score: 15 74         Current admission status: Inpatient   Preferred Pharmacy:   [address] [place] [address]  Phone: [phone] Fax: [phone]    Primary Care Provider: Narda Abraham MD    Primary Insurance: [facility]atha [facility]  Secondary Insurance:     DISCHARGE DETAILS:             IMM Given (Date):: 22  IMM Given to[de-identified] Patient       IMM reviewed with patient's caregiver, patient's caregiver agrees with discharge determination

## 2022-12-17 NOTE — DISCHARGE SUMMARY
Discharge Summary - [address] [facility]    Patient Information: Anneliese Reddy 81 y o  male MRN: [id number]  [facility]counter: [id number]    Discharging Physician / Practitioner: Brad Celis MD  PCP: Sai Flores MD  Admission Date: 12/12/2022  Discharge Date: 2/16/2022  Disposition:     Home with VNA Services (Reminder: Complete face to face encounter)    Reason for Admission: Syncope vs fall    Discharge Diagnoses:     Principal Problem:    Syncope vs fall  Active Problems:    Altered mental status    Dementia ([facility] )    History of DVT (deep vein thrombosis)    History of GI bleed    Peptic ulcer disease    History of drug abuse ([facility] )    Hypertension    Hyponatremia  Resolved Problems:    * No resolved hospital problems  *      Consultations During Hospital Stay:  ·     Procedures Performed:     ·     Significant Findings / Test Results:     · MRI brain -Incomplete exam due to patient claustrophobia no evidence of acute infarct otherwise limited evaluate  · CT spine cervical-no cervical spine fracture or traumatic malalignment  · CT head without contrast-no acute intracranial abnormality  · Chest x-ray-right costophrenic angle is not fully imaged and streaky opacity in the left lower lobe likely subsegmental atelectasis emphysema healed right rib fracture  · X ray trauma multiple-no acute bony abnormality  Incidental Findings:   ·     Test Results Pending at Discharge (will require follow up): Outpatient Tests Requested:  · BMP in a week    Complications:   none    Hospital Course:     Anneliese Reddy is a 81 y o  male patient who originally presented to the hospital on 12/12/2022 due to complete versus fall  Patient with dementia and lives at home with family  Patient was found down by his family in the morning  Unknown downtime  Unsure that his loss of consciousness or head injury  Patient was more confused than his baseline    It appears that the patient  and family is recovering from a wide-eyed illness last week  Patient with decreased p o  intake recently his PCP prescribed Bactrim since he was having foul-smelling urine  Patient finished the course of antibiotics before coming to the hospital   Patient was traumatic work-up was negative he was admitted to the hospital   Patient noted to have hyponatremia likely secondary to decreased p o  intake  Patient was also on Lasix  Hyponatremia improved after discontinuation of the Lasix  Patient was noted one-time dose of salt tablets  It is felt that the patient is hypobulimic hyponatremia  Patient refused IV fluids and IV line  This was discussed with patient's family that I will be out of his poor p o  intake and advanced dementia  No feeding tube as per family  Patient symptomatically improved  His appetite improved and his sodium level improved and he was discharged in a stable condition on 12/16/222  Follow data  Front of the chart    Condition at Discharge: stable     Discharge Day Visit / Exam:     Subjective: Patient seen and examined  Denies any specific complaints  Agreeable to go home  Vitals: Blood Pressure: 138/73 (12/16/22 0753)  Pulse: 71 (12/15/22 2203)  Temperature: 97 7 °F (36 5 °C) (12/16/22 0753)  Temp Source: Temporal (12/12/22 2346)  Respirations: 16 (12/16/22 0753)  Height: 5' 5" (165 1 cm) (per encounter review) (12/13/22 1432)  Weight - Scale: 55 kg (121 lb 4 1 oz) (12/16/22 0226)  SpO2: 90 % (12/14/22 0702)  Exam:   Physical Exam  Constitutional:       General: He is not in acute distress  Comments: Cachectic appearing male   HENT:      Head: Normocephalic and atraumatic  Nose: Nose normal    Eyes:      General: No scleral icterus  Cardiovascular:      Rate and Rhythm: Normal rate and regular rhythm  Heart sounds: Normal heart sounds  Pulmonary:      Breath sounds: Normal breath sounds  Abdominal:      General: Abdomen is flat     Musculoskeletal:         General: Normal range of motion  Skin:     General: Skin is warm  Neurological:      Mental Status: He is alert  Mental status is at baseline  Discussion with Family: Son and daughter-in-law    Discharge instructions/Information to patient and family:   See after visit summary for information provided to patient and family  Provisions for Follow-Up Care:  See after visit summary for information related to follow-up care and any pertinent home health orders  Planned Readmission:  none     Discharge Statement:  I spent 45 minutes discharging the patient  This time was spent on the day of discharge  I had direct contact with the patient on the day of discharge  Greater than 50% of the total time was spent examining patient, answering all patient questions, arranging and discussing plan of care with patient as well as directly providing post-discharge instructions  Additional time then spent on discharge activities  Discharge Medications:  See after visit summary for reconciled discharge medications provided to patient and family        ** Please Note: This note has been constructed using a voice recognition system **

## 2022-12-19 NOTE — UTILIZATION REVIEW
NOTIFICATION OF ADMISSION DISCHARGE   This is a Notification of Discharge from [facility]  Please be advised that this patient has been discharge from our facility  Below you will find the admission and discharge date and time including the patient’s disposition  UTILIZATION REVIEW CONTACT:  Kathy Fuentes  Utilization   Network Utilization Review Department  Phone: [phone] x carefully listen to the prompts  All voicemails are confidential   Email: [email or web address]     ADMISSION INFORMATION  PRESENTATION DATE: 12/12/2022 10:32 AM  OBERVATION ADMISSION DATE:   INPATIENT ADMISSION DATE: 12/13/22  3:11 PM   DISCHARGE DATE: 12/16/2022  4:18 PM   DISPOSITION:Home/Self Care    IMPORTANT INFORMATION:  Send all requests for admission clinical reviews, approved or denied determinations and any other requests to dedicated fax number below belonging to the campus where the patient is receiving treatment   List of dedicated fax numbers:  [address] DENIALS (Administrative/Medical Necessity) [phone]   [address] (Maternity/NICU/Pediatrics) [phone]   [facility] [phone]   [facility] 87 [phone]   [address] [phone]   220 [facility] [phone] 90 [facility] [phone]   [address] [place] [address] 119 [phone]   [facility]  [phone]   [facility] [phone]   [address] [place] [address] 850 [facility] [phone]

## 2022-12-28 ENCOUNTER — VBI (OUTPATIENT)
Dept: ADMINISTRATIVE | Facility: OTHER | Age: 72
End: 2022-12-28

## 2022-12-28 NOTE — TELEPHONE ENCOUNTER
12/28/22 11:15 AM     VB [employer] SmartForm used to document caregap status      [facility]

## 2023-03-01 NOTE — QUICK NOTE
Cervical Collar Clearance: The patient had a CT scan of the cervical spine demonstrating no acute injury  On exam, the patient had no midline point tenderness or paresthesias/numbness/weakness in the extremities  The patient had full range of motion (was then able to flex, extend, and rotate head laterally) without pain  There were no distracting injuries and the patient was not intoxicated  The patient's cervical spine was cleared radiologically and clinically  Cervical collar removed at this time       Micha Dontao MD  12/12/2022 5:19 PM
Detail Level: Generalized
Detail Level: Detailed

## 2023-08-18 NOTE — ANESTHESIA POSTPROCEDURE EVALUATION
Post-Op Assessment Note    CV Status:  Stable    Pain management: adequate     Mental Status:  Sleepy (Pt sedated)   Hydration Status:  Stable   PONV Controlled:  Controlled   Airway Patency:  Patent  Airway: intubated   Post Op Vitals Reviewed: Yes      Staff: Anesthesiologist, CRNA           BP   11/75   Temp      Pulse  74   Resp  14   SpO2 100 % (01/11/20 1415) Spine appears normal, range of motion is not limited, no muscle or joint tenderness

## 2024-04-23 NOTE — PROGRESS NOTES
Take Venlafaxine 75 mg daily for 7 days. Then Take Venlafaxine 37.5 mg for 7 days. Then start Duloxetine after finished with venlafaxine taper   The metronidazole has / have been converted to Oral per [facility] IV-to-PO Auto-Conversion Protocol for Adults as approved by the Pharmacy and Therapeutics Committee  The patient met all eligible criteria:  3 Age = 27years old   2) Received at least one dose of the IV form   3) Receiving at least one other scheduled oral/enteral medication   4) Tolerating an oral/enteral diet   and did not have any exclusions:   1) Critical care patient   2) Active GI bleed (IF assessing H2RAs or PPIs)   3) Continuous tube feeding (IF assessing cipro, doxycycline, levofloxacin, minocycline, rifampin, or voriconazole)   4) Receiving PO vancomycin (IF assessing metronidazole)   5) Persistent nausea and/or vomiting   6) Ileus or gastrointestinal obstruction   7) Sean/nasogastric tube set for continuous suction   8) Specific order not to automatically convert to PO (in the order's comments or if discussed in the most recent Infectious Disease or primary team's progress notes)     Thanks  Gala Allen, Pharmacist

## (undated) DEVICE — SUT ETHILON 3-0 FS-1 18 IN 663G

## (undated) DEVICE — BETHLEHEM UNIVERSAL OUTPATIENT: Brand: CARDINAL HEALTH

## (undated) DEVICE — SUT VICRYL 2-0 REEL 54 IN J286G

## (undated) DEVICE — SUT SILK 0 SH 30 IN K834H

## (undated) DEVICE — SPONGE LAP 18 X 18 IN

## (undated) DEVICE — SUT SILK 2-0 SH 30 IN K833H

## (undated) DEVICE — SUT SILK 2-0 SH CR/8 18 IN C012D

## (undated) DEVICE — BETHLEHEM MAJOR GENERAL PACK: Brand: CARDINAL HEALTH

## (undated) DEVICE — JP CHANNEL DRAIN, 19FR HUBLESS: Brand: CARDINAL HEALTH

## (undated) DEVICE — PROXIMATE PLUS MD MULTI-DIRECTIONAL RELEASE SKIN STAPLERS CONTAINS 35 STAINLESS STEEL STAPLES APPROXIMATE CLOSED DIMENSIONS: 6.9MM X 3.9MM WIDE: Brand: PROXIMATE

## (undated) DEVICE — COBAN 4 IN STERILE

## (undated) DEVICE — INTENDED FOR TISSUE SEPARATION, AND OTHER PROCEDURES THAT REQUIRE A SHARP SURGICAL BLADE TO PUNCTURE OR CUT.: Brand: BARD-PARKER SAFETY BLADES SIZE 10, STERILE

## (undated) DEVICE — DRESSING ALLEVYN LIFE HEEL 25 X 25.2CM

## (undated) DEVICE — LIGACLIP MCA MULTIPLE CLIP APPLIERS, 20 MEDIUM CLIPS: Brand: LIGACLIP

## (undated) DEVICE — Device

## (undated) DEVICE — SUT VICRYL 3-0 SH 27 IN J416H

## (undated) DEVICE — BULB SYRINGE,IRRIGATION WITH PROTECTIVE CAP: Brand: DOVER

## (undated) DEVICE — CHLORAPREP HI-LITE 26ML ORANGE

## (undated) DEVICE — ASTOUND STANDARD SURGICAL GOWN, XXL: Brand: CONVERTORS

## (undated) DEVICE — PLUMEPEN PRO 10FT

## (undated) DEVICE — SUT SILK 3-0 SH 30 IN K832H

## (undated) DEVICE — GLOVE INDICATOR PI UNDERGLOVE SZ 9 BLUE

## (undated) DEVICE — TIBURON SPLIT SHEET: Brand: CONVERTORS

## (undated) DEVICE — 3M™ TEGADERM™ TRANSPARENT FILM DRESSING FRAME STYLE, 1628, 6 IN X 8 IN (15 CM X 20 CM), 10/CT 8CT/CASE: Brand: 3M™ TEGADERM™

## (undated) DEVICE — 3M™ IOBAN™ 2 ANTIMICROBIAL INCISE DRAPE 6650EZ: Brand: IOBAN™ 2

## (undated) DEVICE — GLOVE SRG BIOGEL 9

## (undated) DEVICE — SOFT SILICONE HYDROCELLULAR SACRUM DRESSING WITH LOCK AWAY LAYER: Brand: ALLEVYN LIFE SACRUM (LARGE) PACK OF 10

## (undated) DEVICE — SUT PDS II 1 XLH 96 IN LOOPED Z881G

## (undated) DEVICE — SUT SILK 0 CT-1 30 IN 424H

## (undated) DEVICE — GAUZE SPONGES,16 PLY: Brand: CURITY

## (undated) DEVICE — SUT SILK 2-0 TIES 144 IN LA55G

## (undated) DEVICE — POOLE SUCTION HANDLE: Brand: CARDINAL HEALTH

## (undated) DEVICE — 3000CC GUARDIAN II: Brand: GUARDIAN

## (undated) DEVICE — TRAY FOLEY 16FR URIMETER SURESTEP

## (undated) DEVICE — PAD GROUNDING ADULT

## (undated) DEVICE — SUT SILK 2-0 18 IN A185H

## (undated) DEVICE — INTENDED FOR TISSUE SEPARATION, AND OTHER PROCEDURES THAT REQUIRE A SHARP SURGICAL BLADE TO PUNCTURE OR CUT.: Brand: BARD-PARKER SAFETY BLADES SIZE 15, STERILE